# Patient Record
Sex: FEMALE | Race: BLACK OR AFRICAN AMERICAN | NOT HISPANIC OR LATINO | Employment: OTHER | URBAN - METROPOLITAN AREA
[De-identification: names, ages, dates, MRNs, and addresses within clinical notes are randomized per-mention and may not be internally consistent; named-entity substitution may affect disease eponyms.]

---

## 2017-02-18 ENCOUNTER — HOSPITAL ENCOUNTER (EMERGENCY)
Facility: HOSPITAL | Age: 42
Discharge: HOME | End: 2017-02-19
Attending: EMERGENCY MEDICINE
Payer: MEDICAID

## 2017-02-18 DIAGNOSIS — R73.9 HYPERGLYCEMIA: Primary | ICD-10-CM

## 2017-02-18 DIAGNOSIS — R07.89 CHEST PAIN, NON-CARDIAC: ICD-10-CM

## 2017-02-18 LAB
ALBUMIN SERPL BCP-MCNC: 3.5 G/DL
ALP SERPL-CCNC: 126 U/L
ALT SERPL W/O P-5'-P-CCNC: 20 U/L
ANION GAP SERPL CALC-SCNC: 12 MMOL/L
APTT BLDCRRT: 23.4 SEC
AST SERPL-CCNC: 15 U/L
B-HCG UR QL: NEGATIVE
BACTERIA #/AREA URNS HPF: ABNORMAL /HPF
BASOPHILS # BLD AUTO: 0.01 K/UL
BASOPHILS NFR BLD: 0.2 %
BILIRUB SERPL-MCNC: 0.3 MG/DL
BILIRUB UR QL STRIP: NEGATIVE
BUN SERPL-MCNC: 14 MG/DL
CALCIUM SERPL-MCNC: 9.7 MG/DL
CHLORIDE SERPL-SCNC: 96 MMOL/L
CK MB SERPL-MCNC: 2.1 NG/ML
CK MB SERPL-RTO: 2.8 %
CK SERPL-CCNC: 74 U/L
CLARITY UR: CLEAR
CO2 SERPL-SCNC: 25 MMOL/L
COLOR UR: ABNORMAL
CREAT SERPL-MCNC: 1.5 MG/DL
CTP QC/QA: YES
DIFFERENTIAL METHOD: NORMAL
EOSINOPHIL # BLD AUTO: 0.1 K/UL
EOSINOPHIL NFR BLD: 2.3 %
ERYTHROCYTE [DISTWIDTH] IN BLOOD BY AUTOMATED COUNT: 14.2 %
EST. GFR  (AFRICAN AMERICAN): 50 ML/MIN/1.73 M^2
EST. GFR  (NON AFRICAN AMERICAN): 43 ML/MIN/1.73 M^2
GLUCOSE SERPL-MCNC: 637 MG/DL
GLUCOSE UR QL STRIP: ABNORMAL
HCT VFR BLD AUTO: 43.8 %
HGB BLD-MCNC: 14.2 G/DL
HGB UR QL STRIP: ABNORMAL
INR PPP: 0.9
KETONES UR QL STRIP: NEGATIVE
LEUKOCYTE ESTERASE UR QL STRIP: NEGATIVE
LYMPHOCYTES # BLD AUTO: 1.5 K/UL
LYMPHOCYTES NFR BLD: 28.1 %
MAGNESIUM SERPL-MCNC: 2.2 MG/DL
MCH RBC QN AUTO: 27.6 PG
MCHC RBC AUTO-ENTMCNC: 32.4 %
MCV RBC AUTO: 85 FL
MICROSCOPIC COMMENT: ABNORMAL
MONOCYTES # BLD AUTO: 0.3 K/UL
MONOCYTES NFR BLD: 6.1 %
NEUTROPHILS # BLD AUTO: 3.3 K/UL
NEUTROPHILS NFR BLD: 63.3 %
NITRITE UR QL STRIP: NEGATIVE
PH UR STRIP: 7 [PH] (ref 5–8)
PLATELET # BLD AUTO: 287 K/UL
PMV BLD AUTO: 11 FL
POCT GLUCOSE: 361 MG/DL (ref 70–110)
POCT GLUCOSE: 387 MG/DL (ref 70–110)
POCT GLUCOSE: 402 MG/DL (ref 70–110)
POTASSIUM SERPL-SCNC: 4.3 MMOL/L
PROT SERPL-MCNC: 7.9 G/DL
PROT UR QL STRIP: NEGATIVE
PROTHROMBIN TIME: 9.5 SEC
RBC # BLD AUTO: 5.14 M/UL
RBC #/AREA URNS HPF: >100 /HPF (ref 0–4)
SODIUM SERPL-SCNC: 133 MMOL/L
SP GR UR STRIP: 1.02 (ref 1–1.03)
SQUAMOUS #/AREA URNS HPF: 4 /HPF
TROPONIN I SERPL DL<=0.01 NG/ML-MCNC: 0.02 NG/ML
TROPONIN I SERPL DL<=0.01 NG/ML-MCNC: <0.006 NG/ML
TSH SERPL DL<=0.005 MIU/L-ACNC: 0.88 UIU/ML
URN SPEC COLLECT METH UR: ABNORMAL
UROBILINOGEN UR STRIP-ACNC: NEGATIVE EU/DL
WBC # BLD AUTO: 5.26 K/UL
WBC #/AREA URNS HPF: 2 /HPF (ref 0–5)
YEAST URNS QL MICRO: ABNORMAL

## 2017-02-18 PROCEDURE — 82962 GLUCOSE BLOOD TEST: CPT

## 2017-02-18 PROCEDURE — 96374 THER/PROPH/DIAG INJ IV PUSH: CPT

## 2017-02-18 PROCEDURE — 84443 ASSAY THYROID STIM HORMONE: CPT

## 2017-02-18 PROCEDURE — 96376 TX/PRO/DX INJ SAME DRUG ADON: CPT

## 2017-02-18 PROCEDURE — 80053 COMPREHEN METABOLIC PANEL: CPT

## 2017-02-18 PROCEDURE — 81000 URINALYSIS NONAUTO W/SCOPE: CPT

## 2017-02-18 PROCEDURE — 83735 ASSAY OF MAGNESIUM: CPT

## 2017-02-18 PROCEDURE — 63600175 PHARM REV CODE 636 W HCPCS: Performed by: EMERGENCY MEDICINE

## 2017-02-18 PROCEDURE — 99285 EMERGENCY DEPT VISIT HI MDM: CPT | Mod: 25

## 2017-02-18 PROCEDURE — 96372 THER/PROPH/DIAG INJ SC/IM: CPT

## 2017-02-18 PROCEDURE — 84484 ASSAY OF TROPONIN QUANT: CPT | Mod: 91

## 2017-02-18 PROCEDURE — 85025 COMPLETE CBC W/AUTO DIFF WBC: CPT

## 2017-02-18 PROCEDURE — 81025 URINE PREGNANCY TEST: CPT | Performed by: EMERGENCY MEDICINE

## 2017-02-18 PROCEDURE — 85730 THROMBOPLASTIN TIME PARTIAL: CPT

## 2017-02-18 PROCEDURE — 96375 TX/PRO/DX INJ NEW DRUG ADDON: CPT

## 2017-02-18 PROCEDURE — 96361 HYDRATE IV INFUSION ADD-ON: CPT

## 2017-02-18 PROCEDURE — 93005 ELECTROCARDIOGRAM TRACING: CPT

## 2017-02-18 PROCEDURE — 85610 PROTHROMBIN TIME: CPT

## 2017-02-18 PROCEDURE — 25000003 PHARM REV CODE 250: Performed by: EMERGENCY MEDICINE

## 2017-02-18 PROCEDURE — 82553 CREATINE MB FRACTION: CPT

## 2017-02-18 RX ORDER — METFORMIN HYDROCHLORIDE 500 MG/1
1000 TABLET ORAL 2 TIMES DAILY WITH MEALS
Status: DISCONTINUED | OUTPATIENT
Start: 2017-02-19 | End: 2017-02-18

## 2017-02-18 RX ORDER — INSULIN ASPART 100 [IU]/ML
15 INJECTION, SOLUTION INTRAVENOUS; SUBCUTANEOUS
Status: COMPLETED | OUTPATIENT
Start: 2017-02-18 | End: 2017-02-18

## 2017-02-18 RX ORDER — INSULIN ASPART 100 [IU]/ML
25 INJECTION, SOLUTION INTRAVENOUS; SUBCUTANEOUS
Status: DISCONTINUED | OUTPATIENT
Start: 2017-02-18 | End: 2017-02-18

## 2017-02-18 RX ORDER — HYDROMORPHONE HYDROCHLORIDE 2 MG/ML
1 INJECTION, SOLUTION INTRAMUSCULAR; INTRAVENOUS; SUBCUTANEOUS
Status: COMPLETED | OUTPATIENT
Start: 2017-02-18 | End: 2017-02-18

## 2017-02-18 RX ORDER — ONDANSETRON 2 MG/ML
8 INJECTION INTRAMUSCULAR; INTRAVENOUS
Status: COMPLETED | OUTPATIENT
Start: 2017-02-18 | End: 2017-02-18

## 2017-02-18 RX ORDER — INSULIN ASPART 100 [IU]/ML
10 INJECTION, SOLUTION INTRAVENOUS; SUBCUTANEOUS
Status: COMPLETED | OUTPATIENT
Start: 2017-02-18 | End: 2017-02-18

## 2017-02-18 RX ORDER — INSULIN ASPART 100 [IU]/ML
20 INJECTION, SOLUTION INTRAVENOUS; SUBCUTANEOUS
Status: DISCONTINUED | OUTPATIENT
Start: 2017-02-18 | End: 2017-02-18

## 2017-02-18 RX ORDER — ACETAMINOPHEN 500 MG
1000 TABLET ORAL
Status: COMPLETED | OUTPATIENT
Start: 2017-02-18 | End: 2017-02-18

## 2017-02-18 RX ORDER — GLIMEPIRIDE 4 MG/1
4 TABLET ORAL
Status: DISCONTINUED | OUTPATIENT
Start: 2017-02-19 | End: 2017-02-19 | Stop reason: HOSPADM

## 2017-02-18 RX ADMIN — SODIUM CHLORIDE 2000 ML: 0.9 INJECTION, SOLUTION INTRAVENOUS at 11:02

## 2017-02-18 RX ADMIN — INSULIN HUMAN 12 UNITS: 100 INJECTION, SOLUTION PARENTERAL at 11:02

## 2017-02-18 RX ADMIN — ACETAMINOPHEN 1000 MG: 500 TABLET ORAL at 07:02

## 2017-02-18 RX ADMIN — HYDROMORPHONE HYDROCHLORIDE 1 MG: 2 INJECTION INTRAMUSCULAR; INTRAVENOUS; SUBCUTANEOUS at 07:02

## 2017-02-18 RX ADMIN — SODIUM CHLORIDE 1000 ML: 0.9 INJECTION, SOLUTION INTRAVENOUS at 07:02

## 2017-02-18 RX ADMIN — ONDANSETRON 8 MG: 2 INJECTION INTRAMUSCULAR; INTRAVENOUS at 07:02

## 2017-02-18 RX ADMIN — INSULIN HUMAN 12 UNITS: 100 INJECTION, SOLUTION PARENTERAL at 09:02

## 2017-02-18 RX ADMIN — INSULIN ASPART 10 UNITS: 100 INJECTION, SOLUTION INTRAVENOUS; SUBCUTANEOUS at 10:02

## 2017-02-18 RX ADMIN — SODIUM CHLORIDE 2000 ML: 0.9 INJECTION, SOLUTION INTRAVENOUS at 09:02

## 2017-02-18 RX ADMIN — INSULIN ASPART 15 UNITS: 100 INJECTION, SOLUTION INTRAVENOUS; SUBCUTANEOUS at 11:02

## 2017-02-18 NOTE — ED AVS SNAPSHOT
[facility]  [address] [place] [address]               Fabiana Chanel   2017  6:48 PM   ED    Description:  Female : 1975   Department:  [facility]           Your Care was Coordinated By:     Provider Role From To    Cristian Treadwell MD Attending Provider [id number] --      Reason for Visit     Chest Pain           Diagnoses this Visit        Comments    Hyperglycemia    -  Primary     Uncontrolled type 2 diabetes mellitus without complication, without long-term current use of insulin         Chest pain, non-cardiac           ED Disposition     ED Disposition Condition Comment    Discharge             To Do List           Follow-up Information     Follow up with Aguilar Daniel MD In 3 days.    Specialty:  Family Medicine    Contact information:    [address] [facility]  [place] [address] [phone]         These Medications        Disp Refills Start End    glimepiride (AMARYL) 4 MG tablet 30 tablet 3 2017     Take 1 tablet (4 mg total) by mouth before breakfast. - Oral    Pharmacy: [facility] [address] [address] Ph #: [phone]         [facility]     [facility] -  Assistance  Registered nurses in the [facility] provide clinical advisement, health education, appointment booking, and other advisory services.  Call for this free service at [phone].             Medications           Message regarding Medications     Verify the changes and/or additions to your medication regime listed below are the same as discussed with your clinician today.  If any of these changes or additions are incorrect, please notify your healthcare provider.        These medications were administered today        Dose Freq    sodium chloride 0.9% bolus 1,000 mL 1,000 mL ED 1 Time    Sig: Inject 1,000 mLs into the vein ED 1 Time.    Class: Normal    Route: Intravenous     acetaminophen tablet 1,000 mg 1,000 mg ED 1 Time    Sig: Take 2 tablets (1,000 mg total) by mouth ED 1 Time.    Class: Normal    Route: Oral    ondansetron injection 8 mg 8 mg ED 1 Time    Sig: Inject 8 mg into the vein ED 1 Time.    Class: Normal    Route: Intravenous    hydromorphone (PF) injection 1 mg 1 mg ED 1 Time    Sig: Inject 0.5 mLs (1 mg total) into the vein ED 1 Time.    Class: Normal    Route: Intravenous    insulin regular injection 12 Units 12 Units ED 1 Time    Sig: Inject 12 Units into the vein ED 1 Time.    Class: Normal    Route: Intravenous    sodium chloride 0.9% bolus 2,000 mL 2,000 mL ED 1 Time    Sig: Inject 2,000 mLs into the vein ED 1 Time.    Class: Normal    Route: Intravenous    insulin aspart pen 10 Units 10 Units ED 1 Time    Sig: Inject 10 Units into the skin ED 1 Time.    Class: Normal    Route: Subcutaneous    insulin regular injection 12 Units 12 Units ED 1 Time    Sig: Inject 12 Units into the vein ED 1 Time.    Class: Normal    Route: Intravenous    insulin aspart pen 15 Units 15 Units ED 1 Time    Sig: Inject 15 Units into the skin ED 1 Time.    Class: Normal    Route: Subcutaneous    sodium chloride 0.9% bolus 2,000 mL 2,000 mL ED 1 Time    Sig: Inject 2,000 mLs into the vein ED 1 Time.    Class: Normal    Route: Intravenous    glimepiride tablet 4 mg 4 mg With breakfast    Starting on: 2/19/2017    Sig: Take 1 tablet (4 mg total) by mouth daily with breakfast.    Class: Normal    Route: Oral      CHANGE how you are taking these medications     Start Taking Instead of    glimepiride (AMARYL) 4 MG tablet glimepiride (AMARYL) 4 MG tablet    Dosage:  Take 1 tablet (4 mg total) by mouth before breakfast. Dosage:  Take 4 mg by mouth before breakfast.    Starting on: 2/19/2017       STOP taking these medications     naproxen (NAPROSYN) 500 MG tablet Take 1 tablet (500 mg total) by mouth 2 (two) times daily with meals.           Verify that the below list of medications is an accurate  "representation of the medications you are currently taking.  If none reported, the list may be blank. If incorrect, please contact your healthcare provider. Carry this list with you in case of emergency.           Current Medications     albuterol 90 mcg/actuation inhaler Inhale 2 puffs into the lungs every 6 (six) hours as needed for Wheezing.    aspirin 81 MG Chew Take 81 mg by mouth once daily.    carvedilol (COREG) 12.5 MG tablet Take 12.5 mg by mouth 2 (two) times daily with meals.    furosemide (LASIX) 40 MG tablet Take 1 tablet (40 mg total) by mouth once daily.    isosorbide mononitrate (IMDUR) 30 MG 24 hr tablet Take 1 tablet (30 mg total) by mouth once daily.    lansoprazole (PREVACID) 30 MG capsule Take 30 mg by mouth once daily.    lisinopril (PRINIVIL,ZESTRIL) 40 MG tablet Take 1 tablet (40 mg total) by mouth once daily.    metoprolol succinate (TOPROL-XL) 50 MG 24 hr tablet Take 1 tablet (50 mg total) by mouth once daily.    glimepiride (AMARYL) 4 MG tablet Take 1 tablet (4 mg total) by mouth before breakfast.    glimepiride tablet 4 mg Take 1 tablet (4 mg total) by mouth daily with breakfast.    medroxyPROGESTERone (DEPO-PROVERA) 150 mg/mL Syrg Inject 1 mL (150 mg total) into the muscle once.    simvastatin (ZOCOR) 40 MG tablet Take 1 tablet (40 mg total) by mouth every evening.    valacyclovir (VALTREX) 500 MG tablet Take 1 tablet (500 mg total) by mouth 2 (two) times daily.           Clinical Reference Information           Your Vitals Were     BP Pulse Temp Resp Height Weight    138/83 78 98.5 °F (36.9 °C) (Oral) 18 5' 7" (1.702 m) 111.1 kg (245 lb)    Last Period SpO2 BMI          02/11/2017 (Approximate) 100% 38.37 kg/m2        Allergies as of 2/19/2017        Reactions    Pneumococcal 23-abimbola Ps Vaccine       Immunizations Administered on Date of Encounter - 2/19/2017     None      ED Micro, Lab, POCT     Start Ordered       Status Ordering Provider    02/19/17 0023 02/19/17 0023  POCT glucose  " Once      Final result     02/18/17 2321 02/18/17 2321  POCT glucose  Once      Final result     02/18/17 2319 02/18/17 2318  POCT glucose  Once      Acknowledged     02/18/17 2222 02/18/17 2221  Troponin I  STAT      Final result     02/18/17 2211 02/18/17 2211  POCT glucose  Once      Final result     02/18/17 2120 02/18/17 2120  POCT glucose  Once      Final result     02/18/17 1927 02/18/17 1926  Comprehensive metabolic panel  STAT      Final result     02/18/17 1927 02/18/17 1926  Troponin I  STAT      Final result     02/18/17 1927 02/18/17 1926  APTT  STAT      Final result     02/18/17 1927 02/18/17 1926  Protime-INR  STAT      Final result     02/18/17 1927 02/18/17 1926  Magnesium  STAT      Final result     02/18/17 1927 02/18/17 1926  TSH  STAT      Final result     02/18/17 1927 02/18/17 1926  Urinalysis  STAT      Final result     02/18/17 1927 02/18/17 1926  CBC auto differential  STAT      Final result     02/18/17 1927 02/18/17 1926  CK-MB  STAT      Final result     02/18/17 1926 02/18/17 1926  Urinalysis Microscopic  Once      Final result     02/18/17 1853 02/18/17 1852  POCT urine pregnancy  Once      Final result       ED Imaging Orders     Start Ordered       Status Ordering Provider    02/18/17 1927 02/18/17 1926  X-Ray Chest AP Portable  1 time imaging      Final result         Discharge Instructions       Please follow your diabetic diet.  Meat and vegetables only.  Some fruit.  Do not eat candy, pasta bread rice potatoes starches.  Lots of liquids without sugar.  Please take her Amaryl as directed.  Please check her blood sugar twice a day and write the values down.  Please follow-up with your primary care doctor this week.  Take your Amaryl as its prescribed.    [facility] Sign-Up     Activating your [facility] account is as easy as 1-2-3!     1) Visit [email or web address], select Sign Up Now, enter this activation code and your date of birth, then select Next.  [email or web address]  Expires:  4/5/2017  1:05 AM      2) Create a username and password to use when you visit [facility] in the future and select a security question in case you lose your password and select Next.    3) Enter your e-mail address and click Sign Up!    Additional Information  If you have questions, please e-mail [email or web address] or call [phone] to talk to our [facility] staff. Remember, [facility] is NOT to be used for urgent needs. For medical emergencies, dial 911.         Smoking Cessation     If you would like to quit smoking:   You may be eligible for free services if you are a [place] resident and started smoking cigarettes before September 1, 1988.  Call the [employer]oking [employer]essation [employer] ([employer]) toll free at (513) 273-8233 or (609) 951-0625.   Call 3-129-QUIT-NOW if you do not meet the above criteria.             [facility] complies with applicable Federal civil rights laws and does not discriminate on the basis of race, color, national origin, age, disability, or sex.        Language Assistance Services     ATTENTION: Language assistance services are available, free of charge. Please call [phone].      ATENCIÓN: Si habla español, tiene a farley disposición servicios gratuitos de asistencia lingüística. Llame al [phone].     CHÚ Ý: N?u b?n nói Ti?ng Vi?t, có các d?ch v? h? tr? ngôn ng? mi?n phí dành cho b?n. G?i s? [phone].

## 2017-02-19 VITALS
DIASTOLIC BLOOD PRESSURE: 89 MMHG | HEART RATE: 74 BPM | BODY MASS INDEX: 38.45 KG/M2 | OXYGEN SATURATION: 90 % | WEIGHT: 245 LBS | RESPIRATION RATE: 18 BRPM | SYSTOLIC BLOOD PRESSURE: 118 MMHG | TEMPERATURE: 99 F | HEIGHT: 67 IN

## 2017-02-19 LAB
POCT GLUCOSE: 220 MG/DL (ref 70–110)
POCT GLUCOSE: 296 MG/DL (ref 70–110)

## 2017-02-19 PROCEDURE — 25000003 PHARM REV CODE 250: Performed by: EMERGENCY MEDICINE

## 2017-02-19 PROCEDURE — 82962 GLUCOSE BLOOD TEST: CPT

## 2017-02-19 RX ORDER — GLIMEPIRIDE 4 MG/1
4 TABLET ORAL
Qty: 30 TABLET | Refills: 3 | Status: SHIPPED | OUTPATIENT
Start: 2017-02-19 | End: 2018-04-11 | Stop reason: SDUPTHER

## 2017-02-19 RX ADMIN — GLIMEPIRIDE 4 MG: 4 TABLET ORAL at 12:02

## 2017-02-19 NOTE — ED NOTES
Rounding on the patient has been done. she has been updated on the plan of care and her current status. Pain was assessed and is currently a 8/10. Comfort positioning and restroom needs were addressed. Necessary items were placed with in her reach and she was advised when a reassessment would take place. The call bell remains at the bedside for any additional patient needs. The patient is resting comfortably on the stretcher, respirations are even and unlabored, skin warm and dry. Will continue to monitor.

## 2017-02-19 NOTE — DISCHARGE INSTRUCTIONS
Please follow your diabetic diet.  Meat and vegetables only.  Some fruit.  Do not eat candy, pasta bread rice potatoes starches.  Lots of liquids without sugar.  Please take her Amaryl as directed.  Please check her blood sugar twice a day and write the values down.  Please follow-up with your primary care doctor this week.  Take your Amaryl as its prescribed.

## 2017-02-19 NOTE — ED PROVIDER NOTES
"Encounter Date: 2/18/2017    SCRIBE #1 NOTE: I, Elma Cerda, am scribing for, and in the presence of,  Cristian Treadwell MD. I have scribed the following portions of the note - Other sections scribed: HPI and ROS.       History     Chief Complaint   Patient presents with    Chest Pain     midsternal chest pain x 5 hours "my body been aching all night and my headache."      Review of patient's allergies indicates:   Allergen Reactions    Pneumococcal 23-abimbola ps vaccine      HPI Comments: CC: Chest Pain    HPI: This 41 y.o. female with medical history of HTN, CHF, psoriasis, diabetes mellitus, blood clot (associated with vein wall inflammation), sleep apnea and heart attack presents to the ED c/o mid-sternal chest pain that began 3 hours ago. Pt describes the symptoms as "a tight pain" and notes that the symptoms are exacerbated by movement and palpation. Pt also c/o body aches (since yesterday), frontal headache (gradual onset; 1x week), sore throat, "a little" cough and emesis (2x episodes in 24 hours). Pt reports experiencing her last menstrual cycle last week and notes that it was normal and on time. Pt denies abdominal pain, fever, rhinorrhea, ear pain, shortness of breath, diarrhea and dysuria. No other associated symptoms. No attempted treatment reported. No alleviating factors.         The history is provided by the patient. No  was used.     Past Medical History   Diagnosis Date    Blood clot associated with vein wall inflammation     CHF (congestive heart failure)     DM (diabetes mellitus) 9/19/2013    Hypertension     Prediabetes     Psoriasis     Sleep apnea      Past Medical History Pertinent Negatives   Diagnosis Date Noted    Blood transfusion 9/18/2013    Transfusion reaction 9/18/2013     Past Surgical History   Procedure Laterality Date    Dilation and curettage of uterus      Colonoscopy       Family History   Problem Relation Age of Onset    Hypertension Mother  " "   Hypertension Father     Diabetes Father     Diabetes Maternal Grandmother     Diabetes Paternal Grandmother     Breast cancer Neg Hx     Colon cancer Neg Hx     Ovarian cancer Neg Hx      Social History   Substance Use Topics    Smoking status: Passive Smoke Exposure - Never Smoker     Types: Cigarettes    Smokeless tobacco: Never Used      Comment: smokes cigars on occasion    Alcohol use Yes      Comment: occasional     Review of Systems   Constitutional: Negative for fever.   HENT: Positive for sore throat. Negative for ear pain and rhinorrhea.    Eyes: Negative for pain.   Respiratory: Positive for cough ("a little"). Negative for shortness of breath.    Cardiovascular: Positive for chest pain (mid-sternal).   Gastrointestinal: Positive for vomiting (2x episodes in 24 hours). Negative for abdominal pain, diarrhea and nausea.   Genitourinary: Negative for dysuria.   Musculoskeletal: Negative for back pain.        (+) body aches   Skin: Negative for rash.   Neurological: Positive for headaches (frontal). Negative for weakness.       Physical Exam   Initial Vitals   BP Pulse Resp Temp SpO2   02/18/17 1821 02/18/17 1821 02/18/17 1821 02/18/17 1821 02/18/17 1821   123/77 106 18 99.4 °F (37.4 °C) 93 %     Physical Exam  The patient was examined specifically for the following:   General:No significant distress, Good color, Warm and dry. Head and neck:Scalp atraumatic, Neck supple. Neurological:Appropriate conversation, Gross motor deficits. Eyes:Conjugate gaze, Clear corneas. ENT: No epistaxis. Cardiac: Regular rate and rhythm, Grossly normal heart tones. Pulmonary: Wheezing, Rales. Gastrointestinal: Abdominal tenderness, Abdominal distention. Musculoskeletal: Extremity deformity, Apparent pain with range of motion of the joints. Skin: Rash.   The findings on examination were normal except for the following: The patient has exquisite tenderness to the left lower sternal border the chest.  Palpation there " reproduces the pain of chief complaint.  The neck is supple.  Mental status examination, cranial nerves, motor and sensory examination are normal lungs are clear and free wheezing rales of the rhonchi.  Heart tones are normal except for regular tachycardia.  The abdomen is nontender.  Extremities are nontender there is no pale range of motion of any joints.  The pharynx is unremarkable.  The patient's temperatures 99.4.  Vital signs are stable except for the tachycardia with a heart rate of 106.  The patient is not short of breath.  There is no evidence of cardiopulmonary strain.  ED Course   Procedures  Labs Reviewed   COMPREHENSIVE METABOLIC PANEL - Abnormal; Notable for the following:        Result Value    Sodium 133 (*)     Glucose 637 (*)     Creatinine 1.5 (*)     eGFR if  50 (*)     eGFR if non  43 (*)     All other components within normal limits    Narrative:      Glucose result(s) called and verbal readback obtained from Shanna Guerrier., 02/18/2017 20:38   URINALYSIS - Abnormal; Notable for the following:     Glucose, UA 3+ (*)     Occult Blood UA 3+ (*)     All other components within normal limits   URINALYSIS MICROSCOPIC - Abnormal; Notable for the following:     RBC, UA >100 (*)     All other components within normal limits   POCT GLUCOSE - Abnormal; Notable for the following:     POCT Glucose 402 (*)     All other components within normal limits   POCT GLUCOSE - Abnormal; Notable for the following:     POCT Glucose 361 (*)     All other components within normal limits   POCT GLUCOSE - Abnormal; Notable for the following:     POCT Glucose 387 (*)     All other components within normal limits   POCT GLUCOSE - Abnormal; Notable for the following:     POCT Glucose 296 (*)     All other components within normal limits   POCT GLUCOSE - Abnormal; Notable for the following:     POCT Glucose 220 (*)     All other components within normal limits   TROPONIN I   APTT   PROTIME-INR    MAGNESIUM   TSH   CBC W/ AUTO DIFFERENTIAL   CK-MB   TROPONIN I   POCT URINE PREGNANCY   POCT GLUCOSE MONITORING CONTINUOUS     EKG Readings: (Independently Interpreted)   Patient's EKG reveals nonspecific ST and T-wave changes.  There is no definite evidence of acute myocardial infarction or malignant arrhythmia.  This patient may have left ventricular hypertrophy.       X-Rays:   Independently Interpreted Readings:   Other Readings:  Chest x-ray fails to reveal pneumothorax pneumonia pleural effusion    Medical decision making: Given the above, this patient presented emergency room with multiple complaints.  She has generalized body aches and headache in front of the head and chest pain.  The patient states she's had 2 small myocardial infarctions in the past.  2 troponins were negative in the emergency room.  I doubt myocardial infarction.  The patient's pain is worse with movement.  The chest is very tender.  I can find no evidence of rhabdomyolysis significant infection.  The patient does have hematuria.  I will discharge her to follow-up with primary care.  I find no evidence of myocardial infarction.  I doubt pulmonary embolus.  The patient is not short of breath or tachycardic.  She is morbidly obese.  Her diabetes is uncontrolled.  I discussed this case with Dr. Harding, who is in agreement with the treatment plan.  We will discharge her on metformin 1000 twice a day in addition to her current diabetes medicine.         Patient's glucose is 296 at 1 AM.  I learned that this patient has been out of her Amaryl for quite some time. .  She has had trouble getting them refilled.  I will start her back on her Amaryl.  I will advise compliance with her diabetic diet.  I will have her follow-up with primary care doctor.  I believe the patient has chest wall pain.  She is tender where she hurts.  I will have her drink lots of liquids without sugar.       Scribe Attestation:   Scribe #1: I performed the above  scribed service and the documentation accurately describes the services I performed. I attest to the accuracy of the note.    Attending Attestation:           Physician Attestation for Scribe:  Physician Attestation Statement for Scribe #1: I, Cristian Treadwell MD, reviewed documentation, as scribed by Elma Cerda in my presence, and it is both accurate and complete.                 ED Course     Clinical Impression:   The primary encounter diagnosis was Hyperglycemia. Diagnoses of Uncontrolled type 2 diabetes mellitus without complication, without long-term current use of insulin and Chest pain, non-cardiac were also pertinent to this visit.          Cristian Treadwell MD  02/19/17 0869

## 2017-02-19 NOTE — ED NOTES
Rounding on the patient has been done. she has been updated on the plan of care and her current status. Pain was assessed and is currently a 3/10. Comfort positioning and restroom needs were addressed. Necessary items were placed with in her reach and she was advised when a reassessment would take place. The call bell remains at the bedside for any additional patient needs. The patient is resting comfortably on the stretcher, respirations are even and unlabored, skin warm and dry. Will continue to monitor.

## 2017-02-19 NOTE — ED NOTES
Rounding on the patient has been done. she has been updated on the plan of care and her current status. Pain was assessed and is currently a 4/10. Comfort positioning and restroom needs were addressed. Necessary items were placed with in her reach and she was advised when a reassessment would take place. The call bell remains at the bedside for any additional patient needs. The patient is resting comfortably on the stretcher, respirations are even and unlabored, skin warm and dry. Will continue to monitor.

## 2017-02-19 NOTE — ED NOTES
Rounding on the patient has been done. she has been updated on the plan of care and her current status. Pain was assessed and is currently a 0/10. Comfort positioning and restroom needs were addressed. Necessary items were placed with in her reach and she was advised when a reassessment would take place. The call bell remains at the bedside for any additional patient needs. The patient is resting comfortably on the stretcher, respirations are even and unlabored, skin warm and dry. Will continue to monitor.

## 2017-02-23 ENCOUNTER — HOSPITAL ENCOUNTER (EMERGENCY)
Facility: HOSPITAL | Age: 42
Discharge: HOME | End: 2017-02-24
Attending: EMERGENCY MEDICINE
Payer: MEDICAID

## 2017-02-23 DIAGNOSIS — R73.9 HYPERGLYCEMIA: Primary | ICD-10-CM

## 2017-02-23 DIAGNOSIS — R52 BODY ACHES: ICD-10-CM

## 2017-02-23 DIAGNOSIS — E86.0 DEHYDRATION: ICD-10-CM

## 2017-02-23 LAB
ALBUMIN SERPL BCP-MCNC: 3.3 G/DL
ALP SERPL-CCNC: 105 U/L
ALT SERPL W/O P-5'-P-CCNC: 20 U/L
ANION GAP SERPL CALC-SCNC: 10 MMOL/L
AST SERPL-CCNC: 16 U/L
B-HCG UR QL: NEGATIVE
BASOPHILS # BLD AUTO: 0.01 K/UL
BASOPHILS NFR BLD: 0.2 %
BILIRUB SERPL-MCNC: 0.3 MG/DL
BUN SERPL-MCNC: 16 MG/DL
CALCIUM SERPL-MCNC: 9.8 MG/DL
CHLORIDE SERPL-SCNC: 98 MMOL/L
CK SERPL-CCNC: 81 U/L
CO2 SERPL-SCNC: 25 MMOL/L
CREAT SERPL-MCNC: 1.4 MG/DL
CTP QC/QA: YES
DIFFERENTIAL METHOD: NORMAL
EOSINOPHIL # BLD AUTO: 0.1 K/UL
EOSINOPHIL NFR BLD: 2 %
ERYTHROCYTE [DISTWIDTH] IN BLOOD BY AUTOMATED COUNT: 14.1 %
EST. GFR  (AFRICAN AMERICAN): 54 ML/MIN/1.73 M^2
EST. GFR  (NON AFRICAN AMERICAN): 47 ML/MIN/1.73 M^2
GLUCOSE SERPL-MCNC: 447 MG/DL
GLUCOSE SERPL-MCNC: >433 MG/DL (ref 70–110)
HCT VFR BLD AUTO: 42.5 %
HGB BLD-MCNC: 13.9 G/DL
INR PPP: 0.9
LYMPHOCYTES # BLD AUTO: 1.6 K/UL
LYMPHOCYTES NFR BLD: 27.3 %
MCH RBC QN AUTO: 27.9 PG
MCHC RBC AUTO-ENTMCNC: 32.7 %
MCV RBC AUTO: 85 FL
MONOCYTES # BLD AUTO: 0.3 K/UL
MONOCYTES NFR BLD: 4.9 %
NEUTROPHILS # BLD AUTO: 3.8 K/UL
NEUTROPHILS NFR BLD: 65.6 %
PLATELET # BLD AUTO: 253 K/UL
PMV BLD AUTO: 10.8 FL
POCT GLUCOSE: 433 MG/DL (ref 70–110)
POTASSIUM SERPL-SCNC: 4.4 MMOL/L
PROT SERPL-MCNC: 7.6 G/DL
PROTHROMBIN TIME: 9.7 SEC
RBC # BLD AUTO: 4.99 M/UL
SODIUM SERPL-SCNC: 133 MMOL/L
TROPONIN I SERPL DL<=0.01 NG/ML-MCNC: <0.006 NG/ML
WBC # BLD AUTO: 5.86 K/UL

## 2017-02-23 PROCEDURE — 96361 HYDRATE IV INFUSION ADD-ON: CPT

## 2017-02-23 PROCEDURE — 93005 ELECTROCARDIOGRAM TRACING: CPT

## 2017-02-23 PROCEDURE — 80053 COMPREHEN METABOLIC PANEL: CPT

## 2017-02-23 PROCEDURE — 63600175 PHARM REV CODE 636 W HCPCS: Performed by: EMERGENCY MEDICINE

## 2017-02-23 PROCEDURE — 25000003 PHARM REV CODE 250: Performed by: EMERGENCY MEDICINE

## 2017-02-23 PROCEDURE — 96374 THER/PROPH/DIAG INJ IV PUSH: CPT

## 2017-02-23 PROCEDURE — 82550 ASSAY OF CK (CPK): CPT

## 2017-02-23 PROCEDURE — 96375 TX/PRO/DX INJ NEW DRUG ADDON: CPT

## 2017-02-23 PROCEDURE — 81025 URINE PREGNANCY TEST: CPT | Performed by: EMERGENCY MEDICINE

## 2017-02-23 PROCEDURE — 85025 COMPLETE CBC W/AUTO DIFF WBC: CPT

## 2017-02-23 PROCEDURE — 82962 GLUCOSE BLOOD TEST: CPT

## 2017-02-23 PROCEDURE — 84484 ASSAY OF TROPONIN QUANT: CPT

## 2017-02-23 PROCEDURE — 85610 PROTHROMBIN TIME: CPT

## 2017-02-23 PROCEDURE — 99284 EMERGENCY DEPT VISIT MOD MDM: CPT | Mod: 25

## 2017-02-23 RX ORDER — SODIUM CHLORIDE 9 MG/ML
500 INJECTION, SOLUTION INTRAVENOUS
Status: COMPLETED | OUTPATIENT
Start: 2017-02-23 | End: 2017-02-24

## 2017-02-23 RX ORDER — MORPHINE SULFATE 10 MG/ML
6 INJECTION INTRAMUSCULAR; INTRAVENOUS; SUBCUTANEOUS
Status: COMPLETED | OUTPATIENT
Start: 2017-02-23 | End: 2017-02-23

## 2017-02-23 RX ADMIN — MORPHINE SULFATE 6 MG: 10 INJECTION INTRAVENOUS at 09:02

## 2017-02-23 RX ADMIN — INSULIN HUMAN 5 UNITS: 100 INJECTION, SOLUTION PARENTERAL at 11:02

## 2017-02-23 RX ADMIN — SODIUM CHLORIDE, SODIUM LACTATE, POTASSIUM CHLORIDE, AND CALCIUM CHLORIDE 1000 ML: .6; .31; .03; .02 INJECTION, SOLUTION INTRAVENOUS at 09:02

## 2017-02-23 RX ADMIN — SODIUM CHLORIDE 500 ML: 0.9 INJECTION, SOLUTION INTRAVENOUS at 11:02

## 2017-02-23 NOTE — ED AVS SNAPSHOT
[facility]  [address] [place] [address]               Fabiana Chanel   2017  7:31 PM   ED    Description:  Female : 1975   Department:  [facility]           Your Care was Coordinated By:     Provider Role From To    Cristian Turpin III, MD Attending Provider 17 --      Reason for Visit     multiple complaints     Hyperglycemia           Diagnoses this Visit        Comments    Hyperglycemia    -  Primary     Dehydration         Body aches           ED Disposition     ED Disposition Condition Comment    Discharge             To Do List           Follow-up Information     Follow up with Christopher Ferguson MD In 1 week.    Specialty:  Family Medicine    Contact information:    [address] [facility]  [place] [address] [phone]         These Medications        Disp Refills Start End    metformin (GLUCOPHAGE) 500 MG tablet 56 tablet 0 2017 3/24/2017    Take 1 tablet (500 mg total) by mouth 2 (two) times daily with meals. - Oral    Pharmacy: [facility] [address] Ph #: [phone]         [facility] On Call     [facility] -  Assistance  Registered nurses in the [facility] provide clinical advisement, health education, appointment booking, and other advisory services.  Call for this free service at [phone].             Medications           Message regarding Medications     Verify the changes and/or additions to your medication regime listed below are the same as discussed with your clinician today.  If any of these changes or additions are incorrect, please notify your healthcare provider.        START taking these NEW medications        Refills    metformin (GLUCOPHAGE) 500 MG tablet 0    Sig: Take 1 tablet (500 mg total) by mouth 2 (two) times daily with meals.    Class: Print    Route: Oral      These medications were administered today        Dose  Freq    morphine injection 6 mg 6 mg ED 1 Time    Sig: Inject 0.6 mLs (6 mg total) into the vein ED 1 Time.    Class: Normal    Route: Intravenous    lactated ringers bolus 1,000 mL 1,000 mL ED 1 Time    Sig: Inject 1,000 mLs into the vein ED 1 Time.    Class: Normal    Route: Intravenous    0.9%  NaCl infusion 500 mL ED 1 Time    Sig: Inject 500 mLs into the vein ED 1 Time.    Class: Normal    Route: Intravenous    insulin regular injection 5 Units 5 Units ED 1 Time    Sig: Inject 5 Units into the vein ED 1 Time.    Class: Normal    Route: Intravenous           Verify that the below list of medications is an accurate representation of the medications you are currently taking.  If none reported, the list may be blank. If incorrect, please contact your healthcare provider. Carry this list with you in case of emergency.           Current Medications     albuterol 90 mcg/actuation inhaler Inhale 2 puffs into the lungs every 6 (six) hours as needed for Wheezing.    aspirin 81 MG Chew Take 81 mg by mouth once daily.    carvedilol (COREG) 12.5 MG tablet Take 12.5 mg by mouth 2 (two) times daily with meals.    furosemide (LASIX) 40 MG tablet Take 1 tablet (40 mg total) by mouth once daily.    glimepiride (AMARYL) 4 MG tablet Take 1 tablet (4 mg total) by mouth before breakfast.    isosorbide mononitrate (IMDUR) 30 MG 24 hr tablet Take 1 tablet (30 mg total) by mouth once daily.    lansoprazole (PREVACID) 30 MG capsule Take 30 mg by mouth once daily.    lisinopril (PRINIVIL,ZESTRIL) 40 MG tablet Take 1 tablet (40 mg total) by mouth once daily.    medroxyPROGESTERone (DEPO-PROVERA) 150 mg/mL Syrg Inject 1 mL (150 mg total) into the muscle once.    metoprolol succinate (TOPROL-XL) 50 MG 24 hr tablet Take 1 tablet (50 mg total) by mouth once daily.    simvastatin (ZOCOR) 40 MG tablet Take 1 tablet (40 mg total) by mouth every evening.    valacyclovir (VALTREX) 500 MG tablet Take 1 tablet (500 mg total) by mouth 2 (two) times  daily.    metformin (GLUCOPHAGE) 500 MG tablet Take 1 tablet (500 mg total) by mouth 2 (two) times daily with meals.           Clinical Reference Information           Your Vitals Were     BP                   107/60           Allergies as of 2/24/2017        Reactions    Pneumococcal 23-abimbola Ps Vaccine       Immunizations Administered on Date of Encounter - 2/24/2017     None      ED Micro, Lab, POCT     Start Ordered       Status Ordering Provider    02/24/17 0055 02/24/17 0055  POCT glucose  Once      Final result     02/24/17 0028 02/24/17 0027  POCT glucose  Once      Acknowledged     02/23/17 2053 02/23/17 2053  CPK  STAT      Final result     02/23/17 1929 02/23/17 1928  Troponin I  STAT      Final result     02/23/17 1929 02/23/17 1928  Protime-INR  STAT      Final result     02/23/17 1929 02/23/17 1928  POCT glucose  Once      Acknowledged     02/23/17 1929 02/23/17 1928  POCT urine pregnancy  Once      Final result     02/23/17 1928 02/23/17 1928  CBC auto differential  STAT      Final result     02/23/17 1928 02/23/17 1928  Comprehensive metabolic panel  STAT      Final result     02/23/17 1821 02/23/17 1821  POCT glucose  Once      Final result     02/23/17 1821 02/23/17 1820  POCT glucose  Once      Acknowledged     02/23/17 0000 02/23/17 1820  POCT glucose     Comments:  This order was created through External Result Entry    Completed       ED Imaging Orders     Start Ordered       Status Ordering Provider    02/23/17 1929 02/23/17 1928  X-Ray Chest 1 View  1 time imaging      Final result         Discharge Instructions       Return to the emergency department if you develop difficulty breathing, fainting, severe lightheadedness, persistent vomiting, severe abdominal pain, or for any new or worsening medical concerns.  It is very important that you follow-up with a primary doctor as soon as you can to help you control your blood sugars better.  It is also important that you are very strict about not  eating sugary foods, such as candy, soda, juice, etc, until your blood sugar is better controlled. I have included a print-out about diabetic diet that you should read.         Discharge References/Attachments     DIABETES, DIET (ENGLISH)    DIABETES, HEALTHY MEALS FOR (ENGLISH)      [facility] Sign-Up     Activating your [facility] account is as easy as 1-2-3!     1) Visit [email or web address], select Sign Up Now, enter this activation code and your date of birth, then select Next.  [email or web address]  Expires: 4/5/2017  1:05 AM      2) Create a username and password to use when you visit [facility] in the future and select a security question in case you lose your password and select Next.    3) Enter your e-mail address and click Sign Up!    Additional Information  If you have questions, please e-mail [email or web address] or call [phone] to talk to our [facility] staff. Remember, [facility] is NOT to be used for urgent needs. For medical emergencies, dial 911.         Smoking Cessation     If you would like to quit smoking:   You may be eligible for free services if you are a [place] resident and started smoking cigarettes before September 1, 1988.  Call the [employer]oking [employer]essation [employer] (SCT) toll free at (760) 865-7057 or (293) [phone].   Call 4-527-QUIT-NOW if you do not meet the above criteria.             [facility] complies with applicable Federal civil rights laws and does not discriminate on the basis of race, color, national origin, age, disability, or sex.        Language Assistance Services     ATTENTION: Language assistance services are available, free of charge. Please call [phone].      ATENCIÓN: Si habla español, tiene a farley disposición servicios gratuitos de asistencia lingüística. Llame al [phone].     CHÚ Ý: N?u b?n nói Ti?ng Vi?t, có các d?ch v? h? tr? ngôn ng? mi?n phí dành cho b?n. G?i s? [phone].

## 2017-02-24 VITALS
HEART RATE: 82 BPM | SYSTOLIC BLOOD PRESSURE: 118 MMHG | WEIGHT: 250 LBS | RESPIRATION RATE: 18 BRPM | BODY MASS INDEX: 39.24 KG/M2 | OXYGEN SATURATION: 95 % | HEIGHT: 67 IN | DIASTOLIC BLOOD PRESSURE: 85 MMHG | TEMPERATURE: 98 F

## 2017-02-24 LAB — POCT GLUCOSE: 276 MG/DL (ref 70–110)

## 2017-02-24 RX ORDER — METFORMIN HYDROCHLORIDE 500 MG/1
500 TABLET ORAL 2 TIMES DAILY WITH MEALS
Qty: 56 TABLET | Refills: 0 | Status: SHIPPED | OUTPATIENT
Start: 2017-02-24 | End: 2019-02-11 | Stop reason: SDUPTHER

## 2017-02-24 NOTE — ED PROVIDER NOTES
"Encounter Date: 2/23/2017    SCRIBE #1 NOTE: I, Loretta Gonsales, am scribing for, and in the presence of,  Cristian Turpin MD. I have scribed the following portions of the note - Other sections scribed: HPI, ROS.       History     Chief Complaint   Patient presents with    multiple complaints     Pt. c/o body aches, headache, and chest pain that began several days ago. Pt. reports the headache is accompanied by blurry vision. Pt. describes the chest pain as "tightness" and located to the midsternal chest.     Hyperglycemia     Pt. states she ran out of test strips for glucometer. Reading today is >433 mg/dL.      Review of patient's allergies indicates:   Allergen Reactions    Pneumococcal 23-abimbola ps vaccine      HPI Comments: CC: Generalized Body Aches    HPI: 41 year old female with DM, HTN, and CHF presents to the ED c/o generalized body aches and headaches that began yesterday. Symptoms are acute onset and moderate (7/10). Pt states she was evaluated at this ED 5 days ago for hyperglycemia. She was put on treatment with glimepiride and has been taking it for the past 4 days. Pt denies fever, chills, activity changes, appetite changes, cough, N/V/D, and any other associated symptoms. No alleviating factors.    Pt is scheduled to see her new PCP on 3/9/17.    The history is provided by the patient. No  was used.     Past Medical History:   Diagnosis Date    Blood clot associated with vein wall inflammation     CHF (congestive heart failure)     DM (diabetes mellitus) 9/19/2013    Hypertension     Prediabetes     Psoriasis     Sleep apnea      Past Surgical History:   Procedure Laterality Date    COLONOSCOPY      DILATION AND CURETTAGE OF UTERUS       Family History   Problem Relation Age of Onset    Hypertension Mother     Hypertension Father     Diabetes Father     Diabetes Maternal Grandmother     Diabetes Paternal Grandmother     Breast cancer Neg Hx     Colon cancer Neg Hx     " Ovarian cancer Neg Hx      Social History   Substance Use Topics    Smoking status: Passive Smoke Exposure - Never Smoker     Types: Cigarettes    Smokeless tobacco: Never Used      Comment: smokes cigars on occasion    Alcohol use Yes      Comment: occasional     Review of Systems   Constitutional: Negative for activity change, appetite change, chills, diaphoresis and fever.   HENT: Negative for ear pain, rhinorrhea and sore throat.    Eyes: Negative for photophobia and visual disturbance.   Respiratory: Negative for cough and shortness of breath.    Cardiovascular: Negative for chest pain.   Gastrointestinal: Negative for abdominal pain, diarrhea, nausea and vomiting.   Genitourinary: Negative for dysuria.   Musculoskeletal: Positive for myalgias. Negative for back pain.   Skin: Negative for rash.   Neurological: Positive for headaches.       Physical Exam   Initial Vitals   BP Pulse Resp Temp SpO2   02/23/17 1818 02/23/17 1818 02/23/17 1818 02/23/17 1818 02/23/17 1818   114/73 96 18 98.3 °F (36.8 °C) 95 %     Physical Exam    Nursing note and vitals reviewed.  Constitutional: She appears well-developed and well-nourished. She is not diaphoretic. No distress.   HENT:   Head: Normocephalic and atraumatic.   Nose: Nose normal.   Mouth/Throat: Oropharynx is clear and moist. No oropharyngeal exudate.   Eyes: Conjunctivae and EOM are normal. Pupils are equal, round, and reactive to light. No scleral icterus.   Neck: Normal range of motion. Neck supple. No thyromegaly present. No tracheal deviation present.   Cardiovascular: Normal rate, regular rhythm and normal heart sounds. Exam reveals no gallop and no friction rub.    No murmur heard.  Pulmonary/Chest: Breath sounds normal. No respiratory distress. She has no wheezes. She has no rhonchi. She has no rales.   Abdominal: Soft. Bowel sounds are normal. She exhibits no distension and no mass. There is no tenderness. There is no rebound and no guarding.    Musculoskeletal: Normal range of motion. She exhibits no edema or tenderness.   Lymphadenopathy:     She has no cervical adenopathy.   Neurological: She is alert and oriented to person, place, and time. She has normal strength. No cranial nerve deficit or sensory deficit.   Skin: Skin is warm and dry. No rash noted. No erythema. No pallor.   Psychiatric: She has a normal mood and affect. Her behavior is normal. Thought content normal.         ED Course   Procedures  Labs Reviewed   COMPREHENSIVE METABOLIC PANEL - Abnormal; Notable for the following:        Result Value    Sodium 133 (*)     Glucose 447 (*)     Albumin 3.3 (*)     eGFR if  54 (*)     eGFR if non  47 (*)     All other components within normal limits   CBC W/ AUTO DIFFERENTIAL   TROPONIN I   PROTIME-INR   CK   POCT URINE PREGNANCY   POCT GLUCOSE MONITORING CONTINUOUS   POCT GLUCOSE MONITORING CONTINUOUS             Medical Decision Making:   Initial Assessment:   41-year-old female presents complaining of generalized body aches and blurry vision.  While it was reported in triage that the patient was complaining of chest tightness, at this time the patient cannot distinguish her chest pain from any of her other body aches.  Examination reveals evidence of dehydration but no other significant abnormalities.  Differential Diagnosis:   HHS, dehydration, electrolyte abnormality, renal insufficiency, DKA  Independently Interpreted Test(s):   I have ordered and independently interpreted X-rays - see summary below.       <> Summary of X-Ray Reading(s): Chest x-ray: No acute abnormality  I have ordered and independently interpreted EKG Reading(s) - see summary below       <> Summary of EKG Reading(s): Sinus tachycardia at 106 bpm, normal axis, normal intervals, no definite acute ischemic changes  ED Management:  Patient's workup does reveal severe hyperglycemia, but no other significant abnormality.  She is feeling much better  after IV fluids and morphine.  Will add on metformin to her oral hypoglycemic regimen.  I have stressed the importance of compliance with a diabetic diet and also obtaining follow-up with her primary physician.  Return precautions provided.            Scribe Attestation:   Scribe #1: I performed the above scribed service and the documentation accurately describes the services I performed. I attest to the accuracy of the note.    Attending Attestation:           Physician Attestation for Scribe:  Physician Attestation Statement for Scribe #1: I, Cristian Turpin MD, reviewed documentation, as scribed by Loretta Gonsales in my presence, and it is both accurate and complete.                 ED Course     Clinical Impression:   The primary encounter diagnosis was Hyperglycemia. Diagnoses of Dehydration and Body aches were also pertinent to this visit.          Cristian Turpin III, MD  02/28/17 9544

## 2017-02-24 NOTE — DISCHARGE INSTRUCTIONS
Return to the emergency department if you develop difficulty breathing, fainting, severe lightheadedness, persistent vomiting, severe abdominal pain, or for any new or worsening medical concerns.  It is very important that you follow-up with a primary doctor as soon as you can to help you control your blood sugars better.  It is also important that you are very strict about not eating sugary foods, such as candy, soda, juice, etc, until your blood sugar is better controlled. I have included a print-out about diabetic diet that you should read.

## 2017-02-24 NOTE — ED TRIAGE NOTES
Pt presents to the ER for body aches pains. Pt states she was seen here on Saturday and was dx with hyperglycemia and had a CBG of 640's. Pt was discharged that day.

## 2017-03-13 ENCOUNTER — HOSPITAL ENCOUNTER (EMERGENCY)
Facility: HOSPITAL | Age: 42
Discharge: HOME | End: 2017-03-13
Attending: EMERGENCY MEDICINE
Payer: MEDICAID

## 2017-03-13 VITALS
WEIGHT: 240 LBS | DIASTOLIC BLOOD PRESSURE: 68 MMHG | OXYGEN SATURATION: 96 % | HEIGHT: 67 IN | BODY MASS INDEX: 37.67 KG/M2 | TEMPERATURE: 98 F | SYSTOLIC BLOOD PRESSURE: 133 MMHG | RESPIRATION RATE: 18 BRPM | HEART RATE: 76 BPM

## 2017-03-13 DIAGNOSIS — G89.11 ACUTE TRAUMATIC PAIN: ICD-10-CM

## 2017-03-13 DIAGNOSIS — M25.561 ACUTE PAIN OF RIGHT KNEE: Primary | ICD-10-CM

## 2017-03-13 PROCEDURE — 99283 EMERGENCY DEPT VISIT LOW MDM: CPT

## 2017-03-13 RX ORDER — MELOXICAM 7.5 MG/1
7.5 TABLET ORAL 2 TIMES DAILY PRN
Qty: 20 TABLET | Refills: 0 | Status: SHIPPED | OUTPATIENT
Start: 2017-03-13 | End: 2018-01-11

## 2017-03-13 NOTE — ED AVS SNAPSHOT
[facility]  [address] [place] [address]               Fabiana Chanel   3/13/2017  7:14 PM   ED    Description:  Female : 1975   Department:  [facility]           Your Care was Coordinated By:     Provider Role From To    Richard Arias MD Attending Provider 17 --    Michael Mitchell PA-C Physician Assistant 17      Reason for Visit     Back Pain     Knee Pain           Diagnoses this Visit        Comments    Acute pain of right knee    -  Primary     Acute traumatic pain           ED Disposition     ED Disposition Condition Comment    Discharge             To Do List           Follow-up Information     Schedule an appointment as soon as possible for a visit with Primary care physician/[facility].        Schedule an appointment as soon as possible for a visit with Chace Gonsales MD.    Specialty:  Orthopedic Surgery    Why:  Orthopedics    Contact information:    [address]  [address]  [place] [address]  [phone]         These Medications        Disp Refills Start End    meloxicam (MOBIC) 7.5 MG tablet 20 tablet 0 3/13/2017     Take 1 tablet (7.5 mg total) by mouth 2 (two) times daily as needed for Pain. - Oral    Pharmacy: [facility] [address] Ph #: [phone]         [facility] On Call     [facility] On [facility] Nurse Care Line -  Assistance  Registered nurses in the [facility] provide clinical advisement, health education, appointment booking, and other advisory services.  Call for this free service at [phone].             Medications           Message regarding Medications     Verify the changes and/or additions to your medication regime listed below are the same as discussed with your clinician today.  If any of these changes or additions are incorrect, please notify your healthcare provider.        START taking these  NEW medications        Refills    meloxicam (MOBIC) 7.5 MG tablet 0    Sig: Take 1 tablet (7.5 mg total) by mouth 2 (two) times daily as needed for Pain.    Class: Print    Route: Oral           Verify that the below list of medications is an accurate representation of the medications you are currently taking.  If none reported, the list may be blank. If incorrect, please contact your healthcare provider. Carry this list with you in case of emergency.           Current Medications     aspirin 81 MG Chew Take 81 mg by mouth once daily.    carvedilol (COREG) 12.5 MG tablet Take 12.5 mg by mouth 2 (two) times daily with meals.    furosemide (LASIX) 40 MG tablet Take 1 tablet (40 mg total) by mouth once daily.    glimepiride (AMARYL) 4 MG tablet Take 1 tablet (4 mg total) by mouth before breakfast.    lansoprazole (PREVACID) 30 MG capsule Take 30 mg by mouth once daily.    lisinopril (PRINIVIL,ZESTRIL) 40 MG tablet Take 1 tablet (40 mg total) by mouth once daily.    metformin (GLUCOPHAGE) 500 MG tablet Take 1 tablet (500 mg total) by mouth 2 (two) times daily with meals.    albuterol 90 mcg/actuation inhaler Inhale 2 puffs into the lungs every 6 (six) hours as needed for Wheezing.    isosorbide mononitrate (IMDUR) 30 MG 24 hr tablet Take 1 tablet (30 mg total) by mouth once daily.    medroxyPROGESTERone (DEPO-PROVERA) 150 mg/mL Syrg Inject 1 mL (150 mg total) into the muscle once.    meloxicam (MOBIC) 7.5 MG tablet Take 1 tablet (7.5 mg total) by mouth 2 (two) times daily as needed for Pain.    metoprolol succinate (TOPROL-XL) 50 MG 24 hr tablet Take 1 tablet (50 mg total) by mouth once daily.    simvastatin (ZOCOR) 40 MG tablet Take 1 tablet (40 mg total) by mouth every evening.    valacyclovir (VALTREX) 500 MG tablet Take 1 tablet (500 mg total) by mouth 2 (two) times daily.           Clinical Reference Information           Your Vitals Were     BP Pulse Temp Resp Height Weight    133/68 (BP Location: Right arm,  "Patient Position: Sitting) 76 98.3 °F (36.8 °C) (Oral) 18 5' 7" (1.702 m) 108.9 kg (240 lb)    Last Period SpO2 BMI          02/11/2017 (Approximate) 96% 37.59 kg/m2        Allergies as of 3/13/2017        Reactions    Pneumococcal 23-abimbola Ps Vaccine       Immunizations Administered on Date of Encounter - 3/13/2017     None      ED Micro, Lab, POCT     None      ED Imaging Orders     Start Ordered       Status Ordering Provider    03/13/17 1950 03/13/17 1949  X-Ray Knee 3 View Right  1 time imaging      Final result       Discharge References/Attachments     KNEE PAIN AND SWELLING, REDUCING (ENGLISH)    KNEE PAIN OF UNCERTAIN CAUSE (ENGLISH)      [facility] Sign-Up     Activating your [facility] account is as easy as 1-2-3!     1) Visit [email or web address], select Sign Up Now, enter this activation code and your date of birth, then select Next.  [id number]  Expires: 4/5/2017  2:05 AM      2) Create a username and password to use when you visit [facility] in the future and select a security question in case you lose your password and select Next.    3) Enter your e-mail address and click Sign Up!    Additional Information  If you have questions, please e-mail [email or web address] or call [phone] to talk to our [facility] staff. Remember, [facility] is NOT to be used for urgent needs. For medical emergencies, dial 911.          [facility] complies with applicable Federal civil rights laws and does not discriminate on the basis of race, color, national origin, age, disability, or sex.        Language Assistance Services     ATTENTION: Language assistance services are available, free of charge. Please call [phone].      ATENCIÓN: Si habla sandi, tiene a farley disposición servicios gratuitos de asistencia lingüística. Llame al [phone].     CHÚ Ý: N?u b?n nói Ti?ng Vi?t, có các d?ch v? h? tr? ngôn ng? mi?n phí dành cho b?n. G?i s? [phone].        "

## 2017-03-14 NOTE — ED TRIAGE NOTES
Pt reports that she had fallen at [facility] in November and now experiencing pain to middle area of back and Rt knee.  Denies difficulty ambulating.  No noted swelling to Rt knee  States pain is aching pain to both back and knee.  Pain rates 7 at this time.

## 2017-03-30 ENCOUNTER — HOSPITAL ENCOUNTER (OUTPATIENT)
Dept: RADIOLOGY | Facility: HOSPITAL | Age: 42
Discharge: HOME | End: 2017-03-30
Attending: GENERAL PRACTICE
Payer: MEDICARE

## 2017-03-30 DIAGNOSIS — Z12.31 VISIT FOR SCREENING MAMMOGRAM: ICD-10-CM

## 2017-03-30 PROCEDURE — 77063 BREAST TOMOSYNTHESIS BI: CPT | Mod: 26,,, | Performed by: RADIOLOGY

## 2017-03-30 PROCEDURE — 77067 SCR MAMMO BI INCL CAD: CPT | Mod: TC

## 2017-03-30 PROCEDURE — 77067 SCR MAMMO BI INCL CAD: CPT | Mod: 26,,, | Performed by: RADIOLOGY

## 2017-04-17 NOTE — ED PROVIDER NOTES
"Encounter Date: 3/13/2017       History     Chief Complaint   Patient presents with    Back Pain     "I fell back in november and my lower back and right knee have still been bothering me."    Knee Pain     Review of patient's allergies indicates:   Allergen Reactions    Pneumococcal 23-abimbola ps vaccine      HPI Comments:   41-year-old female presents to the emergency department complaining of lower back and right knee pain which has progressively worsened over the past 2-3 days.  Pain is described as a throbbing aching sensation localized to both her lower back and right knee.  Pain does not reportedly radiate.  Discomfort is worsened with palpation, movement of the affected joint and walking.  No known alleviating factors at this time.  Pain is currently a 7/10.      The history is provided by the patient.     Past Medical History:   Diagnosis Date    Blood clot associated with vein wall inflammation     CHF (congestive heart failure)     DM (diabetes mellitus) 9/19/2013    Hypertension     Prediabetes     Psoriasis     Sleep apnea      Past Surgical History:   Procedure Laterality Date    COLONOSCOPY      DILATION AND CURETTAGE OF UTERUS       Family History   Problem Relation Age of Onset    Hypertension Mother     Hypertension Father     Diabetes Father     Diabetes Maternal Grandmother     Diabetes Paternal Grandmother     Breast cancer Neg Hx     Colon cancer Neg Hx     Ovarian cancer Neg Hx      Social History   Substance Use Topics    Smoking status: Passive Smoke Exposure - Never Smoker     Types: Cigarettes    Smokeless tobacco: Never Used      Comment: smokes cigars on occasion    Alcohol use Yes      Comment: occasional     Review of Systems  General:   (-) fever, (-) general weakness  ENT:  (-) neck pain  CV:   (-) chest pain,  (-) exertional dyspnea  Respiratory:   (-) SOB  Gastrointestinal:  (-) nausea, (-) vomiting, (-) abdominal pain  Genitourinary:  (-) hematuria, (-) " dysuria  Skin:  (-) Rash, (-) abrasion(s), (-) lesion(s)  Musculoskeletal:  (+) joint pain (right knee), (-) joint swelling, (+) back pain, (-) myalgia  Neuro:  (-) Headache  All other systems negative.      Physical Exam   Initial Vitals   BP Pulse Resp Temp SpO2   17 1815 17 1815 17 1815 17 1815 17 1815   133/68 76 18 98.3 °F (36.8 °C) 96 %     Physical Exam    Nursing note and vitals reviewed.  Constitutional: Vital signs are normal. She appears well-developed and well-nourished. She is cooperative.   HENT:   Head: Normocephalic and atraumatic.   Eyes: Conjunctivae are normal. No scleral icterus.   Neck: Normal range of motion. Neck supple.   Abdominal: Soft. She exhibits no distension.   Musculoskeletal: Normal range of motion.   Paraspinal lumbar ttp.    Generalized tenderness to the anterior right knee.  No joint effusion or laxity.    Neurological: She is alert and oriented to person, place, and time. She has normal strength.   Skin: Skin is warm, dry and intact.   Psychiatric: She has a normal mood and affect. Thought content normal.         ED Course   Procedures  Labs Reviewed - No data to display          Medical Decision Making:   History:   Old Medical Records: I decided to obtain old medical records.  Old Records Summarized: other records.  Independently Interpreted Test(s):   I have ordered and independently interpreted X-rays - see prior notes.  Clinical Tests:   Radiological Study: Ordered and Reviewed    Additional Medical Decision Makin-year-old female with no significant past history presents emergency department complaining of worsening lower back and right knee pain for the past 2-3 days.  Although no recent injury, she reports falling at [facility] in 2016 which resulted in pain to both her lower back and right knee.  On exam, she appears uncomfortable although in no acute distress.  She is morbidly obese limiting the accuracy of her exam.   X-rays of her right knee are without evidence of acute fracture or malalignment.  There is no significant change from previous x-rays 11/8/16.  Her right knee has been placed in an Ace wrap.  Recommended supportive care including NSAIDs as needed.  She has been advised follow-up with her PCP for further evaluation and management including outpatient MRI and/or orthopedic referral as needed.  Return instructions discussed prior to discharge.                 ED Course     Clinical Impression:   The primary encounter diagnosis was Acute pain of right knee. A diagnosis of Acute traumatic pain was also pertinent to this visit.    Disposition:   Disposition: Discharged       Richard Arias MD  04/17/17 8267

## 2017-06-08 ENCOUNTER — HOSPITAL ENCOUNTER (OUTPATIENT)
Facility: HOSPITAL | Age: 42
Discharge: HOME | End: 2017-06-09
Attending: EMERGENCY MEDICINE | Admitting: EMERGENCY MEDICINE
Payer: MEDICARE

## 2017-06-08 DIAGNOSIS — I50.9 HEART FAILURE: ICD-10-CM

## 2017-06-08 DIAGNOSIS — I10 ESSENTIAL HYPERTENSION: ICD-10-CM

## 2017-06-08 DIAGNOSIS — R07.9 CHEST PAIN: Primary | ICD-10-CM

## 2017-06-08 DIAGNOSIS — E78.2 MIXED HYPERLIPIDEMIA: ICD-10-CM

## 2017-06-08 DIAGNOSIS — E11.9 TYPE 2 DIABETES MELLITUS WITHOUT COMPLICATION, WITHOUT LONG-TERM CURRENT USE OF INSULIN: ICD-10-CM

## 2017-06-08 LAB
ALBUMIN SERPL BCP-MCNC: 3.3 G/DL
ALP SERPL-CCNC: 86 U/L
ALT SERPL W/O P-5'-P-CCNC: 12 U/L
ANION GAP SERPL CALC-SCNC: 7 MMOL/L
AST SERPL-CCNC: 11 U/L
BASOPHILS # BLD AUTO: 0.01 K/UL
BASOPHILS NFR BLD: 0.1 %
BILIRUB SERPL-MCNC: 0.3 MG/DL
BNP SERPL-MCNC: 24 PG/ML
BUN SERPL-MCNC: 11 MG/DL
CALCIUM SERPL-MCNC: 9.7 MG/DL
CHLORIDE SERPL-SCNC: 101 MMOL/L
CO2 SERPL-SCNC: 29 MMOL/L
CREAT SERPL-MCNC: 1.3 MG/DL
DIFFERENTIAL METHOD: ABNORMAL
EOSINOPHIL # BLD AUTO: 0.1 K/UL
EOSINOPHIL NFR BLD: 1.7 %
ERYTHROCYTE [DISTWIDTH] IN BLOOD BY AUTOMATED COUNT: 13.8 %
EST. GFR  (AFRICAN AMERICAN): 59 ML/MIN/1.73 M^2
EST. GFR  (NON AFRICAN AMERICAN): 51 ML/MIN/1.73 M^2
GLUCOSE SERPL-MCNC: 371 MG/DL
HCT VFR BLD AUTO: 43.5 %
HGB BLD-MCNC: 13.7 G/DL
INR PPP: 0.9
LYMPHOCYTES # BLD AUTO: 1.7 K/UL
LYMPHOCYTES NFR BLD: 23.3 %
MCH RBC QN AUTO: 27.3 PG
MCHC RBC AUTO-ENTMCNC: 31.5 %
MCV RBC AUTO: 87 FL
MONOCYTES # BLD AUTO: 0.4 K/UL
MONOCYTES NFR BLD: 6.2 %
NEUTROPHILS # BLD AUTO: 4.9 K/UL
NEUTROPHILS NFR BLD: 68.7 %
PLATELET # BLD AUTO: 300 K/UL
PMV BLD AUTO: 10.3 FL
POTASSIUM SERPL-SCNC: 4.2 MMOL/L
PROT SERPL-MCNC: 7.3 G/DL
PROTHROMBIN TIME: 10 SEC
RBC # BLD AUTO: 5.02 M/UL
SODIUM SERPL-SCNC: 137 MMOL/L
TROPONIN I SERPL DL<=0.01 NG/ML-MCNC: 0.01 NG/ML
WBC # BLD AUTO: 7.08 K/UL

## 2017-06-08 PROCEDURE — 93005 ELECTROCARDIOGRAM TRACING: CPT

## 2017-06-08 PROCEDURE — 83880 ASSAY OF NATRIURETIC PEPTIDE: CPT

## 2017-06-08 PROCEDURE — 80053 COMPREHEN METABOLIC PANEL: CPT

## 2017-06-08 PROCEDURE — 85610 PROTHROMBIN TIME: CPT

## 2017-06-08 PROCEDURE — 84484 ASSAY OF TROPONIN QUANT: CPT

## 2017-06-08 PROCEDURE — 85025 COMPLETE CBC W/AUTO DIFF WBC: CPT

## 2017-06-08 PROCEDURE — 96374 THER/PROPH/DIAG INJ IV PUSH: CPT

## 2017-06-08 PROCEDURE — 81025 URINE PREGNANCY TEST: CPT | Performed by: EMERGENCY MEDICINE

## 2017-06-08 PROCEDURE — 99285 EMERGENCY DEPT VISIT HI MDM: CPT | Mod: 25

## 2017-06-08 RX ORDER — ASPIRIN 325 MG
325 TABLET ORAL
Status: COMPLETED | OUTPATIENT
Start: 2017-06-08 | End: 2017-06-09

## 2017-06-09 VITALS
HEIGHT: 67 IN | TEMPERATURE: 98 F | WEIGHT: 245.31 LBS | DIASTOLIC BLOOD PRESSURE: 60 MMHG | OXYGEN SATURATION: 95 % | RESPIRATION RATE: 20 BRPM | SYSTOLIC BLOOD PRESSURE: 120 MMHG | HEART RATE: 90 BPM | BODY MASS INDEX: 38.5 KG/M2

## 2017-06-09 LAB
ALBUMIN SERPL BCP-MCNC: 3 G/DL
ALP SERPL-CCNC: 78 U/L
ALT SERPL W/O P-5'-P-CCNC: 9 U/L
AMPHET+METHAMPHET UR QL: NEGATIVE
ANION GAP SERPL CALC-SCNC: 7 MMOL/L
AST SERPL-CCNC: 9 U/L
B-HCG UR QL: NEGATIVE
BARBITURATES UR QL SCN>200 NG/ML: NEGATIVE
BASOPHILS # BLD AUTO: 0.01 K/UL
BASOPHILS NFR BLD: 0.2 %
BENZODIAZ UR QL SCN>200 NG/ML: NEGATIVE
BILIRUB SERPL-MCNC: 0.3 MG/DL
BUN SERPL-MCNC: 11 MG/DL
BZE UR QL SCN: NEGATIVE
CALCIUM SERPL-MCNC: 8.9 MG/DL
CANNABINOIDS UR QL SCN: NEGATIVE
CHLORIDE SERPL-SCNC: 103 MMOL/L
CHOLEST/HDLC SERPL: 4.9 {RATIO}
CO2 SERPL-SCNC: 27 MMOL/L
CREAT SERPL-MCNC: 1.1 MG/DL
CREAT UR-MCNC: 26.8 MG/DL
CTP QC/QA: YES
DIFFERENTIAL METHOD: ABNORMAL
EOSINOPHIL # BLD AUTO: 0.1 K/UL
EOSINOPHIL NFR BLD: 1.7 %
ERYTHROCYTE [DISTWIDTH] IN BLOOD BY AUTOMATED COUNT: 13.9 %
EST. GFR  (AFRICAN AMERICAN): >60 ML/MIN/1.73 M^2
EST. GFR  (NON AFRICAN AMERICAN): >60 ML/MIN/1.73 M^2
GLUCOSE SERPL-MCNC: 281 MG/DL
HCT VFR BLD AUTO: 40.8 %
HDL/CHOLESTEROL RATIO: 20.5 %
HDLC SERPL-MCNC: 185 MG/DL
HDLC SERPL-MCNC: 38 MG/DL
HGB BLD-MCNC: 12.9 G/DL
LDLC SERPL CALC-MCNC: 130.4 MG/DL
LYMPHOCYTES # BLD AUTO: 2.1 K/UL
LYMPHOCYTES NFR BLD: 32.2 %
MCH RBC QN AUTO: 27.5 PG
MCHC RBC AUTO-ENTMCNC: 31.6 %
MCV RBC AUTO: 87 FL
METHADONE UR QL SCN>300 NG/ML: NEGATIVE
MONOCYTES # BLD AUTO: 0.4 K/UL
MONOCYTES NFR BLD: 6.8 %
NEUTROPHILS # BLD AUTO: 3.8 K/UL
NEUTROPHILS NFR BLD: 59.1 %
NONHDLC SERPL-MCNC: 147 MG/DL
OPIATES UR QL SCN: NORMAL
PCP UR QL SCN>25 NG/ML: NEGATIVE
PLATELET # BLD AUTO: 289 K/UL
PMV BLD AUTO: 10.6 FL
POCT GLUCOSE: 300 MG/DL (ref 70–110)
POTASSIUM SERPL-SCNC: 3.8 MMOL/L
PROT SERPL-MCNC: 6.6 G/DL
RBC # BLD AUTO: 4.69 M/UL
SODIUM SERPL-SCNC: 137 MMOL/L
TOXICOLOGY INFORMATION: NORMAL
TRIGL SERPL-MCNC: 83 MG/DL
TROPONIN I SERPL DL<=0.01 NG/ML-MCNC: 0.01 NG/ML
TROPONIN I SERPL DL<=0.01 NG/ML-MCNC: 0.02 NG/ML
TROPONIN I SERPL DL<=0.01 NG/ML-MCNC: 0.02 NG/ML
WBC # BLD AUTO: 6.45 K/UL

## 2017-06-09 PROCEDURE — 85025 COMPLETE CBC W/AUTO DIFF WBC: CPT

## 2017-06-09 PROCEDURE — 25000003 PHARM REV CODE 250: Performed by: HOSPITALIST

## 2017-06-09 PROCEDURE — 25000003 PHARM REV CODE 250: Performed by: EMERGENCY MEDICINE

## 2017-06-09 PROCEDURE — 63600175 PHARM REV CODE 636 W HCPCS: Performed by: EMERGENCY MEDICINE

## 2017-06-09 PROCEDURE — 93005 ELECTROCARDIOGRAM TRACING: CPT

## 2017-06-09 PROCEDURE — 96375 TX/PRO/DX INJ NEW DRUG ADDON: CPT | Mod: 59

## 2017-06-09 PROCEDURE — 96374 THER/PROPH/DIAG INJ IV PUSH: CPT

## 2017-06-09 PROCEDURE — 80061 LIPID PANEL: CPT

## 2017-06-09 PROCEDURE — 80053 COMPREHEN METABOLIC PANEL: CPT

## 2017-06-09 PROCEDURE — 25000003 PHARM REV CODE 250: Performed by: NURSE PRACTITIONER

## 2017-06-09 PROCEDURE — 93306 TTE W/DOPPLER COMPLETE: CPT

## 2017-06-09 PROCEDURE — 36415 COLL VENOUS BLD VENIPUNCTURE: CPT

## 2017-06-09 PROCEDURE — G0378 HOSPITAL OBSERVATION PER HR: HCPCS

## 2017-06-09 PROCEDURE — 80307 DRUG TEST PRSMV CHEM ANLYZR: CPT

## 2017-06-09 PROCEDURE — 84484 ASSAY OF TROPONIN QUANT: CPT

## 2017-06-09 PROCEDURE — 96376 TX/PRO/DX INJ SAME DRUG ADON: CPT

## 2017-06-09 PROCEDURE — 63600175 PHARM REV CODE 636 W HCPCS: Performed by: NURSE PRACTITIONER

## 2017-06-09 RX ORDER — MORPHINE SULFATE 10 MG/ML
4 INJECTION INTRAMUSCULAR; INTRAVENOUS; SUBCUTANEOUS EVERY 4 HOURS PRN
Status: DISCONTINUED | OUTPATIENT
Start: 2017-06-09 | End: 2017-06-09

## 2017-06-09 RX ORDER — ISOSORBIDE MONONITRATE 30 MG/1
30 TABLET, EXTENDED RELEASE ORAL DAILY
Status: DISCONTINUED | OUTPATIENT
Start: 2017-06-09 | End: 2017-06-09 | Stop reason: HOSPADM

## 2017-06-09 RX ORDER — ASPIRIN 325 MG
325 TABLET ORAL DAILY
Status: DISCONTINUED | OUTPATIENT
Start: 2017-06-10 | End: 2017-06-09

## 2017-06-09 RX ORDER — FUROSEMIDE 10 MG/ML
40 INJECTION INTRAMUSCULAR; INTRAVENOUS ONCE
Status: COMPLETED | OUTPATIENT
Start: 2017-06-09 | End: 2017-06-09

## 2017-06-09 RX ORDER — SODIUM CHLORIDE 0.9 % (FLUSH) 0.9 %
3 SYRINGE (ML) INJECTION EVERY 8 HOURS PRN
Status: DISCONTINUED | OUTPATIENT
Start: 2017-06-09 | End: 2017-06-09 | Stop reason: HOSPADM

## 2017-06-09 RX ORDER — ONDANSETRON 2 MG/ML
4 INJECTION INTRAMUSCULAR; INTRAVENOUS EVERY 8 HOURS PRN
Status: DISCONTINUED | OUTPATIENT
Start: 2017-06-09 | End: 2017-06-09 | Stop reason: HOSPADM

## 2017-06-09 RX ORDER — LISINOPRIL 20 MG/1
40 TABLET ORAL DAILY
Status: DISCONTINUED | OUTPATIENT
Start: 2017-06-09 | End: 2017-06-09 | Stop reason: HOSPADM

## 2017-06-09 RX ORDER — MORPHINE SULFATE 10 MG/ML
4 INJECTION INTRAMUSCULAR; INTRAVENOUS; SUBCUTANEOUS
Status: COMPLETED | OUTPATIENT
Start: 2017-06-09 | End: 2017-06-09

## 2017-06-09 RX ORDER — SODIUM CHLORIDE 9 MG/ML
INJECTION, SOLUTION INTRAVENOUS CONTINUOUS
Status: DISCONTINUED | OUTPATIENT
Start: 2017-06-09 | End: 2017-06-09

## 2017-06-09 RX ORDER — NITROGLYCERIN 0.4 MG/1
0.4 TABLET SUBLINGUAL EVERY 5 MIN PRN
Status: DISCONTINUED | OUTPATIENT
Start: 2017-06-09 | End: 2017-06-09 | Stop reason: HOSPADM

## 2017-06-09 RX ORDER — MORPHINE SULFATE 10 MG/ML
1 INJECTION INTRAMUSCULAR; INTRAVENOUS; SUBCUTANEOUS EVERY 6 HOURS PRN
Status: DISCONTINUED | OUTPATIENT
Start: 2017-06-09 | End: 2017-06-09 | Stop reason: HOSPADM

## 2017-06-09 RX ORDER — ASPIRIN 81 MG/1
81 TABLET ORAL DAILY
Status: DISCONTINUED | OUTPATIENT
Start: 2017-06-09 | End: 2017-06-09 | Stop reason: HOSPADM

## 2017-06-09 RX ORDER — CARVEDILOL 6.25 MG/1
12.5 TABLET ORAL 2 TIMES DAILY WITH MEALS
Status: DISCONTINUED | OUTPATIENT
Start: 2017-06-09 | End: 2017-06-09 | Stop reason: HOSPADM

## 2017-06-09 RX ADMIN — ASPIRIN 81 MG: 81 TABLET, COATED ORAL at 08:06

## 2017-06-09 RX ADMIN — MORPHINE SULFATE 1 MG: 10 INJECTION INTRAVENOUS at 08:06

## 2017-06-09 RX ADMIN — SODIUM CHLORIDE: 0.9 INJECTION, SOLUTION INTRAVENOUS at 04:06

## 2017-06-09 RX ADMIN — MORPHINE SULFATE 4 MG: 10 INJECTION INTRAVENOUS at 12:06

## 2017-06-09 RX ADMIN — ASPIRIN 325 MG ORAL TABLET 325 MG: 325 PILL ORAL at 12:06

## 2017-06-09 RX ADMIN — NITROGLYCERIN 0.4 MG: 0.4 TABLET SUBLINGUAL at 04:06

## 2017-06-09 RX ADMIN — LISINOPRIL 40 MG: 20 TABLET ORAL at 08:06

## 2017-06-09 RX ADMIN — ISOSORBIDE MONONITRATE 30 MG: 30 TABLET, EXTENDED RELEASE ORAL at 08:06

## 2017-06-09 RX ADMIN — CARVEDILOL 12.5 MG: 6.25 TABLET, FILM COATED ORAL at 08:06

## 2017-06-09 RX ADMIN — FUROSEMIDE 40 MG: 10 INJECTION, SOLUTION INTRAMUSCULAR; INTRAVENOUS at 08:06

## 2017-06-09 NOTE — PLAN OF CARE
06/09/17 1411   Discharge Assessment   Assessment Type Discharge Planning Assessment   Confirmed/corrected address and phone number on facesheet? Yes   Assessment information obtained from? Patient   Expected Length of Stay (days) 1   Communicated expected length of stay with patient/caregiver yes   Prior to hospitilization cognitive status: Alert/Oriented   Prior to hospitalization functional status: Independent   Current cognitive status: Alert/Oriented   Current Functional Status: Independent   Arrived From home or self-care   Lives With spouse   Able to Return to Prior Arrangements yes   Is patient able to care for self after discharge? Yes   Who are your caregiver(s) and their phone number(s)?  available to help at home if needed   Patient's perception of discharge disposition home or selfcare   Readmission Within The Last 30 Days no previous admission in last 30 days   Patient currently being followed by outpatient case management? No   Patient currently receives home health services? No   Does the patient currently use HME? No   Equipment Currently Used at Home none   Do you have any problems affording any of your prescribed medications? No   Is the patient taking medications as prescribed? yes   Does the patient have transportation to healthcare appointments? Yes   Transportation Available car   On Dialysis? No   Does the patient receive services at the [facility]madin Clinic? No   Discharge Plan A Home with family   Patient/Family In Agreement With Plan yes   TN to patient's room to discuss Helping the patient manage care at home.   TN/MAITE roll explained to pt.  Teach back method used.  TN's name and contact info placed on white board.  Pt/family encouraged to call for any problems/concerns with DC.

## 2017-06-09 NOTE — HPI
Fabiana Chanel is a 41 y.o. AAF who is on home oxygen with known Heart failure (EF 30%), DMII, HTN, and sleep apnea who presented for evaluation of a couple of complaints including mid-sternal chest pain that began while washing dishes. She denies radiation, but does endorse L shoulder pain as well. States the CP felt like squeezing 8/10, she reports associated body aches all over. She also reported abdominal pain at the time of presentation but denies abdominal pain at the time of my interview in early morning. Her CP is not reproducible at the bedside.    Initial workup includes unremarkable CBC, mild dehydration on chemistry, elevated BGL on serum = 371, and serial negative troponin 1 levels with normal BNP. CXR negative

## 2017-06-09 NOTE — H&P
"[facility] Medicine  History & Physical    Patient Name: Fabiana Chanel  MRN: [id number]  Admission Date: 6/8/2017  Attending Physician: Missy Gotti MD   Primary Care Provider: Josh Huffman MD         Patient information was obtained from patient and ER records.     Subjective:     Principal Problem:Chest pain    Chief Complaint:   Chief Complaint   Patient presents with    Chest Pain     left sided chest pain, going on since yesterday, "getting tight about 1 hour ago and I have a hx of CHF" No radiation to arm or neck. +cough reported.        HPI: Fabiana Chanel is a 41 y.o. AAF who is on home oxygen with known Heart failure (EF 30%), DMII, HTN, and sleep apnea who presented for evaluation of a couple of complaints including mid-sternal chest pain that began while washing dishes. She denies radiation, but does endorse L shoulder pain as well. States the CP felt like squeezing 8/10, she reports associated body aches all over. She also reported abdominal pain at the time of presentation but denies abdominal pain at the time of my interview in early morning. Her CP is not reproducible at the bedside.    Initial workup includes unremarkable CBC, mild dehydration on chemistry, elevated BGL on serum = 371, and serial negative troponin 1 levels with normal BNP. CXR negative      Past Medical History:   Diagnosis Date    Blood clot associated with vein wall inflammation     CHF (congestive heart failure)     DM (diabetes mellitus) 9/19/2013    Hypertension     Prediabetes     Psoriasis     Sleep apnea        Past Surgical History:   Procedure Laterality Date    COLONOSCOPY      DILATION AND CURETTAGE OF UTERUS         Review of patient's allergies indicates:   Allergen Reactions    Pneumococcal 23-abimbola ps vaccine        No current facility-administered medications on file prior to encounter.      Current Outpatient Prescriptions on File Prior to Encounter "   Medication Sig    aspirin 81 MG Chew Take 81 mg by mouth once daily.    carvedilol (COREG) 12.5 MG tablet Take 12.5 mg by mouth 2 (two) times daily with meals.    furosemide (LASIX) 40 MG tablet Take 1 tablet (40 mg total) by mouth once daily.    glimepiride (AMARYL) 4 MG tablet Take 1 tablet (4 mg total) by mouth before breakfast.    lansoprazole (PREVACID) 30 MG capsule Take 30 mg by mouth once daily.    lisinopril (PRINIVIL,ZESTRIL) 40 MG tablet Take 1 tablet (40 mg total) by mouth once daily.    meloxicam (MOBIC) 7.5 MG tablet Take 1 tablet (7.5 mg total) by mouth 2 (two) times daily as needed for Pain.    metoprolol succinate (TOPROL-XL) 50 MG 24 hr tablet Take 1 tablet (50 mg total) by mouth once daily.    albuterol 90 mcg/actuation inhaler Inhale 2 puffs into the lungs every 6 (six) hours as needed for Wheezing.    isosorbide mononitrate (IMDUR) 30 MG 24 hr tablet Take 1 tablet (30 mg total) by mouth once daily.    medroxyPROGESTERone (DEPO-PROVERA) 150 mg/mL Syrg Inject 1 mL (150 mg total) into the muscle once.    metformin (GLUCOPHAGE) 500 MG tablet Take 1 tablet (500 mg total) by mouth 2 (two) times daily with meals.    simvastatin (ZOCOR) 40 MG tablet Take 1 tablet (40 mg total) by mouth every evening.    valacyclovir (VALTREX) 500 MG tablet Take 1 tablet (500 mg total) by mouth 2 (two) times daily.     Family History     Problem Relation (Age of Onset)    Diabetes Father, Maternal Grandmother, Paternal Grandmother    Hypertension Mother, Father        Social History Main Topics    Smoking status: Passive Smoke Exposure - Never Smoker     Types: Cigarettes    Smokeless tobacco: Never Used      Comment: smokes cigars on occasion    Alcohol use Yes      Comment: occasional    Drug use:      Types: Marijuana      Comment: hx of marijuana use 3 years ago    Sexual activity: Not Currently     Partners: Male     Review of Systems   Constitutional: Negative for appetite change, chills,  diaphoresis and fever.   HENT: Negative for congestion, hearing loss, sore throat, tinnitus and trouble swallowing.    Eyes: Negative for photophobia, discharge, itching and visual disturbance.   Respiratory: Negative for apnea, cough, wheezing and stridor.    Cardiovascular: Negative for chest pain, palpitations and leg swelling.   Gastrointestinal: Negative for abdominal distention, abdominal pain, blood in stool, constipation, diarrhea and nausea.   Endocrine: Negative for polydipsia, polyphagia and polyuria.   Genitourinary: Negative for difficulty urinating, dysuria, flank pain and frequency.   Musculoskeletal: Negative for arthralgias, joint swelling and neck stiffness.   Skin: Negative for color change, rash and wound.   Neurological: Negative for dizziness, tremors, seizures, light-headedness, numbness and headaches.   Hematological: Negative for adenopathy.   Psychiatric/Behavioral: Negative for hallucinations and self-injury.     Objective:     Vital Signs (Most Recent):  Temp: 98.1 °F (36.7 °C) (06/09/17 1148)  Pulse: 106 (06/09/17 1148)  Resp: 20 (06/09/17 1148)  BP: (!) 103/57 (06/09/17 1148)  SpO2: 96 % (06/09/17 1148) Vital Signs (24h Range):  Temp:  [98.1 °F (36.7 °C)-98.5 °F (36.9 °C)] 98.1 °F (36.7 °C)  Pulse:  [] 106  Resp:  [20-22] 20  SpO2:  [93 %-98 %] 96 %  BP: (103-160)/() 103/57     Weight: 111.3 kg (245 lb 4.8 oz)  Body mass index is 38.42 kg/m².    Physical Exam   Constitutional: She appears well-developed and well-nourished. She is cooperative.   Obese and deconditioned appearing   HENT:   Head: Normocephalic and atraumatic.   Eyes: Conjunctivae and lids are normal.   Neck: Full passive range of motion without pain. Neck supple. No JVD present. No edema present. No thyroid mass present.   Cardiovascular: S1 normal, S2 normal and intact distal pulses.    No murmur heard.  Abdominal: Soft. Bowel sounds are normal. She exhibits no distension and no abdominal bruit. There is no  splenomegaly or hepatomegaly. There is no tenderness. There is no CVA tenderness.   Lymphadenopathy:     She has no cervical adenopathy.     She has no axillary adenopathy.   Neurological: She is alert. She has normal reflexes. She displays no tremor. She displays no seizure activity.   Skin: Skin is warm, dry and intact.   Psychiatric: She has a normal mood and affect. Her speech is normal. Thought content normal. Cognition and memory are normal.        Significant Labs:   BMP:   Recent Labs  Lab 06/09/17  0524   *      K 3.8      CO2 27   BUN 11   CREATININE 1.1   CALCIUM 8.9     CBC:   Recent Labs  Lab 06/08/17 2111 06/09/17 0524   WBC 7.08 6.45   HGB 13.7 12.9   HCT 43.5 40.8    289     Cardiac Markers:   Recent Labs  Lab 06/08/17 2111   BNP 24     Lipase: No results for input(s): LIPASE in the last 48 hours.  Lipid Panel:   Recent Labs  Lab 06/09/17 0524   CHOL 185   HDL 38*   LDLCALC 130.4   TRIG 83   CHOLHDL 20.5     Troponin:   Recent Labs  Lab 06/08/17 2111 06/09/17  0030 06/09/17 0524   TROPONINI 0.012 0.016 0.018     TSH:   Recent Labs  Lab 02/18/17  1943   TSH 0.882     Urine Culture: No results for input(s): LABURIN in the last 48 hours.    Significant Imaging:   Imaging Results          X-Ray Chest PA And Lateral (Final result)  Result time 06/08/17 20:19:06    Final result by Rico Saxena MD (06/08/17 20:19:06)                 Impression:     No acute cardiopulmonary process.          Electronically signed by: RICO SAXENA MD  Date:     06/08/17  Time:    20:19              Narrative:    Chest PA and lateral    Indication:Chest pain    Comparison:2/23/2017    Findings:  The cardiomediastinal silhouette is not enlarged.  There is no pleural effusion.  The trachea is midline.  The lungs are symmetrically expanded bilaterally without evidence of acute parenchymal process. No large focal consolidation seen.  There is no pneumothorax.  The osseous structures are  unremarkable.                                Assessment/Plan:     * Chest pain    Atypical chest pain and serial negative troponin 1 levels; Last know EF 30% (5/2015).. Given patient's risk factors will keep NPO until seen by cardiology  Continuous cardiac monitoring  Continue to trend CE Q8 hrs X3 sets  ASA 81 mg PO Daily  Supplemental oxygen at 2L NC PRN SOB  2D Echo  NST                  CHF (congestive heart failure)    Repeat 2D echo as above to check for decompensation although patient does not appear in FVO clinically, negative LE edema, negative JVD, lungs appear clear on exam  -Continue home med of lasix, coreg, imdur, lisinopril, and sdimvastatin          Hyperlipidemia    Lipid panel, continue home statin          Type 2 diabetes mellitus without complication, without long-term current use of insulin    Hold home oral hyperglycemics if indicated - while hospitalized will use combined insulin therapy with basal and prandial insulin coverage, POCT glucose checks, hypoglycemic protocol and correction scale - HgA1c          Tobacco abuse    Smoking cessation - states she quit          HTN (hypertension)    Controlled continue home medications          VTE Risk Mitigation         Ordered     Medium Risk of VTE  Once      06/09/17 0359     Place JONNY hose  Until discontinued      06/09/17 0359     Place sequential compression device  Until discontinued      06/09/17 0359            RODRÍGUEZ Miller, FNP-C  PA# [id number]  NPI# [id number]  Hospitalist - Department of Hospital Medicine  [place] [address] [place] [address]  Office [phone]; Pager [phone]

## 2017-06-09 NOTE — ED PROVIDER NOTES
"Encounter Date: 6/8/2017    SCRIBE #1 NOTE: I, Olvera María , am scribing for, and in the presence of,  Richard Arias MD. I have scribed the following portions of the note - Other sections scribed: HPI, ROS.       History     Chief Complaint   Patient presents with    Chest Pain     left sided chest pain, going on since yesterday, "getting tight about 1 hour ago and I have a hx of CHF" No radiation to arm or neck. +cough reported.     Review of patient's allergies indicates:   Allergen Reactions    Pneumococcal 23-abimbola ps vaccine      CC: Chest Pain     HPI: This 41 y.o. female presents to the ED c/o intermittent abdominal pain and intermittent left-sided chest pain with cough that began 1 day ago. Chest pain feels like a tight sensation. Treatment with Potassium gave intermittent relief. Symptoms are not exacerbated with eating, drinking, or walking. Denies any abdominal pain with palpation. Pt is also c/o generalized body aches. Symptoms are acute in onset and severe 8/10. Denies gallbladder removal. Denies any shortness of breath, fever, dysuria, or any other associated symptoms. Pt has no modifying factors.     Pt has a medical history of Blood clot associated with vein wall inflammation; CHF (congestive heart failure); DM (diabetes mellitus) (9/19/2013); Hypertension; Prediabetes; Psoriasis; and Sleep apnea.      The history is provided by the patient. No  was used.     Past Medical History:   Diagnosis Date    Blood clot associated with vein wall inflammation     CHF (congestive heart failure)     DM (diabetes mellitus) 9/19/2013    Hypertension     Prediabetes     Psoriasis     Sleep apnea      Past Surgical History:   Procedure Laterality Date    COLONOSCOPY      DILATION AND CURETTAGE OF UTERUS       Family History   Problem Relation Age of Onset    Hypertension Mother     Hypertension Father     Diabetes Father     Diabetes Maternal Grandmother     Diabetes Paternal " Grandmother     Breast cancer Neg Hx     Colon cancer Neg Hx     Ovarian cancer Neg Hx      Social History   Substance Use Topics    Smoking status: Passive Smoke Exposure - Never Smoker     Types: Cigarettes    Smokeless tobacco: Never Used      Comment: smokes cigars on occasion    Alcohol use Yes      Comment: occasional     Review of Systems   Constitutional: Negative for fever.   HENT: Negative for sore throat.    Respiratory: Positive for cough. Negative for shortness of breath.    Cardiovascular: Positive for chest pain.   Gastrointestinal: Positive for abdominal pain. Negative for nausea.   Genitourinary: Negative for dysuria.   Musculoskeletal: Negative for back pain.        (+) Generalized Body Aches    Skin: Negative for rash.   Neurological: Negative for weakness.   Hematological: Does not bruise/bleed easily.   All other systems reviewed and are negative.      Physical Exam     Initial Vitals [06/08/17 1954]   BP Pulse Resp Temp SpO2   (!) 160/101 87 (!) 22 98.5 °F (36.9 °C) 97 %     Physical Exam  Nursing note and vitals reviewed.  Constitutional: Morbidly obese middle-age female in no obvious distress.  HENT:    Head: NC/AT    Eyes: Conjunctivae normal.  (-) scleral icterus.              Mouth/Throat: MMM   Neck: Neck supple, normal rom.  Cardiovascular: RRR   Pulmonary/Chest: CTAB   Abdomen:  Soft, ND/NT.  (-) CVA tenderness.  Musculoskeletal:  FROM of all major joints. No apparent edema or tenderness.  Neurological: A&O x4.  No acute focal motor deficits.    Skin: Skin is intact, warm and dry.  Psychiatric: normal mood and affect.      ED Course   Procedures  Labs Reviewed   CBC W/ AUTO DIFFERENTIAL - Abnormal; Notable for the following:        Result Value    MCHC 31.5 (*)     All other components within normal limits   COMPREHENSIVE METABOLIC PANEL - Abnormal; Notable for the following:     Glucose 371 (*)     Albumin 3.3 (*)     Anion Gap 7 (*)     eGFR if  59 (*)     eGFR  if non  51 (*)     All other components within normal limits   B-TYPE NATRIURETIC PEPTIDE   TROPONIN I   PROTIME-INR   TROPONIN I    Narrative:     - REPEAT TROPONIN  - please redraw blood   POCT URINE PREGNANCY     EKG Readings: (Independently Interpreted)   Initial Reading: No STEMI.   Normal sinus rhythm, rate 86, normal axis/intervals, no ST/T-wave abnormalities.   Other EKG Interpretations:   Repeat EKG  06/09/17 00:37  Initial Reading: No STEMI.    - Normal sinus rhythm, rate 81, T-wave inversion in leads III and aVF.       X-Rays:   Independently Interpreted Readings:   Chest X-Ray: Normal heart size.  No infiltrates.  No acute abnormalities.     Medical Decision Making:   History:   I obtained history from: someone other than patient.  Old Medical Records: I decided to obtain old medical records.  Old Records Summarized: records from clinic visits and other records.  Independently Interpreted Test(s):   I have ordered and independently interpreted X-rays - see prior notes.  I have ordered and independently interpreted EKG Reading(s) - see prior notes  Clinical Tests:   Lab Tests: Ordered and Reviewed  Radiological Study: Ordered and Reviewed  Medical Tests: Ordered and Reviewed      Differential diagnosis:   Initial differential diagnosis includes but is not limited to... ACS/MI, aortic dissection, pericarditis, pericardial effusion, PE, pneumothorax, pneumonia, costochondritis, gastritis, GERD, pancreatitis.    Additional Medical Decision Making:   Emergent evaluation of a 41 y.o. female with hypertension, dyslipidemia, and CHF who presents to the Emergency Department complaining of midsternal chest pain for the past hour.  Cardiac, respiratory and abdominal exam are benign.  Emergent EKG without evidence of acute ischemia or dysrhythmia.  Repeat EKG however reveals T-wave inversion in the inferior leads concerning for acute ischemia.  Troponin negative.  Basic labs wnls.    Given her  cardiac risk factors, presentation, and EKG changes she will be admitted to medicine for further evaluation and management including serial cardiac enzymes/EKGs and continuous cardiac monitoring in an effort to r/o ACS.            Scribe Attestation:   Scribe #1: I performed the above scribed service and the documentation accurately describes the services I performed. I attest to the accuracy of the note.    Attending Attestation:           Physician Attestation for Scribe:  Physician Attestation Statement for Scribe #1: I, Richard Arias MD, reviewed documentation, as scribed by Wade Daniel in my presence, and it is both accurate and complete.                 ED Course     Clinical Impression:   The encounter diagnosis was Chest pain.    Disposition:   Disposition: Admitted  Condition: Stable       Richard Arias MD  06/09/17 0250

## 2017-06-09 NOTE — SUBJECTIVE & OBJECTIVE
Past Medical History:   Diagnosis Date    Blood clot associated with vein wall inflammation     CHF (congestive heart failure)     DM (diabetes mellitus) 9/19/2013    Hypertension     Prediabetes     Psoriasis     Sleep apnea        Past Surgical History:   Procedure Laterality Date    COLONOSCOPY      DILATION AND CURETTAGE OF UTERUS         Review of patient's allergies indicates:   Allergen Reactions    Pneumococcal 23-abimbola ps vaccine        No current facility-administered medications on file prior to encounter.      Current Outpatient Prescriptions on File Prior to Encounter   Medication Sig    aspirin 81 MG Chew Take 81 mg by mouth once daily.    carvedilol (COREG) 12.5 MG tablet Take 12.5 mg by mouth 2 (two) times daily with meals.    furosemide (LASIX) 40 MG tablet Take 1 tablet (40 mg total) by mouth once daily.    glimepiride (AMARYL) 4 MG tablet Take 1 tablet (4 mg total) by mouth before breakfast.    lansoprazole (PREVACID) 30 MG capsule Take 30 mg by mouth once daily.    lisinopril (PRINIVIL,ZESTRIL) 40 MG tablet Take 1 tablet (40 mg total) by mouth once daily.    meloxicam (MOBIC) 7.5 MG tablet Take 1 tablet (7.5 mg total) by mouth 2 (two) times daily as needed for Pain.    metoprolol succinate (TOPROL-XL) 50 MG 24 hr tablet Take 1 tablet (50 mg total) by mouth once daily.    albuterol 90 mcg/actuation inhaler Inhale 2 puffs into the lungs every 6 (six) hours as needed for Wheezing.    isosorbide mononitrate (IMDUR) 30 MG 24 hr tablet Take 1 tablet (30 mg total) by mouth once daily.    medroxyPROGESTERone (DEPO-PROVERA) 150 mg/mL Syrg Inject 1 mL (150 mg total) into the muscle once.    metformin (GLUCOPHAGE) 500 MG tablet Take 1 tablet (500 mg total) by mouth 2 (two) times daily with meals.    simvastatin (ZOCOR) 40 MG tablet Take 1 tablet (40 mg total) by mouth every evening.    valacyclovir (VALTREX) 500 MG tablet Take 1 tablet (500 mg total) by mouth 2 (two) times daily.      Family History     Problem Relation (Age of Onset)    Diabetes Father, Maternal Grandmother, Paternal Grandmother    Hypertension Mother, Father        Social History Main Topics    Smoking status: Passive Smoke Exposure - Never Smoker     Types: Cigarettes    Smokeless tobacco: Never Used      Comment: smokes cigars on occasion    Alcohol use Yes      Comment: occasional    Drug use:      Types: Marijuana      Comment: hx of marijuana use 3 years ago    Sexual activity: Not Currently     Partners: Male     Review of Systems   Constitutional: Negative for appetite change, chills, diaphoresis and fever.   HENT: Negative for congestion, hearing loss, sore throat, tinnitus and trouble swallowing.    Eyes: Negative for photophobia, discharge, itching and visual disturbance.   Respiratory: Negative for apnea, cough, wheezing and stridor.    Cardiovascular: Negative for chest pain, palpitations and leg swelling.   Gastrointestinal: Negative for abdominal distention, abdominal pain, blood in stool, constipation, diarrhea and nausea.   Endocrine: Negative for polydipsia, polyphagia and polyuria.   Genitourinary: Negative for difficulty urinating, dysuria, flank pain and frequency.   Musculoskeletal: Negative for arthralgias, joint swelling and neck stiffness.   Skin: Negative for color change, rash and wound.   Neurological: Negative for dizziness, tremors, seizures, light-headedness, numbness and headaches.   Hematological: Negative for adenopathy.   Psychiatric/Behavioral: Negative for hallucinations and self-injury.     Objective:     Vital Signs (Most Recent):  Temp: 98.1 °F (36.7 °C) (06/09/17 1148)  Pulse: 106 (06/09/17 1148)  Resp: 20 (06/09/17 1148)  BP: (!) 103/57 (06/09/17 1148)  SpO2: 96 % (06/09/17 1148) Vital Signs (24h Range):  Temp:  [98.1 °F (36.7 °C)-98.5 °F (36.9 °C)] 98.1 °F (36.7 °C)  Pulse:  [] 106  Resp:  [20-22] 20  SpO2:  [93 %-98 %] 96 %  BP: (103-160)/() 103/57     Weight:  111.3 kg (245 lb 4.8 oz)  Body mass index is 38.42 kg/m².    Physical Exam   Constitutional: She appears well-developed and well-nourished. She is cooperative.   Obese and deconditioned appearing   HENT:   Head: Normocephalic and atraumatic.   Eyes: Conjunctivae and lids are normal.   Neck: Full passive range of motion without pain. Neck supple. No JVD present. No edema present. No thyroid mass present.   Cardiovascular: S1 normal, S2 normal and intact distal pulses.    No murmur heard.  Abdominal: Soft. Bowel sounds are normal. She exhibits no distension and no abdominal bruit. There is no splenomegaly or hepatomegaly. There is no tenderness. There is no CVA tenderness.   Lymphadenopathy:     She has no cervical adenopathy.     She has no axillary adenopathy.   Neurological: She is alert. She has normal reflexes. She displays no tremor. She displays no seizure activity.   Skin: Skin is warm, dry and intact.   Psychiatric: She has a normal mood and affect. Her speech is normal. Thought content normal. Cognition and memory are normal.        Significant Labs:   BMP:   Recent Labs  Lab 06/09/17  0524   *      K 3.8      CO2 27   BUN 11   CREATININE 1.1   CALCIUM 8.9     CBC:   Recent Labs  Lab 06/08/17 2111 06/09/17  0524   WBC 7.08 6.45   HGB 13.7 12.9   HCT 43.5 40.8    289     Cardiac Markers:   Recent Labs  Lab 06/08/17 2111   BNP 24     Lipase: No results for input(s): LIPASE in the last 48 hours.  Lipid Panel:   Recent Labs  Lab 06/09/17  0524   CHOL 185   HDL 38*   LDLCALC 130.4   TRIG 83   CHOLHDL 20.5     Troponin:   Recent Labs  Lab 06/08/17 2111 06/09/17  0030 06/09/17  0524   TROPONINI 0.012 0.016 0.018     TSH:   Recent Labs  Lab 02/18/17  1943   TSH 0.882     Urine Culture: No results for input(s): LABURIN in the last 48 hours.    Significant Imaging:   Imaging Results          X-Ray Chest PA And Lateral (Final result)  Result time 06/08/17 20:19:06    Final result by Francois  DAJA Saxena MD (06/08/17 20:19:06)                 Impression:     No acute cardiopulmonary process.          Electronically signed by: RICO SAXENA MD  Date:     06/08/17  Time:    20:19              Narrative:    Chest PA and lateral    Indication:Chest pain    Comparison:2/23/2017    Findings:  The cardiomediastinal silhouette is not enlarged.  There is no pleural effusion.  The trachea is midline.  The lungs are symmetrically expanded bilaterally without evidence of acute parenchymal process. No large focal consolidation seen.  There is no pneumothorax.  The osseous structures are unremarkable.

## 2017-06-09 NOTE — NURSING
Patient change in heart rhythm; 17 beat run of V Tach. Patient lying in bed resting; stated she had just fell asleep, no distress noted. B/P-118/80   P-85  O2-98%. Will notify BRANDON Worthington/ Abby NP

## 2017-06-09 NOTE — HOSPITAL COURSE
Atypical chest pain with Negative chest pain workup. She was mildly dehydrated which resolved after IV fluids. Her BGL was elevated on serum =371 which improved with treatment. Patient seen by Cardiology noted that 2D echo repeated showed normalized EF. Cleared for discharge for acute issues.

## 2017-06-09 NOTE — NURSING
Discharge instructions given to patient at bedside. Patient verbalized understanding of instructions. Patient states willingness to comply. Saline lock removed. Tele monitoring removed.       Patient waiting on transportation

## 2017-06-09 NOTE — DISCHARGE SUMMARY
[facility] Hospital Medicine  Discharge Summary      Patient Name: Fabiana Chanel  MRN: [id number]  Admission Date: 6/8/2017  Hospital Length of Stay: 0 days  Discharge Date and Time:  06/09/2017 1:45 PM  Attending Physician: Missy Gotti MD   Discharging Provider: TONYA Martínez  Primary Care Provider: Josh Huffman MD      HPI:   Fabiana Chanel is a 41 y.o. AAF who is on home oxygen with known Heart failure (EF 30%), DMII, HTN, and sleep apnea who presented for evaluation of a couple of complaints including mid-sternal chest pain that began while washing dishes. She denies radiation, but does endorse L shoulder pain as well. States the CP felt like squeezing 8/10, she reports associated body aches all over. She also reported abdominal pain at the time of presentation but denies abdominal pain at the time of my interview in early morning. Her CP is not reproducible at the bedside.    Initial workup includes unremarkable CBC, mild dehydration on chemistry, elevated BGL on serum = 371, and serial negative troponin 1 levels with normal BNP. CXR negative    * No surgery found *      Indwelling Lines/Drains at time of discharge:   Lines/Drains/Airways          No matching active lines, drains, or airways        Hospital Course:   Atypical chest pain with Negative chest pain workup. She was mildly dehydrated which resolved after IV fluids. Her BGL was elevated on serum =371 which improved with treatment. Patient seen by Cardiology noted that 2D echo repeated showed normalized EF. Cleared for discharge for acute issues.     Consults:   Consults         Status Ordering Provider     Inpatient consult to Cardiology  Once     Provider:  Frankie Angel MD    Completed MAXWELL CARDENAS          Significant Diagnostic Studies: Labs:   CMP   Recent Labs  Lab 06/08/17  2111 06/09/17  0524    137   K 4.2 3.8    103   CO2 29 27   * 281*   BUN 11 11   CREATININE  1.3 1.1   CALCIUM 9.7 8.9   PROT 7.3 6.6   ALBUMIN 3.3* 3.0*   BILITOT 0.3 0.3   ALKPHOS 86 78   AST 11 9*   ALT 12 9*   ANIONGAP 7* 7*   ESTGFRAFRICA 59* >60   EGFRNONAA 51* >60   , CBC   Recent Labs  Lab 06/08/17  2111 06/09/17  0524   WBC 7.08 6.45   HGB 13.7 12.9   HCT 43.5 40.8    289   , INR   Lab Results   Component Value Date    INR 0.9 06/08/2017    INR 0.9 02/23/2017    INR 0.9 02/18/2017   , Lipid Panel   Lab Results   Component Value Date    CHOL 185 06/09/2017    HDL 38 (L) 06/09/2017    LDLCALC 130.4 06/09/2017    TRIG 83 06/09/2017    CHOLHDL 20.5 06/09/2017   , Troponin   Recent Labs  Lab 06/09/17  1154   TROPONINI 0.011    and A1C: No results for input(s): HGBA1C in the last 4320 hours.    Pending Diagnostic Studies:     None        Final Active Diagnoses:    Diagnosis Date Noted POA    PRINCIPAL PROBLEM:  Chest pain [R07.9] 05/13/2014 Yes    CHF (congestive heart failure) [I50.9] 08/12/2014 Yes    Hyperlipidemia [E78.5] 05/16/2014 Yes    Type 2 diabetes mellitus without complication, without long-term current use of insulin [E11.9] 09/20/2013 Yes    HTN (hypertension) [I10] 09/19/2013 Yes    Tobacco abuse [Z72.0] 09/19/2013 Yes     Chronic      Problems Resolved During this Admission:    Diagnosis Date Noted Date Resolved POA      No new Assessment & Plan notes have been filed under this hospital service since the last note was generated.  Service: Hospital Medicine      Discharged Condition: stable    Disposition: Home or Self Care    Follow Up:  Follow-up Information     Josh Huffman MD In 1 week.    Specialty:  Family Medicine  Contact information:  [address] [place] [address] [id number] [phone]                 Patient Instructions:     Diet general   Order Specific Question Answer Comments   Na restriction, if any: 2gNa      Activity as tolerated       Medications:  Reconciled Home Medications:   Current Discharge Medication List      CONTINUE these medications  which have NOT CHANGED    Details   aspirin 81 MG Chew Take 81 mg by mouth once daily.      carvedilol (COREG) 12.5 MG tablet Take 12.5 mg by mouth 2 (two) times daily with meals.      furosemide (LASIX) 40 MG tablet Take 1 tablet (40 mg total) by mouth once daily.  Qty: 30 tablet, Refills: 6      glimepiride (AMARYL) 4 MG tablet Take 1 tablet (4 mg total) by mouth before breakfast.  Qty: 30 tablet, Refills: 3      lansoprazole (PREVACID) 30 MG capsule Take 30 mg by mouth once daily.      lisinopril (PRINIVIL,ZESTRIL) 40 MG tablet Take 1 tablet (40 mg total) by mouth once daily.  Qty: 30 tablet, Refills: 6      meloxicam (MOBIC) 7.5 MG tablet Take 1 tablet (7.5 mg total) by mouth 2 (two) times daily as needed for Pain.  Qty: 20 tablet, Refills: 0      metoprolol succinate (TOPROL-XL) 50 MG 24 hr tablet Take 1 tablet (50 mg total) by mouth once daily.  Qty: 60 tablet, Refills: 6      albuterol 90 mcg/actuation inhaler Inhale 2 puffs into the lungs every 6 (six) hours as needed for Wheezing.  Qty: 18 g, Refills: 3      isosorbide mononitrate (IMDUR) 30 MG 24 hr tablet Take 1 tablet (30 mg total) by mouth once daily.  Qty: 30 tablet, Refills: 6      medroxyPROGESTERone (DEPO-PROVERA) 150 mg/mL Syrg Inject 1 mL (150 mg total) into the muscle once.  Qty: 1 mL, Refills: 3      metformin (GLUCOPHAGE) 500 MG tablet Take 1 tablet (500 mg total) by mouth 2 (two) times daily with meals.  Qty: 56 tablet, Refills: 0      simvastatin (ZOCOR) 40 MG tablet Take 1 tablet (40 mg total) by mouth every evening.  Qty: 30 tablet, Refills: 6      valacyclovir (VALTREX) 500 MG tablet Take 1 tablet (500 mg total) by mouth 2 (two) times daily.  Qty: 10 tablet, Refills: 1    Associated Diagnoses: HSV (herpes simplex virus) anogenital infection; Vaginitis           Time spent on the discharge of patient: 45 minutes        Abby V. Hensley, APRN, FNP-C  PA# [id number]  NPI# [id number]  Hospitalist - Department of Hospital Medicine  [facility]  [address], [place] [address]  Office [phone]; Pager [phone]

## 2017-06-09 NOTE — CONSULTS
41 yof, diabetic,hx of NICMP with EF 25-35% 2012 until 5/2016. She says she had transradial coronary angio 3 yrs ago at [facility], with no CAD (no records found).  She follows at [facility] and her EF improved to 50% on echo 5/2017. No HF admission over the last yr.  She presents with prolonged atpical sharp CP, that lasted a whole day and resolved with narcotics in the ED. No dyspnea, no PND.  No syncope or dizziness    Exam:   today  Pulse 85  Lungs clear  CV: reg, no MRG  Ext: no edema, DP 1+  JVP Nl    Trop 0.01x3  BNP 24  CXR clear    ECG on admission Nl; ECG today: non-specific mild T inversion  Echo today reviewed by me: EF remains ~50%, no gross wall motion abnormality or valvular abnormality    Impression:  Atypical prolonged pain with negative troponin and no major ECG abnormality, in a patient with NICMP and normalized EF  Echo with improved normalized EF  Suspect GERD as a cause of her pain  May discharge on her current cardiac regimen of ACE/coreg/statin.  STOP NSAIDs and add PPI

## 2017-06-09 NOTE — ED TRIAGE NOTES
Pt reports left sided chest pain that radiates to left arm x2 days. Pt denies SOB, nv, dizziness. Pt doesn't appear to be in distress.

## 2017-06-09 NOTE — PLAN OF CARE
06/09/17 1414   Final Note   Assessment Type Final Discharge Note   Discharge Disposition Home   Discharge planning education complete? Yes   What phone number can be called within the next 1-3 days to see how you are doing after discharge? ([phone])   Hospital Follow Up  Appt(s) scheduled? Yes   Discharge plans and expectations educations in teach back method with documentation complete? Yes   Referral to / orders for Home Health Complete? n/a   30 day supply of medicines given at discharge, if documented non-compliance / non-adherence? n/a   Any social issues identified prior to discharge? No   Did you assess the readiness or willingness of the family or caregiver to support self management of care? Yes   Right Care Referral Info   Post Acute Recommendation No Care   NurseCarolyn notified that all CM needs are met.

## 2017-06-09 NOTE — ASSESSMENT & PLAN NOTE
Atypical chest pain and serial negative troponin 1 levels; Last know EF 30% (5/2015).. Given patient's risk factors will keep NPO until seen by cardiology  Continuous cardiac monitoring  Continue to trend CE Q8 hrs X3 sets  ASA 81 mg PO Daily  Supplemental oxygen at 2L NC PRN SOB  2D Echo  NST  Consult to Cardiology

## 2017-06-09 NOTE — PROGRESS NOTES
Pt follows with [facility] cardiology.  RN informed to change consult to [facility].  Please call us back if we can be of further assistance.

## 2017-06-09 NOTE — ASSESSMENT & PLAN NOTE
Hold home oral hyperglycemics if indicated - while hospitalized will use combined insulin therapy with basal and prandial insulin coverage, POCT glucose checks, hypoglycemic protocol and correction scale - HgA1c

## 2017-06-09 NOTE — ASSESSMENT & PLAN NOTE
Repeat 2D echo as above to check for decompensation although patient does not appear in FVO clinically, negative LE edema, negative JVD, lungs appear clear on exam  -Continue home med of lasix, coreg, imdur, lisinopril, and simvastatin  -Unsure why patient is not on OAC 2/2 to heart failure - deferr to cardiology

## 2017-06-09 NOTE — NURSING
Received patient from ER to room via bed. Patient accompanied by transport. Transferred patient to bed. Evaluated general patient appearance and condition. Nursing assessment initiated. Tele monitoring initiated. Saline lock at left AC is CDI. Plan of care for the evening is reviewed with patient. No apparent distress noted at this time.

## 2017-06-10 LAB
DIASTOLIC DYSFUNCTION: NO
ESTIMATED PA SYSTOLIC PRESSURE: 19.97
RETIRED EF AND QEF - SEE NOTES: 50 (ref 55–65)

## 2017-09-17 ENCOUNTER — HOSPITAL ENCOUNTER (EMERGENCY)
Facility: HOSPITAL | Age: 42
Discharge: HOME | End: 2017-09-17
Attending: EMERGENCY MEDICINE
Payer: MEDICARE

## 2017-09-17 VITALS
SYSTOLIC BLOOD PRESSURE: 127 MMHG | HEIGHT: 67 IN | BODY MASS INDEX: 37.67 KG/M2 | RESPIRATION RATE: 14 BRPM | OXYGEN SATURATION: 98 % | DIASTOLIC BLOOD PRESSURE: 78 MMHG | TEMPERATURE: 98 F | HEART RATE: 84 BPM | WEIGHT: 240 LBS

## 2017-09-17 DIAGNOSIS — R07.89 NON-CARDIAC CHEST PAIN: Primary | ICD-10-CM

## 2017-09-17 DIAGNOSIS — K59.00 CONSTIPATION, UNSPECIFIED CONSTIPATION TYPE: ICD-10-CM

## 2017-09-17 DIAGNOSIS — R73.9 HYPERGLYCEMIA: ICD-10-CM

## 2017-09-17 LAB
ALBUMIN SERPL BCP-MCNC: 3.1 G/DL
ALP SERPL-CCNC: 97 U/L
ALT SERPL W/O P-5'-P-CCNC: 12 U/L
ANION GAP SERPL CALC-SCNC: 11 MMOL/L
AST SERPL-CCNC: 13 U/L
BASOPHILS # BLD AUTO: 0.03 K/UL
BASOPHILS NFR BLD: 0.5 %
BILIRUB SERPL-MCNC: 0.4 MG/DL
BUN SERPL-MCNC: 10 MG/DL
CALCIUM SERPL-MCNC: 9.4 MG/DL
CHLORIDE SERPL-SCNC: 96 MMOL/L
CO2 SERPL-SCNC: 28 MMOL/L
CREAT SERPL-MCNC: 1.2 MG/DL
DIFFERENTIAL METHOD: ABNORMAL
EOSINOPHIL # BLD AUTO: 0.2 K/UL
EOSINOPHIL NFR BLD: 3 %
ERYTHROCYTE [DISTWIDTH] IN BLOOD BY AUTOMATED COUNT: 14.1 %
EST. GFR  (AFRICAN AMERICAN): >60 ML/MIN/1.73 M^2
EST. GFR  (NON AFRICAN AMERICAN): 56 ML/MIN/1.73 M^2
GLUCOSE SERPL-MCNC: 430 MG/DL
HCT VFR BLD AUTO: 41.7 %
HGB BLD-MCNC: 13.3 G/DL
LYMPHOCYTES # BLD AUTO: 2.1 K/UL
LYMPHOCYTES NFR BLD: 34 %
MCH RBC QN AUTO: 26.8 PG
MCHC RBC AUTO-ENTMCNC: 31.9 G/DL
MCV RBC AUTO: 84 FL
MONOCYTES # BLD AUTO: 0.4 K/UL
MONOCYTES NFR BLD: 6.5 %
NEUTROPHILS # BLD AUTO: 3.5 K/UL
NEUTROPHILS NFR BLD: 56 %
PLATELET # BLD AUTO: 321 K/UL
PMV BLD AUTO: 10.4 FL
POCT GLUCOSE: 245 MG/DL (ref 70–110)
POCT GLUCOSE: 342 MG/DL (ref 70–110)
POTASSIUM SERPL-SCNC: 4 MMOL/L
PROT SERPL-MCNC: 7.6 G/DL
RBC # BLD AUTO: 4.96 M/UL
SODIUM SERPL-SCNC: 135 MMOL/L
TROPONIN I SERPL DL<=0.01 NG/ML-MCNC: 0.01 NG/ML
WBC # BLD AUTO: 6.29 K/UL

## 2017-09-17 PROCEDURE — 25000003 PHARM REV CODE 250: Performed by: EMERGENCY MEDICINE

## 2017-09-17 PROCEDURE — 80053 COMPREHEN METABOLIC PANEL: CPT

## 2017-09-17 PROCEDURE — 93005 ELECTROCARDIOGRAM TRACING: CPT

## 2017-09-17 PROCEDURE — 99284 EMERGENCY DEPT VISIT MOD MDM: CPT | Mod: 25

## 2017-09-17 PROCEDURE — 85025 COMPLETE CBC W/AUTO DIFF WBC: CPT

## 2017-09-17 PROCEDURE — 93010 ELECTROCARDIOGRAM REPORT: CPT | Mod: ,,, | Performed by: INTERNAL MEDICINE

## 2017-09-17 PROCEDURE — 84484 ASSAY OF TROPONIN QUANT: CPT

## 2017-09-17 PROCEDURE — 82962 GLUCOSE BLOOD TEST: CPT

## 2017-09-17 RX ORDER — MORPHINE SULFATE 10 MG/ML
5 INJECTION INTRAMUSCULAR; INTRAVENOUS; SUBCUTANEOUS ONCE
Status: DISCONTINUED | OUTPATIENT
Start: 2017-09-17 | End: 2017-09-17 | Stop reason: HOSPADM

## 2017-09-17 RX ORDER — MORPHINE SULFATE 10 MG/ML
INJECTION INTRAMUSCULAR; INTRAVENOUS; SUBCUTANEOUS
Status: DISCONTINUED
Start: 2017-09-17 | End: 2017-09-17 | Stop reason: HOSPADM

## 2017-09-17 RX ORDER — FAMOTIDINE 20 MG/1
20 TABLET, FILM COATED ORAL 2 TIMES DAILY
Qty: 20 TABLET | Refills: 0 | Status: SHIPPED | OUTPATIENT
Start: 2017-09-17 | End: 2017-10-27

## 2017-09-17 RX ORDER — ASPIRIN 325 MG
325 TABLET ORAL
Status: COMPLETED | OUTPATIENT
Start: 2017-09-17 | End: 2017-09-17

## 2017-09-17 RX ORDER — DOCUSATE SODIUM 100 MG/1
100 CAPSULE, LIQUID FILLED ORAL 3 TIMES DAILY PRN
Qty: 60 CAPSULE | Refills: 0 | Status: SHIPPED | OUTPATIENT
Start: 2017-09-17 | End: 2017-10-27

## 2017-09-17 RX ORDER — NITROGLYCERIN 0.4 MG/1
0.4 TABLET SUBLINGUAL EVERY 5 MIN PRN
Status: DISCONTINUED | OUTPATIENT
Start: 2017-09-17 | End: 2017-09-17 | Stop reason: HOSPADM

## 2017-09-17 RX ORDER — ONDANSETRON 4 MG/1
4 TABLET, ORALLY DISINTEGRATING ORAL EVERY 12 HOURS PRN
Qty: 12 TABLET | Refills: 0 | Status: SHIPPED | OUTPATIENT
Start: 2017-09-17 | End: 2017-09-20

## 2017-09-17 RX ADMIN — ASPIRIN 325 MG ORAL TABLET 325 MG: 325 PILL ORAL at 01:09

## 2017-09-17 NOTE — ED TRIAGE NOTES
Pt reports mid sternal and body aches that started this afternoon. Pt also reports generalized abd pain since Thursday. Denies n/v  fever

## 2017-09-17 NOTE — ED PROVIDER NOTES
Encounter Date: 9/17/2017    SCRIBE #1 NOTE: I, Nancy Norris, am scribing for, and in the presence of,  Bruce Pantoja MD. I have scribed the following portions of the note - Other sections scribed: HPI/ROS.       History     Chief Complaint   Patient presents with    Chest Pain     Chest pain and whole body aches.      CC: Chest Pain    HPI: 42 y.o. F with a PMHx of DM, HTN, CHF, and blood clot associated with vein wall inflamation presents to the ED c/o acute onset of midsternal CP. Pt also reports of generelized myalgias, sinus pressure, rhinorrhea, constipation, and generalized abdominal pain (x2 days). Pt's last bowel movement was this AM; however, the episode prior to that was 2 weeks ago. Symptoms are acute and severe. No prior tx reported. Pt notes that her CBG has been running in the 210s. Pt otherwise denies fever, sore throat, cough, dysuria, leg swelling, and SOB.       The history is provided by the patient.     Review of patient's allergies indicates:   Allergen Reactions    Pneumococcal 23-abimbola ps vaccine      Past Medical History:   Diagnosis Date    Blood clot associated with vein wall inflammation     CHF (congestive heart failure)     DM (diabetes mellitus) 9/19/2013    Hypertension     Prediabetes     Psoriasis     Sleep apnea      Past Surgical History:   Procedure Laterality Date    COLONOSCOPY      DILATION AND CURETTAGE OF UTERUS       Family History   Problem Relation Age of Onset    Hypertension Mother     Hypertension Father     Diabetes Father     Diabetes Maternal Grandmother     Diabetes Paternal Grandmother     Breast cancer Neg Hx     Colon cancer Neg Hx     Ovarian cancer Neg Hx      Social History   Substance Use Topics    Smoking status: Passive Smoke Exposure - Never Smoker     Types: Cigarettes    Smokeless tobacco: Never Used      Comment: smokes cigars on occasion    Alcohol use Yes      Comment: occasional     Review of Systems   Constitutional:  Negative for chills and fever.   HENT: Positive for rhinorrhea and sinus pressure. Negative for congestion and sore throat.    Eyes: Negative for pain.   Respiratory: Negative for cough and shortness of breath.    Cardiovascular: Positive for chest pain. Negative for leg swelling.   Gastrointestinal: Positive for abdominal pain and constipation. Negative for blood in stool, diarrhea, nausea and vomiting.   Genitourinary: Negative for difficulty urinating, dysuria and hematuria.   Musculoskeletal: Positive for myalgias. Negative for joint swelling.   Skin: Negative for rash and wound.   Neurological: Negative for headaches.       Physical Exam     Initial Vitals [09/17/17 0112]   BP Pulse Resp Temp SpO2   127/84 96 14 98.4 °F (36.9 °C) 96 %      MAP       98.33         Physical Exam    Vitals reviewed.  Constitutional: She appears well-developed and well-nourished.   HENT:   Head: Normocephalic and atraumatic.   Nose: Nose normal.   Mouth/Throat: No oropharyngeal exudate.   Eyes: EOM are normal. Pupils are equal, round, and reactive to light.   Neck: Normal range of motion. Neck supple. No JVD present.   Cardiovascular: Normal rate, regular rhythm, normal heart sounds and intact distal pulses. Exam reveals no gallop and no friction rub.    No murmur heard.  Pulmonary/Chest: Breath sounds normal. No stridor. No respiratory distress. She has no wheezes. She has no rhonchi. She has no rales.   Abdominal: Soft. Bowel sounds are normal. She exhibits no distension and no mass. There is no tenderness. There is no rebound and no guarding.   Musculoskeletal: Normal range of motion. She exhibits no edema or tenderness.   Neurological: She is alert and oriented to person, place, and time. She has normal strength. No sensory deficit.   Skin: Skin is warm and dry.   Psychiatric: She has a normal mood and affect. Thought content normal.         ED Course   Procedures  Labs Reviewed   COMPREHENSIVE METABOLIC PANEL - Abnormal;  Notable for the following:        Result Value    Sodium 135 (*)     Glucose 430 (*)     Albumin 3.1 (*)     eGFR if non  56 (*)     All other components within normal limits   CBC W/ AUTO DIFFERENTIAL - Abnormal; Notable for the following:     MCH 26.8 (*)     MCHC 31.9 (*)     All other components within normal limits   POCT GLUCOSE - Abnormal; Notable for the following:     POCT Glucose 342 (*)     All other components within normal limits   POCT GLUCOSE - Abnormal; Notable for the following:     POCT Glucose 245 (*)     All other components within normal limits   TROPONIN I     EKG Readings: (Independently Interpreted)   Rhythm: Normal Sinus Rhythm. Ectopy: No Ectopy. Conduction: Normal. ST Segments: Normal ST Segments. T Waves: Normal. Clinical Impression: Normal Sinus Rhythm       X-Rays:   Independently Interpreted Readings:   Chest X-Ray: Normal heart size.  No infiltrates.  No acute abnormalities.     Medical Decision Making:   History:   Old Medical Records: I decided to obtain old medical records.      Medical decision-making:    The patient received a medical screening exam. If performed, the EKG was independently evaluated by me and is pending final cardiology evaluation.  If performed, all radiographic studies were independently evaluated by me and are pending final radiology evaluation. If labs were ordered, they were reviewed. Vital signs are independently assessed by me.  If performed, the pulse oximetry was independently evaluated by me.  I decided to obtain the patient's past medical record.  If available, I reviewed the patient's past medical record, including most recent labs and radiology reports.    This is an emergent evaluation for a patient with chest pain.The patient's pain is atypical for cardiac etiology.  I decided to obtain and review the patient's past medical record.        The vital signs are stable in the room.    EKG is normal.  There is no evidence of STEMI or  ischemia.    CXR is negative for pneumonia, pneumothorax and edema.  Troponin is negative and was drawn at least 8 hours since the onset of pain.  I doubt ACS. There is no evidence of congestive heart failure.  The electrolytes are relatively normal.  The pt is not anemic.  Wells score zero and no sign of DVT- I doubt PE.  Pt was hyperglycemic without being in DKA. Treated with IVF and insulin with improvement   The pt's symptoms were treated with:    Medications   aspirin tablet 325 mg (325 mg Oral Given 9/17/17 0156)       Currently the patient has a a non-diagnostic EKG with negative troponin in the emergency department.  I doubt acute coronary syndrome.  I did inform the patient that even with negative testing, we can never eliminate all risk.  I believe the patient is low risk with negative initial testing; they are appropriate for close outpatient follow-up.  The patient is aware of the small but persistent risk for MI/ACS with subsequent cardiac complications or death.  I have low suspicion for cardiopulmonary, vascular, infectious, respiratory, or other emergent medical condition based on my evaluation in the ED.     The patient's pain is currently improved.      The results and physical exam findings were reviewed with the patient. Pt agrees with assessment, disposition and treatment plan and has no further questions or complaints at this time.      MYRIAM Pantoja M.D. 1:25 AM 9/18/2017            Scribe Attestation:   Scribe #1: I performed the above scribed service and the documentation accurately describes the services I performed. I attest to the accuracy of the note.    Attending Attestation:           Physician Attestation for Scribe:  Physician Attestation Statement for Scribe #1: I, Bruce Pantoja MD, reviewed documentation, as scribed by Nancy Norris in my presence, and it is both accurate and complete.                 ED Course      Clinical Impression:   The primary encounter diagnosis  was Non-cardiac chest pain. Diagnoses of Hyperglycemia and Constipation, unspecified constipation type were also pertinent to this visit.    Disposition:   Disposition: Discharged  Condition: Stable                        Bruce Pantoja MD  09/18/17 0128       Bruce Pantoja MD  09/18/17 0129

## 2017-10-27 ENCOUNTER — HOSPITAL ENCOUNTER (EMERGENCY)
Facility: HOSPITAL | Age: 42
Discharge: HOME | End: 2017-10-27
Attending: EMERGENCY MEDICINE
Payer: MEDICARE

## 2017-10-27 VITALS
TEMPERATURE: 99 F | HEART RATE: 98 BPM | BODY MASS INDEX: 37.67 KG/M2 | WEIGHT: 240 LBS | RESPIRATION RATE: 20 BRPM | SYSTOLIC BLOOD PRESSURE: 160 MMHG | OXYGEN SATURATION: 98 % | HEIGHT: 67 IN | DIASTOLIC BLOOD PRESSURE: 85 MMHG

## 2017-10-27 DIAGNOSIS — N76.4 LEFT GENITAL LABIAL ABSCESS: Primary | ICD-10-CM

## 2017-10-27 PROCEDURE — 25000003 PHARM REV CODE 250: Performed by: EMERGENCY MEDICINE

## 2017-10-27 PROCEDURE — 99283 EMERGENCY DEPT VISIT LOW MDM: CPT | Mod: 25

## 2017-10-27 PROCEDURE — 56405 I&D VULVA/PERINEAL ABSCESS: CPT

## 2017-10-27 RX ORDER — DOXYCYCLINE 100 MG/1
100 CAPSULE ORAL 2 TIMES DAILY
Qty: 14 CAPSULE | Refills: 0 | Status: SHIPPED | OUTPATIENT
Start: 2017-10-27 | End: 2017-11-03

## 2017-10-27 RX ORDER — LIDOCAINE HYDROCHLORIDE AND EPINEPHRINE 10; 10 MG/ML; UG/ML
10 INJECTION, SOLUTION INFILTRATION; PERINEURAL ONCE
Status: COMPLETED | OUTPATIENT
Start: 2017-10-27 | End: 2017-10-27

## 2017-10-27 RX ADMIN — LIDOCAINE HYDROCHLORIDE,EPINEPHRINE BITARTRATE 10 ML: 10; .01 INJECTION, SOLUTION INFILTRATION; PERINEURAL at 02:10

## 2017-10-27 NOTE — ED PROVIDER NOTES
Encounter Date: 10/27/2017    SCRIBE #1 NOTE: I, Isael Jules, am scribing for, and in the presence of,  Arcenio Hu MD. I have scribed the following portions of the note - Other sections scribed: HPI, ROS, PE.       History     Chief Complaint   Patient presents with    Abscess     vaginal abscess x2 days. Denies fever or chills     CC: Abscess    HPI: This 42 y.o. Female with CHF, diabetes mellitus and HTN presents to the ED c/o acute onset vaginal abscess which began x2 days ago. The patient states the previous time she had a vaginal abscess it was lanced and drained. She reports her abscess is painful and swollen. She is allergic to Pneumococcal 23-abimbola Ps Vaccine. The patient is an occasional drinker. She admits diabetes runs in her family. The patient complies with all of her at home medications. She has not taken any medication to relieve her symptoms. The patient denies chest pain, abdominal pain, back pain, neck pain, leg swelling, fever, chills, nausea or emesis. No other symptoms or alleviating factors present.    PCP: Dr. Josh Huffman      The history is provided by the patient. No  was used.     Review of patient's allergies indicates:   Allergen Reactions    Pneumococcal 23-abimbola ps vaccine      Past Medical History:   Diagnosis Date    Blood clot associated with vein wall inflammation     CHF (congestive heart failure)     DM (diabetes mellitus) 9/19/2013    Hypertension     Prediabetes     Psoriasis     Sleep apnea      Past Surgical History:   Procedure Laterality Date    COLONOSCOPY      DILATION AND CURETTAGE OF UTERUS       Family History   Problem Relation Age of Onset    Hypertension Mother     Hypertension Father     Diabetes Father     Diabetes Maternal Grandmother     Diabetes Paternal Grandmother     Breast cancer Neg Hx     Colon cancer Neg Hx     Ovarian cancer Neg Hx      Social History   Substance Use Topics    Smoking status: Passive  Smoke Exposure - Never Smoker     Types: Cigarettes    Smokeless tobacco: Never Used      Comment: smokes cigars on occasion    Alcohol use Yes      Comment: occasional     Review of Systems   Constitutional: Negative for fever.   HENT: Negative for sore throat.    Eyes: Negative for photophobia.   Respiratory: Negative for shortness of breath.    Cardiovascular: Negative for chest pain.   Gastrointestinal: Negative for nausea.   Genitourinary: Positive for genital sores and vaginal pain. Negative for dysuria, flank pain and vaginal bleeding.   Musculoskeletal: Negative for back pain.   Skin: Positive for rash (vaginal abscess).   Neurological: Negative for weakness.   Hematological: Does not bruise/bleed easily.   Psychiatric/Behavioral: Negative for confusion.       Physical Exam     Initial Vitals   BP Pulse Resp Temp SpO2   10/27/17 0141 10/27/17 0140 10/27/17 0140 10/27/17 0140 10/27/17 0140   (!) 178/90 89 19 97.9 °F (36.6 °C) 97 %      MAP       10/27/17 0141       119.33         Physical Exam    Nursing note and vitals reviewed.  Constitutional: She appears well-developed and well-nourished. She is not diaphoretic. No distress.   HENT:   Head: Normocephalic and atraumatic.   Nose: Nose normal.   Eyes: EOM are normal. Pupils are equal, round, and reactive to light. No scleral icterus.   Neck: Normal range of motion. Neck supple.   Cardiovascular: Normal rate, regular rhythm, normal heart sounds and intact distal pulses. Exam reveals no gallop and no friction rub.    No murmur heard.  Pulmonary/Chest: Breath sounds normal. No stridor. No respiratory distress. She has no wheezes. She has no rhonchi. She has no rales.   Abdominal: Soft. Normal appearance and bowel sounds are normal. She exhibits no distension. There is no tenderness. There is no rebound and no guarding.   Genitourinary:         Genitourinary Comments: Small ~1x1 tender nodule with slight fluctuance with surroudning induration on left anterior  labia. Minimal surrounding cellultis. No crepitus.    Musculoskeletal: Normal range of motion. She exhibits no edema or tenderness.   Neurological: She is oriented to person, place, and time. No cranial nerve deficit.   Skin: Skin is warm and dry. No rash noted.   Psychiatric: She has a normal mood and affect. Her behavior is normal.         ED Course   I & D - Incision and Drainage  Date/Time: 11/8/2017 12:58 PM  Performed by: JESENIA LEDESMA  Authorized by: JESENIA LEDESMA   Consent Done: Yes  Consent: Verbal consent obtained.  Consent given by: patient  Patient understanding: patient states understanding of the procedure being performed  Patient consent: the patient's understanding of the procedure matches consent given  Required items: required blood products, implants, devices, and special equipment available  Patient identity confirmed: name, provided demographic data and verbally with patient  Type: abscess  Body area: anogenital  Location details: vulva  Anesthesia: local infiltration    Anesthesia:  Local Anesthetic: lidocaine 1% with epinephrine  Patient sedated: no  Scalpel size: 11  Incision type: single straight  Complexity: simple  Drainage: pus  Drainage amount: moderate  Wound treatment: expression of material and  drain placed  Packing material: wick placed  Complications: No        Labs Reviewed - No data to display          Medical Decision Making:   Initial Assessment:   41 yo with small abscess on labia. Incised with #11 blade after anesthesia applied. Pus drained. No systemic symptoms. No concern for severe or rapidly spreading infection such as nec fasc, isael's. Minimal bleeding after procedure. Rx for Abx provided. Recommended f/u with PCP.            Scribe Attestation:   Scribe #1: I performed the above scribed service and the documentation accurately describes the services I performed. I attest to the accuracy of the note.    Attending Attestation:           Physician  Attestation for Scribe:  Physician Attestation Statement for Scribe #1: I, Arcenio Hu MD, reviewed documentation, as scribed by Isael Jules in my presence, and it is both accurate and complete.                 ED Course      Clinical Impression:   There were no encounter diagnoses.    Disposition:   Disposition: Discharged  Condition: Stable                        Arcenio Hu MD  11/08/17 1301

## 2017-10-27 NOTE — ED TRIAGE NOTES
"Pt reports to ED via personal transportation with c/o "boil on my vagina" ongoing for 2 days; pt reports hx with these types of boils on her vagina and in her butt crack; pt has red, raised, painful bump to left outer vagina lip; pt denies having discharge from the bump; pt AAOx4  "

## 2017-11-21 ENCOUNTER — LAB VISIT (OUTPATIENT)
Dept: LAB | Facility: HOSPITAL | Age: 42
End: 2017-11-21
Attending: GENERAL PRACTICE
Payer: MEDICARE

## 2017-11-21 ENCOUNTER — OFFICE VISIT (OUTPATIENT)
Dept: FAMILY MEDICINE | Facility: CLINIC | Age: 42
End: 2017-11-21
Payer: MEDICARE

## 2017-11-21 VITALS
OXYGEN SATURATION: 96 % | TEMPERATURE: 99 F | WEIGHT: 244.94 LBS | HEIGHT: 67 IN | SYSTOLIC BLOOD PRESSURE: 128 MMHG | HEART RATE: 77 BPM | DIASTOLIC BLOOD PRESSURE: 74 MMHG | RESPIRATION RATE: 14 BRPM | BODY MASS INDEX: 38.44 KG/M2

## 2017-11-21 DIAGNOSIS — E11.9 TYPE 2 DIABETES MELLITUS WITHOUT COMPLICATION, WITHOUT LONG-TERM CURRENT USE OF INSULIN: Primary | ICD-10-CM

## 2017-11-21 DIAGNOSIS — K59.00 CONSTIPATION, UNSPECIFIED CONSTIPATION TYPE: ICD-10-CM

## 2017-11-21 DIAGNOSIS — I50.43 ACUTE ON CHRONIC COMBINED SYSTOLIC AND DIASTOLIC CONGESTIVE HEART FAILURE: ICD-10-CM

## 2017-11-21 DIAGNOSIS — I10 ESSENTIAL HYPERTENSION: ICD-10-CM

## 2017-11-21 DIAGNOSIS — I50.9 CONGESTIVE HEART FAILURE, UNSPECIFIED CONGESTIVE HEART FAILURE CHRONICITY, UNSPECIFIED CONGESTIVE HEART FAILURE TYPE: ICD-10-CM

## 2017-11-21 DIAGNOSIS — E11.9 TYPE 2 DIABETES MELLITUS WITHOUT COMPLICATION, WITHOUT LONG-TERM CURRENT USE OF INSULIN: ICD-10-CM

## 2017-11-21 DIAGNOSIS — E78.2 MIXED HYPERLIPIDEMIA: ICD-10-CM

## 2017-11-21 DIAGNOSIS — Z01.419 WOMEN'S ANNUAL ROUTINE GYNECOLOGICAL EXAMINATION: ICD-10-CM

## 2017-11-21 LAB
ALBUMIN SERPL BCP-MCNC: 3.1 G/DL
ALP SERPL-CCNC: 95 U/L
ALT SERPL W/O P-5'-P-CCNC: 11 U/L
ANION GAP SERPL CALC-SCNC: 8 MMOL/L
AST SERPL-CCNC: 14 U/L
BASOPHILS # BLD AUTO: 0.03 K/UL
BASOPHILS NFR BLD: 0.5 %
BILIRUB SERPL-MCNC: 0.6 MG/DL
BILIRUB SERPL-MCNC: NEGATIVE MG/DL
BLOOD URINE, POC: ABNORMAL
BNP SERPL-MCNC: 375 PG/ML
BUN SERPL-MCNC: 11 MG/DL
CALCIUM SERPL-MCNC: 9.5 MG/DL
CHLORIDE SERPL-SCNC: 100 MMOL/L
CHOLEST SERPL-MCNC: 227 MG/DL
CHOLEST/HDLC SERPL: 3.7 {RATIO}
CO2 SERPL-SCNC: 30 MMOL/L
COLOR, POC UA: YELLOW
CREAT SERPL-MCNC: 1 MG/DL
DIFFERENTIAL METHOD: ABNORMAL
EOSINOPHIL # BLD AUTO: 0.1 K/UL
EOSINOPHIL NFR BLD: 2.3 %
ERYTHROCYTE [DISTWIDTH] IN BLOOD BY AUTOMATED COUNT: 15.3 %
EST. GFR  (AFRICAN AMERICAN): >60 ML/MIN/1.73 M^2
EST. GFR  (NON AFRICAN AMERICAN): >60 ML/MIN/1.73 M^2
ESTIMATED AVG GLUCOSE: 292 MG/DL
GLUCOSE SERPL-MCNC: 293 MG/DL
GLUCOSE UR QL STRIP: 1000
HBA1C MFR BLD HPLC: 11.8 %
HCT VFR BLD AUTO: 43.2 %
HDLC SERPL-MCNC: 62 MG/DL
HDLC SERPL: 27.3 %
HGB BLD-MCNC: 13.2 G/DL
IMM GRANULOCYTES # BLD AUTO: 0.01 K/UL
IMM GRANULOCYTES NFR BLD AUTO: 0.2 %
KETONES UR QL STRIP: ABNORMAL
LDLC SERPL CALC-MCNC: 140.6 MG/DL
LEUKOCYTE ESTERASE URINE, POC: NEGATIVE
LYMPHOCYTES # BLD AUTO: 1.8 K/UL
LYMPHOCYTES NFR BLD: 31.9 %
MCH RBC QN AUTO: 25.9 PG
MCHC RBC AUTO-ENTMCNC: 30.6 G/DL
MCV RBC AUTO: 85 FL
MONOCYTES # BLD AUTO: 0.4 K/UL
MONOCYTES NFR BLD: 7.5 %
NEUTROPHILS # BLD AUTO: 3.3 K/UL
NEUTROPHILS NFR BLD: 57.6 %
NITRITE, POC UA: NEGATIVE
NONHDLC SERPL-MCNC: 165 MG/DL
NRBC BLD-RTO: 0 /100 WBC
PH, POC UA: 5
PLATELET # BLD AUTO: 283 K/UL
PMV BLD AUTO: 11.5 FL
POTASSIUM SERPL-SCNC: 4.1 MMOL/L
PROT SERPL-MCNC: 7.3 G/DL
PROTEIN, POC: ABNORMAL
RBC # BLD AUTO: 5.09 M/UL
SODIUM SERPL-SCNC: 138 MMOL/L
SPECIFIC GRAVITY, POC UA: 1.01
T4 FREE SERPL-MCNC: 0.95 NG/DL
TRIGL SERPL-MCNC: 122 MG/DL
TSH SERPL DL<=0.005 MIU/L-ACNC: 1.19 UIU/ML
UROBILINOGEN, POC UA: NORMAL
WBC # BLD AUTO: 5.73 K/UL

## 2017-11-21 PROCEDURE — 84439 ASSAY OF FREE THYROXINE: CPT

## 2017-11-21 PROCEDURE — 99999 PR PBB SHADOW E&M-EST. PATIENT-LVL IV: CPT | Mod: PBBFAC,,, | Performed by: FAMILY MEDICINE

## 2017-11-21 PROCEDURE — 85025 COMPLETE CBC W/AUTO DIFF WBC: CPT

## 2017-11-21 PROCEDURE — 36415 COLL VENOUS BLD VENIPUNCTURE: CPT | Mod: PO

## 2017-11-21 PROCEDURE — 81001 URINALYSIS AUTO W/SCOPE: CPT | Mod: QW,S$GLB,, | Performed by: FAMILY MEDICINE

## 2017-11-21 PROCEDURE — 99205 OFFICE O/P NEW HI 60 MIN: CPT | Mod: 25,S$GLB,, | Performed by: FAMILY MEDICINE

## 2017-11-21 PROCEDURE — 84443 ASSAY THYROID STIM HORMONE: CPT

## 2017-11-21 PROCEDURE — 83036 HEMOGLOBIN GLYCOSYLATED A1C: CPT

## 2017-11-21 PROCEDURE — 99499 UNLISTED E&M SERVICE: CPT | Mod: S$GLB,,, | Performed by: FAMILY MEDICINE

## 2017-11-21 PROCEDURE — 83880 ASSAY OF NATRIURETIC PEPTIDE: CPT

## 2017-11-21 PROCEDURE — 80053 COMPREHEN METABOLIC PANEL: CPT

## 2017-11-21 PROCEDURE — 80061 LIPID PANEL: CPT

## 2017-11-21 RX ORDER — CARVEDILOL 25 MG/1
25 TABLET ORAL 2 TIMES DAILY WITH MEALS
Status: ON HOLD | COMMUNITY
End: 2017-11-28 | Stop reason: HOSPADM

## 2017-11-21 RX ORDER — GABAPENTIN 400 MG/1
400 CAPSULE ORAL DAILY
COMMUNITY
End: 2018-07-02 | Stop reason: SDUPTHER

## 2017-11-21 RX ORDER — AMLODIPINE BESYLATE 10 MG/1
10 TABLET ORAL DAILY
Status: ON HOLD | COMMUNITY
End: 2017-11-28 | Stop reason: HOSPADM

## 2017-11-21 RX ORDER — GLIMEPIRIDE 4 MG/1
4 TABLET ORAL
Status: ON HOLD | COMMUNITY
Start: 2015-01-20 | End: 2017-11-28 | Stop reason: HOSPADM

## 2017-11-21 RX ORDER — DICYCLOMINE HYDROCHLORIDE 10 MG/1
10 CAPSULE ORAL
COMMUNITY
End: 2018-01-11

## 2017-11-21 RX ORDER — ATORVASTATIN CALCIUM 20 MG/1
20 TABLET, FILM COATED ORAL DAILY
Status: ON HOLD | COMMUNITY
End: 2017-11-28 | Stop reason: HOSPADM

## 2017-11-21 RX ORDER — ESOMEPRAZOLE MAGNESIUM 40 MG/1
40 CAPSULE, DELAYED RELEASE ORAL
COMMUNITY
End: 2019-06-19 | Stop reason: ALTCHOICE

## 2017-11-21 RX ORDER — SYRING-NEEDL,DISP,INSUL,0.3 ML 29 G X1/2"
296 SYRINGE, EMPTY DISPOSABLE MISCELLANEOUS ONCE
Qty: 296 ML | Refills: 2 | Status: SHIPPED | OUTPATIENT
Start: 2017-11-21 | End: 2017-11-21

## 2017-11-21 RX ORDER — FUROSEMIDE 20 MG/1
20 TABLET ORAL DAILY
COMMUNITY
End: 2017-11-21

## 2017-11-21 RX ORDER — POTASSIUM CHLORIDE 750 MG/1
10 TABLET, EXTENDED RELEASE ORAL
Status: ON HOLD | COMMUNITY
End: 2017-11-28 | Stop reason: HOSPADM

## 2017-11-21 NOTE — PROGRESS NOTES
Patient, Fabiana Chanel (MRN #[id number]), presented with a recorded BMI of 38.36 kg/m^2 and a documented comorbidity(s):  - Diabetes Mellitus Type 2  - Hypertension  - Hyperlipidemia  - Atrial Fibrillation  to which the severe obesity is a contributing factor. This is consistent with the definition of severe obesity (BMI 35.0-35.9) with comorbidity (ICD-10 E66.01, Z68.35). The patient's severe obesity was monitored, evaluated, addressed and/or treated. This addendum to the medical record is made on 11/21/2017.

## 2017-11-21 NOTE — PROGRESS NOTES
Chief Complaint   Patient presents with    [facility]     patient insurance changed had to change doc. having a side pain       HPI  Fabiana Chanel is a 42 y.o. female with medical diagnoses as listed in the medical history and problem list that presents to [facility].      Cardiology - CHF, overall compliant with medications, 5/2017 last exacerbation. States mild SOB at this time x 1 day. Currently on lasix 40 qd.    L flank pain - 1 day hx, prog worsening, worse with standing, +tightness. No urinary symptoms, +constipated (states prev seen in ER), last BM 1 week, normally daily.     Resp failure - states 2 years ago, DUB, epilepsy    DM2 - 170 - 230 at home. Only on metformin/glimeperide.    Hx of DVT - 2 years ago, began with anticoagulation, d/c'd 2/2 ICU stay.     PAST MEDICAL HISTORY:  Past Medical History:   Diagnosis Date    Blood clot associated with vein wall inflammation     CHF (congestive heart failure)     DM (diabetes mellitus) 9/19/2013    Hypertension     Prediabetes     Psoriasis     Sleep apnea        PAST SURGICAL HISTORY:  Past Surgical History:   Procedure Laterality Date    COLONOSCOPY      DILATION AND CURETTAGE OF UTERUS         SOCIAL HISTORY:  Social History     Social History    Marital status:      Spouse name: N/A    Number of children: N/A    Years of education: N/A     Occupational History    Not on file.     Social History Main Topics    Smoking status: Passive Smoke Exposure - Never Smoker     Types: Cigarettes    Smokeless tobacco: Never Used      Comment: smokes cigars on occasion    Alcohol use Yes      Comment: occasional    Drug use:      Types: Marijuana      Comment: hx of marijuana use 3 years ago    Sexual activity: Not Currently     Partners: Male     Other Topics Concern    Not on file     Social History Narrative    No narrative on file       FAMILY HISTORY:  Family History   Problem Relation Age of Onset    Hypertension Mother      Hypertension Father     Diabetes Father     Diabetes Maternal Grandmother     Diabetes Paternal Grandmother     Breast cancer Neg Hx     Colon cancer Neg Hx     Ovarian cancer Neg Hx        ALLERGIES AND MEDICATIONS: updated and reviewed.  Review of patient's allergies indicates:   Allergen Reactions    Pneumococcal 23-abimbola ps vaccine      Current Outpatient Prescriptions   Medication Sig Dispense Refill    albuterol 90 mcg/actuation inhaler Inhale 2 puffs into the lungs every 6 (six) hours as needed for Wheezing. 18 g 3    amLODIPine (NORVASC) 10 MG tablet Take 10 mg by mouth once daily.      atorvastatin (LIPITOR) 20 MG tablet Take 20 mg by mouth once daily.      carvedilol (COREG) 25 MG tablet Take 25 mg by mouth 2 (two) times daily with meals.      esomeprazole (NEXIUM) 40 MG capsule Take 40 mg by mouth before breakfast.      gabapentin (NEURONTIN) 400 MG capsule Take 400 mg by mouth Daily.      glimepiride (AMARYL) 4 MG tablet Take 1 tablet (4 mg total) by mouth before breakfast. (Patient taking differently: Take 4 mg by mouth 2 (two) times daily. ) 30 tablet 3    lisinopril (PRINIVIL,ZESTRIL) 40 MG tablet Take 1 tablet (40 mg total) by mouth once daily. 30 tablet 6    metformin (GLUCOPHAGE) 500 MG tablet Take 1 tablet (500 mg total) by mouth 2 (two) times daily with meals. 56 tablet 0    potassium chloride (KLOR-CON) 10 MEQ TbSR Take 10 mEq by mouth.      aspirin 81 MG Chew Take 81 mg by mouth once daily.      carvedilol (COREG) 12.5 MG tablet Take 25 mg by mouth 2 (two) times daily with meals.       dicyclomine (BENTYL) 10 MG capsule Take 10 mg by mouth.      furosemide (LASIX) 40 MG tablet Take 1 tablet (40 mg total) by mouth once daily. 30 tablet 6    glimepiride (AMARYL) 4 MG tablet Take 4 mg by mouth.      magnesium citrate solution Take 296 mLs by mouth once. 296 mL 2    meloxicam (MOBIC) 7.5 MG tablet Take 1 tablet (7.5 mg total) by mouth 2 (two) times daily as needed for  "Pain. 20 tablet 0    metoprolol succinate (TOPROL-XL) 50 MG 24 hr tablet Take 1 tablet (50 mg total) by mouth once daily. 60 tablet 6    rivaroxaban (XARELTO) 10 mg Tab Take 10 mg by mouth.      simvastatin (ZOCOR) 40 MG tablet Take 1 tablet (40 mg total) by mouth every evening. 30 tablet 6     No current facility-administered medications for this visit.        ROS  Review of Systems   Constitutional: Negative for activity change, appetite change and fever.   HENT: Negative for congestion and sore throat.    Eyes: Negative for visual disturbance.   Respiratory: Negative for cough and shortness of breath.    Cardiovascular: Negative for chest pain.   Gastrointestinal: Positive for constipation. Negative for abdominal pain, diarrhea, nausea and vomiting.   Endocrine: Negative.    Genitourinary: Positive for flank pain. Negative for dysuria.   Musculoskeletal: Negative for arthralgias and back pain.   Skin: Negative for rash.   Allergic/Immunologic: Negative.    Neurological: Negative for dizziness, weakness and headaches.   Hematological: Negative.    Psychiatric/Behavioral: Negative for agitation and confusion.       Physical Exam  Vitals:    11/21/17 1333   BP: 128/74   Pulse: 77   Resp: 14   Temp: 98.7 °F (37.1 °C)    Body mass index is 38.36 kg/m².  Weight: 111.1 kg (244 lb 14.9 oz)   Height: 5' 7" (170.2 cm)     Physical Exam   Constitutional: She is oriented to person, place, and time. She appears well-developed and well-nourished.   HENT:   Head: Normocephalic and atraumatic.   Eyes: Conjunctivae and EOM are normal. Pupils are equal, round, and reactive to light.   Neck: Normal range of motion. Neck supple.   Cardiovascular: Normal rate, regular rhythm and normal heart sounds.    Pulmonary/Chest: Effort normal and breath sounds normal.   Abdominal: Soft. Bowel sounds are normal.   Musculoskeletal: Normal range of motion.   L flank pain - reproducible   Neurological: She is alert and oriented to person, " place, and time. She has normal reflexes.   Skin: Skin is warm and dry.   Psychiatric: She has a normal mood and affect. Her behavior is normal. Judgment and thought content normal.       Health Maintenance       Date Due Completion Date    Foot Exam 07/03/1986 ---    Eye Exam 07/03/1986 ---    TETANUS VACCINE 07/03/1994 ---    Pneumococcal PPSV23 (Medium Risk) (1) 07/03/1994 ---    Pap Smear 01/17/2014 1/17/2013    Hemoglobin A1c 11/16/2015 5/16/2015    Influenza Vaccine 08/01/2017 ---    Lipid Panel 06/09/2018 6/9/2017    Mammogram 03/30/2019 3/30/2017          Assessment & Plan    Type 2 diabetes mellitus without complication, without long-term current use of insulin  -     Hemoglobin A1c; Future; Expected date: 12/05/2017  -     T4, free; Future; Expected date: 11/21/2017  -     TSH; Future; Expected date: 11/21/2017  -     Lipid panel; Future; Expected date: 11/21/2017  -     CBC auto differential; Future; Expected date: 11/21/2017  -     Comprehensive metabolic panel; Future; Expected date: 11/21/2017  -     Brain natriuretic peptide; Future; Expected date: 12/05/2017  - Assess labs today  - May require medication adjustment    Essential hypertension  -     Ambulatory Referral to Cardiology    Mixed hyperlipidemia  -     Ambulatory Referral to Cardiology    Women's annual routine gynecological examination  -     Ambulatory referral to Obstetrics / Gynecology    Congestive heart failure, unspecified congestive heart failure chronicity, unspecified congestive heart failure type  -     Ambulatory Referral to Cardiology  - Will refer at this time    Constipation, unspecified constipation type  -     magnesium citrate solution; Take 296 mLs by mouth once.  Dispense: 296 mL; Refill: 2  -     POCT urinalysis, dipstick or tablet reag    Will retrieve records from previous PCP    Return in about 4 weeks (around 12/19/2017).

## 2017-11-26 ENCOUNTER — HOSPITAL ENCOUNTER (INPATIENT)
Facility: HOSPITAL | Age: 42
LOS: 1 days | Discharge: HOME | DRG: 287 | End: 2017-11-28
Attending: EMERGENCY MEDICINE | Admitting: INTERNAL MEDICINE
Payer: MEDICARE

## 2017-11-26 DIAGNOSIS — I50.9 CHF EXACERBATION: ICD-10-CM

## 2017-11-26 DIAGNOSIS — I50.9 CHF (CONGESTIVE HEART FAILURE): ICD-10-CM

## 2017-11-26 DIAGNOSIS — R07.9 CHEST PAIN: ICD-10-CM

## 2017-11-26 DIAGNOSIS — I50.43 ACUTE ON CHRONIC COMBINED SYSTOLIC AND DIASTOLIC CONGESTIVE HEART FAILURE: Primary | ICD-10-CM

## 2017-11-26 DIAGNOSIS — I24.9 ACS (ACUTE CORONARY SYNDROME): ICD-10-CM

## 2017-11-26 DIAGNOSIS — I10 ESSENTIAL (PRIMARY) HYPERTENSION: ICD-10-CM

## 2017-11-26 LAB
ALBUMIN SERPL BCP-MCNC: 3.1 G/DL
ALP SERPL-CCNC: 97 U/L
ALT SERPL W/O P-5'-P-CCNC: 12 U/L
ANION GAP SERPL CALC-SCNC: 10 MMOL/L
AST SERPL-CCNC: 11 U/L
B-HCG UR QL: NEGATIVE
BACTERIA #/AREA URNS HPF: ABNORMAL /HPF
BASOPHILS # BLD AUTO: 0.02 K/UL
BASOPHILS NFR BLD: 0.3 %
BILIRUB SERPL-MCNC: 0.4 MG/DL
BILIRUB UR QL STRIP: NEGATIVE
BNP SERPL-MCNC: 389 PG/ML
BUN SERPL-MCNC: 13 MG/DL
CALCIUM SERPL-MCNC: 9.3 MG/DL
CHLORIDE SERPL-SCNC: 100 MMOL/L
CLARITY UR: CLEAR
CO2 SERPL-SCNC: 29 MMOL/L
COLOR UR: YELLOW
CREAT SERPL-MCNC: 1.3 MG/DL
CTP QC/QA: YES
DIFFERENTIAL METHOD: ABNORMAL
EOSINOPHIL # BLD AUTO: 0.2 K/UL
EOSINOPHIL NFR BLD: 2.9 %
ERYTHROCYTE [DISTWIDTH] IN BLOOD BY AUTOMATED COUNT: 15.5 %
EST. GFR  (AFRICAN AMERICAN): 59 ML/MIN/1.73 M^2
EST. GFR  (NON AFRICAN AMERICAN): 51 ML/MIN/1.73 M^2
GLUCOSE SERPL-MCNC: 251 MG/DL
GLUCOSE UR QL STRIP: ABNORMAL
HCT VFR BLD AUTO: 43.3 %
HGB BLD-MCNC: 13.6 G/DL
HGB UR QL STRIP: NEGATIVE
HYALINE CASTS #/AREA URNS LPF: 0 /LPF
KETONES UR QL STRIP: NEGATIVE
LEUKOCYTE ESTERASE UR QL STRIP: NEGATIVE
LYMPHOCYTES # BLD AUTO: 2.1 K/UL
LYMPHOCYTES NFR BLD: 28.7 %
MCH RBC QN AUTO: 26.4 PG
MCHC RBC AUTO-ENTMCNC: 31.4 G/DL
MCV RBC AUTO: 84 FL
MICROSCOPIC COMMENT: ABNORMAL
MONOCYTES # BLD AUTO: 0.6 K/UL
MONOCYTES NFR BLD: 7.8 %
NEUTROPHILS # BLD AUTO: 4.4 K/UL
NEUTROPHILS NFR BLD: 60.3 %
NITRITE UR QL STRIP: NEGATIVE
NON-SQ EPI CELLS #/AREA URNS HPF: 1 /HPF
PH UR STRIP: 6 [PH] (ref 5–8)
PLATELET # BLD AUTO: 318 K/UL
PMV BLD AUTO: 11.4 FL
POCT GLUCOSE: 228 MG/DL (ref 70–110)
POTASSIUM SERPL-SCNC: 3.9 MMOL/L
PROT SERPL-MCNC: 7.3 G/DL
PROT UR QL STRIP: ABNORMAL
RBC # BLD AUTO: 5.16 M/UL
RBC #/AREA URNS HPF: 1 /HPF (ref 0–4)
SODIUM SERPL-SCNC: 139 MMOL/L
SP GR UR STRIP: 1.02 (ref 1–1.03)
TROPONIN I SERPL DL<=0.01 NG/ML-MCNC: 0.05 NG/ML
URN SPEC COLLECT METH UR: ABNORMAL
UROBILINOGEN UR STRIP-ACNC: ABNORMAL EU/DL
WBC # BLD AUTO: 7.32 K/UL
WBC #/AREA URNS HPF: 2 /HPF (ref 0–5)
YEAST URNS QL MICRO: ABNORMAL

## 2017-11-26 PROCEDURE — 21400001 HC TELEMETRY ROOM

## 2017-11-26 PROCEDURE — 80053 COMPREHEN METABOLIC PANEL: CPT

## 2017-11-26 PROCEDURE — 93010 ELECTROCARDIOGRAM REPORT: CPT | Mod: ,,, | Performed by: INTERNAL MEDICINE

## 2017-11-26 PROCEDURE — 99285 EMERGENCY DEPT VISIT HI MDM: CPT | Mod: 25

## 2017-11-26 PROCEDURE — 93005 ELECTROCARDIOGRAM TRACING: CPT

## 2017-11-26 PROCEDURE — 82962 GLUCOSE BLOOD TEST: CPT

## 2017-11-26 PROCEDURE — 81025 URINE PREGNANCY TEST: CPT | Performed by: EMERGENCY MEDICINE

## 2017-11-26 PROCEDURE — 84484 ASSAY OF TROPONIN QUANT: CPT

## 2017-11-26 PROCEDURE — 83880 ASSAY OF NATRIURETIC PEPTIDE: CPT

## 2017-11-26 PROCEDURE — 86141 C-REACTIVE PROTEIN HS: CPT

## 2017-11-26 PROCEDURE — 96374 THER/PROPH/DIAG INJ IV PUSH: CPT

## 2017-11-26 PROCEDURE — 85651 RBC SED RATE NONAUTOMATED: CPT

## 2017-11-26 PROCEDURE — 85025 COMPLETE CBC W/AUTO DIFF WBC: CPT

## 2017-11-26 PROCEDURE — 81000 URINALYSIS NONAUTO W/SCOPE: CPT

## 2017-11-27 PROBLEM — I24.9 ACS (ACUTE CORONARY SYNDROME): Status: RESOLVED | Noted: 2017-11-27 | Resolved: 2017-11-27

## 2017-11-27 PROBLEM — I24.9 ACS (ACUTE CORONARY SYNDROME): Status: ACTIVE | Noted: 2017-11-27

## 2017-11-27 PROBLEM — I50.43 ACUTE ON CHRONIC COMBINED SYSTOLIC AND DIASTOLIC CONGESTIVE HEART FAILURE: Status: ACTIVE | Noted: 2017-11-27

## 2017-11-27 LAB
ANION GAP SERPL CALC-SCNC: 11 MMOL/L
BASOPHILS # BLD AUTO: 0.03 K/UL
BASOPHILS NFR BLD: 0.6 %
BUN SERPL-MCNC: 12 MG/DL
CALCIUM SERPL-MCNC: 9 MG/DL
CHLORIDE SERPL-SCNC: 96 MMOL/L
CO2 SERPL-SCNC: 31 MMOL/L
CREAT SERPL-MCNC: 1.1 MG/DL
CRP SERPL-MCNC: 2.29 MG/L
CRP SERPL-MCNC: 2.54 MG/L
DIASTOLIC DYSFUNCTION: YES
DIFFERENTIAL METHOD: ABNORMAL
EOSINOPHIL # BLD AUTO: 0.1 K/UL
EOSINOPHIL NFR BLD: 2.8 %
ERYTHROCYTE [DISTWIDTH] IN BLOOD BY AUTOMATED COUNT: 15.5 %
ERYTHROCYTE [SEDIMENTATION RATE] IN BLOOD BY WESTERGREN METHOD: 8 MM/HR
EST. GFR  (AFRICAN AMERICAN): >60 ML/MIN/1.73 M^2
EST. GFR  (NON AFRICAN AMERICAN): >60 ML/MIN/1.73 M^2
ESTIMATED AVG GLUCOSE: 295 MG/DL
ESTIMATED PA SYSTOLIC PRESSURE: 30.25
GLOBAL PERICARDIAL EFFUSION: ABNORMAL
GLUCOSE SERPL-MCNC: 304 MG/DL
HBA1C MFR BLD HPLC: 11.9 %
HCT VFR BLD AUTO: 42.9 %
HGB BLD-MCNC: 13.5 G/DL
LYMPHOCYTES # BLD AUTO: 1.6 K/UL
LYMPHOCYTES NFR BLD: 32.3 %
MCH RBC QN AUTO: 26.5 PG
MCHC RBC AUTO-ENTMCNC: 31.5 G/DL
MCV RBC AUTO: 84 FL
MITRAL VALVE MOBILITY: NORMAL
MITRAL VALVE REGURGITATION: ABNORMAL
MONOCYTES # BLD AUTO: 0.4 K/UL
MONOCYTES NFR BLD: 8.1 %
NEUTROPHILS # BLD AUTO: 2.8 K/UL
NEUTROPHILS NFR BLD: 56.2 %
PLATELET # BLD AUTO: 278 K/UL
PMV BLD AUTO: 10.8 FL
POTASSIUM SERPL-SCNC: 3.5 MMOL/L
RBC # BLD AUTO: 5.09 M/UL
RETIRED EF AND QEF - SEE NOTES: 20 (ref 55–65)
SODIUM SERPL-SCNC: 138 MMOL/L
T3FREE SERPL-MCNC: 2.7 PG/ML
T4 FREE SERPL-MCNC: 0.98 NG/DL
TRICUSPID VALVE REGURGITATION: ABNORMAL
TROPONIN I SERPL DL<=0.01 NG/ML-MCNC: 0.03 NG/ML
TROPONIN I SERPL DL<=0.01 NG/ML-MCNC: 0.03 NG/ML
TSH SERPL DL<=0.005 MIU/L-ACNC: 1.94 UIU/ML
WBC # BLD AUTO: 5.05 K/UL

## 2017-11-27 PROCEDURE — 25000003 PHARM REV CODE 250: Performed by: EMERGENCY MEDICINE

## 2017-11-27 PROCEDURE — 63600175 PHARM REV CODE 636 W HCPCS

## 2017-11-27 PROCEDURE — 84481 FREE ASSAY (FT-3): CPT

## 2017-11-27 PROCEDURE — 84484 ASSAY OF TROPONIN QUANT: CPT | Mod: 91

## 2017-11-27 PROCEDURE — 93306 TTE W/DOPPLER COMPLETE: CPT | Mod: 26,,, | Performed by: INTERNAL MEDICINE

## 2017-11-27 PROCEDURE — 99152 MOD SED SAME PHYS/QHP 5/>YRS: CPT | Mod: ,,, | Performed by: INTERNAL MEDICINE

## 2017-11-27 PROCEDURE — 80048 BASIC METABOLIC PNL TOTAL CA: CPT

## 2017-11-27 PROCEDURE — 93306 TTE W/DOPPLER COMPLETE: CPT

## 2017-11-27 PROCEDURE — 36415 COLL VENOUS BLD VENIPUNCTURE: CPT

## 2017-11-27 PROCEDURE — 63600175 PHARM REV CODE 636 W HCPCS: Performed by: HOSPITALIST

## 2017-11-27 PROCEDURE — A4216 STERILE WATER/SALINE, 10 ML: HCPCS | Performed by: EMERGENCY MEDICINE

## 2017-11-27 PROCEDURE — 25000003 PHARM REV CODE 250

## 2017-11-27 PROCEDURE — C1769 GUIDE WIRE: HCPCS

## 2017-11-27 PROCEDURE — A4216 STERILE WATER/SALINE, 10 ML: HCPCS

## 2017-11-27 PROCEDURE — 99223 1ST HOSP IP/OBS HIGH 75: CPT | Mod: 25,,, | Performed by: INTERNAL MEDICINE

## 2017-11-27 PROCEDURE — 99900035 HC TECH TIME PER 15 MIN (STAT)

## 2017-11-27 PROCEDURE — 99152 MOD SED SAME PHYS/QHP 5/>YRS: CPT

## 2017-11-27 PROCEDURE — 25000003 PHARM REV CODE 250: Performed by: INTERNAL MEDICINE

## 2017-11-27 PROCEDURE — 63600175 PHARM REV CODE 636 W HCPCS: Performed by: INTERNAL MEDICINE

## 2017-11-27 PROCEDURE — 93460 R&L HRT ART/VENTRICLE ANGIO: CPT | Mod: 26,,, | Performed by: INTERNAL MEDICINE

## 2017-11-27 PROCEDURE — 25000003 PHARM REV CODE 250: Performed by: HOSPITALIST

## 2017-11-27 PROCEDURE — 85025 COMPLETE CBC W/AUTO DIFF WBC: CPT

## 2017-11-27 PROCEDURE — B2161ZZ FLUOROSCOPY OF RIGHT AND LEFT HEART USING LOW OSMOLAR CONTRAST: ICD-10-PCS | Performed by: INTERNAL MEDICINE

## 2017-11-27 PROCEDURE — 84443 ASSAY THYROID STIM HORMONE: CPT

## 2017-11-27 PROCEDURE — 86141 C-REACTIVE PROTEIN HS: CPT

## 2017-11-27 PROCEDURE — 83036 HEMOGLOBIN GLYCOSYLATED A1C: CPT

## 2017-11-27 PROCEDURE — 84439 ASSAY OF FREE THYROXINE: CPT

## 2017-11-27 PROCEDURE — 21400001 HC TELEMETRY ROOM

## 2017-11-27 PROCEDURE — 4A023N8 MEASUREMENT OF CARDIAC SAMPLING AND PRESSURE, BILATERAL, PERCUTANEOUS APPROACH: ICD-10-PCS | Performed by: INTERNAL MEDICINE

## 2017-11-27 PROCEDURE — 94761 N-INVAS EAR/PLS OXIMETRY MLT: CPT

## 2017-11-27 RX ORDER — ONDANSETRON 2 MG/ML
4 INJECTION INTRAMUSCULAR; INTRAVENOUS EVERY 8 HOURS PRN
Status: DISCONTINUED | OUTPATIENT
Start: 2017-11-27 | End: 2017-11-27

## 2017-11-27 RX ORDER — GABAPENTIN 400 MG/1
400 CAPSULE ORAL DAILY
Status: DISCONTINUED | OUTPATIENT
Start: 2017-11-27 | End: 2017-11-28 | Stop reason: HOSPADM

## 2017-11-27 RX ORDER — ACETAMINOPHEN 325 MG/1
650 TABLET ORAL EVERY 8 HOURS PRN
Status: DISCONTINUED | OUTPATIENT
Start: 2017-11-27 | End: 2017-11-28 | Stop reason: HOSPADM

## 2017-11-27 RX ORDER — SODIUM CHLORIDE 9 MG/ML
1.5 INJECTION, SOLUTION INTRAVENOUS CONTINUOUS
Status: ACTIVE | OUTPATIENT
Start: 2017-11-27 | End: 2017-11-27

## 2017-11-27 RX ORDER — FAMOTIDINE 20 MG/1
20 TABLET, FILM COATED ORAL 2 TIMES DAILY
Status: DISCONTINUED | OUTPATIENT
Start: 2017-11-27 | End: 2017-11-28 | Stop reason: HOSPADM

## 2017-11-27 RX ORDER — METOPROLOL SUCCINATE 50 MG/1
50 TABLET, EXTENDED RELEASE ORAL DAILY
Status: DISCONTINUED | OUTPATIENT
Start: 2017-11-27 | End: 2017-11-28 | Stop reason: HOSPADM

## 2017-11-27 RX ORDER — GLUCAGON 1 MG
1 KIT INJECTION
Status: DISCONTINUED | OUTPATIENT
Start: 2017-11-27 | End: 2017-11-28 | Stop reason: HOSPADM

## 2017-11-27 RX ORDER — CLOPIDOGREL BISULFATE 75 MG/1
75 TABLET ORAL DAILY
Status: DISCONTINUED | OUTPATIENT
Start: 2017-11-28 | End: 2017-11-27

## 2017-11-27 RX ORDER — OXYCODONE HYDROCHLORIDE 5 MG/1
5 TABLET ORAL
Status: COMPLETED | OUTPATIENT
Start: 2017-11-27 | End: 2017-11-27

## 2017-11-27 RX ORDER — AMOXICILLIN 250 MG
1 CAPSULE ORAL 2 TIMES DAILY
Status: DISCONTINUED | OUTPATIENT
Start: 2017-11-27 | End: 2017-11-28 | Stop reason: HOSPADM

## 2017-11-27 RX ORDER — ATORVASTATIN CALCIUM 40 MG/1
80 TABLET, FILM COATED ORAL NIGHTLY
Status: DISCONTINUED | OUTPATIENT
Start: 2017-11-27 | End: 2017-11-28 | Stop reason: HOSPADM

## 2017-11-27 RX ORDER — LISINOPRIL 20 MG/1
40 TABLET ORAL DAILY
Status: DISCONTINUED | OUTPATIENT
Start: 2017-11-27 | End: 2017-11-27

## 2017-11-27 RX ORDER — ZOLPIDEM TARTRATE 5 MG/1
5 TABLET ORAL NIGHTLY PRN
Status: DISCONTINUED | OUTPATIENT
Start: 2017-11-27 | End: 2017-11-27

## 2017-11-27 RX ORDER — METOPROLOL TARTRATE 1 MG/ML
5 INJECTION, SOLUTION INTRAVENOUS EVERY 5 MIN PRN
Status: DISCONTINUED | OUTPATIENT
Start: 2017-11-27 | End: 2017-11-28 | Stop reason: HOSPADM

## 2017-11-27 RX ORDER — HYDRALAZINE HYDROCHLORIDE 25 MG/1
25 TABLET, FILM COATED ORAL EVERY 8 HOURS
Status: DISCONTINUED | OUTPATIENT
Start: 2017-11-27 | End: 2017-11-28

## 2017-11-27 RX ORDER — SIMVASTATIN 40 MG/1
40 TABLET, FILM COATED ORAL NIGHTLY
Status: DISCONTINUED | OUTPATIENT
Start: 2017-11-27 | End: 2017-11-27

## 2017-11-27 RX ORDER — ONDANSETRON 2 MG/ML
4 INJECTION INTRAMUSCULAR; INTRAVENOUS EVERY 12 HOURS PRN
Status: DISCONTINUED | OUTPATIENT
Start: 2017-11-27 | End: 2017-11-28 | Stop reason: HOSPADM

## 2017-11-27 RX ORDER — SODIUM CHLORIDE 0.9 % (FLUSH) 0.9 %
3 SYRINGE (ML) INJECTION EVERY 8 HOURS
Status: DISCONTINUED | OUTPATIENT
Start: 2017-11-27 | End: 2017-11-28 | Stop reason: HOSPADM

## 2017-11-27 RX ORDER — MORPHINE SULFATE 10 MG/ML
4 INJECTION INTRAMUSCULAR; INTRAVENOUS; SUBCUTANEOUS EVERY 4 HOURS PRN
Status: DISCONTINUED | OUTPATIENT
Start: 2017-11-27 | End: 2017-11-28

## 2017-11-27 RX ORDER — AMLODIPINE BESYLATE 5 MG/1
10 TABLET ORAL DAILY
Status: DISCONTINUED | OUTPATIENT
Start: 2017-11-27 | End: 2017-11-27

## 2017-11-27 RX ORDER — METOPROLOL SUCCINATE 50 MG/1
50 TABLET, EXTENDED RELEASE ORAL DAILY
Status: DISCONTINUED | OUTPATIENT
Start: 2017-11-27 | End: 2017-11-27

## 2017-11-27 RX ORDER — SODIUM CHLORIDE 9 MG/ML
INJECTION, SOLUTION INTRAVENOUS CONTINUOUS
Status: DISCONTINUED | OUTPATIENT
Start: 2017-11-27 | End: 2017-11-27

## 2017-11-27 RX ORDER — ALBUTEROL SULFATE 90 UG/1
2 AEROSOL, METERED RESPIRATORY (INHALATION) EVERY 6 HOURS PRN
Status: DISCONTINUED | OUTPATIENT
Start: 2017-11-27 | End: 2017-11-28 | Stop reason: HOSPADM

## 2017-11-27 RX ORDER — HYDRALAZINE HYDROCHLORIDE 20 MG/ML
10 INJECTION INTRAMUSCULAR; INTRAVENOUS EVERY 6 HOURS PRN
Status: DISCONTINUED | OUTPATIENT
Start: 2017-11-27 | End: 2017-11-28 | Stop reason: HOSPADM

## 2017-11-27 RX ORDER — FUROSEMIDE 10 MG/ML
20 INJECTION INTRAMUSCULAR; INTRAVENOUS 2 TIMES DAILY
Status: DISCONTINUED | OUTPATIENT
Start: 2017-11-27 | End: 2017-11-28

## 2017-11-27 RX ORDER — ONDANSETRON 2 MG/ML
8 INJECTION INTRAMUSCULAR; INTRAVENOUS EVERY 8 HOURS PRN
Status: DISCONTINUED | OUTPATIENT
Start: 2017-11-27 | End: 2017-11-28 | Stop reason: HOSPADM

## 2017-11-27 RX ORDER — INSULIN ASPART 100 [IU]/ML
1-10 INJECTION, SOLUTION INTRAVENOUS; SUBCUTANEOUS EVERY 6 HOURS PRN
Status: DISCONTINUED | OUTPATIENT
Start: 2017-11-27 | End: 2017-11-27

## 2017-11-27 RX ORDER — INSULIN ASPART 100 [IU]/ML
1-10 INJECTION, SOLUTION INTRAVENOUS; SUBCUTANEOUS EVERY 6 HOURS PRN
Status: DISCONTINUED | OUTPATIENT
Start: 2017-11-27 | End: 2017-11-28 | Stop reason: HOSPADM

## 2017-11-27 RX ORDER — ATROPINE SULFATE 0.1 MG/ML
0.5 INJECTION INTRAVENOUS
Status: DISCONTINUED | OUTPATIENT
Start: 2017-11-27 | End: 2017-11-28 | Stop reason: HOSPADM

## 2017-11-27 RX ORDER — ISOSORBIDE MONONITRATE 30 MG/1
30 TABLET, EXTENDED RELEASE ORAL DAILY
Status: DISCONTINUED | OUTPATIENT
Start: 2017-11-28 | End: 2017-11-28 | Stop reason: HOSPADM

## 2017-11-27 RX ORDER — LISINOPRIL 20 MG/1
40 TABLET ORAL DAILY
Status: DISCONTINUED | OUTPATIENT
Start: 2017-11-27 | End: 2017-11-28 | Stop reason: HOSPADM

## 2017-11-27 RX ORDER — MORPHINE SULFATE 5 MG/ML
2 INJECTION, SOLUTION INTRAMUSCULAR; INTRAVENOUS EVERY 10 MIN PRN
Status: DISCONTINUED | OUTPATIENT
Start: 2017-11-27 | End: 2017-11-28

## 2017-11-27 RX ORDER — OXYCODONE HYDROCHLORIDE 5 MG/1
5 TABLET ORAL EVERY 4 HOURS PRN
Status: DISCONTINUED | OUTPATIENT
Start: 2017-11-27 | End: 2017-11-28

## 2017-11-27 RX ORDER — FUROSEMIDE 10 MG/ML
60 INJECTION INTRAMUSCULAR; INTRAVENOUS ONCE
Status: COMPLETED | OUTPATIENT
Start: 2017-11-27 | End: 2017-11-27

## 2017-11-27 RX ORDER — NAPROXEN SODIUM 220 MG/1
81 TABLET, FILM COATED ORAL DAILY
Status: DISCONTINUED | OUTPATIENT
Start: 2017-11-27 | End: 2017-11-28 | Stop reason: HOSPADM

## 2017-11-27 RX ORDER — CLOPIDOGREL 300 MG/1
600 TABLET, FILM COATED ORAL ONCE
Status: COMPLETED | OUTPATIENT
Start: 2017-11-27 | End: 2017-11-27

## 2017-11-27 RX ORDER — HYDROCODONE BITARTRATE AND ACETAMINOPHEN 5; 325 MG/1; MG/1
1 TABLET ORAL EVERY 4 HOURS PRN
Status: DISCONTINUED | OUTPATIENT
Start: 2017-11-27 | End: 2017-11-28 | Stop reason: HOSPADM

## 2017-11-27 RX ADMIN — METOPROLOL SUCCINATE 50 MG: 50 TABLET, EXTENDED RELEASE ORAL at 08:11

## 2017-11-27 RX ADMIN — DOCUSATE SODIUM AND SENNOSIDES 1 TABLET: 8.6; 5 TABLET, FILM COATED ORAL at 08:11

## 2017-11-27 RX ADMIN — SODIUM CHLORIDE 1.5 ML/KG/HR: 0.9 INJECTION, SOLUTION INTRAVENOUS at 03:11

## 2017-11-27 RX ADMIN — HYDRALAZINE HYDROCHLORIDE 25 MG: 25 TABLET ORAL at 10:11

## 2017-11-27 RX ADMIN — AMLODIPINE BESYLATE 10 MG: 5 TABLET ORAL at 08:11

## 2017-11-27 RX ADMIN — Medication 3 ML: at 06:11

## 2017-11-27 RX ADMIN — LISINOPRIL 40 MG: 20 TABLET ORAL at 08:11

## 2017-11-27 RX ADMIN — FAMOTIDINE 20 MG: 20 TABLET, FILM COATED ORAL at 08:11

## 2017-11-27 RX ADMIN — ASPIRIN 81 MG 81 MG: 81 TABLET ORAL at 08:11

## 2017-11-27 RX ADMIN — FUROSEMIDE 20 MG: 10 INJECTION, SOLUTION INTRAMUSCULAR; INTRAVENOUS at 08:11

## 2017-11-27 RX ADMIN — ATORVASTATIN CALCIUM 80 MG: 40 TABLET, FILM COATED ORAL at 08:11

## 2017-11-27 RX ADMIN — HYDROCODONE BITARTRATE AND ACETAMINOPHEN 1 TABLET: 5; 325 TABLET ORAL at 02:11

## 2017-11-27 RX ADMIN — FUROSEMIDE 60 MG: 10 INJECTION, SOLUTION INTRAVENOUS at 12:11

## 2017-11-27 RX ADMIN — GABAPENTIN 400 MG: 400 CAPSULE ORAL at 06:11

## 2017-11-27 RX ADMIN — SODIUM CHLORIDE: 0.9 INJECTION, SOLUTION INTRAVENOUS at 08:11

## 2017-11-27 RX ADMIN — SIMVASTATIN 40 MG: 40 TABLET, FILM COATED ORAL at 04:11

## 2017-11-27 RX ADMIN — CLOPIDOGREL BISULFATE 600 MG: 300 TABLET, FILM COATED ORAL at 08:11

## 2017-11-27 RX ADMIN — Medication 3 ML: at 03:11

## 2017-11-27 RX ADMIN — LIDOCAINE HYDROCHLORIDE: 20 SOLUTION ORAL; TOPICAL at 12:11

## 2017-11-27 RX ADMIN — OXYCODONE HYDROCHLORIDE 5 MG: 5 TABLET ORAL at 12:11

## 2017-11-27 RX ADMIN — HYDROCODONE BITARTRATE AND ACETAMINOPHEN 1 TABLET: 5; 325 TABLET ORAL at 06:11

## 2017-11-27 RX ADMIN — Medication 3 ML: at 10:11

## 2017-11-27 NOTE — PROGRESS NOTES
Patient awake, alert and fully oriented; Up and walked to the Br; Produced 500 cc yellow urine;  Will continue to f/u;

## 2017-11-27 NOTE — HPI
Ms. Chanel is a 43 yo woman with CHF (echo 6/2017 EF 50%, no DD though previously 30% and +DD), h/o DVT on Xarelto, DM2 (A1c 11.8% not on insulin), HTN, psoriasis, obesity, and MILE who presented for shortness of breath. Work up in the ER was consistent with heart failure and she was started on Lasix. Please see H&P for full detail.

## 2017-11-27 NOTE — ED PROVIDER NOTES
"Encounter Date: 11/26/2017    SCRIBE #1 NOTE: I, Isael Jules, am scribing for, and in the presence of,  Arcenio Hu MD. I have scribed the following portions of the note - Other sections scribed: HPI, ROS.       History     Chief Complaint   Patient presents with    Shortness of Breath     " I keep getting SOB and my chest keeps getting tight " since this AM. Reports taking aspirin earlier. Pt crying in triage.     CC: Shortness of Breath    HPI: This 42 y.o. Female with CHF, DM and HTN presents to the ED c/o intermittent SOB, chest tightness, body aches, nausea, cough, hot flashes and rhinorrhea which began this morning. The patient reports her chest tightness worsens when laying down and feels better when sitting up. She reports her SOB comes for x5-10 minutes then goes away. , The patient states she coughs with mucus and tried to take a nap when symptoms onset. She got a morphine shot the last time she had current  symptoms x2 months ago. She denies fever or chills. She complies with heart medications. She took an aspirin for symptoms with slight relief. No other symptoms or alleviating factors present.    PCP: Dr. Josh Huffman      The history is provided by the patient. No  was used.     Review of patient's allergies indicates:   Allergen Reactions    Pneumococcal 23-abimbola ps vaccine      Past Medical History:   Diagnosis Date    Blood clot associated with vein wall inflammation     Cardiomyopathy     Normal cors on cath 11/2017    CHF (congestive heart failure)     DM (diabetes mellitus) 9/19/2013    Hyperlipidemia     Hypertension     Prediabetes     Psoriasis     Sleep apnea      Past Surgical History:   Procedure Laterality Date    CARDIAC CATHETERIZATION      COLONOSCOPY      DILATION AND CURETTAGE OF UTERUS       Family History   Problem Relation Age of Onset    Hypertension Mother     Hypertension Father     Diabetes Father     Diabetes Maternal " Grandmother     Diabetes Paternal Grandmother     Breast cancer Neg Hx     Colon cancer Neg Hx     Ovarian cancer Neg Hx      Social History   Substance Use Topics    Smoking status: Passive Smoke Exposure - Never Smoker     Types: Cigarettes    Smokeless tobacco: Never Used      Comment: smokes cigars on occasion    Alcohol use Yes      Comment: occasional     Review of Systems   Constitutional: Negative for chills and fever.   HENT: Positive for rhinorrhea. Negative for sore throat.    Respiratory: Positive for cough, chest tightness and shortness of breath.    Cardiovascular: Negative for chest pain.   Gastrointestinal: Positive for nausea.   Genitourinary: Negative for dysuria.   Musculoskeletal: Negative for back pain.   Skin: Negative for rash.   Neurological: Negative for weakness.   Hematological: Does not bruise/bleed easily.   Psychiatric/Behavioral: Negative for confusion.       Physical Exam     Initial Vitals [11/26/17 2146]   BP Pulse Resp Temp SpO2   (!) 187/114 108 (!) 22 98.3 °F (36.8 °C) 98 %      MAP       138.33         Physical Exam    Nursing note and vitals reviewed.  Constitutional: She appears well-developed and well-nourished. She is not diaphoretic. No distress.   HENT:   Head: Normocephalic and atraumatic.   Nose: Nose normal.   Eyes: EOM are normal. Pupils are equal, round, and reactive to light. No scleral icterus.   Neck: Normal range of motion. Neck supple.   Cardiovascular: Normal rate, regular rhythm, normal heart sounds and intact distal pulses. Exam reveals no gallop and no friction rub.    No murmur heard.  Pulmonary/Chest: Breath sounds normal. No stridor. No respiratory distress. She has no wheezes. She has no rhonchi. She has no rales.   Abdominal: Soft. Normal appearance and bowel sounds are normal. She exhibits no distension. There is no tenderness. There is no rebound and no guarding.   Musculoskeletal: Normal range of motion. She exhibits no edema or tenderness.    Neurological: She is oriented to person, place, and time. No cranial nerve deficit.   Skin: Skin is warm and dry. No rash noted.   Psychiatric: She has a normal mood and affect. Her behavior is normal.         ED Course   Critical Care  Date/Time: 12/22/2017 10:51 AM  Performed by: JESENIA LEDESMA  Authorized by: JERRY GILLILAND   Direct patient critical care time: 30 minutes  Total critical care time (exclusive of procedural time) : 30 minutes  Critical care time was exclusive of separately billable procedures and treating other patients.  Critical care was necessary to treat or prevent imminent or life-threatening deterioration of the following conditions: cardiac failure.  Critical care was time spent personally by me on the following activities: interpretation of cardiac output measurements, evaluation of patient's response to treatment, examination of patient, obtaining history from patient or surrogate, ordering and performing treatments and interventions, ordering and review of laboratory studies, ordering and review of radiographic studies, pulse oximetry and re-evaluation of patient's condition.        Labs Reviewed   CBC W/ AUTO DIFFERENTIAL - Abnormal; Notable for the following:        Result Value    MCH 26.4 (*)     MCHC 31.4 (*)     RDW 15.5 (*)     All other components within normal limits   COMPREHENSIVE METABOLIC PANEL - Abnormal; Notable for the following:     Glucose 251 (*)     Albumin 3.1 (*)     eGFR if  59 (*)     eGFR if non  51 (*)     All other components within normal limits   TROPONIN I - Abnormal; Notable for the following:     Troponin I 0.047 (*)     All other components within normal limits   B-TYPE NATRIURETIC PEPTIDE - Abnormal; Notable for the following:      (*)     All other components within normal limits   URINALYSIS - Abnormal; Notable for the following:     Protein, UA 2+ (*)     Glucose, UA 3+ (*)     Urobilinogen, UA 4.0-6.0  (*)     All other components within normal limits   URINALYSIS MICROSCOPIC - Abnormal; Notable for the following:     Non-Squam Epith 1 (*)     All other components within normal limits   POCT GLUCOSE - Abnormal; Notable for the following:     POCT Glucose 228 (*)     All other components within normal limits   SEDIMENTATION RATE, MANUAL   HIGH SENSITIVITY CRP   SEDIMENTATION RATE, MANUAL   POCT URINE PREGNANCY     EKG Readings: (Independently Interpreted)   Initial Reading: No STEMI. Rhythm: Normal Sinus Rhythm. Heart Rate: 96.   TWI V5, V6          Medical Decision Making:   Initial Assessment:   43 yo female w/ Cp, SOB, nausea. Ddx includes ACS, CHF exacerbation, PNA, pericarditis, GERD. EKG with TWI in V5/V6, Ii, III, aVF. Initial trop slightly +. Patient took aspirin at home. Patient obese, HTN, Dm. Concerning for CHF exacerbation or possible early NSTEMI. Trop has been elevated previously with CHF. Will admit to medicine for further evaluation, trend trops, and cardiac consult.             Scribe Attestation:   Scribe #1: I performed the above scribed service and the documentation accurately describes the services I performed. I attest to the accuracy of the note.    Attending Attestation:           Physician Attestation for Scribe:  Physician Attestation Statement for Scribe #1: I, Arcenio Hu MD, reviewed documentation, as scribed by Isael Jules in my presence, and it is both accurate and complete.                 ED Course      Clinical Impression:   The primary encounter diagnosis was Acute on chronic combined systolic and diastolic congestive heart failure. Diagnoses of Chest pain, CHF (congestive heart failure), CHF exacerbation, ACS (acute coronary syndrome), and Essential (primary) hypertension were also pertinent to this visit.    Disposition:   Disposition: Admitted  Condition: Stable                        Arcenio Hu MD  12/05/17 1536       Arcenio Hu MD  12/22/17  [id number]

## 2017-11-27 NOTE — BRIEF OP NOTE
[facility]  Brief Operative Note     SUMMARY     Surgery Date: 11/27/2017     Surgeon(s) and Role:     * Oziel Yanez MD - Primary    Assisting Surgeon: None    Pre-op Diagnosis:  ACS (acute coronary syndrome) [I24.9]    Post-op Diagnosis:  NICM    Procedure(s) (LRB):  Left heart cath R rad access, not before 10am (Left)    Anesthesia: RN IV Sedation    Description of the findings of the procedure: R and L HC performed (echo noted severe LV dysfxn).  Consent obtained verbally for RHC in cath lab prior to administering of sedatives.    Findings/Key Components:  RA 5  RV 61/6  PA 60/29/42  PAWP 18  /41  Ao 163/107/130  CO 5.6 L/min    LVEF: 20% by echo with severe LV dilation  Wall Motion: Severe global HK    Dominance: Co-dom  LM: normal  LAD: normal  LCx: normal  RCA: normal    Hemostasis:  RFA/V manual compression    Impression:  Normal cors  Elevated LVEDP with transmission of pressures to pulmonary circuit  Above c/w Severe NICM  RFA/V manual compression    Plan:  Cont med rx  Stop Plavix  Stop amlodipine  Start Hydrala/Imdur/Lasix  Goal net neg 1L/day  Cont BBl/ACEi  Repeat echo in 3 months for reassessment of LV fxn and need for ICD  Follow up with Dr. Yanez 1 week after discharge    Estimated Blood Loss: <50cc         Specimens: None

## 2017-11-27 NOTE — NURSING
Report from cath lab no blockage weak pump pressure dsg R groin no bleed no hematoma. Lying flat /68SR 79 Family at bedside

## 2017-11-27 NOTE — CONSULTS
"  [facility]  Cardiology  Consult Note    Patient Name: Fabiana Chanel  MRN: [id number]  Admission Date: 11/26/2017  Hospital Length of Stay: 0 days  Code Status: Full Code   Attending Provider: Sophia Hsieh MD   Consulting Provider: Oziel Yoon MD  Primary Care Physician: Josh Huffman MD  Principal Problem:Acute on chronic combined systolic and diastolic heart failure, NYHA class 2    Patient information was obtained from patient and ER records.     Inpatient consult to Cardiology  Consult performed by: OZIEL YOON  Consult ordered by: OZIEL SPIVEY  Reason for consult: ACS        Subjective:     Chief Complaint:  CP/SOB     HPI:   42 y.o. Female with CHF, DM and HTN presents to the ED c/o intermittent SOB, chest tightness, body aches, nausea, cough, hot flashes and rhinorrhea which began this morning. The patient reports her chest tightness worsens when laying down and feels better when sitting up. She reports her SOB comes for x5-10 minutes then goes away. The patient states she coughs with mucus and tried to take a nap when symptoms onset. She got a morphine shot the last time she had current  symptoms x2 months ago. She denies fever or chills. She complies with heart medications. She took an aspirin for symptoms with slight relief. No other symptoms or alleviating factors present.    Pt previously followed by [facility].  Apparently has a hx of "NICM" and reportedly a neg cath in 2015 (her at [facility]).  However, no record of that angiogram exists (in [facility] or on the [facility] ) and the pt's description of the procedure is dissimilar to a cardiac angio.  She presents to the ER with chest tightness and dyspnea.  EKG with NSSTTW changes and minimal trop elev.  C/R/O ACS.    Past Medical History:   Diagnosis Date    Blood clot associated with vein wall inflammation     CHF (congestive heart failure)     DM (diabetes mellitus) 9/19/2013    Hypertension     " Prediabetes     Psoriasis     Sleep apnea        Past Surgical History:   Procedure Laterality Date    COLONOSCOPY      DILATION AND CURETTAGE OF UTERUS         Review of patient's allergies indicates:   Allergen Reactions    Pneumococcal 23-abimbola ps vaccine        No current facility-administered medications on file prior to encounter.      Current Outpatient Prescriptions on File Prior to Encounter   Medication Sig    albuterol 90 mcg/actuation inhaler Inhale 2 puffs into the lungs every 6 (six) hours as needed for Wheezing.    amLODIPine (NORVASC) 10 MG tablet Take 10 mg by mouth once daily.    aspirin 81 MG Chew Take 81 mg by mouth once daily.    atorvastatin (LIPITOR) 20 MG tablet Take 20 mg by mouth once daily.    carvedilol (COREG) 12.5 MG tablet Take 25 mg by mouth 2 (two) times daily with meals.     carvedilol (COREG) 25 MG tablet Take 25 mg by mouth 2 (two) times daily with meals.    dicyclomine (BENTYL) 10 MG capsule Take 10 mg by mouth.    esomeprazole (NEXIUM) 40 MG capsule Take 40 mg by mouth before breakfast.    furosemide (LASIX) 40 MG tablet Take 1 tablet (40 mg total) by mouth once daily.    gabapentin (NEURONTIN) 400 MG capsule Take 400 mg by mouth Daily.    glimepiride (AMARYL) 4 MG tablet Take 1 tablet (4 mg total) by mouth before breakfast. (Patient taking differently: Take 4 mg by mouth 2 (two) times daily. )    glimepiride (AMARYL) 4 MG tablet Take 4 mg by mouth.    lisinopril (PRINIVIL,ZESTRIL) 40 MG tablet Take 1 tablet (40 mg total) by mouth once daily.    meloxicam (MOBIC) 7.5 MG tablet Take 1 tablet (7.5 mg total) by mouth 2 (two) times daily as needed for Pain.    metformin (GLUCOPHAGE) 500 MG tablet Take 1 tablet (500 mg total) by mouth 2 (two) times daily with meals.    metoprolol succinate (TOPROL-XL) 50 MG 24 hr tablet Take 1 tablet (50 mg total) by mouth once daily.    potassium chloride (KLOR-CON) 10 MEQ TbSR Take 10 mEq by mouth.    rivaroxaban (XARELTO) 10  mg Tab Take 10 mg by mouth.    simvastatin (ZOCOR) 40 MG tablet Take 1 tablet (40 mg total) by mouth every evening.     Family History     Problem Relation (Age of Onset)    Diabetes Father, Maternal Grandmother, Paternal Grandmother    Hypertension Mother, Father        Social History Main Topics    Smoking status: Passive Smoke Exposure - Never Smoker     Types: Cigarettes    Smokeless tobacco: Never Used      Comment: smokes cigars on occasion    Alcohol use Yes      Comment: occasional    Drug use:      Types: Marijuana      Comment: hx of marijuana use 3 years ago    Sexual activity: Not Currently     Partners: Male     Review of Systems   Constitution: Negative for chills, diaphoresis, fever, weakness and malaise/fatigue.   HENT: Negative for nosebleeds.    Eyes: Negative for blurred vision and double vision.   Cardiovascular: Positive for chest pain. Negative for claudication, cyanosis, dyspnea on exertion, leg swelling, orthopnea, palpitations, paroxysmal nocturnal dyspnea and syncope.   Respiratory: Positive for shortness of breath. Negative for cough and wheezing.    Skin: Negative for dry skin and poor wound healing.   Musculoskeletal: Negative for back pain, joint swelling and myalgias.   Gastrointestinal: Negative for abdominal pain, nausea and vomiting.   Genitourinary: Negative for hematuria.   Neurological: Negative for dizziness, headaches, numbness and seizures.   Psychiatric/Behavioral: Negative for altered mental status and depression.     Objective:     Vital Signs (Most Recent):  Temp: 97.8 °F (36.6 °C) (11/27/17 0750)  Pulse: 84 (11/27/17 0750)  Resp: 19 (11/27/17 0750)  BP: 116/82 (11/27/17 0750)  SpO2: (!) 94 % (11/27/17 0750) Vital Signs (24h Range):  Temp:  [97.6 °F (36.4 °C)-98.3 °F (36.8 °C)] 97.8 °F (36.6 °C)  Pulse:  [] 84  Resp:  [14-22] 19  SpO2:  [94 %-99 %] 94 %  BP: (116-187)/() 116/82     Weight: 109.1 kg (240 lb 8.4 oz)  Body mass index is 37.67 kg/m².    SpO2:  (!) 94 %  O2 Device (Oxygen Therapy): room air    No intake or output data in the 24 hours ending 11/27/17 0815    Lines/Drains/Airways     Peripheral Intravenous Line                 Peripheral IV - Single Lumen 11/26/17 2250 Left Antecubital less than 1 day                Physical Exam   Constitutional: She is oriented to person, place, and time. She appears well-developed and well-nourished. No distress.   HENT:   Head: Normocephalic and atraumatic.   Mouth/Throat: No oropharyngeal exudate.   Eyes: Conjunctivae and EOM are normal. Pupils are equal, round, and reactive to light. No scleral icterus.   Neck: Normal range of motion. Neck supple. No JVD present. No tracheal deviation present. No thyromegaly present.   Cardiovascular: Normal rate, regular rhythm, S1 normal and S2 normal.  Exam reveals no gallop and no friction rub.    No murmur heard.  Pulmonary/Chest: Effort normal and breath sounds normal. No respiratory distress. She has no wheezes. She has no rales. She exhibits no tenderness.   Abdominal: Soft. Bowel sounds are normal. There is no tenderness.   Musculoskeletal: Normal range of motion. She exhibits no edema.   Neurological: She is alert and oriented to person, place, and time. No cranial nerve deficit.   Skin: Skin is warm and dry. She is not diaphoretic.   Psychiatric: She has a normal mood and affect. Her behavior is normal. Judgment normal.       Current Medications:   amLODIPine  10 mg Oral Daily    aspirin  81 mg Oral Daily    famotidine  20 mg Oral BID    gabapentin  400 mg Oral Daily    lisinopril  40 mg Oral Daily    metoprolol succinate  50 mg Oral Daily    senna-docusate 8.6-50 mg  1 tablet Oral BID    simvastatin  40 mg Oral QHS    sodium chloride 0.9%  3 mL Intravenous Q8H        acetaminophen, acetaminophen, albuterol, dextrose 50%, glucagon (human recombinant), hydrALAZINE, influenza, insulin aspart, metoprolol, morphine, ondansetron, oxyCODONE    Laboratory:  CBC:    Recent  Labs  Lab 11/21/17 1445 11/26/17 2205 11/27/17  0509   WHITE BLOOD CELL COUNT 5.73 7.32 5.05   HEMOGLOBIN 13.2 13.6 13.5   HEMATOCRIT 43.2 43.3 42.9   PLATELETS 283 318 278       CHEMISTRIES:    Recent Labs  Lab 12/10/14  1031  07/09/15  1831  02/18/17 1943 11/21/17  1445 11/26/17 2205 11/27/17  0509   GLUCOSE  --   < > 523 HH  < > 637 HH  < > 293 H 251 H 304 H   SODIUM  --   < > 133 L  < > 133 L  < > 138 139 138   POTASSIUM  --   < > 4.5  < > 4.3  < > 4.1 3.9 3.5   BUN BLD  --   < > 20  < > 14  < > 11 13 12   CREATININE  --   < > 1.5 H  < > 1.5 H  < > 1.0 1.3 1.1   EGFR IF   --   < > 50 A  < > 50 A  < > >60.0 59 A >60   EGFR IF NON-  --   < > 44 A  < > 43 A  < > >60.0 51 A >60   CALCIUM  --   < > 9.2  < > 9.7  < > 9.5 9.3 9.0   MAGNESIUM 1.5 L  --  1.8  --  2.2  --   --   --   --    < > = values in this interval not displayed.    CARDIAC BIOMARKERS:    Recent Labs  Lab 02/18/17 1943 02/23/17 2012 09/17/17 0204 11/26/17 2205 11/27/17  0509   CPK 74  --  81  --   --   --   --    CPK MB 2.1  --   --   --   --   --   --    TROPONIN I <0.006  < > <0.006  < > 0.013 0.047 H 0.033 H   < > = values in this interval not displayed.    COAGS:    Recent Labs  Lab 02/18/17 1943 02/23/17 2012 06/08/17  2111   INR 0.9 0.9 0.9       LIPIDS/LFTS:    Recent Labs  Lab 12/10/14  0615  06/09/17  0524 09/17/17 0204 11/21/17 1445 11/26/17 2205   CHOLESTEROL 233 H  --  185  --  227 H  --    TRIGLYCERIDES 94  --  83  --  122  --    HDL 51  --  38 L  --  62  --    LDL CHOLESTEROL 163.2 H  --  130.4  --  140.6  --    NON-HDL CHOLESTEROL 182  --  147  --  165  --    AST  --   < > 9 L 13 14 11   ALT  --   < > 9 L 12 11 12   < > = values in this interval not displayed.    Lab Results   Component Value Date    TSH 1.938 11/27/2017     BNP    Recent Labs  Lab 11/26/17  2205   *     Lab Results   Component Value Date    HGBA1C 11.8 (H) 11/21/2017     UCg neg 11/26/17    Diagnostic Results:  ECG  (personally reviewed tracings):   11/26/17 2159 SR 96, inflat ST abnl ?isch, similar to 9/17/17    Chest X-Ray (personally reviewed image(s)): 11/26/17 mild CHF/CMeg    Echo: 6/9/17 (repeat pending)    1 - Low normal to mildly depressed left ventricular systolic function (EF 50-55%).     2 - Mild left ventricular enlargement.     3 - Wall motion abnormalities (basal anteroseptal segment).     4 - The estimated PA systolic pressure is 20 mmHg.     5 - Normal right ventricular systolic function .     Stress Test: L MPI 9/20/13  Nuclear Quantitative Functional Analysis:   LVEF: 31 %  Impression: NORMAL MYOCARDIAL PERFUSION  1. The perfusion scan is free of evidence for myocardial ischemia or injury.   2. There is a moderate intensity fixed defect in the inferior wall of the left ventricle, secondary to diaphragm attenuation.   3. There is abnormal wall motion at rest showing moderate global hypokinesis of the left ventricle.   4. There is resting LV dysfunction with a reduced ejection fraction of 31 %.   5. The ventricular volumes are normal at rest and stress.   6. The extracardiac distribution of radioactivity is normal.     Assessment and Plan:     * Acute on chronic combined systolic and diastolic heart failure, NYHA class 2    Prior CMP with more recent echo 6/2017 with EF 50%.  Repeat echo.  Cont BBl/ACEi.        ACS (acute coronary syndrome)    Given her presenting sxs, and no evidence of prior angio, will proceed with cath today.  Cont ASA, load plavix.  IVF/NPO.  Check echo.  Change simva to atorva 80mg qhs.  Risks, benefits and alternatives of the catheterization procedure were discussed with the patient which include but are not limited to: bleeding, infection, death, heart attack, arrhythmia, kidney failure, stroke, need for emergency surgery, etc.  Patient understands and and agrees to proceed.  Consent was placed on the chart.          HTN (hypertension)    As above        Type 2 diabetes mellitus without  complication, without long-term current use of insulin    Per IM, uncontrolled with A1c nearly 12%.        Elevated troponin    As above        Hyperlipidemia    Change simva to atorva 80            VTE Risk Mitigation         Ordered     Medium Risk of VTE  Once      11/27/17 0312     Place JONNY hose  Until discontinued      11/27/17 0312     Place sequential compression device  Until discontinued      11/27/17 0312          Thank you for your consult. I will follow-up with patient. Please contact us if you have any additional questions.    Oziel Yanez MD  Cardiology   [facility]

## 2017-11-27 NOTE — SUBJECTIVE & OBJECTIVE
Past Medical History:   Diagnosis Date    Blood clot associated with vein wall inflammation     CHF (congestive heart failure)     DM (diabetes mellitus) 9/19/2013    Hypertension     Prediabetes     Psoriasis     Sleep apnea        Past Surgical History:   Procedure Laterality Date    COLONOSCOPY      DILATION AND CURETTAGE OF UTERUS         Review of patient's allergies indicates:   Allergen Reactions    Pneumococcal 23-abimbola ps vaccine        No current facility-administered medications on file prior to encounter.      Current Outpatient Prescriptions on File Prior to Encounter   Medication Sig    albuterol 90 mcg/actuation inhaler Inhale 2 puffs into the lungs every 6 (six) hours as needed for Wheezing.    amLODIPine (NORVASC) 10 MG tablet Take 10 mg by mouth once daily.    aspirin 81 MG Chew Take 81 mg by mouth once daily.    atorvastatin (LIPITOR) 20 MG tablet Take 20 mg by mouth once daily.    carvedilol (COREG) 12.5 MG tablet Take 25 mg by mouth 2 (two) times daily with meals.     carvedilol (COREG) 25 MG tablet Take 25 mg by mouth 2 (two) times daily with meals.    dicyclomine (BENTYL) 10 MG capsule Take 10 mg by mouth.    esomeprazole (NEXIUM) 40 MG capsule Take 40 mg by mouth before breakfast.    furosemide (LASIX) 40 MG tablet Take 1 tablet (40 mg total) by mouth once daily.    gabapentin (NEURONTIN) 400 MG capsule Take 400 mg by mouth Daily.    glimepiride (AMARYL) 4 MG tablet Take 1 tablet (4 mg total) by mouth before breakfast. (Patient taking differently: Take 4 mg by mouth 2 (two) times daily. )    glimepiride (AMARYL) 4 MG tablet Take 4 mg by mouth.    lisinopril (PRINIVIL,ZESTRIL) 40 MG tablet Take 1 tablet (40 mg total) by mouth once daily.    meloxicam (MOBIC) 7.5 MG tablet Take 1 tablet (7.5 mg total) by mouth 2 (two) times daily as needed for Pain.    metformin (GLUCOPHAGE) 500 MG tablet Take 1 tablet (500 mg total) by mouth 2 (two) times daily with meals.    metoprolol  succinate (TOPROL-XL) 50 MG 24 hr tablet Take 1 tablet (50 mg total) by mouth once daily.    potassium chloride (KLOR-CON) 10 MEQ TbSR Take 10 mEq by mouth.    rivaroxaban (XARELTO) 10 mg Tab Take 10 mg by mouth.    simvastatin (ZOCOR) 40 MG tablet Take 1 tablet (40 mg total) by mouth every evening.     Family History     Problem Relation (Age of Onset)    Diabetes Father, Maternal Grandmother, Paternal Grandmother    Hypertension Mother, Father        Social History Main Topics    Smoking status: Passive Smoke Exposure - Never Smoker     Types: Cigarettes    Smokeless tobacco: Never Used      Comment: smokes cigars on occasion    Alcohol use Yes      Comment: occasional    Drug use:      Types: Marijuana      Comment: hx of marijuana use 3 years ago    Sexual activity: Not Currently     Partners: Male     Review of Systems   Constitution: Negative for chills, diaphoresis, fever, weakness and malaise/fatigue.   HENT: Negative for nosebleeds.    Eyes: Negative for blurred vision and double vision.   Cardiovascular: Positive for chest pain. Negative for claudication, cyanosis, dyspnea on exertion, leg swelling, orthopnea, palpitations, paroxysmal nocturnal dyspnea and syncope.   Respiratory: Positive for shortness of breath. Negative for cough and wheezing.    Skin: Negative for dry skin and poor wound healing.   Musculoskeletal: Negative for back pain, joint swelling and myalgias.   Gastrointestinal: Negative for abdominal pain, nausea and vomiting.   Genitourinary: Negative for hematuria.   Neurological: Negative for dizziness, headaches, numbness and seizures.   Psychiatric/Behavioral: Negative for altered mental status and depression.     Objective:     Vital Signs (Most Recent):  Temp: 97.8 °F (36.6 °C) (11/27/17 0750)  Pulse: 84 (11/27/17 0750)  Resp: 19 (11/27/17 0750)  BP: 116/82 (11/27/17 0750)  SpO2: (!) 94 % (11/27/17 0750) Vital Signs (24h Range):  Temp:  [97.6 °F (36.4 °C)-98.3 °F (36.8 °C)] 97.8  °F (36.6 °C)  Pulse:  [] 84  Resp:  [14-22] 19  SpO2:  [94 %-99 %] 94 %  BP: (116-187)/() 116/82     Weight: 109.1 kg (240 lb 8.4 oz)  Body mass index is 37.67 kg/m².    SpO2: (!) 94 %  O2 Device (Oxygen Therapy): room air    No intake or output data in the 24 hours ending 11/27/17 0815    Lines/Drains/Airways     Peripheral Intravenous Line                 Peripheral IV - Single Lumen 11/26/17 2250 Left Antecubital less than 1 day                Physical Exam   Constitutional: She is oriented to person, place, and time. She appears well-developed and well-nourished. No distress.   HENT:   Head: Normocephalic and atraumatic.   Mouth/Throat: No oropharyngeal exudate.   Eyes: Conjunctivae and EOM are normal. Pupils are equal, round, and reactive to light. No scleral icterus.   Neck: Normal range of motion. Neck supple. No JVD present. No tracheal deviation present. No thyromegaly present.   Cardiovascular: Normal rate, regular rhythm, S1 normal and S2 normal.  Exam reveals no gallop and no friction rub.    No murmur heard.  Pulmonary/Chest: Effort normal and breath sounds normal. No respiratory distress. She has no wheezes. She has no rales. She exhibits no tenderness.   Abdominal: Soft. Bowel sounds are normal. There is no tenderness.   Musculoskeletal: Normal range of motion. She exhibits no edema.   Neurological: She is alert and oriented to person, place, and time. No cranial nerve deficit.   Skin: Skin is warm and dry. She is not diaphoretic.   Psychiatric: She has a normal mood and affect. Her behavior is normal. Judgment normal.       Current Medications:   amLODIPine  10 mg Oral Daily    aspirin  81 mg Oral Daily    famotidine  20 mg Oral BID    gabapentin  400 mg Oral Daily    lisinopril  40 mg Oral Daily    metoprolol succinate  50 mg Oral Daily    senna-docusate 8.6-50 mg  1 tablet Oral BID    simvastatin  40 mg Oral QHS    sodium chloride 0.9%  3 mL Intravenous Q8H         acetaminophen, acetaminophen, albuterol, dextrose 50%, glucagon (human recombinant), hydrALAZINE, influenza, insulin aspart, metoprolol, morphine, ondansetron, oxyCODONE    Laboratory:  CBC:    Recent Labs  Lab 11/21/17  1445 11/26/17 2205 11/27/17  0509   WHITE BLOOD CELL COUNT 5.73 7.32 5.05   HEMOGLOBIN 13.2 13.6 13.5   HEMATOCRIT 43.2 43.3 42.9   PLATELETS 283 318 278       CHEMISTRIES:    Recent Labs  Lab 12/10/14  1031  07/09/15  1831  02/18/17 1943 11/21/17  1445 11/26/17 2205 11/27/17  0509   GLUCOSE  --   < > 523 HH  < > 637 HH  < > 293 H 251 H 304 H   SODIUM  --   < > 133 L  < > 133 L  < > 138 139 138   POTASSIUM  --   < > 4.5  < > 4.3  < > 4.1 3.9 3.5   BUN BLD  --   < > 20  < > 14  < > 11 13 12   CREATININE  --   < > 1.5 H  < > 1.5 H  < > 1.0 1.3 1.1   EGFR IF   --   < > 50 A  < > 50 A  < > >60.0 59 A >60   EGFR IF NON-  --   < > 44 A  < > 43 A  < > >60.0 51 A >60   CALCIUM  --   < > 9.2  < > 9.7  < > 9.5 9.3 9.0   MAGNESIUM 1.5 L  --  1.8  --  2.2  --   --   --   --    < > = values in this interval not displayed.    CARDIAC BIOMARKERS:    Recent Labs  Lab 02/18/17 1943 02/23/17 2012 09/17/17  0204 11/26/17 2205 11/27/17  0509   CPK 74  --  81  --   --   --   --    CPK MB 2.1  --   --   --   --   --   --    TROPONIN I <0.006  < > <0.006  < > 0.013 0.047 H 0.033 H   < > = values in this interval not displayed.    COAGS:    Recent Labs  Lab 02/18/17 1943 02/23/17 2012 06/08/17  2111   INR 0.9 0.9 0.9       LIPIDS/LFTS:    Recent Labs  Lab 12/10/14  0615  06/09/17  0524 09/17/17  0204 11/21/17  1445 11/26/17  2205   CHOLESTEROL 233 H  --  185  --  227 H  --    TRIGLYCERIDES 94  --  83  --  122  --    HDL 51  --  38 L  --  62  --    LDL CHOLESTEROL 163.2 H  --  130.4  --  140.6  --    NON-HDL CHOLESTEROL 182  --  147  --  165  --    AST  --   < > 9 L 13 14 11   ALT  --   < > 9 L 12 11 12   < > = values in this interval not displayed.    Lab Results   Component  Value Date    TSH 1.938 11/27/2017     BNP    Recent Labs  Lab 11/26/17  2205   *     Lab Results   Component Value Date    HGBA1C 11.8 (H) 11/21/2017     UCg neg 11/26/17    Diagnostic Results:  ECG (personally reviewed tracings):   11/26/17 2159 SR 96, inflat ST abnl ?isch, similar to 9/17/17    Chest X-Ray (personally reviewed image(s)): 11/26/17 mild CHF/CMeg    Echo: 6/9/17 (repeat pending)    1 - Low normal to mildly depressed left ventricular systolic function (EF 50-55%).     2 - Mild left ventricular enlargement.     3 - Wall motion abnormalities (basal anteroseptal segment).     4 - The estimated PA systolic pressure is 20 mmHg.     5 - Normal right ventricular systolic function .     Stress Test: L MPI 9/20/13  Nuclear Quantitative Functional Analysis:   LVEF: 31 %  Impression: NORMAL MYOCARDIAL PERFUSION  1. The perfusion scan is free of evidence for myocardial ischemia or injury.   2. There is a moderate intensity fixed defect in the inferior wall of the left ventricle, secondary to diaphragm attenuation.   3. There is abnormal wall motion at rest showing moderate global hypokinesis of the left ventricle.   4. There is resting LV dysfunction with a reduced ejection fraction of 31 %.   5. The ventricular volumes are normal at rest and stress.   6. The extracardiac distribution of radioactivity is normal.

## 2017-11-27 NOTE — PROVIDER TRANSFER
Ms. Chanel is a 43 yo woman with CHF (echo 6/2017 EF 50%, no DD though previously 30% and +DD), h/o DVT on Xarelto, DM2 (A1c 11.8% not on insulin), HTN, psoriasis, obesity, and MILE who presented for shortness of breath. Work up in the ER was consistent with heart failure and she was started on Lasix. Please see H&P for full detail.    Echo 11/27/17 showed    1 - Severely depressed left ventricular systolic function (EF 20-25%). (Previously 50%)     2 - Eccentric hypertrophy.     3 - Severe left ventricular enlargement.     4 - Impaired LV relaxation, elevated LAP (grade 2 diastolic dysfunction).     5 - Mild mitral regurgitation.     6 - Mild tricuspid regurgitation.     7 - The estimated PA systolic pressure is greater than 30 mmHg.     [facility] 11/27/17 with no CAD, elevated LVEDP c/w severe NICM.    Appreciate cards recs. Continue diuresis.

## 2017-11-27 NOTE — NURSING
Pt arrived via stretcher from Er. Alert and oriented X4. Pt moved self from stretcher to bed without difficulty. No c/o voiced at this time.

## 2017-11-27 NOTE — ED TRIAGE NOTES
"Pt here for chest pain that began at noon today. Pt states "I was trying to lay down, my chest was tight, I took an aspirin, but when I lay down I feel like Im drowning". Pt also reports SOB, cough.   "

## 2017-11-27 NOTE — H&P
"[facility] Hospital Medicine  History & Physical    Patient Name: Fabiana Chanel  MRN: [id number]  Admission Date: 11/27/2017  Attending Physician: Oziel Harding MD, MPH      PCP:     Josh Huffman MD    CC:     Chief Complaint   Patient presents with    Shortness of Breath     " I keep getting SOB and my chest keeps getting tight " since this AM. Reports taking aspirin earlier. Pt crying in triage.       HISTORY OF PRESENT ILLNESS:     Fabiana Chanel is a 42 y.o. female that (in part)  has a past medical history of Blood clot associated with vein wall inflammation; CHF (congestive heart failure); DM (diabetes mellitus); Hypertension; Prediabetes; Psoriasis; and Sleep apnea. Presents to [facility] Emergency Department complaining of shortness of breath as described below in detail.     Description of symptoms    Location:  Lungs  Onset:  Subacute onset this morning with progressive worsening  Character:  Moderate intensity.  She reports "I keep getting short of breath and my chest is getting tight.  I feel like I am drowning when I lay down.  "  Frequency:  This is a recurrent problem for her  Duration:  Constant duration  Associated Symptoms:  Dyspnea with exertion, nonproductive cough, bilateral lower extremity edema, unintentional weight gain  Radiation:  No radiation  Exacerbating factors:  Movement/exertion  Relieving factors:  Some relief given with supplemental oxygen and Lasix in the ED       In the emergency department routine laboratory studies, EKG, chest x-ray, and cardiac enzymes were obtained.  There was evidence of acute CHF exacerbation and pulmonary edema.  She was given Lasix and supplemental oxygen.    Hospital medicine has been asked to admit for further evaluation and treatment.       REVIEW OF SYSTEMS:     -- Constitutional: No fever or chills.  -- Eyes: No visual changes, diplopia, pain, tearing, blind spots, or discharge.   -- Ears, " nose, mouth, throat, and face: No congestion, sore throat, epistaxis, d/c, bleeding gums, neck stiffness masses, or dental issues.  -- Respiratory: Shortness of breath.  Nonproductive cough.  No , hemoptysis, stridor, wheezing, or night sweats.  -- Cardiovascular: As above in history of present illness.   -- Gastrointestinal: No vomiting, abdominal pain, hematemesis, melena, dyspepsia, or change in bowel habits.  -- Genitourinary: No hematuria, dysuria, frequency, urgency, nocturia, polyuria, stones, or incontinence.  -- Integument/breast: No rash, pruritis, pigmentation changes, dryness, or changes in hair  -- Hematologic/lymphatic: No easy bruising or lymphadenopathy.   -- Musculoskeletal: No acute arthralgias, acute myalgias, joint swelling, acute limitations of ROM, or acute muscular weakness.  -- Neurological: No seizures, headaches, incoordination, paraesthesias, ataxia, vertigo, or tremors.  -- Behavioral/Psych: No auditory or visual hallucinations, depression, or suicidal/homicidal ideations.  -- Endocrine: Unintentional weight gain.  No heat or cold intolerance, polydipsia, .  -- Allergy/Immunologic: No recurrent infections or adverse reaction to food, insects, or difficulty breathing.    Pain Scale  2 on scale of 1 to 10    PAST MEDICAL / SURGICAL HISTORY:     Past Medical History:   Diagnosis Date    Blood clot associated with vein wall inflammation     CHF (congestive heart failure)     DM (diabetes mellitus) 9/19/2013    Hypertension     Prediabetes     Psoriasis     Sleep apnea      Past Surgical History:   Procedure Laterality Date    COLONOSCOPY      DILATION AND CURETTAGE OF UTERUS           FAMILY HISTORY:     Family History   Problem Relation Age of Onset    Hypertension Mother     Hypertension Father     Diabetes Father     Diabetes Maternal Grandmother     Diabetes Paternal Grandmother     Breast cancer Neg Hx     Colon cancer Neg Hx     Ovarian cancer Neg Hx          SOCIAL  HISTORY:     Social History   Substance Use Topics    Smoking status: Passive Smoke Exposure - Never Smoker     Types: Cigarettes    Smokeless tobacco: Never Used      Comment: smokes cigars on occasion    Alcohol use Yes      Comment: occasional     Social History     Social History    Marital status:      Spouse name: N/A    Number of children: N/A    Years of education: N/A     Social History Main Topics    Smoking status: Passive Smoke Exposure - Never Smoker     Types: Cigarettes    Smokeless tobacco: Never Used      Comment: smokes cigars on occasion    Alcohol use Yes      Comment: occasional    Drug use:      Types: Marijuana      Comment: hx of marijuana use 3 years ago    Sexual activity: Not Currently     Partners: Male     Other Topics Concern    None     Social History Narrative    None         ALLERGIES:       Review of patient's allergies indicates:   Allergen Reactions    Pneumococcal 23-abimbola ps vaccine          HEALTH SCREENING:     Influenza vaccine not up-to-date for this season.  Prevnar 13 pneumonia vaccine = no evidence of previous vaccination found in the medical record      HOME MEDICATIONS:     Prior to Admission medications    Medication Sig Start Date End Date Taking? Authorizing Provider   albuterol 90 mcg/actuation inhaler Inhale 2 puffs into the lungs every 6 (six) hours as needed for Wheezing. 8/13/14 11/21/17  Abby Hensley, TONYA   amLODIPine (NORVASC) 10 MG tablet Take 10 mg by mouth once daily.    Historical Provider, MD   aspirin 81 MG Chew Take 81 mg by mouth once daily.    Historical Provider, MD   atorvastatin (LIPITOR) 20 MG tablet Take 20 mg by mouth once daily.    Historical Provider, MD   carvedilol (COREG) 12.5 MG tablet Take 25 mg by mouth 2 (two) times daily with meals.     Historical Provider, MD   carvedilol (COREG) 25 MG tablet Take 25 mg by mouth 2 (two) times daily with meals.    Historical Provider, MD   dicyclomine (BENTYL) 10 MG capsule Take  10 mg by mouth.    Historical Provider, MD   esomeprazole (NEXIUM) 40 MG capsule Take 40 mg by mouth before breakfast.    Historical Provider, MD   furosemide (LASIX) 40 MG tablet Take 1 tablet (40 mg total) by mouth once daily. 8/13/14   TONYA Miller   gabapentin (NEURONTIN) 400 MG capsule Take 400 mg by mouth Daily.    Historical Provider, MD   glimepiride (AMARYL) 4 MG tablet Take 1 tablet (4 mg total) by mouth before breakfast.  Patient taking differently: Take 4 mg by mouth 2 (two) times daily.  2/19/17   Cristian Treadwell MD   glimepiride (AMARYL) 4 MG tablet Take 4 mg by mouth. 1/20/15   Historical Provider, MD   lisinopril (PRINIVIL,ZESTRIL) 40 MG tablet Take 1 tablet (40 mg total) by mouth once daily. 8/13/14   TONYA Miller   meloxicam (MOBIC) 7.5 MG tablet Take 1 tablet (7.5 mg total) by mouth 2 (two) times daily as needed for Pain. 3/13/17   Richard Arias MD   metformin (GLUCOPHAGE) 500 MG tablet Take 1 tablet (500 mg total) by mouth 2 (two) times daily with meals. 2/24/17 11/21/17  Cristian Turpin III, MD   metoprolol succinate (TOPROL-XL) 50 MG 24 hr tablet Take 1 tablet (50 mg total) by mouth once daily. 8/13/14 10/27/17  TONYA Miller   potassium chloride (KLOR-CON) 10 MEQ TbSR Take 10 mEq by mouth.    Historical Provider, MD   rivaroxaban (XARELTO) 10 mg Tab Take 10 mg by mouth.    Historical Provider, MD   simvastatin (ZOCOR) 40 MG tablet Take 1 tablet (40 mg total) by mouth every evening. 8/13/14 10/27/17  TONYA Miller          HOSPITAL MEDICATIONS:     Scheduled Meds:    amLODIPine  10 mg Oral Daily    aspirin  81 mg Oral Daily    famotidine  20 mg Oral BID    gabapentin  400 mg Oral Daily    lisinopril  40 mg Oral Daily    metoprolol succinate  50 mg Oral Daily    senna-docusate 8.6-50 mg  1 tablet Oral BID    simvastatin  40 mg Oral QHS    sodium chloride 0.9%  3 mL Intravenous Q8H     Continuous Infusions:    PRN Meds: acetaminophen,  "acetaminophen, albuterol, dextrose 50%, glucagon (human recombinant), hydrALAZINE, influenza, insulin aspart, metoprolol, morphine, ondansetron, oxyCODONE      PHYSICAL EXAM:     Wt Readings from Last 1 Encounters:   11/27/17 0235 109.1 kg (240 lb 8.4 oz)   11/26/17 2146 108.9 kg (240 lb)     Body mass index is 37.67 kg/m².  Vitals:    11/26/17 2339 11/27/17 0042 11/27/17 0235 11/27/17 0411   BP: (!) 150/98 (!) 144/94 116/71 121/72   BP Location:   Right arm Left arm   Patient Position:   Lying Lying   Pulse: 82 86 88 85   Resp:  14 14 17   Temp:   98.3 °F (36.8 °C) 97.6 °F (36.4 °C)   TempSrc:   Oral Oral   SpO2: 99% 97% (!) 94% 95%   Weight:   109.1 kg (240 lb 8.4 oz)    Height:   5' 7" (1.702 m)           -- General appearance: Morbidly obese middle-aged female who is lying in bed and appears comfortable.  well developed. appears stated age   -- Head: normocephalic, atraumatic   -- Eyes: conjunctivae clear. Extraocular muscles intact  -- Nose: Nares normal. Septum midline.   -- Mouth/Throat: lips, mucosa, and tongue normal. no throat erythema.   -- Neck: supple, symmetrical, trachea midline, no JVD and thyroid not grossly enlarged, appears symmetric  -- Lungs: Inspiratory crackles at bases bilaterally. normal respiratory effort. No use of accessory muscles.   -- Chest wall: no tenderness. equal bilateral chest rise   -- Heart: regular rate and regular rhythm. S1, S2 normal.  no click, rub or gallop   -- Abdomen: Obese abdomen, soft, non-tender, non-distended, non-tympanic; bowel sounds normal; megaly exam limited by body habitus  -- Extremities: 1+ pitting edema to the level of the knee and bilateral lower extremities.  no cyanosis, clubbing.   -- Pulses: 2+ and symmetric   -- Skin: color normal, texture normal, turgor normal. No rashes or lesions.   -- Neurologic: Normal strength and tone. No focal numbness or weakness. CNII-XII intact. Dee coma scale: eyes open spontaneously-4, oriented & converses-5, obeys " commands-6.      LABORATORY STUDIES:     Recent Results (from the past 36 hour(s))   CBC auto differential    Collection Time: 11/26/17 10:05 PM   Result Value Ref Range    WBC 7.32 3.90 - 12.70 K/uL    RBC 5.16 4.00 - 5.40 M/uL    Hemoglobin 13.6 12.0 - 16.0 g/dL    Hematocrit 43.3 37.0 - 48.5 %    MCV 84 82 - 98 fL    MCH 26.4 (L) 27.0 - 31.0 pg    MCHC 31.4 (L) 32.0 - 36.0 g/dL    RDW 15.5 (H) 11.5 - 14.5 %    Platelets 318 150 - 350 K/uL    MPV 11.4 9.2 - 12.9 fL    Gran # 4.4 1.8 - 7.7 K/uL    Lymph # 2.1 1.0 - 4.8 K/uL    Mono # 0.6 0.3 - 1.0 K/uL    Eos # 0.2 0.0 - 0.5 K/uL    Baso # 0.02 0.00 - 0.20 K/uL    Gran% 60.3 38.0 - 73.0 %    Lymph% 28.7 18.0 - 48.0 %    Mono% 7.8 4.0 - 15.0 %    Eosinophil% 2.9 0.0 - 8.0 %    Basophil% 0.3 0.0 - 1.9 %    Differential Method Automated    Comprehensive metabolic panel    Collection Time: 11/26/17 10:05 PM   Result Value Ref Range    Sodium 139 136 - 145 mmol/L    Potassium 3.9 3.5 - 5.1 mmol/L    Chloride 100 95 - 110 mmol/L    CO2 29 23 - 29 mmol/L    Glucose 251 (H) 70 - 110 mg/dL    BUN, Bld 13 6 - 20 mg/dL    Creatinine 1.3 0.5 - 1.4 mg/dL    Calcium 9.3 8.7 - 10.5 mg/dL    Total Protein 7.3 6.0 - 8.4 g/dL    Albumin 3.1 (L) 3.5 - 5.2 g/dL    Total Bilirubin 0.4 0.1 - 1.0 mg/dL    Alkaline Phosphatase 97 55 - 135 U/L    AST 11 10 - 40 U/L    ALT 12 10 - 44 U/L    Anion Gap 10 8 - 16 mmol/L    eGFR if African American 59 (A) >60 mL/min/1.73 m^2    eGFR if non African American 51 (A) >60 mL/min/1.73 m^2   Troponin I #1    Collection Time: 11/26/17 10:05 PM   Result Value Ref Range    Troponin I 0.047 (H) 0.000 - 0.026 ng/mL   B-Type natriuretic peptide (BNP)    Collection Time: 11/26/17 10:05 PM   Result Value Ref Range     (H) 0 - 99 pg/mL   Sedimentation rate, manual    Collection Time: 11/26/17 10:05 PM   Result Value Ref Range    Sed Rate 8 0 - 20 mm/Hr   POCT glucose    Collection Time: 11/26/17 10:23 PM   Result Value Ref Range    POCT Glucose 228 (H)  70 - 110 mg/dL   POCT urine pregnancy    Collection Time: 11/26/17 11:11 PM   Result Value Ref Range    POC Preg Test, Ur Negative Negative     Acceptable Yes    Urinalysis Clean Catch    Collection Time: 11/26/17 11:11 PM   Result Value Ref Range    Specimen UA Urine, Clean Catch     Color, UA Yellow Yellow, Straw, Elin    Appearance, UA Clear Clear    pH, UA 6.0 5.0 - 8.0    Specific Gravity, UA 1.025 1.005 - 1.030    Protein, UA 2+ (A) Negative    Glucose, UA 3+ (A) Negative    Ketones, UA Negative Negative    Bilirubin (UA) Negative Negative    Occult Blood UA Negative Negative    Nitrite, UA Negative Negative    Urobilinogen, UA 4.0-6.0 (A) <2.0 EU/dL    Leukocytes, UA Negative Negative   Urinalysis Microscopic    Collection Time: 11/26/17 11:11 PM   Result Value Ref Range    RBC, UA 1 0 - 4 /hpf    WBC, UA 2 0 - 5 /hpf    Bacteria, UA Occasional None-Occ /hpf    Yeast, UA None None    Non-Squam Epith 1 (A) <1/hpf /hpf    Hyaline Casts, UA 0 0-1/lpf /lpf    Microscopic Comment SEE COMMENT        Lab Results   Component Value Date    INR 0.9 06/08/2017    INR 0.9 02/23/2017    INR 0.9 02/18/2017     Lab Results   Component Value Date    HGBA1C 11.8 (H) 11/21/2017     Recent Labs      11/26/17   2223   POCTGLUCOSE  228*           IMAGING:     Imaging Results          X-Ray Chest PA And Lateral (Final result)  Result time 11/26/17 23:06:41    Final result by Flavio Desai MD (11/26/17 23:06:41)                 Impression:         Mild cardiomegaly with mild congestive changes.          Electronically signed by: FLAVIO DESAI MD  Date:     11/26/17  Time:    23:06              Narrative:    Chest PA and Lateral    Indication:Chest Pain.    Comparison:September 17, 2017.    Findings:     The cardiomediastinal silhouette is mildly enlarged with central vascular congestion.  There is no pleural effusion.  The trachea is midline.  The lungs are symmetrically expanded bilaterally mild bilateral  edema.  There is no pneumothorax.  The osseous structures appear unchanged.                                CONSULTS:     IP CONSULT TO CARDIOLOGY       ASSESSMENT & PLAN:     Primary Diagnosis:  Acute on chronic combined systolic and diastolic heart failure, NYHA class 2    Active Hospital Problems    Diagnosis  POA    *Acute on chronic combined systolic and diastolic heart failure, NYHA class 2 [I50.43]  Yes     Priority: 1 - High     Chronic    Elevated troponin [R74.8]  Yes     Priority: 2     Hyperlipidemia [E78.5]  Yes    Paroxysmal atrial fibrillation [I48.0]  Yes    Obesity with body mass index (BMI) of 30.0 to 39.9 [E66.9]  Yes    Type 2 diabetes mellitus without complication, without long-term current use of insulin [E11.9]  Yes    HTN (hypertension) [I10]  Yes      Resolved Hospital Problems    Diagnosis Date Resolved POA   No resolved problems to display.         Acute on chronic combined systolic and diastolic heart failure, NYHA class 2  · Evidenced by history, elevated BNP, pulmonary edema, peripheral edema  · Provide diuresis w/ IV medication  · Maintain w/ beta-blocker  · ACE inhibitor or ARB if GFR allows and remains stable  · If 1) Patient cannot tolerate ACEi or 2) NYHA class III or above, add spironolactone (or eplerenone) and hydralazine-isosorbide dinitrate   · Daily Weights  · Strict I/O  · Fluid restriction to 1,500cc daily  · Low-sodium cardiac diet  · Obtain 2D echo if <6 months     EF   Date Value Ref Range Status   06/09/2017 50 55 - 65    05/16/2015 30 (A) 55 - 65      · Chest X-ray  · Check TSH, albumin, UA, and renal function  · EKG and cardiac enzymes PRN  · DVT prophylaxis w/ pharmacological and/or mechanical measures  · Oxygen supplementation support PRN    Instructions given to patient/family:  Monitor daily weight.  Regular activity within patient's limitations.  Low salt, low fat and low choleterol diet and restrict fluid < 2L per day.  Call MD if SOB, chest pain, weight  gain > 2-3 lbs per day and/or 5-6 lbs per week.   No smoking. Annual influenza vaccine required.        Elevated troponin  Possibly related to CHF.  Continue to trend values.  No evidence of ischemia on EKG    HTN (hypertension)  · Goal while inpatient is a systolic blood pressure less than 130mmHg given elevated troponin level  · BP in acceptable range at this time  · Continue current home regimen with hold parameters  · PRN antihypertensives available        Hyperlipidemia  · Lipid panel - pending  · Cardiac diet  · Continue statin        Obesity with body mass index (BMI) of 30.0 to 39.9  · Body mass index is 37.67 kg/m².  · Order a cardiac diet  · Counseling given  · Nutrition consult as an outpatient          Paroxysmal atrial fibrillation  · Currently rate controlled  · Maintain with beta-blocker with hold parameters  · Monitor on telemetry  · Maintain magnesium around 2.0  · Maintain potassium around 4.0      Type 2 diabetes mellitus without complication, without long-term current use of insulin  · BG is above acceptable range at this time; sliding scale insulin given  · Maintain w/ subcutaneous insulin management order set  · Hold oral diabetic meds  · ADA 1800 kcal diet  · BG goal while in patient is <180mg/dL  · HgA1c = Pending              VTE Risk Mitigation         Ordered     Medium Risk of VTE  Once      11/27/17 0312     Place JONNY hose  Until discontinued      11/27/17 0312     Place sequential compression device  Until discontinued      11/27/17 0312            Adult PRN medications available   DVT prophylaxis given       DISPOSITION:     Will admit to the Hospital Medicine service for further evaluation and treatment.    Chart reviewed and updated where applicable.    High Risk Conditions:  Patient has a condition that poses threat to life and bodily function: Acute CHF exacerbation      ===============================================================    Oziel Harding MD, MPH  Department of  Hospital Medicine [facility]  [phone]  (7pm - 6am)          This note is dictated using Dragon Medical 360 voice recognition software.  There are word recognition mistakes that are occasionally missed on review.

## 2017-11-27 NOTE — HPI
"42 y.o. Female with CHF, DM and HTN presents to the ED c/o intermittent SOB, chest tightness, body aches, nausea, cough, hot flashes and rhinorrhea which began this morning. The patient reports her chest tightness worsens when laying down and feels better when sitting up. She reports her SOB comes for x5-10 minutes then goes away. The patient states she coughs with mucus and tried to take a nap when symptoms onset. She got a morphine shot the last time she had current  symptoms x2 months ago. She denies fever or chills. She complies with heart medications. She took an aspirin for symptoms with slight relief. No other symptoms or alleviating factors present.    Pt previously followed by [facility].  Apparently has a hx of "NICM" and reportedly a neg cath in 2015 (her at [facility]).  However, no record of that angiogram exists (in [facility] or on the [facility] ) and the pt's description of the procedure is dissimilar to a cardiac angio.  She presents to the ER with chest tightness and dyspnea.  EKG with NSSTTW changes and minimal trop elev.  C/R/O ACS.  "

## 2017-11-27 NOTE — PROGRESS NOTES
Pre-sedation Assessment:    1. ASA Score: ASA 3 - Patient with moderate systemic disease with functional limitations  2. Mallampati Class: III (soft and hard palate and base of uvula visible)  3. Patient or family history of any reaction to anesthesia or sedation: No  4. Plan for Sedation: Moderate  5. H&P within 30 days of the procedure and updated within 24 hrs of admission or registration: Yes

## 2017-11-28 VITALS
SYSTOLIC BLOOD PRESSURE: 118 MMHG | OXYGEN SATURATION: 94 % | TEMPERATURE: 98 F | DIASTOLIC BLOOD PRESSURE: 64 MMHG | WEIGHT: 240.5 LBS | HEART RATE: 80 BPM | RESPIRATION RATE: 19 BRPM | HEIGHT: 67 IN | BODY MASS INDEX: 37.75 KG/M2

## 2017-11-28 LAB
ANION GAP SERPL CALC-SCNC: 8 MMOL/L
BASOPHILS # BLD AUTO: 0.01 K/UL
BASOPHILS NFR BLD: 0.2 %
BNP SERPL-MCNC: 145 PG/ML
BUN SERPL-MCNC: 11 MG/DL
CALCIUM SERPL-MCNC: 9 MG/DL
CHLORIDE SERPL-SCNC: 98 MMOL/L
CHOLEST SERPL-MCNC: 175 MG/DL
CHOLEST/HDLC SERPL: 3.8 {RATIO}
CO2 SERPL-SCNC: 32 MMOL/L
CREAT SERPL-MCNC: 1.1 MG/DL
DIFFERENTIAL METHOD: ABNORMAL
EOSINOPHIL # BLD AUTO: 0.1 K/UL
EOSINOPHIL NFR BLD: 1.4 %
ERYTHROCYTE [DISTWIDTH] IN BLOOD BY AUTOMATED COUNT: 15.8 %
EST. GFR  (AFRICAN AMERICAN): >60 ML/MIN/1.73 M^2
EST. GFR  (NON AFRICAN AMERICAN): >60 ML/MIN/1.73 M^2
GLUCOSE SERPL-MCNC: 319 MG/DL
HCT VFR BLD AUTO: 41 %
HDLC SERPL-MCNC: 46 MG/DL
HDLC SERPL: 26.3 %
HGB BLD-MCNC: 12.9 G/DL
LDLC SERPL CALC-MCNC: 113.8 MG/DL
LYMPHOCYTES # BLD AUTO: 1.3 K/UL
LYMPHOCYTES NFR BLD: 20 %
MAGNESIUM SERPL-MCNC: 1.7 MG/DL
MCH RBC QN AUTO: 26.4 PG
MCHC RBC AUTO-ENTMCNC: 31.5 G/DL
MCV RBC AUTO: 84 FL
MONOCYTES # BLD AUTO: 0.6 K/UL
MONOCYTES NFR BLD: 8.9 %
NEUTROPHILS # BLD AUTO: 4.6 K/UL
NEUTROPHILS NFR BLD: 69.5 %
NONHDLC SERPL-MCNC: 129 MG/DL
PHOSPHATE SERPL-MCNC: 3.8 MG/DL
PLATELET # BLD AUTO: 310 K/UL
PMV BLD AUTO: 11.1 FL
POCT GLUCOSE: 193 MG/DL (ref 70–110)
POCT GLUCOSE: 212 MG/DL (ref 70–110)
POCT GLUCOSE: 255 MG/DL (ref 70–110)
POCT GLUCOSE: 295 MG/DL (ref 70–110)
POTASSIUM SERPL-SCNC: 3.5 MMOL/L
RBC # BLD AUTO: 4.89 M/UL
SODIUM SERPL-SCNC: 138 MMOL/L
TRIGL SERPL-MCNC: 76 MG/DL
WBC # BLD AUTO: 6.6 K/UL

## 2017-11-28 PROCEDURE — 63600175 PHARM REV CODE 636 W HCPCS: Performed by: HOSPITALIST

## 2017-11-28 PROCEDURE — 84100 ASSAY OF PHOSPHORUS: CPT

## 2017-11-28 PROCEDURE — A4216 STERILE WATER/SALINE, 10 ML: HCPCS | Performed by: EMERGENCY MEDICINE

## 2017-11-28 PROCEDURE — 25000003 PHARM REV CODE 250: Performed by: EMERGENCY MEDICINE

## 2017-11-28 PROCEDURE — 83880 ASSAY OF NATRIURETIC PEPTIDE: CPT

## 2017-11-28 PROCEDURE — 99233 SBSQ HOSP IP/OBS HIGH 50: CPT | Mod: ,,, | Performed by: INTERNAL MEDICINE

## 2017-11-28 PROCEDURE — 25000003 PHARM REV CODE 250: Performed by: INTERNAL MEDICINE

## 2017-11-28 PROCEDURE — 80061 LIPID PANEL: CPT

## 2017-11-28 PROCEDURE — 83735 ASSAY OF MAGNESIUM: CPT

## 2017-11-28 PROCEDURE — 25000003 PHARM REV CODE 250: Performed by: HOSPITALIST

## 2017-11-28 PROCEDURE — 36415 COLL VENOUS BLD VENIPUNCTURE: CPT

## 2017-11-28 PROCEDURE — 63600175 PHARM REV CODE 636 W HCPCS: Performed by: INTERNAL MEDICINE

## 2017-11-28 PROCEDURE — 80048 BASIC METABOLIC PNL TOTAL CA: CPT

## 2017-11-28 PROCEDURE — 85025 COMPLETE CBC W/AUTO DIFF WBC: CPT

## 2017-11-28 RX ORDER — FUROSEMIDE 40 MG/1
40 TABLET ORAL DAILY
Qty: 30 TABLET | Refills: 11 | Status: SHIPPED | OUTPATIENT
Start: 2017-11-29 | End: 2017-12-05 | Stop reason: SDUPTHER

## 2017-11-28 RX ORDER — HYDRALAZINE HYDROCHLORIDE 50 MG/1
50 TABLET, FILM COATED ORAL 2 TIMES DAILY
Qty: 60 TABLET | Refills: 11 | Status: SHIPPED | OUTPATIENT
Start: 2017-11-28 | End: 2018-01-18 | Stop reason: SDUPTHER

## 2017-11-28 RX ORDER — FUROSEMIDE 40 MG/1
40 TABLET ORAL DAILY
Status: DISCONTINUED | OUTPATIENT
Start: 2017-11-28 | End: 2017-11-28 | Stop reason: HOSPADM

## 2017-11-28 RX ORDER — SPIRONOLACTONE 25 MG/1
25 TABLET ORAL DAILY
Qty: 30 TABLET | Refills: 11 | Status: ON HOLD | OUTPATIENT
Start: 2017-11-29 | End: 2018-12-30 | Stop reason: HOSPADM

## 2017-11-28 RX ORDER — SPIRONOLACTONE 25 MG/1
25 TABLET ORAL DAILY
Status: DISCONTINUED | OUTPATIENT
Start: 2017-11-28 | End: 2017-11-28 | Stop reason: HOSPADM

## 2017-11-28 RX ORDER — ISOSORBIDE MONONITRATE 30 MG/1
30 TABLET, EXTENDED RELEASE ORAL DAILY
Qty: 30 TABLET | Refills: 11 | Status: SHIPPED | OUTPATIENT
Start: 2017-11-29 | End: 2018-01-18 | Stop reason: SDUPTHER

## 2017-11-28 RX ORDER — ATORVASTATIN CALCIUM 80 MG/1
80 TABLET, FILM COATED ORAL NIGHTLY
Qty: 90 TABLET | Refills: 3 | Status: SHIPPED | OUTPATIENT
Start: 2017-11-28 | End: 2019-06-18

## 2017-11-28 RX ORDER — METOPROLOL SUCCINATE 50 MG/1
50 TABLET, EXTENDED RELEASE ORAL DAILY
Qty: 30 TABLET | Refills: 11 | Status: SHIPPED | OUTPATIENT
Start: 2017-11-29 | End: 2018-07-23 | Stop reason: SDUPTHER

## 2017-11-28 RX ORDER — LISINOPRIL 40 MG/1
40 TABLET ORAL DAILY
Qty: 90 TABLET | Refills: 3 | Status: ON HOLD | OUTPATIENT
Start: 2017-11-29 | End: 2018-12-30 | Stop reason: HOSPADM

## 2017-11-28 RX ORDER — HYDRALAZINE HYDROCHLORIDE 25 MG/1
50 TABLET, FILM COATED ORAL 2 TIMES DAILY
Status: DISCONTINUED | OUTPATIENT
Start: 2017-11-28 | End: 2017-11-28 | Stop reason: HOSPADM

## 2017-11-28 RX ADMIN — INSULIN ASPART 6 UNITS: 100 INJECTION, SOLUTION INTRAVENOUS; SUBCUTANEOUS at 05:11

## 2017-11-28 RX ADMIN — Medication 3 ML: at 05:11

## 2017-11-28 RX ADMIN — SPIRONOLACTONE 25 MG: 25 TABLET ORAL at 08:11

## 2017-11-28 RX ADMIN — DOCUSATE SODIUM AND SENNOSIDES 1 TABLET: 8.6; 5 TABLET, FILM COATED ORAL at 08:11

## 2017-11-28 RX ADMIN — FUROSEMIDE 40 MG: 40 TABLET ORAL at 08:11

## 2017-11-28 RX ADMIN — INSULIN DETEMIR 3 UNITS: 100 INJECTION, SOLUTION SUBCUTANEOUS at 08:11

## 2017-11-28 RX ADMIN — ASPIRIN 81 MG 81 MG: 81 TABLET ORAL at 08:11

## 2017-11-28 RX ADMIN — FAMOTIDINE 20 MG: 20 TABLET, FILM COATED ORAL at 08:11

## 2017-11-28 RX ADMIN — HYDROCODONE BITARTRATE AND ACETAMINOPHEN 1 TABLET: 5; 325 TABLET ORAL at 05:11

## 2017-11-28 RX ADMIN — LISINOPRIL 40 MG: 20 TABLET ORAL at 08:11

## 2017-11-28 RX ADMIN — ISOSORBIDE MONONITRATE 30 MG: 30 TABLET, EXTENDED RELEASE ORAL at 08:11

## 2017-11-28 RX ADMIN — HYDRALAZINE HYDROCHLORIDE 25 MG: 25 TABLET ORAL at 05:11

## 2017-11-28 RX ADMIN — METOPROLOL SUCCINATE 50 MG: 50 TABLET, EXTENDED RELEASE ORAL at 08:11

## 2017-11-28 NOTE — PLAN OF CARE
11/28/17 1129   Final Note   Assessment Type Final Discharge Note   Discharge Disposition Home   Hospital Follow Up  Appt(s) scheduled? Yes   Discharge plans and expectations educations in teach back method with documentation complete? Yes   Right Care Referral Info   Post Acute Recommendation No Care     Follow-up Information     Oziel Yanez MD On 12/5/2017.    Specialties:  Cardiology, INTERVENTIONAL CARDIOLOGY  Why:  Appointment scheduled for Tuesday December 5th at 9am  Contact information:  [address]  [place] [address]  [phone]             Josh Huffman MD In 3 days.    Specialty:  Family Medicine  Contact information:  [address] [facility] [address] [facility] [address]  [phone]               pts nurse Vidhya notified that all CM needs have been addressed and that she may proceed with nursing d/c instructions and teaching to complete d/c process

## 2017-11-28 NOTE — HOSPITAL COURSE
11/27/17: cath with NICM and elev filling pressures    Interval Hx: feeling better, no cp/sob    Tele: SR, NSVT (pers rev)

## 2017-11-28 NOTE — PROGRESS NOTES
MAITE f/u with patient to review post discharge f/u appt.    Katy Castellanos LMSW, KIKI-[facility], [facility]  11/28/2017

## 2017-11-28 NOTE — PLAN OF CARE
Discharge planning assessment completed.  Patient from home with sopuse and independent.  Patient's spouse helps at home if needed.  Patient's PCP is Dr. Miguel Burden and patient prefers afternoon appts.  Patient's preferred pharmacy is [facility].   Discharge disposition is to home.       11/28/17 1323   Discharge Assessment   Assessment Type Discharge Planning Assessment   Confirmed/corrected address and phone number on facesheet? Yes   Assessment information obtained from? Patient   Expected Length of Stay (days) 2   Communicated expected length of stay with patient/caregiver yes   Prior to hospitilization cognitive status: Alert/Oriented   Prior to hospitalization functional status: Independent   Current cognitive status: Alert/Oriented   Current Functional Status: Independent   Lives With spouse   Able to Return to Prior Arrangements yes   Is patient able to care for self after discharge? Yes   Who are your caregiver(s) and their phone number(s)? Carla Chanel  [phone]   Patient's perception of discharge disposition home or selfcare   Readmission Within The Last 30 Days no previous admission in last 30 days   Patient currently being followed by outpatient case management? No   Patient currently receives any other outside agency services? No   Equipment Currently Used at Home glucometer   Do you have any problems affording any of your prescribed medications? No   Is the patient taking medications as prescribed? yes   Does the patient have transportation home? Yes   Transportation Available car   Does the patient receive services at the [facility]? No   Discharge Plan A Home   Discharge Plan B Home   Patient/Family In Agreement With Plan yes   Katy Castellanos, SRIKANTH, ACMONIKA-Tashi, CCM  11/28/2017

## 2017-11-28 NOTE — HOSPITAL COURSE
Echo 11/27/17 showed    1 - Severely depressed left ventricular systolic function (EF 20-25%). (Previously 50%)     2 - Eccentric hypertrophy.     3 - Severe left ventricular enlargement.     4 - Impaired LV relaxation, elevated LAP (grade 2 diastolic dysfunction).     5 - Mild mitral regurgitation.     6 - Mild tricuspid regurgitation.     7 - The estimated PA systolic pressure is greater than 30 mmHg.      [facility] 11/27/17 with no CAD, elevated LVEDP c/w severe NICM.     Appreciate cards recs. She was started on diuresis and converted to PO Lasix. Cont BBl/ACEi/hydrala/imdur and added aldactone. Follow up with cardiology in 1 week. Will need echo in 3 months to evaluated for need for ICD.

## 2017-11-28 NOTE — PROGRESS NOTES
"Preparation for discharge:Patientawake, alert and fully oriented;patient is aware that she is being discharged today;  She states,"my ride cannot come until 5."  Note left IV removed;  Patient denies pain at this time;  Patient refused influenza/pneumonia vaccine(s); Will continue to follow until departure;   "

## 2017-11-28 NOTE — PROGRESS NOTES
[facility]  Cardiology  Progress Note    Patient Name: Fabiana Chanel  MRN: [id number]  Admission Date: 11/26/2017  Hospital Length of Stay: 1 days  Code Status: Full Code   Attending Physician: Sophia Hsieh MD   Primary Care Physician: Josh Huffman MD  Expected Discharge Date:   Principal Problem:Acute on chronic combined systolic and diastolic heart failure, NYHA class 2    Subjective:     Hospital Course:   11/27/17: cath with NICM and elev filling pressures    Interval Hx: feeling better, no cp/sob    Tele: SR, NSVT (pers rev)      Past Medical History:   Diagnosis Date    Blood clot associated with vein wall inflammation     CHF (congestive heart failure)     DM (diabetes mellitus) 9/19/2013    Hypertension     Prediabetes     Psoriasis     Sleep apnea        Past Surgical History:   Procedure Laterality Date    COLONOSCOPY      DILATION AND CURETTAGE OF UTERUS         Review of patient's allergies indicates:   Allergen Reactions    Pneumococcal 23-abimbola ps vaccine        No current facility-administered medications on file prior to encounter.      Current Outpatient Prescriptions on File Prior to Encounter   Medication Sig    albuterol 90 mcg/actuation inhaler Inhale 2 puffs into the lungs every 6 (six) hours as needed for Wheezing.    amLODIPine (NORVASC) 10 MG tablet Take 10 mg by mouth once daily.    aspirin 81 MG Chew Take 81 mg by mouth once daily.    atorvastatin (LIPITOR) 20 MG tablet Take 20 mg by mouth once daily.    carvedilol (COREG) 12.5 MG tablet Take 25 mg by mouth 2 (two) times daily with meals.     carvedilol (COREG) 25 MG tablet Take 25 mg by mouth 2 (two) times daily with meals.    dicyclomine (BENTYL) 10 MG capsule Take 10 mg by mouth.    esomeprazole (NEXIUM) 40 MG capsule Take 40 mg by mouth before breakfast.    furosemide (LASIX) 40 MG tablet Take 1 tablet (40 mg total) by mouth once daily.    gabapentin (NEURONTIN) 400 MG capsule Take 400  mg by mouth Daily.    glimepiride (AMARYL) 4 MG tablet Take 1 tablet (4 mg total) by mouth before breakfast. (Patient taking differently: Take 4 mg by mouth 2 (two) times daily. )    glimepiride (AMARYL) 4 MG tablet Take 4 mg by mouth.    lisinopril (PRINIVIL,ZESTRIL) 40 MG tablet Take 1 tablet (40 mg total) by mouth once daily.    meloxicam (MOBIC) 7.5 MG tablet Take 1 tablet (7.5 mg total) by mouth 2 (two) times daily as needed for Pain.    metformin (GLUCOPHAGE) 500 MG tablet Take 1 tablet (500 mg total) by mouth 2 (two) times daily with meals.    metoprolol succinate (TOPROL-XL) 50 MG 24 hr tablet Take 1 tablet (50 mg total) by mouth once daily.    potassium chloride (KLOR-CON) 10 MEQ TbSR Take 10 mEq by mouth.    rivaroxaban (XARELTO) 10 mg Tab Take 10 mg by mouth.    simvastatin (ZOCOR) 40 MG tablet Take 1 tablet (40 mg total) by mouth every evening.     Family History     Problem Relation (Age of Onset)    Diabetes Father, Maternal Grandmother, Paternal Grandmother    Hypertension Mother, Father        Social History Main Topics    Smoking status: Passive Smoke Exposure - Never Smoker     Types: Cigarettes    Smokeless tobacco: Never Used      Comment: smokes cigars on occasion    Alcohol use Yes      Comment: occasional    Drug use:      Types: Marijuana      Comment: hx of marijuana use 3 years ago    Sexual activity: Not Currently     Partners: Male     Review of Systems   Gastrointestinal: Negative for melena.   Genitourinary: Negative for hematuria.     Objective:     Vital Signs (Most Recent):  Temp: 98 °F (36.7 °C) (11/28/17 0641)  Pulse: 81 (11/28/17 0641)  Resp: 18 (11/28/17 0641)  BP: 107/72 (11/28/17 0641)  SpO2: 95 % (11/28/17 0641) Vital Signs (24h Range):  Temp:  [97.8 °F (36.6 °C)-98.6 °F (37 °C)] 98 °F (36.7 °C)  Pulse:  [81-98] 81  Resp:  [17-19] 18  SpO2:  [91 %-96 %] 95 %  BP: (107-129)/(70-87) 107/72     Weight: 109.1 kg (240 lb 8.4 oz)  Body mass index is 37.67  kg/m².    SpO2: 95 %  O2 Device (Oxygen Therapy): room air      Intake/Output Summary (Last 24 hours) at 11/28/17 0744  Last data filed at 11/27/17 1000   Gross per 24 hour   Intake                0 ml   Output              500 ml   Net             -500 ml       Lines/Drains/Airways     Peripheral Intravenous Line                 Peripheral IV - Single Lumen 11/26/17 2250 Left Antecubital 1 day                Physical Exam   Constitutional: She is oriented to person, place, and time. She appears well-developed and well-nourished. No distress.   HENT:   Head: Normocephalic and atraumatic.   Mouth/Throat: No oropharyngeal exudate.   Eyes: Conjunctivae and EOM are normal. Pupils are equal, round, and reactive to light. No scleral icterus.   Neck: Normal range of motion. Neck supple. No JVD present. No tracheal deviation present. No thyromegaly present.   Cardiovascular: Normal rate, regular rhythm, S1 normal and S2 normal.  Exam reveals no gallop and no friction rub.    No murmur heard.  R groin access site c/d/i   Pulmonary/Chest: Effort normal and breath sounds normal. No respiratory distress. She has no wheezes. She has no rales. She exhibits no tenderness.   Abdominal: Soft. Bowel sounds are normal. There is no tenderness.   Musculoskeletal: Normal range of motion. She exhibits no edema.   Neurological: She is alert and oriented to person, place, and time. No cranial nerve deficit.   Skin: Skin is warm and dry. She is not diaphoretic.   Psychiatric: She has a normal mood and affect. Her behavior is normal. Judgment normal.       Current Medications:   aspirin  81 mg Oral Daily    atorvastatin  80 mg Oral Nightly    famotidine  20 mg Oral BID    furosemide  20 mg Intravenous BID    gabapentin  400 mg Oral Daily    hydrALAZINE  25 mg Oral Q8H    insulin detemir  3 Units Subcutaneous Once    isosorbide mononitrate  30 mg Oral Daily    lisinopril  40 mg Oral Daily    metoprolol succinate  50 mg Oral Daily     senna-docusate 8.6-50 mg  1 tablet Oral BID    sodium chloride 0.9%  3 mL Intravenous Q8H        acetaminophen, acetaminophen, albuterol, atropine, dextrose 50%, dextrose 50%, glucagon (human recombinant), glucagon (human recombinant), hydrALAZINE, hydrocodone-acetaminophen 5-325mg, influenza, insulin aspart, metoprolol, ondansetron, ondansetron, sodium chloride 0.9%    Laboratory:  CBC:    Recent Labs  Lab 11/26/17 2205 11/27/17  0509 11/28/17  0508   WHITE BLOOD CELL COUNT 7.32 5.05 6.60   HEMOGLOBIN 13.6 13.5 12.9   HEMATOCRIT 43.3 42.9 41.0   PLATELETS [phone]       CHEMISTRIES:    Recent Labs  Lab 12/10/14  1031  07/09/15  1831  02/18/17 1943 11/21/17  1445 11/26/17 2205 11/27/17  0509   GLUCOSE  --   < > 523 HH  < > 637 HH  < > 293 H 251 H 304 H   SODIUM  --   < > 133 L  < > 133 L  < > 138 139 138   POTASSIUM  --   < > 4.5  < > 4.3  < > 4.1 3.9 3.5   BUN BLD  --   < > 20  < > 14  < > 11 13 12   CREATININE  --   < > 1.5 H  < > 1.5 H  < > 1.0 1.3 1.1   EGFR IF   --   < > 50 A  < > 50 A  < > >60.0 59 A >60   EGFR IF NON-  --   < > 44 A  < > 43 A  < > >60.0 51 A >60   CALCIUM  --   < > 9.2  < > 9.7  < > 9.5 9.3 9.0   MAGNESIUM 1.5 L  --  1.8  --  2.2  --   --   --   --    < > = values in this interval not displayed.    CARDIAC BIOMARKERS:    Recent Labs  Lab 02/18/17 1943 02/23/17 2012 11/26/17 2205 11/27/17  0509 11/27/17  0937   CPK 74  --  81  --   --   --   --    CPK MB 2.1  --   --   --   --   --   --    TROPONIN I <0.006  < > <0.006  < > 0.047 H 0.033 H 0.032 H   < > = values in this interval not displayed.    COAGS:    Recent Labs  Lab 02/18/17  1943 02/23/17 2012 06/08/17  2111   INR 0.9 0.9 0.9       LIPIDS/LFTS:    Recent Labs  Lab 12/10/14  0615  06/09/17  0524 09/17/17  0204 11/21/17  1445 11/26/17  2205   CHOLESTEROL 233 H  --  185  --  227 H  --    TRIGLYCERIDES 94  --  83  --  122  --    HDL 51  --  38 L  --  62  --    LDL CHOLESTEROL 163.2 H  --  130.4   --  140.6  --    NON-HDL CHOLESTEROL 182  --  147  --  165  --    AST  --   < > 9 L 13 14 11   ALT  --   < > 9 L 12 11 12   < > = values in this interval not displayed.    Lab Results   Component Value Date    TSH 1.938 11/27/2017     BNP    Recent Labs  Lab 11/26/17 2205   *     Lab Results   Component Value Date    HGBA1C 11.9 (H) 11/27/2017     UCg neg 11/26/17    Diagnostic Results:  ECG (personally reviewed tracings):   11/26/17 2159 SR 96, inflat ST abnl ?isch, similar to 9/17/17    Chest X-Ray (personally reviewed image(s)): 11/26/17 mild CHF/CMeg    Echo: 11/27/17 (images pers rev, EF dropped from 50% 6/2017)    1 - Severely depressed left ventricular systolic function (EF 20-25%).     2 - Eccentric hypertrophy.     3 - Severe left ventricular enlargement.     4 - Impaired LV relaxation, elevated LAP (grade 2 diastolic dysfunction).     5 - Mild mitral regurgitation.     6 - Mild tricuspid regurgitation.     7 - The estimated PA systolic pressure is greater than 30 mmHg.     Cath 11/27/17 (images pers rev)  RA 5  RV 61/6  PA 60/29/42  PAWP 18  /41  Ao 163/107/130  CO 5.6 L/min  LVEF: 20% by echo with severe LV dilation  Wall Motion: Severe global HK  Dominance: Co-dom  LM: normal  LAD: normal  LCx: normal  RCA: normal  Hemostasis:  RFA/V manual compression  Impression:  Normal cors  Elevated LVEDP with transmission of pressures to pulmonary circuit  Above c/w Severe NICM  RFA/V manual compression  Plan:  Cont med rx  Stop Plavix  Stop amlodipine  Start Hydrala/Imdur/Lasix  Goal net neg 1L/day  Cont BBl/ACEi  Repeat echo in 3 months for reassessment of LV fxn and need for ICD  Follow up with Dr. Yanez 1 week after discharge    Stress Test: L MPI 9/20/13  Nuclear Quantitative Functional Analysis:   LVEF: 31 %  Impression: NORMAL MYOCARDIAL PERFUSION  1. The perfusion scan is free of evidence for myocardial ischemia or injury.   2. There is a moderate intensity fixed defect in the inferior wall  of the left ventricle, secondary to diaphragm attenuation.   3. There is abnormal wall motion at rest showing moderate global hypokinesis of the left ventricle.   4. There is resting LV dysfunction with a reduced ejection fraction of 31 %.   5. The ventricular volumes are normal at rest and stress.   6. The extracardiac distribution of radioactivity is normal.     Assessment and Plan:     * Acute on chronic combined systolic and diastolic heart failure, NYHA class 2    Echo noted recurrent severe LV dysfxn  Cath with NICM and elevated filling pressures  Cont med rx  Change lasix to PO  Cont BBl/ACEi/hydrala/imdur  Add aldactone  Dispo planning appropriate  Repeat echo in 3 months (late Feb 2018) for reassessment of LV fxn and need for ICD  Follow up with Dr. Yanez 1 week after discharge          HTN (hypertension)    Cont med rx        Type 2 diabetes mellitus without complication, without long-term current use of insulin    Per IM        Elevated troponin    As above        Hyperlipidemia    Change simva to atorva 80            VTE Risk Mitigation         Ordered     Medium Risk of VTE  Once      11/27/17 0312     Place JONNY hose  Until discontinued      11/27/17 0312     Place sequential compression device  Until discontinued      11/27/17 0312          Oziel Yanez MD  Cardiology  [facility]

## 2017-11-28 NOTE — SUBJECTIVE & OBJECTIVE
Past Medical History:   Diagnosis Date    Blood clot associated with vein wall inflammation     CHF (congestive heart failure)     DM (diabetes mellitus) 9/19/2013    Hypertension     Prediabetes     Psoriasis     Sleep apnea        Past Surgical History:   Procedure Laterality Date    COLONOSCOPY      DILATION AND CURETTAGE OF UTERUS         Review of patient's allergies indicates:   Allergen Reactions    Pneumococcal 23-abimbola ps vaccine        No current facility-administered medications on file prior to encounter.      Current Outpatient Prescriptions on File Prior to Encounter   Medication Sig    albuterol 90 mcg/actuation inhaler Inhale 2 puffs into the lungs every 6 (six) hours as needed for Wheezing.    amLODIPine (NORVASC) 10 MG tablet Take 10 mg by mouth once daily.    aspirin 81 MG Chew Take 81 mg by mouth once daily.    atorvastatin (LIPITOR) 20 MG tablet Take 20 mg by mouth once daily.    carvedilol (COREG) 12.5 MG tablet Take 25 mg by mouth 2 (two) times daily with meals.     carvedilol (COREG) 25 MG tablet Take 25 mg by mouth 2 (two) times daily with meals.    dicyclomine (BENTYL) 10 MG capsule Take 10 mg by mouth.    esomeprazole (NEXIUM) 40 MG capsule Take 40 mg by mouth before breakfast.    furosemide (LASIX) 40 MG tablet Take 1 tablet (40 mg total) by mouth once daily.    gabapentin (NEURONTIN) 400 MG capsule Take 400 mg by mouth Daily.    glimepiride (AMARYL) 4 MG tablet Take 1 tablet (4 mg total) by mouth before breakfast. (Patient taking differently: Take 4 mg by mouth 2 (two) times daily. )    glimepiride (AMARYL) 4 MG tablet Take 4 mg by mouth.    lisinopril (PRINIVIL,ZESTRIL) 40 MG tablet Take 1 tablet (40 mg total) by mouth once daily.    meloxicam (MOBIC) 7.5 MG tablet Take 1 tablet (7.5 mg total) by mouth 2 (two) times daily as needed for Pain.    metformin (GLUCOPHAGE) 500 MG tablet Take 1 tablet (500 mg total) by mouth 2 (two) times daily with meals.    metoprolol  succinate (TOPROL-XL) 50 MG 24 hr tablet Take 1 tablet (50 mg total) by mouth once daily.    potassium chloride (KLOR-CON) 10 MEQ TbSR Take 10 mEq by mouth.    rivaroxaban (XARELTO) 10 mg Tab Take 10 mg by mouth.    simvastatin (ZOCOR) 40 MG tablet Take 1 tablet (40 mg total) by mouth every evening.     Family History     Problem Relation (Age of Onset)    Diabetes Father, Maternal Grandmother, Paternal Grandmother    Hypertension Mother, Father        Social History Main Topics    Smoking status: Passive Smoke Exposure - Never Smoker     Types: Cigarettes    Smokeless tobacco: Never Used      Comment: smokes cigars on occasion    Alcohol use Yes      Comment: occasional    Drug use:      Types: Marijuana      Comment: hx of marijuana use 3 years ago    Sexual activity: Not Currently     Partners: Male     Review of Systems   Gastrointestinal: Negative for melena.   Genitourinary: Negative for hematuria.     Objective:     Vital Signs (Most Recent):  Temp: 98 °F (36.7 °C) (11/28/17 0641)  Pulse: 81 (11/28/17 0641)  Resp: 18 (11/28/17 0641)  BP: 107/72 (11/28/17 0641)  SpO2: 95 % (11/28/17 0641) Vital Signs (24h Range):  Temp:  [97.8 °F (36.6 °C)-98.6 °F (37 °C)] 98 °F (36.7 °C)  Pulse:  [81-98] 81  Resp:  [17-19] 18  SpO2:  [91 %-96 %] 95 %  BP: (107-129)/(70-87) 107/72     Weight: 109.1 kg (240 lb 8.4 oz)  Body mass index is 37.67 kg/m².    SpO2: 95 %  O2 Device (Oxygen Therapy): room air      Intake/Output Summary (Last 24 hours) at 11/28/17 0722  Last data filed at 11/27/17 1000   Gross per 24 hour   Intake                0 ml   Output              500 ml   Net             -500 ml       Lines/Drains/Airways     Peripheral Intravenous Line                 Peripheral IV - Single Lumen 11/26/17 2250 Left Antecubital 1 day                Physical Exam   Constitutional: She is oriented to person, place, and time. She appears well-developed and well-nourished. No distress.   HENT:   Head: Normocephalic and  atraumatic.   Mouth/Throat: No oropharyngeal exudate.   Eyes: Conjunctivae and EOM are normal. Pupils are equal, round, and reactive to light. No scleral icterus.   Neck: Normal range of motion. Neck supple. No JVD present. No tracheal deviation present. No thyromegaly present.   Cardiovascular: Normal rate, regular rhythm, S1 normal and S2 normal.  Exam reveals no gallop and no friction rub.    No murmur heard.  R groin access site c/d/i   Pulmonary/Chest: Effort normal and breath sounds normal. No respiratory distress. She has no wheezes. She has no rales. She exhibits no tenderness.   Abdominal: Soft. Bowel sounds are normal. There is no tenderness.   Musculoskeletal: Normal range of motion. She exhibits no edema.   Neurological: She is alert and oriented to person, place, and time. No cranial nerve deficit.   Skin: Skin is warm and dry. She is not diaphoretic.   Psychiatric: She has a normal mood and affect. Her behavior is normal. Judgment normal.       Current Medications:   aspirin  81 mg Oral Daily    atorvastatin  80 mg Oral Nightly    famotidine  20 mg Oral BID    furosemide  20 mg Intravenous BID    gabapentin  400 mg Oral Daily    hydrALAZINE  25 mg Oral Q8H    insulin detemir  3 Units Subcutaneous Once    isosorbide mononitrate  30 mg Oral Daily    lisinopril  40 mg Oral Daily    metoprolol succinate  50 mg Oral Daily    senna-docusate 8.6-50 mg  1 tablet Oral BID    sodium chloride 0.9%  3 mL Intravenous Q8H        acetaminophen, acetaminophen, albuterol, atropine, dextrose 50%, dextrose 50%, glucagon (human recombinant), glucagon (human recombinant), hydrALAZINE, hydrocodone-acetaminophen 5-325mg, influenza, insulin aspart, metoprolol, ondansetron, ondansetron, sodium chloride 0.9%    Laboratory:  CBC:    Recent Labs  Lab 11/26/17  2205 11/27/17  0509 11/28/17  0508   WHITE BLOOD CELL COUNT 7.32 5.05 6.60   HEMOGLOBIN 13.6 13.5 12.9   HEMATOCRIT 43.3 42.9 41.0   PLATELETS 318 278 310        CHEMISTRIES:    Recent Labs  Lab 12/10/14  1031  07/09/15  1831  02/18/17  1943  11/21/17  1445 11/26/17 2205 11/27/17  0509   GLUCOSE  --   < > 523 HH  < > 637 HH  < > 293 H 251 H 304 H   SODIUM  --   < > 133 L  < > 133 L  < > 138 139 138   POTASSIUM  --   < > 4.5  < > 4.3  < > 4.1 3.9 3.5   BUN BLD  --   < > 20  < > 14  < > 11 13 12   CREATININE  --   < > 1.5 H  < > 1.5 H  < > 1.0 1.3 1.1   EGFR IF   --   < > 50 A  < > 50 A  < > >60.0 59 A >60   EGFR IF NON-  --   < > 44 A  < > 43 A  < > >60.0 51 A >60   CALCIUM  --   < > 9.2  < > 9.7  < > 9.5 9.3 9.0   MAGNESIUM 1.5 L  --  1.8  --  2.2  --   --   --   --    < > = values in this interval not displayed.    CARDIAC BIOMARKERS:    Recent Labs  Lab 02/18/17 1943 02/23/17 2012 11/26/17 2205 11/27/17  0509 11/27/17  0937   CPK 74  --  81  --   --   --   --    CPK MB 2.1  --   --   --   --   --   --    TROPONIN I <0.006  < > <0.006  < > 0.047 H 0.033 H 0.032 H   < > = values in this interval not displayed.    COAGS:    Recent Labs  Lab 02/18/17 1943 02/23/17 2012 06/08/17  2111   INR 0.9 0.9 0.9       LIPIDS/LFTS:    Recent Labs  Lab 12/10/14  0615  06/09/17  0524 09/17/17  0204 11/21/17  1445 11/26/17  2205   CHOLESTEROL 233 H  --  185  --  227 H  --    TRIGLYCERIDES 94  --  83  --  122  --    HDL 51  --  38 L  --  62  --    LDL CHOLESTEROL 163.2 H  --  130.4  --  140.6  --    NON-HDL CHOLESTEROL 182  --  147  --  165  --    AST  --   < > 9 L 13 14 11   ALT  --   < > 9 L 12 11 12   < > = values in this interval not displayed.    Lab Results   Component Value Date    TSH 1.938 11/27/2017     BNP    Recent Labs  Lab 11/26/17 2205   *     Lab Results   Component Value Date    HGBA1C 11.9 (H) 11/27/2017     UCg neg 11/26/17    Diagnostic Results:  ECG (personally reviewed tracings):   11/26/17 2159 SR 96, inflat ST abnl ?isch, similar to 9/17/17    Chest X-Ray (personally reviewed image(s)): 11/26/17 mild  CHF/CMeg    Echo: 11/27/17 (images pers rev, EF dropped from 50% 6/2017)    1 - Severely depressed left ventricular systolic function (EF 20-25%).     2 - Eccentric hypertrophy.     3 - Severe left ventricular enlargement.     4 - Impaired LV relaxation, elevated LAP (grade 2 diastolic dysfunction).     5 - Mild mitral regurgitation.     6 - Mild tricuspid regurgitation.     7 - The estimated PA systolic pressure is greater than 30 mmHg.     Cath 11/27/17 (images pers rev)  RA 5  RV 61/6  PA 60/29/42  PAWP 18  /41  Ao 163/107/130  CO 5.6 L/min  LVEF: 20% by echo with severe LV dilation  Wall Motion: Severe global HK  Dominance: Co-dom  LM: normal  LAD: normal  LCx: normal  RCA: normal  Hemostasis:  RFA/V manual compression  Impression:  Normal cors  Elevated LVEDP with transmission of pressures to pulmonary circuit  Above c/w Severe NICM  RFA/V manual compression  Plan:  Cont med rx  Stop Plavix  Stop amlodipine  Start Hydrala/Imdur/Lasix  Goal net neg 1L/day  Cont BBl/ACEi  Repeat echo in 3 months for reassessment of LV fxn and need for ICD  Follow up with Dr. Yanez 1 week after discharge    Stress Test: L MPI 9/20/13  Nuclear Quantitative Functional Analysis:   LVEF: 31 %  Impression: NORMAL MYOCARDIAL PERFUSION  1. The perfusion scan is free of evidence for myocardial ischemia or injury.   2. There is a moderate intensity fixed defect in the inferior wall of the left ventricle, secondary to diaphragm attenuation.   3. There is abnormal wall motion at rest showing moderate global hypokinesis of the left ventricle.   4. There is resting LV dysfunction with a reduced ejection fraction of 31 %.   5. The ventricular volumes are normal at rest and stress.   6. The extracardiac distribution of radioactivity is normal.

## 2017-11-28 NOTE — PLAN OF CARE
Problem: Fall Risk (Adult)  Goal: Identify Related Risk Factors and Signs and Symptoms  Related risk factors and signs and symptoms are identified upon initiation of Human Response Clinical Practice Guideline (CPG)    11/27/17 2632   Fall Risk   Related Risk Factors (Fall Risk) gait/mobility problems;environment unfamiliar

## 2017-11-28 NOTE — DISCHARGE SUMMARY
[facility] Medicine  Discharge Summary      Patient Name: Fabiana Chanel  MRN: [id number]  Admission Date: 11/26/2017  Hospital Length of Stay: 1 days  Discharge Date and Time: 11/28/2017  Attending Physician: Sophia Hsieh MD   Discharging Provider: Sophia Hsieh MD  Primary Care Provider: Miguel Burden MD      HPI:   Ms. Chanel is a 43 yo woman with CHF (echo 6/2017 EF 50%, no DD though previously 30% and +DD), h/o DVT on Xarelto, DM2 (A1c 11.8% not on insulin), HTN, psoriasis, obesity, and MILE who presented for shortness of breath. Work up in the ER was consistent with heart failure and she was started on Lasix. Please see H&P for full detail.    Procedure(s) (LRB):  Left heart cath R rad access, not before 10am (Left)      Hospital Course:   Echo 11/27/17 showed    1 - Severely depressed left ventricular systolic function (EF 20-25%). (Previously 50%)     2 - Eccentric hypertrophy.     3 - Severe left ventricular enlargement.     4 - Impaired LV relaxation, elevated LAP (grade 2 diastolic dysfunction).     5 - Mild mitral regurgitation.     6 - Mild tricuspid regurgitation.     7 - The estimated PA systolic pressure is greater than 30 mmHg.      C 11/27/17 with no CAD, elevated LVEDP c/w severe NICM.     Appreciate cards recs. She was started on diuresis and converted to PO Lasix. Cont BBl/ACEi/hydrala/imdur and added aldactone. Follow up with cardiology in 1 week. Will need echo in 3 months to evaluated for need for ICD.     Consults:   Consults         Status Ordering Provider     Inpatient consult to Cardiology  Once     Provider:  Valentino Magallanes MD    Completed JESENIA LEDESMA        Final Active Diagnoses:    Diagnosis Date Noted POA    PRINCIPAL PROBLEM:  Acute on chronic combined systolic and diastolic heart failure, NYHA class 2 [I50.43] 12/09/2014 Yes     Chronic    Acute on chronic combined systolic and diastolic congestive heart failure [I50.43]  11/27/2017 Yes    Hyperlipidemia [E78.5] 05/16/2014 Yes    Elevated troponin [R74.8] 05/14/2014 Yes    Paroxysmal atrial fibrillation [I48.0] 05/14/2014 Yes    Obesity with body mass index (BMI) of 30.0 to 39.9 [E66.9] 05/14/2014 Yes    Type 2 diabetes mellitus without complication, without long-term current use of insulin [E11.9] 09/20/2013 Yes    HTN (hypertension) [I10] 09/19/2013 Yes      Problems Resolved During this Admission:    Diagnosis Date Noted Date Resolved POA    ACS (acute coronary syndrome) [I24.9] 11/27/2017 11/27/2017 Yes       Discharged Condition: stable    Disposition: Home or Self Care    Follow Up:  Follow-up Information     Oziel Yanez MD On 12/5/2017.    Specialties:  Cardiology, INTERVENTIONAL CARDIOLOGY  Why:  Appointment scheduled for Tuesday December 5th at 9am  Contact information:  [address] [place] [id number]  [phone]             Miguel Burden MD On 12/5/2017.    Specialty:  Family Medicine  Why:  Tuesday, December 5, 2017 at 1:00 p.m. see Dr. Burden for your hospital followup visit.  Contact information:  [address]  [place] [id number] [phone]                 Patient Instructions:     Diet Diabetic 1800 Calories     Diet Cardiac     Diet Low Sodium, 2gm     Activity as tolerated     Call MD for:  increased confusion or weakness     Call MD for:  persistent dizziness, light-headedness, or visual disturbances     Call MD for:  worsening rash     Call MD for:  severe persistent headache     Call MD for:  difficulty breathing or increased cough     Call MD for:  redness, tenderness, or signs of infection (pain, swelling, redness, odor or green/yellow discharge around incision site)     Call MD for:  severe uncontrolled pain     Call MD for:  persistent nausea and vomiting or diarrhea     Call MD for:  temperature >100.4      Medications:  Reconciled Home Medications:   Current Discharge Medication List      START taking these medications     Details   hydrALAZINE (APRESOLINE) 50 MG tablet Take 1 tablet (50 mg total) by mouth 2 (two) times daily.  Qty: 60 tablet, Refills: 11      isosorbide mononitrate (IMDUR) 30 MG 24 hr tablet Take 1 tablet (30 mg total) by mouth once daily.  Qty: 30 tablet, Refills: 11      spironolactone (ALDACTONE) 25 MG tablet Take 1 tablet (25 mg total) by mouth once daily.  Qty: 30 tablet, Refills: 11         CONTINUE these medications which have CHANGED    Details   atorvastatin (LIPITOR) 80 MG tablet Take 1 tablet (80 mg total) by mouth nightly.  Qty: 90 tablet, Refills: 3      furosemide (LASIX) 40 MG tablet Take 1 tablet (40 mg total) by mouth once daily.  Qty: 30 tablet, Refills: 11      lisinopril (PRINIVIL,ZESTRIL) 40 MG tablet Take 1 tablet (40 mg total) by mouth once daily.  Qty: 90 tablet, Refills: 3      metoprolol succinate (TOPROL-XL) 50 MG 24 hr tablet Take 1 tablet (50 mg total) by mouth once daily.  Qty: 30 tablet, Refills: 11         CONTINUE these medications which have NOT CHANGED    Details   aspirin 81 MG Chew Take 81 mg by mouth once daily.      dicyclomine (BENTYL) 10 MG capsule Take 10 mg by mouth.      esomeprazole (NEXIUM) 40 MG capsule Take 40 mg by mouth before breakfast.      gabapentin (NEURONTIN) 400 MG capsule Take 400 mg by mouth Daily.      glimepiride (AMARYL) 4 MG tablet Take 1 tablet (4 mg total) by mouth before breakfast.  Qty: 30 tablet, Refills: 3      meloxicam (MOBIC) 7.5 MG tablet Take 1 tablet (7.5 mg total) by mouth 2 (two) times daily as needed for Pain.  Qty: 20 tablet, Refills: 0      metformin (GLUCOPHAGE) 500 MG tablet Take 1 tablet (500 mg total) by mouth 2 (two) times daily with meals.  Qty: 56 tablet, Refills: 0      rivaroxaban (XARELTO) 10 mg Tab Take 10 mg by mouth.         STOP taking these medications       albuterol 90 mcg/actuation inhaler Comments:   Reason for Stopping:         amLODIPine (NORVASC) 10 MG tablet Comments:   Reason for Stopping:         carvedilol  (COREG) 12.5 MG tablet Comments:   Reason for Stopping:         carvedilol (COREG) 25 MG tablet Comments:   Reason for Stopping:         potassium chloride (KLOR-CON) 10 MEQ TbSR Comments:   Reason for Stopping:         simvastatin (ZOCOR) 40 MG tablet Comments:   Reason for Stopping:                 Time spent on the discharge of patient: 35 minutes  Patient was seen and examined on the date of discharge and determined to be suitable for discharge.         Sophia Hsieh MD  Department of Hospital Medicine  [facility]

## 2017-11-29 ENCOUNTER — TELEPHONE (OUTPATIENT)
Dept: ADMINISTRATIVE | Facility: HOSPITAL | Age: 42
End: 2017-11-29

## 2017-11-30 ENCOUNTER — PATIENT OUTREACH (OUTPATIENT)
Dept: ADMINISTRATIVE | Facility: CLINIC | Age: 42
End: 2017-11-30

## 2017-12-05 ENCOUNTER — OFFICE VISIT (OUTPATIENT)
Dept: FAMILY MEDICINE | Facility: CLINIC | Age: 42
End: 2017-12-05
Payer: MEDICARE

## 2017-12-05 ENCOUNTER — OFFICE VISIT (OUTPATIENT)
Dept: CARDIOLOGY | Facility: CLINIC | Age: 42
End: 2017-12-05
Payer: MEDICARE

## 2017-12-05 VITALS
RESPIRATION RATE: 15 BRPM | BODY MASS INDEX: 37.3 KG/M2 | HEART RATE: 98 BPM | SYSTOLIC BLOOD PRESSURE: 117 MMHG | HEIGHT: 67 IN | OXYGEN SATURATION: 95 % | WEIGHT: 237.63 LBS | DIASTOLIC BLOOD PRESSURE: 76 MMHG

## 2017-12-05 VITALS
HEART RATE: 94 BPM | TEMPERATURE: 98 F | HEIGHT: 67 IN | BODY MASS INDEX: 37.54 KG/M2 | SYSTOLIC BLOOD PRESSURE: 128 MMHG | WEIGHT: 239.19 LBS | DIASTOLIC BLOOD PRESSURE: 88 MMHG | RESPIRATION RATE: 14 BRPM | OXYGEN SATURATION: 94 %

## 2017-12-05 DIAGNOSIS — Z23 NEEDS FLU SHOT: Primary | ICD-10-CM

## 2017-12-05 DIAGNOSIS — E78.2 MIXED HYPERLIPIDEMIA: ICD-10-CM

## 2017-12-05 DIAGNOSIS — E11.9 TYPE 2 DIABETES MELLITUS WITHOUT COMPLICATION, WITHOUT LONG-TERM CURRENT USE OF INSULIN: ICD-10-CM

## 2017-12-05 DIAGNOSIS — I10 ESSENTIAL HYPERTENSION: ICD-10-CM

## 2017-12-05 DIAGNOSIS — I50.42 CHRONIC COMBINED SYSTOLIC AND DIASTOLIC HEART FAILURE: Primary | ICD-10-CM

## 2017-12-05 DIAGNOSIS — I42.8 NON-ISCHEMIC CARDIOMYOPATHY: ICD-10-CM

## 2017-12-05 DIAGNOSIS — E66.9 OBESITY WITH BODY MASS INDEX (BMI) OF 30.0 TO 39.9: ICD-10-CM

## 2017-12-05 DIAGNOSIS — G47.33 OSA (OBSTRUCTIVE SLEEP APNEA): ICD-10-CM

## 2017-12-05 PROBLEM — I50.43 ACUTE ON CHRONIC COMBINED SYSTOLIC AND DIASTOLIC CONGESTIVE HEART FAILURE: Status: RESOLVED | Noted: 2017-11-27 | Resolved: 2017-12-05

## 2017-12-05 PROCEDURE — 99499 UNLISTED E&M SERVICE: CPT | Mod: S$PBB,,, | Performed by: FAMILY MEDICINE

## 2017-12-05 PROCEDURE — 90686 IIV4 VACC NO PRSV 0.5 ML IM: CPT | Mod: S$GLB,,, | Performed by: FAMILY MEDICINE

## 2017-12-05 PROCEDURE — 99499 UNLISTED E&M SERVICE: CPT | Mod: S$PBB,,, | Performed by: INTERNAL MEDICINE

## 2017-12-05 PROCEDURE — 99213 OFFICE O/P EST LOW 20 MIN: CPT | Mod: S$GLB,,, | Performed by: FAMILY MEDICINE

## 2017-12-05 PROCEDURE — G0008 ADMIN INFLUENZA VIRUS VAC: HCPCS | Mod: S$GLB,,, | Performed by: FAMILY MEDICINE

## 2017-12-05 PROCEDURE — 99214 OFFICE O/P EST MOD 30 MIN: CPT | Mod: S$GLB,,, | Performed by: INTERNAL MEDICINE

## 2017-12-05 PROCEDURE — 99999 PR PBB SHADOW E&M-EST. PATIENT-LVL III: CPT | Mod: PBBFAC,,, | Performed by: INTERNAL MEDICINE

## 2017-12-05 PROCEDURE — 99999 PR PBB SHADOW E&M-EST. PATIENT-LVL IV: CPT | Mod: PBBFAC,,, | Performed by: FAMILY MEDICINE

## 2017-12-05 RX ORDER — INSULIN GLARGINE 100 [IU]/ML
10 INJECTION, SOLUTION SUBCUTANEOUS NIGHTLY
Qty: 15 ML | Refills: 2 | Status: SHIPPED | OUTPATIENT
Start: 2017-12-05 | End: 2018-04-25

## 2017-12-05 RX ORDER — FUROSEMIDE 20 MG/1
20 TABLET ORAL DAILY
Qty: 90 TABLET | Refills: 3 | Status: ON HOLD | OUTPATIENT
Start: 2017-12-05 | End: 2018-12-30 | Stop reason: HOSPADM

## 2017-12-05 NOTE — PROGRESS NOTES
Chief Complaint   Patient presents with    Hospital Follow Up       HPI  Fabiana Chanel is a 42 y.o. female with multiple medical diagnoses as listed in the medical history and problem list that presents for hospital follow up.      Admission - presented for CP and SOB, dx with NICM, CHF, diuresed during admission, also s/p LHC no CAD, followed by Cardiology today. Decreased appetite mildly improving.     DM2 - uncontrolled at this time, diet - high fruit, potato, bread intake. Home BG - , night BG 300s.     Pt is known to me and was last seen by me on 11/21/2017.    PAST MEDICAL HISTORY:  Past Medical History:   Diagnosis Date    Blood clot associated with vein wall inflammation     Cardiomyopathy     Normal cors on cath 11/2017    CHF (congestive heart failure)     DM (diabetes mellitus) 9/19/2013    Hyperlipidemia     Hypertension     Prediabetes     Psoriasis     Sleep apnea        PAST SURGICAL HISTORY:  Past Surgical History:   Procedure Laterality Date    CARDIAC CATHETERIZATION      COLONOSCOPY      DILATION AND CURETTAGE OF UTERUS         SOCIAL HISTORY:  Social History     Social History    Marital status:      Spouse name: N/A    Number of children: N/A    Years of education: N/A     Occupational History    Not on file.     Social History Main Topics    Smoking status: Passive Smoke Exposure - Never Smoker     Types: Cigarettes    Smokeless tobacco: Never Used      Comment: smokes cigars on occasion    Alcohol use Yes      Comment: occasional    Drug use:      Types: Marijuana      Comment: hx of marijuana use 3 years ago    Sexual activity: Not Currently     Partners: Male     Other Topics Concern    Not on file     Social History Narrative    No narrative on file       FAMILY HISTORY:  Family History   Problem Relation Age of Onset    Hypertension Mother     Hypertension Father     Diabetes Father     Diabetes Maternal Grandmother     Diabetes Paternal  "Grandmother     Breast cancer Neg Hx     Colon cancer Neg Hx     Ovarian cancer Neg Hx        ALLERGIES AND MEDICATIONS: updated and reviewed.  Review of patient's allergies indicates:   Allergen Reactions    Pneumococcal 23-abimbola ps vaccine      Current Outpatient Prescriptions   Medication Sig Dispense Refill    aspirin 81 MG Chew Take 81 mg by mouth once daily.      atorvastatin (LIPITOR) 80 MG tablet Take 1 tablet (80 mg total) by mouth nightly. 90 tablet 3    dicyclomine (BENTYL) 10 MG capsule Take 10 mg by mouth.      esomeprazole (NEXIUM) 40 MG capsule Take 40 mg by mouth before breakfast.      furosemide (LASIX) 20 MG tablet Take 1 tablet (20 mg total) by mouth once daily. 90 tablet 3    gabapentin (NEURONTIN) 400 MG capsule Take 400 mg by mouth Daily.      glimepiride (AMARYL) 4 MG tablet Take 1 tablet (4 mg total) by mouth before breakfast. (Patient taking differently: Take 4 mg by mouth 2 (two) times daily. ) 30 tablet 3    hydrALAZINE (APRESOLINE) 50 MG tablet Take 1 tablet (50 mg total) by mouth 2 (two) times daily. 60 tablet 11    isosorbide mononitrate (IMDUR) 30 MG 24 hr tablet Take 1 tablet (30 mg total) by mouth once daily. 30 tablet 11    lisinopril (PRINIVIL,ZESTRIL) 40 MG tablet Take 1 tablet (40 mg total) by mouth once daily. 90 tablet 3    meloxicam (MOBIC) 7.5 MG tablet Take 1 tablet (7.5 mg total) by mouth 2 (two) times daily as needed for Pain. 20 tablet 0    metformin (GLUCOPHAGE) 500 MG tablet Take 1 tablet (500 mg total) by mouth 2 (two) times daily with meals. 56 tablet 0    metoprolol succinate (TOPROL-XL) 50 MG 24 hr tablet Take 1 tablet (50 mg total) by mouth once daily. 30 tablet 11    spironolactone (ALDACTONE) 25 MG tablet Take 1 tablet (25 mg total) by mouth once daily. 30 tablet 11    insulin glargine (LANTUS SOLOSTAR) 100 unit/mL (3 mL) InPn pen Inject 10 Units into the skin every evening. 15 mL 2    pen needle, diabetic 31 gauge x 1/4" Ndle 1 each by " "Misc.(Non-Drug; Combo Route) route 2 (two) times daily. 100 each 0     No current facility-administered medications for this visit.        ROS  Review of Systems   Constitutional: Negative for activity change, appetite change and fever.   HENT: Negative for congestion and sore throat.    Eyes: Negative for visual disturbance.   Respiratory: Positive for chest tightness and shortness of breath. Negative for cough.    Cardiovascular: Negative for chest pain.   Gastrointestinal: Negative for abdominal pain, diarrhea, nausea and vomiting.   Endocrine: Negative.    Genitourinary: Negative for dysuria.   Musculoskeletal: Negative for arthralgias and back pain.   Skin: Negative for rash.   Allergic/Immunologic: Negative.    Neurological: Negative for dizziness, weakness and headaches.   Hematological: Negative.    Psychiatric/Behavioral: Negative for agitation and confusion.       Physical Exam  Vitals:    12/05/17 1321   BP: 128/88   Pulse: 94   Resp: 14   Temp: 98.3 °F (36.8 °C)    Body mass index is 37.46 kg/m².  Weight: 108.5 kg (239 lb 3.2 oz)   Height: 5' 7" (170.2 cm)     Physical Exam   Constitutional: She is oriented to person, place, and time. She appears well-developed and well-nourished.   HENT:   Head: Normocephalic and atraumatic.   Eyes: Conjunctivae and EOM are normal. Pupils are equal, round, and reactive to light.   Neck: Normal range of motion. Neck supple.   Cardiovascular: Normal rate, regular rhythm and normal heart sounds.    Pulmonary/Chest: Effort normal and breath sounds normal.   Abdominal: Soft. Bowel sounds are normal.   Musculoskeletal: Normal range of motion.   Neurological: She is alert and oriented to person, place, and time. She has normal reflexes.   Skin: Skin is warm and dry.   Psychiatric: She has a normal mood and affect. Her behavior is normal. Judgment and thought content normal.       Health Maintenance       Date Due Completion Date    Foot Exam 07/03/1986 ---    Eye Exam " 07/03/1986 ---    TETANUS VACCINE 09/25/2016 9/25/2006    Influenza Vaccine 08/01/2017 ---    Pneumococcal PPSV23 (Medium Risk) (1) 01/25/2018 (Originally 7/3/1994) ---    Pap Smear 01/25/2018 (Originally 1/17/2014) 1/17/2013    Hemoglobin A1c 05/27/2018 11/27/2017    Lipid Panel 11/28/2018 11/28/2017    Mammogram 03/30/2019 3/30/2017          Assessment & Plan    Needs flu shot  -     Influenza - Quadrivalent (3 years & older) (PF)    Essential hypertension, Non-ischemic cardiomyopathy  - Continue current medication regimen as prescribed  - Cont follow up with Cardiology  - Monitor BP closely    Type 2 diabetes mellitus without complication, without long-term current use of insulin  -     insulin glargine (LANTUS SOLOSTAR) 100 unit/mL (3 mL) InPn pen; Inject 10 Units into the skin every evening.  Dispense: 15 mL; Refill: 2  -     Ambulatory consult to Diabetic Education  -     Ambulatory referral to Podiatry  - Monitor BG closely.    MILE (obstructive sleep apnea)  - Continue current medication regimen as prescribed              Return in about 4 weeks (around 1/2/2018), or if symptoms worsen or fail to improve.

## 2017-12-05 NOTE — PROGRESS NOTES
CARDIOVASCULAR PROGRESS NOTE    REASON FOR CONSULT:   Fabiana Chanel is a 42 y.o. female who presents for follow up of NIC.    PCP: Bertram  HISTORY OF PRESENT ILLNESS:   The patient returns for follow-up of her recent hospitalization.  She's had no further shortness of breath, and denies any chest pain.  There's been no palpitations, lightheadedness, dizziness, or syncope.  She denies PND, orthopnea, or lower extremity edema.  There's been no melena, hematuria, or claudicant symptoms.  She does describe intermittent discomfort at the right groin without claudicant symptoms.  On examination today, there was no hematoma.    I discussed the importance of medication compliance, and the patient tells me that she had been taking her blood pressure medications prior to her most recent admission.  We also discussed lifestyle modification, weight loss, diet, and exercise.  I've given her the bariatric surgery information session flyer and encouraged her to follow-up with the information session.    CARDIOVASCULAR HISTORY:   NICM (cath 11/2017) EF 25%    PAST MEDICAL HISTORY:     Past Medical History:   Diagnosis Date    Blood clot associated with vein wall inflammation     Cardiomyopathy     Normal cors on cath 11/2017    CHF (congestive heart failure)     DM (diabetes mellitus) 9/19/2013    Hyperlipidemia     Hypertension     Prediabetes     Psoriasis     Sleep apnea        PAST SURGICAL HISTORY:     Past Surgical History:   Procedure Laterality Date    CARDIAC CATHETERIZATION      COLONOSCOPY      DILATION AND CURETTAGE OF UTERUS         ALLERGIES AND MEDICATION:     Review of patient's allergies indicates:   Allergen Reactions    Pneumococcal 23-abimbola ps vaccine      Previous Medications    ASPIRIN 81 MG CHEW    Take 81 mg by mouth once daily.    ATORVASTATIN (LIPITOR) 80 MG TABLET    Take 1 tablet (80 mg total) by mouth nightly.    DICYCLOMINE (BENTYL) 10 MG CAPSULE    Take 10 mg by mouth.     ESOMEPRAZOLE (NEXIUM) 40 MG CAPSULE    Take 40 mg by mouth before breakfast.    FUROSEMIDE (LASIX) 40 MG TABLET    Take 1 tablet (40 mg total) by mouth once daily.    GABAPENTIN (NEURONTIN) 400 MG CAPSULE    Take 400 mg by mouth Daily.    GLIMEPIRIDE (AMARYL) 4 MG TABLET    Take 1 tablet (4 mg total) by mouth before breakfast.    HYDRALAZINE (APRESOLINE) 50 MG TABLET    Take 1 tablet (50 mg total) by mouth 2 (two) times daily.    ISOSORBIDE MONONITRATE (IMDUR) 30 MG 24 HR TABLET    Take 1 tablet (30 mg total) by mouth once daily.    LISINOPRIL (PRINIVIL,ZESTRIL) 40 MG TABLET    Take 1 tablet (40 mg total) by mouth once daily.    MELOXICAM (MOBIC) 7.5 MG TABLET    Take 1 tablet (7.5 mg total) by mouth 2 (two) times daily as needed for Pain.    METFORMIN (GLUCOPHAGE) 500 MG TABLET    Take 1 tablet (500 mg total) by mouth 2 (two) times daily with meals.    METOPROLOL SUCCINATE (TOPROL-XL) 50 MG 24 HR TABLET    Take 1 tablet (50 mg total) by mouth once daily.    SPIRONOLACTONE (ALDACTONE) 25 MG TABLET    Take 1 tablet (25 mg total) by mouth once daily.       SOCIAL HISTORY:     Social History     Social History    Marital status:      Spouse name: N/A    Number of children: N/A    Years of education: N/A     Occupational History    Not on file.     Social History Main Topics    Smoking status: Passive Smoke Exposure - Never Smoker     Types: Cigarettes    Smokeless tobacco: Never Used      Comment: smokes cigars on occasion    Alcohol use Yes      Comment: occasional    Drug use:      Types: Marijuana      Comment: hx of marijuana use 3 years ago    Sexual activity: Not Currently     Partners: Male     Other Topics Concern    Not on file     Social History Narrative    No narrative on file       FAMILY HISTORY:     Family History   Problem Relation Age of Onset    Hypertension Mother     Hypertension Father     Diabetes Father     Diabetes Maternal Grandmother     Diabetes Paternal Grandmother   "   Breast cancer Neg Hx     Colon cancer Neg Hx     Ovarian cancer Neg Hx        REVIEW OF SYSTEMS:   Review of Systems   Constitutional: Negative for chills, diaphoresis and fever.   HENT: Negative for nosebleeds.    Eyes: Negative for blurred vision, double vision and photophobia.   Respiratory: Negative for hemoptysis, shortness of breath and wheezing.    Cardiovascular: Negative for chest pain, palpitations, orthopnea, claudication, leg swelling and PND.   Gastrointestinal: Negative for abdominal pain, blood in stool, heartburn, melena, nausea and vomiting.   Genitourinary: Negative for flank pain and hematuria.   Musculoskeletal: Negative for falls, myalgias and neck pain.   Skin: Negative for rash.   Neurological: Negative for dizziness, seizures, loss of consciousness, weakness and headaches.   Endo/Heme/Allergies: Negative for polydipsia. Does not bruise/bleed easily.   Psychiatric/Behavioral: Negative for depression and memory loss. The patient is not nervous/anxious.        PHYSICAL EXAM:     Vitals:    12/05/17 0944   BP: 117/76   Pulse: 98   Resp: 15    Body mass index is 37.22 kg/m².  Weight: 107.8 kg (237 lb 10.5 oz)   Height: 5' 7" (170.2 cm)     Physical Exam   Constitutional: She is oriented to person, place, and time. She appears well-developed and well-nourished. She is cooperative.  Non-toxic appearance. No distress.   HENT:   Head: Normocephalic and atraumatic.   Eyes: Conjunctivae and EOM are normal. Pupils are equal, round, and reactive to light. No scleral icterus.   Neck: Trachea normal. Neck supple. Normal carotid pulses and no JVD present. Carotid bruit is not present. No neck rigidity. No edema present. No thyromegaly present.   Cardiovascular: Normal rate, regular rhythm, S1 normal and S2 normal.  PMI is not displaced.  Exam reveals no gallop and no friction rub.    No murmur heard.  Pulses:       Carotid pulses are 2+ on the right side, and 2+ on the left side.  R groin c/d/i, no " hematoma.   Pulmonary/Chest: Effort normal and breath sounds normal. No respiratory distress. She has no wheezes. She has no rales. She exhibits no tenderness.   Abdominal: Soft. Bowel sounds are normal. She exhibits no distension and no mass. There is no hepatosplenomegaly. There is no tenderness.   obese   Musculoskeletal: She exhibits no edema or tenderness.   Feet:   Right Foot:   Skin Integrity: Negative for ulcer.   Left Foot:   Skin Integrity: Negative for ulcer.   Neurological: She is alert and oriented to person, place, and time. No cranial nerve deficit.   Skin: Skin is warm and dry. No rash noted. No erythema.   Psychiatric: She has a normal mood and affect. Her speech is normal and behavior is normal.   Vitals reviewed.      DATA:   EKG: (personally reviewed tracing)  11/26/17 SR 96, inflat ST abnl ?isch, similar to 9/17/17    Laboratory:  CBC:    Recent Labs  Lab 11/26/17 2205 11/27/17  0509 11/28/17  0508   WHITE BLOOD CELL COUNT 7.32 5.05 6.60   HEMOGLOBIN 13.6 13.5 12.9   HEMATOCRIT 43.3 42.9 41.0   PLATELETS 318 278 310       CHEMISTRIES:    Recent Labs  Lab 07/09/15  1831 02/18/17 1943 11/26/17 2205 11/27/17  0509 11/28/17  0508   GLUCOSE 523 HH  < > 637 HH  < > 251 H 304 H 319 H   SODIUM 133 L  < > 133 L  < > 139 138 138   POTASSIUM 4.5  < > 4.3  < > 3.9 3.5 3.5   BUN BLD 20  < > 14  < > 13 12 11   CREATININE 1.5 H  < > 1.5 H  < > 1.3 1.1 1.1   EGFR IF  50 A  < > 50 A  < > 59 A >60 >60   EGFR IF NON- 44 A  < > 43 A  < > 51 A >60 >60   CALCIUM 9.2  < > 9.7  < > 9.3 9.0 9.0   MAGNESIUM 1.8  --  2.2  --   --   --  1.7   < > = values in this interval not displayed.    CARDIAC BIOMARKERS:    Recent Labs  Lab 02/18/17  1943  02/23/17 2012 11/26/17 2205 11/27/17  0509 11/27/17  0937   CPK 74  --  81  --   --   --   --    CPK MB 2.1  --   --   --   --   --   --    TROPONIN I <0.006  < > <0.006  < > 0.047 H 0.033 H 0.032 H   < > = values in this interval not  displayed.    COAGS:    Recent Labs  Lab 02/18/17  1943 02/23/17 2012 06/08/17  2111   INR 0.9 0.9 0.9       LIPIDS/LFTS:    Recent Labs  Lab 06/09/17  0524 09/17/17  0204 11/21/17  1445 11/26/17  2205 11/28/17  0508   CHOLESTEROL 185  --  227 H  --  175   TRIGLYCERIDES 83  --  122  --  76   HDL 38 L  --  62  --  46   LDL CHOLESTEROL 130.4  --  140.6  --  113.8   NON-HDL CHOLESTEROL 147  --  165  --  129   AST 9 L 13 14 11  --    ALT 9 L 12 11 12  --        Cardiovascular Testing:  Echo: 11/27/17 (EF dropped from 50% 6/2017)    1 - Severely depressed left ventricular systolic function (EF 20-25%).     2 - Eccentric hypertrophy.     3 - Severe left ventricular enlargement.     4 - Impaired LV relaxation, elevated LAP (grade 2 diastolic dysfunction).     5 - Mild mitral regurgitation.     6 - Mild tricuspid regurgitation.     7 - The estimated PA systolic pressure is greater than 30 mmHg.      Cath 11/27/17  RA 5  RV 61/6  PA 60/29/42  PAWP 18  /41  Ao 163/107/130  CO 5.6 L/min  LVEF: 20% by echo with severe LV dilation  Wall Motion: Severe global HK  Dominance: Co-dom  LM: normal  LAD: normal  LCx: normal  RCA: normal  Hemostasis:  RFA/V manual compression  Impression:  Normal cors  Elevated LVEDP with transmission of pressures to pulmonary circuit  Above c/w Severe NICM  RFA/V manual compression  Plan:  Cont med rx  Stop Plavix  Stop amlodipine  Start Hydrala/Imdur/Lasix  Goal net neg 1L/day  Cont BBl/ACEi  Repeat echo in 3 months for reassessment of LV fxn and need for ICD  Follow up with Dr. Yanez 1 week after discharge     Stress Test: L MPI 9/20/13  Nuclear Quantitative Functional Analysis:   LVEF: 31 %  Impression: NORMAL MYOCARDIAL PERFUSION  1. The perfusion scan is free of evidence for myocardial ischemia or injury.   2. There is a moderate intensity fixed defect in the inferior wall of the left ventricle, secondary to diaphragm attenuation.   3. There is abnormal wall motion at rest showing  moderate global hypokinesis of the left ventricle.   4. There is resting LV dysfunction with a reduced ejection fraction of 31 %.   5. The ventricular volumes are normal at rest and stress.   6. The extracardiac distribution of radioactivity is normal.     ASSESSMENT:   # NICM, EF 20-25% by echo/cath 11/2017.  Pt appears euvolemic.  # HTN, controlled  # HLP, on atorva 80mg  # DM, uncontrolled, following up with PCP today  # BMI 37    PLAN:   Cont med rx  Dec lasix to 20mg qd  Diet/exercise/weight loss, Na+ avoidance, bariatric surg info session flyer given to pt  RTC 1 month  Repeat echo in 3 months (late Feb 2018) for reassessment of LV fxn and need for ICD  Check lipids/LFT 3 months (late Feb 2018)    Oziel Yanez MD, FACC

## 2017-12-06 DIAGNOSIS — E11.9 TYPE 2 DIABETES MELLITUS WITHOUT COMPLICATION, WITHOUT LONG-TERM CURRENT USE OF INSULIN: ICD-10-CM

## 2017-12-06 RX ORDER — PEN NEEDLE, DIABETIC 29 G X1/2"
1 NEEDLE, DISPOSABLE MISCELLANEOUS 2 TIMES DAILY
Qty: 100 EACH | Refills: 0 | Status: SHIPPED | OUTPATIENT
Start: 2017-12-06 | End: 2018-03-19 | Stop reason: SDUPTHER

## 2017-12-06 NOTE — TELEPHONE ENCOUNTER
----- Message from Melly Aguilar sent at 12/6/2017 10:57 AM CST -----  Contact: Leslie Viera requesting medical forms to be faxed to [phone]. Pt needs all diabetic supplies.

## 2017-12-06 NOTE — TELEPHONE ENCOUNTER
----- Message from Julian Zhu sent at 12/6/2017 12:27 PM CST -----  Contact: [phone]  Called in all scripts but not script for needles. Pt states [facility] trying to reach office for request approval. Please call Pt when script called or sent in

## 2017-12-08 ENCOUNTER — TELEPHONE (OUTPATIENT)
Dept: ADMINISTRATIVE | Facility: HOSPITAL | Age: 42
End: 2017-12-08

## 2017-12-08 NOTE — TELEPHONE ENCOUNTER
Patient has been scheduled with Johanny Henley, Diabetic Education 12/18 @ 1:00pm    Also, scheduled with Dr Cordero, Podiatry, 12/28 @ 4:15pm

## 2017-12-28 ENCOUNTER — HOSPITAL ENCOUNTER (EMERGENCY)
Facility: HOSPITAL | Age: 42
Discharge: HOME | End: 2017-12-28
Attending: EMERGENCY MEDICINE
Payer: MEDICARE

## 2017-12-28 VITALS
BODY MASS INDEX: 36.73 KG/M2 | HEART RATE: 85 BPM | SYSTOLIC BLOOD PRESSURE: 117 MMHG | HEIGHT: 67 IN | DIASTOLIC BLOOD PRESSURE: 76 MMHG | OXYGEN SATURATION: 96 % | TEMPERATURE: 98 F | WEIGHT: 234 LBS | RESPIRATION RATE: 18 BRPM

## 2017-12-28 DIAGNOSIS — J20.9 ACUTE BRONCHITIS, UNSPECIFIED ORGANISM: Primary | ICD-10-CM

## 2017-12-28 DIAGNOSIS — R06.02 SHORTNESS OF BREATH: ICD-10-CM

## 2017-12-28 LAB
B-HCG UR QL: NEGATIVE
CTP QC/QA: YES
FLUAV AG SPEC QL IA: NEGATIVE
FLUBV AG SPEC QL IA: NEGATIVE
POCT GLUCOSE: 182 MG/DL (ref 70–110)
SPECIMEN SOURCE: NORMAL

## 2017-12-28 PROCEDURE — 93005 ELECTROCARDIOGRAM TRACING: CPT

## 2017-12-28 PROCEDURE — 81025 URINE PREGNANCY TEST: CPT | Performed by: PHYSICIAN ASSISTANT

## 2017-12-28 PROCEDURE — 63600175 PHARM REV CODE 636 W HCPCS: Performed by: NURSE PRACTITIONER

## 2017-12-28 PROCEDURE — 96372 THER/PROPH/DIAG INJ SC/IM: CPT

## 2017-12-28 PROCEDURE — 87400 INFLUENZA A/B EACH AG IA: CPT | Mod: 59

## 2017-12-28 PROCEDURE — 99284 EMERGENCY DEPT VISIT MOD MDM: CPT | Mod: 25

## 2017-12-28 PROCEDURE — 82962 GLUCOSE BLOOD TEST: CPT

## 2017-12-28 PROCEDURE — 93010 ELECTROCARDIOGRAM REPORT: CPT | Mod: ,,, | Performed by: INTERNAL MEDICINE

## 2017-12-28 RX ORDER — CODEINE PHOSPHATE AND GUAIFENESIN 10; 100 MG/5ML; MG/5ML
5 SOLUTION ORAL 3 TIMES DAILY PRN
Qty: 118 ML | Refills: 0 | Status: SHIPPED | OUTPATIENT
Start: 2017-12-28 | End: 2018-01-07

## 2017-12-28 RX ORDER — AZITHROMYCIN 250 MG/1
TABLET, FILM COATED ORAL
Qty: 6 TABLET | Refills: 0 | Status: SHIPPED | OUTPATIENT
Start: 2017-12-28 | End: 2018-01-02

## 2017-12-28 RX ORDER — KETOROLAC TROMETHAMINE 30 MG/ML
30 INJECTION, SOLUTION INTRAMUSCULAR; INTRAVENOUS
Status: COMPLETED | OUTPATIENT
Start: 2017-12-28 | End: 2017-12-28

## 2017-12-28 RX ADMIN — KETOROLAC TROMETHAMINE 30 MG: 30 INJECTION, SOLUTION INTRAMUSCULAR at 04:12

## 2017-12-28 NOTE — DISCHARGE INSTRUCTIONS
Please return to the ED for any new or worsening symptoms: chest pain, shortness of breath, loss of consciousness or any other concerns. Please follow up with primary care within in the week. You may also call [phone] for the [facility] same day appointment line.

## 2017-12-28 NOTE — ED TRIAGE NOTES
Pt presents to Ed with c/o productive cough, runny nose that started on 12/25 and progressed generalized body aches that started today. Denies chest pain or SOB. Reports nauseas denies V/D.

## 2017-12-28 NOTE — ED PROVIDER NOTES
"Encounter Date: 12/28/2017    SCRIBE #1 NOTE: I, Hamzah Arora , am scribing for, and in the presence of,  Tess Ruelas NP . I have scribed the following portions of the note - Other sections scribed: HPI/ROS .       History     Chief Complaint   Patient presents with    Generalized Body Aches     body aches, cough - white, cold, congestion x 3 days.  denies n/v/d or fever.     CC: Generalized Body Aches     HPI: This 42 y.o. female with with a pertinent medical hx of HTN, CHF, IDDM, cardiomyopathy, and blood clot associated with vein wall inflammation presents to the ED c/o a 3-day hx of acute-onset rhinorrhea, sore throat, persistent cough, mild abdominal pain, nausea, vomiting "mucus," and generalized body aches that have not resolved since onset. Pt notes that her sore throat has improved, but her generalized body aches (worst to her L shoulder) became especially worse today, prompting her arrival to the ED. Pt has been attempting tx with Nyquil and drinking green tea with no reduction of symptoms. She did not take any Tylenol or Motrin today. Pt notes that she did get the flu shot this season. She otherwise denies fever, chills, chest pain, shortness of breath, headache, and any other associated symptoms.       The history is provided by the patient. No  was used.     Review of patient's allergies indicates:   Allergen Reactions    Pneumococcal 23-abimbola ps vaccine      Past Medical History:   Diagnosis Date    Blood clot associated with vein wall inflammation     Cardiomyopathy     Normal cors on cath 11/2017    CHF (congestive heart failure)     DM (diabetes mellitus) 9/19/2013    Hyperlipidemia     Hypertension     Prediabetes     Psoriasis     Sleep apnea      Past Surgical History:   Procedure Laterality Date    CARDIAC CATHETERIZATION      COLONOSCOPY      DILATION AND CURETTAGE OF UTERUS       Family History   Problem Relation Age of Onset    Hypertension Mother " "    Hypertension Father     Diabetes Father     Diabetes Maternal Grandmother     Diabetes Paternal Grandmother     Breast cancer Neg Hx     Colon cancer Neg Hx     Ovarian cancer Neg Hx      Social History   Substance Use Topics    Smoking status: Passive Smoke Exposure - Never Smoker     Types: Cigarettes    Smokeless tobacco: Never Used      Comment: smokes cigars on occasion    Alcohol use Yes      Comment: occasional     Review of Systems   Constitutional: Negative for chills and fever.   HENT: Positive for rhinorrhea and sore throat. Negative for congestion, ear pain, sinus pain and sinus pressure.    Eyes: Negative for visual disturbance.   Respiratory: Positive for cough. Negative for shortness of breath.    Cardiovascular: Negative for chest pain.   Gastrointestinal: Positive for abdominal pain, nausea and vomiting (vomiting "mucus"). Negative for constipation and diarrhea.   Genitourinary: Negative for dysuria, flank pain, hematuria and urgency.   Musculoskeletal: Positive for myalgias (generalized body aches). Negative for back pain.   Skin: Negative for rash.   Neurological: Negative for dizziness, syncope, weakness, numbness and headaches.       Physical Exam     Initial Vitals [12/28/17 1451]   BP Pulse Resp Temp SpO2   126/89 88 18 97.9 °F (36.6 °C) 96 %      MAP       101.33         Physical Exam    Constitutional: Vital signs are normal. She appears well-developed and well-nourished.  Non-toxic appearance. She appears ill.   HENT:   Head: Normocephalic and atraumatic.   Right Ear: Tympanic membrane normal.   Left Ear: Tympanic membrane normal.   Nose: Mucosal edema and rhinorrhea present.   Mouth/Throat: Uvula is midline and mucous membranes are normal. No trismus in the jaw. Posterior oropharyngeal erythema present. No oropharyngeal exudate or tonsillar abscesses.   Eyes: EOM are normal.   Neck: Full passive range of motion without pain. Neck supple. No neck rigidity.   Cardiovascular: " Normal rate, S1 normal, S2 normal and normal heart sounds. Exam reveals no gallop.    No murmur heard.  Pulmonary/Chest: Effort normal. No tachypnea. She has decreased breath sounds. She has no wheezes. She has no rhonchi. She has no rales.   Abdominal: Soft. Normal appearance. There is no tenderness. There is no CVA tenderness, no tenderness at McBurney's point and negative Dunbar's sign.   Lymphadenopathy:     She has cervical adenopathy.        Right cervical: Superficial cervical adenopathy present.        Left cervical: Superficial cervical adenopathy present.   Neurological: She is alert and oriented to person, place, and time. She has normal strength. Gait normal. GCS eye subscore is 4. GCS verbal subscore is 5. GCS motor subscore is 6.   Skin: Skin is warm and dry. No rash noted.         ED Course   Procedures  Labs Reviewed   POCT GLUCOSE - Abnormal; Notable for the following:        Result Value    POCT Glucose 182 (*)     All other components within normal limits   INFLUENZA A AND B ANTIGEN   POCT URINE PREGNANCY   POCT GLUCOSE MONITORING CONTINUOUS     EKG Readings: (Independently Interpreted)   Initial Reading: No STEMI. Rhythm: Normal Sinus Rhythm. Heart Rate: 85. Ectopy: No Ectopy. Conduction: Normal. ST Segments: Normal ST Segments. T Waves: Normal. Clinical Impression: Normal Sinus Rhythm       X-Rays:   Independently Interpreted Readings:   Chest X-Ray: No consolidation or edema     Medical Decision Making:   ED Management:  This is a 42-year-old nonpregnant female with CHF who presents to the ED with complaints of cough, body aches, fever and congestion.  She is afebrile and nontoxic appearing.  She does appear ill.  On exam, breath sounds are mildly decreased.  Sinus congestion, rhinorrhea and lymphadenopathy noted.  Twelve-lead ECG shows normal sinus rhythm.  Chest x-ray negative.  Flu negative.  Toradol given for pain.  No evidence to suggest PE, ACS or serious illness.  Differential diagnoses  include viral bronchitis versus pneumonia.  However, I will cover for possible early pneumonia given her comorbidities.  Discharged home with prescriptions for azithromycin and Cheratussin.  Instructions given for supportive care and follow-up.  Return precautions given.  Case discussed with Dr. Montez, who agrees with plan.            Scribe Attestation:   Scribe #1: I performed the above scribed service and the documentation accurately describes the services I performed. I attest to the accuracy of the note.    Attending Attestation:           Physician Attestation for Scribe:  Physician Attestation Statement for Scribe #1: I, Tess Ruelas NP , reviewed documentation, as scribed by Hamzah Arora  in my presence, and it is both accurate and complete.                 ED Course      Clinical Impression:   The primary encounter diagnosis was Acute bronchitis, unspecified organism. A diagnosis of Shortness of breath was also pertinent to this visit.    Disposition:   Disposition: Discharged  Condition: Stable                        Tess Ruelas NP  12/28/17 4716

## 2018-01-11 ENCOUNTER — HOSPITAL ENCOUNTER (EMERGENCY)
Facility: HOSPITAL | Age: 43
Discharge: HOME | End: 2018-01-11
Attending: EMERGENCY MEDICINE
Payer: MEDICARE

## 2018-01-11 VITALS
WEIGHT: 237 LBS | HEART RATE: 78 BPM | DIASTOLIC BLOOD PRESSURE: 101 MMHG | RESPIRATION RATE: 18 BRPM | BODY MASS INDEX: 37.2 KG/M2 | TEMPERATURE: 98 F | OXYGEN SATURATION: 97 % | HEIGHT: 67 IN | SYSTOLIC BLOOD PRESSURE: 149 MMHG

## 2018-01-11 DIAGNOSIS — R06.02 SHORTNESS OF BREATH: ICD-10-CM

## 2018-01-11 LAB
ALBUMIN SERPL BCP-MCNC: 3.4 G/DL
ALP SERPL-CCNC: 94 U/L
ALT SERPL W/O P-5'-P-CCNC: 10 U/L
ANION GAP SERPL CALC-SCNC: 8 MMOL/L
AST SERPL-CCNC: 9 U/L
BASOPHILS # BLD AUTO: 0.01 K/UL
BASOPHILS NFR BLD: 0.2 %
BILIRUB SERPL-MCNC: 0.5 MG/DL
BNP SERPL-MCNC: 160 PG/ML
BUN SERPL-MCNC: 12 MG/DL
CALCIUM SERPL-MCNC: 9.2 MG/DL
CHLORIDE SERPL-SCNC: 101 MMOL/L
CO2 SERPL-SCNC: 28 MMOL/L
CREAT SERPL-MCNC: 1 MG/DL
D DIMER PPP IA.FEU-MCNC: 0.31 MG/L FEU
DIFFERENTIAL METHOD: ABNORMAL
EOSINOPHIL # BLD AUTO: 0.1 K/UL
EOSINOPHIL NFR BLD: 1.7 %
ERYTHROCYTE [DISTWIDTH] IN BLOOD BY AUTOMATED COUNT: 16.7 %
EST. GFR  (AFRICAN AMERICAN): >60 ML/MIN/1.73 M^2
EST. GFR  (NON AFRICAN AMERICAN): >60 ML/MIN/1.73 M^2
GLUCOSE SERPL-MCNC: 159 MG/DL
HCT VFR BLD AUTO: 40.5 %
HGB BLD-MCNC: 12.9 G/DL
INR PPP: 1
LYMPHOCYTES # BLD AUTO: 1.7 K/UL
LYMPHOCYTES NFR BLD: 29.5 %
MCH RBC QN AUTO: 26.7 PG
MCHC RBC AUTO-ENTMCNC: 31.9 G/DL
MCV RBC AUTO: 84 FL
MONOCYTES # BLD AUTO: 0.4 K/UL
MONOCYTES NFR BLD: 7.5 %
NEUTROPHILS # BLD AUTO: 3.6 K/UL
NEUTROPHILS NFR BLD: 61.1 %
PLATELET # BLD AUTO: 289 K/UL
PMV BLD AUTO: 11.1 FL
POCT GLUCOSE: 167 MG/DL (ref 70–110)
POTASSIUM SERPL-SCNC: 3.7 MMOL/L
PROT SERPL-MCNC: 7.4 G/DL
PROTHROMBIN TIME: 10 SEC
RBC # BLD AUTO: 4.84 M/UL
SODIUM SERPL-SCNC: 137 MMOL/L
WBC # BLD AUTO: 5.89 K/UL

## 2018-01-11 PROCEDURE — 82962 GLUCOSE BLOOD TEST: CPT

## 2018-01-11 PROCEDURE — 85610 PROTHROMBIN TIME: CPT

## 2018-01-11 PROCEDURE — 93005 ELECTROCARDIOGRAM TRACING: CPT

## 2018-01-11 PROCEDURE — 85379 FIBRIN DEGRADATION QUANT: CPT

## 2018-01-11 PROCEDURE — 96374 THER/PROPH/DIAG INJ IV PUSH: CPT | Mod: 59

## 2018-01-11 PROCEDURE — 94761 N-INVAS EAR/PLS OXIMETRY MLT: CPT

## 2018-01-11 PROCEDURE — 93010 ELECTROCARDIOGRAM REPORT: CPT | Mod: ,,, | Performed by: INTERNAL MEDICINE

## 2018-01-11 PROCEDURE — 99284 EMERGENCY DEPT VISIT MOD MDM: CPT | Mod: 25

## 2018-01-11 PROCEDURE — 25000242 PHARM REV CODE 250 ALT 637 W/ HCPCS: Performed by: EMERGENCY MEDICINE

## 2018-01-11 PROCEDURE — 80053 COMPREHEN METABOLIC PANEL: CPT

## 2018-01-11 PROCEDURE — 85025 COMPLETE CBC W/AUTO DIFF WBC: CPT

## 2018-01-11 PROCEDURE — 25500020 PHARM REV CODE 255: Performed by: EMERGENCY MEDICINE

## 2018-01-11 PROCEDURE — 63600175 PHARM REV CODE 636 W HCPCS: Performed by: EMERGENCY MEDICINE

## 2018-01-11 PROCEDURE — 83880 ASSAY OF NATRIURETIC PEPTIDE: CPT

## 2018-01-11 PROCEDURE — 94640 AIRWAY INHALATION TREATMENT: CPT

## 2018-01-11 RX ORDER — KETOROLAC TROMETHAMINE 30 MG/ML
30 INJECTION, SOLUTION INTRAMUSCULAR; INTRAVENOUS
Status: COMPLETED | OUTPATIENT
Start: 2018-01-11 | End: 2018-01-11

## 2018-01-11 RX ORDER — IPRATROPIUM BROMIDE AND ALBUTEROL SULFATE 2.5; .5 MG/3ML; MG/3ML
3 SOLUTION RESPIRATORY (INHALATION)
Status: COMPLETED | OUTPATIENT
Start: 2018-01-11 | End: 2018-01-11

## 2018-01-11 RX ORDER — ALBUTEROL SULFATE 90 UG/1
1-2 AEROSOL, METERED RESPIRATORY (INHALATION) EVERY 6 HOURS PRN
Qty: 1 INHALER | Refills: 0 | Status: SHIPPED | OUTPATIENT
Start: 2018-01-11 | End: 2019-03-18

## 2018-01-11 RX ORDER — PREDNISONE 20 MG/1
20 TABLET ORAL DAILY
Qty: 10 TABLET | Refills: 0 | Status: SHIPPED | OUTPATIENT
Start: 2018-01-11 | End: 2018-01-16

## 2018-01-11 RX ADMIN — IPRATROPIUM BROMIDE AND ALBUTEROL SULFATE 3 ML: .5; 3 SOLUTION RESPIRATORY (INHALATION) at 01:01

## 2018-01-11 RX ADMIN — KETOROLAC TROMETHAMINE 30 MG: 30 INJECTION, SOLUTION INTRAMUSCULAR at 02:01

## 2018-01-11 RX ADMIN — IOHEXOL 80 ML: 300 INJECTION, SOLUTION INTRAVENOUS at 04:01

## 2018-01-11 NOTE — ED TRIAGE NOTES
Pt. Has a history of CHF and reports having generalized body aches and weakness, SOB since this morning. Pt denies any n/v/d, she states she is SOB at rest. She is AAOx3 tearful and appears to be in no acute distress.

## 2018-01-11 NOTE — ED PROVIDER NOTES
;Encounter Date: 1/11/2018    SCRIBE #1 NOTE: I, Hamzah Arora , am scribing for, and in the presence of,  Ry Leung MD . I have scribed the following portions of the note - Other sections scribed: HPI/ROS .       History     Chief Complaint   Patient presents with    Shortness of Breath     States she has sob and also heart keeps beating fast     Generalized Body Aches     CC: Shortness of Breath, Generalized Body Aches     HPI: This 42 y.o. female with a pertinent medical hx of HTN, CHF (echo 12/27/17 revealed severely depressed left ventricular systolic function - 20-25% compared to 50% on 6/17), MILE, NIDDM, cardiomyopathy, and hx of DVT on Xarelto presents to the ED c/o generalized body aches since last night. Today, she reports experiencing orthopnea and intermittent palpitations. She is experiencing a mild cough, but states that she has not had to use inhaler in a long time. No hx of COPD or asthma. Pt does not smoke cigarettes. She denies any recent sick contacts or long-distance travel. Per medical record review, pt was last seen in this ED on 11/26/17 for SOB, CP, and nausea. She was subsequently admitted to the hospital for further evaluation where she had an echo revealing severely depressed left ventricular systolic function (20-25% previously 50% on 6/17 echo) and LHC which revealed elevated LVEDP consistent with severe NICM but no CAD. Pt states that she was told by her doctors in the past that her CHF is related to her HTN which was initially dx 7 years ago. For the past 5 years, pt reports compliance with all of her medications including her antihypertensive. She reports a f/u with her cardiologist (Dr. Yanez) next month. Pt otherwise denies fever, chills, leg swelling, leg pain, chest pain, abdominal pain, and N/V/D.       The history is provided by the patient and medical records. No  was used.     Review of patient's allergies indicates:   Allergen Reactions     Pneumococcal 23-abimbola ps vaccine      Past Medical History:   Diagnosis Date    Blood clot associated with vein wall inflammation     Cardiomyopathy     Normal cors on cath 11/2017    CHF (congestive heart failure)     DM (diabetes mellitus) 9/19/2013    Hyperlipidemia     Hypertension     Prediabetes     Psoriasis     Sleep apnea      Past Surgical History:   Procedure Laterality Date    CARDIAC CATHETERIZATION      COLONOSCOPY      DILATION AND CURETTAGE OF UTERUS       Family History   Problem Relation Age of Onset    Hypertension Mother     Hypertension Father     Diabetes Father     Diabetes Maternal Grandmother     Diabetes Paternal Grandmother     Breast cancer Neg Hx     Colon cancer Neg Hx     Ovarian cancer Neg Hx      Social History   Substance Use Topics    Smoking status: Passive Smoke Exposure - Never Smoker     Types: Cigarettes    Smokeless tobacco: Never Used      Comment: smokes cigars on occasion    Alcohol use Yes      Comment: occasional     Review of Systems   Constitutional: Negative.  Negative for chills and fever.   HENT: Negative.  Negative for congestion, ear pain, rhinorrhea and sore throat.    Eyes: Negative.  Negative for visual disturbance.   Respiratory: Positive for cough and shortness of breath.    Cardiovascular: Positive for palpitations. Negative for chest pain and leg swelling.   Gastrointestinal: Negative.  Negative for abdominal pain, constipation, diarrhea, nausea and vomiting.   Endocrine: Negative.    Genitourinary: Negative.  Negative for dysuria, frequency, hematuria and urgency.   Musculoskeletal: Positive for myalgias (generalized body aches). Negative for back pain.        (-) BLE pain    Skin: Negative.  Negative for rash.   Allergic/Immunologic: Negative.    Neurological: Negative.  Negative for weakness, numbness and headaches.   Hematological: Negative.    Psychiatric/Behavioral: Negative.    All other systems reviewed and are  negative.      Physical Exam     Initial Vitals [01/11/18 1158]   BP Pulse Resp Temp SpO2   (!) 171/100 79 16 97.8 °F (36.6 °C) 96 %      MAP       123.67         Physical Exam    Nursing note and vitals reviewed.  Constitutional: She appears well-developed and well-nourished.   HENT:   Head: Normocephalic and atraumatic.   Eyes: EOM are normal.   Neck: Normal range of motion. Neck supple.   Cardiovascular: Normal rate, regular rhythm, normal heart sounds and intact distal pulses.   Pulmonary/Chest:   Decreased BS in the apices bilaterally, speaking in full sentences, nonlabored   Abdominal: Soft. Bowel sounds are normal.   Musculoskeletal: Normal range of motion.   Neurological: She is alert and oriented to person, place, and time. She has normal strength and normal reflexes.   Skin: Skin is warm. Capillary refill takes less than 2 seconds.   Psychiatric: She has a normal mood and affect. Her behavior is normal. Judgment and thought content normal.         ED Course   Procedures  Labs Reviewed   CBC W/ AUTO DIFFERENTIAL - Abnormal; Notable for the following:        Result Value    MCH 26.7 (*)     MCHC 31.9 (*)     RDW 16.7 (*)     All other components within normal limits   COMPREHENSIVE METABOLIC PANEL - Abnormal; Notable for the following:     Glucose 159 (*)     Albumin 3.4 (*)     AST 9 (*)     All other components within normal limits   B-TYPE NATRIURETIC PEPTIDE - Abnormal; Notable for the following:      (*)     All other components within normal limits   POCT GLUCOSE - Abnormal; Notable for the following:     POCT Glucose 167 (*)     All other components within normal limits   PROTIME-INR   D DIMER, QUANTITATIVE     EKG Readings: (Independently Interpreted)   Rhythm: Normal Sinus Rhythm. Heart Rate: 78. Ectopy: PVCs. ST Segments: Normal ST Segments. T Waves: Normal. Other Impression: with incomplete LBBB       X-Rays:   Independently Interpreted Readings:   Other Readings:  Impression       1.  No  pulmonary thromboembolism.    2.  Mosaic/ground glass attenuation of the bilateral lower lobes, nonspecific, differential would include mild edema, with possible sequela of small airways/small vessel disease, correlation recommended.    3.  Stable vague low attenuating focus within the right hepatic lobe, essentially unchanged since examination of 2014.    4.  Several additional findings above.      Electronically signed by: RICO TANNER MD  Date: 01/11/18  Time: 17:06         Medical Decision Making:   Initial Assessment:   HPI: This 42 y.o. female with a pertinent medical hx of HTN, CHF (echo 12/27/17 revealed severely depressed left ventricular systolic function - 20-25% compared to 50% on 6/17), MILE, NIDDM, cardiomyopathy, and hx of DVT on Xarelto presents to the ED c/o generalized body aches since last night. Today, she reports experiencing orthopnea and intermittent palpitations. She is experiencing a mild cough, but states that she has not had to use inhaler in a long time. No hx of COPD or asthma. Pt does not smoke cigarettes. She denies any recent sick contacts or long-distance travel. Per medical record review, pt was last seen in this ED on 11/26/17 for SOB, CP, and nausea. She was subsequently admitted to the hospital for further evaluation where she had an echo revealing severely depressed left ventricular systolic function (20-25% previously 50% on 6/17 echo) and LHC which revealed elevated LVEDP consistent with severe NICM but no CAD. Pt states that she was told by her doctors in the past that her CHF is related to her HTN which was initially dx 7 years ago. For the past 5 years, pt reports compliance with all of her medications including her antihypertensive. She reports a f/u with her cardiologist (Dr. Yanez) next month. Pt otherwise denies fever, chills, leg swelling, leg pain, chest pain, abdominal pain, and N/V/D.     Differential Diagnosis:   CHF  PE  ACS  Bronchospasm  PNA    Clinical  Tests:   Lab Tests: Ordered and Reviewed  The following lab test(s) were unremarkable: CBC, BMP, BNP and D-Dimer  Radiological Study: Ordered and Reviewed  Medical Tests: Ordered and Reviewed  ED Management:  Pt placed on cardiac monitor, IV placed, neb given for decreased  BS in the apices, and toradol given for diffuse body myalgias.  1730 pt reassessed, states she feels better, BS no longer diminished after neb and and body pain resolved after toradol.  Since pt is no longer symptomatic after the neb will discharge patient home with albuterol.  Other:   I discussed test(s) with the performing physician.       <> Summary of the Findings: Pt discussed with Dr. Yanez, agreed pt can be discharged home, if pt not in heart failure.   Pt was taken to the cath lab in Nov. 2017, per cath report, coronary arteries were Nl, so CAD is not a concern.                     ED Course      Clinical Impression:   The encounter diagnosis was Shortness of breath.    Disposition:   Disposition: Discharged  Condition: Stable                        Ry Leung MD  01/11/18 8987

## 2018-01-18 ENCOUNTER — OFFICE VISIT (OUTPATIENT)
Dept: CARDIOLOGY | Facility: CLINIC | Age: 43
End: 2018-01-18
Payer: MEDICARE

## 2018-01-18 VITALS
RESPIRATION RATE: 15 BRPM | HEART RATE: 77 BPM | WEIGHT: 233.69 LBS | OXYGEN SATURATION: 96 % | BODY MASS INDEX: 36.6 KG/M2 | SYSTOLIC BLOOD PRESSURE: 170 MMHG | DIASTOLIC BLOOD PRESSURE: 120 MMHG

## 2018-01-18 DIAGNOSIS — E66.9 OBESITY WITH BODY MASS INDEX (BMI) OF 30.0 TO 39.9: ICD-10-CM

## 2018-01-18 DIAGNOSIS — E78.2 MIXED HYPERLIPIDEMIA: ICD-10-CM

## 2018-01-18 DIAGNOSIS — E11.9 TYPE 2 DIABETES MELLITUS WITHOUT COMPLICATION, WITHOUT LONG-TERM CURRENT USE OF INSULIN: ICD-10-CM

## 2018-01-18 DIAGNOSIS — I42.8 NONISCHEMIC CARDIOMYOPATHY: Primary | ICD-10-CM

## 2018-01-18 DIAGNOSIS — I10 ESSENTIAL HYPERTENSION: ICD-10-CM

## 2018-01-18 DIAGNOSIS — G47.33 OSA (OBSTRUCTIVE SLEEP APNEA): ICD-10-CM

## 2018-01-18 PROCEDURE — 99214 OFFICE O/P EST MOD 30 MIN: CPT | Mod: S$GLB,,, | Performed by: INTERNAL MEDICINE

## 2018-01-18 PROCEDURE — 99499 UNLISTED E&M SERVICE: CPT | Mod: S$GLB,,, | Performed by: INTERNAL MEDICINE

## 2018-01-18 PROCEDURE — 99999 PR PBB SHADOW E&M-EST. PATIENT-LVL III: CPT | Mod: PBBFAC,,, | Performed by: INTERNAL MEDICINE

## 2018-01-18 RX ORDER — ISOSORBIDE MONONITRATE 60 MG/1
60 TABLET, EXTENDED RELEASE ORAL DAILY
Qty: 90 TABLET | Refills: 3 | Status: SHIPPED | OUTPATIENT
Start: 2018-01-18 | End: 2018-02-15

## 2018-01-18 RX ORDER — HYDRALAZINE HYDROCHLORIDE 100 MG/1
100 TABLET, FILM COATED ORAL 2 TIMES DAILY
Qty: 180 TABLET | Refills: 3 | Status: SHIPPED | OUTPATIENT
Start: 2018-01-18 | End: 2019-06-18

## 2018-01-18 NOTE — PROGRESS NOTES
Patient, Fabiana Chanel (MRN #[id number]), presented with a recorded BMI of 36.6 kg/m^2 and a documented comorbidity(s):  - Diabetes Mellitus Type 2  - Obstructive Sleep Apnea  - Hypertension  - Hyperlipidemia  to which the severe obesity is a contributing factor. This is consistent with the definition of severe obesity (BMI 35.0-35.9) with comorbidity (ICD-10 E66.01, Z68.35). The patient's severe obesity was monitored, evaluated, addressed and/or treated. This addendum to the medical record is made on 01/18/2018.

## 2018-01-18 NOTE — PROGRESS NOTES
CARDIOVASCULAR PROGRESS NOTE    REASON FOR CONSULT:   Fabiana Chanel is a 42 y.o. female who presents for follow up of NICM.    PCP: Bertram  HISTORY OF PRESENT ILLNESS:   The patient returns for follow-up.  She was recently seen in the emergency room for symptoms of bronchitis and prescribed an asthma inhaler.  Her blood pressure was uncontrolled in the 170/100 range at that visit, and remains uncontrolled today.  She reports compliance with all of her medications, but cannot tell me exactly what medication she is taking.  She says she is taking whatever I prescribed at last office visit.  She otherwise denies angina or dyspnea.  There's been no palpitations, lightheadedness, dizziness, or syncope.  She denies PND, orthopnea, or lower extremity edema.  There's been no melena, hematuria, or claudicant symptoms.    At last visit, I suggested the patient follow up with the bariatric surgery info session, but she has not done so.  I have again encouraged her to do this.  The patient is also been suggested to follow-up with sleep medicine, for a sleep apnea evaluation, and we will attempt to get this scheduled for her.    CARDIOVASCULAR HISTORY:   NICM (cath 11/2017) EF 25%    PAST MEDICAL HISTORY:     Past Medical History:   Diagnosis Date    Blood clot associated with vein wall inflammation     Cardiomyopathy     Normal cors on cath 11/2017    CHF (congestive heart failure)     DM (diabetes mellitus) 9/19/2013    Hyperlipidemia     Hypertension     Prediabetes     Psoriasis     Sleep apnea        PAST SURGICAL HISTORY:     Past Surgical History:   Procedure Laterality Date    CARDIAC CATHETERIZATION      COLONOSCOPY      DILATION AND CURETTAGE OF UTERUS         ALLERGIES AND MEDICATION:     Review of patient's allergies indicates:   Allergen Reactions    Pneumococcal 23-abimbola ps vaccine      Previous Medications    ALBUTEROL 90 MCG/ACTUATION INHALER    Inhale 1-2 puffs into the lungs every 6 (six) hours  "as needed for Wheezing. Rescue    ASPIRIN 81 MG CHEW    Take 81 mg by mouth once daily.    ATORVASTATIN (LIPITOR) 80 MG TABLET    Take 1 tablet (80 mg total) by mouth nightly.    ESOMEPRAZOLE (NEXIUM) 40 MG CAPSULE    Take 40 mg by mouth before breakfast.    FUROSEMIDE (LASIX) 20 MG TABLET    Take 1 tablet (20 mg total) by mouth once daily.    GABAPENTIN (NEURONTIN) 400 MG CAPSULE    Take 400 mg by mouth Daily.    GLIMEPIRIDE (AMARYL) 4 MG TABLET    Take 1 tablet (4 mg total) by mouth before breakfast.    HYDRALAZINE (APRESOLINE) 50 MG TABLET    Take 1 tablet (50 mg total) by mouth 2 (two) times daily.    INSULIN GLARGINE (LANTUS SOLOSTAR) 100 UNIT/ML (3 ML) INPN PEN    Inject 10 Units into the skin every evening.    ISOSORBIDE MONONITRATE (IMDUR) 30 MG 24 HR TABLET    Take 1 tablet (30 mg total) by mouth once daily.    LISINOPRIL (PRINIVIL,ZESTRIL) 40 MG TABLET    Take 1 tablet (40 mg total) by mouth once daily.    METFORMIN (GLUCOPHAGE) 500 MG TABLET    Take 1 tablet (500 mg total) by mouth 2 (two) times daily with meals.    METOPROLOL SUCCINATE (TOPROL-XL) 50 MG 24 HR TABLET    Take 1 tablet (50 mg total) by mouth once daily.    PEN NEEDLE, DIABETIC 31 GAUGE X 1/4" NDLE    1 each by Misc.(Non-Drug; Combo Route) route 2 (two) times daily.    SPIRONOLACTONE (ALDACTONE) 25 MG TABLET    Take 1 tablet (25 mg total) by mouth once daily.       SOCIAL HISTORY:     Social History     Social History    Marital status:      Spouse name: N/A    Number of children: N/A    Years of education: N/A     Occupational History    Not on file.     Social History Main Topics    Smoking status: Passive Smoke Exposure - Never Smoker     Types: Cigarettes    Smokeless tobacco: Never Used      Comment: smokes cigars on occasion    Alcohol use Yes      Comment: occasional    Drug use: Yes     Types: Marijuana      Comment: hx of marijuana use 3 years ago    Sexual activity: Not Currently     Partners: Male     Other Topics " Concern    Not on file     Social History Narrative    No narrative on file       FAMILY HISTORY:     Family History   Problem Relation Age of Onset    Hypertension Mother     Hypertension Father     Diabetes Father     Diabetes Maternal Grandmother     Diabetes Paternal Grandmother     Breast cancer Neg Hx     Colon cancer Neg Hx     Ovarian cancer Neg Hx        REVIEW OF SYSTEMS:   Review of Systems   Constitutional: Negative for chills, diaphoresis and fever.   HENT: Negative for nosebleeds.    Eyes: Negative for blurred vision, double vision and photophobia.   Respiratory: Negative for hemoptysis, shortness of breath and wheezing.    Cardiovascular: Negative for chest pain, palpitations, orthopnea, claudication, leg swelling and PND.   Gastrointestinal: Negative for abdominal pain, blood in stool, heartburn, melena, nausea and vomiting.   Genitourinary: Negative for flank pain and hematuria.   Musculoskeletal: Negative for falls, myalgias and neck pain.   Skin: Negative for rash.   Neurological: Negative for dizziness, seizures, loss of consciousness, weakness and headaches.   Endo/Heme/Allergies: Negative for polydipsia. Does not bruise/bleed easily.   Psychiatric/Behavioral: Negative for depression and memory loss. The patient is not nervous/anxious.        PHYSICAL EXAM:     Vitals:    01/18/18 0923   BP: (!) 170/120   Pulse: 77   Resp: 15    Body mass index is 36.6 kg/m².  Weight: 106 kg (233 lb 11 oz)         Physical Exam   Constitutional: She is oriented to person, place, and time. She appears well-developed and well-nourished. She is cooperative.  Non-toxic appearance. No distress.   HENT:   Head: Normocephalic and atraumatic.   Eyes: Conjunctivae and EOM are normal. Pupils are equal, round, and reactive to light. No scleral icterus.   Neck: Trachea normal. Neck supple. Normal carotid pulses and no JVD present. Carotid bruit is not present. No neck rigidity. No edema present. No thyromegaly  present.   Cardiovascular: Normal rate, regular rhythm, S1 normal and S2 normal.  PMI is not displaced.  Exam reveals no gallop and no friction rub.    No murmur heard.  Pulses:       Carotid pulses are 2+ on the right side, and 2+ on the left side.  Pulmonary/Chest: Effort normal and breath sounds normal. No respiratory distress. She has no wheezes. She has no rales. She exhibits no tenderness.   Abdominal: Soft. Bowel sounds are normal. She exhibits no distension and no mass. There is no hepatosplenomegaly. There is no tenderness.   obese   Musculoskeletal: She exhibits no edema or tenderness.   Feet:   Right Foot:   Skin Integrity: Negative for ulcer.   Left Foot:   Skin Integrity: Negative for ulcer.   Neurological: She is alert and oriented to person, place, and time. No cranial nerve deficit.   Skin: Skin is warm and dry. No rash noted. No erythema.   Psychiatric: She has a normal mood and affect. Her speech is normal and behavior is normal.   Vitals reviewed.      DATA:   EKG: (personally reviewed tracing)  11/26/17 SR 96, inflat ST abnl ?isch, similar to 9/17/17    Laboratory:  CBC:    Recent Labs  Lab 11/27/17  0509 11/28/17  0508 01/11/18  1218   WHITE BLOOD CELL COUNT 5.05 6.60 5.89   HEMOGLOBIN 13.5 12.9 12.9   HEMATOCRIT 42.9 41.0 40.5   PLATELETS 278 310 289       CHEMISTRIES:    Recent Labs  Lab 07/09/15  1831  02/18/17  1943  11/27/17  0509 11/28/17  0508 01/11/18  1218   GLUCOSE 523 HH  < > 637 HH  < > 304 H 319 H 159 H   SODIUM 133 L  < > 133 L  < > 138 138 137   POTASSIUM 4.5  < > 4.3  < > 3.5 3.5 3.7   BUN BLD 20  < > 14  < > 12 11 12   CREATININE 1.5 H  < > 1.5 H  < > 1.1 1.1 1.0   EGFR IF  50 A  < > 50 A  < > >60 >60 >60   EGFR IF NON- 44 A  < > 43 A  < > >60 >60 >60   CALCIUM 9.2  < > 9.7  < > 9.0 9.0 9.2   MAGNESIUM 1.8  --  2.2  --   --  1.7  --    < > = values in this interval not displayed.    CARDIAC BIOMARKERS:    Recent Labs  Lab 02/18/17  4494   02/23/17 2012 11/26/17 2205 11/27/17  0509 11/27/17  0937   CPK 74  --  81  --   --   --   --    CPK MB 2.1  --   --   --   --   --   --    TROPONIN I <0.006  < > <0.006  < > 0.047 H 0.033 H 0.032 H   < > = values in this interval not displayed.    COAGS:    Recent Labs  Lab 02/23/17 2012 06/08/17 2111 01/11/18  1218   INR 0.9 0.9 1.0       LIPIDS/LFTS:    Recent Labs  Lab 06/09/17  0524  11/21/17  1445 11/26/17 2205 11/28/17 0508 01/11/18  1218   CHOLESTEROL 185  --  227 H  --  175  --    TRIGLYCERIDES 83  --  122  --  76  --    HDL 38 L  --  62  --  46  --    LDL CHOLESTEROL 130.4  --  140.6  --  113.8  --    NON-HDL CHOLESTEROL 147  --  165  --  129  --    AST 9 L  < > 14 11  --  9 L   ALT 9 L  < > 11 12  --  10   < > = values in this interval not displayed.    Cardiovascular Testing:  Echo: 11/27/17 (EF dropped from 50% 6/2017)    1 - Severely depressed left ventricular systolic function (EF 20-25%).     2 - Eccentric hypertrophy.     3 - Severe left ventricular enlargement.     4 - Impaired LV relaxation, elevated LAP (grade 2 diastolic dysfunction).     5 - Mild mitral regurgitation.     6 - Mild tricuspid regurgitation.     7 - The estimated PA systolic pressure is greater than 30 mmHg.      Cath 11/27/17  RA 5  RV 61/6  PA 60/29/42  PAWP 18  /41  Ao 163/107/130  CO 5.6 L/min  LVEF: 20% by echo with severe LV dilation  Wall Motion: Severe global HK  Dominance: Co-dom  LM: normal  LAD: normal  LCx: normal  RCA: normal  Hemostasis:  RFA/V manual compression  Impression:  Normal cors  Elevated LVEDP with transmission of pressures to pulmonary circuit  Above c/w Severe NICM  RFA/V manual compression  Plan:  Cont med rx  Stop Plavix  Stop amlodipine  Start Hydrala/Imdur/Lasix  Goal net neg 1L/day  Cont BBl/ACEi  Repeat echo in 3 months for reassessment of LV fxn and need for ICD  Follow up with Dr. Yanez 1 week after discharge     Stress Test: L MPI 9/20/13  Nuclear Quantitative Functional Analysis:    LVEF: 31 %  Impression: NORMAL MYOCARDIAL PERFUSION  1. The perfusion scan is free of evidence for myocardial ischemia or injury.   2. There is a moderate intensity fixed defect in the inferior wall of the left ventricle, secondary to diaphragm attenuation.   3. There is abnormal wall motion at rest showing moderate global hypokinesis of the left ventricle.   4. There is resting LV dysfunction with a reduced ejection fraction of 31 %.   5. The ventricular volumes are normal at rest and stress.   6. The extracardiac distribution of radioactivity is normal.     ASSESSMENT:   # NICM, EF 20-25% by echo/cath 11/2017.  Pt appears euvolemic.  # HTN, uncontrolled, ?med compliance  # HLP, on atorva 80mg  # DM  # BMI 37, stable vs last OV    PLAN:   Cont med rx  Inc hydrala to 100mg bid  Inc imdur to 60mg qd  MILE eval  Diet/exercise/weight loss, Na+ avoidance, bariatric surg info session flyer given to pt (again)  RTC 1 month  Repeat echo in 2 months (late Feb 2018) for reassessment of LV fxn and need for ICD  Check lipids/LFT 2 months (late Feb 2018)    Oziel Yanez MD, FACC

## 2018-02-08 ENCOUNTER — OFFICE VISIT (OUTPATIENT)
Dept: SLEEP MEDICINE | Facility: CLINIC | Age: 43
End: 2018-02-08
Payer: MEDICARE

## 2018-02-08 VITALS
WEIGHT: 240.94 LBS | OXYGEN SATURATION: 94 % | BODY MASS INDEX: 37.82 KG/M2 | HEIGHT: 67 IN | HEART RATE: 74 BPM | SYSTOLIC BLOOD PRESSURE: 110 MMHG | DIASTOLIC BLOOD PRESSURE: 74 MMHG

## 2018-02-08 DIAGNOSIS — G47.00 INSOMNIA, UNSPECIFIED TYPE: ICD-10-CM

## 2018-02-08 DIAGNOSIS — G47.33 OSA (OBSTRUCTIVE SLEEP APNEA): Primary | ICD-10-CM

## 2018-02-08 PROCEDURE — 3008F BODY MASS INDEX DOCD: CPT | Mod: S$GLB,,, | Performed by: INTERNAL MEDICINE

## 2018-02-08 PROCEDURE — 99999 PR PBB SHADOW E&M-EST. PATIENT-LVL III: CPT | Mod: PBBFAC,,, | Performed by: INTERNAL MEDICINE

## 2018-02-08 PROCEDURE — 99204 OFFICE O/P NEW MOD 45 MIN: CPT | Mod: S$GLB,,, | Performed by: INTERNAL MEDICINE

## 2018-02-08 PROCEDURE — 99213 OFFICE O/P EST LOW 20 MIN: CPT | Mod: PO | Performed by: INTERNAL MEDICINE

## 2018-02-08 PROCEDURE — 99499 UNLISTED E&M SERVICE: CPT | Mod: S$GLB,,, | Performed by: INTERNAL MEDICINE

## 2018-02-08 NOTE — LETTER
February 8, 2018      Oziel Yanez MD  [address]  [address]  [place] [address]           [facility]  [address] [place] [address]  Phone: [phone]  Fax: [phone]          Patient: Fabiana Chanel   MR Number: [id number]   YOB: 1975   Date of Visit: 2/8/2018       Dear Dr. Oziel Yanez:    Thank you for referring Fabiana Chanel to me for evaluation. Attached you will find relevant portions of my assessment and plan of care.    If you have questions, please do not hesitate to call me. I look forward to following Fabiana Chanel along with you.    Sincerely,    Hunter Arora MD    Enclosure  CC:  No Recipients    If you would like to receive this communication electronically, please contact [email or web address] or [phone] to request more information on [facility] access.    For providers and/or their staff who would like to refer a patient to [facility], please contact us through our one-stop-shop provider referral line, [facility], at [phone].    If you feel you have received this communication in error or would no longer like to receive these types of communications, please e-mail [email or web address]

## 2018-02-08 NOTE — PATIENT INSTRUCTIONS
Telma or Brian will contact you to schedule your sleep study. Their number is [phone] (ext 2). The [facility] is located on 7th floor of the [facility].    We will call you when the sleep study results are ready - if you have not heard from us by 2 weeks from the date of the study, please call [phone] (ext 1).    You are advised to abstain from driving should you feel sleepy or drowsy.

## 2018-02-08 NOTE — PROGRESS NOTES
Fabiana Chanel  was seen as a new patient at the request of  Oziel Yanez MD for the evaluation of  kaitlynn.    CHIEF COMPLAINT:    Chief Complaint   Patient presents with    Sleep Apnea    Fatigue       HISTORY OF PRESENT ILLNESS: Fabiana Chanel is a 42 y.o. female is here for sleep evaluation.   Patient was provided bipap of 15/7 in 2015 after hospitalization for acute respiratory failure requiring intubation.  Patient was doing well with bipap until machine dropped.  Machine with loud noise when it is on and it does not produced adequate pressure.  Patient have not used bpap x 8 months.  Sleep has gotten worse without cpap.     Without cpap, patient with dry mouth upon awake.  +gasping sensation upon awake 3-4 times per night.  +fatigue upon awake.  No parasomnia.  No cataplexy.  No rls symptoms.      [place] Sleepiness Scale score during initial sleep evaluation was 13.    SLEEP ROUTINE:  Activity the hour prior to sleep: watch tv in bed    Bed partner:    Time to bed:  10 pm   Lights off:  Lamp on   Sleep onset latency:  15 mintues        Disruptions or awakenings:    3 times (no difficulty going back to sleep)    Wakeup time:      8 am   Perceived sleep quality:  tire       Daytime naps:      60 minutes 2 times per day  Weekend sleep routine:      10 pm to 10 am  Caffeine use: none   exercise habit:   none      PAST MEDICAL HISTORY:    Active Ambulatory Problems     Diagnosis Date Noted    HTN (hypertension) 09/19/2013    Tobacco abuse 09/19/2013    Type 2 diabetes mellitus without complication, without long-term current use of insulin 09/20/2013    Iron deficiency anemia 09/20/2013    Long term (current) use of anticoagulants 09/27/2013    Chest pain 05/13/2014    Elevated troponin 05/14/2014    Essential hypertension 05/14/2014    Paroxysmal atrial fibrillation 05/14/2014    Obesity with body mass index (BMI) of 30.0 to 39.9 05/14/2014    Mild protein malnutrition 05/14/2014     Hypoxia 05/16/2014    Respiratory acidosis 05/16/2014    Hyperlipidemia 05/16/2014    Chronic combined systolic and diastolic heart failure 05/16/2014    Hypercapnic respiratory failure, chronic 05/17/2014    Dyspnea 08/12/2014    Pulmonary edema 08/12/2014    Blood glucose elevated 08/12/2014    Shortness of breath 12/09/2014    Acute on chronic combined systolic and diastolic heart failure, NYHA class 2 12/09/2014    Acute respiratory failure with hypercapnia 05/15/2015    Anemia associated with acute blood loss 05/16/2015    MILE (obstructive sleep apnea) 05/16/2015    Obesity hypoventilation syndrome 05/16/2015    Convulsion 05/19/2015    Other convulsions     Treponemal infection 08/18/2015    Vulvar ulceration 08/18/2015    Acute bronchitis     Nonischemic cardiomyopathy 01/18/2018     Resolved Ambulatory Problems     Diagnosis Date Noted    Atrial fibrillation with rapid ventricular response 09/18/2013    CHF (congestive heart failure) 08/12/2014    CHF exacerbation 12/09/2014    Seizure 05/16/2015    Excessive vaginal bleeding 05/16/2015    Altered mental status 05/16/2015    HSV (herpes simplex virus) anogenital infection 07/13/2015    ACS (acute coronary syndrome) 11/27/2017    Acute on chronic combined systolic and diastolic congestive heart failure 11/27/2017     Past Medical History:   Diagnosis Date    Blood clot associated with vein wall inflammation     Cardiomyopathy     CHF (congestive heart failure)     DM (diabetes mellitus) 9/19/2013    Hyperlipidemia     Hypertension     Psoriasis     Sleep apnea                 PAST SURGICAL HISTORY:    Past Surgical History:   Procedure Laterality Date    CARDIAC CATHETERIZATION      COLONOSCOPY      DILATION AND CURETTAGE OF UTERUS           FAMILY HISTORY:                Family History   Problem Relation Age of Onset    Hypertension Mother     Hypertension Father     Diabetes Father     Diabetes Maternal  Grandmother     Diabetes Paternal Grandmother     Breast cancer Neg Hx     Colon cancer Neg Hx     Ovarian cancer Neg Hx        SOCIAL HISTORY:          Tobacco:   History   Smoking Status    Passive Smoke Exposure - Never Smoker    Types: Cigarettes   Smokeless Tobacco    Never Used     Comment: smokes cigars on occasion       alcohol use:    History   Alcohol Use    Yes     Comment: occasional                 Occupation:  disable    ALLERGIES:    Review of patient's allergies indicates:   Allergen Reactions    Pneumococcal 23-abimbola ps vaccine        CURRENT MEDICATIONS:    Current Outpatient Prescriptions   Medication Sig Dispense Refill    albuterol 90 mcg/actuation inhaler Inhale 1-2 puffs into the lungs every 6 (six) hours as needed for Wheezing. Rescue 1 Inhaler 0    aspirin 81 MG Chew Take 81 mg by mouth once daily.      atorvastatin (LIPITOR) 80 MG tablet Take 1 tablet (80 mg total) by mouth nightly. 90 tablet 3    esomeprazole (NEXIUM) 40 MG capsule Take 40 mg by mouth before breakfast.      furosemide (LASIX) 20 MG tablet Take 1 tablet (20 mg total) by mouth once daily. 90 tablet 3    gabapentin (NEURONTIN) 400 MG capsule Take 400 mg by mouth Daily.      glimepiride (AMARYL) 4 MG tablet Take 1 tablet (4 mg total) by mouth before breakfast. (Patient taking differently: Take 4 mg by mouth 2 (two) times daily. ) 30 tablet 3    hydrALAZINE (APRESOLINE) 100 MG tablet Take 1 tablet (100 mg total) by mouth 2 (two) times daily. 180 tablet 3    insulin glargine (LANTUS SOLOSTAR) 100 unit/mL (3 mL) InPn pen Inject 10 Units into the skin every evening. 15 mL 2    isosorbide mononitrate (IMDUR) 60 MG 24 hr tablet Take 1 tablet (60 mg total) by mouth once daily. 90 tablet 3    lisinopril (PRINIVIL,ZESTRIL) 40 MG tablet Take 1 tablet (40 mg total) by mouth once daily. 90 tablet 3    metoprolol succinate (TOPROL-XL) 50 MG 24 hr tablet Take 1 tablet (50 mg total) by mouth once daily. 30 tablet 11     "pen needle, diabetic 31 gauge x 1/4" Ndle 1 each by Misc.(Non-Drug; Combo Route) route 2 (two) times daily. 100 each 0    spironolactone (ALDACTONE) 25 MG tablet Take 1 tablet (25 mg total) by mouth once daily. 30 tablet 11    metformin (GLUCOPHAGE) 500 MG tablet Take 1 tablet (500 mg total) by mouth 2 (two) times daily with meals. 56 tablet 0     No current facility-administered medications for this visit.                   REVIEW OF SYSTEMS:     Sleep related symptoms as per HPI.  CONST:Denies weight gain; loosing weight    HEENT: + sinus congestion  PULM: +dyspnea x 2 blocks  CARD:  +intermittent palpitations - follow by Dr. Yanez  GI:  +intermittent acid reflux  : Denies polyuria  NEURO: Denies headaches  PSYCH: +depression  HEME: Denies anemia   Otherwise, a balance of systems reviewed is negative.          PHYSICAL EXAM:  Vitals:    02/08/18 0848   BP: 110/74   Pulse: 74   SpO2: (!) 94%   Weight: 109.3 kg (240 lb 15.4 oz)   Height: 5' 7" (1.702 m)   PainSc:   2     Body mass index is 37.74 kg/m².     GENERAL: Normal development, well groomed  HEENT:  Conjunctivae are non-erythematous; Pupils equal, round, and reactive to light; Nose is symmetrical; Nasal mucosa is pink and moist; Septum is midline; Inferior turbinates are normal; Nasal airflow is normal; Posterior pharynx is pink; Modified Mallampati: 4; Posterior palate is normal; Tonsils +1; Uvula is normal and pink;Tongue is normal; Dentition is fair; No TMJ tenderness; Jaw opening and protrusion without click and without discomfort.  NECK: Supple. Neck circumference is 16 inches. No thyromegaly. No palpable nodes.     SKIN: On face and neck: No abrasions, no rashes, no lesions.  No subcutaneous nodules are palpable.  RESPIRATORY: Chest is clear to auscultation.  Normal chest expansion and non-labored breathing at rest.  CARDIOVASCULAR: Normal S1, S2.  No murmurs, gallops or rubs. No carotid bruits bilaterally.  EXTREMITIES: No edema. No clubbing. No " cyanosis. Station normal. Gait normal.        NEURO/PSYCH: Oriented to time, place and person. Normal attention span and concentration. Affect is full. Mood is normal.                                              DATA no prior sleep study  12/27/17 echo    1 - Severely depressed left ventricular systolic function (EF 20-25%).     2 - Eccentric hypertrophy.     3 - Severe left ventricular enlargement.     4 - Impaired LV relaxation, elevated LAP (grade 2 diastolic dysfunction).     5 - Mild mitral regurgitation.     6 - Mild tricuspid regurgitation.     7 - The estimated PA systolic pressure is greater than 30 mmHg.     Lab Results   Component Value Date    TSH 1.938 11/27/2017     ASSESSMENT    ICD-10-CM ICD-9-CM    1. MILE (obstructive sleep apnea) G47.33 327.23 Polysomnogram (CPAP will be added if patient meets diagnostic criteria.)   2. Insomnia, unspecified type G47.00 780.52        PLAN:    Sleep Apnea NEC. The patient symptomatically has restless sleep, daytime somnolence with findings of elevated bmi, enlarge neck, high grade mallampatti, chf. This warrants further investigation for possible obstructive sleep apnea.  Patient will be contacted after sleep study is done.  chf warrants in lab evaluation.     chf - reduced ef.  Followed by Dr. Yanez.  Appear euvolumic.    Insomnia - maintenance is the issue.  Slept better while having bipap.      Obesity - aware of need for drastic weight loss.  Loosing weight with chf optimization.      Diagnostic: Polysomnogram. The nature of this procedure and its indication was discussed with the patient.     Education: During our discussion today, we talked about the etiology of obstructive sleep apnea as well as the potential ramifications of untreated sleep apnea, which could include daytime sleepiness, hypertension, heart disease and/or stroke.     Precautions: The patient was advised to abstain from driving should they feel sleepy or drowsy.       Thank you for allowing  me the opportunity to participate in the care of your patient.    Patient will Follow-up after sleep study. with md/np.    Please cc note to  Oziel Yanez MD.     This is 45 minutes visit, over 50% of time spent in direct consultation with patient.

## 2018-02-15 ENCOUNTER — OFFICE VISIT (OUTPATIENT)
Dept: CARDIOLOGY | Facility: CLINIC | Age: 43
End: 2018-02-15
Payer: MEDICARE

## 2018-02-15 VITALS
DIASTOLIC BLOOD PRESSURE: 60 MMHG | BODY MASS INDEX: 37.57 KG/M2 | HEART RATE: 104 BPM | WEIGHT: 239.88 LBS | RESPIRATION RATE: 16 BRPM | SYSTOLIC BLOOD PRESSURE: 110 MMHG | OXYGEN SATURATION: 95 %

## 2018-02-15 DIAGNOSIS — G47.33 OSA (OBSTRUCTIVE SLEEP APNEA): ICD-10-CM

## 2018-02-15 DIAGNOSIS — E11.9 TYPE 2 DIABETES MELLITUS WITHOUT COMPLICATION, WITHOUT LONG-TERM CURRENT USE OF INSULIN: ICD-10-CM

## 2018-02-15 DIAGNOSIS — E78.2 MIXED HYPERLIPIDEMIA: ICD-10-CM

## 2018-02-15 DIAGNOSIS — I10 ESSENTIAL HYPERTENSION: ICD-10-CM

## 2018-02-15 DIAGNOSIS — I42.8 NONISCHEMIC CARDIOMYOPATHY: Primary | ICD-10-CM

## 2018-02-15 DIAGNOSIS — E66.9 OBESITY WITH BODY MASS INDEX (BMI) OF 30.0 TO 39.9: ICD-10-CM

## 2018-02-15 PROCEDURE — 99999 PR PBB SHADOW E&M-EST. PATIENT-LVL III: CPT | Mod: PBBFAC,,, | Performed by: INTERNAL MEDICINE

## 2018-02-15 PROCEDURE — 3008F BODY MASS INDEX DOCD: CPT | Mod: S$GLB,,, | Performed by: INTERNAL MEDICINE

## 2018-02-15 PROCEDURE — 99499 UNLISTED E&M SERVICE: CPT | Mod: S$GLB,,, | Performed by: INTERNAL MEDICINE

## 2018-02-15 PROCEDURE — 99214 OFFICE O/P EST MOD 30 MIN: CPT | Mod: S$GLB,,, | Performed by: INTERNAL MEDICINE

## 2018-02-15 NOTE — PROGRESS NOTES
CARDIOVASCULAR PROGRESS NOTE    REASON FOR CONSULT:   Fabiana Chanel is a 42 y.o. female who presents for follow up of NIC.    PCP: Bertram  HISTORY OF PRESENT ILLNESS:   The patient returns for follow-up.  In the interim since her last office visit, she was seen by Dr. Arora and plans to undergo sleep study for evaluation of sleep apnea.  She reports and otherwise generally a symptom medic status without angina or dyspnea.  She denies palpitations, lightheadedness, dizziness, or syncope.  She's had no PND, orthopnea, or lower extremity edema.  There's been no melena, hematuria, or claudicant symptoms.    CARDIOVASCULAR HISTORY:   NICM (cath 11/2017) EF 25%    PAST MEDICAL HISTORY:     Past Medical History:   Diagnosis Date    Blood clot associated with vein wall inflammation     not dvt    Cardiomyopathy     Normal cors on cath 11/2017    CHF (congestive heart failure)     DM (diabetes mellitus) 9/19/2013    Hyperlipidemia     Hypertension     Psoriasis     Sleep apnea        PAST SURGICAL HISTORY:     Past Surgical History:   Procedure Laterality Date    CARDIAC CATHETERIZATION      COLONOSCOPY      DILATION AND CURETTAGE OF UTERUS         ALLERGIES AND MEDICATION:     Review of patient's allergies indicates:   Allergen Reactions    Pneumococcal 23-abimbola ps vaccine      Previous Medications    ALBUTEROL 90 MCG/ACTUATION INHALER    Inhale 1-2 puffs into the lungs every 6 (six) hours as needed for Wheezing. Rescue    ASPIRIN 81 MG CHEW    Take 81 mg by mouth once daily.    ATORVASTATIN (LIPITOR) 80 MG TABLET    Take 1 tablet (80 mg total) by mouth nightly.    ESOMEPRAZOLE (NEXIUM) 40 MG CAPSULE    Take 40 mg by mouth before breakfast.    FUROSEMIDE (LASIX) 20 MG TABLET    Take 1 tablet (20 mg total) by mouth once daily.    GABAPENTIN (NEURONTIN) 400 MG CAPSULE    Take 400 mg by mouth Daily.    GLIMEPIRIDE (AMARYL) 4 MG TABLET    Take 1 tablet (4 mg total) by mouth before breakfast.    HYDRALAZINE  "(APRESOLINE) 100 MG TABLET    Take 1 tablet (100 mg total) by mouth 2 (two) times daily.    INSULIN GLARGINE (LANTUS SOLOSTAR) 100 UNIT/ML (3 ML) INPN PEN    Inject 10 Units into the skin every evening.    ISOSORBIDE MONONITRATE (IMDUR) 60 MG 24 HR TABLET    Take 1 tablet (60 mg total) by mouth once daily.    LISINOPRIL (PRINIVIL,ZESTRIL) 40 MG TABLET    Take 1 tablet (40 mg total) by mouth once daily.    METFORMIN (GLUCOPHAGE) 500 MG TABLET    Take 1 tablet (500 mg total) by mouth 2 (two) times daily with meals.    METOPROLOL SUCCINATE (TOPROL-XL) 50 MG 24 HR TABLET    Take 1 tablet (50 mg total) by mouth once daily.    PEN NEEDLE, DIABETIC 31 GAUGE X 1/4" NDLE    1 each by Misc.(Non-Drug; Combo Route) route 2 (two) times daily.    SPIRONOLACTONE (ALDACTONE) 25 MG TABLET    Take 1 tablet (25 mg total) by mouth once daily.       SOCIAL HISTORY:     Social History     Social History    Marital status:      Spouse name: N/A    Number of children: N/A    Years of education: N/A     Occupational History    Not on file.     Social History Main Topics    Smoking status: Passive Smoke Exposure - Never Smoker     Types: Cigarettes    Smokeless tobacco: Never Used      Comment: smokes cigars on occasion    Alcohol use Yes      Comment: occasional    Drug use: No      Comment: hx of marijuana use 3 years ago    Sexual activity: Yes     Partners: Male     Other Topics Concern    Not on file     Social History Narrative    No narrative on file       FAMILY HISTORY:     Family History   Problem Relation Age of Onset    Hypertension Mother     Hypertension Father     Diabetes Father     Diabetes Maternal Grandmother     Diabetes Paternal Grandmother     Breast cancer Neg Hx     Colon cancer Neg Hx     Ovarian cancer Neg Hx        REVIEW OF SYSTEMS:   Review of Systems   Constitutional: Negative for chills, diaphoresis and fever.   HENT: Negative for nosebleeds.    Eyes: Negative for blurred vision, " double vision and photophobia.   Respiratory: Negative for hemoptysis, shortness of breath and wheezing.    Cardiovascular: Negative for chest pain, palpitations, orthopnea, claudication, leg swelling and PND.   Gastrointestinal: Negative for abdominal pain, blood in stool, heartburn, melena, nausea and vomiting.   Genitourinary: Negative for flank pain and hematuria.   Musculoskeletal: Negative for falls, myalgias and neck pain.   Skin: Negative for rash.   Neurological: Negative for dizziness, seizures, loss of consciousness, weakness and headaches.   Endo/Heme/Allergies: Negative for polydipsia. Does not bruise/bleed easily.   Psychiatric/Behavioral: Negative for depression and memory loss. The patient is not nervous/anxious.        PHYSICAL EXAM:     Vitals:    02/15/18 0918   BP: 110/60   Pulse: 104   Resp: 16    Body mass index is 37.57 kg/m².  Weight: 108.8 kg (239 lb 13.8 oz)         Physical Exam   Constitutional: She is oriented to person, place, and time. She appears well-developed and well-nourished. She is cooperative.  Non-toxic appearance. No distress.   HENT:   Head: Normocephalic and atraumatic.   Eyes: Conjunctivae and EOM are normal. Pupils are equal, round, and reactive to light. No scleral icterus.   Neck: Trachea normal. Neck supple. Normal carotid pulses and no JVD present. Carotid bruit is not present. No neck rigidity. No edema present. No thyromegaly present.   Cardiovascular: Normal rate, regular rhythm, S1 normal and S2 normal.  PMI is not displaced.  Exam reveals no gallop and no friction rub.    No murmur heard.  Pulses:       Carotid pulses are 2+ on the right side, and 2+ on the left side.  Pulmonary/Chest: Effort normal and breath sounds normal. No respiratory distress. She has no wheezes. She has no rales. She exhibits no tenderness.   Abdominal: Soft. Bowel sounds are normal. She exhibits no distension and no mass. There is no hepatosplenomegaly. There is no tenderness.   obese    Musculoskeletal: She exhibits no edema or tenderness.   Feet:   Right Foot:   Skin Integrity: Negative for ulcer.   Left Foot:   Skin Integrity: Negative for ulcer.   Neurological: She is alert and oriented to person, place, and time. No cranial nerve deficit.   Skin: Skin is warm and dry. No rash noted. No erythema.   Psychiatric: She has a normal mood and affect. Her speech is normal and behavior is normal.   Vitals reviewed.      DATA:   EKG: (personally reviewed tracing)  11/26/17 SR 96, inflat ST abnl ?isch, similar to 9/17/17    Laboratory:  CBC:    Recent Labs  Lab 11/27/17 0509 11/28/17 0508 01/11/18  1218   WHITE BLOOD CELL COUNT 5.05 6.60 5.89   HEMOGLOBIN 13.5 12.9 12.9   HEMATOCRIT 42.9 41.0 40.5   PLATELETS 278 310 289       CHEMISTRIES:    Recent Labs  Lab 07/09/15  1831  02/18/17 1943 11/27/17 0509 11/28/17  0508 01/11/18  1218   GLUCOSE 523 HH  < > 637 HH  < > 304 H 319 H 159 H   SODIUM 133 L  < > 133 L  < > 138 138 137   POTASSIUM 4.5  < > 4.3  < > 3.5 3.5 3.7   BUN BLD 20  < > 14  < > 12 11 12   CREATININE 1.5 H  < > 1.5 H  < > 1.1 1.1 1.0   EGFR IF  50 A  < > 50 A  < > >60 >60 >60   EGFR IF NON- 44 A  < > 43 A  < > >60 >60 >60   CALCIUM 9.2  < > 9.7  < > 9.0 9.0 9.2   MAGNESIUM 1.8  --  2.2  --   --  1.7  --    < > = values in this interval not displayed.    CARDIAC BIOMARKERS:    Recent Labs  Lab 02/18/17  1943  02/23/17  2012  11/26/17  2205 11/27/17  0509 11/27/17  0937   CPK 74  --  81  --   --   --   --    CPK MB 2.1  --   --   --   --   --   --    TROPONIN I <0.006  < > <0.006  < > 0.047 H 0.033 H 0.032 H   < > = values in this interval not displayed.    COAGS:    Recent Labs  Lab 02/23/17 2012 06/08/17  2111 01/11/18  1218   INR 0.9 0.9 1.0       LIPIDS/LFTS:    Recent Labs  Lab 06/09/17  0524  11/21/17  1445 11/26/17  2205 11/28/17  0508 01/11/18  1218   CHOLESTEROL 185  --  227 H  --  175  --    TRIGLYCERIDES 83  --  122  --  76  --    HDL 38 L  --   62  --  46  --    LDL CHOLESTEROL 130.4  --  140.6  --  113.8  --    NON-HDL CHOLESTEROL 147  --  165  --  129  --    AST 9 L  < > 14 11  --  9 L   ALT 9 L  < > 11 12  --  10   < > = values in this interval not displayed.    Cardiovascular Testing:  Echo: 11/27/17 (EF dropped from 50% 6/2017)    1 - Severely depressed left ventricular systolic function (EF 20-25%).     2 - Eccentric hypertrophy.     3 - Severe left ventricular enlargement.     4 - Impaired LV relaxation, elevated LAP (grade 2 diastolic dysfunction).     5 - Mild mitral regurgitation.     6 - Mild tricuspid regurgitation.     7 - The estimated PA systolic pressure is greater than 30 mmHg.      Cath 11/27/17  RA 5  RV 61/6  PA 60/29/42  PAWP 18  /41  Ao 163/107/130  CO 5.6 L/min  LVEF: 20% by echo with severe LV dilation  Wall Motion: Severe global HK  Dominance: Co-dom  LM: normal  LAD: normal  LCx: normal  RCA: normal  Hemostasis:  RFA/V manual compression  Impression:  Normal cors  Elevated LVEDP with transmission of pressures to pulmonary circuit  Above c/w Severe NICM  RFA/V manual compression  Plan:  Cont med rx  Stop Plavix  Stop amlodipine  Start Hydrala/Imdur/Lasix  Goal net neg 1L/day  Cont BBl/ACEi  Repeat echo in 3 months for reassessment of LV fxn and need for ICD  Follow up with Dr. Yanez 1 week after discharge     Stress Test: L MPI 9/20/13  Nuclear Quantitative Functional Analysis:   LVEF: 31 %  Impression: NORMAL MYOCARDIAL PERFUSION  1. The perfusion scan is free of evidence for myocardial ischemia or injury.   2. There is a moderate intensity fixed defect in the inferior wall of the left ventricle, secondary to diaphragm attenuation.   3. There is abnormal wall motion at rest showing moderate global hypokinesis of the left ventricle.   4. There is resting LV dysfunction with a reduced ejection fraction of 31 %.   5. The ventricular volumes are normal at rest and stress.   6. The extracardiac distribution of radioactivity is  normal.     ASSESSMENT:   # NICM, EF 20-25% by echo/cath 11/2017.  Pt appears euvolemic.  # HTN, mildly hypotensive/tachycardic today  # HLP, on atorva 80mg  # DM  # BMI 38, up 1 unit vs last OV  # ?MILE, sleep study pending    PLAN:   Cont med rx  Check echo 2 weeks (late feb 2018) for reassessment of LV fxn and need for ICD  Check lipids 2 weeks (late feb 2018)  Stop imdur given mild hypotension and tachycardia  Sleep study pending  Diet/exercise/weight loss, Na+ avoidance  RTC 1 month    Oziel Yanez MD, FACC

## 2018-02-20 ENCOUNTER — HOSPITAL ENCOUNTER (EMERGENCY)
Facility: HOSPITAL | Age: 43
Discharge: HOME | End: 2018-02-20
Attending: EMERGENCY MEDICINE
Payer: MEDICARE

## 2018-02-20 VITALS
WEIGHT: 239 LBS | SYSTOLIC BLOOD PRESSURE: 110 MMHG | HEART RATE: 88 BPM | DIASTOLIC BLOOD PRESSURE: 64 MMHG | HEIGHT: 67 IN | OXYGEN SATURATION: 94 % | BODY MASS INDEX: 37.51 KG/M2 | RESPIRATION RATE: 18 BRPM | TEMPERATURE: 98 F

## 2018-02-20 DIAGNOSIS — J06.9 UPPER RESPIRATORY TRACT INFECTION, UNSPECIFIED TYPE: Primary | ICD-10-CM

## 2018-02-20 DIAGNOSIS — J00 COMMON COLD: ICD-10-CM

## 2018-02-20 DIAGNOSIS — R52 BODY ACHES: ICD-10-CM

## 2018-02-20 DIAGNOSIS — R07.9 CHEST PAIN: ICD-10-CM

## 2018-02-20 LAB
ALBUMIN SERPL BCP-MCNC: 3.3 G/DL
ALP SERPL-CCNC: 118 U/L
ALT SERPL W/O P-5'-P-CCNC: 10 U/L
ANION GAP SERPL CALC-SCNC: 7 MMOL/L
AST SERPL-CCNC: 9 U/L
B-HCG UR QL: NEGATIVE
BACTERIA #/AREA URNS HPF: NORMAL /HPF
BASOPHILS # BLD AUTO: 0.02 K/UL
BASOPHILS NFR BLD: 0.3 %
BILIRUB SERPL-MCNC: 0.4 MG/DL
BILIRUB UR QL STRIP: NEGATIVE
BNP SERPL-MCNC: 153 PG/ML
BUN SERPL-MCNC: 10 MG/DL
CALCIUM SERPL-MCNC: 9.5 MG/DL
CHLORIDE SERPL-SCNC: 99 MMOL/L
CLARITY UR: CLEAR
CO2 SERPL-SCNC: 36 MMOL/L
COLOR UR: ABNORMAL
CREAT SERPL-MCNC: 1 MG/DL
CTP QC/QA: YES
DIFFERENTIAL METHOD: ABNORMAL
EOSINOPHIL # BLD AUTO: 0.1 K/UL
EOSINOPHIL NFR BLD: 2 %
ERYTHROCYTE [DISTWIDTH] IN BLOOD BY AUTOMATED COUNT: 16.9 %
EST. GFR  (AFRICAN AMERICAN): >60 ML/MIN/1.73 M^2
EST. GFR  (NON AFRICAN AMERICAN): >60 ML/MIN/1.73 M^2
FLUAV AG SPEC QL IA: NEGATIVE
FLUBV AG SPEC QL IA: NEGATIVE
GLUCOSE SERPL-MCNC: 229 MG/DL
GLUCOSE UR QL STRIP: ABNORMAL
HCT VFR BLD AUTO: 41.2 %
HGB BLD-MCNC: 12.7 G/DL
HGB UR QL STRIP: NEGATIVE
KETONES UR QL STRIP: NEGATIVE
LEUKOCYTE ESTERASE UR QL STRIP: NEGATIVE
LYMPHOCYTES # BLD AUTO: 1.7 K/UL
LYMPHOCYTES NFR BLD: 24.2 %
MCH RBC QN AUTO: 26.3 PG
MCHC RBC AUTO-ENTMCNC: 30.8 G/DL
MCV RBC AUTO: 86 FL
MICROSCOPIC COMMENT: NORMAL
MONOCYTES # BLD AUTO: 0.5 K/UL
MONOCYTES NFR BLD: 6.6 %
NEUTROPHILS # BLD AUTO: 4.8 K/UL
NEUTROPHILS NFR BLD: 66.8 %
NITRITE UR QL STRIP: NEGATIVE
PH UR STRIP: 6 [PH] (ref 5–8)
PLATELET # BLD AUTO: 300 K/UL
PMV BLD AUTO: 10.4 FL
POTASSIUM SERPL-SCNC: 3.6 MMOL/L
PROT SERPL-MCNC: 7 G/DL
PROT UR QL STRIP: NEGATIVE
RBC # BLD AUTO: 4.82 M/UL
RBC #/AREA URNS HPF: 0 /HPF (ref 0–4)
SODIUM SERPL-SCNC: 142 MMOL/L
SP GR UR STRIP: 1 (ref 1–1.03)
SPECIMEN SOURCE: NORMAL
SQUAMOUS #/AREA URNS HPF: NORMAL /HPF
TROPONIN I SERPL DL<=0.01 NG/ML-MCNC: 0.02 NG/ML
URN SPEC COLLECT METH UR: ABNORMAL
UROBILINOGEN UR STRIP-ACNC: NEGATIVE EU/DL
WBC # BLD AUTO: 7.12 K/UL
WBC #/AREA URNS HPF: 1 /HPF (ref 0–5)
YEAST URNS QL MICRO: NORMAL

## 2018-02-20 PROCEDURE — 93010 ELECTROCARDIOGRAM REPORT: CPT | Mod: ,,, | Performed by: INTERNAL MEDICINE

## 2018-02-20 PROCEDURE — 96374 THER/PROPH/DIAG INJ IV PUSH: CPT

## 2018-02-20 PROCEDURE — 84484 ASSAY OF TROPONIN QUANT: CPT

## 2018-02-20 PROCEDURE — 85025 COMPLETE CBC W/AUTO DIFF WBC: CPT

## 2018-02-20 PROCEDURE — 93005 ELECTROCARDIOGRAM TRACING: CPT

## 2018-02-20 PROCEDURE — 81000 URINALYSIS NONAUTO W/SCOPE: CPT

## 2018-02-20 PROCEDURE — 87400 INFLUENZA A/B EACH AG IA: CPT | Mod: 59

## 2018-02-20 PROCEDURE — 83880 ASSAY OF NATRIURETIC PEPTIDE: CPT

## 2018-02-20 PROCEDURE — 25000003 PHARM REV CODE 250: Performed by: EMERGENCY MEDICINE

## 2018-02-20 PROCEDURE — 80053 COMPREHEN METABOLIC PANEL: CPT

## 2018-02-20 PROCEDURE — 81025 URINE PREGNANCY TEST: CPT | Performed by: EMERGENCY MEDICINE

## 2018-02-20 PROCEDURE — 63600175 PHARM REV CODE 636 W HCPCS: Performed by: EMERGENCY MEDICINE

## 2018-02-20 PROCEDURE — 99284 EMERGENCY DEPT VISIT MOD MDM: CPT

## 2018-02-20 RX ORDER — ACETAMINOPHEN 325 MG/1
650 TABLET ORAL
Status: COMPLETED | OUTPATIENT
Start: 2018-02-20 | End: 2018-02-20

## 2018-02-20 RX ORDER — KETOROLAC TROMETHAMINE 10 MG/1
10 TABLET, FILM COATED ORAL
Status: DISCONTINUED | OUTPATIENT
Start: 2018-02-20 | End: 2018-02-20

## 2018-02-20 RX ORDER — KETOROLAC TROMETHAMINE 30 MG/ML
10 INJECTION, SOLUTION INTRAMUSCULAR; INTRAVENOUS
Status: COMPLETED | OUTPATIENT
Start: 2018-02-20 | End: 2018-02-20

## 2018-02-20 RX ORDER — KETOROLAC TROMETHAMINE 10 MG/1
10 TABLET, FILM COATED ORAL EVERY 12 HOURS PRN
Qty: 6 TABLET | Refills: 0 | Status: SHIPPED | OUTPATIENT
Start: 2018-02-20 | End: 2018-02-23

## 2018-02-20 RX ADMIN — KETOROLAC TROMETHAMINE 10 MG: 30 INJECTION, SOLUTION INTRAMUSCULAR at 02:02

## 2018-02-20 RX ADMIN — ACETAMINOPHEN 650 MG: 325 TABLET, FILM COATED ORAL at 02:02

## 2018-02-20 NOTE — ED PROVIDER NOTES
Encounter Date: 2/20/2018    SCRIBE #1 NOTE: I, Isela Ramirez, am scribing for, and in the presence of,  Bruce Pantoja MD. I have scribed the following portions of the note - Other sections scribed: HPI and ROS.       History     Chief Complaint   Patient presents with    Chest Pain     pt reports midsternal chest pain and generalized body aches starting earlier today;     Generalized Body Aches     CC: Chest Pain and Generalized Body Aches    HPI: The pt is a 42 y.o. F with a PMHx that includes HTN, CHF, cardiomyopathy, HLD, blood clot associated w/ vein wall inflammation, and diabetes and a past surgical hx of cardiac catheterization who presents to the ED c/o acute onset midsternal chest pain w/ some bilateral leg swelling and generalized body aches x today. She rates pain as severe (8/10). Pt also reports dry cough. No attempted treatments reported. Pt otherwise denies fever, rhinorrhea, congestion, n/v,  dysuria, and other associated symptoms.     Per chart review, L heart cath done by Dr. Yanez on 11/27/2017 revealed clear coronary.       The history is provided by the patient. No  was used.     Review of patient's allergies indicates:   Allergen Reactions    Pneumococcal 23-abimbola ps vaccine      Past Medical History:   Diagnosis Date    Blood clot associated with vein wall inflammation     not dvt    Cardiomyopathy     Normal cors on cath 11/2017    CHF (congestive heart failure)     DM (diabetes mellitus) 9/19/2013    Hyperlipidemia     Hypertension     Psoriasis     Sleep apnea      Past Surgical History:   Procedure Laterality Date    CARDIAC CATHETERIZATION      COLONOSCOPY      DILATION AND CURETTAGE OF UTERUS       Family History   Problem Relation Age of Onset    Hypertension Mother     Hypertension Father     Diabetes Father     Diabetes Maternal Grandmother     Diabetes Paternal Grandmother     Breast cancer Neg Hx     Colon cancer Neg Hx     Ovarian cancer  Neg Hx      Social History   Substance Use Topics    Smoking status: Passive Smoke Exposure - Never Smoker     Types: Cigarettes    Smokeless tobacco: Never Used      Comment: smokes cigars on occasion    Alcohol use Yes      Comment: occasional     Review of Systems   Constitutional: Negative for chills, diaphoresis and fever.   HENT: Negative for congestion, ear pain, rhinorrhea and sore throat.    Eyes: Negative for redness.   Respiratory: Positive for cough (dry). Negative for shortness of breath.    Cardiovascular: Positive for chest pain (8/10, midsternal) and leg swelling (bilateral).   Gastrointestinal: Negative for abdominal pain, diarrhea, nausea and vomiting.   Genitourinary: Negative for decreased urine volume, difficulty urinating, dysuria, flank pain, frequency, hematuria and urgency.   Musculoskeletal: Positive for myalgias (generalized). Negative for back pain.   Skin: Negative for rash.   Neurological: Negative for headaches.       Physical Exam     Initial Vitals [02/20/18 0107]   BP Pulse Resp Temp SpO2   (!) 145/77 100 18 97.9 °F (36.6 °C) 95 %      MAP       99.67         Physical Exam    Vitals reviewed.  Constitutional: She appears well-developed and well-nourished. She is Obese .   HENT:   Head: Normocephalic and atraumatic.   Nose: Nose normal.   Mouth/Throat: No oropharyngeal exudate.   MOUTH, EARS, and NOSE: The tympanic membranes are pearly gray. No evidence of otitis media.  The nasal mucosa is pink with no discharge. The oropharynx is pink with no tonsillar erythema or exudate. There was no evidence of abnormal masses or leukoplakia. No evidence of peritonsillar abscess.  No evidence of odontogenic abscess.     Eyes: EOM are normal. Pupils are equal, round, and reactive to light.   Neck: Normal range of motion. Neck supple. No JVD present.   Cardiovascular: Regular rhythm and normal heart sounds. Exam reveals no gallop and no friction rub.    No murmur heard.  Pulmonary/Chest: Breath  sounds normal. No stridor. No respiratory distress. She has no wheezes. She has no rhonchi. She has no rales. She exhibits no tenderness.   Abdominal: Soft. Bowel sounds are normal. She exhibits no distension and no mass. There is no tenderness. There is no rebound and no guarding.   Musculoskeletal: Normal range of motion. She exhibits no edema or tenderness.   Gross musculoskeletal review of all extremities shows no pain, misalignment, stiffness, joint swelling, decreased range of motion, crepitus, functional or sensory deficit, arthritis, or active sign of bleeding.  There is no evidence of compartment syndrome.  I doubt bony injury such as fracture or dislocation.  I doubt vascular injury.     Neurological: She is alert and oriented to person, place, and time. She has normal strength. No sensory deficit.   Skin: Skin is warm and dry.   Psychiatric: She has a normal mood and affect. Thought content normal.         ED Course   Procedures  Labs Reviewed   CBC W/ AUTO DIFFERENTIAL - Abnormal; Notable for the following:        Result Value    MCH 26.3 (*)     MCHC 30.8 (*)     RDW 16.9 (*)     All other components within normal limits   COMPREHENSIVE METABOLIC PANEL - Abnormal; Notable for the following:     CO2 36 (*)     Glucose 229 (*)     Albumin 3.3 (*)     AST 9 (*)     Anion Gap 7 (*)     All other components within normal limits   URINALYSIS - Abnormal; Notable for the following:     Glucose, UA 3+ (*)     All other components within normal limits   B-TYPE NATRIURETIC PEPTIDE - Abnormal; Notable for the following:      (*)     All other components within normal limits   TROPONIN I   INFLUENZA A AND B ANTIGEN   URINALYSIS MICROSCOPIC   POCT URINE PREGNANCY     EKG Readings: (Independently Interpreted)   Initial Reading: No STEMI. Rhythm: Normal Sinus Rhythm. Ectopy: No Ectopy. Conduction: Normal. ST Segments: Normal ST Segments. T Waves: Normal. Clinical Impression: Normal Sinus Rhythm       X-Rays:    Independently Interpreted Readings:   Chest X-Ray: No infiltrates.  No acute abnormalities. Cardiomegaly present.     Medical Decision Making:   History:   Old Medical Records: I decided to obtain old medical records.  Initial Assessment:   Medical decision-making:    The patient received a medical screening exam. If performed, the EKG was independently evaluated by me and is pending final cardiology evaluation.  If performed, all radiographic studies were independently evaluated by me and are pending final radiology evaluation. If labs were ordered, they were reviewed. Vital signs are independently assessed by me.  If performed, the pulse oximetry was independently evaluated by me.  I decided to obtain the patient's past medical record.  If available, I reviewed the patient's past medical record, including most recent labs and radiology reports.    ED Management:    The patient appears to have a viral upper respiratory infection.  Based upon the history and physical exam the patient does not appear to have a serious bacterial infection such as pneumonia, sepsis, otitis media, bacterial sinusitis, strep pharyngitis, parapharyngeal or peritonsillar abscess, meningitis.  Patient appears very well and I have given specific return precautions to the patient and/or family members.  The patient can take over the counter medications and does not appear to need antibiotics at this time.  Chest x-ray does not show any evidence of infiltrate or pneumonia.  The patient's chest pain is unlikely to be related to cardiac etiology.  Given the patient's clean catheterization several months ago.  Patient is not in heart failure.    The results and physical exam findings were reviewed with the patient. Pt agrees with assessment, disposition and treatment plan and has no further questions or complaints at this time.    Follow up with your primary care physician.    MYRIAM Pantoja M.D. 3:05 AM 2/20/2018                  Nolvia  Attestation:   Scribe #1: I performed the above scribed service and the documentation accurately describes the services I performed. I attest to the accuracy of the note.    Attending Attestation:           Physician Attestation for Scribe:  Physician Attestation Statement for Scribe #1: I, Bruce Pantoja MD, reviewed documentation, as scribed by Isela Ramirez in my presence, and it is both accurate and complete.                    Clinical Impression:   The primary encounter diagnosis was Upper respiratory tract infection, unspecified type. Diagnoses of Chest pain, Common cold, and Body aches were also pertinent to this visit.                           Bruce Pantoja MD  02/20/18 0307       Bruce Pantoja MD  02/20/18 0323

## 2018-02-20 NOTE — DISCHARGE INSTRUCTIONS
"Return to the Emergency Department of any acute worsening of your symptoms or for any other concern.     You should return to the ED for fever/chills, shortness of breath, chest pain, weakness or "passing out".     Pt should take all medications as prescribed.    Pt should follow up with PCP as soon as possible.    The risks associated with not taking your medications as prescribed and not following up with your Primary Care doctor or sub specialist includes worsening of your condition, pain, disability, loss of function or livelihood, and death      MYRIAM Pantoja M.D. 3:03 AM 2/20/2018      Our goal in the emergency department is to always give you outstanding care and exceptional service. You may receive a survey by mail or e-mail in the next week regarding your experience in our ED. We would greatly appreciate your completing and returning the survey. Your feedback provides us with a way to recognize our staff who give very good care and it helps us learn how to improve when your experience was below our aspiration of excellence.     "

## 2018-02-20 NOTE — ED TRIAGE NOTES
Pt c/o midsternal CP (tightness) without radiation and tachycardia on and off all day. Pt also c/o generalized body aches. Pt also c/o non productive cough x2 days. Pt denies fever, N/V/D. Hx CHF

## 2018-02-27 ENCOUNTER — HOSPITAL ENCOUNTER (EMERGENCY)
Facility: HOSPITAL | Age: 43
Discharge: HOME | End: 2018-02-27
Attending: EMERGENCY MEDICINE
Payer: MEDICARE

## 2018-02-27 VITALS
SYSTOLIC BLOOD PRESSURE: 142 MMHG | TEMPERATURE: 98 F | DIASTOLIC BLOOD PRESSURE: 99 MMHG | WEIGHT: 237 LBS | HEIGHT: 66 IN | RESPIRATION RATE: 17 BRPM | BODY MASS INDEX: 38.09 KG/M2 | OXYGEN SATURATION: 99 % | HEART RATE: 99 BPM

## 2018-02-27 DIAGNOSIS — M79.10 MYALGIA: ICD-10-CM

## 2018-02-27 DIAGNOSIS — J98.4 PNEUMONITIS: Primary | ICD-10-CM

## 2018-02-27 DIAGNOSIS — R05.9 COUGH: ICD-10-CM

## 2018-02-27 LAB
ALBUMIN SERPL BCP-MCNC: 3.9 G/DL
ALP SERPL-CCNC: 106 U/L
ALT SERPL W/O P-5'-P-CCNC: 11 U/L
ANION GAP SERPL CALC-SCNC: 11 MMOL/L
AST SERPL-CCNC: 16 U/L
B-HCG UR QL: NEGATIVE
BASOPHILS # BLD AUTO: 0.01 K/UL
BASOPHILS NFR BLD: 0.1 %
BILIRUB SERPL-MCNC: 1.2 MG/DL
BNP SERPL-MCNC: 82 PG/ML
BUN SERPL-MCNC: 10 MG/DL
CALCIUM SERPL-MCNC: 10.6 MG/DL
CHLORIDE SERPL-SCNC: 98 MMOL/L
CK SERPL-CCNC: 65 U/L
CO2 SERPL-SCNC: 30 MMOL/L
CREAT SERPL-MCNC: 1 MG/DL
CTP QC/QA: YES
DIFFERENTIAL METHOD: ABNORMAL
EOSINOPHIL # BLD AUTO: 0.1 K/UL
EOSINOPHIL NFR BLD: 1.2 %
ERYTHROCYTE [DISTWIDTH] IN BLOOD BY AUTOMATED COUNT: 16.5 %
EST. GFR  (AFRICAN AMERICAN): >60 ML/MIN/1.73 M^2
EST. GFR  (NON AFRICAN AMERICAN): >60 ML/MIN/1.73 M^2
GLUCOSE SERPL-MCNC: 134 MG/DL
HCT VFR BLD AUTO: 47.6 %
HGB BLD-MCNC: 15.3 G/DL
LYMPHOCYTES # BLD AUTO: 1.3 K/UL
LYMPHOCYTES NFR BLD: 16.9 %
MAGNESIUM SERPL-MCNC: 1.9 MG/DL
MCH RBC QN AUTO: 26.9 PG
MCHC RBC AUTO-ENTMCNC: 32.1 G/DL
MCV RBC AUTO: 84 FL
MONOCYTES # BLD AUTO: 0.5 K/UL
MONOCYTES NFR BLD: 6.2 %
NEUTROPHILS # BLD AUTO: 5.8 K/UL
NEUTROPHILS NFR BLD: 75.6 %
PLATELET # BLD AUTO: 309 K/UL
PMV BLD AUTO: 10.5 FL
POTASSIUM SERPL-SCNC: 4.8 MMOL/L
PROT SERPL-MCNC: 8.9 G/DL
RBC # BLD AUTO: 5.68 M/UL
SODIUM SERPL-SCNC: 139 MMOL/L
TROPONIN I SERPL DL<=0.01 NG/ML-MCNC: 0.02 NG/ML
WBC # BLD AUTO: 7.73 K/UL

## 2018-02-27 PROCEDURE — 83880 ASSAY OF NATRIURETIC PEPTIDE: CPT

## 2018-02-27 PROCEDURE — 82962 GLUCOSE BLOOD TEST: CPT

## 2018-02-27 PROCEDURE — 81025 URINE PREGNANCY TEST: CPT | Performed by: NURSE PRACTITIONER

## 2018-02-27 PROCEDURE — 93005 ELECTROCARDIOGRAM TRACING: CPT

## 2018-02-27 PROCEDURE — 99284 EMERGENCY DEPT VISIT MOD MDM: CPT | Mod: 25

## 2018-02-27 PROCEDURE — 25000003 PHARM REV CODE 250: Performed by: EMERGENCY MEDICINE

## 2018-02-27 PROCEDURE — 93010 ELECTROCARDIOGRAM REPORT: CPT | Mod: ,,, | Performed by: INTERNAL MEDICINE

## 2018-02-27 PROCEDURE — 83735 ASSAY OF MAGNESIUM: CPT

## 2018-02-27 PROCEDURE — 82550 ASSAY OF CK (CPK): CPT

## 2018-02-27 PROCEDURE — 80053 COMPREHEN METABOLIC PANEL: CPT

## 2018-02-27 PROCEDURE — 84484 ASSAY OF TROPONIN QUANT: CPT

## 2018-02-27 PROCEDURE — 85025 COMPLETE CBC W/AUTO DIFF WBC: CPT

## 2018-02-27 RX ORDER — AZITHROMYCIN 250 MG/1
500 TABLET, FILM COATED ORAL DAILY
Qty: 7 TABLET | Refills: 0 | Status: SHIPPED | OUTPATIENT
Start: 2018-02-27 | End: 2018-03-26 | Stop reason: ALTCHOICE

## 2018-02-27 RX ORDER — OSELTAMIVIR PHOSPHATE 75 MG/1
75 CAPSULE ORAL 2 TIMES DAILY
Qty: 10 CAPSULE | Refills: 0 | Status: SHIPPED | OUTPATIENT
Start: 2018-02-27 | End: 2018-03-04

## 2018-02-27 RX ORDER — PROMETHAZINE HYDROCHLORIDE AND DEXTROMETHORPHAN HYDROBROMIDE 6.25; 15 MG/5ML; MG/5ML
5 SYRUP ORAL EVERY 6 HOURS PRN
Qty: 180 ML | Refills: 0 | Status: SHIPPED | OUTPATIENT
Start: 2018-02-27 | End: 2018-03-09

## 2018-02-27 RX ORDER — HYDROCODONE BITARTRATE AND ACETAMINOPHEN 5; 325 MG/1; MG/1
2 TABLET ORAL
Status: COMPLETED | OUTPATIENT
Start: 2018-02-27 | End: 2018-02-27

## 2018-02-27 RX ORDER — TRAMADOL HYDROCHLORIDE 50 MG/1
50 TABLET ORAL EVERY 6 HOURS PRN
Qty: 10 TABLET | Refills: 0 | Status: SHIPPED | OUTPATIENT
Start: 2018-02-27 | End: 2018-03-09

## 2018-02-27 RX ADMIN — HYDROCODONE BITARTRATE AND ACETAMINOPHEN 2 TABLET: 5; 325 TABLET ORAL at 09:02

## 2018-02-28 LAB — POCT GLUCOSE: 130 MG/DL (ref 70–110)

## 2018-02-28 NOTE — ED PROVIDER NOTES
Encounter Date: 2/27/2018       History     Chief Complaint   Patient presents with    Fatigue     Body aches and weakness that began earlier today.    Coughing with mucous for past week, denies fever.      HPI   This 42-year-old female presents to the emergency room complaining of a 7 day history of rhinorrhea cough sore throat and generalized body aches..  The sore throat is still scratchy.  The patient has a history of congestive heart failure.  She has been to the emergency room twice.  The patient gets shortness of breath, only when she lays down at night.  There is no chest pain pressure or tightness.  She had a negative angiogram in the recent past.  She does have a history of hypertension and diabetes.  She is without other injuries or problems.  She is asking for pain medicine.  Review of patient's allergies indicates:   Allergen Reactions    Pneumococcal 23-abimbola ps vaccine      Past Medical History:   Diagnosis Date    Blood clot associated with vein wall inflammation     not dvt    Cardiomyopathy     Normal cors on cath 11/2017    CHF (congestive heart failure)     DM (diabetes mellitus) 9/19/2013    Hyperlipidemia     Hypertension     Psoriasis     Sleep apnea      Past Surgical History:   Procedure Laterality Date    CARDIAC CATHETERIZATION      COLONOSCOPY      DILATION AND CURETTAGE OF UTERUS       Family History   Problem Relation Age of Onset    Hypertension Mother     Hypertension Father     Diabetes Father     Diabetes Maternal Grandmother     Diabetes Paternal Grandmother     Breast cancer Neg Hx     Colon cancer Neg Hx     Ovarian cancer Neg Hx      Social History   Substance Use Topics    Smoking status: Passive Smoke Exposure - Never Smoker     Types: Cigarettes    Smokeless tobacco: Never Used      Comment: smokes cigars on occasion    Alcohol use Yes      Comment: occasional     Review of Systems  The patient was questioned specifically with regard to the  following.  General: Fever, chills, sweats. Neuro: Headache. Eyes: eye problems. ENT: Ear pain, sore throat. Cardiovascular: Chest pain. Respiratory: Cough, shortness of breath. Gastrointestinal: Abdominal pain, vomiting, diarrhea. Genitourinary: Painful urination.  Musculoskeletal: Arm and leg problems. Skin: Rash.  The review of systems was negative except for the following: Cough generalized body aches sore throat rhinorrhea shortness of breath when she lays down at night.  Physical Exam     Initial Vitals [02/27/18 1938]   BP Pulse Resp Temp SpO2   (!) 162/105 96 16 96.5 °F (35.8 °C) 95 %      MAP       124         Physical Exam  The patient was examined specifically for the following:   General:No significant distress, Good color, Warm and dry. Head and neck:Scalp atraumatic, Neck supple. Neurological:Appropriate conversation, Gross motor deficits. Eyes:Conjugate gaze, Clear corneas. ENT: No epistaxis. Cardiac: Regular rate and rhythm, Grossly normal heart tones. Pulmonary: Wheezing, Rales. Gastrointestinal: Abdominal tenderness, Abdominal distention. Musculoskeletal: Extremity deformity, Apparent pain with range of motion of the joints. Skin: Rash.   The findings on examination were normal except for the following: Patient has facial congestion.  The pharynx is unremarkable.  The lungs are clear and free of wheezing rales of the rhonchi.  The respiratory rate is 16 the patient is afebrile patient's blood pressures 162/105.  Heart tones are normal.  The patient has a regular rate and rhythm.  ED Course   Procedures  Labs Reviewed   COMPREHENSIVE METABOLIC PANEL - Abnormal; Notable for the following:        Result Value    CO2 30 (*)     Glucose 134 (*)     Calcium 10.6 (*)     Total Protein 8.9 (*)     Total Bilirubin 1.2 (*)     All other components within normal limits   CBC W/ AUTO DIFFERENTIAL - Abnormal; Notable for the following:     RBC 5.68 (*)     MCH 26.9 (*)     RDW 16.5 (*)     Gran% 75.6 (*)      Lymph% 16.9 (*)     All other components within normal limits   POCT GLUCOSE - Abnormal; Notable for the following:     POCT Glucose 130 (*)     All other components within normal limits   B-TYPE NATRIURETIC PEPTIDE   TROPONIN I   CK   MAGNESIUM   POCT URINE PREGNANCY     EKG Readings: (Independently Interpreted)   This patient is in a normal sinus rhythm with a heart rate in 94.  The IL QRS and QT intervals are normal.  There is poor R-wave progression across precordium.  There is no evidence of acute myocardial infarction or malignant arrhythmia.       X-Rays:   Independently Interpreted Readings:   Other Readings:  Chest x-ray reveals findings suggestive of interstitial pneumonia or congestive heart failure      Medical decision making: Given the above, this patient presents to the emergency room with cough.  Chest x-ray reveals a viral pneumonitis.  I will treat with Tamiflu.  I will treat with Zithromax and an abundance of caution.  I doubt bacterial disease.  Patient has generalized body aches but a normal CPK at doubt rhabdomyolysis.  There are no electrolyte abnormalities to explain this pain viral syndrome seems most likely.  I will have the patient follow-up with primary care.  I carefully considered congestive heart failure I think it is unlikely the patient has no pedal edema the BNP is normal.                        Clinical Impression:   The primary encounter diagnosis was Pneumonitis. Diagnoses of Cough and Myalgia were also pertinent to this visit.                           Cristian Treadwell MD  03/02/18 2625

## 2018-02-28 NOTE — ED TRIAGE NOTES
Patient states fatigue, cough and sore throat for a week, seen last week for same symptoms, now states getting worse

## 2018-02-28 NOTE — DISCHARGE INSTRUCTIONS
Please use Tylenol 650 mg by mouth every 6 hours as needed for pain.  Tramadol for pain.  Phenergan DM for cough.  Zithromax and Tamiflu as directed.  Please follow-up with your primary care doctor this week.  Rest.

## 2018-03-01 ENCOUNTER — TELEPHONE (OUTPATIENT)
Dept: SLEEP MEDICINE | Facility: CLINIC | Age: 43
End: 2018-03-01

## 2018-03-02 PROCEDURE — 82962 GLUCOSE BLOOD TEST: CPT

## 2018-03-02 PROCEDURE — 96374 THER/PROPH/DIAG INJ IV PUSH: CPT

## 2018-03-02 PROCEDURE — 99284 EMERGENCY DEPT VISIT MOD MDM: CPT | Mod: 25

## 2018-03-03 ENCOUNTER — HOSPITAL ENCOUNTER (EMERGENCY)
Facility: HOSPITAL | Age: 43
Discharge: HOME | End: 2018-03-03
Attending: EMERGENCY MEDICINE
Payer: MEDICARE

## 2018-03-03 VITALS
OXYGEN SATURATION: 97 % | SYSTOLIC BLOOD PRESSURE: 120 MMHG | WEIGHT: 237 LBS | HEIGHT: 67 IN | HEART RATE: 96 BPM | DIASTOLIC BLOOD PRESSURE: 64 MMHG | BODY MASS INDEX: 37.2 KG/M2 | RESPIRATION RATE: 20 BRPM | TEMPERATURE: 99 F

## 2018-03-03 DIAGNOSIS — R00.2 PALPITATIONS: ICD-10-CM

## 2018-03-03 DIAGNOSIS — R06.02 SHORTNESS OF BREATH: Primary | ICD-10-CM

## 2018-03-03 DIAGNOSIS — R05.9 COUGHING: ICD-10-CM

## 2018-03-03 LAB
ALBUMIN SERPL BCP-MCNC: 3.3 G/DL
ALP SERPL-CCNC: 102 U/L
ALT SERPL W/O P-5'-P-CCNC: 10 U/L
ANION GAP SERPL CALC-SCNC: 11 MMOL/L
AST SERPL-CCNC: 13 U/L
B-HCG UR QL: NEGATIVE
B-OH-BUTYR BLD STRIP-SCNC: 0.1 MMOL/L
BASOPHILS # BLD AUTO: 0.03 K/UL
BASOPHILS NFR BLD: 0.5 %
BILIRUB SERPL-MCNC: 0.6 MG/DL
BNP SERPL-MCNC: 536 PG/ML
BUN SERPL-MCNC: 12 MG/DL
CALCIUM SERPL-MCNC: 9.6 MG/DL
CHLORIDE SERPL-SCNC: 103 MMOL/L
CO2 SERPL-SCNC: 30 MMOL/L
CREAT SERPL-MCNC: 1.1 MG/DL
CTP QC/QA: YES
D DIMER PPP IA.FEU-MCNC: 0.6 MG/L FEU
DIFFERENTIAL METHOD: ABNORMAL
EOSINOPHIL # BLD AUTO: 0.2 K/UL
EOSINOPHIL NFR BLD: 3.3 %
ERYTHROCYTE [DISTWIDTH] IN BLOOD BY AUTOMATED COUNT: 17.2 %
EST. GFR  (AFRICAN AMERICAN): >60 ML/MIN/1.73 M^2
EST. GFR  (NON AFRICAN AMERICAN): >60 ML/MIN/1.73 M^2
GLUCOSE SERPL-MCNC: 153 MG/DL
HCT VFR BLD AUTO: 41.7 %
HGB BLD-MCNC: 13.3 G/DL
LYMPHOCYTES # BLD AUTO: 1.4 K/UL
LYMPHOCYTES NFR BLD: 21.6 %
MCH RBC QN AUTO: 26.7 PG
MCHC RBC AUTO-ENTMCNC: 31.9 G/DL
MCV RBC AUTO: 84 FL
MONOCYTES # BLD AUTO: 0.5 K/UL
MONOCYTES NFR BLD: 7.1 %
NEUTROPHILS # BLD AUTO: 4.5 K/UL
NEUTROPHILS NFR BLD: 67 %
OSMOLALITY SERPL: 302 MOSM/KG
PLATELET # BLD AUTO: 367 K/UL
PMV BLD AUTO: 11 FL
POCT GLUCOSE: 122 MG/DL (ref 70–110)
POTASSIUM SERPL-SCNC: 4.1 MMOL/L
PROT SERPL-MCNC: 7.6 G/DL
RBC # BLD AUTO: 4.98 M/UL
SODIUM SERPL-SCNC: 144 MMOL/L
TROPONIN I SERPL DL<=0.01 NG/ML-MCNC: 0.03 NG/ML
TROPONIN I SERPL DL<=0.01 NG/ML-MCNC: 0.03 NG/ML
WBC # BLD AUTO: 6.66 K/UL

## 2018-03-03 PROCEDURE — 83880 ASSAY OF NATRIURETIC PEPTIDE: CPT

## 2018-03-03 PROCEDURE — 25000242 PHARM REV CODE 250 ALT 637 W/ HCPCS: Performed by: EMERGENCY MEDICINE

## 2018-03-03 PROCEDURE — 85379 FIBRIN DEGRADATION QUANT: CPT

## 2018-03-03 PROCEDURE — 93010 ELECTROCARDIOGRAM REPORT: CPT | Mod: ,,, | Performed by: INTERNAL MEDICINE

## 2018-03-03 PROCEDURE — 82010 KETONE BODYS QUAN: CPT

## 2018-03-03 PROCEDURE — 80053 COMPREHEN METABOLIC PANEL: CPT

## 2018-03-03 PROCEDURE — 85025 COMPLETE CBC W/AUTO DIFF WBC: CPT

## 2018-03-03 PROCEDURE — 63600175 PHARM REV CODE 636 W HCPCS: Performed by: EMERGENCY MEDICINE

## 2018-03-03 PROCEDURE — 83930 ASSAY OF BLOOD OSMOLALITY: CPT

## 2018-03-03 PROCEDURE — 81025 URINE PREGNANCY TEST: CPT | Performed by: EMERGENCY MEDICINE

## 2018-03-03 PROCEDURE — 94640 AIRWAY INHALATION TREATMENT: CPT

## 2018-03-03 PROCEDURE — 84484 ASSAY OF TROPONIN QUANT: CPT

## 2018-03-03 PROCEDURE — 93005 ELECTROCARDIOGRAM TRACING: CPT

## 2018-03-03 RX ORDER — IPRATROPIUM BROMIDE AND ALBUTEROL SULFATE 2.5; .5 MG/3ML; MG/3ML
3 SOLUTION RESPIRATORY (INHALATION)
Status: COMPLETED | OUTPATIENT
Start: 2018-03-03 | End: 2018-03-03

## 2018-03-03 RX ORDER — FUROSEMIDE 10 MG/ML
40 INJECTION INTRAMUSCULAR; INTRAVENOUS
Status: COMPLETED | OUTPATIENT
Start: 2018-03-03 | End: 2018-03-03

## 2018-03-03 RX ADMIN — FUROSEMIDE 40 MG: 10 INJECTION, SOLUTION INTRAMUSCULAR; INTRAVENOUS at 03:03

## 2018-03-03 RX ADMIN — IPRATROPIUM BROMIDE AND ALBUTEROL SULFATE 3 ML: .5; 2.5 SOLUTION RESPIRATORY (INHALATION) at 03:03

## 2018-03-03 NOTE — ED TRIAGE NOTES
Pt presents today with shortness of breath and palpitations. She states she was getting ready for bed and taking her evening pills about an hour ago when she felt her heart rate racing and developed shortness of breath. Pt states she was diagnosed with pneumonia earlier this week. At time of intake, pt appears anxious and tachypneic. Pt coached to slow breathing down and pt became calm. She states shortness of breath is still present, although not as severe. She states she does not feel her heart racing at this time.

## 2018-03-03 NOTE — ED PROVIDER NOTES
Encounter Date: 3/2/2018    SCRIBE #1 NOTE: I, Loretta Gonsales, am scribing for, and in the presence of,  Lauren Zarate MD. I have scribed the following portions of the note - Other sections scribed: HPI, ROS, PE.       History     Chief Complaint   Patient presents with    Shortness of Breath     x 1 hour. Patient states she was taking her medication to get in bed and became SOB.     CC: Palpitations    HPI: 42 year old female with CHF, HTN, DM, and HLD presents to the ED c/o palpitations and SOB x 2 hours. Pt states she felt her heart race and felt short of breath after she took a bath and went to lie down. Episode lasted for 1 hour. Pt reports having intermittent chest tightness with the palpitations but denies having any chest pain. Pt was recently diagnosed with pneumonia on 2/17/18 and reports compliance with Z-sadiq. Pt otherwise denies fever, chills, abdominal pain, N/V/D, changes in urinary habits, headaches, diaphoresis, numbness, weakness, leg swelling, abdominal swelling, and any other associated symptoms.     Seen in ED for cough 2/27 (4 days ago) when she was rx'ed azithromycin.      The history is provided by the patient. No  was used.     Review of patient's allergies indicates:   Allergen Reactions    Pneumococcal 23-abimbola ps vaccine      Past Medical History:   Diagnosis Date    Blood clot associated with vein wall inflammation     not dvt    Cardiomyopathy     Normal cors on cath 11/2017    CHF (congestive heart failure)     DM (diabetes mellitus) 9/19/2013    Hyperlipidemia     Hypertension     Psoriasis     Sleep apnea      Past Surgical History:   Procedure Laterality Date    CARDIAC CATHETERIZATION      COLONOSCOPY      DILATION AND CURETTAGE OF UTERUS       Family History   Problem Relation Age of Onset    Hypertension Mother     Hypertension Father     Diabetes Father     Diabetes Maternal Grandmother     Diabetes Paternal Grandmother     Breast cancer Neg  Hx     Colon cancer Neg Hx     Ovarian cancer Neg Hx      Social History   Substance Use Topics    Smoking status: Passive Smoke Exposure - Never Smoker     Types: Cigarettes    Smokeless tobacco: Never Used      Comment: smokes cigars on occasion    Alcohol use Yes      Comment: occasional     Review of Systems   Constitutional: Negative for chills, diaphoresis and fever.   HENT: Negative for ear pain and sore throat.    Eyes: Negative for photophobia and visual disturbance.   Respiratory: Positive for chest tightness and shortness of breath. Negative for cough.    Cardiovascular: Positive for palpitations. Negative for chest pain and leg swelling.   Gastrointestinal: Negative for abdominal distention, abdominal pain, diarrhea, nausea and vomiting.   Genitourinary: Negative for dysuria.   Musculoskeletal: Negative for back pain and neck stiffness.   Skin: Negative for rash.   Neurological: Negative for headaches.       Physical Exam     Initial Vitals [03/02/18 2359]   BP Pulse Resp Temp SpO2   137/85 86 20 98 °F (36.7 °C) 99 %      MAP       102.33         Physical Exam    Nursing note and vitals reviewed.  Constitutional: She appears well-developed and well-nourished. She is not diaphoretic.  Non-toxic appearance. She does not appear ill.   Awake, alert, obese, speaking in complete sentences   HENT:   Head: Normocephalic and atraumatic.   Mouth/Throat: Oropharynx is clear and moist.   Eyes: Conjunctivae and EOM are normal. Pupils are equal, round, and reactive to light.   Neck: Normal range of motion. Neck supple.   Cardiovascular: Normal rate and regular rhythm.   Murmur heard.  Pulmonary/Chest: Effort normal. She has no wheezes. She has no rhonchi. She has no rales.   Coughing intermittently.    Abdominal: Soft. Normal appearance and bowel sounds are normal. She exhibits no distension. There is no tenderness.   Musculoskeletal: Normal range of motion. She exhibits no edema or tenderness.   Neurological:  She is alert and oriented to person, place, and time. She has normal strength.   Skin: Skin is warm and dry. There is pallor.   Psychiatric: She has a normal mood and affect.         ED Course   Procedures  Labs Reviewed   CBC W/ AUTO DIFFERENTIAL - Abnormal; Notable for the following:        Result Value    MCH 26.7 (*)     MCHC 31.9 (*)     RDW 17.2 (*)     Platelets 367 (*)     All other components within normal limits   D DIMER, QUANTITATIVE - Abnormal; Notable for the following:     D-Dimer 0.60 (*)     All other components within normal limits   POCT GLUCOSE - Abnormal; Notable for the following:     POCT Glucose 122 (*)     All other components within normal limits   BETA - HYDROXYBUTYRATE, SERUM   COMPREHENSIVE METABOLIC PANEL   B-TYPE NATRIURETIC PEPTIDE   TROPONIN I   OSMOLALITY   POCT URINE PREGNANCY   POCT GLUCOSE MONITORING CONTINUOUS     EKG Readings: (Independently Interpreted)   00:25: NSR, HR 89. L axis. LVH. PVCs. TWIs in V5, V6. No STEMI.       X-Rays:   Independently Interpreted Readings:   Other Readings:  CXR cardiomegaly, no pulmonary edema    Medical Decision Making:   History:   Old Medical Records: I decided to obtain old medical records.  Old Records Summarized: records from previous admission(s), records from another hospital and records from clinic visits.       <> Summary of Records: Echocardiogram 11/27/17:   CONCLUSIONS     1 - Severely depressed left ventricular systolic function (EF 20-25%).     2 - Eccentric hypertrophy.     3 - Severe left ventricular enlargement.     4 - Impaired LV relaxation, elevated LAP (grade 2 diastolic dysfunction).     5 - Mild mitral regurgitation.     6 - Mild tricuspid regurgitation.     7 - The estimated PA systolic pressure is greater than 30 mmHg.   Initial Assessment:   This is an emergent evaluation of a 42 y.o. Female with CHF, now with SOB and palpitations; palpitations have resolved.  Differential Diagnosis:   Ddx includes CHF exacerbation,  PNA, ACS, symptomatic anemia, PNA, other.  Independently Interpreted Test(s):   I have ordered and independently interpreted X-rays - see prior notes.  I have ordered and independently interpreted EKG Reading(s) - see prior notes  Clinical Tests:   Lab Tests: Ordered and Reviewed  Radiological Study: Ordered and Reviewed  Medical Tests: Ordered and Reviewed  ED Management:  EKG no STEMI.    CXR NAD.    CBC, CMP without acute pathology. Ddimer 0.60; this has been elevated in the past and CTA chests have been negative. Troponins 0.030 and 0.031, c/w prior. ; this is more than in the recent past but this has been elevated as well.    Patient tx'ed with IV lasix and duonebs. As she is maintaining sats here, and has not had any significant dysrhythmia to telemetry (occasional PVCs only but no runs of same), she is stable for d/c to outpatient f/u. Will continue previously rx'ed zpack.            Scribe Attestation:   Scribe #1: I performed the above scribed service and the documentation accurately describes the services I performed. I attest to the accuracy of the note.    Attending Attestation:           Physician Attestation for Scribe:  Physician Attestation Statement for Scribe #1: I, Lauren Zarate MD, reviewed documentation, as scribed by Loretta Gonsales in my presence, and it is both accurate and complete.                    Clinical Impression:   The primary encounter diagnosis was Shortness of breath. Diagnoses of Palpitations and Coughing were also pertinent to this visit.                           Lauren Zarate MD  03/03/18 0998

## 2018-03-12 ENCOUNTER — HOSPITAL ENCOUNTER (OUTPATIENT)
Dept: CARDIOLOGY | Facility: HOSPITAL | Age: 43
Discharge: HOME | End: 2018-03-12
Attending: INTERNAL MEDICINE
Payer: MEDICARE

## 2018-03-12 DIAGNOSIS — I42.8 NONISCHEMIC CARDIOMYOPATHY: ICD-10-CM

## 2018-03-12 LAB
DIASTOLIC DYSFUNCTION: YES
ESTIMATED PA SYSTOLIC PRESSURE: 35.26
GLOBAL PERICARDIAL EFFUSION: ABNORMAL
MITRAL VALVE MOBILITY: NORMAL
MITRAL VALVE REGURGITATION: ABNORMAL
RETIRED EF AND QEF - SEE NOTES: 15 (ref 55–65)
TRICUSPID VALVE REGURGITATION: ABNORMAL

## 2018-03-12 PROCEDURE — 93306 TTE W/DOPPLER COMPLETE: CPT | Mod: 26,,, | Performed by: INTERNAL MEDICINE

## 2018-03-12 PROCEDURE — 93306 TTE W/DOPPLER COMPLETE: CPT

## 2018-03-19 ENCOUNTER — OFFICE VISIT (OUTPATIENT)
Dept: CARDIOLOGY | Facility: CLINIC | Age: 43
End: 2018-03-19
Payer: MEDICARE

## 2018-03-19 ENCOUNTER — PATIENT MESSAGE (OUTPATIENT)
Dept: FAMILY MEDICINE | Facility: CLINIC | Age: 43
End: 2018-03-19

## 2018-03-19 VITALS
RESPIRATION RATE: 15 BRPM | BODY MASS INDEX: 38.44 KG/M2 | WEIGHT: 244.94 LBS | SYSTOLIC BLOOD PRESSURE: 112 MMHG | HEIGHT: 67 IN | OXYGEN SATURATION: 96 % | HEART RATE: 78 BPM | DIASTOLIC BLOOD PRESSURE: 77 MMHG

## 2018-03-19 DIAGNOSIS — I10 ESSENTIAL HYPERTENSION: ICD-10-CM

## 2018-03-19 DIAGNOSIS — E78.2 MIXED HYPERLIPIDEMIA: ICD-10-CM

## 2018-03-19 DIAGNOSIS — I42.8 NONISCHEMIC CARDIOMYOPATHY: Primary | ICD-10-CM

## 2018-03-19 DIAGNOSIS — E11.9 TYPE 2 DIABETES MELLITUS WITHOUT COMPLICATION, WITHOUT LONG-TERM CURRENT USE OF INSULIN: ICD-10-CM

## 2018-03-19 DIAGNOSIS — E66.9 OBESITY WITH BODY MASS INDEX (BMI) OF 30.0 TO 39.9: ICD-10-CM

## 2018-03-19 PROCEDURE — 99214 OFFICE O/P EST MOD 30 MIN: CPT | Mod: S$GLB,,, | Performed by: INTERNAL MEDICINE

## 2018-03-19 PROCEDURE — 3046F HEMOGLOBIN A1C LEVEL >9.0%: CPT | Mod: CPTII,S$GLB,, | Performed by: INTERNAL MEDICINE

## 2018-03-19 PROCEDURE — 3074F SYST BP LT 130 MM HG: CPT | Mod: CPTII,S$GLB,, | Performed by: INTERNAL MEDICINE

## 2018-03-19 PROCEDURE — 3078F DIAST BP <80 MM HG: CPT | Mod: CPTII,S$GLB,, | Performed by: INTERNAL MEDICINE

## 2018-03-19 PROCEDURE — 99999 PR PBB SHADOW E&M-EST. PATIENT-LVL III: CPT | Mod: PBBFAC,,, | Performed by: INTERNAL MEDICINE

## 2018-03-19 PROCEDURE — 99499 UNLISTED E&M SERVICE: CPT | Mod: S$GLB,,, | Performed by: INTERNAL MEDICINE

## 2018-03-19 NOTE — ED NOTES
Confirmed with Michelle that pt's tele box is transmitting properly.  
Patient placed on continuous cardiac monitor, automatic blood pressure cuff and continuous pulse oximeter.  
Patient report given to Cookie Gomes. Patient AAOx4 with NAD noted at this time.  
Yes

## 2018-03-19 NOTE — PROGRESS NOTES
CARDIOVASCULAR PROGRESS NOTE    REASON FOR CONSULT:   Fabiana Chanel is a 42 y.o. female who presents for follow up of NICM.    PCP: Bertram  HISTORY OF PRESENT ILLNESS:   The patient returns for follow-up.  In the interim since her last office visit, she's been seen in the emergency room several times and diagnosed with bronchitis/pneumonia.  She's currently feeling back to a sliding.  She denies chest discomfort, shortness of breath, dictations, lightheadedness, dizziness, or syncope.  There's been no PND, orthopnea, or lower extremity edema.  She denies melena, hematuria, or claudicant symptoms.    I reviewed the results of her echocardiogram noting persistently severely reduced LV systolic function.  Her EKG notes LVH with QRS duration approximately 110 ms.  I plan to refer to Dr. Christine for evaluation of placement of a possible ICD plus minus biventricular leads.    CARDIOVASCULAR HISTORY:   NICM (cath 11/2017) EF 15-20% by echo 3/2018    PAST MEDICAL HISTORY:     Past Medical History:   Diagnosis Date    Blood clot associated with vein wall inflammation     not dvt    Cardiomyopathy     Normal cors on cath 11/2017    CHF (congestive heart failure)     DM (diabetes mellitus) 9/19/2013    Hyperlipidemia     Hypertension     Psoriasis     Sleep apnea        PAST SURGICAL HISTORY:     Past Surgical History:   Procedure Laterality Date    CARDIAC CATHETERIZATION      COLONOSCOPY      DILATION AND CURETTAGE OF UTERUS         ALLERGIES AND MEDICATION:     Review of patient's allergies indicates:   Allergen Reactions    Pneumococcal 23-abimbola ps vaccine      Previous Medications    ALBUTEROL 90 MCG/ACTUATION INHALER    Inhale 1-2 puffs into the lungs every 6 (six) hours as needed for Wheezing. Rescue    ASPIRIN 81 MG CHEW    Take 81 mg by mouth once daily.    ATORVASTATIN (LIPITOR) 80 MG TABLET    Take 1 tablet (80 mg total) by mouth nightly.    AZITHROMYCIN (ZITHROMAX Z-THAO) 250 MG TABLET    Take 2  "tablets (500 mg total) by mouth once daily.    ESOMEPRAZOLE (NEXIUM) 40 MG CAPSULE    Take 40 mg by mouth before breakfast.    FUROSEMIDE (LASIX) 20 MG TABLET    Take 1 tablet (20 mg total) by mouth once daily.    GABAPENTIN (NEURONTIN) 400 MG CAPSULE    Take 400 mg by mouth Daily.    GLIMEPIRIDE (AMARYL) 4 MG TABLET    Take 1 tablet (4 mg total) by mouth before breakfast.    HYDRALAZINE (APRESOLINE) 100 MG TABLET    Take 1 tablet (100 mg total) by mouth 2 (two) times daily.    INSULIN GLARGINE (LANTUS SOLOSTAR) 100 UNIT/ML (3 ML) INPN PEN    Inject 10 Units into the skin every evening.    LISINOPRIL (PRINIVIL,ZESTRIL) 40 MG TABLET    Take 1 tablet (40 mg total) by mouth once daily.    METFORMIN (GLUCOPHAGE) 500 MG TABLET    Take 1 tablet (500 mg total) by mouth 2 (two) times daily with meals.    METOPROLOL SUCCINATE (TOPROL-XL) 50 MG 24 HR TABLET    Take 1 tablet (50 mg total) by mouth once daily.    PEN NEEDLE, DIABETIC 31 GAUGE X 1/4" NDLE    1 each by Misc.(Non-Drug; Combo Route) route 2 (two) times daily.    SPIRONOLACTONE (ALDACTONE) 25 MG TABLET    Take 1 tablet (25 mg total) by mouth once daily.       SOCIAL HISTORY:     Social History     Social History    Marital status:      Spouse name: N/A    Number of children: N/A    Years of education: N/A     Occupational History    Not on file.     Social History Main Topics    Smoking status: Passive Smoke Exposure - Never Smoker     Types: Cigarettes    Smokeless tobacco: Never Used      Comment: smokes cigars on occasion    Alcohol use Yes      Comment: occasional    Drug use: No      Comment: hx of marijuana use 3 years ago    Sexual activity: Yes     Partners: Male     Other Topics Concern    Not on file     Social History Narrative    No narrative on file       FAMILY HISTORY:     Family History   Problem Relation Age of Onset    Hypertension Mother     Hypertension Father     Diabetes Father     Diabetes Maternal Grandmother     " "Diabetes Paternal Grandmother     Breast cancer Neg Hx     Colon cancer Neg Hx     Ovarian cancer Neg Hx        REVIEW OF SYSTEMS:   Review of Systems   Constitutional: Negative for chills, diaphoresis and fever.   HENT: Negative for nosebleeds.    Eyes: Negative for blurred vision, double vision and photophobia.   Respiratory: Negative for hemoptysis, shortness of breath and wheezing.    Cardiovascular: Negative for chest pain, palpitations, orthopnea, claudication, leg swelling and PND.   Gastrointestinal: Negative for abdominal pain, blood in stool, heartburn, melena, nausea and vomiting.   Genitourinary: Negative for flank pain and hematuria.   Musculoskeletal: Negative for falls, myalgias and neck pain.   Skin: Negative for rash.   Neurological: Negative for dizziness, seizures, loss of consciousness, weakness and headaches.   Endo/Heme/Allergies: Negative for polydipsia. Does not bruise/bleed easily.   Psychiatric/Behavioral: Negative for depression and memory loss. The patient is not nervous/anxious.        PHYSICAL EXAM:     Vitals:    03/19/18 1020   BP: 112/77   Pulse: 78   Resp: 15    Body mass index is 38.36 kg/m².  Weight: 111.1 kg (244 lb 14.9 oz)   Height: 5' 7" (170.2 cm)     Physical Exam   Constitutional: She is oriented to person, place, and time. She appears well-developed and well-nourished. She is cooperative.  Non-toxic appearance. No distress.   HENT:   Head: Normocephalic and atraumatic.   Eyes: Conjunctivae and EOM are normal. Pupils are equal, round, and reactive to light. No scleral icterus.   Neck: Trachea normal. Neck supple. Normal carotid pulses and no JVD present. Carotid bruit is not present. No neck rigidity. No edema present. No thyromegaly present.   Cardiovascular: Normal rate, regular rhythm, S1 normal and S2 normal.  PMI is not displaced.  Exam reveals no gallop and no friction rub.    No murmur heard.  Pulses:       Carotid pulses are 2+ on the right side, and 2+ on the " left side.  Pulmonary/Chest: Effort normal and breath sounds normal. No respiratory distress. She has no wheezes. She has no rales. She exhibits no tenderness.   Abdominal: Soft. Bowel sounds are normal. She exhibits no distension and no mass. There is no hepatosplenomegaly. There is no tenderness.   obese   Musculoskeletal: She exhibits no edema or tenderness.   Feet:   Right Foot:   Skin Integrity: Negative for ulcer.   Left Foot:   Skin Integrity: Negative for ulcer.   Neurological: She is alert and oriented to person, place, and time. No cranial nerve deficit.   Skin: Skin is warm and dry. No rash noted. No erythema.   Psychiatric: She has a normal mood and affect. Her speech is normal and behavior is normal.   Vitals reviewed.      DATA:   EKG: (personally reviewed tracing)  3/3/18 SR 89, PVC, borderline LVH, lat ST abnl    Laboratory:  CBC:    Recent Labs  Lab 02/20/18 0155 02/27/18 2120 03/03/18  0030   WHITE BLOOD CELL COUNT 7.12 7.73 6.66   HEMOGLOBIN 12.7 15.3 13.3   HEMATOCRIT 41.2 47.6 41.7   PLATELETS 300 309 367 H       CHEMISTRIES:    Recent Labs  Lab 02/18/17  1943  11/28/17  0508  02/20/18 0155 02/27/18 2120 03/03/18  0030   GLUCOSE 637 HH  < > 319 H  < > 229 H 134 H 153 H   SODIUM 133 L  < > 138  < > 142 139 144   POTASSIUM 4.3  < > 3.5  < > 3.6 4.8 4.1   BUN BLD 14  < > 11  < > 10 10 12   CREATININE 1.5 H  < > 1.1  < > 1.0 1.0 1.1   EGFR IF  50 A  < > >60  < > >60 >60 >60   EGFR IF NON- 43 A  < > >60  < > >60 >60 >60   CALCIUM 9.7  < > 9.0  < > 9.5 10.6 H 9.6   MAGNESIUM 2.2  --  1.7  --   --  1.9  --    < > = values in this interval not displayed.    CARDIAC BIOMARKERS:    Recent Labs  Lab 02/18/17  1943  02/23/17  2012  02/27/18  2120 03/03/18  0030 03/03/18  0225   CPK 74  --  81  --  65  --   --    CPK MB 2.1  --   --   --   --   --   --    TROPONIN I <0.006  < > <0.006  < > 0.017 0.030 H 0.031 H   < > = values in this interval not  displayed.    COAGS:    Recent Labs  Lab 02/23/17 2012 06/08/17  2111 01/11/18  1218   INR 0.9 0.9 1.0       LIPIDS/LFTS:    Recent Labs  Lab 11/21/17  1445  11/28/17  0508  02/27/18  2120 03/03/18  0030 03/12/18  0900   CHOLESTEROL 227 H  --  175  --   --   --  175   TRIGLYCERIDES 122  --  76  --   --   --  76   HDL 62  --  46  --   --   --  47   LDL CHOLESTEROL 140.6  --  113.8  --   --   --  112.8   NON-HDL CHOLESTEROL 165  --  129  --   --   --  128   AST 14  < >  --   < > 16 13 8 L   ALT 11  < >  --   < > 11 10 11   < > = values in this interval not displayed.    Lab Results   Component Value Date    TSH 1.938 11/27/2017         Cardiovascular Testing:  Echo: 3/12/18 (EF similar to 11/2017)    1 - Severely depressed left ventricular systolic function (EF 15-20%).  Severe global hypokinesis.     2 - Severe left ventricular enlargement.     3 - Eccentric hypertrophy.     4 - Impaired LV relaxation, elevated LAP (grade 2 diastolic dysfunction).     5 - Mildly depressed right ventricular systolic function .     6 - Mild mitral regurgitation.     7 - Trivial to mild tricuspid regurgitation.     8 - The estimated PA systolic pressure is 35 mmHg.      Cath 11/27/17  RA 5  RV 61/6  PA 60/29/42  PAWP 18  /41  Ao 163/107/130  CO 5.6 L/min  LVEF: 20% by echo with severe LV dilation  Wall Motion: Severe global HK  Dominance: Co-dom  LM: normal  LAD: normal  LCx: normal  RCA: normal  Hemostasis:  RFA/V manual compression  Impression:  Normal cors  Elevated LVEDP with transmission of pressures to pulmonary circuit  Above c/w Severe NICM  RFA/V manual compression  Plan:  Cont med rx  Stop Plavix  Stop amlodipine  Start Hydrala/Imdur/Lasix  Goal net neg 1L/day  Cont BBl/ACEi  Repeat echo in 3 months for reassessment of LV fxn and need for ICD  Follow up with Dr. Yanez 1 week after discharge     Stress Test: L MPI 9/20/13  Nuclear Quantitative Functional Analysis:   LVEF: 31 %  Impression: NORMAL MYOCARDIAL  PERFUSION  1. The perfusion scan is free of evidence for myocardial ischemia or injury.   2. There is a moderate intensity fixed defect in the inferior wall of the left ventricle, secondary to diaphragm attenuation.   3. There is abnormal wall motion at rest showing moderate global hypokinesis of the left ventricle.   4. There is resting LV dysfunction with a reduced ejection fraction of 31 %.   5. The ventricular volumes are normal at rest and stress.   6. The extracardiac distribution of radioactivity is normal.     ASSESSMENT:   # NICM, EF persistently reduced 15-20% by echo 3/2018.  Pt appears euvolemic.  # HTN, controlled  # HLP, on atorva 80mg  # DM  # BMI 38, stable vs last OV  # ?MILE, sleep study pending    PLAN:   Cont med rx  Refer to Dr. Christine for ICD ?BiV  Sleep study pending 4/18/18  Diet/exercise/weight loss, Na+ avoidance  RTC 3 months    Oziel Yanez MD, FACC

## 2018-03-20 RX ORDER — PEN NEEDLE, DIABETIC 29 G X1/2"
1 NEEDLE, DISPOSABLE MISCELLANEOUS 2 TIMES DAILY
Qty: 100 EACH | Refills: 0 | Status: SHIPPED | OUTPATIENT
Start: 2018-03-20 | End: 2018-07-02 | Stop reason: SDUPTHER

## 2018-03-23 DIAGNOSIS — E11.9 TYPE 2 DIABETES MELLITUS WITHOUT COMPLICATION, WITHOUT LONG-TERM CURRENT USE OF INSULIN: ICD-10-CM

## 2018-03-26 ENCOUNTER — OFFICE VISIT (OUTPATIENT)
Dept: FAMILY MEDICINE | Facility: CLINIC | Age: 43
End: 2018-03-26
Payer: MEDICARE

## 2018-03-26 ENCOUNTER — LAB VISIT (OUTPATIENT)
Dept: LAB | Facility: HOSPITAL | Age: 43
End: 2018-03-26
Attending: FAMILY MEDICINE
Payer: MEDICARE

## 2018-03-26 VITALS
TEMPERATURE: 98 F | BODY MASS INDEX: 37.37 KG/M2 | RESPIRATION RATE: 20 BRPM | DIASTOLIC BLOOD PRESSURE: 74 MMHG | SYSTOLIC BLOOD PRESSURE: 126 MMHG | HEART RATE: 81 BPM | OXYGEN SATURATION: 96 % | HEIGHT: 67 IN | WEIGHT: 238.13 LBS

## 2018-03-26 DIAGNOSIS — K21.9 GASTROESOPHAGEAL REFLUX DISEASE, ESOPHAGITIS PRESENCE NOT SPECIFIED: ICD-10-CM

## 2018-03-26 DIAGNOSIS — I50.42 CHRONIC COMBINED SYSTOLIC AND DIASTOLIC HEART FAILURE: ICD-10-CM

## 2018-03-26 DIAGNOSIS — I10 ESSENTIAL HYPERTENSION: ICD-10-CM

## 2018-03-26 DIAGNOSIS — E78.2 MIXED HYPERLIPIDEMIA: ICD-10-CM

## 2018-03-26 DIAGNOSIS — I42.8 NONISCHEMIC CARDIOMYOPATHY: ICD-10-CM

## 2018-03-26 DIAGNOSIS — M79.672 LEFT FOOT PAIN: ICD-10-CM

## 2018-03-26 DIAGNOSIS — E11.9 TYPE 2 DIABETES MELLITUS WITHOUT COMPLICATION, WITH LONG-TERM CURRENT USE OF INSULIN: Primary | ICD-10-CM

## 2018-03-26 DIAGNOSIS — Z79.4 TYPE 2 DIABETES MELLITUS WITHOUT COMPLICATION, WITH LONG-TERM CURRENT USE OF INSULIN: Primary | ICD-10-CM

## 2018-03-26 LAB
ESTIMATED AVG GLUCOSE: 223 MG/DL
HBA1C MFR BLD HPLC: 9.4 %

## 2018-03-26 PROCEDURE — 99214 OFFICE O/P EST MOD 30 MIN: CPT | Mod: S$GLB,,, | Performed by: FAMILY MEDICINE

## 2018-03-26 PROCEDURE — 3074F SYST BP LT 130 MM HG: CPT | Mod: CPTII,S$GLB,, | Performed by: FAMILY MEDICINE

## 2018-03-26 PROCEDURE — 3046F HEMOGLOBIN A1C LEVEL >9.0%: CPT | Mod: CPTII,S$GLB,, | Performed by: FAMILY MEDICINE

## 2018-03-26 PROCEDURE — 36415 COLL VENOUS BLD VENIPUNCTURE: CPT | Mod: PO

## 2018-03-26 PROCEDURE — 3078F DIAST BP <80 MM HG: CPT | Mod: CPTII,S$GLB,, | Performed by: FAMILY MEDICINE

## 2018-03-26 PROCEDURE — 99999 PR PBB SHADOW E&M-EST. PATIENT-LVL III: CPT | Mod: PBBFAC,,, | Performed by: FAMILY MEDICINE

## 2018-03-26 PROCEDURE — 86677 HELICOBACTER PYLORI ANTIBODY: CPT

## 2018-03-26 PROCEDURE — 83036 HEMOGLOBIN GLYCOSYLATED A1C: CPT

## 2018-03-26 RX ORDER — NEBULIZER AND COMPRESSOR
1 EACH MISCELLANEOUS 2 TIMES DAILY
Qty: 1 EACH | Refills: 0 | Status: SHIPPED | OUTPATIENT
Start: 2018-03-26

## 2018-03-26 RX ORDER — DICLOFENAC SODIUM 10 MG/G
2 GEL TOPICAL 4 TIMES DAILY
Qty: 100 G | Refills: 1 | Status: SHIPPED | OUTPATIENT
Start: 2018-03-26 | End: 2019-03-11

## 2018-03-26 NOTE — PROGRESS NOTES
Chief Complaint   Patient presents with    ER Follow-up    Pneumonia       HPI  Fabiana Chanel is a 42 y.o. female with multiple medical diagnoses as listed in the medical history and problem list that presents for ER follow up.    ER follow up - dx with CAP, treated with abx and symptoms resolved.     NICM, DCHF - pending visit with Dr. Christine, Cardiology for BiVCD    Decreased appetite overall, +burning sensation, before and after meals. Currently on PPI.     DM2 - overall doing well, states currently lantus 10 qhs, metformin and glimeperide.      Pt is known to me and was last seen by me on 12/5/2017.    PAST MEDICAL HISTORY:  Past Medical History:   Diagnosis Date    Blood clot associated with vein wall inflammation     not dvt    Cardiomyopathy     Normal cors on cath 11/2017    CHF (congestive heart failure)     DM (diabetes mellitus) 9/19/2013    Hyperlipidemia     Hypertension     Psoriasis     Sleep apnea        PAST SURGICAL HISTORY:  Past Surgical History:   Procedure Laterality Date    CARDIAC CATHETERIZATION      COLONOSCOPY      DILATION AND CURETTAGE OF UTERUS         SOCIAL HISTORY:  Social History     Social History    Marital status:      Spouse name: N/A    Number of children: N/A    Years of education: N/A     Occupational History    Not on file.     Social History Main Topics    Smoking status: Passive Smoke Exposure - Never Smoker     Types: Cigarettes    Smokeless tobacco: Never Used      Comment: smokes cigars on occasion    Alcohol use Yes      Comment: occasional    Drug use: No      Comment: hx of marijuana use 3 years ago    Sexual activity: Yes     Partners: Male     Other Topics Concern    Not on file     Social History Narrative    No narrative on file       FAMILY HISTORY:  Family History   Problem Relation Age of Onset    Hypertension Mother     Hypertension Father     Diabetes Father     Diabetes Maternal Grandmother     Diabetes Paternal  "Grandmother     Breast cancer Neg Hx     Colon cancer Neg Hx     Ovarian cancer Neg Hx        ALLERGIES AND MEDICATIONS: updated and reviewed.  Review of patient's allergies indicates:   Allergen Reactions    Pneumococcal 23-abimbola ps vaccine      Current Outpatient Prescriptions   Medication Sig Dispense Refill    albuterol 90 mcg/actuation inhaler Inhale 1-2 puffs into the lungs every 6 (six) hours as needed for Wheezing. Rescue 1 Inhaler 0    aspirin 81 MG Chew Take 81 mg by mouth once daily.      atorvastatin (LIPITOR) 80 MG tablet Take 1 tablet (80 mg total) by mouth nightly. 90 tablet 3    esomeprazole (NEXIUM) 40 MG capsule Take 40 mg by mouth before breakfast.      furosemide (LASIX) 20 MG tablet Take 1 tablet (20 mg total) by mouth once daily. 90 tablet 3    gabapentin (NEURONTIN) 400 MG capsule Take 400 mg by mouth Daily.      glimepiride (AMARYL) 4 MG tablet Take 1 tablet (4 mg total) by mouth before breakfast. (Patient taking differently: Take 4 mg by mouth 2 (two) times daily. ) 30 tablet 3    hydrALAZINE (APRESOLINE) 100 MG tablet Take 1 tablet (100 mg total) by mouth 2 (two) times daily. 180 tablet 3    insulin glargine (LANTUS SOLOSTAR) 100 unit/mL (3 mL) InPn pen Inject 10 Units into the skin every evening. 15 mL 2    lisinopril (PRINIVIL,ZESTRIL) 40 MG tablet Take 1 tablet (40 mg total) by mouth once daily. 90 tablet 3    metformin (GLUCOPHAGE) 500 MG tablet Take 1 tablet (500 mg total) by mouth 2 (two) times daily with meals. 56 tablet 0    metoprolol succinate (TOPROL-XL) 50 MG 24 hr tablet Take 1 tablet (50 mg total) by mouth once daily. 30 tablet 11    pen needle, diabetic 31 gauge x 1/4" Ndle 1 each by Misc.(Non-Drug; Combo Route) route 2 (two) times daily. 100 each 0    spironolactone (ALDACTONE) 25 MG tablet Take 1 tablet (25 mg total) by mouth once daily. 30 tablet 11    BLOOD PRESSURE CUFF Misc 1 kit by Misc.(Non-Drug; Combo Route) route 2 (two) times daily. 1 each 0    " "diclofenac sodium (VOLTAREN) 1 % Gel Apply 2 g topically 4 (four) times daily. 100 g 1     No current facility-administered medications for this visit.        ROS  Review of Systems   Constitutional: Positive for appetite change. Negative for activity change and fever.   HENT: Negative for congestion and sore throat.    Eyes: Negative for visual disturbance.   Respiratory: Positive for shortness of breath. Negative for cough.    Cardiovascular: Negative for chest pain.   Gastrointestinal: Positive for abdominal pain. Negative for diarrhea, nausea and vomiting.   Endocrine: Negative.    Genitourinary: Negative for dysuria.   Musculoskeletal: Negative for arthralgias and back pain.   Skin: Negative for rash.   Allergic/Immunologic: Negative.    Neurological: Negative for dizziness, weakness and headaches.   Hematological: Negative.    Psychiatric/Behavioral: Negative for agitation and confusion.       Physical Exam  Vitals:    03/26/18 1333   BP: 126/74   Pulse: 81   Resp: 20   Temp: 97.9 °F (36.6 °C)    Body mass index is 37.29 kg/m².  Weight: 108 kg (238 lb 1.6 oz)   Height: 5' 7" (170.2 cm)     Physical Exam   Constitutional: She is oriented to person, place, and time. She appears well-developed and well-nourished.   HENT:   Head: Normocephalic and atraumatic.   Eyes: Conjunctivae and EOM are normal. Pupils are equal, round, and reactive to light.   Neck: Normal range of motion. Neck supple.   Cardiovascular: Normal rate, regular rhythm and normal heart sounds.    Pulses:       Dorsalis pedis pulses are 2+ on the right side, and 2+ on the left side.        Posterior tibial pulses are 2+ on the right side, and 2+ on the left side.   Pulmonary/Chest: Effort normal and breath sounds normal.   Abdominal: Soft. Bowel sounds are normal.   Musculoskeletal: Normal range of motion.        Right foot: There is normal range of motion and no deformity.        Left foot: There is normal range of motion and no deformity.   Feet: "   Right Foot:   Protective Sensation: 9 sites tested. 9 sites sensed.   Left Foot:   Protective Sensation: 9 sites tested. 9 sites sensed.   Neurological: She is alert and oriented to person, place, and time. She has normal reflexes.   Skin: Skin is warm and dry.   Psychiatric: She has a normal mood and affect. Her behavior is normal. Judgment and thought content normal.       Health Maintenance       Date Due Completion Date    Foot Exam 07/03/1986 ---    Pap Smear 01/17/2014 1/17/2013    TETANUS VACCINE 09/25/2016 9/25/2006    Pneumococcal PPSV23 (Medium Risk) (1) 03/26/2019 (Originally 7/3/1994) ---    Hemoglobin A1c 05/27/2018 11/27/2017    Eye Exam 10/02/2018 10/2/2017 (Done)    Override on 10/2/2017: Done (dr moctezuma)    Lipid Panel 03/12/2019 3/12/2018    Mammogram 03/30/2019 3/30/2017          Assessment & Plan    Type 2 diabetes mellitus without complication, with long-term current use of insulin  -      DIABETES FOOT EXAM    Gastroesophageal reflux disease, esophagitis presence not specified  -     Hemoglobin A1c; Future; Expected date: 03/26/2018  -     H. PYLORI ANTIBODY, IGG; Future; Expected date: 03/26/2018  -     Ambulatory referral to Gastroenterology  - Will refer to evaluate for possible PUD    Chronic combined systolic and diastolic heart failure  -     BLOOD PRESSURE CUFF Misc; 1 kit by Misc.(Non-Drug; Combo Route) route 2 (two) times daily.  Dispense: 1 each; Refill: 0    Essential hypertension  -     BLOOD PRESSURE CUFF Misc; 1 kit by Misc.(Non-Drug; Combo Route) route 2 (two) times daily.  Dispense: 1 each; Refill: 0    Mixed hyperlipidemia  - Continue current medication regimen as prescribed    Nonischemic cardiomyopathy  -     BLOOD PRESSURE CUFF Misc; 1 kit by Misc.(Non-Drug; Combo Route) route 2 (two) times daily.  Dispense: 1 each; Refill: 0  - Continue current medication regimen as prescribed  - Cont follow up with Cardiology as scheduled    Left foot pain  -     diclofenac sodium  (VOLTAREN) 1 % Gel; Apply 2 g topically 4 (four) times daily.  Dispense: 100 g; Refill: 1        Follow-up in about 4 weeks (around 4/23/2018).

## 2018-03-28 LAB — H PYLORI IGG SERPL QL IA: NEGATIVE

## 2018-04-02 ENCOUNTER — TELEPHONE (OUTPATIENT)
Dept: ADMINISTRATIVE | Facility: HOSPITAL | Age: 43
End: 2018-04-02

## 2018-04-09 ENCOUNTER — TELEPHONE (OUTPATIENT)
Dept: ELECTROPHYSIOLOGY | Facility: CLINIC | Age: 43
End: 2018-04-09

## 2018-04-09 NOTE — TELEPHONE ENCOUNTER
Called pt to review new patient questions and confirm apt Wednesday AM with Dr. Christine. Pt denies outside records or implantable cardiac device. Confirmed apt with patient.

## 2018-04-11 ENCOUNTER — TELEPHONE (OUTPATIENT)
Dept: FAMILY MEDICINE | Facility: CLINIC | Age: 43
End: 2018-04-11

## 2018-04-11 ENCOUNTER — PATIENT MESSAGE (OUTPATIENT)
Dept: ELECTROPHYSIOLOGY | Facility: CLINIC | Age: 43
End: 2018-04-11

## 2018-04-11 ENCOUNTER — INITIAL CONSULT (OUTPATIENT)
Dept: ELECTROPHYSIOLOGY | Facility: CLINIC | Age: 43
End: 2018-04-11
Payer: MEDICARE

## 2018-04-11 VITALS
DIASTOLIC BLOOD PRESSURE: 78 MMHG | HEIGHT: 67 IN | BODY MASS INDEX: 37.67 KG/M2 | WEIGHT: 240 LBS | HEART RATE: 86 BPM | SYSTOLIC BLOOD PRESSURE: 119 MMHG

## 2018-04-11 DIAGNOSIS — G47.33 OSA (OBSTRUCTIVE SLEEP APNEA): ICD-10-CM

## 2018-04-11 DIAGNOSIS — I42.8 NONISCHEMIC CARDIOMYOPATHY: Primary | ICD-10-CM

## 2018-04-11 DIAGNOSIS — E11.9 TYPE 2 DIABETES MELLITUS WITHOUT COMPLICATION, WITHOUT LONG-TERM CURRENT USE OF INSULIN: ICD-10-CM

## 2018-04-11 DIAGNOSIS — R79.89 ELEVATED TROPONIN: ICD-10-CM

## 2018-04-11 DIAGNOSIS — I42.8 NONISCHEMIC CARDIOMYOPATHY: ICD-10-CM

## 2018-04-11 DIAGNOSIS — I10 ESSENTIAL HYPERTENSION: Primary | ICD-10-CM

## 2018-04-11 DIAGNOSIS — I48.0 PAF (PAROXYSMAL ATRIAL FIBRILLATION): ICD-10-CM

## 2018-04-11 DIAGNOSIS — E78.2 MIXED HYPERLIPIDEMIA: ICD-10-CM

## 2018-04-11 PROCEDURE — 99999 PR PBB SHADOW E&M-EST. PATIENT-LVL III: CPT | Mod: PBBFAC,,, | Performed by: INTERNAL MEDICINE

## 2018-04-11 PROCEDURE — 3074F SYST BP LT 130 MM HG: CPT | Mod: CPTII,S$GLB,, | Performed by: INTERNAL MEDICINE

## 2018-04-11 PROCEDURE — 99205 OFFICE O/P NEW HI 60 MIN: CPT | Mod: S$GLB,,, | Performed by: INTERNAL MEDICINE

## 2018-04-11 PROCEDURE — 93005 ELECTROCARDIOGRAM TRACING: CPT | Mod: S$GLB,,, | Performed by: INTERNAL MEDICINE

## 2018-04-11 PROCEDURE — 93010 ELECTROCARDIOGRAM REPORT: CPT | Mod: S$GLB,,, | Performed by: INTERNAL MEDICINE

## 2018-04-11 PROCEDURE — 3046F HEMOGLOBIN A1C LEVEL >9.0%: CPT | Mod: CPTII,S$GLB,, | Performed by: INTERNAL MEDICINE

## 2018-04-11 PROCEDURE — 3078F DIAST BP <80 MM HG: CPT | Mod: CPTII,S$GLB,, | Performed by: INTERNAL MEDICINE

## 2018-04-11 RX ORDER — GLIMEPIRIDE 4 MG/1
4 TABLET ORAL
Qty: 90 TABLET | Refills: 1 | Status: SHIPPED | OUTPATIENT
Start: 2018-04-11 | End: 2018-10-26 | Stop reason: SDUPTHER

## 2018-04-11 NOTE — PROGRESS NOTES
IMPLANTABLE DEVICE EDUCATION CHECKLIST            4-17-18 @ 1 PM - DEFIBRILLATOR     REPORT TO CARDIOLOGY WAITING ROOM ON 3RD FLOOR OF HOSPITAL       WASH YOUR CHEST WITH HIBICLENS OR AN ANTIBACTERIAL SOAP (SUCH AS DIAL) ON THE NIGHT BEFORE AND THE MORNING OF YOUR PROCEDURE.      DO NOT EAT OR DRINK ANYTHING AFTER: 12 MN ON THE NIGHT BEFORE YOUR PROCEDURE    MEDICATIONS  NIGHT PRIOR TO PROCEDURE TAKE 1/2 DOSE OF INSULIN IF TAKEN AT BEDTIME  MORNING OF PROCEDURE DO NOT TAKE INSULIN, GLIMEPERIDE, OR METFORMIN.  YOU MAY TAKE ALL OTHER MORNING MEDICATIONS WITH A SIP OF WATER    DIRECTIONS TO CARDIOLOGY WAITING ROOM  If you park in the Parking Garage:  Take elevators to the 2nd floor  Walk up ramp and turn right by [address] Elevators  Take elevator to the 3rd floor  Upon exiting the elevator, turn away from the clinic areas  Walk long [place]houn around to front of hospital to area with windows overlooking [facility]  Check in at Reception Desk  OR  If family is dropping you off:  Have them drop you off at the front of the Hospital  (Near the ER, where all the flags are hung).  Take the E elevators to the 3rd floor.  Check in at the Reception Desk in the waiting room.        YOU WILL BE SPENDING THE NIGHT AFTER YOUR PROCEDURE  YOU WILL NEED SOMEONE TO DRIVE YOU HOME THE DAY AFTER YOUR PROCEDURE    YOUR PAIN DURING YOUR PROCEDURE WILL BE MANAGED BY THE ANESTHESIA TEAM    Any need to reschedule or cancel procedures, or any questions regarding your procedures should be addressed directly with the Arrhythmia Department Nurses at the following phone number: [phone]    THE ABOVE INSTRUCTIONS WERE GIVEN TO THE PATIENT VERBALLY AND THEY VERBALIZED UNDERSTANDING. THEY DO NOT REQUIRE ANY SPECIAL NEEDS AND DO NOT HAVE ANY LEARNING BARRIERS.

## 2018-04-11 NOTE — PROGRESS NOTES
"Subjective:    Patient ID:  Fabiana Chanel is a 42 y.o. female who presents for evaluation of Cardiomyopathy      HPI   "Bette" is referred by Dr Yanez.    42 y.o. F NICM (cath proved; 11/17)  CHF, Class III  DM on meds   hyperlipidemia on meds  HTN on meds  MILE pending repeat sleep study for CPAP titration  PAF hx, on coumadin and xarelto in past, c/b vaginal bleeding    NYHA III sx despite OMT.  Occasional palpitations. No syncope ever. No CP.    3/18 15-20% LVEF. global HK.  QRSd ~110 ms    My interpretation of today's ECG is NSR. QRSd 112 ms.      Review of Systems   Constitution: Negative. Negative for weakness and malaise/fatigue.   HENT: Negative.  Negative for ear pain and tinnitus.    Eyes: Negative for blurred vision.   Cardiovascular: Positive for dyspnea on exertion and palpitations. Negative for chest pain, near-syncope and syncope.   Respiratory: Negative.  Negative for shortness of breath.    Endocrine: Negative.  Negative for polyuria.   Hematologic/Lymphatic: Does not bruise/bleed easily.   Skin: Negative.  Negative for rash.   Musculoskeletal: Negative.  Negative for joint pain and muscle weakness.   Gastrointestinal: Negative.  Negative for abdominal pain and change in bowel habit.   Genitourinary: Negative for frequency.   Neurological: Negative.  Negative for dizziness.   Psychiatric/Behavioral: Negative.  Negative for depression. The patient is not nervous/anxious.    Allergic/Immunologic: Negative for environmental allergies.        Objective:    Physical Exam   Constitutional: She is oriented to person, place, and time. Vital signs are normal. She appears well-developed and well-nourished. She is active and cooperative.   HENT:   Head: Normocephalic and atraumatic.   Eyes: Conjunctivae and EOM are normal.   Neck: Normal range of motion. Carotid bruit is not present. No tracheal deviation and no edema present. No thyroid mass and no thyromegaly present.   Cardiovascular: Normal rate, " regular rhythm, normal heart sounds, intact distal pulses and normal pulses.   No extrasystoles are present. PMI is not displaced.  Exam reveals no gallop and no friction rub.    No murmur heard.  Pulmonary/Chest: Effort normal and breath sounds normal. No respiratory distress. She has no wheezes. She has no rales.   Abdominal: Soft. Normal appearance. She exhibits no distension. There is no hepatosplenomegaly.   Musculoskeletal: Normal range of motion.   Neurological: She is alert and oriented to person, place, and time. Coordination normal.   Skin: Skin is warm and dry. No rash noted.   Psychiatric: She has a normal mood and affect. Her speech is normal and behavior is normal. Thought content normal. Cognition and memory are normal.   Nursing note and vitals reviewed.        Assessment:       1. Essential hypertension    2. Elevated troponin    3. Mixed hyperlipidemia    4. Nonischemic cardiomyopathy    5. Type 2 diabetes mellitus without complication, without long-term current use of insulin    6. MILE (obstructive sleep apnea)         Plan:       Given CM with LVEF <35%, NYHA III sx, makes SCD-HeFT criteria for ICD.  Narrow QRS; CRT is not indicated at present.  Hx of remote PAF, with recent palpitations; will place atrial lead for surveillance for recurrent AF.    I spent about a half hour discussing the risks and benefits of ICD placement and testing. Our discussion of risks included (but was not limited to) the possibility of infection, death, stroke, MI, pneumothorax, embolism, cardiac perforation, cardiac tamponade, renal injury, and bleeding.  I discussed with patient risks, indications, benefits, and alternatives of the planned procedure. All questions were answered. Patient understands and wishes to proceed.

## 2018-04-11 NOTE — TELEPHONE ENCOUNTER
Rep is asking for orders for Diabetic meter and supplies, and test strips    Also states that Lantus is no longer covered under patients formulary, requesting an alternative to be sent in for patient .

## 2018-04-11 NOTE — TELEPHONE ENCOUNTER
Received a corres from [facility] regarding patient compliance with her glimepiride. According to our records,  has never prescribed the glimepiride for the patient. Attempted to reach patient to inquire if there is any issue with getting medication and if she wanted to schedule appt with diabetes education as ordered by , last appt scheduled with them was cancelled. LM for patient to call the office to see who is prescribing medication. Please advise, if script for glimepiride will be changed to 90 day per PHN for compliance. Thank you

## 2018-04-11 NOTE — LETTER
April 11, 2018      Oziel Yanez MD  [address]  [address]  [place] [address]           [facility]  [address]  [facility] [address]  Phone: [phone]  Fax: [phone]          Patient: Fabiana Chanel   MR Number: [id number]   YOB: 1975   Date of Visit: 4/11/2018       Dear Dr. Oziel Yanez:    Thank you for referring Fabiana Chanel to me for evaluation. Attached you will find relevant portions of my assessment and plan of care.    If you have questions, please do not hesitate to call me. I look forward to following Fabiana Chanel along with you.    Sincerely,    Eben Christine MD    Enclosure  CC:  No Recipients    If you would like to receive this communication electronically, please contact [email or web address] or [phone] to request more information on [employer] Link access.    For providers and/or their staff who would like to refer a patient to [facility], please contact us through our one-stop-shop provider referral line, [facility], at [phone].    If you feel you have received this communication in error or would no longer like to receive these types of communications, please e-mail [email or web address]

## 2018-04-11 NOTE — TELEPHONE ENCOUNTER
Returned call to patient, no answer, lm for patient, patient can schedule OV for next week or week after is fine.

## 2018-04-11 NOTE — TELEPHONE ENCOUNTER
----- Message from Shanna lucerobyron sent at 4/11/2018  4:39 PM CDT -----  Contact: Madelaine nurse with [employer]  Rep is asking for orders for Diabetic meter and supplies, and test strips.    Fax #   [phone].    Call back # is [phone].    -Rep also states Lantus fomulara is no longer covered with phn asking to choose a different therapy for insulin.     -Also requesting orders for a Diabetic class

## 2018-04-11 NOTE — TELEPHONE ENCOUNTER
----- Message from Shanna Aalmo sent at 4/11/2018  4:32 PM CDT -----  Contact: self  Pt is asking for an appt on Friday. She stated Dr. Burden asked her to make a 4 week f/u from visit on 03/26. She states she cannot come at 7 or 8 am Tomorrow  (04/12)   b/c that is too early and next week she is already scheduled for multiple appts. Please call back- [phone] to discuss appt.

## 2018-04-13 ENCOUNTER — TELEPHONE (OUTPATIENT)
Dept: ELECTROPHYSIOLOGY | Facility: CLINIC | Age: 43
End: 2018-04-13

## 2018-04-13 DIAGNOSIS — I48.0 PAF (PAROXYSMAL ATRIAL FIBRILLATION): Primary | ICD-10-CM

## 2018-04-13 NOTE — TELEPHONE ENCOUNTER
Contacted Pt to let her know her procedure had not been approved for Monday 4/16. Advised I would call her on Monday once I know its approved or not so we can reschedule. Pt voiced understanding.

## 2018-04-14 ENCOUNTER — HOSPITAL ENCOUNTER (OUTPATIENT)
Dept: SLEEP MEDICINE | Facility: HOSPITAL | Age: 43
Discharge: HOME | End: 2018-04-14
Attending: INTERNAL MEDICINE
Payer: MEDICARE

## 2018-04-14 DIAGNOSIS — G47.33 OSA (OBSTRUCTIVE SLEEP APNEA): ICD-10-CM

## 2018-04-14 PROCEDURE — 95811 PR POLYSOMNOGRAPHY W/CPAP: ICD-10-PCS | Mod: 26,,, | Performed by: INTERNAL MEDICINE

## 2018-04-14 PROCEDURE — 95811 POLYSOM 6/>YRS CPAP 4/> PARM: CPT

## 2018-04-14 PROCEDURE — 95811 POLYSOM 6/>YRS CPAP 4/> PARM: CPT | Mod: 26,,, | Performed by: INTERNAL MEDICINE

## 2018-04-15 NOTE — PATIENT INSTRUCTIONS
Your sleep study will be scored and interpreted by one of our physicians who are board certified in sleep medicine.  Within two weeks the results will be sent to the physician who referred you. Your physician should then contact you to go over the results, along with any recommendations. If you do not hear from your physician within two weeks, please call them. Please make a note of your CPAP pressure. This is very important in case you need to stay in a hospital. You will need to know this pressure to let your Respiratory Therapist know how to set up your CPAP pressure.    If CPAP (the mask you wore during the night) is recommended, it will be ordered by your physician and a home health company will contact you for delivery and set up.  If you do not receive your CPAP in about two weeks, please notify your physician. You will need to use your CPAP machine every night to be compliant with your insurance company. If you have trouble using it, please call your home health company.

## 2018-04-15 NOTE — PROGRESS NOTES
Fabiana Chanel is a 41 y/o F. She is here to R/O MILE. She reported using CPAP at home after being discharge from the hospital. She does not know her CPAP pressure. She reported using full face mask.Patient education was  performed. The patient was instructed on how to call out for help.    EKG: NSR, frequent PVCs, couplets and vetricular tachycardia.    The lowest oxygen saturation observed: 74%    She qualified for split night study. A small Simplus full face mask was use during the study. CPAP pressure explored from 4 to 19. BIPAP mode was  tried to eliminate respiratory events and high mask leak.     The patient's reaction to PAP in the morning was, its good.

## 2018-04-16 ENCOUNTER — PATIENT MESSAGE (OUTPATIENT)
Dept: ELECTROPHYSIOLOGY | Facility: CLINIC | Age: 43
End: 2018-04-16

## 2018-04-16 ENCOUNTER — TELEPHONE (OUTPATIENT)
Dept: ELECTROPHYSIOLOGY | Facility: CLINIC | Age: 43
End: 2018-04-16

## 2018-04-16 NOTE — TELEPHONE ENCOUNTER
Contacted Pt this morning that her procedure was authorized. Pt scheduled for tomm. Reviewed pre op instructions, medications to hold  and arrival time with pt. Pt voiced understanding.

## 2018-04-17 ENCOUNTER — ANESTHESIA (OUTPATIENT)
Dept: MEDSURG UNIT | Facility: HOSPITAL | Age: 43
End: 2018-04-17
Payer: MEDICARE

## 2018-04-17 ENCOUNTER — ANESTHESIA EVENT (OUTPATIENT)
Dept: MEDSURG UNIT | Facility: HOSPITAL | Age: 43
End: 2018-04-17
Payer: MEDICARE

## 2018-04-17 ENCOUNTER — HOSPITAL ENCOUNTER (OUTPATIENT)
Facility: HOSPITAL | Age: 43
Discharge: HOME | End: 2018-04-18
Attending: INTERNAL MEDICINE | Admitting: INTERNAL MEDICINE
Payer: MEDICARE

## 2018-04-17 DIAGNOSIS — I10 ESSENTIAL (PRIMARY) HYPERTENSION: ICD-10-CM

## 2018-04-17 DIAGNOSIS — I42.8 NICM (NONISCHEMIC CARDIOMYOPATHY): Primary | ICD-10-CM

## 2018-04-17 DIAGNOSIS — I42.8 NONISCHEMIC CARDIOMYOPATHY: ICD-10-CM

## 2018-04-17 DIAGNOSIS — I49.9 ARRHYTHMIA: ICD-10-CM

## 2018-04-17 LAB
B-HCG UR QL: NEGATIVE
CTP QC/QA: YES
POCT GLUCOSE: 139 MG/DL (ref 70–110)
POTASSIUM SERPL-SCNC: 3.6 MMOL/L

## 2018-04-17 PROCEDURE — 82962 GLUCOSE BLOOD TEST: CPT | Performed by: INTERNAL MEDICINE

## 2018-04-17 PROCEDURE — 37000009 HC ANESTHESIA EA ADD 15 MINS: Performed by: INTERNAL MEDICINE

## 2018-04-17 PROCEDURE — 93010 ELECTROCARDIOGRAM REPORT: CPT | Mod: ,,, | Performed by: INTERNAL MEDICINE

## 2018-04-17 PROCEDURE — A4216 STERILE WATER/SALINE, 10 ML: HCPCS | Performed by: NURSE ANESTHETIST, CERTIFIED REGISTERED

## 2018-04-17 PROCEDURE — 33249 INSJ/RPLCMT DEFIB W/LEAD(S): CPT | Mod: Q0,,, | Performed by: INTERNAL MEDICINE

## 2018-04-17 PROCEDURE — 25000003 PHARM REV CODE 250: Performed by: NURSE PRACTITIONER

## 2018-04-17 PROCEDURE — C1894 INTRO/SHEATH, NON-LASER: HCPCS

## 2018-04-17 PROCEDURE — 63600175 PHARM REV CODE 636 W HCPCS: Performed by: NURSE ANESTHETIST, CERTIFIED REGISTERED

## 2018-04-17 PROCEDURE — 37000008 HC ANESTHESIA 1ST 15 MINUTES: Performed by: INTERNAL MEDICINE

## 2018-04-17 PROCEDURE — 63600175 PHARM REV CODE 636 W HCPCS: Performed by: NURSE PRACTITIONER

## 2018-04-17 PROCEDURE — 63600175 PHARM REV CODE 636 W HCPCS

## 2018-04-17 PROCEDURE — C1898 LEAD, PMKR, OTHER THAN TRANS: HCPCS

## 2018-04-17 PROCEDURE — 25000003 PHARM REV CODE 250: Performed by: NURSE ANESTHETIST, CERTIFIED REGISTERED

## 2018-04-17 PROCEDURE — 84132 ASSAY OF SERUM POTASSIUM: CPT

## 2018-04-17 PROCEDURE — 81025 URINE PREGNANCY TEST: CPT | Performed by: NURSE PRACTITIONER

## 2018-04-17 PROCEDURE — D9220A PRA ANESTHESIA: Mod: CRNA,,, | Performed by: NURSE ANESTHETIST, CERTIFIED REGISTERED

## 2018-04-17 PROCEDURE — 93005 ELECTROCARDIOGRAM TRACING: CPT

## 2018-04-17 PROCEDURE — 93010 ELECTROCARDIOGRAM REPORT: CPT | Mod: 76,,, | Performed by: INTERNAL MEDICINE

## 2018-04-17 PROCEDURE — 63600175 PHARM REV CODE 636 W HCPCS: Performed by: ANESTHESIOLOGY

## 2018-04-17 PROCEDURE — D9220A PRA ANESTHESIA: Mod: ANES,,, | Performed by: ANESTHESIOLOGY

## 2018-04-17 PROCEDURE — 25000003 PHARM REV CODE 250

## 2018-04-17 RX ORDER — FENTANYL CITRATE 50 UG/ML
INJECTION, SOLUTION INTRAMUSCULAR; INTRAVENOUS
Status: DISCONTINUED | OUTPATIENT
Start: 2018-04-17 | End: 2018-04-17

## 2018-04-17 RX ORDER — SODIUM CHLORIDE 0.9 % (FLUSH) 0.9 %
3 SYRINGE (ML) INJECTION
Status: DISCONTINUED | OUTPATIENT
Start: 2018-04-17 | End: 2018-04-17 | Stop reason: HOSPADM

## 2018-04-17 RX ORDER — MIDAZOLAM HYDROCHLORIDE 1 MG/ML
INJECTION INTRAMUSCULAR; INTRAVENOUS
Status: DISCONTINUED | OUTPATIENT
Start: 2018-04-17 | End: 2018-04-17

## 2018-04-17 RX ORDER — TRAMADOL HYDROCHLORIDE 50 MG/1
50 TABLET ORAL EVERY 6 HOURS PRN
Status: DISCONTINUED | OUTPATIENT
Start: 2018-04-17 | End: 2018-04-18 | Stop reason: HOSPADM

## 2018-04-17 RX ORDER — CEFAZOLIN SODIUM 1 G/3ML
2 INJECTION, POWDER, FOR SOLUTION INTRAMUSCULAR; INTRAVENOUS
Status: COMPLETED | OUTPATIENT
Start: 2018-04-17 | End: 2018-04-17

## 2018-04-17 RX ORDER — ACETAMINOPHEN 325 MG/1
TABLET ORAL
Status: COMPLETED
Start: 2018-04-17 | End: 2018-04-17

## 2018-04-17 RX ORDER — DEXMEDETOMIDINE HYDROCHLORIDE 100 UG/ML
INJECTION, SOLUTION INTRAVENOUS
Status: DISCONTINUED | OUTPATIENT
Start: 2018-04-17 | End: 2018-04-17

## 2018-04-17 RX ORDER — FENTANYL CITRATE 50 UG/ML
25 INJECTION, SOLUTION INTRAMUSCULAR; INTRAVENOUS EVERY 5 MIN PRN
Status: DISCONTINUED | OUTPATIENT
Start: 2018-04-17 | End: 2018-04-17 | Stop reason: HOSPADM

## 2018-04-17 RX ORDER — ACETAMINOPHEN 325 MG/1
650 TABLET ORAL EVERY 4 HOURS PRN
Status: DISCONTINUED | OUTPATIENT
Start: 2018-04-17 | End: 2018-04-18 | Stop reason: HOSPADM

## 2018-04-17 RX ORDER — CEFAZOLIN SODIUM 1 G/3ML
2 INJECTION, POWDER, FOR SOLUTION INTRAMUSCULAR; INTRAVENOUS
Status: COMPLETED | OUTPATIENT
Start: 2018-04-18 | End: 2018-04-18

## 2018-04-17 RX ORDER — SODIUM CHLORIDE 9 MG/ML
INJECTION, SOLUTION INTRAVENOUS CONTINUOUS
Status: DISCONTINUED | OUTPATIENT
Start: 2018-04-17 | End: 2018-04-18 | Stop reason: HOSPADM

## 2018-04-17 RX ORDER — CEPHALEXIN 500 MG/1
500 CAPSULE ORAL EVERY 12 HOURS
Status: DISCONTINUED | OUTPATIENT
Start: 2018-04-18 | End: 2018-04-18 | Stop reason: HOSPADM

## 2018-04-17 RX ADMIN — DEXMEDETOMIDINE HYDROCHLORIDE 1 MCG/KG/HR: 100 INJECTION, SOLUTION, CONCENTRATE INTRAVENOUS at 05:04

## 2018-04-17 RX ADMIN — FENTANYL CITRATE 25 MCG: 50 INJECTION INTRAMUSCULAR; INTRAVENOUS at 07:04

## 2018-04-17 RX ADMIN — ACETAMINOPHEN 650 MG: 325 TABLET ORAL at 07:04

## 2018-04-17 RX ADMIN — MIDAZOLAM HYDROCHLORIDE 2 MG: 1 INJECTION, SOLUTION INTRAMUSCULAR; INTRAVENOUS at 05:04

## 2018-04-17 RX ADMIN — CEFAZOLIN 2 G: 330 INJECTION, POWDER, FOR SOLUTION INTRAMUSCULAR; INTRAVENOUS at 06:04

## 2018-04-17 RX ADMIN — FENTANYL CITRATE 25 MCG: 50 INJECTION, SOLUTION INTRAMUSCULAR; INTRAVENOUS at 06:04

## 2018-04-17 RX ADMIN — SODIUM CHLORIDE: 0.9 INJECTION, SOLUTION INTRAVENOUS at 05:04

## 2018-04-17 RX ADMIN — DEXMEDETOMIDINE HYDROCHLORIDE 55 MCG: 100 INJECTION, SOLUTION, CONCENTRATE INTRAVENOUS at 05:04

## 2018-04-17 NOTE — ANESTHESIA PREPROCEDURE EVALUATION
04/17/2018  Pre-operative evaluation for Procedure(s) (LRB):  INSERTION-ICD-DUAL (Left)    aFbiana Chanel is a 42 y.o. female NICM EF 15%, MILE on CPAP, DMII on insulin, a fib, HTN    Patient Active Problem List   Diagnosis    HTN (hypertension)    Tobacco abuse    Type 2 diabetes mellitus without complication, without long-term current use of insulin    Iron deficiency anemia    Chest pain    Elevated troponin    Essential hypertension    Paroxysmal atrial fibrillation    Obesity with body mass index (BMI) of 30.0 to 39.9    Mild protein malnutrition    Hypoxia    Respiratory acidosis    Hyperlipidemia    Chronic combined systolic and diastolic heart failure    Hypercapnic respiratory failure, chronic    Dyspnea    Pulmonary edema    Blood glucose elevated    Shortness of breath    Acute on chronic combined systolic and diastolic heart failure, NYHA class 2    Acute respiratory failure with hypercapnia    Anemia associated with acute blood loss    MILE (obstructive sleep apnea)    Obesity hypoventilation syndrome    Convulsion    Other convulsions    Treponemal infection    Vulvar ulceration    Acute bronchitis    Nonischemic cardiomyopathy    Upper respiratory tract infection    Pneumonitis    NICM (nonischemic cardiomyopathy)       Review of patient's allergies indicates:   Allergen Reactions    Pneumococcal 23-abimbola ps vaccine        No current facility-administered medications on file prior to encounter.      Current Outpatient Prescriptions on File Prior to Encounter   Medication Sig Dispense Refill    aspirin 81 MG Chew Take 81 mg by mouth once daily.      atorvastatin (LIPITOR) 80 MG tablet Take 1 tablet (80 mg total) by mouth nightly. 90 tablet 3    esomeprazole (NEXIUM) 40 MG capsule Take 40 mg by mouth before breakfast.      furosemide (LASIX) 20 MG tablet  "Take 1 tablet (20 mg total) by mouth once daily. 90 tablet 3    gabapentin (NEURONTIN) 400 MG capsule Take 400 mg by mouth Daily.      hydrALAZINE (APRESOLINE) 100 MG tablet Take 1 tablet (100 mg total) by mouth 2 (two) times daily. 180 tablet 3    insulin glargine (LANTUS SOLOSTAR) 100 unit/mL (3 mL) InPn pen Inject 10 Units into the skin every evening. (Patient taking differently: Inject 10 Units into the skin every evening. Took 1/2 last night) 15 mL 2    lisinopril (PRINIVIL,ZESTRIL) 40 MG tablet Take 1 tablet (40 mg total) by mouth once daily. 90 tablet 3    metoprolol succinate (TOPROL-XL) 50 MG 24 hr tablet Take 1 tablet (50 mg total) by mouth once daily. 30 tablet 11    spironolactone (ALDACTONE) 25 MG tablet Take 1 tablet (25 mg total) by mouth once daily. 30 tablet 11    albuterol 90 mcg/actuation inhaler Inhale 1-2 puffs into the lungs every 6 (six) hours as needed for Wheezing. Rescue 1 Inhaler 0    BLOOD PRESSURE CUFF Misc 1 kit by Misc.(Non-Drug; Combo Route) route 2 (two) times daily. 1 each 0    diclofenac sodium (VOLTAREN) 1 % Gel Apply 2 g topically 4 (four) times daily. 100 g 1    metformin (GLUCOPHAGE) 500 MG tablet Take 1 tablet (500 mg total) by mouth 2 (two) times daily with meals. 56 tablet 0    pen needle, diabetic 31 gauge x 1/4" Ndle 1 each by Misc.(Non-Drug; Combo Route) route 2 (two) times daily. 100 each 0       Past Surgical History:   Procedure Laterality Date    CARDIAC CATHETERIZATION      COLONOSCOPY      DILATION AND CURETTAGE OF UTERUS         Social History     Social History    Marital status:      Spouse name: N/A    Number of children: N/A    Years of education: N/A     Occupational History    Not on file.     Social History Main Topics    Smoking status: Passive Smoke Exposure - Never Smoker     Types: Cigarettes    Smokeless tobacco: Never Used      Comment: smokes cigars on occasion    Alcohol use Yes      Comment: occasional    Drug use: No    "   Comment: hx of marijuana use 3 years ago    Sexual activity: Yes     Partners: Male     Other Topics Concern    Not on file     Social History Narrative    No narrative on file         CBC: No results for input(s): WBC, RBC, HGB, HCT, PLT, MCV, MCH, MCHC in the last 72 hours.    CMP:   Recent Labs      18   1323   K  3.6       INR  No results for input(s): PT, INR, PROTIME, APTT in the last 72 hours.        Diagnostic Studies:      EKD Echo:  Results for orders placed or performed during the hospital encounter of 18   2D Echo w/ Color Flow Doppler   Result Value Ref Range    EF 15 (A) 55 - 65    Mitral Valve Regurgitation MILD     Diastolic Dysfunction Yes (A)     Est. PA Systolic Pressure 35.26     Pericardial Effusion NONE     Mitral Valve Mobility NORMAL     Tricuspid Valve Regurgitation TRIVIAL TO MILD          Anesthesia Evaluation    I have reviewed the Patient Summary Reports.     I have reviewed the Medications.     Review of Systems  Anesthesia Hx:  No problems with previous Anesthesia  History of prior surgery of interest to airway management or planning: Denies Family Hx of Anesthesia complications.   Denies Personal Hx of Anesthesia complications.   Cardiovascular:   Exercise tolerance: poor Hypertension CHF DIAZ NYHA Classification III    Pulmonary:   Shortness of breath Sleep Apnea, CPAP    Renal/:  Renal/ Normal     Hepatic/GI:   GERD, well controlled    Neurological:   Seizures, well controlled    Endocrine:   Diabetes, well controlled, type 2, using insulin        Physical Exam  General:  Obesity    Airway/Jaw/Neck:  Airway Findings: Mouth Opening: Normal Tongue: Normal  General Airway Assessment: Adult  Mallampati: III  TM Distance: Normal, at least 6 cm  Jaw/Neck Findings:  Neck ROM: Normal ROM      Dental:  Dental Findings: In tact, Upper Dentures   Chest/Lungs:  Chest/Lungs Findings: Clear to auscultation, Normal Respiratory Rate         Mental Status:  Mental Status  Findings:  Cooperative, Alert and Oriented         Anesthesia Plan  Type of Anesthesia, risks & benefits discussed:  Anesthesia Type:  general  Patient's Preference:   Intra-op Monitoring Plan: standard ASA monitors  Intra-op Monitoring Plan Comments:   Post Op Pain Control Plan: multimodal analgesia  Post Op Pain Control Plan Comments:   Induction:   IV  Beta Blocker:  Patient is not currently on a Beta-Blocker (No further documentation required).       Informed Consent: Patient understands risks and agrees with Anesthesia plan.  Questions answered. Anesthesia consent signed with patient.  ASA Score: 4     Day of Surgery Review of History & Physical:    H&P update referred to the surgeon.         Ready For Surgery From Anesthesia Perspective.

## 2018-04-17 NOTE — INTERVAL H&P NOTE
The patient has been examined and the H&P has been reviewed:    I concur with the findings and no changes have occurred since H&P was written. pt denies any acute symptoms such as fevers, chills, malaise. Pt FC NYHA class III symptoms and is  on OMT for  NICM > pt is on toprol, lisinopril, and aldactone.      PE:   General: Well developed, well nourished, No distress on room air   HEENT: Head is normocephalic, atraumatic;  Lungs: Clear to auscultation bilaterally.  No wheezing. Normal respiratory effort.  Cardiovascular:  S1 S2 Normal rate, regular rhythm and normal heart sounds.    Extremities: No LE edema. Pulses 2+ and symmetric.   Abdomen: Abdomen is soft, non-tender non-distended with normal bowel sounds.  Skin: Skin color, texture, turgor normal. No rashes.  Musculoskeletal: normal range of motion   Neurologic: Normal strength and tone. No focal numbness or weakness.   Psychiatric: normal mood and affect.  behavior is normal. Alert and oriented X 3.  Labs reviewed      PLAN   ICD implantation   Anesthesia for sedation      Prior to procedure, extensive discussion with patient by STAFF Nanci  regarding risks and benefits of ICD implantation   , and patient  would like to proceed.  The patient/ spouse  voices understanding and all questions have been answered. No further questions/concerns voiced at this time.          DILLON Ta-BC  Cardiology Electrophysiology  NP   [facility]     Attending: MD Eben Christine               Active Hospital Problems    Diagnosis  POA    *NICM (nonischemic cardiomyopathy) [I42.8]  Yes      Resolved Hospital Problems    Diagnosis Date Resolved POA   No resolved problems to display.

## 2018-04-18 VITALS
SYSTOLIC BLOOD PRESSURE: 116 MMHG | OXYGEN SATURATION: 95 % | WEIGHT: 241 LBS | HEIGHT: 67 IN | RESPIRATION RATE: 18 BRPM | TEMPERATURE: 98 F | DIASTOLIC BLOOD PRESSURE: 88 MMHG | HEART RATE: 74 BPM | BODY MASS INDEX: 37.83 KG/M2

## 2018-04-18 LAB — POCT GLUCOSE: 177 MG/DL (ref 70–110)

## 2018-04-18 PROCEDURE — 25000003 PHARM REV CODE 250: Performed by: NURSE PRACTITIONER

## 2018-04-18 PROCEDURE — 63600175 PHARM REV CODE 636 W HCPCS: Performed by: INTERNAL MEDICINE

## 2018-04-18 PROCEDURE — 25000003 PHARM REV CODE 250: Performed by: STUDENT IN AN ORGANIZED HEALTH CARE EDUCATION/TRAINING PROGRAM

## 2018-04-18 RX ORDER — HYDRALAZINE HYDROCHLORIDE 25 MG/1
100 TABLET, FILM COATED ORAL EVERY 12 HOURS
Status: DISCONTINUED | OUTPATIENT
Start: 2018-04-18 | End: 2018-04-18 | Stop reason: HOSPADM

## 2018-04-18 RX ORDER — IBUPROFEN 200 MG
24 TABLET ORAL
Status: DISCONTINUED | OUTPATIENT
Start: 2018-04-18 | End: 2018-04-18 | Stop reason: HOSPADM

## 2018-04-18 RX ORDER — OXYCODONE HYDROCHLORIDE AND ACETAMINOPHEN 5; 325 MG/1; MG/1
1 TABLET ORAL EVERY 6 HOURS PRN
Qty: 10 TABLET | Refills: 0 | Status: SHIPPED | OUTPATIENT
Start: 2018-04-18 | End: 2018-06-21 | Stop reason: ALTCHOICE

## 2018-04-18 RX ORDER — CEPHALEXIN 500 MG/1
500 CAPSULE ORAL EVERY 12 HOURS
Qty: 10 CAPSULE | Refills: 0 | Status: SHIPPED | OUTPATIENT
Start: 2018-04-18 | End: 2018-04-23

## 2018-04-18 RX ORDER — METOPROLOL SUCCINATE 50 MG/1
50 TABLET, EXTENDED RELEASE ORAL DAILY
Status: DISCONTINUED | OUTPATIENT
Start: 2018-04-18 | End: 2018-04-18 | Stop reason: HOSPADM

## 2018-04-18 RX ORDER — INSULIN ASPART 100 [IU]/ML
0-5 INJECTION, SOLUTION INTRAVENOUS; SUBCUTANEOUS
Status: DISCONTINUED | OUTPATIENT
Start: 2018-04-18 | End: 2018-04-18 | Stop reason: HOSPADM

## 2018-04-18 RX ORDER — GLUCAGON 1 MG
1 KIT INJECTION
Status: DISCONTINUED | OUTPATIENT
Start: 2018-04-18 | End: 2018-04-18 | Stop reason: HOSPADM

## 2018-04-18 RX ORDER — ASPIRIN 81 MG/1
81 TABLET ORAL DAILY
Status: DISCONTINUED | OUTPATIENT
Start: 2018-04-18 | End: 2018-04-18 | Stop reason: HOSPADM

## 2018-04-18 RX ORDER — OXYCODONE AND ACETAMINOPHEN 5; 325 MG/1; MG/1
1 TABLET ORAL ONCE
Status: COMPLETED | OUTPATIENT
Start: 2018-04-18 | End: 2018-04-18

## 2018-04-18 RX ORDER — OXYCODONE HYDROCHLORIDE AND ACETAMINOPHEN 5; 325 MG/1; MG/1
1 TABLET ORAL EVERY 6 HOURS PRN
Qty: 10 TABLET | Refills: 0 | Status: SHIPPED | OUTPATIENT
Start: 2018-04-18 | End: 2018-04-18

## 2018-04-18 RX ORDER — LISINOPRIL 20 MG/1
40 TABLET ORAL DAILY
Status: DISCONTINUED | OUTPATIENT
Start: 2018-04-18 | End: 2018-04-18 | Stop reason: HOSPADM

## 2018-04-18 RX ORDER — IBUPROFEN 200 MG
16 TABLET ORAL
Status: DISCONTINUED | OUTPATIENT
Start: 2018-04-18 | End: 2018-04-18 | Stop reason: HOSPADM

## 2018-04-18 RX ADMIN — CEFAZOLIN 2 G: 330 INJECTION, POWDER, FOR SOLUTION INTRAMUSCULAR; INTRAVENOUS at 12:04

## 2018-04-18 RX ADMIN — HYDRALAZINE HYDROCHLORIDE 100 MG: 25 TABLET, FILM COATED ORAL at 09:04

## 2018-04-18 RX ADMIN — TRAMADOL HYDROCHLORIDE 50 MG: 50 TABLET, FILM COATED ORAL at 12:04

## 2018-04-18 RX ADMIN — OXYCODONE HYDROCHLORIDE AND ACETAMINOPHEN 1 TABLET: 5; 325 TABLET ORAL at 09:04

## 2018-04-18 RX ADMIN — ASPIRIN 81 MG: 81 TABLET, COATED ORAL at 09:04

## 2018-04-18 RX ADMIN — CEFAZOLIN 2 G: 330 INJECTION, POWDER, FOR SOLUTION INTRAMUSCULAR; INTRAVENOUS at 09:04

## 2018-04-18 RX ADMIN — METOPROLOL SUCCINATE 50 MG: 50 TABLET, EXTENDED RELEASE ORAL at 09:04

## 2018-04-18 NOTE — PLAN OF CARE
"Problem: Patient Care Overview  Goal: Plan of Care Review  Outcome: Ongoing (interventions implemented as appropriate)  Patient has remained free of falls this shift. She is AAOx4 and VS's have been patient. She is somewhat concerned that the AICD will always "fee tight" and somewhat painful. I told her several times that it was the wrapping on he shoulder that was making it feel that way and that after the incision healed, she won't even know that it's there. Her diet has been advanced to a ADA 2000 calorie diet.      "

## 2018-04-18 NOTE — DISCHARGE SUMMARY
[facility]  Cardiac Electrophysiology  Discharge Summary      Patient Name: Fabiana Chanel  MRN: [id number]  Admission Date: 4/17/2018  Hospital Length of Stay: 0 days  Discharge Date and Time:  04/18/2018 9:00 AM  Attending Physician: Eben Christine MD    Discharging Provider: Naren Conti NP  Primary Care Physician: Miguel Burden MD    HPI: 42 y.o. F NICM (cath proved; 11/17)  CHF, Class III  DM on meds   hyperlipidemia on meds  HTN on meds  MILE pending repeat sleep study for CPAP titration  PAF hx, on coumadin and xarelto in past, c/b vaginal bleeding     NYHA III sx despite OMT. > pt is on toprol and lisinopril.     Given CM with LVEF <35%, NYHA III sx, makes SCD-HeFT criteria for ICD. Pt presented to [facility] for this planned outpatient procedure > denies any acute symptoms on admit.        Procedure(s) (LRB):  INSERTION-ICD-DUAL (Left)       Hospital Course:  Pt underwent  ICD implantation  ( see procedure note for details)  tolerated procedure, no acute complications noted. L chest with with aquacel dressing CDI, no bleeding or hematoma noted. Continue Keflex for 5 days   Pt with aquacel dressing to remain intact until wound clinic visit. Pt to follow up with device clinic in 1 week for wound check in 1 week, and   3 months with MD Christine  in 3 months   Discharge plans/instructions discussed with patient and spouse   who verbalized understanding and agreement of plans of care.  No further questions or concerns  voiced at this time.         Physical Exam:   Gen: no acute distress, pleasant patient answering questions appropriately  CVS: regular rate and rhythm, S1S2 normal, no murmurs/rubs/gallops  CHEST: clear to auscultation bilaterally, no wheezes/rales/ronchi  ABD: Soft, non-tender, nondistended, bowel sounds present  EXT: No Edema  NEURO: awake, alert, and oriented No focal defictis      Consults: ANESTHESIA     Final Active Diagnoses:    Diagnosis Date Noted POA    PRINCIPAL  PROBLEM:  NICM (nonischemic cardiomyopathy) [I42.8] 04/17/2018 Yes      Problems Resolved During this Admission:    Diagnosis Date Noted Date Resolved POA       Discharged Condition: good    Disposition: Home or Self Care    Follow Up:    Patient Instructions:     Diet diabetic   Scheduling Instructions: cardiac     Other restrictions (specify):   Scheduling Instructions: Instructions to patient:  - We have implanted a implantable  defibrillator  on this admission, please take the following precautions in the days/weeks following your procedure  - Please refer to the written post procedure hand out that was given to you.   - For the first 6 weeks, while your implanted leads become permanently fixed, we ask that you avoid raising your left elbow above your shoulder,  and lifting greater than 5-10 lbs.    Wear sling and swath to Left arm continuously x 48 hours; Then wear sling and swath at bedtime x 6 weeks.  - We will schedule a visit to our wound clinic 1 week after your procedure for close follow up, in the interim period please keep your wound as clean & dry as possible, If you notice any discharge,worsening tenderness or discomfort from the area please call our clinic as soon as possible>  Telephone [phone] > or main hospital number  to be directed to Device Clinic [phone].   - You may continue the antibiotics as  prescribed    - Follow with Dr. Christine  in 3  months post procedure     Notify your health care provider if you experience any of the following:  temperature >100.4     Notify your health care provider if you experience any of the following:  persistent nausea and vomiting or diarrhea     Notify your health care provider if you experience any of the following:  severe uncontrolled pain     Notify your health care provider if you experience any of the following:  redness, tenderness, or signs of infection (pain, swelling, redness, odor or green/yellow discharge around incision site)      Notify your health care provider if you experience any of the following:  difficulty breathing or increased cough     Notify your health care provider if you experience any of the following:  severe persistent headache     Notify your health care provider if you experience any of the following:  worsening rash     Notify your health care provider if you experience any of the following:  persistent dizziness, light-headedness, or visual disturbances     Notify your health care provider if you experience any of the following:  increased confusion or weakness     Notify your health care provider if you experience any of the following:     Leave dressing on - Keep it clean, dry, and intact until clinic visit       Medications:  Reconciled Home Medications:      Medication List      START taking these medications    cephALEXin 500 MG capsule  Commonly known as:  KEFLEX  Take 1 capsule (500 mg total) by mouth every 12 (twelve) hours.     PERCOCET 5-325 mg per tablet  Generic drug:  oxyCODONE-acetaminophen  Take 1 tablet by mouth every 6 (six) hours as needed for Pain.        CHANGE how you take these medications    insulin glargine 100 unit/mL (3 mL) Inpn pen  Commonly known as:  LANTUS SOLOSTAR U-100 INSULIN  Inject 10 Units into the skin every evening.  What changed:  additional instructions        CONTINUE taking these medications    albuterol 90 mcg/actuation inhaler  Inhale 1-2 puffs into the lungs every 6 (six) hours as needed for Wheezing. Rescue     aspirin 81 MG Chew  Take 81 mg by mouth once daily.     atorvastatin 80 MG tablet  Commonly known as:  LIPITOR  Take 1 tablet (80 mg total) by mouth nightly.     BLOOD PRESSURE CUFF Misc  Generic drug:  miscellaneous medical supply  1 kit by Misc.(Non-Drug; Combo Route) route 2 (two) times daily.     diclofenac sodium 1 % Gel  Commonly known as:  VOLTAREN  Apply 2 g topically 4 (four) times daily.     esomeprazole 40 MG capsule  Commonly known as:  NEXIUM  Take 40  "mg by mouth before breakfast.     furosemide 20 MG tablet  Commonly known as:  LASIX  Take 1 tablet (20 mg total) by mouth once daily.     gabapentin 400 MG capsule  Commonly known as:  NEURONTIN  Take 400 mg by mouth Daily.     glimepiride 4 MG tablet  Commonly known as:  AMARYL  Take 1 tablet (4 mg total) by mouth daily with breakfast.     hydrALAZINE 100 MG tablet  Commonly known as:  APRESOLINE  Take 1 tablet (100 mg total) by mouth 2 (two) times daily.     lisinopril 40 MG tablet  Commonly known as:  PRINIVIL,ZESTRIL  Take 1 tablet (40 mg total) by mouth once daily.     metFORMIN 500 MG tablet  Commonly known as:  GLUCOPHAGE  Take 1 tablet (500 mg total) by mouth 2 (two) times daily with meals.     metoprolol succinate 50 MG 24 hr tablet  Commonly known as:  TOPROL-XL  Take 1 tablet (50 mg total) by mouth once daily.     pen needle, diabetic 31 gauge x 1/4" Ndle  1 each by Misc.(Non-Drug; Combo Route) route 2 (two) times daily.     spironolactone 25 MG tablet  Commonly known as:  ALDACTONE  Take 1 tablet (25 mg total) by mouth once daily.            Naren Conti NP  Cardiac Electrophysiology  [facility]    STAFF MD Nanci Treadwell   "

## 2018-04-18 NOTE — TRANSFER OF CARE
"Anesthesia Transfer of Care Note    Patient: Fabiana Chanel    Procedure(s) Performed: Procedure(s) (LRB):  INSERTION-ICD-DUAL (Left)    Patient location: PACU    Anesthesia Type: general    Transport from OR: Transported from OR on 6-10 L/min O2 by face mask with adequate spontaneous ventilation    Post pain: adequate analgesia    Post assessment: no apparent anesthetic complications    Post vital signs: stable    Level of consciousness: awake and alert    Nausea/Vomiting: no nausea/vomiting    Complications: none    Transfer of care protocol was followed      Last vitals:   Visit Vitals  BP (!) 99/56 (BP Location: Right arm, Patient Position: Lying)   Pulse 70   Temp 36.2 °C (97.2 °F) (Temporal)   Resp 20   Ht 5' 7" (1.702 m)   Wt 109.3 kg (241 lb)   LMP  (LMP Unknown)   SpO2 99%   Breastfeeding? No   BMI 37.75 kg/m²     "

## 2018-04-18 NOTE — PLAN OF CARE
Problem: Patient Care Overview  Goal: Plan of Care Review  Outcome: Ongoing (interventions implemented as appropriate)  Pt with no s/s hypo or hyperglycemia. Pt with no falls or injuries this hospital stay. Cardiac monitoring NSR. Pt afebrile, post-op dressing intact. No s/s infection.

## 2018-04-18 NOTE — ANESTHESIA POSTPROCEDURE EVALUATION
"Anesthesia Post Evaluation    Patient: Fabiana Chanel    Procedure(s) Performed: Procedure(s) (LRB):  INSERTION-ICD-DUAL (Left)    Final Anesthesia Type: general  Patient location during evaluation: floor  Patient participation: Yes- Able to Participate  Level of consciousness: awake and alert and oriented  Post-procedure vital signs: reviewed and stable  Pain management: adequate  Airway patency: patent  PONV status at discharge: No PONV  Anesthetic complications: no      Cardiovascular status: hemodynamically stable  Respiratory status: unassisted  Hydration status: euvolemic  Follow-up not needed.        Visit Vitals  /88 (BP Location: Right arm, Patient Position: Lying)   Pulse 74   Temp 36.6 °C (97.9 °F) (Oral)   Resp 18   Ht 5' 7" (1.702 m)   Wt 109.3 kg (241 lb)   LMP  (LMP Unknown)   SpO2 95%   Breastfeeding? No   BMI 37.75 kg/m²       Pain/Moris Score: Pain Assessment Performed: Yes (4/18/2018 10:00 AM)  Presence of Pain: complains of pain/discomfort (4/18/2018 10:00 AM)  Pain Rating Prior to Med Admin: 7 (4/18/2018  9:14 AM)  Pain Rating Post Med Admin: 5 (4/18/2018 10:14 AM)  Moris Score: 10 (4/17/2018  8:45 PM)      "

## 2018-04-18 NOTE — NURSING
Order to d/c home today. Saline lock removed. VSS. Discharge instructions given to pt. Pt verbalized understanding. Pt discharged from OBS unit via w/c. Pt accompanied by family. All personal belongings taken home by pt.

## 2018-04-18 NOTE — NURSING TRANSFER
Nursing Transfer Note      4/17/2018     Transfer To: OBS 5    Transfer via stretcher    Transfer with cardiac monitoring and 3L NC    Transported by PCT    Medicines sent: none    Chart send with patient: Yes    Notified: spouse    Patient reassessed at: 4/17/18 see flowsheets

## 2018-04-23 ENCOUNTER — TELEPHONE (OUTPATIENT)
Dept: PULMONOLOGY | Facility: CLINIC | Age: 43
End: 2018-04-23

## 2018-04-23 ENCOUNTER — TELEPHONE (OUTPATIENT)
Dept: SLEEP MEDICINE | Facility: CLINIC | Age: 43
End: 2018-04-23

## 2018-04-23 DIAGNOSIS — G47.33 OSA (OBSTRUCTIVE SLEEP APNEA): Primary | ICD-10-CM

## 2018-04-23 NOTE — TELEPHONE ENCOUNTER
Please let patient know that he/she has sleep apnea and the titration went well.  Patient can have the option of coming into clinic to further discuss result of sleep study  or begin bpap and follow up in clinic.  If patient agreed to cpap, then I would like to set patient up with cpap via dme of choice.  Clinic follow up with md/np in approximately 8 weeks after cpap usage.

## 2018-04-25 ENCOUNTER — CLINICAL SUPPORT (OUTPATIENT)
Dept: ELECTROPHYSIOLOGY | Facility: CLINIC | Age: 43
End: 2018-04-25
Attending: INTERNAL MEDICINE
Payer: MEDICARE

## 2018-04-25 DIAGNOSIS — I42.8 NONISCHEMIC CARDIOMYOPATHY: ICD-10-CM

## 2018-04-25 PROCEDURE — 93283 PRGRMG EVAL IMPLANTABLE DFB: CPT | Mod: S$GLB,,, | Performed by: INTERNAL MEDICINE

## 2018-04-25 RX ORDER — LANCETS
1 EACH MISCELLANEOUS 3 TIMES DAILY
Qty: 200 EACH | Refills: 2 | Status: SHIPPED | OUTPATIENT
Start: 2018-04-25 | End: 2018-07-18 | Stop reason: SDUPTHER

## 2018-04-25 RX ORDER — DEXTROSE 4 G
1 TABLET,CHEWABLE ORAL 3 TIMES DAILY
Qty: 1 EACH | Refills: 0 | Status: SHIPPED | OUTPATIENT
Start: 2018-04-25 | End: 2018-07-18 | Stop reason: SDUPTHER

## 2018-04-26 ENCOUNTER — TELEPHONE (OUTPATIENT)
Dept: FAMILY MEDICINE | Facility: CLINIC | Age: 43
End: 2018-04-26

## 2018-04-26 NOTE — TELEPHONE ENCOUNTER
[facility] sent fax stating Patient's lantus solostar was not cover on patient's insurance and they are requesting another insulin.

## 2018-06-21 ENCOUNTER — OFFICE VISIT (OUTPATIENT)
Dept: CARDIOLOGY | Facility: CLINIC | Age: 43
End: 2018-06-21
Payer: MEDICARE

## 2018-06-21 VITALS
HEIGHT: 67 IN | HEART RATE: 72 BPM | DIASTOLIC BLOOD PRESSURE: 70 MMHG | OXYGEN SATURATION: 95 % | RESPIRATION RATE: 15 BRPM | WEIGHT: 244.06 LBS | BODY MASS INDEX: 38.3 KG/M2 | SYSTOLIC BLOOD PRESSURE: 102 MMHG

## 2018-06-21 DIAGNOSIS — Z95.810 IMPLANTABLE CARDIOVERTER-DEFIBRILLATOR (ICD) IN SITU: ICD-10-CM

## 2018-06-21 DIAGNOSIS — I10 ESSENTIAL HYPERTENSION: ICD-10-CM

## 2018-06-21 DIAGNOSIS — E11.9 TYPE 2 DIABETES MELLITUS WITHOUT COMPLICATION, WITHOUT LONG-TERM CURRENT USE OF INSULIN: ICD-10-CM

## 2018-06-21 DIAGNOSIS — G47.33 OSA (OBSTRUCTIVE SLEEP APNEA): ICD-10-CM

## 2018-06-21 DIAGNOSIS — I42.8 NICM (NONISCHEMIC CARDIOMYOPATHY): Primary | ICD-10-CM

## 2018-06-21 DIAGNOSIS — E78.2 MIXED HYPERLIPIDEMIA: ICD-10-CM

## 2018-06-21 DIAGNOSIS — E66.9 OBESITY WITH BODY MASS INDEX (BMI) OF 30.0 TO 39.9: ICD-10-CM

## 2018-06-21 PROCEDURE — 99214 OFFICE O/P EST MOD 30 MIN: CPT | Mod: S$GLB,,, | Performed by: INTERNAL MEDICINE

## 2018-06-21 PROCEDURE — 3046F HEMOGLOBIN A1C LEVEL >9.0%: CPT | Mod: CPTII,S$GLB,, | Performed by: INTERNAL MEDICINE

## 2018-06-21 PROCEDURE — 3008F BODY MASS INDEX DOCD: CPT | Mod: CPTII,S$GLB,, | Performed by: INTERNAL MEDICINE

## 2018-06-21 PROCEDURE — 99999 PR PBB SHADOW E&M-EST. PATIENT-LVL III: CPT | Mod: PBBFAC,,, | Performed by: INTERNAL MEDICINE

## 2018-06-21 PROCEDURE — 99499 UNLISTED E&M SERVICE: CPT | Mod: S$GLB,,, | Performed by: INTERNAL MEDICINE

## 2018-06-21 PROCEDURE — 3078F DIAST BP <80 MM HG: CPT | Mod: CPTII,S$GLB,, | Performed by: INTERNAL MEDICINE

## 2018-06-21 PROCEDURE — 3074F SYST BP LT 130 MM HG: CPT | Mod: CPTII,S$GLB,, | Performed by: INTERNAL MEDICINE

## 2018-06-21 NOTE — PROGRESS NOTES
CARDIOVASCULAR PROGRESS NOTE    REASON FOR CONSULT:   Fabiana Chanel is a 42 y.o. female who presents for follow up of NICM.    PCP: Bertram  EP: Nanci  HISTORY OF PRESENT ILLNESS:   The patient returns for follow-up.  In the interim since her last office visit, she has underwent [employer] ICD implant.  The patient tells me she is feeling well and has had no intercurrent angina or dyspnea.  She denies any palpitations, lightheadedness, dizziness, syncope, or defibrillator discharges.  There has been no PND, orthopnea, or lower extremity edema. She denies melena, hematuria, or claudication symptoms.  Her sleep study was positive, and she is now on CPAP.    CARDIOVASCULAR HISTORY:   NICM (cath 11/2017) EF 15-20% by echo 3/2018  [employer] ICD (4/17/18 Dr. Christine)  MILE on CPAP    PAST MEDICAL HISTORY:     Past Medical History:   Diagnosis Date    Atrial fibrillation     Blood clot associated with vein wall inflammation     not dvt    Cardiomyopathy     Normal cors on cath 11/2017    CHF (congestive heart failure)     DM (diabetes mellitus) 9/19/2013    Hyperlipidemia     Hypertension     Psoriasis     Sleep apnea        PAST SURGICAL HISTORY:     Past Surgical History:   Procedure Laterality Date    CARDIAC CATHETERIZATION      COLONOSCOPY      DILATION AND CURETTAGE OF UTERUS         ALLERGIES AND MEDICATION:     Review of patient's allergies indicates:   Allergen Reactions    Pneumococcal 23-abimbola ps vaccine      Previous Medications    ALBUTEROL 90 MCG/ACTUATION INHALER    Inhale 1-2 puffs into the lungs every 6 (six) hours as needed for Wheezing. Rescue    ASPIRIN 81 MG CHEW    Take 81 mg by mouth once daily.    ATORVASTATIN (LIPITOR) 80 MG TABLET    Take 1 tablet (80 mg total) by mouth nightly.    BLOOD GLUCOSE CONTROL, HIGH SOLN    1 each by Misc.(Non-Drug; Combo Route) route once.    BLOOD GLUCOSE CONTROL, LOW SOLN    1 each by Misc.(Non-Drug; Combo Route) route once.    BLOOD PRESSURE CUFF MISC    1  "kit by Misc.(Non-Drug; Combo Route) route 2 (two) times daily.    BLOOD SUGAR DIAGNOSTIC (TRUE METRIX GLUCOSE TEST STRIP) STRP    1 strip by Misc.(Non-Drug; Combo Route) route 3 (three) times daily.    BLOOD-GLUCOSE METER (TRUE METRIX AIR GLUCOSE METER) MISC    1 each by Misc.(Non-Drug; Combo Route) route 3 (three) times daily.    DICLOFENAC SODIUM (VOLTAREN) 1 % GEL    Apply 2 g topically 4 (four) times daily.    ESOMEPRAZOLE (NEXIUM) 40 MG CAPSULE    Take 40 mg by mouth before breakfast.    FUROSEMIDE (LASIX) 20 MG TABLET    Take 1 tablet (20 mg total) by mouth once daily.    GABAPENTIN (NEURONTIN) 400 MG CAPSULE    Take 400 mg by mouth Daily.    GLIMEPIRIDE (AMARYL) 4 MG TABLET    Take 1 tablet (4 mg total) by mouth daily with breakfast.    HYDRALAZINE (APRESOLINE) 100 MG TABLET    Take 1 tablet (100 mg total) by mouth 2 (two) times daily.    INSULIN DETEMIR U-100 (LEVEMIR FLEXTOUCH) 100 UNIT/ML (3 ML) SUBQ INPN PEN    Inject 10 Units into the skin every evening.    LANCETS MISC    1 each by Misc.(Non-Drug; Combo Route) route 3 (three) times daily.    LISINOPRIL (PRINIVIL,ZESTRIL) 40 MG TABLET    Take 1 tablet (40 mg total) by mouth once daily.    METFORMIN (GLUCOPHAGE) 500 MG TABLET    Take 1 tablet (500 mg total) by mouth 2 (two) times daily with meals.    METOPROLOL SUCCINATE (TOPROL-XL) 50 MG 24 HR TABLET    Take 1 tablet (50 mg total) by mouth once daily.    PEN NEEDLE, DIABETIC 31 GAUGE X 1/4" NDLE    1 each by Misc.(Non-Drug; Combo Route) route 2 (two) times daily.    SPIRONOLACTONE (ALDACTONE) 25 MG TABLET    Take 1 tablet (25 mg total) by mouth once daily.       SOCIAL HISTORY:     Social History     Social History    Marital status:      Spouse name: N/A    Number of children: N/A    Years of education: N/A     Occupational History    Not on file.     Social History Main Topics    Smoking status: Passive Smoke Exposure - Never Smoker     Types: Cigarettes    Smokeless tobacco: Never Used    " "  Comment: smokes cigars on occasion    Alcohol use Yes      Comment: occasional    Drug use: No      Comment: hx of marijuana use 3 years ago    Sexual activity: Yes     Partners: Male     Other Topics Concern    Not on file     Social History Narrative    No narrative on file       FAMILY HISTORY:     Family History   Problem Relation Age of Onset    Hypertension Mother     Hypertension Father     Diabetes Father     Diabetes Maternal Grandmother     Diabetes Paternal Grandmother     Breast cancer Neg Hx     Colon cancer Neg Hx     Ovarian cancer Neg Hx        REVIEW OF SYSTEMS:   Review of Systems   Constitutional: Negative for chills, diaphoresis and fever.   HENT: Negative for nosebleeds.    Eyes: Negative for blurred vision, double vision and photophobia.   Respiratory: Negative for hemoptysis, shortness of breath and wheezing.    Cardiovascular: Negative for chest pain, palpitations, orthopnea, claudication, leg swelling and PND.   Gastrointestinal: Negative for abdominal pain, blood in stool, heartburn, melena, nausea and vomiting.   Genitourinary: Negative for flank pain and hematuria.   Musculoskeletal: Negative for falls, myalgias and neck pain.   Skin: Negative for rash.   Neurological: Negative for dizziness, seizures, loss of consciousness, weakness and headaches.   Endo/Heme/Allergies: Negative for polydipsia. Does not bruise/bleed easily.   Psychiatric/Behavioral: Negative for depression and memory loss. The patient is not nervous/anxious.        PHYSICAL EXAM:     Vitals:    06/21/18 1027   BP: 102/70   Pulse: 72   Resp: 15    Body mass index is 38.22 kg/m².  Weight: 110.7 kg (244 lb 0.8 oz)   Height: 5' 7" (170.2 cm)     Physical Exam   Constitutional: She is oriented to person, place, and time. She appears well-developed and well-nourished. She is cooperative.  Non-toxic appearance. No distress.   HENT:   Head: Normocephalic and atraumatic.   Eyes: Conjunctivae and EOM are normal. " Pupils are equal, round, and reactive to light. No scleral icterus.   Neck: Trachea normal and normal range of motion. Neck supple. Normal carotid pulses and no JVD present. Carotid bruit is not present. No neck rigidity. No tracheal deviation and no edema present. No thyromegaly present.   Cardiovascular: Normal rate, regular rhythm, S1 normal and S2 normal.  PMI is not displaced.  Exam reveals no gallop and no friction rub.    No murmur heard.  Pulses:       Carotid pulses are 2+ on the right side, and 2+ on the left side.  L side ICD incision C/D/I.   Pulmonary/Chest: Effort normal and breath sounds normal. No respiratory distress. She has no wheezes. She has no rales. She exhibits no tenderness.   Abdominal: Soft. She exhibits no distension. There is no hepatosplenomegaly.   obese   Musculoskeletal: She exhibits no edema or tenderness.   Feet:   Right Foot:   Skin Integrity: Negative for ulcer.   Left Foot:   Skin Integrity: Negative for ulcer.   Neurological: She is alert and oriented to person, place, and time. No cranial nerve deficit.   Skin: Skin is warm and dry. No rash noted. No erythema.   Psychiatric: She has a normal mood and affect. Her speech is normal and behavior is normal.   Vitals reviewed.      DATA:   EKG: (personally reviewed tracing)  3/3/18 SR 89, PVC, borderline LVH, lat ST abnl    Laboratory:  CBC:    Recent Labs  Lab 02/27/18 2120 03/03/18  0030 04/11/18  1133   WHITE BLOOD CELL COUNT 7.73 6.66 5.07   HEMOGLOBIN 15.3 13.3 13.0   HEMATOCRIT 47.6 41.7 43.6   PLATELETS 309 367 H 337       CHEMISTRIES:    Recent Labs  Lab 02/18/17  1943  11/28/17  0508  02/27/18 2120 03/03/18  0030 04/11/18  1133 04/17/18  1323   GLUCOSE 637 HH  < > 319 H  < > 134 H 153 H 149 H  --    SODIUM 133 L  < > 138  < > 139 144 142  --    POTASSIUM 4.3  < > 3.5  < > 4.8 4.1 3.4 L 3.6   BUN BLD 14  < > 11  < > 10 12 10  --    CREATININE 1.5 H  < > 1.1  < > 1.0 1.1 1.0  --    EGFR IF  50 A  < > >60  <  > >60 >60 >60.0  --    EGFR IF NON- 43 A  < > >60  < > >60 >60 >60.0  --    CALCIUM 9.7  < > 9.0  < > 10.6 H 9.6 9.3  --    MAGNESIUM 2.2  --  1.7  --  1.9  --   --   --    < > = values in this interval not displayed.    CARDIAC BIOMARKERS:    Recent Labs  Lab 02/18/17  1943  02/23/17 2012 02/27/18 2120 03/03/18  0030 03/03/18  0225   CPK 74  --  81  --  65  --   --    CPK MB 2.1  --   --   --   --   --   --    TROPONIN I <0.006  < > <0.006  < > 0.017 0.030 H 0.031 H   < > = values in this interval not displayed.    COAGS:    Recent Labs  Lab 06/08/17  2111 01/11/18  1218 04/11/18  1133   INR 0.9 1.0 0.9       LIPIDS/LFTS:    Recent Labs  Lab 11/21/17  1445  11/28/17  0508  02/27/18 2120 03/03/18 0030 03/12/18  0900   CHOLESTEROL 227 H  --  175  --   --   --  175   TRIGLYCERIDES 122  --  76  --   --   --  76   HDL 62  --  46  --   --   --  47   LDL CHOLESTEROL 140.6  --  113.8  --   --   --  112.8   NON-HDL CHOLESTEROL 165  --  129  --   --   --  128   AST 14  < >  --   < > 16 13 8 L   ALT 11  < >  --   < > 11 10 11   < > = values in this interval not displayed.    Lab Results   Component Value Date    TSH 1.938 11/27/2017         Cardiovascular Testing:  Echo: 3/12/18 (EF similar to 11/2017)    1 - Severely depressed left ventricular systolic function (EF 15-20%).  Severe global hypokinesis.     2 - Severe left ventricular enlargement.     3 - Eccentric hypertrophy.     4 - Impaired LV relaxation, elevated LAP (grade 2 diastolic dysfunction).     5 - Mildly depressed right ventricular systolic function .     6 - Mild mitral regurgitation.     7 - Trivial to mild tricuspid regurgitation.     8 - The estimated PA systolic pressure is 35 mmHg.      Cath 11/27/17  RA 5  RV 61/6  PA 60/29/42  PAWP 18  /41  Ao 163/107/130  CO 5.6 L/min  LVEF: 20% by echo with severe LV dilation  Wall Motion: Severe global HK  Dominance: Co-dom  LM: normal  LAD: normal  LCx: normal  RCA:  normal  Hemostasis:  RFA/V manual compression  Impression:  Normal cors  Elevated LVEDP with transmission of pressures to pulmonary circuit  Above c/w Severe NICM  RFA/V manual compression  Plan:  Cont med rx  Stop Plavix  Stop amlodipine  Start Hydrala/Imdur/Lasix  Goal net neg 1L/day  Cont BBl/ACEi  Repeat echo in 3 months for reassessment of LV fxn and need for ICD  Follow up with Dr. Yanez 1 week after discharge     Stress Test: L MPI 9/20/13  Nuclear Quantitative Functional Analysis:   LVEF: 31 %  Impression: NORMAL MYOCARDIAL PERFUSION  1. The perfusion scan is free of evidence for myocardial ischemia or injury.   2. There is a moderate intensity fixed defect in the inferior wall of the left ventricle, secondary to diaphragm attenuation.   3. There is abnormal wall motion at rest showing moderate global hypokinesis of the left ventricle.   4. There is resting LV dysfunction with a reduced ejection fraction of 31 %.   5. The ventricular volumes are normal at rest and stress.   6. The extracardiac distribution of radioactivity is normal.     ASSESSMENT:   # NICM, EF persistently reduced 15-20% by echo 3/2018.  Pt appears euvolemic.  # S/P [employer] ICD 4/2018 (Dr. Christine)  # HTN, controlled  # HLP, on atorva 80mg  # DM  # BMI 38, stable vs last OV  # MILE, on CPAP    PLAN:   Cont med rx  F/U with arrhythmia svc at [facility] planned 7/23/18  Diet/exercise/weight loss, Na+ avoidance  RTC 6 months or sooner prn    Oziel Yanez MD, [facility]

## 2018-07-02 ENCOUNTER — OFFICE VISIT (OUTPATIENT)
Dept: FAMILY MEDICINE | Facility: CLINIC | Age: 43
End: 2018-07-02
Payer: MEDICARE

## 2018-07-02 ENCOUNTER — LAB VISIT (OUTPATIENT)
Dept: LAB | Facility: HOSPITAL | Age: 43
End: 2018-07-02
Attending: FAMILY MEDICINE
Payer: MEDICARE

## 2018-07-02 VITALS
DIASTOLIC BLOOD PRESSURE: 82 MMHG | WEIGHT: 243.63 LBS | BODY MASS INDEX: 38.24 KG/M2 | SYSTOLIC BLOOD PRESSURE: 120 MMHG | RESPIRATION RATE: 20 BRPM | TEMPERATURE: 98 F | HEART RATE: 79 BPM | OXYGEN SATURATION: 97 % | HEIGHT: 67 IN

## 2018-07-02 DIAGNOSIS — D50.9 IRON DEFICIENCY ANEMIA, UNSPECIFIED IRON DEFICIENCY ANEMIA TYPE: ICD-10-CM

## 2018-07-02 DIAGNOSIS — Z95.810 IMPLANTABLE CARDIOVERTER-DEFIBRILLATOR (ICD) IN SITU: ICD-10-CM

## 2018-07-02 DIAGNOSIS — E11.9 TYPE 2 DIABETES MELLITUS WITHOUT COMPLICATION, WITHOUT LONG-TERM CURRENT USE OF INSULIN: ICD-10-CM

## 2018-07-02 DIAGNOSIS — R10.9 CHRONIC ABDOMINAL PAIN: ICD-10-CM

## 2018-07-02 DIAGNOSIS — R56.9 CONVULSIONS, UNSPECIFIED CONVULSION TYPE: ICD-10-CM

## 2018-07-02 DIAGNOSIS — E66.2 OBESITY HYPOVENTILATION SYNDROME: ICD-10-CM

## 2018-07-02 DIAGNOSIS — I50.42 CHRONIC COMBINED SYSTOLIC AND DIASTOLIC HEART FAILURE: ICD-10-CM

## 2018-07-02 DIAGNOSIS — E78.2 MIXED HYPERLIPIDEMIA: ICD-10-CM

## 2018-07-02 DIAGNOSIS — E11.69 TYPE 2 DIABETES MELLITUS WITH OTHER SPECIFIED COMPLICATION, WITH LONG-TERM CURRENT USE OF INSULIN: ICD-10-CM

## 2018-07-02 DIAGNOSIS — E44.1 MILD PROTEIN MALNUTRITION: ICD-10-CM

## 2018-07-02 DIAGNOSIS — J30.9 ALLERGIC RHINITIS, UNSPECIFIED SEASONALITY, UNSPECIFIED TRIGGER: ICD-10-CM

## 2018-07-02 DIAGNOSIS — I10 ESSENTIAL HYPERTENSION: ICD-10-CM

## 2018-07-02 DIAGNOSIS — Z79.4 TYPE 2 DIABETES MELLITUS WITH OTHER SPECIFIED COMPLICATION, WITH LONG-TERM CURRENT USE OF INSULIN: ICD-10-CM

## 2018-07-02 DIAGNOSIS — I48.0 PAROXYSMAL ATRIAL FIBRILLATION: Primary | ICD-10-CM

## 2018-07-02 DIAGNOSIS — G89.29 CHRONIC ABDOMINAL PAIN: ICD-10-CM

## 2018-07-02 DIAGNOSIS — I42.8 NONISCHEMIC CARDIOMYOPATHY: ICD-10-CM

## 2018-07-02 LAB
ESTIMATED AVG GLUCOSE: 209 MG/DL
HBA1C MFR BLD HPLC: 8.9 %

## 2018-07-02 PROCEDURE — 36415 COLL VENOUS BLD VENIPUNCTURE: CPT | Mod: PO

## 2018-07-02 PROCEDURE — 99499 UNLISTED E&M SERVICE: CPT | Mod: S$GLB,,, | Performed by: FAMILY MEDICINE

## 2018-07-02 PROCEDURE — 3008F BODY MASS INDEX DOCD: CPT | Mod: CPTII,S$GLB,, | Performed by: FAMILY MEDICINE

## 2018-07-02 PROCEDURE — 3045F PR MOST RECENT HEMOGLOBIN A1C LEVEL 7.0-9.0%: CPT | Mod: CPTII,S$GLB,, | Performed by: FAMILY MEDICINE

## 2018-07-02 PROCEDURE — 99214 OFFICE O/P EST MOD 30 MIN: CPT | Mod: S$GLB,,, | Performed by: FAMILY MEDICINE

## 2018-07-02 PROCEDURE — 3079F DIAST BP 80-89 MM HG: CPT | Mod: CPTII,S$GLB,, | Performed by: FAMILY MEDICINE

## 2018-07-02 PROCEDURE — 3074F SYST BP LT 130 MM HG: CPT | Mod: CPTII,S$GLB,, | Performed by: FAMILY MEDICINE

## 2018-07-02 PROCEDURE — 83036 HEMOGLOBIN GLYCOSYLATED A1C: CPT

## 2018-07-02 PROCEDURE — 99999 PR PBB SHADOW E&M-EST. PATIENT-LVL III: CPT | Mod: PBBFAC,,, | Performed by: FAMILY MEDICINE

## 2018-07-02 RX ORDER — LORATADINE 10 MG/1
10 TABLET ORAL DAILY
Qty: 30 TABLET | Refills: 1 | Status: SHIPPED | OUTPATIENT
Start: 2018-07-02 | End: 2018-12-25

## 2018-07-02 RX ORDER — FLUTICASONE PROPIONATE 50 MCG
1 SPRAY, SUSPENSION (ML) NASAL DAILY
Qty: 16 G | Refills: 1 | Status: SHIPPED | OUTPATIENT
Start: 2018-07-02 | End: 2021-02-19

## 2018-07-02 RX ORDER — GABAPENTIN 400 MG/1
400 CAPSULE ORAL DAILY
Qty: 60 CAPSULE | Refills: 2 | Status: SHIPPED | OUTPATIENT
Start: 2018-07-02 | End: 2019-02-11 | Stop reason: SDUPTHER

## 2018-07-02 RX ORDER — PEN NEEDLE, DIABETIC 29 G X1/2"
1 NEEDLE, DISPOSABLE MISCELLANEOUS 2 TIMES DAILY
Qty: 100 EACH | Refills: 0 | Status: SHIPPED | OUTPATIENT
Start: 2018-07-02 | End: 2018-07-18 | Stop reason: SDUPTHER

## 2018-07-02 NOTE — PROGRESS NOTES
Chief Complaint   Patient presents with    Diabetes    Hypertension    Sinus Problem       HPI  Fabiana Chanel is a 43 y.o. female with multiple medical diagnoses as listed in the medical history and problem list that presents for follow up.      DM2 - states is out of strips, unable to check BG. Previous HgA1c minimally improved, levemir 10 units qhs, amaryl, metformin.    CAD/CHF/Afib/NICM - followed by Cardiology, s/p AICD 2.5 months ago.     MILE - states back on CPAP x 3 months, +PND, +sneeze, +rhinitis    Pt is known to me and was last seen by me on 3/26/2018.    PAST MEDICAL HISTORY:  Past Medical History:   Diagnosis Date    Atrial fibrillation     Blood clot associated with vein wall inflammation     not dvt    Cardiomyopathy     Normal cors on cath 11/2017    CHF (congestive heart failure)     DM (diabetes mellitus) 9/19/2013    Hyperlipidemia     Hypertension     Psoriasis     Sleep apnea        PAST SURGICAL HISTORY:  Past Surgical History:   Procedure Laterality Date    CARDIAC CATHETERIZATION      COLONOSCOPY      DILATION AND CURETTAGE OF UTERUS         SOCIAL HISTORY:  Social History     Social History    Marital status:      Spouse name: N/A    Number of children: N/A    Years of education: N/A     Occupational History    Not on file.     Social History Main Topics    Smoking status: Passive Smoke Exposure - Never Smoker     Types: Cigarettes    Smokeless tobacco: Never Used      Comment: smokes cigars on occasion    Alcohol use Yes      Comment: occasional    Drug use: No      Comment: hx of marijuana use 3 years ago    Sexual activity: Yes     Partners: Male     Other Topics Concern    Not on file     Social History Narrative    No narrative on file       FAMILY HISTORY:  Family History   Problem Relation Age of Onset    Hypertension Mother     Hypertension Father     Diabetes Father     Diabetes Maternal Grandmother     Diabetes Paternal Grandmother      Breast cancer Neg Hx     Colon cancer Neg Hx     Ovarian cancer Neg Hx        ALLERGIES AND MEDICATIONS: updated and reviewed.  Review of patient's allergies indicates:   Allergen Reactions    Pneumococcal 23-abimbola ps vaccine      Current Outpatient Prescriptions   Medication Sig Dispense Refill    albuterol 90 mcg/actuation inhaler Inhale 1-2 puffs into the lungs every 6 (six) hours as needed for Wheezing. Rescue 1 Inhaler 0    aspirin 81 MG Chew Take 81 mg by mouth once daily.      atorvastatin (LIPITOR) 80 MG tablet Take 1 tablet (80 mg total) by mouth nightly. 90 tablet 3    blood glucose control, high Soln 1 each by Misc.(Non-Drug; Combo Route) route once. 1 each 3    blood glucose control, low Soln 1 each by Misc.(Non-Drug; Combo Route) route once. 1 each 3    BLOOD PRESSURE CUFF Misc 1 kit by Misc.(Non-Drug; Combo Route) route 2 (two) times daily. 1 each 0    blood sugar diagnostic (TRUE METRIX GLUCOSE TEST STRIP) Strp 1 strip by Misc.(Non-Drug; Combo Route) route 3 (three) times daily. 200 each 2    blood-glucose meter (TRUE METRIX AIR GLUCOSE METER) Misc 1 each by Misc.(Non-Drug; Combo Route) route 3 (three) times daily. 1 each 0    diclofenac sodium (VOLTAREN) 1 % Gel Apply 2 g topically 4 (four) times daily. 100 g 1    esomeprazole (NEXIUM) 40 MG capsule Take 40 mg by mouth before breakfast.      fluticasone (FLONASE) 50 mcg/actuation nasal spray 1 spray (50 mcg total) by Each Nare route once daily. 16 g 1    furosemide (LASIX) 20 MG tablet Take 1 tablet (20 mg total) by mouth once daily. 90 tablet 3    gabapentin (NEURONTIN) 400 MG capsule Take 1 capsule (400 mg total) by mouth Daily. 60 capsule 2    glimepiride (AMARYL) 4 MG tablet Take 1 tablet (4 mg total) by mouth daily with breakfast. 90 tablet 1    hydrALAZINE (APRESOLINE) 100 MG tablet Take 1 tablet (100 mg total) by mouth 2 (two) times daily. 180 tablet 3    insulin detemir U-100 (LEVEMIR FLEXTOUCH) 100 unit/mL (3 mL) SubQ InPn  "pen Inject 10 Units into the skin every evening. 15 mL 0    lancets Misc 1 each by Misc.(Non-Drug; Combo Route) route 3 (three) times daily. 200 each 2    lisinopril (PRINIVIL,ZESTRIL) 40 MG tablet Take 1 tablet (40 mg total) by mouth once daily. 90 tablet 3    loratadine (CLARITIN) 10 mg tablet Take 1 tablet (10 mg total) by mouth once daily. 30 tablet 1    metformin (GLUCOPHAGE) 500 MG tablet Take 1 tablet (500 mg total) by mouth 2 (two) times daily with meals. 56 tablet 0    metoprolol succinate (TOPROL-XL) 50 MG 24 hr tablet Take 1 tablet (50 mg total) by mouth once daily. 30 tablet 11    pen needle, diabetic 31 gauge x 1/4" Ndle 1 each by Misc.(Non-Drug; Combo Route) route 2 (two) times daily. 100 each 0    spironolactone (ALDACTONE) 25 MG tablet Take 1 tablet (25 mg total) by mouth once daily. 30 tablet 11     No current facility-administered medications for this visit.        ROS  Review of Systems   Constitutional: Negative for activity change, appetite change and fever.   HENT: Negative for congestion and sore throat.    Eyes: Negative for visual disturbance.   Respiratory: Negative for cough and shortness of breath.    Cardiovascular: Negative for chest pain.   Gastrointestinal: Negative for abdominal pain, diarrhea, nausea and vomiting.   Endocrine: Negative.    Genitourinary: Negative for dysuria.   Musculoskeletal: Negative for arthralgias and back pain.   Skin: Negative for rash.   Allergic/Immunologic: Negative.    Neurological: Negative for dizziness, weakness and headaches.   Hematological: Negative.    Psychiatric/Behavioral: Negative for agitation and confusion.       Physical Exam  Vitals:    07/02/18 1328   BP: 120/82   Pulse: 79   Resp: 20   Temp: 98.2 °F (36.8 °C)    Body mass index is 38.15 kg/m².  Weight: 110.5 kg (243 lb 9.7 oz)   Height: 5' 7" (170.2 cm)     Physical Exam   Constitutional: She is oriented to person, place, and time. She appears well-developed and well-nourished. " "  HENT:   Head: Normocephalic and atraumatic.   Eyes: Conjunctivae and EOM are normal. Pupils are equal, round, and reactive to light.   Neck: Normal range of motion. Neck supple.   Cardiovascular: Normal rate, regular rhythm and normal heart sounds.    Pulmonary/Chest: Effort normal and breath sounds normal.   Abdominal: Soft. Bowel sounds are normal.   Musculoskeletal: Normal range of motion.   Neurological: She is alert and oriented to person, place, and time. She has normal reflexes.   Skin: Skin is warm and dry.   1 cm wound, L arm   Psychiatric: She has a normal mood and affect. Her behavior is normal. Judgment and thought content normal.       Health Maintenance       Date Due Completion Date    Pneumococcal PPSV23 (Medium Risk) (1) 03/26/2019 (Originally 7/3/1994) ---    TETANUS VACCINE 07/02/2019 (Originally 9/25/2016) 9/25/2006    Influenza Vaccine 08/01/2018 12/5/2017    Hemoglobin A1c 09/26/2018 3/26/2018    Eye Exam 10/02/2018 10/2/2017 (Done)    Override on 10/2/2017: Done (dr moctezuma)    Lipid Panel 03/12/2019 3/12/2018    Foot Exam 03/26/2019 3/26/2018    Pap Smear 03/27/2019 3/27/2018    Mammogram 03/30/2019 3/30/2017          Assessment & Plan    Paroxysmal atrial fibrillation    Type 2 diabetes mellitus without complication, without long-term current use of insulin  -     gabapentin (NEURONTIN) 400 MG capsule; Take 1 capsule (400 mg total) by mouth Daily.  Dispense: 60 capsule; Refill: 2  -     blood sugar diagnostic (TRUE METRIX GLUCOSE TEST STRIP) Strp; 1 strip by Misc.(Non-Drug; Combo Route) route 3 (three) times daily.  Dispense: 200 each; Refill: 2  -     Hemoglobin A1c; Future; Expected date: 07/02/2018  -     pen needle, diabetic 31 gauge x 1/4" Ndle; 1 each by Misc.(Non-Drug; Combo Route) route 2 (two) times daily.  Dispense: 100 each; Refill: 0  -     Ambulatory referral to Gastroenterology  -     Hemoglobin A1c; Future; Expected date: 07/02/2018  - Refilled all supplies, will assess " labs today    Nonischemic cardiomyopathy, Mixed hyperlipidemia, Implantable cardioverter-defibrillator (ICD) in situ, Essential hypertension, Chronic combined systolic and diastolic heart failure  - Continue current medication regimen as prescribed  - Cont follow up as scheduled, BP well controlled tdoay    Iron deficiency anemia, unspecified iron deficiency anemia type  - Continue current medication regimen as prescribed    Allergic rhinitis, unspecified seasonality, unspecified trigger  -     loratadine (CLARITIN) 10 mg tablet; Take 1 tablet (10 mg total) by mouth once daily.  Dispense: 30 tablet; Refill: 1  - Continue current medication regimen as prescribed    Chronic abdominal pain  -     Ambulatory referral to Gastroenterology    Follow-up in about 4 weeks (around 7/30/2018).

## 2018-07-05 ENCOUNTER — TELEPHONE (OUTPATIENT)
Dept: ADMINISTRATIVE | Facility: HOSPITAL | Age: 43
End: 2018-07-05

## 2018-07-06 ENCOUNTER — TELEPHONE (OUTPATIENT)
Dept: ADMINISTRATIVE | Facility: HOSPITAL | Age: 43
End: 2018-07-06

## 2018-07-09 ENCOUNTER — TELEPHONE (OUTPATIENT)
Dept: ADMINISTRATIVE | Facility: HOSPITAL | Age: 43
End: 2018-07-09

## 2018-07-17 ENCOUNTER — TELEPHONE (OUTPATIENT)
Dept: FAMILY MEDICINE | Facility: CLINIC | Age: 43
End: 2018-07-17

## 2018-07-17 DIAGNOSIS — E11.9 TYPE 2 DIABETES MELLITUS WITHOUT COMPLICATION, WITHOUT LONG-TERM CURRENT USE OF INSULIN: ICD-10-CM

## 2018-07-17 NOTE — TELEPHONE ENCOUNTER
----- Message from Carmela Smith sent at 7/17/2018  4:15 PM CDT -----  Contact: [facility]/Jalen  Please fax orders for pt's diabetic supplies to [phone]. Jalen [phone].    Thanks-

## 2018-07-18 RX ORDER — PEN NEEDLE, DIABETIC 29 G X1/2"
1 NEEDLE, DISPOSABLE MISCELLANEOUS 2 TIMES DAILY
Qty: 100 EACH | Refills: 0 | Status: SHIPPED | OUTPATIENT
Start: 2018-07-18 | End: 2018-10-26 | Stop reason: SDUPTHER

## 2018-07-18 RX ORDER — LANCETS
1 EACH MISCELLANEOUS 3 TIMES DAILY
Qty: 200 EACH | Refills: 2 | Status: SHIPPED | OUTPATIENT
Start: 2018-07-18 | End: 2019-08-29

## 2018-07-18 RX ORDER — DEXTROSE 4 G
1 TABLET,CHEWABLE ORAL 3 TIMES DAILY
Qty: 1 EACH | Refills: 0 | Status: SHIPPED | OUTPATIENT
Start: 2018-07-18 | End: 2022-12-15

## 2018-07-23 ENCOUNTER — OFFICE VISIT (OUTPATIENT)
Dept: ELECTROPHYSIOLOGY | Facility: CLINIC | Age: 43
End: 2018-07-23
Payer: MEDICARE

## 2018-07-23 ENCOUNTER — CLINICAL SUPPORT (OUTPATIENT)
Dept: ELECTROPHYSIOLOGY | Facility: CLINIC | Age: 43
End: 2018-07-23
Payer: MEDICARE

## 2018-07-23 ENCOUNTER — HOSPITAL ENCOUNTER (OUTPATIENT)
Dept: CARDIOLOGY | Facility: CLINIC | Age: 43
Discharge: HOME | End: 2018-07-23
Payer: MEDICARE

## 2018-07-23 VITALS
DIASTOLIC BLOOD PRESSURE: 91 MMHG | BODY MASS INDEX: 37.67 KG/M2 | WEIGHT: 240 LBS | HEART RATE: 93 BPM | SYSTOLIC BLOOD PRESSURE: 137 MMHG | HEIGHT: 67 IN

## 2018-07-23 DIAGNOSIS — I48.0 PAF (PAROXYSMAL ATRIAL FIBRILLATION): ICD-10-CM

## 2018-07-23 DIAGNOSIS — G47.33 OSA (OBSTRUCTIVE SLEEP APNEA): ICD-10-CM

## 2018-07-23 DIAGNOSIS — I10 ESSENTIAL HYPERTENSION: ICD-10-CM

## 2018-07-23 DIAGNOSIS — I48.0 PAROXYSMAL ATRIAL FIBRILLATION: Primary | ICD-10-CM

## 2018-07-23 DIAGNOSIS — E66.9 OBESITY WITH BODY MASS INDEX (BMI) OF 30.0 TO 39.9: ICD-10-CM

## 2018-07-23 DIAGNOSIS — Z95.810 IMPLANTABLE CARDIOVERTER-DEFIBRILLATOR (ICD) IN SITU: ICD-10-CM

## 2018-07-23 DIAGNOSIS — I42.8 NONISCHEMIC CARDIOMYOPATHY: ICD-10-CM

## 2018-07-23 PROCEDURE — 99214 OFFICE O/P EST MOD 30 MIN: CPT | Mod: S$GLB,,, | Performed by: NURSE PRACTITIONER

## 2018-07-23 PROCEDURE — 3075F SYST BP GE 130 - 139MM HG: CPT | Mod: CPTII,S$GLB,, | Performed by: NURSE PRACTITIONER

## 2018-07-23 PROCEDURE — 99999 PR PBB SHADOW E&M-EST. PATIENT-LVL III: CPT | Mod: PBBFAC,,, | Performed by: NURSE PRACTITIONER

## 2018-07-23 PROCEDURE — 3080F DIAST BP >= 90 MM HG: CPT | Mod: CPTII,S$GLB,, | Performed by: NURSE PRACTITIONER

## 2018-07-23 PROCEDURE — 93283 PRGRMG EVAL IMPLANTABLE DFB: CPT | Mod: S$GLB,,, | Performed by: INTERNAL MEDICINE

## 2018-07-23 PROCEDURE — 93000 ELECTROCARDIOGRAM COMPLETE: CPT | Mod: S$GLB,,, | Performed by: INTERNAL MEDICINE

## 2018-07-23 PROCEDURE — 3008F BODY MASS INDEX DOCD: CPT | Mod: CPTII,S$GLB,, | Performed by: NURSE PRACTITIONER

## 2018-07-23 RX ORDER — METOPROLOL SUCCINATE 25 MG/1
75 TABLET, EXTENDED RELEASE ORAL DAILY
Qty: 90 TABLET | Refills: 11 | Status: SHIPPED | OUTPATIENT
Start: 2018-07-23 | End: 2019-02-04 | Stop reason: SDUPTHER

## 2018-07-23 NOTE — PROGRESS NOTES
"Ms. Chanel is a patient of Dr. Christine and was last seen in clinic 4/11/2018.      Subjective:   Patient ID:  Fabiana Chanel is a 43 y.o. female who presents for follow-up of Pacemaker Check  .     HPI:    Ms. Chanel is a 43 y.o. female with NICM (EF 15-20%), ICD (4/2018), DM, HTN, MILE, pAF (off OAC due to vaginal bleeding) here for follow up after ICD implantation.    Background:    "Karmen Peraza" is referred by Dr Yanez.  NICM (cath proved; 11/17)  CHF, Class III  DM on meds   hyperlipidemia on meds  HTN on meds  MILE pending repeat sleep study for CPAP titration  PAF hx, on coumadin and xarelto in past, c/b vaginal bleeding  NYHA III sx despite OMT.  Occasional palpitations. No syncope ever. No CP.  3/18 15-20% LVEF. global HK.    Given CM with LVEF <35%, NYHA III sx, makes SCD-HeFT criteria for ICD.  Narrow QRS (QRSd ~110 ms); CRT is not indicated at present.  Hx of remote PAF, with recent palpitations; will place atrial lead for surveillance for recurrent AF.    Update (07/23/2018):    She successfully underwent dual chamber ICD implantation on 4/17/2018.    Today she says that she feels some fatigue. She has mild DIAZ that is chronic. She says she does sometimes feel her heart race. She denies light-headedness or syncope. She has some brief sharp pain in her left nipple area that comes and goes.    Device Interrogation (7/23/2018) reveals an intrinsic sinus rhythm with stable lead and device function. Infrequent NSVT noted, max 6-8 seconds. She paces 0% in the RA and 0% in the RV. Estimated battery longevity 5-8 years.     I have personally reviewed the patient's EKG today, which shows sinus tachycardia at 100bpm. WY interval is 108. QTc is 503.    Recent Cardiac Tests:    2D Echo (3/12/2018):  CONCLUSIONS     1 - Severely depressed left ventricular systolic function (EF 15-20%).  Severe global hypokinesis.     2 - Severe left ventricular enlargement.     3 - Eccentric hypertrophy.     4 - Impaired LV " relaxation, elevated LAP (grade 2 diastolic dysfunction).     5 - Mildly depressed right ventricular systolic function .     6 - Mild mitral regurgitation.     7 - Trivial to mild tricuspid regurgitation.     8 - The estimated PA systolic pressure is 35 mmHg.       Current Outpatient Prescriptions   Medication Sig    albuterol 90 mcg/actuation inhaler Inhale 1-2 puffs into the lungs every 6 (six) hours as needed for Wheezing. Rescue    aspirin 81 MG Chew Take 81 mg by mouth once daily.    atorvastatin (LIPITOR) 80 MG tablet Take 1 tablet (80 mg total) by mouth nightly.    blood glucose control, high Soln 1 each by Misc.(Non-Drug; Combo Route) route once. for 1 dose    blood glucose control, low Soln 1 each by Misc.(Non-Drug; Combo Route) route once. for 1 dose    BLOOD PRESSURE CUFF Misc 1 kit by Misc.(Non-Drug; Combo Route) route 2 (two) times daily.    blood sugar diagnostic (TRUE METRIX GLUCOSE TEST STRIP) Strp 1 strip by Misc.(Non-Drug; Combo Route) route 3 (three) times daily.    blood-glucose meter (TRUE METRIX AIR GLUCOSE METER) Misc 1 each by Misc.(Non-Drug; Combo Route) route 3 (three) times daily.    diclofenac sodium (VOLTAREN) 1 % Gel Apply 2 g topically 4 (four) times daily.    esomeprazole (NEXIUM) 40 MG capsule Take 40 mg by mouth before breakfast.    fluticasone (FLONASE) 50 mcg/actuation nasal spray 1 spray (50 mcg total) by Each Nare route once daily.    furosemide (LASIX) 20 MG tablet Take 1 tablet (20 mg total) by mouth once daily.    gabapentin (NEURONTIN) 400 MG capsule Take 1 capsule (400 mg total) by mouth Daily.    glimepiride (AMARYL) 4 MG tablet Take 1 tablet (4 mg total) by mouth daily with breakfast.    hydrALAZINE (APRESOLINE) 100 MG tablet Take 1 tablet (100 mg total) by mouth 2 (two) times daily.    insulin detemir U-100 (LEVEMIR FLEXTOUCH) 100 unit/mL (3 mL) SubQ InPn pen Inject 10 Units into the skin every evening.    lancets Misc 1 each by Misc.(Non-Drug; Combo  "Route) route 3 (three) times daily.    lisinopril (PRINIVIL,ZESTRIL) 40 MG tablet Take 1 tablet (40 mg total) by mouth once daily.    loratadine (CLARITIN) 10 mg tablet Take 1 tablet (10 mg total) by mouth once daily.    metformin (GLUCOPHAGE) 500 MG tablet Take 1 tablet (500 mg total) by mouth 2 (two) times daily with meals.    metoprolol succinate (TOPROL-XL) 50 MG 24 hr tablet Take 1 tablet (50 mg total) by mouth once daily.    pen needle, diabetic 31 gauge x 1/4" Ndle 1 each by Misc.(Non-Drug; Combo Route) route 2 (two) times daily.    spironolactone (ALDACTONE) 25 MG tablet Take 1 tablet (25 mg total) by mouth once daily.     No current facility-administered medications for this visit.        Review of Systems   Constitution: Negative for malaise/fatigue.   Cardiovascular: Negative for chest pain, dyspnea on exertion, irregular heartbeat, leg swelling and palpitations.   Respiratory: Negative for shortness of breath.    Hematologic/Lymphatic: Negative for bleeding problem.   Skin: Negative for rash.   Musculoskeletal: Negative for myalgias.   Gastrointestinal: Negative for hematemesis, hematochezia and nausea.   Genitourinary: Negative for hematuria.   Neurological: Negative for light-headedness.   Psychiatric/Behavioral: Negative for altered mental status.   Allergic/Immunologic: Negative for persistent infections.     Objective:          BP (!) 137/91   Pulse 93   Ht 5' 7" (1.702 m)   Wt 108.9 kg (240 lb)   LMP 06/30/2018 (Exact Date)   BMI 37.59 kg/m²     Physical Exam   Constitutional: She is oriented to person, place, and time. She appears well-developed and well-nourished.   HENT:   Head: Normocephalic.   Nose: Nose normal.   Eyes: Pupils are equal, round, and reactive to light.   Cardiovascular: Normal rate, regular rhythm, S1 normal and S2 normal.    No murmur heard.  Pulses:       Radial pulses are 2+ on the right side, and 2+ on the left side.   Pulmonary/Chest: Breath sounds normal. No " respiratory distress.   Device to LUCW   Abdominal: Normal appearance.   Musculoskeletal: Normal range of motion. She exhibits no edema.   Neurological: She is alert and oriented to person, place, and time.   Skin: Skin is warm and dry. No erythema.   Psychiatric: She has a normal mood and affect. Her speech is normal and behavior is normal.   Nursing note and vitals reviewed.    Lab Results   Component Value Date     04/11/2018    K 3.6 04/17/2018    MG 1.9 02/27/2018    BUN 10 04/11/2018    CREATININE 1.0 04/11/2018    ALT 11 03/12/2018    AST 8 (L) 03/12/2018    HGB 13.0 04/11/2018    HCT 43.6 04/11/2018    TSH 1.938 11/27/2017    LDLCALC 112.8 03/12/2018         Recent Labs  Lab 02/23/17 2012 06/08/17  2111 01/11/18  1218 04/11/18  1133   INR 0.9 0.9 1.0 0.9       Assessment:     1. Paroxysmal atrial fibrillation    2. Nonischemic cardiomyopathy    3. Essential hypertension    4. Obesity with body mass index (BMI) of 30.0 to 39.9    5. MILE (obstructive sleep apnea)    6. Implantable cardioverter-defibrillator (ICD) in situ      Plan:     In summary, Ms. Chanel is a 43 y.o. female with NICM (EF 15-20%), ICD (4/2018), DM, HTN, MILE, pAF (off OAC due to vaginal bleeding) here for follow up after ICD implantation. Ms. Chanel is doing well from a device perspective with stable lead and device function. Infrequent NSVT noted. HR today is 100bpm and she reports heart racing despite taking her metoprolol last night. Will increase metoprolol. She should continue routine CHF management with Dr. Yanez. Advised to notify PCP if her breast/nipple pain continues.    Increase metoprolol to 75mg daily.  Continue current device settings.   Follow up in device clinic as scheduled.   Follow up in EP clinic with echo in 1 year, sooner as needed.     *A copy of this note has been sent to Dr. Christine*    Follow-up in about 1 year (around  7/23/2019).    ------------------------------------------------------------------    RODRÍGUEZ Roach, NP-C  [facility]

## 2018-08-11 NOTE — TELEPHONE ENCOUNTER
----- Message from Gila Wasserman sent at 12/6/2017  9:29 AM CST -----  Contact: [facility] [phone] fax [phone]  Pharmacy is needing a script for pen needles Please call  at your earliest convenience.    Thanks !   7lb+

## 2018-08-18 ENCOUNTER — HOSPITAL ENCOUNTER (EMERGENCY)
Facility: HOSPITAL | Age: 43
Discharge: HOME | End: 2018-08-18
Attending: EMERGENCY MEDICINE
Payer: MEDICARE

## 2018-08-18 VITALS
BODY MASS INDEX: 38.57 KG/M2 | RESPIRATION RATE: 18 BRPM | HEART RATE: 88 BPM | OXYGEN SATURATION: 98 % | DIASTOLIC BLOOD PRESSURE: 113 MMHG | TEMPERATURE: 98 F | WEIGHT: 240 LBS | SYSTOLIC BLOOD PRESSURE: 152 MMHG | HEIGHT: 66 IN

## 2018-08-18 DIAGNOSIS — J02.9 SORE THROAT: ICD-10-CM

## 2018-08-18 DIAGNOSIS — T18.9XXA SWALLOWED FOREIGN BODY, INITIAL ENCOUNTER: ICD-10-CM

## 2018-08-18 LAB
ALBUMIN SERPL BCP-MCNC: 3.8 G/DL
ALP SERPL-CCNC: 82 U/L
ALT SERPL W/O P-5'-P-CCNC: 7 U/L
ANION GAP SERPL CALC-SCNC: 10 MMOL/L
AST SERPL-CCNC: 9 U/L
B-HCG UR QL: NEGATIVE
BASOPHILS # BLD AUTO: 0.01 K/UL
BASOPHILS NFR BLD: 0.2 %
BILIRUB SERPL-MCNC: 0.7 MG/DL
BNP SERPL-MCNC: 246 PG/ML
BUN SERPL-MCNC: 10 MG/DL
CALCIUM SERPL-MCNC: 9.6 MG/DL
CHLORIDE SERPL-SCNC: 99 MMOL/L
CO2 SERPL-SCNC: 31 MMOL/L
CREAT SERPL-MCNC: 0.9 MG/DL
CTP QC/QA: YES
DIFFERENTIAL METHOD: ABNORMAL
EOSINOPHIL # BLD AUTO: 0.2 K/UL
EOSINOPHIL NFR BLD: 3.5 %
ERYTHROCYTE [DISTWIDTH] IN BLOOD BY AUTOMATED COUNT: 16.8 %
EST. GFR  (AFRICAN AMERICAN): >60 ML/MIN/1.73 M^2
EST. GFR  (NON AFRICAN AMERICAN): >60 ML/MIN/1.73 M^2
GLUCOSE SERPL-MCNC: 130 MG/DL
HCT VFR BLD AUTO: 40 %
HGB BLD-MCNC: 12.9 G/DL
LYMPHOCYTES # BLD AUTO: 1.3 K/UL
LYMPHOCYTES NFR BLD: 25.8 %
MCH RBC QN AUTO: 26.2 PG
MCHC RBC AUTO-ENTMCNC: 32.3 G/DL
MCV RBC AUTO: 81 FL
MONOCYTES # BLD AUTO: 0.5 K/UL
MONOCYTES NFR BLD: 9.4 %
NEUTROPHILS # BLD AUTO: 3.1 K/UL
NEUTROPHILS NFR BLD: 60.9 %
PLATELET # BLD AUTO: 290 K/UL
PMV BLD AUTO: 10.8 FL
POTASSIUM SERPL-SCNC: 4 MMOL/L
PROT SERPL-MCNC: 7.8 G/DL
RBC # BLD AUTO: 4.93 M/UL
SODIUM SERPL-SCNC: 140 MMOL/L
TROPONIN I SERPL DL<=0.01 NG/ML-MCNC: 0.02 NG/ML
WBC # BLD AUTO: 5.11 K/UL

## 2018-08-18 PROCEDURE — 81025 URINE PREGNANCY TEST: CPT | Performed by: PHYSICIAN ASSISTANT

## 2018-08-18 PROCEDURE — 25000003 PHARM REV CODE 250: Performed by: EMERGENCY MEDICINE

## 2018-08-18 PROCEDURE — 83880 ASSAY OF NATRIURETIC PEPTIDE: CPT

## 2018-08-18 PROCEDURE — 84484 ASSAY OF TROPONIN QUANT: CPT

## 2018-08-18 PROCEDURE — 80053 COMPREHEN METABOLIC PANEL: CPT

## 2018-08-18 PROCEDURE — 85025 COMPLETE CBC W/AUTO DIFF WBC: CPT

## 2018-08-18 PROCEDURE — 99284 EMERGENCY DEPT VISIT MOD MDM: CPT | Mod: 25

## 2018-08-18 RX ORDER — SYRING-NEEDL,DISP,INSUL,0.3 ML 29 G X1/2"
296 SYRINGE, EMPTY DISPOSABLE MISCELLANEOUS
Status: COMPLETED | OUTPATIENT
Start: 2018-08-18 | End: 2018-08-18

## 2018-08-18 RX ADMIN — MAGESIUM CITRATE 296 ML: 1.75 LIQUID ORAL at 02:08

## 2018-08-18 NOTE — ED TRIAGE NOTES
Patient thinks she has swallowed her dentures. Now feels like she is choking. Incident happened after 0500 this AM.

## 2018-08-18 NOTE — ED PROVIDER NOTES
"Encounter Date: 8/18/2018    SCRIBE #1 NOTE: I, Antonia Young, am scribing for, and in the presence of,  Cristian Amezcua MD. I have scribed the entire note.       History     Chief Complaint   Patient presents with    Swallowed Foreign Body     " I think I swallowed my dentures, I sleep with a mask and this morning they was gone"     CC: Swallowed Foreign Body    HPI: This 43 y.o. Female with PMHx of atrial fibrillation, Blood clot associated with vein wall inflammation, Cardiomyopathy, CHF, DM , Hyperlipidemia, Hypertension, Psoriasis, and Sleep apnea presents to the ED for an evaluation after she swallowed a foreign body. Pt states she wears 4 front teeth bridge denatures and she sleeps with BiPap. Pt woke up chocking, and then she noticed her denatures were gone. Pt feels her denatures "stuck"  in the mid top part of her chest. Pt states she began spitting up mucus and blood. Pt denies fever, abdominal pain, and any other associated symptoms.      The history is provided by the patient. No  was used.     Review of patient's allergies indicates:   Allergen Reactions    Pneumococcal 23-abimbola ps vaccine      Past Medical History:   Diagnosis Date    Atrial fibrillation     Blood clot associated with vein wall inflammation     not dvt    Cardiomyopathy     Normal cors on cath 11/2017    CHF (congestive heart failure)     DM (diabetes mellitus) 9/19/2013    Hyperlipidemia     Hypertension     Psoriasis     Sleep apnea      Past Surgical History:   Procedure Laterality Date    CARDIAC CATHETERIZATION      COLONOSCOPY      DILATION AND CURETTAGE OF UTERUS       Family History   Problem Relation Age of Onset    Hypertension Mother     Hypertension Father     Diabetes Father     Diabetes Maternal Grandmother     Diabetes Paternal Grandmother     Breast cancer Neg Hx     Colon cancer Neg Hx     Ovarian cancer Neg Hx      Social History     Tobacco Use    Smoking status: Passive Smoke " "Exposure - Never Smoker    Smokeless tobacco: Never Used    Tobacco comment: smokes cigars on occasion   Substance Use Topics    Alcohol use: Yes     Comment: occasional    Drug use: No     Comment: hx of marijuana use 3 years ago     Review of Systems   Constitutional: Negative for chills, diaphoresis and fever.   HENT: Negative for ear pain and sore throat.    Eyes: Negative for pain.   Respiratory: Negative for cough and shortness of breath.    Cardiovascular: Negative for chest pain.   Gastrointestinal: Negative for abdominal pain, diarrhea, nausea and vomiting.        (+) Denatures "stuck"  in the mid top part of chest. (+) Spitting up mucus and blood.   Genitourinary: Negative for dysuria.   Musculoskeletal: Negative for back pain.   Skin: Negative for rash.   Neurological: Negative for headaches.       Physical Exam     Initial Vitals [08/18/18 1009]   BP Pulse Resp Temp SpO2   (!) 134/103 96 20 97.8 °F (36.6 °C) 97 %      MAP       --         Physical Exam    Nursing note and vitals reviewed.  Constitutional: She is not diaphoretic. No distress.   HENT:   Head: Normocephalic and atraumatic.   Nose: Nose normal.   Mouth/Throat: Oropharynx is clear and moist.   Unable to see denatures in oropharyngeal exam.   Eyes: EOM are normal. Pupils are equal, round, and reactive to light.   Neck: Normal range of motion. No JVD present.   Cardiovascular: Normal rate, regular rhythm, normal heart sounds and intact distal pulses.   see documented heart rate and blood pressure,     Radial pulses 2+ equal DP pulses 2+ and equal   Pulmonary/Chest: Breath sounds normal. No respiratory distress. She has no wheezes. She has no rhonchi. She has no rales.   No stridor   Abdominal: Soft. She exhibits no distension. There is no tenderness. There is no rebound and no guarding.   Musculoskeletal: She exhibits no edema.   Neurological: She is alert and oriented to person, place, and time. She has normal strength and normal reflexes. " She displays normal reflexes. No cranial nerve deficit or sensory deficit.   No obvious focal deficit   Skin: Skin is warm.   Psychiatric: She has a normal mood and affect.         ED Course   Procedures  Labs Reviewed   POCT URINE PREGNANCY          Imaging Results    None          Medical Decision Making:   X-ray of the neck, chest, CT of the neck and chest all show no evidence of foreign body.  CT shows no evidence of aspiration pulmonary edema, patient appears to be better now.  Perhaps she swallowed her bridge in its in her small intestines at this time.  She needs to follow up with her primary care physician.  Her vital signs are stable for discharge except for mild hypertension.  I do not think she has any signs of heart failure.            Scribe Attestation:   Scribe #1: I performed the above scribed service and the documentation accurately describes the services I performed. I attest to the accuracy of the note.    Attending Attestation:           Physician Attestation for Scribe:  Physician Attestation Statement for Scribe #1: I, Cristian Amezcua MD, reviewed documentation, as scribed by Antonia Young in my presence, and it is both accurate and complete.                    Clinical Impression:   Diagnoses of Sore throat, Swallowed foreign body, initial encounter, and Swallowed foreign body, initial encounter were pertinent to this visit.                             Cristian Amezcua MD  08/18/18 [id number]

## 2018-08-18 NOTE — DISCHARGE INSTRUCTIONS
You may have had a small amount of aspiration anterior lungs which is where you breathe and saliva and stomach fluid into the lungs while on BiPAP.  This should improve in the next few days.  You can continue BiPAP.  We do not see any evidence of your bridge/dentures on CT or x-ray of her neck and chest.  You may have swallowed it and may be in your stool and, out in the next few days.  Return to the ER if he have any severe abdominal pain bloody stools vomiting or any other complaints.

## 2018-10-26 DIAGNOSIS — E11.9 TYPE 2 DIABETES MELLITUS WITHOUT COMPLICATION, WITHOUT LONG-TERM CURRENT USE OF INSULIN: ICD-10-CM

## 2018-10-26 DIAGNOSIS — Z95.810 CARDIAC DEFIBRILLATOR IN SITU: Primary | ICD-10-CM

## 2018-10-26 RX ORDER — PEN NEEDLE, DIABETIC 29 G X1/2"
1 NEEDLE, DISPOSABLE MISCELLANEOUS 2 TIMES DAILY
Qty: 100 EACH | Refills: 0 | Status: SHIPPED | OUTPATIENT
Start: 2018-10-26 | End: 2019-05-13 | Stop reason: SDUPTHER

## 2018-10-26 RX ORDER — GLIMEPIRIDE 4 MG/1
4 TABLET ORAL
Qty: 90 TABLET | Refills: 1 | Status: SHIPPED | OUTPATIENT
Start: 2018-10-26 | End: 2019-07-24 | Stop reason: SDUPTHER

## 2018-10-30 ENCOUNTER — CLINICAL SUPPORT (OUTPATIENT)
Dept: ELECTROPHYSIOLOGY | Facility: CLINIC | Age: 43
End: 2018-10-30
Attending: INTERNAL MEDICINE
Payer: MEDICAID

## 2018-10-30 DIAGNOSIS — I42.8 NONISCHEMIC CARDIOMYOPATHY: ICD-10-CM

## 2018-12-25 PROBLEM — S29.012A MUSCLE STRAIN OF LEFT UPPER BACK: Status: ACTIVE | Noted: 2018-12-25

## 2018-12-27 ENCOUNTER — HOSPITAL ENCOUNTER (INPATIENT)
Facility: HOSPITAL | Age: 43
LOS: 3 days | Discharge: HOME | DRG: 292 | End: 2018-12-30
Attending: EMERGENCY MEDICINE | Admitting: EMERGENCY MEDICINE
Payer: COMMERCIAL

## 2018-12-27 DIAGNOSIS — R06.02 SOB (SHORTNESS OF BREATH): ICD-10-CM

## 2018-12-27 DIAGNOSIS — I50.9 CONGESTIVE HEART FAILURE (CHF): ICD-10-CM

## 2018-12-27 DIAGNOSIS — I50.9 CONGESTIVE HEART FAILURE, UNSPECIFIED HF CHRONICITY, UNSPECIFIED HEART FAILURE TYPE: Primary | ICD-10-CM

## 2018-12-27 DIAGNOSIS — I50.43 ACUTE ON CHRONIC COMBINED SYSTOLIC AND DIASTOLIC HEART FAILURE, NYHA CLASS 2: Chronic | ICD-10-CM

## 2018-12-27 LAB
ALBUMIN SERPL BCP-MCNC: 3.3 G/DL
ALP SERPL-CCNC: 79 U/L
ALT SERPL W/O P-5'-P-CCNC: 12 U/L
ANION GAP SERPL CALC-SCNC: 10 MMOL/L
ANION GAP SERPL CALC-SCNC: 13 MMOL/L (ref 8–16)
AST SERPL-CCNC: 11 U/L
BASOPHILS # BLD AUTO: 0.02 K/UL
BASOPHILS NFR BLD: 0.4 %
BILIRUB SERPL-MCNC: 0.7 MG/DL
BNP SERPL-MCNC: 802 PG/ML
BUN SERPL-MCNC: 14 MG/DL
BUN SERPL-MCNC: 15 MG/DL (ref 6–30)
CALCIUM SERPL-MCNC: 9.2 MG/DL
CHLORIDE SERPL-SCNC: 100 MMOL/L (ref 95–110)
CHLORIDE SERPL-SCNC: 102 MMOL/L
CO2 SERPL-SCNC: 25 MMOL/L
CREAT SERPL-MCNC: 0.8 MG/DL (ref 0.5–1.4)
CREAT SERPL-MCNC: 1 MG/DL
DIFFERENTIAL METHOD: ABNORMAL
EOSINOPHIL # BLD AUTO: 0.2 K/UL
EOSINOPHIL NFR BLD: 3.2 %
ERYTHROCYTE [DISTWIDTH] IN BLOOD BY AUTOMATED COUNT: 15.2 %
EST. GFR  (AFRICAN AMERICAN): >60 ML/MIN/1.73 M^2
EST. GFR  (NON AFRICAN AMERICAN): >60 ML/MIN/1.73 M^2
GLUCOSE SERPL-MCNC: 220 MG/DL (ref 70–110)
GLUCOSE SERPL-MCNC: 226 MG/DL
GLUCOSE SERPL-MCNC: 232 MG/DL (ref 70–110)
HCT VFR BLD AUTO: 42 %
HCT VFR BLD CALC: 43 %PCV (ref 36–54)
HGB BLD-MCNC: 13.4 G/DL
INR PPP: 1
LYMPHOCYTES # BLD AUTO: 1.3 K/UL
LYMPHOCYTES NFR BLD: 28.5 %
MAGNESIUM SERPL-MCNC: 1.9 MG/DL
MCH RBC QN AUTO: 27.3 PG
MCHC RBC AUTO-ENTMCNC: 31.9 G/DL
MCV RBC AUTO: 86 FL
MONOCYTES # BLD AUTO: 0.4 K/UL
MONOCYTES NFR BLD: 7.4 %
NEUTROPHILS # BLD AUTO: 2.9 K/UL
NEUTROPHILS NFR BLD: 60.5 %
PLATELET # BLD AUTO: 248 K/UL
PMV BLD AUTO: 11 FL
POC IONIZED CALCIUM: 1.23 MMOL/L (ref 1.06–1.42)
POC TCO2 (MEASURED): 31 MMOL/L (ref 23–29)
POTASSIUM BLD-SCNC: 4.4 MMOL/L (ref 3.5–5.1)
POTASSIUM SERPL-SCNC: 4.4 MMOL/L
PROT SERPL-MCNC: 7.4 G/DL
PROTHROMBIN TIME: 10.5 SEC
RBC # BLD AUTO: 4.91 M/UL
SAMPLE: ABNORMAL
SODIUM BLD-SCNC: 139 MMOL/L (ref 136–145)
SODIUM SERPL-SCNC: 137 MMOL/L
TROPONIN I SERPL DL<=0.01 NG/ML-MCNC: 0.01 NG/ML
WBC # BLD AUTO: 4.71 K/UL

## 2018-12-27 PROCEDURE — 96374 THER/PROPH/DIAG INJ IV PUSH: CPT

## 2018-12-27 PROCEDURE — 85025 COMPLETE CBC W/AUTO DIFF WBC: CPT

## 2018-12-27 PROCEDURE — 83735 ASSAY OF MAGNESIUM: CPT

## 2018-12-27 PROCEDURE — 99284 EMERGENCY DEPT VISIT MOD MDM: CPT | Mod: 25

## 2018-12-27 PROCEDURE — 82803 BLOOD GASES ANY COMBINATION: CPT

## 2018-12-27 PROCEDURE — 82565 ASSAY OF CREATININE: CPT

## 2018-12-27 PROCEDURE — 63600175 PHARM REV CODE 636 W HCPCS: Performed by: EMERGENCY MEDICINE

## 2018-12-27 PROCEDURE — 93010 ELECTROCARDIOGRAM REPORT: CPT | Mod: ,,, | Performed by: INTERNAL MEDICINE

## 2018-12-27 PROCEDURE — 85014 HEMATOCRIT: CPT

## 2018-12-27 PROCEDURE — 85610 PROTHROMBIN TIME: CPT

## 2018-12-27 PROCEDURE — 84484 ASSAY OF TROPONIN QUANT: CPT

## 2018-12-27 PROCEDURE — 96375 TX/PRO/DX INJ NEW DRUG ADDON: CPT

## 2018-12-27 PROCEDURE — 82330 ASSAY OF CALCIUM: CPT

## 2018-12-27 PROCEDURE — 83880 ASSAY OF NATRIURETIC PEPTIDE: CPT

## 2018-12-27 PROCEDURE — 12000002 HC ACUTE/MED SURGE SEMI-PRIVATE ROOM

## 2018-12-27 PROCEDURE — 93010 EKG 12-LEAD: ICD-10-PCS | Mod: ,,, | Performed by: INTERNAL MEDICINE

## 2018-12-27 PROCEDURE — 93005 ELECTROCARDIOGRAM TRACING: CPT

## 2018-12-27 PROCEDURE — 80053 COMPREHEN METABOLIC PANEL: CPT

## 2018-12-27 PROCEDURE — 84132 ASSAY OF SERUM POTASSIUM: CPT

## 2018-12-27 PROCEDURE — 84295 ASSAY OF SERUM SODIUM: CPT

## 2018-12-27 PROCEDURE — 99900035 HC TECH TIME PER 15 MIN (STAT)

## 2018-12-27 RX ORDER — FUROSEMIDE 10 MG/ML
40 INJECTION INTRAMUSCULAR; INTRAVENOUS
Status: COMPLETED | OUTPATIENT
Start: 2018-12-27 | End: 2018-12-27

## 2018-12-27 RX ORDER — MORPHINE SULFATE 10 MG/ML
4 INJECTION INTRAMUSCULAR; INTRAVENOUS; SUBCUTANEOUS
Status: COMPLETED | OUTPATIENT
Start: 2018-12-27 | End: 2018-12-27

## 2018-12-27 RX ORDER — HYDRALAZINE HYDROCHLORIDE 20 MG/ML
10 INJECTION INTRAMUSCULAR; INTRAVENOUS
Status: COMPLETED | OUTPATIENT
Start: 2018-12-27 | End: 2018-12-27

## 2018-12-27 RX ADMIN — HYDRALAZINE HYDROCHLORIDE 10 MG: 20 INJECTION INTRAMUSCULAR; INTRAVENOUS at 11:12

## 2018-12-27 RX ADMIN — MORPHINE SULFATE 4 MG: 10 INJECTION INTRAVENOUS at 11:12

## 2018-12-27 RX ADMIN — FUROSEMIDE 40 MG: 10 INJECTION, SOLUTION INTRAVENOUS at 10:12

## 2018-12-28 PROBLEM — I42.8 NONISCHEMIC CARDIOMYOPATHY: Status: RESOLVED | Noted: 2018-01-18 | Resolved: 2018-12-28

## 2018-12-28 PROBLEM — S29.012A MUSCLE STRAIN OF LEFT UPPER BACK: Status: RESOLVED | Noted: 2018-12-25 | Resolved: 2018-12-28

## 2018-12-28 LAB
AORTIC ROOT ANNULUS: 2.78 CM
AORTIC VALVE CUSP SEPERATION: 2 CM
ASCENDING AORTA: 2.42 CM
AV INDEX (PROSTH): 0.8
AV MEAN GRADIENT: 3.97 MMHG
AV PEAK GRADIENT: 7.29 MMHG
AV VALVE AREA: 4.06 CM2
BSA FOR ECHO PROCEDURE: 2.27 M2
CV ECHO LV RWT: 0.28 CM
DOP CALC AO PEAK VEL: 1.35 M/S
DOP CALC AO VTI: 22.89 CM
DOP CALC LVOT AREA: 5.1 CM2
DOP CALC LVOT DIAMETER: 2.55 CM
DOP CALC LVOT STROKE VOLUME: 93 CM3
DOP CALCLVOT PEAK VEL VTI: 18.22 CM
E WAVE DECELERATION TIME: 144.09 MSEC
E/A RATIO: 1.9
E/E' RATIO: 18
ECHO LV POSTERIOR WALL: 0.93 CM (ref 0.6–1.1)
ESTIMATED AVG GLUCOSE: 249 MG/DL
FRACTIONAL SHORTENING: 7 % (ref 28–44)
HBA1C MFR BLD HPLC: 10.3 %
INTERVENTRICULAR SEPTUM: 1.05 CM (ref 0.6–1.1)
IVRT: 0.12 MSEC
LA MAJOR: 5.24 CM
LA MINOR: 5.6 CM
LA WIDTH: 4.05 CM
LEFT ATRIUM SIZE: 4.08 CM
LEFT ATRIUM VOLUME INDEX: 34.5 ML/M2
LEFT ATRIUM VOLUME: 76.04 CM3
LEFT INTERNAL DIMENSION IN SYSTOLE: 6.24 CM (ref 2.1–4)
LEFT VENTRICLE DIASTOLIC VOLUME INDEX: 105.08 ML/M2
LEFT VENTRICLE DIASTOLIC VOLUME: 231.51 ML
LEFT VENTRICLE MASS INDEX: 133.7 G/M2
LEFT VENTRICLE SYSTOLIC VOLUME INDEX: 89.2 ML/M2
LEFT VENTRICLE SYSTOLIC VOLUME: 196.56 ML
LEFT VENTRICULAR INTERNAL DIMENSION IN DIASTOLE: 6.7 CM (ref 3.5–6)
LEFT VENTRICULAR MASS: 294.47 G
LV LATERAL E/E' RATIO: 16.5
LV SEPTAL E/E' RATIO: 19.8
MV PEAK A VEL: 0.52 M/S
MV PEAK E VEL: 0.99 M/S
PISA TR MAX VEL: 2.52 M/S
POCT GLUCOSE: 172 MG/DL (ref 70–110)
POCT GLUCOSE: 177 MG/DL (ref 70–110)
POCT GLUCOSE: 187 MG/DL (ref 70–110)
POCT GLUCOSE: 194 MG/DL (ref 70–110)
POCT GLUCOSE: 220 MG/DL (ref 70–110)
POCT GLUCOSE: 224 MG/DL (ref 70–110)
PV PEAK VELOCITY: 0.97 CM/S
RA MAJOR: 4.82 CM
RA WIDTH: 3.81 CM
RV TISSUE DOPPLER FREE WALL SYSTOLIC VELOCITY 1 (APICAL 4 CHAMBER VIEW): 11.78 M/S
SINUS: 2.6 CM
STJ: 2.75 CM
TDI LATERAL: 0.06
TDI SEPTAL: 0.05
TDI: 0.06
TR MAX PG: 25.4 MMHG
TRICUSPID ANNULAR PLANE SYSTOLIC EXCURSION: 1.26 CM

## 2018-12-28 PROCEDURE — 99223 PR INITIAL HOSPITAL CARE,LEVL III: ICD-10-PCS | Mod: ,,, | Performed by: INTERNAL MEDICINE

## 2018-12-28 PROCEDURE — 27000190 HC CPAP FULL FACE MASK W/VALVE

## 2018-12-28 PROCEDURE — 27000221 HC OXYGEN, UP TO 24 HOURS

## 2018-12-28 PROCEDURE — 36415 COLL VENOUS BLD VENIPUNCTURE: CPT

## 2018-12-28 PROCEDURE — 83036 HEMOGLOBIN GLYCOSYLATED A1C: CPT

## 2018-12-28 PROCEDURE — 25000003 PHARM REV CODE 250: Performed by: HOSPITALIST

## 2018-12-28 PROCEDURE — 21400001 HC TELEMETRY ROOM

## 2018-12-28 PROCEDURE — 99223 1ST HOSP IP/OBS HIGH 75: CPT | Mod: ,,, | Performed by: INTERNAL MEDICINE

## 2018-12-28 PROCEDURE — 25000242 PHARM REV CODE 250 ALT 637 W/ HCPCS: Performed by: HOSPITALIST

## 2018-12-28 PROCEDURE — 94640 AIRWAY INHALATION TREATMENT: CPT

## 2018-12-28 PROCEDURE — 63600175 PHARM REV CODE 636 W HCPCS: Performed by: INTERNAL MEDICINE

## 2018-12-28 PROCEDURE — 94660 CPAP INITIATION&MGMT: CPT

## 2018-12-28 PROCEDURE — 63600175 PHARM REV CODE 636 W HCPCS: Performed by: HOSPITALIST

## 2018-12-28 PROCEDURE — 94761 N-INVAS EAR/PLS OXIMETRY MLT: CPT

## 2018-12-28 PROCEDURE — 99900035 HC TECH TIME PER 15 MIN (STAT)

## 2018-12-28 PROCEDURE — S5571 INSULIN DISPOS PEN 3 ML: HCPCS | Performed by: INTERNAL MEDICINE

## 2018-12-28 RX ORDER — FUROSEMIDE 10 MG/ML
40 INJECTION INTRAMUSCULAR; INTRAVENOUS 2 TIMES DAILY
Status: DISCONTINUED | OUTPATIENT
Start: 2018-12-28 | End: 2018-12-30

## 2018-12-28 RX ORDER — CYCLOBENZAPRINE HCL 10 MG
10 TABLET ORAL 3 TIMES DAILY PRN
Status: DISCONTINUED | OUTPATIENT
Start: 2018-12-28 | End: 2018-12-30 | Stop reason: HOSPADM

## 2018-12-28 RX ORDER — GABAPENTIN 400 MG/1
400 CAPSULE ORAL DAILY
Status: DISCONTINUED | OUTPATIENT
Start: 2018-12-28 | End: 2018-12-30 | Stop reason: HOSPADM

## 2018-12-28 RX ORDER — IBUPROFEN 200 MG
24 TABLET ORAL
Status: DISCONTINUED | OUTPATIENT
Start: 2018-12-28 | End: 2018-12-30 | Stop reason: HOSPADM

## 2018-12-28 RX ORDER — NAPROXEN SODIUM 220 MG/1
81 TABLET, FILM COATED ORAL DAILY
Status: DISCONTINUED | OUTPATIENT
Start: 2018-12-28 | End: 2018-12-30 | Stop reason: HOSPADM

## 2018-12-28 RX ORDER — HYDRALAZINE HYDROCHLORIDE 25 MG/1
100 TABLET, FILM COATED ORAL 2 TIMES DAILY
Status: DISCONTINUED | OUTPATIENT
Start: 2018-12-28 | End: 2018-12-30 | Stop reason: HOSPADM

## 2018-12-28 RX ORDER — ATORVASTATIN CALCIUM 40 MG/1
80 TABLET, FILM COATED ORAL NIGHTLY
Status: DISCONTINUED | OUTPATIENT
Start: 2018-12-28 | End: 2018-12-30 | Stop reason: HOSPADM

## 2018-12-28 RX ORDER — IPRATROPIUM BROMIDE AND ALBUTEROL SULFATE 2.5; .5 MG/3ML; MG/3ML
3 SOLUTION RESPIRATORY (INHALATION) EVERY 4 HOURS PRN
Status: DISCONTINUED | OUTPATIENT
Start: 2018-12-28 | End: 2018-12-30 | Stop reason: HOSPADM

## 2018-12-28 RX ORDER — INSULIN ASPART 100 [IU]/ML
0-5 INJECTION, SOLUTION INTRAVENOUS; SUBCUTANEOUS
Status: DISCONTINUED | OUTPATIENT
Start: 2018-12-28 | End: 2018-12-30 | Stop reason: HOSPADM

## 2018-12-28 RX ORDER — OXYCODONE AND ACETAMINOPHEN 5; 325 MG/1; MG/1
1 TABLET ORAL EVERY 4 HOURS PRN
Status: DISCONTINUED | OUTPATIENT
Start: 2018-12-28 | End: 2018-12-30 | Stop reason: HOSPADM

## 2018-12-28 RX ORDER — ONDANSETRON 2 MG/ML
8 INJECTION INTRAMUSCULAR; INTRAVENOUS EVERY 6 HOURS PRN
Status: DISCONTINUED | OUTPATIENT
Start: 2018-12-28 | End: 2018-12-30 | Stop reason: HOSPADM

## 2018-12-28 RX ORDER — ACETAMINOPHEN 325 MG/1
650 TABLET ORAL EVERY 4 HOURS PRN
Status: DISCONTINUED | OUTPATIENT
Start: 2018-12-28 | End: 2018-12-30 | Stop reason: HOSPADM

## 2018-12-28 RX ORDER — GLUCAGON 1 MG
1 KIT INJECTION
Status: DISCONTINUED | OUTPATIENT
Start: 2018-12-28 | End: 2018-12-30 | Stop reason: HOSPADM

## 2018-12-28 RX ORDER — SPIRONOLACTONE 25 MG/1
25 TABLET ORAL DAILY
Status: DISCONTINUED | OUTPATIENT
Start: 2018-12-28 | End: 2018-12-30

## 2018-12-28 RX ORDER — IBUPROFEN 200 MG
16 TABLET ORAL
Status: DISCONTINUED | OUTPATIENT
Start: 2018-12-28 | End: 2018-12-30 | Stop reason: HOSPADM

## 2018-12-28 RX ORDER — FLUTICASONE PROPIONATE 50 MCG
1 SPRAY, SUSPENSION (ML) NASAL DAILY
Status: DISCONTINUED | OUTPATIENT
Start: 2018-12-28 | End: 2018-12-30 | Stop reason: HOSPADM

## 2018-12-28 RX ORDER — PANTOPRAZOLE SODIUM 40 MG/1
40 TABLET, DELAYED RELEASE ORAL DAILY
Status: DISCONTINUED | OUTPATIENT
Start: 2018-12-28 | End: 2018-12-30 | Stop reason: HOSPADM

## 2018-12-28 RX ORDER — ENOXAPARIN SODIUM 100 MG/ML
40 INJECTION SUBCUTANEOUS EVERY 24 HOURS
Status: DISCONTINUED | OUTPATIENT
Start: 2018-12-28 | End: 2018-12-30 | Stop reason: HOSPADM

## 2018-12-28 RX ORDER — LISINOPRIL 20 MG/1
40 TABLET ORAL DAILY
Status: DISCONTINUED | OUTPATIENT
Start: 2018-12-28 | End: 2018-12-30 | Stop reason: HOSPADM

## 2018-12-28 RX ADMIN — INSULIN DETEMIR 10 UNITS: 100 INJECTION, SOLUTION SUBCUTANEOUS at 08:12

## 2018-12-28 RX ADMIN — FUROSEMIDE 40 MG: 10 INJECTION, SOLUTION INTRAVENOUS at 05:12

## 2018-12-28 RX ADMIN — LISINOPRIL 40 MG: 20 TABLET ORAL at 08:12

## 2018-12-28 RX ADMIN — METOPROLOL SUCCINATE 75 MG: 50 TABLET, EXTENDED RELEASE ORAL at 08:12

## 2018-12-28 RX ADMIN — ASPIRIN 81 MG 81 MG: 81 TABLET ORAL at 08:12

## 2018-12-28 RX ADMIN — FUROSEMIDE 40 MG: 10 INJECTION, SOLUTION INTRAVENOUS at 08:12

## 2018-12-28 RX ADMIN — ATORVASTATIN CALCIUM 80 MG: 40 TABLET, FILM COATED ORAL at 08:12

## 2018-12-28 RX ADMIN — GABAPENTIN 400 MG: 400 CAPSULE ORAL at 05:12

## 2018-12-28 RX ADMIN — IPRATROPIUM BROMIDE AND ALBUTEROL SULFATE 3 ML: .5; 2.5 SOLUTION RESPIRATORY (INHALATION) at 08:12

## 2018-12-28 RX ADMIN — INSULIN DETEMIR 10 UNITS: 100 INJECTION, SOLUTION SUBCUTANEOUS at 02:12

## 2018-12-28 RX ADMIN — ENOXAPARIN SODIUM 40 MG: 100 INJECTION SUBCUTANEOUS at 05:12

## 2018-12-28 RX ADMIN — HYDRALAZINE HYDROCHLORIDE 100 MG: 25 TABLET ORAL at 08:12

## 2018-12-28 RX ADMIN — PANTOPRAZOLE SODIUM 40 MG: 40 TABLET, DELAYED RELEASE ORAL at 08:12

## 2018-12-28 RX ADMIN — SPIRONOLACTONE 25 MG: 25 TABLET ORAL at 08:12

## 2018-12-28 NOTE — NURSING
Notified Dr. Yanez of 5 beats of vtach. Ordered to only notify MD of vtach if greater than 20 beats.

## 2018-12-28 NOTE — PLAN OF CARE
Problem: Hypertension Acute  Goal: Blood Pressure Within Desired Range    Intervention: Provide Multisystem Surveillance and Support   12/28/18 0502   Manage Acute Allergic Reaction   Stabilization Measures legs elevated;respiratory support increased         Problem: Breathing Pattern Ineffective  Goal: Effective Breathing Pattern  Outcome: Ongoing (interventions implemented as appropriate)  Intervention: Promote Improved Breathing Pattern   12/28/18 0502   Support Asthma Symptom Control   Airway/Ventilation Management airway patency maintained;calming measures promoted   Promote Airway Secretion Clearance   Breathing Techniques/Airway Clearance deep/controlled cough encouraged   Prevent Additional Skin Injury   Head of Bed (HOB) HOB at 60 degrees   Promote Anxiety Reduction   Supportive Measures active listening utilized;self-care encouraged;verbalization of feelings encouraged

## 2018-12-28 NOTE — PLAN OF CARE
Problem: Fall Injury Risk  Goal: Absence of Fall and Fall-Related Injury  Outcome: Ongoing (interventions implemented as appropriate)  Intervention: Identify and Manage Contributors to Fall Injury Risk   12/28/18 1647   Manage Acute Allergic Reaction   Medication Review/Management medications reviewed     Intervention: Promote Injury-Free Environment   12/28/18 1647   Optimize Stevens and Functional Mobility   Environmental Safety Modification lighting adjusted;clutter free environment maintained   Optimize Balance and Safe Activity   Safety Promotion/Fall Prevention medications reviewed         Problem: Hypertension Acute  Goal: Blood Pressure Within Desired Range    Intervention: Normalize Blood Pressure   12/28/18 1647   Manage Acute Allergic Reaction   Medication Review/Management medications reviewed   Prevent or Manage Pain   Sensory Stimulation Regulation lighting decreased;music/television provided for stimulation;quiet environment promoted;care clustered         Problem: Breathing Pattern Ineffective  Goal: Effective Breathing Pattern    Intervention: Promote Improved Breathing Pattern   12/28/18 1647   Support Asthma Symptom Control   Airway/Ventilation Management airway patency maintained   Prevent Additional Skin Injury   Head of Bed (HOB) HOB elevated   Promote Anxiety Reduction   Supportive Measures active listening utilized;relaxation techniques promoted

## 2018-12-28 NOTE — SUBJECTIVE & OBJECTIVE
Past Medical History:   Diagnosis Date    Atrial fibrillation     Blood clot associated with vein wall inflammation     not dvt    Cardiomyopathy     Normal cors on cath 11/2017    CHF (congestive heart failure)     DM (diabetes mellitus) 9/19/2013    Hyperlipidemia     Hypertension     Psoriasis     Sleep apnea        Past Surgical History:   Procedure Laterality Date    CARDIAC CATHETERIZATION      COLONOSCOPY      DILATION AND CURETTAGE OF UTERUS      INSERTION-ICD-DUAL Left 4/17/2018    Performed by Eben Christine MD at [facility] CATH LAB       Review of patient's allergies indicates:   Allergen Reactions    Pneumococcal 23-abimbola ps vaccine        No current facility-administered medications on file prior to encounter.      Current Outpatient Medications on File Prior to Encounter   Medication Sig    aspirin 81 MG Chew Take 81 mg by mouth once daily.    atorvastatin (LIPITOR) 80 MG tablet Take 1 tablet (80 mg total) by mouth nightly.    blood glucose control, high Soln 1 each by Misc.(Non-Drug; Combo Route) route once. for 1 dose    blood glucose control, low Soln 1 each by Misc.(Non-Drug; Combo Route) route once. for 1 dose    BLOOD PRESSURE CUFF Misc 1 kit by Misc.(Non-Drug; Combo Route) route 2 (two) times daily.    blood sugar diagnostic (TRUE METRIX GLUCOSE TEST STRIP) Strp 1 strip by Misc.(Non-Drug; Combo Route) route 3 (three) times daily.    blood-glucose meter (TRUE METRIX AIR GLUCOSE METER) Misc 1 each by Misc.(Non-Drug; Combo Route) route 3 (three) times daily.    cyclobenzaprine (FLEXERIL) 10 MG tablet Take 1 tablet (10 mg total) by mouth 3 (three) times daily as needed for Muscle spasms.    diclofenac sodium (VOLTAREN) 1 % Gel Apply 2 g topically 4 (four) times daily.    esomeprazole (NEXIUM) 40 MG capsule Take 40 mg by mouth before breakfast.    fluticasone (FLONASE) 50 mcg/actuation nasal spray 1 spray (50 mcg total) by Each Nare route once daily.    furosemide (LASIX) 20 MG  "tablet Take 1 tablet (20 mg total) by mouth once daily.    gabapentin (NEURONTIN) 400 MG capsule Take 1 capsule (400 mg total) by mouth Daily.    glimepiride (AMARYL) 4 MG tablet Take 1 tablet (4 mg total) by mouth daily with breakfast.    hydrALAZINE (APRESOLINE) 100 MG tablet Take 1 tablet (100 mg total) by mouth 2 (two) times daily.    ibuprofen (ADVIL,MOTRIN) 600 MG tablet Take 1 tablet (600 mg total) by mouth 3 (three) times daily.    insulin detemir U-100 (LEVEMIR FLEXTOUCH) 100 unit/mL (3 mL) SubQ InPn pen Inject 10 Units into the skin every evening.    lancets Misc 1 each by Misc.(Non-Drug; Combo Route) route 3 (three) times daily.    lisinopril (PRINIVIL,ZESTRIL) 40 MG tablet Take 1 tablet (40 mg total) by mouth once daily.    metformin (GLUCOPHAGE) 500 MG tablet Take 1 tablet (500 mg total) by mouth 2 (two) times daily with meals.    metoprolol succinate (TOPROL-XL) 25 MG 24 hr tablet Take 3 tablets (75 mg total) by mouth once daily.    pen needle, diabetic 31 gauge x 1/4" Ndle 1 each by Misc.(Non-Drug; Combo Route) route 2 (two) times daily.    spironolactone (ALDACTONE) 25 MG tablet Take 1 tablet (25 mg total) by mouth once daily.    albuterol 90 mcg/actuation inhaler Inhale 1-2 puffs into the lungs every 6 (six) hours as needed for Wheezing. Rescue     Family History     Problem Relation (Age of Onset)    Diabetes Father, Maternal Grandmother, Paternal Grandmother    Hypertension Mother, Father        Tobacco Use    Smoking status: Never Smoker    Smokeless tobacco: Never Used    Tobacco comment: smokes cigars on occasion   Substance and Sexual Activity    Alcohol use: Yes     Comment: occasional    Drug use: No     Comment: hx of marijuana use 3 years ago    Sexual activity: Yes     Partners: Male     Review of Systems   Constitution: Negative for chills, diaphoresis, fever, weakness and malaise/fatigue.   HENT: Negative for nosebleeds.    Eyes: Negative for blurred vision and double " vision.   Cardiovascular: Negative for chest pain, claudication, cyanosis, dyspnea on exertion, leg swelling, orthopnea, palpitations, paroxysmal nocturnal dyspnea and syncope.   Respiratory: Positive for shortness of breath. Negative for cough and wheezing.    Skin: Negative for dry skin and poor wound healing.   Musculoskeletal: Negative for back pain, joint swelling and myalgias.   Gastrointestinal: Positive for abdominal pain and anorexia. Negative for nausea and vomiting.   Genitourinary: Negative for hematuria.   Neurological: Negative for dizziness, headaches, numbness and seizures.   Psychiatric/Behavioral: Negative for altered mental status and depression.     Objective:     Vital Signs (Most Recent):  Temp: 98 °F (36.7 °C) (12/28/18 0806)  Pulse: 104 (12/28/18 0806)  Resp: 17 (12/28/18 0806)  BP: (!) 135/96 (12/28/18 0806)  SpO2: (!) 90 % (12/28/18 0806) Vital Signs (24h Range):  Temp:  [98 °F (36.7 °C)-99 °F (37.2 °C)] 98 °F (36.7 °C)  Pulse:  [] 104  Resp:  [17-33] 17  SpO2:  [90 %-99 %] 90 %  BP: (127-178)/() 135/96     Weight: 111 kg (244 lb 11.4 oz)  Body mass index is 38.33 kg/m².    SpO2: (!) 90 %  O2 Device (Oxygen Therapy): nasal cannula      Intake/Output Summary (Last 24 hours) at 12/28/2018 1128  Last data filed at 12/28/2018 0930  Gross per 24 hour   Intake 200 ml   Output 3650 ml   Net -3450 ml       Lines/Drains/Airways     Peripheral Intravenous Line                 Peripheral IV - Single Lumen 12/27/18 2132 Right Forearm less than 1 day                Physical Exam   Constitutional: She is oriented to person, place, and time. She appears well-developed and well-nourished. No distress.   HENT:   Head: Normocephalic and atraumatic.   Mouth/Throat: No oropharyngeal exudate.   Eyes: Conjunctivae and EOM are normal. Pupils are equal, round, and reactive to light. No scleral icterus.   Neck: Normal range of motion. Neck supple. No JVD present. No tracheal deviation present. No  thyromegaly present.   Cardiovascular: Normal rate, regular rhythm, S1 normal and S2 normal. Exam reveals distant heart sounds. Exam reveals no gallop and no friction rub.   No murmur heard.  Pulmonary/Chest: Effort normal. No respiratory distress. She has no wheezes. She has no rales. She exhibits no tenderness.   Dec air entry bilat   Abdominal: Soft. She exhibits no distension.   obese   Musculoskeletal: Normal range of motion. She exhibits no edema.   Neurological: She is alert and oriented to person, place, and time. No cranial nerve deficit.   Skin: Skin is warm and dry. She is not diaphoretic.   Psychiatric: She has a normal mood and affect. Her behavior is normal. Judgment normal.       Current Medications:   aspirin  81 mg Oral Daily    atorvastatin  80 mg Oral Nightly    enoxaparin  40 mg Subcutaneous Daily    fluticasone  1 spray Each Nare Daily    furosemide  40 mg Intravenous BID    gabapentin  400 mg Oral Daily    hydrALAZINE  100 mg Oral BID    insulin detemir U-100  10 Units Subcutaneous QHS    lisinopril  40 mg Oral Daily    metoprolol succinate  75 mg Oral Daily    pantoprazole  40 mg Oral Daily    spironolactone  25 mg Oral Daily       acetaminophen, albuterol-ipratropium, cyclobenzaprine, dextrose 50%, dextrose 50%, glucagon (human recombinant), glucose, glucose, insulin aspart U-100, ondansetron, oxyCODONE-acetaminophen    Laboratory:  CBC:  Recent Labs   Lab 03/03/18  0030 04/11/18  1133 08/18/18  1313 12/25/18  2150 12/27/18  2139 12/27/18  2148   WHITE BLOOD CELL COUNT 6.66 5.07 5.11 5.57 4.71  --    HEMOGLOBIN 13.3 13.0 12.9 13.2 13.4  --    POC HEMATOCRIT  --   --   --   --   --  43   HEMATOCRIT 41.7 43.6 40.0 41.6 42.0  --    PLATELETS 367 H 337 290 264 248  --        CHEMISTRIES:  Recent Labs   Lab 02/18/17  1943  11/28/17  0508  02/27/18 2120 03/03/18  0030 04/11/18  1133 04/17/18  1323 08/18/18  1313 12/25/18  2150 12/27/18  2139   GLUCOSE 637 HH   < > 319 H   < > 134 H 153  H 149 H  --  130 H 224 H 226 H   SODIUM 133 L   < > 138   < > 139 144 142  --  140 140 137   POTASSIUM 4.3   < > 3.5   < > 4.8 4.1 3.4 L 3.6 4.0 4.2 4.4   BUN BLD 14   < > 11   < > 10 12 10  --  10 13 14   CREATININE 1.5 H   < > 1.1   < > 1.0 1.1 1.0  --  0.9 1.2 1.0   EGFR IF  50 A   < > >60   < > >60 >60 >60.0  --  >60 >60 >60   EGFR IF NON- 43 A   < > >60   < > >60 >60 >60.0  --  >60 56 A >60   CALCIUM 9.7   < > 9.0   < > 10.6 H 9.6 9.3  --  9.6 9.3 9.2   MAGNESIUM 2.2  --  1.7  --  1.9  --   --   --   --   --  1.9    < > = values in this interval not displayed.       CARDIAC BIOMARKERS:  Recent Labs   Lab 02/18/17  1943  02/23/17  2012  02/27/18  2120 03/03/18  0030 03/03/18  0225 08/18/18  1313 12/27/18 2139   CPK 74  --  81  --  65  --   --   --   --    CPK MB 2.1  --   --   --   --   --   --   --   --    TROPONIN I <0.006   < > <0.006   < > 0.017 0.030 H 0.031 H 0.018 0.014    < > = values in this interval not displayed.       COAGS:  Recent Labs   Lab 06/08/17  2111 01/11/18  1218 04/11/18  1133 12/25/18 2150 12/27/18  2139   INR 0.9 1.0 0.9 1.0 1.0       LIPIDS/LFTS:  Recent Labs   Lab 06/09/17  0524  11/21/17  1445  11/28/17  0508  03/03/18  0030 03/12/18  0900 08/18/18  1313 12/25/18  2150 12/27/18  2139   CHOLESTEROL 185  --  227 H  --  175  --   --  175  --   --   --    TRIGLYCERIDES 83  --  122  --  76  --   --  76  --   --   --    HDL 38 L  --  62  --  46  --   --  47  --   --   --    LDL CHOLESTEROL 130.4  --  140.6  --  113.8  --   --  112.8  --   --   --    NON-HDL CHOLESTEROL 147  --  165  --  129  --   --  128  --   --   --    AST 9 L   < > 14   < >  --    < > 13 8 L 9 L 15 11   ALT 9 L   < > 11   < >  --    < > 10 11 7 L 16 12    < > = values in this interval not displayed.       BNP:  Recent Labs   Lab 02/20/18  0155 02/27/18  2120 03/03/18  0030 08/18/18  1313 12/27/18  2139    H 82 536 H 246 H 802 H       TSH:  Recent Labs   Lab 02/18/17  1943  11/21/17  1445 11/27/17  0509   TSH 0.882 1.189 1.938       Free T4:  Recent Labs   Lab 11/21/17  1445 11/27/17  0509   FREE T4 0.95 0.98       Diagnostic Results:  ECG (personally reviewed tracings):  12/27/18 , PRWP, NSSTTW changes, similar to 7/23/18    Chest X-Ray (personally reviewed image(s)): 12/27/18 CHF, L ICD    Echo: 3/12/18 (EF similar to 11/2017) Repeat study ordered by primary team, pending.    1 - Severely depressed left ventricular systolic function (EF 15-20%).  Severe global hypokinesis.     2 - Severe left ventricular enlargement.     3 - Eccentric hypertrophy.     4 - Impaired LV relaxation, elevated LAP (grade 2 diastolic dysfunction).     5 - Mildly depressed right ventricular systolic function .     6 - Mild mitral regurgitation.     7 - Trivial to mild tricuspid regurgitation.     8 - The estimated PA systolic pressure is 35 mmHg.      Cath 11/27/17  RA 5  RV 61/6  PA 60/29/42  PAWP 18  /41  Ao 163/107/130  CO 5.6 L/min  LVEF: 20% by echo with severe LV dilation  Wall Motion: Severe global HK  Dominance: Co-dom  LM: normal  LAD: normal  LCx: normal  RCA: normal  Hemostasis:  RFA/V manual compression  Impression:  Normal cors  Elevated LVEDP with transmission of pressures to pulmonary circuit  Above c/w Severe NICM  RFA/V manual compression  Plan:  Cont med rx  Stop Plavix  Stop amlodipine  Start Hydrala/Imdur/Lasix  Goal net neg 1L/day  Cont BBl/ACEi  Repeat echo in 3 months for reassessment of LV fxn and need for ICD  Follow up with Dr. Yanez 1 week after discharge     Stress Test: L MPI 9/20/13  Nuclear Quantitative Functional Analysis:   LVEF: 31 %  Impression: NORMAL MYOCARDIAL PERFUSION  1. The perfusion scan is free of evidence for myocardial ischemia or injury.   2. There is a moderate intensity fixed defect in the inferior wall of the left ventricle, secondary to diaphragm attenuation.   3. There is abnormal wall motion at rest showing moderate global hypokinesis of the  left ventricle.   4. There is resting LV dysfunction with a reduced ejection fraction of 31 %.   5. The ventricular volumes are normal at rest and stress.   6. The extracardiac distribution of radioactivity is normal.

## 2018-12-28 NOTE — PLAN OF CARE
"TN met with pt and pt's family at bedside. TN explained her role in Care Management. Pt voiced understanding. TN inquired about HELP AT HOME. Pt stated that she will have spouse at home to help for support. TN voiced understanding. TN inquired about responsibilities when it comes to  MANAGING HER HEALTH at home and what it entails. Pt inquired about details. TN informed pt of RESPONSIBILITIES of:    1. Follow up appointments  2. Getting Prescriptions filled  3. Taking medications as prescribed.     Pt voiced understanding.  TN explained "My Health Packet" blue folder and the pink and green tabs that are on the folder as well.  Pt voiced understanding.     Pt's pharmacy:   [id number]  [place] - [address]  [address]  [place] [id number]  Phone: [phone] Fax: [phone]      Pt's preference for appointments: Pt requested to schedule all appts.       12/28/18 1439   Discharge Assessment   Assessment Type Discharge Planning Assessment   Confirmed/corrected address and phone number on facesheet? Yes   Assessment information obtained from? Patient   Communicated expected length of stay with patient/caregiver no   Prior to hospitilization cognitive status: Alert/Oriented   Prior to hospitalization functional status: Independent   Current cognitive status: Alert/Oriented   Current Functional Status: Independent   Lives With spouse   Able to Return to Prior Arrangements yes   Is patient able to care for self after discharge? Yes   Who are your caregiver(s) and their phone number(s)? (Spouse, Carla Chanel [phone])   Patient's perception of discharge disposition home or selfcare   Readmission Within the Last 30 Days no previous admission in last 30 days   Patient currently being followed by outpatient case management? No   Patient currently receives any other outside agency services? No   Equipment Currently Used at Home glucometer;CPAP  (inhalers)   Do you have any problems " affording any of your prescribed medications? No   Is the patient taking medications as prescribed? yes   Does the patient have transportation home? Yes   Transportation Anticipated family or friend will provide   Does the patient receive services at the [facility]madin Clinic? No   Discharge Plan A Home with family   Patient/Family in Agreement with Plan yes

## 2018-12-28 NOTE — ASSESSMENT & PLAN NOTE
Patient presents with dyspnea on exertion.  Elevated BNP with evidence of fluid overload on CXR.  EF of 15-20% with diastolic dysfunction in 3/2018.  Non ischemic cardiomyopathy.  Start IV diuresis.  Continue ACEI, B blocker and Aldactone.  Cardiology consult.  Repeat Echo.

## 2018-12-28 NOTE — HPI
44 y/o female with non ischemic cardiomyopathy EF of 20% presents to the ER complaining of acute onset, SOB with myalgias and weakness.  Symptoms started yesterday morning.  Patient reports similar symptoms whenever she gets fluid in her lungs.  Dyspnea is mostly on exertion.  Denies any lower extremity edema.  Also denies any chest pain.  Patient states compliance with medications.  Denies non compliance with diet over [place] holidays.  She reports she has a defibrillator in place since April and denies any shocking sensation.  She reports she uses a CPAP machine to sleep at night.  No alleviating or aggravating factors.  She presented to ER where she was noted to have elevated BNP and fluid on CXR.  No other complaints.

## 2018-12-28 NOTE — CONSULTS
[facility]  Cardiology  Consult Note    Patient Name: Fabiana Chanel  MRN: [id number]  Admission Date: 12/27/2018  Hospital Length of Stay: 1 days  Code Status: Full Code   Attending Provider: Marty Lira MD   Consulting Provider: Oziel Yanez MD  Primary Care Physician: Miguel Burden MD  Principal Problem:Acute on chronic combined systolic (congestive) and diastolic (congestive) heart failure    Patient information was obtained from patient and ER records.     Inpatient consult to Cardiology  Consult performed by: Oziel Yanez MD  Consult ordered by: Marty Lira MD  Reason for consult: ADHF/NICM        Subjective:     Chief Complaint:  SOB     HPI:   44 y/o female with non ischemic cardiomyopathy EF of 20% presents to the ER complaining of acute onset, SOB with myalgias and weakness.  Symptoms started yesterday morning.  Patient reports similar symptoms whenever she gets fluid in her lungs.  Dyspnea is mostly on exertion.  Denies any lower extremity edema.  Also denies any chest pain.  Patient states compliance with medications.  Denies non compliance with diet over Christmas holidays.  She reports she has a defibrillator in place since April and denies any shocking sensation.  She reports she uses a CPAP machine to sleep at night.  No alleviating or aggravating factors.  She presented to ER where she was noted to have elevated BNP and fluid on CXR.  No other complaints.    Pt presents with sxs of CHF.  Denies angina.  No med/diet noncompliance.  No ICD discharges or LOC.  Feeling better since admit.    Follows with me in clinic, last seen 6/21/18:  CARDIOVASCULAR HISTORY:   NICM (cath 11/2017) EF 15-20% by echo 3/2018  [employer] [facility] ICD (4/17/18 Dr. Christine)  MILE on CPAP  Cardiovascular Testing:  Echo: 3/12/18 (EF similar to 11/2017)    1 - Severely depressed left ventricular systolic function (EF 15-20%).  Severe global hypokinesis.     2 - Severe left ventricular  enlargement.     3 - Eccentric hypertrophy.     4 - Impaired LV relaxation, elevated LAP (grade 2 diastolic dysfunction).     5 - Mildly depressed right ventricular systolic function .     6 - Mild mitral regurgitation.     7 - Trivial to mild tricuspid regurgitation.     8 - The estimated PA systolic pressure is 35 mmHg.      Cath 11/27/17  RA 5  RV 61/6  PA 60/29/42  PAWP 18  /41  Ao 163/107/130  CO 5.6 L/min  LVEF: 20% by echo with severe LV dilation  Wall Motion: Severe global HK  Dominance: Co-dom  LM: normal  LAD: normal  LCx: normal  RCA: normal  Hemostasis:  RFA/V manual compression  Impression:  Normal cors  Elevated LVEDP with transmission of pressures to pulmonary circuit  Above c/w Severe NICM  RFA/V manual compression  Plan:  Cont med rx  Stop Plavix  Stop amlodipine  Start Hydrala/Imdur/Lasix  Goal net neg 1L/day  Cont BBl/ACEi  Repeat echo in 3 months for reassessment of LV fxn and need for ICD  Follow up with Dr. Yanez 1 week after discharge     Stress Test: L MPI 9/20/13  Nuclear Quantitative Functional Analysis:   LVEF: 31 %  Impression: NORMAL MYOCARDIAL PERFUSION  1. The perfusion scan is free of evidence for myocardial ischemia or injury.   2. There is a moderate intensity fixed defect in the inferior wall of the left ventricle, secondary to diaphragm attenuation.   3. There is abnormal wall motion at rest showing moderate global hypokinesis of the left ventricle.   4. There is resting LV dysfunction with a reduced ejection fraction of 31 %.   5. The ventricular volumes are normal at rest and stress.   6. The extracardiac distribution of radioactivity is normal.      ASSESSMENT:   # NICM, EF persistently reduced 15-20% by echo 3/2018.  Pt appears euvolemic.  # S/P [facility] [facility] ICD 4/2018 (Dr. Christine)  # HTN, controlled  # HLP, on atorva 80mg  # DM  # BMI 38, stable vs last OV  # MILE, on CPAP     PLAN:   Cont med rx  F/U with arrhythmia svc at [facility]- planned 7/23/18  Diet/exercise/weight loss,  Na+ avoidance  RTC 6 months or sooner prn      Past Medical History:   Diagnosis Date    Atrial fibrillation     Blood clot associated with vein wall inflammation     not dvt    Cardiomyopathy     Normal cors on cath 11/2017    CHF (congestive heart failure)     DM (diabetes mellitus) 9/19/2013    Hyperlipidemia     Hypertension     Psoriasis     Sleep apnea        Past Surgical History:   Procedure Laterality Date    CARDIAC CATHETERIZATION      COLONOSCOPY      DILATION AND CURETTAGE OF UTERUS      INSERTION-ICD-DUAL Left 4/17/2018    Performed by Eben Christine MD at [facility] CATH LAB       Review of patient's allergies indicates:   Allergen Reactions    Pneumococcal 23-abimbola ps vaccine        No current facility-administered medications on file prior to encounter.      Current Outpatient Medications on File Prior to Encounter   Medication Sig    aspirin 81 MG Chew Take 81 mg by mouth once daily.    atorvastatin (LIPITOR) 80 MG tablet Take 1 tablet (80 mg total) by mouth nightly.    blood glucose control, high Soln 1 each by Misc.(Non-Drug; Combo Route) route once. for 1 dose    blood glucose control, low Soln 1 each by Misc.(Non-Drug; Combo Route) route once. for 1 dose    BLOOD PRESSURE CUFF Misc 1 kit by Misc.(Non-Drug; Combo Route) route 2 (two) times daily.    blood sugar diagnostic (TRUE METRIX GLUCOSE TEST STRIP) Strp 1 strip by Misc.(Non-Drug; Combo Route) route 3 (three) times daily.    blood-glucose meter (TRUE METRIX AIR GLUCOSE METER) Misc 1 each by Misc.(Non-Drug; Combo Route) route 3 (three) times daily.    cyclobenzaprine (FLEXERIL) 10 MG tablet Take 1 tablet (10 mg total) by mouth 3 (three) times daily as needed for Muscle spasms.    diclofenac sodium (VOLTAREN) 1 % Gel Apply 2 g topically 4 (four) times daily.    esomeprazole (NEXIUM) 40 MG capsule Take 40 mg by mouth before breakfast.    fluticasone (FLONASE) 50 mcg/actuation nasal spray 1 spray (50 mcg total) by Each Nare  "route once daily.    furosemide (LASIX) 20 MG tablet Take 1 tablet (20 mg total) by mouth once daily.    gabapentin (NEURONTIN) 400 MG capsule Take 1 capsule (400 mg total) by mouth Daily.    glimepiride (AMARYL) 4 MG tablet Take 1 tablet (4 mg total) by mouth daily with breakfast.    hydrALAZINE (APRESOLINE) 100 MG tablet Take 1 tablet (100 mg total) by mouth 2 (two) times daily.    ibuprofen (ADVIL,MOTRIN) 600 MG tablet Take 1 tablet (600 mg total) by mouth 3 (three) times daily.    insulin detemir U-100 (LEVEMIR FLEXTOUCH) 100 unit/mL (3 mL) SubQ InPn pen Inject 10 Units into the skin every evening.    lancets Misc 1 each by Misc.(Non-Drug; Combo Route) route 3 (three) times daily.    lisinopril (PRINIVIL,ZESTRIL) 40 MG tablet Take 1 tablet (40 mg total) by mouth once daily.    metformin (GLUCOPHAGE) 500 MG tablet Take 1 tablet (500 mg total) by mouth 2 (two) times daily with meals.    metoprolol succinate (TOPROL-XL) 25 MG 24 hr tablet Take 3 tablets (75 mg total) by mouth once daily.    pen needle, diabetic 31 gauge x 1/4" Ndle 1 each by Misc.(Non-Drug; Combo Route) route 2 (two) times daily.    spironolactone (ALDACTONE) 25 MG tablet Take 1 tablet (25 mg total) by mouth once daily.    albuterol 90 mcg/actuation inhaler Inhale 1-2 puffs into the lungs every 6 (six) hours as needed for Wheezing. Rescue     Family History     Problem Relation (Age of Onset)    Diabetes Father, Maternal Grandmother, Paternal Grandmother    Hypertension Mother, Father        Tobacco Use    Smoking status: Never Smoker    Smokeless tobacco: Never Used    Tobacco comment: smokes cigars on occasion   Substance and Sexual Activity    Alcohol use: Yes     Comment: occasional    Drug use: No     Comment: hx of marijuana use 3 years ago    Sexual activity: Yes     Partners: Male     Review of Systems   Constitution: Negative for chills, diaphoresis, fever, weakness and malaise/fatigue.   HENT: Negative for nosebleeds.  "   Eyes: Negative for blurred vision and double vision.   Cardiovascular: Negative for chest pain, claudication, cyanosis, dyspnea on exertion, leg swelling, orthopnea, palpitations, paroxysmal nocturnal dyspnea and syncope.   Respiratory: Positive for shortness of breath. Negative for cough and wheezing.    Skin: Negative for dry skin and poor wound healing.   Musculoskeletal: Negative for back pain, joint swelling and myalgias.   Gastrointestinal: Positive for abdominal pain and anorexia. Negative for nausea and vomiting.   Genitourinary: Negative for hematuria.   Neurological: Negative for dizziness, headaches, numbness and seizures.   Psychiatric/Behavioral: Negative for altered mental status and depression.     Objective:     Vital Signs (Most Recent):  Temp: 98 °F (36.7 °C) (12/28/18 0806)  Pulse: 104 (12/28/18 0806)  Resp: 17 (12/28/18 0806)  BP: (!) 135/96 (12/28/18 0806)  SpO2: (!) 90 % (12/28/18 0806) Vital Signs (24h Range):  Temp:  [98 °F (36.7 °C)-99 °F (37.2 °C)] 98 °F (36.7 °C)  Pulse:  [] 104  Resp:  [17-33] 17  SpO2:  [90 %-99 %] 90 %  BP: (127-178)/() 135/96     Weight: 111 kg (244 lb 11.4 oz)  Body mass index is 38.33 kg/m².    SpO2: (!) 90 %  O2 Device (Oxygen Therapy): nasal cannula      Intake/Output Summary (Last 24 hours) at 12/28/2018 1128  Last data filed at 12/28/2018 0930  Gross per 24 hour   Intake 200 ml   Output 3650 ml   Net -3450 ml       Lines/Drains/Airways     Peripheral Intravenous Line                 Peripheral IV - Single Lumen 12/27/18 2132 Right Forearm less than 1 day                Physical Exam   Constitutional: She is oriented to person, place, and time. She appears well-developed and well-nourished. No distress.   HENT:   Head: Normocephalic and atraumatic.   Mouth/Throat: No oropharyngeal exudate.   Eyes: Conjunctivae and EOM are normal. Pupils are equal, round, and reactive to light. No scleral icterus.   Neck: Normal range of motion. Neck supple. No JVD  present. No tracheal deviation present. No thyromegaly present.   Cardiovascular: Normal rate, regular rhythm, S1 normal and S2 normal. Exam reveals distant heart sounds. Exam reveals no gallop and no friction rub.   No murmur heard.  Pulmonary/Chest: Effort normal. No respiratory distress. She has no wheezes. She has no rales. She exhibits no tenderness.   Dec air entry bilat   Abdominal: Soft. She exhibits no distension.   obese   Musculoskeletal: Normal range of motion. She exhibits no edema.   Neurological: She is alert and oriented to person, place, and time. No cranial nerve deficit.   Skin: Skin is warm and dry. She is not diaphoretic.   Psychiatric: She has a normal mood and affect. Her behavior is normal. Judgment normal.       Current Medications:   aspirin  81 mg Oral Daily    atorvastatin  80 mg Oral Nightly    enoxaparin  40 mg Subcutaneous Daily    fluticasone  1 spray Each Nare Daily    furosemide  40 mg Intravenous BID    gabapentin  400 mg Oral Daily    hydrALAZINE  100 mg Oral BID    insulin detemir U-100  10 Units Subcutaneous QHS    lisinopril  40 mg Oral Daily    metoprolol succinate  75 mg Oral Daily    pantoprazole  40 mg Oral Daily    spironolactone  25 mg Oral Daily       acetaminophen, albuterol-ipratropium, cyclobenzaprine, dextrose 50%, dextrose 50%, glucagon (human recombinant), glucose, glucose, insulin aspart U-100, ondansetron, oxyCODONE-acetaminophen    Laboratory:  CBC:  Recent Labs   Lab 03/03/18  0030 04/11/18  1133 08/18/18  1313 12/25/18  2150 12/27/18 2139 12/27/18  2148   WHITE BLOOD CELL COUNT 6.66 5.07 5.11 5.57 4.71  --    HEMOGLOBIN 13.3 13.0 12.9 13.2 13.4  --    POC HEMATOCRIT  --   --   --   --   --  43   HEMATOCRIT 41.7 43.6 40.0 41.6 42.0  --    PLATELETS 367 H 337 290 264 248  --        CHEMISTRIES:  Recent Labs   Lab 02/18/17  1943  11/28/17  0508  02/27/18 2120 03/03/18  0030 04/11/18  1133 04/17/18  1323 08/18/18  1313 12/25/18  2150 12/27/18 2139    GLUCOSE 637 HH   < > 319 H   < > 134 H 153 H 149 H  --  130 H 224 H 226 H   SODIUM 133 L   < > 138   < > 139 144 142  --  140 140 137   POTASSIUM 4.3   < > 3.5   < > 4.8 4.1 3.4 L 3.6 4.0 4.2 4.4   BUN BLD 14   < > 11   < > 10 12 10  --  10 13 14   CREATININE 1.5 H   < > 1.1   < > 1.0 1.1 1.0  --  0.9 1.2 1.0   EGFR IF  50 A   < > >60   < > >60 >60 >60.0  --  >60 >60 >60   EGFR IF NON- 43 A   < > >60   < > >60 >60 >60.0  --  >60 56 A >60   CALCIUM 9.7   < > 9.0   < > 10.6 H 9.6 9.3  --  9.6 9.3 9.2   MAGNESIUM 2.2  --  1.7  --  1.9  --   --   --   --   --  1.9    < > = values in this interval not displayed.       CARDIAC BIOMARKERS:  Recent Labs   Lab 02/18/17  1943  02/23/17 2012 02/27/18 2120 03/03/18  0030 03/03/18  0225 08/18/18 1313 12/27/18 2139   CPK 74  --  81  --  65  --   --   --   --    CPK MB 2.1  --   --   --   --   --   --   --   --    TROPONIN I <0.006   < > <0.006   < > 0.017 0.030 H 0.031 H 0.018 0.014    < > = values in this interval not displayed.       COAGS:  Recent Labs   Lab 06/08/17  2111 01/11/18  1218 04/11/18  1133 12/25/18 2150 12/27/18  2139   INR 0.9 1.0 0.9 1.0 1.0       LIPIDS/LFTS:  Recent Labs   Lab 06/09/17  0524  11/21/17  1445  11/28/17  0508  03/03/18  0030 03/12/18  0900 08/18/18  1313 12/25/18 2150 12/27/18  2139   CHOLESTEROL 185  --  227 H  --  175  --   --  175  --   --   --    TRIGLYCERIDES 83  --  122  --  76  --   --  76  --   --   --    HDL 38 L  --  62  --  46  --   --  47  --   --   --    LDL CHOLESTEROL 130.4  --  140.6  --  113.8  --   --  112.8  --   --   --    NON-HDL CHOLESTEROL 147  --  165  --  129  --   --  128  --   --   --    AST 9 L   < > 14   < >  --    < > 13 8 L 9 L 15 11   ALT 9 L   < > 11   < >  --    < > 10 11 7 L 16 12    < > = values in this interval not displayed.       BNP:  Recent Labs   Lab 02/20/18  0155 02/27/18 2120 03/03/18  0030 08/18/18  1313 12/27/18 2139    H 82 536 H 246 H 802 H        TSH:  Recent Labs   Lab 02/18/17  1943 11/21/17  1445 11/27/17  0509   TSH 0.882 1.189 1.938       Free T4:  Recent Labs   Lab 11/21/17  1445 11/27/17  0509   FREE T4 0.95 0.98       Diagnostic Results:  ECG (personally reviewed tracings):  12/27/18 , PRWP, NSSTTW changes, similar to 7/23/18    Chest X-Ray (personally reviewed image(s)): 12/27/18 CHF, L ICD    Echo: 3/12/18 (EF similar to 11/2017) Repeat study ordered by primary team, pending.    1 - Severely depressed left ventricular systolic function (EF 15-20%).  Severe global hypokinesis.     2 - Severe left ventricular enlargement.     3 - Eccentric hypertrophy.     4 - Impaired LV relaxation, elevated LAP (grade 2 diastolic dysfunction).     5 - Mildly depressed right ventricular systolic function .     6 - Mild mitral regurgitation.     7 - Trivial to mild tricuspid regurgitation.     8 - The estimated PA systolic pressure is 35 mmHg.      Cath 11/27/17  RA 5  RV 61/6  PA 60/29/42  PAWP 18  /41  Ao 163/107/130  CO 5.6 L/min  LVEF: 20% by echo with severe LV dilation  Wall Motion: Severe global HK  Dominance: Co-dom  LM: normal  LAD: normal  LCx: normal  RCA: normal  Hemostasis:  RFA/V manual compression  Impression:  Normal cors  Elevated LVEDP with transmission of pressures to pulmonary circuit  Above c/w Severe NICM  RFA/V manual compression  Plan:  Cont med rx  Stop Plavix  Stop amlodipine  Start Hydrala/Imdur/Lasix  Goal net neg 1L/day  Cont BBl/ACEi  Repeat echo in 3 months for reassessment of LV fxn and need for ICD  Follow up with Dr. Yanez 1 week after discharge     Stress Test: L MPI 9/20/13  Nuclear Quantitative Functional Analysis:   LVEF: 31 %  Impression: NORMAL MYOCARDIAL PERFUSION  1. The perfusion scan is free of evidence for myocardial ischemia or injury.   2. There is a moderate intensity fixed defect in the inferior wall of the left ventricle, secondary to diaphragm attenuation.   3. There is abnormal wall motion at  rest showing moderate global hypokinesis of the left ventricle.   4. There is resting LV dysfunction with a reduced ejection fraction of 31 %.   5. The ventricular volumes are normal at rest and stress.   6. The extracardiac distribution of radioactivity is normal.         Assessment and Plan:     * Acute on chronic combined systolic (congestive) and diastolic (congestive) heart failure    Known NICM  Pt denies med noncompliance or dietary indiscretion  Agree with IV lasix  Cont BBl/ACEi/aldact  Review echo (ordered by primary team)  I will ask social work to inquire as to insurance coverage for entresto     NICM (nonischemic cardiomyopathy)    As above     Essential hypertension    Cont med rx     Paroxysmal atrial fibrillation    Per EP notes  Currently in SR  Not on OAC d/t hx of vaginal bleeding  Had atrial lead placed for monitoring of possible recurrent PAF, will need to have [employer] [facility] interrogate device (as outpatient).     Type 2 diabetes mellitus without complication, without long-term current use of insulin    Per IM     Mixed hyperlipidemia    Cont statin     Implantable cardioverter-defibrillator (ICD) in situ    [facility] [facility]  No ICD shocks per pt report         VTE Risk Mitigation (From admission, onward)        Ordered     enoxaparin injection 40 mg  Daily      12/28/18 0835     IP VTE HIGH RISK PATIENT  Once      12/28/18 0041     Place sequential compression device  Until discontinued      12/28/18 0041          Thank you for your consult. I will follow-up with patient. Please contact us if you have any additional questions.    Oziel Yanez MD  Cardiology   [facility]

## 2018-12-28 NOTE — ED TRIAGE NOTES
Patient presents to the ER with  via personal vehicle. Patient presents with SOB, body aches that started today. Reports feeling weak and fatigue. Hx of CHF. Denies chest pain, n/v/d. 9/10 pain

## 2018-12-28 NOTE — NURSING
Pt arrived back to room. Pt is AAO. Dr. Yanez at pt's bedside. Safety maintained. Will continue to monitor.

## 2018-12-28 NOTE — H&P
[facility] Medicine  History & Physical    Patient Name: Fabiana Chanel  MRN: [id number]  Admission Date: 12/27/2018  Attending Physician: Marty Lira MD   Primary Care Provider: Miguel Burden MD         Patient information was obtained from patient and ER records.     Subjective:     Principal Problem:Acute on chronic combined systolic (congestive) and diastolic (congestive) heart failure    Chief Complaint:   Chief Complaint   Patient presents with    Shortness of Breath     Pt reports to ED wiht complaints of SOB and generalized body aches. Pt has hx of COPD, and CHF.        HPI: 44 y/o female with non ischemic cardiomyopathy EF of 20% presents to the ER complaining of acute onset, SOB with myalgias and weakness.  Symptoms started yesterday morning.  Patient reports similar symptoms whenever she gets fluid in her lungs.  Dyspnea is mostly on exertion.  Denies any lower extremity edema.  Also denies any chest pain.  Patient states compliance with medications.  Denies non compliance with diet over Christmas holidays.  She reports she has a defibrillator in place since April and denies any shocking sensation.  She reports she uses a CPAP machine to sleep at night.  No alleviating or aggravating factors.  She presented to ER where she was noted to have elevated BNP and fluid on CXR.  No other complaints.        Past Medical History:   Diagnosis Date    Atrial fibrillation     Blood clot associated with vein wall inflammation     not dvt    Cardiomyopathy     Normal cors on cath 11/2017    CHF (congestive heart failure)     DM (diabetes mellitus) 9/19/2013    Hyperlipidemia     Hypertension     Psoriasis     Sleep apnea        Past Surgical History:   Procedure Laterality Date    CARDIAC CATHETERIZATION      COLONOSCOPY      DILATION AND CURETTAGE OF UTERUS      INSERTION-ICD-DUAL Left 4/17/2018    Performed by Eben Christine MD at [facility] CATH LAB       Review of  patient's allergies indicates:   Allergen Reactions    Pneumococcal 23-abimbola ps vaccine        No current facility-administered medications on file prior to encounter.      Current Outpatient Medications on File Prior to Encounter   Medication Sig    aspirin 81 MG Chew Take 81 mg by mouth once daily.    atorvastatin (LIPITOR) 80 MG tablet Take 1 tablet (80 mg total) by mouth nightly.    blood glucose control, high Soln 1 each by Misc.(Non-Drug; Combo Route) route once. for 1 dose    blood glucose control, low Soln 1 each by Misc.(Non-Drug; Combo Route) route once. for 1 dose    BLOOD PRESSURE CUFF Misc 1 kit by Misc.(Non-Drug; Combo Route) route 2 (two) times daily.    blood sugar diagnostic (TRUE METRIX GLUCOSE TEST STRIP) Strp 1 strip by Misc.(Non-Drug; Combo Route) route 3 (three) times daily.    blood-glucose meter (TRUE METRIX AIR GLUCOSE METER) Misc 1 each by Misc.(Non-Drug; Combo Route) route 3 (three) times daily.    cyclobenzaprine (FLEXERIL) 10 MG tablet Take 1 tablet (10 mg total) by mouth 3 (three) times daily as needed for Muscle spasms.    diclofenac sodium (VOLTAREN) 1 % Gel Apply 2 g topically 4 (four) times daily.    esomeprazole (NEXIUM) 40 MG capsule Take 40 mg by mouth before breakfast.    fluticasone (FLONASE) 50 mcg/actuation nasal spray 1 spray (50 mcg total) by Each Nare route once daily.    furosemide (LASIX) 20 MG tablet Take 1 tablet (20 mg total) by mouth once daily.    gabapentin (NEURONTIN) 400 MG capsule Take 1 capsule (400 mg total) by mouth Daily.    glimepiride (AMARYL) 4 MG tablet Take 1 tablet (4 mg total) by mouth daily with breakfast.    hydrALAZINE (APRESOLINE) 100 MG tablet Take 1 tablet (100 mg total) by mouth 2 (two) times daily.    ibuprofen (ADVIL,MOTRIN) 600 MG tablet Take 1 tablet (600 mg total) by mouth 3 (three) times daily.    insulin detemir U-100 (LEVEMIR FLEXTOUCH) 100 unit/mL (3 mL) SubQ InPn pen Inject 10 Units into the skin every evening.     "lancets Misc 1 each by Misc.(Non-Drug; Combo Route) route 3 (three) times daily.    lisinopril (PRINIVIL,ZESTRIL) 40 MG tablet Take 1 tablet (40 mg total) by mouth once daily.    metformin (GLUCOPHAGE) 500 MG tablet Take 1 tablet (500 mg total) by mouth 2 (two) times daily with meals.    metoprolol succinate (TOPROL-XL) 25 MG 24 hr tablet Take 3 tablets (75 mg total) by mouth once daily.    pen needle, diabetic 31 gauge x 1/4" Ndle 1 each by Misc.(Non-Drug; Combo Route) route 2 (two) times daily.    spironolactone (ALDACTONE) 25 MG tablet Take 1 tablet (25 mg total) by mouth once daily.    albuterol 90 mcg/actuation inhaler Inhale 1-2 puffs into the lungs every 6 (six) hours as needed for Wheezing. Rescue     Family History     Problem Relation (Age of Onset)    Diabetes Father, Maternal Grandmother, Paternal Grandmother    Hypertension Mother, Father        Tobacco Use    Smoking status: Never Smoker    Smokeless tobacco: Never Used    Tobacco comment: smokes cigars on occasion   Substance and Sexual Activity    Alcohol use: Yes     Comment: occasional    Drug use: No     Comment: hx of marijuana use 3 years ago    Sexual activity: Yes     Partners: Male     Review of Systems   Constitutional: Negative for chills and fever.   HENT: Negative for ear discharge and ear pain.    Eyes: Negative for pain and itching.   Respiratory: Positive for shortness of breath. Negative for cough.    Cardiovascular: Negative for chest pain and palpitations.   Gastrointestinal: Negative for abdominal distention and abdominal pain.   Endocrine: Negative for polyphagia and polyuria.   Genitourinary: Negative for difficulty urinating and dysuria.   Musculoskeletal: Negative for neck pain and neck stiffness.   Skin: Negative for rash and wound.   Neurological: Negative for seizures and syncope.   Psychiatric/Behavioral: Negative for agitation and hallucinations.     Objective:     Vital Signs (Most Recent):  Temp: 98 °F (36.7 " °C) (12/28/18 0806)  Pulse: 104 (12/28/18 0806)  Resp: 17 (12/28/18 0806)  BP: (!) 135/96 (12/28/18 0806)  SpO2: (!) 90 % (12/28/18 0806) Vital Signs (24h Range):  Temp:  [98 °F (36.7 °C)-99 °F (37.2 °C)] 98 °F (36.7 °C)  Pulse:  [] 104  Resp:  [17-33] 17  SpO2:  [90 %-99 %] 90 %  BP: (127-178)/() 135/96     Weight: 111 kg (244 lb 11.4 oz)  Body mass index is 38.33 kg/m².    Physical Exam   Constitutional: She is oriented to person, place, and time. She appears well-developed. No distress.   HENT:   Head: Normocephalic and atraumatic.   Eyes: Conjunctivae are normal. Right eye exhibits no discharge. Left eye exhibits no discharge.   Neck: Neck supple. No tracheal deviation present.   Cardiovascular: Normal rate, regular rhythm and normal heart sounds.   Pulmonary/Chest: Effort normal. No respiratory distress. She has no wheezes.   Decreased BS bases   Abdominal: Soft. Bowel sounds are normal.   Musculoskeletal: Normal range of motion. She exhibits no deformity.   Neurological: She is alert and oriented to person, place, and time.   Skin: Skin is warm and dry.           Significant Labs:   BMP:   Recent Labs   Lab 12/27/18 2139   *      K 4.4      CO2 25   BUN 14   CREATININE 1.0   CALCIUM 9.2   MG 1.9     CBC:   Recent Labs   Lab 12/27/18 2139 12/27/18 2148   WBC 4.71  --    HGB 13.4  --    HCT 42.0 43     --        Significant Imaging: I have reviewed all pertinent imaging results/findings within the past 24 hours.    Assessment/Plan:     * Acute on chronic combined systolic (congestive) and diastolic (congestive) heart failure    Patient presents with dyspnea on exertion.  Elevated BNP with evidence of fluid overload on CXR.  EF of 15-20% with diastolic dysfunction in 3/2018.  Non ischemic cardiomyopathy.  Start IV diuresis.  Continue ACEI, B blocker and Aldactone.  Cardiology consult.  Repeat Echo.       NICM (nonischemic cardiomyopathy)           MILE (obstructive sleep  apnea)    Nightly CPAP       Mixed hyperlipidemia    Continue Statin       Obesity with body mass index (BMI) of 30.0 to 39.9           Essential hypertension    Continue home meds and monitor.     Type 2 diabetes mellitus without complication, without long-term current use of insulin    Continue nightly Levemir.  Diabetic diet and insulin sliding scale.         VTE Risk Mitigation (From admission, onward)        Ordered     enoxaparin injection 40 mg  Daily      12/28/18 0835     IP VTE HIGH RISK PATIENT  Once      12/28/18 0041     Place sequential compression device  Until discontinued      12/28/18 0041             Marty Lira MD  Department of Hospital Medicine   [facility]

## 2018-12-28 NOTE — HPI
44 y/o female with non ischemic cardiomyopathy EF of 20% presents to the ER complaining of acute onset, SOB with myalgias and weakness.  Symptoms started yesterday morning.  Patient reports similar symptoms whenever she gets fluid in her lungs.  Dyspnea is mostly on exertion.  Denies any lower extremity edema.  Also denies any chest pain.  Patient states compliance with medications.  Denies non compliance with diet over [place] holidays.  She reports she has a defibrillator in place since April and denies any shocking sensation.  She reports she uses a CPAP machine to sleep at night.  No alleviating or aggravating factors.  She presented to ER where she was noted to have elevated BNP and fluid on CXR.  No other complaints.    Pt presents with sxs of CHF.  Denies angina.  No med/diet noncompliance.  No ICD discharges or LOC.  Feeling better since admit.    Follows with me in clinic, last seen 6/21/18:  CARDIOVASCULAR HISTORY:   NICM (cath 11/2017) EF 15-20% by echo 3/2018  [employer] ICD (4/17/18 Dr. Christine)  MILE on CPAP  Cardiovascular Testing:  Echo: 3/12/18 (EF similar to 11/2017)    1 - Severely depressed left ventricular systolic function (EF 15-20%).  Severe global hypokinesis.     2 - Severe left ventricular enlargement.     3 - Eccentric hypertrophy.     4 - Impaired LV relaxation, elevated LAP (grade 2 diastolic dysfunction).     5 - Mildly depressed right ventricular systolic function .     6 - Mild mitral regurgitation.     7 - Trivial to mild tricuspid regurgitation.     8 - The estimated PA systolic pressure is 35 mmHg.      Cath 11/27/17  RA 5  RV 61/6  PA 60/29/42  PAWP 18  /41  Ao 163/107/130  CO 5.6 L/min  LVEF: 20% by echo with severe LV dilation  Wall Motion: Severe global HK  Dominance: Co-dom  LM: normal  LAD: normal  LCx: normal  RCA: normal  Hemostasis:  RFA/V manual compression  Impression:  Normal cors  Elevated LVEDP with transmission of pressures to pulmonary circuit  Above c/w Severe  NICM  RFA/V manual compression  Plan:  Cont med rx  Stop Plavix  Stop amlodipine  Start Hydrala/Imdur/Lasix  Goal net neg 1L/day  Cont BBl/ACEi  Repeat echo in 3 months for reassessment of LV fxn and need for ICD  Follow up with Dr. Yanez 1 week after discharge     Stress Test: L MPI 9/20/13  Nuclear Quantitative Functional Analysis:   LVEF: 31 %  Impression: NORMAL MYOCARDIAL PERFUSION  1. The perfusion scan is free of evidence for myocardial ischemia or injury.   2. There is a moderate intensity fixed defect in the inferior wall of the left ventricle, secondary to diaphragm attenuation.   3. There is abnormal wall motion at rest showing moderate global hypokinesis of the left ventricle.   4. There is resting LV dysfunction with a reduced ejection fraction of 31 %.   5. The ventricular volumes are normal at rest and stress.   6. The extracardiac distribution of radioactivity is normal.      ASSESSMENT:   # NICM, EF persistently reduced 15-20% by echo 3/2018.  Pt appears euvolemic.  # S/P [employer] [facility] ICD 4/2018 (Dr. Christine)  # HTN, controlled  # HLP, on atorva 80mg  # DM  # BMI 38, stable vs last OV  # MILE, on CPAP     PLAN:   Cont med rx  F/U with arrhythmia svc at [facility] planned 7/23/18  Diet/exercise/weight loss, Na+ avoidance  RTC 6 months or sooner prn

## 2018-12-28 NOTE — SUBJECTIVE & OBJECTIVE
Past Medical History:   Diagnosis Date    Atrial fibrillation     Blood clot associated with vein wall inflammation     not dvt    Cardiomyopathy     Normal cors on cath 11/2017    CHF (congestive heart failure)     DM (diabetes mellitus) 9/19/2013    Hyperlipidemia     Hypertension     Psoriasis     Sleep apnea        Past Surgical History:   Procedure Laterality Date    CARDIAC CATHETERIZATION      COLONOSCOPY      DILATION AND CURETTAGE OF UTERUS      INSERTION-ICD-DUAL Left 4/17/2018    Performed by Eben Christine MD at [facility] CATH LAB       Review of patient's allergies indicates:   Allergen Reactions    Pneumococcal 23-abimbola ps vaccine        No current facility-administered medications on file prior to encounter.      Current Outpatient Medications on File Prior to Encounter   Medication Sig    aspirin 81 MG Chew Take 81 mg by mouth once daily.    atorvastatin (LIPITOR) 80 MG tablet Take 1 tablet (80 mg total) by mouth nightly.    blood glucose control, high Soln 1 each by Misc.(Non-Drug; Combo Route) route once. for 1 dose    blood glucose control, low Soln 1 each by Misc.(Non-Drug; Combo Route) route once. for 1 dose    BLOOD PRESSURE CUFF Misc 1 kit by Misc.(Non-Drug; Combo Route) route 2 (two) times daily.    blood sugar diagnostic (TRUE METRIX GLUCOSE TEST STRIP) Strp 1 strip by Misc.(Non-Drug; Combo Route) route 3 (three) times daily.    blood-glucose meter (TRUE METRIX AIR GLUCOSE METER) Misc 1 each by Misc.(Non-Drug; Combo Route) route 3 (three) times daily.    cyclobenzaprine (FLEXERIL) 10 MG tablet Take 1 tablet (10 mg total) by mouth 3 (three) times daily as needed for Muscle spasms.    diclofenac sodium (VOLTAREN) 1 % Gel Apply 2 g topically 4 (four) times daily.    esomeprazole (NEXIUM) 40 MG capsule Take 40 mg by mouth before breakfast.    fluticasone (FLONASE) 50 mcg/actuation nasal spray 1 spray (50 mcg total) by Each Nare route once daily.    furosemide (LASIX) 20 MG  "tablet Take 1 tablet (20 mg total) by mouth once daily.    gabapentin (NEURONTIN) 400 MG capsule Take 1 capsule (400 mg total) by mouth Daily.    glimepiride (AMARYL) 4 MG tablet Take 1 tablet (4 mg total) by mouth daily with breakfast.    hydrALAZINE (APRESOLINE) 100 MG tablet Take 1 tablet (100 mg total) by mouth 2 (two) times daily.    ibuprofen (ADVIL,MOTRIN) 600 MG tablet Take 1 tablet (600 mg total) by mouth 3 (three) times daily.    insulin detemir U-100 (LEVEMIR FLEXTOUCH) 100 unit/mL (3 mL) SubQ InPn pen Inject 10 Units into the skin every evening.    lancets Misc 1 each by Misc.(Non-Drug; Combo Route) route 3 (three) times daily.    lisinopril (PRINIVIL,ZESTRIL) 40 MG tablet Take 1 tablet (40 mg total) by mouth once daily.    metformin (GLUCOPHAGE) 500 MG tablet Take 1 tablet (500 mg total) by mouth 2 (two) times daily with meals.    metoprolol succinate (TOPROL-XL) 25 MG 24 hr tablet Take 3 tablets (75 mg total) by mouth once daily.    pen needle, diabetic 31 gauge x 1/4" Ndle 1 each by Misc.(Non-Drug; Combo Route) route 2 (two) times daily.    spironolactone (ALDACTONE) 25 MG tablet Take 1 tablet (25 mg total) by mouth once daily.    albuterol 90 mcg/actuation inhaler Inhale 1-2 puffs into the lungs every 6 (six) hours as needed for Wheezing. Rescue     Family History     Problem Relation (Age of Onset)    Diabetes Father, Maternal Grandmother, Paternal Grandmother    Hypertension Mother, Father        Tobacco Use    Smoking status: Never Smoker    Smokeless tobacco: Never Used    Tobacco comment: smokes cigars on occasion   Substance and Sexual Activity    Alcohol use: Yes     Comment: occasional    Drug use: No     Comment: hx of marijuana use 3 years ago    Sexual activity: Yes     Partners: Male     Review of Systems   Constitutional: Negative for chills and fever.   HENT: Negative for ear discharge and ear pain.    Eyes: Negative for pain and itching.   Respiratory: Positive for " shortness of breath. Negative for cough.    Cardiovascular: Negative for chest pain and palpitations.   Gastrointestinal: Negative for abdominal distention and abdominal pain.   Endocrine: Negative for polyphagia and polyuria.   Genitourinary: Negative for difficulty urinating and dysuria.   Musculoskeletal: Negative for neck pain and neck stiffness.   Skin: Negative for rash and wound.   Neurological: Negative for seizures and syncope.   Psychiatric/Behavioral: Negative for agitation and hallucinations.     Objective:     Vital Signs (Most Recent):  Temp: 98 °F (36.7 °C) (12/28/18 0806)  Pulse: 104 (12/28/18 0806)  Resp: 17 (12/28/18 0806)  BP: (!) 135/96 (12/28/18 0806)  SpO2: (!) 90 % (12/28/18 0806) Vital Signs (24h Range):  Temp:  [98 °F (36.7 °C)-99 °F (37.2 °C)] 98 °F (36.7 °C)  Pulse:  [] 104  Resp:  [17-33] 17  SpO2:  [90 %-99 %] 90 %  BP: (127-178)/() 135/96     Weight: 111 kg (244 lb 11.4 oz)  Body mass index is 38.33 kg/m².    Physical Exam   Constitutional: She is oriented to person, place, and time. She appears well-developed. No distress.   HENT:   Head: Normocephalic and atraumatic.   Eyes: Conjunctivae are normal. Right eye exhibits no discharge. Left eye exhibits no discharge.   Neck: Neck supple. No tracheal deviation present.   Cardiovascular: Normal rate, regular rhythm and normal heart sounds.   Pulmonary/Chest: Effort normal. No respiratory distress. She has no wheezes.   Decreased BS bases   Abdominal: Soft. Bowel sounds are normal.   Musculoskeletal: Normal range of motion. She exhibits no deformity.   Neurological: She is alert and oriented to person, place, and time.   Skin: Skin is warm and dry.           Significant Labs:   BMP:   Recent Labs   Lab 12/27/18 2139   *      K 4.4      CO2 25   BUN 14   CREATININE 1.0   CALCIUM 9.2   MG 1.9     CBC:   Recent Labs   Lab 12/27/18 2139 12/27/18 2148   WBC 4.71  --    HGB 13.4  --    HCT 42.0 43     --         Significant Imaging: I have reviewed all pertinent imaging results/findings within the past 24 hours.

## 2018-12-28 NOTE — PROGRESS NOTES
Results for FARRUKH FERREIRA (MRN [id number]) as of 12/27/2018 22:15   Ref. Range 12/27/2018 21:48   POC Glucose Latest Ref Range: 70 - 110 mg/dL 232 (H)   POC Creatinine Latest Ref Range: 0.5 - 1.4 mg/dL 0.8   POC BUN Latest Ref Range: 6 - 30 mg/dL 15   POC Chloride Latest Ref Range: 95 - 110 mmol/L 100   POC Sodium Latest Ref Range: 136 - 145 mmol/L 139   POC Potassium Latest Ref Range: 3.5 - 5.1 mmol/L 4.4   POC Ionized Calcium Latest Ref Range: 1.06 - 1.42 mmol/L 1.23   POC Hematocrit Latest Ref Range: 36 - 54 %PCV 43   POC TCO2 (MEASURED) Latest Ref Range: 23 - 29 mmol/L 31 (H)   Sample Unknown VENOUS   POC Anion Gap Latest Ref Range: 8 - 16 mmol/L 13     CHEM 8 results reported to DEB Ruelas MD

## 2018-12-28 NOTE — ASSESSMENT & PLAN NOTE
Per EP notes  Currently in SR  Not on OAC d/t hx of vaginal bleeding  Had atrial lead placed for monitoring of possible recurrent PAF, will need to have [employer] [facility] interrogate device (as outpatient).

## 2018-12-28 NOTE — NURSING
Bedside report received from BONY Leon. Pt is AAO. Pt denies needs. Safety maintained. Will continue to monitor.

## 2018-12-28 NOTE — NURSING
informed of patient having a 5 run beat of v-tach. BP 19/94, 100. No new orders given. Pt resting quietly.

## 2018-12-28 NOTE — ASSESSMENT & PLAN NOTE
Known NICM  Pt denies med noncompliance or dietary indiscretion  Agree with IV lasix  Cont BBl/ACEi/aldact  Review echo (ordered by primary team)  I will ask social work to inquire as to insurance coverage for entresto

## 2018-12-29 LAB
ANION GAP SERPL CALC-SCNC: 12 MMOL/L
BUN SERPL-MCNC: 15 MG/DL
CALCIUM SERPL-MCNC: 9.3 MG/DL
CHLORIDE SERPL-SCNC: 94 MMOL/L
CO2 SERPL-SCNC: 32 MMOL/L
CREAT SERPL-MCNC: 1 MG/DL
EST. GFR  (AFRICAN AMERICAN): >60 ML/MIN/1.73 M^2
EST. GFR  (NON AFRICAN AMERICAN): >60 ML/MIN/1.73 M^2
GLUCOSE SERPL-MCNC: 179 MG/DL
POCT GLUCOSE: 156 MG/DL (ref 70–110)
POCT GLUCOSE: 164 MG/DL (ref 70–110)
POCT GLUCOSE: 205 MG/DL (ref 70–110)
POCT GLUCOSE: 247 MG/DL (ref 70–110)
POTASSIUM SERPL-SCNC: 3.8 MMOL/L
SODIUM SERPL-SCNC: 138 MMOL/L

## 2018-12-29 PROCEDURE — 99233 SBSQ HOSP IP/OBS HIGH 50: CPT | Mod: ,,, | Performed by: INTERNAL MEDICINE

## 2018-12-29 PROCEDURE — 99900035 HC TECH TIME PER 15 MIN (STAT)

## 2018-12-29 PROCEDURE — 25000242 PHARM REV CODE 250 ALT 637 W/ HCPCS: Performed by: HOSPITALIST

## 2018-12-29 PROCEDURE — 80048 BASIC METABOLIC PNL TOTAL CA: CPT

## 2018-12-29 PROCEDURE — 94660 CPAP INITIATION&MGMT: CPT

## 2018-12-29 PROCEDURE — 25000003 PHARM REV CODE 250: Performed by: HOSPITALIST

## 2018-12-29 PROCEDURE — 99233 PR SUBSEQUENT HOSPITAL CARE,LEVL III: ICD-10-PCS | Mod: ,,, | Performed by: INTERNAL MEDICINE

## 2018-12-29 PROCEDURE — 36415 COLL VENOUS BLD VENIPUNCTURE: CPT

## 2018-12-29 PROCEDURE — 25000003 PHARM REV CODE 250: Performed by: INTERNAL MEDICINE

## 2018-12-29 PROCEDURE — 63600175 PHARM REV CODE 636 W HCPCS: Performed by: HOSPITALIST

## 2018-12-29 PROCEDURE — 63600175 PHARM REV CODE 636 W HCPCS: Performed by: INTERNAL MEDICINE

## 2018-12-29 PROCEDURE — 21400001 HC TELEMETRY ROOM

## 2018-12-29 RX ORDER — METOPROLOL SUCCINATE 50 MG/1
100 TABLET, EXTENDED RELEASE ORAL DAILY
Status: DISCONTINUED | OUTPATIENT
Start: 2018-12-29 | End: 2018-12-30 | Stop reason: HOSPADM

## 2018-12-29 RX ADMIN — FLUTICASONE PROPIONATE 50 MCG: 50 SPRAY, METERED NASAL at 09:12

## 2018-12-29 RX ADMIN — METOPROLOL SUCCINATE 100 MG: 50 TABLET, EXTENDED RELEASE ORAL at 09:12

## 2018-12-29 RX ADMIN — INSULIN ASPART 2 UNITS: 100 INJECTION, SOLUTION INTRAVENOUS; SUBCUTANEOUS at 05:12

## 2018-12-29 RX ADMIN — FUROSEMIDE 40 MG: 10 INJECTION, SOLUTION INTRAVENOUS at 09:12

## 2018-12-29 RX ADMIN — ATORVASTATIN CALCIUM 80 MG: 40 TABLET, FILM COATED ORAL at 09:12

## 2018-12-29 RX ADMIN — ENOXAPARIN SODIUM 40 MG: 100 INJECTION SUBCUTANEOUS at 05:12

## 2018-12-29 RX ADMIN — LISINOPRIL 40 MG: 20 TABLET ORAL at 09:12

## 2018-12-29 RX ADMIN — PANTOPRAZOLE SODIUM 40 MG: 40 TABLET, DELAYED RELEASE ORAL at 09:12

## 2018-12-29 RX ADMIN — GABAPENTIN 400 MG: 400 CAPSULE ORAL at 05:12

## 2018-12-29 RX ADMIN — FUROSEMIDE 40 MG: 10 INJECTION, SOLUTION INTRAVENOUS at 05:12

## 2018-12-29 RX ADMIN — HYDRALAZINE HYDROCHLORIDE 100 MG: 25 TABLET ORAL at 09:12

## 2018-12-29 RX ADMIN — SPIRONOLACTONE 25 MG: 25 TABLET ORAL at 09:12

## 2018-12-29 RX ADMIN — INSULIN DETEMIR 10 UNITS: 100 INJECTION, SOLUTION SUBCUTANEOUS at 09:12

## 2018-12-29 RX ADMIN — OXYCODONE AND ACETAMINOPHEN 1 TABLET: 5; 325 TABLET ORAL at 12:12

## 2018-12-29 RX ADMIN — ASPIRIN 81 MG 81 MG: 81 TABLET ORAL at 09:12

## 2018-12-29 NOTE — SUBJECTIVE & OBJECTIVE
Interval History: No new events overnight.    Review of Systems   HENT: Negative for ear discharge and ear pain.    Eyes: Negative for itching.   Endocrine: Negative for polyphagia and polyuria.   Neurological: Negative for seizures and syncope.     Objective:     Vital Signs (Most Recent):  Temp: 97.5 °F (36.4 °C) (12/29/18 0748)  Pulse: 79 (12/29/18 0748)  Resp: 18 (12/29/18 0748)  BP: 118/69 (12/29/18 0748)  SpO2: 97 % (12/29/18 0748) Vital Signs (24h Range):  Temp:  [97.5 °F (36.4 °C)-97.8 °F (36.6 °C)] 97.5 °F (36.4 °C)  Pulse:  [] 79  Resp:  [17-20] 18  SpO2:  [94 %-100 %] 97 %  BP: (100-126)/(64-84) 118/69     Weight: 111 kg (244 lb 11.4 oz)  Body mass index is 38.33 kg/m².    Intake/Output Summary (Last 24 hours) at 12/29/2018 1101  Last data filed at 12/29/2018 0600  Gross per 24 hour   Intake 876 ml   Output 2250 ml   Net -1374 ml      Physical Exam   Constitutional: She is oriented to person, place, and time. She appears well-developed. No distress.   HENT:   Head: Normocephalic and atraumatic.   Eyes: Conjunctivae are normal. Right eye exhibits no discharge. Left eye exhibits no discharge.   Neck: Neck supple. No tracheal deviation present.   Cardiovascular: Normal rate, regular rhythm and normal heart sounds.   Pulmonary/Chest: Effort normal. No respiratory distress. She has no wheezes.   Decreased BS bases   Abdominal: Soft. Bowel sounds are normal.   Musculoskeletal: Normal range of motion. She exhibits no deformity.   Neurological: She is alert and oriented to person, place, and time.   Skin: Skin is warm and dry.       Significant Labs:   BMP:   Recent Labs   Lab 12/27/18 2139 12/29/18  0510   * 179*    138   K 4.4 3.8    94*   CO2 25 32*   BUN 14 15   CREATININE 1.0 1.0   CALCIUM 9.2 9.3   MG 1.9  --      CBC:   Recent Labs   Lab 12/27/18 2139 12/27/18 2148   WBC 4.71  --    HGB 13.4  --    HCT 42.0 43     --

## 2018-12-29 NOTE — ASSESSMENT & PLAN NOTE
Patient presents with dyspnea on exertion.  Elevated BNP with evidence of fluid overload on CXR.  EF of 15-20% with diastolic dysfunction in 3/2018.  Non ischemic cardiomyopathy.  Started IV diuresis.  Continue ACEI, B blocker and Aldactone.  Appreciate Cards input.  Repeat Echo shows EF of 20%

## 2018-12-29 NOTE — HOSPITAL COURSE
12/27/18: adm for ADHF  12/28/18: neg 1.7L  12/29/18: neg 2.2L    Interval Hx: feeling better.  No cp, sob resolved.  Ambulating without difficulty.  Case d/w Dr. Lira.    Tele: SR, Sustained VT (pers rev)

## 2018-12-29 NOTE — PROGRESS NOTES
[facility] [place]  Cardiology  Progress Note    Patient Name: Fabiana Chanel  MRN: [id number]  Admission Date: 12/27/2018  Hospital Length of Stay: 2 days  Code Status: Full Code   Attending Physician: Marty Lira MD   Primary Care Physician: Miguel Burden MD  Expected Discharge Date:   Principal Problem:Acute on chronic combined systolic (congestive) and diastolic (congestive) heart failure    Subjective:     Hospital Course:   12/27/18: adm for ADHF  12/28/18: neg 1.7L    Interval Hx: feeling better.  No cp, sob much improved.    Tele: SR, NSVT (pers rev)      Past Medical History:   Diagnosis Date    Atrial fibrillation     Blood clot associated with vein wall inflammation     not dvt    Cardiomyopathy     Normal cors on cath 11/2017    CHF (congestive heart failure)     DM (diabetes mellitus) 9/19/2013    Hyperlipidemia     Hypertension     Psoriasis     Sleep apnea        Past Surgical History:   Procedure Laterality Date    CARDIAC CATHETERIZATION      COLONOSCOPY      DILATION AND CURETTAGE OF UTERUS      INSERTION-ICD-DUAL Left 4/17/2018    Performed by Eben Christine MD at [facility] CATH LAB       Review of patient's allergies indicates:   Allergen Reactions    Pneumococcal 23-abimbola ps vaccine        No current facility-administered medications on file prior to encounter.      Current Outpatient Medications on File Prior to Encounter   Medication Sig    aspirin 81 MG Chew Take 81 mg by mouth once daily.    atorvastatin (LIPITOR) 80 MG tablet Take 1 tablet (80 mg total) by mouth nightly.    blood glucose control, high Soln 1 each by Misc.(Non-Drug; Combo Route) route once. for 1 dose    blood glucose control, low Soln 1 each by Misc.(Non-Drug; Combo Route) route once. for 1 dose    BLOOD PRESSURE CUFF Misc 1 kit by Misc.(Non-Drug; Combo Route) route 2 (two) times daily.    blood sugar diagnostic (TRUE METRIX GLUCOSE TEST STRIP) Strp 1 strip by Misc.(Non-Drug; Combo Route)  "route 3 (three) times daily.    blood-glucose meter (TRUE METRIX AIR GLUCOSE METER) Misc 1 each by Misc.(Non-Drug; Combo Route) route 3 (three) times daily.    cyclobenzaprine (FLEXERIL) 10 MG tablet Take 1 tablet (10 mg total) by mouth 3 (three) times daily as needed for Muscle spasms.    diclofenac sodium (VOLTAREN) 1 % Gel Apply 2 g topically 4 (four) times daily.    esomeprazole (NEXIUM) 40 MG capsule Take 40 mg by mouth before breakfast.    fluticasone (FLONASE) 50 mcg/actuation nasal spray 1 spray (50 mcg total) by Each Nare route once daily.    furosemide (LASIX) 20 MG tablet Take 1 tablet (20 mg total) by mouth once daily.    gabapentin (NEURONTIN) 400 MG capsule Take 1 capsule (400 mg total) by mouth Daily.    glimepiride (AMARYL) 4 MG tablet Take 1 tablet (4 mg total) by mouth daily with breakfast.    hydrALAZINE (APRESOLINE) 100 MG tablet Take 1 tablet (100 mg total) by mouth 2 (two) times daily.    ibuprofen (ADVIL,MOTRIN) 600 MG tablet Take 1 tablet (600 mg total) by mouth 3 (three) times daily.    insulin detemir U-100 (LEVEMIR FLEXTOUCH) 100 unit/mL (3 mL) SubQ InPn pen Inject 10 Units into the skin every evening.    lancets Misc 1 each by Misc.(Non-Drug; Combo Route) route 3 (three) times daily.    lisinopril (PRINIVIL,ZESTRIL) 40 MG tablet Take 1 tablet (40 mg total) by mouth once daily.    metformin (GLUCOPHAGE) 500 MG tablet Take 1 tablet (500 mg total) by mouth 2 (two) times daily with meals.    metoprolol succinate (TOPROL-XL) 25 MG 24 hr tablet Take 3 tablets (75 mg total) by mouth once daily.    pen needle, diabetic 31 gauge x 1/4" Ndle 1 each by Misc.(Non-Drug; Combo Route) route 2 (two) times daily.    spironolactone (ALDACTONE) 25 MG tablet Take 1 tablet (25 mg total) by mouth once daily.    albuterol 90 mcg/actuation inhaler Inhale 1-2 puffs into the lungs every 6 (six) hours as needed for Wheezing. Rescue     Family History     Problem Relation (Age of Onset)    Diabetes " Father, Maternal Grandmother, Paternal Grandmother    Hypertension Mother, Father        Tobacco Use    Smoking status: Never Smoker    Smokeless tobacco: Never Used    Tobacco comment: smokes cigars on occasion   Substance and Sexual Activity    Alcohol use: Yes     Comment: occasional    Drug use: No     Comment: hx of marijuana use 3 years ago    Sexual activity: Yes     Partners: Male     Review of Systems   Gastrointestinal: Negative for melena.   Genitourinary: Negative for hematuria.     Objective:     Vital Signs (Most Recent):  Temp: 97.5 °F (36.4 °C) (12/29/18 0748)  Pulse: 79 (12/29/18 0748)  Resp: 18 (12/29/18 0748)  BP: 118/69 (12/29/18 0748)  SpO2: 97 % (12/29/18 0748) Vital Signs (24h Range):  Temp:  [97.5 °F (36.4 °C)-97.8 °F (36.6 °C)] 97.5 °F (36.4 °C)  Pulse:  [] 79  Resp:  [17-20] 18  SpO2:  [94 %-100 %] 97 %  BP: (100-126)/(64-84) 118/69     Weight: 111 kg (244 lb 11.4 oz)  Body mass index is 38.33 kg/m².    SpO2: 97 %  O2 Device (Oxygen Therapy): nasal cannula      Intake/Output Summary (Last 24 hours) at 12/29/2018 0907  Last data filed at 12/29/2018 0600  Gross per 24 hour   Intake 1236 ml   Output 2700 ml   Net -1464 ml       Lines/Drains/Airways     Peripheral Intravenous Line                 Peripheral IV - Single Lumen 12/27/18 2132 Right Forearm 1 day                Physical Exam   Constitutional: She is oriented to person, place, and time. She appears well-developed and well-nourished. No distress.   HENT:   Head: Normocephalic and atraumatic.   Mouth/Throat: No oropharyngeal exudate.   Eyes: Conjunctivae and EOM are normal. Pupils are equal, round, and reactive to light. No scleral icterus.   Neck: Normal range of motion. Neck supple. No JVD present. No tracheal deviation present. No thyromegaly present.   Cardiovascular: Normal rate, regular rhythm, S1 normal and S2 normal. Exam reveals distant heart sounds. Exam reveals no gallop and no friction rub.   No murmur  heard.  Pulmonary/Chest: Effort normal. No respiratory distress. She has no wheezes. She has no rales. She exhibits no tenderness.   Dec air entry bilat   Abdominal: Soft. She exhibits no distension.   obese   Musculoskeletal: Normal range of motion. She exhibits no edema.   Neurological: She is alert and oriented to person, place, and time. No cranial nerve deficit.   Skin: Skin is warm and dry. She is not diaphoretic.   Psychiatric: She has a normal mood and affect. Her behavior is normal. Judgment normal.       Current Medications:   aspirin  81 mg Oral Daily    atorvastatin  80 mg Oral Nightly    enoxaparin  40 mg Subcutaneous Daily    fluticasone  1 spray Each Nare Daily    furosemide  40 mg Intravenous BID    gabapentin  400 mg Oral Daily    hydrALAZINE  100 mg Oral BID    insulin detemir U-100  10 Units Subcutaneous QHS    lisinopril  40 mg Oral Daily    metoprolol succinate  75 mg Oral Daily    pantoprazole  40 mg Oral Daily    spironolactone  25 mg Oral Daily       acetaminophen, albuterol-ipratropium, cyclobenzaprine, dextrose 50%, dextrose 50%, glucagon (human recombinant), glucose, glucose, insulin aspart U-100, ondansetron, oxyCODONE-acetaminophen    Laboratory:  CBC:  Recent Labs   Lab 03/03/18  0030 04/11/18  1133 08/18/18  1313 12/25/18 2150 12/27/18 2139 12/27/18  2148   WHITE BLOOD CELL COUNT 6.66 5.07 5.11 5.57 4.71  --    HEMOGLOBIN 13.3 13.0 12.9 13.2 13.4  --    POC HEMATOCRIT  --   --   --   --   --  43   HEMATOCRIT 41.7 43.6 40.0 41.6 42.0  --    PLATELETS 367 H 337 290 264 248  --        CHEMISTRIES:  Recent Labs   Lab 02/18/17  1943  11/28/17  0508  02/27/18  2120  04/11/18  1133 04/17/18  1323 08/18/18  1313 12/25/18  2150 12/27/18 2139 12/29/18  0510   GLUCOSE 637 HH   < > 319 H   < > 134 H   < > 149 H  --  130 H 224 H 226 H 179 H   SODIUM 133 L   < > 138   < > 139   < > 142  --  140 140 137 138   POTASSIUM 4.3   < > 3.5   < > 4.8   < > 3.4 L 3.6 4.0 4.2 4.4 3.8   BUN BLD  14   < > 11   < > 10   < > 10  --  10 13 14 15   CREATININE 1.5 H   < > 1.1   < > 1.0   < > 1.0  --  0.9 1.2 1.0 1.0   EGFR IF  50 A   < > >60   < > >60   < > >60.0  --  >60 >60 >60 >60   EGFR IF NON- 43 A   < > >60   < > >60   < > >60.0  --  >60 56 A >60 >60   CALCIUM 9.7   < > 9.0   < > 10.6 H   < > 9.3  --  9.6 9.3 9.2 9.3   MAGNESIUM 2.2  --  1.7  --  1.9  --   --   --   --   --  1.9  --     < > = values in this interval not displayed.       CARDIAC BIOMARKERS:  Recent Labs   Lab 02/18/17  1943  02/23/17 2012 02/27/18 2120 03/03/18  0030 03/03/18  0225 08/18/18  1313 12/27/18  2139   CPK 74  --  81  --  65  --   --   --   --    CPK MB 2.1  --   --   --   --   --   --   --   --    TROPONIN I <0.006   < > <0.006   < > 0.017 0.030 H 0.031 H 0.018 0.014    < > = values in this interval not displayed.       COAGS:  Recent Labs   Lab 06/08/17  2111 01/11/18  1218 04/11/18  1133 12/25/18 2150 12/27/18  2139   INR 0.9 1.0 0.9 1.0 1.0       LIPIDS/LFTS:  Recent Labs   Lab 06/09/17  0524  11/21/17  1445  11/28/17  0508  03/03/18  0030 03/12/18  0900 08/18/18  1313 12/25/18  2150 12/27/18  2139   CHOLESTEROL 185  --  227 H  --  175  --   --  175  --   --   --    TRIGLYCERIDES 83  --  122  --  76  --   --  76  --   --   --    HDL 38 L  --  62  --  46  --   --  47  --   --   --    LDL CHOLESTEROL 130.4  --  140.6  --  113.8  --   --  112.8  --   --   --    NON-HDL CHOLESTEROL 147  --  165  --  129  --   --  128  --   --   --    AST 9 L   < > 14   < >  --    < > 13 8 L 9 L 15 11   ALT 9 L   < > 11   < >  --    < > 10 11 7 L 16 12    < > = values in this interval not displayed.       BNP:  Recent Labs   Lab 02/20/18  0155 02/27/18  2120 03/03/18  0030 08/18/18  1313 12/27/18  2139    H 82 536 H 246 H 802 H       TSH:  Recent Labs   Lab 02/18/17  1943 11/21/17  1445 11/27/17  0509   TSH 0.882 1.189 1.938       Free T4:  Recent Labs   Lab 11/21/17  1445 11/27/17  0509   FREE T4 0.95  0.98       Diagnostic Results:  ECG (personally reviewed tracings):  12/27/18 , PRWP, NSSTTW changes, similar to 7/23/18    Chest X-Ray (personally reviewed image(s)): 12/27/18 CHF, L ICD    Echo: 12/28/18 (images pers rev, EF unchanged vs 3/2018)  · Severely decreased left ventricular systolic function. The estimated ejection fraction is 20%  · Severe global hypokinetic wall motion.  · Severe left ventricular enlargement.  · Eccentric left ventricular hypertrophy.  · Grade II (moderate) left ventricular diastolic dysfunction consistent with pseudonormalization.  · Moderate right ventricular enlargement.  · Moderately reduced right ventricular systolic function.     Cath 11/27/17  RA 5  RV 61/6  PA 60/29/42  PAWP 18  /41  Ao 163/107/130  CO 5.6 L/min  LVEF: 20% by echo with severe LV dilation  Wall Motion: Severe global HK  Dominance: Co-dom  LM: normal  LAD: normal  LCx: normal  RCA: normal  Hemostasis:  RFA/V manual compression  Impression:  Normal cors  Elevated LVEDP with transmission of pressures to pulmonary circuit  Above c/w Severe NICM  RFA/V manual compression  Plan:  Cont med rx  Stop Plavix  Stop amlodipine  Start Hydrala/Imdur/Lasix  Goal net neg 1L/day  Cont BBl/ACEi  Repeat echo in 3 months for reassessment of LV fxn and need for ICD  Follow up with Dr. Yanez 1 week after discharge     Stress Test: L MPI 9/20/13  Nuclear Quantitative Functional Analysis:   LVEF: 31 %  Impression: NORMAL MYOCARDIAL PERFUSION  1. The perfusion scan is free of evidence for myocardial ischemia or injury.   2. There is a moderate intensity fixed defect in the inferior wall of the left ventricle, secondary to diaphragm attenuation.   3. There is abnormal wall motion at rest showing moderate global hypokinesis of the left ventricle.   4. There is resting LV dysfunction with a reduced ejection fraction of 31 %.   5. The ventricular volumes are normal at rest and stress.   6. The extracardiac distribution of  radioactivity is normal.         Assessment and Plan:     * Acute on chronic combined systolic (congestive) and diastolic (congestive) heart failure    Known NICM  Pt denies med noncompliance or dietary indiscretion  Cont IV lasix x24 hrx, switch to PO tomorrow  Cont BBl/ACEi/aldact, titrate toprol XL to 100mg qd  I asked social work to inquire as to insurance coverage for entresto     NICM (nonischemic cardiomyopathy)    As above     Essential hypertension    Cont med rx  Titrate Toprol to 100mg qd     Paroxysmal atrial fibrillation    Per EP notes  Currently in SR  Not on OAC d/t hx of vaginal bleeding  Had atrial lead placed for monitoring of possible recurrent PAF, will need to have [facility] [facility] interrogate device (as outpatient).     Type 2 diabetes mellitus without complication, without long-term current use of insulin    Per IM     Mixed hyperlipidemia    Cont statin     Implantable cardioverter-defibrillator (ICD) in situ    NSVT noted, titrate BBL  [facility]  No ICD shocks per pt report     MILE (obstructive sleep apnea)    On CPAP         VTE Risk Mitigation (From admission, onward)        Ordered     enoxaparin injection 40 mg  Daily      12/28/18 0835     IP VTE HIGH RISK PATIENT  Once      12/28/18 0041     Place sequential compression device  Until discontinued      12/28/18 0041        Case d/w Dr. Pankaj Yanez MD  Cardiology  [facility] [place]

## 2018-12-29 NOTE — ASSESSMENT & PLAN NOTE
Known NICM  Pt denies med noncompliance or dietary indiscretion  Cont IV lasix x24 hrx, switch to PO tomorrow  Cont BBl/ACEi/aldact, titrate toprol XL to 100mg qd  I asked social work to inquire as to insurance coverage for entresto

## 2018-12-29 NOTE — SUBJECTIVE & OBJECTIVE
Past Medical History:   Diagnosis Date    Atrial fibrillation     Blood clot associated with vein wall inflammation     not dvt    Cardiomyopathy     Normal cors on cath 11/2017    CHF (congestive heart failure)     DM (diabetes mellitus) 9/19/2013    Hyperlipidemia     Hypertension     Psoriasis     Sleep apnea        Past Surgical History:   Procedure Laterality Date    CARDIAC CATHETERIZATION      COLONOSCOPY      DILATION AND CURETTAGE OF UTERUS      INSERTION-ICD-DUAL Left 4/17/2018    Performed by Eben Christine MD at [facility] CATH LAB       Review of patient's allergies indicates:   Allergen Reactions    Pneumococcal 23-abimbola ps vaccine        No current facility-administered medications on file prior to encounter.      Current Outpatient Medications on File Prior to Encounter   Medication Sig    aspirin 81 MG Chew Take 81 mg by mouth once daily.    atorvastatin (LIPITOR) 80 MG tablet Take 1 tablet (80 mg total) by mouth nightly.    blood glucose control, high Soln 1 each by Misc.(Non-Drug; Combo Route) route once. for 1 dose    blood glucose control, low Soln 1 each by Misc.(Non-Drug; Combo Route) route once. for 1 dose    BLOOD PRESSURE CUFF Misc 1 kit by Misc.(Non-Drug; Combo Route) route 2 (two) times daily.    blood sugar diagnostic (TRUE METRIX GLUCOSE TEST STRIP) Strp 1 strip by Misc.(Non-Drug; Combo Route) route 3 (three) times daily.    blood-glucose meter (TRUE METRIX AIR GLUCOSE METER) Misc 1 each by Misc.(Non-Drug; Combo Route) route 3 (three) times daily.    cyclobenzaprine (FLEXERIL) 10 MG tablet Take 1 tablet (10 mg total) by mouth 3 (three) times daily as needed for Muscle spasms.    diclofenac sodium (VOLTAREN) 1 % Gel Apply 2 g topically 4 (four) times daily.    esomeprazole (NEXIUM) 40 MG capsule Take 40 mg by mouth before breakfast.    fluticasone (FLONASE) 50 mcg/actuation nasal spray 1 spray (50 mcg total) by Each Nare route once daily.    furosemide (LASIX) 20 MG  "tablet Take 1 tablet (20 mg total) by mouth once daily.    gabapentin (NEURONTIN) 400 MG capsule Take 1 capsule (400 mg total) by mouth Daily.    glimepiride (AMARYL) 4 MG tablet Take 1 tablet (4 mg total) by mouth daily with breakfast.    hydrALAZINE (APRESOLINE) 100 MG tablet Take 1 tablet (100 mg total) by mouth 2 (two) times daily.    ibuprofen (ADVIL,MOTRIN) 600 MG tablet Take 1 tablet (600 mg total) by mouth 3 (three) times daily.    insulin detemir U-100 (LEVEMIR FLEXTOUCH) 100 unit/mL (3 mL) SubQ InPn pen Inject 10 Units into the skin every evening.    lancets Misc 1 each by Misc.(Non-Drug; Combo Route) route 3 (three) times daily.    lisinopril (PRINIVIL,ZESTRIL) 40 MG tablet Take 1 tablet (40 mg total) by mouth once daily.    metformin (GLUCOPHAGE) 500 MG tablet Take 1 tablet (500 mg total) by mouth 2 (two) times daily with meals.    metoprolol succinate (TOPROL-XL) 25 MG 24 hr tablet Take 3 tablets (75 mg total) by mouth once daily.    pen needle, diabetic 31 gauge x 1/4" Ndle 1 each by Misc.(Non-Drug; Combo Route) route 2 (two) times daily.    spironolactone (ALDACTONE) 25 MG tablet Take 1 tablet (25 mg total) by mouth once daily.    albuterol 90 mcg/actuation inhaler Inhale 1-2 puffs into the lungs every 6 (six) hours as needed for Wheezing. Rescue     Family History     Problem Relation (Age of Onset)    Diabetes Father, Maternal Grandmother, Paternal Grandmother    Hypertension Mother, Father        Tobacco Use    Smoking status: Never Smoker    Smokeless tobacco: Never Used    Tobacco comment: smokes cigars on occasion   Substance and Sexual Activity    Alcohol use: Yes     Comment: occasional    Drug use: No     Comment: hx of marijuana use 3 years ago    Sexual activity: Yes     Partners: Male     Review of Systems   Gastrointestinal: Negative for melena.   Genitourinary: Negative for hematuria.     Objective:     Vital Signs (Most Recent):  Temp: 97.5 °F (36.4 °C) (12/29/18 " 0748)  Pulse: 79 (12/29/18 0748)  Resp: 18 (12/29/18 0748)  BP: 118/69 (12/29/18 0748)  SpO2: 97 % (12/29/18 0748) Vital Signs (24h Range):  Temp:  [97.5 °F (36.4 °C)-97.8 °F (36.6 °C)] 97.5 °F (36.4 °C)  Pulse:  [] 79  Resp:  [17-20] 18  SpO2:  [94 %-100 %] 97 %  BP: (100-126)/(64-84) 118/69     Weight: 111 kg (244 lb 11.4 oz)  Body mass index is 38.33 kg/m².    SpO2: 97 %  O2 Device (Oxygen Therapy): nasal cannula      Intake/Output Summary (Last 24 hours) at 12/29/2018 0907  Last data filed at 12/29/2018 0600  Gross per 24 hour   Intake 1236 ml   Output 2700 ml   Net -1464 ml       Lines/Drains/Airways     Peripheral Intravenous Line                 Peripheral IV - Single Lumen 12/27/18 2132 Right Forearm 1 day                Physical Exam   Constitutional: She is oriented to person, place, and time. She appears well-developed and well-nourished. No distress.   HENT:   Head: Normocephalic and atraumatic.   Mouth/Throat: No oropharyngeal exudate.   Eyes: Conjunctivae and EOM are normal. Pupils are equal, round, and reactive to light. No scleral icterus.   Neck: Normal range of motion. Neck supple. No JVD present. No tracheal deviation present. No thyromegaly present.   Cardiovascular: Normal rate, regular rhythm, S1 normal and S2 normal. Exam reveals distant heart sounds. Exam reveals no gallop and no friction rub.   No murmur heard.  Pulmonary/Chest: Effort normal. No respiratory distress. She has no wheezes. She has no rales. She exhibits no tenderness.   Dec air entry bilat   Abdominal: Soft. She exhibits no distension.   obese   Musculoskeletal: Normal range of motion. She exhibits no edema.   Neurological: She is alert and oriented to person, place, and time. No cranial nerve deficit.   Skin: Skin is warm and dry. She is not diaphoretic.   Psychiatric: She has a normal mood and affect. Her behavior is normal. Judgment normal.       Current Medications:   aspirin  81 mg Oral Daily    atorvastatin  80  mg Oral Nightly    enoxaparin  40 mg Subcutaneous Daily    fluticasone  1 spray Each Nare Daily    furosemide  40 mg Intravenous BID    gabapentin  400 mg Oral Daily    hydrALAZINE  100 mg Oral BID    insulin detemir U-100  10 Units Subcutaneous QHS    lisinopril  40 mg Oral Daily    metoprolol succinate  75 mg Oral Daily    pantoprazole  40 mg Oral Daily    spironolactone  25 mg Oral Daily       acetaminophen, albuterol-ipratropium, cyclobenzaprine, dextrose 50%, dextrose 50%, glucagon (human recombinant), glucose, glucose, insulin aspart U-100, ondansetron, oxyCODONE-acetaminophen    Laboratory:  CBC:  Recent Labs   Lab 03/03/18  0030 04/11/18  1133 08/18/18  1313 12/25/18 2150 12/27/18 2139 12/27/18  2148   WHITE BLOOD CELL COUNT 6.66 5.07 5.11 5.57 4.71  --    HEMOGLOBIN 13.3 13.0 12.9 13.2 13.4  --    POC HEMATOCRIT  --   --   --   --   --  43   HEMATOCRIT 41.7 43.6 40.0 41.6 42.0  --    PLATELETS 367 H 337 290 264 248  --        CHEMISTRIES:  Recent Labs   Lab 02/18/17  1943  11/28/17  0508  02/27/18 2120  04/11/18  1133 04/17/18  1323 08/18/18  1313 12/25/18 2150 12/27/18 2139 12/29/18  0510   GLUCOSE 637 HH   < > 319 H   < > 134 H   < > 149 H  --  130 H 224 H 226 H 179 H   SODIUM 133 L   < > 138   < > 139   < > 142  --  140 140 137 138   POTASSIUM 4.3   < > 3.5   < > 4.8   < > 3.4 L 3.6 4.0 4.2 4.4 3.8   BUN BLD 14   < > 11   < > 10   < > 10  --  10 13 14 15   CREATININE 1.5 H   < > 1.1   < > 1.0   < > 1.0  --  0.9 1.2 1.0 1.0   EGFR IF  50 A   < > >60   < > >60   < > >60.0  --  >60 >60 >60 >60   EGFR IF NON- 43 A   < > >60   < > >60   < > >60.0  --  >60 56 A >60 >60   CALCIUM 9.7   < > 9.0   < > 10.6 H   < > 9.3  --  9.6 9.3 9.2 9.3   MAGNESIUM 2.2  --  1.7  --  1.9  --   --   --   --   --  1.9  --     < > = values in this interval not displayed.       CARDIAC BIOMARKERS:  Recent Labs   Lab 02/18/17  1943  02/23/17 2012 02/27/18 2120 03/03/18  0030  03/03/18  0225 08/18/18 1313 12/27/18 2139   CPK 74  --  81  --  65  --   --   --   --    CPK MB 2.1  --   --   --   --   --   --   --   --    TROPONIN I <0.006   < > <0.006   < > 0.017 0.030 H 0.031 H 0.018 0.014    < > = values in this interval not displayed.       COAGS:  Recent Labs   Lab 06/08/17 2111 01/11/18  1218 04/11/18  1133 12/25/18  2150 12/27/18  2139   INR 0.9 1.0 0.9 1.0 1.0       LIPIDS/LFTS:  Recent Labs   Lab 06/09/17  0524  11/21/17  1445 11/28/17  0508  03/03/18  0030 03/12/18  0900 08/18/18 1313 12/25/18 2150 12/27/18 2139   CHOLESTEROL 185  --  227 H  --  175  --   --  175  --   --   --    TRIGLYCERIDES 83  --  122  --  76  --   --  76  --   --   --    HDL 38 L  --  62  --  46  --   --  47  --   --   --    LDL CHOLESTEROL 130.4  --  140.6  --  113.8  --   --  112.8  --   --   --    NON-HDL CHOLESTEROL 147  --  165  --  129  --   --  128  --   --   --    AST 9 L   < > 14   < >  --    < > 13 8 L 9 L 15 11   ALT 9 L   < > 11   < >  --    < > 10 11 7 L 16 12    < > = values in this interval not displayed.       BNP:  Recent Labs   Lab 02/20/18  0155 02/27/18 2120 03/03/18 0030 08/18/18 1313 12/27/18 2139    H 82 536 H 246 H 802 H       TSH:  Recent Labs   Lab 02/18/17 1943 11/21/17  1445 11/27/17  0509   TSH 0.882 1.189 1.938       Free T4:  Recent Labs   Lab 11/21/17 1445 11/27/17  0509   FREE T4 0.95 0.98       Diagnostic Results:  ECG (personally reviewed tracings):  12/27/18 , PRWP, NSSTTW changes, similar to 7/23/18    Chest X-Ray (personally reviewed image(s)): 12/27/18 CHF, L ICD    Echo: 12/28/18 (images pers rev, EF unchanged vs 3/2018)  · Severely decreased left ventricular systolic function. The estimated ejection fraction is 20%  · Severe global hypokinetic wall motion.  · Severe left ventricular enlargement.  · Eccentric left ventricular hypertrophy.  · Grade II (moderate) left ventricular diastolic dysfunction consistent with pseudonormalization.  · Moderate  right ventricular enlargement.  · Moderately reduced right ventricular systolic function.     Cath 11/27/17  RA 5  RV 61/6  PA 60/29/42  PAWP 18  /41  Ao 163/107/130  CO 5.6 L/min  LVEF: 20% by echo with severe LV dilation  Wall Motion: Severe global HK  Dominance: Co-dom  LM: normal  LAD: normal  LCx: normal  RCA: normal  Hemostasis:  RFA/V manual compression  Impression:  Normal cors  Elevated LVEDP with transmission of pressures to pulmonary circuit  Above c/w Severe NICM  RFA/V manual compression  Plan:  Cont med rx  Stop Plavix  Stop amlodipine  Start Hydrala/Imdur/Lasix  Goal net neg 1L/day  Cont BBl/ACEi  Repeat echo in 3 months for reassessment of LV fxn and need for ICD  Follow up with Dr. Yanez 1 week after discharge     Stress Test: L MPI 9/20/13  Nuclear Quantitative Functional Analysis:   LVEF: 31 %  Impression: NORMAL MYOCARDIAL PERFUSION  1. The perfusion scan is free of evidence for myocardial ischemia or injury.   2. There is a moderate intensity fixed defect in the inferior wall of the left ventricle, secondary to diaphragm attenuation.   3. There is abnormal wall motion at rest showing moderate global hypokinesis of the left ventricle.   4. There is resting LV dysfunction with a reduced ejection fraction of 31 %.   5. The ventricular volumes are normal at rest and stress.   6. The extracardiac distribution of radioactivity is normal.

## 2018-12-29 NOTE — PROGRESS NOTES
[facility] Hospital Medicine  Progress Note    Patient Name: Fabiana Chanel  MRN: [id number]  Patient Class: IP- Inpatient   Admission Date: 12/27/2018  Length of Stay: 2 days  Attending Physician: Marty Lira MD  Primary Care Provider: Miguel Burden MD        Subjective:     Principal Problem:Acute on chronic combined systolic (congestive) and diastolic (congestive) heart failure    HPI:  44 y/o female with non ischemic cardiomyopathy EF of 20% presents to the ER complaining of acute onset, SOB with myalgias and weakness.  Symptoms started yesterday morning.  Patient reports similar symptoms whenever she gets fluid in her lungs.  Dyspnea is mostly on exertion.  Denies any lower extremity edema.  Also denies any chest pain.  Patient states compliance with medications.  Denies non compliance with diet over Christmas holidays.  She reports she has a defibrillator in place since April and denies any shocking sensation.  She reports she uses a CPAP machine to sleep at night.  No alleviating or aggravating factors.  She presented to ER where she was noted to have elevated BNP and fluid on CXR.  No other complaints.        Hospital Course:  44 y/o female admitted with acute on chronic combined CHF.  Started on IV diuresis.  Cardiology consulted.    Interval History: Doing better.    Review of Systems   HENT: Negative for ear discharge and ear pain.    Eyes: Negative for itching.   Endocrine: Negative for polyphagia and polyuria.   Neurological: Negative for seizures and syncope.     Objective:     Vital Signs (Most Recent):  Temp: 97.5 °F (36.4 °C) (12/29/18 0748)  Pulse: 79 (12/29/18 0748)  Resp: 18 (12/29/18 0748)  BP: 118/69 (12/29/18 0748)  SpO2: 97 % (12/29/18 0748) Vital Signs (24h Range):  Temp:  [97.5 °F (36.4 °C)-97.8 °F (36.6 °C)] 97.5 °F (36.4 °C)  Pulse:  [] 79  Resp:  [17-20] 18  SpO2:  [94 %-100 %] 97 %  BP: (100-126)/(64-84) 118/69     Weight: 111 kg (244 lb 11.4 oz)  Body  mass index is 38.33 kg/m².    Intake/Output Summary (Last 24 hours) at 12/29/2018 1101  Last data filed at 12/29/2018 0600  Gross per 24 hour   Intake 876 ml   Output 2250 ml   Net -1374 ml      Physical Exam   Constitutional: She is oriented to person, place, and time. She appears well-developed. No distress.   HENT:   Head: Normocephalic and atraumatic.   Eyes: Conjunctivae are normal. Right eye exhibits no discharge. Left eye exhibits no discharge.   Neck: Neck supple. No tracheal deviation present.   Cardiovascular: Normal rate, regular rhythm and normal heart sounds.   Pulmonary/Chest: Effort normal. No respiratory distress. She has no wheezes.   Decreased BS bases   Abdominal: Soft. Bowel sounds are normal.   Musculoskeletal: Normal range of motion. She exhibits no deformity.   Neurological: She is alert and oriented to person, place, and time.   Skin: Skin is warm and dry.       Significant Labs:   BMP:   Recent Labs   Lab 12/27/18 2139 12/29/18  0510   * 179*    138   K 4.4 3.8    94*   CO2 25 32*   BUN 14 15   CREATININE 1.0 1.0   CALCIUM 9.2 9.3   MG 1.9  --      CBC:   Recent Labs   Lab 12/27/18 2139 12/27/18 2148   WBC 4.71  --    HGB 13.4  --    HCT 42.0 43     --          Assessment/Plan:      * Acute on chronic combined systolic (congestive) and diastolic (congestive) heart failure    Patient presents with dyspnea on exertion.  Elevated BNP with evidence of fluid overload on CXR.  EF of 15-20% with diastolic dysfunction in 3/2018.  Non ischemic cardiomyopathy.  Started IV diuresis.  Continue ACEI, B blocker and Aldactone.  Appreciate Cards input.  Repeat Echo shows EF of 20%       NICM (nonischemic cardiomyopathy)           MILE (obstructive sleep apnea)    Nightly CPAP       Mixed hyperlipidemia    Continue Statin       Obesity with body mass index (BMI) of 30.0 to 39.9           Essential hypertension    Continue home meds and monitor.     Type 2 diabetes mellitus without  complication, without long-term current use of insulin    Continue nightly Levemir.  Diabetic diet and insulin sliding scale.         VTE Risk Mitigation (From admission, onward)        Ordered     enoxaparin injection 40 mg  Daily      12/28/18 0835     IP VTE HIGH RISK PATIENT  Once      12/28/18 0041     Place sequential compression device  Until discontinued      12/28/18 0041              Marty Lira MD  Department of Hospital Medicine   [facility]

## 2018-12-30 VITALS
BODY MASS INDEX: 34.64 KG/M2 | WEIGHT: 220.69 LBS | SYSTOLIC BLOOD PRESSURE: 91 MMHG | RESPIRATION RATE: 17 BRPM | TEMPERATURE: 98 F | DIASTOLIC BLOOD PRESSURE: 50 MMHG | HEART RATE: 80 BPM | OXYGEN SATURATION: 97 % | HEIGHT: 67 IN

## 2018-12-30 LAB
ANION GAP SERPL CALC-SCNC: 12 MMOL/L
BUN SERPL-MCNC: 17 MG/DL
CALCIUM SERPL-MCNC: 9.1 MG/DL
CHLORIDE SERPL-SCNC: 95 MMOL/L
CO2 SERPL-SCNC: 31 MMOL/L
CREAT SERPL-MCNC: 1.1 MG/DL
EST. GFR  (AFRICAN AMERICAN): >60 ML/MIN/1.73 M^2
EST. GFR  (NON AFRICAN AMERICAN): >60 ML/MIN/1.73 M^2
GLUCOSE SERPL-MCNC: 154 MG/DL
POCT GLUCOSE: 225 MG/DL (ref 70–110)
POCT GLUCOSE: 246 MG/DL (ref 70–110)
POTASSIUM SERPL-SCNC: 3.5 MMOL/L
SODIUM SERPL-SCNC: 138 MMOL/L

## 2018-12-30 PROCEDURE — 63600175 PHARM REV CODE 636 W HCPCS: Performed by: HOSPITALIST

## 2018-12-30 PROCEDURE — 63600175 PHARM REV CODE 636 W HCPCS: Performed by: INTERNAL MEDICINE

## 2018-12-30 PROCEDURE — 99233 SBSQ HOSP IP/OBS HIGH 50: CPT | Mod: ,,, | Performed by: INTERNAL MEDICINE

## 2018-12-30 PROCEDURE — 80048 BASIC METABOLIC PNL TOTAL CA: CPT

## 2018-12-30 PROCEDURE — 36415 COLL VENOUS BLD VENIPUNCTURE: CPT

## 2018-12-30 PROCEDURE — 25000003 PHARM REV CODE 250: Performed by: HOSPITALIST

## 2018-12-30 PROCEDURE — 99233 PR SUBSEQUENT HOSPITAL CARE,LEVL III: ICD-10-PCS | Mod: ,,, | Performed by: INTERNAL MEDICINE

## 2018-12-30 PROCEDURE — 25000003 PHARM REV CODE 250: Performed by: INTERNAL MEDICINE

## 2018-12-30 RX ORDER — FUROSEMIDE 40 MG/1
40 TABLET ORAL 2 TIMES DAILY
Status: DISCONTINUED | OUTPATIENT
Start: 2018-12-30 | End: 2018-12-30 | Stop reason: HOSPADM

## 2018-12-30 RX ORDER — SPIRONOLACTONE 25 MG/1
25 TABLET ORAL ONCE
Status: COMPLETED | OUTPATIENT
Start: 2018-12-30 | End: 2018-12-30

## 2018-12-30 RX ORDER — FUROSEMIDE 40 MG/1
40 TABLET ORAL 2 TIMES DAILY
Qty: 60 TABLET | Refills: 11 | Status: SHIPPED | OUTPATIENT
Start: 2018-12-30 | End: 2020-01-02 | Stop reason: SDUPTHER

## 2018-12-30 RX ORDER — SPIRONOLACTONE 50 MG/1
50 TABLET, FILM COATED ORAL DAILY
Qty: 30 TABLET | Refills: 11 | Status: SHIPPED | OUTPATIENT
Start: 2018-12-31 | End: 2019-02-04 | Stop reason: SDUPTHER

## 2018-12-30 RX ORDER — SPIRONOLACTONE 25 MG/1
50 TABLET ORAL DAILY
Status: DISCONTINUED | OUTPATIENT
Start: 2018-12-31 | End: 2018-12-30 | Stop reason: HOSPADM

## 2018-12-30 RX ADMIN — METOPROLOL SUCCINATE 100 MG: 50 TABLET, EXTENDED RELEASE ORAL at 09:12

## 2018-12-30 RX ADMIN — FUROSEMIDE 40 MG: 10 INJECTION, SOLUTION INTRAVENOUS at 09:12

## 2018-12-30 RX ADMIN — LISINOPRIL 40 MG: 20 TABLET ORAL at 09:12

## 2018-12-30 RX ADMIN — HYDRALAZINE HYDROCHLORIDE 100 MG: 25 TABLET ORAL at 09:12

## 2018-12-30 RX ADMIN — FLUTICASONE PROPIONATE 50 MCG: 50 SPRAY, METERED NASAL at 09:12

## 2018-12-30 RX ADMIN — SPIRONOLACTONE 25 MG: 25 TABLET ORAL at 11:12

## 2018-12-30 RX ADMIN — SPIRONOLACTONE 25 MG: 25 TABLET ORAL at 09:12

## 2018-12-30 RX ADMIN — INSULIN ASPART 2 UNITS: 100 INJECTION, SOLUTION INTRAVENOUS; SUBCUTANEOUS at 12:12

## 2018-12-30 RX ADMIN — PANTOPRAZOLE SODIUM 40 MG: 40 TABLET, DELAYED RELEASE ORAL at 09:12

## 2018-12-30 RX ADMIN — ASPIRIN 81 MG 81 MG: 81 TABLET ORAL at 09:12

## 2018-12-30 RX ADMIN — INSULIN ASPART 2 UNITS: 100 INJECTION, SOLUTION INTRAVENOUS; SUBCUTANEOUS at 09:12

## 2018-12-30 NOTE — PLAN OF CARE
12/30/18 1400   Final Note   Assessment Type Final Discharge Note   Anticipated Discharge Disposition Home   What phone number can be called within the next 1-3 days to see how you are doing after discharge? ([phone] )   Hospital Follow Up  Appt(s) scheduled? Yes   Discharge plans and expectations educations in teach back method with documentation complete? Yes   Right Care Referral Info   Post Acute Recommendation No Care        12/30/18 1400   Final Note   Assessment Type Final Discharge Note   Anticipated Discharge Disposition Home   What phone number can be called within the next 1-3 days to see how you are doing after discharge? ([phone] )   Hospital Follow Up  Appt(s) scheduled? Yes   Discharge plans and expectations educations in teach back method with documentation complete? Yes   Right Care Referral Info   Post Acute Recommendation No Care

## 2018-12-30 NOTE — PLAN OF CARE
Problem: Diabetes Comorbidity  Goal: Blood Glucose Level Within Desired Range  Outcome: Ongoing (interventions implemented as appropriate)  Intervention: Maintain Glycemic Control   12/30/18 0045   Monitor and Manage Ketoacidosis   Glycemic Management blood glucose monitoring

## 2018-12-30 NOTE — PROGRESS NOTES
[facility]  Cardiology  Progress Note    Patient Name: Fabiana Chanel  MRN: [id number]  Admission Date: 12/27/2018  Hospital Length of Stay: 3 days  Code Status: Full Code   Attending Physician: Marty Lira MD   Primary Care Physician: Miguel Burden MD  Expected Discharge Date:   Principal Problem:Acute on chronic combined systolic (congestive) and diastolic (congestive) heart failure    Subjective:     Hospital Course:   12/27/18: adm for ADHF  12/28/18: neg 1.7L  12/29/18: neg 2.2L    Interval Hx: feeling better.  No cp, sob resolved.  Ambulating without difficulty.  Case d/w Dr. Lira.    Tele: SR, Sustained VT (pers rev)      Past Medical History:   Diagnosis Date    Atrial fibrillation     Blood clot associated with vein wall inflammation     not dvt    Cardiomyopathy     Normal cors on cath 11/2017    CHF (congestive heart failure)     DM (diabetes mellitus) 9/19/2013    Hyperlipidemia     Hypertension     Psoriasis     Sleep apnea        Past Surgical History:   Procedure Laterality Date    CARDIAC CATHETERIZATION      COLONOSCOPY      DILATION AND CURETTAGE OF UTERUS      INSERTION-ICD-DUAL Left 4/17/2018    Performed by Eben Christine MD at [facility] CATH LAB       Review of patient's allergies indicates:   Allergen Reactions    Pneumococcal 23-abimbola ps vaccine        No current facility-administered medications on file prior to encounter.      Current Outpatient Medications on File Prior to Encounter   Medication Sig    aspirin 81 MG Chew Take 81 mg by mouth once daily.    atorvastatin (LIPITOR) 80 MG tablet Take 1 tablet (80 mg total) by mouth nightly.    blood glucose control, high Soln 1 each by Misc.(Non-Drug; Combo Route) route once. for 1 dose    blood glucose control, low Soln 1 each by Misc.(Non-Drug; Combo Route) route once. for 1 dose    BLOOD PRESSURE CUFF Misc 1 kit by Misc.(Non-Drug; Combo Route) route 2 (two) times daily.    blood sugar diagnostic  "(TRUE METRIX GLUCOSE TEST STRIP) Strp 1 strip by Misc.(Non-Drug; Combo Route) route 3 (three) times daily.    blood-glucose meter (TRUE METRIX AIR GLUCOSE METER) Misc 1 each by Misc.(Non-Drug; Combo Route) route 3 (three) times daily.    cyclobenzaprine (FLEXERIL) 10 MG tablet Take 1 tablet (10 mg total) by mouth 3 (three) times daily as needed for Muscle spasms.    diclofenac sodium (VOLTAREN) 1 % Gel Apply 2 g topically 4 (four) times daily.    esomeprazole (NEXIUM) 40 MG capsule Take 40 mg by mouth before breakfast.    fluticasone (FLONASE) 50 mcg/actuation nasal spray 1 spray (50 mcg total) by Each Nare route once daily.    furosemide (LASIX) 20 MG tablet Take 1 tablet (20 mg total) by mouth once daily.    gabapentin (NEURONTIN) 400 MG capsule Take 1 capsule (400 mg total) by mouth Daily.    glimepiride (AMARYL) 4 MG tablet Take 1 tablet (4 mg total) by mouth daily with breakfast.    hydrALAZINE (APRESOLINE) 100 MG tablet Take 1 tablet (100 mg total) by mouth 2 (two) times daily.    ibuprofen (ADVIL,MOTRIN) 600 MG tablet Take 1 tablet (600 mg total) by mouth 3 (three) times daily.    insulin detemir U-100 (LEVEMIR FLEXTOUCH) 100 unit/mL (3 mL) SubQ InPn pen Inject 10 Units into the skin every evening.    lancets Misc 1 each by Misc.(Non-Drug; Combo Route) route 3 (three) times daily.    lisinopril (PRINIVIL,ZESTRIL) 40 MG tablet Take 1 tablet (40 mg total) by mouth once daily.    metformin (GLUCOPHAGE) 500 MG tablet Take 1 tablet (500 mg total) by mouth 2 (two) times daily with meals.    metoprolol succinate (TOPROL-XL) 25 MG 24 hr tablet Take 3 tablets (75 mg total) by mouth once daily.    pen needle, diabetic 31 gauge x 1/4" Ndle 1 each by Misc.(Non-Drug; Combo Route) route 2 (two) times daily.    spironolactone (ALDACTONE) 25 MG tablet Take 1 tablet (25 mg total) by mouth once daily.    albuterol 90 mcg/actuation inhaler Inhale 1-2 puffs into the lungs every 6 (six) hours as needed for " Wheezing. Rescue     Family History     Problem Relation (Age of Onset)    Diabetes Father, Maternal Grandmother, Paternal Grandmother    Hypertension Mother, Father        Tobacco Use    Smoking status: Never Smoker    Smokeless tobacco: Never Used    Tobacco comment: smokes cigars on occasion   Substance and Sexual Activity    Alcohol use: Yes     Comment: occasional    Drug use: No     Comment: hx of marijuana use 3 years ago    Sexual activity: Yes     Partners: Male     Review of Systems   Gastrointestinal: Negative for melena.   Genitourinary: Negative for hematuria.     Objective:     Vital Signs (Most Recent):  Temp: 97 °F (36.1 °C) (12/30/18 0828)  Pulse: 87 (12/30/18 0828)  Resp: 18 (12/30/18 0828)  BP: (!) 114/59 (12/30/18 0828)  SpO2: 95 % (12/30/18 0828) Vital Signs (24h Range):  Temp:  [96.5 °F (35.8 °C)-98.7 °F (37.1 °C)] 97 °F (36.1 °C)  Pulse:  [75-91] 87  Resp:  [17-19] 18  SpO2:  [94 %-98 %] 95 %  BP: ()/(54-72) 114/59     Weight: 100.1 kg (220 lb 10.9 oz)  Body mass index is 34.56 kg/m².    SpO2: 95 %  O2 Device (Oxygen Therapy): nasal cannula      Intake/Output Summary (Last 24 hours) at 12/30/2018 0934  Last data filed at 12/30/2018 0500  Gross per 24 hour   Intake --   Output 2200 ml   Net -2200 ml       Lines/Drains/Airways     Peripheral Intravenous Line                 Peripheral IV - Single Lumen 12/27/18 2132 Right Forearm 2 days                Physical Exam   Constitutional: She is oriented to person, place, and time. She appears well-developed and well-nourished. No distress.   HENT:   Head: Normocephalic and atraumatic.   Mouth/Throat: No oropharyngeal exudate.   Eyes: Conjunctivae and EOM are normal. Pupils are equal, round, and reactive to light. No scleral icterus.   Neck: Normal range of motion. Neck supple. No JVD present. No tracheal deviation present. No thyromegaly present.   Cardiovascular: Normal rate, regular rhythm, S1 normal and S2 normal. Exam reveals distant  heart sounds. Exam reveals no gallop and no friction rub.   No murmur heard.  Pulmonary/Chest: Effort normal. No respiratory distress. She has no wheezes. She has no rales. She exhibits no tenderness.   Abdominal: Soft. She exhibits no distension.   obese   Musculoskeletal: Normal range of motion. She exhibits no edema.   Neurological: She is alert and oriented to person, place, and time. No cranial nerve deficit.   Skin: Skin is warm and dry. She is not diaphoretic.   Psychiatric: She has a normal mood and affect. Her behavior is normal. Judgment normal.       Current Medications:   aspirin  81 mg Oral Daily    atorvastatin  80 mg Oral Nightly    enoxaparin  40 mg Subcutaneous Daily    fluticasone  1 spray Each Nare Daily    furosemide  40 mg Intravenous BID    gabapentin  400 mg Oral Daily    hydrALAZINE  100 mg Oral BID    insulin detemir U-100  10 Units Subcutaneous QHS    lisinopril  40 mg Oral Daily    metoprolol succinate  100 mg Oral Daily    pantoprazole  40 mg Oral Daily    spironolactone  25 mg Oral Daily       acetaminophen, albuterol-ipratropium, cyclobenzaprine, dextrose 50%, dextrose 50%, glucagon (human recombinant), glucose, glucose, insulin aspart U-100, ondansetron, oxyCODONE-acetaminophen    Laboratory:  CBC:  Recent Labs   Lab 03/03/18  0030 04/11/18  1133 08/18/18  1313 12/25/18  2150 12/27/18 2139 12/27/18  2148   WHITE BLOOD CELL COUNT 6.66 5.07 5.11 5.57 4.71  --    HEMOGLOBIN 13.3 13.0 12.9 13.2 13.4  --    POC HEMATOCRIT  --   --   --   --   --  43   HEMATOCRIT 41.7 43.6 40.0 41.6 42.0  --    PLATELETS 367 H 337 290 264 248  --        CHEMISTRIES:  Recent Labs   Lab 02/18/17  1943  11/28/17  0508  02/27/18  2120  08/18/18  1313 12/25/18  2150 12/27/18  2139 12/29/18  0510 12/30/18  0602   GLUCOSE 637 HH   < > 319 H   < > 134 H   < > 130 H 224 H 226 H 179 H 154 H   SODIUM 133 L   < > 138   < > 139   < > 140 140 137 138 138   POTASSIUM 4.3   < > 3.5   < > 4.8   < > 4.0 4.2 4.4  3.8 3.5   BUN BLD 14   < > 11   < > 10   < > 10 13 14 15 17   CREATININE 1.5 H   < > 1.1   < > 1.0   < > 0.9 1.2 1.0 1.0 1.1   EGFR IF  50 A   < > >60   < > >60   < > >60 >60 >60 >60 >60   EGFR IF NON- 43 A   < > >60   < > >60   < > >60 56 A >60 >60 >60   CALCIUM 9.7   < > 9.0   < > 10.6 H   < > 9.6 9.3 9.2 9.3 9.1   MAGNESIUM 2.2  --  1.7  --  1.9  --   --   --  1.9  --   --     < > = values in this interval not displayed.       CARDIAC BIOMARKERS:  Recent Labs   Lab 02/18/17  1943  02/23/17 2012 02/27/18 2120 03/03/18  0030 03/03/18  0225 08/18/18  1313 12/27/18 2139   CPK 74  --  81  --  65  --   --   --   --    CPK MB 2.1  --   --   --   --   --   --   --   --    TROPONIN I <0.006   < > <0.006   < > 0.017 0.030 H 0.031 H 0.018 0.014    < > = values in this interval not displayed.       COAGS:  Recent Labs   Lab 06/08/17  2111 01/11/18  1218 04/11/18  1133 12/25/18 2150 12/27/18  2139   INR 0.9 1.0 0.9 1.0 1.0       LIPIDS/LFTS:  Recent Labs   Lab 06/09/17  0524  11/21/17  1445  11/28/17  0508  03/03/18  0030 03/12/18  0900 08/18/18  1313 12/25/18  2150 12/27/18  2139   CHOLESTEROL 185  --  227 H  --  175  --   --  175  --   --   --    TRIGLYCERIDES 83  --  122  --  76  --   --  76  --   --   --    HDL 38 L  --  62  --  46  --   --  47  --   --   --    LDL CHOLESTEROL 130.4  --  140.6  --  113.8  --   --  112.8  --   --   --    NON-HDL CHOLESTEROL 147  --  165  --  129  --   --  128  --   --   --    AST 9 L   < > 14   < >  --    < > 13 8 L 9 L 15 11   ALT 9 L   < > 11   < >  --    < > 10 11 7 L 16 12    < > = values in this interval not displayed.       BNP:  Recent Labs   Lab 02/20/18  0155 02/27/18  2120 03/03/18  0030 08/18/18  1313 12/27/18  2139    H 82 536 H 246 H 802 H       TSH:  Recent Labs   Lab 02/18/17  1943 11/21/17  1445 11/27/17  0509   TSH 0.882 1.189 1.938       Free T4:  Recent Labs   Lab 11/21/17  1445 11/27/17  0509   FREE T4 0.95 0.98        Diagnostic Results:  ECG (personally reviewed tracings):  12/27/18 , PRWP, NSSTTW changes, similar to 7/23/18    Chest X-Ray (personally reviewed image(s)): 12/27/18 CHF, L ICD    Echo: 12/28/18 (images pers rev, EF unchanged vs 3/2018)  · Severely decreased left ventricular systolic function. The estimated ejection fraction is 20%  · Severe global hypokinetic wall motion.  · Severe left ventricular enlargement.  · Eccentric left ventricular hypertrophy.  · Grade II (moderate) left ventricular diastolic dysfunction consistent with pseudonormalization.  · Moderate right ventricular enlargement.  · Moderately reduced right ventricular systolic function.     Cath 11/27/17  RA 5  RV 61/6  PA 60/29/42  PAWP 18  /41  Ao 163/107/130  CO 5.6 L/min  LVEF: 20% by echo with severe LV dilation  Wall Motion: Severe global HK  Dominance: Co-dom  LM: normal  LAD: normal  LCx: normal  RCA: normal  Hemostasis:  RFA/V manual compression  Impression:  Normal cors  Elevated LVEDP with transmission of pressures to pulmonary circuit  Above c/w Severe NICM  RFA/V manual compression  Plan:  Cont med rx  Stop Plavix  Stop amlodipine  Start Hydrala/Imdur/Lasix  Goal net neg 1L/day  Cont BBl/ACEi  Repeat echo in 3 months for reassessment of LV fxn and need for ICD  Follow up with Dr. Yanez 1 week after discharge     Stress Test: L MPI 9/20/13  Nuclear Quantitative Functional Analysis:   LVEF: 31 %  Impression: NORMAL MYOCARDIAL PERFUSION  1. The perfusion scan is free of evidence for myocardial ischemia or injury.   2. There is a moderate intensity fixed defect in the inferior wall of the left ventricle, secondary to diaphragm attenuation.   3. There is abnormal wall motion at rest showing moderate global hypokinesis of the left ventricle.   4. There is resting LV dysfunction with a reduced ejection fraction of 31 %.   5. The ventricular volumes are normal at rest and stress.   6. The extracardiac distribution of  radioactivity is normal.         Assessment and Plan:     * Acute on chronic combined systolic (congestive) and diastolic (congestive) heart failure    Known NICM  Pt denies med noncompliance or dietary indiscretion  Change lasix to 40mg po bid for home use  Cont BBl/ACEi/aldact, titrate aldact to 50mg qd  I asked social work to inquire as to insurance coverage for entresto     NICM (nonischemic cardiomyopathy)    As above     Essential hypertension    Cont med rx  Controlled     Paroxysmal atrial fibrillation    Per EP notes  Currently in SR  Not on OAC d/t hx of vaginal bleeding  Had atrial lead placed for monitoring of possible recurrent PAF, will need to have [employer] interrogate device (as outpatient).     Type 2 diabetes mellitus without complication, without long-term current use of insulin    Per IM     Mixed hyperlipidemia    Cont statin     Implantable cardioverter-defibrillator (ICD) in situ    Sustained VT/NSVT noted  [employer] device in place  No ICD shocks per pt report     MILE (obstructive sleep apnea)    On CPAP         VTE Risk Mitigation (From admission, onward)        Ordered     enoxaparin injection 40 mg  Daily      12/28/18 0835     IP VTE HIGH RISK PATIENT  Once      12/28/18 0041     Place sequential compression device  Until discontinued      12/28/18 0041        Dispo planning appropriate.  Cardiology will sign off, pls call with questions.  Pt to follow up with me in 1 week.    Oziel Yanez MD  Cardiology  [facility]

## 2018-12-30 NOTE — NURSING
Pt discharged home.  Pt teaching and dc instructions given.  Pt verbalized understanding.  IV removed tip intact.  Pt tolerated well.  Tele removed.  Pt awaiting her  and transport.

## 2018-12-30 NOTE — PLAN OF CARE
12/30/18 1403   Post-Acute Status   Post-Acute Authorization (Home with follow-up appointments)

## 2018-12-30 NOTE — SUBJECTIVE & OBJECTIVE
Past Medical History:   Diagnosis Date    Atrial fibrillation     Blood clot associated with vein wall inflammation     not dvt    Cardiomyopathy     Normal cors on cath 11/2017    CHF (congestive heart failure)     DM (diabetes mellitus) 9/19/2013    Hyperlipidemia     Hypertension     Psoriasis     Sleep apnea        Past Surgical History:   Procedure Laterality Date    CARDIAC CATHETERIZATION      COLONOSCOPY      DILATION AND CURETTAGE OF UTERUS      INSERTION-ICD-DUAL Left 4/17/2018    Performed by Eben Christine MD at [facility] CATH LAB       Review of patient's allergies indicates:   Allergen Reactions    Pneumococcal 23-abimbola ps vaccine        No current facility-administered medications on file prior to encounter.      Current Outpatient Medications on File Prior to Encounter   Medication Sig    aspirin 81 MG Chew Take 81 mg by mouth once daily.    atorvastatin (LIPITOR) 80 MG tablet Take 1 tablet (80 mg total) by mouth nightly.    blood glucose control, high Soln 1 each by Misc.(Non-Drug; Combo Route) route once. for 1 dose    blood glucose control, low Soln 1 each by Misc.(Non-Drug; Combo Route) route once. for 1 dose    BLOOD PRESSURE CUFF Misc 1 kit by Misc.(Non-Drug; Combo Route) route 2 (two) times daily.    blood sugar diagnostic (TRUE METRIX GLUCOSE TEST STRIP) Strp 1 strip by Misc.(Non-Drug; Combo Route) route 3 (three) times daily.    blood-glucose meter (TRUE METRIX AIR GLUCOSE METER) Misc 1 each by Misc.(Non-Drug; Combo Route) route 3 (three) times daily.    cyclobenzaprine (FLEXERIL) 10 MG tablet Take 1 tablet (10 mg total) by mouth 3 (three) times daily as needed for Muscle spasms.    diclofenac sodium (VOLTAREN) 1 % Gel Apply 2 g topically 4 (four) times daily.    esomeprazole (NEXIUM) 40 MG capsule Take 40 mg by mouth before breakfast.    fluticasone (FLONASE) 50 mcg/actuation nasal spray 1 spray (50 mcg total) by Each Nare route once daily.    furosemide (LASIX) 20 MG  "tablet Take 1 tablet (20 mg total) by mouth once daily.    gabapentin (NEURONTIN) 400 MG capsule Take 1 capsule (400 mg total) by mouth Daily.    glimepiride (AMARYL) 4 MG tablet Take 1 tablet (4 mg total) by mouth daily with breakfast.    hydrALAZINE (APRESOLINE) 100 MG tablet Take 1 tablet (100 mg total) by mouth 2 (two) times daily.    ibuprofen (ADVIL,MOTRIN) 600 MG tablet Take 1 tablet (600 mg total) by mouth 3 (three) times daily.    insulin detemir U-100 (LEVEMIR FLEXTOUCH) 100 unit/mL (3 mL) SubQ InPn pen Inject 10 Units into the skin every evening.    lancets Misc 1 each by Misc.(Non-Drug; Combo Route) route 3 (three) times daily.    lisinopril (PRINIVIL,ZESTRIL) 40 MG tablet Take 1 tablet (40 mg total) by mouth once daily.    metformin (GLUCOPHAGE) 500 MG tablet Take 1 tablet (500 mg total) by mouth 2 (two) times daily with meals.    metoprolol succinate (TOPROL-XL) 25 MG 24 hr tablet Take 3 tablets (75 mg total) by mouth once daily.    pen needle, diabetic 31 gauge x 1/4" Ndle 1 each by Misc.(Non-Drug; Combo Route) route 2 (two) times daily.    spironolactone (ALDACTONE) 25 MG tablet Take 1 tablet (25 mg total) by mouth once daily.    albuterol 90 mcg/actuation inhaler Inhale 1-2 puffs into the lungs every 6 (six) hours as needed for Wheezing. Rescue     Family History     Problem Relation (Age of Onset)    Diabetes Father, Maternal Grandmother, Paternal Grandmother    Hypertension Mother, Father        Tobacco Use    Smoking status: Never Smoker    Smokeless tobacco: Never Used    Tobacco comment: smokes cigars on occasion   Substance and Sexual Activity    Alcohol use: Yes     Comment: occasional    Drug use: No     Comment: hx of marijuana use 3 years ago    Sexual activity: Yes     Partners: Male     Review of Systems   Gastrointestinal: Negative for melena.   Genitourinary: Negative for hematuria.     Objective:     Vital Signs (Most Recent):  Temp: 97 °F (36.1 °C) (12/30/18 " 0828)  Pulse: 87 (12/30/18 0828)  Resp: 18 (12/30/18 0828)  BP: (!) 114/59 (12/30/18 0828)  SpO2: 95 % (12/30/18 0828) Vital Signs (24h Range):  Temp:  [96.5 °F (35.8 °C)-98.7 °F (37.1 °C)] 97 °F (36.1 °C)  Pulse:  [75-91] 87  Resp:  [17-19] 18  SpO2:  [94 %-98 %] 95 %  BP: ()/(54-72) 114/59     Weight: 100.1 kg (220 lb 10.9 oz)  Body mass index is 34.56 kg/m².    SpO2: 95 %  O2 Device (Oxygen Therapy): nasal cannula      Intake/Output Summary (Last 24 hours) at 12/30/2018 0934  Last data filed at 12/30/2018 0500  Gross per 24 hour   Intake --   Output 2200 ml   Net -2200 ml       Lines/Drains/Airways     Peripheral Intravenous Line                 Peripheral IV - Single Lumen 12/27/18 2132 Right Forearm 2 days                Physical Exam   Constitutional: She is oriented to person, place, and time. She appears well-developed and well-nourished. No distress.   HENT:   Head: Normocephalic and atraumatic.   Mouth/Throat: No oropharyngeal exudate.   Eyes: Conjunctivae and EOM are normal. Pupils are equal, round, and reactive to light. No scleral icterus.   Neck: Normal range of motion. Neck supple. No JVD present. No tracheal deviation present. No thyromegaly present.   Cardiovascular: Normal rate, regular rhythm, S1 normal and S2 normal. Exam reveals distant heart sounds. Exam reveals no gallop and no friction rub.   No murmur heard.  Pulmonary/Chest: Effort normal. No respiratory distress. She has no wheezes. She has no rales. She exhibits no tenderness.   Abdominal: Soft. She exhibits no distension.   obese   Musculoskeletal: Normal range of motion. She exhibits no edema.   Neurological: She is alert and oriented to person, place, and time. No cranial nerve deficit.   Skin: Skin is warm and dry. She is not diaphoretic.   Psychiatric: She has a normal mood and affect. Her behavior is normal. Judgment normal.       Current Medications:   aspirin  81 mg Oral Daily    atorvastatin  80 mg Oral Nightly     enoxaparin  40 mg Subcutaneous Daily    fluticasone  1 spray Each Nare Daily    furosemide  40 mg Intravenous BID    gabapentin  400 mg Oral Daily    hydrALAZINE  100 mg Oral BID    insulin detemir U-100  10 Units Subcutaneous QHS    lisinopril  40 mg Oral Daily    metoprolol succinate  100 mg Oral Daily    pantoprazole  40 mg Oral Daily    spironolactone  25 mg Oral Daily       acetaminophen, albuterol-ipratropium, cyclobenzaprine, dextrose 50%, dextrose 50%, glucagon (human recombinant), glucose, glucose, insulin aspart U-100, ondansetron, oxyCODONE-acetaminophen    Laboratory:  CBC:  Recent Labs   Lab 03/03/18  0030 04/11/18  1133 08/18/18  1313 12/25/18 2150 12/27/18 2139 12/27/18  2148   WHITE BLOOD CELL COUNT 6.66 5.07 5.11 5.57 4.71  --    HEMOGLOBIN 13.3 13.0 12.9 13.2 13.4  --    POC HEMATOCRIT  --   --   --   --   --  43   HEMATOCRIT 41.7 43.6 40.0 41.6 42.0  --    PLATELETS 367 H 337 290 264 248  --        CHEMISTRIES:  Recent Labs   Lab 02/18/17  1943  11/28/17  0508  02/27/18  2120  08/18/18  1313 12/25/18  2150 12/27/18 2139 12/29/18  0510 12/30/18  0602   GLUCOSE 637 HH   < > 319 H   < > 134 H   < > 130 H 224 H 226 H 179 H 154 H   SODIUM 133 L   < > 138   < > 139   < > 140 140 137 138 138   POTASSIUM 4.3   < > 3.5   < > 4.8   < > 4.0 4.2 4.4 3.8 3.5   BUN BLD 14   < > 11   < > 10   < > 10 13 14 15 17   CREATININE 1.5 H   < > 1.1   < > 1.0   < > 0.9 1.2 1.0 1.0 1.1   EGFR IF  50 A   < > >60   < > >60   < > >60 >60 >60 >60 >60   EGFR IF NON- 43 A   < > >60   < > >60   < > >60 56 A >60 >60 >60   CALCIUM 9.7   < > 9.0   < > 10.6 H   < > 9.6 9.3 9.2 9.3 9.1   MAGNESIUM 2.2  --  1.7  --  1.9  --   --   --  1.9  --   --     < > = values in this interval not displayed.       CARDIAC BIOMARKERS:  Recent Labs   Lab 02/18/17  1943  02/23/17 2012 02/27/18 2120 03/03/18  0030 03/03/18  0225 08/18/18  1313 12/27/18  2139   CPK 74  --  81  --  65  --   --   --   --     CPK MB 2.1  --   --   --   --   --   --   --   --    TROPONIN I <0.006   < > <0.006   < > 0.017 0.030 H 0.031 H 0.018 0.014    < > = values in this interval not displayed.       COAGS:  Recent Labs   Lab 06/08/17  2111 01/11/18  1218 04/11/18  1133 12/25/18  2150 12/27/18  2139   INR 0.9 1.0 0.9 1.0 1.0       LIPIDS/LFTS:  Recent Labs   Lab 06/09/17  0524  11/21/17  1445  11/28/17  0508  03/03/18  0030 03/12/18  0900 08/18/18  1313 12/25/18 2150 12/27/18  2139   CHOLESTEROL 185  --  227 H  --  175  --   --  175  --   --   --    TRIGLYCERIDES 83  --  122  --  76  --   --  76  --   --   --    HDL 38 L  --  62  --  46  --   --  47  --   --   --    LDL CHOLESTEROL 130.4  --  140.6  --  113.8  --   --  112.8  --   --   --    NON-HDL CHOLESTEROL 147  --  165  --  129  --   --  128  --   --   --    AST 9 L   < > 14   < >  --    < > 13 8 L 9 L 15 11   ALT 9 L   < > 11   < >  --    < > 10 11 7 L 16 12    < > = values in this interval not displayed.       BNP:  Recent Labs   Lab 02/20/18  0155 02/27/18 2120 03/03/18  0030 08/18/18  1313 12/27/18 2139    H 82 536 H 246 H 802 H       TSH:  Recent Labs   Lab 02/18/17  1943 11/21/17  1445 11/27/17  0509   TSH 0.882 1.189 1.938       Free T4:  Recent Labs   Lab 11/21/17  1445 11/27/17  0509   FREE T4 0.95 0.98       Diagnostic Results:  ECG (personally reviewed tracings):  12/27/18 , PRWP, NSSTTW changes, similar to 7/23/18    Chest X-Ray (personally reviewed image(s)): 12/27/18 CHF, L ICD    Echo: 12/28/18 (images pers rev, EF unchanged vs 3/2018)  · Severely decreased left ventricular systolic function. The estimated ejection fraction is 20%  · Severe global hypokinetic wall motion.  · Severe left ventricular enlargement.  · Eccentric left ventricular hypertrophy.  · Grade II (moderate) left ventricular diastolic dysfunction consistent with pseudonormalization.  · Moderate right ventricular enlargement.  · Moderately reduced right ventricular systolic  function.     Cath 11/27/17  RA 5  RV 61/6  PA 60/29/42  PAWP 18  /41  Ao 163/107/130  CO 5.6 L/min  LVEF: 20% by echo with severe LV dilation  Wall Motion: Severe global HK  Dominance: Co-dom  LM: normal  LAD: normal  LCx: normal  RCA: normal  Hemostasis:  RFA/V manual compression  Impression:  Normal cors  Elevated LVEDP with transmission of pressures to pulmonary circuit  Above c/w Severe NICM  RFA/V manual compression  Plan:  Cont med rx  Stop Plavix  Stop amlodipine  Start Hydrala/Imdur/Lasix  Goal net neg 1L/day  Cont BBl/ACEi  Repeat echo in 3 months for reassessment of LV fxn and need for ICD  Follow up with Dr. Yanez 1 week after discharge     Stress Test: L MPI 9/20/13  Nuclear Quantitative Functional Analysis:   LVEF: 31 %  Impression: NORMAL MYOCARDIAL PERFUSION  1. The perfusion scan is free of evidence for myocardial ischemia or injury.   2. There is a moderate intensity fixed defect in the inferior wall of the left ventricle, secondary to diaphragm attenuation.   3. There is abnormal wall motion at rest showing moderate global hypokinesis of the left ventricle.   4. There is resting LV dysfunction with a reduced ejection fraction of 31 %.   5. The ventricular volumes are normal at rest and stress.   6. The extracardiac distribution of radioactivity is normal.

## 2018-12-30 NOTE — CONSULTS
Follow-up with Dr. Yanez scheduled for patient; written discharge instructions provided; entresto covered by patient's insurance for $3.75 and called into pharmacy.

## 2018-12-30 NOTE — PROGRESS NOTES
WRITTEN DISCHARGE INSTRUCTIONS    Follow-Up Appointments Scheduled for You: It is very important that you attend your follow-ups, especially your cardiology follow-up to ensure that you will be able to get your refills in place for your medications.     Follow-up Information     Oziel Yanez MD. Go on 1/11/2019.    Specialties:  Cardiology, INTERVENTIONAL CARDIOLOGY  Why:  8:40am Cardiology [facility] Follow-Up with Dr. Yanez. It is very important that you attend this follow-up   Contact information:  [address] [facility] [address]  [address]  [place] [address]  [phone]             Miguel Burden MD. Go on 12/31/2018.    Specialty:  Family Medicine  Why:  1:00pm [facility] follow-up scheduled  Contact information:  [address] [place] [address]  [phone]               HELP AT HOME:      [phone] After discharge for assistance with [facility] On-Call Nurse Care Line open 24/7 for assistance.    Things you are responsible for to Manage Your Care at Home:  1. Getting your prescriptions filled or picking up those prescriptions that have been called in for you.  2. Taking your medication as directed; DO NOT MISS ANY DOSES!  3. Going to your follow-up doctor appointment(s).  This is important because it allows your doctor to monitor your progress and determine if any changes need to be made to your treatment plan.    Thanks for choosing [facility] for your care. Please answer any calls you may receive from [facility]. We want to continue to support you as you manage your healthcare needs.  We is happy to have the opportunity to serve you.    Sincerely,  Your [facility] Treatment Team,  LAMAR Foster, ACSW: [phone]

## 2018-12-30 NOTE — ASSESSMENT & PLAN NOTE
Known NICM  Pt denies med noncompliance or dietary indiscretion  Change lasix to 40mg po bid for home use  Cont BBl/ACEi/aldact, titrate aldact to 50mg qd  I asked social work to inquire as to insurance coverage for entresto

## 2018-12-30 NOTE — NURSING
Pt transported off unit via wc to 's vehicle.   Keystone Flap Text: The defect edges were debeveled with a #15 scalpel blade.  Given the location of the defect, shape of the defect a keystone flap was deemed most appropriate.  Using a sterile surgical marker, an appropriate keystone flap was drawn incorporating the defect, outlining the appropriate donor tissue and placing the expected incisions within the relaxed skin tension lines where possible. The area thus outlined was incised deep to adipose tissue with a #15 scalpel blade.  The skin margins were undermined to an appropriate distance in all directions around the primary defect and laterally outward around the flap utilizing iris scissors.

## 2018-12-30 NOTE — PROGRESS NOTES
WRITTEN DISCHARGE INSTRUCTIONS    Follow-Up Appointments:  We were unable to schedule a cardiology follow-up with Dr. Yanez for within 1 week; please call his office on Monday morning to schedule with his nurses. A follow-up was scheduled with Dr. Burden; his next available appointment is provided below.     Follow-up Information     Oziel Yanez MD. Schedule an appointment as soon as possible for a visit in 1 week.    Specialties:  Cardiology, INTERVENTIONAL CARDIOLOGY  Why:  Call Monday morning to schedule appointment with Dr. Yanez as soon as possible  Contact information:  [address] [facility] [address]  [address]  [place] [address]  [phone]             Miguel Burden MD On 12/31/2018.    Specialty:  Family Medicine  Why:  1:00pm [facility] follow-up scheduled  Contact information:  [address]  [place] [address]  [phone]                HELP AT HOME:      [phone] After discharge for assistance with [facility] On-Call Nurse Care Line open 24/7 for assistance.    Things you are responsible for to Manage Your Care at Home:  1. Getting your prescriptions filled or picking up those prescriptions that have been called in for you.  2. Taking your medication as directed; DO NOT MISS ANY DOSES!  3. Going to your follow-up doctor appointment(s).  This is important because it allows your doctor to monitor your progress and determine if any changes need to be made to your treatment plan.    Thanks for choosing [facility] for your care. Please answer any calls you may receive from [facility]. We want to continue to support you as you manage your healthcare needs.  We are happy to have the opportunity to serve you.    Sincerely,  Your [facility] Treatment Team,  LAMAR Foster, ACSW: [phone]

## 2018-12-30 NOTE — DISCHARGE SUMMARY
[facility] Hospital Medicine  Discharge Summary      Patient Name: Fabiana Chanel  MRN: [id number]  Admission Date: 12/27/2018  Hospital Length of Stay: 3 days  Discharge Date and Time:  12/30/2018 10:06 AM  Attending Physician: Marty Lira MD   Discharging Provider: Marty Lira MD  Primary Care Provider: Miguel Burden MD      HPI:   42 y/o female with non ischemic cardiomyopathy EF of 20% presents to the ER complaining of acute onset, SOB with myalgias and weakness.  Symptoms started yesterday morning.  Patient reports similar symptoms whenever she gets fluid in her lungs.  Dyspnea is mostly on exertion.  Denies any lower extremity edema.  Also denies any chest pain.  Patient states compliance with medications.  Denies non compliance with diet over Christmas holidays.  She reports she has a defibrillator in place since April and denies any shocking sensation.  She reports she uses a CPAP machine to sleep at night.  No alleviating or aggravating factors.  She presented to ER where she was noted to have elevated BNP and fluid on CXR.  No other complaints.        * No surgery found *      Hospital Course:   42 y/o female admitted with acute on chronic combined CHF.  Started on IV diuresis with improvement of volume status and symptoms.  Cardiology consulted and agreed with continued diuresis.  Echo with EF of 20% and diastolic dysfunction as on previous echo.  Patient remained afebrile and hemodynamically stable.  She did have episode of asymptomatic NSVT during hospital stay.  Patient has AICD in place with no evidence of shocks.  Patient is currently asymptomatic.  Cardiology has cleared her for discharge.  Her home Lasix dose will be increased and her Aldactone dose adjusted.  Patient will be discharged home to follow up with PCP.  Per Cardiology, patient's Lisinopril will be stopped and she will be started on Entresto.     Consults:   Consults (From admission, onward)         Status Ordering Provider     Inpatient consult to Cardiology  Once     Provider:  Oziel Yanez MD    Completed KASSIE DAS     Inpatient consult to Social Work  Once     Provider:  (Not yet assigned)    OZIEL Coates     Inpatient consult to Social Work  Once     Provider:  (Not yet assigned)    OZIEL Coates          No new Assessment & Plan notes have been filed under this hospital service since the last note was generated.  Service: Hospital Medicine    Final Active Diagnoses:    Diagnosis Date Noted POA    Implantable cardioverter-defibrillator (ICD) in situ [Z95.810] 06/21/2018 Yes    NICM (nonischemic cardiomyopathy) [I42.8] 04/17/2018 Yes    MILE (obstructive sleep apnea) [G47.33] 05/16/2015 Yes    Mixed hyperlipidemia [E78.2] 05/16/2014 Yes    Essential hypertension [I10] 05/14/2014 Yes    Obesity with body mass index (BMI) of 30.0 to 39.9 [E66.9] 05/14/2014 Yes    Paroxysmal atrial fibrillation [I48.0] 05/14/2014 Yes    Type 2 diabetes mellitus without complication, without long-term current use of insulin [E11.9] 09/20/2013 Yes      Problems Resolved During this Admission:    Diagnosis Date Noted Date Resolved POA    PRINCIPAL PROBLEM:  Acute on chronic combined systolic (congestive) and diastolic (congestive) heart failure [I50.43] 12/09/2014 12/30/2018 Yes    SOB (shortness of breath) [R06.02] 12/09/2014 12/28/2018 Unknown       Discharged Condition: stable    Disposition: Home or Self Care    Follow Up:  Follow-up Information     Oziel Yanez MD. Go on 1/11/2019.    Specialties:  Cardiology, INTERVENTIONAL CARDIOLOGY  Why:  8:40am Cardiology Hospital Follow-Up with Dr. Yanez. It is very important that you attend this follow-up   Contact information:  [address] [facility]  [address]  [facility] [address]  [phone]             Miguel Burden MD. Go on 12/31/2018.    Specialty:  Family Medicine  Why:  1:00pm [facility] Hospital follow-up scheduled  Contact  information:  [address] [place] [address]  [phone]                 Patient Instructions:      Diet diabetic     Diet Cardiac     Notify your health care provider if you experience any of the following:  temperature >100.4     Notify your health care provider if you experience any of the following:  persistent nausea and vomiting or diarrhea     Notify your health care provider if you experience any of the following:  severe uncontrolled pain     Notify your health care provider if you experience any of the following:  difficulty breathing or increased cough     Notify your health care provider if you experience any of the following:  persistent dizziness, light-headedness, or visual disturbances     Notify your health care provider if you experience any of the following:  increased confusion or weakness     Activity as tolerated         Pending Diagnostic Studies:     None         Medications:  Reconciled Home Medications:      Medication List      START taking these medications    sacubitril-valsartan 49-51 mg per tablet  Commonly known as:  ENTRESTO  Take 1 tablet by mouth 2 (two) times daily.        CHANGE how you take these medications    furosemide 40 MG tablet  Commonly known as:  LASIX  Take 1 tablet (40 mg total) by mouth 2 (two) times daily.  What changed:    · medication strength  · how much to take  · when to take this     spironolactone 50 MG tablet  Commonly known as:  ALDACTONE  Take 1 tablet (50 mg total) by mouth once daily.  Start taking on:  12/31/2018  What changed:    · medication strength  · how much to take        CONTINUE taking these medications    albuterol 90 mcg/actuation inhaler  Commonly known as:  PROVENTIL/VENTOLIN HFA  Inhale 1-2 puffs into the lungs every 6 (six) hours as needed for Wheezing. Rescue     aspirin 81 MG Chew  Take 81 mg by mouth once daily.     atorvastatin 80 MG tablet  Commonly known as:  LIPITOR  Take 1 tablet (80 mg total) by mouth nightly.     blood  glucose control, high Soln  1 each by Misc.(Non-Drug; Combo Route) route once. for 1 dose     blood glucose control, low Soln  1 each by Misc.(Non-Drug; Combo Route) route once. for 1 dose     BLOOD PRESSURE CUFF Misc  Generic drug:  miscellaneous medical supply  1 kit by Misc.(Non-Drug; Combo Route) route 2 (two) times daily.     blood sugar diagnostic Strp  Commonly known as:  TRUE METRIX GLUCOSE TEST STRIP  1 strip by Misc.(Non-Drug; Combo Route) route 3 (three) times daily.     blood-glucose meter Misc  Commonly known as:  TRUE METRIX AIR GLUCOSE METER  1 each by Misc.(Non-Drug; Combo Route) route 3 (three) times daily.     cyclobenzaprine 10 MG tablet  Commonly known as:  FLEXERIL  Take 1 tablet (10 mg total) by mouth 3 (three) times daily as needed for Muscle spasms.     diclofenac sodium 1 % Gel  Commonly known as:  VOLTAREN  Apply 2 g topically 4 (four) times daily.     esomeprazole 40 MG capsule  Commonly known as:  NEXIUM  Take 40 mg by mouth before breakfast.     fluticasone 50 mcg/actuation nasal spray  Commonly known as:  FLONASE  1 spray (50 mcg total) by Each Nare route once daily.     gabapentin 400 MG capsule  Commonly known as:  NEURONTIN  Take 1 capsule (400 mg total) by mouth Daily.     glimepiride 4 MG tablet  Commonly known as:  AMARYL  Take 1 tablet (4 mg total) by mouth daily with breakfast.     hydrALAZINE 100 MG tablet  Commonly known as:  APRESOLINE  Take 1 tablet (100 mg total) by mouth 2 (two) times daily.     ibuprofen 600 MG tablet  Commonly known as:  ADVIL,MOTRIN  Take 1 tablet (600 mg total) by mouth 3 (three) times daily.     insulin detemir U-100 100 unit/mL (3 mL) Inpn pen  Commonly known as:  LEVEMIR FLEXTOUCH  Inject 10 Units into the skin every evening.     lancets Misc  1 each by Misc.(Non-Drug; Combo Route) route 3 (three) times daily.     metFORMIN 500 MG tablet  Commonly known as:  GLUCOPHAGE  Take 1 tablet (500 mg total) by mouth 2 (two) times daily with meals.    "  metoprolol succinate 25 MG 24 hr tablet  Commonly known as:  TOPROL-XL  Take 3 tablets (75 mg total) by mouth once daily.     pen needle, diabetic 31 gauge x 1/4" Ndle  1 each by Misc.(Non-Drug; Combo Route) route 2 (two) times daily.        STOP taking these medications    lisinopril 40 MG tablet  Commonly known as:  PRINIVIL,ZESTRIL            Indwelling Lines/Drains at time of discharge:   Lines/Drains/Airways          None          Time spent on the discharge of patient: >30 minutes  Patient was seen and examined on the date of discharge and determined to be suitable for discharge.         Marty Lira MD  Department of Hospital Medicine  [facility]  "

## 2019-01-02 ENCOUNTER — PATIENT OUTREACH (OUTPATIENT)
Dept: ADMINISTRATIVE | Facility: CLINIC | Age: 44
End: 2019-01-02

## 2019-01-15 DIAGNOSIS — Z95.810 CARDIAC DEFIBRILLATOR IN PLACE: Primary | ICD-10-CM

## 2019-01-23 DIAGNOSIS — E11.9 DIABETES MELLITUS WITHOUT COMPLICATION: Primary | ICD-10-CM

## 2019-02-04 ENCOUNTER — OFFICE VISIT (OUTPATIENT)
Dept: CARDIOLOGY | Facility: CLINIC | Age: 44
End: 2019-02-04
Payer: MEDICARE

## 2019-02-04 VITALS
BODY MASS INDEX: 38.06 KG/M2 | DIASTOLIC BLOOD PRESSURE: 84 MMHG | RESPIRATION RATE: 15 BRPM | SYSTOLIC BLOOD PRESSURE: 120 MMHG | HEIGHT: 67 IN | OXYGEN SATURATION: 97 % | WEIGHT: 242.5 LBS | HEART RATE: 85 BPM

## 2019-02-04 DIAGNOSIS — E66.9 OBESITY WITH BODY MASS INDEX (BMI) OF 30.0 TO 39.9: ICD-10-CM

## 2019-02-04 DIAGNOSIS — G47.33 OSA (OBSTRUCTIVE SLEEP APNEA): ICD-10-CM

## 2019-02-04 DIAGNOSIS — I10 ESSENTIAL HYPERTENSION: ICD-10-CM

## 2019-02-04 DIAGNOSIS — Z95.810 CARDIAC DEFIBRILLATOR IN PLACE: ICD-10-CM

## 2019-02-04 DIAGNOSIS — I48.0 PAROXYSMAL ATRIAL FIBRILLATION: ICD-10-CM

## 2019-02-04 DIAGNOSIS — I42.8 NONISCHEMIC CARDIOMYOPATHY: Primary | ICD-10-CM

## 2019-02-04 PROCEDURE — 99214 PR OFFICE/OUTPT VISIT, EST, LEVL IV, 30-39 MIN: ICD-10-PCS | Mod: S$GLB,,, | Performed by: INTERNAL MEDICINE

## 2019-02-04 PROCEDURE — 3008F BODY MASS INDEX DOCD: CPT | Mod: CPTII,S$GLB,, | Performed by: INTERNAL MEDICINE

## 2019-02-04 PROCEDURE — 99499 RISK ADDL DX/OHS AUDIT: ICD-10-PCS | Mod: S$GLB,,, | Performed by: INTERNAL MEDICINE

## 2019-02-04 PROCEDURE — 99499 UNLISTED E&M SERVICE: CPT | Mod: S$GLB,,, | Performed by: INTERNAL MEDICINE

## 2019-02-04 PROCEDURE — 3008F PR BODY MASS INDEX (BMI) DOCUMENTED: ICD-10-PCS | Mod: CPTII,S$GLB,, | Performed by: INTERNAL MEDICINE

## 2019-02-04 PROCEDURE — 99999 PR PBB SHADOW E&M-EST. PATIENT-LVL III: CPT | Mod: PBBFAC,,, | Performed by: INTERNAL MEDICINE

## 2019-02-04 PROCEDURE — 99214 OFFICE O/P EST MOD 30 MIN: CPT | Mod: S$GLB,,, | Performed by: INTERNAL MEDICINE

## 2019-02-04 PROCEDURE — 3074F SYST BP LT 130 MM HG: CPT | Mod: CPTII,S$GLB,, | Performed by: INTERNAL MEDICINE

## 2019-02-04 PROCEDURE — 3079F DIAST BP 80-89 MM HG: CPT | Mod: CPTII,S$GLB,, | Performed by: INTERNAL MEDICINE

## 2019-02-04 PROCEDURE — 3079F PR MOST RECENT DIASTOLIC BLOOD PRESSURE 80-89 MM HG: ICD-10-PCS | Mod: CPTII,S$GLB,, | Performed by: INTERNAL MEDICINE

## 2019-02-04 PROCEDURE — 99999 PR PBB SHADOW E&M-EST. PATIENT-LVL III: ICD-10-PCS | Mod: PBBFAC,,, | Performed by: INTERNAL MEDICINE

## 2019-02-04 PROCEDURE — 3074F PR MOST RECENT SYSTOLIC BLOOD PRESSURE < 130 MM HG: ICD-10-PCS | Mod: CPTII,S$GLB,, | Performed by: INTERNAL MEDICINE

## 2019-02-04 RX ORDER — METOPROLOL SUCCINATE 100 MG/1
100 TABLET, EXTENDED RELEASE ORAL DAILY
Qty: 90 TABLET | Refills: 3 | Status: SHIPPED | OUTPATIENT
Start: 2019-02-04 | End: 2020-05-12

## 2019-02-04 RX ORDER — SPIRONOLACTONE 50 MG/1
50 TABLET, FILM COATED ORAL DAILY
Qty: 90 TABLET | Refills: 3 | Status: SHIPPED | OUTPATIENT
Start: 2019-02-04 | End: 2020-05-12

## 2019-02-04 NOTE — PROGRESS NOTES
CARDIOVASCULAR PROGRESS NOTE    REASON FOR CONSULT:   Fabiana Chanel is a 43 y.o. female who presents for follow up of NICM.    PCP: Bertram  EP: Nanci  HISTORY OF PRESENT ILLNESS:   The patient returns for follow-up.  She was hospitalized for heart failure in late December.  She returns today feeling well and tells me her shortness of breath has resolved on increased dose of Lasix.  She is also now on Entresto.  She tells me she has run out of her spironolactone, and is about to run out of her metoprolol, I have instructed her that it is very important that she gets refills of these medications on time.  She did describe a brief episode of chest discomfort last night, but otherwise had no further anginal symptoms.  She denies any palpitations, lightheadedness, dizziness, syncope, or defibrillator discharges.  There has been no PND, orthopnea, or lower extremity edema.  She denies melena, hematuria, or claudicant symptoms.  She has not had any genitourinary bleeding, and reports a prior history of vaginal bleeding on Xarelto, without any other cause for this bleeding identified other than the anticoagulant.    CARDIOVASCULAR HISTORY:   NICM (cath 11/2017) EF 20% by echo 12/2018  [employer] ICD (4/17/18 Dr. Christine)  PAF (see 6/17/19 addendum below)  MILE on CPAP    PAST MEDICAL HISTORY:     Past Medical History:   Diagnosis Date    Atrial fibrillation     Blood clot associated with vein wall inflammation     not dvt    Cardiomyopathy     Normal cors on cath 11/2017    CHF (congestive heart failure)     DM (diabetes mellitus) 9/19/2013    Hyperlipidemia     Hypertension     Psoriasis     Sleep apnea        PAST SURGICAL HISTORY:     Past Surgical History:   Procedure Laterality Date    CARDIAC CATHETERIZATION      COLONOSCOPY      DILATION AND CURETTAGE OF UTERUS      INSERTION-ICD-DUAL Left 4/17/2018    Performed by Eben Christine MD at [facility] CATH LAB       ALLERGIES AND MEDICATION:     Review of  patient's allergies indicates:   Allergen Reactions    Pneumococcal 23-abimbola ps vaccine      Previous Medications    ALBUTEROL 90 MCG/ACTUATION INHALER    Inhale 1-2 puffs into the lungs every 6 (six) hours as needed for Wheezing. Rescue    ASPIRIN 81 MG CHEW    Take 81 mg by mouth once daily.    ATORVASTATIN (LIPITOR) 80 MG TABLET    Take 1 tablet (80 mg total) by mouth nightly.    BLOOD GLUCOSE CONTROL, HIGH SOLN    1 each by Misc.(Non-Drug; Combo Route) route once. for 1 dose    BLOOD GLUCOSE CONTROL, LOW SOLN    1 each by Misc.(Non-Drug; Combo Route) route once. for 1 dose    BLOOD PRESSURE CUFF MISC    1 kit by Misc.(Non-Drug; Combo Route) route 2 (two) times daily.    BLOOD SUGAR DIAGNOSTIC (TRUE METRIX GLUCOSE TEST STRIP) STRP    1 strip by Misc.(Non-Drug; Combo Route) route 3 (three) times daily.    BLOOD-GLUCOSE METER (TRUE METRIX AIR GLUCOSE METER) MISC    1 each by Misc.(Non-Drug; Combo Route) route 3 (three) times daily.    DICLOFENAC SODIUM (VOLTAREN) 1 % GEL    Apply 2 g topically 4 (four) times daily.    ESOMEPRAZOLE (NEXIUM) 40 MG CAPSULE    Take 40 mg by mouth before breakfast.    FLUTICASONE (FLONASE) 50 MCG/ACTUATION NASAL SPRAY    1 spray (50 mcg total) by Each Nare route once daily.    FUROSEMIDE (LASIX) 40 MG TABLET    Take 1 tablet (40 mg total) by mouth 2 (two) times daily.    GABAPENTIN (NEURONTIN) 400 MG CAPSULE    Take 1 capsule (400 mg total) by mouth Daily.    GLIMEPIRIDE (AMARYL) 4 MG TABLET    Take 1 tablet (4 mg total) by mouth daily with breakfast.    HYDRALAZINE (APRESOLINE) 100 MG TABLET    Take 1 tablet (100 mg total) by mouth 2 (two) times daily.    IBUPROFEN (ADVIL,MOTRIN) 600 MG TABLET    Take 1 tablet (600 mg total) by mouth 3 (three) times daily.    INSULIN DETEMIR U-100 (LEVEMIR FLEXTOUCH) 100 UNIT/ML (3 ML) SUBQ INPN PEN    Inject 10 Units into the skin every evening.    LANCETS MISC    1 each by Misc.(Non-Drug; Combo Route) route 3 (three) times daily.    METFORMIN  "(GLUCOPHAGE) 500 MG TABLET    Take 1 tablet (500 mg total) by mouth 2 (two) times daily with meals.    METOPROLOL SUCCINATE (TOPROL-XL) 25 MG 24 HR TABLET    Take 3 tablets (75 mg total) by mouth once daily.    PEN NEEDLE, DIABETIC 31 GAUGE X 1/4" NDLE    1 each by Misc.(Non-Drug; Combo Route) route 2 (two) times daily.    SACUBITRIL-VALSARTAN (ENTRESTO) 49-51 MG PER TABLET    Take 1 tablet by mouth 2 (two) times daily.    SPIRONOLACTONE (ALDACTONE) 50 MG TABLET    Take 1 tablet (50 mg total) by mouth once daily.       SOCIAL HISTORY:     Social History     Socioeconomic History    Marital status:      Spouse name: Not on file    Number of children: Not on file    Years of education: Not on file    Highest education level: Not on file   Social Needs    Financial resource strain: Not on file    Food insecurity - worry: Not on file    Food insecurity - inability: Not on file    Transportation needs - medical: Not on file    Transportation needs - non-medical: Not on file   Occupational History    Not on file   Tobacco Use    Smoking status: Never Smoker    Smokeless tobacco: Never Used    Tobacco comment: smokes cigars on occasion   Substance and Sexual Activity    Alcohol use: Yes     Comment: occasional    Drug use: No     Comment: hx of marijuana use 3 years ago    Sexual activity: Yes     Partners: Male   Other Topics Concern    Not on file   Social History Narrative    Not on file       FAMILY HISTORY:     Family History   Problem Relation Age of Onset    Hypertension Mother     Hypertension Father     Diabetes Father     Diabetes Maternal Grandmother     Diabetes Paternal Grandmother     Breast cancer Neg Hx     Colon cancer Neg Hx     Ovarian cancer Neg Hx        REVIEW OF SYSTEMS:   Review of Systems   Constitutional: Negative for chills, diaphoresis and fever.   HENT: Negative for nosebleeds.    Eyes: Negative for blurred vision, double vision and photophobia.   Respiratory: " "Negative for hemoptysis, shortness of breath and wheezing.    Cardiovascular: Negative for chest pain, palpitations, orthopnea, claudication, leg swelling and PND.   Gastrointestinal: Negative for abdominal pain, blood in stool, heartburn, melena, nausea and vomiting.   Genitourinary: Negative for flank pain and hematuria.   Musculoskeletal: Negative for falls, myalgias and neck pain.   Skin: Negative for rash.   Neurological: Negative for dizziness, seizures, loss of consciousness, weakness and headaches.   Endo/Heme/Allergies: Negative for polydipsia. Does not bruise/bleed easily.   Psychiatric/Behavioral: Negative for depression and memory loss. The patient is not nervous/anxious.        PHYSICAL EXAM:     Vitals:    02/04/19 0907   BP: 120/84   Pulse: 85   Resp: 15    Body mass index is 37.98 kg/m².  Weight: 110 kg (242 lb 8.1 oz)   Height: 5' 7" (170.2 cm)     Physical Exam   Constitutional: She is oriented to person, place, and time. She appears well-developed and well-nourished. She is cooperative.  Non-toxic appearance. No distress.   HENT:   Head: Normocephalic and atraumatic.   Eyes: Conjunctivae and EOM are normal. Pupils are equal, round, and reactive to light. No scleral icterus.   Neck: Trachea normal and normal range of motion. Neck supple. Normal carotid pulses and no JVD present. Carotid bruit is not present. No neck rigidity. No tracheal deviation and no edema present. No thyromegaly present.   Cardiovascular: Normal rate, regular rhythm, S1 normal and S2 normal. PMI is not displaced. Exam reveals no gallop and no friction rub.   No murmur heard.  Pulses:       Carotid pulses are 2+ on the right side, and 2+ on the left side.  L side ICD in place.   Pulmonary/Chest: Effort normal and breath sounds normal. No stridor. No respiratory distress. She has no wheezes. She has no rales. She exhibits no tenderness.   Abdominal: Soft. She exhibits no distension. There is no hepatosplenomegaly.   obese "   Musculoskeletal: Normal range of motion. She exhibits no edema or tenderness.   Feet:   Right Foot:   Skin Integrity: Negative for ulcer.   Left Foot:   Skin Integrity: Negative for ulcer.   Neurological: She is alert and oriented to person, place, and time. No cranial nerve deficit.   Skin: Skin is warm and dry. No rash noted. No erythema.   Psychiatric: She has a normal mood and affect. Her speech is normal and behavior is normal.   Vitals reviewed.      DATA:   EKG: (personally reviewed tracing)  12/27/18 , PRWP, NSSTTW changes, similar to 7/23/18    Laboratory:  CBC:  Recent Labs   Lab 08/18/18  1313 12/25/18  2150 12/27/18 2139 12/27/18  2148   WHITE BLOOD CELL COUNT 5.11 5.57 4.71  --    HEMOGLOBIN 12.9 13.2 13.4  --    POC HEMATOCRIT  --   --   --  43   HEMATOCRIT 40.0 41.6 42.0  --    PLATELETS 290 264 248  --        CHEMISTRIES:  Recent Labs   Lab 11/28/17  0508  02/27/18 2120 12/27/18 2139 12/29/18  0510 12/30/18  0602   GLUCOSE 319 H   < > 134 H   < > 226 H 179 H 154 H   SODIUM 138   < > 139   < > 137 138 138   POTASSIUM 3.5   < > 4.8   < > 4.4 3.8 3.5   BUN BLD 11   < > 10   < > 14 15 17   CREATININE 1.1   < > 1.0   < > 1.0 1.0 1.1   EGFR IF  >60   < > >60   < > >60 >60 >60   EGFR IF NON- >60   < > >60   < > >60 >60 >60   CALCIUM 9.0   < > 10.6 H   < > 9.2 9.3 9.1   MAGNESIUM 1.7  --  1.9  --  1.9  --   --     < > = values in this interval not displayed.       CARDIAC BIOMARKERS:  Recent Labs   Lab 02/18/17  1943  02/23/17 2012 02/27/18 2120 03/03/18  0225 08/18/18  1313 12/27/18 2139   CPK 74  --  81  --  65  --   --   --   --    CPK MB 2.1  --   --   --   --   --   --   --   --    TROPONIN I <0.006   < > <0.006   < > 0.017   < > 0.031 H 0.018 0.014    < > = values in this interval not displayed.       COAGS:  Recent Labs   Lab 04/11/18  1133 12/25/18  2150 12/27/18  2139   INR 0.9 1.0 1.0       LIPIDS/LFTS:  Recent Labs   Lab 11/21/17  1445   11/28/17  0508  03/12/18  0900 08/18/18  1313 12/25/18  2150 12/27/18  2139   CHOLESTEROL 227 H  --  175  --  175  --   --   --    TRIGLYCERIDES 122  --  76  --  76  --   --   --    HDL 62  --  46  --  47  --   --   --    LDL CHOLESTEROL 140.6  --  113.8  --  112.8  --   --   --    NON-HDL CHOLESTEROL 165  --  129  --  128  --   --   --    AST 14   < >  --    < > 8 L 9 L 15 11   ALT 11   < >  --    < > 11 7 L 16 12    < > = values in this interval not displayed.       Lab Results   Component Value Date    TSH 1.938 11/27/2017       Cardiovascular Testing:  Echo: 12/28/18 (EF unchanged vs 3/2018)  · Severely decreased left ventricular systolic function. The estimated ejection fraction is 20%  · Severe global hypokinetic wall motion.  · Severe left ventricular enlargement.  · Eccentric left ventricular hypertrophy.  · Grade II (moderate) left ventricular diastolic dysfunction consistent with pseudonormalization.  · Moderate right ventricular enlargement.  · Moderately reduced right ventricular systolic function.     Cath 11/27/17  RA 5  RV 61/6  PA 60/29/42  PAWP 18  /41  Ao 163/107/130  CO 5.6 L/min  LVEF: 20% by echo with severe LV dilation  Wall Motion: Severe global HK  Dominance: Co-dom  LM: normal  LAD: normal  LCx: normal  RCA: normal  Hemostasis:  RFA/V manual compression  Impression:  Normal cors  Elevated LVEDP with transmission of pressures to pulmonary circuit  Above c/w Severe NICM  RFA/V manual compression  Plan:  Cont med rx  Stop Plavix  Stop amlodipine  Start Hydrala/Imdur/Lasix  Goal net neg 1L/day  Cont BBl/ACEi  Repeat echo in 3 months for reassessment of LV fxn and need for ICD  Follow up with Dr. Yanez 1 week after discharge     Stress Test: L MPI 9/20/13  Nuclear Quantitative Functional Analysis:   LVEF: 31 %  Impression: NORMAL MYOCARDIAL PERFUSION  1. The perfusion scan is free of evidence for myocardial ischemia or injury.   2. There is a moderate intensity fixed defect in the  inferior wall of the left ventricle, secondary to diaphragm attenuation.   3. There is abnormal wall motion at rest showing moderate global hypokinesis of the left ventricle.   4. There is resting LV dysfunction with a reduced ejection fraction of 31 %.   5. The ventricular volumes are normal at rest and stress.   6. The extracardiac distribution of radioactivity is normal.     ASSESSMENT:   # NICM, EF persistently reduced 20% by echo 12/2018.  Pt appears euvolemic.  # hx NSVT  # PAF, not on OAC d/t hx of  bleeding on Xarelto  # S/P [employer] [facility] ICD 4/2018 (Dr. Christine), atrial leadplaced for AF monitoring  # HTN, controlled  # HLP, on atorva 80mg  # DM  # BMI 38, stable vs last OV  # MILE, on CPAP    PLAN:   Cont med rx  Inc toprol XL 100mg qd  Importance of med adherence and asking for refills on time stressed to pt (monique with entresto rx)  Diet/exercise/weight loss, Na+ avoidance  Check BMP 2 weeks  RTC 1 month with [employer] [facility] ICD check (?PAF, NSVT)  Consider dose titration of Entresto and initiation of eliquis at next OV    Oziel Yanez MD, FACC    Addendum 6/17/19:  Eben Christine MD at 6/17/2019  8:40 AM     Status: Signed      >4 hours AF found on ICD.  d/w pt via phone.  Start eliquis for CVA prophylaxis. Monitor for bleeding. If bleeding, stop eliquis and seek medical care -- e.g., if recurrent vaginal bleeding, seek OB/GYN.  Discussed the indications and possible side effects of the medications prescribed to the patient by me.  f/u as scheduled.

## 2019-02-04 NOTE — PROGRESS NOTES
Patient, Fabiana Chanel (MRN #[id number]), presented with a recorded BMI of 37.98 kg/m^2 and a documented comorbidity(s):  - Obstructive Sleep Apnea  - Hypertension  - Atrial Fibrillation  to which the severe obesity is a contributing factor. This is consistent with the definition of severe obesity (BMI 35.0-39.9) with comorbidity (ICD-10 E66.01, Z68.35). The patient's severe obesity was monitored, evaluated, addressed and/or treated. This addendum to the medical record is made on 02/04/2019.

## 2019-02-11 ENCOUNTER — OFFICE VISIT (OUTPATIENT)
Dept: FAMILY MEDICINE | Facility: CLINIC | Age: 44
End: 2019-02-11
Payer: MEDICARE

## 2019-02-11 VITALS
TEMPERATURE: 98 F | OXYGEN SATURATION: 96 % | BODY MASS INDEX: 38.65 KG/M2 | RESPIRATION RATE: 17 BRPM | DIASTOLIC BLOOD PRESSURE: 80 MMHG | HEART RATE: 90 BPM | SYSTOLIC BLOOD PRESSURE: 124 MMHG | WEIGHT: 246.25 LBS | HEIGHT: 67 IN

## 2019-02-11 DIAGNOSIS — E11.9 TYPE 2 DIABETES MELLITUS WITHOUT COMPLICATION, WITHOUT LONG-TERM CURRENT USE OF INSULIN: ICD-10-CM

## 2019-02-11 DIAGNOSIS — Z95.810 IMPLANTABLE CARDIOVERTER-DEFIBRILLATOR (ICD) IN SITU: ICD-10-CM

## 2019-02-11 DIAGNOSIS — G47.33 OSA (OBSTRUCTIVE SLEEP APNEA): ICD-10-CM

## 2019-02-11 DIAGNOSIS — K21.9 GASTROESOPHAGEAL REFLUX DISEASE, ESOPHAGITIS PRESENCE NOT SPECIFIED: ICD-10-CM

## 2019-02-11 DIAGNOSIS — I10 ESSENTIAL HYPERTENSION: ICD-10-CM

## 2019-02-11 DIAGNOSIS — I50.42 CHRONIC COMBINED SYSTOLIC AND DIASTOLIC HEART FAILURE: Primary | ICD-10-CM

## 2019-02-11 LAB
BILIRUB SERPL-MCNC: NEGATIVE MG/DL
BLOOD URINE, POC: NEGATIVE
COLOR, POC UA: YELLOW
GLUCOSE UR QL STRIP: 1000
KETONES UR QL STRIP: NEGATIVE
LEUKOCYTE ESTERASE URINE, POC: NEGATIVE
NITRITE, POC UA: NEGATIVE
PH, POC UA: 7
PROTEIN, POC: ABNORMAL
SPECIFIC GRAVITY, POC UA: 1
UROBILINOGEN, POC UA: 1

## 2019-02-11 PROCEDURE — 99214 PR OFFICE/OUTPT VISIT, EST, LEVL IV, 30-39 MIN: ICD-10-PCS | Mod: 25,S$GLB,, | Performed by: FAMILY MEDICINE

## 2019-02-11 PROCEDURE — 3046F HEMOGLOBIN A1C LEVEL >9.0%: CPT | Mod: CPTII,S$GLB,, | Performed by: FAMILY MEDICINE

## 2019-02-11 PROCEDURE — 3046F PR MOST RECENT HEMOGLOBIN A1C LEVEL > 9.0%: ICD-10-PCS | Mod: CPTII,S$GLB,, | Performed by: FAMILY MEDICINE

## 2019-02-11 PROCEDURE — 3074F SYST BP LT 130 MM HG: CPT | Mod: CPTII,S$GLB,, | Performed by: FAMILY MEDICINE

## 2019-02-11 PROCEDURE — 3079F DIAST BP 80-89 MM HG: CPT | Mod: CPTII,S$GLB,, | Performed by: FAMILY MEDICINE

## 2019-02-11 PROCEDURE — 99999 PR PBB SHADOW E&M-EST. PATIENT-LVL IV: CPT | Mod: PBBFAC,,, | Performed by: FAMILY MEDICINE

## 2019-02-11 PROCEDURE — 99999 PR PBB SHADOW E&M-EST. PATIENT-LVL IV: ICD-10-PCS | Mod: PBBFAC,,, | Performed by: FAMILY MEDICINE

## 2019-02-11 PROCEDURE — 3074F PR MOST RECENT SYSTOLIC BLOOD PRESSURE < 130 MM HG: ICD-10-PCS | Mod: CPTII,S$GLB,, | Performed by: FAMILY MEDICINE

## 2019-02-11 PROCEDURE — 3008F BODY MASS INDEX DOCD: CPT | Mod: CPTII,S$GLB,, | Performed by: FAMILY MEDICINE

## 2019-02-11 PROCEDURE — 3008F PR BODY MASS INDEX (BMI) DOCUMENTED: ICD-10-PCS | Mod: CPTII,S$GLB,, | Performed by: FAMILY MEDICINE

## 2019-02-11 PROCEDURE — 99214 OFFICE O/P EST MOD 30 MIN: CPT | Mod: 25,S$GLB,, | Performed by: FAMILY MEDICINE

## 2019-02-11 PROCEDURE — 81001 POCT URINALYSIS, DIPSTICK OR TABLET REAGENT, AUTOMATED, WITH MICROSCOP: ICD-10-PCS | Mod: S$GLB,,, | Performed by: FAMILY MEDICINE

## 2019-02-11 PROCEDURE — 81001 URINALYSIS AUTO W/SCOPE: CPT | Mod: S$GLB,,, | Performed by: FAMILY MEDICINE

## 2019-02-11 PROCEDURE — 3079F PR MOST RECENT DIASTOLIC BLOOD PRESSURE 80-89 MM HG: ICD-10-PCS | Mod: CPTII,S$GLB,, | Performed by: FAMILY MEDICINE

## 2019-02-11 RX ORDER — GABAPENTIN 400 MG/1
400 CAPSULE ORAL DAILY
Qty: 60 CAPSULE | Refills: 2 | Status: SHIPPED | OUTPATIENT
Start: 2019-02-11 | End: 2019-08-06 | Stop reason: SDUPTHER

## 2019-02-11 RX ORDER — METFORMIN HYDROCHLORIDE 500 MG/1
500 TABLET ORAL 2 TIMES DAILY WITH MEALS
Qty: 60 TABLET | Refills: 3 | Status: SHIPPED | OUTPATIENT
Start: 2019-02-11 | End: 2019-04-26 | Stop reason: SDUPTHER

## 2019-02-11 NOTE — PROGRESS NOTES
Chief Complaint   Patient presents with    Hospital Follow Up       HPI  Fabiana Chanel is a 43 y.o. female with multiple medical diagnoses as listed in the medical history and problem list that presents for hospital follow up.    Hospital follow up - CHF exac, improved with treatment    Since d/c overall feeling well, denies SOB    MILE - states sleeps well, although +sinus congestion, needs replacement of parts.     BLE pain - states previously improved, with gabapentin,     Pt is known to me and was last seen by me on 7/2/2018.    PAST MEDICAL HISTORY:  Past Medical History:   Diagnosis Date    Atrial fibrillation     Blood clot associated with vein wall inflammation     not dvt    Cardiomyopathy     Normal cors on cath 11/2017    CHF (congestive heart failure)     DM (diabetes mellitus) 9/19/2013    Hyperlipidemia     Hypertension     Psoriasis     Sleep apnea        PAST SURGICAL HISTORY:  Past Surgical History:   Procedure Laterality Date    CARDIAC CATHETERIZATION      COLONOSCOPY      DILATION AND CURETTAGE OF UTERUS      INSERTION-ICD-DUAL Left 4/17/2018    Performed by Eben Christine MD at [facility] CATH LAB       SOCIAL HISTORY:  Social History     Socioeconomic History    Marital status:      Spouse name: Not on file    Number of children: Not on file    Years of education: Not on file    Highest education level: Not on file   Social Needs    Financial resource strain: Not on file    Food insecurity - worry: Not on file    Food insecurity - inability: Not on file    Transportation needs - medical: Not on file    Transportation needs - non-medical: Not on file   Occupational History    Not on file   Tobacco Use    Smoking status: Never Smoker    Smokeless tobacco: Never Used    Tobacco comment: smokes cigars on occasion   Substance and Sexual Activity    Alcohol use: Yes     Comment: occasional    Drug use: No     Comment: hx of marijuana use 3 years ago    Sexual  activity: Yes     Partners: Male   Other Topics Concern    Not on file   Social History Narrative    Not on file       FAMILY HISTORY:  Family History   Problem Relation Age of Onset    Hypertension Mother     Hypertension Father     Diabetes Father     Diabetes Maternal Grandmother     Diabetes Paternal Grandmother     Breast cancer Neg Hx     Colon cancer Neg Hx     Ovarian cancer Neg Hx        ALLERGIES AND MEDICATIONS: updated and reviewed.  Review of patient's allergies indicates:   Allergen Reactions    Pneumococcal 23-abimbola ps vaccine      Current Outpatient Medications   Medication Sig Dispense Refill    aspirin 81 MG Chew Take 81 mg by mouth once daily.      blood sugar diagnostic (TRUE METRIX GLUCOSE TEST STRIP) Strp 1 strip by Misc.(Non-Drug; Combo Route) route 3 (three) times daily. 200 each 2    blood-glucose meter (TRUE METRIX AIR GLUCOSE METER) Misc 1 each by Misc.(Non-Drug; Combo Route) route 3 (three) times daily. 1 each 0    esomeprazole (NEXIUM) 40 MG capsule Take 40 mg by mouth before breakfast.      fluticasone (FLONASE) 50 mcg/actuation nasal spray 1 spray (50 mcg total) by Each Nare route once daily. 16 g 1    furosemide (LASIX) 40 MG tablet Take 1 tablet (40 mg total) by mouth 2 (two) times daily. 60 tablet 11    gabapentin (NEURONTIN) 400 MG capsule Take 1 capsule (400 mg total) by mouth Daily. 60 capsule 2    glimepiride (AMARYL) 4 MG tablet Take 1 tablet (4 mg total) by mouth daily with breakfast. 90 tablet 1    ibuprofen (ADVIL,MOTRIN) 600 MG tablet Take 1 tablet (600 mg total) by mouth 3 (three) times daily. 20 tablet 0    insulin detemir U-100 (LEVEMIR FLEXTOUCH) 100 unit/mL (3 mL) SubQ InPn pen Inject 10 Units into the skin every evening. 15 mL 0    lancets Misc 1 each by Misc.(Non-Drug; Combo Route) route 3 (three) times daily. 200 each 2    metoprolol succinate (TOPROL-XL) 100 MG 24 hr tablet Take 1 tablet (100 mg total) by mouth once daily. 90 tablet 3    pen  "needle, diabetic 31 gauge x 1/4" Ndle 1 each by Misc.(Non-Drug; Combo Route) route 2 (two) times daily. 100 each 0    sacubitril-valsartan (ENTRESTO) 49-51 mg per tablet Take 1 tablet by mouth 2 (two) times daily. 30 tablet 11    spironolactone (ALDACTONE) 50 MG tablet Take 1 tablet (50 mg total) by mouth once daily. 90 tablet 3    albuterol 90 mcg/actuation inhaler Inhale 1-2 puffs into the lungs every 6 (six) hours as needed for Wheezing. Rescue 1 Inhaler 0    atorvastatin (LIPITOR) 80 MG tablet Take 1 tablet (80 mg total) by mouth nightly. 90 tablet 3    blood glucose control, high Soln 1 each by Misc.(Non-Drug; Combo Route) route once. for 1 dose 1 each 3    blood glucose control, low Soln 1 each by Misc.(Non-Drug; Combo Route) route once. for 1 dose 1 each 3    BLOOD PRESSURE CUFF Misc 1 kit by Misc.(Non-Drug; Combo Route) route 2 (two) times daily. 1 each 0    diclofenac sodium (VOLTAREN) 1 % Gel Apply 2 g topically 4 (four) times daily. 100 g 1    hydrALAZINE (APRESOLINE) 100 MG tablet Take 1 tablet (100 mg total) by mouth 2 (two) times daily. 180 tablet 3    metFORMIN (GLUCOPHAGE) 500 MG tablet Take 1 tablet (500 mg total) by mouth 2 (two) times daily with meals. 60 tablet 3     No current facility-administered medications for this visit.        ROS  Review of Systems   Constitutional: Negative for activity change, appetite change and fever.   HENT: Negative for congestion and sore throat.    Eyes: Negative for visual disturbance.   Respiratory: Negative for cough and shortness of breath.    Cardiovascular: Negative for chest pain.   Gastrointestinal: Negative for abdominal pain, diarrhea, nausea and vomiting.   Endocrine: Negative.    Genitourinary: Negative for dysuria.   Musculoskeletal: Negative for arthralgias and back pain.   Skin: Negative for rash.   Allergic/Immunologic: Negative.    Neurological: Negative for dizziness, weakness and headaches.   Hematological: Negative.  " "  Psychiatric/Behavioral: Negative for agitation and confusion.       Physical Exam  Vitals:    02/11/19 1139   BP: 124/80   Pulse: 90   Resp: 17   Temp: 98.2 °F (36.8 °C)    Body mass index is 38.57 kg/m².  Weight: 111.7 kg (246 lb 4.1 oz)   Height: 5' 7" (170.2 cm)     Physical Exam   Constitutional: She is oriented to person, place, and time. She appears well-developed and well-nourished.   HENT:   Head: Normocephalic and atraumatic.   Eyes: Conjunctivae and EOM are normal. Pupils are equal, round, and reactive to light.   Neck: Normal range of motion. Neck supple.   Cardiovascular: Normal rate, regular rhythm and normal heart sounds.   Pulmonary/Chest: Effort normal and breath sounds normal.   Abdominal: Soft. Bowel sounds are normal.   Musculoskeletal: Normal range of motion.   Neurological: She is alert and oriented to person, place, and time. She has normal reflexes.   Skin: Skin is warm and dry.   Psychiatric: She has a normal mood and affect. Her behavior is normal. Judgment and thought content normal.       Health Maintenance       Date Due Completion Date    Foot Exam 03/26/2019 3/26/2018    Mammogram 03/30/2019 3/30/2017    Pneumococcal Vaccine (Medium Risk) (1 of 1 - PPSV23) 03/26/2019 (Originally 7/3/1995) ---    TETANUS VACCINE 07/02/2019 (Originally 9/25/2016) 9/25/2006    Lipid Panel 03/12/2019 3/12/2018    Hemoglobin A1c 03/28/2019 12/28/2018    Eye Exam 07/09/2019 7/9/2018    Override on 10/2/2017: Done (dr moctezuma)    Pap Smear 03/27/2021 3/27/2018          Assessment & Plan    Chronic combined systolic and diastolic heart failure, Essential hypertension, AICD  - Continue current medication regimen as prescribed  - Cont follow up with Cardiology    Type 2 diabetes mellitus without complication, without long-term current use of insulin  -     gabapentin (NEURONTIN) 400 MG capsule; Take 1 capsule (400 mg total) by mouth Daily.  Dispense: 60 capsule; Refill: 2  -     Ambulatory referral to " Endocrinology  -     POCT URINE DIPSTICK WITH MICROSCOPE, AUTOMATED  -     metFORMIN (GLUCOPHAGE) 500 MG tablet; Take 1 tablet (500 mg total) by mouth 2 (two) times daily with meals.  Dispense: 60 tablet; Refill: 3  - Continue current medication regimen as prescribed    MILE (obstructive sleep apnea)  -     Ambulatory referral to Pulmonology    Gastroesophageal reflux disease, esophagitis presence not specified  -     Ambulatory referral to Gastroenterology        Follow-up in about 4 weeks (around 3/11/2019), or if symptoms worsen or fail to improve.

## 2019-02-13 ENCOUNTER — TELEPHONE (OUTPATIENT)
Dept: CARDIOLOGY | Facility: HOSPITAL | Age: 44
End: 2019-02-13

## 2019-02-18 ENCOUNTER — LAB VISIT (OUTPATIENT)
Dept: LAB | Facility: HOSPITAL | Age: 44
End: 2019-02-18
Attending: INTERNAL MEDICINE
Payer: MEDICARE

## 2019-02-18 ENCOUNTER — TELEPHONE (OUTPATIENT)
Dept: ADMINISTRATIVE | Facility: HOSPITAL | Age: 44
End: 2019-02-18

## 2019-02-18 DIAGNOSIS — I10 ESSENTIAL HYPERTENSION: ICD-10-CM

## 2019-02-18 DIAGNOSIS — I42.8 NONISCHEMIC CARDIOMYOPATHY: ICD-10-CM

## 2019-02-18 LAB
ANION GAP SERPL CALC-SCNC: 9 MMOL/L
BUN SERPL-MCNC: 10 MG/DL
CALCIUM SERPL-MCNC: 8.7 MG/DL
CHLORIDE SERPL-SCNC: 97 MMOL/L
CO2 SERPL-SCNC: 31 MMOL/L
CREAT SERPL-MCNC: 0.9 MG/DL
EST. GFR  (AFRICAN AMERICAN): >60 ML/MIN/1.73 M^2
EST. GFR  (NON AFRICAN AMERICAN): >60 ML/MIN/1.73 M^2
GLUCOSE SERPL-MCNC: 202 MG/DL
POTASSIUM SERPL-SCNC: 3.6 MMOL/L
SODIUM SERPL-SCNC: 137 MMOL/L

## 2019-02-18 PROCEDURE — 36415 COLL VENOUS BLD VENIPUNCTURE: CPT

## 2019-02-18 PROCEDURE — 80048 BASIC METABOLIC PNL TOTAL CA: CPT

## 2019-03-11 ENCOUNTER — OFFICE VISIT (OUTPATIENT)
Dept: PULMONOLOGY | Facility: CLINIC | Age: 44
End: 2019-03-11
Payer: MEDICARE

## 2019-03-11 VITALS
HEIGHT: 67 IN | TEMPERATURE: 98 F | BODY MASS INDEX: 38.67 KG/M2 | OXYGEN SATURATION: 92 % | DIASTOLIC BLOOD PRESSURE: 86 MMHG | SYSTOLIC BLOOD PRESSURE: 114 MMHG | WEIGHT: 246.38 LBS | HEART RATE: 73 BPM

## 2019-03-11 DIAGNOSIS — I10 ESSENTIAL HYPERTENSION: ICD-10-CM

## 2019-03-11 DIAGNOSIS — G47.33 OSA TREATED WITH BIPAP: Primary | ICD-10-CM

## 2019-03-11 DIAGNOSIS — I50.42 CHRONIC COMBINED SYSTOLIC AND DIASTOLIC CONGESTIVE HEART FAILURE: ICD-10-CM

## 2019-03-11 PROCEDURE — 99999 PR PBB SHADOW E&M-EST. PATIENT-LVL V: ICD-10-PCS | Mod: PBBFAC,,, | Performed by: NURSE PRACTITIONER

## 2019-03-11 PROCEDURE — 3074F PR MOST RECENT SYSTOLIC BLOOD PRESSURE < 130 MM HG: ICD-10-PCS | Mod: CPTII,S$GLB,, | Performed by: NURSE PRACTITIONER

## 2019-03-11 PROCEDURE — 99999 PR PBB SHADOW E&M-EST. PATIENT-LVL V: CPT | Mod: PBBFAC,,, | Performed by: NURSE PRACTITIONER

## 2019-03-11 PROCEDURE — 3079F DIAST BP 80-89 MM HG: CPT | Mod: CPTII,S$GLB,, | Performed by: NURSE PRACTITIONER

## 2019-03-11 PROCEDURE — 99499 RISK ADDL DX/OHS AUDIT: ICD-10-PCS | Mod: S$GLB,,, | Performed by: NURSE PRACTITIONER

## 2019-03-11 PROCEDURE — 99499 UNLISTED E&M SERVICE: CPT | Mod: S$GLB,,, | Performed by: NURSE PRACTITIONER

## 2019-03-11 PROCEDURE — 3008F BODY MASS INDEX DOCD: CPT | Mod: CPTII,S$GLB,, | Performed by: NURSE PRACTITIONER

## 2019-03-11 PROCEDURE — 3074F SYST BP LT 130 MM HG: CPT | Mod: CPTII,S$GLB,, | Performed by: NURSE PRACTITIONER

## 2019-03-11 PROCEDURE — 3008F PR BODY MASS INDEX (BMI) DOCUMENTED: ICD-10-PCS | Mod: CPTII,S$GLB,, | Performed by: NURSE PRACTITIONER

## 2019-03-11 PROCEDURE — 99214 PR OFFICE/OUTPT VISIT, EST, LEVL IV, 30-39 MIN: ICD-10-PCS | Mod: S$GLB,,, | Performed by: NURSE PRACTITIONER

## 2019-03-11 PROCEDURE — 99214 OFFICE O/P EST MOD 30 MIN: CPT | Mod: S$GLB,,, | Performed by: NURSE PRACTITIONER

## 2019-03-11 PROCEDURE — 3079F PR MOST RECENT DIASTOLIC BLOOD PRESSURE 80-89 MM HG: ICD-10-PCS | Mod: CPTII,S$GLB,, | Performed by: NURSE PRACTITIONER

## 2019-03-11 NOTE — LETTER
March 15, 2019      Miguel Burden MD  [address]  [place] [address]           [facility]  [address] [facility] [address]  [place] [address]  Phone: [phone]  Fax: [phone]          Patient: Fabiana Chanel   MR Number: [id number]   YOB: 1975   Date of Visit: 3/11/2019       Dear Dr. Miguel Burden:    Thank you for referring Fabiana Chanel to me for evaluation. Attached you will find relevant portions of my assessment and plan of care.    If you have questions, please do not hesitate to call me. I look forward to following Fabiana Chanel along with you.    Sincerely,    Vivian Hall  CC:  No Recipients    If you would like to receive this communication electronically, please contact [email or web address] or [phone] to request more information on [employer] access.    For providers and/or their staff who would like to refer a patient to [facility], please contact us through our one-stop-shop provider referral line, [facility], at [phone].    If you feel you have received this communication in error or would no longer like to receive these types of communications, please e-mail [email or web address]

## 2019-03-11 NOTE — PROGRESS NOTES
Fabiana Chanel is a 43 y.o. year-old female returns for management of obstructive sleep apnea and CPAP equipment check. The patient has symptoms of dry mouth, gasping upon awakening, difficulty maintaining sleep, daytime fatigue and EDS.  Patient has been on BiPAP since 2015 after hospitalization for acute respiratory failure requiring intubation.  Medical co-morbidities include: DM, PAF, chronic hypercapnic respiratory failure, HLD, Obesity hypoventilation syndrome, MILE NICM, CHF EF 20% s/p ICD, HTN, Smokes cigars    She is using her BiPAP most nights.  Reports improved sleep with use.    Split night Sleep Study:    The overall AHI was 133.1 with an oxygen nataly of 74.0%.   Effective control of sleep disordered breathing was seen during supine REM stage sleep at a pressure of 19/11 cm of water.      Compliance Summary  10/26/2018 - 1/23/2019 (90 days)   Days with Device Usage 88 days   Days without Device Usage 2 days   Percent Days with Device Usage 97.8%   . Average Usage (Days Used) 11 hrs. 9 mins. 2 secs.  Percent of Days with Usage >= 4 Hours 96.7%    Summary Average Time in Large Leak Per Day 8 mins. 22 secs.  Average AHI 2.2  EPAP 11.0 cmH2O  IPAP 19.0 cmH2O      On today's [place] Sleepiness Scale the patient scores a 10. (prior 13)       Past Medical History:    Past Medical History:   Diagnosis Date    Atrial fibrillation     Blood clot associated with vein wall inflammation     not dvt    Cardiomyopathy     Normal cors on cath 11/2017    CHF (congestive heart failure)     DM (diabetes mellitus) 9/19/2013    Hyperlipidemia     Hypertension     Psoriasis     Sleep apnea       Past Surgical History:   Past Surgical History:   Procedure Laterality Date    CARDIAC CATHETERIZATION      COLONOSCOPY      DILATION AND CURETTAGE OF UTERUS      INSERTION-ICD-DUAL Left 4/17/2018    Performed by Eben Christine MD at [facility] CATH LAB      Family History:   Family History   Problem Relation Age of  "Onset    Hypertension Mother     Hypertension Father     Diabetes Father     Diabetes Maternal Grandmother     Diabetes Paternal Grandmother     Breast cancer Neg Hx     Colon cancer Neg Hx     Ovarian cancer Neg Hx       Social History:   Social History     Tobacco Use   Smoking Status Never Smoker   Smokeless Tobacco Never Used   Tobacco Comment    smokes cigars on occasion      Social History     Substance and Sexual Activity   Alcohol Use Yes    Comment: occasional      Social History     Substance and Sexual Activity   Drug Use No    Comment: hx of marijuana use 3 years ago        Review of Symptoms  Review of Systems   Constitutional: Negative for activity change and appetite change.   HENT: Positive for postnasal drip and congestion.    Respiratory: Negative for apnea, snoring, shortness of breath and Paroxysmal Nocturnal Dyspnea.    Cardiovascular: Negative for chest pain.       Physical Exam  /86   Pulse 73   Temp 97.7 °F (36.5 °C) (Oral)   Ht 5' 7" (1.702 m)   Wt 111.8 kg (246 lb 6.4 oz)   SpO2 (!) 92%   BMI 38.59 kg/m²     Physical Exam   Constitutional: She appears well-developed. No distress. She is obese.   HENT:   Head: Normocephalic.   Mouth/Throat: Mallampati Score: IV.   Cardiovascular: Normal rate and regular rhythm.   Murmur heard.  Pulmonary/Chest: Normal expansion, effort normal and breath sounds normal.   Musculoskeletal: She exhibits no edema.   Skin: She is not diaphoretic.   Psychiatric: She has a normal mood and affect. Her behavior is normal.     Assessment/Plan:     Fabiana was seen today for sleep apnea.    Diagnoses and all orders for this visit:    MILE treated with BiPAP  Split night Sleep Study 4/14/18:    The overall AHI was 133.1 with an oxygen nataly of 74.0%.   Effective control of sleep disordered breathing was seen during supine REM stage sleep at a pressure of 19/11 cm of water.      The patient is using and benefitting from BiPAP. Residual AHI optimal " at 2.2 on BIPAP  Continue treatment  -     CPAP/BIPAP SUPPLIES    Chronic combined systolic and diastolic congestive heart failure  Comments:  f/u per cardiology    Essential hypertension  Comments:  f/u per pcp    Follow-up in about 1 year (around 3/11/2020).

## 2019-03-17 ENCOUNTER — HOSPITAL ENCOUNTER (EMERGENCY)
Facility: HOSPITAL | Age: 44
Discharge: HOME | End: 2019-03-17
Attending: EMERGENCY MEDICINE
Payer: MEDICARE

## 2019-03-17 ENCOUNTER — HOSPITAL ENCOUNTER (EMERGENCY)
Facility: HOSPITAL | Age: 44
Discharge: HOME | End: 2019-03-18
Attending: EMERGENCY MEDICINE
Payer: MEDICARE

## 2019-03-17 VITALS
HEART RATE: 81 BPM | WEIGHT: 240 LBS | TEMPERATURE: 99 F | OXYGEN SATURATION: 100 % | SYSTOLIC BLOOD PRESSURE: 135 MMHG | RESPIRATION RATE: 20 BRPM | BODY MASS INDEX: 37.67 KG/M2 | HEIGHT: 67 IN | DIASTOLIC BLOOD PRESSURE: 88 MMHG

## 2019-03-17 DIAGNOSIS — J06.9 VIRAL URI WITH COUGH: ICD-10-CM

## 2019-03-17 DIAGNOSIS — B34.9 ACUTE VIRAL SYNDROME: Primary | ICD-10-CM

## 2019-03-17 DIAGNOSIS — R05.9 COUGH: ICD-10-CM

## 2019-03-17 DIAGNOSIS — R52 GENERALIZED BODY ACHES: Primary | ICD-10-CM

## 2019-03-17 DIAGNOSIS — R53.81 MALAISE: ICD-10-CM

## 2019-03-17 DIAGNOSIS — R06.02 SOB (SHORTNESS OF BREATH): ICD-10-CM

## 2019-03-17 LAB
ALBUMIN SERPL BCP-MCNC: 3.6 G/DL
ALP SERPL-CCNC: 88 U/L
ALT SERPL W/O P-5'-P-CCNC: 15 U/L
ANION GAP SERPL CALC-SCNC: 11 MMOL/L
AST SERPL-CCNC: 20 U/L
B-HCG UR QL: NEGATIVE
BACTERIA #/AREA URNS HPF: NORMAL /HPF
BASOPHILS # BLD AUTO: 0.01 K/UL
BASOPHILS NFR BLD: 0.2 %
BILIRUB SERPL-MCNC: 0.9 MG/DL
BILIRUB UR QL STRIP: NEGATIVE
BUN SERPL-MCNC: 11 MG/DL
CALCIUM SERPL-MCNC: 9.6 MG/DL
CHLORIDE SERPL-SCNC: 100 MMOL/L
CLARITY UR: CLEAR
CO2 SERPL-SCNC: 27 MMOL/L
COLOR UR: YELLOW
CREAT SERPL-MCNC: 1 MG/DL
CTP QC/QA: YES
DIFFERENTIAL METHOD: ABNORMAL
EOSINOPHIL # BLD AUTO: 0.2 K/UL
EOSINOPHIL NFR BLD: 4.4 %
ERYTHROCYTE [DISTWIDTH] IN BLOOD BY AUTOMATED COUNT: 17.6 %
EST. GFR  (AFRICAN AMERICAN): >60 ML/MIN/1.73 M^2
EST. GFR  (NON AFRICAN AMERICAN): >60 ML/MIN/1.73 M^2
FLUAV AG NPH QL: NEGATIVE
FLUBV AG NPH QL: NEGATIVE
GLUCOSE SERPL-MCNC: 234 MG/DL
GLUCOSE UR QL STRIP: ABNORMAL
HCT VFR BLD AUTO: 41.6 %
HGB BLD-MCNC: 13.2 G/DL
HGB UR QL STRIP: NEGATIVE
HYALINE CASTS #/AREA URNS LPF: 0 /LPF
KETONES UR QL STRIP: NEGATIVE
LEUKOCYTE ESTERASE UR QL STRIP: NEGATIVE
LYMPHOCYTES # BLD AUTO: 1.1 K/UL
LYMPHOCYTES NFR BLD: 23 %
MCH RBC QN AUTO: 28 PG
MCHC RBC AUTO-ENTMCNC: 31.7 G/DL
MCV RBC AUTO: 88 FL
MICROSCOPIC COMMENT: NORMAL
MONOCYTES # BLD AUTO: 0.5 K/UL
MONOCYTES NFR BLD: 10.5 %
NEUTROPHILS # BLD AUTO: 2.8 K/UL
NEUTROPHILS NFR BLD: 61.9 %
NITRITE UR QL STRIP: NEGATIVE
PH UR STRIP: 6 [PH] (ref 5–8)
PLATELET # BLD AUTO: 220 K/UL
PMV BLD AUTO: 10.6 FL
POTASSIUM SERPL-SCNC: 4.3 MMOL/L
PROT SERPL-MCNC: 7.7 G/DL
PROT UR QL STRIP: ABNORMAL
RBC # BLD AUTO: 4.71 M/UL
RBC #/AREA URNS HPF: 1 /HPF (ref 0–4)
SODIUM SERPL-SCNC: 138 MMOL/L
SP GR UR STRIP: 1.01 (ref 1–1.03)
SQUAMOUS #/AREA URNS HPF: 15 /HPF
TROPONIN I SERPL DL<=0.01 NG/ML-MCNC: 0.02 NG/ML
URN SPEC COLLECT METH UR: ABNORMAL
UROBILINOGEN UR STRIP-ACNC: ABNORMAL EU/DL
WBC # BLD AUTO: 4.57 K/UL
WBC #/AREA URNS HPF: 3 /HPF (ref 0–5)
YEAST URNS QL MICRO: NORMAL

## 2019-03-17 PROCEDURE — 99284 EMERGENCY DEPT VISIT MOD MDM: CPT | Mod: 25

## 2019-03-17 PROCEDURE — 85025 COMPLETE CBC W/AUTO DIFF WBC: CPT

## 2019-03-17 PROCEDURE — 80053 COMPREHEN METABOLIC PANEL: CPT

## 2019-03-17 PROCEDURE — 84484 ASSAY OF TROPONIN QUANT: CPT

## 2019-03-17 PROCEDURE — 81000 URINALYSIS NONAUTO W/SCOPE: CPT

## 2019-03-17 PROCEDURE — 87804 INFLUENZA ASSAY W/OPTIC: CPT

## 2019-03-17 PROCEDURE — 99285 EMERGENCY DEPT VISIT HI MDM: CPT | Mod: 27

## 2019-03-17 PROCEDURE — 81025 URINE PREGNANCY TEST: CPT

## 2019-03-17 PROCEDURE — 25000003 PHARM REV CODE 250: Performed by: EMERGENCY MEDICINE

## 2019-03-17 RX ORDER — ACETAMINOPHEN 500 MG
1000 TABLET ORAL
Status: COMPLETED | OUTPATIENT
Start: 2019-03-17 | End: 2019-03-17

## 2019-03-17 RX ADMIN — ACETAMINOPHEN 1000 MG: 500 TABLET, FILM COATED ORAL at 01:03

## 2019-03-17 NOTE — ED PROVIDER NOTES
Encounter Date: 3/17/2019    This is a SORT/MSE of a 43 y.o. female presenting to the ED with c/o body aches, shortness of breath, and chest/nasal congestion. Hx of CHF, Afib, DM. Breath sounds with no wheezing. No lower extremity swelling. Care will be transferred to an alternate provider when patient is roomed for a full evaluation and final disposition. RODRÍGUEZ Ruff, FNP-C 3/17/2019 12:37 PM    SCRIBE #1 NOTE: I, Shubham Clifton, am scribing for, and in the presence of,  Timothy Rg MD . I have scribed the following portions of the note - Other sections scribed: HPI, ROS, PE .       History     Chief Complaint   Patient presents with    Generalized Body Aches     all over body aches, nasal and chest congestion x 2 days.   denies n/v/d or fever.     CC: Generalized Body Aches    This is a 43 y.o Female with pmhx of Afib, cardiomyopathy, CHF, DM, hyperlipidemia, HTN, psoriasis, and sleep apnea presenting to the ED with c/o congestion, SOB and coughing x2 days as well as generalized body aches x1 day. Pt has not attempted txt. She denies smoking, drinking and drug use. Also denies nausea, vomiting, abdominal pain, diarrhea, and neck pain. No other symptoms to report.         The history is provided by the patient. No  was used.     Review of patient's allergies indicates:   Allergen Reactions    Pneumococcal 23-abimbola ps vaccine      Past Medical History:   Diagnosis Date    Atrial fibrillation     Blood clot associated with vein wall inflammation     not dvt    Cardiomyopathy     Normal cors on cath 11/2017    CHF (congestive heart failure)     DM (diabetes mellitus) 9/19/2013    Hyperlipidemia     Hypertension     Psoriasis     Sleep apnea      Past Surgical History:   Procedure Laterality Date    CARDIAC CATHETERIZATION      COLONOSCOPY      DILATION AND CURETTAGE OF UTERUS      INSERTION-ICD-DUAL Left 4/17/2018    Performed by Eben Christine MD at [facility] CATH LAB     [facility]  History   Problem Relation Age of Onset    Hypertension Mother     Hypertension Father     Diabetes Father     Diabetes Maternal Grandmother     Diabetes Paternal Grandmother     Breast cancer Neg Hx     Colon cancer Neg Hx     Ovarian cancer Neg Hx      Social History     Tobacco Use    Smoking status: Never Smoker    Smokeless tobacco: Never Used    Tobacco comment: smokes cigars on occasion   Substance Use Topics    Alcohol use: Yes     Comment: occasional    Drug use: No     Comment: hx of marijuana use 3 years ago     Review of Systems   Constitutional: Negative for chills and fever.   HENT: Positive for congestion.    Eyes: Negative for redness.   Respiratory: Positive for cough and shortness of breath.    Gastrointestinal: Negative for abdominal pain, diarrhea, nausea and vomiting.   Genitourinary: Negative for dysuria.   Musculoskeletal: Negative for neck pain.        (+) generalize body aches   Skin: Negative for color change.   Neurological: Negative for headaches.       Physical Exam     Initial Vitals [03/17/19 1237]   BP Pulse Resp Temp SpO2   (!) 137/95 92 18 98.7 °F (37.1 °C) 95 %      MAP       --         Physical Exam    Nursing note and vitals reviewed.  Constitutional: She is not diaphoretic. No distress.   Well hydrated, no evidence of infection    HENT:   Head: Normocephalic and atraumatic.   Mouth/Throat: Oropharynx is clear and moist.   Eyes: EOM are normal. Pupils are equal, round, and reactive to light. No scleral icterus.   Neck: Normal range of motion. Neck supple. No JVD present.   Cardiovascular: Normal rate and regular rhythm.   Pulmonary/Chest: Breath sounds normal. No stridor. No respiratory distress.   Abdominal: Soft. Bowel sounds are normal. She exhibits no distension. There is no tenderness.   Musculoskeletal: Normal range of motion. She exhibits no edema or tenderness.   Neurological: She is alert and oriented to person, place, and time. She has normal strength. No  cranial nerve deficit or sensory deficit.   Skin: Skin is warm and dry.   Psychiatric: She has a normal mood and affect.         ED Course   Procedures  Labs Reviewed   CBC W/ AUTO DIFFERENTIAL - Abnormal; Notable for the following components:       Result Value    MCHC 31.7 (*)     RDW 17.6 (*)     All other components within normal limits   COMPREHENSIVE METABOLIC PANEL - Abnormal; Notable for the following components:    Glucose 234 (*)     All other components within normal limits   URINALYSIS - Abnormal; Notable for the following components:    Protein, UA 1+ (*)     Glucose, UA 3+ (*)     Urobilinogen, UA 4.0-6.0 (*)     All other components within normal limits   PREGNANCY TEST, URINE RAPID   TROPONIN I   URINALYSIS MICROSCOPIC   INFLUENZA A AND B ANTIGEN   POCT INFLUENZA A/B     EKG Readings: (Independently Interpreted)   No STEMI, Vent Rate 82 BPM, normal sinus rhythm, nonspecific T wave abnormality, prolonged QT, abnormal ECG       Imaging Results          X-Ray Chest AP Portable (Final result)  Result time 03/17/19 13:58:37    Final result by Willa Quarles MD (03/17/19 13:58:37)                 Impression:      No acute abnormality.      Electronically signed by: Willa Quarles MD  Date:    03/17/2019  Time:    13:58             Narrative:    EXAMINATION:  XR CHEST AP PORTABLE    CLINICAL HISTORY:  Chest Pain;.    TECHNIQUE:  Single frontal portable view of the chest was performed.    COMPARISON:  12/27/2018    FINDINGS:  Support devices: Left-sided cardiac pacing device shows no abnormalities.    Evaluation slightly limited by body habitus and underpenetration.The lungs are clear, with normal appearance of pulmonary vasculature and no pleural effusion or pneumothorax.    The cardiac silhouette is normal in size. The hilar and mediastinal contours are unremarkable.    Bones are intact.                                            Scribe Attestation:   Scribe #1: I performed the above scribed service  and the documentation accurately describes the services I performed. I attest to the accuracy of the note.    Attending Attestation:           Physician Attestation for Scribe:  Physician Attestation Statement for Scribe #1: I, Timothy Rg MD, reviewed documentation, as scribed by Shubham Clifton  in my presence, and it is both accurate and complete.          medical decision making:  Patient with normal labs unremarkable chest x-ray negative for flu history consistent with general myalgias and viral respiratory infection no evidence of pneumonia or acute coronary event normal electrolytes.  Patient discharged home with instructions regarding viral syndrome treatment.  Of note she was not happy with her care she felt like we had a not dresser pain issues although she was given Tylenol as recommended she take Motrin at home do not feel that narcotics or stronger medication or indicated this time.  Patient left the emergency department without receiving her discharge instructions and took her own IV out prior to leaving.           Clinical Impression:       ICD-10-CM ICD-9-CM   1. Acute viral syndrome B34.9 079.99   2. SOB (shortness of breath) R06.02 786.05   3. Malaise R53.81 780.79                     I, Dr. Timothy Rg, personally performed the services described in this documentation.   All medical record entries made by the scribe were at my direction and in my presence.   I have reviewed the chart and agree that the record is accurate and complete.   Timothy Rg MD.  5:02 PM 03/17/2019            Timothy Rg MD  03/17/19 1751

## 2019-03-18 VITALS
RESPIRATION RATE: 20 BRPM | HEART RATE: 122 BPM | OXYGEN SATURATION: 96 % | WEIGHT: 240 LBS | TEMPERATURE: 99 F | SYSTOLIC BLOOD PRESSURE: 148 MMHG | DIASTOLIC BLOOD PRESSURE: 83 MMHG | BODY MASS INDEX: 37.67 KG/M2 | HEIGHT: 67 IN

## 2019-03-18 PROCEDURE — 96372 THER/PROPH/DIAG INJ SC/IM: CPT

## 2019-03-18 PROCEDURE — 63600175 PHARM REV CODE 636 W HCPCS: Performed by: PHYSICIAN ASSISTANT

## 2019-03-18 PROCEDURE — 94640 AIRWAY INHALATION TREATMENT: CPT

## 2019-03-18 PROCEDURE — 94761 N-INVAS EAR/PLS OXIMETRY MLT: CPT

## 2019-03-18 PROCEDURE — 25000003 PHARM REV CODE 250: Performed by: PHYSICIAN ASSISTANT

## 2019-03-18 PROCEDURE — 25000242 PHARM REV CODE 250 ALT 637 W/ HCPCS: Performed by: PHYSICIAN ASSISTANT

## 2019-03-18 RX ORDER — OSELTAMIVIR PHOSPHATE 75 MG/1
75 CAPSULE ORAL
Status: COMPLETED | OUTPATIENT
Start: 2019-03-18 | End: 2019-03-18

## 2019-03-18 RX ORDER — IPRATROPIUM BROMIDE AND ALBUTEROL SULFATE 2.5; .5 MG/3ML; MG/3ML
3 SOLUTION RESPIRATORY (INHALATION)
Status: COMPLETED | OUTPATIENT
Start: 2019-03-18 | End: 2019-03-18

## 2019-03-18 RX ORDER — OSELTAMIVIR PHOSPHATE 75 MG/1
75 CAPSULE ORAL 2 TIMES DAILY
Qty: 10 CAPSULE | Refills: 0 | Status: SHIPPED | OUTPATIENT
Start: 2019-03-18 | End: 2019-03-23

## 2019-03-18 RX ORDER — IBUPROFEN 600 MG/1
600 TABLET ORAL EVERY 6 HOURS PRN
Qty: 20 TABLET | Refills: 0 | Status: SHIPPED | OUTPATIENT
Start: 2019-03-18 | End: 2019-03-23

## 2019-03-18 RX ORDER — ACETAMINOPHEN 500 MG
500 TABLET ORAL EVERY 4 HOURS PRN
Qty: 20 TABLET | Refills: 0 | Status: SHIPPED | OUTPATIENT
Start: 2019-03-18 | End: 2019-03-23

## 2019-03-18 RX ORDER — ACETAMINOPHEN 500 MG
500 TABLET ORAL
Status: COMPLETED | OUTPATIENT
Start: 2019-03-18 | End: 2019-03-18

## 2019-03-18 RX ORDER — ALBUTEROL SULFATE 90 UG/1
1-2 AEROSOL, METERED RESPIRATORY (INHALATION) EVERY 6 HOURS PRN
Qty: 1 INHALER | Refills: 0 | Status: SHIPPED | OUTPATIENT
Start: 2019-03-18 | End: 2019-11-23

## 2019-03-18 RX ORDER — BENZONATATE 100 MG/1
100 CAPSULE ORAL
Status: COMPLETED | OUTPATIENT
Start: 2019-03-18 | End: 2019-03-18

## 2019-03-18 RX ORDER — BENZONATATE 100 MG/1
100 CAPSULE ORAL 3 TIMES DAILY PRN
Qty: 20 CAPSULE | Refills: 0 | Status: SHIPPED | OUTPATIENT
Start: 2019-03-18 | End: 2019-03-25

## 2019-03-18 RX ORDER — CETIRIZINE HYDROCHLORIDE 10 MG/1
10 TABLET ORAL DAILY
Qty: 14 TABLET | Refills: 0 | Status: SHIPPED | OUTPATIENT
Start: 2019-03-18 | End: 2021-02-19

## 2019-03-18 RX ORDER — KETOROLAC TROMETHAMINE 30 MG/ML
15 INJECTION, SOLUTION INTRAMUSCULAR; INTRAVENOUS
Status: COMPLETED | OUTPATIENT
Start: 2019-03-18 | End: 2019-03-18

## 2019-03-18 RX ADMIN — IPRATROPIUM BROMIDE AND ALBUTEROL SULFATE 3 ML: .5; 3 SOLUTION RESPIRATORY (INHALATION) at 12:03

## 2019-03-18 RX ADMIN — KETOROLAC TROMETHAMINE 15 MG: 30 INJECTION, SOLUTION INTRAMUSCULAR; INTRAVENOUS at 12:03

## 2019-03-18 RX ADMIN — OSELTAMIVIR PHOSPHATE 75 MG: 75 CAPSULE ORAL at 12:03

## 2019-03-18 RX ADMIN — BENZONATATE 100 MG: 100 CAPSULE ORAL at 12:03

## 2019-03-18 RX ADMIN — ACETAMINOPHEN 500 MG: 500 TABLET, FILM COATED ORAL at 12:03

## 2019-03-18 NOTE — DISCHARGE INSTRUCTIONS
Take following meds as prescribed:   Tamiflu for possible flu  Ibuprofen and Tylenol for body aches  Zyrtec and nasal spray for congestion and itchy, watery eyes  Tessalon for cough  Albuterol for Shortness of breath     Follow up with primary care in 2 days. Return to ER for worsening symptoms or as needed.

## 2019-03-18 NOTE — ED TRIAGE NOTES
Patient came to the ED with complaint of SOB, bad cold, and generalized body aches since yesterday.

## 2019-03-18 NOTE — ED NOTES
Patient had elevated pulse rate of 122.  Provider, Neema Astorga NP notified.  Repeat pulse rate showed decrease to 108 upon asking patient to take a few deep breaths through nose and slowly let out through mouth.  Patient tolerated activity well and netted the decrease.  Patient denied pain prior to and after activity.  Provider notified and this Nurse was instructed to follow through with discharge.

## 2019-03-18 NOTE — ED PROVIDER NOTES
"Encounter Date: 3/17/2019    This is a SORT/MSE of a 43 y.o. female presenting to the ED with c/o body aches, shortness of breath. Evaluated and discharged earlier today, returns because symptoms are worse. Care will be transferred to an alternate provider when patient is roomed for a full evaluation and final disposition. RODRÍGUEZ Ruff, TONYA-C 3/17/2019 9:34 PM       History     Chief Complaint   Patient presents with    Generalized Body Aches     Pt states, "My body is aching and I'm short of breath and have a bad cold." Pt states symptoms started on Wed and bodyaches are getting worse. Pt last took tylenol for pain while here earlier for same complaint.     URI     CC: Generalized Body Aches: URI     HPI:   44 y/o Female with past medical history of hypertension, diabetes, CHF, AFib presenting to the ED with c/o 2 day history of nasal congestion, productive cough with yellow sputum, intermittent SOB and 1 day history of generalized body aches.  Patient was evaluated at this facility earlier today with full workup that was negative. Returning due to worsening myalgias. According to note from earlier pt removed her IV and left without discharge paperwork because she was unhappy with receiving Tylenol for pain. Pt is now asking me "are you going to give me something by the IV for pain?" No attempted tx. Denies chest pain, lower extremity swelling, difficulty laying flat, nausea, vomiting, abdominal pain, diarrhea, and neck pain.              Review of patient's allergies indicates:   Allergen Reactions    Pneumococcal 23-abimbola ps vaccine      Past Medical History:   Diagnosis Date    Atrial fibrillation     Blood clot associated with vein wall inflammation     not dvt    Cardiomyopathy     Normal cors on cath 11/2017    CHF (congestive heart failure)     DM (diabetes mellitus) 9/19/2013    Hyperlipidemia     Hypertension     Psoriasis     Sleep apnea      Past Surgical History:   Procedure Laterality " Date    CARDIAC CATHETERIZATION      COLONOSCOPY      DILATION AND CURETTAGE OF UTERUS      INSERTION-ICD-DUAL Left 4/17/2018    Performed by Eben Christine MD at [facility]     Family History   Problem Relation Age of Onset    Hypertension Mother     Hypertension Father     Diabetes Father     Diabetes Maternal Grandmother     Diabetes Paternal Grandmother     Breast cancer Neg Hx     Colon cancer Neg Hx     Ovarian cancer Neg Hx      Social History     Tobacco Use    Smoking status: Never Smoker    Smokeless tobacco: Never Used    Tobacco comment: smokes cigars on occasion   Substance Use Topics    Alcohol use: Yes     Comment: occasional    Drug use: No     Comment: hx of marijuana use 3 years ago     Review of Systems   Constitutional: Positive for chills. Negative for fever.   HENT: Positive for congestion and rhinorrhea. Negative for ear pain and sore throat.    Eyes: Positive for discharge and itching. Negative for redness.   Respiratory: Positive for cough and shortness of breath.    Cardiovascular: Negative for chest pain and leg swelling.   Gastrointestinal: Negative for abdominal pain, diarrhea and vomiting.   Genitourinary: Negative for decreased urine volume and dysuria.   Musculoskeletal: Positive for myalgias. Negative for back pain and neck pain.   Skin: Negative for rash.   Neurological: Negative for dizziness, light-headedness and headaches.   Psychiatric/Behavioral: Negative for confusion.       Physical Exam     Initial Vitals [03/17/19 2132]   BP Pulse Resp Temp SpO2   (!) 154/95 93 18 98.8 °F (37.1 °C) 95 %      MAP       --         Physical Exam    Nursing note and vitals reviewed.  Constitutional: She appears well-developed and well-nourished.   HENT:   Head: Normocephalic.   Right Ear: Hearing, tympanic membrane, external ear and ear canal normal.   Left Ear: Hearing, tympanic membrane, external ear and ear canal normal.   Nose: Mucosal edema and rhinorrhea present.  Right sinus exhibits maxillary sinus tenderness. Left sinus exhibits maxillary sinus tenderness.   Mouth/Throat: Uvula is midline, oropharynx is clear and moist and mucous membranes are normal. No oropharyngeal exudate, posterior oropharyngeal edema or posterior oropharyngeal erythema.   Postnasal drip    Eyes: Conjunctivae, EOM and lids are normal. Pupils are equal, round, and reactive to light.   Allergic shiners to bilateral eyes. Dried white crusted discharge to R eye. No conjunctival injection.    Cardiovascular: Normal rate and regular rhythm. Exam reveals no gallop and no friction rub.    No murmur heard.  Pulmonary/Chest: Effort normal and breath sounds normal. She has no decreased breath sounds. She has no wheezes. She has no rhonchi. She has no rales.   Abdominal: Soft. Bowel sounds are normal. She exhibits no distension. There is no tenderness. There is no rebound, no guarding, no tenderness at McBurney's point and negative Dunbar's sign.   Musculoskeletal: Normal range of motion.   Lymphadenopathy:     She has no cervical adenopathy.   Neurological: She is alert. She has normal strength. No cranial nerve deficit or sensory deficit.   Skin: Skin is warm and dry.   Psychiatric: She has a normal mood and affect.         ED Course   Procedures  Labs Reviewed - No data to display       Imaging Results    None          Medical Decision Making:   Initial Assessment:   43-year-old female with history of hypertension, diabetes, CHF presenting for evaluation of flu-like symptoms x2 days.  Patient evaluated this facility earlier today with negative workup.  She returns because her myalgias are persisting and worsening.  Patient is afebrile, nontoxic appearing in no distress.  Not hypoxic or tachycardic.  Exam above.  Heart RRR. Doubt AFib. Denies CP doubt ACS. Had negative workup earlier today.Patient has no lower extremity swelling. She states this is not shortness of breath that is similar to her CHF  exacerbations.  Considered but doubt that at this time.  Patient was given IM toradol and DuoNeb x3 with improvement of her myalgias and shortness of breath. Tachycardic at discharge, likely due to breathing tx. Will discharge with albuterol inhaler.  Will treat with Tamiflu for possible influenza.  Ibuprofen and Tylenol for myalgias.  Tessalon for cough.  Zyrtec nasal spray for nasal congestion. follow up with primary care in 2 days.  Return to the ER for worsening symptoms or as needed. Discussed with Dr. Lincoln who agrees with assessment and plan.                       Clinical Impression:       ICD-10-CM ICD-9-CM   1. Generalized body aches R52 780.96   2. SOB (shortness of breath) R06.02 786.05   3. Cough R05 786.2   4. Viral URI with cough J06.9 465.9    B97.89                                  Neema Garcia PA-C  03/18/19 0109

## 2019-03-26 NOTE — ASSESSMENT & PLAN NOTE
As above  
Change simva to atorva 80  
Change simva to atorva 80  
Cont med rx  
Echo noted recurrent severe LV dysfxn  Cath with NICM and elevated filling pressures  Cont med rx  Change lasix to PO  Cont BBl/ACEi/hydrala/imdur  Add aldactone  Dispo planning appropriate  Repeat echo in 3 months (late Feb 2018) for reassessment of LV fxn and need for ICD  Follow up with Dr. Yanez 1 week after discharge    
Given her presenting sxs, and no evidence of prior angio, will proceed with cath today.  Cont ASA, load plavix.  IVF/NPO.  Check echo.  Change simva to atorva 80mg qhs.  Risks, benefits and alternatives of the catheterization procedure were discussed with the patient which include but are not limited to: bleeding, infection, death, heart attack, arrhythmia, kidney failure, stroke, need for emergency surgery, etc.  Patient understands and and agrees to proceed.  Consent was placed on the chart.    
Per IM  
Per IM  
Possibly related to CHF.  Continue to trend values.  No evidence of ischemia on EKG  
Prior CMP with more recent echo 6/2017 with EF 50%.  Repeat echo.  Cont BBl/ACEi.  
· BG is above acceptable range at this time; sliding scale insulin given  · Maintain w/ subcutaneous insulin management order set  · Hold oral diabetic meds  · ADA 1800 kcal diet  · BG goal while in patient is <180mg/dL  · HgA1c = Pending        
· Body mass index is 37.67 kg/m².  · Order a cardiac diet  · Counseling given  · Nutrition consult as an outpatient        
· Currently rate controlled  · Maintain with beta-blocker with hold parameters  · Monitor on telemetry  · Maintain magnesium around 2.0  · Maintain potassium around 4.0    
· Evidenced by history, elevated BNP, pulmonary edema, peripheral edema  · Provide diuresis w/ IV medication  · Maintain w/ beta-blocker  · ACE inhibitor or ARB if GFR allows and remains stable  · If 1) Patient cannot tolerate ACEi or 2) NYHA class III or above, add spironolactone (or eplerenone) and hydralazine-isosorbide dinitrate   · Daily Weights  · Strict I/O  · Fluid restriction to 1,500cc daily  · Low-sodium cardiac diet  · Obtain 2D echo if <6 months     EF   Date Value Ref Range Status   06/09/2017 50 55 - 65    05/16/2015 30 (A) 55 - 65      · Chest X-ray  · Check TSH, albumin, UA, and renal function  · EKG and cardiac enzymes PRN  · DVT prophylaxis w/ pharmacological and/or mechanical measures  · Oxygen supplementation support PRN    Instructions given to patient/family:  Monitor daily weight.  Regular activity within patient's limitations.  Low salt, low fat and low choleterol diet and restrict fluid < 2L per day.  Call MD if SOB, chest pain, weight gain > 2-3 lbs per day and/or 5-6 lbs per week.   No smoking. Annual influenza vaccine required.      
· Goal while inpatient is a systolic blood pressure less than 130mmHg given elevated troponin level  · BP in acceptable range at this time  · Continue current home regimen with hold parameters  · PRN antihypertensives available      
· Lipid panel - pending  · Cardiac diet  · Continue statin      
1092

## 2019-04-26 ENCOUNTER — OFFICE VISIT (OUTPATIENT)
Dept: GASTROENTEROLOGY | Facility: CLINIC | Age: 44
End: 2019-04-26
Payer: MEDICARE

## 2019-04-26 ENCOUNTER — HOSPITAL ENCOUNTER (EMERGENCY)
Facility: HOSPITAL | Age: 44
Discharge: HOME | End: 2019-04-26
Attending: EMERGENCY MEDICINE
Payer: MEDICARE

## 2019-04-26 VITALS
WEIGHT: 238 LBS | DIASTOLIC BLOOD PRESSURE: 56 MMHG | HEIGHT: 67 IN | BODY MASS INDEX: 37.35 KG/M2 | SYSTOLIC BLOOD PRESSURE: 90 MMHG | HEART RATE: 76 BPM

## 2019-04-26 VITALS
DIASTOLIC BLOOD PRESSURE: 76 MMHG | TEMPERATURE: 98 F | BODY MASS INDEX: 37.35 KG/M2 | HEART RATE: 68 BPM | RESPIRATION RATE: 18 BRPM | OXYGEN SATURATION: 95 % | HEIGHT: 67 IN | WEIGHT: 238 LBS | SYSTOLIC BLOOD PRESSURE: 112 MMHG

## 2019-04-26 DIAGNOSIS — R10.13 EPIGASTRIC PAIN: Primary | ICD-10-CM

## 2019-04-26 DIAGNOSIS — R53.83 FATIGUE: ICD-10-CM

## 2019-04-26 DIAGNOSIS — R42 EPISODIC LIGHTHEADEDNESS: Primary | ICD-10-CM

## 2019-04-26 DIAGNOSIS — R73.9 HYPERGLYCEMIA: ICD-10-CM

## 2019-04-26 DIAGNOSIS — R11.0 NAUSEA: ICD-10-CM

## 2019-04-26 LAB
ALBUMIN SERPL BCP-MCNC: 3.7 G/DL (ref 3.5–5.2)
ALP SERPL-CCNC: 90 U/L (ref 55–135)
ALT SERPL W/O P-5'-P-CCNC: 13 U/L (ref 10–44)
ANION GAP SERPL CALC-SCNC: 10 MMOL/L (ref 8–16)
AST SERPL-CCNC: 14 U/L (ref 10–40)
BACTERIA #/AREA URNS HPF: ABNORMAL /HPF
BASOPHILS # BLD AUTO: 0.03 K/UL (ref 0–0.2)
BASOPHILS NFR BLD: 0.6 % (ref 0–1.9)
BILIRUB SERPL-MCNC: 0.4 MG/DL (ref 0.1–1)
BILIRUB UR QL STRIP: NEGATIVE
BUN SERPL-MCNC: 15 MG/DL (ref 6–20)
CALCIUM SERPL-MCNC: 9.6 MG/DL (ref 8.7–10.5)
CHLORIDE SERPL-SCNC: 94 MMOL/L (ref 95–110)
CLARITY UR: ABNORMAL
CO2 SERPL-SCNC: 33 MMOL/L (ref 23–29)
COLOR UR: YELLOW
CREAT SERPL-MCNC: 1.2 MG/DL (ref 0.5–1.4)
DIFFERENTIAL METHOD: ABNORMAL
EOSINOPHIL # BLD AUTO: 0.1 K/UL (ref 0–0.5)
EOSINOPHIL NFR BLD: 2.3 % (ref 0–8)
ERYTHROCYTE [DISTWIDTH] IN BLOOD BY AUTOMATED COUNT: 15.9 % (ref 11.5–14.5)
EST. GFR  (AFRICAN AMERICAN): >60 ML/MIN/1.73 M^2
EST. GFR  (NON AFRICAN AMERICAN): 56 ML/MIN/1.73 M^2
GLUCOSE SERPL-MCNC: 306 MG/DL (ref 70–110)
GLUCOSE UR QL STRIP: ABNORMAL
HCT VFR BLD AUTO: 49.3 % (ref 37–48.5)
HGB BLD-MCNC: 15.8 G/DL (ref 12–16)
HGB UR QL STRIP: NEGATIVE
HYALINE CASTS #/AREA URNS LPF: 0 /LPF
KETONES UR QL STRIP: NEGATIVE
LEUKOCYTE ESTERASE UR QL STRIP: NEGATIVE
LIPASE SERPL-CCNC: 5 U/L (ref 4–60)
LYMPHOCYTES # BLD AUTO: 1.9 K/UL (ref 1–4.8)
LYMPHOCYTES NFR BLD: 38.3 % (ref 18–48)
MCH RBC QN AUTO: 29.2 PG (ref 27–31)
MCHC RBC AUTO-ENTMCNC: 32 G/DL (ref 32–36)
MCV RBC AUTO: 91 FL (ref 82–98)
MICROSCOPIC COMMENT: ABNORMAL
MONOCYTES # BLD AUTO: 0.3 K/UL (ref 0.3–1)
MONOCYTES NFR BLD: 6.1 % (ref 4–15)
NEUTROPHILS # BLD AUTO: 2.6 K/UL (ref 1.8–7.7)
NEUTROPHILS NFR BLD: 52.7 % (ref 38–73)
NITRITE UR QL STRIP: NEGATIVE
PH UR STRIP: 5 [PH] (ref 5–8)
PLATELET # BLD AUTO: 299 K/UL (ref 150–350)
PMV BLD AUTO: 10.9 FL (ref 9.2–12.9)
POTASSIUM SERPL-SCNC: 4 MMOL/L (ref 3.5–5.1)
PROT SERPL-MCNC: 7.8 G/DL (ref 6–8.4)
PROT UR QL STRIP: ABNORMAL
RBC # BLD AUTO: 5.41 M/UL (ref 4–5.4)
RBC #/AREA URNS HPF: 1 /HPF (ref 0–4)
SODIUM SERPL-SCNC: 137 MMOL/L (ref 136–145)
SP GR UR STRIP: 1.01 (ref 1–1.03)
TSH SERPL DL<=0.005 MIU/L-ACNC: 1.25 UIU/ML (ref 0.4–4)
URN SPEC COLLECT METH UR: ABNORMAL
UROBILINOGEN UR STRIP-ACNC: ABNORMAL EU/DL
WBC # BLD AUTO: 4.88 K/UL (ref 3.9–12.7)
WBC #/AREA URNS HPF: 2 /HPF (ref 0–5)
YEAST URNS QL MICRO: ABNORMAL

## 2019-04-26 PROCEDURE — 3074F PR MOST RECENT SYSTOLIC BLOOD PRESSURE < 130 MM HG: ICD-10-PCS | Mod: CPTII,S$GLB,, | Performed by: INTERNAL MEDICINE

## 2019-04-26 PROCEDURE — 3008F PR BODY MASS INDEX (BMI) DOCUMENTED: ICD-10-PCS | Mod: CPTII,S$GLB,, | Performed by: INTERNAL MEDICINE

## 2019-04-26 PROCEDURE — 99284 EMERGENCY DEPT VISIT MOD MDM: CPT | Mod: 25

## 2019-04-26 PROCEDURE — 84443 ASSAY THYROID STIM HORMONE: CPT

## 2019-04-26 PROCEDURE — 93010 EKG 12-LEAD: ICD-10-PCS | Mod: ,,, | Performed by: INTERNAL MEDICINE

## 2019-04-26 PROCEDURE — 3008F BODY MASS INDEX DOCD: CPT | Mod: CPTII,S$GLB,, | Performed by: INTERNAL MEDICINE

## 2019-04-26 PROCEDURE — 93005 ELECTROCARDIOGRAM TRACING: CPT

## 2019-04-26 PROCEDURE — 93010 ELECTROCARDIOGRAM REPORT: CPT | Mod: ,,, | Performed by: INTERNAL MEDICINE

## 2019-04-26 PROCEDURE — 80053 COMPREHEN METABOLIC PANEL: CPT

## 2019-04-26 PROCEDURE — 85025 COMPLETE CBC W/AUTO DIFF WBC: CPT

## 2019-04-26 PROCEDURE — 81000 URINALYSIS NONAUTO W/SCOPE: CPT

## 2019-04-26 PROCEDURE — 3074F SYST BP LT 130 MM HG: CPT | Mod: CPTII,S$GLB,, | Performed by: INTERNAL MEDICINE

## 2019-04-26 PROCEDURE — 3078F DIAST BP <80 MM HG: CPT | Mod: CPTII,S$GLB,, | Performed by: INTERNAL MEDICINE

## 2019-04-26 PROCEDURE — 63600175 PHARM REV CODE 636 W HCPCS: Performed by: EMERGENCY MEDICINE

## 2019-04-26 PROCEDURE — 3078F PR MOST RECENT DIASTOLIC BLOOD PRESSURE < 80 MM HG: ICD-10-PCS | Mod: CPTII,S$GLB,, | Performed by: INTERNAL MEDICINE

## 2019-04-26 PROCEDURE — 99204 OFFICE O/P NEW MOD 45 MIN: CPT | Mod: S$GLB,,, | Performed by: INTERNAL MEDICINE

## 2019-04-26 PROCEDURE — 99204 PR OFFICE/OUTPT VISIT, NEW, LEVL IV, 45-59 MIN: ICD-10-PCS | Mod: S$GLB,,, | Performed by: INTERNAL MEDICINE

## 2019-04-26 PROCEDURE — 96374 THER/PROPH/DIAG INJ IV PUSH: CPT

## 2019-04-26 PROCEDURE — 83690 ASSAY OF LIPASE: CPT

## 2019-04-26 RX ORDER — METFORMIN HYDROCHLORIDE 500 MG/1
1000 TABLET ORAL 2 TIMES DAILY WITH MEALS
Qty: 56 TABLET | Refills: 0 | Status: SHIPPED | OUTPATIENT
Start: 2019-04-26 | End: 2019-06-13 | Stop reason: SINTOL

## 2019-04-26 RX ORDER — ONDANSETRON 2 MG/ML
4 INJECTION INTRAMUSCULAR; INTRAVENOUS
Status: COMPLETED | OUTPATIENT
Start: 2019-04-26 | End: 2019-04-26

## 2019-04-26 RX ORDER — SODIUM CHLORIDE 9 MG/ML
1000 INJECTION, SOLUTION INTRAVENOUS ONCE
Status: DISCONTINUED | OUTPATIENT
Start: 2019-04-26 | End: 2019-04-26

## 2019-04-26 RX ADMIN — ONDANSETRON 4 MG: 2 INJECTION INTRAMUSCULAR; INTRAVENOUS at 01:04

## 2019-04-26 NOTE — LETTER
April 26, 2019      Miguel Burden MD  [address]  [place] [address]           [facility]  [address] [facility] [address]  [place] [address]  Phone: [phone]  Fax: [phone]          Patient: Fabiana Chanel   MR Number: [id number]   YOB: 1975   Date of Visit: 4/26/2019       Dear Dr. Miguel Burden:    Thank you for referring Fabiana Chanel to me for evaluation. Attached you will find relevant portions of my assessment and plan of care.    If you have questions, please do not hesitate to call me. I look forward to following Fabiana Chanel along with you.    Sincerely,    Vielka Burrell MD    Enclosure  CC:  No Recipients    If you would like to receive this communication electronically, please contact [email or web address] or [phone] to request more information on [facility] access.    For providers and/or their staff who would like to refer a patient to [facility], please contact us through our one-stop-shop provider referral line, [facility], at [phone].    If you feel you have received this communication in error or would no longer like to receive these types of communications, please e-mail [email or web address]

## 2019-04-26 NOTE — ED TRIAGE NOTES
"Pt states she was at the doctor's earlier today and that she came to the ED because "her pressure was low." Pt states she has a hx of high blood pressure and takes pressure medicines. States she did not take any today since she takes them at night.  "

## 2019-04-26 NOTE — PROGRESS NOTES
"Subjective:       Patient ID: Fabiana Chanel is a 43 y.o. female.    Chief Complaint: Gastroesophageal Reflux    Nausea   This is a chronic problem. The current episode started more than 1 year ago. The problem occurs intermittently. The problem has been unchanged. Associated symptoms include abdominal pain and nausea. Pertinent negatives include no arthralgias, chest pain, fever, joint swelling, neck pain, rash, sore throat or weakness. The symptoms are aggravated by eating. Treatments tried: bentyl. The treatment provided moderate relief.   Abdominal Pain   This is a chronic problem. The problem occurs intermittently. The problem has been unchanged. The pain is located in the epigastric region. The pain is at a severity of 8/10. The quality of the pain is burning. Pain radiation: "side to side" Associated symptoms include nausea. Pertinent negatives include no arthralgias or fever. Treatments tried: Bentyl.     Review of Systems   Constitutional: Positive for appetite change. Negative for fever.   HENT: Negative for sore throat and trouble swallowing.    Eyes: Negative for photophobia and visual disturbance.   Respiratory: Negative for wheezing.    Cardiovascular: Negative for chest pain and palpitations.   Gastrointestinal: Positive for abdominal pain and nausea.        See HPI for details   Musculoskeletal: Negative for arthralgias, joint swelling and neck pain.   Skin: Negative for rash and wound.   Neurological: Negative for dizziness, tremors and weakness.   Psychiatric/Behavioral: Negative for behavioral problems and suicidal ideas.       Objective:       Vitals:    04/26/19 0900   BP: (!) 90/56   Pulse: 76       Physical Exam   Constitutional: She is oriented to person, place, and time. She appears well-nourished.   HENT:   Mouth/Throat: Oropharynx is clear and moist.   Eyes: Pupils are equal, round, and reactive to light.   Neck: Neck supple.   Cardiovascular: Normal heart sounds.   Pulmonary/Chest: " "Effort normal and breath sounds normal.   Abdominal: Soft. She exhibits no mass. There is no tenderness. There is no guarding.   Musculoskeletal: Normal range of motion.   Lymphadenopathy:     She has no cervical adenopathy.   Neurological: She is alert and oriented to person, place, and time.   Skin: Skin is warm. Rash (multiple hyperpigmented lesions on upper extremities.) noted.   Psychiatric: She has a normal mood and affect.       Assessment:       1. Epigastric pain    2. Nausea        Plan:       EGD  PPI  Continue Bentyl    While at the office she suddenly complained of feeling "very bad" and light headed.   Blood pressure repeated 94/60, 76  No changes in mental status.    Taken to ED in wheelchair.       "

## 2019-04-26 NOTE — ED PROVIDER NOTES
"Encounter Date: 4/26/2019    SCRIBE #1 NOTE: I, Jack Callahan, am scribing for, and in the presence of,  Samson Jackson MD. I have scribed the following portions of the note - Other sections scribed: HPI/ROS.       History     Chief Complaint   Patient presents with    Fatigue     " I feel a little tired, they brought me from the doctor for low blood pressure".      CC: Decreased Blood Pressure      HPI: This 43 y.o. female with a medical hx of HTN, CHF (EF: 20%; pt states secondary to HTN; defibrillator also in place), psoriasis, DM, blood clot associated with vein wall inflammation, sleep apnea, cardiomyopathy, HLD, and A-fib presents to the ED for an emergent evaluation of decreased BP and fatigue/lightheadedness. Pt reports she was evaluated at gastroenterology earlier today secondary to nausea and abdominal pain where it was noted that her BP was low. Patient was at the GI clinic for an EGD evaluation of suspected GERD/gastritis when she began to feel lightheaded and overall fatigued. Her BP was taken and found to be low. She was then taken to the ED via wheelchair for evaluation. Patient denies any chest pain, shortness of breath, palpitations, or LOC associated with the event. Reports an increase in Lasix medication recently secondary to excessive fluid around the lungs and heart. Lightheadedness improved with sitting/lying down. Denies fever, chills, n/v/d, dizziness, abdominal pain, chest pain, SOB, cough, and any other associated symptoms. States she has an appointment with endocrinology soon. She does report feeling dehydrated and decreased PO intake and increased urinary output. Increased urinary output since increase in Lasix. Decreased PO intake since yesterday.      The history is provided by the patient. No  was used.     Review of patient's allergies indicates:   Allergen Reactions    Pneumococcal 23-abimbola ps vaccine      Past Medical History:   Diagnosis Date    Atrial " fibrillation     Blood clot associated with vein wall inflammation     not dvt    Cardiomyopathy     Normal cors on cath 11/2017    CHF (congestive heart failure)     DM (diabetes mellitus) 9/19/2013    Hyperlipidemia     Hypertension     Psoriasis     Sleep apnea      Past Surgical History:   Procedure Laterality Date    CARDIAC CATHETERIZATION      COLONOSCOPY      DILATION AND CURETTAGE OF UTERUS      INSERTION-ICD-DUAL Left 4/17/2018    Performed by Eben Christine MD at [facility]     Family History   Problem Relation Age of Onset    Hypertension Mother     Hypertension Father     Diabetes Father     Diabetes Maternal Grandmother     Diabetes Paternal Grandmother     Breast cancer Neg Hx     Colon cancer Neg Hx     Ovarian cancer Neg Hx      Social History     Tobacco Use    Smoking status: Never Smoker    Smokeless tobacco: Never Used    Tobacco comment: smokes cigars on occasion   Substance Use Topics    Alcohol use: Yes     Comment: occasional    Drug use: No     Comment: hx of marijuana use 3 years ago     Review of Systems   Constitutional: Positive for fatigue. Negative for activity change, chills, diaphoresis, fever and unexpected weight change.        (+) decreased BP   HENT: Negative for congestion, ear pain, facial swelling, rhinorrhea, sore throat, trouble swallowing and voice change.    Eyes: Negative for pain and visual disturbance.   Respiratory: Negative for apnea, cough, choking, chest tightness, shortness of breath, wheezing and stridor.    Cardiovascular: Negative for chest pain, palpitations and leg swelling.   Gastrointestinal: Positive for abdominal pain (Chronic, unchanged. ) and nausea. Negative for abdominal distention, anal bleeding, blood in stool, constipation, diarrhea, rectal pain and vomiting.   Endocrine: Positive for polyuria. Negative for polydipsia and polyphagia.   Genitourinary: Positive for decreased urine volume and frequency. Negative for  difficulty urinating, dysuria, hematuria, pelvic pain and urgency.   Musculoskeletal: Negative for arthralgias, back pain, gait problem, joint swelling, myalgias, neck pain and neck stiffness.   Skin: Negative for rash.   Neurological: Positive for light-headedness. Negative for dizziness, tremors, seizures, syncope, facial asymmetry, speech difficulty, weakness, numbness and headaches.   Psychiatric/Behavioral: Negative for agitation, confusion, decreased concentration, self-injury, sleep disturbance and suicidal ideas. The patient is not nervous/anxious.    All other systems reviewed and are negative.      Physical Exam     Initial Vitals [04/26/19 1005]   BP Pulse Resp Temp SpO2   111/79 75 18 97.8 °F (36.6 °C) 99 %      MAP       --         Physical Exam    Nursing note and vitals reviewed.  Constitutional: She appears well-developed and well-nourished. She is not diaphoretic. No distress.   HENT:   Head: Normocephalic and atraumatic.   Right Ear: External ear normal.   Left Ear: External ear normal.   Nose: Nose normal.   Mouth/Throat: Oropharynx is clear and moist. No oropharyngeal exudate.   Eyes: Conjunctivae and EOM are normal. Pupils are equal, round, and reactive to light. Right eye exhibits no discharge. Left eye exhibits no discharge. No scleral icterus.   Neck: Normal range of motion. No thyromegaly present. No tracheal deviation present. No JVD present.   Cardiovascular: Normal rate, regular rhythm and intact distal pulses.   Pulmonary/Chest: Breath sounds normal. No stridor. No respiratory distress. She has no wheezes. She has no rhonchi. She has no rales. She exhibits no tenderness.   Abdominal: Soft. She exhibits no distension and no mass. There is no tenderness. There is no rebound and no guarding.   Musculoskeletal: Normal range of motion. She exhibits no edema or tenderness.   Neurological: She is alert and oriented to person, place, and time. She has normal strength. She displays normal  reflexes. No cranial nerve deficit or sensory deficit. GCS score is 15. GCS eye subscore is 4. GCS verbal subscore is 5. GCS motor subscore is 6.   Finger to nose, shin to heel, tandem gait are all intact.    Skin: Skin is warm. Capillary refill takes less than 2 seconds. No rash noted. No erythema.   Psychiatric: She has a normal mood and affect. Her behavior is normal. Judgment and thought content normal.         ED Course   Procedures  Labs Reviewed   CBC W/ AUTO DIFFERENTIAL - Abnormal; Notable for the following components:       Result Value    RBC 5.41 (*)     Hematocrit 49.3 (*)     RDW 15.9 (*)     All other components within normal limits   COMPREHENSIVE METABOLIC PANEL - Abnormal; Notable for the following components:    Chloride 94 (*)     CO2 33 (*)     Glucose 306 (*)     eGFR if non  56 (*)     All other components within normal limits   URINALYSIS, REFLEX TO URINE CULTURE - Abnormal; Notable for the following components:    Appearance, UA Hazy (*)     Protein, UA 2+ (*)     Glucose, UA 3+ (*)     Urobilinogen, UA 4.0-6.0 (*)     All other components within normal limits   URINALYSIS MICROSCOPIC - Abnormal; Notable for the following components:    Bacteria Few (*)     All other components within normal limits   LIPASE   TSH     EKG Readings: (Independently Interpreted)   Initial Reading: No STEMI. Rhythm: Normal Sinus Rhythm. Heart Rate: 83.   TWI Lead aVL  No acute ST segment changes      ECG Results          EKG 12-lead (Final result)  Result time 04/27/19 10:36:56    Final result by Interface, Lab In [facility] (04/27/19 10:36:56)                 Narrative:    Test Reason : R53.83,    Vent. Rate : 083 BPM     Atrial Rate : 083 BPM     P-R Int : 130 ms          QRS Dur : 114 ms      QT Int : 446 ms       P-R-T Axes : 029 -07 077 degrees     QTc Int : 524 ms    Normal sinus rhythm  Possible Left atrial enlargement  Incomplete left bundle branch block  LVH  Prolonged QT  Abnormal ECG  When  compared with ECG of 27-DEC-2018 21:39,  Significant changes have occurred  Confirmed by Oziel Yanez MD ([id number]) on 4/27/2019 10:36:46 AM    Referred By: SHIREEN   SELF           Confirmed By:Oziel Yanez MD                            Imaging Results    None       Medications   ondansetron injection 4 mg (4 mg Intravenous Given 4/26/19 1344)     Vitals:    04/26/19 1346 04/26/19 1401 04/26/19 1416 04/26/19 1433   BP:  110/73     Pulse: 71 70 60 66   Resp:       Temp:       TempSrc:       SpO2: 99% (!) 94% 95% 97%   Weight:       Height:        04/26/19 1442   BP: 112/76   Pulse: 68   Resp: 18   Temp: 98 °F (36.7 °C)   TempSrc: Oral   SpO2: 95%   Weight:    Height:             Medical Decision Making:   ED Management:  Patient felt mildly nausea still upon arrival to ED. This resolved with Zofran. Patient able to tolerate PO intake. IV versus PO fluid hydration discussed with patient. PO preferred in this case due to patient being currently asymptomatic when sitting and able to tolerate PO and having a diagnosis of CHF with an EF of 20%.     EKG shows no acute ischemic changes when compared to EKG on 3/18/19. QT prolongation noted on today's and last month's EKG.     Patient found to have hyperglycemia on lab evaluation. She reports compliance with her diabetic medications. Hyperglycemia and increase in Lasix dose possibly causing greater PO output resulting in dehydration and hypotensive episodes. Increasing patient;s Metformin until she can follow up with her PCP was discussed with patient who is agreeable with plan. Patient instructed to monitor her blood glucose closely due to increase in Metformin. Patient expressed understanding.     After PO intake in ED and observation patient reports significant improvement. Patient is able to stand without recurrence of postural hypotension or lightheadedness. She is no longer nauseous. Patient given strict return precautions and instructions to increase PO intake  and monitor her blood glucose.     Patient expressed understanding and was discharged with strict return precautions and instructions to urgently follow up with her PCP due to medication adjustments and today's presenting symptoms.             Scribe Attestation:   Scribe #1: I performed the above scribed service and the documentation accurately describes the services I performed. I attest to the accuracy of the note.    Attending Attestation:           Physician Attestation for Scribe:  Physician Attestation Statement for Scribe #1: I, Samson Jackson MD, reviewed documentation, as scribed by Jack Callahan in my presence, and it is both accurate and complete.                    Clinical Impression:       ICD-10-CM ICD-9-CM   1. Episodic lightheadedness R42 780.4   2. Fatigue R53.83 780.79   3. Hyperglycemia R73.9 790.29         Disposition:   Disposition: Discharged                        Samson Jackson MD  05/03/19 [id number]

## 2019-04-29 ENCOUNTER — TELEPHONE (OUTPATIENT)
Dept: SURGERY | Facility: CLINIC | Age: 44
End: 2019-04-29

## 2019-04-29 DIAGNOSIS — R10.13 EPIGASTRIC PAIN: Primary | ICD-10-CM

## 2019-05-07 ENCOUNTER — TELEPHONE (OUTPATIENT)
Dept: PULMONOLOGY | Facility: CLINIC | Age: 44
End: 2019-05-07

## 2019-05-07 NOTE — TELEPHONE ENCOUNTER
----- Message from Kiya Amador NP sent at 5/7/2019  1:02 PM CDT -----  Contact: [facility]  [phone]  Please fax necessary documents. Thanks. Kiya  ----- Message -----  From: Yanique Jackson MA  Sent: 5/6/2019   2:38 PM  To: Kiya Amador NP    Please Advise  ----- Message -----  From: Anyi Allred  Sent: 5/6/2019   2:26 PM  To: Marjorie Huertas Staff    Type:  Call Back    Who called:[facility] any one  Can assist with call     What is the request in detail:  Requesting orders, office notes and medical necessity forms for Ppap  and supplies FAX # [phone].    Best call back number: [phone]    Additional Information: Pls fax to [phone]    Thanks.......Ella

## 2019-05-09 ENCOUNTER — HOSPITAL ENCOUNTER (OUTPATIENT)
Facility: HOSPITAL | Age: 44
Discharge: HOME | End: 2019-05-09
Attending: INTERNAL MEDICINE | Admitting: INTERNAL MEDICINE
Payer: MEDICARE

## 2019-05-09 ENCOUNTER — ANESTHESIA (OUTPATIENT)
Dept: ENDOSCOPY | Facility: HOSPITAL | Age: 44
End: 2019-05-09
Payer: MEDICARE

## 2019-05-09 ENCOUNTER — ANESTHESIA EVENT (OUTPATIENT)
Dept: ENDOSCOPY | Facility: HOSPITAL | Age: 44
End: 2019-05-09
Payer: MEDICARE

## 2019-05-09 VITALS
OXYGEN SATURATION: 98 % | TEMPERATURE: 98 F | RESPIRATION RATE: 18 BRPM | WEIGHT: 238 LBS | BODY MASS INDEX: 37.35 KG/M2 | SYSTOLIC BLOOD PRESSURE: 120 MMHG | DIASTOLIC BLOOD PRESSURE: 64 MMHG | HEIGHT: 67 IN | HEART RATE: 78 BPM

## 2019-05-09 DIAGNOSIS — K21.9 GERD (GASTROESOPHAGEAL REFLUX DISEASE): ICD-10-CM

## 2019-05-09 LAB
B-HCG UR QL: NEGATIVE
POCT GLUCOSE: 245 MG/DL (ref 70–110)

## 2019-05-09 PROCEDURE — 88305 TISSUE EXAM BY PATHOLOGIST: CPT | Performed by: PATHOLOGY

## 2019-05-09 PROCEDURE — 27201012 HC FORCEPS, HOT/COLD, DISP: Performed by: INTERNAL MEDICINE

## 2019-05-09 PROCEDURE — 43239 EGD BIOPSY SINGLE/MULTIPLE: CPT | Mod: ,,, | Performed by: INTERNAL MEDICINE

## 2019-05-09 PROCEDURE — D9220A PRA ANESTHESIA: Mod: CRNA,,, | Performed by: NURSE ANESTHETIST, CERTIFIED REGISTERED

## 2019-05-09 PROCEDURE — 88305 TISSUE EXAM BY PATHOLOGIST: CPT | Mod: 26,,, | Performed by: PATHOLOGY

## 2019-05-09 PROCEDURE — D9220A PRA ANESTHESIA: ICD-10-PCS | Mod: ANES,,, | Performed by: ANESTHESIOLOGY

## 2019-05-09 PROCEDURE — 37000009 HC ANESTHESIA EA ADD 15 MINS: Performed by: INTERNAL MEDICINE

## 2019-05-09 PROCEDURE — 25000003 PHARM REV CODE 250: Performed by: ANESTHESIOLOGY

## 2019-05-09 PROCEDURE — 43239 EGD BIOPSY SINGLE/MULTIPLE: CPT | Performed by: INTERNAL MEDICINE

## 2019-05-09 PROCEDURE — 81025 URINE PREGNANCY TEST: CPT

## 2019-05-09 PROCEDURE — D9220A PRA ANESTHESIA: Mod: ANES,,, | Performed by: ANESTHESIOLOGY

## 2019-05-09 PROCEDURE — 25000003 PHARM REV CODE 250: Performed by: NURSE ANESTHETIST, CERTIFIED REGISTERED

## 2019-05-09 PROCEDURE — 88305 TISSUE SPECIMEN TO PATHOLOGY - SURGERY: ICD-10-PCS | Mod: 26,,, | Performed by: PATHOLOGY

## 2019-05-09 PROCEDURE — 37000008 HC ANESTHESIA 1ST 15 MINUTES: Performed by: INTERNAL MEDICINE

## 2019-05-09 PROCEDURE — 63600175 PHARM REV CODE 636 W HCPCS: Performed by: NURSE ANESTHETIST, CERTIFIED REGISTERED

## 2019-05-09 PROCEDURE — 43239 PR EGD, FLEX, W/BIOPSY, SGL/MULTI: ICD-10-PCS | Mod: ,,, | Performed by: INTERNAL MEDICINE

## 2019-05-09 PROCEDURE — D9220A PRA ANESTHESIA: ICD-10-PCS | Mod: CRNA,,, | Performed by: NURSE ANESTHETIST, CERTIFIED REGISTERED

## 2019-05-09 RX ORDER — GLYCOPYRROLATE 0.2 MG/ML
INJECTION INTRAMUSCULAR; INTRAVENOUS
Status: DISCONTINUED | OUTPATIENT
Start: 2019-05-09 | End: 2019-05-09

## 2019-05-09 RX ORDER — MIDAZOLAM HYDROCHLORIDE 1 MG/ML
INJECTION, SOLUTION INTRAMUSCULAR; INTRAVENOUS
Status: DISCONTINUED | OUTPATIENT
Start: 2019-05-09 | End: 2019-05-09

## 2019-05-09 RX ORDER — PROPOFOL 10 MG/ML
VIAL (ML) INTRAVENOUS
Status: COMPLETED
Start: 2019-05-09 | End: 2019-05-09

## 2019-05-09 RX ORDER — ONDANSETRON 2 MG/ML
INJECTION INTRAMUSCULAR; INTRAVENOUS
Status: DISCONTINUED | OUTPATIENT
Start: 2019-05-09 | End: 2019-05-09

## 2019-05-09 RX ORDER — LIDOCAINE HCL/PF 100 MG/5ML
SYRINGE (ML) INTRAVENOUS
Status: DISCONTINUED | OUTPATIENT
Start: 2019-05-09 | End: 2019-05-09

## 2019-05-09 RX ORDER — SODIUM CHLORIDE, SODIUM LACTATE, POTASSIUM CHLORIDE, CALCIUM CHLORIDE 600; 310; 30; 20 MG/100ML; MG/100ML; MG/100ML; MG/100ML
INJECTION, SOLUTION INTRAVENOUS CONTINUOUS PRN
Status: DISCONTINUED | OUTPATIENT
Start: 2019-05-09 | End: 2019-05-09

## 2019-05-09 RX ORDER — SODIUM CHLORIDE 9 MG/ML
INJECTION, SOLUTION INTRAVENOUS CONTINUOUS
Status: DISCONTINUED | OUTPATIENT
Start: 2019-05-09 | End: 2019-05-09 | Stop reason: HOSPADM

## 2019-05-09 RX ORDER — LIDOCAINE HYDROCHLORIDE 20 MG/ML
INJECTION, SOLUTION EPIDURAL; INFILTRATION; INTRACAUDAL; PERINEURAL
Status: DISCONTINUED
Start: 2019-05-09 | End: 2019-05-09 | Stop reason: HOSPADM

## 2019-05-09 RX ORDER — GLYCOPYRROLATE 0.2 MG/ML
INJECTION INTRAMUSCULAR; INTRAVENOUS
Status: COMPLETED
Start: 2019-05-09 | End: 2019-05-09

## 2019-05-09 RX ORDER — MIDAZOLAM HYDROCHLORIDE 1 MG/ML
INJECTION INTRAMUSCULAR; INTRAVENOUS
Status: COMPLETED
Start: 2019-05-09 | End: 2019-05-09

## 2019-05-09 RX ORDER — LIDOCAINE HYDROCHLORIDE 10 MG/ML
1 INJECTION, SOLUTION EPIDURAL; INFILTRATION; INTRACAUDAL; PERINEURAL ONCE
Status: DISCONTINUED | OUTPATIENT
Start: 2019-05-09 | End: 2019-05-09 | Stop reason: HOSPADM

## 2019-05-09 RX ORDER — ONDANSETRON 2 MG/ML
INJECTION INTRAMUSCULAR; INTRAVENOUS
Status: DISCONTINUED
Start: 2019-05-09 | End: 2019-05-09 | Stop reason: HOSPADM

## 2019-05-09 RX ORDER — PROPOFOL 10 MG/ML
VIAL (ML) INTRAVENOUS
Status: DISCONTINUED | OUTPATIENT
Start: 2019-05-09 | End: 2019-05-09

## 2019-05-09 RX ADMIN — PROPOFOL 100 MG: 10 INJECTION, EMULSION INTRAVENOUS at 10:05

## 2019-05-09 RX ADMIN — SODIUM CHLORIDE: 0.9 INJECTION, SOLUTION INTRAVENOUS at 09:05

## 2019-05-09 RX ADMIN — PROPOFOL 20 MG: 10 INJECTION, EMULSION INTRAVENOUS at 10:05

## 2019-05-09 RX ADMIN — SODIUM CHLORIDE, SODIUM LACTATE, POTASSIUM CHLORIDE, AND CALCIUM CHLORIDE: .6; .31; .03; .02 INJECTION, SOLUTION INTRAVENOUS at 10:05

## 2019-05-09 RX ADMIN — MIDAZOLAM HYDROCHLORIDE 2 MG: 1 INJECTION, SOLUTION INTRAMUSCULAR; INTRAVENOUS at 10:05

## 2019-05-09 RX ADMIN — ONDANSETRON 4 MG: 2 INJECTION, SOLUTION INTRAMUSCULAR; INTRAVENOUS at 10:05

## 2019-05-09 RX ADMIN — LIDOCAINE HYDROCHLORIDE 100 MG: 20 INJECTION, SOLUTION INTRAVENOUS at 10:05

## 2019-05-09 RX ADMIN — GLYCOPYRROLATE 0.1 MG: 0.2 INJECTION, SOLUTION INTRAMUSCULAR; INTRAVENOUS at 10:05

## 2019-05-09 NOTE — TRANSFER OF CARE
"Anesthesia Transfer of Care Note    Patient: Fabiana Chanel    Procedure(s) Performed: Procedure(s) (LRB):  EGD (ESOPHAGOGASTRODUODENOSCOPY) (N/A)    Patient location: GI    Anesthesia Type: general (tiva)    Transport from OR: Transported from OR on room air with adequate spontaneous ventilation    Post assessment: no apparent anesthetic complications    Post vital signs: stable    Level of consciousness: awake    Nausea/Vomiting: no nausea/vomiting    Complications: none    Transfer of care protocol was followed      Last vitals:   Visit Vitals  BP (!) 96/54 (BP Location: Left arm, Patient Position: Lying)   Pulse 74   Temp 36.5 °C (97.7 °F) (Oral)   Resp 16   Ht 5' 7" (1.702 m)   Wt 108 kg (238 lb)   SpO2 96%   Breastfeeding? No   BMI 37.28 kg/m²     "

## 2019-05-09 NOTE — ANESTHESIA PREPROCEDURE EVALUATION
05/09/2019  Fabiana Chanel is a 43 y.o., female. NPO for EGD to evaluate abdominal pain. PMH sifnificant for DM (gluc 200s this am), EF 40%. CPAP 4 at home    Anesthesia Evaluation    I have reviewed the Patient Summary Reports.        Review of Systems  Cardiovascular:   Hypertension CHF  Congestive Heart Failure (CHF)  Hypertension    Pulmonary:   Shortness of breath Sleep Apnea    Hepatic/GI:   GERD    Neurological:   Seizures    Endocrine:   Diabetes  Diabetes    Results for FABIANA CHANEL (MRN [id number]) as of 5/9/2019 10:40   Ref. Range 4/26/2019 11:15   WBC Latest Ref Range: 3.90 - 12.70 K/uL 4.88   RBC Latest Ref Range: 4.00 - 5.40 M/uL 5.41 (H)   Hemoglobin Latest Ref Range: 12.0 - 16.0 g/dL 15.8   Hematocrit Latest Ref Range: 37.0 - 48.5 % 49.3 (H)   MCV Latest Ref Range: 82 - 98 fL 91   MCH Latest Ref Range: 27.0 - 31.0 pg 29.2   MCHC Latest Ref Range: 32.0 - 36.0 g/dL 32.0   RDW Latest Ref Range: 11.5 - 14.5 % 15.9 (H)   Results for FABIANA CHANEL (MRN [id number]) as of 5/9/2019 10:40   Ref. Range 4/26/2019 11:04 4/26/2019 11:15 5/6/2019 10:24 5/9/2019 09:38 5/9/2019 09:42   Sodium Latest Ref Range: 136 - 145 mmol/L  137      Potassium Latest Ref Range: 3.5 - 5.1 mmol/L  4.0      Chloride Latest Ref Range: 95 - 110 mmol/L  94 (L)      CO2 Latest Ref Range: 23 - 29 mmol/L  33 (H)      Anion Gap Latest Ref Range: 8 - 16 mmol/L  10      BUN, Bld Latest Ref Range: 6 - 20 mg/dL  15      Creatinine Latest Ref Range: 0.5 - 1.4 mg/dL  1.2      Results for FABIANA CHANEL (MRN [id number]) as of 5/9/2019 10:40   Ref. Range 5/9/2019 09:38   Preg Test, Ur Unknown Negative   Performed Procedure     TRANSTHORACIC ECHO (TTE) COMPLETE   Conclusion     · Severely decreased left ventricular systolic function. The estimated ejection fraction is 20%  · Severe global hypokinetic wall motion.  · Severe  left ventricular enlargement.  · Eccentric left ventricular hypertrophy.  · Grade II (moderate) left ventricular diastolic dysfunction consistent with pseudonormalization.  · Moderate right ventricular enlargement.  · Moderately reduced right ventricular systolic function.         Physical Exam  General:  Obesity    Airway/Jaw/Neck:  Airway Findings: Mouth Opening: Normal General Airway Assessment: Adult  Mallampati: II       Chest/Lungs:  Chest/Lungs Findings: Clear to auscultation, Normal Respiratory Rate     Heart/Vascular:  Heart Findings: Rate: Normal  Rhythm: Regular Rhythm        Mental Status:  Mental Status Findings:  Alert and Oriented         Anesthesia Plan  Type of Anesthesia, risks & benefits discussed:  Anesthesia Type:  general  Patient's Preference:   Intra-op Monitoring Plan:   Intra-op Monitoring Plan Comments:   Post Op Pain Control Plan:   Post Op Pain Control Plan Comments:   Induction:    Beta Blocker:         Informed Consent: Patient understands risks and agrees with Anesthesia plan.  Questions answered. Anesthesia consent signed with patient.  ASA Score: 3     Day of Surgery Review of History & Physical:            Ready For Surgery From Anesthesia Perspective.

## 2019-05-09 NOTE — ANESTHESIA POSTPROCEDURE EVALUATION
Anesthesia Post Evaluation    Patient: Fabiana Chanel    Procedure(s) Performed: Procedure(s) (LRB):  EGD (ESOPHAGOGASTRODUODENOSCOPY) (N/A)    Final Anesthesia Type: general  Patient location during evaluation: PACU  Patient participation: Yes- Able to Participate  Level of consciousness: sedated  Post-procedure vital signs: reviewed and stable  Pain management: adequate  Airway patency: patent  PONV status at discharge: No PONV  Anesthetic complications: no      Cardiovascular status: blood pressure returned to baseline  Respiratory status: unassisted  Hydration status: euvolemic  Follow-up not needed.          Vitals Value Taken Time   /77 5/9/2019  9:47 AM   Temp 36.7 °C (98.1 °F) 5/9/2019  9:47 AM   Pulse 74 5/9/2019  9:47 AM   Resp 18 5/9/2019  9:47 AM   SpO2 95 % 5/9/2019  9:47 AM         No case tracking events are documented in the log.      Pain/Moris Score: No data recorded    Pt participated with evaluation

## 2019-05-09 NOTE — INTERVAL H&P NOTE
The patient has been examined and the H&P has been reviewed:    I concur with the findings and no changes have occurred since H&P was written.    Anesthesia/Surgery risks, benefits and alternative options discussed and understood by patient/family.          Active Hospital Problems    Diagnosis  POA    GERD (gastroesophageal reflux disease) [K21.9]  Yes      Resolved Hospital Problems   No resolved problems to display.

## 2019-05-09 NOTE — OR NURSING
"Patient states "I'm still sleepy" Lying in bed with eyes closed. Family at bedside. Recovery completed. Will continue to monitor until patient ready to leave.  "

## 2019-05-09 NOTE — [employer] PATIENT INSTRUCTIONS
Discharge Summary/Instructions after an Endoscopic Procedure  Patient Name: Fabiana Chanel  Patient MRN: [id number]  Patient YOB: 1975  Thursday, May 09, 2019  Vielka Burrell MD  RESTRICTIONS:  During your procedure today, you received medications for sedation.  These   medications may affect your judgment, balance and coordination.  Therefore,   for 24 hours, you have the following restrictions:   - DO NOT drive a car, operate machinery, make legal/financial decisions,   sign important papers or drink alcohol.    ACTIVITY:  Today: no heavy lifting, straining or running due to procedural   sedation/anesthesia.  The following day: return to full activity including work.  DIET:  Eat and drink normally unless instructed otherwise.     TREATMENT FOR COMMON SIDE EFFECTS:  - Mild abdominal pain, nausea, belching, bloating or excessive gas:  rest,   eat lightly and use a heating pad.  - Sore Throat: treat with throat lozenges and/or gargle with warm salt   water.  - Because air was used during the procedure, expelling large amounts of air   from your rectum or belching is normal.  - If a bowel prep was taken, you may not have a bowel movement for 1-3 days.    This is normal.  SYMPTOMS TO WATCH FOR AND REPORT TO YOUR PHYSICIAN:  1. Abdominal pain or bloating, other than gas cramps.  2. Chest pain.  3. Back pain.  4. Signs of infection such as: chills or fever occurring within 24 hours   after the procedure.  5. Rectal bleeding, which would show as bright red, maroon, or black stools.   (A tablespoon of blood from the rectum is not serious, especially if   hemorrhoids are present.)  6. Vomiting.  7. Weakness or dizziness.  GO DIRECTLY TO THE NEAREST EMERGENCY ROOM IF YOU HAVE ANY OF THE FOLLOWING:      Difficulty breathing              Chills and/or fever over 101 F   Persistent vomiting and/or vomiting blood   Severe abdominal pain   Severe chest pain   Black, tarry stools   Bleeding- more than one  tablespoon   Any other symptom or condition that you feel may need urgent attention  Your doctor recommends these additional instructions:  If any biopsies were taken, your doctors clinic will contact you in 1 to 2   weeks with any results.  - Await pathology results.   - Recommend acid suppression medication.   - Discontinue aspirin and NSAIDs.   - Discharge patient to home.   - Return to GI clinic PRN.  For questions, problems or results please call your physician - Vielka Burrell MD at Work:  [phone].  [facility] can be reached at [phone]     IF A COMPLICATION OR EMERGENCY SITUATION ARISES AND YOU ARE UNABLE TO REACH   YOUR PHYSICIAN - GO DIRECTLY TO THE EMERGENCY ROOM.  Vielka Burrell MD  5/9/2019 10:39:17 AM  This report has been verified and signed electronically.  [employer]

## 2019-05-13 DIAGNOSIS — E11.9 TYPE 2 DIABETES MELLITUS WITHOUT COMPLICATION, WITHOUT LONG-TERM CURRENT USE OF INSULIN: ICD-10-CM

## 2019-05-13 RX ORDER — PEN NEEDLE, DIABETIC 29 G X1/2"
1 NEEDLE, DISPOSABLE MISCELLANEOUS 2 TIMES DAILY
Qty: 100 EACH | Refills: 0 | Status: SHIPPED | OUTPATIENT
Start: 2019-05-13 | End: 2019-07-22 | Stop reason: SDUPTHER

## 2019-05-23 ENCOUNTER — TELEPHONE (OUTPATIENT)
Dept: SURGERY | Facility: CLINIC | Age: 44
End: 2019-05-23

## 2019-05-23 ENCOUNTER — PATIENT MESSAGE (OUTPATIENT)
Dept: GASTROENTEROLOGY | Facility: CLINIC | Age: 44
End: 2019-05-23

## 2019-05-23 NOTE — TELEPHONE ENCOUNTER
----- Message from Yanique Aburto sent at 5/23/2019  1:17 PM CDT -----  Contact: Self  Type: Patient Call Back    Who called: Fabiana    What is the request in detail: patient called to discuss care plan after  Biopsy results    Can the clinic reply by [facility]?    Would the patient rather a call back or a response via [facility]? Call    Best call back number:[phone]    Additional Information:

## 2019-05-24 ENCOUNTER — OFFICE VISIT (OUTPATIENT)
Dept: GASTROENTEROLOGY | Facility: CLINIC | Age: 44
End: 2019-05-24
Payer: MEDICARE

## 2019-05-24 VITALS
WEIGHT: 238 LBS | HEIGHT: 67 IN | DIASTOLIC BLOOD PRESSURE: 81 MMHG | SYSTOLIC BLOOD PRESSURE: 137 MMHG | HEART RATE: 79 BPM | BODY MASS INDEX: 37.35 KG/M2

## 2019-05-24 DIAGNOSIS — R10.13 EPIGASTRIC PAIN: Primary | ICD-10-CM

## 2019-05-24 PROCEDURE — 3008F PR BODY MASS INDEX (BMI) DOCUMENTED: ICD-10-PCS | Mod: CPTII,S$GLB,, | Performed by: INTERNAL MEDICINE

## 2019-05-24 PROCEDURE — 3075F SYST BP GE 130 - 139MM HG: CPT | Mod: CPTII,S$GLB,, | Performed by: INTERNAL MEDICINE

## 2019-05-24 PROCEDURE — 99214 PR OFFICE/OUTPT VISIT, EST, LEVL IV, 30-39 MIN: ICD-10-PCS | Mod: S$GLB,,, | Performed by: INTERNAL MEDICINE

## 2019-05-24 PROCEDURE — 3075F PR MOST RECENT SYSTOLIC BLOOD PRESS GE 130-139MM HG: ICD-10-PCS | Mod: CPTII,S$GLB,, | Performed by: INTERNAL MEDICINE

## 2019-05-24 PROCEDURE — 3008F BODY MASS INDEX DOCD: CPT | Mod: CPTII,S$GLB,, | Performed by: INTERNAL MEDICINE

## 2019-05-24 PROCEDURE — 3079F PR MOST RECENT DIASTOLIC BLOOD PRESSURE 80-89 MM HG: ICD-10-PCS | Mod: CPTII,S$GLB,, | Performed by: INTERNAL MEDICINE

## 2019-05-24 PROCEDURE — 99214 OFFICE O/P EST MOD 30 MIN: CPT | Mod: S$GLB,,, | Performed by: INTERNAL MEDICINE

## 2019-05-24 PROCEDURE — 3079F DIAST BP 80-89 MM HG: CPT | Mod: CPTII,S$GLB,, | Performed by: INTERNAL MEDICINE

## 2019-05-24 RX ORDER — SUCRALFATE 1 G/10ML
1 SUSPENSION ORAL 4 TIMES DAILY
Qty: 1200 ML | Refills: 3 | Status: SHIPPED | OUTPATIENT
Start: 2019-05-24 | End: 2019-06-19 | Stop reason: ALTCHOICE

## 2019-05-25 NOTE — PROGRESS NOTES
Subjective:       Patient ID: Fabiana Chanel is a 43 y.o. female.    Chief Complaint: Abdominal Pain    HPI Continue to have epigastric pain. There is no nausea or vomiting.  Gastric nodule is benign. She denies weight loss.    Review of Systems   Constitutional: Negative for appetite change.   HENT: Negative for trouble swallowing.    Eyes: Negative for photophobia.   Respiratory: Negative for cough and shortness of breath.    Cardiovascular: Negative for palpitations.   Gastrointestinal:        See HPI for details   Genitourinary: Negative for frequency and hematuria.   Skin: Negative for rash.   Neurological: Negative for weakness and headaches.   Psychiatric/Behavioral: Negative for behavioral problems and suicidal ideas.       Objective:      Physical Exam   Eyes: Pupils are equal, round, and reactive to light.   Neck: No thyromegaly present.   Cardiovascular: Normal rate, regular rhythm and normal heart sounds.   Pulmonary/Chest: Effort normal and breath sounds normal.   Abdominal: Soft. She exhibits no mass. There is no tenderness. There is no rebound and no guarding.   Musculoskeletal: She exhibits no tenderness.   Psychiatric: Her behavior is normal. Thought content normal.       Assessment:       1. Epigastric pain        Plan:       Fabiana was seen today for abdominal pain.    Diagnoses and all orders for this visit:    Epigastric pain    Other orders  -     sucralfate (CARAFATE) 100 mg/mL suspension; Take 10 mLs (1 g total) by mouth 4 (four) times daily.    If pain persists, will proceed with CT scan of the abdomen.

## 2019-05-28 ENCOUNTER — CLINICAL SUPPORT (OUTPATIENT)
Dept: CARDIOLOGY | Facility: HOSPITAL | Age: 44
End: 2019-05-28
Attending: INTERNAL MEDICINE
Payer: MEDICARE

## 2019-05-28 ENCOUNTER — LAB VISIT (OUTPATIENT)
Dept: LAB | Facility: HOSPITAL | Age: 44
End: 2019-05-28
Attending: DERMATOLOGY
Payer: MEDICARE

## 2019-05-28 DIAGNOSIS — L40.0 PSORIASIS VULGARIS: Primary | ICD-10-CM

## 2019-05-28 DIAGNOSIS — Z95.810 CARDIAC DEFIBRILLATOR IN PLACE: ICD-10-CM

## 2019-05-28 LAB
ALBUMIN SERPL BCP-MCNC: 3.4 G/DL (ref 3.5–5.2)
ALBUMIN SERPL BCP-MCNC: 3.4 G/DL (ref 3.5–5.2)
ALP SERPL-CCNC: 76 U/L (ref 55–135)
ALT SERPL W/O P-5'-P-CCNC: 13 U/L (ref 10–44)
ANION GAP SERPL CALC-SCNC: 9 MMOL/L (ref 8–16)
ANION GAP SERPL CALC-SCNC: 9 MMOL/L (ref 8–16)
AST SERPL-CCNC: 10 U/L (ref 10–40)
BASOPHILS # BLD AUTO: 0.01 K/UL (ref 0–0.2)
BASOPHILS NFR BLD: 0.2 % (ref 0–1.9)
BILIRUB SERPL-MCNC: 0.4 MG/DL (ref 0.1–1)
BUN SERPL-MCNC: 15 MG/DL (ref 6–20)
BUN SERPL-MCNC: 15 MG/DL (ref 6–20)
CALCIUM SERPL-MCNC: 8.9 MG/DL (ref 8.7–10.5)
CALCIUM SERPL-MCNC: 8.9 MG/DL (ref 8.7–10.5)
CHLORIDE SERPL-SCNC: 99 MMOL/L (ref 95–110)
CHLORIDE SERPL-SCNC: 99 MMOL/L (ref 95–110)
CO2 SERPL-SCNC: 30 MMOL/L (ref 23–29)
CO2 SERPL-SCNC: 30 MMOL/L (ref 23–29)
CREAT SERPL-MCNC: 1 MG/DL (ref 0.5–1.4)
CREAT SERPL-MCNC: 1 MG/DL (ref 0.5–1.4)
DIFFERENTIAL METHOD: NORMAL
EOSINOPHIL # BLD AUTO: 0.1 K/UL (ref 0–0.5)
EOSINOPHIL NFR BLD: 2 % (ref 0–8)
ERYTHROCYTE [DISTWIDTH] IN BLOOD BY AUTOMATED COUNT: 13.8 % (ref 11.5–14.5)
EST. GFR  (AFRICAN AMERICAN): >60 ML/MIN/1.73 M^2
EST. GFR  (AFRICAN AMERICAN): >60 ML/MIN/1.73 M^2
EST. GFR  (NON AFRICAN AMERICAN): >60 ML/MIN/1.73 M^2
EST. GFR  (NON AFRICAN AMERICAN): >60 ML/MIN/1.73 M^2
GLUCOSE SERPL-MCNC: 226 MG/DL (ref 70–110)
GLUCOSE SERPL-MCNC: 226 MG/DL (ref 70–110)
HCT VFR BLD AUTO: 44.3 % (ref 37–48.5)
HGB BLD-MCNC: 14.4 G/DL (ref 12–16)
LYMPHOCYTES # BLD AUTO: 2.1 K/UL (ref 1–4.8)
LYMPHOCYTES NFR BLD: 34.4 % (ref 18–48)
MCH RBC QN AUTO: 29.6 PG (ref 27–31)
MCHC RBC AUTO-ENTMCNC: 32.5 G/DL (ref 32–36)
MCV RBC AUTO: 91 FL (ref 82–98)
MONOCYTES # BLD AUTO: 0.3 K/UL (ref 0.3–1)
MONOCYTES NFR BLD: 4.5 % (ref 4–15)
NEUTROPHILS # BLD AUTO: 3.5 K/UL (ref 1.8–7.7)
NEUTROPHILS NFR BLD: 58.9 % (ref 38–73)
PHOSPHATE SERPL-MCNC: 3.3 MG/DL (ref 2.7–4.5)
PLATELET # BLD AUTO: 282 K/UL (ref 150–350)
PMV BLD AUTO: 10.6 FL (ref 9.2–12.9)
POTASSIUM SERPL-SCNC: 3.8 MMOL/L (ref 3.5–5.1)
POTASSIUM SERPL-SCNC: 3.8 MMOL/L (ref 3.5–5.1)
PROT SERPL-MCNC: 7.3 G/DL (ref 6–8.4)
RBC # BLD AUTO: 4.86 M/UL (ref 4–5.4)
SODIUM SERPL-SCNC: 138 MMOL/L (ref 136–145)
SODIUM SERPL-SCNC: 138 MMOL/L (ref 136–145)
WBC # BLD AUTO: 5.99 K/UL (ref 3.9–12.7)

## 2019-05-28 PROCEDURE — 85025 COMPLETE CBC W/AUTO DIFF WBC: CPT

## 2019-05-28 PROCEDURE — 86706 HEP B SURFACE ANTIBODY: CPT

## 2019-05-28 PROCEDURE — 80074 ACUTE HEPATITIS PANEL: CPT

## 2019-05-28 PROCEDURE — 84100 ASSAY OF PHOSPHORUS: CPT

## 2019-05-28 PROCEDURE — 80053 COMPREHEN METABOLIC PANEL: CPT

## 2019-05-28 PROCEDURE — 86480 TB TEST CELL IMMUN MEASURE: CPT

## 2019-05-28 PROCEDURE — 93296 REM INTERROG EVL PM/IDS: CPT

## 2019-05-29 LAB
HAV IGM SERPL QL IA: NEGATIVE
HBV CORE IGM SERPL QL IA: NEGATIVE
HBV SURFACE AB SER-ACNC: NEGATIVE M[IU]/ML
HBV SURFACE AG SERPL QL IA: NEGATIVE
HCV AB SERPL QL IA: NEGATIVE
HCV AB SERPL QL IA: NEGATIVE

## 2019-05-30 ENCOUNTER — PATIENT MESSAGE (OUTPATIENT)
Dept: GASTROENTEROLOGY | Facility: CLINIC | Age: 44
End: 2019-05-30

## 2019-06-03 ENCOUNTER — TELEPHONE (OUTPATIENT)
Dept: SURGERY | Facility: CLINIC | Age: 44
End: 2019-06-03

## 2019-06-03 NOTE — TELEPHONE ENCOUNTER
Pt notified PHN is denying Carafate.  Will discuss with Dr. Burrell when she gets back on Monday and call pt.

## 2019-06-05 LAB
M TB IFN-G CD4+ BCKGRND COR BLD-ACNC: 0 IU/ML
MITOGEN IGNF BCKGRD COR BLD-ACNC: >10 IU/ML
MITOGEN IGNF BCKGRD COR BLD-ACNC: NEGATIVE [IU]/ML
NIL: 0.03 IU/ML
TB2 - NIL: -0.01 IU/ML

## 2019-06-10 ENCOUNTER — TELEPHONE (OUTPATIENT)
Dept: SURGERY | Facility: CLINIC | Age: 44
End: 2019-06-10

## 2019-06-10 NOTE — TELEPHONE ENCOUNTER
Spoke with pt to let her know Dr. Burrell wants her to take Nexium q am before breakfast and Zantac 150mg po at bedtime. Pt states she is taking these medications and they do not help.  I told pt we do not have Zantac on her med list but she says she takes it.  Pt would like to speak with Dr. Burrell.  Informed pt I will send her a message.

## 2019-06-13 ENCOUNTER — OFFICE VISIT (OUTPATIENT)
Dept: ENDOCRINOLOGY | Facility: CLINIC | Age: 44
End: 2019-06-13
Payer: MEDICARE

## 2019-06-13 VITALS
DIASTOLIC BLOOD PRESSURE: 99 MMHG | HEIGHT: 67 IN | RESPIRATION RATE: 14 BRPM | WEIGHT: 248.88 LBS | HEART RATE: 69 BPM | SYSTOLIC BLOOD PRESSURE: 133 MMHG | BODY MASS INDEX: 39.06 KG/M2

## 2019-06-13 DIAGNOSIS — E78.2 MIXED HYPERLIPIDEMIA: ICD-10-CM

## 2019-06-13 DIAGNOSIS — I50.42 CHRONIC COMBINED SYSTOLIC AND DIASTOLIC HEART FAILURE: ICD-10-CM

## 2019-06-13 DIAGNOSIS — I10 HYPERTENSION, UNSPECIFIED TYPE: ICD-10-CM

## 2019-06-13 DIAGNOSIS — E66.01 SEVERE OBESITY (BMI 35.0-39.9) WITH COMORBIDITY: ICD-10-CM

## 2019-06-13 PROCEDURE — 3080F DIAST BP >= 90 MM HG: CPT | Mod: CPTII,S$GLB,, | Performed by: NURSE PRACTITIONER

## 2019-06-13 PROCEDURE — 99204 PR OFFICE/OUTPT VISIT, NEW, LEVL IV, 45-59 MIN: ICD-10-PCS | Mod: S$GLB,,, | Performed by: NURSE PRACTITIONER

## 2019-06-13 PROCEDURE — 99204 OFFICE O/P NEW MOD 45 MIN: CPT | Mod: S$GLB,,, | Performed by: NURSE PRACTITIONER

## 2019-06-13 PROCEDURE — 3075F SYST BP GE 130 - 139MM HG: CPT | Mod: CPTII,S$GLB,, | Performed by: NURSE PRACTITIONER

## 2019-06-13 PROCEDURE — 99999 PR PBB SHADOW E&M-EST. PATIENT-LVL V: ICD-10-PCS | Mod: PBBFAC,,, | Performed by: NURSE PRACTITIONER

## 2019-06-13 PROCEDURE — 3008F BODY MASS INDEX DOCD: CPT | Mod: CPTII,S$GLB,, | Performed by: NURSE PRACTITIONER

## 2019-06-13 PROCEDURE — 3075F PR MOST RECENT SYSTOLIC BLOOD PRESS GE 130-139MM HG: ICD-10-PCS | Mod: CPTII,S$GLB,, | Performed by: NURSE PRACTITIONER

## 2019-06-13 PROCEDURE — 3080F PR MOST RECENT DIASTOLIC BLOOD PRESSURE >= 90 MM HG: ICD-10-PCS | Mod: CPTII,S$GLB,, | Performed by: NURSE PRACTITIONER

## 2019-06-13 PROCEDURE — 99499 UNLISTED E&M SERVICE: CPT | Mod: S$GLB,,, | Performed by: NURSE PRACTITIONER

## 2019-06-13 PROCEDURE — 3008F PR BODY MASS INDEX (BMI) DOCUMENTED: ICD-10-PCS | Mod: CPTII,S$GLB,, | Performed by: NURSE PRACTITIONER

## 2019-06-13 PROCEDURE — 99999 PR PBB SHADOW E&M-EST. PATIENT-LVL V: CPT | Mod: PBBFAC,,, | Performed by: NURSE PRACTITIONER

## 2019-06-13 PROCEDURE — 3046F HEMOGLOBIN A1C LEVEL >9.0%: CPT | Mod: CPTII,S$GLB,, | Performed by: NURSE PRACTITIONER

## 2019-06-13 PROCEDURE — 3046F PR MOST RECENT HEMOGLOBIN A1C LEVEL > 9.0%: ICD-10-PCS | Mod: CPTII,S$GLB,, | Performed by: NURSE PRACTITIONER

## 2019-06-13 PROCEDURE — 99499 RISK ADDL DX/OHS AUDIT: ICD-10-PCS | Mod: S$GLB,,, | Performed by: NURSE PRACTITIONER

## 2019-06-13 RX ORDER — METFORMIN HYDROCHLORIDE 500 MG/1
1000 TABLET, EXTENDED RELEASE ORAL 2 TIMES DAILY WITH MEALS
Qty: 360 TABLET | Refills: 3 | Status: SHIPPED | OUTPATIENT
Start: 2019-06-13 | End: 2019-08-21 | Stop reason: SINTOL

## 2019-06-13 NOTE — PATIENT INSTRUCTIONS
A1C goal: <7%  Fasting/premeal blood glucose goal:   2 hour post-meal blood glucose goal:     Start exercise regimen - aim for 1/2 hour 5 days/week.   Try to incorporate regular meal schedule. See Diabetes Educator/Registered Dietician for Medical Nutrition Therapy. Avoid beverages with sugar.     Stop metformin instant release 500 mg tablets because of diarrhea.   Switch to metformin  mg tablets. Start with 1 tablet twice daily with food. After a week, if having no diarrhea, then increase to 2 tablets with breakfast and 2 tablets with dinner.     Increase Levemir to 20 units nightly.     Continue glimepiride 4 mg tablet for now - but take with first meal of the day.     Test glucose 2x/day - fasting before eating in the morning and again at night.   Keep a blood sugar log.     Return to 2 months with a1c prior.     a1c today.     Send blood sugar log in 2 weeks and await further instructions.

## 2019-06-13 NOTE — PROGRESS NOTES
"CC: This 43 y.o. Black or  female  is here for evaluation of  T2DM along with comorbidities indicated in the Visit Diagnosis section of this encounter.    HPI: Fabiana Chanel was diagnosed with T2DM in 2013. Metformin and a sulfonylurea started at the time of diagnosis.     New to Endocrine.   She has only been taking metformin 500 mg bid instead of 1000 mg bid. C/o diarrhea on metformin.   She just realized that she should be taking furosemide bid not once daily; has not taken it today yet. Also she did not know she was rx'd hydralazine, which was rx'd to her last year by Dr. Yanez, her cardiologist.     PMHX: CHF,  MILE on cpap, atrial fibrillation, MI     LAST DIABETES EDUCATION: never     RECENT ILLNESSES/HOSPITALIZATIONS - -  Admitted 12/28/18 x 2 nights for acute on chronic HF      HOSPITALIZED FOR DIABETES  -  Yes - for hyperglycemia     PRESCRIBED DIABETES MEDICATIONS: metformin 1000 mg bid, glimepiride 4 mg once daily, Levemir Flextouch 16 units every evening      Misses medication doses - No    DM COMPLICATIONS: peripheral neuropathy and cardiovascular disease    SIGNIFICANT DIABETES MED HISTORY: diarrhea on metformin 1000 mg instant release     SELF MONITORING BLOOD GLUCOSE: Checks blood glucose at home 1x/day, about an hour after dinner; mostly low to mid 200s; lowest 186.     HYPOGLYCEMIC EPISODES: denies      CURRENT DIET: drinks mostly water and coconut water; apple juice about 3x/week. Meal schedule erratic due to GI symptoms - nausea, reflux. Eats at least 1 meal a day in the evening. At most eats 2 meals/day.     CURRENT EXERCISE: none but just joined gym       BP (!) 133/99 (BP Location: Right arm, Patient Position: Sitting, BP Method: Medium (Automatic))   Pulse 69   Resp 14   Ht 5' 7" (1.702 m)   Wt 112.9 kg (248 lb 14.4 oz)   BMI 38.98 kg/m²       ROS:   CONSTITUTIONAL: Appetite good, + fatigue  SKIN: No rash or pruritis   EYES: No visual disturbances  RESPIRATORY: No " shortness of breath or cough  CARDIAC: No chest pain or palpitations  GI: + nausea; + diarrhea  : + urinary frequency on lasix; no dysuria   MS: + arthralgias sometimes    NEURO: + paresthesias to feet   OTHER: no night sweats       PHYSICAL EXAM:  GENERAL: Well developed, well nourished. No acute distress.   PSYCH: AAOx3, appropriate mood and affect, conversant, well-groomed. Judgement and insight good.   NEURO: Cranial nerves grossly intact. Speech clear, no tremor.   NECK: Trachea midline, no thyromegaly or lymphadenopathy.   CHEST: Respirations even and unlabored. CTA bilaterally.  CARDIOVASCULAR: Regular rate and rhythm. No bruits. No murmur. No edema.   ABDOMEN: Soft, non-tender, non-distended. Bowel sounds present.   MS: Gait steady. No clubbing.   SKIN: Normal skin turgor. Skin warm and dry. No areas of breakdown. + acanthosis nigricans.  FEET: no open wounds         Hemoglobin A1C   Date Value Ref Range Status   12/28/2018 10.3 (H) 4.0 - 5.6 % Final     Comment:     ADA Screening Guidelines:  5.7-6.4%  Consistent with prediabetes  >or=6.5%  Consistent with diabetes  High levels of fetal hemoglobin interfere with the HbA1C  assay. Heterozygous hemoglobin variants (HbS, HgC, etc)do  not significantly interfere with this assay.   However, presence of multiple variants may affect accuracy.     07/02/2018 8.9 (H) 4.0 - 5.6 % Final     Comment:     ADA Screening Guidelines:  5.7-6.4%  Consistent with prediabetes  >or=6.5%  Consistent with diabetes  High levels of fetal hemoglobin interfere with the HbA1C  assay. Heterozygous hemoglobin variants (HbS, HgC, etc)do  not significantly interfere with this assay.   However, presence of multiple variants may affect accuracy.     03/26/2018 9.4 (H) 4.0 - 5.6 % Final     Comment:     According to ADA guidelines, hemoglobin A1c <7.0% represents  optimal control in non-pregnant diabetic patients. Different  metrics may apply to specific patient populations.   Standards of  Medical Care in Diabetes-2016.  For the purpose of screening for the presence of diabetes:  <5.7%     Consistent with the absence of diabetes  5.7-6.4%  Consistent with increasing risk for diabetes   (prediabetes)  >or=6.5%  Consistent with diabetes  Currently, no consensus exists for use of hemoglobin A1c  for diagnosis of diabetes for children.  This Hemoglobin A1c assay has significant interference with fetal   hemoglobin   (HbF). The results are invalid for patients with abnormal amounts of   HbF,   including those with known Hereditary Persistence   of Fetal Hemoglobin. Heterozygous hemoglobin variants (HbAS, HbAC,   HbAD, HbAE, HbA2) do not significantly interfere with this assay;   however, presence of multiple variants in a sample may impact the %   interference.             Chemistry        Component Value Date/Time     05/28/2019 1304     05/28/2019 1304    K 3.8 05/28/2019 1304    K 3.8 05/28/2019 1304    CL 99 05/28/2019 1304    CL 99 05/28/2019 1304    CO2 30 (H) 05/28/2019 1304    CO2 30 (H) 05/28/2019 1304    BUN 15 05/28/2019 1304    BUN 15 05/28/2019 1304    CREATININE 1.0 05/28/2019 1304    CREATININE 1.0 05/28/2019 1304     (H) 05/28/2019 1304     (H) 05/28/2019 1304        Component Value Date/Time    CALCIUM 8.9 05/28/2019 1304    CALCIUM 8.9 05/28/2019 1304    ALKPHOS 76 05/28/2019 1304    AST 10 05/28/2019 1304    ALT 13 05/28/2019 1304    BILITOT 0.4 05/28/2019 1304    ESTGFRAFRICA >60 05/28/2019 1304    ESTGFRAFRICA >60 05/28/2019 1304    EGFRNONAA >60 05/28/2019 1304    EGFRNONAA >60 05/28/2019 1304          Lab Results   Component Value Date    LDLCALC 112.8 03/12/2018       No results found for: MICALBCREAT        ASSESSMENT and PLAN:    A1C GOAL: < 7 %     1. Type 2 diabetes, uncontrolled, with neuropathy  Start exercise regimen - aim for 1/2 hour 5 days/week.   Try to incorporate regular meal schedule. See Diabetes Educator/Registered Dietician for Medical  Nutrition Therapy. Avoid beverages with sugar.     Stop metformin instant release 500 mg tablets because of diarrhea.   Switch to metformin  mg tablets. Start with 1 tablet twice daily with food. After a week, if having no diarrhea, then increase to 2 tablets with breakfast and 2 tablets with dinner.     Increase Levemir to 20 units nightly.     Continue glimepiride 4 mg tablet for now - but take with first meal of the day.     Test glucose 2x/day - fasting before eating in the morning and again at night.   Keep a blood sugar log.     Return to 2 months with a1c prior.     a1c today.     Send blood sugar log in 2 weeks and await further instructions.       Ambulatory Referral to Diabetes Education    Hemoglobin A1c    Hemoglobin A1c    Ambulatory referral to Podiatry    Microalbumin/creatinine urine ratio   2. Mixed hyperlipidemia  Lipid panel   3. Severe obesity (BMI 35.0-39.9) with comorbidity  Increases insulin resistance.   See dietician   4. Hypertension, unspecified type  She is confused re: hydralazine rx; Advised her to f/u with Dr. Yanez.    5. Chronic combined systolic and diastolic heart failure  Followed by Cardiology.        Orders Placed This Encounter   Procedures    Hemoglobin A1c     Standing Status:   Future     Standing Expiration Date:   8/11/2020    Hemoglobin A1c     Standing Status:   Future     Standing Expiration Date:   8/11/2020    Lipid panel     Standing Status:   Future     Standing Expiration Date:   6/12/2020    Microalbumin/creatinine urine ratio     Standing Status:   Future     Standing Expiration Date:   8/11/2020     Order Specific Question:   Specimen Source     Answer:   Urine    Ambulatory Referral to Diabetes Education     Referral Priority:   Routine     Referral Type:   Consultation     Referral Reason:   Specialty Services Required     Requested Specialty:   Endocrinology     Number of Visits Requested:   1     Expiration Date:   6/13/2020    Ambulatory  referral to Podiatry     Referral Priority:   Routine     Referral Type:   Consultation     Referral Reason:   Specialty Services Required     Requested Specialty:   Podiatry     Number of Visits Requested:   1        Follow up in about 2 months (around 8/13/2019).     Thank you very much for allowing me to participate in Fabiana Chanel's care.

## 2019-06-13 NOTE — LETTER
June 13, 2019      Miguel Burden MD  [address]  [place] [address]           [place]  [address], [address]  [place] [address]  Phone: [phone]  Fax: [phone]          Patient: Fabiana Chanel   MR Number: [id number]   YOB: 1975   Date of Visit: 6/13/2019       Dear Dr. Miguel Burden:    Thank you for referring Fabiana Chanel to me for evaluation. Attached you will find relevant portions of my assessment and plan of care.    If you have questions, please do not hesitate to call me. I look forward to following Fabiana Chanel along with you.    Sincerely,    Xiomara Daniel NP    Enclosure  CC:  No Recipients    If you would like to receive this communication electronically, please contact [email or web address] or [phone] to request more information on [employer] Link access.    For providers and/or their staff who would like to refer a patient to [facility], please contact us through our one-stop-shop provider referral line, [facility], at [phone].    If you feel you have received this communication in error or would no longer like to receive these types of communications, please e-mail [email or web address]

## 2019-06-17 ENCOUNTER — TELEPHONE (OUTPATIENT)
Dept: ELECTROPHYSIOLOGY | Facility: CLINIC | Age: 44
End: 2019-06-17

## 2019-06-17 NOTE — TELEPHONE ENCOUNTER
>4 hours AF found on ICD.  d/w pt via phone.  Start eliquis for CVA prophylaxis. Monitor for bleeding. If bleeding, stop eliquis and seek medical care -- e.g., if recurrent vaginal bleeding, seek OB/GYN.  Discussed the indications and possible side effects of the medications prescribed to the patient by me.  f/u as scheduled.

## 2019-06-18 ENCOUNTER — OFFICE VISIT (OUTPATIENT)
Dept: FAMILY MEDICINE | Facility: CLINIC | Age: 44
End: 2019-06-18
Payer: MEDICARE

## 2019-06-18 VITALS
DIASTOLIC BLOOD PRESSURE: 80 MMHG | TEMPERATURE: 98 F | HEIGHT: 67 IN | SYSTOLIC BLOOD PRESSURE: 110 MMHG | OXYGEN SATURATION: 95 % | HEART RATE: 74 BPM | WEIGHT: 243.19 LBS | BODY MASS INDEX: 38.17 KG/M2 | RESPIRATION RATE: 17 BRPM

## 2019-06-18 DIAGNOSIS — L40.9 PSORIASIS: ICD-10-CM

## 2019-06-18 DIAGNOSIS — I50.42 CHRONIC COMBINED SYSTOLIC AND DIASTOLIC HEART FAILURE: Primary | ICD-10-CM

## 2019-06-18 DIAGNOSIS — I10 ESSENTIAL HYPERTENSION: ICD-10-CM

## 2019-06-18 DIAGNOSIS — Z23 NEED FOR TETANUS BOOSTER: ICD-10-CM

## 2019-06-18 DIAGNOSIS — E11.9 TYPE 2 DIABETES MELLITUS WITHOUT COMPLICATION, WITHOUT LONG-TERM CURRENT USE OF INSULIN: ICD-10-CM

## 2019-06-18 PROBLEM — R42 EPISODIC LIGHTHEADEDNESS: Status: RESOLVED | Noted: 2019-04-26 | Resolved: 2019-06-18

## 2019-06-18 PROCEDURE — 90471 IMMUNIZATION ADMIN: CPT | Mod: S$GLB,,, | Performed by: FAMILY MEDICINE

## 2019-06-18 PROCEDURE — 3008F BODY MASS INDEX DOCD: CPT | Mod: CPTII,S$GLB,, | Performed by: FAMILY MEDICINE

## 2019-06-18 PROCEDURE — 3046F HEMOGLOBIN A1C LEVEL >9.0%: CPT | Mod: CPTII,S$GLB,, | Performed by: FAMILY MEDICINE

## 2019-06-18 PROCEDURE — 90714 TD VACC NO PRESV 7 YRS+ IM: CPT | Mod: S$GLB,,, | Performed by: FAMILY MEDICINE

## 2019-06-18 PROCEDURE — 3046F PR MOST RECENT HEMOGLOBIN A1C LEVEL > 9.0%: ICD-10-PCS | Mod: CPTII,S$GLB,, | Performed by: FAMILY MEDICINE

## 2019-06-18 PROCEDURE — 99999 PR PBB SHADOW E&M-EST. PATIENT-LVL III: ICD-10-PCS | Mod: PBBFAC,,, | Performed by: FAMILY MEDICINE

## 2019-06-18 PROCEDURE — 3074F PR MOST RECENT SYSTOLIC BLOOD PRESSURE < 130 MM HG: ICD-10-PCS | Mod: CPTII,S$GLB,, | Performed by: FAMILY MEDICINE

## 2019-06-18 PROCEDURE — 3008F PR BODY MASS INDEX (BMI) DOCUMENTED: ICD-10-PCS | Mod: CPTII,S$GLB,, | Performed by: FAMILY MEDICINE

## 2019-06-18 PROCEDURE — 99499 UNLISTED E&M SERVICE: CPT | Mod: S$GLB,,, | Performed by: FAMILY MEDICINE

## 2019-06-18 PROCEDURE — 3079F DIAST BP 80-89 MM HG: CPT | Mod: CPTII,S$GLB,, | Performed by: FAMILY MEDICINE

## 2019-06-18 PROCEDURE — 3074F SYST BP LT 130 MM HG: CPT | Mod: CPTII,S$GLB,, | Performed by: FAMILY MEDICINE

## 2019-06-18 PROCEDURE — 99214 PR OFFICE/OUTPT VISIT, EST, LEVL IV, 30-39 MIN: ICD-10-PCS | Mod: 25,S$GLB,, | Performed by: FAMILY MEDICINE

## 2019-06-18 PROCEDURE — 99499 RISK ADDL DX/OHS AUDIT: ICD-10-PCS | Mod: S$GLB,,, | Performed by: FAMILY MEDICINE

## 2019-06-18 PROCEDURE — 90471 TD VACCINE GREATER THAN OR EQUAL TO 7YO PRESERVATIVE FREE IM: ICD-10-PCS | Mod: S$GLB,,, | Performed by: FAMILY MEDICINE

## 2019-06-18 PROCEDURE — 99999 PR PBB SHADOW E&M-EST. PATIENT-LVL III: CPT | Mod: PBBFAC,,, | Performed by: FAMILY MEDICINE

## 2019-06-18 PROCEDURE — 3079F PR MOST RECENT DIASTOLIC BLOOD PRESSURE 80-89 MM HG: ICD-10-PCS | Mod: CPTII,S$GLB,, | Performed by: FAMILY MEDICINE

## 2019-06-18 PROCEDURE — 99214 OFFICE O/P EST MOD 30 MIN: CPT | Mod: 25,S$GLB,, | Performed by: FAMILY MEDICINE

## 2019-06-18 PROCEDURE — 90714 TD VACCINE GREATER THAN OR EQUAL TO 7YO PRESERVATIVE FREE IM: ICD-10-PCS | Mod: S$GLB,,, | Performed by: FAMILY MEDICINE

## 2019-06-18 RX ORDER — KETOCONAZOLE 20 MG/G
CREAM TOPICAL
Refills: 4 | COMMUNITY
Start: 2019-05-24 | End: 2019-11-23

## 2019-06-18 RX ORDER — TRIAMCINOLONE ACETONIDE 1 MG/G
CREAM TOPICAL 2 TIMES DAILY
Refills: 4 | COMMUNITY
Start: 2019-05-24

## 2019-06-18 NOTE — PROGRESS NOTES
Chief Complaint   Patient presents with    Follow-up    Hypotension       Fabiana Chanel is a 43 y.o. female who presents per the Chief Complaint.  Pt is known to this practice but has not been seen by me previously.  All known chronic medical issues have been documented.           Diabetes   She presents for her follow-up diabetic visit. She has type 2 diabetes mellitus. Her disease course has been worsening. There are no hypoglycemic associated symptoms. Pertinent negatives for hypoglycemia include no confusion, dizziness, headaches, nervousness/anxiousness, seizures or sweats. Pertinent negatives for diabetes include no blurred vision, no chest pain, no fatigue, no foot paresthesias, no foot ulcerations, no polydipsia, no polyphagia, no polyuria, no visual change, no weakness and no weight loss. There are no hypoglycemic complications. Symptoms are stable. There are no diabetic complications. Pertinent negatives for diabetic complications include no CVA, PVD or retinopathy. Risk factors for coronary artery disease include hypertension, diabetes mellitus, sedentary lifestyle and obesity. Current diabetic treatment includes insulin injections and oral agent (dual therapy). She is compliant with treatment most of the time. Her weight is stable. She is following a generally healthy diet. When asked about meal planning, she reported none. She has not had a previous visit with a dietitian. She participates in exercise intermittently. Her home blood glucose trend is increasing steadily. An ACE inhibitor/angiotensin II receptor blocker is being taken. She does not see a podiatrist.Eye exam is current.   Hypertension   This is a chronic problem. The current episode started more than 1 year ago. The problem is unchanged. The problem is controlled. Pertinent negatives include no anxiety, blurred vision, chest pain, headaches, malaise/fatigue, neck pain, orthopnea, palpitations, peripheral edema, PND, shortness of  breath or sweats. There are no associated agents to hypertension. Risk factors for coronary artery disease include diabetes mellitus and obesity. Past treatments include ACE inhibitors, angiotensin blockers and diuretics. The current treatment provides moderate improvement. Hypertensive end-organ damage includes heart failure. There is no history of angina, kidney disease, CAD/MI, CVA, left ventricular hypertrophy, PVD or retinopathy. There is no history of chronic renal disease, coarctation of the aorta, hyperaldosteronism, hypercortisolism, hyperparathyroidism, a hypertension causing med, pheochromocytoma, renovascular disease, sleep apnea or a thyroid problem.        ROS  Review of Systems   Constitutional: Negative.  Negative for activity change, appetite change, chills, diaphoresis, fatigue, fever, malaise/fatigue, unexpected weight change and weight loss.   HENT: Negative.  Negative for congestion, ear pain, hearing loss, nosebleeds, postnasal drip, rhinorrhea, sinus pressure, sneezing, sore throat and trouble swallowing.    Eyes: Negative for blurred vision, pain and visual disturbance.   Respiratory: Negative for cough, choking and shortness of breath.    Cardiovascular: Negative for chest pain, palpitations, orthopnea, leg swelling and PND.   Gastrointestinal: Negative for abdominal pain, constipation, diarrhea, nausea and vomiting.   Endocrine: Negative for polydipsia, polyphagia and polyuria.   Genitourinary: Negative for difficulty urinating, dysuria, frequency and urgency.   Musculoskeletal: Negative.  Negative for arthralgias, back pain, gait problem, joint swelling, myalgias and neck pain.   Skin: Negative.    Allergic/Immunologic: Negative for environmental allergies and food allergies.   Neurological: Negative.  Negative for dizziness, seizures, syncope, weakness, light-headedness and headaches.   Psychiatric/Behavioral: Negative.  Negative for confusion, decreased concentration, dysphoric mood and  "sleep disturbance. The patient is not nervous/anxious.        Physical Exam  Vitals:    06/18/19 1427   BP: 110/80   Pulse: 74   Resp: 17   Temp: 98.1 °F (36.7 °C)    Body mass index is 38.09 kg/m².  Weight: 110.3 kg (243 lb 2.7 oz)   Height: 5' 7" (170.2 cm)     Physical Exam    Assessment & Plan    Discussion of plan of care including treatment options regarding health and wellness were reviewed and discussed with patient.  Any changes to medication or treatment plan, as well as any screening blood test, imaging, or referrals to specialist, are documented.  Follow up as indicated.     1. Chronic combined systolic and diastolic heart failure  Stable; no therapeutic changes at this time.  Managed by  Cardiology.    2. Essential hypertension  Patient was counseled and encouraged to maintain a low sodium diet, as well as increasing physical activity.  Recommend random BP checks at home on a regular basis.  Repeat BP at end of visit was not necessary. Will continue medication at this time, and follow up in 3-6 months, or sooner if blood pressure begins to increase.       3. Type 2 diabetes mellitus without complication, without long-term current use of insulin  Patient is encouraged to follow a diet low in carbohydrates and simple sugars.  Discussed simple vs. complex carbohydrates as well as eating times of certain meals. Advised to focus on good food choices and increased physical activity and encouraged to adhere to medication regimen and/or lifestyle adjustments, and to check glucose level as recommended.  Contact office if glucose levels are not improving over time.  Screening blood test is due in 3 months.       4. Psoriasis  Stable; no therapeutic changes at this time.     5. Need for tetanus booster  Recommended vaccines were given to boost immunity and prevent spread of communicable diseases.   - (In Office Administered) Td Vaccine - Preservative Free       Follow up if symptoms worsen or fail to improve.  "       ACTIVE MEDICAL ISSUES:  Documented in Problem List    PAST MEDICAL HISTORY  Documented    PAST SURGICAL HISTORY:  Documented    SOCIAL HISTORY:  Documented    FAMILY HISTORY:  Documented    ALLERGIES AND MEDICATIONS: updated and reviewed.  Documented    Health Maintenance       Date Due Completion Date    Pneumococcal Vaccine (Medium Risk) (1 of 1 - PPSV23) 07/03/1995 ---    Lipid Panel 03/12/2019 3/12/2018    Foot Exam 03/26/2019 3/26/2018    Eye Exam 07/09/2019 7/9/2018    Override on 10/2/2017: Done (dr moctezuma)    TETANUS VACCINE 07/02/2019 (Originally 9/25/2016) 9/25/2006    Influenza Vaccine 08/01/2019 1/8/2019    Hemoglobin A1c 09/13/2019 6/13/2019    Mammogram 05/28/2021 5/28/2019    Pap Smear 05/06/2022 5/6/2019

## 2019-06-19 ENCOUNTER — TELEPHONE (OUTPATIENT)
Dept: SURGERY | Facility: CLINIC | Age: 44
End: 2019-06-19

## 2019-06-19 ENCOUNTER — CLINICAL SUPPORT (OUTPATIENT)
Dept: DIABETES | Facility: CLINIC | Age: 44
End: 2019-06-19
Payer: MEDICARE

## 2019-06-19 VITALS — WEIGHT: 249.81 LBS | BODY MASS INDEX: 39.12 KG/M2

## 2019-06-19 DIAGNOSIS — R10.84 GENERALIZED ABDOMINAL PAIN: ICD-10-CM

## 2019-06-19 DIAGNOSIS — E11.9 TYPE 2 DIABETES MELLITUS WITHOUT COMPLICATION, WITHOUT LONG-TERM CURRENT USE OF INSULIN: Primary | ICD-10-CM

## 2019-06-19 PROCEDURE — G0108 DIAB MANAGE TRN  PER INDIV: HCPCS | Mod: S$GLB,,, | Performed by: DIETITIAN, REGISTERED

## 2019-06-19 PROCEDURE — G0108 PR DIAB MANAGE TRN  PER INDIV: ICD-10-PCS | Mod: S$GLB,,, | Performed by: DIETITIAN, REGISTERED

## 2019-06-19 RX ORDER — SUCRALFATE 1 G/1
1 TABLET ORAL 4 TIMES DAILY
Qty: 120 TABLET | Refills: 3 | Status: SHIPPED | OUTPATIENT
Start: 2019-06-19 | End: 2019-07-19

## 2019-06-19 NOTE — PROGRESS NOTES
Diabetes Education  Author: Johanny Henley RD  Date: 6/19/2019    Diabetes Care Management Summary  Pt visit today focused on introducing pt to diabetes self management w emphasis on CHO awareness/counting, meal planning/label reading, SMBG, exercise, and meds. Also discussed 2 gm Na and low-saturated fat Heart Healthy Diet  Diabetes Education Record Assessment/Progress: Initial  Current Diabetes Risk Level: Moderate     Last A1c:   Lab Results   Component Value Date    HGBA1C 11.0 (H) 06/13/2019     Last visit with Diabetes Educator: : 06/19/2019  Diabetes Type  Diabetes Type : Type II  Diabetes History  Diabetes Diagnosis: 5-10 years  Current Treatment: Oral Medication, Diet, Insulin  Reviewed Problem List with Patient: Yes    Health Maintenance was reviewed today with patient. Discussed with patient importance of routine eye exams, foot exams/foot care, blood work (i.e.: A1c, microalbumin, and lipid), dental visits, yearly flu vaccine, and pneumonia vaccine as indicated by PCP. Patient verbalized understanding.     Health Maintenance Topics with due status: Not Due       Topic Last Completion Date    Influenza Vaccine 01/08/2019    Pap Smear 05/06/2019    Mammogram 05/28/2019    Hemoglobin A1c 06/13/2019    TETANUS VACCINE 06/18/2019     Health Maintenance Due   Topic Date Due    Pneumococcal Vaccine (Medium Risk) (1 of 1 - PPSV23) 07/03/1995    Lipid Panel  03/12/2019    Foot Exam  03/26/2019    Eye Exam  07/09/2019     Nutrition  Meal Planning: skipping meals, water  What type of beverages do you drink?: sport drinks, water, juice, other (see comments)(coconut water)  Meal Plan 24 Hour Recall - Breakfast: often skips due to GI discomfort but has tried grits  Meal Plan 24 Hour Recall - Lunch: noon: likes seafood salads made w bell and pickle reslish such as Imitation crab salad (13 g carb, 1700 mg Sodium per 3 ounce serving) w 16 buttery crackers  or Turkdy sandwich w american cheese, bell and mustard  Meal  Plan 24 Hour Recall - Dinner: Cheeseadeeler w American cheese, bell and mustard, water  Meal Plan 24 Hour Recall - Snack: 2 cups watermelon    Monitoring   Self Monitoring : checks BG 2x/day since lov  Blood Glucose Logs: No(stated she needed log forms but fasting this am was 197 and last night was 167)  In the last month, how often have you had a low blood sugar reaction?: never  Can you tell when your blood sugar is too high?: no    Exercise   Exercise Type: none(none at present but joined a gym and plans to start soon)  Intensity: (Pt stated Cardiologist rec keeping HR to less than 115)    Current Diabetes Treatment   Current Treatment: Oral Medication, Diet, Insulin    Social History  Preferred Learning Method: Face to Face, Group Education  Primary Support: Self  Educational Level: Middle School  Occupation: disabled  Smoking Status: Never a Smoker  Alcohol Use: Rarely  Barriers to Change: None  Learning Challenges : None   Readiness to Learn : Acceptance  Cultural Influences: No    Diabetes Education Assessment/Progress  Diabetes Disease Process (diabetes disease process and treatment options): Instructed, Discussion, Written Materials Provided, Individual Session, Comprehends Key Points  -Pt aware of continued increase in A1c up to 11. Discussed goal of 7% and strategies for lowering this value.    Nutrition (Incorporating nutritional management into one's lifestyle): Individual Session, Written Materials Provided, Instructed, Discussion, Needs Review  -Pt states she often skips meals due to GI discomfort/nausea. Diet hx indicates pt consumes 1-2 meals/day with mid-meal snacks included. Diet recall indicates carb inconsistency in meals mainly due to carb unawareness as well as pt following unsupported diet/beverage recommendations from friends/family and Internet  -Diet recall notes high sodium foods consumed daily such as pickles, processed cheeses, coconut water (1 c= 250 mg Na, 9 g carb, 60 cals),  "processed/packaged imitation crab, salted crackers, condiments like mustard and ketchup, etc. Diet lacking in whole grains, fresh fruit, non-starchy vegetables and low fat dairy.  -Using the Diabetes and Heart Healthy Guide, discussed carb vs non-carb foods. Discussed appropriate amount of carbs to have at meals/snacks. Discussed appropriate serving sizes of individual carb items.   -Reviewed label reading, portion control (hand) and using the plate method of meal planning.   -Instructed pt to aim for 3 evenly-spaced meals with 30-45 gm carb/meal and 0-15 gm at snacks.  -Reviewed strategies for reducing sodium and saturated fats in meals/food choices.  - Pt states she understood info provided    Physical Activity (incorporating physical activity into one's lifestyle): Individual Session, Written Materials Provided, Instructed, Discussion, Comprehends Key Points  -Pt stated she just joined gym but has not yet started exercising. Also stated, her Cardiologist rec'd keeping HR to less than 115 during exercise  -Discussed benefits of physical activity on BG control and encouraged pt to exercise for a total of 150 minutes per week while  keeping 3 exercise components in mind: Frequency- 3-5x/wk, Duration- 30 min, Intensity- can say name but "not sing a song"    Medications (states correct name, dose, onset, peak, duration, side effects & timing of meds): Instructed, Comprehends Key Points, Discussion, Written Materials Provided, Individual Session  -Pt correctly identified meds and dosage for metformin, Levemir and Amaryl. Pt states she is diligent w taking meds on time    Monitoring (monitoring blood glucose/other parameters & using results): Individual Session, Written Materials Provided, Instructed, Discussion, Comprehends Key Points  -pt states she just increased Bg checks to 2 x/day since lov; fasting 197, night 167  -Reviewed A1c goal of 7% or less and BGgoals of 80-130pre meal/fasting, <160-180 2hrpp;   discussed " BG readings and how to use data in DM self management; rec'd continue SMBG 2x daily, fasting and night ; keep log/ copy log sheet provided.    Acute Complications (preventing, detecting, and treating acute complications): Individual Session, Written Materials Provided, Instructed, Discussion, Comprehends Key Points  Chronic Complications (preventing, detecting, and treating chronic complications): Discussion, Individual Session, Written Materials Provided, Instructed, Comprehends Key Points  Clinical (diabetes, other pertinent medical history, and relevant comorbidities reviewed during visit): Discussion, Individual Session, Written Materials Provided, Instructed  Cognitive (knowledge of self-management skills, functional health literacy): Discussion, Individual Session, Written Materials Provided  Psychosocial (emotional response to diabetes): Not Covered/Deferred  Diabetes Distress and Support Systems: Discussion  Behavioral (readiness for change, lifestyle practices, self-care behaviors): Discussion    Goals  Patient has selected/evaluated goals during today's session: Yes, selected  Healthy Eating: Set(distribute carbs into 3 evenly spaced meals/day 30-45g carb/meal while limiting sodium and saturated fats)  Start Date: 06/19/19  Target Date: 07/19/19  Monitoring: Set(SMBG 2x/day and keep log)  Start Date: 06/19/19  Target Date: 07/19/19    Diabetes Care Plan/Intervention  Education Plan/Intervention: Individual Follow-Up DSMT(contact information provided)    Diabetes Meal Plan  Restrictions: Low Sodium, Low Fat, Restricted Carbohydrate  Carbohydrate Per Meal: 30-45g  Carbohydrate Per Snack : 15-20g    Today's Self-Management Care Plan was developed with the patient's input and is based on barriers identified during today's assessment.    The long and short-term goals in the care plan were written with the patient/caregiver's input. The patient has agreed to work toward these goals to improve her overall  diabetes control.      The patient received a copy of today's self-management plan and verbalized understanding of the care plan, goals, and all of today's instructions.      The patient was encouraged to communicate with her physician and care team regarding her condition(s) and treatment.  I provided the patient with my contact information today and encouraged her to contact me via phone or patient portal as needed.     Education Units of Time   Time Spent: 60 min

## 2019-06-20 DIAGNOSIS — R10.84 GENERALIZED ABDOMINAL PAIN: Primary | ICD-10-CM

## 2019-06-20 PROCEDURE — 81025 POCT URINE PREGNANCY: ICD-10-PCS | Mod: S$GLB,,, | Performed by: INTERNAL MEDICINE

## 2019-06-20 PROCEDURE — 81025 URINE PREGNANCY TEST: CPT | Mod: S$GLB,,, | Performed by: INTERNAL MEDICINE

## 2019-06-25 ENCOUNTER — HOSPITAL ENCOUNTER (OUTPATIENT)
Dept: RADIOLOGY | Facility: HOSPITAL | Age: 44
Discharge: HOME | End: 2019-06-25
Attending: INTERNAL MEDICINE
Payer: MEDICARE

## 2019-06-25 DIAGNOSIS — R10.84 GENERALIZED ABDOMINAL PAIN: ICD-10-CM

## 2019-06-25 PROCEDURE — 25500020 PHARM REV CODE 255: Performed by: INTERNAL MEDICINE

## 2019-06-25 PROCEDURE — 74177 CT ABD & PELVIS W/CONTRAST: CPT | Mod: 26,,, | Performed by: RADIOLOGY

## 2019-06-25 PROCEDURE — 74177 CT ABD & PELVIS W/CONTRAST: CPT | Mod: TC

## 2019-06-25 PROCEDURE — 74177 CT ABDOMEN PELVIS WITH CONTRAST: ICD-10-PCS | Mod: 26,,, | Performed by: RADIOLOGY

## 2019-06-25 RX ADMIN — IOHEXOL 15 ML: 300 INJECTION, SOLUTION INTRAVENOUS at 10:06

## 2019-06-25 RX ADMIN — IOHEXOL 100 ML: 350 INJECTION, SOLUTION INTRAVENOUS at 10:06

## 2019-06-26 ENCOUNTER — PATIENT MESSAGE (OUTPATIENT)
Dept: ENDOCRINOLOGY | Facility: CLINIC | Age: 44
End: 2019-06-26

## 2019-06-29 ENCOUNTER — HOSPITAL ENCOUNTER (EMERGENCY)
Facility: HOSPITAL | Age: 44
Discharge: HOME | End: 2019-06-29
Attending: EMERGENCY MEDICINE
Payer: MEDICARE

## 2019-06-29 VITALS
BODY MASS INDEX: 37.67 KG/M2 | WEIGHT: 240 LBS | TEMPERATURE: 98 F | SYSTOLIC BLOOD PRESSURE: 110 MMHG | HEIGHT: 67 IN | HEART RATE: 98 BPM | OXYGEN SATURATION: 99 % | RESPIRATION RATE: 18 BRPM | DIASTOLIC BLOOD PRESSURE: 65 MMHG

## 2019-06-29 DIAGNOSIS — M79.672 LEFT FOOT PAIN: Primary | ICD-10-CM

## 2019-06-29 PROCEDURE — 99284 EMERGENCY DEPT VISIT MOD MDM: CPT

## 2019-06-29 PROCEDURE — 25000003 PHARM REV CODE 250: Performed by: NURSE PRACTITIONER

## 2019-06-29 RX ORDER — OXYCODONE AND ACETAMINOPHEN 5; 325 MG/1; MG/1
1 TABLET ORAL
Status: COMPLETED | OUTPATIENT
Start: 2019-06-29 | End: 2019-06-29

## 2019-06-29 RX ORDER — HYDROCODONE BITARTRATE AND ACETAMINOPHEN 5; 325 MG/1; MG/1
1 TABLET ORAL EVERY 6 HOURS PRN
Qty: 8 TABLET | Refills: 0 | Status: SHIPPED | OUTPATIENT
Start: 2019-06-29 | End: 2019-11-23

## 2019-06-29 RX ADMIN — OXYCODONE HYDROCHLORIDE AND ACETAMINOPHEN 1 TABLET: 5; 325 TABLET ORAL at 04:06

## 2019-06-29 NOTE — DISCHARGE INSTRUCTIONS
Take pain medication as needed only as prescribed.  Do not drive or drink alcohol when taking pain medication because it will make you drowsy.  Schedule a follow-up appointment with your regular doctor for further testing and treatment.  Return to the emergency department immediately for any new or worsening symptoms.    Thank you for coming to our Emergency Department today. It is important to remember that some problems are difficult to diagnose and may not be found during your first visit. Be sure to follow up with your primary care doctor.  If you do not have one, you may contact the one listed on your discharge paperwork or you may also call the [facility] Appointment Desk at [phone] to schedule an appointment with one.     Return to the ER with any questions/concerns, new/concerning symptoms, worsening or failure to improve. Do not drive or make any important decisions for 24 hours if you have received any pain medications, sedatives or mood altering drugs during your ER visit.

## 2019-06-29 NOTE — ED PROVIDER NOTES
Encounter Date: 6/29/2019       History     Chief Complaint   Patient presents with    Ankle Pain     Left ankle pain with swelling x 1 day Pt denies injury      43-year-old female with a history of type 2 diabetes, pulmonary edema, hypertension, cardiomyopathy, and others presenting for evaluation of pain to the medial aspect of the left foot that she 1st noticed when she awoke in the morning.  She denies pain to the left ankle or toes.  She also denies pain to the dorsum or plantar aspects of the foot.  She denies trauma to the foot.  She has not taken any medications for symptoms prior to arrival.        Review of patient's allergies indicates:   Allergen Reactions    Pneumococcal 23-abimbola ps vaccine      Past Medical History:   Diagnosis Date    Atrial fibrillation     Blood clot associated with vein wall inflammation     not dvt    Cardiomyopathy     Normal cors on cath 11/2017    CHF (congestive heart failure)     DM (diabetes mellitus) 9/19/2013    Hyperlipidemia     Hypertension     Psoriasis     Sleep apnea      Past Surgical History:   Procedure Laterality Date    CARDIAC CATHETERIZATION      COLONOSCOPY      DILATION AND CURETTAGE OF UTERUS      EGD (ESOPHAGOGASTRODUODENOSCOPY) N/A 5/9/2019    Performed [facility]iel[facility]rell [facility] at [facility] ENDO    INSERTION-ICD-DUAL Left 4/17/2018    Performed by Eben Christine [facility] at [facility] CATH LAB     Family History   Problem Relation Age of Onset    Hypertension Mother     Hypertension Father     Diabetes Father     Diabetes Maternal Grandmother     Diabetes Paternal Grandmother     Breast cancer Neg Hx     Colon cancer Neg Hx     Ovarian cancer Neg Hx      Social History     Tobacco Use    Smoking status: Never Smoker    Smokeless tobacco: Never Used    Tobacco comment: smokes cigars on occasion   Substance Use Topics    Alcohol use: Yes     Comment: occasional    Drug use: No     Comment: hx of marijuana use 3 years ago     Review of  Systems   Constitutional: Negative for chills, fatigue and fever.   HENT: Negative for congestion, ear pain, nosebleeds, postnasal drip, rhinorrhea, sinus pressure and sore throat.    Eyes: Negative for photophobia and pain.   Respiratory: Negative for apnea, cough, choking, chest tightness, shortness of breath, wheezing and stridor.    Cardiovascular: Negative for chest pain, palpitations and leg swelling.   Gastrointestinal: Negative for abdominal pain, constipation, diarrhea, nausea and vomiting.   Genitourinary: Negative for dysuria, flank pain, hematuria, pelvic pain and vaginal discharge.   Musculoskeletal: Negative for arthralgias, back pain, gait problem and neck pain.        Left foot pain   Skin: Negative for color change, pallor, rash and wound.   Neurological: Negative for dizziness, seizures, syncope, facial asymmetry, light-headedness and headaches.   Hematological: Negative for adenopathy.   Psychiatric/Behavioral: Negative for confusion. The patient is not nervous/anxious.        Physical Exam     Initial Vitals [06/29/19 0329]   BP Pulse Resp Temp SpO2   105/67 108 18 97.9 °F (36.6 °C) 98 %      MAP       --         Physical Exam    Nursing note and vitals reviewed.  Constitutional: She appears well-developed and well-nourished. She is not diaphoretic. No distress.   HENT:   Head: Normocephalic and atraumatic.   Right Ear: External ear normal.   Left Ear: External ear normal.   Nose: Nose normal.   Eyes: Conjunctivae and EOM are normal. Right eye exhibits no discharge. Left eye exhibits no discharge.   Neck: Normal range of motion. Neck supple. No tracheal deviation present.   Cardiovascular: Normal rate.   Pulmonary/Chest: No stridor. No respiratory distress.   Abdominal: Soft. She exhibits no distension. There is no tenderness.   Musculoskeletal: Normal range of motion.        Left foot: There is tenderness. There is normal range of motion, no bony tenderness, no swelling, normal capillary refill,  no crepitus, no deformity and no laceration.   Focal tenderness to palpation to the medial aspect of the left midfoot.  Do plantar or dorsal tenderness.  No pain with range of motion of the ankle.  DP pulse is normal. Capillary refill to toes is brisk.  There is no deformity, erythema, warmth, induration, fluctuance, or other acute abnormality.   Neurological: She is alert and oriented to person, place, and time. She has normal strength. No cranial nerve deficit or sensory deficit.   Skin: Skin is warm and dry.   Psychiatric: She has a normal mood and affect. Her behavior is normal. Judgment and thought content normal.         ED Course   Procedures  Labs Reviewed - No data to display       Imaging Results          X-Ray Foot Complete Left (Final result)  Result time 06/29/19 05:22:13    Final result by Mingo Duran MD (06/29/19 05:22:13)                 Impression:      No radiographic evidence of acute osseous injury of the left foot.      Electronically signed by: Mingo Duran MD  Date:    06/29/2019  Time:    05:22             Narrative:    EXAMINATION:  XR FOOT COMPLETE 3 VIEW LEFT    CLINICAL HISTORY:  .  Pain in left foot    TECHNIQUE:  AP, lateral and oblique views of the left foot were performed.    COMPARISON:  None    FINDINGS:  There is no evidence of acute fracture or dislocation.  The Lisfranc interval appears within normal limits.  There is a moderate-sized os naviculare incidentally noted.  No radiopaque foreign body within the visualized soft tissues.                               X-Ray Ankle Complete Left (Final result)  Result time 06/29/19 03:59:39    Final result by Mingo Duran MD (06/29/19 03:59:39)                 Impression:      No radiographic evidence of acute fracture or dislocation.      Electronically signed by: Mingo Duran MD  Date:    06/29/2019  Time:    03:59             Narrative:    EXAMINATION:  XR ANKLE COMPLETE 3 VIEW LEFT    CLINICAL HISTORY:  Pain in left  ankle and joints of left foot    TECHNIQUE:  AP, lateral and oblique views of the left ankle were performed.    COMPARISON:  None    FINDINGS:  There is no evidence of acute fracture or dislocation.  The ankle mortise appears maintained and symmetric.  There is mild noninflammatory calcaneal enthesopathic change at the plantar fascia attachment.  No focal soft tissue swelling or radiopaque foreign body identified.                                 Medical Decision Making:   History:   Old Medical Records: I decided to obtain old medical records.  Differential Diagnosis:   Fracture, dislocation, septic joint, gout, contusion, cellulitis, others  Clinical Tests:   Radiological Study: Ordered and Reviewed  ED Management:  HPI and physical exam as above.    There is focal tenderness to the medial aspect of the left midfoot.  There is no associated erythema, warmth, induration, fluctuance, or other signs of an infectious process.  The remaining aspects of the foot are nontender. No pain with range of motion or palpation of the ankle.  X-ray of the left foot and left ankle is negative.  Uncertain etiology of the patient's symptoms, however I doubt emergent pathology.  Will treat with a short course of Norco given patient's comorbidities. Advised patient to follow up with her PCP for re-evaluation and further management.  ED return precautions given. All questions regarding diagnosis and plan were answered to the patient's fullest possible satisfaction. Patient expressed understanding of diagnosis, discharge instructions, and return precautions.                        Clinical Impression:       ICD-10-CM ICD-9-CM   1. Left foot pain M79.672 729.5         Disposition:   Disposition: Discharged  Condition: Stable                        Collin Mores NP  06/29/19 0550

## 2019-07-05 ENCOUNTER — TELEPHONE (OUTPATIENT)
Dept: SURGERY | Facility: CLINIC | Age: 44
End: 2019-07-05

## 2019-07-08 ENCOUNTER — TELEPHONE (OUTPATIENT)
Dept: SURGERY | Facility: CLINIC | Age: 44
End: 2019-07-08

## 2019-07-08 DIAGNOSIS — R10.84 GENERALIZED ABDOMINAL PAIN: Primary | ICD-10-CM

## 2019-07-09 ENCOUNTER — OFFICE VISIT (OUTPATIENT)
Dept: FAMILY MEDICINE | Facility: CLINIC | Age: 44
End: 2019-07-09
Payer: MEDICARE

## 2019-07-09 VITALS
DIASTOLIC BLOOD PRESSURE: 70 MMHG | RESPIRATION RATE: 16 BRPM | TEMPERATURE: 98 F | OXYGEN SATURATION: 97 % | SYSTOLIC BLOOD PRESSURE: 120 MMHG | HEIGHT: 67 IN | WEIGHT: 253.5 LBS | BODY MASS INDEX: 39.79 KG/M2 | HEART RATE: 76 BPM

## 2019-07-09 DIAGNOSIS — M77.52 TENDONITIS OF ANKLE, LEFT: Primary | ICD-10-CM

## 2019-07-09 DIAGNOSIS — E11.9 TYPE 2 DIABETES MELLITUS WITHOUT COMPLICATION, WITHOUT LONG-TERM CURRENT USE OF INSULIN: ICD-10-CM

## 2019-07-09 DIAGNOSIS — R56.9 SEIZURE: ICD-10-CM

## 2019-07-09 DIAGNOSIS — I10 ESSENTIAL HYPERTENSION: ICD-10-CM

## 2019-07-09 PROCEDURE — 99214 PR OFFICE/OUTPT VISIT, EST, LEVL IV, 30-39 MIN: ICD-10-PCS | Mod: S$GLB,,, | Performed by: FAMILY MEDICINE

## 2019-07-09 PROCEDURE — 3074F SYST BP LT 130 MM HG: CPT | Mod: CPTII,S$GLB,, | Performed by: FAMILY MEDICINE

## 2019-07-09 PROCEDURE — 3078F PR MOST RECENT DIASTOLIC BLOOD PRESSURE < 80 MM HG: ICD-10-PCS | Mod: CPTII,S$GLB,, | Performed by: FAMILY MEDICINE

## 2019-07-09 PROCEDURE — 99999 PR PBB SHADOW E&M-EST. PATIENT-LVL IV: ICD-10-PCS | Mod: PBBFAC,,, | Performed by: FAMILY MEDICINE

## 2019-07-09 PROCEDURE — 3008F PR BODY MASS INDEX (BMI) DOCUMENTED: ICD-10-PCS | Mod: CPTII,S$GLB,, | Performed by: FAMILY MEDICINE

## 2019-07-09 PROCEDURE — 3074F PR MOST RECENT SYSTOLIC BLOOD PRESSURE < 130 MM HG: ICD-10-PCS | Mod: CPTII,S$GLB,, | Performed by: FAMILY MEDICINE

## 2019-07-09 PROCEDURE — 3078F DIAST BP <80 MM HG: CPT | Mod: CPTII,S$GLB,, | Performed by: FAMILY MEDICINE

## 2019-07-09 PROCEDURE — 99214 OFFICE O/P EST MOD 30 MIN: CPT | Mod: S$GLB,,, | Performed by: FAMILY MEDICINE

## 2019-07-09 PROCEDURE — 3008F BODY MASS INDEX DOCD: CPT | Mod: CPTII,S$GLB,, | Performed by: FAMILY MEDICINE

## 2019-07-09 PROCEDURE — 99499 RISK ADDL DX/OHS AUDIT: ICD-10-PCS | Mod: S$GLB,,, | Performed by: FAMILY MEDICINE

## 2019-07-09 PROCEDURE — 3046F PR MOST RECENT HEMOGLOBIN A1C LEVEL > 9.0%: ICD-10-PCS | Mod: CPTII,S$GLB,, | Performed by: FAMILY MEDICINE

## 2019-07-09 PROCEDURE — 99499 UNLISTED E&M SERVICE: CPT | Mod: S$GLB,,, | Performed by: FAMILY MEDICINE

## 2019-07-09 PROCEDURE — 3046F HEMOGLOBIN A1C LEVEL >9.0%: CPT | Mod: CPTII,S$GLB,, | Performed by: FAMILY MEDICINE

## 2019-07-09 PROCEDURE — 99999 PR PBB SHADOW E&M-EST. PATIENT-LVL IV: CPT | Mod: PBBFAC,,, | Performed by: FAMILY MEDICINE

## 2019-07-09 RX ORDER — DICLOFENAC SODIUM 10 MG/G
GEL TOPICAL
Qty: 100 G | Refills: 3 | Status: SHIPPED | OUTPATIENT
Start: 2019-07-09 | End: 2019-08-06

## 2019-07-09 NOTE — PROGRESS NOTES
Pneumonia Vaccine: Pt is allergic  Foot Exam: Ok to get done today  Eye Exam: Completed with jose elias

## 2019-07-11 ENCOUNTER — HOSPITAL ENCOUNTER (OUTPATIENT)
Dept: RADIOLOGY | Facility: HOSPITAL | Age: 44
Discharge: HOME | End: 2019-07-11
Attending: INTERNAL MEDICINE
Payer: MEDICARE

## 2019-07-11 DIAGNOSIS — R10.84 GENERALIZED ABDOMINAL PAIN: ICD-10-CM

## 2019-07-11 PROCEDURE — 76705 ECHO EXAM OF ABDOMEN: CPT | Mod: TC

## 2019-07-11 PROCEDURE — 76705 ECHO EXAM OF ABDOMEN: CPT | Mod: 26,,, | Performed by: RADIOLOGY

## 2019-07-11 PROCEDURE — 76705 US ABDOMEN LIMITED_LIVER: ICD-10-PCS | Mod: 26,,, | Performed by: RADIOLOGY

## 2019-07-12 ENCOUNTER — TELEPHONE (OUTPATIENT)
Dept: ADMINISTRATIVE | Facility: HOSPITAL | Age: 44
End: 2019-07-12

## 2019-07-15 ENCOUNTER — TELEPHONE (OUTPATIENT)
Dept: ADMINISTRATIVE | Facility: HOSPITAL | Age: 44
End: 2019-07-15

## 2019-07-17 ENCOUNTER — PATIENT MESSAGE (OUTPATIENT)
Dept: ENDOCRINOLOGY | Facility: CLINIC | Age: 44
End: 2019-07-17

## 2019-07-18 ENCOUNTER — TELEPHONE (OUTPATIENT)
Dept: FAMILY MEDICINE | Facility: CLINIC | Age: 44
End: 2019-07-18

## 2019-07-18 DIAGNOSIS — E11.9 TYPE 2 DIABETES MELLITUS WITHOUT COMPLICATION, WITHOUT LONG-TERM CURRENT USE OF INSULIN: ICD-10-CM

## 2019-07-18 NOTE — TELEPHONE ENCOUNTER
[facility] need's clarification on LEVEMIR FLEXTOUCH INJ, patient states  Increased to 20 units please send new rx

## 2019-07-22 DIAGNOSIS — E11.9 TYPE 2 DIABETES MELLITUS WITHOUT COMPLICATION, WITHOUT LONG-TERM CURRENT USE OF INSULIN: ICD-10-CM

## 2019-07-24 DIAGNOSIS — E11.9 TYPE 2 DIABETES MELLITUS WITHOUT COMPLICATION, WITHOUT LONG-TERM CURRENT USE OF INSULIN: ICD-10-CM

## 2019-07-24 RX ORDER — PEN NEEDLE, DIABETIC 31 GX5/16"
NEEDLE, DISPOSABLE MISCELLANEOUS
Qty: 100 EACH | Refills: 3 | Status: SHIPPED | OUTPATIENT
Start: 2019-07-24 | End: 2019-11-21 | Stop reason: SDUPTHER

## 2019-07-24 RX ORDER — GLIMEPIRIDE 4 MG/1
4 TABLET ORAL
Qty: 90 TABLET | Refills: 0 | Status: SHIPPED | OUTPATIENT
Start: 2019-07-24 | End: 2019-11-06 | Stop reason: SDUPTHER

## 2019-07-29 ENCOUNTER — TELEPHONE (OUTPATIENT)
Dept: FAMILY MEDICINE | Facility: CLINIC | Age: 44
End: 2019-07-29

## 2019-07-30 NOTE — TELEPHONE ENCOUNTER
Pt advised and verbalized understanding. Pt states that the Tylenol is not working and would like something else if possible. Pt scheduled with podiatry 8/13. Pt scheduled with PCP 7/31 due to pain.

## 2019-07-30 NOTE — TELEPHONE ENCOUNTER
There is no obvious substitute.  Pain options are limited by her heart condition.  Please have her take Tylenol arthritis over-the-counter as directed on the bottle be. I would like to send her to Podiatry to have them take a 2nd look.  It does not seem like she has follow-up with  podiatry.  Please call patient her call referral dept to schedule an appt.

## 2019-08-01 NOTE — TELEPHONE ENCOUNTER
Patient states that she is not taking the Tylenol. She states that she given 2 Norcos at [facility]  And that she is taking that. Please advise.

## 2019-08-01 NOTE — TELEPHONE ENCOUNTER
Please inquire wether or not she is taking tylenol arthritis 650 x 2 every 8 hours. This is the maximum dose but works well for pain. Her options are very limited because of her health conditions and blood thinner.

## 2019-08-02 NOTE — TELEPHONE ENCOUNTER
Another topical option does now available over-the-counter is Aspercreme with lidocaine.  Lidocaine as a numbing medicine we used prior to small procedures and surgeries and can be very effective at helping localized pain. Please have her try this and keep her follow ups as scheduled.  If these things are not controlling her pain please have her follow up urgently with with 1 of my partners.  As I am out of town for the next week

## 2019-08-05 ENCOUNTER — HOSPITAL ENCOUNTER (EMERGENCY)
Facility: HOSPITAL | Age: 44
Discharge: HOME | End: 2019-08-05
Attending: EMERGENCY MEDICINE
Payer: MEDICARE

## 2019-08-05 VITALS
HEIGHT: 67 IN | OXYGEN SATURATION: 97 % | TEMPERATURE: 98 F | WEIGHT: 241 LBS | HEART RATE: 60 BPM | BODY MASS INDEX: 37.83 KG/M2 | SYSTOLIC BLOOD PRESSURE: 131 MMHG | DIASTOLIC BLOOD PRESSURE: 64 MMHG | RESPIRATION RATE: 16 BRPM

## 2019-08-05 DIAGNOSIS — M62.830 BACK SPASM: Primary | ICD-10-CM

## 2019-08-05 LAB
B-HCG UR QL: NEGATIVE
B-HCG UR QL: NEGATIVE
CTP QC/QA: YES
CTP QC/QA: YES
POCT GLUCOSE: 321 MG/DL (ref 70–110)

## 2019-08-05 PROCEDURE — 82962 GLUCOSE BLOOD TEST: CPT

## 2019-08-05 PROCEDURE — 63600175 PHARM REV CODE 636 W HCPCS: Performed by: EMERGENCY MEDICINE

## 2019-08-05 PROCEDURE — 96372 THER/PROPH/DIAG INJ SC/IM: CPT

## 2019-08-05 PROCEDURE — 99284 EMERGENCY DEPT VISIT MOD MDM: CPT | Mod: 25

## 2019-08-05 PROCEDURE — 81025 URINE PREGNANCY TEST: CPT | Performed by: EMERGENCY MEDICINE

## 2019-08-05 RX ORDER — BACLOFEN 10 MG/1
10 TABLET ORAL 3 TIMES DAILY
Qty: 90 TABLET | Refills: 0 | Status: SHIPPED | OUTPATIENT
Start: 2019-08-05 | End: 2019-11-23

## 2019-08-05 RX ORDER — KETOROLAC TROMETHAMINE 30 MG/ML
30 INJECTION, SOLUTION INTRAMUSCULAR; INTRAVENOUS
Status: COMPLETED | OUTPATIENT
Start: 2019-08-05 | End: 2019-08-05

## 2019-08-05 RX ADMIN — KETOROLAC TROMETHAMINE 30 MG: 30 INJECTION, SOLUTION INTRAMUSCULAR; INTRAVENOUS at 08:08

## 2019-08-06 ENCOUNTER — OFFICE VISIT (OUTPATIENT)
Dept: FAMILY MEDICINE | Facility: CLINIC | Age: 44
End: 2019-08-06
Payer: MEDICARE

## 2019-08-06 VITALS
HEART RATE: 66 BPM | OXYGEN SATURATION: 95 % | WEIGHT: 253.75 LBS | TEMPERATURE: 98 F | BODY MASS INDEX: 39.83 KG/M2 | HEIGHT: 67 IN | SYSTOLIC BLOOD PRESSURE: 102 MMHG | DIASTOLIC BLOOD PRESSURE: 74 MMHG | RESPIRATION RATE: 18 BRPM

## 2019-08-06 DIAGNOSIS — M54.50 ACUTE LOW BACK PAIN WITHOUT SCIATICA, UNSPECIFIED BACK PAIN LATERALITY: Primary | ICD-10-CM

## 2019-08-06 DIAGNOSIS — M79.672 FOOT PAIN, LEFT: ICD-10-CM

## 2019-08-06 DIAGNOSIS — E11.9 TYPE 2 DIABETES MELLITUS WITHOUT COMPLICATION, WITHOUT LONG-TERM CURRENT USE OF INSULIN: ICD-10-CM

## 2019-08-06 PROCEDURE — 3074F PR MOST RECENT SYSTOLIC BLOOD PRESSURE < 130 MM HG: ICD-10-PCS | Mod: CPTII,S$GLB,, | Performed by: FAMILY MEDICINE

## 2019-08-06 PROCEDURE — 99214 OFFICE O/P EST MOD 30 MIN: CPT | Mod: 25,S$GLB,, | Performed by: FAMILY MEDICINE

## 2019-08-06 PROCEDURE — 3078F DIAST BP <80 MM HG: CPT | Mod: CPTII,S$GLB,, | Performed by: FAMILY MEDICINE

## 2019-08-06 PROCEDURE — 96372 PR INJECTION,THERAP/PROPH/DIAG2ST, IM OR SUBCUT: ICD-10-PCS | Mod: S$GLB,,, | Performed by: FAMILY MEDICINE

## 2019-08-06 PROCEDURE — 3008F PR BODY MASS INDEX (BMI) DOCUMENTED: ICD-10-PCS | Mod: CPTII,S$GLB,, | Performed by: FAMILY MEDICINE

## 2019-08-06 PROCEDURE — 99999 PR PBB SHADOW E&M-EST. PATIENT-LVL III: CPT | Mod: PBBFAC,,, | Performed by: FAMILY MEDICINE

## 2019-08-06 PROCEDURE — 3046F HEMOGLOBIN A1C LEVEL >9.0%: CPT | Mod: CPTII,S$GLB,, | Performed by: FAMILY MEDICINE

## 2019-08-06 PROCEDURE — 3046F PR MOST RECENT HEMOGLOBIN A1C LEVEL > 9.0%: ICD-10-PCS | Mod: CPTII,S$GLB,, | Performed by: FAMILY MEDICINE

## 2019-08-06 PROCEDURE — 3078F PR MOST RECENT DIASTOLIC BLOOD PRESSURE < 80 MM HG: ICD-10-PCS | Mod: CPTII,S$GLB,, | Performed by: FAMILY MEDICINE

## 2019-08-06 PROCEDURE — 99214 PR OFFICE/OUTPT VISIT, EST, LEVL IV, 30-39 MIN: ICD-10-PCS | Mod: 25,S$GLB,, | Performed by: FAMILY MEDICINE

## 2019-08-06 PROCEDURE — 3008F BODY MASS INDEX DOCD: CPT | Mod: CPTII,S$GLB,, | Performed by: FAMILY MEDICINE

## 2019-08-06 PROCEDURE — 3074F SYST BP LT 130 MM HG: CPT | Mod: CPTII,S$GLB,, | Performed by: FAMILY MEDICINE

## 2019-08-06 PROCEDURE — 96372 THER/PROPH/DIAG INJ SC/IM: CPT | Mod: S$GLB,,, | Performed by: FAMILY MEDICINE

## 2019-08-06 PROCEDURE — 99999 PR PBB SHADOW E&M-EST. PATIENT-LVL III: ICD-10-PCS | Mod: PBBFAC,,, | Performed by: FAMILY MEDICINE

## 2019-08-06 RX ORDER — KETOROLAC TROMETHAMINE 30 MG/ML
60 INJECTION, SOLUTION INTRAMUSCULAR; INTRAVENOUS
Status: COMPLETED | OUTPATIENT
Start: 2019-08-06 | End: 2019-08-06

## 2019-08-06 RX ORDER — IBUPROFEN 600 MG/1
600 TABLET ORAL EVERY 8 HOURS PRN
Qty: 90 TABLET | Refills: 0 | Status: SHIPPED | OUTPATIENT
Start: 2019-08-06 | End: 2019-11-23 | Stop reason: ALTCHOICE

## 2019-08-06 RX ORDER — GABAPENTIN 400 MG/1
400 CAPSULE ORAL 3 TIMES DAILY PRN
Qty: 90 CAPSULE | Refills: 1 | Status: SHIPPED | OUTPATIENT
Start: 2019-08-06 | End: 2019-10-16 | Stop reason: SDUPTHER

## 2019-08-06 RX ORDER — ADALIMUMAB 4 MG/ML
KIT INJECTION
Refills: 0 | COMMUNITY
Start: 2019-07-05 | End: 2019-11-23

## 2019-08-06 RX ORDER — DICLOFENAC SODIUM 10 MG/G
2 GEL TOPICAL 2 TIMES DAILY
Qty: 100 G | Refills: 1 | Status: SHIPPED | OUTPATIENT
Start: 2019-08-06 | End: 2022-05-10

## 2019-08-06 RX ADMIN — KETOROLAC TROMETHAMINE 60 MG: 30 INJECTION, SOLUTION INTRAMUSCULAR; INTRAVENOUS at 02:08

## 2019-08-06 NOTE — ED PROVIDER NOTES
Encounter Date: 8/5/2019       History     Chief Complaint   Patient presents with    Generalized Body Aches     pt reports generalized body aches ongoing for 2 days; pt denies fever, n/v/d, or any other symptoms at this time; pt denies taking any medications for pain      44 y.o. female Past Medical History:  No date: Atrial fibrillation  No date: Blood clot associated with vein wall inflammation      Comment:  not dvt  No date: Cardiomyopathy      Comment:  Normal cors on cath 11/2017  No date: CHF (congestive heart failure)  9/19/2013: DM (diabetes mellitus)  No date: Hyperlipidemia  No date: Hypertension  No date: Psoriasis  No date: Sleep apnea     Presents for evaluation of R flank pain, pt notes that she awoke with flank pain this am. Has used heating pads which have seemed to help her symptoms. Denies f/c, n/v, diarrhea/dysuria, cough, cp/sob, headache, runny nose/sore throat/body aches. Notes R flank pain improved with rest/worse with movement.        Review of patient's allergies indicates:   Allergen Reactions    Pneumococcal 23-abimbola ps vaccine      Past Medical History:   Diagnosis Date    Atrial fibrillation     Blood clot associated with vein wall inflammation     not dvt    Cardiomyopathy     Normal cors on cath 11/2017    CHF (congestive heart failure)     DM (diabetes mellitus) 9/19/2013    Hyperlipidemia     Hypertension     Psoriasis     Sleep apnea      Past Surgical History:   Procedure Laterality Date    CARDIAC CATHETERIZATION      COLONOSCOPY      DILATION AND CURETTAGE OF UTERUS      EGD (ESOPHAGOGASTRODUODENOSCOPY) N/A 5/9/2019    [facility] ENDO    INSERTION-ICD-DUAL Left 4/17/2018    Performed by Eben Christine MD at [facility] CATH LAB     Family History   Problem Relation Age of Onset    Hypertension Mother     Hypertension Father     Diabetes Father     Diabetes Maternal Grandmother     Diabetes Paternal Grandmother     Breast cancer  Neg Hx     Colon cancer Neg Hx     Ovarian cancer Neg Hx      Social History     Tobacco Use    Smoking status: Never Smoker    Smokeless tobacco: Never Used    Tobacco comment: smokes cigars on occasion   Substance Use Topics    Alcohol use: Yes     Comment: occasional    Drug use: No     Comment: hx of marijuana use 3 years ago     Review of Systems   Constitutional: Negative for fever.   HENT: Negative for sore throat.    Respiratory: Negative for shortness of breath.    Cardiovascular: Negative for chest pain.   Gastrointestinal: Negative for nausea.   Genitourinary: Negative for dysuria.   Musculoskeletal: Negative for back pain.   Skin: Negative for rash.   Neurological: Negative for weakness.   Hematological: Does not bruise/bleed easily.   All other systems reviewed and are negative.      Physical Exam     Initial Vitals [08/05/19 1942]   BP Pulse Resp Temp SpO2   128/78 60 16 98 °F (36.7 °C) 99 %      MAP       --         Physical Exam    Nursing note and vitals reviewed.  Constitutional: She appears well-developed and well-nourished.   HENT:   Head: Normocephalic and atraumatic.   Eyes: Conjunctivae and EOM are normal. Pupils are equal, round, and reactive to light.   Neck: Normal range of motion.   Cardiovascular: Normal rate and regular rhythm.   Pulmonary/Chest: Breath sounds normal. No respiratory distress.   Abdominal: She exhibits no distension.   Musculoskeletal: Normal range of motion.   Neurological: She is alert. No cranial nerve deficit. GCS score is 15. GCS eye subscore is 4. GCS verbal subscore is 5. GCS motor subscore is 6.   Skin: Skin is warm and dry.   Psychiatric: She has a normal mood and affect. Thought content normal.     no midline ttp on any spinal body on neck/back  +paraspinal muscle spasm on R with complete reproducibility on palpation.    ED Course   Procedures  Labs Reviewed   POCT URINE PREGNANCY   POCT GLUCOSE MONITORING CONTINUOUS          Imaging Results    None                                Clinical Impression:       ICD-10-CM ICD-9-CM   1. Back spasm M62.830 724.8                                Jerzy Velez MD  08/05/19 2006

## 2019-08-06 NOTE — PROGRESS NOTES
Patient given toradol 60 mg injection right ventrogluteal, tolerated well, no complaints, no reaction noted

## 2019-08-06 NOTE — ED TRIAGE NOTES
Pt presents to the ED with c/o lower back pain that has been going on for 2 days. Denies pain anywhere else currently. In NAD at this time. VSS.

## 2019-08-06 NOTE — DISCHARGE INSTRUCTIONS
Thank you for coming to our Emergency Department today. It is important to remember that some problems are difficult to diagnose and may not be found during your first visit. Be sure to follow up with your primary care doctor and review any labs/imaging that was performed with them. If you do not have a primary care doctor, you may contact the one listed on your discharge paperwork or you may also call the [facility] Appointment Desk at [phone] to schedule an appointment with one.     All medications may potentially have side effects and it is impossible to predict which medications may give you side effects. If you feel that you are having a negative effect of any medication you should immediately stop taking them and seek medical attention.    Return to the ER with any questions/concerns, new/concerning symptoms, worsening or failure to improve. Do not drive or make any important decisions for 24 hours if you have received any pain medications, sedatives or mood altering drugs during your ER visit.

## 2019-08-07 ENCOUNTER — TELEPHONE (OUTPATIENT)
Dept: FAMILY MEDICINE | Facility: CLINIC | Age: 44
End: 2019-08-07

## 2019-08-07 NOTE — PROGRESS NOTES
Subjective:       Patient ID: Fabiana Chanel is a 44 y.o. female.    Chief Complaint: Back Pain and Foot Pain (left)    HPI   43 yo female presents for L foot pain and back pain. Went to ED yesterday for muscle spasms. States the pain is severe. Denies any urinary or bowel issues. Denies any numbness. States the L foot pain is chronic. Was referred to podiatry by pcp.     Review of Systems   Constitutional: Negative.    HENT: Negative.    Respiratory: Negative.    Cardiovascular: Negative.    Gastrointestinal: Negative.    Endocrine: Negative.    Genitourinary: Negative.    Musculoskeletal: Positive for arthralgias and myalgias.   Neurological: Negative.    Psychiatric/Behavioral: Negative.           Past Medical History:   Diagnosis Date    Atrial fibrillation     Blood clot associated with vein wall inflammation     not dvt    Cardiomyopathy     Normal cors on cath 11/2017    CHF (congestive heart failure)     DM (diabetes mellitus) 9/19/2013    Hyperlipidemia     Hypertension     Psoriasis     Sleep apnea      Past Surgical History:   Procedure Laterality Date    CARDIAC CATHETERIZATION      COLONOSCOPY      DILATION AND CURETTAGE OF UTERUS      EGD (ESOPHAGOGASTRODUODENOSCOPY) N/A 5/9/2019    Performed by [facility]rell [facility] at [facility] ENDO    INSERTION-ICD-DUAL Left 4/17/2018    Performed by Eben Christine MD at [facility] CATH LAB     Family History   Problem Relation Age of Onset    Hypertension Mother     Hypertension Father     Diabetes Father     Diabetes Maternal Grandmother     Diabetes Paternal Grandmother     Breast cancer Neg Hx     Colon cancer Neg Hx     Ovarian cancer Neg Hx      Social History     Socioeconomic History    Marital status:      Spouse name: Not on file    Number of children: Not on file    Years of education: Not on file    Highest education level: Not on file   Occupational History    Not on file   Social Needs    Financial resource strain:  "Not on file    Food insecurity:     Worry: Not on file     Inability: Not on file    Transportation needs:     Medical: Not on file     Non-medical: Not on file   Tobacco Use    Smoking status: Never Smoker    Smokeless tobacco: Never Used    Tobacco comment: smokes cigars on occasion   Substance and Sexual Activity    Alcohol use: Yes     Comment: occasional    Drug use: No     Comment: hx of marijuana use 3 years ago    Sexual activity: Yes     Partners: Male   Lifestyle    Physical activity:     Days per week: Not on file     Minutes per session: Not on file    Stress: Rather much   Relationships    Social connections:     Talks on phone: Not on file     Gets together: Not on file     Attends Congregational service: Not on file     Active member of club or organization: Not on file     Attends meetings of clubs or organizations: Not on file     Relationship status: Not on file   Other Topics Concern    Not on file   Social History Narrative    Not on file       Current Outpatient Medications:     apixaban (ELIQUIS) 5 mg Tab, Take 1 tablet (5 mg total) by mouth 2 (two) times daily., Disp: 180 tablet, Rfl: 3    aspirin 81 MG Chew, Take 81 mg by mouth once daily., Disp: , Rfl:     baclofen (LIORESAL) 10 MG tablet, Take 1 tablet (10 mg total) by mouth 3 (three) times daily., Disp: 90 tablet, Rfl: 0    BD ULTRA-FINE LORI PEN NEEDLE 32 gauge x 5/32" Ndle, USE 1 UNIT TWICE DAILY, Disp: 100 each, Rfl: 3    BLOOD PRESSURE CUFF Misc, 1 kit by Misc.(Non-Drug; Combo Route) route 2 (two) times daily., Disp: 1 each, Rfl: 0    blood sugar diagnostic (TRUE METRIX GLUCOSE TEST STRIP) Strp, 1 strip by Misc.(Non-Drug; Combo Route) route 3 (three) times daily., Disp: 200 each, Rfl: 2    fluticasone (FLONASE) 50 mcg/actuation nasal spray, 1 spray (50 mcg total) by Each Nare route once daily., Disp: 16 g, Rfl: 1    furosemide (LASIX) 40 MG tablet, Take 1 tablet (40 mg total) by mouth 2 (two) times daily., Disp: 60 " tablet, Rfl: 11    gabapentin (NEURONTIN) 400 MG capsule, Take 1 capsule (400 mg total) by mouth 3 (three) times daily as needed (pain)., Disp: 90 capsule, Rfl: 1    glimepiride (AMARYL) 4 MG tablet, Take 1 tablet (4 mg total) by mouth daily with breakfast., Disp: 90 tablet, Rfl: 0    HUMIRA,CF, PEN [id number] HS 80 mg/0.8 mL-40 mg/0.4 mL PnKt, INJECT ONE 80MG INJECTION SUBCUTANEOUSLY ON DAY 1 THEN INJECT ONE 40MG INJECTION ON DAY 8 THEN INJECT ONE 40MG INJECTION ON DAY 22, Disp: , Rfl: 0    HYDROcodone-acetaminophen (NORCO) 5-325 mg per tablet, Take 1 tablet by mouth every 6 (six) hours as needed for Pain., Disp: 8 tablet, Rfl: 0    insulin detemir U-100 (LEVEMIR FLEXTOUCH) 100 unit/mL (3 mL) SubQ InPn pen, Inject 20 Units into the skin every evening., Disp: 15 mL, Rfl: 0    ketoconazole (NIZORAL) 2 % cream, APPLY TO BREAST TWICE A DAY, Disp: , Rfl: 4    lancets Misc, 1 each by Misc.(Non-Drug; Combo Route) route 3 (three) times daily., Disp: 200 each, Rfl: 2    metFORMIN (GLUCOPHAGE-XR) 500 MG 24 hr tablet, Take 2 tablets (1,000 mg total) by mouth 2 (two) times daily with meals., Disp: 360 tablet, Rfl: 3    metoprolol succinate (TOPROL-XL) 100 MG 24 hr tablet, Take 1 tablet (100 mg total) by mouth once daily., Disp: 90 tablet, Rfl: 3    sacubitril-valsartan (ENTRESTO) 49-51 mg per tablet, Take 1 tablet by mouth 2 (two) times daily., Disp: 30 tablet, Rfl: 11    spironolactone (ALDACTONE) 50 MG tablet, Take 1 tablet (50 mg total) by mouth once daily., Disp: 90 tablet, Rfl: 3    triamcinolone acetonide 0.1% (KENALOG) 0.1 % cream, 2 (two) times daily. Apply to affected area, Disp: , Rfl: 4    albuterol (PROVENTIL/VENTOLIN HFA) 90 mcg/actuation inhaler, Inhale 1-2 puffs into the lungs every 6 (six) hours as needed for Shortness of Breath. Rescue, Disp: 1 Inhaler, Rfl: 0    blood glucose control, high Soln, 1 each by Misc.(Non-Drug; Combo Route) route once. for 1 dose, Disp: 1 each, Rfl: 3    blood  "glucose control, low Soln, 1 each by Misc.(Non-Drug; Combo Route) route once. for 1 dose, Disp: 1 each, Rfl: 3    blood-glucose meter (TRUE METRIX AIR GLUCOSE METER) Misc, 1 each by Misc.(Non-Drug; Combo Route) route 3 (three) times daily., Disp: 1 each, Rfl: 0    cetirizine (ZYRTEC) 10 MG tablet, Take 1 tablet (10 mg total) by mouth once daily. for 14 days (Patient taking differently: Take 10 mg by mouth daily as needed. ), Disp: 14 tablet, Rfl: 0    diclofenac sodium (VOLTAREN) 1 % Gel, Apply 2 g topically 2 (two) times daily., Disp: 100 g, Rfl: 1    ibuprofen (ADVIL,MOTRIN) 600 MG tablet, Take 1 tablet (600 mg total) by mouth every 8 (eight) hours as needed for Pain., Disp: 90 tablet, Rfl: 0  No current facility-administered medications for this visit.    Objective:      Vitals:    08/06/19 1330   BP: 102/74   BP Location: Left arm   Patient Position: Sitting   BP Method: Large (Manual)   Pulse: 66   Resp: 18   Temp: 97.9 °F (36.6 °C)   TempSrc: Oral   SpO2: 95%   Weight: 115.1 kg (253 lb 12 oz)   Height: 5' 7" (1.702 m)       Physical Exam   Constitutional: She is oriented to person, place, and time. No distress.   HENT:   Head: Normocephalic and atraumatic.   Eyes: Conjunctivae are normal.   Neck: Neck supple.   Cardiovascular: Normal rate, regular rhythm and normal heart sounds. Exam reveals no gallop and no friction rub.   No murmur heard.  Pulmonary/Chest: Effort normal and breath sounds normal. She has no wheezes. She has no rales.   Musculoskeletal:        Lumbar back: She exhibits no tenderness.        Left foot: There is tenderness (in medial aspect). There is no swelling.   Neurological: She is alert and oriented to person, place, and time.   Skin: Skin is warm and dry.   Psychiatric: She has a normal mood and affect. Her behavior is normal. Judgment and thought content normal.        Assessment:       1. Acute low back pain without sciatica, unspecified back pain laterality    2. Foot pain, left  "   3. Type 2 diabetes mellitus without complication, without long-term current use of insulin        Plan:       Acute low back pain without sciatica, unspecified back pain laterality  -     ketorolac injection 60 mg  -     ibuprofen (ADVIL,MOTRIN) 600 MG tablet; Take 1 tablet (600 mg total) by mouth every 8 (eight) hours as needed for Pain.  Dispense: 90 tablet; Refill: 0  -     diclofenac sodium (VOLTAREN) 1 % Gel; Apply 2 g topically 2 (two) times daily.  Dispense: 100 g; Refill: 1    Foot pain, left  -     ketorolac injection 60 mg  -     ibuprofen (ADVIL,MOTRIN) 600 MG tablet; Take 1 tablet (600 mg total) by mouth every 8 (eight) hours as needed for Pain.  Dispense: 90 tablet; Refill: 0  -     diclofenac sodium (VOLTAREN) 1 % Gel; Apply 2 g topically 2 (two) times daily.  Dispense: 100 g; Refill: 1    Type 2 diabetes mellitus without complication, without long-term current use of insulin  -     gabapentin (NEURONTIN) 400 MG capsule; Take 1 capsule (400 mg total) by mouth 3 (three) times daily as needed (pain).  Dispense: 90 capsule; Refill: 1      Follow up in about 3 months (around 11/6/2019).            Gil Leal MD

## 2019-08-07 NOTE — TELEPHONE ENCOUNTER
Patient was prescribed  Diclofenac Sodium 1% gel  , Pharmacy is stating that med need's PA, sent to [facility] for PA .

## 2019-08-09 ENCOUNTER — TELEPHONE (OUTPATIENT)
Dept: FAMILY MEDICINE | Facility: CLINIC | Age: 44
End: 2019-08-09

## 2019-08-09 ENCOUNTER — PATIENT OUTREACH (OUTPATIENT)
Dept: ADMINISTRATIVE | Facility: OTHER | Age: 44
End: 2019-08-09

## 2019-08-09 DIAGNOSIS — E11.9 DIABETES MELLITUS WITHOUT COMPLICATION: Primary | ICD-10-CM

## 2019-08-09 NOTE — TELEPHONE ENCOUNTER
Pharmacist needing to know if pt has diagnosis of osteoarthritis regarding PA for diclofenac; please advise

## 2019-08-09 NOTE — TELEPHONE ENCOUNTER
----- Message from Sondra Mota sent at 8/9/2019  9:00 AM CDT -----  Contact: daniel with [employer] [phone]  Type: Patient Call Back    Who called:daniel with [employer] [phone]    What is the request in detail:wants to discuss PA. Please call daniel    Can the clinic reply by [facility]?    Would the patient rather a call back or a response via [facility]? Call back    Best call back number:daniel with [employer] [phone]    Additional Information:

## 2019-08-09 NOTE — TELEPHONE ENCOUNTER
Informed Ermelinda per Dr Leal diagnosis is osteoarthritis left foot; she verbalized understanding; states medication is approved and she will contact member to let her know

## 2019-08-13 ENCOUNTER — OFFICE VISIT (OUTPATIENT)
Dept: PODIATRY | Facility: CLINIC | Age: 44
End: 2019-08-13
Payer: MEDICARE

## 2019-08-13 ENCOUNTER — LAB VISIT (OUTPATIENT)
Dept: LAB | Facility: HOSPITAL | Age: 44
End: 2019-08-13
Attending: NURSE PRACTITIONER
Payer: MEDICARE

## 2019-08-13 VITALS — HEIGHT: 67 IN | WEIGHT: 253 LBS | BODY MASS INDEX: 39.71 KG/M2

## 2019-08-13 DIAGNOSIS — M79.672 FOOT PAIN, LEFT: ICD-10-CM

## 2019-08-13 DIAGNOSIS — E11.49 TYPE II DIABETES MELLITUS WITH NEUROLOGICAL MANIFESTATIONS: Primary | ICD-10-CM

## 2019-08-13 DIAGNOSIS — E78.2 MIXED HYPERLIPIDEMIA: ICD-10-CM

## 2019-08-13 DIAGNOSIS — M20.42 HAMMER TOES OF BOTH FEET: ICD-10-CM

## 2019-08-13 DIAGNOSIS — M20.41 HAMMER TOES OF BOTH FEET: ICD-10-CM

## 2019-08-13 DIAGNOSIS — M76.822 LEFT TIBIALIS POSTERIOR TENDONITIS: ICD-10-CM

## 2019-08-13 DIAGNOSIS — M20.5X2 HALLUX LIMITUS, ACQUIRED, LEFT: ICD-10-CM

## 2019-08-13 DIAGNOSIS — M21.41 PES PLANUS OF BOTH FEET: ICD-10-CM

## 2019-08-13 DIAGNOSIS — M20.5X1 HALLUX LIMITUS, ACQUIRED, RIGHT: ICD-10-CM

## 2019-08-13 DIAGNOSIS — M21.42 PES PLANUS OF BOTH FEET: ICD-10-CM

## 2019-08-13 LAB
CHOLEST SERPL-MCNC: 346 MG/DL (ref 120–199)
CHOLEST/HDLC SERPL: 6.4 {RATIO} (ref 2–5)
ESTIMATED AVG GLUCOSE: 275 MG/DL (ref 68–131)
HBA1C MFR BLD HPLC: 11.2 % (ref 4–5.6)
HDLC SERPL-MCNC: 54 MG/DL (ref 40–75)
HDLC SERPL: 15.6 % (ref 20–50)
LDLC SERPL CALC-MCNC: 241.6 MG/DL (ref 63–159)
NONHDLC SERPL-MCNC: 292 MG/DL
TRIGL SERPL-MCNC: 252 MG/DL (ref 30–150)

## 2019-08-13 PROCEDURE — 99999 PR PBB SHADOW E&M-EST. PATIENT-LVL III: ICD-10-PCS | Mod: PBBFAC,,, | Performed by: PODIATRIST

## 2019-08-13 PROCEDURE — 3008F BODY MASS INDEX DOCD: CPT | Mod: CPTII,S$GLB,, | Performed by: PODIATRIST

## 2019-08-13 PROCEDURE — 99203 PR OFFICE/OUTPT VISIT, NEW, LEVL III, 30-44 MIN: ICD-10-PCS | Mod: S$GLB,,, | Performed by: PODIATRIST

## 2019-08-13 PROCEDURE — 3046F HEMOGLOBIN A1C LEVEL >9.0%: CPT | Mod: CPTII,S$GLB,, | Performed by: PODIATRIST

## 2019-08-13 PROCEDURE — 3008F PR BODY MASS INDEX (BMI) DOCUMENTED: ICD-10-PCS | Mod: CPTII,S$GLB,, | Performed by: PODIATRIST

## 2019-08-13 PROCEDURE — 83036 HEMOGLOBIN GLYCOSYLATED A1C: CPT

## 2019-08-13 PROCEDURE — 80061 LIPID PANEL: CPT

## 2019-08-13 PROCEDURE — 99499 RISK ADDL DX/OHS AUDIT: ICD-10-PCS | Mod: S$GLB,,, | Performed by: PODIATRIST

## 2019-08-13 PROCEDURE — 36415 COLL VENOUS BLD VENIPUNCTURE: CPT | Mod: PN

## 2019-08-13 PROCEDURE — 99203 OFFICE O/P NEW LOW 30 MIN: CPT | Mod: S$GLB,,, | Performed by: PODIATRIST

## 2019-08-13 PROCEDURE — 3046F PR MOST RECENT HEMOGLOBIN A1C LEVEL > 9.0%: ICD-10-PCS | Mod: CPTII,S$GLB,, | Performed by: PODIATRIST

## 2019-08-13 PROCEDURE — 99499 UNLISTED E&M SERVICE: CPT | Mod: S$GLB,,, | Performed by: PODIATRIST

## 2019-08-13 PROCEDURE — 99999 PR PBB SHADOW E&M-EST. PATIENT-LVL III: CPT | Mod: PBBFAC,,, | Performed by: PODIATRIST

## 2019-08-13 NOTE — PROGRESS NOTES
Subjective:      Patient ID: Fabiana Chanel is a 44 y.o. female.    Chief Complaint: Foot Pain (tendonitis, left foot (PCP Dr Leal 8/6/19)); Foot Problem; Diabetes Mellitus; and Diabetic Foot Exam      Fabiana Chanel is a 44 y.o. female who presents to the clinic complaining of bilateral plantar medial midfoot and arch pain pain, especially at night. The pain is described as Burning and Throbbing.  Fabiana Chanel rates the pain as 9/10.  The onset of the pain was gradual and has worsened over the past several month(s).  she relates pain began without known inciting event. Prior treatments include none.    History of trauma: denies    Shoe gear: Flip-flops  Hours on Feet: 8  Exercise: not active      Hemoglobin A1C   Date Value Ref Range Status   08/13/2019 11.2 (H) 4.0 - 5.6 % Final     Comment:     ADA Screening Guidelines:  5.7-6.4%  Consistent with prediabetes  >or=6.5%  Consistent with diabetes  High levels of fetal hemoglobin interfere with the HbA1C  assay. Heterozygous hemoglobin variants (HbS, HgC, etc)do  not significantly interfere with this assay.   However, presence of multiple variants may affect accuracy.     06/13/2019 11.0 (H) 4.0 - 5.6 % Final     Comment:     ADA Screening Guidelines:  5.7-6.4%  Consistent with prediabetes  >or=6.5%  Consistent with diabetes  High levels of fetal hemoglobin interfere with the HbA1C  assay. Heterozygous hemoglobin variants (HbS, HgC, etc)do  not significantly interfere with this assay.   However, presence of multiple variants may affect accuracy.     12/28/2018 10.3 (H) 4.0 - 5.6 % Final     Comment:     ADA Screening Guidelines:  5.7-6.4%  Consistent with prediabetes  >or=6.5%  Consistent with diabetes  High levels of fetal hemoglobin interfere with the HbA1C  assay. Heterozygous hemoglobin variants (HbS, HgC, etc)do  not significantly interfere with this assay.   However, presence of multiple variants may affect accuracy.         Patient Active  "Problem List   Diagnosis    Type 2 diabetes mellitus without complication, without long-term current use of insulin    Iron deficiency anemia    Essential hypertension    Paroxysmal atrial fibrillation    Obesity with body mass index (BMI) of 30.0 to 39.9    Mixed hyperlipidemia    Chronic combined systolic and diastolic heart failure    Dyspnea    Pulmonary edema    Seizure    MILE treated with BiPAP    Obesity hypoventilation syndrome    Vulvar ulceration    NICM (nonischemic cardiomyopathy)    Implantable cardioverter-defibrillator (ICD) in situ    Fatigue    GERD (gastroesophageal reflux disease)    Psoriasis       Current Outpatient Medications on File Prior to Visit   Medication Sig Dispense Refill    apixaban (ELIQUIS) 5 mg Tab Take 1 tablet (5 mg total) by mouth 2 (two) times daily. 180 tablet 3    aspirin 81 MG Chew Take 81 mg by mouth once daily.      baclofen (LIORESAL) 10 MG tablet Take 1 tablet (10 mg total) by mouth 3 (three) times daily. 90 tablet 0    BD ULTRA-FINE LORI PEN NEEDLE 32 gauge x 5/32" Ndle USE 1 UNIT TWICE DAILY 100 each 3    BLOOD PRESSURE CUFF Misc 1 kit by Misc.(Non-Drug; Combo Route) route 2 (two) times daily. 1 each 0    blood sugar diagnostic (TRUE METRIX GLUCOSE TEST STRIP) Strp 1 strip by Misc.(Non-Drug; Combo Route) route 3 (three) times daily. 200 each 2    diclofenac sodium (VOLTAREN) 1 % Gel Apply 2 g topically 2 (two) times daily. 100 g 1    fluticasone (FLONASE) 50 mcg/actuation nasal spray 1 spray (50 mcg total) by Each Nare route once daily. 16 g 1    furosemide (LASIX) 40 MG tablet Take 1 tablet (40 mg total) by mouth 2 (two) times daily. 60 tablet 11    gabapentin (NEURONTIN) 400 MG capsule Take 1 capsule (400 mg total) by mouth 3 (three) times daily as needed (pain). 90 capsule 1    glimepiride (AMARYL) 4 MG tablet Take 1 tablet (4 mg total) by mouth daily with breakfast. 90 tablet 0    HUMIRA,CF, PEN AOXC-MS-VOYK HS 80 mg/0.8 mL-40 mg/0.4 " mL PnKt INJECT ONE 80MG INJECTION SUBCUTANEOUSLY ON DAY 1 THEN INJECT ONE 40MG INJECTION ON DAY 8 THEN INJECT ONE 40MG INJECTION ON DAY 22  0    HYDROcodone-acetaminophen (NORCO) 5-325 mg per tablet Take 1 tablet by mouth every 6 (six) hours as needed for Pain. 8 tablet 0    ibuprofen (ADVIL,MOTRIN) 600 MG tablet Take 1 tablet (600 mg total) by mouth every 8 (eight) hours as needed for Pain. 90 tablet 0    insulin detemir U-100 (LEVEMIR FLEXTOUCH) 100 unit/mL (3 mL) SubQ InPn pen Inject 20 Units into the skin every evening. 15 mL 0    ketoconazole (NIZORAL) 2 % cream APPLY TO BREAST TWICE A DAY  4    lancets Misc 1 each by Misc.(Non-Drug; Combo Route) route 3 (three) times daily. 200 each 2    metFORMIN (GLUCOPHAGE-XR) 500 MG 24 hr tablet Take 2 tablets (1,000 mg total) by mouth 2 (two) times daily with meals. 360 tablet 3    metoprolol succinate (TOPROL-XL) 100 MG 24 hr tablet Take 1 tablet (100 mg total) by mouth once daily. 90 tablet 3    sacubitril-valsartan (ENTRESTO) 49-51 mg per tablet Take 1 tablet by mouth 2 (two) times daily. 30 tablet 11    spironolactone (ALDACTONE) 50 MG tablet Take 1 tablet (50 mg total) by mouth once daily. 90 tablet 3    triamcinolone acetonide 0.1% (KENALOG) 0.1 % cream 2 (two) times daily. Apply to affected area  4    albuterol (PROVENTIL/VENTOLIN HFA) 90 mcg/actuation inhaler Inhale 1-2 puffs into the lungs every 6 (six) hours as needed for Shortness of Breath. Rescue 1 Inhaler 0    blood glucose control, high Soln 1 each by Misc.(Non-Drug; Combo Route) route once. for 1 dose 1 each 3    blood glucose control, low Soln 1 each by Misc.(Non-Drug; Combo Route) route once. for 1 dose 1 each 3    blood-glucose meter (TRUE METRIX AIR GLUCOSE METER) Misc 1 each by Misc.(Non-Drug; Combo Route) route 3 (three) times daily. 1 each 0    cetirizine (ZYRTEC) 10 MG tablet Take 1 tablet (10 mg total) by mouth once daily. for 14 days (Patient taking differently: Take 10 mg by mouth  daily as needed. ) 14 tablet 0     No current facility-administered medications on file prior to visit.        Review of patient's allergies indicates:   Allergen Reactions    Pneumococcal 23-abimbola ps vaccine        Past Surgical History:   Procedure Laterality Date    CARDIAC CATHETERIZATION      COLONOSCOPY      DILATION AND CURETTAGE OF UTERUS      EGD (ESOPHAGOGASTRODUODENOSCOPY) N/A 5/9/2019    [facility]    INSERTION-ICD-DUAL Left 4/17/2018    Performed by Eben [facility] at [facility] CATH LAB       Family History   Problem Relation Age of Onset    Hypertension Mother     Hypertension Father     Diabetes Father     Diabetes Maternal Grandmother     Diabetes Paternal Grandmother     Breast cancer Neg Hx     Colon cancer Neg Hx     Ovarian cancer Neg Hx        Social History     Socioeconomic History    Marital status:      Spouse name: Not on file    Number of children: Not on file    Years of education: Not on file    Highest education level: Not on file   Occupational History    Not on file   Social Needs    Financial resource strain: Not on file    Food insecurity:     Worry: Not on file     Inability: Not on file    Transportation needs:     Medical: Not on file     Non-medical: Not on file   Tobacco Use    Smoking status: Never Smoker    Smokeless tobacco: Never Used    Tobacco comment: smokes cigars on occasion   Substance and Sexual Activity    Alcohol use: Yes     Comment: occasional    Drug use: No     Comment: hx of marijuana use 3 years ago    Sexual activity: Yes     Partners: Male   Lifestyle    Physical activity:     Days per week: Not on file     Minutes per session: Not on file    Stress: Rather much   Relationships    Social connections:     Talks on phone: Not on file     Gets together: Not on file     Attends Jehovah's witness service: Not on file     Active member of club or organization: Not on file     Attends meetings of  "clubs or organizations: Not on file     Relationship status: Not on file   Other Topics Concern    Not on file   Social History Narrative    Not on file       Review of Systems   Constitution: Negative for chills and fever.   Cardiovascular: Positive for leg swelling. Negative for claudication.   Respiratory: Negative for cough and shortness of breath.    Skin: Positive for dry skin and nail changes. Negative for itching and rash.   Musculoskeletal: Positive for joint pain and myalgias. Negative for falls, joint swelling and muscle weakness.   Gastrointestinal: Negative for diarrhea, nausea and vomiting.   Neurological: Positive for numbness and paresthesias. Negative for tremors and weakness.   Psychiatric/Behavioral: Negative for altered mental status and hallucinations.           Objective:      Vitals:    08/13/19 1126   Weight: 114.8 kg (253 lb)   Height: 5' 7" (1.702 m)   PainSc:   7   PainLoc: Foot       Physical Exam   Constitutional: She appears well-developed and well-nourished.  Non-toxic appearance. She does not have a sickly appearance. No distress.   alert and oriented x 3.    Cardiovascular:   Pulses:       Dorsalis pedis pulses are 2+ on the right side, and 2+ on the left side.        Posterior tibial pulses are 2+ on the right side, and 2+ on the left side.    Capillary refill time is within normal limits. Digital hair present.    Pulmonary/Chest: No respiratory distress.   Musculoskeletal: She exhibits no deformity.        Right ankle: No tenderness. No lateral malleolus, no medial malleolus, no AITFL, no CF ligament and no posterior TFL tenderness found. Achilles tendon exhibits no pain, no defect and normal Jones's test results.        Left ankle: No tenderness. No lateral malleolus, no medial malleolus, no AITFL, no CF ligament and no posterior TFL tenderness found. Achilles tendon exhibits no pain, no defect and normal Jones's test results.        Right foot: There is no tenderness and " no bony tenderness.        Left foot: There is tenderness (Pain on palpation to posterior tibial tendon course and insertion. ). There is no bony tenderness.   Muscle strength is 5/5 in all groups bilaterally.    There is equinus deformity bilateral with decreased dorsiflexion at the ankle joint bilateral. No tenderness with compression of heel. Negative tinels sign. Gait analysis reveals excessive pronation through midstance and propulsion with early heel off. Shoes reveals lateral heel counter wear bilateral     Decreased first MPJ range of motion both weightbearing and nonweightbearing, no crepitus observed the first MP joint, + dorsal flag sign. Mild  bunion deformity is observed .    Patient has hammertoes of digits 2-5 bilateral partially reducible              Feet:   Right Foot:   Protective Sensation: 5 sites tested. 5 sites sensed.   Left Foot:   Protective Sensation: 5 sites tested. 5 sites sensed.   Lymphadenopathy:   No lymphatic streaking     Neurological: She displays no atrophy. No sensory deficit.   Light touch present    Amanda Park-Jayashree 5.07 monofilament is intact bilateral feet. Sharp/dull sensation is also intact Bilateral feet.    Paresthesias, and hyperesthesia bilateral feet with no clearly identified trigger or source.     Skin: Skin is warm, dry and intact. No rash noted. She is not diaphoretic. No cyanosis. No pallor. Nails show no clubbing.   Skin is of normal turgor.   Normal temperature gradient.  Examination of the skin reveals no evidence of significant rashes, open lesions, suspicious appearing nevi or other concerning lesions.      Psychiatric: Her mood appears not anxious. Her affect is not inappropriate. Her speech is not slurred. She is not combative. She is communicative. She is attentive.   Nursing note and vitals reviewed.            Assessment:       Encounter Diagnoses   Name Primary?    Type II diabetes mellitus with neurological manifestations Yes    Foot pain, left      Left tibialis posterior tendonitis     Pes planus of both feet     Hallux limitus, acquired, right     Hallux limitus, acquired, left     Hammer toes of both feet          Plan:       Fabiana was seen today for foot pain, foot problem, diabetes mellitus and diabetic foot exam.    Diagnoses and all orders for this visit:    Type II diabetes mellitus with neurological manifestations  -     DIABETIC SHOES FOR HOME USE    Foot pain, left  -     DIABETIC SHOES FOR HOME USE    Left tibialis posterior tendonitis  -     DIABETIC SHOES FOR HOME USE    Pes planus of both feet  -     DIABETIC SHOES FOR HOME USE    Hallux limitus, acquired, right  -     DIABETIC SHOES FOR HOME USE    Hallux limitus, acquired, left  -     DIABETIC SHOES FOR HOME USE    Hammer toes of both feet  -     DIABETIC SHOES FOR HOME USE      I counseled the patient on her conditions, their implications and medical management.      Greater than 50% of this visit spent on counseling and coordination of care.    Education about the diabetic foot, neuropathy, and prevention of limb loss.    Shoe inspection. Diabetic Foot Education. Patient reminded of the importance of good nutrition/healthy diet/weight management and blood sugar control to help prevent podiatric complications of diabetes. Patient instructed on proper foot hygeine. Wear comfortable, proper fitting shoes. Wash feet daily. Dry well. After drying, apply moisturizer to feet (no lotion to webspaces). Inspect feet daily for skin breaks, blisters, swelling, or redness. Wear cotton socks (preferably white)  Change socks every day. Do NOT walk barefoot. Do NOT use heating pads or hot water soaks. We discussed wearing proper shoe gear, daily foot inspections, never walking without protective shoe gear.     Discussed edema control and the importance of daily moisturizer to the feet such as Gold bonds diabetic foot cream    Recommend applying vicks vaporub to thick abnormal toenails daily x 6  months to treat fungal nail infection.    Foot pain secondary to tendonitis of tibialis posterior tendon of left foot. Discussed tendon pathology particularly PT tendon and importance of immobilization for healing as well as the lengthy course of treatment to decrease potential deformity and arthralgia of the ankle    Rx diabetic shoes for protection and support    Patient instructed on adequate icing techniques. Patient should ice the affected area at least once per day x 10 minutes for 10 days . I advised the  patient that extra icing would also be beneficial to ensure adequate anti inflammatory effect     Rx topical pain cream from [employer] to be delivered to patient home. Previously prescribed voltaren gel was not covered.     RTC in  6-9 weeks if no improvement, at this time she will receive cortisone injections and referral to PT. Patient is amenable to plan.

## 2019-08-13 NOTE — PATIENT INSTRUCTIONS
.Recommend lotions: eucerin, eucerin for diabetics, aquaphor, A&D ointment, gold bond for diabetics, sween, Jose Alberto's Bees all purpose baby ointment,  urea 40 with aloe (found on amazon.com)    Shoe recommendations: (try 6pm.com, zappos.com , nordstromrack.[email or web address], or shoes.[email or web address] for discounted prices) you can visit DSW shoes in [place]  or [employer] in the [facility] (there are also several shoe brand outlets in the [facility])    Asics (GT 2000 or gel foundations), new balance stability type shoes, saucony (stabil c3),  Leslie (GTS or Beast or transcend), propet (tennis shoe)    Sofft Joss (women) Lillian&Manjinder (men), clarks, crocs, aerosoles, naturalizers, SAS, ecco, born, saeed dueñas, rockports (dress shoes)    Vionic, burkenstocks, fitflops, propet (sandals)  Nike comfort thong sandals, crocs, propet (house shoes)    Nail Home remedy:  Vicks Vapor rub to nails for easier manageability      Diabetes: Inspecting Your Feet  Diabetes increases your chances of developing foot problems. So inspect your feet every day. This helps you find small skin irritations before they become serious infections. If you have trouble seeing the bottoms of your feet, use a mirror or ask a family member or friend to help.     Pressure spots on the bottom of the foot are common areas where problems develop.   How to check your feet  Below are tips to help you look for foot problems. Try to check your feet at the same time each day, such as when you get out of bed in the morning:  · Check the top of each foot. The tops of toes, back of the heel, and outer edge of the foot can get a lot of rubbing from poor-fitting shoes.  · Check the bottom of each foot. Daily wear and tear often leads to problems at pressure spots.  · Check the toes and nails. Fungal infections often occur between toes. Toenail problems can also be a sign of fungal infections or lead to breaks in the skin.  · Check your shoes, too. Loose objects inside a shoe can injure the  foot. Use your hand to feel inside your shoes for things like lakeisha, loose stitching, or rough areas that could irritate your skin.  Warning signs  Look for any color changes in the foot. Redness with streaks can signal a severe infection, which needs immediate medical attention. Tell your doctor right away if you have any of these problems:  · Swelling, sometimes with color changes, may be a sign of poor blood flow or infection. Symptoms include tenderness and an increase in the size of your foot.  · Warm or hot areas on your feet may be signs of infection. A foot that is cold may not be getting enough blood.  · Sensations such as burning, tingling, or pins and needles can be signs of a problem. Also check for areas that may be numb.  · Hot spots are caused by friction or pressure. Look for hot spots in areas that get a lot of rubbing. Hot spots can turn into blisters, calluses, or sores.  · Cracks and sores are caused by dry or irritated skin. They are a sign that the skin is breaking down, which can lead to infection.  · Toenail problems to watch for include nails growing into the skin (ingrown toenail) and causing redness or pain. Thick, yellow, or discolored nails can signal a fungal infection.  · Drainage and odor can develop from untreated sores and ulcers. Call your doctor right away if you notice white or yellow drainage, bleeding, or unpleasant odor.   © 2294-5747 [employer]. [address] [place] [address]. All rights reserved. This information is not intended as a substitute for professional medical care. Always follow your healthcare professional's instructions.        Step-by-Step:  Inspecting Your Feet (Diabetes)    Date Last Reviewed: 10/1/2016  © 5904-6419 [employer]. [address] [place] [address]. All rights reserved. This information is not intended as a substitute for professional medical care. Always follow your healthcare professional's  instructions.            What Is Tendonitis of the Foot?  When you use a set of muscles too much, youre likely to strain the tendons (soft tissues) that connect those muscles to your bones. At first, pain or swelling may come and go quickly. But if you do too much too soon, your muscles may overtire again. The strain may cause a tendons outer covering to swell or small fibers in a tendon to pull apart. If you keep pushing your muscles, damage to the tendons adds up, and tendonitis develops. Over time, pain and swelling may limit your activities. But with your doctors help, tendonitis can be controlled. Both your symptoms and your risk of future problems including tendon rupture can be reduced.       The back of your foot  The Achilles tendon connects the calf muscle to the heel bone. If tendonitis occurs here, you may feel pain when your foot touches down or when your heel lifts off the ground.   The front of your foot  The anterior tibial tendon helps control the front of your foot when it meets the ground. If this tendon is strained, you may feel pain when you go down stairs or walk or run on hills.     The inside of your foot  The posterior tibial tendon runs along the inside of the ankle and foot. If this tendon is strained, your foot may hurt when it moves forward to push off the ground. Or you may feel pain when your heel shifts from side to side.   The outside of your foot  The peroneal tendon wraps across the bottom of your foot, from the outside to the inside. Tendonitis here may cause pain when you stand or push off the ground and when walking on uneven surfaces.   Date Last Reviewed: 9/21/2015 © 2000-2017 [employer]. [address] [place] [address], [place] [address]. All rights reserved. This information is not intended as a substitute for professional medical care. Always follow your healthcare professional's instructions.

## 2019-08-13 NOTE — LETTER
August 14, 2019      Eliseo Murphy MD  [address] [facility]  [place] [address]           [facility]  [address] [place]  [place] [address]  Phone: [phone]          Patient: Fabiana Chanel   MR Number: [id number]   YOB: 1975   Date of Visit: 8/13/2019       Dear Dr. Eliseo Murphy:    Thank you for referring Fabiana Chanel to me for evaluation. Attached you will find relevant portions of my assessment and plan of care.    If you have questions, please do not hesitate to call me. I look forward to following Fabiana Chanel along with you.    Sincerely,    Coco Cordero DPM    Enclosure  CC:  No Recipients    If you would like to receive this communication electronically, please contact [email or web address] or [phone] to request more information on [employer] access.    For providers and/or their staff who would like to refer a patient to [facility], please contact us through our one-stop-shop provider referral line, [facility] , at [phone].    If you feel you have received this communication in error or would no longer like to receive these types of communications, please e-mail [email or web address]

## 2019-08-18 ENCOUNTER — PATIENT OUTREACH (OUTPATIENT)
Dept: ADMINISTRATIVE | Facility: OTHER | Age: 44
End: 2019-08-18

## 2019-08-21 ENCOUNTER — TELEPHONE (OUTPATIENT)
Dept: ENDOCRINOLOGY | Facility: CLINIC | Age: 44
End: 2019-08-21

## 2019-08-21 ENCOUNTER — OFFICE VISIT (OUTPATIENT)
Dept: ENDOCRINOLOGY | Facility: CLINIC | Age: 44
End: 2019-08-21
Payer: MEDICARE

## 2019-08-21 VITALS
DIASTOLIC BLOOD PRESSURE: 77 MMHG | BODY MASS INDEX: 38.78 KG/M2 | WEIGHT: 247.63 LBS | SYSTOLIC BLOOD PRESSURE: 101 MMHG | HEART RATE: 77 BPM

## 2019-08-21 DIAGNOSIS — E78.2 MIXED HYPERLIPIDEMIA: ICD-10-CM

## 2019-08-21 DIAGNOSIS — E66.01 SEVERE OBESITY (BMI 35.0-39.9) WITH COMORBIDITY: ICD-10-CM

## 2019-08-21 DIAGNOSIS — I50.42 CHRONIC COMBINED SYSTOLIC AND DIASTOLIC HEART FAILURE: ICD-10-CM

## 2019-08-21 PROCEDURE — 99999 PR PBB SHADOW E&M-EST. PATIENT-LVL V: CPT | Mod: PBBFAC,,, | Performed by: NURSE PRACTITIONER

## 2019-08-21 PROCEDURE — 3008F PR BODY MASS INDEX (BMI) DOCUMENTED: ICD-10-PCS | Mod: CPTII,S$GLB,, | Performed by: NURSE PRACTITIONER

## 2019-08-21 PROCEDURE — 3074F SYST BP LT 130 MM HG: CPT | Mod: CPTII,S$GLB,, | Performed by: NURSE PRACTITIONER

## 2019-08-21 PROCEDURE — 3078F PR MOST RECENT DIASTOLIC BLOOD PRESSURE < 80 MM HG: ICD-10-PCS | Mod: CPTII,S$GLB,, | Performed by: NURSE PRACTITIONER

## 2019-08-21 PROCEDURE — 3046F PR MOST RECENT HEMOGLOBIN A1C LEVEL > 9.0%: ICD-10-PCS | Mod: CPTII,S$GLB,, | Performed by: NURSE PRACTITIONER

## 2019-08-21 PROCEDURE — 3046F HEMOGLOBIN A1C LEVEL >9.0%: CPT | Mod: CPTII,S$GLB,, | Performed by: NURSE PRACTITIONER

## 2019-08-21 PROCEDURE — 3074F PR MOST RECENT SYSTOLIC BLOOD PRESSURE < 130 MM HG: ICD-10-PCS | Mod: CPTII,S$GLB,, | Performed by: NURSE PRACTITIONER

## 2019-08-21 PROCEDURE — 99214 PR OFFICE/OUTPT VISIT, EST, LEVL IV, 30-39 MIN: ICD-10-PCS | Mod: S$GLB,,, | Performed by: NURSE PRACTITIONER

## 2019-08-21 PROCEDURE — 99214 OFFICE O/P EST MOD 30 MIN: CPT | Mod: S$GLB,,, | Performed by: NURSE PRACTITIONER

## 2019-08-21 PROCEDURE — 99999 PR PBB SHADOW E&M-EST. PATIENT-LVL V: ICD-10-PCS | Mod: PBBFAC,,, | Performed by: NURSE PRACTITIONER

## 2019-08-21 PROCEDURE — 3008F BODY MASS INDEX DOCD: CPT | Mod: CPTII,S$GLB,, | Performed by: NURSE PRACTITIONER

## 2019-08-21 PROCEDURE — 3078F DIAST BP <80 MM HG: CPT | Mod: CPTII,S$GLB,, | Performed by: NURSE PRACTITIONER

## 2019-08-21 RX ORDER — ROSUVASTATIN CALCIUM 40 MG/1
40 TABLET, COATED ORAL NIGHTLY
Qty: 30 TABLET | Refills: 3 | Status: SHIPPED | OUTPATIENT
Start: 2019-08-21 | End: 2019-10-04 | Stop reason: SDUPTHER

## 2019-08-21 NOTE — PROGRESS NOTES
CC: This 44 y.o. Black or  female  is here for evaluation of  T2DM along with comorbidities indicated in the Visit Diagnosis section of this encounter.    HPI: Fabiana Chanel was diagnosed with T2DM in 2013. Metformin and a sulfonylurea started at the time of diagnosis.     Initial visit 6/2019  New to Endocrine.   She has only been taking metformin 500 mg bid instead of 1000 mg bid. C/o diarrhea on metformin.   She just realized that she should be taking furosemide bid not once daily; has not taken it today yet. Also she did not know she was rx'd hydralazine, which was rx'd to her last year by Dr. Yanez, her cardiologist.   Plan Start exercise regimen - aim for 1/2 hour 5 days/week.   Try to incorporate regular meal schedule. See Diabetes Educator/Registered Dietician for Medical Nutrition Therapy. Avoid beverages with sugar.   Stop metformin instant release 500 mg tablets because of diarrhea.   Switch to metformin  mg tablets. Start with 1 tablet twice daily with food. After a week, if having no diarrhea, then increase to 2 tablets with breakfast and 2 tablets with dinner.   Increase Levemir to 20 units nightly.   Continue glimepiride 4 mg tablet for now - but take with first meal of the day.   Test glucose 2x/day - fasting before eating in the morning and again at night.   Keep a blood sugar log.   Return to 2 months with a1c prior. a1c today. Send blood sugar log in 2 weeks and await further instructions.     Interval history  Pt unable to tolerate metformin ER d/t diarrhea even on 500 mg twice daily. She was advised to start Trulicity. Today, she questions if she can take it with her other meds. She has not taken metformin for at least a month.     She did meet with dietician. She found the visit helpful. Backed away from sodas and watching her carbs more.  She does feel a bit better in general. She no longer c/o nausea.     PMHX: CHF,  MILE on cpap, atrial fibrillation, MI     LAST  DIABETES EDUCATION: 6/19/19    RECENT ILLNESSES/HOSPITALIZATIONS - -  Admitted 12/28/18 x 2 nights for acute on chronic HF      HOSPITALIZED FOR DIABETES  -  Yes - for hyperglycemia     PRESCRIBED DIABETES MEDICATIONS:  glimepiride 4 mg once daily, Levemir Flextouch 20 units every evening      Misses medication doses - No    DM COMPLICATIONS: peripheral neuropathy and cardiovascular disease    SIGNIFICANT DIABETES MED HISTORY: diarrhea on metformin 1000 mg instant release     SELF MONITORING BLOOD GLUCOSE: Checks blood glucose at home 2x/day. Forgot her logs. Recalls:   FBG 180s  After dinner 260s     No more BGs in 300-400s    HYPOGLYCEMIC EPISODES: denies      CURRENT DIET: drinks water.  Eats 1-2 meals/day because appetite fluctuates.  tries to snack when she doesn't get a meal in.     CURRENT EXERCISE: none but just joined gym       /77 (BP Location: Right arm, Patient Position: Sitting, BP Method: X-Large (Automatic))   Pulse 77   Wt 112.3 kg (247 lb 9.6 oz)   BMI 38.78 kg/m²       ROS:   CONSTITUTIONAL: Appetite good, + fatigue  GI: No nausea, vomiting, or diarrhea  : + urinary frequency on diuretics       PHYSICAL EXAM:  GENERAL: Well developed, well nourished. No acute distress.   PSYCH: AAOx3, appropriate mood and affect, conversant, well-groomed. Judgement and insight good.   NEURO: Cranial nerves grossly intact. Speech clear, no tremor.   CHEST: Respirations even and unlabored.         Hemoglobin A1C   Date Value Ref Range Status   08/13/2019 11.2 (H) 4.0 - 5.6 % Final     Comment:     ADA Screening Guidelines:  5.7-6.4%  Consistent with prediabetes  >or=6.5%  Consistent with diabetes  High levels of fetal hemoglobin interfere with the HbA1C  assay. Heterozygous hemoglobin variants (HbS, HgC, etc)do  not significantly interfere with this assay.   However, presence of multiple variants may affect accuracy.     06/13/2019 11.0 (H) 4.0 - 5.6 % Final     Comment:     ADA Screening  Guidelines:  5.7-6.4%  Consistent with prediabetes  >or=6.5%  Consistent with diabetes  High levels of fetal hemoglobin interfere with the HbA1C  assay. Heterozygous hemoglobin variants (HbS, HgC, etc)do  not significantly interfere with this assay.   However, presence of multiple variants may affect accuracy.     12/28/2018 10.3 (H) 4.0 - 5.6 % Final     Comment:     ADA Screening Guidelines:  5.7-6.4%  Consistent with prediabetes  >or=6.5%  Consistent with diabetes  High levels of fetal hemoglobin interfere with the HbA1C  assay. Heterozygous hemoglobin variants (HbS, HgC, etc)do  not significantly interfere with this assay.   However, presence of multiple variants may affect accuracy.             Chemistry        Component Value Date/Time     05/28/2019 1304     05/28/2019 1304    K 3.8 05/28/2019 1304    K 3.8 05/28/2019 1304    CL 99 05/28/2019 1304    CL 99 05/28/2019 1304    CO2 30 (H) 05/28/2019 1304    CO2 30 (H) 05/28/2019 1304    BUN 15 05/28/2019 1304    BUN 15 05/28/2019 1304    CREATININE 1.0 05/28/2019 1304    CREATININE 1.0 05/28/2019 1304     (H) 05/28/2019 1304     (H) 05/28/2019 1304        Component Value Date/Time    CALCIUM 8.9 05/28/2019 1304    CALCIUM 8.9 05/28/2019 1304    ALKPHOS 76 05/28/2019 1304    AST 10 05/28/2019 1304    ALT 13 05/28/2019 1304    BILITOT 0.4 05/28/2019 1304    ESTGFRAFRICA >60 05/28/2019 1304    ESTGFRAFRICA >60 05/28/2019 1304    EGFRNONAA >60 05/28/2019 1304    EGFRNONAA >60 05/28/2019 1304          Lab Results   Component Value Date    LDLCALC 241.6 (H) 08/13/2019       Lab Results   Component Value Date    MICALBCREAT 44.7 (H) 08/13/2019           ASSESSMENT and PLAN:    A1C GOAL: < 7 %     1. Type 2 diabetes, uncontrolled, with neuropathy  Increase Levemir to 30 units every evening.   Continue glimepiride.   Start Jardiance 10 mg once daily in the AM for diabetes.   Continue testing blood sugars 2x/day.     Return to clinic in 3 months  with labs prior.     Send a blood sugar log in 2 weeks after starting new med and increasing insulin dose.       empagliflozin (JARDIANCE) 10 mg Tab    Hemoglobin A1c   2. Severe obesity (BMI 35.0-39.9) with comorbidity  empagliflozin (JARDIANCE) 10 mg Tab    May lose some weight on jardiance    3. Mixed hyperlipidemia  rosuvastatin (CRESTOR) 40 MG Tab    Hepatic function panel    Lipid panel   4. Chronic combined systolic and diastolic heart failure  Reviewed that Jardiance has been shown to reduce HF hospitalizations.          Orders Placed This Encounter   Procedures    Hemoglobin A1c     Standing Status:   Future     Standing Expiration Date:   10/19/2020    Hepatic function panel     Standing Status:   Future     Standing Expiration Date:   10/19/2020    Lipid panel     Standing Status:   Future     Standing Expiration Date:   8/20/2020        Follow up in about 3 months (around 11/21/2019).

## 2019-08-21 NOTE — TELEPHONE ENCOUNTER
Spoke with pt given appt today for 11:30 reminded pt to make every effort to be on time. Xiomara krishnamurthy.

## 2019-08-21 NOTE — PATIENT INSTRUCTIONS
Increase Levemir to 30 units every evening.   Continue glimepiride.   Start Jardiance 10 mg once daily in the AM for diabetes.   Continue testing blood sugars 2x/day.     Return to clinic in 3 months with labs prior.     Send a blood sugar log in 2 weeks after starting new med and increasing insulin dose.       Jardiance -   Drink at least 2 liters of water to avoid dehydration. Side effects include yeast infection, UTI, and lightheadedness. May lower blood pressure a few points. May lose some weight. Stop 2 days  prior to surgery and resume 2 days later. Go to ER if nausea and vomiting. Cannot go on no carbohydrate diet. We expect there to be positive glucose in your urine samples now.

## 2019-08-21 NOTE — TELEPHONE ENCOUNTER
----- Message from Veto Stock sent at 8/21/2019 10:02 AM CDT -----  Contact: Fabiana [phone]  Type:  Sooner Appointment Request    Patient is requesting a sooner appointment.  Patient declined first available appointment listed as well as another facility and provider .  Patient will not accept being placed on the waitlist and is requesting a message be sent to doctor.    Name of Caller: Fabiana    When is the first available appointment? October 21    Symptoms: 2 month follow up(#patient had an appointment today that she missed, she would like to be seen sooner that the next availability in October#)    Would the patient rather a call back or a response via [facility]? Call back    Best Call Back Number: [phone]

## 2019-08-22 ENCOUNTER — TELEPHONE (OUTPATIENT)
Dept: GASTROENTEROLOGY | Facility: CLINIC | Age: 44
End: 2019-08-22

## 2019-08-22 DIAGNOSIS — E11.9 TYPE 2 DIABETES MELLITUS WITHOUT COMPLICATION, WITHOUT LONG-TERM CURRENT USE OF INSULIN: ICD-10-CM

## 2019-08-26 ENCOUNTER — PATIENT MESSAGE (OUTPATIENT)
Dept: CARDIOLOGY | Facility: CLINIC | Age: 44
End: 2019-08-26

## 2019-08-28 ENCOUNTER — PATIENT MESSAGE (OUTPATIENT)
Dept: ENDOCRINOLOGY | Facility: CLINIC | Age: 44
End: 2019-08-28

## 2019-08-29 RX ORDER — LANCETS
EACH MISCELLANEOUS
Qty: 300 EACH | Refills: 3 | Status: SHIPPED | OUTPATIENT
Start: 2019-08-29 | End: 2019-11-23

## 2019-09-10 ENCOUNTER — CLINICAL SUPPORT (OUTPATIENT)
Dept: CARDIOLOGY | Facility: HOSPITAL | Age: 44
End: 2019-09-10
Attending: INTERNAL MEDICINE
Payer: MEDICARE

## 2019-09-10 DIAGNOSIS — Z95.810 CARDIAC DEFIBRILLATOR IN PLACE: ICD-10-CM

## 2019-09-10 PROCEDURE — 93296 REM INTERROG EVL PM/IDS: CPT

## 2019-09-13 ENCOUNTER — TELEPHONE (OUTPATIENT)
Dept: ELECTROPHYSIOLOGY | Facility: CLINIC | Age: 44
End: 2019-09-13

## 2019-09-13 DIAGNOSIS — I50.42 CHRONIC COMBINED SYSTOLIC AND DIASTOLIC HEART FAILURE: Primary | ICD-10-CM

## 2019-09-13 DIAGNOSIS — I49.8 OTHER SPECIFIED CARDIAC ARRHYTHMIAS: Primary | ICD-10-CM

## 2019-09-13 NOTE — TELEPHONE ENCOUNTER
Spoke w/ pt & scheduled echo, ekg, device & JF appt. Will mail to pt.  ----- Message from Nancy Morales RN sent at 9/13/2019 12:16 PM CDT -----  done  ----- Message -----  From: Brannon Anguiano MA  Sent: 9/13/2019  10:07 AM  To: Nancy Morales RN    Echo order please  ----- Message -----  From: Mahad Hutson  Sent: 9/12/2019   8:46 AM  To: Brannon Anguiano MA    Good morning,    The above patient was due to see Dr. Christine in July with testing and ICD ck.  If you would please schedule her needed appointments.    Thanks,  Mahad

## 2019-09-19 ENCOUNTER — PATIENT MESSAGE (OUTPATIENT)
Dept: ELECTROPHYSIOLOGY | Facility: CLINIC | Age: 44
End: 2019-09-19

## 2019-09-19 ENCOUNTER — PATIENT MESSAGE (OUTPATIENT)
Dept: CARDIOLOGY | Facility: CLINIC | Age: 44
End: 2019-09-19

## 2019-09-20 ENCOUNTER — PATIENT MESSAGE (OUTPATIENT)
Dept: CARDIOLOGY | Facility: CLINIC | Age: 44
End: 2019-09-20

## 2019-10-04 ENCOUNTER — TELEPHONE (OUTPATIENT)
Dept: FAMILY MEDICINE | Facility: CLINIC | Age: 44
End: 2019-10-04

## 2019-10-04 ENCOUNTER — OFFICE VISIT (OUTPATIENT)
Dept: FAMILY MEDICINE | Facility: CLINIC | Age: 44
End: 2019-10-04
Payer: MEDICARE

## 2019-10-04 DIAGNOSIS — I50.42 CHRONIC COMBINED SYSTOLIC AND DIASTOLIC HEART FAILURE: ICD-10-CM

## 2019-10-04 DIAGNOSIS — Z95.810 IMPLANTABLE CARDIOVERTER-DEFIBRILLATOR (ICD) IN SITU: ICD-10-CM

## 2019-10-04 DIAGNOSIS — I48.0 PAROXYSMAL ATRIAL FIBRILLATION: ICD-10-CM

## 2019-10-04 DIAGNOSIS — L40.9 PSORIASIS: ICD-10-CM

## 2019-10-04 DIAGNOSIS — E78.2 MIXED HYPERLIPIDEMIA: ICD-10-CM

## 2019-10-04 DIAGNOSIS — E11.65 UNCONTROLLED TYPE 2 DIABETES MELLITUS WITH HYPERGLYCEMIA: ICD-10-CM

## 2019-10-04 DIAGNOSIS — E11.65 UNCONTROLLED TYPE 2 DIABETES MELLITUS WITH HYPERGLYCEMIA: Primary | ICD-10-CM

## 2019-10-04 PROCEDURE — 3074F SYST BP LT 130 MM HG: CPT | Mod: CPTII,S$GLB,, | Performed by: FAMILY MEDICINE

## 2019-10-04 PROCEDURE — 3078F DIAST BP <80 MM HG: CPT | Mod: CPTII,S$GLB,, | Performed by: FAMILY MEDICINE

## 2019-10-04 PROCEDURE — 99499 RISK ADDL DX/OHS AUDIT: ICD-10-PCS | Mod: S$GLB,,, | Performed by: FAMILY MEDICINE

## 2019-10-04 PROCEDURE — 3046F HEMOGLOBIN A1C LEVEL >9.0%: CPT | Mod: CPTII,S$GLB,, | Performed by: FAMILY MEDICINE

## 2019-10-04 PROCEDURE — 99215 OFFICE O/P EST HI 40 MIN: CPT | Mod: S$GLB,,, | Performed by: FAMILY MEDICINE

## 2019-10-04 PROCEDURE — 3074F PR MOST RECENT SYSTOLIC BLOOD PRESSURE < 130 MM HG: ICD-10-PCS | Mod: CPTII,S$GLB,, | Performed by: FAMILY MEDICINE

## 2019-10-04 PROCEDURE — 3008F BODY MASS INDEX DOCD: CPT | Mod: CPTII,S$GLB,, | Performed by: FAMILY MEDICINE

## 2019-10-04 PROCEDURE — 99999 PR PBB SHADOW E&M-EST. PATIENT-LVL V: ICD-10-PCS | Mod: PBBFAC,,, | Performed by: FAMILY MEDICINE

## 2019-10-04 PROCEDURE — 3078F PR MOST RECENT DIASTOLIC BLOOD PRESSURE < 80 MM HG: ICD-10-PCS | Mod: CPTII,S$GLB,, | Performed by: FAMILY MEDICINE

## 2019-10-04 PROCEDURE — 99215 PR OFFICE/OUTPT VISIT, EST, LEVL V, 40-54 MIN: ICD-10-PCS | Mod: S$GLB,,, | Performed by: FAMILY MEDICINE

## 2019-10-04 PROCEDURE — 3008F PR BODY MASS INDEX (BMI) DOCUMENTED: ICD-10-PCS | Mod: CPTII,S$GLB,, | Performed by: FAMILY MEDICINE

## 2019-10-04 PROCEDURE — 99999 PR PBB SHADOW E&M-EST. PATIENT-LVL V: CPT | Mod: PBBFAC,,, | Performed by: FAMILY MEDICINE

## 2019-10-04 PROCEDURE — 3046F PR MOST RECENT HEMOGLOBIN A1C LEVEL > 9.0%: ICD-10-PCS | Mod: CPTII,S$GLB,, | Performed by: FAMILY MEDICINE

## 2019-10-04 PROCEDURE — 99499 UNLISTED E&M SERVICE: CPT | Mod: S$GLB,,, | Performed by: FAMILY MEDICINE

## 2019-10-04 RX ORDER — ADALIMUMAB 40MG/0.4ML
KIT SUBCUTANEOUS
Refills: 3 | COMMUNITY
Start: 2019-09-16 | End: 2021-04-15

## 2019-10-04 RX ORDER — ROSUVASTATIN CALCIUM 40 MG/1
40 TABLET, COATED ORAL NIGHTLY
Qty: 90 TABLET | Refills: 3 | Status: SHIPPED | OUTPATIENT
Start: 2019-10-04 | End: 2020-01-07 | Stop reason: SDUPTHER

## 2019-10-04 NOTE — TELEPHONE ENCOUNTER
Directions for Levemir states to inject 28 Units into the skin 2 times daily and Inject 30 Units and will titrate. Which one is it supposed to be. Please resend.

## 2019-10-04 NOTE — TELEPHONE ENCOUNTER
----- Message from Kelsy George sent at 10/4/2019  4:51 PM CDT -----  Contact: [facility] [phone]  Type:  Pharmacy Calling to Clarify an RX    Name of Caller: [facility]    Pharmacy Name:   [facility] - [address]  [address]  [place] [address]  Phone: [phone] Fax: [phone]    Prescription Name: insulin detemir U-100 (LEVEMIR FLEXTOUCH) 100 unit/mL (3 mL) SubQ InPn pen    What do they need to clarify? The directions. The doctor sent over 2 different directions not sure which one to follow    Can you be contacted via [facility]?Call back    Best Call Back Number: [phone]

## 2019-10-05 VITALS
SYSTOLIC BLOOD PRESSURE: 98 MMHG | WEIGHT: 245.38 LBS | RESPIRATION RATE: 16 BRPM | OXYGEN SATURATION: 95 % | DIASTOLIC BLOOD PRESSURE: 60 MMHG | BODY MASS INDEX: 38.51 KG/M2 | TEMPERATURE: 99 F | HEIGHT: 67 IN | HEART RATE: 77 BPM

## 2019-10-05 NOTE — PROGRESS NOTES
Subjective:       Patient ID: Fabiana Chanel is a 44 y.o. female.    Chief Complaint: Follow-up    HPI   43 yo female presents for back pain f/u. Pt states the pain has improved greatly. States she has minor neck pain but will take care of it herself.     Has hx of dm. a1c is uncontrolled. Pt states she is complaint w/ her meds. Follows w/ summer. Is attempting to diet and exercise.     Review of Systems   Constitutional: Negative.    HENT: Negative.    Respiratory: Negative.    Cardiovascular: Negative.    Gastrointestinal: Negative.    Endocrine: Negative.    Genitourinary: Negative.    Musculoskeletal: Negative.    Neurological: Negative.    Psychiatric/Behavioral: Negative.           Past Medical History:   Diagnosis Date    Atrial fibrillation     Blood clot associated with vein wall inflammation     not dvt    Cardiomyopathy     Normal cors on cath 11/2017    CHF (congestive heart failure)     DM (diabetes mellitus) 9/19/2013    Hyperlipidemia     Hypertension     Psoriasis     Sleep apnea      Past Surgical History:   Procedure Laterality Date    CARDIAC CATHETERIZATION      COLONOSCOPY      DILATION AND CURETTAGE OF UTERUS      ESOPHAGOGASTRODUODENOSCOPY N/A 5/9/2019    Procedure: EGD (ESOPHAGOGASTRODUODENOSCOPY);  Surgeon: Vielka Burrell MD;  [facility]: [facility];  Service: Endoscopy;  Laterality: N/A;     Family History   Problem Relation Age of Onset    Hypertension Mother     Hypertension Father     Diabetes Father     Diabetes Maternal Grandmother     Diabetes Paternal Grandmother     Breast cancer Neg Hx     Colon cancer Neg Hx     Ovarian cancer Neg Hx      Social History     Socioeconomic History    Marital status:      Spouse name: Not on file    Number of children: Not on file    Years of education: Not on file    Highest education level: Not on file   Occupational History    Not on file   Social Needs    Financial resource strain: Not on file    Food  "insecurity:     Worry: Not on file     Inability: Not on file    Transportation needs:     Medical: Not on file     Non-medical: Not on file   Tobacco Use    Smoking status: Never Smoker    Smokeless tobacco: Never Used    Tobacco comment: smokes cigars on occasion   Substance and Sexual Activity    Alcohol use: Yes     Comment: occasional    Drug use: No     Comment: hx of marijuana use 3 years ago    Sexual activity: Yes     Partners: Male   Lifestyle    Physical activity:     Days per week: Not on file     Minutes per session: Not on file    Stress: Rather much   Relationships    Social connections:     Talks on phone: Not on file     Gets together: Not on file     Attends Judaism service: Not on file     Active member of club or organization: Not on file     Attends meetings of clubs or organizations: Not on file     Relationship status: Not on file   Other Topics Concern    Not on file   Social History Narrative    Not on file       Current Outpatient Medications:     apixaban (ELIQUIS) 5 mg Tab, Take 1 tablet (5 mg total) by mouth 2 (two) times daily., Disp: 180 tablet, Rfl: 3    aspirin 81 MG Chew, Take 81 mg by mouth once daily., Disp: , Rfl:     baclofen (LIORESAL) 10 MG tablet, Take 1 tablet (10 mg total) by mouth 3 (three) times daily., Disp: 90 tablet, Rfl: 0    BD ULTRA-FINE LORI PEN NEEDLE 32 gauge x 5/32" Ndle, USE 1 UNIT TWICE DAILY, Disp: 100 each, Rfl: 3    BLOOD PRESSURE CUFF Misc, 1 kit by Misc.(Non-Drug; Combo Route) route 2 (two) times daily., Disp: 1 each, Rfl: 0    blood sugar diagnostic Strp, Check blood glucose 3x/day., Disp: 300 strip, Rfl: 3    diclofenac sodium (VOLTAREN) 1 % Gel, Apply 2 g topically 2 (two) times daily., Disp: 100 g, Rfl: 1    empagliflozin (JARDIANCE) 10 mg Tab, Take 10 mg by mouth once daily., Disp: 30 tablet, Rfl: 3    fluticasone (FLONASE) 50 mcg/actuation nasal spray, 1 spray (50 mcg total) by Each Nare route once daily., Disp: 16 g, Rfl: " 1    furosemide (LASIX) 40 MG tablet, Take 1 tablet (40 mg total) by mouth 2 (two) times daily., Disp: 60 tablet, Rfl: 11    gabapentin (NEURONTIN) 400 MG capsule, Take 1 capsule (400 mg total) by mouth 3 (three) times daily as needed (pain)., Disp: 90 capsule, Rfl: 1    glimepiride (AMARYL) 4 MG tablet, Take 1 tablet (4 mg total) by mouth daily with breakfast., Disp: 90 tablet, Rfl: 0    HUMIRA,CF, PEN 40 mg/0.4 mL PnKt, INJECT ONE 40MG INJECTION SUBCUTANEOUSLY EVERY OTHER WEEK, Disp: , Rfl: 3    HUMIRA,CF, PEN XBGE-CC-CAKJ HS 80 mg/0.8 mL-40 mg/0.4 mL PnKt, INJECT ONE 80MG INJECTION SUBCUTANEOUSLY ON DAY 1 THEN INJECT ONE 40MG INJECTION ON DAY 8 THEN INJECT ONE 40MG INJECTION ON DAY 22, Disp: , Rfl: 0    HYDROcodone-acetaminophen (NORCO) 5-325 mg per tablet, Take 1 tablet by mouth every 6 (six) hours as needed for Pain., Disp: 8 tablet, Rfl: 0    ibuprofen (ADVIL,MOTRIN) 600 MG tablet, Take 1 tablet (600 mg total) by mouth every 8 (eight) hours as needed for Pain., Disp: 90 tablet, Rfl: 0    insulin detemir U-100 (LEVEMIR FLEXTOUCH) 100 unit/mL (3 mL) SubQ InPn pen, Inject 28 Units into the skin 2 (two) times daily. Inject 30 units and will titrate. Max dose 50 units/day, Disp: 50.4 mL, Rfl: 3    ketoconazole (NIZORAL) 2 % cream, APPLY TO BREAST TWICE A DAY, Disp: , Rfl: 4    lancets Misc, Check blood glucose 3x/day., Disp: 300 each, Rfl: 3    metoprolol succinate (TOPROL-XL) 100 MG 24 hr tablet, Take 1 tablet (100 mg total) by mouth once daily., Disp: 90 tablet, Rfl: 3    rosuvastatin (CRESTOR) 40 MG Tab, Take 1 tablet (40 mg total) by mouth every evening., Disp: 90 tablet, Rfl: 3    sacubitril-valsartan (ENTRESTO) 49-51 mg per tablet, Take 1 tablet by mouth 2 (two) times daily., Disp: 180 tablet, Rfl: 1    spironolactone (ALDACTONE) 50 MG tablet, Take 1 tablet (50 mg total) by mouth once daily., Disp: 90 tablet, Rfl: 3    triamcinolone acetonide 0.1% (KENALOG) 0.1 % cream, 2 (two) times daily.  "Apply to affected area, Disp: , Rfl: 4    albuterol (PROVENTIL/VENTOLIN HFA) 90 mcg/actuation inhaler, Inhale 1-2 puffs into the lungs every 6 (six) hours as needed for Shortness of Breath. Rescue, Disp: 1 Inhaler, Rfl: 0    blood glucose control, high Soln, 1 each by Misc.(Non-Drug; Combo Route) route once. for 1 dose, Disp: 1 each, Rfl: 3    blood glucose control, low Soln, 1 each by Misc.(Non-Drug; Combo Route) route once. for 1 dose, Disp: 1 each, Rfl: 3    blood-glucose meter (TRUE METRIX AIR GLUCOSE METER) Misc, 1 each by Misc.(Non-Drug; Combo Route) route 3 (three) times daily., Disp: 1 each, Rfl: 0    cetirizine (ZYRTEC) 10 MG tablet, Take 1 tablet (10 mg total) by mouth once daily. for 14 days (Patient taking differently: Take 10 mg by mouth daily as needed. ), Disp: 14 tablet, Rfl: 0   Objective:      Vitals:    10/04/19 1450   BP: 98/60   BP Location: Left arm   Patient Position: Sitting   BP Method: Medium (Manual)   Pulse: 77   Resp: 16   Temp: 98.8 °F (37.1 °C)   TempSrc: Oral   SpO2: 95%   Weight: 111.3 kg (245 lb 6 oz)   Height: 5' 7" (1.702 m)       Physical Exam   Constitutional: She is oriented to person, place, and time. No distress.   HENT:   Head: Normocephalic and atraumatic.   Eyes: Conjunctivae are normal.   Neck: Neck supple.   Cardiovascular: Normal rate, regular rhythm and normal heart sounds. Exam reveals no gallop and no friction rub.   No murmur heard.  Pulmonary/Chest: Effort normal and breath sounds normal. She has no wheezes. She has no rales.   Neurological: She is alert and oriented to person, place, and time.   Skin: Skin is warm and dry.   Psychiatric: She has a normal mood and affect. Her behavior is normal. Judgment and thought content normal.          Assessment:       1. Uncontrolled type 2 diabetes mellitus with hyperglycemia    2. Mixed hyperlipidemia    3. Chronic combined systolic and diastolic heart failure    4. Paroxysmal atrial fibrillation    5. Psoriasis    6. " Implantable cardioverter-defibrillator (ICD) in situ        Plan:       Uncontrolled type 2 diabetes mellitus with hyperglycemia  - levemir's half life is around 18hrs. Will increase insulin overall to 56u but switch to bid dosing. Advised pt to continue other dm meds. Has a f/u with summer in 1 month  -     insulin detemir U-100 (LEVEMIR FLEXTOUCH) 100 unit/mL (3 mL) SubQ InPn pen; Inject 28 Units into the skin 2 (two) times daily. Inject 30 units and will titrate. Max dose 50 units/day  Dispense: 50.4 mL; Refill: 3    Mixed hyperlipidemia  -     rosuvastatin (CRESTOR) 40 MG Tab; Take 1 tablet (40 mg total) by mouth every evening.  Dispense: 90 tablet; Refill: 3    Chronic combined systolic and diastolic heart failure  S/p ICD. Continue meds. Has a f/u with cards in 1 month    Paroxysmal atrial fibrillation  Continue meds    Psoriasis  Advised pt about importance of immunizations due to her I/c but pt declined.  Continue meds    Implantable cardioverter-defibrillator (ICD) in situ  See hf    40 minute visit with more than 50% of time spent on counseling.     Follow up in about 3 months (around 1/4/2020).            Gil Leal MD

## 2019-10-08 ENCOUNTER — PATIENT OUTREACH (OUTPATIENT)
Dept: ADMINISTRATIVE | Facility: OTHER | Age: 44
End: 2019-10-08

## 2019-10-10 ENCOUNTER — HOSPITAL ENCOUNTER (OUTPATIENT)
Dept: CARDIOLOGY | Facility: CLINIC | Age: 44
Discharge: HOME | End: 2019-10-10
Payer: MEDICARE

## 2019-10-10 ENCOUNTER — HOSPITAL ENCOUNTER (OUTPATIENT)
Dept: CARDIOLOGY | Facility: CLINIC | Age: 44
Discharge: HOME | End: 2019-10-10
Attending: INTERNAL MEDICINE
Payer: MEDICARE

## 2019-10-10 ENCOUNTER — CLINICAL SUPPORT (OUTPATIENT)
Dept: CARDIOLOGY | Facility: HOSPITAL | Age: 44
End: 2019-10-10
Attending: INTERNAL MEDICINE
Payer: MEDICARE

## 2019-10-10 ENCOUNTER — OFFICE VISIT (OUTPATIENT)
Dept: ELECTROPHYSIOLOGY | Facility: CLINIC | Age: 44
End: 2019-10-10
Payer: MEDICARE

## 2019-10-10 VITALS
DIASTOLIC BLOOD PRESSURE: 74 MMHG | SYSTOLIC BLOOD PRESSURE: 132 MMHG | HEIGHT: 67 IN | BODY MASS INDEX: 40.45 KG/M2 | HEART RATE: 66 BPM | WEIGHT: 257.69 LBS

## 2019-10-10 VITALS
SYSTOLIC BLOOD PRESSURE: 142 MMHG | BODY MASS INDEX: 38.45 KG/M2 | WEIGHT: 245 LBS | DIASTOLIC BLOOD PRESSURE: 95 MMHG | HEIGHT: 67 IN | HEART RATE: 63 BPM

## 2019-10-10 DIAGNOSIS — Z79.01 ANTICOAGULANT LONG-TERM USE: ICD-10-CM

## 2019-10-10 DIAGNOSIS — I48.0 PAROXYSMAL ATRIAL FIBRILLATION: ICD-10-CM

## 2019-10-10 DIAGNOSIS — I42.8 NICM (NONISCHEMIC CARDIOMYOPATHY): ICD-10-CM

## 2019-10-10 DIAGNOSIS — I49.8 OTHER SPECIFIED CARDIAC ARRHYTHMIAS: ICD-10-CM

## 2019-10-10 DIAGNOSIS — G47.33 OSA TREATED WITH BIPAP: ICD-10-CM

## 2019-10-10 DIAGNOSIS — Z95.810 IMPLANTABLE CARDIOVERTER-DEFIBRILLATOR (ICD) IN SITU: Primary | ICD-10-CM

## 2019-10-10 DIAGNOSIS — I10 ESSENTIAL HYPERTENSION: ICD-10-CM

## 2019-10-10 DIAGNOSIS — I50.42 CHRONIC COMBINED SYSTOLIC AND DIASTOLIC HEART FAILURE: ICD-10-CM

## 2019-10-10 DIAGNOSIS — Z95.810 CARDIAC DEFIBRILLATOR IN PLACE: ICD-10-CM

## 2019-10-10 LAB
ASCENDING AORTA: 2.74 CM
AV INDEX (PROSTH): 0.44
AV MEAN GRADIENT: 8 MMHG
AV PEAK GRADIENT: 13 MMHG
AV VALVE AREA: 2.09 CM2
AV VELOCITY RATIO: 0.42
BSA FOR ECHO PROCEDURE: 2.29 M2
CV ECHO LV RWT: 0.29 CM
DOP CALC AO PEAK VEL: 1.79 M/S
DOP CALC AO VTI: 33.7 CM
DOP CALC LVOT AREA: 4.7 CM2
DOP CALC LVOT DIAMETER: 2.45 CM
DOP CALC LVOT PEAK VEL: 0.76 M/S
DOP CALC LVOT STROKE VOLUME: 70.3 CM3
DOP CALCLVOT PEAK VEL VTI: 14.92 CM
E WAVE DECELERATION TIME: 198.24 MSEC
E/A RATIO: 0.67
E/E' RATIO: 8.27 M/S
ECHO LV POSTERIOR WALL: 0.94 CM (ref 0.6–1.1)
FRACTIONAL SHORTENING: 20 % (ref 28–44)
INTERVENTRICULAR SEPTUM: 0.89 CM (ref 0.6–1.1)
LA MAJOR: 3.98 CM
LA MINOR: 4.21 CM
LA WIDTH: 3.65 CM
LEFT ATRIUM SIZE: 4.41 CM
LEFT ATRIUM VOLUME INDEX: 25.4 ML/M2
LEFT ATRIUM VOLUME: 55.98 CM3
LEFT INTERNAL DIMENSION IN SYSTOLE: 5.27 CM (ref 2.1–4)
LEFT VENTRICLE DIASTOLIC VOLUME INDEX: 100.72 ML/M2
LEFT VENTRICLE DIASTOLIC VOLUME: 222.01 ML
LEFT VENTRICLE MASS INDEX: 117 G/M2
LEFT VENTRICLE SYSTOLIC VOLUME INDEX: 60.6 ML/M2
LEFT VENTRICLE SYSTOLIC VOLUME: 133.65 ML
LEFT VENTRICULAR INTERNAL DIMENSION IN DIASTOLE: 6.58 CM (ref 3.5–6)
LEFT VENTRICULAR MASS: 258.46 G
LV LATERAL E/E' RATIO: 6.2 M/S
LV SEPTAL E/E' RATIO: 12.4 M/S
MV PEAK A VEL: 0.93 M/S
MV PEAK E VEL: 0.62 M/S
PISA TR MAX VEL: 2.71 M/S
PULM VEIN S/D RATIO: 1.14
PV PEAK D VEL: 0.42 M/S
PV PEAK S VEL: 0.48 M/S
RA MAJOR: 3.62 CM
RA PRESSURE: 3 MMHG
RA WIDTH: 2.99 CM
RIGHT VENTRICULAR END-DIASTOLIC DIMENSION: 3.42 CM
RV TISSUE DOPPLER FREE WALL SYSTOLIC VELOCITY 1 (APICAL 4 CHAMBER VIEW): 12.46 CM/S
SINUS: 2.26 CM
STJ: 2.26 CM
TDI LATERAL: 0.1 M/S
TDI SEPTAL: 0.05 M/S
TDI: 0.08 M/S
TR MAX PG: 29 MMHG
TRICUSPID ANNULAR PLANE SYSTOLIC EXCURSION: 1.87 CM
TV REST PULMONARY ARTERY PRESSURE: 32 MMHG

## 2019-10-10 PROCEDURE — 93306 ECHO (CUPID ONLY): ICD-10-PCS | Mod: 26,,, | Performed by: INTERNAL MEDICINE

## 2019-10-10 PROCEDURE — 93306 TTE W/DOPPLER COMPLETE: CPT

## 2019-10-10 PROCEDURE — 99999 PR PBB SHADOW E&M-EST. PATIENT-LVL III: CPT | Mod: PBBFAC,,, | Performed by: NURSE PRACTITIONER

## 2019-10-10 PROCEDURE — 3075F PR MOST RECENT SYSTOLIC BLOOD PRESS GE 130-139MM HG: ICD-10-PCS | Mod: CPTII,S$GLB,, | Performed by: NURSE PRACTITIONER

## 2019-10-10 PROCEDURE — 99999 PR PBB SHADOW E&M-EST. PATIENT-LVL III: ICD-10-PCS | Mod: PBBFAC,,, | Performed by: NURSE PRACTITIONER

## 2019-10-10 PROCEDURE — 93306 TTE W/DOPPLER COMPLETE: CPT | Mod: 26,,, | Performed by: INTERNAL MEDICINE

## 2019-10-10 PROCEDURE — 93010 ELECTROCARDIOGRAM REPORT: CPT | Mod: S$GLB,,, | Performed by: INTERNAL MEDICINE

## 2019-10-10 PROCEDURE — 3008F BODY MASS INDEX DOCD: CPT | Mod: CPTII,S$GLB,, | Performed by: NURSE PRACTITIONER

## 2019-10-10 PROCEDURE — 3078F DIAST BP <80 MM HG: CPT | Mod: CPTII,S$GLB,, | Performed by: NURSE PRACTITIONER

## 2019-10-10 PROCEDURE — 3008F PR BODY MASS INDEX (BMI) DOCUMENTED: ICD-10-PCS | Mod: CPTII,S$GLB,, | Performed by: NURSE PRACTITIONER

## 2019-10-10 PROCEDURE — 99214 PR OFFICE/OUTPT VISIT, EST, LEVL IV, 30-39 MIN: ICD-10-PCS | Mod: S$GLB,,, | Performed by: NURSE PRACTITIONER

## 2019-10-10 PROCEDURE — 93005 RHYTHM STRIP: ICD-10-PCS | Mod: S$GLB,,, | Performed by: INTERNAL MEDICINE

## 2019-10-10 PROCEDURE — 93005 ELECTROCARDIOGRAM TRACING: CPT | Mod: S$GLB,,, | Performed by: INTERNAL MEDICINE

## 2019-10-10 PROCEDURE — 3075F SYST BP GE 130 - 139MM HG: CPT | Mod: CPTII,S$GLB,, | Performed by: NURSE PRACTITIONER

## 2019-10-10 PROCEDURE — 93283 PRGRMG EVAL IMPLANTABLE DFB: CPT

## 2019-10-10 PROCEDURE — 99214 OFFICE O/P EST MOD 30 MIN: CPT | Mod: S$GLB,,, | Performed by: NURSE PRACTITIONER

## 2019-10-10 PROCEDURE — 93010 RHYTHM STRIP: ICD-10-PCS | Mod: S$GLB,,, | Performed by: INTERNAL MEDICINE

## 2019-10-10 PROCEDURE — 3078F PR MOST RECENT DIASTOLIC BLOOD PRESSURE < 80 MM HG: ICD-10-PCS | Mod: CPTII,S$GLB,, | Performed by: NURSE PRACTITIONER

## 2019-10-10 NOTE — PROGRESS NOTES
"Ms. Chanel is a patient of Dr. Christine and was last seen in clinic 7/23/2018.      Subjective:   Patient ID:  Fabiana Chanel is a 44 y.o. female who presents for follow-up of Atrial Fibrillation and Pacemaker Check  .     HPI:    Ms. Chanel is a 44 y.o. female with NICM, ICD (4/2018), DM, HTN, MILE, pAF (off OAC due to vaginal bleeding) here for follow up.    Background:    "Karmen Peraza" is referred by Dr Yanez.  NICM (cath proved; 11/17)  CHF, Class III  DM on meds   hyperlipidemia on meds  HTN on meds  MILE pending repeat sleep study for CPAP titration  PAF hx, on coumadin and xarelto in past, c/b vaginal bleeding  NYHA III sx despite OMT.  Occasional palpitations. No syncope ever. No CP.  3/18 15-20% LVEF. global HK.    Given CM with LVEF <35%, NYHA III sx, makes SCD-HeFT criteria for ICD.  Narrow QRS (QRSd ~110 ms); CRT is not indicated at present.  Hx of remote PAF, with recent palpitations; will place atrial lead for surveillance for recurrent AF.  Underwent dual chamber ICD implantation on 4/17/2018. EF 15-20% 2018.    Update (10/10/2019):    6/17/2019: >4 hours AF found on ICD. Eliquis started for CVA prophylaxis.    Today she says she feels well. Overall baseline. No recent CHF exacerbations. No new cardiac complaints. Ms. Chanel denies chest pain with exertion or at rest, palpitations, SOB, DIAZ, dizziness, or syncope. ED visits over the year primarily for body aches.    She is currently taking eliquis 5mg BID for stroke prophylaxis and denies significant bleeding episodes. She is currently being treated with metoprolol succinate 100mg daily for HR control.  Kidney function is stable, with a creatinine of 1 on 5/28/2019.    Device Interrogation (10/10/2019) reveals an intrinsic sinus rhythm with stable lead and device function. No arrhythmias or treated episodes were noted.  She paces 1.2% in the RA and <1% in the RV. Estimated battery longevity 6.9-7.7 years.     I have personally reviewed the patient's " "EKG today, which shows sinus rhythm with IVCD at 66bpm. TX interval is 146. QRS is 134. QTc is 459.    Recent Cardiac Tests:    2D Echo (10/10/2019):  · Severely decreased left ventricular systolic function. The estimated ejection fraction is 20%. Global hypokinetic wall motion.  · Grade I (mild) left ventricular diastolic dysfunction consistent with impaired relaxation.  · Eccentric left ventricular hypertrophy. Moderate left ventricular enlargement.  · Normal right ventricular systolic function.  · The estimated PA systolic pressure is 32 mm Hg  · Normal central venous pressure (3 mm Hg).    Current Outpatient Medications   Medication Sig    albuterol (PROVENTIL/VENTOLIN HFA) 90 mcg/actuation inhaler Inhale 1-2 puffs into the lungs every 6 (six) hours as needed for Shortness of Breath. Rescue    apixaban (ELIQUIS) 5 mg Tab Take 1 tablet (5 mg total) by mouth 2 (two) times daily.    aspirin 81 MG Chew Take 81 mg by mouth once daily.    BD ULTRA-FINE LORI PEN NEEDLE 32 gauge x 5/32" Ndle USE 1 UNIT TWICE DAILY    BLOOD PRESSURE CUFF Misc 1 kit by Misc.(Non-Drug; Combo Route) route 2 (two) times daily.    blood sugar diagnostic Strp Check blood glucose 3x/day.    diclofenac sodium (VOLTAREN) 1 % Gel Apply 2 g topically 2 (two) times daily.    empagliflozin (JARDIANCE) 10 mg Tab Take 10 mg by mouth once daily.    fluticasone (FLONASE) 50 mcg/actuation nasal spray 1 spray (50 mcg total) by Each Nare route once daily.    furosemide (LASIX) 40 MG tablet Take 1 tablet (40 mg total) by mouth 2 (two) times daily.    gabapentin (NEURONTIN) 400 MG capsule Take 1 capsule (400 mg total) by mouth 3 (three) times daily as needed (pain).    glimepiride (AMARYL) 4 MG tablet Take 1 tablet (4 mg total) by mouth daily with breakfast.    [place] CALVERT PEN 40 mg/0.4 mL PnKt INJECT ONE 40MG INJECTION SUBCUTANEOUSLY EVERY OTHER WEEK    HUMCHASECF PEN [id number] HS 80 mg/0.8 mL-40 mg/0.4 mL PnKt INJECT ONE 80MG INJECTION " SUBCUTANEOUSLY ON DAY 1 THEN INJECT ONE 40MG INJECTION ON DAY 8 THEN INJECT ONE 40MG INJECTION ON DAY 22    HYDROcodone-acetaminophen (NORCO) 5-325 mg per tablet Take 1 tablet by mouth every 6 (six) hours as needed for Pain.    insulin detemir U-100 (LEVEMIR FLEXTOUCH) 100 unit/mL (3 mL) SubQ InPn pen Inject 28 Units into the skin 2 (two) times daily.    ketoconazole (NIZORAL) 2 % cream APPLY TO BREAST TWICE A DAY    lancets Misc Check blood glucose 3x/day.    metoprolol succinate (TOPROL-XL) 100 MG 24 hr tablet Take 1 tablet (100 mg total) by mouth once daily.    rosuvastatin (CRESTOR) 40 MG Tab Take 1 tablet (40 mg total) by mouth every evening.    sacubitril-valsartan (ENTRESTO) 49-51 mg per tablet Take 1 tablet by mouth 2 (two) times daily.    spironolactone (ALDACTONE) 50 MG tablet Take 1 tablet (50 mg total) by mouth once daily.    triamcinolone acetonide 0.1% (KENALOG) 0.1 % cream 2 (two) times daily. Apply to affected area    baclofen (LIORESAL) 10 MG tablet Take 1 tablet (10 mg total) by mouth 3 (three) times daily. (Patient not taking: Reported on 10/10/2019)    blood glucose control, high Soln 1 each by Misc.(Non-Drug; Combo Route) route once. for 1 dose    blood glucose control, low Soln 1 each by Misc.(Non-Drug; Combo Route) route once. for 1 dose    blood-glucose meter (TRUE METRIX AIR GLUCOSE METER) Misc 1 each by Misc.(Non-Drug; Combo Route) route 3 (three) times daily.    cetirizine (ZYRTEC) 10 MG tablet Take 1 tablet (10 mg total) by mouth once daily. for 14 days (Patient taking differently: Take 10 mg by mouth daily as needed. )    ibuprofen (ADVIL,MOTRIN) 600 MG tablet Take 1 tablet (600 mg total) by mouth every 8 (eight) hours as needed for Pain. (Patient not taking: Reported on 10/10/2019)     No current facility-administered medications for this visit.        Review of Systems   Constitution: Negative for malaise/fatigue.   Cardiovascular: Negative for chest pain, dyspnea on  "exertion, irregular heartbeat, leg swelling and palpitations.   Respiratory: Negative for shortness of breath.    Hematologic/Lymphatic: Negative for bleeding problem.   Skin: Negative for rash.   Musculoskeletal: Negative for myalgias.   Gastrointestinal: Negative for hematemesis, hematochezia and nausea.   Genitourinary: Negative for hematuria.   Neurological: Negative for light-headedness.   Psychiatric/Behavioral: Negative for altered mental status.   Allergic/Immunologic: Negative for persistent infections.     Objective:        /74   Pulse 66   Ht 5' 7" (1.702 m)   Wt 116.9 kg (257 lb 11.5 oz)   BMI 40.36 kg/m²     Physical Exam   Constitutional: She is oriented to person, place, and time. She appears well-developed and well-nourished.   HENT:   Head: Normocephalic.   Nose: Nose normal.   Eyes: Pupils are equal, round, and reactive to light.   Cardiovascular: Normal rate, regular rhythm, S1 normal and S2 normal.   No murmur heard.  Pulses:       Radial pulses are 2+ on the right side, and 2+ on the left side.   Pulmonary/Chest: Breath sounds normal. No respiratory distress.   Device to LUCW. Pocket in good repair.   Abdominal: Normal appearance.   Musculoskeletal: Normal range of motion. She exhibits no edema.   Neurological: She is alert and oriented to person, place, and time.   Skin: Skin is warm and dry. No erythema.   Psychiatric: She has a normal mood and affect. Her speech is normal and behavior is normal.   Nursing note and vitals reviewed.    Lab Results   Component Value Date     05/28/2019     05/28/2019    K 3.8 05/28/2019    K 3.8 05/28/2019    MG 1.9 12/27/2018    BUN 15 05/28/2019    BUN 15 05/28/2019    CREATININE 1.0 05/28/2019    CREATININE 1.0 05/28/2019    ALT 13 05/28/2019    AST 10 05/28/2019    HGB 13.3 10/10/2019    HCT 43.6 10/10/2019    HCT 43 12/27/2018    TSH 1.245 04/26/2019    LDLCALC 241.6 (H) 08/13/2019       Recent Labs   Lab 01/11/18  1218 04/11/18  1133 " 12/25/18 2150 12/27/18 2139   INR 1.0 0.9 1.0 1.0       Assessment:     1. Implantable cardioverter-defibrillator (ICD) in situ    2. Paroxysmal atrial fibrillation    3. NICM (nonischemic cardiomyopathy)    4. Essential hypertension    5. MILE treated with BiPAP    6. Anticoagulant long-term use      Plan:     In summary, Ms. Chanel is a 44 y.o. female with NICM, ICD (4/2018), DM, HTN, MILE, pAF (off OAC due to vaginal bleeding) here for follow up.  Ms. Chanel is doing well from a device perspective with stable lead and device function. Norecent  arrhythmia noted. pAF on device and now on eliquis for CVA prophylaxis. Will update CBC given history of vaginal bleeding. No RV pacing. No CHF symptoms. Echo shows EF stable at 20%. On GDMT.     CBC (UPDATE: CBC WNL)  Continue current medication regimen and device settings.   Follow up in device clinic as scheduled.   Follow up in EP clinic in 1 year, sooner as needed.     *A copy of this note has been sent to Dr. Christine*    Follow up in about 1 year (around 10/10/2020).    ------------------------------------------------------------------    RODRÍGUEZ Roach, NP-C  Cardiac Electrophysiology

## 2019-10-15 ENCOUNTER — PATIENT OUTREACH (OUTPATIENT)
Dept: ADMINISTRATIVE | Facility: OTHER | Age: 44
End: 2019-10-15

## 2019-10-16 DIAGNOSIS — E11.9 TYPE 2 DIABETES MELLITUS WITHOUT COMPLICATION, WITHOUT LONG-TERM CURRENT USE OF INSULIN: ICD-10-CM

## 2019-10-18 ENCOUNTER — OFFICE VISIT (OUTPATIENT)
Dept: CARDIOLOGY | Facility: CLINIC | Age: 44
End: 2019-10-18
Payer: MEDICARE

## 2019-10-18 VITALS
HEIGHT: 67 IN | OXYGEN SATURATION: 95 % | SYSTOLIC BLOOD PRESSURE: 132 MMHG | BODY MASS INDEX: 39.38 KG/M2 | RESPIRATION RATE: 15 BRPM | WEIGHT: 250.88 LBS | HEART RATE: 76 BPM | DIASTOLIC BLOOD PRESSURE: 66 MMHG

## 2019-10-18 DIAGNOSIS — R06.02 SOB (SHORTNESS OF BREATH): ICD-10-CM

## 2019-10-18 DIAGNOSIS — I50.42 CHRONIC COMBINED SYSTOLIC AND DIASTOLIC HEART FAILURE: ICD-10-CM

## 2019-10-18 DIAGNOSIS — E78.2 MIXED HYPERLIPIDEMIA: ICD-10-CM

## 2019-10-18 DIAGNOSIS — E66.9 OBESITY WITH BODY MASS INDEX (BMI) OF 30.0 TO 39.9: ICD-10-CM

## 2019-10-18 DIAGNOSIS — Z95.810 IMPLANTABLE CARDIOVERTER-DEFIBRILLATOR (ICD) IN SITU: ICD-10-CM

## 2019-10-18 DIAGNOSIS — G47.33 OSA TREATED WITH BIPAP: ICD-10-CM

## 2019-10-18 DIAGNOSIS — I48.0 PAROXYSMAL ATRIAL FIBRILLATION: ICD-10-CM

## 2019-10-18 DIAGNOSIS — E11.9 TYPE 2 DIABETES MELLITUS WITHOUT COMPLICATION, WITHOUT LONG-TERM CURRENT USE OF INSULIN: ICD-10-CM

## 2019-10-18 DIAGNOSIS — I10 ESSENTIAL HYPERTENSION: ICD-10-CM

## 2019-10-18 DIAGNOSIS — I42.8 NICM (NONISCHEMIC CARDIOMYOPATHY): Primary | ICD-10-CM

## 2019-10-18 PROCEDURE — 3078F DIAST BP <80 MM HG: CPT | Mod: CPTII,S$GLB,, | Performed by: INTERNAL MEDICINE

## 2019-10-18 PROCEDURE — 93000 EKG 12-LEAD: ICD-10-PCS | Mod: S$GLB,,, | Performed by: INTERNAL MEDICINE

## 2019-10-18 PROCEDURE — 99999 PR PBB SHADOW E&M-EST. PATIENT-LVL III: CPT | Mod: PBBFAC,,, | Performed by: INTERNAL MEDICINE

## 2019-10-18 PROCEDURE — 3075F PR MOST RECENT SYSTOLIC BLOOD PRESS GE 130-139MM HG: ICD-10-PCS | Mod: CPTII,S$GLB,, | Performed by: INTERNAL MEDICINE

## 2019-10-18 PROCEDURE — 3046F HEMOGLOBIN A1C LEVEL >9.0%: CPT | Mod: CPTII,S$GLB,, | Performed by: INTERNAL MEDICINE

## 2019-10-18 PROCEDURE — 99999 PR PBB SHADOW E&M-EST. PATIENT-LVL III: ICD-10-PCS | Mod: PBBFAC,,, | Performed by: INTERNAL MEDICINE

## 2019-10-18 PROCEDURE — 3075F SYST BP GE 130 - 139MM HG: CPT | Mod: CPTII,S$GLB,, | Performed by: INTERNAL MEDICINE

## 2019-10-18 PROCEDURE — 3046F PR MOST RECENT HEMOGLOBIN A1C LEVEL > 9.0%: ICD-10-PCS | Mod: CPTII,S$GLB,, | Performed by: INTERNAL MEDICINE

## 2019-10-18 PROCEDURE — 3008F BODY MASS INDEX DOCD: CPT | Mod: CPTII,S$GLB,, | Performed by: INTERNAL MEDICINE

## 2019-10-18 PROCEDURE — 3078F PR MOST RECENT DIASTOLIC BLOOD PRESSURE < 80 MM HG: ICD-10-PCS | Mod: CPTII,S$GLB,, | Performed by: INTERNAL MEDICINE

## 2019-10-18 PROCEDURE — 99214 PR OFFICE/OUTPT VISIT, EST, LEVL IV, 30-39 MIN: ICD-10-PCS | Mod: S$GLB,,, | Performed by: INTERNAL MEDICINE

## 2019-10-18 PROCEDURE — 3008F PR BODY MASS INDEX (BMI) DOCUMENTED: ICD-10-PCS | Mod: CPTII,S$GLB,, | Performed by: INTERNAL MEDICINE

## 2019-10-18 PROCEDURE — 99214 OFFICE O/P EST MOD 30 MIN: CPT | Mod: S$GLB,,, | Performed by: INTERNAL MEDICINE

## 2019-10-18 PROCEDURE — 93000 ELECTROCARDIOGRAM COMPLETE: CPT | Mod: S$GLB,,, | Performed by: INTERNAL MEDICINE

## 2019-10-18 NOTE — PROGRESS NOTES
CARDIOVASCULAR PROGRESS NOTE    REASON FOR CONSULT:   Fabiana Chanel is a 44 y.o. female who presents for follow up of NICM.    PCP: Elvis  EP: Nanci  HISTORY OF PRESENT ILLNESS:   The patient returns for follow-up.  She was recently seen in the EP Clinic.  She denies intercurrent angina or dyspnea and reports generally asymptomatic status.  There has been no palpitations, lightheadedness, dizziness, syncope, or defibrillator discharges.  She denies PND, orthopnea, or lower extremity edema.  There has been no melena, hematuria, or claudicant symptoms.    CARDIOVASCULAR HISTORY:   NICM (cath 11/2017) EF 20% by echo 12/2018  [employer] ICD (4/17/18 Dr. Christine)  PAF on eliquis 5mg bid  MILE on CPAP    PAST MEDICAL HISTORY:     Past Medical History:   Diagnosis Date    Atrial fibrillation     Blood clot associated with vein wall inflammation     not dvt    Cardiomyopathy     Normal cors on cath 11/2017    CHF (congestive heart failure)     DM (diabetes mellitus) 9/19/2013    Hyperlipidemia     Hypertension     Psoriasis     Sleep apnea        PAST SURGICAL HISTORY:     Past Surgical History:   Procedure Laterality Date    CARDIAC CATHETERIZATION      COLONOSCOPY      DILATION AND CURETTAGE OF UTERUS      ESOPHAGOGASTRODUODENOSCOPY N/A 5/9/2019    Procedure: EGD (ESOPHAGOGASTRODUODENOSCOPY);  Surgeon: Vielka Burrell MD;  Location: [facility];  Service: Endoscopy;  Laterality: N/A;       ALLERGIES AND MEDICATION:     Review of patient's allergies indicates:   Allergen Reactions    Pneumococcal 23-abimbola ps vaccine      Previous Medications    ALBUTEROL (PROVENTIL/VENTOLIN HFA) 90 MCG/ACTUATION INHALER    Inhale 1-2 puffs into the lungs every 6 (six) hours as needed for Shortness of Breath. Rescue    APIXABAN (ELIQUIS) 5 MG TAB    Take 1 tablet (5 mg total) by mouth 2 (two) times daily.    ASPIRIN 81 MG CHEW    Take 81 mg by mouth once daily.    BACLOFEN (LIORESAL) 10 MG TABLET    Take 1 tablet (10 mg  "total) by mouth 3 (three) times daily.    BD ULTRA-FINE LORI PEN NEEDLE 32 GAUGE X 5/32" NDLE    USE 1 UNIT TWICE DAILY    BLOOD GLUCOSE CONTROL, HIGH SOLN    1 each by Misc.(Non-Drug; Combo Route) route once. for 1 dose    BLOOD GLUCOSE CONTROL, LOW SOLN    1 each by Misc.(Non-Drug; Combo Route) route once. for 1 dose    BLOOD PRESSURE CUFF MISC    1 kit by Misc.(Non-Drug; Combo Route) route 2 (two) times daily.    BLOOD SUGAR DIAGNOSTIC STRP    Check blood glucose 3x/day.    BLOOD-GLUCOSE METER (TRUE METRIX AIR GLUCOSE METER) MISC    1 each by Misc.(Non-Drug; Combo Route) route 3 (three) times daily.    CETIRIZINE (ZYRTEC) 10 MG TABLET    Take 1 tablet (10 mg total) by mouth once daily. for 14 days    DICLOFENAC SODIUM (VOLTAREN) 1 % GEL    Apply 2 g topically 2 (two) times daily.    EMPAGLIFLOZIN (JARDIANCE) 10 MG TAB    Take 10 mg by mouth once daily.    FLUTICASONE (FLONASE) 50 MCG/ACTUATION NASAL SPRAY    1 spray (50 mcg total) by Each Nare route once daily.    FUROSEMIDE (LASIX) 40 MG TABLET    Take 1 tablet (40 mg total) by mouth 2 (two) times daily.    GABAPENTIN (NEURONTIN) 400 MG CAPSULE    Take 1 capsule (400 mg total) by mouth 3 (three) times daily as needed (pain).    GLIMEPIRIDE (AMARYL) 4 MG TABLET    Take 1 tablet (4 mg total) by mouth daily with breakfast.    [place] CALVERT PEN 40 MG/0.4 ML PNKT    INJECT ONE 40MG INJECTION SUBCUTANEOUSLY EVERY OTHER WEEK    HUMALBANIA STONE PEN XOVH-VE-SJHA HS 80 MG/0.8 ML-40 MG/0.4 ML PNKT    INJECT ONE 80MG INJECTION SUBCUTANEOUSLY ON DAY 1 THEN INJECT ONE 40MG INJECTION ON DAY 8 THEN INJECT ONE 40MG INJECTION ON DAY 22    HYDROCODONE-ACETAMINOPHEN (NORCO) 5-325 MG PER TABLET    Take 1 tablet by mouth every 6 (six) hours as needed for Pain.    IBUPROFEN (ADVIL,MOTRIN) 600 MG TABLET    Take 1 tablet (600 mg total) by mouth every 8 (eight) hours as needed for Pain.    INSULIN DETEMIR U-100 (LEVEMIR FLEXTOUCH) 100 UNIT/ML (3 ML) SUBQ INPN PEN    Inject 28 Units into the skin " 2 (two) times daily.    KETOCONAZOLE (NIZORAL) 2 % CREAM    APPLY TO BREAST TWICE A DAY    LANCETS MISC    Check blood glucose 3x/day.    METOPROLOL SUCCINATE (TOPROL-XL) 100 MG 24 HR TABLET    Take 1 tablet (100 mg total) by mouth once daily.    ROSUVASTATIN (CRESTOR) 40 MG TAB    Take 1 tablet (40 mg total) by mouth every evening.    SACUBITRIL-VALSARTAN (ENTRESTO) 49-51 MG PER TABLET    Take 1 tablet by mouth 2 (two) times daily.    SPIRONOLACTONE (ALDACTONE) 50 MG TABLET    Take 1 tablet (50 mg total) by mouth once daily.    TRIAMCINOLONE ACETONIDE 0.1% (KENALOG) 0.1 % CREAM    2 (two) times daily. Apply to affected area       SOCIAL HISTORY:     Social History     Socioeconomic History    Marital status:      Spouse name: Not on file    Number of children: Not on file    Years of education: Not on file    Highest education level: Not on file   Occupational History    Not on file   Social Needs    Financial resource strain: Not on file    Food insecurity:     Worry: Not on file     Inability: Not on file    Transportation needs:     Medical: Not on file     Non-medical: Not on file   Tobacco Use    Smoking status: Never Smoker    Smokeless tobacco: Never Used    Tobacco comment: smokes cigars on occasion   Substance and Sexual Activity    Alcohol use: Yes     Comment: occasional    Drug use: No     Comment: hx of marijuana use 3 years ago    Sexual activity: Yes     Partners: Male   Lifestyle    Physical activity:     Days per week: Not on file     Minutes per session: Not on file    Stress: Rather much   Relationships    Social connections:     Talks on phone: Not on file     Gets together: Not on file     Attends Sikh service: Not on file     Active member of club or organization: Not on file     Attends meetings of clubs or organizations: Not on file     Relationship status: Not on file   Other Topics Concern    Not on file   Social History Narrative    Not on file       FAMILY  "HISTORY:     Family History   Problem Relation Age of Onset    Hypertension Mother     Hypertension Father     Diabetes Father     Diabetes Maternal Grandmother     Diabetes Paternal Grandmother     Breast cancer Neg Hx     Colon cancer Neg Hx     Ovarian cancer Neg Hx        REVIEW OF SYSTEMS:   Review of Systems   Constitutional: Negative for chills, diaphoresis and fever.   HENT: Negative for nosebleeds.    Eyes: Negative for blurred vision, double vision and photophobia.   Respiratory: Negative for hemoptysis, shortness of breath and wheezing.    Cardiovascular: Negative for chest pain, palpitations, orthopnea, claudication, leg swelling and PND.   Gastrointestinal: Negative for abdominal pain, blood in stool, heartburn, melena, nausea and vomiting.   Genitourinary: Negative for flank pain and hematuria.   Musculoskeletal: Negative for falls, myalgias and neck pain.   Skin: Negative for rash.   Neurological: Negative for dizziness, seizures, loss of consciousness, weakness and headaches.   Endo/Heme/Allergies: Negative for polydipsia. Does not bruise/bleed easily.   Psychiatric/Behavioral: Negative for depression and memory loss. The patient is not nervous/anxious.        PHYSICAL EXAM:     Vitals:    10/18/19 1324   BP: 132/66   Pulse: 76   Resp: 15    Body mass index is 39.29 kg/m².  Weight: 113.8 kg (250 lb 14.1 oz)   Height: 5' 7" (170.2 cm)     Physical Exam   Constitutional: She is oriented to person, place, and time. She appears well-developed and well-nourished. She is cooperative.  Non-toxic appearance. No distress.   HENT:   Head: Normocephalic and atraumatic.   Eyes: Pupils are equal, round, and reactive to light. Conjunctivae and EOM are normal. No scleral icterus.   Neck: Trachea normal and normal range of motion. Neck supple. Normal carotid pulses and no JVD present. Carotid bruit is not present. No neck rigidity. No tracheal deviation and no edema present. No thyromegaly present. "   Cardiovascular: Normal rate, regular rhythm, S1 normal and S2 normal. PMI is not displaced. Exam reveals no gallop and no friction rub.   No murmur heard.  Pulses:       Carotid pulses are 2+ on the right side, and 2+ on the left side.  L side ICD in place.   Pulmonary/Chest: Effort normal and breath sounds normal. No stridor. No respiratory distress. She has no wheezes. She has no rales. She exhibits no tenderness.   Abdominal: Soft. She exhibits no distension. There is no hepatosplenomegaly.   obese   Musculoskeletal: Normal range of motion. She exhibits no edema or tenderness.   Feet:   Right Foot:   Skin Integrity: Negative for ulcer.   Left Foot:   Skin Integrity: Negative for ulcer.   Neurological: She is alert and oriented to person, place, and time. No cranial nerve deficit.   Skin: Skin is warm and dry. No rash noted. No erythema.   Psychiatric: She has a normal mood and affect. Her speech is normal and behavior is normal.   Vitals reviewed.      DATA:   EKG: (personally reviewed tracing)  10/18/19 SR 76, PVC, LVH    Laboratory:  CBC:  Recent Labs   Lab 04/26/19  1115 05/28/19  1304 10/10/19  1153   WBC 4.88 5.99 8.40   Hemoglobin 15.8 14.4 13.3   Hematocrit 49.3 H 44.3 43.6   Platelets 299 282 203       CHEMISTRIES:  Recent Labs   Lab 11/28/17  0508  02/27/18  2120  12/27/18  2139  03/17/19  1343 04/26/19  1115 05/28/19  1304   Glucose 319 H   < > 134 H   < > 226 H   < > 234 H 306 H 226 H  226 H   Sodium 138   < > 139   < > 137   < > 138 137 138  138   Potassium 3.5   < > 4.8   < > 4.4   < > 4.3 4.0 3.8  3.8   BUN, Bld 11   < > 10   < > 14   < > 11 15 15  15   Creatinine 1.1   < > 1.0   < > 1.0   < > 1.0 1.2 1.0  1.0   eGFR if  >60   < > >60   < > >60   < > >60 >60 >60  >60   eGFR if non African American >60   < > >60   < > >60   < > >60 56 A >60  >60   Calcium 9.0   < > 10.6 H   < > 9.2   < > 9.6 9.6 8.9  8.9   Magnesium 1.7  --  1.9  --  1.9  --   --   --   --     < > = values  in this interval not displayed.       CARDIAC BIOMARKERS:  Recent Labs   Lab 02/18/17  1943  02/23/17 2012 02/27/18  2120  08/18/18  1313 12/27/18 2139 03/17/19  1343   CPK 74  --  81  --  65  --   --   --   --    CPK MB 2.1  --   --   --   --   --   --   --   --    Troponin I <0.006   < > <0.006   < > 0.017   < > 0.018 0.014 0.020    < > = values in this interval not displayed.       COAGS:  Recent Labs   Lab 04/11/18  1133 12/25/18 2150 12/27/18 2139   INR 0.9 1.0 1.0       LIPIDS/LFTS:  Recent Labs   Lab 11/28/17  0508  03/12/18  0900  03/17/19  1343 04/26/19  1115 05/28/19  1304 08/13/19  0826   Cholesterol 175  --  175  --   --   --   --  346 H   Triglycerides 76  --  76  --   --   --   --  252 H   HDL 46  --  47  --   --   --   --  54   LDL Cholesterol 113.8  --  112.8  --   --   --   --  241.6 H   Non-HDL Cholesterol 129  --  128  --   --   --   --  292   AST  --    < > 8 L   < > 20 14 10  --    ALT  --    < > 11   < > 15 13 13  --     < > = values in this interval not displayed.       Lab Results   Component Value Date    TSH 1.245 04/26/2019       Cardiovascular Testing:  Echo: 10/10/19 (EF unchanged vs 12/2018)  · Severely decreased left ventricular systolic function. The estimated ejection fraction is 20%. Global hypokinetic wall motion.  · Grade I (mild) left ventricular diastolic dysfunction consistent with impaired relaxation.  · Eccentric left ventricular hypertrophy. Moderate left ventricular enlargement.  · Normal right ventricular systolic function.  · The estimated PA systolic pressure is 32 mm Hg  · Normal central venous pressure (3 mm Hg).    Cath 11/27/17  RA 5  RV 61/6  PA 60/29/42  PAWP 18  /41  Ao 163/107/130  CO 5.6 L/min  LVEF: 20% by echo with severe LV dilation  Wall Motion: Severe global HK  Dominance: Co-dom  LM: normal  LAD: normal  LCx: normal  RCA: normal  Hemostasis:  RFA/V manual compression  Impression:  Normal cors  Elevated LVEDP with transmission of pressures to  pulmonary circuit  Above c/w Severe NICM  RFA/V manual compression  Plan:  Cont med rx  Stop Plavix  Stop amlodipine  Start Hydrala/Imdur/Lasix  Goal net neg 1L/day  Cont BBl/ACEi  Repeat echo in 3 months for reassessment of LV fxn and need for ICD  Follow up with Dr. Yanez 1 week after discharge     Stress Test: L MPI 9/20/13  Nuclear Quantitative Functional Analysis:   LVEF: 31 %  Impression: NORMAL MYOCARDIAL PERFUSION  1. The perfusion scan is free of evidence for myocardial ischemia or injury.   2. There is a moderate intensity fixed defect in the inferior wall of the left ventricle, secondary to diaphragm attenuation.   3. There is abnormal wall motion at rest showing moderate global hypokinesis of the left ventricle.   4. There is resting LV dysfunction with a reduced ejection fraction of 31 %.   5. The ventricular volumes are normal at rest and stress.   6. The extracardiac distribution of radioactivity is normal.     ASSESSMENT:   # NICM, EF persistently reduced 20% by echo 10/2019.  Pt appears euvolemic.  # hx NSVT  # PAF, in SR on eliquis 5mg bid  # S/P [employer] [facility] ICD 4/2018 (Dr. Christine), atrial lead placed for AF monitoring  # HTN, controlled  # HLP, on atorva 80mg  # DM  # BMI 39, up 1 unit vs last OV  # MILE, on CPAP    PLAN:   Cont med rx  Cont eliquis 5mg bid  Inc entresto to top dose  Diet/exercise/weight loss  Check BMP 2 weeks  RTC 1 month   [facility] as planned.    Oziel Yanez MD, [facility]    Addendum 11/6/19    BMP  Lab Results   Component Value Date     11/06/2019    K 2.9 (L) 11/06/2019    CL 96 11/06/2019    CO2 37 (H) 11/06/2019    BUN 16 11/06/2019    CREATININE 1.6 (H) 11/06/2019    CALCIUM 9.6 11/06/2019    ANIONGAP 9 11/06/2019    ESTGFRAFRICA 45 (A) 11/06/2019    EGFRNONAA 39 (A) 11/06/2019         Will decrease entresto back to 49/51mg bid dose and repeat BMP in 2 weeks.  Follow up as scheduled 12/5/19.    Addendum 11/18/19:  Creat 1.0->1.6->2.0    BMP  Lab Results    Component Value Date     11/18/2019    K 2.9 (L) 11/18/2019    CL 92 (L) 11/18/2019    CO2 36 (H) 11/18/2019    BUN 25 (H) 11/18/2019    CREATININE 2.0 (H) 11/18/2019    CALCIUM 10.0 11/18/2019    ANIONGAP 9 11/18/2019    ESTGFRAFRICA 34 (A) 11/18/2019    EGFRNONAA 30 (A) 11/18/2019     Will stop entresto and repeat BMP in 1 week  Follow up as planned 12/5/19

## 2019-10-20 RX ORDER — GABAPENTIN 400 MG/1
400 CAPSULE ORAL 3 TIMES DAILY PRN
Qty: 90 CAPSULE | Refills: 1 | Status: SHIPPED | OUTPATIENT
Start: 2019-10-20 | End: 2020-03-28 | Stop reason: SDUPTHER

## 2019-11-01 ENCOUNTER — TELEPHONE (OUTPATIENT)
Dept: GASTROENTEROLOGY | Facility: CLINIC | Age: 44
End: 2019-11-01

## 2019-11-01 ENCOUNTER — TELEMEDICINE (OUTPATIENT)
Dept: GASTROENTEROLOGY | Facility: CLINIC | Age: 44
End: 2019-11-01

## 2019-11-04 ENCOUNTER — TELEPHONE (OUTPATIENT)
Dept: SURGERY | Facility: CLINIC | Age: 44
End: 2019-11-04

## 2019-11-04 NOTE — TELEPHONE ENCOUNTER
Called pt to schedule f/u ct scan per Dr. Burrell's orders.  Pt does not want to have CT performed at this time.  I will notify Dr. Burrell.

## 2019-11-06 ENCOUNTER — TELEPHONE (OUTPATIENT)
Dept: CARDIOLOGY | Facility: CLINIC | Age: 44
End: 2019-11-06

## 2019-11-06 ENCOUNTER — LAB VISIT (OUTPATIENT)
Dept: LAB | Facility: HOSPITAL | Age: 44
End: 2019-11-06
Attending: INTERNAL MEDICINE
Payer: MEDICARE

## 2019-11-06 DIAGNOSIS — E11.9 TYPE 2 DIABETES MELLITUS WITHOUT COMPLICATION, WITHOUT LONG-TERM CURRENT USE OF INSULIN: ICD-10-CM

## 2019-11-06 DIAGNOSIS — I42.8 NICM (NONISCHEMIC CARDIOMYOPATHY): ICD-10-CM

## 2019-11-06 LAB
ANION GAP SERPL CALC-SCNC: 9 MMOL/L (ref 8–16)
BUN SERPL-MCNC: 16 MG/DL (ref 6–20)
CALCIUM SERPL-MCNC: 9.6 MG/DL (ref 8.7–10.5)
CHLORIDE SERPL-SCNC: 96 MMOL/L (ref 95–110)
CO2 SERPL-SCNC: 37 MMOL/L (ref 23–29)
CREAT SERPL-MCNC: 1.6 MG/DL (ref 0.5–1.4)
EST. GFR  (AFRICAN AMERICAN): 45 ML/MIN/1.73 M^2
EST. GFR  (NON AFRICAN AMERICAN): 39 ML/MIN/1.73 M^2
GLUCOSE SERPL-MCNC: 206 MG/DL (ref 70–110)
POTASSIUM SERPL-SCNC: 2.9 MMOL/L (ref 3.5–5.1)
SODIUM SERPL-SCNC: 142 MMOL/L (ref 136–145)

## 2019-11-06 PROCEDURE — 36415 COLL VENOUS BLD VENIPUNCTURE: CPT

## 2019-11-06 PROCEDURE — 80048 BASIC METABOLIC PNL TOTAL CA: CPT

## 2019-11-07 ENCOUNTER — CLINICAL SUPPORT (OUTPATIENT)
Dept: CARDIOLOGY | Facility: HOSPITAL | Age: 44
End: 2019-11-07
Attending: INTERNAL MEDICINE
Payer: MEDICARE

## 2019-11-07 DIAGNOSIS — Z95.810 CARDIAC DEFIBRILLATOR IN PLACE: ICD-10-CM

## 2019-11-07 PROCEDURE — 93296 REM INTERROG EVL PM/IDS: CPT | Performed by: INTERNAL MEDICINE

## 2019-11-07 PROCEDURE — 93295 CARDIAC DEVICE CHECK - REMOTE: ICD-10-PCS | Mod: ,,, | Performed by: INTERNAL MEDICINE

## 2019-11-07 PROCEDURE — 93295 DEV INTERROG REMOTE 1/2/MLT: CPT | Mod: ,,, | Performed by: INTERNAL MEDICINE

## 2019-11-11 RX ORDER — GLIMEPIRIDE 4 MG/1
TABLET ORAL
Qty: 90 TABLET | Refills: 0 | Status: SHIPPED | OUTPATIENT
Start: 2019-11-11 | End: 2019-11-21 | Stop reason: ALTCHOICE

## 2019-11-17 ENCOUNTER — PATIENT OUTREACH (OUTPATIENT)
Dept: ADMINISTRATIVE | Facility: OTHER | Age: 44
End: 2019-11-17

## 2019-11-18 ENCOUNTER — LAB VISIT (OUTPATIENT)
Dept: LAB | Facility: HOSPITAL | Age: 44
End: 2019-11-18
Attending: NURSE PRACTITIONER
Payer: MEDICARE

## 2019-11-18 DIAGNOSIS — E78.2 MIXED HYPERLIPIDEMIA: ICD-10-CM

## 2019-11-18 LAB
ALBUMIN SERPL BCP-MCNC: 4 G/DL (ref 3.5–5.2)
ALP SERPL-CCNC: 119 U/L (ref 55–135)
ALT SERPL W/O P-5'-P-CCNC: 20 U/L (ref 10–44)
AST SERPL-CCNC: 21 U/L (ref 10–40)
BILIRUB DIRECT SERPL-MCNC: 0.1 MG/DL (ref 0.1–0.3)
BILIRUB SERPL-MCNC: 0.4 MG/DL (ref 0.1–1)
CHOLEST SERPL-MCNC: 199 MG/DL (ref 120–199)
CHOLEST/HDLC SERPL: 4.5 {RATIO} (ref 2–5)
HDLC SERPL-MCNC: 44 MG/DL (ref 40–75)
HDLC SERPL: 22.1 % (ref 20–50)
LDLC SERPL CALC-MCNC: 97.6 MG/DL (ref 63–159)
NONHDLC SERPL-MCNC: 155 MG/DL
PROT SERPL-MCNC: 8 G/DL (ref 6–8.4)
TRIGL SERPL-MCNC: 287 MG/DL (ref 30–150)

## 2019-11-18 PROCEDURE — 80076 HEPATIC FUNCTION PANEL: CPT

## 2019-11-18 PROCEDURE — 80061 LIPID PANEL: CPT

## 2019-11-18 PROCEDURE — 83036 HEMOGLOBIN GLYCOSYLATED A1C: CPT

## 2019-11-19 ENCOUNTER — TELEPHONE (OUTPATIENT)
Dept: CARDIOLOGY | Facility: CLINIC | Age: 44
End: 2019-11-19

## 2019-11-19 LAB
ESTIMATED AVG GLUCOSE: 341 MG/DL (ref 68–131)
HBA1C MFR BLD HPLC: 13.5 % (ref 4–5.6)

## 2019-11-21 ENCOUNTER — OFFICE VISIT (OUTPATIENT)
Dept: ENDOCRINOLOGY | Facility: CLINIC | Age: 44
End: 2019-11-21
Payer: MEDICARE

## 2019-11-21 VITALS
WEIGHT: 246.31 LBS | HEART RATE: 64 BPM | BODY MASS INDEX: 38.58 KG/M2 | SYSTOLIC BLOOD PRESSURE: 117 MMHG | DIASTOLIC BLOOD PRESSURE: 80 MMHG

## 2019-11-21 DIAGNOSIS — Z71.9 VISIT FOR COUNSELING: ICD-10-CM

## 2019-11-21 DIAGNOSIS — N18.30 CKD (CHRONIC KIDNEY DISEASE) STAGE 3, GFR 30-59 ML/MIN: ICD-10-CM

## 2019-11-21 DIAGNOSIS — I50.42 CHRONIC COMBINED SYSTOLIC AND DIASTOLIC HEART FAILURE: ICD-10-CM

## 2019-11-21 DIAGNOSIS — E66.01 SEVERE OBESITY (BMI 35.0-39.9) WITH COMORBIDITY: ICD-10-CM

## 2019-11-21 LAB
BACTERIA #/AREA URNS HPF: NORMAL /HPF
BILIRUB UR QL STRIP: NEGATIVE
CLARITY UR: CLEAR
COLOR UR: ABNORMAL
GLUCOSE UR QL STRIP: ABNORMAL
HGB UR QL STRIP: ABNORMAL
HYALINE CASTS #/AREA URNS LPF: 0 /LPF
KETONES UR QL STRIP: NEGATIVE
LEUKOCYTE ESTERASE UR QL STRIP: NEGATIVE
MICROSCOPIC COMMENT: NORMAL
NITRITE UR QL STRIP: NEGATIVE
NON-SQ EPI CELLS #/AREA URNS HPF: 0 /HPF
PH UR STRIP: 7 [PH] (ref 5–8)
PROT UR QL STRIP: ABNORMAL
RBC #/AREA URNS HPF: 2 /HPF (ref 0–4)
SP GR UR STRIP: 1.02 (ref 1–1.03)
SQUAMOUS #/AREA URNS HPF: 4 /HPF
URN SPEC COLLECT METH UR: ABNORMAL
UROBILINOGEN UR STRIP-ACNC: NEGATIVE EU/DL
WBC #/AREA URNS HPF: 3 /HPF (ref 0–5)
YEAST URNS QL MICRO: NORMAL

## 2019-11-21 PROCEDURE — 99499 UNLISTED E&M SERVICE: CPT | Mod: S$GLB,,, | Performed by: NURSE PRACTITIONER

## 2019-11-21 PROCEDURE — 99214 PR OFFICE/OUTPT VISIT, EST, LEVL IV, 30-39 MIN: ICD-10-PCS | Mod: S$GLB,,, | Performed by: NURSE PRACTITIONER

## 2019-11-21 PROCEDURE — 3008F BODY MASS INDEX DOCD: CPT | Mod: CPTII,S$GLB,, | Performed by: NURSE PRACTITIONER

## 2019-11-21 PROCEDURE — 3079F DIAST BP 80-89 MM HG: CPT | Mod: CPTII,S$GLB,, | Performed by: NURSE PRACTITIONER

## 2019-11-21 PROCEDURE — 3074F SYST BP LT 130 MM HG: CPT | Mod: CPTII,S$GLB,, | Performed by: NURSE PRACTITIONER

## 2019-11-21 PROCEDURE — 99214 OFFICE O/P EST MOD 30 MIN: CPT | Mod: S$GLB,,, | Performed by: NURSE PRACTITIONER

## 2019-11-21 PROCEDURE — 99499 RISK ADDL DX/OHS AUDIT: ICD-10-PCS | Mod: S$GLB,,, | Performed by: NURSE PRACTITIONER

## 2019-11-21 PROCEDURE — 3079F PR MOST RECENT DIASTOLIC BLOOD PRESSURE 80-89 MM HG: ICD-10-PCS | Mod: CPTII,S$GLB,, | Performed by: NURSE PRACTITIONER

## 2019-11-21 PROCEDURE — 3074F PR MOST RECENT SYSTOLIC BLOOD PRESSURE < 130 MM HG: ICD-10-PCS | Mod: CPTII,S$GLB,, | Performed by: NURSE PRACTITIONER

## 2019-11-21 PROCEDURE — 3046F PR MOST RECENT HEMOGLOBIN A1C LEVEL > 9.0%: ICD-10-PCS | Mod: CPTII,S$GLB,, | Performed by: NURSE PRACTITIONER

## 2019-11-21 PROCEDURE — 3046F HEMOGLOBIN A1C LEVEL >9.0%: CPT | Mod: CPTII,S$GLB,, | Performed by: NURSE PRACTITIONER

## 2019-11-21 PROCEDURE — 99999 PR PBB SHADOW E&M-EST. PATIENT-LVL III: CPT | Mod: PBBFAC,,, | Performed by: NURSE PRACTITIONER

## 2019-11-21 PROCEDURE — 3008F PR BODY MASS INDEX (BMI) DOCUMENTED: ICD-10-PCS | Mod: CPTII,S$GLB,, | Performed by: NURSE PRACTITIONER

## 2019-11-21 PROCEDURE — 81000 URINALYSIS NONAUTO W/SCOPE: CPT

## 2019-11-21 PROCEDURE — 99999 PR PBB SHADOW E&M-EST. PATIENT-LVL III: ICD-10-PCS | Mod: PBBFAC,,, | Performed by: NURSE PRACTITIONER

## 2019-11-21 RX ORDER — INSULIN DEGLUDEC 200 U/ML
INJECTION, SOLUTION SUBCUTANEOUS
Qty: 6 SYRINGE | Refills: 1 | Status: SHIPPED | OUTPATIENT
Start: 2019-11-21 | End: 2020-01-07 | Stop reason: SDUPTHER

## 2019-11-21 RX ORDER — PEN NEEDLE, DIABETIC 30 GX3/16"
NEEDLE, DISPOSABLE MISCELLANEOUS
Qty: 400 EACH | Refills: 11 | Status: SHIPPED | OUTPATIENT
Start: 2019-11-21 | End: 2021-03-04

## 2019-11-21 RX ORDER — INSULIN ASPART 100 [IU]/ML
INJECTION, SOLUTION INTRAVENOUS; SUBCUTANEOUS
Qty: 1 BOX | Refills: 1 | Status: SHIPPED | OUTPATIENT
Start: 2019-11-21 | End: 2020-01-07 | Stop reason: SDUPTHER

## 2019-11-21 NOTE — PATIENT INSTRUCTIONS
"Switch from levemir to Tresiba and take 60 units ONCE daily.   Start Novolog 16 units before each meal.   Check your blood glucose before meals and before bedtime, at least 3x/day.   Stop glimepiride.   Ok to take both Levemir/Tresiba and Novolog at the same time.  Start Trulicity 0.75 mg once weekly.   Stop Jardiance for now due to low kidney function.   Return to clinic in 6-8 weeks. Call with any issues. Send a log in 2 weeks.     trulicity - This medication works by lowering glucoses whenever you eat carbohydrates. Therefore, it has a very low chance of lowering your glucose too much (i.e. Low risk of hypoglycemia). It also reduces appetite and decreases the rate of digestion, which may lead to some weight loss. The side effects include nausea and upset stomach. Risks include pancreatitis. Go to ER if having severe abdominal pain, nausea or vomiting.          INSULIN TIPS FOR PATIENTS ON BOTH MEAL AND BASAL INSULIN:    Take meal insulin within 15 minutes before or after each meal. But ideally before you eat. Carry the Novolog pen with you so that you can take it when you go out to eat.     Be as consistent with possible with all of your insulin doses - frequently skipping doses results in poor diabetes control.    If you forget to take your meal insulin, wait until the next meal and take your meal dose.     Skip meal insulin if meal is skipped.     Try to limit snacking between meals to foods that contain 15 grams of carbohydrates or less.    Avoid "insulin stacking" - DO NOT take your meal insulin any more often than every 4 hours.      Maintain blood glucose logs to look for patterns and make insulin adjustments.     If your blood glucose is less than 70 before your meal, eat before taking your insulin - you may also cut back your usual dose by 5 units.    If your blood glucose is less than 100 before bedtime, make sure to eat a snack of 15 - 30 grams of carbohydrates before going to sleep.      Use Rule of 15 " for treatment of hypoglycemia.     Call between visits if BG has been running high or low on several occasions with no known reason.

## 2019-11-21 NOTE — PROGRESS NOTES
CC: This 44 y.o. Black or  female  is here for evaluation of  T2DM along with comorbidities indicated in the Visit Diagnosis section of this encounter.    HPI: Fabiana Chanel was diagnosed with T2DM in 2013. Metformin and a sulfonylurea started at the time of diagnosis.       Prior visit 8/21/19  Pt unable to tolerate metformin ER d/t diarrhea even on 500 mg twice daily. She was advised to start Trulicity. Today, she questions if she can take it with her other meds. She has not taken metformin for at least a month.   She did meet with dietician. She found the visit helpful. Backed away from sodas and watching her carbs more.  She does feel a bit better in general. She no longer c/o nausea.   Plan Increase Levemir to 30 units every evening.   Continue glimepiride.   Start Jardiance 10 mg once daily in the AM for diabetes.   Continue testing blood sugars 2x/day. Return to clinic in 3 months with labs prior. Send a blood sugar log in 2 weeks after starting new med and increasing insulin dose.       Interval history   a1c is higher from 11.2 to 13.5%.   She was advised to increase Levemir from 20 to 30 units once daily. However, she misunderstood and took an additional 30 units for a total of 50 units nightly.  She was advised by her PCP in October to increase her insulin and take it BID. Currently taking 26 units BID. She is discouraged and does not know why her BGs are high despite trying to eat better on significantly more insulin.     No steroids recently nor acute illnesses. States she barely eats and has low appetite.       PMHX: CHF,  MILE on cpap, atrial fibrillation, MI, ICD placement, NICM (cath 11/2017) EF 20% by echo 12/2018    LAST DIABETES EDUCATION: 6/19/19    RECENT ILLNESSES/HOSPITALIZATIONS - -  Admitted 12/28/18 x 2 nights for acute on chronic HF      HOSPITALIZED FOR DIABETES  -  Yes - for hyperglycemia     PRESCRIBED DIABETES MEDICATIONS:  glimepiride 4 mg once daily, Levemir  Flextouch 26 units BID, jardiance 10 mg once daily        Misses medication doses - No    DM COMPLICATIONS: peripheral neuropathy and cardiovascular disease    SIGNIFICANT DIABETES MED HISTORY: diarrhea on metformin 1000 mg instant release     SELF MONITORING BLOOD GLUCOSE: Checks blood glucose at home 2x/day. BGs are mostly 200-300s.   Lowest BG was 230.     HYPOGLYCEMIC EPISODES: denies      CURRENT DIET: drinks water.  Eats 1-2 meals/day because appetite fluctuates.  tries to snack when she doesn't get a meal in.     Diet recall: dinner was a hot dog and salad. Lunch was skipped yesterday.     CURRENT EXERCISE: none but just joined gym       /80 (BP Location: Right arm, Patient Position: Sitting, BP Method: Large (Automatic))   Pulse 64   Wt 111.7 kg (246 lb 4.8 oz)   BMI 38.58 kg/m²       ROS:   CONSTITUTIONAL: Appetite good, + fatigue  GI: Denies personal/family h/o thyroid CA   : + urinary frequency on diuretics - more frequent now; denies dysuria   OTHER: + dry mouth       PHYSICAL EXAM:  GENERAL: Well developed, well nourished. No acute distress.   PSYCH: AAOx3, appropriate mood and affect, conversant, well-groomed. Judgement and insight good.   NEURO: Cranial nerves grossly intact. Speech clear, no tremor.   CHEST: Respirations even and unlabored.         Hemoglobin A1C   Date Value Ref Range Status   11/18/2019 13.5 (H) 4.0 - 5.6 % Final     Comment:     ADA Screening Guidelines:  5.7-6.4%  Consistent with prediabetes  >or=6.5%  Consistent with diabetes  High levels of fetal hemoglobin interfere with the HbA1C  assay. Heterozygous hemoglobin variants (HbS, HgC, etc)do  not significantly interfere with this assay.   However, presence of multiple variants may affect accuracy.     08/13/2019 11.2 (H) 4.0 - 5.6 % Final     Comment:     ADA Screening Guidelines:  5.7-6.4%  Consistent with prediabetes  >or=6.5%  Consistent with diabetes  High levels of fetal hemoglobin interfere with the HbA1C  assay.  Heterozygous hemoglobin variants (HbS, HgC, etc)do  not significantly interfere with this assay.   However, presence of multiple variants may affect accuracy.     06/13/2019 11.0 (H) 4.0 - 5.6 % Final     Comment:     ADA Screening Guidelines:  5.7-6.4%  Consistent with prediabetes  >or=6.5%  Consistent with diabetes  High levels of fetal hemoglobin interfere with the HbA1C  assay. Heterozygous hemoglobin variants (HbS, HgC, etc)do  not significantly interfere with this assay.   However, presence of multiple variants may affect accuracy.             Chemistry        Component Value Date/Time     11/18/2019 0813    K 2.9 (L) 11/18/2019 0813    CL 92 (L) 11/18/2019 0813    CO2 36 (H) 11/18/2019 0813    BUN 25 (H) 11/18/2019 0813    CREATININE 2.0 (H) 11/18/2019 0813     (H) 11/18/2019 0813        Component Value Date/Time    CALCIUM 10.0 11/18/2019 0813    ALKPHOS 119 11/18/2019 0813    AST 21 11/18/2019 0813    ALT 20 11/18/2019 0813    BILITOT 0.4 11/18/2019 0813    ESTGFRAFRICA 34 (A) 11/18/2019 0813    EGFRNONAA 30 (A) 11/18/2019 0813          Lab Results   Component Value Date    LDLCALC 97.6 11/18/2019        Ref. Range 11/18/2019 08:13   Cholesterol Latest Ref Range: 120 - 199 mg/dL 199   HDL Latest Ref Range: 40 - 75 mg/dL 44   Hdl/Cholesterol Ratio Latest Ref Range: 20.0 - 50.0 % 22.1   LDL Cholesterol External Latest Ref Range: 63.0 - 159.0 mg/dL 97.6   Non-HDL Cholesterol Latest Units: mg/dL 155   Total Cholesterol/HDL Ratio Latest Ref Range: 2.0 - 5.0  4.5   Triglycerides Latest Ref Range: 30 - 150 mg/dL 287 (H)     Lab Results   Component Value Date    MICALBCREAT 44.7 (H) 08/13/2019           ASSESSMENT and PLAN:    A1C GOAL: < 7 %     1. Type 2 diabetes, uncontrolled, with neuropathy  Unsure why DM control has worsened. Will check urine today to r/o UTI.     Advised -     Switch from levemir to Tresiba and take 60 units ONCE daily.   Start Novolog 16 units before each meal. Reviewed MOA,  pharmacokinetics, and timing of insulin.   Check your blood glucose before meals and before bedtime, at least 3x/day.   Stop glimepiride.   Ok to take both Levemir/Tresiba and Novolog at the same time.  Start Trulicity 0.75 mg once weekly. Reviewed s/e.   Stop Jardiance for now due to low kidney function.   Return to clinic in 6-8 weeks. Call with any issues. Send a log in 2 weeks.     Urinalysis    Urinalysis   2. Severe obesity (BMI 35.0-39.9) with comorbidity  Increases insulin resistance.      3. Chronic combined systolic and diastolic heart failure  Improve glycemic control.   Followed by Dr. Yanez.    4. CKD (chronic kidney disease) stage 3, GFR 30-59 ml/min  Improve glycemic control.        Spent 40 minutes with patient with >50% time spent in counseling, as noted in # 1-4.           Orders Placed This Encounter   Procedures    Urinalysis     Standing Status:   Future     Number of Occurrences:   1     Standing Expiration Date:   11/20/2020        Follow up in about 7 weeks (around 1/9/2020).

## 2019-11-22 PROCEDURE — 87502 INFLUENZA DNA AMP PROBE: CPT

## 2019-11-22 PROCEDURE — 96374 THER/PROPH/DIAG INJ IV PUSH: CPT

## 2019-11-22 PROCEDURE — 99285 EMERGENCY DEPT VISIT HI MDM: CPT | Mod: 25

## 2019-11-22 PROCEDURE — 82962 GLUCOSE BLOOD TEST: CPT

## 2019-11-23 ENCOUNTER — HOSPITAL ENCOUNTER (EMERGENCY)
Facility: HOSPITAL | Age: 44
Discharge: HOME | End: 2019-11-23
Attending: EMERGENCY MEDICINE
Payer: MEDICARE

## 2019-11-23 VITALS
TEMPERATURE: 98 F | OXYGEN SATURATION: 98 % | HEART RATE: 78 BPM | WEIGHT: 244 LBS | DIASTOLIC BLOOD PRESSURE: 66 MMHG | BODY MASS INDEX: 38.3 KG/M2 | SYSTOLIC BLOOD PRESSURE: 108 MMHG | HEIGHT: 67 IN | RESPIRATION RATE: 14 BRPM

## 2019-11-23 DIAGNOSIS — R07.9 CHEST PAIN: ICD-10-CM

## 2019-11-23 DIAGNOSIS — M79.10 MYALGIA: ICD-10-CM

## 2019-11-23 DIAGNOSIS — R53.1 GENERALIZED WEAKNESS: Primary | ICD-10-CM

## 2019-11-23 LAB
ALBUMIN SERPL BCP-MCNC: 3.7 G/DL (ref 3.5–5.2)
ALP SERPL-CCNC: 87 U/L (ref 55–135)
ALT SERPL W/O P-5'-P-CCNC: 18 U/L (ref 10–44)
ANION GAP SERPL CALC-SCNC: 12 MMOL/L (ref 8–16)
AST SERPL-CCNC: 19 U/L (ref 10–40)
B-HCG UR QL: NEGATIVE
BACTERIA #/AREA URNS HPF: NORMAL /HPF
BASOPHILS # BLD AUTO: 0.03 K/UL (ref 0–0.2)
BASOPHILS NFR BLD: 0.4 % (ref 0–1.9)
BILIRUB SERPL-MCNC: 0.6 MG/DL (ref 0.1–1)
BILIRUB UR QL STRIP: NEGATIVE
BNP SERPL-MCNC: 29 PG/ML (ref 0–99)
BUN SERPL-MCNC: 16 MG/DL (ref 6–20)
CALCIUM SERPL-MCNC: 10.2 MG/DL (ref 8.7–10.5)
CHLORIDE SERPL-SCNC: 100 MMOL/L (ref 95–110)
CK SERPL-CCNC: 151 U/L (ref 20–180)
CLARITY UR: CLEAR
CO2 SERPL-SCNC: 28 MMOL/L (ref 23–29)
COLOR UR: ABNORMAL
CREAT SERPL-MCNC: 1.6 MG/DL (ref 0.5–1.4)
CTP QC/QA: YES
CTP QC/QA: YES
DIFFERENTIAL METHOD: NORMAL
EOSINOPHIL # BLD AUTO: 0.1 K/UL (ref 0–0.5)
EOSINOPHIL NFR BLD: 1.7 % (ref 0–8)
ERYTHROCYTE [DISTWIDTH] IN BLOOD BY AUTOMATED COUNT: 13.7 % (ref 11.5–14.5)
EST. GFR  (AFRICAN AMERICAN): 45 ML/MIN/1.73 M^2
EST. GFR  (NON AFRICAN AMERICAN): 39 ML/MIN/1.73 M^2
GLUCOSE SERPL-MCNC: 195 MG/DL (ref 70–110)
GLUCOSE SERPL-MCNC: 198 MG/DL (ref 70–110)
GLUCOSE UR QL STRIP: ABNORMAL
HCT VFR BLD AUTO: 46.2 % (ref 37–48.5)
HGB BLD-MCNC: 15 G/DL (ref 12–16)
HGB UR QL STRIP: ABNORMAL
HYALINE CASTS #/AREA URNS LPF: NORMAL /LPF
IMM GRANULOCYTES # BLD AUTO: 0.02 K/UL (ref 0–0.04)
IMM GRANULOCYTES NFR BLD AUTO: 0.3 % (ref 0–0.5)
KETONES UR QL STRIP: NEGATIVE
LEUKOCYTE ESTERASE UR QL STRIP: NEGATIVE
LYMPHOCYTES # BLD AUTO: 2.5 K/UL (ref 1–4.8)
LYMPHOCYTES NFR BLD: 32.6 % (ref 18–48)
MAGNESIUM SERPL-MCNC: 2.5 MG/DL (ref 1.6–2.6)
MCH RBC QN AUTO: 29.4 PG (ref 27–31)
MCHC RBC AUTO-ENTMCNC: 32.5 G/DL (ref 32–36)
MCV RBC AUTO: 91 FL (ref 82–98)
MICROSCOPIC COMMENT: NORMAL
MONOCYTES # BLD AUTO: 0.4 K/UL (ref 0.3–1)
MONOCYTES NFR BLD: 5.3 % (ref 4–15)
NEUTROPHILS # BLD AUTO: 4.6 K/UL (ref 1.8–7.7)
NEUTROPHILS NFR BLD: 59.7 % (ref 38–73)
NITRITE UR QL STRIP: NEGATIVE
NRBC BLD-RTO: 0 /100 WBC
PH UR STRIP: 6 [PH] (ref 5–8)
PHOSPHATE SERPL-MCNC: 3.5 MG/DL (ref 2.7–4.5)
PLATELET # BLD AUTO: 224 K/UL (ref 150–350)
PMV BLD AUTO: 11.8 FL (ref 9.2–12.9)
POC MOLECULAR INFLUENZA A AGN: NEGATIVE
POC MOLECULAR INFLUENZA B AGN: NEGATIVE
POCT GLUCOSE: 195 MG/DL (ref 70–110)
POTASSIUM SERPL-SCNC: 3.4 MMOL/L (ref 3.5–5.1)
PROT SERPL-MCNC: 7.7 G/DL (ref 6–8.4)
PROT UR QL STRIP: ABNORMAL
RBC # BLD AUTO: 5.1 M/UL (ref 4–5.4)
RBC #/AREA URNS HPF: 2 /HPF (ref 0–4)
SODIUM SERPL-SCNC: 140 MMOL/L (ref 136–145)
SP GR UR STRIP: 1.02 (ref 1–1.03)
SQUAMOUS #/AREA URNS HPF: NORMAL /HPF
TROPONIN I SERPL DL<=0.01 NG/ML-MCNC: 0.05 NG/ML (ref 0–0.03)
TROPONIN I SERPL DL<=0.01 NG/ML-MCNC: 0.07 NG/ML (ref 0–0.03)
URN SPEC COLLECT METH UR: ABNORMAL
UROBILINOGEN UR STRIP-ACNC: NEGATIVE EU/DL
WBC # BLD AUTO: 7.74 K/UL (ref 3.9–12.7)
WBC #/AREA URNS HPF: 3 /HPF (ref 0–5)
YEAST URNS QL MICRO: NORMAL

## 2019-11-23 PROCEDURE — 80053 COMPREHEN METABOLIC PANEL: CPT

## 2019-11-23 PROCEDURE — 83735 ASSAY OF MAGNESIUM: CPT

## 2019-11-23 PROCEDURE — 25000003 PHARM REV CODE 250: Performed by: EMERGENCY MEDICINE

## 2019-11-23 PROCEDURE — 84484 ASSAY OF TROPONIN QUANT: CPT

## 2019-11-23 PROCEDURE — 93005 ELECTROCARDIOGRAM TRACING: CPT

## 2019-11-23 PROCEDURE — 93010 ELECTROCARDIOGRAM REPORT: CPT | Mod: ,,, | Performed by: INTERNAL MEDICINE

## 2019-11-23 PROCEDURE — 81025 URINE PREGNANCY TEST: CPT | Performed by: EMERGENCY MEDICINE

## 2019-11-23 PROCEDURE — 84100 ASSAY OF PHOSPHORUS: CPT

## 2019-11-23 PROCEDURE — 82550 ASSAY OF CK (CPK): CPT

## 2019-11-23 PROCEDURE — 93010 EKG 12-LEAD: ICD-10-PCS | Mod: ,,, | Performed by: INTERNAL MEDICINE

## 2019-11-23 PROCEDURE — 85025 COMPLETE CBC W/AUTO DIFF WBC: CPT

## 2019-11-23 PROCEDURE — 63600175 PHARM REV CODE 636 W HCPCS: Performed by: EMERGENCY MEDICINE

## 2019-11-23 PROCEDURE — 81000 URINALYSIS NONAUTO W/SCOPE: CPT

## 2019-11-23 PROCEDURE — 83880 ASSAY OF NATRIURETIC PEPTIDE: CPT

## 2019-11-23 RX ORDER — ASPIRIN 325 MG
325 TABLET ORAL
Status: COMPLETED | OUTPATIENT
Start: 2019-11-23 | End: 2019-11-23

## 2019-11-23 RX ORDER — KETOROLAC TROMETHAMINE 30 MG/ML
10 INJECTION, SOLUTION INTRAMUSCULAR; INTRAVENOUS
Status: COMPLETED | OUTPATIENT
Start: 2019-11-23 | End: 2019-11-23

## 2019-11-23 RX ORDER — HYDROCODONE BITARTRATE AND ACETAMINOPHEN 5; 325 MG/1; MG/1
1 TABLET ORAL
Status: COMPLETED | OUTPATIENT
Start: 2019-11-23 | End: 2019-11-23

## 2019-11-23 RX ORDER — ACETAMINOPHEN 500 MG
1000 TABLET ORAL
Status: DISCONTINUED | OUTPATIENT
Start: 2019-11-23 | End: 2019-11-23

## 2019-11-23 RX ADMIN — HYDROCODONE BITARTRATE AND ACETAMINOPHEN 1 TABLET: 5; 325 TABLET ORAL at 02:11

## 2019-11-23 RX ADMIN — ASPIRIN 325 MG ORAL TABLET 325 MG: 325 PILL ORAL at 01:11

## 2019-11-23 RX ADMIN — KETOROLAC TROMETHAMINE 10 MG: 30 INJECTION, SOLUTION INTRAMUSCULAR at 03:11

## 2019-11-23 NOTE — ED PROVIDER NOTES
Encounter Date: 11/22/2019    SCRIBE #1 NOTE: I, Melissa Zurita, am scribing for, and in the presence of,  Tripp Vilchis MD. I have scribed the following portions of the note - Other sections scribed: HPI, ROS, PE.       History     Chief Complaint   Patient presents with    Weakness     Patient reports generalized bodyaches and weakness that started at 1800. pain 8/10.  Patient reports nausea, denies V/D/CP.  Patient denies being near anyone that is sick. denies take medication for relief.      CC: Weakness    HPI: This is a 44 y.o. female with a PMHx of hypertension, hyperlipidemia, diabetes mellitus, and atrial fibrillation who presents to the Emergency Department with a cc of weakness since 7 hours prior to arrival. Patient reports associated shortness of breath, body aches, and a fever. She denies nausea, vomiting, diarrhea, or dysuria. No worsening or alleviating factors are noted and no tx have been tried. Patient reports no prior history of similar symptoms. She is not a smoker, drinks alcohol occasionally, and does not use drugs. Patient is allergic to Metformin.        The history is provided by the patient. No  was used.     Review of patient's allergies indicates:   Allergen Reactions    Metformin      Diarrhea on metformin XR     Pneumococcal 23-abimbola ps vaccine      Past Medical History:   Diagnosis Date    Atrial fibrillation     Blood clot associated with vein wall inflammation     not dvt    Cardiomyopathy     Normal cors on cath 11/2017    CHF (congestive heart failure)     DM (diabetes mellitus) 9/19/2013    Hyperlipidemia     Hypertension     Psoriasis     Sleep apnea      Past Surgical History:   Procedure Laterality Date    CARDIAC CATHETERIZATION      COLONOSCOPY      DILATION AND CURETTAGE OF UTERUS      ESOPHAGOGASTRODUODENOSCOPY N/A 5/9/2019    Procedure: EGD (ESOPHAGOGASTRODUODENOSCOPY);  Surgeon: Vielka Burrell MD;  Location: [facility];   Service: Endoscopy;  Laterality: N/A;     Family History   Problem Relation Age of Onset    Hypertension Mother     Hypertension Father     Diabetes Father     Diabetes Maternal Grandmother     Diabetes Paternal Grandmother     Breast cancer Neg Hx     Colon cancer Neg Hx     Ovarian cancer Neg Hx      Social History     Tobacco Use    Smoking status: Never Smoker    Smokeless tobacco: Never Used    Tobacco comment: smokes cigars on occasion   Substance Use Topics    Alcohol use: Yes     Comment: occasional    Drug use: No     Comment: hx of marijuana use 3 years ago     Review of Systems   Constitutional: Positive for fever.        (+) Body aches   HENT: Negative for sore throat.    Respiratory: Positive for shortness of breath.    Cardiovascular: Negative for chest pain.   Gastrointestinal: Negative for diarrhea, nausea and vomiting.   Genitourinary: Negative for dysuria.   Musculoskeletal: Negative for back pain.   Skin: Negative for rash.   Neurological: Positive for weakness.   Hematological: Does not bruise/bleed easily.       Physical Exam     Initial Vitals [11/22/19 2336]   BP Pulse Resp Temp SpO2   133/84 96 19 98.4 °F (36.9 °C) 99 %      MAP       --         Physical Exam    Nursing note and vitals reviewed.  Constitutional: She appears well-developed and well-nourished.   HENT:   Head: Normocephalic and atraumatic.   Eyes: EOM are normal. Pupils are equal, round, and reactive to light.   Neck: Normal range of motion. Neck supple.   Cardiovascular: Normal rate and regular rhythm.   Pulmonary/Chest: Breath sounds normal. No respiratory distress.   Abdominal: Soft. Bowel sounds are normal.   Musculoskeletal: Normal range of motion. She exhibits no edema.   Neurological: She is alert and oriented to person, place, and time.   Skin: Skin is warm and dry. Capillary refill takes less than 2 seconds.   Psychiatric: She has a normal mood and affect. Her behavior is normal.         ED Course    Procedures  Labs Reviewed   COMPREHENSIVE METABOLIC PANEL - Abnormal; Notable for the following components:       Result Value    Potassium 3.4 (*)     Glucose 198 (*)     Creatinine 1.6 (*)     eGFR if  45 (*)     eGFR if non  39 (*)     All other components within normal limits    Narrative:     Recoll. [id number] by [email or web address] at 11/23/2019 01:55, reason:  11/23/2019    01:55 Specimen hemolyzed   TROPONIN I - Abnormal; Notable for the following components:    Troponin I 0.066 (*)     All other components within normal limits   URINALYSIS, REFLEX TO URINE CULTURE - Abnormal; Notable for the following components:    Protein, UA 1+ (*)     Glucose, UA 3+ (*)     Occult Blood UA 1+ (*)     All other components within normal limits    Narrative:     Preferred Collection Type->Urine, Clean Catch   TROPONIN I - Abnormal; Notable for the following components:    Troponin I 0.049 (*)     All other components within normal limits   POCT GLUCOSE - Abnormal; Notable for the following components:    POCT Glucose 195 (*)     All other components within normal limits   CBC W/ AUTO DIFFERENTIAL   B-TYPE NATRIURETIC PEPTIDE   CK   MAGNESIUM   PHOSPHORUS   URINALYSIS MICROSCOPIC    Narrative:     Preferred Collection Type->Urine, Clean Catch   MAGNESIUM    Narrative:     Recoll. [id number] by SMB2 at 11/23/2019 01:55, reason:  11/23/2019    01:55 Specimen hemolyzed   PHOSPHORUS    Narrative:     Recoll. [id number] by B2 at 11/23/2019 01:55, reason:  11/23/2019    01:55 Specimen hemolyzed   CK    Narrative:     Recoll. [id number] by SMB2 at 11/23/2019 01:55, reason:  11/23/2019    01:55 Specimen hemolyzed   POCT URINE PREGNANCY   POCT INFLUENZA A/B MOLECULAR        ECG Results          EKG 12-lead (Final result)  Result time 11/23/19 12:19:23    Final result by Interface, Lab In [facility] (11/23/19 12:19:23)                 Narrative:    Test Reason : R52,    Vent. Rate : 103 BPM     Atrial Rate :  103 BPM     P-R Int : 142 ms          QRS Dur : 116 ms      QT Int : 304 ms       P-R-T Axes : 063 003 113 degrees     QTc Int : 398 ms    Sinus tachycardia  LVH with QRS widening  Nonspecific T wave abnormality  Abnormal ECG  When compared with ECG of 18-OCT-2019 12:42,  Significant changes have occurred  Confirmed by Oziel Yanez MD ([id number]) on 11/23/2019 12:19:13 PM    Referred By: [email or web address]   SELF           Confirmed By:Oziel Yanez MD                            Imaging Results          X-Ray Chest AP Portable (Final result)  Result time 11/23/19 01:19:11    Final result by Rober Gonsales MD (11/23/19 01:19:11)                 Impression:      No acute cardiopulmonary process identified.      Electronically signed by: Rober Gonsales MD  Date:    11/23/2019  Time:    01:19             Narrative:    EXAMINATION:  XR CHEST AP PORTABLE    CLINICAL HISTORY:  Chest Pain;    TECHNIQUE:  Single frontal view of the chest was performed.    COMPARISON:  03/17/2019.    FINDINGS:  Cardiac silhouette is mildly enlarged but stable in size.  Left-sided pacer device is seen.  Lungs are slightly hypoinflated.  No evidence of focal consolidative process, pneumothorax, or significant effusion.  No acute osseous abnormality identified.                                Pt arrived alert, afebrile, non-toxic in appearance, in no acute respiratory distress with VSS.  EKG revealed no acute findings concerning for ischemia, arrhythmia, heart block or any pathology to warrant cardiology consultation.  Serial troponin enzymes were flat with remainder of labs unremarkable.  PE unremarkable.  Pt endorsing Sx's likely secondary to URI and / or viral syndrome.  Pt had no findings that warranted admission or further evaluation at this time.  Pt discharged and counseled on the need to return to the nearest emergency room if they experience any other concerning symptoms.  Pt counseled to F/U outpatient with a PCP over the next two to three  days.    Tripp Vilchis MD                      Scribe Attestation:   Scribe #1: I performed the above scribed service and the documentation accurately describes the services I performed. I attest to the accuracy of the note.                          Clinical Impression:     1. Generalized weakness    2. Chest pain    3. Myalgia                I, Tripp Vilchis, personally performed the services described in this documentation. All medical record entries made by the scribe were at my direction and in my presence.  I have reviewed the chart and agree that the record reflects my personal performance and is accurate and complete.                   Tripp Vilchis MD  11/23/19 [id number]

## 2019-12-01 ENCOUNTER — PATIENT OUTREACH (OUTPATIENT)
Dept: ADMINISTRATIVE | Facility: OTHER | Age: 44
End: 2019-12-01

## 2019-12-31 ENCOUNTER — PATIENT OUTREACH (OUTPATIENT)
Dept: ADMINISTRATIVE | Facility: OTHER | Age: 44
End: 2019-12-31

## 2020-01-02 ENCOUNTER — OFFICE VISIT (OUTPATIENT)
Dept: CARDIOLOGY | Facility: CLINIC | Age: 45
End: 2020-01-02
Payer: MEDICARE

## 2020-01-02 VITALS
HEIGHT: 67 IN | BODY MASS INDEX: 39.83 KG/M2 | SYSTOLIC BLOOD PRESSURE: 130 MMHG | OXYGEN SATURATION: 98 % | WEIGHT: 253.75 LBS | RESPIRATION RATE: 15 BRPM | HEART RATE: 68 BPM | DIASTOLIC BLOOD PRESSURE: 62 MMHG

## 2020-01-02 DIAGNOSIS — I50.42 CHRONIC COMBINED SYSTOLIC AND DIASTOLIC HEART FAILURE: ICD-10-CM

## 2020-01-02 DIAGNOSIS — I48.0 PAROXYSMAL ATRIAL FIBRILLATION: ICD-10-CM

## 2020-01-02 DIAGNOSIS — E78.2 MIXED HYPERLIPIDEMIA: ICD-10-CM

## 2020-01-02 DIAGNOSIS — Z95.810 IMPLANTABLE CARDIOVERTER-DEFIBRILLATOR (ICD) IN SITU: ICD-10-CM

## 2020-01-02 DIAGNOSIS — I42.8 NICM (NONISCHEMIC CARDIOMYOPATHY): Primary | ICD-10-CM

## 2020-01-02 DIAGNOSIS — E66.9 OBESITY WITH BODY MASS INDEX (BMI) OF 30.0 TO 39.9: ICD-10-CM

## 2020-01-02 DIAGNOSIS — E11.9 TYPE 2 DIABETES MELLITUS WITHOUT COMPLICATION, WITHOUT LONG-TERM CURRENT USE OF INSULIN: ICD-10-CM

## 2020-01-02 DIAGNOSIS — I10 ESSENTIAL HYPERTENSION: ICD-10-CM

## 2020-01-02 PROCEDURE — 3075F PR MOST RECENT SYSTOLIC BLOOD PRESS GE 130-139MM HG: ICD-10-PCS | Mod: CPTII,S$GLB,, | Performed by: INTERNAL MEDICINE

## 2020-01-02 PROCEDURE — 3008F BODY MASS INDEX DOCD: CPT | Mod: CPTII,S$GLB,, | Performed by: INTERNAL MEDICINE

## 2020-01-02 PROCEDURE — 3046F HEMOGLOBIN A1C LEVEL >9.0%: CPT | Mod: CPTII,S$GLB,, | Performed by: INTERNAL MEDICINE

## 2020-01-02 PROCEDURE — 3078F PR MOST RECENT DIASTOLIC BLOOD PRESSURE < 80 MM HG: ICD-10-PCS | Mod: CPTII,S$GLB,, | Performed by: INTERNAL MEDICINE

## 2020-01-02 PROCEDURE — 99499 RISK ADDL DX/OHS AUDIT: ICD-10-PCS | Mod: S$GLB,,, | Performed by: INTERNAL MEDICINE

## 2020-01-02 PROCEDURE — 3008F PR BODY MASS INDEX (BMI) DOCUMENTED: ICD-10-PCS | Mod: CPTII,S$GLB,, | Performed by: INTERNAL MEDICINE

## 2020-01-02 PROCEDURE — 3078F DIAST BP <80 MM HG: CPT | Mod: CPTII,S$GLB,, | Performed by: INTERNAL MEDICINE

## 2020-01-02 PROCEDURE — 99999 PR PBB SHADOW E&M-EST. PATIENT-LVL III: ICD-10-PCS | Mod: PBBFAC,,, | Performed by: INTERNAL MEDICINE

## 2020-01-02 PROCEDURE — 99214 OFFICE O/P EST MOD 30 MIN: CPT | Mod: S$GLB,,, | Performed by: INTERNAL MEDICINE

## 2020-01-02 PROCEDURE — 3075F SYST BP GE 130 - 139MM HG: CPT | Mod: CPTII,S$GLB,, | Performed by: INTERNAL MEDICINE

## 2020-01-02 PROCEDURE — 3046F PR MOST RECENT HEMOGLOBIN A1C LEVEL > 9.0%: ICD-10-PCS | Mod: CPTII,S$GLB,, | Performed by: INTERNAL MEDICINE

## 2020-01-02 PROCEDURE — 99999 PR PBB SHADOW E&M-EST. PATIENT-LVL III: CPT | Mod: PBBFAC,,, | Performed by: INTERNAL MEDICINE

## 2020-01-02 PROCEDURE — 99499 UNLISTED E&M SERVICE: CPT | Mod: S$GLB,,, | Performed by: INTERNAL MEDICINE

## 2020-01-02 PROCEDURE — 99214 PR OFFICE/OUTPT VISIT, EST, LEVL IV, 30-39 MIN: ICD-10-PCS | Mod: S$GLB,,, | Performed by: INTERNAL MEDICINE

## 2020-01-02 RX ORDER — FUROSEMIDE 40 MG/1
40 TABLET ORAL DAILY
Qty: 90 TABLET | Refills: 3 | Status: SHIPPED | OUTPATIENT
Start: 2020-01-02 | End: 2021-03-24

## 2020-01-02 NOTE — PROGRESS NOTES
CARDIOVASCULAR PROGRESS NOTE    REASON FOR CONSULT:   Fabiana Chanel is a 44 y.o. female who presents for follow up of NICM.    PCP: Elvis  EP: Nanci  HISTORY OF PRESENT ILLNESS:   The patient returns for follow-up.  In the interim since her last office visit, her creatinine was noted to rise up to 2.0.  I instructed her to stop her Entresto, but it appears that she has only cut it down to half the top dose.  The patient also thinks that her rising creatinine may have been due to the initiation of Humira.  She denies intercurrent angina or dyspnea.  There has been no palpitations, lightheadedness, dizziness, syncope, or defibrillator discharges.  There has been no PND, orthopnea, lower extremity edema.  She denies melena, hematuria, or claudicant symptoms.    CARDIOVASCULAR HISTORY:   NICM (cath 11/2017) EF 20% by echo 12/2018  [employer] ICD (4/17/18 Dr. Christine)  PAF on eliquis 5mg bid  MILE on CPAP    PAST MEDICAL HISTORY:     Past Medical History:   Diagnosis Date    Atrial fibrillation     Blood clot associated with vein wall inflammation     not dvt    Cardiomyopathy     Normal cors on cath 11/2017    CHF (congestive heart failure)     DM (diabetes mellitus) 9/19/2013    Hyperlipidemia     Hypertension     Psoriasis     Sleep apnea        PAST SURGICAL HISTORY:     Past Surgical History:   Procedure Laterality Date    CARDIAC CATHETERIZATION      COLONOSCOPY      DILATION AND CURETTAGE OF UTERUS      ESOPHAGOGASTRODUODENOSCOPY N/A 5/9/2019    Procedure: EGD (ESOPHAGOGASTRODUODENOSCOPY);  Surgeon: Vielka Burrell MD;  Location: [facility];  Service: Endoscopy;  Laterality: N/A;       ALLERGIES AND MEDICATION:     Review of patient's allergies indicates:   Allergen Reactions    Pneumococcal 23-abimbola ps vaccine      Previous Medications    APIXABAN (ELIQUIS) 5 MG TAB    Take 1 tablet (5 mg total) by mouth 2 (two) times daily.    ASPIRIN 81 MG CHEW    Take 81 mg by mouth once daily.    BLOOD  "GLUCOSE CONTROL, HIGH SOLN    1 each by Misc.(Non-Drug; Combo Route) route once. for 1 dose    BLOOD GLUCOSE CONTROL, LOW SOLN    1 each by Misc.(Non-Drug; Combo Route) route once. for 1 dose    BLOOD PRESSURE CUFF MISC    1 kit by Misc.(Non-Drug; Combo Route) route 2 (two) times daily.    BLOOD SUGAR DIAGNOSTIC STRP    Check blood glucose 3x/day.    BLOOD-GLUCOSE METER (TRUE METRIX AIR GLUCOSE METER) MISC    1 each by Misc.(Non-Drug; Combo Route) route 3 (three) times daily.    CETIRIZINE (ZYRTEC) 10 MG TABLET    Take 1 tablet (10 mg total) by mouth once daily. for 14 days    DICLOFENAC SODIUM (VOLTAREN) 1 % GEL    Apply 2 g topically 2 (two) times daily.    DULAGLUTIDE (TRULICITY) 0.75 MG/0.5 ML PNIJ    Inject 0.5 mLs (0.75 mg total) into the skin every 7 days.    FLUTICASONE (FLONASE) 50 MCG/ACTUATION NASAL SPRAY    1 spray (50 mcg total) by Each Nare route once daily.    FUROSEMIDE (LASIX) 40 MG TABLET    Take 1 tablet (40 mg total) by mouth 2 (two) times daily.    GABAPENTIN (NEURONTIN) 400 MG CAPSULE    TAKE 1 CAPSULE (400 MG TOTAL) BY MOUTH 3 (THREE) TIMES DAILY AS NEEDED (PAIN).    HUMIRA,CF, PEN 40 MG/0.4 ML PNKT    INJECT ONE 40MG INJECTION SUBCUTANEOUSLY EVERY OTHER WEEK    INSULIN ASPART U-100 (NOVOLOG FLEXPEN U-100 INSULIN) 100 UNIT/ML (3 ML) INPN PEN    Inject 16 units three times daily before meals    INSULIN DEGLUDEC (TRESIBA FLEXTOUCH U-200) 200 UNIT/ML (3 ML) INPN    Inject 60 units once daily.    METOPROLOL SUCCINATE (TOPROL-XL) 100 MG 24 HR TABLET    Take 1 tablet (100 mg total) by mouth once daily.    PEN NEEDLE, DIABETIC (BD ULTRA-FINE LORI PEN NEEDLE) 32 GAUGE X 5/32" NDLE    Use 4x/day    ROSUVASTATIN (CRESTOR) 40 MG TAB    Take 1 tablet (40 mg total) by mouth every evening.    SPIRONOLACTONE (ALDACTONE) 50 MG TABLET    Take 1 tablet (50 mg total) by mouth once daily.    TRIAMCINOLONE ACETONIDE 0.1% (KENALOG) 0.1 % CREAM    2 (two) times daily. Apply to affected area       SOCIAL HISTORY: "     Social History     Socioeconomic History    Marital status:      Spouse name: Not on file    Number of children: Not on file    Years of education: Not on file    Highest education level: Not on file   Occupational History    Not on file   Social Needs    Financial resource strain: Not on file    Food insecurity:     Worry: Not on file     Inability: Not on file    Transportation needs:     Medical: Not on file     Non-medical: Not on file   Tobacco Use    Smoking status: Never Smoker    Smokeless tobacco: Never Used    Tobacco comment: smokes cigars on occasion   Substance and Sexual Activity    Alcohol use: Yes     Comment: occasional    Drug use: No     Comment: hx of marijuana use 3 years ago    Sexual activity: Yes     Partners: Male   Lifestyle    Physical activity:     Days per week: Not on file     Minutes per session: Not on file    Stress: Rather much   Relationships    Social connections:     Talks on phone: Not on file     Gets together: Not on file     Attends Jain service: Not on file     Active member of club or organization: Not on file     Attends meetings of clubs or organizations: Not on file     Relationship status: Not on file   Other Topics Concern    Not on file   Social History Narrative    Not on file       FAMILY HISTORY:     Family History   Problem Relation Age of Onset    Hypertension Mother     Hypertension Father     Diabetes Father     Diabetes Maternal Grandmother     Diabetes Paternal Grandmother     Breast cancer Neg Hx     Colon cancer Neg Hx     Ovarian cancer Neg Hx        REVIEW OF SYSTEMS:   Review of Systems   Constitutional: Negative for chills, diaphoresis and fever.   HENT: Negative for nosebleeds.    Eyes: Negative for blurred vision, double vision and photophobia.   Respiratory: Negative for hemoptysis, shortness of breath and wheezing.    Cardiovascular: Negative for chest pain, palpitations, orthopnea, claudication, leg  "swelling and PND.   Gastrointestinal: Negative for abdominal pain, blood in stool, heartburn, melena, nausea and vomiting.   Genitourinary: Negative for flank pain and hematuria.   Musculoskeletal: Negative for falls, myalgias and neck pain.   Skin: Negative for rash.   Neurological: Negative for dizziness, seizures, loss of consciousness, weakness and headaches.   Endo/Heme/Allergies: Negative for polydipsia. Does not bruise/bleed easily.   Psychiatric/Behavioral: Negative for depression and memory loss. The patient is not nervous/anxious.        PHYSICAL EXAM:     Vitals:    01/02/20 1355   BP: 130/62   Pulse: 68   Resp: 15    Body mass index is 39.74 kg/m².  Weight: 115.1 kg (253 lb 12 oz)   Height: 5' 7" (170.2 cm)     Physical Exam   Constitutional: She is oriented to person, place, and time. She appears well-developed and well-nourished. She is cooperative.  Non-toxic appearance. No distress.   HENT:   Head: Normocephalic and atraumatic.   Eyes: Pupils are equal, round, and reactive to light. Conjunctivae and EOM are normal. No scleral icterus.   Neck: Trachea normal and normal range of motion. Neck supple. Normal carotid pulses and no JVD present. Carotid bruit is not present. No neck rigidity. No tracheal deviation and no edema present. No thyromegaly present.   Cardiovascular: Normal rate, regular rhythm, S1 normal and S2 normal. PMI is not displaced. Exam reveals no gallop and no friction rub.   No murmur heard.  Pulses:       Carotid pulses are 2+ on the right side, and 2+ on the left side.  L side ICD in place.   Pulmonary/Chest: Effort normal and breath sounds normal. No stridor. No respiratory distress. She has no wheezes. She has no rales. She exhibits no tenderness.   Abdominal: Soft. She exhibits no distension. There is no hepatosplenomegaly.   obese   Musculoskeletal: Normal range of motion. She exhibits no edema or tenderness.   Feet:   Right Foot:   Skin Integrity: Negative for ulcer.   Left " Foot:   Skin Integrity: Negative for ulcer.   Neurological: She is alert and oriented to person, place, and time. No cranial nerve deficit.   Skin: Skin is warm and dry. No rash noted. No erythema.   Psychiatric: She has a normal mood and affect. Her speech is normal and behavior is normal.   Vitals reviewed.      DATA:   EKG: (personally reviewed tracing)  10/18/19 SR 76, PVC, LVH    Laboratory:  CBC:  Recent Labs   Lab 05/28/19  1304 10/10/19  1153 11/23/19  0109   WBC 5.99 8.40 7.74   Hemoglobin 14.4 13.3 15.0   Hematocrit 44.3 43.6 46.2   Platelets 282 203 224       CHEMISTRIES:  Recent Labs   Lab 02/27/18 2120 12/27/18 2139  11/06/19  1332 11/18/19  0813 11/23/19  0209   Glucose 134 H   < > 226 H   < > 206 H 368 H 198 H   Sodium 139   < > 137   < > 142 137 140   Potassium 4.8   < > 4.4   < > 2.9 L 2.9 L 3.4 L   BUN, Bld 10   < > 14   < > 16 25 H 16   Creatinine 1.0   < > 1.0   < > 1.6 H 2.0 H 1.6 H   eGFR if  >60   < > >60   < > 45 A 34 A 45 A   eGFR if non African American >60   < > >60   < > 39 A 30 A 39 A   Calcium 10.6 H   < > 9.2   < > 9.6 10.0 10.2   Magnesium 1.9  --  1.9  --   --   --  2.5    < > = values in this interval not displayed.       CARDIAC BIOMARKERS:  Recent Labs   Lab 02/18/17 1943 02/23/17 2012 02/27/18 2120 03/17/19  1343 11/23/19  0109 11/23/19  0209 11/23/19  0255   CPK 74  --  81  --  65  --   --   --  151  --    CPK MB 2.1  --   --   --   --   --   --   --   --   --    Troponin I <0.006   < > <0.006   < > 0.017   < > 0.020 0.066 H  --  0.049 H    < > = values in this interval not displayed.       COAGS:  Recent Labs   Lab 04/11/18  1133 12/25/18  2150 12/27/18  2139   INR 0.9 1.0 1.0       LIPIDS/LFTS:  Recent Labs   Lab 03/12/18  0900  05/28/19  1304 08/13/19  0826 11/18/19  0813 11/23/19  0209   Cholesterol 175  --   --  346 H 199  --    Triglycerides 76  --   --  252 H 287 H  --    HDL 47  --   --  54 44  --    LDL Cholesterol 112.8  --   --  241.6 H 97.6   --    Non-HDL Cholesterol 128  --   --  292 155  --    AST 8 L   < > 10  --  21 19   ALT 11   < > 13  --  20 18    < > = values in this interval not displayed.       Lab Results   Component Value Date    TSH 1.245 04/26/2019       Cardiovascular Testing:  Echo: 10/10/19 (EF unchanged vs 12/2018)  · Severely decreased left ventricular systolic function. The estimated ejection fraction is 20%. Global hypokinetic wall motion.  · Grade I (mild) left ventricular diastolic dysfunction consistent with impaired relaxation.  · Eccentric left ventricular hypertrophy. Moderate left ventricular enlargement.  · Normal right ventricular systolic function.  · The estimated PA systolic pressure is 32 mm Hg  · Normal central venous pressure (3 mm Hg).    Cath 11/27/17  RA 5  RV 61/6  PA 60/29/42  PAWP 18  /41  Ao 163/107/130  CO 5.6 L/min  LVEF: 20% by echo with severe LV dilation  Wall Motion: Severe global HK  Dominance: Co-dom  LM: normal  LAD: normal  LCx: normal  RCA: normal  Hemostasis:  RFA/V manual compression  Impression:  Normal cors  Elevated LVEDP with transmission of pressures to pulmonary circuit  Above c/w Severe NICM  RFA/V manual compression  Plan:  Cont med rx  Stop Plavix  Stop amlodipine  Start Hydrala/Imdur/Lasix  Goal net neg 1L/day  Cont BBl/ACEi  Repeat echo in 3 months for reassessment of LV fxn and need for ICD  Follow up with Dr. Yanez 1 week after discharge     Stress Test: L MPI 9/20/13  Nuclear Quantitative Functional Analysis:   LVEF: 31 %  Impression: NORMAL MYOCARDIAL PERFUSION  1. The perfusion scan is free of evidence for myocardial ischemia or injury.   2. There is a moderate intensity fixed defect in the inferior wall of the left ventricle, secondary to diaphragm attenuation.   3. There is abnormal wall motion at rest showing moderate global hypokinesis of the left ventricle.   4. There is resting LV dysfunction with a reduced ejection fraction of 31 %.   5. The ventricular volumes are  normal at rest and stress.   6. The extracardiac distribution of radioactivity is normal.     ASSESSMENT:   # NICM, EF persistently reduced 20% by echo 10/2019.  Pt appears euvolemic.  # hx NSVT  # PAF, in SR on eliquis 5mg bid  # S/P [employer] ICD 4/2018 (Dr. Christine), atrial lead placed for AF monitoring  # HTN, controlled  # HLP, on atorva 80mg  # DM  # BMI 40, up 1 unit vs last OV  # MILE, on CPAP  # Creat up to 2.0 on top entresto dose    PLAN:   Cont med rx  Cont eliquis 5mg bid  Stop ASA  Stop entresto  Dec lasix to 40mg qd  Check BMP 2 weeks  Diet/exercise/weight loss  RTC 1 month   ICD follow up at [facility] as planned.    Oziel Yanez MD, FACC

## 2020-01-02 NOTE — PROGRESS NOTES
Patient, Fabiana Chanel (MRN #[id number]), presented with a recorded BMI of 39.74 kg/m^2 and a documented comorbidity(s):  - Diabetes Mellitus Type 2  - Hypertension  - Hyperlipidemia  - Atrial Fibrillation  - Atrial Fibrillation  to which the severe obesity is a contributing factor. This is consistent with the definition of severe obesity (BMI 35.0-39.9) with comorbidity (ICD-10 E66.01, Z68.35). The patient's severe obesity was monitored, evaluated, addressed and/or treated. This addendum to the medical record is made on 01/02/2020.

## 2020-01-06 ENCOUNTER — PATIENT OUTREACH (OUTPATIENT)
Dept: ADMINISTRATIVE | Facility: OTHER | Age: 45
End: 2020-01-06

## 2020-01-07 ENCOUNTER — OFFICE VISIT (OUTPATIENT)
Dept: ENDOCRINOLOGY | Facility: CLINIC | Age: 45
End: 2020-01-07
Payer: MEDICARE

## 2020-01-07 VITALS
HEART RATE: 73 BPM | BODY MASS INDEX: 41.27 KG/M2 | DIASTOLIC BLOOD PRESSURE: 87 MMHG | SYSTOLIC BLOOD PRESSURE: 140 MMHG | WEIGHT: 263.5 LBS

## 2020-01-07 DIAGNOSIS — E78.5 HYPERLIPIDEMIA, UNSPECIFIED HYPERLIPIDEMIA TYPE: ICD-10-CM

## 2020-01-07 DIAGNOSIS — Z71.9 VISIT FOR COUNSELING: ICD-10-CM

## 2020-01-07 DIAGNOSIS — I50.42 CHRONIC COMBINED SYSTOLIC AND DIASTOLIC HEART FAILURE: ICD-10-CM

## 2020-01-07 DIAGNOSIS — E66.01 MORBID OBESITY, UNSPECIFIED OBESITY TYPE: ICD-10-CM

## 2020-01-07 PROCEDURE — 99215 OFFICE O/P EST HI 40 MIN: CPT | Mod: S$GLB,,, | Performed by: NURSE PRACTITIONER

## 2020-01-07 PROCEDURE — 3079F PR MOST RECENT DIASTOLIC BLOOD PRESSURE 80-89 MM HG: ICD-10-PCS | Mod: CPTII,S$GLB,, | Performed by: NURSE PRACTITIONER

## 2020-01-07 PROCEDURE — 99999 PR PBB SHADOW E&M-EST. PATIENT-LVL IV: ICD-10-PCS | Mod: PBBFAC,,, | Performed by: NURSE PRACTITIONER

## 2020-01-07 PROCEDURE — 3046F HEMOGLOBIN A1C LEVEL >9.0%: CPT | Mod: CPTII,S$GLB,, | Performed by: NURSE PRACTITIONER

## 2020-01-07 PROCEDURE — 99999 PR PBB SHADOW E&M-EST. PATIENT-LVL IV: CPT | Mod: PBBFAC,,, | Performed by: NURSE PRACTITIONER

## 2020-01-07 PROCEDURE — 3046F PR MOST RECENT HEMOGLOBIN A1C LEVEL > 9.0%: ICD-10-PCS | Mod: CPTII,S$GLB,, | Performed by: NURSE PRACTITIONER

## 2020-01-07 PROCEDURE — 99215 PR OFFICE/OUTPT VISIT, EST, LEVL V, 40-54 MIN: ICD-10-PCS | Mod: S$GLB,,, | Performed by: NURSE PRACTITIONER

## 2020-01-07 PROCEDURE — 3079F DIAST BP 80-89 MM HG: CPT | Mod: CPTII,S$GLB,, | Performed by: NURSE PRACTITIONER

## 2020-01-07 PROCEDURE — 3077F SYST BP >= 140 MM HG: CPT | Mod: CPTII,S$GLB,, | Performed by: NURSE PRACTITIONER

## 2020-01-07 PROCEDURE — 3008F BODY MASS INDEX DOCD: CPT | Mod: CPTII,S$GLB,, | Performed by: NURSE PRACTITIONER

## 2020-01-07 PROCEDURE — 3077F PR MOST RECENT SYSTOLIC BLOOD PRESSURE >= 140 MM HG: ICD-10-PCS | Mod: CPTII,S$GLB,, | Performed by: NURSE PRACTITIONER

## 2020-01-07 PROCEDURE — 3008F PR BODY MASS INDEX (BMI) DOCUMENTED: ICD-10-PCS | Mod: CPTII,S$GLB,, | Performed by: NURSE PRACTITIONER

## 2020-01-07 RX ORDER — INSULIN DEGLUDEC 200 U/ML
INJECTION, SOLUTION SUBCUTANEOUS
Qty: 3 SYRINGE | Refills: 5 | Status: SHIPPED | OUTPATIENT
Start: 2020-01-07 | End: 2020-03-09 | Stop reason: SDUPTHER

## 2020-01-07 RX ORDER — ROSUVASTATIN CALCIUM 40 MG/1
40 TABLET, COATED ORAL NIGHTLY
Qty: 90 TABLET | Refills: 2 | Status: SHIPPED | OUTPATIENT
Start: 2020-01-07 | End: 2020-11-18

## 2020-01-07 RX ORDER — INSULIN ASPART 100 [IU]/ML
INJECTION, SOLUTION INTRAVENOUS; SUBCUTANEOUS
Qty: 1 BOX | Refills: 5 | Status: SHIPPED | OUTPATIENT
Start: 2020-01-07 | End: 2020-01-14 | Stop reason: ALTCHOICE

## 2020-01-07 NOTE — PATIENT INSTRUCTIONS
Ideally walk 1/2 hour 5 days a week.   Will increase Trulicity dose and reduce Novolog dose to help with weight contro.   Increase Trulicity to 1.5 mg weekly.   Decrease Novolog from 16 to 12 units before meals.   Continue Tresiba 60 units nightly.   Test glucose before meals and bedtime, at least 3x/day.   Will start Invokana 100 mg once daily for cardiovascular benefits, as well as to reduce progression of chronic kidney disease and hospitalizations for heart failure.   Avoid juice including cranberry.   Return to clinic in 2 months with labs prior.

## 2020-01-07 NOTE — PROGRESS NOTES
CC: This 44 y.o. Black or  female  is here for evaluation of  T2DM along with comorbidities indicated in the Visit Diagnosis section of this encounter.    HPI: Fabiana Chanel was diagnosed with T2DM in 2013. Metformin and a sulfonylurea started at the time of diagnosis.           Prior visit 11/21/19  a1c is higher from 11.2 to 13.5%.   She was advised to increase Levemir from 20 to 30 units once daily. However, she misunderstood and took an additional 30 units for a total of 50 units nightly.  She was advised by her PCP in October to increase her insulin and take it BID. Currently taking 26 units BID. She is discouraged and does not know why her BGs are high despite trying to eat better on significantly more insulin.   No steroids recently nor acute illnesses. States she barely eats and has low appetite.   Plan Unsure why DM control has worsened. Will check urine today to r/o UTI.   Advised -   Switch from levemir to Tresiba and take 60 units ONCE daily.   Start Novolog 16 units before each meal. Reviewed MOA, pharmacokinetics, and timing of insulin.   Check your blood glucose before meals and before bedtime, at least 3x/day.   Stop glimepiride.   Ok to take both Levemir/Tresiba and Novolog at the same time.  Start Trulicity 0.75 mg once weekly. Reviewed s/e.   Stop Jardiance for now due to low kidney function.   Return to clinic in 6-8 weeks. Call with any issues. Send a log in 2 weeks.     Interval history  She has started Trulicity. Denies n/v/d. Had some constipation yesterday. Overall tolerating it fine.   BGs have much improved. She started Novolog ac as advised. Energy has improved as well. But c/o weight gain despite lower appetite secondary to Trulicity.     She has gained 17 lb over the last several weeks from last visit but gained 10 lb just in the last few days. Her lasix dose was reduced from 40 mg BID to now just 20 mg once daily per Dr. Yanez presumably d/t rise in creatinine.  C/o periorbital edema.         PMHX: CHF,  MILE on cpap, atrial fibrillation, MI, ICD placement, NICM (cath 11/2017) EF 20% by echo 12/2018    LAST DIABETES EDUCATION: 6/19/19    RECENT ILLNESSES/HOSPITALIZATIONS - -  Admitted 12/28/18 x 2 nights for acute on chronic HF      HOSPITALIZED FOR DIABETES  -  Yes - for hyperglycemia     PRESCRIBED DIABETES MEDICATIONS:  Trulicity 0.75 mg weekly, Tresiba 60 units nightly,  Novolog 16 units ac      Misses medication doses - No  She injects Novolog about 2x/day as she only eats 2 meals/day     DM COMPLICATIONS: peripheral neuropathy and cardiovascular disease    SIGNIFICANT DIABETES MED HISTORY:   diarrhea on metformin 1000 mg instant release   Pt unable to tolerate metformin ER d/t diarrhea even on 500 mg twice daily.     SELF MONITORING BLOOD GLUCOSE: Checks blood glucose at home 3x/day. Forgot log.   FBGs 130s  A couple hours later she tests again before her first meal and the BGs are about the same 108-130s.   presupper - the same     No more BGs in the 200s; highest was 157.       HYPOGLYCEMIC EPISODES: denies      CURRENT DIET: drinks water and cranberry juice.  Eats 2 meals/day.  tries to snack when she doesn't get a meal in.   Wakes up 12 pm. Lunch 1- 2 pm. Supper 8-9 pm. Bedtime is at 5 am.   No more sodas.     CURRENT EXERCISE:  Walking at the gym maybe once a week.       BP (!) 140/87 (BP Location: Right arm, Patient Position: Sitting, BP Method: X-Large (Automatic)) Comment: Pt has not taken blood pressure medication today.  Pulse 73   Wt 119.5 kg (263 lb 8 oz)   BMI 41.27 kg/m²       ROS:   CONSTITUTIONAL: Appetite good, + fatigue  RESPIRATORY: denies shortness of breath   : + urinary frequency on diuretics - more frequent now; denies dysuria   OTHER: + dry mouth       PHYSICAL EXAM:  GENERAL: Well developed, well nourished. No acute distress.   PSYCH: AAOx3, appropriate mood and affect, conversant, well-groomed. Judgement and insight good.   NEURO: Cranial  nerves grossly intact. Speech clear, no tremor.   EYES: + BILATERAL periorbital edema.   CHEST: Respirations even and unlabored. CTA, diminished bilaterally         Hemoglobin A1C   Date Value Ref Range Status   11/18/2019 13.5 (H) 4.0 - 5.6 % Final     Comment:     ADA Screening Guidelines:  5.7-6.4%  Consistent with prediabetes  >or=6.5%  Consistent with diabetes  High levels of fetal hemoglobin interfere with the HbA1C  assay. Heterozygous hemoglobin variants (HbS, HgC, etc)do  not significantly interfere with this assay.   However, presence of multiple variants may affect accuracy.     08/13/2019 11.2 (H) 4.0 - 5.6 % Final     Comment:     ADA Screening Guidelines:  5.7-6.4%  Consistent with prediabetes  >or=6.5%  Consistent with diabetes  High levels of fetal hemoglobin interfere with the HbA1C  assay. Heterozygous hemoglobin variants (HbS, HgC, etc)do  not significantly interfere with this assay.   However, presence of multiple variants may affect accuracy.     06/13/2019 11.0 (H) 4.0 - 5.6 % Final     Comment:     ADA Screening Guidelines:  5.7-6.4%  Consistent with prediabetes  >or=6.5%  Consistent with diabetes  High levels of fetal hemoglobin interfere with the HbA1C  assay. Heterozygous hemoglobin variants (HbS, HgC, etc)do  not significantly interfere with this assay.   However, presence of multiple variants may affect accuracy.             Chemistry        Component Value Date/Time     11/23/2019 0209    K 3.4 (L) 11/23/2019 0209     11/23/2019 0209    CO2 28 11/23/2019 0209    BUN 16 11/23/2019 0209    CREATININE 1.6 (H) 11/23/2019 0209     (H) 11/23/2019 0209        Component Value Date/Time    CALCIUM 10.2 11/23/2019 0209    ALKPHOS 87 11/23/2019 0209    AST 19 11/23/2019 0209    ALT 18 11/23/2019 0209    BILITOT 0.6 11/23/2019 0209    ESTGFRAFRICA 45 (A) 11/23/2019 0209    EGFRNONAA 39 (A) 11/23/2019 0209          Lab Results   Component Value Date    LDLCALC 97.6 11/18/2019         Ref. Range 11/18/2019 08:13   Cholesterol Latest Ref Range: 120 - 199 mg/dL 199   HDL Latest Ref Range: 40 - 75 mg/dL 44   Hdl/Cholesterol Ratio Latest Ref Range: 20.0 - 50.0 % 22.1   LDL Cholesterol External Latest Ref Range: 63.0 - 159.0 mg/dL 97.6   Non-HDL Cholesterol Latest Units: mg/dL 155   Total Cholesterol/HDL Ratio Latest Ref Range: 2.0 - 5.0  4.5   Triglycerides Latest Ref Range: 30 - 150 mg/dL 287 (H)     Lab Results   Component Value Date    MICALBCREAT 44.7 (H) 08/13/2019           ASSESSMENT and PLAN:    A1C GOAL: < 7 %     1. Type 2 diabetes, uncontrolled, with neuropathy  Ideally walk 1/2 hour 5 days a week.   Will increase Trulicity dose and reduce Novolog dose to help with weight contro.   Increase Trulicity to 1.5 mg weekly.   Decrease Novolog from 16 to 12 units before meals.   Continue Tresiba 60 units nightly.   Test glucose before meals and bedtime, at least 3x/day.   Will start Invokana 100 mg once daily for cardiovascular benefits, as well as to reduce progression of chronic kidney disease and hospitalizations for heart failure.   Avoid juice including cranberry.   Return to clinic in 2 months with labs prior.       Hemoglobin A1c    Basic metabolic panel   2. Hyperlipidemia, unspecified hyperlipidemia type  Lipid panel   3. Chronic combined systolic and diastolic heart failure  Followed by Dr. Yanez. Start Invokana as above.    4. Morbid obesity, unspecified obesity type  Increases insulin resistance.   Increase Trulicity as above and reduce Novolog.   F/u with Dr. Yanez re: rapid weight gain and periorbital edema.          Spent 40 minutes with patient with >50% time spent in counseling, as noted in # 1-4.           Orders Placed This Encounter   Procedures    Hemoglobin A1c     Standing Status:   Future     Standing Expiration Date:   3/7/2021    Lipid panel     Standing Status:   Future     Standing Expiration Date:   1/6/2021    Basic metabolic panel     Standing Status:    Future     Standing Expiration Date:   3/7/2021        Follow up in about 2 months (around 3/7/2020).

## 2020-01-14 RX ORDER — INSULIN LISPRO 100 [IU]/ML
12 INJECTION, SOLUTION INTRAVENOUS; SUBCUTANEOUS
Qty: 1 BOX | Refills: 1 | Status: SHIPPED | OUTPATIENT
Start: 2020-01-14 | End: 2020-03-09 | Stop reason: SDUPTHER

## 2020-01-14 NOTE — TELEPHONE ENCOUNTER
[employer] states that Novolog is not on the plan's drug list. Suggested Alternative drugs Insulin Lispro and Humalog.

## 2020-01-14 NOTE — TELEPHONE ENCOUNTER
Please advise pt to finish off Novolog supply and then switch to Humalog per insurance preference. rx sent.    No

## 2020-01-20 ENCOUNTER — LAB VISIT (OUTPATIENT)
Dept: LAB | Facility: HOSPITAL | Age: 45
End: 2020-01-20
Attending: INTERNAL MEDICINE
Payer: MEDICARE

## 2020-01-20 DIAGNOSIS — I10 ESSENTIAL HYPERTENSION: ICD-10-CM

## 2020-01-20 DIAGNOSIS — I50.42 CHRONIC COMBINED SYSTOLIC AND DIASTOLIC HEART FAILURE: ICD-10-CM

## 2020-01-20 DIAGNOSIS — I42.8 NICM (NONISCHEMIC CARDIOMYOPATHY): ICD-10-CM

## 2020-01-20 LAB
ANION GAP SERPL CALC-SCNC: 7 MMOL/L (ref 8–16)
BUN SERPL-MCNC: 11 MG/DL (ref 6–20)
CALCIUM SERPL-MCNC: 9.1 MG/DL (ref 8.7–10.5)
CHLORIDE SERPL-SCNC: 104 MMOL/L (ref 95–110)
CO2 SERPL-SCNC: 31 MMOL/L (ref 23–29)
CREAT SERPL-MCNC: 1.1 MG/DL (ref 0.5–1.4)
EST. GFR  (AFRICAN AMERICAN): >60 ML/MIN/1.73 M^2
EST. GFR  (NON AFRICAN AMERICAN): >60 ML/MIN/1.73 M^2
GLUCOSE SERPL-MCNC: 95 MG/DL (ref 70–110)
POTASSIUM SERPL-SCNC: 3.9 MMOL/L (ref 3.5–5.1)
SODIUM SERPL-SCNC: 142 MMOL/L (ref 136–145)

## 2020-01-20 PROCEDURE — 36415 COLL VENOUS BLD VENIPUNCTURE: CPT

## 2020-01-20 PROCEDURE — 80048 BASIC METABOLIC PNL TOTAL CA: CPT

## 2020-01-29 ENCOUNTER — LAB VISIT (OUTPATIENT)
Dept: LAB | Facility: HOSPITAL | Age: 45
End: 2020-01-29
Attending: DERMATOLOGY
Payer: MEDICARE

## 2020-01-29 DIAGNOSIS — L40.0 PSORIASIS VULGARIS: ICD-10-CM

## 2020-01-29 LAB
ALBUMIN SERPL BCP-MCNC: 4 G/DL (ref 3.5–5.2)
ALP SERPL-CCNC: 95 U/L (ref 55–135)
ALP SERPL-CCNC: 95 U/L (ref 55–135)
ALT SERPL W/O P-5'-P-CCNC: 13 U/L (ref 10–44)
ALT SERPL W/O P-5'-P-CCNC: 13 U/L (ref 10–44)
ANION GAP SERPL CALC-SCNC: 9 MMOL/L (ref 8–16)
ANION GAP SERPL CALC-SCNC: 9 MMOL/L (ref 8–16)
AST SERPL-CCNC: 13 U/L (ref 10–40)
AST SERPL-CCNC: 13 U/L (ref 10–40)
BASOPHILS # BLD AUTO: 0.04 K/UL (ref 0–0.2)
BASOPHILS NFR BLD: 0.7 % (ref 0–1.9)
BILIRUB DIRECT SERPL-MCNC: 0.2 MG/DL (ref 0.1–0.3)
BILIRUB SERPL-MCNC: 0.3 MG/DL (ref 0.1–1)
BILIRUB SERPL-MCNC: 0.3 MG/DL (ref 0.1–1)
BUN SERPL-MCNC: 17 MG/DL (ref 6–20)
BUN SERPL-MCNC: 17 MG/DL (ref 6–20)
CALCIUM SERPL-MCNC: 9.6 MG/DL (ref 8.7–10.5)
CALCIUM SERPL-MCNC: 9.6 MG/DL (ref 8.7–10.5)
CHLORIDE SERPL-SCNC: 103 MMOL/L (ref 95–110)
CHLORIDE SERPL-SCNC: 103 MMOL/L (ref 95–110)
CO2 SERPL-SCNC: 32 MMOL/L (ref 23–29)
CO2 SERPL-SCNC: 32 MMOL/L (ref 23–29)
CREAT SERPL-MCNC: 1.3 MG/DL (ref 0.5–1.4)
CREAT SERPL-MCNC: 1.3 MG/DL (ref 0.5–1.4)
DIFFERENTIAL METHOD: ABNORMAL
EOSINOPHIL # BLD AUTO: 0.2 K/UL (ref 0–0.5)
EOSINOPHIL NFR BLD: 3.8 % (ref 0–8)
ERYTHROCYTE [DISTWIDTH] IN BLOOD BY AUTOMATED COUNT: 13.2 % (ref 11.5–14.5)
EST. GFR  (AFRICAN AMERICAN): 58 ML/MIN/1.73 M^2
EST. GFR  (AFRICAN AMERICAN): 58 ML/MIN/1.73 M^2
EST. GFR  (NON AFRICAN AMERICAN): 50 ML/MIN/1.73 M^2
EST. GFR  (NON AFRICAN AMERICAN): 50 ML/MIN/1.73 M^2
GLUCOSE SERPL-MCNC: 119 MG/DL (ref 70–110)
GLUCOSE SERPL-MCNC: 119 MG/DL (ref 70–110)
HCT VFR BLD AUTO: 45.8 % (ref 37–48.5)
HGB BLD-MCNC: 14.4 G/DL (ref 12–16)
IMM GRANULOCYTES # BLD AUTO: 0.01 K/UL (ref 0–0.04)
IMM GRANULOCYTES NFR BLD AUTO: 0.2 % (ref 0–0.5)
LYMPHOCYTES # BLD AUTO: 2.3 K/UL (ref 1–4.8)
LYMPHOCYTES NFR BLD: 38.3 % (ref 18–48)
MCH RBC QN AUTO: 28.7 PG (ref 27–31)
MCHC RBC AUTO-ENTMCNC: 31.4 G/DL (ref 32–36)
MCV RBC AUTO: 91 FL (ref 82–98)
MONOCYTES # BLD AUTO: 0.4 K/UL (ref 0.3–1)
MONOCYTES NFR BLD: 7.2 % (ref 4–15)
NEUTROPHILS # BLD AUTO: 3.1 K/UL (ref 1.8–7.7)
NEUTROPHILS NFR BLD: 49.8 % (ref 38–73)
NRBC BLD-RTO: 0 /100 WBC
PHOSPHATE SERPL-MCNC: 3.7 MG/DL (ref 2.7–4.5)
PLATELET # BLD AUTO: 317 K/UL (ref 150–350)
PMV BLD AUTO: 10.1 FL (ref 9.2–12.9)
POTASSIUM SERPL-SCNC: 3.7 MMOL/L (ref 3.5–5.1)
POTASSIUM SERPL-SCNC: 3.7 MMOL/L (ref 3.5–5.1)
PROT SERPL-MCNC: 8.3 G/DL (ref 6–8.4)
PROT SERPL-MCNC: 8.3 G/DL (ref 6–8.4)
RBC # BLD AUTO: 5.02 M/UL (ref 4–5.4)
SODIUM SERPL-SCNC: 144 MMOL/L (ref 136–145)
SODIUM SERPL-SCNC: 144 MMOL/L (ref 136–145)
WBC # BLD AUTO: 6.11 K/UL (ref 3.9–12.7)

## 2020-01-29 PROCEDURE — 86480 TB TEST CELL IMMUN MEASURE: CPT

## 2020-01-29 PROCEDURE — 84100 ASSAY OF PHOSPHORUS: CPT

## 2020-01-29 PROCEDURE — 80053 COMPREHEN METABOLIC PANEL: CPT

## 2020-01-29 PROCEDURE — 85025 COMPLETE CBC W/AUTO DIFF WBC: CPT

## 2020-01-29 PROCEDURE — 86038 ANTINUCLEAR ANTIBODIES: CPT

## 2020-01-31 LAB
GAMMA INTERFERON BACKGROUND BLD IA-ACNC: 0.01 IU/ML
M TB IFN-G CD4+ BCKGRND COR BLD-ACNC: 0 IU/ML
MITOGEN IGNF BCKGRD COR BLD-ACNC: 7.74 IU/ML
TB GOLD PLUS: NEGATIVE
TB2 - NIL: 0 IU/ML

## 2020-02-01 LAB — ANA SER QL IF: NORMAL

## 2020-02-05 ENCOUNTER — CLINICAL SUPPORT (OUTPATIENT)
Dept: CARDIOLOGY | Facility: HOSPITAL | Age: 45
End: 2020-02-05
Attending: INTERNAL MEDICINE
Payer: MEDICARE

## 2020-02-05 DIAGNOSIS — Z95.810 CARDIAC DEFIBRILLATOR IN PLACE: ICD-10-CM

## 2020-02-05 PROCEDURE — 93296 REM INTERROG EVL PM/IDS: CPT | Performed by: INTERNAL MEDICINE

## 2020-02-05 PROCEDURE — 93295 CARDIAC DEVICE CHECK - REMOTE: ICD-10-PCS | Mod: ,,, | Performed by: INTERNAL MEDICINE

## 2020-02-05 PROCEDURE — 93295 DEV INTERROG REMOTE 1/2/MLT: CPT | Mod: ,,, | Performed by: INTERNAL MEDICINE

## 2020-02-10 ENCOUNTER — PATIENT OUTREACH (OUTPATIENT)
Dept: ADMINISTRATIVE | Facility: OTHER | Age: 45
End: 2020-02-10

## 2020-02-10 NOTE — PROGRESS NOTES
Chart reviewed.   Requested updates from Care Everywhere.  Immunizations reconciled.   HM updated.    Eye cam orders already placed  Labs 02/24/2020

## 2020-02-17 ENCOUNTER — OFFICE VISIT (OUTPATIENT)
Dept: GASTROENTEROLOGY | Facility: CLINIC | Age: 45
End: 2020-02-17
Payer: MEDICARE

## 2020-02-17 ENCOUNTER — PATIENT OUTREACH (OUTPATIENT)
Dept: ADMINISTRATIVE | Facility: OTHER | Age: 45
End: 2020-02-17

## 2020-02-17 VITALS
SYSTOLIC BLOOD PRESSURE: 140 MMHG | HEART RATE: 80 BPM | WEIGHT: 263 LBS | DIASTOLIC BLOOD PRESSURE: 89 MMHG | HEIGHT: 67 IN | BODY MASS INDEX: 41.28 KG/M2

## 2020-02-17 DIAGNOSIS — R10.13 EPIGASTRIC PAIN: Primary | ICD-10-CM

## 2020-02-17 DIAGNOSIS — R93.89 ABNORMAL FINDINGS ON DIAGNOSTIC IMAGING OF OTHER SPECIFIED BODY STRUCTURES: ICD-10-CM

## 2020-02-17 PROCEDURE — 99214 OFFICE O/P EST MOD 30 MIN: CPT | Mod: S$GLB,,, | Performed by: INTERNAL MEDICINE

## 2020-02-17 PROCEDURE — 3077F PR MOST RECENT SYSTOLIC BLOOD PRESSURE >= 140 MM HG: ICD-10-PCS | Mod: CPTII,S$GLB,, | Performed by: INTERNAL MEDICINE

## 2020-02-17 PROCEDURE — 3008F BODY MASS INDEX DOCD: CPT | Mod: CPTII,S$GLB,, | Performed by: INTERNAL MEDICINE

## 2020-02-17 PROCEDURE — 3079F PR MOST RECENT DIASTOLIC BLOOD PRESSURE 80-89 MM HG: ICD-10-PCS | Mod: CPTII,S$GLB,, | Performed by: INTERNAL MEDICINE

## 2020-02-17 PROCEDURE — 3008F PR BODY MASS INDEX (BMI) DOCUMENTED: ICD-10-PCS | Mod: CPTII,S$GLB,, | Performed by: INTERNAL MEDICINE

## 2020-02-17 PROCEDURE — 3079F DIAST BP 80-89 MM HG: CPT | Mod: CPTII,S$GLB,, | Performed by: INTERNAL MEDICINE

## 2020-02-17 PROCEDURE — 3077F SYST BP >= 140 MM HG: CPT | Mod: CPTII,S$GLB,, | Performed by: INTERNAL MEDICINE

## 2020-02-17 PROCEDURE — 99214 PR OFFICE/OUTPT VISIT, EST, LEVL IV, 30-39 MIN: ICD-10-PCS | Mod: S$GLB,,, | Performed by: INTERNAL MEDICINE

## 2020-02-17 RX ORDER — ONDANSETRON 4 MG/1
4 TABLET, FILM COATED ORAL EVERY 8 HOURS PRN
Qty: 40 TABLET | Refills: 3 | Status: SHIPPED | OUTPATIENT
Start: 2020-02-17 | End: 2020-03-18

## 2020-02-18 DIAGNOSIS — R93.89 ABNORMAL FINDINGS ON DIAGNOSTIC IMAGING OF OTHER SPECIFIED BODY STRUCTURES: Primary | ICD-10-CM

## 2020-02-19 ENCOUNTER — HOSPITAL ENCOUNTER (OUTPATIENT)
Dept: RADIOLOGY | Facility: HOSPITAL | Age: 45
Discharge: HOME | End: 2020-02-19
Attending: INTERNAL MEDICINE
Payer: MEDICARE

## 2020-02-19 DIAGNOSIS — R93.89 ABNORMAL FINDINGS ON DIAGNOSTIC IMAGING OF OTHER SPECIFIED BODY STRUCTURES: ICD-10-CM

## 2020-02-19 PROCEDURE — 25500020 PHARM REV CODE 255: Performed by: INTERNAL MEDICINE

## 2020-02-19 PROCEDURE — 74177 CT ABDOMEN PELVIS WITH CONTRAST: ICD-10-PCS | Mod: 26,,, | Performed by: RADIOLOGY

## 2020-02-19 PROCEDURE — 74177 CT ABD & PELVIS W/CONTRAST: CPT | Mod: 26,,, | Performed by: RADIOLOGY

## 2020-02-19 PROCEDURE — 74177 CT ABD & PELVIS W/CONTRAST: CPT | Mod: TC

## 2020-02-19 RX ADMIN — IOHEXOL 100 ML: 350 INJECTION, SOLUTION INTRAVENOUS at 11:02

## 2020-02-19 NOTE — PROGRESS NOTES
Subjective:       Patient ID: Fabiana Chanel is a 44 y.o. female.    Chief Complaint: Abdominal Pain (Pt states of having abdominal pain for about 1 year as well as nausea.); Colonoscopy; and Nausea    Abdominal Pain   This is a chronic problem. The current episode started yesterday. The onset quality is undetermined. The problem has been unchanged. The pain is located in the generalized abdominal region. The pain is at a severity of 3/10. The quality of the pain is dull and aching. The abdominal pain does not radiate. Associated symptoms include anorexia and nausea. Pertinent negatives include no arthralgias, fever or vomiting. Nothing aggravates the pain. The pain is relieved by nothing. Prior diagnostic workup includes CT scan and upper endoscopy.   Nausea   Associated symptoms include abdominal pain, anorexia and nausea. Pertinent negatives include no arthralgias, chest pain, fever, joint swelling, neck pain, rash, sore throat, vomiting or weakness.     Review of Systems   Constitutional: Negative for appetite change and fever.   HENT: Negative for sore throat and trouble swallowing.    Eyes: Negative for photophobia and visual disturbance.   Respiratory: Negative for wheezing.    Cardiovascular: Negative for chest pain and palpitations.   Gastrointestinal: Positive for abdominal pain, anorexia and nausea. Negative for vomiting.        See HPI for details   Musculoskeletal: Negative for arthralgias, joint swelling and neck pain.   Skin: Negative for rash and wound.   Neurological: Negative for dizziness, tremors and weakness.   Psychiatric/Behavioral: Negative for behavioral problems and suicidal ideas.       Objective:       Vitals:    02/17/20 1040   BP: (!) 140/89   Pulse: 80       Physical Exam   Constitutional: She is oriented to person, place, and time. She appears well-nourished.   HENT:   Mouth/Throat: Oropharynx is clear and moist.   Eyes: Pupils are equal, round, and reactive to light.   Neck:  Neck supple.   Cardiovascular: Normal heart sounds.   Pulmonary/Chest: Effort normal and breath sounds normal.   Abdominal: Soft. She exhibits no mass. There is no tenderness. There is no guarding.   Musculoskeletal: Normal range of motion.   Lymphadenopathy:     She has no cervical adenopathy.   Neurological: She is alert and oriented to person, place, and time.   Skin: Skin is warm. No rash noted.   Psychiatric: She has a normal mood and affect.       Assessment:       1. Abnormal findings on diagnostic imaging of other specified body structures        Plan:       Fabiana was seen today for abdominal pain, colonoscopy and nausea.    Diagnoses and all orders for this visit:    Epigastric pain    Abnormal findings on diagnostic imaging of other specified body structures  -     CT Abdomen Pelvis W Wo Contrast; Future    Other orders  -     ondansetron (ZOFRAN) 4 MG tablet; Take 1 tablet (4 mg total) by mouth every 8 (eight) hours as needed for Nausea.

## 2020-02-21 ENCOUNTER — PATIENT MESSAGE (OUTPATIENT)
Dept: GASTROENTEROLOGY | Facility: CLINIC | Age: 45
End: 2020-02-21

## 2020-03-06 ENCOUNTER — PATIENT OUTREACH (OUTPATIENT)
Dept: ADMINISTRATIVE | Facility: OTHER | Age: 45
End: 2020-03-06

## 2020-03-09 ENCOUNTER — OFFICE VISIT (OUTPATIENT)
Dept: ENDOCRINOLOGY | Facility: CLINIC | Age: 45
End: 2020-03-09
Payer: MEDICARE

## 2020-03-09 VITALS
BODY MASS INDEX: 39.69 KG/M2 | SYSTOLIC BLOOD PRESSURE: 108 MMHG | HEART RATE: 80 BPM | DIASTOLIC BLOOD PRESSURE: 68 MMHG | RESPIRATION RATE: 24 BRPM | WEIGHT: 252.88 LBS | HEIGHT: 67 IN

## 2020-03-09 DIAGNOSIS — I50.42 CHRONIC COMBINED SYSTOLIC AND DIASTOLIC HEART FAILURE: ICD-10-CM

## 2020-03-09 DIAGNOSIS — E78.5 HYPERLIPIDEMIA, UNSPECIFIED HYPERLIPIDEMIA TYPE: ICD-10-CM

## 2020-03-09 DIAGNOSIS — E66.01 SEVERE OBESITY: ICD-10-CM

## 2020-03-09 PROCEDURE — 3008F PR BODY MASS INDEX (BMI) DOCUMENTED: ICD-10-PCS | Mod: CPTII,S$GLB,, | Performed by: NURSE PRACTITIONER

## 2020-03-09 PROCEDURE — 99214 PR OFFICE/OUTPT VISIT, EST, LEVL IV, 30-39 MIN: ICD-10-PCS | Mod: S$GLB,,, | Performed by: NURSE PRACTITIONER

## 2020-03-09 PROCEDURE — 3078F PR MOST RECENT DIASTOLIC BLOOD PRESSURE < 80 MM HG: ICD-10-PCS | Mod: CPTII,S$GLB,, | Performed by: NURSE PRACTITIONER

## 2020-03-09 PROCEDURE — 3074F PR MOST RECENT SYSTOLIC BLOOD PRESSURE < 130 MM HG: ICD-10-PCS | Mod: CPTII,S$GLB,, | Performed by: NURSE PRACTITIONER

## 2020-03-09 PROCEDURE — 3051F HG A1C>EQUAL 7.0%<8.0%: CPT | Mod: CPTII,S$GLB,, | Performed by: NURSE PRACTITIONER

## 2020-03-09 PROCEDURE — 99999 PR PBB SHADOW E&M-EST. PATIENT-LVL V: ICD-10-PCS | Mod: PBBFAC,,, | Performed by: NURSE PRACTITIONER

## 2020-03-09 PROCEDURE — 3051F PR MOST RECENT HEMOGLOBIN A1C LEVEL 7.0 - < 8.0%: ICD-10-PCS | Mod: CPTII,S$GLB,, | Performed by: NURSE PRACTITIONER

## 2020-03-09 PROCEDURE — 99214 OFFICE O/P EST MOD 30 MIN: CPT | Mod: S$GLB,,, | Performed by: NURSE PRACTITIONER

## 2020-03-09 PROCEDURE — 3074F SYST BP LT 130 MM HG: CPT | Mod: CPTII,S$GLB,, | Performed by: NURSE PRACTITIONER

## 2020-03-09 PROCEDURE — 99999 PR PBB SHADOW E&M-EST. PATIENT-LVL V: CPT | Mod: PBBFAC,,, | Performed by: NURSE PRACTITIONER

## 2020-03-09 PROCEDURE — 3078F DIAST BP <80 MM HG: CPT | Mod: CPTII,S$GLB,, | Performed by: NURSE PRACTITIONER

## 2020-03-09 PROCEDURE — 3008F BODY MASS INDEX DOCD: CPT | Mod: CPTII,S$GLB,, | Performed by: NURSE PRACTITIONER

## 2020-03-09 RX ORDER — INSULIN DEGLUDEC 200 U/ML
INJECTION, SOLUTION SUBCUTANEOUS
Qty: 3 SYRINGE | Refills: 5 | Status: SHIPPED | OUTPATIENT
Start: 2020-03-09 | End: 2020-10-02 | Stop reason: SDUPTHER

## 2020-03-09 RX ORDER — INSULIN LISPRO 100 [IU]/ML
12 INJECTION, SOLUTION INTRAVENOUS; SUBCUTANEOUS
Qty: 1 BOX | Refills: 5 | Status: SHIPPED | OUTPATIENT
Start: 2020-03-09 | End: 2020-10-02 | Stop reason: SDUPTHER

## 2020-03-09 NOTE — PROGRESS NOTES
CC: This 44 y.o. Black or  female  is here for evaluation of  T2DM along with comorbidities indicated in the Visit Diagnosis section of this encounter.    HPI: Fabiana Chanel was diagnosed with T2DM in 2013. Metformin and a sulfonylurea started at the time of diagnosis.           Prior visit 11/21/19  a1c is higher from 11.2 to 13.5%.   She was advised to increase Levemir from 20 to 30 units once daily. However, she misunderstood and took an additional 30 units for a total of 50 units nightly.  She was advised by her PCP in October to increase her insulin and take it BID. Currently taking 26 units BID. She is discouraged and does not know why her BGs are high despite trying to eat better on significantly more insulin.   No steroids recently nor acute illnesses. States she barely eats and has low appetite.   Plan Unsure why DM control has worsened. Will check urine today to r/o UTI.   Advised -   Switch from levemir to Tresiba and take 60 units ONCE daily.   Start Novolog 16 units before each meal. Reviewed MOA, pharmacokinetics, and timing of insulin.   Check your blood glucose before meals and before bedtime, at least 3x/day.   Stop glimepiride.   Ok to take both Levemir/Tresiba and Novolog at the same time.  Start Trulicity 0.75 mg once weekly. Reviewed s/e.   Stop Jardiance for now due to low kidney function.   Return to clinic in 6-8 weeks. Call with any issues. Send a log in 2 weeks.       prior visit 1/7/20  She has started Trulicity. Denies n/v/d. Had some constipation yesterday. Overall tolerating it fine.   BGs have much improved. She started Novolog ac as advised. Energy has improved as well. But c/o weight gain despite lower appetite secondary to Trulicity.   She has gained 17 lb over the last several weeks from last visit but gained 10 lb just in the last few days. Her lasix dose was reduced from 40 mg BID to now just 20 mg once daily per Dr. Yanez presumably d/t rise in  creatinine. C/o periorbital edema.   Plan Ideally walk 1/2 hour 5 days a week.   Will increase Trulicity dose and reduce Novolog dose to help with weight contro.   Increase Trulicity to 1.5 mg weekly.   Decrease Novolog from 16 to 12 units before meals.   Continue Tresiba 60 units nightly.   Test glucose before meals and bedtime, at least 3x/day.   Will start Invokana 100 mg once daily for cardiovascular benefits, as well as to reduce progression of chronic kidney disease and hospitalizations for heart failure.   Avoid juice including cranberry.   Return to clinic in 2 months with labs prior.       Interval history  a1c down from 13.5 to 7.3%.   She did increase Trulicity to 1.5 mg weekly. Appetite is a little bit lower.   She has started Invokana. Denies  irritation/dysuria. Will be changing from Novolog to Humalog d/t formulary change.   She is undergoing GI workup for nausea and constipation. She does not believe this is d/t Trulicity, as she's been having these symptoms for several months and they are no worse than before.         PMHX: CHF,  MILE on cpap, atrial fibrillation, MI, ICD placement, NICM (cath 11/2017) EF 20% by echo 12/2018    LAST DIABETES EDUCATION: 6/19/19    RECENT ILLNESSES/HOSPITALIZATIONS - -  Admitted 12/28/18 x 2 nights for acute on chronic HF      HOSPITALIZED FOR DIABETES  -  Yes - for hyperglycemia     PRESCRIBED DIABETES MEDICATIONS:  Invokana 100 mg once daily, Trulicity 1.5 mg weekly, Tresiba 60 units nightly,  Novolog Flexpen 12 units ac      Misses medication doses - No  She injects Novolog about 2x/day as she only eats 2 meals/day     DM COMPLICATIONS: peripheral neuropathy and cardiovascular disease    SIGNIFICANT DIABETES MED HISTORY:   diarrhea on metformin 1000 mg instant release   Pt unable to tolerate metformin ER d/t diarrhea even on 500 mg twice daily.     SELF MONITORING BLOOD GLUCOSE: Checks blood glucose at home 3x/day. Forgot log.   FBGs  90s   Highest BG is 160s  "after meals.   Prelunch/predinner 130s.       HYPOGLYCEMIC EPISODES: denies      CURRENT DIET: drinks water.  Eats 2 meals/day.  tries to snack when she doesn't get a meal in.   Wakes up 12 pm. Lunch 1- 2 pm. Supper 8-9 pm. Bedtime is at 5 am.   No more sodas.       CURRENT EXERCISE:  None recent       /68   Pulse 80   Resp (!) 24   Ht 5' 7" (1.702 m)   Wt 114.7 kg (252 lb 14.4 oz)   BMI 39.61 kg/m²       ROS:   CONSTITUTIONAL: Appetite good,  Fatigue - improved   : + urinary frequency; denies dysuria   OTHER: + dry mouth       PHYSICAL EXAM:  GENERAL: Well developed, well nourished. No acute distress.   PSYCH: AAOx3, appropriate mood and affect, conversant, well-groomed. Judgement and insight good.   NEURO: Cranial nerves grossly intact. Speech clear, no tremor.   CHEST: Respirations even and unlabored. CTA, diminished bilaterally         Hemoglobin A1C   Date Value Ref Range Status   02/19/2020 7.3 (H) 4.0 - 5.6 % Final     Comment:     ADA Screening Guidelines:  5.7-6.4%  Consistent with prediabetes  >or=6.5%  Consistent with diabetes  High levels of fetal hemoglobin interfere with the HbA1C  assay. Heterozygous hemoglobin variants (HbS, HgC, etc)do  not significantly interfere with this assay.   However, presence of multiple variants may affect accuracy.     11/18/2019 13.5 (H) 4.0 - 5.6 % Final     Comment:     ADA Screening Guidelines:  5.7-6.4%  Consistent with prediabetes  >or=6.5%  Consistent with diabetes  High levels of fetal hemoglobin interfere with the HbA1C  assay. Heterozygous hemoglobin variants (HbS, HgC, etc)do  not significantly interfere with this assay.   However, presence of multiple variants may affect accuracy.     08/13/2019 11.2 (H) 4.0 - 5.6 % Final     Comment:     ADA Screening Guidelines:  5.7-6.4%  Consistent with prediabetes  >or=6.5%  Consistent with diabetes  High levels of fetal hemoglobin interfere with the HbA1C  assay. Heterozygous hemoglobin variants (HbS, HgC, " etc)do  not significantly interfere with this assay.   However, presence of multiple variants may affect accuracy.             Chemistry        Component Value Date/Time     02/19/2020 1022    K 3.5 02/19/2020 1022     02/19/2020 1022    CO2 31 (H) 02/19/2020 1022    BUN 16 02/19/2020 1022    CREATININE 1.2 02/19/2020 1022     (H) 02/19/2020 1022        Component Value Date/Time    CALCIUM 9.5 02/19/2020 1022    ALKPHOS 95 01/29/2020 1226    ALKPHOS 95 01/29/2020 1226    AST 13 01/29/2020 1226    AST 13 01/29/2020 1226    ALT 13 01/29/2020 1226    ALT 13 01/29/2020 1226    BILITOT 0.3 01/29/2020 1226    BILITOT 0.3 01/29/2020 1226    ESTGFRAFRICA >60 02/19/2020 1022    EGFRNONAA 55 (A) 02/19/2020 1022          Lab Results   Component Value Date    LDLCALC 82.0 02/19/2020        Ref. Range 11/18/2019 08:13 2/19/2020 10:22   Cholesterol Latest Ref Range: 120 - 199 mg/dL 199 143   HDL Latest Ref Range: 40 - 75 mg/dL 44 42   Hdl/Cholesterol Ratio Latest Ref Range: 20.0 - 50.0 % 22.1 29.4   LDL Cholesterol External Latest Ref Range: 63.0 - 159.0 mg/dL 97.6 82.0   Non-HDL Cholesterol Latest Units: mg/dL 155 101   Total Cholesterol/HDL Ratio Latest Ref Range: 2.0 - 5.0  4.5 3.4   Triglycerides Latest Ref Range: 30 - 150 mg/dL 287 (H) 95         Lab Results   Component Value Date    MICALBCREAT 44.7 (H) 08/13/2019           ASSESSMENT and PLAN:    A1C GOAL: < 7 %       1. Type 2 diabetes, uncontrolled, with neuropathy  Continue current regimen.   Test glucoses 4x/day.   Start exercise regimen.   Return to clinic in 3 months with labs prior.         Hemoglobin A1c    Basic metabolic panel   2. Hyperlipidemia, unspecified hyperlipidemia type  Continue rosuvastatin    3. Severe obesity  Increases insulin resistance.      4. Chronic combined systolic and diastolic heart failure  Continue Invokana, FDA approved to help prevent hospitalizations for HF.               Orders Placed This Encounter   Procedures     Hemoglobin A1c     Standing Status:   Future     Standing Expiration Date:   5/8/2021    Basic metabolic panel     Standing Status:   Future     Standing Expiration Date:   5/8/2021        Follow up in about 3 months (around 6/9/2020).

## 2020-03-09 NOTE — LETTER
March 9, 2020      [facility]  [address], [address]  [place] [address]  Phone: [phone]  Fax: [phone]       Patient: Fabiana Chanel   YOB: 1975  Date of Visit: 03/09/2020    To Whom It May Concern:    Natasha Chanel  was at [facility] on 03/09/2020. She may return to work on 03/09/2020 with no restrictions. If you have any questions or concerns, or if I can be of further assistance, please do not hesitate to contact me.    Sincerely,    Xiomara DanielNP

## 2020-03-09 NOTE — PATIENT INSTRUCTIONS
Continue current regimen.   Test glucoses 4x/day.   Start exercise regimen.   Return to clinic in 3 months with labs prior.

## 2020-03-20 DIAGNOSIS — R10.2 VAGINAL PAIN: Primary | ICD-10-CM

## 2020-03-28 DIAGNOSIS — E11.9 TYPE 2 DIABETES MELLITUS WITHOUT COMPLICATION, WITHOUT LONG-TERM CURRENT USE OF INSULIN: ICD-10-CM

## 2020-03-30 RX ORDER — GABAPENTIN 400 MG/1
400 CAPSULE ORAL 3 TIMES DAILY PRN
Qty: 90 CAPSULE | Refills: 1 | Status: SHIPPED | OUTPATIENT
Start: 2020-03-30 | End: 2020-06-24 | Stop reason: SDUPTHER

## 2020-04-12 ENCOUNTER — HOSPITAL ENCOUNTER (EMERGENCY)
Facility: HOSPITAL | Age: 45
Discharge: HOME | End: 2020-04-12
Attending: EMERGENCY MEDICINE
Payer: MEDICARE

## 2020-04-12 VITALS
DIASTOLIC BLOOD PRESSURE: 65 MMHG | HEART RATE: 85 BPM | TEMPERATURE: 98 F | SYSTOLIC BLOOD PRESSURE: 122 MMHG | WEIGHT: 240 LBS | OXYGEN SATURATION: 96 % | RESPIRATION RATE: 16 BRPM | BODY MASS INDEX: 37.67 KG/M2 | HEIGHT: 67 IN

## 2020-04-12 DIAGNOSIS — B34.9 VIRAL ILLNESS: ICD-10-CM

## 2020-04-12 DIAGNOSIS — M79.10 MYALGIA: Primary | ICD-10-CM

## 2020-04-12 DIAGNOSIS — R07.9 CHEST PAIN: ICD-10-CM

## 2020-04-12 LAB
ALBUMIN SERPL BCP-MCNC: 3.7 G/DL (ref 3.5–5.2)
ALP SERPL-CCNC: 87 U/L (ref 55–135)
ALT SERPL W/O P-5'-P-CCNC: 18 U/L (ref 10–44)
ANION GAP SERPL CALC-SCNC: 13 MMOL/L (ref 8–16)
AST SERPL-CCNC: 19 U/L (ref 10–40)
B-HCG UR QL: NEGATIVE
BACTERIA #/AREA URNS HPF: NORMAL /HPF
BASOPHILS # BLD AUTO: 0.03 K/UL (ref 0–0.2)
BASOPHILS NFR BLD: 0.5 % (ref 0–1.9)
BILIRUB SERPL-MCNC: 0.6 MG/DL (ref 0.1–1)
BILIRUB UR QL STRIP: NEGATIVE
BNP SERPL-MCNC: 36 PG/ML (ref 0–99)
BUN SERPL-MCNC: 15 MG/DL (ref 6–20)
CALCIUM SERPL-MCNC: 9.3 MG/DL (ref 8.7–10.5)
CHLORIDE SERPL-SCNC: 98 MMOL/L (ref 95–110)
CLARITY UR: CLEAR
CO2 SERPL-SCNC: 28 MMOL/L (ref 23–29)
COLOR UR: YELLOW
CREAT SERPL-MCNC: 1.1 MG/DL (ref 0.5–1.4)
CTP QC/QA: YES
DIFFERENTIAL METHOD: ABNORMAL
EOSINOPHIL # BLD AUTO: 0.2 K/UL (ref 0–0.5)
EOSINOPHIL NFR BLD: 3.1 % (ref 0–8)
ERYTHROCYTE [DISTWIDTH] IN BLOOD BY AUTOMATED COUNT: 13.7 % (ref 11.5–14.5)
EST. GFR  (AFRICAN AMERICAN): >60 ML/MIN/1.73 M^2
EST. GFR  (NON AFRICAN AMERICAN): >60 ML/MIN/1.73 M^2
GLUCOSE SERPL-MCNC: 139 MG/DL (ref 70–110)
GLUCOSE UR QL STRIP: NEGATIVE
HCT VFR BLD AUTO: 42.5 % (ref 37–48.5)
HGB BLD-MCNC: 13.5 G/DL (ref 12–16)
HGB UR QL STRIP: NEGATIVE
HYALINE CASTS #/AREA URNS LPF: 0 /LPF
IMM GRANULOCYTES # BLD AUTO: 0.01 K/UL (ref 0–0.04)
IMM GRANULOCYTES NFR BLD AUTO: 0.2 % (ref 0–0.5)
KETONES UR QL STRIP: NEGATIVE
LEUKOCYTE ESTERASE UR QL STRIP: NEGATIVE
LYMPHOCYTES # BLD AUTO: 2 K/UL (ref 1–4.8)
LYMPHOCYTES NFR BLD: 32.7 % (ref 18–48)
MCH RBC QN AUTO: 27.5 PG (ref 27–31)
MCHC RBC AUTO-ENTMCNC: 31.8 G/DL (ref 32–36)
MCV RBC AUTO: 87 FL (ref 82–98)
MICROSCOPIC COMMENT: NORMAL
MONOCYTES # BLD AUTO: 0.4 K/UL (ref 0.3–1)
MONOCYTES NFR BLD: 6.4 % (ref 4–15)
NEUTROPHILS # BLD AUTO: 3.5 K/UL (ref 1.8–7.7)
NEUTROPHILS NFR BLD: 57.1 % (ref 38–73)
NITRITE UR QL STRIP: NEGATIVE
NRBC BLD-RTO: 0 /100 WBC
PH UR STRIP: 6 [PH] (ref 5–8)
PLATELET # BLD AUTO: 219 K/UL (ref 150–350)
PMV BLD AUTO: 10.4 FL (ref 9.2–12.9)
POTASSIUM SERPL-SCNC: 3.6 MMOL/L (ref 3.5–5.1)
PROT SERPL-MCNC: 7.9 G/DL (ref 6–8.4)
PROT UR QL STRIP: ABNORMAL
RBC # BLD AUTO: 4.91 M/UL (ref 4–5.4)
RBC #/AREA URNS HPF: 0 /HPF (ref 0–4)
SARS-COV-2 RDRP RESP QL NAA+PROBE: NEGATIVE
SODIUM SERPL-SCNC: 139 MMOL/L (ref 136–145)
SP GR UR STRIP: 1.01 (ref 1–1.03)
SQUAMOUS #/AREA URNS HPF: 12 /HPF
TROPONIN I SERPL DL<=0.01 NG/ML-MCNC: 0.02 NG/ML (ref 0–0.03)
URN SPEC COLLECT METH UR: ABNORMAL
UROBILINOGEN UR STRIP-ACNC: ABNORMAL EU/DL
WBC # BLD AUTO: 6.11 K/UL (ref 3.9–12.7)
WBC #/AREA URNS HPF: 5 /HPF (ref 0–5)

## 2020-04-12 PROCEDURE — 80053 COMPREHEN METABOLIC PANEL: CPT

## 2020-04-12 PROCEDURE — 84484 ASSAY OF TROPONIN QUANT: CPT

## 2020-04-12 PROCEDURE — 83880 ASSAY OF NATRIURETIC PEPTIDE: CPT

## 2020-04-12 PROCEDURE — 81000 URINALYSIS NONAUTO W/SCOPE: CPT

## 2020-04-12 PROCEDURE — 96374 THER/PROPH/DIAG INJ IV PUSH: CPT

## 2020-04-12 PROCEDURE — 25000003 PHARM REV CODE 250: Performed by: EMERGENCY MEDICINE

## 2020-04-12 PROCEDURE — 81025 URINE PREGNANCY TEST: CPT | Performed by: NURSE PRACTITIONER

## 2020-04-12 PROCEDURE — U0002 COVID-19 LAB TEST NON-CDC: HCPCS

## 2020-04-12 PROCEDURE — 99285 EMERGENCY DEPT VISIT HI MDM: CPT | Mod: 25

## 2020-04-12 PROCEDURE — 63600175 PHARM REV CODE 636 W HCPCS: Performed by: EMERGENCY MEDICINE

## 2020-04-12 PROCEDURE — 85025 COMPLETE CBC W/AUTO DIFF WBC: CPT

## 2020-04-12 PROCEDURE — 93005 ELECTROCARDIOGRAM TRACING: CPT

## 2020-04-12 RX ORDER — ACETAMINOPHEN 500 MG
1000 TABLET ORAL
Status: COMPLETED | OUTPATIENT
Start: 2020-04-12 | End: 2020-04-12

## 2020-04-12 RX ORDER — KETOROLAC TROMETHAMINE 30 MG/ML
15 INJECTION, SOLUTION INTRAMUSCULAR; INTRAVENOUS
Status: COMPLETED | OUTPATIENT
Start: 2020-04-12 | End: 2020-04-12

## 2020-04-12 RX ORDER — ONDANSETRON 4 MG/1
4 TABLET, ORALLY DISINTEGRATING ORAL
Status: COMPLETED | OUTPATIENT
Start: 2020-04-12 | End: 2020-04-12

## 2020-04-12 RX ORDER — ACETAMINOPHEN 500 MG
1000 TABLET ORAL EVERY 8 HOURS PRN
Qty: 30 TABLET | Refills: 0 | Status: SHIPPED | OUTPATIENT
Start: 2020-04-12 | End: 2020-04-17

## 2020-04-12 RX ORDER — ONDANSETRON 4 MG/1
4 TABLET, FILM COATED ORAL EVERY 8 HOURS PRN
Qty: 8 TABLET | Refills: 0 | Status: SHIPPED | OUTPATIENT
Start: 2020-04-12 | End: 2022-05-10

## 2020-04-12 RX ADMIN — ACETAMINOPHEN 1000 MG: 500 TABLET ORAL at 03:04

## 2020-04-12 RX ADMIN — ONDANSETRON 4 MG: 4 TABLET, ORALLY DISINTEGRATING ORAL at 03:04

## 2020-04-12 RX ADMIN — KETOROLAC TROMETHAMINE 15 MG: 30 INJECTION, SOLUTION INTRAMUSCULAR; INTRAVENOUS at 04:04

## 2020-04-12 NOTE — ED PROVIDER NOTES
Encounter Date: 4/12/2020    SCRIBE #1 NOTE: I, Colin Krishna, am scribing for, and in the presence of,  Juvenal Negron MD. I have scribed the following portions of the note - Other sections scribed: HPI, ROS.       History     Chief Complaint   Patient presents with    Generalized Body Aches     Pt c/o generalized body aches, nausea x2 days. Denies cough, SOB, fever, abd pain, vomiting. Pain is 8/10. Denies taking meds PTA     Fabiana Chanel is an 44 y.o female presenting to the ED complaining of a sudden onset of generalized body aches persisting for three days. Patient reports symptoms of nausea, headache, rhinorrhea, abdominal pain, and sharp intermittent chest pain with episodes persisting for three minutes. Patient denies shortness of breath, cough, sore throat, fever, chills, emesis, diarrhea, or leg swelling. Patient denies prior attempt at treatment. Patient has a past medical history of diabetes mellitus, congestive heart failure, hypertension, hyperlipidemia, and atrial fibrillation. Patient has a past surgical history of cardiac catheterization and colonoscopy. Patient is allergic to Metformin. Patient smokes cigars and drinks on occasion. Patient has history of marijuana use three years ago.    The history is provided by the patient.     Review of patient's allergies indicates:   Allergen Reactions    Metformin      Diarrhea on metformin XR     Pneumococcal 23-abimbola ps vaccine      Past Medical History:   Diagnosis Date    Atrial fibrillation     Blood clot associated with vein wall inflammation     not dvt    Cardiomyopathy     Normal cors on cath 11/2017    CHF (congestive heart failure)     DM (diabetes mellitus) 9/19/2013    Hyperlipidemia     Hypertension     Psoriasis     Sleep apnea      Past Surgical History:   Procedure Laterality Date    CARDIAC CATHETERIZATION      COLONOSCOPY      DILATION AND CURETTAGE OF UTERUS      ESOPHAGOGASTRODUODENOSCOPY N/A 5/9/2019    Procedure:  EGD (ESOPHAGOGASTRODUODENOSCOPY);  Surgeon: Vielka Burrell MD;  Location: [facility];  Service: Endoscopy;  Laterality: N/A;     Family History   Problem Relation Age of Onset    Hypertension Mother     Hypertension Father     Diabetes Father     Diabetes Maternal Grandmother     Diabetes Paternal Grandmother     Breast cancer Neg Hx     Colon cancer Neg Hx     Ovarian cancer Neg Hx      Social History     Tobacco Use    Smoking status: Never Smoker    Smokeless tobacco: Never Used    Tobacco comment: smokes cigars on occasion   Substance Use Topics    Alcohol use: Yes     Comment: occasional    Drug use: No     Comment: hx of marijuana use 3 years ago     Review of Systems   Constitutional: Negative for chills and fever.   HENT: Positive for rhinorrhea. Negative for sore throat.    Eyes: Negative for visual disturbance.   Respiratory: Negative for cough and shortness of breath.    Cardiovascular: Positive for chest pain. Negative for leg swelling.   Gastrointestinal: Positive for abdominal pain and nausea. Negative for diarrhea and vomiting.   Genitourinary: Negative for dysuria.   Musculoskeletal: Positive for myalgias.   Skin: Negative for color change.   Neurological: Positive for headaches.   Hematological: Does not bruise/bleed easily.   Psychiatric/Behavioral: Negative for confusion.       Physical Exam     Initial Vitals [04/12/20 1339]   BP Pulse Resp Temp SpO2   118/89 93 18 98.1 °F (36.7 °C) 97 %      MAP       --         Physical Exam    Nursing note and vitals reviewed.  Constitutional: She appears well-developed and well-nourished. She is not diaphoretic. No distress.   HENT:   Head: Normocephalic and atraumatic.   Right Ear: External ear normal.   Left Ear: External ear normal.   Nose: Nose normal.   Mouth/Throat: Oropharynx is clear and moist.   Eyes: Conjunctivae and EOM are normal. Pupils are equal, round, and reactive to light. Right eye exhibits no discharge. Left eye exhibits  no discharge. No scleral icterus.   Neck: Normal range of motion. Neck supple.   Cardiovascular: Normal rate and regular rhythm.   Pulmonary/Chest: No respiratory distress.   No respiratory distress or increased work of breathing.   Abdominal: Soft. She exhibits no distension and no mass. There is no tenderness. There is no rebound and no guarding.   Patient's abdomen is soft and nontender in all 4 quadrants.  No hernias or masses.  No distention.   Musculoskeletal: Normal range of motion. She exhibits no edema or tenderness.   Neurological: She is alert and oriented to person, place, and time. She has normal strength. No cranial nerve deficit or sensory deficit.   Skin: Skin is warm and dry. No rash noted. No erythema. No pallor.   Psychiatric: She has a normal mood and affect. Her behavior is normal. Judgment and thought content normal.         ED Course   Procedures  Labs Reviewed   CBC W/ AUTO DIFFERENTIAL - Abnormal; Notable for the following components:       Result Value    Mean Corpuscular Hemoglobin Conc 31.8 (*)     All other components within normal limits   COMPREHENSIVE METABOLIC PANEL - Abnormal; Notable for the following components:    Glucose 139 (*)     All other components within normal limits   URINALYSIS, REFLEX TO URINE CULTURE - Abnormal; Notable for the following components:    Protein, UA 1+ (*)     Urobilinogen, UA 2.0-3.0 (*)     All other components within normal limits    Narrative:     Preferred Collection Type->Urine, Clean Catch   TROPONIN I   SARS-COV-2 RNA AMPLIFICATION, QUAL    Narrative:     What symptom criteria does the patient meet?->Other (specify)  Please specify:->Body ache, chest pain   B-TYPE NATRIURETIC PEPTIDE   URINALYSIS MICROSCOPIC    Narrative:     Preferred Collection Type->Urine, Clean Catch   POCT URINE PREGNANCY     EKG Readings: (Independently Interpreted)   Initial Reading: No STEMI. Ectopy: No Ectopy.   EKG reviewed and interpreted by me shows sinus rhythm at a  rate of 95.  There is QRS widening.  The QTC is prolonged.  There is a normal axis.  There is poor R-wave progression.  There are no ST segment or T-wave ischemic findings.  EKG similar morphology to EKG from November 2019.       Imaging Results          X-Ray Chest AP Portable (Final result)  Result time 04/12/20 16:09:10    Final result by Brandyn Suarez MD (04/12/20 16:09:10)                 Impression:      No acute findings.      Electronically signed by: Brandyn Suarez MD  Date:    04/12/2020  Time:    16:09             Narrative:    EXAMINATION:  XR CHEST AP PORTABLE    CLINICAL HISTORY:  Chest Pain;    TECHNIQUE:  Single frontal view of the chest was performed.    COMPARISON:  11/23/2019    FINDINGS:  Left cardiac pacer lead tips unchanged.  Lungs are grossly clear.  No pneumothorax or pleural effusions.  Heart size stable.                              X-Rays:   Independently Interpreted Readings:   Other Readings:  Chest x-ray reveals no infiltrate or consolidation.  No pulmonary edema.  No pleural effusion.    Medical Decision Making:   ED Management:  This is the emergent evaluation of a 44-year-old female presents emergency department for evaluation of generalized body aches for the past 2 days with associated nausea, headache, rhinorrhea, and intermittent sharp chest pain.  Differential diagnosis at the time of initial evaluation included, but was not limited to:  Viral illness including COVID-19 or influenza, bacterial pneumonia, metabolic disturbance, acute renal failure, dehydration, urinary tract infection.   Patient complains of generalized abdominal discomfort.  There is no tenderness to palpation on abdominal examination.    It is unclear what is causing this patient's myalgias.  She has a normal-appearing chest x-ray.  There is no leukocytosis or leukopenia or lymphopenia noted on CBC.  There is no acute renal failure or metabolic disturbance.  The patient's troponin is not elevated.  EKG reveals  no significant changes from prior or evidence of acute ischemia.  Urinalysis reveals no urinary tract infection.  The patient's COVID swab was negative.  I did explain to her that sometimes false negatives occur but given her lack of fever and well appearance and normal-appearing laboratory evaluation, I have less suspicion for this virus.  The patient did decline influenza swab testing.  Patient received Zofran acetaminophen in the emergency department.  At her request, additional dose of medication for myalgias was given.  Patient received 1 dose of Toradol.  I will not have her take NSAIDs at home given her history of being on apixaban.  I did advised to follow up with her PCP this week and return if she develops new or worsening symptoms such as intractable vomiting or shortness of breath.            Scribe Attestation:   Scribe #1: I performed the above scribed service and the documentation accurately describes the services I performed. I attest to the accuracy of the note.                          Clinical Impression:     1. Myalgia    2. Chest pain    3. Viral illness                ED Disposition Condition    Discharge Stable        ED Prescriptions     Medication Sig Dispense Start Date End Date Auth. Provider    acetaminophen (TYLENOL) 500 MG tablet Take 2 tablets (1,000 mg total) by mouth every 8 (eight) hours as needed for Pain or Temperature greater than (100.4 F). 30 tablet 4/12/2020 4/17/2020 Juvenal Negron Jr., MD    ondansetron (ZOFRAN) 4 MG tablet Take 1 tablet (4 mg total) by mouth every 8 (eight) hours as needed for Nausea. 8 tablet 4/12/2020  Juvenal Negron Jr., MD        Follow-up Information     Follow up With Specialties Details Why Contact Info    Gil Leal MD Family Medicine Schedule an appointment as soon as possible for a visit in 3 days  [address] [place] [address]  [phone]                            I, Juvenal Negron MD, personally performed the  services described in this documentation. All medical record entries made by the scribe were at my direction and in my presence. I have reviewed the chart and agree that the record reflects my personal performance and is accurate and complete.             Juvenal Negron Jr., MD  04/12/20 3300

## 2020-04-12 NOTE — DISCHARGE INSTRUCTIONS
It is unclear what is causing your body aches.  Please take medications for nausea and body aches as discussed.  Please return if you develop new or worsening symptoms, especially any intractable vomiting or any shortness of breath.  Drink plenty of fluids.  Follow up with your PCP this week.

## 2020-04-12 NOTE — ED TRIAGE NOTES
Patient reports generalized weakness started Friday, Nausea Headache no fever. Patient has history of pacemaker with Difib.

## 2020-05-05 ENCOUNTER — CLINICAL SUPPORT (OUTPATIENT)
Dept: CARDIOLOGY | Facility: HOSPITAL | Age: 45
End: 2020-05-05
Attending: INTERNAL MEDICINE
Payer: MEDICARE

## 2020-05-05 DIAGNOSIS — Z95.810 CARDIAC DEFIBRILLATOR IN PLACE: ICD-10-CM

## 2020-05-05 PROCEDURE — 93296 REM INTERROG EVL PM/IDS: CPT | Performed by: INTERNAL MEDICINE

## 2020-05-05 PROCEDURE — 93295 CARDIAC DEVICE CHECK - REMOTE: ICD-10-PCS | Mod: ,,, | Performed by: INTERNAL MEDICINE

## 2020-05-05 PROCEDURE — 93295 DEV INTERROG REMOTE 1/2/MLT: CPT | Mod: ,,, | Performed by: INTERNAL MEDICINE

## 2020-05-11 DIAGNOSIS — I42.8 NONISCHEMIC CARDIOMYOPATHY: ICD-10-CM

## 2020-05-12 RX ORDER — METOPROLOL SUCCINATE 100 MG/1
TABLET, EXTENDED RELEASE ORAL
Qty: 90 TABLET | Refills: 3 | Status: SHIPPED | OUTPATIENT
Start: 2020-05-12 | End: 2021-02-19

## 2020-05-12 RX ORDER — SPIRONOLACTONE 50 MG/1
TABLET, FILM COATED ORAL
Qty: 90 TABLET | Refills: 3 | Status: ON HOLD | OUTPATIENT
Start: 2020-05-12 | End: 2021-05-02 | Stop reason: SDUPTHER

## 2020-05-16 ENCOUNTER — HOSPITAL ENCOUNTER (OUTPATIENT)
Facility: HOSPITAL | Age: 45
Discharge: HOME | End: 2020-05-17
Attending: EMERGENCY MEDICINE | Admitting: FAMILY MEDICINE
Payer: MEDICARE

## 2020-05-16 DIAGNOSIS — R79.89 ELEVATED TROPONIN: Primary | ICD-10-CM

## 2020-05-16 DIAGNOSIS — I42.8 NONISCHEMIC CARDIOMYOPATHY: ICD-10-CM

## 2020-05-16 DIAGNOSIS — R07.9 CHEST PAIN: ICD-10-CM

## 2020-05-16 DIAGNOSIS — Z45.02 DEFIBRILLATOR DISCHARGE: ICD-10-CM

## 2020-05-16 LAB
ALBUMIN SERPL BCP-MCNC: 3.3 G/DL (ref 3.5–5.2)
ALP SERPL-CCNC: 74 U/L (ref 55–135)
ALT SERPL W/O P-5'-P-CCNC: 14 U/L (ref 10–44)
ANION GAP SERPL CALC-SCNC: 9 MMOL/L (ref 8–16)
AST SERPL-CCNC: 15 U/L (ref 10–40)
BASOPHILS # BLD AUTO: 0.02 K/UL (ref 0–0.2)
BASOPHILS NFR BLD: 0.3 % (ref 0–1.9)
BILIRUB SERPL-MCNC: 0.4 MG/DL (ref 0.1–1)
BNP SERPL-MCNC: 74 PG/ML (ref 0–99)
BUN SERPL-MCNC: 11 MG/DL (ref 6–20)
CALCIUM SERPL-MCNC: 8.9 MG/DL (ref 8.7–10.5)
CHLORIDE SERPL-SCNC: 105 MMOL/L (ref 95–110)
CO2 SERPL-SCNC: 26 MMOL/L (ref 23–29)
CREAT SERPL-MCNC: 1.1 MG/DL (ref 0.5–1.4)
DIFFERENTIAL METHOD: ABNORMAL
EOSINOPHIL # BLD AUTO: 0.1 K/UL (ref 0–0.5)
EOSINOPHIL NFR BLD: 2.2 % (ref 0–8)
ERYTHROCYTE [DISTWIDTH] IN BLOOD BY AUTOMATED COUNT: 14.8 % (ref 11.5–14.5)
EST. GFR  (AFRICAN AMERICAN): >60 ML/MIN/1.73 M^2
EST. GFR  (NON AFRICAN AMERICAN): >60 ML/MIN/1.73 M^2
GLUCOSE SERPL-MCNC: 119 MG/DL (ref 70–110)
HCT VFR BLD AUTO: 42.7 % (ref 37–48.5)
HGB BLD-MCNC: 13.4 G/DL (ref 12–16)
IMM GRANULOCYTES # BLD AUTO: 0.03 K/UL (ref 0–0.04)
IMM GRANULOCYTES NFR BLD AUTO: 0.5 % (ref 0–0.5)
LYMPHOCYTES # BLD AUTO: 2 K/UL (ref 1–4.8)
LYMPHOCYTES NFR BLD: 30.7 % (ref 18–48)
MAGNESIUM SERPL-MCNC: 2 MG/DL (ref 1.6–2.6)
MCH RBC QN AUTO: 27.1 PG (ref 27–31)
MCHC RBC AUTO-ENTMCNC: 31.4 G/DL (ref 32–36)
MCV RBC AUTO: 86 FL (ref 82–98)
MONOCYTES # BLD AUTO: 0.5 K/UL (ref 0.3–1)
MONOCYTES NFR BLD: 8.2 % (ref 4–15)
NEUTROPHILS # BLD AUTO: 3.8 K/UL (ref 1.8–7.7)
NEUTROPHILS NFR BLD: 58.1 % (ref 38–73)
NRBC BLD-RTO: 0 /100 WBC
PLATELET # BLD AUTO: 239 K/UL (ref 150–350)
PMV BLD AUTO: 10.5 FL (ref 9.2–12.9)
POTASSIUM SERPL-SCNC: 3.5 MMOL/L (ref 3.5–5.1)
PROT SERPL-MCNC: 7.1 G/DL (ref 6–8.4)
RBC # BLD AUTO: 4.94 M/UL (ref 4–5.4)
SODIUM SERPL-SCNC: 140 MMOL/L (ref 136–145)
TROPONIN I SERPL DL<=0.01 NG/ML-MCNC: 0.02 NG/ML (ref 0–0.03)
TROPONIN I SERPL DL<=0.01 NG/ML-MCNC: 0.11 NG/ML (ref 0–0.03)
TSH SERPL DL<=0.005 MIU/L-ACNC: 1.09 UIU/ML (ref 0.4–4)
WBC # BLD AUTO: 6.45 K/UL (ref 3.9–12.7)

## 2020-05-16 PROCEDURE — 84484 ASSAY OF TROPONIN QUANT: CPT | Mod: 91

## 2020-05-16 PROCEDURE — 83735 ASSAY OF MAGNESIUM: CPT

## 2020-05-16 PROCEDURE — 83880 ASSAY OF NATRIURETIC PEPTIDE: CPT

## 2020-05-16 PROCEDURE — 99285 EMERGENCY DEPT VISIT HI MDM: CPT | Mod: 25

## 2020-05-16 PROCEDURE — 25000003 PHARM REV CODE 250: Performed by: EMERGENCY MEDICINE

## 2020-05-16 PROCEDURE — 84443 ASSAY THYROID STIM HORMONE: CPT

## 2020-05-16 PROCEDURE — 93010 ELECTROCARDIOGRAM REPORT: CPT | Mod: ,,, | Performed by: INTERNAL MEDICINE

## 2020-05-16 PROCEDURE — 93005 ELECTROCARDIOGRAM TRACING: CPT

## 2020-05-16 PROCEDURE — 36000 PLACE NEEDLE IN VEIN: CPT

## 2020-05-16 PROCEDURE — 93010 EKG 12-LEAD: ICD-10-PCS | Mod: ,,, | Performed by: INTERNAL MEDICINE

## 2020-05-16 PROCEDURE — 85025 COMPLETE CBC W/AUTO DIFF WBC: CPT

## 2020-05-16 PROCEDURE — 80053 COMPREHEN METABOLIC PANEL: CPT

## 2020-05-16 RX ORDER — ASPIRIN 325 MG
325 TABLET ORAL
Status: COMPLETED | OUTPATIENT
Start: 2020-05-16 | End: 2020-05-16

## 2020-05-16 RX ADMIN — ASPIRIN 325 MG ORAL TABLET 325 MG: 325 PILL ORAL at 07:05

## 2020-05-17 VITALS
DIASTOLIC BLOOD PRESSURE: 94 MMHG | BODY MASS INDEX: 41.12 KG/M2 | HEIGHT: 67 IN | HEART RATE: 88 BPM | SYSTOLIC BLOOD PRESSURE: 131 MMHG | RESPIRATION RATE: 16 BRPM | OXYGEN SATURATION: 96 % | WEIGHT: 262 LBS | TEMPERATURE: 98 F

## 2020-05-17 PROBLEM — R79.89 ELEVATED TROPONIN: Status: ACTIVE | Noted: 2020-05-17

## 2020-05-17 LAB
ANION GAP SERPL CALC-SCNC: 10 MMOL/L (ref 8–16)
BASOPHILS # BLD AUTO: 0.03 K/UL (ref 0–0.2)
BASOPHILS NFR BLD: 0.4 % (ref 0–1.9)
BUN SERPL-MCNC: 12 MG/DL (ref 6–20)
CALCIUM SERPL-MCNC: 9 MG/DL (ref 8.7–10.5)
CHLORIDE SERPL-SCNC: 105 MMOL/L (ref 95–110)
CO2 SERPL-SCNC: 26 MMOL/L (ref 23–29)
CREAT SERPL-MCNC: 1 MG/DL (ref 0.5–1.4)
DIFFERENTIAL METHOD: ABNORMAL
EOSINOPHIL # BLD AUTO: 0.1 K/UL (ref 0–0.5)
EOSINOPHIL NFR BLD: 1.9 % (ref 0–8)
ERYTHROCYTE [DISTWIDTH] IN BLOOD BY AUTOMATED COUNT: 15 % (ref 11.5–14.5)
EST. GFR  (AFRICAN AMERICAN): >60 ML/MIN/1.73 M^2
EST. GFR  (NON AFRICAN AMERICAN): >60 ML/MIN/1.73 M^2
GLUCOSE SERPL-MCNC: 125 MG/DL (ref 70–110)
HCT VFR BLD AUTO: 44.4 % (ref 37–48.5)
HGB BLD-MCNC: 13.4 G/DL (ref 12–16)
IMM GRANULOCYTES # BLD AUTO: 0.03 K/UL (ref 0–0.04)
IMM GRANULOCYTES NFR BLD AUTO: 0.4 % (ref 0–0.5)
LYMPHOCYTES # BLD AUTO: 2.4 K/UL (ref 1–4.8)
LYMPHOCYTES NFR BLD: 35.3 % (ref 18–48)
MCH RBC QN AUTO: 26.8 PG (ref 27–31)
MCHC RBC AUTO-ENTMCNC: 30.2 G/DL (ref 32–36)
MCV RBC AUTO: 89 FL (ref 82–98)
MONOCYTES # BLD AUTO: 0.5 K/UL (ref 0.3–1)
MONOCYTES NFR BLD: 7.1 % (ref 4–15)
NEUTROPHILS # BLD AUTO: 3.7 K/UL (ref 1.8–7.7)
NEUTROPHILS NFR BLD: 54.9 % (ref 38–73)
NRBC BLD-RTO: 0 /100 WBC
PLATELET # BLD AUTO: 242 K/UL (ref 150–350)
PMV BLD AUTO: 10.3 FL (ref 9.2–12.9)
POCT GLUCOSE: 121 MG/DL (ref 70–110)
POCT GLUCOSE: 137 MG/DL (ref 70–110)
POTASSIUM SERPL-SCNC: 3.9 MMOL/L (ref 3.5–5.1)
RBC # BLD AUTO: 5 M/UL (ref 4–5.4)
SARS-COV-2 RDRP RESP QL NAA+PROBE: NEGATIVE
SODIUM SERPL-SCNC: 141 MMOL/L (ref 136–145)
TROPONIN I SERPL DL<=0.01 NG/ML-MCNC: 0.09 NG/ML (ref 0–0.03)
TROPONIN I SERPL DL<=0.01 NG/ML-MCNC: 0.12 NG/ML (ref 0–0.03)
WBC # BLD AUTO: 6.72 K/UL (ref 3.9–12.7)

## 2020-05-17 PROCEDURE — G0378 HOSPITAL OBSERVATION PER HR: HCPCS

## 2020-05-17 PROCEDURE — 25000003 PHARM REV CODE 250: Performed by: EMERGENCY MEDICINE

## 2020-05-17 PROCEDURE — 25000003 PHARM REV CODE 250: Performed by: NURSE PRACTITIONER

## 2020-05-17 PROCEDURE — 84484 ASSAY OF TROPONIN QUANT: CPT

## 2020-05-17 PROCEDURE — 85025 COMPLETE CBC W/AUTO DIFF WBC: CPT

## 2020-05-17 PROCEDURE — U0002 COVID-19 LAB TEST NON-CDC: HCPCS

## 2020-05-17 PROCEDURE — 36415 COLL VENOUS BLD VENIPUNCTURE: CPT

## 2020-05-17 PROCEDURE — 94761 N-INVAS EAR/PLS OXIMETRY MLT: CPT

## 2020-05-17 PROCEDURE — 80048 BASIC METABOLIC PNL TOTAL CA: CPT

## 2020-05-17 RX ORDER — METOPROLOL SUCCINATE 50 MG/1
50 TABLET, EXTENDED RELEASE ORAL NIGHTLY
Status: DISCONTINUED | OUTPATIENT
Start: 2020-05-17 | End: 2020-05-17

## 2020-05-17 RX ORDER — PANTOPRAZOLE SODIUM 40 MG/1
40 TABLET, DELAYED RELEASE ORAL DAILY
Status: DISCONTINUED | OUTPATIENT
Start: 2020-05-17 | End: 2020-05-17 | Stop reason: HOSPADM

## 2020-05-17 RX ORDER — DICLOFENAC SODIUM 10 MG/G
2 GEL TOPICAL 2 TIMES DAILY PRN
Status: DISCONTINUED | OUTPATIENT
Start: 2020-05-17 | End: 2020-05-17 | Stop reason: HOSPADM

## 2020-05-17 RX ORDER — METOPROLOL SUCCINATE 50 MG/1
100 TABLET, EXTENDED RELEASE ORAL DAILY
Status: DISCONTINUED | OUTPATIENT
Start: 2020-05-17 | End: 2020-05-17 | Stop reason: HOSPADM

## 2020-05-17 RX ORDER — GLUCAGON 1 MG
1 KIT INJECTION
Status: DISCONTINUED | OUTPATIENT
Start: 2020-05-17 | End: 2020-05-17 | Stop reason: HOSPADM

## 2020-05-17 RX ORDER — METOPROLOL SUCCINATE 50 MG/1
50 TABLET, EXTENDED RELEASE ORAL DAILY
Qty: 30 TABLET | Refills: 3 | Status: ON HOLD | OUTPATIENT
Start: 2020-05-17 | End: 2021-05-02 | Stop reason: SDUPTHER

## 2020-05-17 RX ORDER — FUROSEMIDE 40 MG/1
40 TABLET ORAL DAILY
Status: DISCONTINUED | OUTPATIENT
Start: 2020-05-17 | End: 2020-05-17 | Stop reason: HOSPADM

## 2020-05-17 RX ORDER — ACETAMINOPHEN 325 MG/1
650 TABLET ORAL EVERY 8 HOURS PRN
Status: DISCONTINUED | OUTPATIENT
Start: 2020-05-17 | End: 2020-05-17 | Stop reason: HOSPADM

## 2020-05-17 RX ORDER — PANTOPRAZOLE SODIUM 40 MG/1
40 TABLET, DELAYED RELEASE ORAL DAILY
Qty: 30 TABLET | Refills: 11 | Status: SHIPPED | OUTPATIENT
Start: 2020-05-18 | End: 2021-04-15

## 2020-05-17 RX ORDER — METOPROLOL SUCCINATE 25 MG/1
25 TABLET, EXTENDED RELEASE ORAL DAILY
Qty: 30 TABLET | Refills: 11 | Status: SHIPPED | OUTPATIENT
Start: 2020-05-17 | End: 2020-05-17 | Stop reason: HOSPADM

## 2020-05-17 RX ORDER — ACETAMINOPHEN 500 MG
1000 TABLET ORAL
Status: COMPLETED | OUTPATIENT
Start: 2020-05-17 | End: 2020-05-17

## 2020-05-17 RX ORDER — SPIRONOLACTONE 25 MG/1
50 TABLET ORAL DAILY
Status: DISCONTINUED | OUTPATIENT
Start: 2020-05-17 | End: 2020-05-17 | Stop reason: HOSPADM

## 2020-05-17 RX ORDER — ROSUVASTATIN CALCIUM 10 MG/1
40 TABLET, COATED ORAL NIGHTLY
Status: DISCONTINUED | OUTPATIENT
Start: 2020-05-17 | End: 2020-05-17 | Stop reason: HOSPADM

## 2020-05-17 RX ORDER — NAPROXEN SODIUM 220 MG/1
81 TABLET, FILM COATED ORAL DAILY
Status: DISCONTINUED | OUTPATIENT
Start: 2020-05-17 | End: 2020-05-17 | Stop reason: HOSPADM

## 2020-05-17 RX ORDER — INSULIN ASPART 100 [IU]/ML
1-10 INJECTION, SOLUTION INTRAVENOUS; SUBCUTANEOUS EVERY 6 HOURS PRN
Status: DISCONTINUED | OUTPATIENT
Start: 2020-05-17 | End: 2020-05-17 | Stop reason: HOSPADM

## 2020-05-17 RX ORDER — SODIUM CHLORIDE 0.9 % (FLUSH) 0.9 %
3 SYRINGE (ML) INJECTION
Status: DISCONTINUED | OUTPATIENT
Start: 2020-05-17 | End: 2020-05-17 | Stop reason: HOSPADM

## 2020-05-17 RX ADMIN — METOPROLOL SUCCINATE 50 MG: 50 TABLET, EXTENDED RELEASE ORAL at 03:05

## 2020-05-17 RX ADMIN — ACETAMINOPHEN 1000 MG: 500 TABLET ORAL at 01:05

## 2020-05-17 RX ADMIN — DICLOFENAC 2 G: 10 GEL TOPICAL at 05:05

## 2020-05-17 RX ADMIN — SPIRONOLACTONE 50 MG: 25 TABLET ORAL at 08:05

## 2020-05-17 RX ADMIN — METOPROLOL SUCCINATE 100 MG: 50 TABLET, EXTENDED RELEASE ORAL at 08:05

## 2020-05-17 RX ADMIN — FUROSEMIDE 40 MG: 40 TABLET ORAL at 08:05

## 2020-05-17 RX ADMIN — ASPIRIN 81 MG 81 MG: 81 TABLET ORAL at 08:05

## 2020-05-17 RX ADMIN — APIXABAN 5 MG: 5 TABLET, FILM COATED ORAL at 08:05

## 2020-05-17 RX ADMIN — ROSUVASTATIN CALCIUM 40 MG: 10 TABLET, COATED ORAL at 03:05

## 2020-05-17 RX ADMIN — PANTOPRAZOLE SODIUM 40 MG: 40 TABLET, DELAYED RELEASE ORAL at 08:05

## 2020-05-17 NOTE — ED NOTES
Pt A & O x 3, texting on her cell phone and in no obvious distress. Respirations are even and unlabored. Skin is warm, dry and pink. VSS. Will continue to monitor closely.

## 2020-05-17 NOTE — ED NOTES
Pt sitting up in bed talking to her  on her cell phone. Pt denies pain and distress. VS. Will continue to monitor closely.

## 2020-05-17 NOTE — ASSESSMENT & PLAN NOTE
Lab Results   Component Value Date    HGBA1C 7.3 (H) 02/19/2020     Home meds: insulin and Trulicity    - Hold home meds  - POCT BG 4 times daily  - NPO for now  - SSI prn

## 2020-05-17 NOTE — ED NOTES
Pt is A & O x 3, denies SOB and chest pain. Pt tolerated blood draw w/out incident. VSS. Will continue to monitor closely.

## 2020-05-17 NOTE — ED NOTES
Pt resting w/ eyes closed and in no obvious distress. Pt denies SOB and chest pain. VS. Will continue to monitor closely.

## 2020-05-17 NOTE — NURSING
Received patient from ER to room via Wheelchair. Patient accompanied by transport, awake and alert, ambulatory. Transferred patient to bed. Evaluated general patient appearance and condition. Admit assessment initiated. Tele monitoring initiated. Saline lock at L forarm flushed, Intact. No apparent distress noted at this time.Bed alarm engaged call light within reach, bed in lowest position.Complains of left lateral neck pain 8/10. Will continue to monitor.    Will inform about CPAP nighly, another pain meds and diabetic order set. GERTRUDE Daniel notified.    4:00am Complains of muscle tightness/cramps on her upper posterior thigh, this is new to her as claimed. She is oncerned for DVT. ASHLEY Daniel notified.

## 2020-05-17 NOTE — ED PROVIDER NOTES
"Encounter Date: 5/16/2020    SCRIBE #1 NOTE: I, Juwan Barth, am scribing for, and in the presence of,  Mayela Johnson MD. I have scribed the following portions of the note - Other sections scribed: HPI, ROS , PE.       History     Chief Complaint   Patient presents with    Chest Pain     Patient arrived to ED with c/o left side chest pain, Left neck pain, left shoulder pain, and reports she felt her defibrillator shock her x 1 x 30 min pta.  Denies chest pain, sob, or nausea at this time.     Time seen by provider: 7:11 PM on 05/16/2020  This is a 44 y.o. female who presents to the ED with c/o sudden onset left side chest sensation 30 mins pta. The pt says she felt a heavy thud in her chest "like someone punched me". She states that she believes her defibrillator shocked her. Pt also states that she had constant left side neck and shoulder pain since yesterday. Symptoms are rated 7/10 in severity. No mitigating or exacerbating factors reported. Patient denies any recent in jury or strain, SOB, N/V/D, diaphoresis, LOC, and all other sxs at this time. The pt does not smoke. Denies any drug or alcohol use.  PMHx: .   NICM-CHF-Ef 20%  Atrial fibrillation   Blood clot associated with vein wall inflammation   Normal cors on cath 11/2017   DM (diabetes mellitus)   Hyperlipidemia   Hypertension   Psoriasis   Sleep apnea     The history is provided by the patient and medical records.     Review of patient's allergies indicates:   Allergen Reactions    Metformin      Diarrhea on metformin XR     Pneumococcal 23-abimbola ps vaccine      Past Medical History:   Diagnosis Date    Atrial fibrillation     Blood clot associated with vein wall inflammation     not dvt    Cardiomyopathy     Normal cors on cath 11/2017    CHF (congestive heart failure)     DM (diabetes mellitus) 9/19/2013    Hyperlipidemia     Hypertension     Psoriasis     Sleep apnea      Past Surgical History:   Procedure Laterality Date    CARDIAC " CATHETERIZATION      COLONOSCOPY      DILATION AND CURETTAGE OF UTERUS      ESOPHAGOGASTRODUODENOSCOPY N/A 5/9/2019    Procedure: EGD (ESOPHAGOGASTRODUODENOSCOPY);  Surgeon: Vielka Burrell MD;  Location: [facility];  Service: Endoscopy;  Laterality: N/A;     Family History   Problem Relation Age of Onset    Hypertension Mother     Hypertension Father     Diabetes Father     Diabetes Maternal Grandmother     Diabetes Paternal Grandmother     Breast cancer Neg Hx     Colon cancer Neg Hx     Ovarian cancer Neg Hx      Social History     Tobacco Use    Smoking status: Never Smoker    Smokeless tobacco: Never Used    Tobacco comment: smokes cigars on occasion   Substance Use Topics    Alcohol use: Yes     Comment: occasional    Drug use: No     Comment: hx of marijuana use 3 years ago     Review of Systems   Constitutional: Negative for chills, diaphoresis and fever.   HENT: Negative for congestion.    Eyes: Negative for photophobia, pain and visual disturbance.   Respiratory: Negative for cough and shortness of breath.    Cardiovascular: Positive for chest pain. Negative for leg swelling.   Gastrointestinal: Negative for abdominal pain, blood in stool, constipation, diarrhea, nausea and vomiting.   Genitourinary: Negative for dysuria, flank pain, frequency, hematuria and urgency.   Musculoskeletal: Positive for arthralgias and neck pain. Negative for back pain and neck stiffness.   Skin: Negative for rash and wound.   Neurological: Negative for dizziness, syncope, weakness, light-headedness, numbness and headaches.   Psychiatric/Behavioral: Negative for confusion and suicidal ideas.   All other systems reviewed and are negative.      Physical Exam     Initial Vitals [05/16/20 1852]   BP Pulse Resp Temp SpO2   (!) 197/128 (!) 142 (!) 22 98.4 °F (36.9 °C) (!) 94 %      MAP       --         Physical Exam    Nursing note and vitals reviewed.  Constitutional: She appears well-developed and  well-nourished. She is not diaphoretic. No distress.   HENT:   Head: Normocephalic and atraumatic.   Mouth/Throat: Oropharynx is clear and moist. No oropharyngeal exudate.   Eyes: Conjunctivae and EOM are normal. Pupils are equal, round, and reactive to light. Right eye exhibits no discharge. Left eye exhibits no discharge.   Neck: Normal range of motion. Neck supple. No JVD present.   Sternocleidomastoid tenderness.   Cardiovascular: Normal rate, regular rhythm, normal heart sounds and intact distal pulses. Exam reveals no gallop and no friction rub.    No murmur heard.  Pulmonary/Chest: Breath sounds normal. No respiratory distress. She has no wheezes. She has no rhonchi. She has no rales.   defibrillator to left chest wall.   Abdominal: Soft. Bowel sounds are normal. She exhibits no distension. There is no tenderness. There is no rebound and no guarding.   Musculoskeletal: Normal range of motion. She exhibits tenderness (left trapezius). She exhibits no edema.   Lymphadenopathy:     She has no cervical adenopathy.   Neurological: She is alert and oriented to person, place, and time. No cranial nerve deficit.   Skin: Skin is warm and dry. Capillary refill takes less than 2 seconds.   Psychiatric: She has a normal mood and affect. Thought content normal.         ED Course   Procedures  Labs Reviewed   CBC W/ AUTO DIFFERENTIAL - Abnormal; Notable for the following components:       Result Value    Mean Corpuscular Hemoglobin Conc 31.4 (*)     RDW 14.8 (*)     All other components within normal limits   COMPREHENSIVE METABOLIC PANEL - Abnormal; Notable for the following components:    Glucose 119 (*)     Albumin 3.3 (*)     All other components within normal limits   TROPONIN I - Abnormal; Notable for the following components:    Troponin I 0.106 (*)     All other components within normal limits   TROPONIN I   B-TYPE NATRIURETIC PEPTIDE   MAGNESIUM   MAGNESIUM   TSH   TSH   SARS-COV-2 RNA AMPLIFICATION, QUAL    TROPONIN I   TROPONIN I     EKG Readings: (Independently Interpreted)   Initial Reading: No STEMI. Rhythm: Sinus Tachycardia. Heart Rate: 106 BPM. Conduction: LBBB.   Sinus tachycardia at 106, no ST elevation or depression.  QTC is 507.  T-wave flattening in aVL.       Imaging Results    None          Medical Decision Making:   History:   Old Medical Records: I decided to obtain old medical records.  Initial Assessment:   Past medical records queried and reviewed.  his is a 44 y.o. female who presents to the ED with c/o sudden onset left side chest pain 30 mins pta. The patient was seen and examined. The history and physical exam was obtained. The nursing notes and vital signs were reviewed.    MDM  This evaluation of a 44-year-old female with nonischemic cardiomyopathy with an EF of 20% status post defibrillator who presents after a shock occurred 30 min prior to arrival.  Patient states she felt normal prior to the shock.  She does not describe chest pain necessary early just the sensation of shock.  She has had left-sided neck pain and shoulder pain for the last 2 days constant.  Worse with movement of the neck.  She has some sternocleidomastoid and trapezius tenderness to palpation.  Her device was interrogated which looks like an atrial tachycardia at 218 which resulted in a attempt to pace and then shock with return to normal sinus rhythm.  Patient denies any chest pain shortness of breath or palpitations prior to the shock.  Differential diagnosis includes was not limited to arrhythmia, metabolic derangement, ACS, thyroid dysfunction, anemia.  Labs with COVID negative, TSH within normal limits.  No leukocytosis or anemia.  No significant metabolic derangement.  BMP within normal limits.  Mag within normal limits.  Chest x-ray no acute abnormality.  Initial troponin negative at 0.016.  I discussed the case with Dr. Magallanes with Cardiology including after patient's troponin came back elevated at 0.106.  And he  recommended increasing her metoprolol to  in the morning and 50 at night.  However when I went to go to discuss the patient discharge she then reported that she had chest pain approximately 45 min prior to our conversation which she did not alert us of.  She said lasted 10-15 minutes and was heaviness in the center of her chest with radiation.  She had associated shortness of breath but no nausea, vomiting, diaphoresis.  Given that she now had chest pain that was 3 hr after her shock with an elevated troponin I do not feel comfortable sending the patient home.  I will place her in observation for trending of her troponins and monitoring.  Patient said she did not feel comfortable going home and is requesting observation.  Discussed with Hospital Medicine who agreed to observe the patient and trend troponins.          Scribe Attestation:   Scribe #1: I performed the above scribed service and the documentation accurately describes the services I performed. I attest to the accuracy of the note.                          Clinical Impression:       ICD-10-CM ICD-9-CM   1. Elevated troponin R79.89 790.6   2. Chest pain R07.9 786.50   3. Defibrillator discharge Z45.02 V71.89             ED Disposition Condition    Observation            I, Mayela Johnson, personally performed the services described in this documentation. All medical record entries made by the scribe were at my direction and in my presence.  I have reviewed the chart and agree that the record reflects my personal performance and is accurate and complete                    Mayela Johnson MD  05/17/20 0336

## 2020-05-17 NOTE — ED NOTES
Pt A & O x 3, denies pain/ distress. Respirations are even and unlabored. VSS. Will continue to monitor closely.

## 2020-05-17 NOTE — ASSESSMENT & PLAN NOTE
Pt c/o her ICD firing x 1 yesterday. [employer] ICD placed on 4/17/2018. Interrogated in ED which reveals HR up to 218 Atrial flutter/atrial fib then back down to sinus rhythm right away.     - Continue monitor  - Telemetry

## 2020-05-17 NOTE — ASSESSMENT & PLAN NOTE
Troponin trending up from 0.01 to 0.1. BNP normal.  C/o intermittent chest discomfort since her ICD shocked her x 1 yesterday. Also c/o left neck and left upper back aching pain x2 days. Hx NICM, [employer] ICD4/17/2018, AF on Eliquis, CHF. Last Echo 10/10/2019 with EF 20%, global hypokinetic wall motion, and grade I DD.  EKG today no acute ischemia with sinus tachycardia at 106 LVH with no significant change from previous 4/12/2020. Denies chest pain on exam. Lung clear and euvolemic on exam and chest xray    - Trend Troponin  - Telemetry  - 2D Echo pending  - On ASA and Eliquis/BB/Spironolactone/laxis/statin  - Consider Cardiology consult if not improve - She follows Dr. Yanez outpatient

## 2020-05-17 NOTE — HOSPITAL COURSE
"Mrs. Chanel is a 43 yo female with history of NICM with AICD and PAF on eliquis who was placed in observation for AICD discharge x 2. AICD interrogation "showed HR up to 218 Atrial flutter/atrial fib then back down to sinus rhythm right away." Elevated troponin trend flat pattern then down on discharge. Discussed with Cardiology, increase metoprolol  mg to 150 mg daily. 2 D echo pending. Patient refused US legs ordered however patient refused US BLE. Patient anxious to go home. She will follow up with primary Cardiologist Dr. Yanez in 1-2 weeks.  "
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
## 2020-05-17 NOTE — ASSESSMENT & PLAN NOTE
EKG ST today.  ICD Interrogated in ED which reveals HR up to 218 Atrial flutter/atrial fib then back down to sinus rhythm right away.     - Continue Eliquis/BB  - Telemetry

## 2020-05-17 NOTE — NURSING
Discharge instructions given to patient prior to discharge. Patient refused to go over discharge information with , requesting to leave now. IV and tele monitor discontinued. Tolerated well.   All personal belongings with patient at the time of discharge. Transported to personal vehicle by FRENCH Templeton.

## 2020-05-17 NOTE — PLAN OF CARE
Problem: Fall Injury Risk  Goal: Absence of Fall and Fall-Related Injury  Outcome: Ongoing, Progressing     Problem: Adult Inpatient Plan of Care  Goal: Plan of Care Review  Outcome: Ongoing, Progressing  Goal: Patient-Specific Goal (Individualization)  Outcome: Ongoing, Progressing  Goal: Absence of Hospital-Acquired Illness or Injury  Outcome: Ongoing, Progressing  Goal: Optimal Comfort and Wellbeing  Outcome: Ongoing, Progressing  Goal: Readiness for Transition of Care  Outcome: Ongoing, Progressing  Goal: Rounds/Family Conference  Outcome: Ongoing, Progressing     Problem: Pain Acute  Goal: Optimal Pain Control  Outcome: Ongoing, Progressing  Intervention: Develop Pain Management Plan  Flowsheets (Taken 5/17/2020 0621)  Pain Management Interventions: breathing exercises; relaxation techniques promoted; quiet environment facilitated; around-the-clock dosing utilized; biofeedback utilized; pain management plan reviewed with patient/caregiver; pillow support provided  Intervention: Prevent or Manage Pain  Flowsheets (Taken 5/17/2020 0621)  Sleep/Rest Enhancement: relaxation techniques promoted; regular sleep/rest pattern promoted  Intervention: Optimize Psychosocial Wellbeing  Flowsheets (Taken 5/17/2020 0621)  Supportive Measures: relaxation techniques promoted; verbalization of feelings encouraged

## 2020-05-17 NOTE — PLAN OF CARE
Attempted d/c assessment but patient refusing to wait. Nurse Wendy on telemetry says pt wants to leave now and will make own appts.     05/17/20 1046   Discharge Assessment   Assessment Type Discharge Planning Assessment   Patient/Family in Agreement with Plan other (see comments)   .Shanna Avina RN, BSN, [facility]  5/17/2020    Follow-up Information     Gil Leal MD. Schedule an appointment as soon as possible for a visit in 1 week.    Specialty:  Family Medicine  Contact information:  [address] [place] [id number] [phone]

## 2020-05-17 NOTE — PLAN OF CARE
TN spoke with nurse Wendy ngo telemetry says pt wants to leave now, is not willing to wait for cm.     05/17/20 1049   Final Note   Assessment Type Final Discharge Note   Anticipated Discharge Disposition Home   What phone number can be called within the next 1-3 days to see how you are doing after discharge?   (see chart)   Hospital Follow Up  Appt(s) scheduled? No   Discharge plans and expectations educations in teach back method with documentation complete? No   Right Care Referral Info   Referral Type no care   Post-Acute Status   Discharge Delays None known at this time   .Shanna Avina, RN, BSN, [facility]  5/17/2020

## 2020-05-17 NOTE — PROGRESS NOTES
"Patient refused to have ultrasound of lower extremity.  She stated " I'm not worried about my legs. I'm ready to go." ASHLEY Brown notified.   "

## 2020-05-17 NOTE — HPI
"This is 45 y/o female with medical hx of T2DM, HTN, HLD, CHF, AF on Eliquis, NICM,  [employer] ICD (placed in 4/17/2018), obesity, and MILE on CPAP who presents to the hospital with a chief complaint of sudden onset left side chest pain 30 mins pta. The patient reports that she believes her defibrillator shocked her x1 while she was having sex yesterday early evening (5/16/2020) (Of note, per ED record, the patient states her defibrillator fired while she was lying down watching TV). She reports she felt like someone hitting her in the chest "real hard w/ their fist" and the pain went away right away. She also reports that she has been having some left side neck pain and left upper back "aching" pain for the last 2 days which relieved by using "muscle cream". Pt denies feeling bad before the event. No alleviating or exacerbating factors reported. Patient denies any recent in jury or strain, SOB, N/V/D, diaphoresis, LOC, and all other sxs at this time. The pt does not smoke. Denies any drug or alcohol use.    In ED, COVID negative. [employer] was contacted and interrogation performed which reveals HR up to 218 Atrial flutter/atrial fib then back down to sinus rhythm right away.  Chest xray with no acute intrathoracic abnormality detected. EKG no acute ischemia with sinus tachycardia at 106 LVH with no significant change from previous 4/12/2020. Labs with Troponin trending up from 0.01 to 0.1 otherwise unremarkable.  BNP/TSH normal. Upon my exam, the patient denies chest pain but still complaints of left neck and left upper back aching.     Patient is placed in to the Observation unit for further evaluation of chest discomfort and elevated troponin.    "

## 2020-05-17 NOTE — SUBJECTIVE & OBJECTIVE
Past Medical History:   Diagnosis Date    Atrial fibrillation     Blood clot associated with vein wall inflammation     not dvt    Cardiomyopathy     Normal cors on cath 11/2017    CHF (congestive heart failure)     DM (diabetes mellitus) 9/19/2013    Hyperlipidemia     Hypertension     Psoriasis     Sleep apnea        Past Surgical History:   Procedure Laterality Date    CARDIAC CATHETERIZATION      COLONOSCOPY      DILATION AND CURETTAGE OF UTERUS      ESOPHAGOGASTRODUODENOSCOPY N/A 5/9/2019    Procedure: EGD (ESOPHAGOGASTRODUODENOSCOPY);  Surgeon: Vielka Burrell MD;  Location: [facility];  Service: Endoscopy;  Laterality: N/A;       Review of patient's allergies indicates:   Allergen Reactions    Metformin      Diarrhea on metformin XR     Pneumococcal 23-abimbola ps vaccine        No current facility-administered medications on file prior to encounter.      Current Outpatient Medications on File Prior to Encounter   Medication Sig    apixaban (ELIQUIS) 5 mg Tab Take 1 tablet (5 mg total) by mouth 2 (two) times daily.    diclofenac sodium (VOLTAREN) 1 % Gel Apply 2 g topically 2 (two) times daily.    dulaglutide (TRULICITY) 1.5 mg/0.5 mL PnIj Inject 1.5 mg into the skin every 7 days.    furosemide (LASIX) 40 MG tablet Take 1 tablet (40 mg total) by mouth once daily.    gabapentin (NEURONTIN) 400 MG capsule Take 1 capsule (400 mg total) by mouth 3 (three) times daily as needed (pain).    metoprolol succinate (TOPROL-XL) 100 MG 24 hr tablet Take 1 tablet by mouth once daily    rosuvastatin (CRESTOR) 40 MG Tab Take 1 tablet (40 mg total) by mouth every evening.    spironolactone (ALDACTONE) 50 MG tablet Take 1 tablet by mouth once daily    triamcinolone acetonide 0.1% (KENALOG) 0.1 % cream 2 (two) times daily. Apply to affected area    blood glucose control, high Soln 1 each by Misc.(Non-Drug; Combo Route) route once. for 1 dose    blood glucose control, low Soln 1 each by Misc.(Non-Drug;  "Combo Route) route once. for 1 dose    BLOOD PRESSURE CUFF Misc 1 kit by Misc.(Non-Drug; Combo Route) route 2 (two) times daily.    blood sugar diagnostic Strp Check blood glucose 3x/day.    blood-glucose meter (TRUE METRIX AIR GLUCOSE METER) Misc 1 each by Misc.(Non-Drug; Combo Route) route 3 (three) times daily.    canagliflozin (INVOKANA) 100 mg Tab tablet Take 1 tablet (100 mg total) by mouth once daily.    cetirizine (ZYRTEC) 10 MG tablet Take 1 tablet (10 mg total) by mouth once daily. for 14 days (Patient not taking: Reported on 11/21/2019)    fluticasone (FLONASE) 50 mcg/actuation nasal spray 1 spray (50 mcg total) by Each Nare route once daily.    HUMIRA,CF, PEN 40 mg/0.4 mL PnKt INJECT ONE 40MG INJECTION SUBCUTANEOUSLY EVERY OTHER WEEK    insulin degludec (TRESIBA FLEXTOUCH U-200) 200 unit/mL (3 mL) InPn Inject 60 units once daily.    insulin lispro (HUMALOG KWIKPEN INSULIN) 100 unit/mL pen Inject 12 Units into the skin 3 (three) times daily before meals.    ondansetron (ZOFRAN) 4 MG tablet Take 1 tablet (4 mg total) by mouth every 8 (eight) hours as needed for Nausea.    pen needle, diabetic (BD ULTRA-FINE LORI PEN NEEDLE) 32 gauge x 5/32" Ndle Use 4x/day     Family History     Problem Relation (Age of Onset)    Diabetes Father, Maternal Grandmother, Paternal Grandmother    Hypertension Mother, Father        Tobacco Use    Smoking status: Never Smoker    Smokeless tobacco: Never Used    Tobacco comment: smokes cigars on occasion   Substance and Sexual Activity    Alcohol use: Yes     Comment: occasional    Drug use: No     Comment: hx of marijuana use 3 years ago    Sexual activity: Yes     Partners: Male     Review of Systems   Constitutional: Negative for activity change, appetite change, chills, diaphoresis, fatigue and fever.   HENT: Negative for sore throat.    Eyes: Negative for visual disturbance.   Respiratory: Negative for apnea, cough, chest tightness, shortness of breath and " "wheezing.    Cardiovascular: Positive for chest pain (sudden quick heavy pain "like someone punched me" . Then intermittent left side chest discomfort since. Denies chest pain during exam). Negative for leg swelling.   Gastrointestinal: Negative for abdominal distention, abdominal pain, blood in stool, diarrhea, nausea and vomiting.   Genitourinary: Negative for dysuria and hematuria.   Musculoskeletal: Positive for neck pain (left side aching constant x2 days).        Left upper back aching pain x 2 days   Neurological: Negative for dizziness, seizures, syncope, weakness, light-headedness and numbness.   Psychiatric/Behavioral: Negative for confusion.     Objective:     Vital Signs (Most Recent):  Temp: 97.2 °F (36.2 °C) (05/17/20 0349)  Pulse: 78 (05/17/20 0349)  Resp: 20 (05/17/20 0349)  BP: (!) 145/95 (05/17/20 0349)  SpO2: 98 % (05/17/20 0349) Vital Signs (24h Range):  Temp:  [97.2 °F (36.2 °C)-98.4 °F (36.9 °C)] 97.2 °F (36.2 °C)  Pulse:  [] 78  Resp:  [20-32] 20  SpO2:  [91 %-100 %] 98 %  BP: (134-197)/() 145/95     Weight: 108.9 kg (240 lb)  Body mass index is 37.59 kg/m².    Physical Exam   Constitutional: She is oriented to person, place, and time. No distress.   obese   HENT:   Head: Normocephalic and atraumatic.   Eyes: Pupils are equal, round, and reactive to light.   Neck: Normal range of motion. No JVD present.   Cardiovascular: Normal rate and regular rhythm.   ICD left upper chest   Pulmonary/Chest: Effort normal and breath sounds normal. No respiratory distress. She has no wheezes. She has no rales. She exhibits no tenderness.   Abdominal: Soft. Bowel sounds are normal. There is no tenderness. There is no rebound and no guarding.   Musculoskeletal: Normal range of motion. She exhibits no edema or tenderness.   Neurological: She is alert and oriented to person, place, and time.   Skin: Skin is warm and dry. Capillary refill takes less than 2 seconds. She is not diaphoretic. "   Psychiatric: She has a normal mood and affect.         CRANIAL NERVES     CN III, IV, VI   Pupils are equal, round, and reactive to light.       Significant Labs:   CBC:   Recent Labs   Lab 05/16/20 1926   WBC 6.45   HGB 13.4   HCT 42.7        CMP:   Recent Labs   Lab 05/16/20 1926      K 3.5      CO2 26   *   BUN 11   CREATININE 1.1   CALCIUM 8.9   PROT 7.1   ALBUMIN 3.3*   BILITOT 0.4   ALKPHOS 74   AST 15   ALT 14   ANIONGAP 9   EGFRNONAA >60     Cardiac Markers:   Recent Labs   Lab 05/16/20 1926   BNP 74     POCT Glucose:   Recent Labs   Lab 05/17/20  0350   POCTGLUCOSE 121*     Troponin:   Recent Labs   Lab 05/16/20 1926 05/16/20  2211   TROPONINI 0.016 0.106*     TSH:   Recent Labs   Lab 05/16/20  2335   TSH 1.092     Urine Studies: No results for input(s): COLORU, APPEARANCEUA, PHUR, SPECGRAV, PROTEINUA, GLUCUA, KETONESU, BILIRUBINUA, OCCULTUA, NITRITE, UROBILINOGEN, LEUKOCYTESUR, RBCUA, WBCUA, BACTERIA, SQUAMEPITHEL, HYALINECASTS in the last 48 hours.    Invalid input(s): TALYA    Significant Imaging: I have reviewed and interpreted all pertinent imaging results/findings within the past 24 hours.

## 2020-05-17 NOTE — DISCHARGE SUMMARY
"[facility]  Discharge Summary      Patient Name: Fabiana Chanel  MRN: [id number]  Admission Date: 5/16/2020  Hospital Length of Stay: 0 days  Discharge Date and Time:  05/17/2020 2:56 PM  Attending Physician: No att. providers found   Discharging Provider: Stephanie Daniel NP  Primary Care Provider: Gil Leal MD      HPI:   This is 45 y/o female with medical hx of T2DM, HTN, HLD, CHF, AF on Eliquis, NICM,  [employer] ICD (placed in 4/17/2018), obesity, and MILE on CPAP who presents to the hospital with a chief complaint of sudden onset left side chest pain 30 mins pta. The patient reports that she believes her defibrillator shocked her x1 while she was having sex yesterday early evening (5/16/2020) (Of note, per ED record, the patient states her defibrillator fired while she was lying down watching TV). She reports she felt like someone hitting her in the chest "real hard w/ their fist" and the pain went away right away. She also reports that she has been having some left side neck pain and left upper back "aching" pain for the last 2 days which relieved by using "muscle cream". Pt denies feeling bad before the event. No alleviating or exacerbating factors reported. Patient denies any recent in jury or strain, SOB, N/V/D, diaphoresis, LOC, and all other sxs at this time. The pt does not smoke. Denies any drug or alcohol use.    In ED, COVID negative. [facility] [facility] was contacted and interrogation performed which reveals HR up to 218 Atrial flutter/atrial fib then back down to sinus rhythm right away.  Chest xray with no acute intrathoracic abnormality detected. EKG no acute ischemia with sinus tachycardia at 106 LVH with no significant change from previous 4/12/2020. Labs with Troponin trending up from 0.01 to 0.1 otherwise unremarkable.  BNP/TSH normal. Upon my exam, the patient denies chest pain but still complaints of left neck and left upper back aching.     Patient is " "placed in to the Observation unit for further evaluation of chest discomfort and elevated troponin.      * No surgery found *      Hospital Course:   Mrs. Chanel is a 43 yo female with history of NICM with AICD and PAF on eliquis who was placed in observation for AICD discharge x 2. AICD interrogation "showed HR up to 218 Atrial flutter/atrial fib then back down to sinus rhythm right away." Elevated troponin trend flat pattern then down on discharge. Discussed with Cardiology, increase metoprolol  mg to 150 mg daily. 2 D echo pending. Patient refused US legs. Patient anxious to go home. She will follow up with primary Cardiologist Dr. Yanez in 1-2 weeks.     Consults:     No new Assessment & Plan notes have been filed under this hospital service since the last note was generated.  Service: Hospital Medicine    Final Active Diagnoses:    Diagnosis Date Noted POA    PRINCIPAL PROBLEM:  Elevated troponin [R79.89] 05/17/2020 Yes    GERD (gastroesophageal reflux disease) [K21.9] 05/09/2019 Yes    Implantable cardioverter-defibrillator (ICD) in situ [Z95.810] 06/21/2018 Yes    MILE treated with BiPAP [G47.33] 05/16/2015 Yes    Mixed hyperlipidemia [E78.2] 05/16/2014 Yes    Chronic combined systolic and diastolic heart failure [I50.42] 05/16/2014 Yes    Essential hypertension [I10] 05/14/2014 Yes    Paroxysmal atrial fibrillation [I48.0] 05/14/2014 Yes    Obesity with body mass index (BMI) of 30.0 to 39.9 [E66.9] 05/14/2014 Yes    DM (diabetes mellitus) [E11.9] 09/19/2013 Unknown      Problems Resolved During this Admission:       Discharged Condition: good    Disposition: Home or Self Care    Follow Up:  Follow-up Information     Gil Leal MD. Schedule an appointment as soon as possible for a visit in 1 week.    Specialty:  Family Medicine  Contact information:  [address] [place] [address] [phone]                 Patient Instructions:      Activity as tolerated "       Significant Diagnostic Studies: Labs: All labs within the past 24 hours have been reviewed    Pending Diagnostic Studies:     Procedure Component Value Units Date/Time    Echo Color Flow Doppler? Yes [[id number]]  (Abnormal) Resulted:  05/17/20 1301    Order Status:  Sent Lab Status:  In process Updated:  05/17/20 1301     BSA 2.37 m2      TDI SEPTAL 0.03 m/s      LV LATERAL E/E' RATIO 16.67 m/s      LV SEPTAL E/E' RATIO 16.67 m/s      LA WIDTH 4.39 cm      AORTIC VALVE CUSP SEPERATION 2.10 cm      TDI LATERAL 0.03 m/s      PV PEAK VELOCITY 1.21 cm/s      LVIDD 6.35 cm      IVS 1.62 cm      PW 1.47 cm      Ao root annulus 3.36 cm      LVIDS 5.92 cm      FS 7 %      LA volume 62.52 cm3      Sinus 3.04 cm      STJ 1.94 cm      Ascending aorta 2.39 cm      LV mass 487.47 g      LA size 3.13 cm      RVDD 3.46 cm      TAPSE 2.31 cm      RV S' 12.65 cm/s      Left Ventricle Relative Wall Thickness 0.46 cm      AV mean gradient 5 mmHg      AV valve area 2.16 cm2      AV Velocity Ratio 0.58     AV index (prosthetic) 0.53     E/A ratio 0.47     Mean e' 0.03 m/s      E wave decelartion time 150.36 msec      IVRT 159.17 msec      Pulm vein S/D ratio 1.31     LVOT diameter 2.28 cm      LVOT area 4.1 cm2      LVOT peak jose 0.84 m/s      LVOT peak VTI 14.99 cm      Ao peak jose 1.46 m/s      Ao VTI 28.30 cm      LVOT stroke volume 61.17 cm3      AV peak gradient 9 mmHg      E/E' ratio 16.67 m/s      MV Peak E Jose 0.50 m/s      TR Max Jose 2.88 m/s      MV Peak A Jose 1.06 m/s      PV Peak S Jose 0.38 m/s      PV Peak D Jose 0.29 m/s      LV Systolic Volume 174.68 mL      LV Systolic Volume Index 77.0 mL/m2      LV Diastolic Volume 204.93 mL      LV Diastolic Volume Index 90.36 mL/m2      LA Volume Index 27.6 mL/m2      LV Mass Index 215 g/m2      RA Major Axis 4.58 cm      Left Atrium Minor Axis 5.46 cm      Left Atrium Major Axis 5.25 cm      Triscuspid Valve Regurgitation Peak Gradient 33 mmHg      RA Width 3.90 cm           Medications:  Reconciled Home Medications:      Medication List      START taking these medications    pantoprazole 40 MG tablet  Commonly known as:  PROTONIX  Take 1 tablet (40 mg total) by mouth once daily.  Start taking on:  May 18, 2020        CHANGE how you take these medications    * metoprolol succinate 100 MG 24 hr tablet  Commonly known as:  TOPROL-XL  Take 1 tablet by mouth once daily  What changed:  Another medication with the same name was added. Make sure you understand how and when to take each.     * metoprolol succinate 50 MG 24 hr tablet  Commonly known as:  TOPROL-XL  Take 1 tablet (50 mg total) by mouth once daily.  What changed:  You were already taking a medication with the same name, and this prescription was added. Make sure you understand how and when to take each.         * This list has 2 medication(s) that are the same as other medications prescribed for you. Read the directions carefully, and ask your doctor or other care provider to review them with you.            CONTINUE taking these medications    apixaban 5 mg Tab  Commonly known as:  ELIQUIS  Take 1 tablet (5 mg total) by mouth 2 (two) times daily.     blood glucose control, high Soln  1 each by Misc.(Non-Drug; Combo Route) route once. for 1 dose     blood glucose control, low Soln  1 each by Misc.(Non-Drug; Combo Route) route once. for 1 dose     BLOOD PRESSURE CUFF Misc  Generic drug:  miscellaneous medical supply  1 kit by Misc.(Non-Drug; Combo Route) route 2 (two) times daily.     blood sugar diagnostic Strp  Check blood glucose 3x/day.     blood-glucose meter Misc  Commonly known as:  TRUE METRIX AIR GLUCOSE METER  1 each by Misc.(Non-Drug; Combo Route) route 3 (three) times daily.     cetirizine 10 MG tablet  Commonly known as:  ZYRTEC  Take 1 tablet (10 mg total) by mouth once daily. for 14 days     diclofenac sodium 1 % Gel  Commonly known as:  VOLTAREN  Apply 2 g topically 2 (two) times daily.     dulaglutide 1.5 mg/0.5  "mL Pnij  Commonly known as:  TRULICITY  Inject 1.5 mg into the skin every 7 days.     fluticasone propionate 50 mcg/actuation nasal spray  Commonly known as:  FLONASE  1 spray (50 mcg total) by Each Nare route once daily.     furosemide 40 MG tablet  Commonly known as:  LASIX  Take 1 tablet (40 mg total) by mouth once daily.     gabapentin 400 MG capsule  Commonly known as:  NEURONTIN  Take 1 capsule (400 mg total) by mouth 3 (three) times daily as needed (pain).     HUMIRA(CF) PEN 40 mg/0.4 mL Pnkt  Generic drug:  adalimumab  INJECT ONE 40MG INJECTION SUBCUTANEOUSLY EVERY OTHER WEEK     insulin degludec 200 unit/mL (3 mL) Inpn  Commonly known as:  TRESIBA FLEXTOUCH U-200  Inject 60 units once daily.     insulin lispro 100 unit/mL pen  Commonly known as:  HumaLOG KwikPen Insulin  Inject 12 Units into the skin 3 (three) times daily before meals.     ondansetron 4 MG tablet  Commonly known as:  ZOFRAN  Take 1 tablet (4 mg total) by mouth every 8 (eight) hours as needed for Nausea.     pen needle, diabetic 32 gauge x 5/32" Ndle  Commonly known as:  BD ULTRA-FINE LORI PEN NEEDLE  Use 4x/day     rosuvastatin 40 MG Tab  Commonly known as:  CRESTOR  Take 1 tablet (40 mg total) by mouth every evening.     spironolactone 50 MG tablet  Commonly known as:  ALDACTONE  Take 1 tablet by mouth once daily     triamcinolone acetonide 0.1% 0.1 % cream  Commonly known as:  KENALOG  2 (two) times daily. Apply to affected area        STOP taking these medications    canagliflozin 100 mg Tab tablet  Commonly known as:  INVOKANA            Indwelling Lines/Drains at time of discharge:   Lines/Drains/Airways     None                 Time spent on the discharge of patient: 35 minutes  Patient was seen and examined on the date of discharge and determined to be suitable for discharge.         Stephanie Daniel NP  Department of Hospital Medicine  [facility]  "

## 2020-05-18 LAB
AORTIC ROOT ANNULUS: 3.36 CM
AORTIC VALVE CUSP SEPERATION: 2.1 CM
ASCENDING AORTA: 2.39 CM
AV INDEX (PROSTH): 0.53
AV MEAN GRADIENT: 5 MMHG
AV PEAK GRADIENT: 9 MMHG
AV VALVE AREA: 2.16 CM2
AV VELOCITY RATIO: 0.58
BSA FOR ECHO PROCEDURE: 2.37 M2
CV ECHO LV RWT: 0.46 CM
DOP CALC AO PEAK VEL: 1.46 M/S
DOP CALC AO VTI: 28.3 CM
DOP CALC LVOT AREA: 4.1 CM2
DOP CALC LVOT DIAMETER: 2.28 CM
DOP CALC LVOT PEAK VEL: 0.84 M/S
DOP CALC LVOT STROKE VOLUME: 61.17 CM3
DOP CALCLVOT PEAK VEL VTI: 14.99 CM
E WAVE DECELERATION TIME: 150.36 MSEC
E/A RATIO: 0.47
E/E' RATIO: 16.67 M/S
ECHO LV POSTERIOR WALL: 1.47 CM (ref 0.6–1.1)
FRACTIONAL SHORTENING: 7 % (ref 28–44)
INTERVENTRICULAR SEPTUM: 1.62 CM (ref 0.6–1.1)
IVRT: 159.17 MSEC
LA MAJOR: 5.25 CM
LA MINOR: 5.46 CM
LA WIDTH: 4.39 CM
LEFT ATRIUM SIZE: 3.13 CM
LEFT ATRIUM VOLUME INDEX: 27.6 ML/M2
LEFT ATRIUM VOLUME: 62.52 CM3
LEFT INTERNAL DIMENSION IN SYSTOLE: 5.92 CM (ref 2.1–4)
LEFT VENTRICLE DIASTOLIC VOLUME INDEX: 90.36 ML/M2
LEFT VENTRICLE DIASTOLIC VOLUME: 204.93 ML
LEFT VENTRICLE MASS INDEX: 215 G/M2
LEFT VENTRICLE SYSTOLIC VOLUME INDEX: 77 ML/M2
LEFT VENTRICLE SYSTOLIC VOLUME: 174.68 ML
LEFT VENTRICULAR INTERNAL DIMENSION IN DIASTOLE: 6.35 CM (ref 3.5–6)
LEFT VENTRICULAR MASS: 487.47 G
LV LATERAL E/E' RATIO: 16.67 M/S
LV SEPTAL E/E' RATIO: 16.67 M/S
MV PEAK A VEL: 1.06 M/S
MV PEAK E VEL: 0.5 M/S
PISA TR MAX VEL: 2.88 M/S
PULM VEIN S/D RATIO: 1.31
PV PEAK D VEL: 0.29 M/S
PV PEAK S VEL: 0.38 M/S
PV PEAK VELOCITY: 1.21 CM/S
RA MAJOR: 4.58 CM
RA PRESSURE: 3 MMHG
RA WIDTH: 3.9 CM
RIGHT VENTRICULAR END-DIASTOLIC DIMENSION: 3.46 CM
RV TISSUE DOPPLER FREE WALL SYSTOLIC VELOCITY 1 (APICAL 4 CHAMBER VIEW): 12.65 CM/S
SINUS: 3.04 CM
STJ: 1.94 CM
TDI LATERAL: 0.03 M/S
TDI SEPTAL: 0.03 M/S
TDI: 0.03 M/S
TR MAX PG: 33 MMHG
TRICUSPID ANNULAR PLANE SYSTOLIC EXCURSION: 2.31 CM
TV REST PULMONARY ARTERY PRESSURE: 36 MMHG

## 2020-05-19 ENCOUNTER — HOSPITAL ENCOUNTER (OUTPATIENT)
Dept: RADIOLOGY | Facility: HOSPITAL | Age: 45
Discharge: HOME | End: 2020-05-19
Attending: OBSTETRICS & GYNECOLOGY
Payer: MEDICARE

## 2020-05-19 DIAGNOSIS — R10.2 VAGINAL PAIN: ICD-10-CM

## 2020-05-19 PROCEDURE — 76830 TRANSVAGINAL US NON-OB: CPT | Mod: 26,,, | Performed by: RADIOLOGY

## 2020-05-19 PROCEDURE — 76830 US TRANSVAGINAL NON OB: ICD-10-PCS | Mod: 26,,, | Performed by: RADIOLOGY

## 2020-05-19 PROCEDURE — 76830 TRANSVAGINAL US NON-OB: CPT | Mod: TC

## 2020-06-03 ENCOUNTER — LAB VISIT (OUTPATIENT)
Dept: LAB | Facility: HOSPITAL | Age: 45
End: 2020-06-03
Payer: MEDICARE

## 2020-06-03 LAB
ANION GAP SERPL CALC-SCNC: 8 MMOL/L (ref 8–16)
BUN SERPL-MCNC: 18 MG/DL (ref 6–20)
CALCIUM SERPL-MCNC: 9.6 MG/DL (ref 8.7–10.5)
CHLORIDE SERPL-SCNC: 96 MMOL/L (ref 95–110)
CO2 SERPL-SCNC: 36 MMOL/L (ref 23–29)
CREAT SERPL-MCNC: 1.7 MG/DL (ref 0.5–1.4)
EST. GFR  (AFRICAN AMERICAN): 42 ML/MIN/1.73 M^2
EST. GFR  (NON AFRICAN AMERICAN): 36 ML/MIN/1.73 M^2
ESTIMATED AVG GLUCOSE: 192 MG/DL (ref 68–131)
GLUCOSE SERPL-MCNC: 177 MG/DL (ref 70–110)
HBA1C MFR BLD HPLC: 8.3 % (ref 4–5.6)
POTASSIUM SERPL-SCNC: 3.6 MMOL/L (ref 3.5–5.1)
SODIUM SERPL-SCNC: 140 MMOL/L (ref 136–145)

## 2020-06-03 PROCEDURE — 83036 HEMOGLOBIN GLYCOSYLATED A1C: CPT

## 2020-06-03 PROCEDURE — 80048 BASIC METABOLIC PNL TOTAL CA: CPT

## 2020-06-03 PROCEDURE — 36415 COLL VENOUS BLD VENIPUNCTURE: CPT | Mod: PN

## 2020-06-09 ENCOUNTER — PATIENT OUTREACH (OUTPATIENT)
Dept: ADMINISTRATIVE | Facility: OTHER | Age: 45
End: 2020-06-09

## 2020-06-16 LAB — HM PAP SMEAR: NORMAL

## 2020-06-23 ENCOUNTER — PATIENT OUTREACH (OUTPATIENT)
Dept: ADMINISTRATIVE | Facility: OTHER | Age: 45
End: 2020-06-23

## 2020-06-25 ENCOUNTER — OFFICE VISIT (OUTPATIENT)
Dept: ENDOCRINOLOGY | Facility: CLINIC | Age: 45
End: 2020-06-25
Payer: MEDICARE

## 2020-06-25 VITALS
WEIGHT: 252.88 LBS | DIASTOLIC BLOOD PRESSURE: 87 MMHG | HEART RATE: 72 BPM | SYSTOLIC BLOOD PRESSURE: 132 MMHG | BODY MASS INDEX: 39.61 KG/M2

## 2020-06-25 DIAGNOSIS — N28.9 RENAL INSUFFICIENCY: ICD-10-CM

## 2020-06-25 DIAGNOSIS — E66.01 SEVERE OBESITY (BMI 35.0-39.9) WITH COMORBIDITY: ICD-10-CM

## 2020-06-25 DIAGNOSIS — F41.9 ANXIETY: ICD-10-CM

## 2020-06-25 PROCEDURE — 3052F HG A1C>EQUAL 8.0%<EQUAL 9.0%: CPT | Mod: CPTII,S$GLB,, | Performed by: NURSE PRACTITIONER

## 2020-06-25 PROCEDURE — 3052F PR MOST RECENT HEMOGLOBIN A1C LEVEL 8.0 - < 9.0%: ICD-10-PCS | Mod: CPTII,S$GLB,, | Performed by: NURSE PRACTITIONER

## 2020-06-25 PROCEDURE — 3008F BODY MASS INDEX DOCD: CPT | Mod: CPTII,S$GLB,, | Performed by: NURSE PRACTITIONER

## 2020-06-25 PROCEDURE — 99499 RISK ADDL DX/OHS AUDIT: ICD-10-PCS | Mod: S$GLB,,, | Performed by: NURSE PRACTITIONER

## 2020-06-25 PROCEDURE — 99499 UNLISTED E&M SERVICE: CPT | Mod: S$GLB,,, | Performed by: NURSE PRACTITIONER

## 2020-06-25 PROCEDURE — 3079F PR MOST RECENT DIASTOLIC BLOOD PRESSURE 80-89 MM HG: ICD-10-PCS | Mod: CPTII,S$GLB,, | Performed by: NURSE PRACTITIONER

## 2020-06-25 PROCEDURE — 3075F SYST BP GE 130 - 139MM HG: CPT | Mod: CPTII,S$GLB,, | Performed by: NURSE PRACTITIONER

## 2020-06-25 PROCEDURE — 3008F PR BODY MASS INDEX (BMI) DOCUMENTED: ICD-10-PCS | Mod: CPTII,S$GLB,, | Performed by: NURSE PRACTITIONER

## 2020-06-25 PROCEDURE — 99999 PR PBB SHADOW E&M-EST. PATIENT-LVL IV: ICD-10-PCS | Mod: PBBFAC,,, | Performed by: NURSE PRACTITIONER

## 2020-06-25 PROCEDURE — 99214 PR OFFICE/OUTPT VISIT, EST, LEVL IV, 30-39 MIN: ICD-10-PCS | Mod: S$GLB,,, | Performed by: NURSE PRACTITIONER

## 2020-06-25 PROCEDURE — 99214 OFFICE O/P EST MOD 30 MIN: CPT | Mod: S$GLB,,, | Performed by: NURSE PRACTITIONER

## 2020-06-25 PROCEDURE — 99999 PR PBB SHADOW E&M-EST. PATIENT-LVL IV: CPT | Mod: PBBFAC,,, | Performed by: NURSE PRACTITIONER

## 2020-06-25 PROCEDURE — 3079F DIAST BP 80-89 MM HG: CPT | Mod: CPTII,S$GLB,, | Performed by: NURSE PRACTITIONER

## 2020-06-25 PROCEDURE — 3075F PR MOST RECENT SYSTOLIC BLOOD PRESS GE 130-139MM HG: ICD-10-PCS | Mod: CPTII,S$GLB,, | Performed by: NURSE PRACTITIONER

## 2020-06-25 RX ORDER — EMPAGLIFLOZIN 10 MG/1
10 TABLET, FILM COATED ORAL DAILY
Qty: 30 TABLET | Refills: 3 | Status: SHIPPED | OUTPATIENT
Start: 2020-06-25 | End: 2020-10-02

## 2020-06-25 NOTE — PROGRESS NOTES
CC: This 44 y.o. Black or  female  is here for evaluation of  T2DM along with comorbidities indicated in the Visit Diagnosis section of this encounter.    HPI: Fabiana Chanel was diagnosed with T2DM in 2013. Metformin and a sulfonylurea started at the time of diagnosis.       Prior visit 3/9/20  a1c down from 13.5 to 7.3%.   She did increase Trulicity to 1.5 mg weekly. Appetite is a little bit lower.   She has started Invokana. Denies  irritation/dysuria. Will be changing from Novolog to Humalog d/t formulary change.   She is undergoing GI workup for nausea and constipation. She does not believe this is d/t Trulicity, as she's been having these symptoms for several months and they are no worse than before.   Plan Continue current regimen.   Test glucoses 4x/day.   Start exercise regimen.   Return to clinic in 3 months with labs prior.       Interval history  a1c is up from 7.3 to 8.3%.     She does not know why her A1c is higher. She found her BGs rising higher after she got her A1c back. Her appetite is low. May only eat once a day. She stopped Invokana on her own in March because it caused her hair to thin. She is open to trying Jardiance again.   Her weight is the same as from last visit.   She has been very stressed and anxious with pandemic. Has not seen her family or anyone else.     Kidney function is much lower from eGFR > 60 to 42 and creatinine 1 to 1.7. She was taking ibuprofen at least 1-2x/day in May.       PMHX: CHF,  MILE on cpap, atrial fibrillation, MI, ICD placement, NICM (cath 11/2017) EF 20% by echo 12/2018    LAST DIABETES EDUCATION: 6/19/19    RECENT ILLNESSES/HOSPITALIZATIONS - -  Admitted 12/28/18 x 2 nights for acute on chronic HF      HOSPITALIZED FOR DIABETES  -  Yes - for hyperglycemia     PRESCRIBED DIABETES MEDICATIONS:  Invokana 100 mg once daily, Trulicity 1.5 mg weekly, Tresiba 60 units nightly,  Humalog Kwikpen 12 units ac      Misses medication doses - No  She  injects Humalog about 1x/day as she only eats 1 meal/day     DM COMPLICATIONS: peripheral neuropathy and cardiovascular disease    SIGNIFICANT DIABETES MED HISTORY:   diarrhea on metformin 1000 mg instant release   Pt unable to tolerate metformin ER d/t diarrhea even on 500 mg twice daily.   Trulicity and Novolog started 11/2019  Invokana started 1/2020     SELF MONITORING BLOOD GLUCOSE: Checks blood glucose at home 3x/day.   FBGs  150s  At night 150s to 200 depending on diet.       HYPOGLYCEMIC EPISODES: denies      CURRENT DIET: drinks water.  Eats 1-2 meals/day.  tries to snack when she doesn't get a meal in.    Bedtime is at 5 am.   No more sodas.   Breakfast could be cereal and an egg or peanut butter and jelly sandwich.       CURRENT EXERCISE:  None recent       /87 (BP Location: Left arm, Patient Position: Sitting, BP Method: Large (Automatic))   Pulse 72   Wt 114.7 kg (252 lb 14.4 oz)   BMI 39.61 kg/m²       ROS:   CONSTITUTIONAL: Appetite good, + Fatigue   GI: gets gassy sometimes and not sure if it's related to Trulicity or not       PHYSICAL EXAM:  GENERAL: Well developed, well nourished. No acute distress.   PSYCH: AAOx3,  conversant, well-groomed. Judgement and insight good. Anxious, worried   NEURO: Cranial nerves grossly intact. Speech clear, no tremor.   CHEST: Respirations even and unlabored. CTA, diminished bilaterally         Hemoglobin A1C   Date Value Ref Range Status   06/03/2020 8.3 (H) 4.0 - 5.6 % Final     Comment:     ADA Screening Guidelines:  5.7-6.4%  Consistent with prediabetes  >or=6.5%  Consistent with diabetes  High levels of fetal hemoglobin interfere with the HbA1C  assay. Heterozygous hemoglobin variants (HbS, HgC, etc)do  not significantly interfere with this assay.   However, presence of multiple variants may affect accuracy.     02/19/2020 7.3 (H) 4.0 - 5.6 % Final     Comment:     ADA Screening Guidelines:  5.7-6.4%  Consistent with prediabetes  >or=6.5%  Consistent  with diabetes  High levels of fetal hemoglobin interfere with the HbA1C  assay. Heterozygous hemoglobin variants (HbS, HgC, etc)do  not significantly interfere with this assay.   However, presence of multiple variants may affect accuracy.     11/18/2019 13.5 (H) 4.0 - 5.6 % Final     Comment:     ADA Screening Guidelines:  5.7-6.4%  Consistent with prediabetes  >or=6.5%  Consistent with diabetes  High levels of fetal hemoglobin interfere with the HbA1C  assay. Heterozygous hemoglobin variants (HbS, HgC, etc)do  not significantly interfere with this assay.   However, presence of multiple variants may affect accuracy.             Chemistry        Component Value Date/Time     06/03/2020 0905    K 3.6 06/03/2020 0905    CL 96 06/03/2020 0905    CO2 36 (H) 06/03/2020 0905    BUN 18 06/03/2020 0905    CREATININE 1.7 (H) 06/03/2020 0905     (H) 06/03/2020 0905        Component Value Date/Time    CALCIUM 9.6 06/03/2020 0905    ALKPHOS 74 05/16/2020 1926    AST 15 05/16/2020 1926    ALT 14 05/16/2020 1926    BILITOT 0.4 05/16/2020 1926    ESTGFRAFRICA 42 (A) 06/03/2020 0905    EGFRNONAA 36 (A) 06/03/2020 0905          Lab Results   Component Value Date    LDLCALC 82.0 02/19/2020        Ref. Range 11/18/2019 08:13 2/19/2020 10:22   Cholesterol Latest Ref Range: 120 - 199 mg/dL 199 143   HDL Latest Ref Range: 40 - 75 mg/dL 44 42   Hdl/Cholesterol Ratio Latest Ref Range: 20.0 - 50.0 % 22.1 29.4   LDL Cholesterol External Latest Ref Range: 63.0 - 159.0 mg/dL 97.6 82.0   Non-HDL Cholesterol Latest Units: mg/dL 155 101   Total Cholesterol/HDL Ratio Latest Ref Range: 2.0 - 5.0  4.5 3.4   Triglycerides Latest Ref Range: 30 - 150 mg/dL 287 (H) 95         Lab Results   Component Value Date    MICALBCREAT 44.7 (H) 08/13/2019           ASSESSMENT and PLAN:    A1C GOAL: < 7 %     1. Type 2 diabetes, uncontrolled, with neuropathy  Start jardiance 10 mg once daily in the morning. Reviewed FDA indications of Jardiance versus  with Invokana.   Test glucose 3x/day.   Return to clinic in 3 months for virtual visit with labs prior.       empagliflozin (JARDIANCE) 10 mg tablet    Hemoglobin A1C   2. Renal insufficiency  Basic metabolic panel   3. Severe obesity (BMI 35.0-39.9) with comorbidity  Increases insulin resistance.      4.  Anxiety  Advised pt she can social distance outdoors and visit with a family member from afar.            Orders Placed This Encounter   Procedures    Basic metabolic panel     Standing Status:   Future     Standing Expiration Date:   8/24/2021    Hemoglobin A1C     Standing Status:   Future     Standing Expiration Date:   8/24/2021        Follow up in about 3 months (around 9/25/2020).

## 2020-06-25 NOTE — PATIENT INSTRUCTIONS
Start jardiance 10 mg once daily in the morning.   Test glucose 3x/day.   Return to clinic in 3 months for virtual visit with labs prior.

## 2020-08-03 ENCOUNTER — CLINICAL SUPPORT (OUTPATIENT)
Dept: CARDIOLOGY | Facility: HOSPITAL | Age: 45
End: 2020-08-03
Attending: INTERNAL MEDICINE
Payer: MEDICARE

## 2020-08-03 DIAGNOSIS — I47.20 VENTRICULAR TACHYCARDIA: ICD-10-CM

## 2020-08-03 DIAGNOSIS — Z95.810 CARDIAC DEFIBRILLATOR IN PLACE: ICD-10-CM

## 2020-08-03 PROCEDURE — 93295 DEV INTERROG REMOTE 1/2/MLT: CPT | Mod: ,,, | Performed by: INTERNAL MEDICINE

## 2020-08-03 PROCEDURE — 93295 CARDIAC DEVICE CHECK - REMOTE: ICD-10-PCS | Mod: ,,, | Performed by: INTERNAL MEDICINE

## 2020-08-03 PROCEDURE — 93296 REM INTERROG EVL PM/IDS: CPT | Performed by: INTERNAL MEDICINE

## 2020-08-24 ENCOUNTER — APPOINTMENT (OUTPATIENT)
Dept: GENERAL RADIOLOGY | Age: 45
End: 2020-08-24
Attending: PHYSICIAN ASSISTANT
Payer: MEDICARE

## 2020-08-24 ENCOUNTER — HOSPITAL ENCOUNTER (EMERGENCY)
Age: 45
Discharge: HOME | End: 2020-08-24
Attending: EMERGENCY MEDICINE
Payer: MEDICARE

## 2020-08-24 VITALS
HEIGHT: 67 IN | OXYGEN SATURATION: 94 % | DIASTOLIC BLOOD PRESSURE: 89 MMHG | HEART RATE: 77 BPM | SYSTOLIC BLOOD PRESSURE: 137 MMHG | WEIGHT: 225 LBS | BODY MASS INDEX: 35.31 KG/M2 | TEMPERATURE: 97.9 F | RESPIRATION RATE: 20 BRPM

## 2020-08-24 DIAGNOSIS — M79.651 ACUTE PAIN OF RIGHT THIGH: Primary | ICD-10-CM

## 2020-08-24 PROCEDURE — 74011250636 HC RX REV CODE- 250/636: Performed by: PHYSICIAN ASSISTANT

## 2020-08-24 PROCEDURE — 99282 EMERGENCY DEPT VISIT SF MDM: CPT

## 2020-08-24 PROCEDURE — 96372 THER/PROPH/DIAG INJ SC/IM: CPT

## 2020-08-24 PROCEDURE — 73552 X-RAY EXAM OF FEMUR 2/>: CPT

## 2020-08-24 RX ORDER — KETOROLAC TROMETHAMINE 15 MG/ML
15 INJECTION, SOLUTION INTRAMUSCULAR; INTRAVENOUS ONCE
Status: COMPLETED | OUTPATIENT
Start: 2020-08-24 | End: 2020-08-24

## 2020-08-24 RX ORDER — KETOROLAC TROMETHAMINE 10 MG/1
10 TABLET, FILM COATED ORAL
Qty: 20 TAB | Refills: 0 | Status: SHIPPED | OUTPATIENT
Start: 2020-08-24 | End: 2020-08-29

## 2020-08-24 RX ADMIN — KETOROLAC TROMETHAMINE 15 MG: 15 INJECTION, SOLUTION INTRAMUSCULAR; INTRAVENOUS at 16:12

## 2020-08-24 NOTE — DISCHARGE INSTRUCTIONS
Patient Education        Leg Pain: Care Instructions  Your Care Instructions  Many things can cause leg pain. Too much exercise or overuse can cause a muscle cramp (or charley horse). You can get leg cramps from not eating a balanced diet that has enough potassium, calcium, and other minerals. If you do not drink enough fluids or are taking certain medicines, you may develop leg cramps. Other causes of leg pain include injuries, blood flow problems, nerve damage, and twisted and enlarged veins (varicose veins). You can usually ease pain with self-care. Your doctor may recommend that you rest your leg and keep it elevated. Follow-up care is a key part of your treatment and safety. Be sure to make and go to all appointments, and call your doctor if you are having problems. It's also a good idea to know your test results and keep a list of the medicines you take. How can you care for yourself at home? · Take pain medicines exactly as directed. ? If the doctor gave you a prescription medicine for pain, take it as prescribed. ? If you are not taking a prescription pain medicine, ask your doctor if you can take an over-the-counter medicine. · Take any other medicines exactly as prescribed. Call your doctor if you think you are having a problem with your medicine. · Rest your leg while you have pain, and avoid standing for long periods of time. · Prop up your leg at or above the level of your heart when possible. · Make sure you are eating a balanced diet that is rich in calcium, potassium, and magnesium, especially if you are pregnant. · If directed by your doctor, put ice or a cold pack on the area for 10 to 20 minutes at a time. Put a thin cloth between the ice and your skin. · Your leg may be in a splint, a brace, or an elastic bandage, and you may have crutches to help you walk. Follow your doctor's directions about how long to wear supports and how to use the crutches. When should you call for help? [id number] anytime you think you may need emergency care. For example, call if:  · You have sudden chest pain and shortness of breath, or you cough up blood. · Your leg is cool or pale or changes color. Call your doctor now or seek immediate medical care if:  · You have increasing or severe pain. · Your leg suddenly feels weak and you cannot move it. · You have signs of a blood clot, such as:  ? Pain in your calf, back of the knee, thigh, or groin. ? Redness and swelling in your leg or groin. · You have signs of infection, such as:  ? Increased pain, swelling, warmth, or redness. ? Red streaks leading from the sore area. ? Pus draining from a place on your leg. ? A fever. · You cannot bear weight on your leg. Watch closely for changes in your health, and be sure to contact your doctor if:  · You do not get better as expected. Where can you learn more? Go to http://www.gray.com/  Enter J298 in the search box to learn more about \"Leg Pain: Care Instructions. \"  Current as of: June 26, 2019               Content Version: 12.5  © 6631-2758 [employer]. Care instructions adapted under license by [employer] (which disclaims liability or warranty for this information). If you have questions about a medical condition or this instruction, always ask your healthcare professional. [place] [address] any warranty or liability for your use of this information.

## 2020-08-24 NOTE — ED PROVIDER NOTES
EMERGENCY DEPARTMENT HISTORY AND PHYSICAL EXAM    Date: 8/24/2020  Patient Name: Alexandro Bloch    History of Presenting Illness     Chief Complaint   Patient presents with    Leg Pain         History Provided By: patient      Additional History (Context): Alexandro Bloch is a 40yo F here with c/o right lateral leg pain. States pain is located along lateral aspect of leg and has been there for 2 days. No injury or trauma. Pain worse with palpation to area. No injury/trauma. No pain in hip, knee, calf or LE. No swelling in leg. No cp, sob or recent travel, immobilization, h/o dvt or bleeding disorders. PCP: [facility], Not On File    Current Outpatient Medications   Medication Sig Dispense Refill    ketorolac (TORADOL) 10 mg tablet Take 1 Tab by mouth every six (6) hours as needed for Pain for up to 5 days. 20 Tab 0       Past History     Past Medical History:  Past Medical History:   Diagnosis Date    CAD (coronary artery disease)     [facility] obstructive [facility] disease ([facility]     Diabetes ([facility]     Hypertension     Sleep disorder        Past Surgical History:  History reviewed. No pertinent surgical history. Family History:  History reviewed. No pertinent family history. Social History:  Social History     Tobacco Use    Smoking status: Never Smoker    Smokeless tobacco: Never Used   Substance Use Topics    Alcohol use: Not Currently    Drug use: Never       Allergies: Allergies   Allergen Reactions    Pneumococcal Vaccine Anaphylaxis         Review of Systems       Review of Systems   Constitutional: Negative for chills and fever. HENT: Negative for nasal congestion, sore throat, rhinorrhea  Eyes: Negative. Respiratory: pos cough and negative for shortness of breath. Cardiovascular: Negative for chest pain and palpitations. Gastrointestinal: Negative for abdominal pain, constipation, diarrhea, nausea and vomiting. Genitourinary: Negative.   Negative for difficulty urinating and flank pain. Musculoskeletal: Negative for back pain. Negative for gait problem and neck pain. Pos for leg pain  Skin: Negative. Allergic/Immunologic: Negative. Neurological: Negative for dizziness, weakness, numbness and headaches. Psychiatric/Behavioral: Negative. All other systems reviewed and are negative. All Other Systems Negative  Physical Exam     Vitals:    08/24/20 1538   BP: 137/89   Pulse: 77   Resp: 20   Temp: 97.9 °F (36.6 °C)   SpO2: 94%   Weight: 102.1 kg (225 lb)   Height: 5' 7\" (1.702 m)     Physical Exam  Vitals signs and nursing note reviewed. Constitutional:       General: She is not in acute distress. Appearance: She is well-developed. She is not diaphoretic. Comments: NAD, well hydrated, non toxic     HENT:      Head: Normocephalic and atraumatic. Nose: Nose normal.      Mouth/Throat:      Pharynx: No oropharyngeal exudate. Eyes:      Conjunctiva/sclera: Conjunctivae normal.      Pupils: Pupils are equal, round, and reactive to light. Neck:      Musculoskeletal: Normal range of motion and neck supple. Cardiovascular:      Rate and Rhythm: Normal rate and regular rhythm. Heart sounds: Normal heart sounds. No murmur. Pulmonary:      Effort: Pulmonary effort is normal. No respiratory distress. Breath sounds: Normal breath sounds. No wheezing or rales. Abdominal:      General: There is no distension. Palpations: Abdomen is soft. Tenderness: There is no abdominal tenderness. There is no guarding. Musculoskeletal: Normal range of motion. Right hip: Normal.      Right knee: Normal.      Right ankle: Normal.      Right upper leg: She exhibits tenderness. She exhibits no bony tenderness, no swelling, no edema, no deformity and no laceration. Legs:    Lymphadenopathy:      Cervical: No cervical adenopathy. Skin:     General: Skin is warm. Findings: No rash.    Neurological:      Mental Status: She is alert and oriented to person, place, and time. Cranial Nerves: No cranial nerve deficit. Coordination: Coordination normal.   Psychiatric:         Behavior: Behavior normal.           Diagnostic Study Results     Labs -   No results found for this or any previous visit (from the past 12 hour(s)). Radiologic Studies -   XR FEMUR RT 2 VS   Final Result   IMPRESSION:      No acute radiographic abnormality involving the right femur. CT Results  (Last 48 hours)    None        CXR Results  (Last 48 hours)    None            Medical Decision Making   I am the first provider for this patient. I reviewed the vital signs, available nursing notes, past medical history, past surgical history, family history and social history. Vital Signs-Reviewed the patient's vital signs. Records Reviewed: Nursing notes, old medical records and any previous labs, imaging, visits, consultations pertinent to patient care    Procedures:  Procedures    ED Course: Progress Notes, Reevaluation, and Consults:      Provider Notes (Medical Decision Making):     Xray negative for acute process. Appropriate for out pt management. Pain along IT band,. Will given supportive treatment, NSAIDS, RICE and ortho f/u. Discussed proper way to take medications. Discussed treatment plan, return precautions, symptomatic relief, and expected time to improvement. All questions answered. Patient is stable for discharge and outpatient management. MED RECONCILIATION:  No current facility-administered medications for this encounter. Current Outpatient Medications   Medication Sig    ketorolac (TORADOL) 10 mg tablet Take 1 Tab by mouth every six (6) hours as needed for Pain for up to 5 days. Disposition:  home    DISCHARGE NOTE:     Pt has been reexamined. Patient has no new complaints, changes, or physical findings. Care plan outlined and precautions discussed. Discussed proper way to take medications.  Discussed treatment plan, return precautions, symptomatic relief, and expected time to improvement. All questions answered. Patient is stable for discharge and outpatient management. Patient is ready to go home. Follow-up Information     Follow up With Specialties Details Why Contact [facility] EMERGENCY DEPT Emergency Medicine   [address]  [phone]          Discharge Medication List as of 8/24/2020  4:32 PM      START taking these medications    Details   ketorolac (TORADOL) 10 mg tablet Take 1 Tab by mouth every six (6) hours as needed for Pain for up to 5 days. , Print, Disp-20 Tab,R-0                   Diagnosis     Clinical Impression:   1. Acute pain of right thigh          Dictation disclaimer:  Please note that this dictation was completed with [employer]uit, the computer voice recognition software. Quite often unanticipated grammatical, syntax, homophones, and other interpretive errors are inadvertently transcribed by the computer software. Please disregard these errors. Please excuse any errors that have escaped final proofreading.

## 2020-09-28 ENCOUNTER — LAB VISIT (OUTPATIENT)
Dept: LAB | Facility: HOSPITAL | Age: 45
End: 2020-09-28
Attending: NURSE PRACTITIONER
Payer: MEDICARE

## 2020-09-28 PROCEDURE — 83036 HEMOGLOBIN GLYCOSYLATED A1C: CPT

## 2020-09-28 PROCEDURE — 36415 COLL VENOUS BLD VENIPUNCTURE: CPT | Mod: PN

## 2020-09-29 LAB
ESTIMATED AVG GLUCOSE: 292 MG/DL (ref 68–131)
HBA1C MFR BLD HPLC: 11.8 % (ref 4–5.6)

## 2020-10-01 ENCOUNTER — PATIENT OUTREACH (OUTPATIENT)
Dept: ADMINISTRATIVE | Facility: OTHER | Age: 45
End: 2020-10-01

## 2020-10-01 NOTE — PROGRESS NOTES
Health Maintenance Due   Topic Date Due    Pneumococcal Vaccine (Medium Risk) (1 of 1 - PPSV23) 07/03/1995    Influenza Vaccine (1) 08/01/2020    Foot Exam  08/13/2020     Updates were requested from care everywhere.  Chart was reviewed for overdue Proactive [facility] Encounters (MATTHIEU) topics (CRS, Breast Cancer Screening, Eye exam)  Health Maintenance has been updated.  LINKS immunization registry triggered.  Immunizations were reconciled.

## 2020-10-02 ENCOUNTER — OFFICE VISIT (OUTPATIENT)
Dept: ENDOCRINOLOGY | Facility: CLINIC | Age: 45
End: 2020-10-02
Payer: MEDICARE

## 2020-10-02 VITALS
TEMPERATURE: 97 F | DIASTOLIC BLOOD PRESSURE: 99 MMHG | BODY MASS INDEX: 39.86 KG/M2 | WEIGHT: 254.5 LBS | HEART RATE: 67 BPM | SYSTOLIC BLOOD PRESSURE: 142 MMHG

## 2020-10-02 DIAGNOSIS — E78.5 HYPERLIPIDEMIA, UNSPECIFIED HYPERLIPIDEMIA TYPE: ICD-10-CM

## 2020-10-02 DIAGNOSIS — E66.01 SEVERE OBESITY (BMI 35.0-39.9) WITH COMORBIDITY: ICD-10-CM

## 2020-10-02 DIAGNOSIS — I10 HYPERTENSION, UNSPECIFIED TYPE: ICD-10-CM

## 2020-10-02 LAB — HM FOOT EXAM: NORMAL

## 2020-10-02 PROCEDURE — 99214 PR OFFICE/OUTPT VISIT, EST, LEVL IV, 30-39 MIN: ICD-10-PCS | Mod: S$GLB,,, | Performed by: NURSE PRACTITIONER

## 2020-10-02 PROCEDURE — 99214 OFFICE O/P EST MOD 30 MIN: CPT | Mod: S$GLB,,, | Performed by: NURSE PRACTITIONER

## 2020-10-02 PROCEDURE — 3080F DIAST BP >= 90 MM HG: CPT | Mod: CPTII,S$GLB,, | Performed by: NURSE PRACTITIONER

## 2020-10-02 PROCEDURE — 3046F HEMOGLOBIN A1C LEVEL >9.0%: CPT | Mod: CPTII,S$GLB,, | Performed by: NURSE PRACTITIONER

## 2020-10-02 PROCEDURE — 3080F PR MOST RECENT DIASTOLIC BLOOD PRESSURE >= 90 MM HG: ICD-10-PCS | Mod: CPTII,S$GLB,, | Performed by: NURSE PRACTITIONER

## 2020-10-02 PROCEDURE — 3046F PR MOST RECENT HEMOGLOBIN A1C LEVEL > 9.0%: ICD-10-PCS | Mod: CPTII,S$GLB,, | Performed by: NURSE PRACTITIONER

## 2020-10-02 PROCEDURE — 3077F PR MOST RECENT SYSTOLIC BLOOD PRESSURE >= 140 MM HG: ICD-10-PCS | Mod: CPTII,S$GLB,, | Performed by: NURSE PRACTITIONER

## 2020-10-02 PROCEDURE — 99999 PR PBB SHADOW E&M-EST. PATIENT-LVL V: ICD-10-PCS | Mod: PBBFAC,,, | Performed by: NURSE PRACTITIONER

## 2020-10-02 PROCEDURE — 3008F BODY MASS INDEX DOCD: CPT | Mod: CPTII,S$GLB,, | Performed by: NURSE PRACTITIONER

## 2020-10-02 PROCEDURE — 3008F PR BODY MASS INDEX (BMI) DOCUMENTED: ICD-10-PCS | Mod: CPTII,S$GLB,, | Performed by: NURSE PRACTITIONER

## 2020-10-02 PROCEDURE — 99999 PR PBB SHADOW E&M-EST. PATIENT-LVL V: CPT | Mod: PBBFAC,,, | Performed by: NURSE PRACTITIONER

## 2020-10-02 PROCEDURE — 3077F SYST BP >= 140 MM HG: CPT | Mod: CPTII,S$GLB,, | Performed by: NURSE PRACTITIONER

## 2020-10-02 RX ORDER — INSULIN DEGLUDEC 200 U/ML
INJECTION, SOLUTION SUBCUTANEOUS
Qty: 3 SYRINGE | Refills: 5 | Status: SHIPPED | OUTPATIENT
Start: 2020-10-02 | End: 2020-11-05 | Stop reason: SDUPTHER

## 2020-10-02 RX ORDER — INSULIN LISPRO 100 [IU]/ML
20 INJECTION, SOLUTION INTRAVENOUS; SUBCUTANEOUS
Qty: 1 BOX | Refills: 5 | Status: SHIPPED | OUTPATIENT
Start: 2020-10-02 | End: 2020-11-05

## 2020-10-02 RX ORDER — DULAGLUTIDE 0.75 MG/.5ML
0.75 INJECTION, SOLUTION SUBCUTANEOUS
Qty: 4 PEN | Refills: 2 | Status: SHIPPED | OUTPATIENT
Start: 2020-10-02 | End: 2021-02-19

## 2020-10-02 NOTE — PROGRESS NOTES
CC: This 45 y.o. Black or  female  is here for evaluation of  T2DM along with comorbidities indicated in the Visit Diagnosis section of this encounter.    HPI: Fabiana Chanel was diagnosed with T2DM in 2013. Metformin and a sulfonylurea started at the time of diagnosis.         Prior visit 6/2020  a1c is up from 7.3 to 8.3%.   She does not know why her A1c is higher. She found her BGs rising higher after she got her A1c back. Her appetite is low. May only eat once a day. She stopped Invokana on her own in March because it caused her hair to thin. She is open to trying Jardiance again.   Her weight is the same as from last visit.   She has been very stressed and anxious with pandemic. Has not seen her family or anyone else.   Kidney function is much lower from eGFR > 60 to 42 and creatinine 1 to 1.7. She was taking ibuprofen at least 1-2x/day in May.   Plan Start jardiance 10 mg once daily in the morning. Reviewed FDA indications of Jardiance versus with Invokana.   Test glucose 3x/day.   Return to clinic in 3 months for virtual visit with labs prior.         Interval history  a1c is up from 8.3 to 11.8%.   Pt had ran out of Trulicity for about a month. She resumed it at Trulicity 1.5 mg and had a lot of nausea and bloating/gas. She stopped it after that. Prior GI issues have resolved since stopping Trulicity. Appetite has increased.       PMHX: CHF,  MILE on cpap, atrial fibrillation, MI, ICD placement, NICM (cath 11/2017) EF 20% by echo 12/2018    LAST DIABETES EDUCATION: 6/19/19    RECENT ILLNESSES/HOSPITALIZATIONS - -  Admitted 12/28/18 x 2 nights for acute on chronic HF      HOSPITALIZED FOR DIABETES  -  Yes - for hyperglycemia     DIABETES MEDICATIONS:  Jardiance 10 mg once daily, Tresiba 60 units nightly,  Humalog Kwikpen 12 units ac      Misses medication doses - No  She injects Humalog about 1x/day as she only eats 1 meal/day     DM COMPLICATIONS: peripheral neuropathy and cardiovascular  disease    SIGNIFICANT DIABETES MED HISTORY:   diarrhea on metformin 1000 mg instant release; cannot afford topical.    Pt unable to tolerate metformin ER d/t diarrhea even on 500 mg twice daily.   Trulicity and Novolog started 11/2019  Invokana started 1/2020     SELF MONITORING BLOOD GLUCOSE: Checks blood glucose at home 2x/day.   BGs are in the 200s.   FBG was 221.     HYPOGLYCEMIC EPISODES: denies      CURRENT DIET: drinks water.  Eats 2 meals/day.  tries to snack when she doesn't get a meal in. Admits that her diet is not healthy as before because she has felt discouraged.   Supper was nuggets.       CURRENT EXERCISE:  None recent       BP (!) 142/99 (BP Location: Right arm, Patient Position: Sitting, BP Method: Large (Automatic))   Pulse 67   Temp 97.4 °F (36.3 °C) (Oral)   Wt 115.4 kg (254 lb 8 oz)   BMI 39.86 kg/m²       ROS:   CONSTITUTIONAL: Appetite good, + Fatigue   GI: negative    PHYSICAL EXAM:  GENERAL: Well developed, well nourished. No acute distress.   PSYCH: AAOx3,  conversant, well-groomed. Judgement and insight good. Anxious, worried   NEURO: Cranial nerves grossly intact. Speech clear, no tremor.   CHEST: Respirations even and unlabored. CTA, diminished bilaterally     Foot exam:     Protective Sensation (w/ 10 gram monofilament):  Right: Intact  Left: Intact    Visual Inspection:  Skin Breakdown -  Neither    Pedal Pulses:   Right: Present  Left: Present    Posterior tibialis:   Right:Present  Left: Present      Hemoglobin A1C   Date Value Ref Range Status   09/28/2020 11.8 (H) 4.0 - 5.6 % Final     Comment:     ADA Screening Guidelines:  5.7-6.4%  Consistent with prediabetes  >or=6.5%  Consistent with diabetes  High levels of fetal hemoglobin interfere with the HbA1C  assay. Heterozygous hemoglobin variants (HbS, HgC, etc)do  not significantly interfere with this assay.   However, presence of multiple variants may affect accuracy.     06/03/2020 8.3 (H) 4.0 - 5.6 % Final     Comment:      ADA Screening Guidelines:  5.7-6.4%  Consistent with prediabetes  >or=6.5%  Consistent with diabetes  High levels of fetal hemoglobin interfere with the HbA1C  assay. Heterozygous hemoglobin variants (HbS, HgC, etc)do  not significantly interfere with this assay.   However, presence of multiple variants may affect accuracy.     02/19/2020 7.3 (H) 4.0 - 5.6 % Final     Comment:     ADA Screening Guidelines:  5.7-6.4%  Consistent with prediabetes  >or=6.5%  Consistent with diabetes  High levels of fetal hemoglobin interfere with the HbA1C  assay. Heterozygous hemoglobin variants (HbS, HgC, etc)do  not significantly interfere with this assay.   However, presence of multiple variants may affect accuracy.             Chemistry        Component Value Date/Time     06/25/2020 1103    K 3.8 06/25/2020 1103    CL 95 06/25/2020 1103    CO2 35 (H) 06/25/2020 1103    BUN 19 06/25/2020 1103    CREATININE 1.5 (H) 06/25/2020 1103     (H) 06/25/2020 1103        Component Value Date/Time    CALCIUM 9.3 06/25/2020 1103    ALKPHOS 74 05/16/2020 1926    AST 15 05/16/2020 1926    ALT 14 05/16/2020 1926    BILITOT 0.4 05/16/2020 1926    ESTGFRAFRICA 49 (A) 06/25/2020 1103    EGFRNONAA 42 (A) 06/25/2020 1103          Lab Results   Component Value Date    LDLCALC 82.0 02/19/2020        Ref. Range 11/18/2019 08:13 2/19/2020 10:22   Cholesterol Latest Ref Range: 120 - 199 mg/dL 199 143   HDL Latest Ref Range: 40 - 75 mg/dL 44 42   Hdl/Cholesterol Ratio Latest Ref Range: 20.0 - 50.0 % 22.1 29.4   LDL Cholesterol External Latest Ref Range: 63.0 - 159.0 mg/dL 97.6 82.0   Non-HDL Cholesterol Latest Units: mg/dL 155 101   Total Cholesterol/HDL Ratio Latest Ref Range: 2.0 - 5.0  4.5 3.4   Triglycerides Latest Ref Range: 30 - 150 mg/dL 287 (H) 95         Lab Results   Component Value Date    MICALBCREAT 44.7 (H) 08/13/2019           ASSESSMENT and PLAN:    A1C GOAL: < 7 %       1. Type 2 diabetes, uncontrolled, with neuropathy   Continue Tresiba at 60 units once daily.   Resume Trulicity but at only 0.75 mg once weekly. Hopefully you will not have any upset stomach on the low dose.     Increase Humalog from 12 to 16 units before each meal.     Stop Jardiance for now since you are having frequent yeast infections. We can resume this once blood sugars drop less than 200.     If blood sugars are higher than 180 after a few days, you can increase the Humalog from 16 to 20 units before each meal.     Test glucose before each meal and bedtime. Keep a blood sugar log and bring to each visit.     Follow up in a month.      2. Severe obesity (BMI 35.0-39.9) with comorbidity  Increases insulin resistance.      3. Hypertension, unspecified type  Suboptimal. Will reassess BP at next visit.    4. Hyperlipidemia, unspecified hyperlipidemia type  Continue Crestor.            No orders of the defined types were placed in this encounter.       Follow up in about 4 weeks (around 10/30/2020).

## 2020-10-02 NOTE — PATIENT INSTRUCTIONS
Continue Tresiba at 60 units once daily.   Resume Trulicity but at only 0.75 mg once weekly. Hopefully you will not have any upset stomach on the low dose.     Increase Humalog from 12 to 16 units before each meal.     Stop Jardiance for now since you are having frequent yeast infections. We can resume this once blood sugars drop less than 200.     If blood sugars are higher than 180 after a few days, you can increase the Humalog from 16 to 20 units before each meal.     Test glucose before each meal and bedtime. Keep a blood sugar log and bring to each visit.     Follow up in a month.

## 2020-10-05 ENCOUNTER — TELEPHONE (OUTPATIENT)
Dept: ENDOCRINOLOGY | Facility: CLINIC | Age: 45
End: 2020-10-05

## 2020-10-05 ENCOUNTER — PATIENT MESSAGE (OUTPATIENT)
Dept: ADMINISTRATIVE | Facility: HOSPITAL | Age: 45
End: 2020-10-05

## 2020-10-05 ENCOUNTER — OFFICE VISIT (OUTPATIENT)
Dept: PULMONOLOGY | Facility: CLINIC | Age: 45
End: 2020-10-05
Payer: MEDICARE

## 2020-10-05 VITALS
TEMPERATURE: 97 F | SYSTOLIC BLOOD PRESSURE: 112 MMHG | BODY MASS INDEX: 40.17 KG/M2 | WEIGHT: 255.94 LBS | HEIGHT: 67 IN | OXYGEN SATURATION: 99 % | HEART RATE: 60 BPM | DIASTOLIC BLOOD PRESSURE: 74 MMHG

## 2020-10-05 DIAGNOSIS — G47.33 OSA TREATED WITH BIPAP: ICD-10-CM

## 2020-10-05 DIAGNOSIS — I42.8 NICM (NONISCHEMIC CARDIOMYOPATHY): ICD-10-CM

## 2020-10-05 PROCEDURE — 99999 PR PBB SHADOW E&M-EST. PATIENT-LVL IV: ICD-10-PCS | Mod: PBBFAC,,, | Performed by: INTERNAL MEDICINE

## 2020-10-05 PROCEDURE — 3008F PR BODY MASS INDEX (BMI) DOCUMENTED: ICD-10-PCS | Mod: CPTII,S$GLB,, | Performed by: INTERNAL MEDICINE

## 2020-10-05 PROCEDURE — 99214 PR OFFICE/OUTPT VISIT, EST, LEVL IV, 30-39 MIN: ICD-10-PCS | Mod: S$GLB,,, | Performed by: INTERNAL MEDICINE

## 2020-10-05 PROCEDURE — 3074F PR MOST RECENT SYSTOLIC BLOOD PRESSURE < 130 MM HG: ICD-10-PCS | Mod: CPTII,S$GLB,, | Performed by: INTERNAL MEDICINE

## 2020-10-05 PROCEDURE — 3078F DIAST BP <80 MM HG: CPT | Mod: CPTII,S$GLB,, | Performed by: INTERNAL MEDICINE

## 2020-10-05 PROCEDURE — 99999 PR PBB SHADOW E&M-EST. PATIENT-LVL IV: CPT | Mod: PBBFAC,,, | Performed by: INTERNAL MEDICINE

## 2020-10-05 PROCEDURE — 99499 UNLISTED E&M SERVICE: CPT | Mod: S$GLB,,, | Performed by: INTERNAL MEDICINE

## 2020-10-05 PROCEDURE — 99214 OFFICE O/P EST MOD 30 MIN: CPT | Mod: S$GLB,,, | Performed by: INTERNAL MEDICINE

## 2020-10-05 PROCEDURE — 3074F SYST BP LT 130 MM HG: CPT | Mod: CPTII,S$GLB,, | Performed by: INTERNAL MEDICINE

## 2020-10-05 PROCEDURE — 3008F BODY MASS INDEX DOCD: CPT | Mod: CPTII,S$GLB,, | Performed by: INTERNAL MEDICINE

## 2020-10-05 PROCEDURE — 3078F PR MOST RECENT DIASTOLIC BLOOD PRESSURE < 80 MM HG: ICD-10-PCS | Mod: CPTII,S$GLB,, | Performed by: INTERNAL MEDICINE

## 2020-10-05 PROCEDURE — 99499 RISK ADDL DX/OHS AUDIT: ICD-10-PCS | Mod: S$GLB,,, | Performed by: INTERNAL MEDICINE

## 2020-10-05 NOTE — TELEPHONE ENCOUNTER
----- Message from Xiomara Daniel NP sent at 10/2/2020  2:18 PM CDT -----  Please see if pt wants to convert her next visit to virtual

## 2020-10-05 NOTE — TELEPHONE ENCOUNTER
Tried contacting the pt to see if she was okay with doing a virtual visit for her follow up. The pt did not answer. LVM explaining and to return call.

## 2020-10-05 NOTE — PROGRESS NOTES
Fabiana Chanel  was seen as a follow up.    CHIEF COMPLAINT:    Chief Complaint   Patient presents with    Apnea       HISTORY OF PRESENT ILLNESS: Fabiana Chanel is a 45 y.o. female is here for sleep evaluation.   Our first encounter was 2/8/18.  At at time, patient was provided bipap of 15/7 in 2015 after hospitalization for acute respiratory failure requiring intubation.  Patient was doing well with bipap until machine dropped.  Machine with loud noise when it is on and it does not produced adequate pressure.  Patient have not used bpap x 8 months.  Sleep has gotten worse without cpap.     Without cpap, patient with dry mouth upon awake.  +gasping sensation upon awake 3-4 times per night.  +fatigue upon awake.  No parasomnia.  No cataplexy.  No rls symptoms.      Wynne Sleepiness Scale score during initial sleep evaluation was 13.  Today's ESS is 11.      Patient underwent split study 4/23/18 with ahi of 133 with optimal bipap of 19/11.  Currently patient is with full face mask.  Patient is wearing bipap nightly.  +dry throat and mouth.  On some days, patient recall waking up with parched tongue.  No nasal congestion.  Overall sleep is better with bipap    SLEEP ROUTINE:  Activity the hour prior to sleep: watch tv in bed    Bed partner:    Time to bed:  10 pm   Lights off:  Lamp on   Sleep onset latency:  15 mintues        Disruptions or awakenings:    3 times (no difficulty going back to sleep)    Wakeup time:      8 am   Perceived sleep quality:  rested   Daytime naps:      60 minutes 2 times per day  Weekend sleep routine:      10 pm to 10 am  Caffeine use: none   exercise habit:   none      PAST MEDICAL HISTORY:    Active Ambulatory Problems     Diagnosis Date Noted    Type 2 diabetes mellitus without complication, without long-term current use of insulin 09/20/2013    Iron deficiency anemia 09/20/2013    Essential hypertension 05/14/2014    Paroxysmal atrial fibrillation 05/14/2014     Obesity with body mass index (BMI) of 30.0 to 39.9 05/14/2014    Mixed hyperlipidemia 05/16/2014    Chronic combined systolic and diastolic heart failure 05/16/2014    Dyspnea 08/12/2014    Pulmonary edema 08/12/2014    Seizure 05/16/2015    MILE treated with BiPAP 05/16/2015    Obesity hypoventilation syndrome 05/16/2015    Vulvar ulceration 08/18/2015    NICM (nonischemic cardiomyopathy) 04/17/2018    Implantable cardioverter-defibrillator (ICD) in situ 06/21/2018    Fatigue 04/26/2019    GERD (gastroesophageal reflux disease) 05/09/2019    Psoriasis 06/18/2019    Elevated troponin 05/17/2020    DM (diabetes mellitus) 09/19/2013     Resolved Ambulatory Problems     Diagnosis Date Noted    Atrial fibrillation with rapid ventricular response 09/18/2013    HTN (hypertension) 09/19/2013    Tobacco abuse 09/19/2013    Long term (current) use of anticoagulants 09/27/2013    Chest pain 05/13/2014    Elevated troponin 05/14/2014    Mild protein malnutrition 05/14/2014    Hypoxia 05/16/2014    Respiratory acidosis 05/16/2014    Hypercapnic respiratory failure, chronic 05/17/2014    CHF (congestive heart failure) 08/12/2014    Hyperglycemia 08/12/2014    Acute on chronic combined systolic (congestive) and diastolic (congestive) heart failure 12/09/2014    SOB (shortness of breath) 12/09/2014    Acute on chronic combined systolic and diastolic heart failure, NYHA class 2 12/09/2014    Acute respiratory failure with hypercapnia 05/15/2015    Anemia associated with acute blood loss 05/16/2015    Excessive vaginal bleeding 05/16/2015    Altered mental status 05/16/2015    Convulsion 05/19/2015    HSV (herpes simplex virus) anogenital infection 07/13/2015    Other convulsions     Treponemal infection 08/18/2015    ACS (acute coronary syndrome) 11/27/2017    Acute on chronic combined systolic and diastolic congestive heart failure 11/27/2017    Acute bronchitis     Nonischemic  cardiomyopathy 01/18/2018    Upper respiratory tract infection     Pneumonitis     Muscle strain of left upper back 12/25/2018    Episodic lightheadedness 04/26/2019     Past Medical History:   Diagnosis Date    Atrial fibrillation     Blood clot associated with vein wall inflammation     Cardiomyopathy     Hyperlipidemia     Hypertension     Sleep apnea                 PAST SURGICAL HISTORY:    Past Surgical History:   Procedure Laterality Date    CARDIAC CATHETERIZATION      COLONOSCOPY      DILATION AND CURETTAGE OF UTERUS      ESOPHAGOGASTRODUODENOSCOPY N/A 5/9/2019    Procedure: EGD (ESOPHAGOGASTRODUODENOSCOPY);  Surgeon: Vielka Burrell MD;  Location: [facility];  Service: Endoscopy;  Laterality: N/A;         FAMILY HISTORY:                Family History   Problem Relation Age of Onset    Hypertension Mother     Hypertension Father     Diabetes Father     Diabetes Maternal Grandmother     Diabetes Paternal Grandmother     Breast cancer Neg Hx     Colon cancer Neg Hx     Ovarian cancer Neg Hx        SOCIAL HISTORY:          Tobacco:   Social History     Tobacco Use   Smoking Status Never Smoker   Smokeless Tobacco Never Used   Tobacco Comment    smokes cigars on occasion       alcohol use:    Social History     Substance and Sexual Activity   Alcohol Use Yes    Comment: occasional                 Occupation:  disable    ALLERGIES:    Review of patient's allergies indicates:   Allergen Reactions    Pneumococcal 23-abimbola ps vaccine        CURRENT MEDICATIONS:    Current Outpatient Medications   Medication Sig Dispense Refill    apixaban (ELIQUIS) 5 mg Tab Take 1 tablet (5 mg total) by mouth 2 (two) times daily. 180 tablet 3    BLOOD PRESSURE CUFF Misc 1 kit by Misc.(Non-Drug; Combo Route) route 2 (two) times daily. 1 each 0    blood sugar diagnostic Strp Check blood glucose 3x/day. 300 strip 3    diclofenac sodium (VOLTAREN) 1 % Gel Apply 2 g topically 2 (two) times daily. 100 g 1  "   dulaglutide (TRULICITY) 0.75 mg/0.5 mL pen injector Inject 0.75 mg into the skin every 7 days. 4 pen 2    fluticasone (FLONASE) 50 mcg/actuation nasal spray 1 spray (50 mcg total) by Each Nare route once daily. 16 g 1    furosemide (LASIX) 40 MG tablet Take 1 tablet (40 mg total) by mouth once daily. 90 tablet 3    gabapentin (NEURONTIN) 400 MG capsule Take 1 capsule (400 mg total) by mouth 3 (three) times daily as needed (pain). 90 capsule 1    HUMIRA,CF, PEN 40 mg/0.4 mL PnKt INJECT ONE 40MG INJECTION SUBCUTANEOUSLY EVERY OTHER WEEK  3    insulin degludec (TRESIBA FLEXTOUCH U-200) 200 unit/mL (3 mL) InPn Inject 60 units once daily. 3 Syringe 5    insulin lispro (HUMALOG KWIKPEN INSULIN) 100 unit/mL pen Inject 20 Units into the skin 3 (three) times daily before meals. 1 Box 5    metoprolol succinate (TOPROL-XL) 100 MG 24 hr tablet Take 1 tablet by mouth once daily 90 tablet 3    metoprolol succinate (TOPROL-XL) 50 MG 24 hr tablet Take 1 tablet (50 mg total) by mouth once daily. 30 tablet 3    ondansetron (ZOFRAN) 4 MG tablet Take 1 tablet (4 mg total) by mouth every 8 (eight) hours as needed for Nausea. 8 tablet 0    pantoprazole (PROTONIX) 40 MG tablet Take 1 tablet (40 mg total) by mouth once daily. 30 tablet 11    pen needle, diabetic (BD ULTRA-FINE LORI PEN NEEDLE) 32 gauge x 5/32" Ndle Use 4x/day 400 each 11    rosuvastatin (CRESTOR) 40 MG Tab Take 1 tablet (40 mg total) by mouth every evening. 90 tablet 2    spironolactone (ALDACTONE) 50 MG tablet Take 1 tablet by mouth once daily 90 tablet 3    triamcinolone acetonide 0.1% (KENALOG) 0.1 % cream 2 (two) times daily. Apply to affected area  4    blood glucose control, high Soln 1 each by Misc.(Non-Drug; Combo Route) route once. for 1 dose 1 each 3    blood glucose control, low Soln 1 each by Misc.(Non-Drug; Combo Route) route once. for 1 dose 1 each 3    blood-glucose meter (TRUE METRIX AIR GLUCOSE METER) Misc 1 each by Misc.(Non-Drug; " "Combo Route) route 3 (three) times daily. 1 each 0    cetirizine (ZYRTEC) 10 MG tablet Take 1 tablet (10 mg total) by mouth once daily. for 14 days (Patient not taking: Reported on 11/21/2019) 14 tablet 0     No current facility-administered medications for this visit.                   REVIEW OF SYSTEMS:     Sleep related symptoms as per HPI.  CONST:Denies weight gain; loosing weight    HEENT: + sinus congestion  PULM: +dyspnea x 2 blocks or less  CARD:  +intermittent palpitations - follow by Dr. Yanez  GI:  +intermittent acid reflux  : Denies polyuria  NEURO: Denies headaches  PSYCH: +depression  HEME: Denies anemia   Otherwise, a balance of systems reviewed is negative.          PHYSICAL EXAM:  Vitals:    10/05/20 1000   BP: 112/74   Pulse: 60   Temp: 97.3 °F (36.3 °C)   TempSrc: Temporal   SpO2: 99%   Weight: 116.1 kg (255 lb 15.3 oz)   Height: 5' 7" (1.702 m)   PainSc: 0-No pain     Body mass index is 40.09 kg/m².     GENERAL: Normal development, well groomed  HEENT:  Conjunctivae are non-erythematous; Pupils equal, round, and reactive to light; Nose is symmetrical; Nasal mucosa is pink and moist; Septum is midline; Inferior turbinates are normal; Nasal airflow is normal; Posterior pharynx is pink; Modified Mallampati: 4; Posterior palate is normal; Tonsils +1; Uvula is normal and pink;Tongue is normal; Dentition is fair; No TMJ tenderness; Jaw opening and protrusion without click and without discomfort.  NECK: Supple. Neck circumference is 16 inches. No thyromegaly. No palpable nodes.     SKIN: On face and neck: No abrasions, no rashes, no lesions.  No subcutaneous nodules are palpable.  RESPIRATORY: Chest is clear to auscultation.  Normal chest expansion and non-labored breathing at rest.  CARDIOVASCULAR: Normal S1, S2.  No murmurs, gallops or rubs. No carotid bruits bilaterally.  EXTREMITIES: No edema. No clubbing. No cyanosis. Station normal. Gait normal.        NEURO/PSYCH: Oriented to time, place and " person. Normal attention span and concentration. Affect is full. Mood is normal.                                              DATA   Split study 4/14/18 ahi of 133 with optimal bipap of 19/11    5/17/20 echo  · Severely decreased left ventricular systolic function. The estimated ejection fraction is 15%.  · Moderate left ventricular enlargement.  · Eccentric left ventricular hypertrophy.  · Grade I (mild) left ventricular diastolic dysfunction consistent with impaired relaxation.  · Normal right ventricular systolic function.  · Mild mitral regurgitation.  · Normal central venous pressure (3 mmHg).  · The estimated PA systolic pressure is 36 mmHg.    Lab Results   Component Value Date    TSH 1.092 05/16/2020     ASSESSMENT  Problem List Items Addressed This Visit     NICM (nonischemic cardiomyopathy)    Overview     reduced ef.  Followed by Dr. Yanez.  Appear euvolumic.  Metoprolol, lasix and spironolactone.           MILE treated with BiPAP    Overview     Split night Sleep Study 4/14/18:    The overall AHI was 133.1 with an oxygen nataly of 74.0%.   Effective control of sleep disordered breathing was seen during supine REM stage sleep at a pressure of 19/11 cm of water.      The patient is benefitting from BiPAP.  100%>4 hours.  Residual 1.2 events per hour.           Current Assessment & Plan     Will switch from simplus full face to nasal mask with chin strap in hope of mitigating dry mouth.           Relevant Orders    CPAP/BIPAP SUPPLIES           Insomnia - maintenance is the issue.  Slept better while having bipap.      Obesity - aware of need for drastic weight loss.  Loosing weight with chf optimization.      Education: During our discussion today, we talked about the etiology of obstructive sleep apnea as well as the potential ramifications of untreated sleep apnea, which could include daytime sleepiness, hypertension, heart disease and/or stroke.     Precautions: The patient was advised to abstain from  driving should they feel sleepy or drowsy.       Patient will No follow-ups on file. with md/np.    Please cc note to  No ref. provider found.     This is 25 minutes visit, over 50% of time spent in direct consultation with patient.

## 2020-10-05 NOTE — ASSESSMENT & PLAN NOTE
Will switch from simplus full face to nasal mask with chin strap in hope of mitigating dry mouth.

## 2020-10-06 ENCOUNTER — PATIENT MESSAGE (OUTPATIENT)
Dept: ENDOCRINOLOGY | Facility: CLINIC | Age: 45
End: 2020-10-06

## 2020-11-01 ENCOUNTER — CLINICAL SUPPORT (OUTPATIENT)
Dept: CARDIOLOGY | Facility: HOSPITAL | Age: 45
End: 2020-11-01
Payer: MEDICARE

## 2020-11-01 DIAGNOSIS — I42.5 OTHER RESTRICTIVE CARDIOMYOPATHY: ICD-10-CM

## 2020-11-01 DIAGNOSIS — I50.42 CHRONIC COMBINED SYSTOLIC (CONGESTIVE) AND DIASTOLIC (CONGESTIVE) HEART FAILURE: ICD-10-CM

## 2020-11-01 DIAGNOSIS — Z95.810 PRESENCE OF AUTOMATIC (IMPLANTABLE) CARDIAC DEFIBRILLATOR: ICD-10-CM

## 2020-11-01 PROCEDURE — 93296 REM INTERROG EVL PM/IDS: CPT | Performed by: INTERNAL MEDICINE

## 2020-11-01 PROCEDURE — 93295 DEV INTERROG REMOTE 1/2/MLT: CPT | Mod: ,,, | Performed by: INTERNAL MEDICINE

## 2020-11-01 PROCEDURE — 93295 CARDIAC DEVICE CHECK - REMOTE: ICD-10-PCS | Mod: ,,, | Performed by: INTERNAL MEDICINE

## 2020-11-04 ENCOUNTER — HOSPITAL ENCOUNTER (EMERGENCY)
Facility: HOSPITAL | Age: 45
Discharge: HOME | End: 2020-11-04
Attending: EMERGENCY MEDICINE
Payer: MEDICARE

## 2020-11-04 ENCOUNTER — PATIENT OUTREACH (OUTPATIENT)
Dept: ADMINISTRATIVE | Facility: OTHER | Age: 45
End: 2020-11-04

## 2020-11-04 VITALS
OXYGEN SATURATION: 98 % | HEIGHT: 67 IN | HEART RATE: 76 BPM | RESPIRATION RATE: 18 BRPM | TEMPERATURE: 98 F | WEIGHT: 245 LBS | SYSTOLIC BLOOD PRESSURE: 116 MMHG | BODY MASS INDEX: 38.45 KG/M2 | DIASTOLIC BLOOD PRESSURE: 78 MMHG

## 2020-11-04 DIAGNOSIS — N93.9 VAGINAL BLEEDING: Primary | ICD-10-CM

## 2020-11-04 LAB
ALBUMIN SERPL BCP-MCNC: 3.8 G/DL (ref 3.5–5.2)
ALP SERPL-CCNC: 77 U/L (ref 55–135)
ALT SERPL W/O P-5'-P-CCNC: 16 U/L (ref 10–44)
ANION GAP SERPL CALC-SCNC: 7 MMOL/L (ref 8–16)
APTT BLDCRRT: 29.5 SEC (ref 21–32)
AST SERPL-CCNC: 17 U/L (ref 10–40)
B-HCG UR QL: NEGATIVE
B-HCG UR QL: NEGATIVE
BACTERIA GENITAL QL WET PREP: ABNORMAL
BASOPHILS # BLD AUTO: 0.03 K/UL (ref 0–0.2)
BASOPHILS NFR BLD: 0.5 % (ref 0–1.9)
BILIRUB SERPL-MCNC: 0.4 MG/DL (ref 0.1–1)
BILIRUB UR QL STRIP: NEGATIVE
BUN SERPL-MCNC: 18 MG/DL (ref 6–20)
CALCIUM SERPL-MCNC: 8.9 MG/DL (ref 8.7–10.5)
CHLORIDE SERPL-SCNC: 99 MMOL/L (ref 95–110)
CLARITY UR: CLEAR
CLUE CELLS VAG QL WET PREP: ABNORMAL
CO2 SERPL-SCNC: 32 MMOL/L (ref 23–29)
COLOR UR: ABNORMAL
CREAT SERPL-MCNC: 1.3 MG/DL (ref 0.5–1.4)
CTP QC/QA: YES
CTP QC/QA: YES
DIFFERENTIAL METHOD: ABNORMAL
EOSINOPHIL # BLD AUTO: 0.2 K/UL (ref 0–0.5)
EOSINOPHIL NFR BLD: 2.8 % (ref 0–8)
ERYTHROCYTE [DISTWIDTH] IN BLOOD BY AUTOMATED COUNT: 15 % (ref 11.5–14.5)
EST. GFR  (AFRICAN AMERICAN): 58 ML/MIN/1.73 M^2
EST. GFR  (NON AFRICAN AMERICAN): 50 ML/MIN/1.73 M^2
FILAMENT FUNGI VAG WET PREP-#/AREA: ABNORMAL
GLUCOSE SERPL-MCNC: 168 MG/DL (ref 70–110)
GLUCOSE UR QL STRIP: NEGATIVE
HCT VFR BLD AUTO: 40.5 % (ref 37–48.5)
HGB BLD-MCNC: 12.8 G/DL (ref 12–16)
HGB UR QL STRIP: ABNORMAL
IMM GRANULOCYTES # BLD AUTO: 0.02 K/UL (ref 0–0.04)
IMM GRANULOCYTES NFR BLD AUTO: 0.3 % (ref 0–0.5)
INR PPP: 0.9 (ref 0.8–1.2)
KETONES UR QL STRIP: NEGATIVE
LEUKOCYTE ESTERASE UR QL STRIP: NEGATIVE
LYMPHOCYTES # BLD AUTO: 1.8 K/UL (ref 1–4.8)
LYMPHOCYTES NFR BLD: 29.1 % (ref 18–48)
MCH RBC QN AUTO: 28.6 PG (ref 27–31)
MCHC RBC AUTO-ENTMCNC: 31.6 G/DL (ref 32–36)
MCV RBC AUTO: 90 FL (ref 82–98)
MICROSCOPIC COMMENT: ABNORMAL
MONOCYTES # BLD AUTO: 0.4 K/UL (ref 0.3–1)
MONOCYTES NFR BLD: 6.6 % (ref 4–15)
NEUTROPHILS # BLD AUTO: 3.8 K/UL (ref 1.8–7.7)
NEUTROPHILS NFR BLD: 60.7 % (ref 38–73)
NITRITE UR QL STRIP: NEGATIVE
NRBC BLD-RTO: 0 /100 WBC
PH UR STRIP: 6 [PH] (ref 5–8)
PLATELET # BLD AUTO: 211 K/UL (ref 150–350)
PMV BLD AUTO: 10.4 FL (ref 9.2–12.9)
POTASSIUM SERPL-SCNC: 3.8 MMOL/L (ref 3.5–5.1)
PROT SERPL-MCNC: 7.7 G/DL (ref 6–8.4)
PROT UR QL STRIP: NEGATIVE
PROTHROMBIN TIME: 10 SEC (ref 9–12.5)
RBC # BLD AUTO: 4.48 M/UL (ref 4–5.4)
RBC #/AREA URNS HPF: >100 /HPF (ref 0–4)
SODIUM SERPL-SCNC: 138 MMOL/L (ref 136–145)
SP GR UR STRIP: 1.01 (ref 1–1.03)
SPECIMEN SOURCE: ABNORMAL
T VAGINALIS GENITAL QL WET PREP: ABNORMAL
URN SPEC COLLECT METH UR: ABNORMAL
UROBILINOGEN UR STRIP-ACNC: NEGATIVE EU/DL
WBC # BLD AUTO: 6.32 K/UL (ref 3.9–12.7)
WBC #/AREA VAG WET PREP: ABNORMAL
YEAST GENITAL QL WET PREP: ABNORMAL

## 2020-11-04 PROCEDURE — 85610 PROTHROMBIN TIME: CPT

## 2020-11-04 PROCEDURE — 96374 THER/PROPH/DIAG INJ IV PUSH: CPT

## 2020-11-04 PROCEDURE — 99284 EMERGENCY DEPT VISIT MOD MDM: CPT | Mod: 25,27

## 2020-11-04 PROCEDURE — 81025 URINE PREGNANCY TEST: CPT | Performed by: EMERGENCY MEDICINE

## 2020-11-04 PROCEDURE — 81000 URINALYSIS NONAUTO W/SCOPE: CPT

## 2020-11-04 PROCEDURE — 81025 URINE PREGNANCY TEST: CPT | Performed by: PHYSICIAN ASSISTANT

## 2020-11-04 PROCEDURE — 80053 COMPREHEN METABOLIC PANEL: CPT

## 2020-11-04 PROCEDURE — 85025 COMPLETE CBC W/AUTO DIFF WBC: CPT

## 2020-11-04 PROCEDURE — 99284 EMERGENCY DEPT VISIT MOD MDM: CPT

## 2020-11-04 PROCEDURE — 87210 SMEAR WET MOUNT SALINE/INK: CPT

## 2020-11-04 PROCEDURE — 85730 THROMBOPLASTIN TIME PARTIAL: CPT

## 2020-11-04 NOTE — ED PROVIDER NOTES
Encounter Date: 11/4/2020       History     Chief Complaint   Patient presents with    Vaginal Bleeding     heavy bleeding started Sunday with clots      Ms. Chanel is a 46 y/o AA female with history of AFib, CHF, diabetes on Eliquis presenting to the ED with vaginal bleeding x 3 days.  Patient reports saturating for pads daily.  She has been on Eliquis for 4 years. She endorses one short, 3-5 minute episode of RUQ cramping on the car ride here, but denies pain, n/v/d, dysuria, headache, weakness, lightheadness, SOB, CP, fever.  Patient reports irregular menstrual cycles, with last cycle being in September.  Patient's OBGYN is Dr. Miguel Lincoln.    The history is provided by the patient. No  was used.     Review of patient's allergies indicates:   Allergen Reactions    Metformin      Diarrhea on metformin XR     Pneumococcal 23-abimbola ps vaccine      Past Medical History:   Diagnosis Date    Atrial fibrillation     Blood clot associated with vein wall inflammation     not dvt    Cardiomyopathy     Normal cors on cath 11/2017    CHF (congestive heart failure)     DM (diabetes mellitus) 9/19/2013    Hyperlipidemia     Hypertension     Psoriasis     Sleep apnea      Past Surgical History:   Procedure Laterality Date    CARDIAC CATHETERIZATION      COLONOSCOPY      DILATION AND CURETTAGE OF UTERUS      ESOPHAGOGASTRODUODENOSCOPY N/A 5/9/2019    Procedure: EGD (ESOPHAGOGASTRODUODENOSCOPY);  Surgeon: Vielka Burrell MD;  Location: [facility];  Service: Endoscopy;  Laterality: N/A;     Family History   Problem Relation Age of Onset    Hypertension Mother     Hypertension Father     Diabetes Father     Diabetes Maternal Grandmother     Diabetes Paternal Grandmother     Breast cancer Neg Hx     Colon cancer Neg Hx     Ovarian cancer Neg Hx      Social History     Tobacco Use    Smoking status: Never Smoker    Smokeless tobacco: Never Used    Tobacco comment: smokes cigars on  occasion   Substance Use Topics    Alcohol use: Yes     Comment: occasional    Drug use: No     Comment: hx of marijuana use 3 years ago     Review of Systems   Constitutional: Negative for chills and fatigue.   Gastrointestinal: Negative for abdominal distention, abdominal pain, anal bleeding, blood in stool and constipation.   Genitourinary: Positive for vaginal bleeding.   All other systems reviewed and are negative.      Physical Exam     Initial Vitals [11/04/20 0843]   BP Pulse Resp Temp SpO2   120/75 78 18 97.9 °F (36.6 °C) 96 %      MAP       --         Physical Exam    Constitutional: She appears well-developed and well-nourished. She is not diaphoretic. No distress.   HENT:   Head: Normocephalic and atraumatic.   Right Ear: External ear normal.   Left Ear: External ear normal.   Nose: Nose normal.   Mouth/Throat: Oropharynx is clear and moist.   Eyes: EOM are normal. Pupils are equal, round, and reactive to light.   Neck: Normal range of motion. Neck supple.   Cardiovascular: Normal rate, regular rhythm, normal heart sounds and intact distal pulses.   Pulmonary/Chest: Breath sounds normal. No respiratory distress.   Abdominal: Soft. Bowel sounds are normal. She exhibits no distension and no mass. There is no abdominal tenderness. There is no rebound and no guarding.   Genitourinary:    Uterus normal.   There is no rash, tenderness, lesion or injury on the right labia. There is no rash, tenderness, lesion or injury on the left labia. Cervix exhibits no motion tenderness, no discharge and no friability. Right adnexum displays no mass, no tenderness and no fullness. Left adnexum displays no mass, no tenderness and no fullness.    Vaginal bleeding present.   There is bleeding in the vagina.    Genitourinary Comments: Cervical os closed.  Small amount (5 ml) of bleeding in the vault.  No large clots, tissue.  No pooling of blood.     Musculoskeletal: Normal range of motion. No tenderness or edema.    Neurological: She is alert and oriented to person, place, and time. She has normal strength.   Skin: Skin is warm and dry. Capillary refill takes less than 2 seconds.   Psychiatric: She has a normal mood and affect. Her behavior is normal.         ED Course   Procedures  Labs Reviewed   URINALYSIS, REFLEX TO URINE CULTURE - Abnormal; Notable for the following components:       Result Value    Color, UA Red (*)     Occult Blood UA 3+ (*)     All other components within normal limits    Narrative:     Specimen Source->Urine   CBC W/ AUTO DIFFERENTIAL - Abnormal; Notable for the following components:    MCHC 31.6 (*)     RDW 15.0 (*)     All other components within normal limits   URINALYSIS MICROSCOPIC - Abnormal; Notable for the following components:    RBC, UA >100 (*)     All other components within normal limits    Narrative:     Specimen Source->Urine   VAGINAL SCREEN - Abnormal; Notable for the following components:    Bacteria - Vaginal Screen Occasional (*)     All other components within normal limits   COMPREHENSIVE METABOLIC PANEL - Abnormal; Notable for the following components:    CO2 32 (*)     Glucose 168 (*)     Anion Gap 7 (*)     eGFR if  58 (*)     eGFR if non  50 (*)     All other components within normal limits   APTT   COMPREHENSIVE METABOLIC PANEL   PROTIME-INR   APTT   PROTIME-INR   POCT URINE PREGNANCY          Imaging Results    None          Medical Decision Making:   ED Management:  45-year-old female on Eliquis presenting to the ED with vaginal bleeding.  She denies chest pain, shortness of breath, dizziness, fatigue.  UPT negative.  Vital signs stable and reassuring.  She is not tachycardic.  CBC reassuring.  She is not anemic. PT-INR, APTT within normal limits.  Urinalysis without infection.   exam with small amount of bleeding.  Abdomen is soft and nontender.  I doubt emergent etiology of dysfunctional uterine bleeding.  Patient has appointment with  her OBGYN on November 12th.  She will keep this for follow-up.  Strict return precautions were given and the patient verbalized understanding.                             Clinical Impression:     ICD-10-CM ICD-9-CM   1. Vaginal bleeding  N93.9 623.8                      Disposition:   Disposition: Discharged  Condition: Stable     ED Disposition Condition    Discharge Stable        ED Prescriptions     None        Follow-up Information     Follow up With Specialties Details Why Contact Info    [facility] Obstetrics [facility]cology Schedule an appointment as soon as possible for a visit in 1 day For follow-up [address] [place] [id number]  [phone]      [facility] Emergency Medicine Go to  If symptoms worsen [address] [place] [address] [phone]                                       Shanna Cabrera NP  11/04/20 1220

## 2020-11-04 NOTE — PROGRESS NOTES
Health Maintenance Due   Topic Date Due    Pneumococcal Vaccine (Medium Risk) (1 of 1 - PPSV23) 07/03/1995     Updates were requested from care everywhere.  Chart was reviewed for overdue Proactive [facility] Encounters (MATTHIEU) topics (CRS, Breast Cancer Screening, Eye exam)  Health Maintenance has been updated.  LINKS immunization registry triggered.  Immunizations were reconciled.    Need VIRI last eye appt.

## 2020-11-04 NOTE — DISCHARGE INSTRUCTIONS
Please return to the Emergency Department for any new or worsening symptoms including: fever, chest pain, shortness of breath, loss of consciousness, dizziness, weakness, or any other concerns.     Please follow up with your OBGYN within in the week. If you do not have one, you may contact the one listed on your discharge paperwork or you may also call the [facility] Appointment Desk at [phone] to schedule an appointment with one.

## 2020-11-04 NOTE — ED TRIAGE NOTES
"Pt presents to the ED via personal transportation reporting vaginal bleeding with "large like blood clots" since Sunday. Pt reporting abdominal cramping that began PTA today. Pt denies any hx of anemia, dizziness, weakness, or lightheadedness. Pt reports going through about 4 pads an hour. Pt reports she doesn't get regular menstrual cycles anymore. Pt in NAD.  "

## 2020-11-05 ENCOUNTER — OFFICE VISIT (OUTPATIENT)
Dept: ENDOCRINOLOGY | Facility: CLINIC | Age: 45
End: 2020-11-05
Payer: MEDICARE

## 2020-11-05 ENCOUNTER — HOSPITAL ENCOUNTER (EMERGENCY)
Facility: HOSPITAL | Age: 45
Discharge: HOME | End: 2020-11-05
Attending: EMERGENCY MEDICINE
Payer: MEDICARE

## 2020-11-05 VITALS
HEART RATE: 76 BPM | OXYGEN SATURATION: 98 % | RESPIRATION RATE: 18 BRPM | SYSTOLIC BLOOD PRESSURE: 120 MMHG | WEIGHT: 245 LBS | BODY MASS INDEX: 38.45 KG/M2 | HEIGHT: 67 IN | DIASTOLIC BLOOD PRESSURE: 82 MMHG | TEMPERATURE: 98 F

## 2020-11-05 VITALS
DIASTOLIC BLOOD PRESSURE: 86 MMHG | WEIGHT: 254 LBS | BODY MASS INDEX: 39.78 KG/M2 | SYSTOLIC BLOOD PRESSURE: 125 MMHG | TEMPERATURE: 98 F | HEART RATE: 68 BPM

## 2020-11-05 DIAGNOSIS — E78.5 HYPERLIPIDEMIA, UNSPECIFIED HYPERLIPIDEMIA TYPE: ICD-10-CM

## 2020-11-05 DIAGNOSIS — Z79.01 ANTICOAGULANT LONG-TERM USE: ICD-10-CM

## 2020-11-05 DIAGNOSIS — R73.09 ELEVATED GLUCOSE LEVEL: ICD-10-CM

## 2020-11-05 DIAGNOSIS — I10 HYPERTENSION, UNSPECIFIED TYPE: ICD-10-CM

## 2020-11-05 DIAGNOSIS — N93.8 DUB (DYSFUNCTIONAL UTERINE BLEEDING): Primary | ICD-10-CM

## 2020-11-05 LAB
ALBUMIN SERPL BCP-MCNC: 3.6 G/DL (ref 3.5–5.2)
ALP SERPL-CCNC: 78 U/L (ref 55–135)
ALT SERPL W/O P-5'-P-CCNC: 12 U/L (ref 10–44)
ANION GAP SERPL CALC-SCNC: 11 MMOL/L (ref 8–16)
AST SERPL-CCNC: 14 U/L (ref 10–40)
BASOPHILS # BLD AUTO: 0.02 K/UL (ref 0–0.2)
BASOPHILS NFR BLD: 0.3 % (ref 0–1.9)
BILIRUB SERPL-MCNC: 0.3 MG/DL (ref 0.1–1)
BUN SERPL-MCNC: 17 MG/DL (ref 6–20)
CALCIUM SERPL-MCNC: 9.6 MG/DL (ref 8.7–10.5)
CHLORIDE SERPL-SCNC: 100 MMOL/L (ref 95–110)
CO2 SERPL-SCNC: 30 MMOL/L (ref 23–29)
CREAT SERPL-MCNC: 1.3 MG/DL (ref 0.5–1.4)
DIFFERENTIAL METHOD: ABNORMAL
EOSINOPHIL # BLD AUTO: 0.1 K/UL (ref 0–0.5)
EOSINOPHIL NFR BLD: 2.1 % (ref 0–8)
ERYTHROCYTE [DISTWIDTH] IN BLOOD BY AUTOMATED COUNT: 14.9 % (ref 11.5–14.5)
EST. GFR  (AFRICAN AMERICAN): 58 ML/MIN/1.73 M^2
EST. GFR  (NON AFRICAN AMERICAN): 50 ML/MIN/1.73 M^2
GLUCOSE SERPL-MCNC: 242 MG/DL (ref 70–110)
HCT VFR BLD AUTO: 40.1 % (ref 37–48.5)
HGB BLD-MCNC: 12.4 G/DL (ref 12–16)
IMM GRANULOCYTES # BLD AUTO: 0.02 K/UL (ref 0–0.04)
IMM GRANULOCYTES NFR BLD AUTO: 0.3 % (ref 0–0.5)
LYMPHOCYTES # BLD AUTO: 2 K/UL (ref 1–4.8)
LYMPHOCYTES NFR BLD: 31.1 % (ref 18–48)
MCH RBC QN AUTO: 28.2 PG (ref 27–31)
MCHC RBC AUTO-ENTMCNC: 30.9 G/DL (ref 32–36)
MCV RBC AUTO: 91 FL (ref 82–98)
MONOCYTES # BLD AUTO: 0.4 K/UL (ref 0.3–1)
MONOCYTES NFR BLD: 6.4 % (ref 4–15)
NEUTROPHILS # BLD AUTO: 3.8 K/UL (ref 1.8–7.7)
NEUTROPHILS NFR BLD: 59.8 % (ref 38–73)
NRBC BLD-RTO: 0 /100 WBC
PLATELET # BLD AUTO: 220 K/UL (ref 150–350)
PMV BLD AUTO: 10.4 FL (ref 9.2–12.9)
POTASSIUM SERPL-SCNC: 3.7 MMOL/L (ref 3.5–5.1)
PROT SERPL-MCNC: 7.3 G/DL (ref 6–8.4)
RBC # BLD AUTO: 4.39 M/UL (ref 4–5.4)
SODIUM SERPL-SCNC: 141 MMOL/L (ref 136–145)
WBC # BLD AUTO: 6.27 K/UL (ref 3.9–12.7)

## 2020-11-05 PROCEDURE — 99214 OFFICE O/P EST MOD 30 MIN: CPT | Mod: S$GLB,,, | Performed by: NURSE PRACTITIONER

## 2020-11-05 PROCEDURE — 63600175 PHARM REV CODE 636 W HCPCS: Performed by: PHYSICIAN ASSISTANT

## 2020-11-05 PROCEDURE — 99999 PR PBB SHADOW E&M-EST. PATIENT-LVL IV: CPT | Mod: PBBFAC,,, | Performed by: NURSE PRACTITIONER

## 2020-11-05 PROCEDURE — 3008F BODY MASS INDEX DOCD: CPT | Mod: CPTII,S$GLB,, | Performed by: NURSE PRACTITIONER

## 2020-11-05 PROCEDURE — 99214 PR OFFICE/OUTPT VISIT, EST, LEVL IV, 30-39 MIN: ICD-10-PCS | Mod: S$GLB,,, | Performed by: NURSE PRACTITIONER

## 2020-11-05 PROCEDURE — 99999 PR PBB SHADOW E&M-EST. PATIENT-LVL IV: ICD-10-PCS | Mod: PBBFAC,,, | Performed by: NURSE PRACTITIONER

## 2020-11-05 PROCEDURE — 3046F HEMOGLOBIN A1C LEVEL >9.0%: CPT | Mod: CPTII,S$GLB,, | Performed by: NURSE PRACTITIONER

## 2020-11-05 PROCEDURE — 3046F PR MOST RECENT HEMOGLOBIN A1C LEVEL > 9.0%: ICD-10-PCS | Mod: CPTII,S$GLB,, | Performed by: NURSE PRACTITIONER

## 2020-11-05 PROCEDURE — 3079F PR MOST RECENT DIASTOLIC BLOOD PRESSURE 80-89 MM HG: ICD-10-PCS | Mod: CPTII,S$GLB,, | Performed by: NURSE PRACTITIONER

## 2020-11-05 PROCEDURE — 3074F PR MOST RECENT SYSTOLIC BLOOD PRESSURE < 130 MM HG: ICD-10-PCS | Mod: CPTII,S$GLB,, | Performed by: NURSE PRACTITIONER

## 2020-11-05 PROCEDURE — 3074F SYST BP LT 130 MM HG: CPT | Mod: CPTII,S$GLB,, | Performed by: NURSE PRACTITIONER

## 2020-11-05 PROCEDURE — 3079F DIAST BP 80-89 MM HG: CPT | Mod: CPTII,S$GLB,, | Performed by: NURSE PRACTITIONER

## 2020-11-05 PROCEDURE — 80053 COMPREHEN METABOLIC PANEL: CPT

## 2020-11-05 PROCEDURE — 85025 COMPLETE CBC W/AUTO DIFF WBC: CPT

## 2020-11-05 PROCEDURE — 3008F PR BODY MASS INDEX (BMI) DOCUMENTED: ICD-10-PCS | Mod: CPTII,S$GLB,, | Performed by: NURSE PRACTITIONER

## 2020-11-05 RX ORDER — IBUPROFEN 600 MG/1
600 TABLET ORAL EVERY 6 HOURS PRN
Qty: 15 TABLET | Refills: 0 | Status: SHIPPED | OUTPATIENT
Start: 2020-11-05 | End: 2021-02-19

## 2020-11-05 RX ORDER — INSULIN LISPRO 100 [IU]/ML
24 INJECTION, SOLUTION INTRAVENOUS; SUBCUTANEOUS
Qty: 2 BOX | Refills: 5 | Status: ON HOLD | OUTPATIENT
Start: 2020-11-05 | End: 2021-05-02 | Stop reason: SDUPTHER

## 2020-11-05 RX ORDER — KETOROLAC TROMETHAMINE 30 MG/ML
30 INJECTION, SOLUTION INTRAMUSCULAR; INTRAVENOUS
Status: COMPLETED | OUTPATIENT
Start: 2020-11-05 | End: 2020-11-05

## 2020-11-05 RX ORDER — INSULIN DEGLUDEC 200 U/ML
INJECTION, SOLUTION SUBCUTANEOUS
Qty: 6 SYRINGE | Refills: 5 | Status: ON HOLD | OUTPATIENT
Start: 2020-11-05 | End: 2021-05-02 | Stop reason: SDUPTHER

## 2020-11-05 RX ADMIN — KETOROLAC TROMETHAMINE 30 MG: 30 INJECTION, SOLUTION INTRAMUSCULAR at 02:11

## 2020-11-05 NOTE — PROGRESS NOTES
CC: This 45 y.o. Black or  female  is here for evaluation of  T2DM along with comorbidities indicated in the Visit Diagnosis section of this encounter.    HPI: Fabiana Chanel was diagnosed with T2DM in 2013. Metformin and a sulfonylurea started at the time of diagnosis.         Prior visit 6/2020  a1c is up from 7.3 to 8.3%.   She does not know why her A1c is higher. She found her BGs rising higher after she got her A1c back. Her appetite is low. May only eat once a day. She stopped Invokana on her own in March because it caused her to thin. She is open to trying Jardiance again.   Her weight is the same as from last visit.   She has been very stressed and anxious with pandemic. Has not seen her family or anyone else.   Kidney function is much lower from eGFR > 60 to 42 and creatinine 1 to 1.7. She was taking ibuprofen at least 1-2x/day in May.   Plan Start jardiance 10 mg once daily in the morning. Reviewed FDA indications of Jardiance versus with Invokana.   Test glucose 3x/day.   Return to clinic in 3 months for virtual visit with labs prior.         Prior visit 10/2/20  a1c is up from 8.3 to 11.8%.   Pt had ran out of Trulicity for about a month. She resumed it at Trulicity 1.5 mg and had a lot of nausea and bloating/gas. She stopped it after that. Prior GI issues have resolved since stopping Trulicity. Appetite has increased.   Plan Continue Tresiba at 60 units once daily.   Resume Trulicity but at only 0.75 mg once weekly. Hopefully you will not have any upset stomach on the low dose.   Increase Humalog from 12 to 16 units before each meal.   Stop Jardiance for now since you are having frequent yeast infections. We can resume this once blood sugars drop less than 200.   If blood sugars are higher than 180 after a few days, you can increase the Humalog from 16 to 20 units before each meal.   Test glucose before each meal and bedtime. Keep a blood sugar log and bring to each visit.    Follow up in a month.       Interval history  She is in poor spirits today. Tearful because she is now having uterine bleeding and cramps. Needs to see gynecologist tomorrow. Went to ER last night.   BGs have improved.   She is back down on Trulicity 0.75 mg. Still has nausea but much improved and is tolerable. She is eating twice daily now. She has increased Humalog from 12 to 20 units ac.       PMHX: CHF,  MILE on cpap, atrial fibrillation, MI, ICD placement, NICM (cath 11/2017) EF 20% by echo 12/2018    LAST DIABETES EDUCATION: 6/19/19    RECENT ILLNESSES/HOSPITALIZATIONS - -  Admitted 12/28/18 x 2 nights for acute on chronic HF      HOSPITALIZED FOR DIABETES  -  Yes - for hyperglycemia     DIABETES MEDICATIONS:  Tresiba 60 units nightly,  Humalog Kwikpen 20 units ac, Trulicity 0.75 mg once weekly.      Misses medication doses - No  She injects Humalog about 1x/day as she only eats 1 meal/day     DM COMPLICATIONS: peripheral neuropathy and cardiovascular disease    SIGNIFICANT DIABETES MED HISTORY:   diarrhea on metformin 1000 mg instant release; cannot afford topical.    Pt unable to tolerate metformin ER d/t diarrhea even on 500 mg twice daily.   Trulicity and Novolog started 11/2019  Jardiance - recurrent  infection     SELF MONITORING BLOOD GLUCOSE: Checks blood glucose at home 3x/day.   BGs are 160-180s. No BGs < 150.     HYPOGLYCEMIC EPISODES: denies      CURRENT DIET: drinks water.  Eats 2 meals/day. Food choices are not always healthy. Eats takeout when she feels too sick to cook.   Lunch yesterday was chicken and rice.     CURRENT EXERCISE:  None recent       /86 (BP Location: Left arm, Patient Position: Sitting, BP Method: Medium (Automatic))   Pulse 68   Temp 97.9 °F (36.6 °C) (Oral)   Wt 115.2 kg (254 lb)   BMI 39.78 kg/m²       ROS:   CONSTITUTIONAL: Appetite low, + Fatigue   : no yeast infection or dysuria       PHYSICAL EXAM:  GENERAL: Well developed, well nourished. No acute  distress.   PSYCH: AAOx3,  conversant, well-groomed. Judgement and insight good. Anxious, worried   NEURO: Cranial nerves grossly intact. Speech clear, no tremor.   CHEST: Respirations even and unlabored. CTA, diminished bilaterally         Hemoglobin A1C   Date Value Ref Range Status   09/28/2020 11.8 (H) 4.0 - 5.6 % Final     Comment:     ADA Screening Guidelines:  5.7-6.4%  Consistent with prediabetes  >or=6.5%  Consistent with diabetes  High levels of fetal hemoglobin interfere with the HbA1C  assay. Heterozygous hemoglobin variants (HbS, HgC, etc)do  not significantly interfere with this assay.   However, presence of multiple variants may affect accuracy.     06/03/2020 8.3 (H) 4.0 - 5.6 % Final     Comment:     ADA Screening Guidelines:  5.7-6.4%  Consistent with prediabetes  >or=6.5%  Consistent with diabetes  High levels of fetal hemoglobin interfere with the HbA1C  assay. Heterozygous hemoglobin variants (HbS, HgC, etc)do  not significantly interfere with this assay.   However, presence of multiple variants may affect accuracy.     02/19/2020 7.3 (H) 4.0 - 5.6 % Final     Comment:     ADA Screening Guidelines:  5.7-6.4%  Consistent with prediabetes  >or=6.5%  Consistent with diabetes  High levels of fetal hemoglobin interfere with the HbA1C  assay. Heterozygous hemoglobin variants (HbS, HgC, etc)do  not significantly interfere with this assay.   However, presence of multiple variants may affect accuracy.             Chemistry        Component Value Date/Time     11/05/2020 0020    K 3.7 11/05/2020 0020     11/05/2020 0020    CO2 30 (H) 11/05/2020 0020    BUN 17 11/05/2020 0020    CREATININE 1.3 11/05/2020 0020     (H) 11/05/2020 0020        Component Value Date/Time    CALCIUM 9.6 11/05/2020 0020    ALKPHOS 78 11/05/2020 0020    AST 14 11/05/2020 0020    ALT 12 11/05/2020 0020    BILITOT 0.3 11/05/2020 0020    ESTGFRAFRICA 58 (A) 11/05/2020 0020    EGFRNONAA 50 (A) 11/05/2020 0020           Lab Results   Component Value Date    LDLCALC 82.0 02/19/2020        Ref. Range 11/18/2019 08:13 2/19/2020 10:22   Cholesterol Latest Ref Range: 120 - 199 mg/dL 199 143   HDL Latest Ref Range: 40 - 75 mg/dL 44 42   Hdl/Cholesterol Ratio Latest Ref Range: 20.0 - 50.0 % 22.1 29.4   LDL Cholesterol External Latest Ref Range: 63.0 - 159.0 mg/dL 97.6 82.0   Non-HDL Cholesterol Latest Units: mg/dL 155 101   Total Cholesterol/HDL Ratio Latest Ref Range: 2.0 - 5.0  4.5 3.4   Triglycerides Latest Ref Range: 30 - 150 mg/dL 287 (H) 95         Lab Results   Component Value Date    MICALBCREAT 44.7 (H) 08/13/2019           ASSESSMENT and PLAN:    A1C GOAL: < 7 %     1. Type 2 diabetes, uncontrolled, with neuropathy  Increase Tresiba from 60 to 70 units once daily.   Increase Humalog from 20 to 24 units before each meal.   Continue Trulicity 0.75 mg once weekly if tolerable.   Test glucoses before each meal and bedtime, at least 3x/day. Please bring written blood sugar log to every visit.   Follow up in 2 months with labs prior.       Microalbumin/Creatinine Ratio, Urine    Hemoglobin A1C    Lipid Panel   2. Hyperlipidemia, unspecified hyperlipidemia type  Lipid Panel   3. Hypertension, unspecified type  Microalbumin/Creatinine Ratio, Urine         Orders Placed This Encounter   Procedures    Microalbumin/Creatinine Ratio, Urine     Standing Status:   Future     Standing Expiration Date:   1/4/2022     Order Specific Question:   Specimen Source     Answer:   Urine    Hemoglobin A1C     Standing Status:   Future     Standing Expiration Date:   1/4/2022    Lipid Panel     Standing Status:   Future     Standing Expiration Date:   11/5/2021        Follow up in about 2 months (around 1/5/2021).

## 2020-11-05 NOTE — PATIENT INSTRUCTIONS
Increase Tresiba from 60 to 70 units once daily.   Increase Humalog from 20 to 24 units before each meal.   Continue Trulicity 0.75 mg once weekly if tolerable.   Test glucoses before each meal and bedtime, at least 3x/day. Please bring written blood sugar log to every visit.   Follow up in 2 months with labs prior.

## 2020-11-05 NOTE — ED TRIAGE NOTES
Patient presents to the ED with c/o abdominal pain and vaginal bleeding since Sunday. Pt reports she has been passing large blood clots. Pt reports she went to the ED yesterday for the same problem, and they told her to return if the bleeding got worse. NAD noted.

## 2020-11-05 NOTE — FIRST PROVIDER EVALUATION
Emergency Department TeleTriage Encounter Note      CHIEF COMPLAINT    Chief Complaint   Patient presents with    Vaginal Bleeding     Seen today for the same symptoms earlier today. Back to ED tonight because her bleeding is worse. Reports passing big clots and abdominal pains. Denies n/v/d.    Abdominal Pain       VITAL SIGNS   Initial Vitals [11/04/20 2214]   BP Pulse Resp Temp SpO2   116/74 79 20 98.6 °F (37 °C) 97 %      MAP       --            ALLERGIES    Review of patient's allergies indicates:   Allergen Reactions    Metformin      Diarrhea on metformin XR     Pneumococcal 23-abimbola ps vaccine        PROVIDER TRIAGE NOTE  This is a teletriage evaluation of a 45 y.o. female presenting to the ED with c/o vaginal bleeding and lower abdominal pain.  Seen in the ED earlier for same complaint, now bleeding is worse.  H&H within normal limits earlier today.. Initial orders will be placed and care will be transferred to an alternate provider when patient is roomed for a full evaluation. Any additional orders and the final disposition will be determined by that provider.         ORDERS  Labs Reviewed   CBC W/ AUTO DIFFERENTIAL   COMPREHENSIVE METABOLIC PANEL   POCT URINE PREGNANCY       ED Orders (720h ago, onward)    Start Ordered     Status Ordering Provider    11/04/20 2303 11/04/20 2303  Insert peripheral IV  Once      Ordered YIFAN MILNER    11/04/20 2303 11/04/20 2303  CBC W/ AUTO DIFFERENTIAL  STAT  Collect    Ordered YIFAN MILNER    11/04/20 2303 11/04/20 2303  Comp. Metabolic Panel  STAT  Collect    Ordered YIFAN MILNER    11/04/20 2303 11/04/20 2303  POCT urine pregnancy  Once      Ordered YIFAN MILNER    11/04/20 2303 11/04/20 2303  Setup Pelvic Tray  Once      Ordered YIFAN MILNER            Virtual Visit Note: The provider triage portion of this emergency department evaluation and documentation was performed via [employer], a HIPAA-compliant telemedicine application, in concert with a  tele-presenter in the room. A face to face patient evaluation with one of my colleagues will occur once the patient is placed in an emergency department room.      DISCLAIMER: This note was prepared with [employer] voice recognition transcription software. Garbled syntax, mangled pronouns, and other bizarre constructions may be attributed to that software system.

## 2020-11-05 NOTE — ED PROVIDER NOTES
Encounter Date: 11/4/2020       History     Chief Complaint   Patient presents with    Vaginal Bleeding     Seen today for the same symptoms earlier today. Back to ED tonight because her bleeding is worse. Reports passing big clots and abdominal pains. Denies n/v/d.    Abdominal Pain     45-year-old female with history of dysfunctional uterine bleeding (required a blood transfusion 5 years ago) and atrial fibrillation (on Eliquis) presents to the emergency department for 4 day history of worsening vaginal bleeding.  States symptoms worsened significantly 2 days ago.  States at the peak of severity, including today, she has fully saturated through approximately 6 pads/tampons.  Notes mild lower abdominal cramping.  Denies fever, nausea, vomiting, chest pain, shortness of breath, dizziness, weakness, rash, and urinary symptoms.  She has an appointment with her OBGYN in roughly 2 weeks.  No history of bleeding disorder.  Not currently on hormone therapy.  Notes he had adverse reaction hormone therapy in the past due to medication interactions.    Of note, patient was here yesterday for identical symptoms.  States there persistent but worsening compared to yesterday's visit.    The history is provided by the patient.     Review of patient's allergies indicates:   Allergen Reactions    Metformin      Diarrhea on metformin XR     Pneumococcal 23-abimbola ps vaccine      Past Medical History:   Diagnosis Date    Atrial fibrillation     Blood clot associated with vein wall inflammation     not dvt    Cardiomyopathy     Normal cors on cath 11/2017    CHF (congestive heart failure)     DM (diabetes mellitus) 9/19/2013    Hyperlipidemia     Hypertension     Psoriasis     Sleep apnea      Past Surgical History:   Procedure Laterality Date    CARDIAC CATHETERIZATION      COLONOSCOPY      DILATION AND CURETTAGE OF UTERUS      ESOPHAGOGASTRODUODENOSCOPY N/A 5/9/2019    Procedure: EGD (ESOPHAGOGASTRODUODENOSCOPY);   Surgeon: Vielka Burrell MD;  Location: [facility];  Service: Endoscopy;  Laterality: N/A;     Family History   Problem Relation Age of Onset    Hypertension Mother     Hypertension Father     Diabetes Father     Diabetes Maternal Grandmother     Diabetes Paternal Grandmother     Breast cancer Neg Hx     Colon cancer Neg Hx     Ovarian cancer Neg Hx      Social History     Tobacco Use    Smoking status: Never Smoker    Smokeless tobacco: Never Used    Tobacco comment: smokes cigars on occasion   Substance Use Topics    Alcohol use: Yes     Comment: occasional    Drug use: No     Comment: hx of marijuana use 3 years ago     Review of Systems   Constitutional: Negative for fever.   HENT: Negative for congestion, sore throat and trouble swallowing.    Respiratory: Negative for cough and shortness of breath.    Cardiovascular: Negative for chest pain.   Gastrointestinal: Positive for abdominal pain. Negative for constipation, diarrhea, nausea and vomiting.   Genitourinary: Positive for menstrual problem and vaginal bleeding. Negative for dysuria, flank pain, frequency, urgency and vaginal discharge.   Musculoskeletal: Negative for back pain.   Skin: Negative for rash.   Neurological: Negative for dizziness, weakness and headaches.   All other systems reviewed and are negative.      Physical Exam     Initial Vitals [11/04/20 2214]   BP Pulse Resp Temp SpO2   116/74 79 20 98.6 °F (37 °C) 97 %      MAP       --         Physical Exam    Nursing note and vitals reviewed.  Constitutional: She appears well-developed and well-nourished. She is not diaphoretic. She is Obese . No distress.   HENT:   Head: Normocephalic and atraumatic.   Nose: Nose normal.   Eyes: Conjunctivae and EOM are normal. Right eye exhibits no discharge. Left eye exhibits no discharge.   Neck: Normal range of motion. No tracheal deviation present. No JVD present.   Cardiovascular: Normal rate, regular rhythm and normal heart sounds. Exam  reveals no friction rub.    No murmur heard.  Pulmonary/Chest: Breath sounds normal. No stridor. No respiratory distress. She has no wheezes. She has no rhonchi. She has no rales. She exhibits no tenderness.   Abdominal: Soft. She exhibits no distension. There is no abdominal tenderness. There is no rigidity, no rebound, no guarding, no CVA tenderness, no tenderness at McBurney's point and negative Dunbar's sign.   Genitourinary:    Pelvic exam was performed with patient supine.   There is no rash, tenderness or lesion on the right labia. There is no rash, tenderness or lesion on the left labia. Cervix exhibits no motion tenderness, no discharge and no friability. Right adnexum displays no mass and no tenderness. Left adnexum displays no mass and no tenderness.    Vaginal bleeding (scant amount of clotted blood in vaginal vault. No active bleeding) present.      No vaginal discharge, erythema or tenderness.   There is bleeding (scant amount of clotted blood in vaginal vault. No active bleeding) in the vagina. No erythema or tenderness in the vagina.    No foreign body in the vagina.      No signs of injury in the vagina.      Genitourinary Comments: No lacerations     Musculoskeletal: Normal range of motion.   Neurological: She is alert and oriented to person, place, and time.   Skin: Skin is warm and dry. No rash and no abscess noted. No erythema. No pallor.         ED Course   Procedures  Labs Reviewed   CBC W/ AUTO DIFFERENTIAL - Abnormal; Notable for the following components:       Result Value    MCHC 30.9 (*)     RDW 14.9 (*)     All other components within normal limits   COMPREHENSIVE METABOLIC PANEL - Abnormal; Notable for the following components:    CO2 30 (*)     Glucose 242 (*)     eGFR if  58 (*)     eGFR if non  50 (*)     All other components within normal limits   POCT URINE PREGNANCY   POCT GLUCOSE MONITORING CONTINUOUS          Imaging Results    None           Medical Decision Making:   History:   Old Medical Records: I decided to obtain old medical records.  Clinical Tests:   Lab Tests: Ordered and Reviewed      This is an emergent evaluation of a 45 y.o. female presenting to the ED for vaginal bleeding. UPT negative. Patient is non-toxic appearing and in no acute distress. Presentation consistent with DUB. Denies Hx of vaginal/pelvic/abdominal trauma and retained foreign body. Denies Hx of bleeding dyscrasia, including for Von Willebrand and ITP. Afebrile and does not endorse symptoms suggestive of acute bacterial etiology at this time. UA unremarkable. I carefully consider but have lower suspicion for hypothyroidism and cirrhosis requiring emergent workup at this time; LFTs normal. No anemia and H/H stable with no signs of significant bleeding on exam today warranting emergent blood transfusion or surgical intervention at this time. Hyperglycemic without DKA; I recommend insulin in ED until improved but patient declines at states she would prefer to take insulin at home. States she does not need refills of insulin/DM supplies.     Sent home with reassurance. I recommend iron supplementation. Advising follow up with OBGYN. Strict return precautions discussed with patient. Patient agreeable to plan.     I discussed with the patient the diagnosis, treatment plan, indications for return to the emergency department, and for expected follow-up. The patient verbalized an understanding. The patient is asked if there are any questions or concerns. We discuss the case, until all issues are addressed to the patients satisfaction. Patient understands and is agreeable to the plan.                            Clinical Impression:     ICD-10-CM ICD-9-CM   1. DUB (dysfunctional uterine bleeding)  N93.8 626.8   2. Anticoagulant long-term use  Z79.01 V58.61   3. Elevated glucose level  R73.09 790.29                          ED Disposition Condition    Discharge Stable        ED  Prescriptions     Medication Sig Dispense Start Date End Date Auth. Provider    ibuprofen (ADVIL,MOTRIN) 600 MG tablet Take 1 tablet (600 mg total) by mouth every 6 (six) hours as needed for Pain. 15 tablet 11/5/2020  Isael Astorga PA-C        Follow-up Information     Follow up With Specialties Details Why Contact Info    maintain your scheduled appointment with your OBGYN for further evaluation. Call for sooner appointment in several hours if you like.        [facility] Go to  If symptoms worsen [address]  [facility] [id number] [phone]                                       Isael Astorga PA-C  11/05/20 0258

## 2020-12-28 ENCOUNTER — TELEPHONE (OUTPATIENT)
Dept: FAMILY MEDICINE | Facility: CLINIC | Age: 45
End: 2020-12-28

## 2020-12-28 NOTE — TELEPHONE ENCOUNTER
"----- Message from Amarilis Pham sent at 12/28/2020 12:23 PM CST -----  Regarding: Maribell "[facility]  [phone]  .Type: Patient Call Back    Who called: maribell "[employer]"    What is the request in detail: Requesting clarification on Dx for arthritis     Can the clinic reply by [facility]? Call back     Would the patient rather a call back or a response via [facility]?  Call back     Best call back number: [phone]        "

## 2020-12-29 ENCOUNTER — LAB VISIT (OUTPATIENT)
Dept: LAB | Facility: HOSPITAL | Age: 45
End: 2020-12-29
Payer: MEDICARE

## 2020-12-29 DIAGNOSIS — E78.5 HYPERLIPIDEMIA, UNSPECIFIED HYPERLIPIDEMIA TYPE: ICD-10-CM

## 2020-12-29 LAB
CHOLEST SERPL-MCNC: 135 MG/DL (ref 120–199)
CHOLEST/HDLC SERPL: 3.2 {RATIO} (ref 2–5)
ESTIMATED AVG GLUCOSE: 243 MG/DL (ref 68–131)
HBA1C MFR BLD HPLC: 10.1 % (ref 4–5.6)
HDLC SERPL-MCNC: 42 MG/DL (ref 40–75)
HDLC SERPL: 31.1 % (ref 20–50)
LDLC SERPL CALC-MCNC: 61.4 MG/DL (ref 63–159)
NONHDLC SERPL-MCNC: 93 MG/DL
TRIGL SERPL-MCNC: 158 MG/DL (ref 30–150)

## 2020-12-29 PROCEDURE — 83036 HEMOGLOBIN GLYCOSYLATED A1C: CPT

## 2020-12-29 PROCEDURE — 80061 LIPID PANEL: CPT

## 2020-12-29 PROCEDURE — 36415 COLL VENOUS BLD VENIPUNCTURE: CPT | Mod: PN

## 2021-01-04 ENCOUNTER — PATIENT OUTREACH (OUTPATIENT)
Dept: ADMINISTRATIVE | Facility: OTHER | Age: 46
End: 2021-01-04

## 2021-01-04 ENCOUNTER — PATIENT MESSAGE (OUTPATIENT)
Dept: ADMINISTRATIVE | Facility: HOSPITAL | Age: 46
End: 2021-01-04

## 2021-01-04 DIAGNOSIS — Z12.31 BREAST CANCER SCREENING BY MAMMOGRAM: Primary | ICD-10-CM

## 2021-01-30 ENCOUNTER — CLINICAL SUPPORT (OUTPATIENT)
Dept: CARDIOLOGY | Facility: HOSPITAL | Age: 46
End: 2021-01-30
Payer: MEDICARE

## 2021-01-30 DIAGNOSIS — Z95.810 PRESENCE OF AUTOMATIC (IMPLANTABLE) CARDIAC DEFIBRILLATOR: ICD-10-CM

## 2021-01-30 PROCEDURE — 93295 CARDIAC DEVICE CHECK - REMOTE: ICD-10-PCS | Mod: ,,, | Performed by: INTERNAL MEDICINE

## 2021-01-30 PROCEDURE — 93295 DEV INTERROG REMOTE 1/2/MLT: CPT | Mod: ,,, | Performed by: INTERNAL MEDICINE

## 2021-01-30 PROCEDURE — 93296 REM INTERROG EVL PM/IDS: CPT | Performed by: INTERNAL MEDICINE

## 2021-02-15 ENCOUNTER — TELEPHONE (OUTPATIENT)
Dept: CARDIOLOGY | Facility: HOSPITAL | Age: 46
End: 2021-02-15

## 2021-02-15 DIAGNOSIS — I48.0 PAROXYSMAL ATRIAL FIBRILLATION: Primary | ICD-10-CM

## 2021-02-18 ENCOUNTER — PATIENT OUTREACH (OUTPATIENT)
Dept: ADMINISTRATIVE | Facility: OTHER | Age: 46
End: 2021-02-18

## 2021-02-19 ENCOUNTER — CLINICAL SUPPORT (OUTPATIENT)
Dept: CARDIOLOGY | Facility: HOSPITAL | Age: 46
End: 2021-02-19
Attending: INTERNAL MEDICINE
Payer: MEDICARE

## 2021-02-19 ENCOUNTER — HOSPITAL ENCOUNTER (OUTPATIENT)
Dept: CARDIOLOGY | Facility: CLINIC | Age: 46
Discharge: HOME | End: 2021-02-19
Payer: MEDICARE

## 2021-02-19 ENCOUNTER — OFFICE VISIT (OUTPATIENT)
Dept: ELECTROPHYSIOLOGY | Facility: CLINIC | Age: 46
End: 2021-02-19
Payer: MEDICARE

## 2021-02-19 VITALS
DIASTOLIC BLOOD PRESSURE: 76 MMHG | HEIGHT: 67 IN | HEART RATE: 76 BPM | BODY MASS INDEX: 40.48 KG/M2 | WEIGHT: 257.94 LBS | SYSTOLIC BLOOD PRESSURE: 118 MMHG

## 2021-02-19 DIAGNOSIS — I50.42 CHRONIC COMBINED SYSTOLIC AND DIASTOLIC HEART FAILURE: ICD-10-CM

## 2021-02-19 DIAGNOSIS — I48.0 PAROXYSMAL ATRIAL FIBRILLATION: ICD-10-CM

## 2021-02-19 DIAGNOSIS — E78.2 MIXED HYPERLIPIDEMIA: ICD-10-CM

## 2021-02-19 DIAGNOSIS — I49.8 OTHER SPECIFIED CARDIAC ARRHYTHMIAS: ICD-10-CM

## 2021-02-19 DIAGNOSIS — Z95.810 IMPLANTABLE CARDIOVERTER-DEFIBRILLATOR (ICD) IN SITU: ICD-10-CM

## 2021-02-19 DIAGNOSIS — I42.8 NICM (NONISCHEMIC CARDIOMYOPATHY): ICD-10-CM

## 2021-02-19 DIAGNOSIS — G47.33 OSA TREATED WITH BIPAP: ICD-10-CM

## 2021-02-19 DIAGNOSIS — I10 ESSENTIAL HYPERTENSION: Primary | ICD-10-CM

## 2021-02-19 PROCEDURE — 3074F PR MOST RECENT SYSTOLIC BLOOD PRESSURE < 130 MM HG: ICD-10-PCS | Mod: CPTII,S$GLB,, | Performed by: INTERNAL MEDICINE

## 2021-02-19 PROCEDURE — 99999 PR PBB SHADOW E&M-EST. PATIENT-LVL IV: ICD-10-PCS | Mod: PBBFAC,,, | Performed by: INTERNAL MEDICINE

## 2021-02-19 PROCEDURE — 3008F PR BODY MASS INDEX (BMI) DOCUMENTED: ICD-10-PCS | Mod: CPTII,S$GLB,, | Performed by: INTERNAL MEDICINE

## 2021-02-19 PROCEDURE — 93010 ELECTROCARDIOGRAM REPORT: CPT | Mod: S$GLB,,, | Performed by: INTERNAL MEDICINE

## 2021-02-19 PROCEDURE — 3074F SYST BP LT 130 MM HG: CPT | Mod: CPTII,S$GLB,, | Performed by: INTERNAL MEDICINE

## 2021-02-19 PROCEDURE — 93010 RHYTHM STRIP: ICD-10-PCS | Mod: S$GLB,,, | Performed by: INTERNAL MEDICINE

## 2021-02-19 PROCEDURE — 93005 RHYTHM STRIP: ICD-10-PCS | Mod: S$GLB,,, | Performed by: INTERNAL MEDICINE

## 2021-02-19 PROCEDURE — 93283 PRGRMG EVAL IMPLANTABLE DFB: CPT | Mod: 26,,, | Performed by: INTERNAL MEDICINE

## 2021-02-19 PROCEDURE — 3078F PR MOST RECENT DIASTOLIC BLOOD PRESSURE < 80 MM HG: ICD-10-PCS | Mod: CPTII,S$GLB,, | Performed by: INTERNAL MEDICINE

## 2021-02-19 PROCEDURE — 99999 PR PBB SHADOW E&M-EST. PATIENT-LVL IV: CPT | Mod: PBBFAC,,, | Performed by: INTERNAL MEDICINE

## 2021-02-19 PROCEDURE — 99214 OFFICE O/P EST MOD 30 MIN: CPT | Mod: S$GLB,,, | Performed by: INTERNAL MEDICINE

## 2021-02-19 PROCEDURE — 99499 UNLISTED E&M SERVICE: CPT | Mod: S$GLB,,, | Performed by: INTERNAL MEDICINE

## 2021-02-19 PROCEDURE — 93283 CARDIAC DEVICE CHECK - IN CLINIC & HOSPITAL: ICD-10-PCS | Mod: 26,,, | Performed by: INTERNAL MEDICINE

## 2021-02-19 PROCEDURE — 1126F AMNT PAIN NOTED NONE PRSNT: CPT | Mod: S$GLB,,, | Performed by: INTERNAL MEDICINE

## 2021-02-19 PROCEDURE — 93283 PRGRMG EVAL IMPLANTABLE DFB: CPT

## 2021-02-19 PROCEDURE — 3078F DIAST BP <80 MM HG: CPT | Mod: CPTII,S$GLB,, | Performed by: INTERNAL MEDICINE

## 2021-02-19 PROCEDURE — 3008F BODY MASS INDEX DOCD: CPT | Mod: CPTII,S$GLB,, | Performed by: INTERNAL MEDICINE

## 2021-02-19 PROCEDURE — 93005 ELECTROCARDIOGRAM TRACING: CPT | Mod: S$GLB,,, | Performed by: INTERNAL MEDICINE

## 2021-02-19 PROCEDURE — 99499 RISK ADDL DX/OHS AUDIT: ICD-10-PCS | Mod: S$GLB,,, | Performed by: INTERNAL MEDICINE

## 2021-02-19 PROCEDURE — 99214 PR OFFICE/OUTPT VISIT, EST, LEVL IV, 30-39 MIN: ICD-10-PCS | Mod: S$GLB,,, | Performed by: INTERNAL MEDICINE

## 2021-02-19 PROCEDURE — 1126F PR PAIN SEVERITY QUANTIFIED, NO PAIN PRESENT: ICD-10-PCS | Mod: S$GLB,,, | Performed by: INTERNAL MEDICINE

## 2021-02-19 RX ORDER — MEDROXYPROGESTERONE ACETATE 10 MG/1
10 TABLET ORAL DAILY
COMMUNITY
Start: 2020-11-12 | End: 2022-05-17

## 2021-02-25 ENCOUNTER — HOSPITAL ENCOUNTER (EMERGENCY)
Facility: HOSPITAL | Age: 46
Discharge: HOME | End: 2021-02-26
Attending: EMERGENCY MEDICINE
Payer: MEDICARE

## 2021-02-25 DIAGNOSIS — R73.9 HYPERGLYCEMIA: ICD-10-CM

## 2021-02-25 DIAGNOSIS — Z91.89 AT HIGH RISK FOR KIDNEY INJURY: ICD-10-CM

## 2021-02-25 DIAGNOSIS — R07.9 CHEST PAIN: Primary | ICD-10-CM

## 2021-02-25 LAB — POCT GLUCOSE: 448 MG/DL (ref 70–110)

## 2021-02-25 PROCEDURE — 82962 GLUCOSE BLOOD TEST: CPT

## 2021-02-25 PROCEDURE — 93005 ELECTROCARDIOGRAM TRACING: CPT

## 2021-02-25 PROCEDURE — 93010 ELECTROCARDIOGRAM REPORT: CPT | Mod: ,,, | Performed by: INTERNAL MEDICINE

## 2021-02-25 PROCEDURE — 96372 THER/PROPH/DIAG INJ SC/IM: CPT

## 2021-02-25 PROCEDURE — 93010 EKG 12-LEAD: ICD-10-PCS | Mod: ,,, | Performed by: INTERNAL MEDICINE

## 2021-02-25 PROCEDURE — 99285 EMERGENCY DEPT VISIT HI MDM: CPT | Mod: 25

## 2021-02-25 PROCEDURE — U0002 COVID-19 LAB TEST NON-CDC: HCPCS | Performed by: PHYSICIAN ASSISTANT

## 2021-02-26 VITALS
SYSTOLIC BLOOD PRESSURE: 110 MMHG | HEART RATE: 83 BPM | HEIGHT: 67 IN | DIASTOLIC BLOOD PRESSURE: 62 MMHG | TEMPERATURE: 99 F | OXYGEN SATURATION: 95 % | BODY MASS INDEX: 39.24 KG/M2 | RESPIRATION RATE: 23 BRPM | WEIGHT: 250 LBS

## 2021-02-26 LAB
ALBUMIN SERPL BCP-MCNC: 3.6 G/DL (ref 3.5–5.2)
ALLENS TEST: ABNORMAL
ALP SERPL-CCNC: 112 U/L (ref 55–135)
ALT SERPL W/O P-5'-P-CCNC: 19 U/L (ref 10–44)
ANION GAP SERPL CALC-SCNC: 12 MMOL/L (ref 8–16)
APTT BLDCRRT: 25.4 SEC (ref 21–32)
AST SERPL-CCNC: 18 U/L (ref 10–40)
B-OH-BUTYR BLD STRIP-SCNC: 0.1 MMOL/L (ref 0–0.5)
BACTERIA #/AREA URNS HPF: NORMAL /HPF
BASOPHILS # BLD AUTO: 0.04 K/UL (ref 0–0.2)
BASOPHILS NFR BLD: 0.5 % (ref 0–1.9)
BILIRUB SERPL-MCNC: 0.4 MG/DL (ref 0.1–1)
BILIRUB UR QL STRIP: NEGATIVE
BNP SERPL-MCNC: 17 PG/ML (ref 0–99)
BUN SERPL-MCNC: 20 MG/DL (ref 6–20)
CALCIUM SERPL-MCNC: 9.3 MG/DL (ref 8.7–10.5)
CHLORIDE SERPL-SCNC: 96 MMOL/L (ref 95–110)
CLARITY UR: CLEAR
CO2 SERPL-SCNC: 30 MMOL/L (ref 23–29)
COLOR UR: COLORLESS
CREAT SERPL-MCNC: 1.8 MG/DL (ref 0.5–1.4)
CTP QC/QA: YES
D DIMER PPP IA.FEU-MCNC: 0.63 MG/L FEU
DELSYS: ABNORMAL
DIFFERENTIAL METHOD: ABNORMAL
EOSINOPHIL # BLD AUTO: 0.2 K/UL (ref 0–0.5)
EOSINOPHIL NFR BLD: 2.2 % (ref 0–8)
ERYTHROCYTE [DISTWIDTH] IN BLOOD BY AUTOMATED COUNT: 15.6 % (ref 11.5–14.5)
EST. GFR  (AFRICAN AMERICAN): 39 ML/MIN/1.73 M^2
EST. GFR  (NON AFRICAN AMERICAN): 34 ML/MIN/1.73 M^2
FIO2: 21
GLUCOSE SERPL-MCNC: 421 MG/DL (ref 70–110)
GLUCOSE UR QL STRIP: ABNORMAL
HCO3 UR-SCNC: 36 MMOL/L (ref 24–28)
HCT VFR BLD AUTO: 45.5 % (ref 37–48.5)
HGB BLD-MCNC: 14.2 G/DL (ref 12–16)
HGB UR QL STRIP: NEGATIVE
IMM GRANULOCYTES # BLD AUTO: 0.03 K/UL (ref 0–0.04)
IMM GRANULOCYTES NFR BLD AUTO: 0.4 % (ref 0–0.5)
INR PPP: 1 (ref 0.8–1.2)
KETONES UR QL STRIP: NEGATIVE
LEUKOCYTE ESTERASE UR QL STRIP: NEGATIVE
LYMPHOCYTES # BLD AUTO: 2.2 K/UL (ref 1–4.8)
LYMPHOCYTES NFR BLD: 26.3 % (ref 18–48)
MCH RBC QN AUTO: 26.1 PG (ref 27–31)
MCHC RBC AUTO-ENTMCNC: 31.2 G/DL (ref 32–36)
MCV RBC AUTO: 84 FL (ref 82–98)
MICROSCOPIC COMMENT: NORMAL
MODE: ABNORMAL
MONOCYTES # BLD AUTO: 0.5 K/UL (ref 0.3–1)
MONOCYTES NFR BLD: 5.8 % (ref 4–15)
NEUTROPHILS # BLD AUTO: 5.4 K/UL (ref 1.8–7.7)
NEUTROPHILS NFR BLD: 64.8 % (ref 38–73)
NITRITE UR QL STRIP: NEGATIVE
NRBC BLD-RTO: 0 /100 WBC
PCO2 BLDA: 58.7 MMHG (ref 35–45)
PH SMN: 7.4 [PH] (ref 7.35–7.45)
PH UR STRIP: 7 [PH] (ref 5–8)
PLATELET # BLD AUTO: 265 K/UL (ref 150–350)
PMV BLD AUTO: 9.9 FL (ref 9.2–12.9)
PO2 BLDA: 38 MMHG (ref 40–60)
POC BE: 9 MMOL/L
POC SATURATED O2: 70 % (ref 95–100)
POC TCO2: 38 MMOL/L (ref 24–29)
POCT GLUCOSE: 311 MG/DL (ref 70–110)
POCT GLUCOSE: 389 MG/DL (ref 70–110)
POTASSIUM SERPL-SCNC: 4.2 MMOL/L (ref 3.5–5.1)
PROT SERPL-MCNC: 8 G/DL (ref 6–8.4)
PROT UR QL STRIP: NEGATIVE
PROTHROMBIN TIME: 10.3 SEC (ref 9–12.5)
RBC # BLD AUTO: 5.44 M/UL (ref 4–5.4)
SAMPLE: ABNORMAL
SARS-COV-2 RDRP RESP QL NAA+PROBE: NEGATIVE
SITE: ABNORMAL
SODIUM SERPL-SCNC: 138 MMOL/L (ref 136–145)
SP GR UR STRIP: 1.01 (ref 1–1.03)
TROPONIN I SERPL DL<=0.01 NG/ML-MCNC: 0.02 NG/ML (ref 0–0.03)
TROPONIN I SERPL DL<=0.01 NG/ML-MCNC: 0.02 NG/ML (ref 0–0.03)
URN SPEC COLLECT METH UR: ABNORMAL
UROBILINOGEN UR STRIP-ACNC: NEGATIVE EU/DL
WBC # BLD AUTO: 8.34 K/UL (ref 3.9–12.7)
YEAST URNS QL MICRO: NORMAL

## 2021-02-26 PROCEDURE — 25500020 PHARM REV CODE 255: Performed by: EMERGENCY MEDICINE

## 2021-02-26 PROCEDURE — 82803 BLOOD GASES ANY COMBINATION: CPT

## 2021-02-26 PROCEDURE — 82010 KETONE BODYS QUAN: CPT

## 2021-02-26 PROCEDURE — 85025 COMPLETE CBC W/AUTO DIFF WBC: CPT

## 2021-02-26 PROCEDURE — 80053 COMPREHEN METABOLIC PANEL: CPT

## 2021-02-26 PROCEDURE — 83880 ASSAY OF NATRIURETIC PEPTIDE: CPT

## 2021-02-26 PROCEDURE — 63600175 PHARM REV CODE 636 W HCPCS: Performed by: PHYSICIAN ASSISTANT

## 2021-02-26 PROCEDURE — 81000 URINALYSIS NONAUTO W/SCOPE: CPT

## 2021-02-26 PROCEDURE — 85730 THROMBOPLASTIN TIME PARTIAL: CPT

## 2021-02-26 PROCEDURE — 85379 FIBRIN DEGRADATION QUANT: CPT

## 2021-02-26 PROCEDURE — 82962 GLUCOSE BLOOD TEST: CPT | Mod: 91

## 2021-02-26 PROCEDURE — 85610 PROTHROMBIN TIME: CPT

## 2021-02-26 PROCEDURE — 99900035 HC TECH TIME PER 15 MIN (STAT)

## 2021-02-26 PROCEDURE — 84484 ASSAY OF TROPONIN QUANT: CPT | Mod: 91

## 2021-02-26 PROCEDURE — 25000003 PHARM REV CODE 250: Performed by: PHYSICIAN ASSISTANT

## 2021-02-26 RX ADMIN — INSULIN HUMAN 8 UNITS: 100 INJECTION, SOLUTION PARENTERAL at 01:02

## 2021-02-26 RX ADMIN — IOHEXOL 75 ML: 350 INJECTION, SOLUTION INTRAVENOUS at 02:02

## 2021-02-26 RX ADMIN — SODIUM CHLORIDE 500 ML: 0.9 INJECTION, SOLUTION INTRAVENOUS at 02:02

## 2021-03-04 RX ORDER — PEN NEEDLE, DIABETIC 32GX 5/32"
NEEDLE, DISPOSABLE MISCELLANEOUS
Qty: 400 EACH | Refills: 3 | Status: SHIPPED | OUTPATIENT
Start: 2021-03-04 | End: 2022-03-10 | Stop reason: SDUPTHER

## 2021-03-29 ENCOUNTER — PATIENT OUTREACH (OUTPATIENT)
Dept: ADMINISTRATIVE | Facility: HOSPITAL | Age: 46
End: 2021-03-29

## 2021-04-01 ENCOUNTER — PATIENT OUTREACH (OUTPATIENT)
Dept: ADMINISTRATIVE | Facility: HOSPITAL | Age: 46
End: 2021-04-01

## 2021-04-05 ENCOUNTER — PATIENT MESSAGE (OUTPATIENT)
Dept: ADMINISTRATIVE | Facility: HOSPITAL | Age: 46
End: 2021-04-05

## 2021-04-06 ENCOUNTER — HOSPITAL ENCOUNTER (EMERGENCY)
Facility: HOSPITAL | Age: 46
Discharge: LEFT BEFORE DISCHARGE | End: 2021-04-07
Payer: MEDICARE

## 2021-04-06 VITALS
HEART RATE: 90 BPM | RESPIRATION RATE: 18 BRPM | SYSTOLIC BLOOD PRESSURE: 143 MMHG | TEMPERATURE: 99 F | WEIGHT: 250 LBS | OXYGEN SATURATION: 98 % | HEIGHT: 67 IN | DIASTOLIC BLOOD PRESSURE: 80 MMHG | BODY MASS INDEX: 39.24 KG/M2

## 2021-04-06 LAB
B-HCG UR QL: NEGATIVE
CTP QC/QA: YES

## 2021-04-06 PROCEDURE — 99282 EMERGENCY DEPT VISIT SF MDM: CPT

## 2021-04-06 PROCEDURE — 81025 URINE PREGNANCY TEST: CPT | Performed by: PHYSICIAN ASSISTANT

## 2021-04-06 RX ORDER — LIDOCAINE HYDROCHLORIDE 20 MG/ML
5 INJECTION, SOLUTION INFILTRATION; PERINEURAL
Status: DISCONTINUED | OUTPATIENT
Start: 2021-04-06 | End: 2021-04-07 | Stop reason: HOSPADM

## 2021-04-07 ENCOUNTER — HOSPITAL ENCOUNTER (EMERGENCY)
Facility: HOSPITAL | Age: 46
Discharge: HOME | End: 2021-04-07
Attending: EMERGENCY MEDICINE
Payer: MEDICARE

## 2021-04-07 VITALS
OXYGEN SATURATION: 98 % | SYSTOLIC BLOOD PRESSURE: 135 MMHG | HEART RATE: 87 BPM | TEMPERATURE: 99 F | DIASTOLIC BLOOD PRESSURE: 90 MMHG | WEIGHT: 250 LBS | HEIGHT: 67 IN | BODY MASS INDEX: 39.24 KG/M2 | RESPIRATION RATE: 18 BRPM

## 2021-04-07 DIAGNOSIS — N76.4 LABIAL ABSCESS: Primary | ICD-10-CM

## 2021-04-07 LAB
B-HCG UR QL: NEGATIVE
CTP QC/QA: YES
POCT GLUCOSE: 330 MG/DL (ref 70–110)

## 2021-04-07 PROCEDURE — 81025 URINE PREGNANCY TEST: CPT | Performed by: NURSE PRACTITIONER

## 2021-04-07 PROCEDURE — 25000003 PHARM REV CODE 250: Performed by: NURSE PRACTITIONER

## 2021-04-07 PROCEDURE — 82962 GLUCOSE BLOOD TEST: CPT

## 2021-04-07 PROCEDURE — 99284 EMERGENCY DEPT VISIT MOD MDM: CPT | Mod: 25

## 2021-04-07 PROCEDURE — 56420 I&D BARTHOLINS GLAND ABSCESS: CPT

## 2021-04-07 RX ORDER — LIDOCAINE HYDROCHLORIDE 10 MG/ML
10 INJECTION INFILTRATION; PERINEURAL
Status: COMPLETED | OUTPATIENT
Start: 2021-04-07 | End: 2021-04-07

## 2021-04-07 RX ORDER — OXYCODONE AND ACETAMINOPHEN 5; 325 MG/1; MG/1
1 TABLET ORAL
Status: DISCONTINUED | OUTPATIENT
Start: 2021-04-07 | End: 2021-04-07 | Stop reason: HOSPADM

## 2021-04-07 RX ORDER — OXYCODONE AND ACETAMINOPHEN 5; 325 MG/1; MG/1
1 TABLET ORAL EVERY 6 HOURS PRN
Qty: 6 TABLET | Refills: 0 | Status: SHIPPED | OUTPATIENT
Start: 2021-04-07 | End: 2021-04-07 | Stop reason: SDUPTHER

## 2021-04-07 RX ORDER — OXYCODONE AND ACETAMINOPHEN 5; 325 MG/1; MG/1
1 TABLET ORAL EVERY 6 HOURS PRN
Qty: 6 TABLET | Refills: 0 | Status: SHIPPED | OUTPATIENT
Start: 2021-04-07 | End: 2021-07-12

## 2021-04-07 RX ORDER — KETOROLAC TROMETHAMINE 30 MG/ML
15 INJECTION, SOLUTION INTRAMUSCULAR; INTRAVENOUS
Status: DISCONTINUED | OUTPATIENT
Start: 2021-04-07 | End: 2021-04-07

## 2021-04-07 RX ADMIN — LIDOCAINE HYDROCHLORIDE 10 ML: 10 INJECTION, SOLUTION INFILTRATION; PERINEURAL at 07:04

## 2021-04-08 ENCOUNTER — PATIENT OUTREACH (OUTPATIENT)
Dept: ADMINISTRATIVE | Facility: OTHER | Age: 46
End: 2021-04-08

## 2021-04-08 DIAGNOSIS — E11.9 TYPE 2 DIABETES MELLITUS WITHOUT COMPLICATION, WITHOUT LONG-TERM CURRENT USE OF INSULIN: Primary | ICD-10-CM

## 2021-04-15 ENCOUNTER — HOSPITAL ENCOUNTER (OUTPATIENT)
Dept: RADIOLOGY | Facility: HOSPITAL | Age: 46
Discharge: HOME | End: 2021-04-15
Attending: FAMILY MEDICINE
Payer: MEDICARE

## 2021-04-15 ENCOUNTER — OFFICE VISIT (OUTPATIENT)
Dept: FAMILY MEDICINE | Facility: CLINIC | Age: 46
End: 2021-04-15
Payer: MEDICARE

## 2021-04-15 ENCOUNTER — OFFICE VISIT (OUTPATIENT)
Dept: CARDIOLOGY | Facility: CLINIC | Age: 46
End: 2021-04-15
Payer: MEDICARE

## 2021-04-15 ENCOUNTER — HOSPITAL ENCOUNTER (EMERGENCY)
Facility: HOSPITAL | Age: 46
Discharge: HOME | End: 2021-04-16
Attending: EMERGENCY MEDICINE
Payer: MEDICARE

## 2021-04-15 VITALS
SYSTOLIC BLOOD PRESSURE: 122 MMHG | OXYGEN SATURATION: 99 % | DIASTOLIC BLOOD PRESSURE: 82 MMHG | HEART RATE: 80 BPM | BODY MASS INDEX: 39.88 KG/M2 | WEIGHT: 254.63 LBS | TEMPERATURE: 98 F

## 2021-04-15 VITALS
SYSTOLIC BLOOD PRESSURE: 126 MMHG | BODY MASS INDEX: 39.87 KG/M2 | HEART RATE: 91 BPM | HEIGHT: 67 IN | OXYGEN SATURATION: 98 % | WEIGHT: 254 LBS | RESPIRATION RATE: 15 BRPM | DIASTOLIC BLOOD PRESSURE: 72 MMHG

## 2021-04-15 VITALS — HEIGHT: 67 IN | WEIGHT: 250 LBS | BODY MASS INDEX: 39.24 KG/M2

## 2021-04-15 DIAGNOSIS — I42.8 NICM (NONISCHEMIC CARDIOMYOPATHY): ICD-10-CM

## 2021-04-15 DIAGNOSIS — E78.2 MIXED HYPERLIPIDEMIA: ICD-10-CM

## 2021-04-15 DIAGNOSIS — E11.9 TYPE 2 DIABETES MELLITUS WITHOUT COMPLICATION, WITHOUT LONG-TERM CURRENT USE OF INSULIN: ICD-10-CM

## 2021-04-15 DIAGNOSIS — E66.9 OBESITY WITH BODY MASS INDEX (BMI) OF 30.0 TO 39.9: ICD-10-CM

## 2021-04-15 DIAGNOSIS — I48.0 PAROXYSMAL ATRIAL FIBRILLATION: ICD-10-CM

## 2021-04-15 DIAGNOSIS — N18.31 STAGE 3A CHRONIC KIDNEY DISEASE: ICD-10-CM

## 2021-04-15 DIAGNOSIS — G47.33 OSA TREATED WITH BIPAP: ICD-10-CM

## 2021-04-15 DIAGNOSIS — R25.2 MUSCLE CRAMP: Primary | ICD-10-CM

## 2021-04-15 DIAGNOSIS — Z95.810 IMPLANTABLE CARDIOVERTER-DEFIBRILLATOR (ICD) IN SITU: ICD-10-CM

## 2021-04-15 DIAGNOSIS — R73.9 HYPERGLYCEMIA: Primary | ICD-10-CM

## 2021-04-15 DIAGNOSIS — Z12.31 BREAST CANCER SCREENING BY MAMMOGRAM: ICD-10-CM

## 2021-04-15 DIAGNOSIS — I10 ESSENTIAL HYPERTENSION: ICD-10-CM

## 2021-04-15 DIAGNOSIS — I50.42 CHRONIC COMBINED SYSTOLIC AND DIASTOLIC HEART FAILURE: Primary | ICD-10-CM

## 2021-04-15 LAB
ALLENS TEST: ABNORMAL
B-HCG UR QL: NEGATIVE
B-OH-BUTYR BLD STRIP-SCNC: 0.1 MMOL/L (ref 0–0.5)
BASOPHILS # BLD AUTO: 0.02 K/UL (ref 0–0.2)
BASOPHILS NFR BLD: 0.2 % (ref 0–1.9)
CTP QC/QA: YES
DELSYS: ABNORMAL
DIFFERENTIAL METHOD: ABNORMAL
EOSINOPHIL # BLD AUTO: 0.1 K/UL (ref 0–0.5)
EOSINOPHIL NFR BLD: 1.5 % (ref 0–8)
ERYTHROCYTE [DISTWIDTH] IN BLOOD BY AUTOMATED COUNT: 16.8 % (ref 11.5–14.5)
FIO2: 21
HCO3 UR-SCNC: 34.8 MMOL/L (ref 24–28)
HCT VFR BLD AUTO: 44.2 % (ref 37–48.5)
HGB BLD-MCNC: 14.7 G/DL (ref 12–16)
IMM GRANULOCYTES # BLD AUTO: 0.03 K/UL (ref 0–0.04)
IMM GRANULOCYTES NFR BLD AUTO: 0.3 % (ref 0–0.5)
LYMPHOCYTES # BLD AUTO: 2.5 K/UL (ref 1–4.8)
LYMPHOCYTES NFR BLD: 28.2 % (ref 18–48)
MCH RBC QN AUTO: 26.5 PG (ref 27–31)
MCHC RBC AUTO-ENTMCNC: 33.3 G/DL (ref 32–36)
MCV RBC AUTO: 80 FL (ref 82–98)
MODE: ABNORMAL
MONOCYTES # BLD AUTO: 0.6 K/UL (ref 0.3–1)
MONOCYTES NFR BLD: 6.6 % (ref 4–15)
NEUTROPHILS # BLD AUTO: 5.6 K/UL (ref 1.8–7.7)
NEUTROPHILS NFR BLD: 63.2 % (ref 38–73)
NRBC BLD-RTO: 0 /100 WBC
PCO2 BLDA: 59.6 MMHG (ref 35–45)
PH SMN: 7.38 [PH] (ref 7.35–7.45)
PLATELET # BLD AUTO: 243 K/UL (ref 150–450)
PMV BLD AUTO: 11 FL (ref 9.2–12.9)
PO2 BLDA: 42 MMHG (ref 40–60)
POC BE: 7 MMOL/L
POC SATURATED O2: 74 % (ref 95–100)
POC TCO2: 37 MMOL/L (ref 24–29)
POCT GLUCOSE: 431 MG/DL (ref 70–110)
RBC # BLD AUTO: 5.54 M/UL (ref 4–5.4)
SAMPLE: ABNORMAL
SITE: ABNORMAL
SP02: 97
WBC # BLD AUTO: 8.84 K/UL (ref 3.9–12.7)

## 2021-04-15 PROCEDURE — 99499 RISK ADDL DX/OHS AUDIT: ICD-10-PCS | Mod: S$GLB,,, | Performed by: FAMILY MEDICINE

## 2021-04-15 PROCEDURE — 3008F BODY MASS INDEX DOCD: CPT | Mod: CPTII,S$GLB,, | Performed by: FAMILY MEDICINE

## 2021-04-15 PROCEDURE — 99499 UNLISTED E&M SERVICE: CPT | Mod: S$GLB,,, | Performed by: INTERNAL MEDICINE

## 2021-04-15 PROCEDURE — 96361 HYDRATE IV INFUSION ADD-ON: CPT

## 2021-04-15 PROCEDURE — 99214 OFFICE O/P EST MOD 30 MIN: CPT | Mod: S$GLB,,, | Performed by: FAMILY MEDICINE

## 2021-04-15 PROCEDURE — 3074F SYST BP LT 130 MM HG: CPT | Mod: CPTII,S$GLB,, | Performed by: FAMILY MEDICINE

## 2021-04-15 PROCEDURE — 77067 MAMMO DIGITAL SCREENING BILAT WITH TOMO: ICD-10-PCS | Mod: 26,,, | Performed by: RADIOLOGY

## 2021-04-15 PROCEDURE — 3008F PR BODY MASS INDEX (BMI) DOCUMENTED: ICD-10-PCS | Mod: CPTII,S$GLB,, | Performed by: INTERNAL MEDICINE

## 2021-04-15 PROCEDURE — 99900035 HC TECH TIME PER 15 MIN (STAT)

## 2021-04-15 PROCEDURE — 25000003 PHARM REV CODE 250: Performed by: EMERGENCY MEDICINE

## 2021-04-15 PROCEDURE — 85025 COMPLETE CBC W/AUTO DIFF WBC: CPT | Performed by: EMERGENCY MEDICINE

## 2021-04-15 PROCEDURE — 80047 BASIC METABLC PNL IONIZED CA: CPT

## 2021-04-15 PROCEDURE — 1126F AMNT PAIN NOTED NONE PRSNT: CPT | Mod: S$GLB,,, | Performed by: FAMILY MEDICINE

## 2021-04-15 PROCEDURE — 3074F PR MOST RECENT SYSTOLIC BLOOD PRESSURE < 130 MM HG: ICD-10-PCS | Mod: CPTII,S$GLB,, | Performed by: FAMILY MEDICINE

## 2021-04-15 PROCEDURE — 93000 ELECTROCARDIOGRAM COMPLETE: CPT | Mod: S$GLB,,, | Performed by: INTERNAL MEDICINE

## 2021-04-15 PROCEDURE — 77063 BREAST TOMOSYNTHESIS BI: CPT | Mod: 26,,, | Performed by: RADIOLOGY

## 2021-04-15 PROCEDURE — 77067 SCR MAMMO BI INCL CAD: CPT | Mod: TC

## 2021-04-15 PROCEDURE — 99999 PR PBB SHADOW E&M-EST. PATIENT-LVL V: ICD-10-PCS | Mod: PBBFAC,,, | Performed by: INTERNAL MEDICINE

## 2021-04-15 PROCEDURE — 99999 PR PBB SHADOW E&M-EST. PATIENT-LVL IV: CPT | Mod: PBBFAC,,, | Performed by: FAMILY MEDICINE

## 2021-04-15 PROCEDURE — 96374 THER/PROPH/DIAG INJ IV PUSH: CPT

## 2021-04-15 PROCEDURE — 99214 PR OFFICE/OUTPT VISIT, EST, LEVL IV, 30-39 MIN: ICD-10-PCS | Mod: S$GLB,,, | Performed by: INTERNAL MEDICINE

## 2021-04-15 PROCEDURE — 77067 SCR MAMMO BI INCL CAD: CPT | Mod: 26,,, | Performed by: RADIOLOGY

## 2021-04-15 PROCEDURE — 93000 EKG 12-LEAD: ICD-10-PCS | Mod: S$GLB,,, | Performed by: INTERNAL MEDICINE

## 2021-04-15 PROCEDURE — 99499 RISK ADDL DX/OHS AUDIT: ICD-10-PCS | Mod: S$GLB,,, | Performed by: INTERNAL MEDICINE

## 2021-04-15 PROCEDURE — 80053 COMPREHEN METABOLIC PANEL: CPT | Performed by: EMERGENCY MEDICINE

## 2021-04-15 PROCEDURE — 82962 GLUCOSE BLOOD TEST: CPT

## 2021-04-15 PROCEDURE — 99999 PR PBB SHADOW E&M-EST. PATIENT-LVL V: CPT | Mod: PBBFAC,,, | Performed by: INTERNAL MEDICINE

## 2021-04-15 PROCEDURE — 99214 PR OFFICE/OUTPT VISIT, EST, LEVL IV, 30-39 MIN: ICD-10-PCS | Mod: S$GLB,,, | Performed by: FAMILY MEDICINE

## 2021-04-15 PROCEDURE — 1126F PR PAIN SEVERITY QUANTIFIED, NO PAIN PRESENT: ICD-10-PCS | Mod: S$GLB,,, | Performed by: FAMILY MEDICINE

## 2021-04-15 PROCEDURE — 96372 THER/PROPH/DIAG INJ SC/IM: CPT

## 2021-04-15 PROCEDURE — 99285 EMERGENCY DEPT VISIT HI MDM: CPT | Mod: 25

## 2021-04-15 PROCEDURE — 3079F DIAST BP 80-89 MM HG: CPT | Mod: CPTII,S$GLB,, | Performed by: FAMILY MEDICINE

## 2021-04-15 PROCEDURE — 81025 URINE PREGNANCY TEST: CPT | Performed by: EMERGENCY MEDICINE

## 2021-04-15 PROCEDURE — 99214 OFFICE O/P EST MOD 30 MIN: CPT | Mod: S$GLB,,, | Performed by: INTERNAL MEDICINE

## 2021-04-15 PROCEDURE — 99999 PR PBB SHADOW E&M-EST. PATIENT-LVL IV: ICD-10-PCS | Mod: PBBFAC,,, | Performed by: FAMILY MEDICINE

## 2021-04-15 PROCEDURE — 99499 UNLISTED E&M SERVICE: CPT | Mod: S$GLB,,, | Performed by: FAMILY MEDICINE

## 2021-04-15 PROCEDURE — 1126F AMNT PAIN NOTED NONE PRSNT: CPT | Mod: S$GLB,,, | Performed by: INTERNAL MEDICINE

## 2021-04-15 PROCEDURE — 3079F PR MOST RECENT DIASTOLIC BLOOD PRESSURE 80-89 MM HG: ICD-10-PCS | Mod: CPTII,S$GLB,, | Performed by: FAMILY MEDICINE

## 2021-04-15 PROCEDURE — 3008F PR BODY MASS INDEX (BMI) DOCUMENTED: ICD-10-PCS | Mod: CPTII,S$GLB,, | Performed by: FAMILY MEDICINE

## 2021-04-15 PROCEDURE — 82803 BLOOD GASES ANY COMBINATION: CPT

## 2021-04-15 PROCEDURE — 1126F PR PAIN SEVERITY QUANTIFIED, NO PAIN PRESENT: ICD-10-PCS | Mod: S$GLB,,, | Performed by: INTERNAL MEDICINE

## 2021-04-15 PROCEDURE — 82010 KETONE BODYS QUAN: CPT | Performed by: EMERGENCY MEDICINE

## 2021-04-15 PROCEDURE — 3008F BODY MASS INDEX DOCD: CPT | Mod: CPTII,S$GLB,, | Performed by: INTERNAL MEDICINE

## 2021-04-15 PROCEDURE — 77063 MAMMO DIGITAL SCREENING BILAT WITH TOMO: ICD-10-PCS | Mod: 26,,, | Performed by: RADIOLOGY

## 2021-04-15 RX ORDER — CYCLOBENZAPRINE HCL 5 MG
5 TABLET ORAL NIGHTLY PRN
Qty: 30 TABLET | Refills: 0 | Status: SHIPPED | OUTPATIENT
Start: 2021-04-15 | End: 2021-05-15

## 2021-04-15 RX ORDER — GABAPENTIN 400 MG/1
CAPSULE ORAL
Qty: 90 CAPSULE | Refills: 0 | Status: SHIPPED | OUTPATIENT
Start: 2021-04-15 | End: 2021-04-15 | Stop reason: SDUPTHER

## 2021-04-15 RX ORDER — GABAPENTIN 400 MG/1
CAPSULE ORAL
Qty: 90 CAPSULE | Refills: 0 | Status: SHIPPED | OUTPATIENT
Start: 2021-04-15 | End: 2021-08-31 | Stop reason: SDUPTHER

## 2021-04-15 RX ADMIN — INSULIN DETEMIR 50 UNITS: 100 INJECTION, SOLUTION SUBCUTANEOUS at 11:04

## 2021-04-15 RX ADMIN — SODIUM CHLORIDE 1000 ML: 0.9 INJECTION, SOLUTION INTRAVENOUS at 11:04

## 2021-04-16 VITALS
WEIGHT: 255.75 LBS | OXYGEN SATURATION: 100 % | SYSTOLIC BLOOD PRESSURE: 132 MMHG | BODY MASS INDEX: 40.05 KG/M2 | HEART RATE: 78 BPM | RESPIRATION RATE: 17 BRPM | TEMPERATURE: 98 F | DIASTOLIC BLOOD PRESSURE: 67 MMHG

## 2021-04-16 LAB
ALBUMIN SERPL BCP-MCNC: 3.9 G/DL (ref 3.5–5.2)
ALP SERPL-CCNC: 124 U/L (ref 55–135)
ALT SERPL W/O P-5'-P-CCNC: 21 U/L (ref 10–44)
ANION GAP SERPL CALC-SCNC: 13 MMOL/L (ref 8–16)
AST SERPL-CCNC: 20 U/L (ref 10–40)
BILIRUB SERPL-MCNC: 0.5 MG/DL (ref 0.1–1)
BUN SERPL-MCNC: 31 MG/DL (ref 6–20)
CALCIUM SERPL-MCNC: 9.5 MG/DL (ref 8.7–10.5)
CHLORIDE SERPL-SCNC: 91 MMOL/L (ref 95–110)
CO2 SERPL-SCNC: 27 MMOL/L (ref 23–29)
CREAT SERPL-MCNC: 2.4 MG/DL (ref 0.5–1.4)
EST. GFR  (AFRICAN AMERICAN): 27 ML/MIN/1.73 M^2
EST. GFR  (NON AFRICAN AMERICAN): 24 ML/MIN/1.73 M^2
GLUCOSE SERPL-MCNC: 532 MG/DL (ref 70–110)
POCT GLUCOSE: 323 MG/DL (ref 70–110)
POCT GLUCOSE: 352 MG/DL (ref 70–110)
POCT GLUCOSE: 367 MG/DL (ref 70–110)
POCT GLUCOSE: 433 MG/DL (ref 70–110)
POTASSIUM SERPL-SCNC: 4.2 MMOL/L (ref 3.5–5.1)
PROT SERPL-MCNC: 8.3 G/DL (ref 6–8.4)
SODIUM SERPL-SCNC: 131 MMOL/L (ref 136–145)

## 2021-04-16 PROCEDURE — 96376 TX/PRO/DX INJ SAME DRUG ADON: CPT

## 2021-04-16 PROCEDURE — 82962 GLUCOSE BLOOD TEST: CPT

## 2021-04-16 PROCEDURE — 96374 THER/PROPH/DIAG INJ IV PUSH: CPT

## 2021-04-16 PROCEDURE — 25000003 PHARM REV CODE 250: Performed by: EMERGENCY MEDICINE

## 2021-04-16 PROCEDURE — 63600175 PHARM REV CODE 636 W HCPCS: Performed by: EMERGENCY MEDICINE

## 2021-04-16 PROCEDURE — C9399 UNCLASSIFIED DRUGS OR BIOLOG: HCPCS | Performed by: EMERGENCY MEDICINE

## 2021-04-16 RX ADMIN — INSULIN HUMAN 10 UNITS: 100 INJECTION, SOLUTION PARENTERAL at 03:04

## 2021-04-16 RX ADMIN — INSULIN HUMAN 5 UNITS: 100 INJECTION, SOLUTION PARENTERAL at 01:04

## 2021-04-16 RX ADMIN — INSULIN HUMAN 5 UNITS: 100 INJECTION, SOLUTION PARENTERAL at 12:04

## 2021-04-16 RX ADMIN — INSULIN HUMAN 7 UNITS: 100 INJECTION, SOLUTION PARENTERAL at 04:04

## 2021-04-26 ENCOUNTER — PATIENT OUTREACH (OUTPATIENT)
Dept: ADMINISTRATIVE | Facility: HOSPITAL | Age: 46
End: 2021-04-26

## 2021-04-26 DIAGNOSIS — I10 ESSENTIAL HYPERTENSION: Primary | ICD-10-CM

## 2021-04-28 ENCOUNTER — HOSPITAL ENCOUNTER (INPATIENT)
Facility: HOSPITAL | Age: 46
LOS: 4 days | Discharge: HOME | DRG: 177 | End: 2021-05-02
Attending: EMERGENCY MEDICINE | Admitting: HOSPITALIST
Payer: MEDICARE

## 2021-04-28 DIAGNOSIS — N18.31 STAGE 3A CHRONIC KIDNEY DISEASE: ICD-10-CM

## 2021-04-28 DIAGNOSIS — U07.1 PNEUMONIA DUE TO COVID-19 VIRUS: ICD-10-CM

## 2021-04-28 DIAGNOSIS — R09.02 HYPOXIA: Primary | ICD-10-CM

## 2021-04-28 DIAGNOSIS — U07.1 ACUTE RESPIRATORY FAILURE DUE TO COVID-19: ICD-10-CM

## 2021-04-28 DIAGNOSIS — U07.1 COVID-19: ICD-10-CM

## 2021-04-28 DIAGNOSIS — E11.9 TYPE 2 DIABETES MELLITUS WITHOUT COMPLICATION, WITHOUT LONG-TERM CURRENT USE OF INSULIN: ICD-10-CM

## 2021-04-28 DIAGNOSIS — Z95.810 IMPLANTABLE CARDIOVERTER-DEFIBRILLATOR (ICD) IN SITU: ICD-10-CM

## 2021-04-28 DIAGNOSIS — J96.00 ACUTE RESPIRATORY FAILURE DUE TO COVID-19: ICD-10-CM

## 2021-04-28 DIAGNOSIS — I10 ESSENTIAL HYPERTENSION: ICD-10-CM

## 2021-04-28 DIAGNOSIS — J12.82 PNEUMONIA DUE TO COVID-19 VIRUS: ICD-10-CM

## 2021-04-28 LAB
ALBUMIN SERPL BCP-MCNC: 3.6 G/DL (ref 3.5–5.2)
ALP SERPL-CCNC: 102 U/L (ref 55–135)
ALT SERPL W/O P-5'-P-CCNC: 29 U/L (ref 10–44)
ANION GAP SERPL CALC-SCNC: 10 MMOL/L (ref 8–16)
AST SERPL-CCNC: 28 U/L (ref 10–40)
BACTERIA #/AREA URNS HPF: ABNORMAL /HPF
BASOPHILS # BLD AUTO: 0.03 K/UL (ref 0–0.2)
BASOPHILS NFR BLD: 0.5 % (ref 0–1.9)
BILIRUB SERPL-MCNC: 0.6 MG/DL (ref 0.1–1)
BILIRUB UR QL STRIP: NEGATIVE
BNP SERPL-MCNC: 17 PG/ML (ref 0–99)
BUN SERPL-MCNC: 21 MG/DL (ref 6–20)
CALCIUM SERPL-MCNC: 9.1 MG/DL (ref 8.7–10.5)
CHLORIDE SERPL-SCNC: 97 MMOL/L (ref 95–110)
CK SERPL-CCNC: 186 U/L (ref 20–180)
CLARITY UR: CLEAR
CO2 SERPL-SCNC: 28 MMOL/L (ref 23–29)
COLOR UR: YELLOW
CREAT SERPL-MCNC: 2 MG/DL (ref 0.5–1.4)
CRP SERPL-MCNC: 9.2 MG/L (ref 0–8.2)
CTP QC/QA: YES
D DIMER PPP IA.FEU-MCNC: 0.3 MG/L FEU
DIFFERENTIAL METHOD: ABNORMAL
EOSINOPHIL # BLD AUTO: 0.1 K/UL (ref 0–0.5)
EOSINOPHIL NFR BLD: 1.3 % (ref 0–8)
ERYTHROCYTE [DISTWIDTH] IN BLOOD BY AUTOMATED COUNT: 18 % (ref 11.5–14.5)
EST. GFR  (AFRICAN AMERICAN): 34 ML/MIN/1.73 M^2
EST. GFR  (NON AFRICAN AMERICAN): 30 ML/MIN/1.73 M^2
FERRITIN SERPL-MCNC: 64 NG/ML (ref 20–300)
GLUCOSE SERPL-MCNC: 255 MG/DL (ref 70–110)
GLUCOSE UR QL STRIP: ABNORMAL
HCT VFR BLD AUTO: 46.7 % (ref 37–48.5)
HGB BLD-MCNC: 15.2 G/DL (ref 12–16)
HGB UR QL STRIP: ABNORMAL
HYALINE CASTS #/AREA URNS LPF: 62 /LPF
IMM GRANULOCYTES # BLD AUTO: 0.03 K/UL (ref 0–0.04)
IMM GRANULOCYTES NFR BLD AUTO: 0.5 % (ref 0–0.5)
KETONES UR QL STRIP: NEGATIVE
LACTATE SERPL-SCNC: 0.8 MMOL/L (ref 0.5–2.2)
LDH SERPL L TO P-CCNC: 276 U/L (ref 110–260)
LEUKOCYTE ESTERASE UR QL STRIP: NEGATIVE
LYMPHOCYTES # BLD AUTO: 1.4 K/UL (ref 1–4.8)
LYMPHOCYTES NFR BLD: 24.9 % (ref 18–48)
MCH RBC QN AUTO: 27.3 PG (ref 27–31)
MCHC RBC AUTO-ENTMCNC: 32.5 G/DL (ref 32–36)
MCV RBC AUTO: 84 FL (ref 82–98)
MICROSCOPIC COMMENT: ABNORMAL
MONOCYTES # BLD AUTO: 0.6 K/UL (ref 0.3–1)
MONOCYTES NFR BLD: 11 % (ref 4–15)
NEUTROPHILS # BLD AUTO: 3.4 K/UL (ref 1.8–7.7)
NEUTROPHILS NFR BLD: 61.8 % (ref 38–73)
NITRITE UR QL STRIP: NEGATIVE
NRBC BLD-RTO: 0 /100 WBC
PH UR STRIP: 6 [PH] (ref 5–8)
PLATELET # BLD AUTO: 196 K/UL (ref 150–450)
PMV BLD AUTO: 10.2 FL (ref 9.2–12.9)
POTASSIUM SERPL-SCNC: 3.8 MMOL/L (ref 3.5–5.1)
PROCALCITONIN SERPL IA-MCNC: 0.11 NG/ML
PROT SERPL-MCNC: 8.2 G/DL (ref 6–8.4)
PROT UR QL STRIP: ABNORMAL
RBC # BLD AUTO: 5.57 M/UL (ref 4–5.4)
RBC #/AREA URNS HPF: 4 /HPF (ref 0–4)
SARS-COV-2 RDRP RESP QL NAA+PROBE: POSITIVE
SODIUM SERPL-SCNC: 135 MMOL/L (ref 136–145)
SP GR UR STRIP: 1.01 (ref 1–1.03)
SQUAMOUS #/AREA URNS HPF: 1 /HPF
TROPONIN I SERPL DL<=0.01 NG/ML-MCNC: 0.04 NG/ML (ref 0–0.03)
URN SPEC COLLECT METH UR: ABNORMAL
UROBILINOGEN UR STRIP-ACNC: NEGATIVE EU/DL
WBC # BLD AUTO: 5.47 K/UL (ref 3.9–12.7)
WBC #/AREA URNS HPF: 0 /HPF (ref 0–5)

## 2021-04-28 PROCEDURE — 93005 ELECTROCARDIOGRAM TRACING: CPT

## 2021-04-28 PROCEDURE — 84484 ASSAY OF TROPONIN QUANT: CPT | Performed by: EMERGENCY MEDICINE

## 2021-04-28 PROCEDURE — 96374 THER/PROPH/DIAG INJ IV PUSH: CPT

## 2021-04-28 PROCEDURE — C9399 UNCLASSIFIED DRUGS OR BIOLOG: HCPCS | Performed by: PHYSICIAN ASSISTANT

## 2021-04-28 PROCEDURE — 99285 EMERGENCY DEPT VISIT HI MDM: CPT | Mod: 25

## 2021-04-28 PROCEDURE — 84145 PROCALCITONIN (PCT): CPT | Performed by: EMERGENCY MEDICINE

## 2021-04-28 PROCEDURE — 83880 ASSAY OF NATRIURETIC PEPTIDE: CPT | Performed by: PHYSICIAN ASSISTANT

## 2021-04-28 PROCEDURE — 36415 COLL VENOUS BLD VENIPUNCTURE: CPT | Performed by: PHYSICIAN ASSISTANT

## 2021-04-28 PROCEDURE — G0378 HOSPITAL OBSERVATION PER HR: HCPCS

## 2021-04-28 PROCEDURE — 85025 COMPLETE CBC W/AUTO DIFF WBC: CPT | Performed by: EMERGENCY MEDICINE

## 2021-04-28 PROCEDURE — 87040 BLOOD CULTURE FOR BACTERIA: CPT | Performed by: PHYSICIAN ASSISTANT

## 2021-04-28 PROCEDURE — 82728 ASSAY OF FERRITIN: CPT | Performed by: EMERGENCY MEDICINE

## 2021-04-28 PROCEDURE — 96372 THER/PROPH/DIAG INJ SC/IM: CPT

## 2021-04-28 PROCEDURE — 83615 LACTATE (LD) (LDH) ENZYME: CPT | Performed by: EMERGENCY MEDICINE

## 2021-04-28 PROCEDURE — 85379 FIBRIN DEGRADATION QUANT: CPT | Performed by: EMERGENCY MEDICINE

## 2021-04-28 PROCEDURE — 83036 HEMOGLOBIN GLYCOSYLATED A1C: CPT | Performed by: PHYSICIAN ASSISTANT

## 2021-04-28 PROCEDURE — 82306 VITAMIN D 25 HYDROXY: CPT | Performed by: PHYSICIAN ASSISTANT

## 2021-04-28 PROCEDURE — 63600175 PHARM REV CODE 636 W HCPCS: Performed by: EMERGENCY MEDICINE

## 2021-04-28 PROCEDURE — 93010 EKG 12-LEAD: ICD-10-PCS | Mod: ,,, | Performed by: INTERNAL MEDICINE

## 2021-04-28 PROCEDURE — 81000 URINALYSIS NONAUTO W/SCOPE: CPT | Performed by: EMERGENCY MEDICINE

## 2021-04-28 PROCEDURE — 82550 ASSAY OF CK (CPK): CPT | Performed by: PHYSICIAN ASSISTANT

## 2021-04-28 PROCEDURE — U0002 COVID-19 LAB TEST NON-CDC: HCPCS | Performed by: EMERGENCY MEDICINE

## 2021-04-28 PROCEDURE — 25000003 PHARM REV CODE 250: Performed by: EMERGENCY MEDICINE

## 2021-04-28 PROCEDURE — 85652 RBC SED RATE AUTOMATED: CPT | Performed by: PHYSICIAN ASSISTANT

## 2021-04-28 PROCEDURE — 25000003 PHARM REV CODE 250: Performed by: PHYSICIAN ASSISTANT

## 2021-04-28 PROCEDURE — 83605 ASSAY OF LACTIC ACID: CPT | Performed by: PHYSICIAN ASSISTANT

## 2021-04-28 PROCEDURE — 80053 COMPREHEN METABOLIC PANEL: CPT | Performed by: EMERGENCY MEDICINE

## 2021-04-28 PROCEDURE — 11000001 HC ACUTE MED/SURG PRIVATE ROOM

## 2021-04-28 PROCEDURE — 93010 ELECTROCARDIOGRAM REPORT: CPT | Mod: ,,, | Performed by: INTERNAL MEDICINE

## 2021-04-28 PROCEDURE — 63600175 PHARM REV CODE 636 W HCPCS: Performed by: PHYSICIAN ASSISTANT

## 2021-04-28 PROCEDURE — 96361 HYDRATE IV INFUSION ADD-ON: CPT

## 2021-04-28 PROCEDURE — 86140 C-REACTIVE PROTEIN: CPT | Performed by: EMERGENCY MEDICINE

## 2021-04-28 PROCEDURE — 83520 IMMUNOASSAY QUANT NOS NONAB: CPT | Performed by: EMERGENCY MEDICINE

## 2021-04-28 RX ORDER — ACETAMINOPHEN 500 MG
500 TABLET ORAL EVERY 6 HOURS PRN
Status: DISCONTINUED | OUTPATIENT
Start: 2021-04-28 | End: 2021-05-02 | Stop reason: HOSPADM

## 2021-04-28 RX ORDER — ALBUTEROL SULFATE 90 UG/1
2 AEROSOL, METERED RESPIRATORY (INHALATION) EVERY 6 HOURS
Status: DISCONTINUED | OUTPATIENT
Start: 2021-04-29 | End: 2021-05-02 | Stop reason: HOSPADM

## 2021-04-28 RX ORDER — METOPROLOL SUCCINATE 50 MG/1
50 TABLET, EXTENDED RELEASE ORAL DAILY
Status: DISCONTINUED | OUTPATIENT
Start: 2021-04-29 | End: 2021-05-02 | Stop reason: HOSPADM

## 2021-04-28 RX ORDER — AMOXICILLIN 250 MG
1 CAPSULE ORAL DAILY PRN
Status: DISCONTINUED | OUTPATIENT
Start: 2021-04-28 | End: 2021-05-02 | Stop reason: HOSPADM

## 2021-04-28 RX ORDER — GLUCAGON 1 MG
1 KIT INJECTION
Status: DISCONTINUED | OUTPATIENT
Start: 2021-04-28 | End: 2021-04-28

## 2021-04-28 RX ORDER — IBUPROFEN 200 MG
16 TABLET ORAL
Status: DISCONTINUED | OUTPATIENT
Start: 2021-04-28 | End: 2021-04-28

## 2021-04-28 RX ORDER — DEXAMETHASONE SODIUM PHOSPHATE 4 MG/ML
6 INJECTION, SOLUTION INTRA-ARTICULAR; INTRALESIONAL; INTRAMUSCULAR; INTRAVENOUS; SOFT TISSUE
Status: COMPLETED | OUTPATIENT
Start: 2021-04-28 | End: 2021-04-28

## 2021-04-28 RX ORDER — GLUCAGON 1 MG
1 KIT INJECTION
Status: DISCONTINUED | OUTPATIENT
Start: 2021-04-28 | End: 2021-05-02 | Stop reason: HOSPADM

## 2021-04-28 RX ORDER — INSULIN ASPART 100 [IU]/ML
0-5 INJECTION, SOLUTION INTRAVENOUS; SUBCUTANEOUS
Status: DISCONTINUED | OUTPATIENT
Start: 2021-04-28 | End: 2021-05-02 | Stop reason: HOSPADM

## 2021-04-28 RX ORDER — BENZONATATE 100 MG/1
100 CAPSULE ORAL 3 TIMES DAILY PRN
Status: DISCONTINUED | OUTPATIENT
Start: 2021-04-28 | End: 2021-05-02 | Stop reason: HOSPADM

## 2021-04-28 RX ORDER — ASCORBIC ACID 500 MG
500 TABLET ORAL 2 TIMES DAILY
Status: DISCONTINUED | OUTPATIENT
Start: 2021-04-28 | End: 2021-05-02 | Stop reason: HOSPADM

## 2021-04-28 RX ORDER — IBUPROFEN 200 MG
16 TABLET ORAL
Status: DISCONTINUED | OUTPATIENT
Start: 2021-04-28 | End: 2021-05-02 | Stop reason: HOSPADM

## 2021-04-28 RX ORDER — SODIUM CHLORIDE 0.9 % (FLUSH) 0.9 %
10 SYRINGE (ML) INJECTION
Status: DISCONTINUED | OUTPATIENT
Start: 2021-04-28 | End: 2021-05-02 | Stop reason: HOSPADM

## 2021-04-28 RX ORDER — IBUPROFEN 200 MG
24 TABLET ORAL
Status: DISCONTINUED | OUTPATIENT
Start: 2021-04-28 | End: 2021-04-28

## 2021-04-28 RX ORDER — SODIUM CHLORIDE 9 MG/ML
INJECTION, SOLUTION INTRAVENOUS
Status: COMPLETED | OUTPATIENT
Start: 2021-04-28 | End: 2021-04-28

## 2021-04-28 RX ORDER — TALC
6 POWDER (GRAM) TOPICAL NIGHTLY PRN
Status: DISCONTINUED | OUTPATIENT
Start: 2021-04-28 | End: 2021-05-02 | Stop reason: HOSPADM

## 2021-04-28 RX ORDER — INSULIN ASPART 100 [IU]/ML
7 INJECTION, SOLUTION INTRAVENOUS; SUBCUTANEOUS
Status: DISCONTINUED | OUTPATIENT
Start: 2021-04-29 | End: 2021-04-29

## 2021-04-28 RX ORDER — ROSUVASTATIN CALCIUM 10 MG/1
40 TABLET, COATED ORAL NIGHTLY
Status: DISCONTINUED | OUTPATIENT
Start: 2021-04-28 | End: 2021-05-02 | Stop reason: HOSPADM

## 2021-04-28 RX ORDER — IBUPROFEN 200 MG
24 TABLET ORAL
Status: DISCONTINUED | OUTPATIENT
Start: 2021-04-28 | End: 2021-05-02 | Stop reason: HOSPADM

## 2021-04-28 RX ORDER — ACETAMINOPHEN 500 MG
1000 TABLET ORAL
Status: COMPLETED | OUTPATIENT
Start: 2021-04-28 | End: 2021-04-28

## 2021-04-28 RX ADMIN — SODIUM CHLORIDE: 0.9 INJECTION, SOLUTION INTRAVENOUS at 07:04

## 2021-04-28 RX ADMIN — INSULIN ASPART 1 UNITS: 100 INJECTION, SOLUTION INTRAVENOUS; SUBCUTANEOUS at 10:04

## 2021-04-28 RX ADMIN — DEXAMETHASONE SODIUM PHOSPHATE 6 MG: 4 INJECTION INTRA-ARTICULAR; INTRALESIONAL; INTRAMUSCULAR; INTRAVENOUS; SOFT TISSUE at 07:04

## 2021-04-28 RX ADMIN — OXYCODONE HYDROCHLORIDE AND ACETAMINOPHEN 500 MG: 500 TABLET ORAL at 10:04

## 2021-04-28 RX ADMIN — APIXABAN 5 MG: 5 TABLET, FILM COATED ORAL at 10:04

## 2021-04-28 RX ADMIN — ROSUVASTATIN CALCIUM 40 MG: 10 TABLET, FILM COATED ORAL at 10:04

## 2021-04-28 RX ADMIN — INSULIN DETEMIR 20 UNITS: 100 INJECTION, SOLUTION SUBCUTANEOUS at 10:04

## 2021-04-28 RX ADMIN — ACETAMINOPHEN 1000 MG: 500 TABLET, FILM COATED ORAL at 07:04

## 2021-04-29 PROBLEM — U07.1 ACUTE RESPIRATORY FAILURE DUE TO COVID-19: Status: ACTIVE | Noted: 2021-04-29

## 2021-04-29 PROBLEM — J96.00 ACUTE RESPIRATORY FAILURE DUE TO COVID-19: Status: ACTIVE | Noted: 2021-04-29

## 2021-04-29 PROBLEM — R09.02 HYPOXIA: Status: ACTIVE | Noted: 2021-04-29

## 2021-04-29 LAB
25(OH)D3+25(OH)D2 SERPL-MCNC: 9 NG/ML (ref 30–96)
ALBUMIN SERPL BCP-MCNC: 3.2 G/DL (ref 3.5–5.2)
ALP SERPL-CCNC: 95 U/L (ref 55–135)
ALT SERPL W/O P-5'-P-CCNC: 28 U/L (ref 10–44)
ANION GAP SERPL CALC-SCNC: 9 MMOL/L (ref 8–16)
AST SERPL-CCNC: 26 U/L (ref 10–40)
BILIRUB SERPL-MCNC: 0.5 MG/DL (ref 0.1–1)
BUN SERPL-MCNC: 23 MG/DL (ref 6–20)
CALCIUM SERPL-MCNC: 8.6 MG/DL (ref 8.7–10.5)
CHLORIDE SERPL-SCNC: 97 MMOL/L (ref 95–110)
CO2 SERPL-SCNC: 27 MMOL/L (ref 23–29)
CREAT SERPL-MCNC: 2 MG/DL (ref 0.5–1.4)
ERYTHROCYTE [SEDIMENTATION RATE] IN BLOOD BY WESTERGREN METHOD: 29 MM/HR (ref 0–20)
EST. GFR  (AFRICAN AMERICAN): 34 ML/MIN/1.73 M^2
EST. GFR  (NON AFRICAN AMERICAN): 30 ML/MIN/1.73 M^2
ESTIMATED AVG GLUCOSE: ABNORMAL MG/DL (ref 68–131)
GLUCOSE SERPL-MCNC: 419 MG/DL (ref 70–110)
GLUCOSE SERPL-MCNC: 482 MG/DL (ref 70–110)
HBA1C MFR BLD: >14 % (ref 4–5.6)
MAGNESIUM SERPL-MCNC: 2.2 MG/DL (ref 1.6–2.6)
PHOSPHATE SERPL-MCNC: 4.3 MG/DL (ref 2.7–4.5)
POCT GLUCOSE: 295 MG/DL (ref 70–110)
POCT GLUCOSE: 364 MG/DL (ref 70–110)
POCT GLUCOSE: 386 MG/DL (ref 70–110)
POCT GLUCOSE: 404 MG/DL (ref 70–110)
POCT GLUCOSE: 407 MG/DL (ref 70–110)
POCT GLUCOSE: 422 MG/DL (ref 70–110)
POTASSIUM SERPL-SCNC: 4.3 MMOL/L (ref 3.5–5.1)
PROT SERPL-MCNC: 7.4 G/DL (ref 6–8.4)
SODIUM SERPL-SCNC: 133 MMOL/L (ref 136–145)
TROPONIN I SERPL DL<=0.01 NG/ML-MCNC: 0.02 NG/ML (ref 0–0.03)

## 2021-04-29 PROCEDURE — 27000221 HC OXYGEN, UP TO 24 HOURS

## 2021-04-29 PROCEDURE — 94761 N-INVAS EAR/PLS OXIMETRY MLT: CPT

## 2021-04-29 PROCEDURE — 25000242 PHARM REV CODE 250 ALT 637 W/ HCPCS: Performed by: PHYSICIAN ASSISTANT

## 2021-04-29 PROCEDURE — 36415 COLL VENOUS BLD VENIPUNCTURE: CPT | Performed by: HOSPITALIST

## 2021-04-29 PROCEDURE — 63600175 PHARM REV CODE 636 W HCPCS: Performed by: PHYSICIAN ASSISTANT

## 2021-04-29 PROCEDURE — 84484 ASSAY OF TROPONIN QUANT: CPT | Performed by: PHYSICIAN ASSISTANT

## 2021-04-29 PROCEDURE — 96372 THER/PROPH/DIAG INJ SC/IM: CPT

## 2021-04-29 PROCEDURE — 36415 COLL VENOUS BLD VENIPUNCTURE: CPT | Performed by: PHYSICIAN ASSISTANT

## 2021-04-29 PROCEDURE — 83735 ASSAY OF MAGNESIUM: CPT | Performed by: PHYSICIAN ASSISTANT

## 2021-04-29 PROCEDURE — 63600175 PHARM REV CODE 636 W HCPCS: Performed by: NURSE PRACTITIONER

## 2021-04-29 PROCEDURE — 94640 AIRWAY INHALATION TREATMENT: CPT

## 2021-04-29 PROCEDURE — 11000001 HC ACUTE MED/SURG PRIVATE ROOM

## 2021-04-29 PROCEDURE — 82947 ASSAY GLUCOSE BLOOD QUANT: CPT | Performed by: HOSPITALIST

## 2021-04-29 PROCEDURE — 80053 COMPREHEN METABOLIC PANEL: CPT | Performed by: PHYSICIAN ASSISTANT

## 2021-04-29 PROCEDURE — 84100 ASSAY OF PHOSPHORUS: CPT | Performed by: PHYSICIAN ASSISTANT

## 2021-04-29 PROCEDURE — 25000003 PHARM REV CODE 250: Performed by: PHYSICIAN ASSISTANT

## 2021-04-29 RX ORDER — INSULIN ASPART 100 [IU]/ML
15 INJECTION, SOLUTION INTRAVENOUS; SUBCUTANEOUS
Status: DISCONTINUED | OUTPATIENT
Start: 2021-04-29 | End: 2021-04-30

## 2021-04-29 RX ORDER — INSULIN ASPART 100 [IU]/ML
6 INJECTION, SOLUTION INTRAVENOUS; SUBCUTANEOUS ONCE
Status: COMPLETED | OUTPATIENT
Start: 2021-04-29 | End: 2021-04-29

## 2021-04-29 RX ORDER — INSULIN ASPART 100 [IU]/ML
10 INJECTION, SOLUTION INTRAVENOUS; SUBCUTANEOUS
Status: DISCONTINUED | OUTPATIENT
Start: 2021-04-29 | End: 2021-04-29

## 2021-04-29 RX ORDER — INSULIN ASPART 100 [IU]/ML
5 INJECTION, SOLUTION INTRAVENOUS; SUBCUTANEOUS ONCE
Status: COMPLETED | OUTPATIENT
Start: 2021-04-29 | End: 2021-04-29

## 2021-04-29 RX ADMIN — OXYCODONE HYDROCHLORIDE AND ACETAMINOPHEN 500 MG: 500 TABLET ORAL at 08:04

## 2021-04-29 RX ADMIN — ACETAMINOPHEN 500 MG: 500 TABLET, FILM COATED ORAL at 09:04

## 2021-04-29 RX ADMIN — ALBUTEROL SULFATE 2 PUFF: 90 AEROSOL, METERED RESPIRATORY (INHALATION) at 07:04

## 2021-04-29 RX ADMIN — APIXABAN 5 MG: 5 TABLET, FILM COATED ORAL at 09:04

## 2021-04-29 RX ADMIN — ALBUTEROL SULFATE 2 PUFF: 90 AEROSOL, METERED RESPIRATORY (INHALATION) at 12:04

## 2021-04-29 RX ADMIN — INSULIN DETEMIR 20 UNITS: 100 INJECTION, SOLUTION SUBCUTANEOUS at 09:04

## 2021-04-29 RX ADMIN — INSULIN ASPART 5 UNITS: 100 INJECTION, SOLUTION INTRAVENOUS; SUBCUTANEOUS at 03:04

## 2021-04-29 RX ADMIN — METOPROLOL SUCCINATE 50 MG: 50 TABLET, EXTENDED RELEASE ORAL at 08:04

## 2021-04-29 RX ADMIN — INSULIN ASPART 5 UNITS: 100 INJECTION, SOLUTION INTRAVENOUS; SUBCUTANEOUS at 12:04

## 2021-04-29 RX ADMIN — INSULIN ASPART 7 UNITS: 100 INJECTION, SOLUTION INTRAVENOUS; SUBCUTANEOUS at 08:04

## 2021-04-29 RX ADMIN — DEXAMETHASONE 6 MG: 4 TABLET ORAL at 08:04

## 2021-04-29 RX ADMIN — INSULIN ASPART 15 UNITS: 100 INJECTION, SOLUTION INTRAVENOUS; SUBCUTANEOUS at 06:04

## 2021-04-29 RX ADMIN — THERA TABS 1 TABLET: TAB at 08:04

## 2021-04-29 RX ADMIN — INSULIN ASPART 3 UNITS: 100 INJECTION, SOLUTION INTRAVENOUS; SUBCUTANEOUS at 09:04

## 2021-04-29 RX ADMIN — ALBUTEROL SULFATE 2 PUFF: 90 AEROSOL, METERED RESPIRATORY (INHALATION) at 08:04

## 2021-04-29 RX ADMIN — INSULIN ASPART 5 UNITS: 100 INJECTION, SOLUTION INTRAVENOUS; SUBCUTANEOUS at 09:04

## 2021-04-29 RX ADMIN — INSULIN ASPART 5 UNITS: 100 INJECTION, SOLUTION INTRAVENOUS; SUBCUTANEOUS at 04:04

## 2021-04-29 RX ADMIN — OXYCODONE HYDROCHLORIDE AND ACETAMINOPHEN 500 MG: 500 TABLET ORAL at 09:04

## 2021-04-29 RX ADMIN — REMDESIVIR 200 MG: 100 INJECTION, POWDER, LYOPHILIZED, FOR SOLUTION INTRAVENOUS at 04:04

## 2021-04-29 RX ADMIN — INSULIN ASPART 10 UNITS: 100 INJECTION, SOLUTION INTRAVENOUS; SUBCUTANEOUS at 04:04

## 2021-04-29 RX ADMIN — ROSUVASTATIN CALCIUM 40 MG: 10 TABLET, FILM COATED ORAL at 09:04

## 2021-04-29 RX ADMIN — APIXABAN 5 MG: 5 TABLET, FILM COATED ORAL at 08:04

## 2021-04-29 RX ADMIN — INSULIN ASPART 7 UNITS: 100 INJECTION, SOLUTION INTRAVENOUS; SUBCUTANEOUS at 12:04

## 2021-04-29 RX ADMIN — INSULIN ASPART 5 UNITS: 100 INJECTION, SOLUTION INTRAVENOUS; SUBCUTANEOUS at 05:04

## 2021-04-29 RX ADMIN — INSULIN ASPART 6 UNITS: 100 INJECTION, SOLUTION INTRAVENOUS; SUBCUTANEOUS at 06:04

## 2021-04-30 ENCOUNTER — CLINICAL SUPPORT (OUTPATIENT)
Dept: CARDIOLOGY | Facility: HOSPITAL | Age: 46
End: 2021-04-30
Payer: MEDICARE

## 2021-04-30 DIAGNOSIS — Z95.810 PRESENCE OF AUTOMATIC (IMPLANTABLE) CARDIAC DEFIBRILLATOR: ICD-10-CM

## 2021-04-30 LAB
ALBUMIN SERPL BCP-MCNC: 3.1 G/DL (ref 3.5–5.2)
ALP SERPL-CCNC: 97 U/L (ref 55–135)
ALT SERPL W/O P-5'-P-CCNC: 25 U/L (ref 10–44)
ANION GAP SERPL CALC-SCNC: 6 MMOL/L (ref 8–16)
AST SERPL-CCNC: 21 U/L (ref 10–40)
BASOPHILS # BLD AUTO: 0.01 K/UL (ref 0–0.2)
BASOPHILS NFR BLD: 0.1 % (ref 0–1.9)
BILIRUB SERPL-MCNC: 0.3 MG/DL (ref 0.1–1)
BUN SERPL-MCNC: 30 MG/DL (ref 6–20)
CALCIUM SERPL-MCNC: 8.6 MG/DL (ref 8.7–10.5)
CHLORIDE SERPL-SCNC: 100 MMOL/L (ref 95–110)
CK SERPL-CCNC: 132 U/L (ref 20–180)
CO2 SERPL-SCNC: 27 MMOL/L (ref 23–29)
CREAT SERPL-MCNC: 1.8 MG/DL (ref 0.5–1.4)
CRP SERPL-MCNC: 7.3 MG/L (ref 0–8.2)
DIFFERENTIAL METHOD: ABNORMAL
EOSINOPHIL # BLD AUTO: 0 K/UL (ref 0–0.5)
EOSINOPHIL NFR BLD: 0.1 % (ref 0–8)
ERYTHROCYTE [DISTWIDTH] IN BLOOD BY AUTOMATED COUNT: 18.6 % (ref 11.5–14.5)
EST. GFR  (AFRICAN AMERICAN): 39 ML/MIN/1.73 M^2
EST. GFR  (NON AFRICAN AMERICAN): 34 ML/MIN/1.73 M^2
GLUCOSE SERPL-MCNC: 344 MG/DL (ref 70–110)
HCT VFR BLD AUTO: 41.4 % (ref 37–48.5)
HGB BLD-MCNC: 13.2 G/DL (ref 12–16)
IMM GRANULOCYTES # BLD AUTO: 0.05 K/UL (ref 0–0.04)
IMM GRANULOCYTES NFR BLD AUTO: 0.7 % (ref 0–0.5)
LYMPHOCYTES # BLD AUTO: 1.4 K/UL (ref 1–4.8)
LYMPHOCYTES NFR BLD: 18.6 % (ref 18–48)
MAGNESIUM SERPL-MCNC: 2.4 MG/DL (ref 1.6–2.6)
MCH RBC QN AUTO: 27.7 PG (ref 27–31)
MCHC RBC AUTO-ENTMCNC: 31.9 G/DL (ref 32–36)
MCV RBC AUTO: 87 FL (ref 82–98)
MONOCYTES # BLD AUTO: 0.5 K/UL (ref 0.3–1)
MONOCYTES NFR BLD: 7.4 % (ref 4–15)
NEUTROPHILS # BLD AUTO: 5.3 K/UL (ref 1.8–7.7)
NEUTROPHILS NFR BLD: 73.1 % (ref 38–73)
NRBC BLD-RTO: 0 /100 WBC
PHOSPHATE SERPL-MCNC: 3.2 MG/DL (ref 2.7–4.5)
PLATELET # BLD AUTO: 175 K/UL (ref 150–450)
PMV BLD AUTO: 10.3 FL (ref 9.2–12.9)
POCT GLUCOSE: 236 MG/DL (ref 70–110)
POCT GLUCOSE: 295 MG/DL (ref 70–110)
POCT GLUCOSE: 319 MG/DL (ref 70–110)
POTASSIUM SERPL-SCNC: 4.4 MMOL/L (ref 3.5–5.1)
PROT SERPL-MCNC: 7.1 G/DL (ref 6–8.4)
RBC # BLD AUTO: 4.77 M/UL (ref 4–5.4)
SODIUM SERPL-SCNC: 133 MMOL/L (ref 136–145)
WBC # BLD AUTO: 7.31 K/UL (ref 3.9–12.7)

## 2021-04-30 PROCEDURE — 63600175 PHARM REV CODE 636 W HCPCS: Performed by: PHYSICIAN ASSISTANT

## 2021-04-30 PROCEDURE — 94640 AIRWAY INHALATION TREATMENT: CPT

## 2021-04-30 PROCEDURE — 86140 C-REACTIVE PROTEIN: CPT | Performed by: PHYSICIAN ASSISTANT

## 2021-04-30 PROCEDURE — 36415 COLL VENOUS BLD VENIPUNCTURE: CPT | Performed by: PHYSICIAN ASSISTANT

## 2021-04-30 PROCEDURE — 99900035 HC TECH TIME PER 15 MIN (STAT)

## 2021-04-30 PROCEDURE — 85025 COMPLETE CBC W/AUTO DIFF WBC: CPT | Performed by: NURSE PRACTITIONER

## 2021-04-30 PROCEDURE — 83735 ASSAY OF MAGNESIUM: CPT | Performed by: PHYSICIAN ASSISTANT

## 2021-04-30 PROCEDURE — 11000001 HC ACUTE MED/SURG PRIVATE ROOM

## 2021-04-30 PROCEDURE — 94761 N-INVAS EAR/PLS OXIMETRY MLT: CPT

## 2021-04-30 PROCEDURE — 27000221 HC OXYGEN, UP TO 24 HOURS

## 2021-04-30 PROCEDURE — 25000242 PHARM REV CODE 250 ALT 637 W/ HCPCS: Performed by: PHYSICIAN ASSISTANT

## 2021-04-30 PROCEDURE — 80053 COMPREHEN METABOLIC PANEL: CPT | Performed by: PHYSICIAN ASSISTANT

## 2021-04-30 PROCEDURE — 84100 ASSAY OF PHOSPHORUS: CPT | Performed by: PHYSICIAN ASSISTANT

## 2021-04-30 PROCEDURE — 93295 CARDIAC DEVICE CHECK - REMOTE: ICD-10-PCS | Mod: ,,, | Performed by: INTERNAL MEDICINE

## 2021-04-30 PROCEDURE — 93295 DEV INTERROG REMOTE 1/2/MLT: CPT | Mod: ,,, | Performed by: INTERNAL MEDICINE

## 2021-04-30 PROCEDURE — 82550 ASSAY OF CK (CPK): CPT | Performed by: NURSE PRACTITIONER

## 2021-04-30 PROCEDURE — 93296 REM INTERROG EVL PM/IDS: CPT | Performed by: INTERNAL MEDICINE

## 2021-04-30 PROCEDURE — 27000190 HC CPAP FULL FACE MASK W/VALVE

## 2021-04-30 PROCEDURE — 25000003 PHARM REV CODE 250: Performed by: PHYSICIAN ASSISTANT

## 2021-04-30 RX ORDER — INSULIN ASPART 100 [IU]/ML
20 INJECTION, SOLUTION INTRAVENOUS; SUBCUTANEOUS
Status: DISCONTINUED | OUTPATIENT
Start: 2021-04-30 | End: 2021-04-30

## 2021-04-30 RX ORDER — INSULIN ASPART 100 [IU]/ML
25 INJECTION, SOLUTION INTRAVENOUS; SUBCUTANEOUS
Status: DISCONTINUED | OUTPATIENT
Start: 2021-05-01 | End: 2021-05-01

## 2021-04-30 RX ADMIN — THERA TABS 1 TABLET: TAB at 08:04

## 2021-04-30 RX ADMIN — INSULIN ASPART 20 UNITS: 100 INJECTION, SOLUTION INTRAVENOUS; SUBCUTANEOUS at 03:04

## 2021-04-30 RX ADMIN — ACETAMINOPHEN 500 MG: 500 TABLET, FILM COATED ORAL at 11:04

## 2021-04-30 RX ADMIN — OXYCODONE HYDROCHLORIDE AND ACETAMINOPHEN 500 MG: 500 TABLET ORAL at 08:04

## 2021-04-30 RX ADMIN — ALBUTEROL SULFATE 2 PUFF: 90 AEROSOL, METERED RESPIRATORY (INHALATION) at 08:04

## 2021-04-30 RX ADMIN — INSULIN ASPART 20 UNITS: 100 INJECTION, SOLUTION INTRAVENOUS; SUBCUTANEOUS at 11:04

## 2021-04-30 RX ADMIN — ALBUTEROL SULFATE 2 PUFF: 90 AEROSOL, METERED RESPIRATORY (INHALATION) at 12:04

## 2021-04-30 RX ADMIN — DEXAMETHASONE 6 MG: 4 TABLET ORAL at 08:04

## 2021-04-30 RX ADMIN — INSULIN ASPART 20 UNITS: 100 INJECTION, SOLUTION INTRAVENOUS; SUBCUTANEOUS at 08:04

## 2021-04-30 RX ADMIN — APIXABAN 5 MG: 5 TABLET, FILM COATED ORAL at 08:04

## 2021-04-30 RX ADMIN — INSULIN ASPART 3 UNITS: 100 INJECTION, SOLUTION INTRAVENOUS; SUBCUTANEOUS at 03:04

## 2021-04-30 RX ADMIN — METOPROLOL SUCCINATE 50 MG: 50 TABLET, EXTENDED RELEASE ORAL at 08:04

## 2021-04-30 RX ADMIN — INSULIN ASPART 2 UNITS: 100 INJECTION, SOLUTION INTRAVENOUS; SUBCUTANEOUS at 08:04

## 2021-04-30 RX ADMIN — ALBUTEROL SULFATE 2 PUFF: 90 AEROSOL, METERED RESPIRATORY (INHALATION) at 02:04

## 2021-04-30 RX ADMIN — ACETAMINOPHEN 500 MG: 500 TABLET, FILM COATED ORAL at 09:04

## 2021-04-30 RX ADMIN — ROSUVASTATIN CALCIUM 40 MG: 10 TABLET, FILM COATED ORAL at 08:04

## 2021-04-30 RX ADMIN — ALBUTEROL SULFATE 2 PUFF: 90 AEROSOL, METERED RESPIRATORY (INHALATION) at 07:04

## 2021-04-30 RX ADMIN — INSULIN ASPART 4 UNITS: 100 INJECTION, SOLUTION INTRAVENOUS; SUBCUTANEOUS at 11:04

## 2021-05-01 LAB
BASOPHILS # BLD AUTO: 0.01 K/UL (ref 0–0.2)
BASOPHILS NFR BLD: 0.2 % (ref 0–1.9)
CK SERPL-CCNC: 95 U/L (ref 20–180)
DIFFERENTIAL METHOD: ABNORMAL
EOSINOPHIL # BLD AUTO: 0 K/UL (ref 0–0.5)
EOSINOPHIL NFR BLD: 0.3 % (ref 0–8)
ERYTHROCYTE [DISTWIDTH] IN BLOOD BY AUTOMATED COUNT: 17.8 % (ref 11.5–14.5)
HCT VFR BLD AUTO: 46.2 % (ref 37–48.5)
HGB BLD-MCNC: 14.4 G/DL (ref 12–16)
IMM GRANULOCYTES # BLD AUTO: 0.04 K/UL (ref 0–0.04)
IMM GRANULOCYTES NFR BLD AUTO: 0.7 % (ref 0–0.5)
LYMPHOCYTES # BLD AUTO: 1.7 K/UL (ref 1–4.8)
LYMPHOCYTES NFR BLD: 27 % (ref 18–48)
MCH RBC QN AUTO: 26.8 PG (ref 27–31)
MCHC RBC AUTO-ENTMCNC: 31.2 G/DL (ref 32–36)
MCV RBC AUTO: 86 FL (ref 82–98)
MONOCYTES # BLD AUTO: 0.5 K/UL (ref 0.3–1)
MONOCYTES NFR BLD: 7.8 % (ref 4–15)
NEUTROPHILS # BLD AUTO: 3.9 K/UL (ref 1.8–7.7)
NEUTROPHILS NFR BLD: 64 % (ref 38–73)
NRBC BLD-RTO: 0 /100 WBC
PLATELET # BLD AUTO: 190 K/UL (ref 150–450)
PMV BLD AUTO: 10.6 FL (ref 9.2–12.9)
POCT GLUCOSE: 288 MG/DL (ref 70–110)
POCT GLUCOSE: 310 MG/DL (ref 70–110)
POCT GLUCOSE: 365 MG/DL (ref 70–110)
POCT GLUCOSE: 399 MG/DL (ref 70–110)
POCT GLUCOSE: 410 MG/DL (ref 70–110)
RBC # BLD AUTO: 5.38 M/UL (ref 4–5.4)
WBC # BLD AUTO: 6.15 K/UL (ref 3.9–12.7)

## 2021-05-01 PROCEDURE — 25000003 PHARM REV CODE 250: Performed by: NURSE PRACTITIONER

## 2021-05-01 PROCEDURE — 94640 AIRWAY INHALATION TREATMENT: CPT

## 2021-05-01 PROCEDURE — 11000001 HC ACUTE MED/SURG PRIVATE ROOM

## 2021-05-01 PROCEDURE — 85025 COMPLETE CBC W/AUTO DIFF WBC: CPT | Performed by: NURSE PRACTITIONER

## 2021-05-01 PROCEDURE — 25000003 PHARM REV CODE 250: Performed by: PHYSICIAN ASSISTANT

## 2021-05-01 PROCEDURE — 82550 ASSAY OF CK (CPK): CPT | Performed by: NURSE PRACTITIONER

## 2021-05-01 PROCEDURE — 27000221 HC OXYGEN, UP TO 24 HOURS

## 2021-05-01 PROCEDURE — 25000242 PHARM REV CODE 250 ALT 637 W/ HCPCS: Performed by: PHYSICIAN ASSISTANT

## 2021-05-01 PROCEDURE — 94761 N-INVAS EAR/PLS OXIMETRY MLT: CPT

## 2021-05-01 PROCEDURE — 99900035 HC TECH TIME PER 15 MIN (STAT)

## 2021-05-01 PROCEDURE — 63600175 PHARM REV CODE 636 W HCPCS: Performed by: PHYSICIAN ASSISTANT

## 2021-05-01 RX ORDER — INSULIN ASPART 100 [IU]/ML
32 INJECTION, SOLUTION INTRAVENOUS; SUBCUTANEOUS
Status: DISCONTINUED | OUTPATIENT
Start: 2021-05-02 | End: 2021-05-02

## 2021-05-01 RX ORDER — INSULIN ASPART 100 [IU]/ML
10 INJECTION, SOLUTION INTRAVENOUS; SUBCUTANEOUS ONCE
Status: COMPLETED | OUTPATIENT
Start: 2021-05-01 | End: 2021-05-01

## 2021-05-01 RX ORDER — INSULIN ASPART 100 [IU]/ML
28 INJECTION, SOLUTION INTRAVENOUS; SUBCUTANEOUS
Status: DISCONTINUED | OUTPATIENT
Start: 2021-05-01 | End: 2021-05-01

## 2021-05-01 RX ORDER — TRAMADOL HYDROCHLORIDE 50 MG/1
50 TABLET ORAL EVERY 6 HOURS PRN
Status: DISCONTINUED | OUTPATIENT
Start: 2021-05-01 | End: 2021-05-02 | Stop reason: HOSPADM

## 2021-05-01 RX ADMIN — INSULIN ASPART 4 UNITS: 100 INJECTION, SOLUTION INTRAVENOUS; SUBCUTANEOUS at 11:05

## 2021-05-01 RX ADMIN — ROSUVASTATIN CALCIUM 40 MG: 10 TABLET, FILM COATED ORAL at 08:05

## 2021-05-01 RX ADMIN — REMDESIVIR 100 MG: 100 INJECTION, POWDER, LYOPHILIZED, FOR SOLUTION INTRAVENOUS at 03:05

## 2021-05-01 RX ADMIN — APIXABAN 5 MG: 5 TABLET, FILM COATED ORAL at 08:05

## 2021-05-01 RX ADMIN — TRAMADOL HYDROCHLORIDE 50 MG: 50 TABLET, FILM COATED ORAL at 11:05

## 2021-05-01 RX ADMIN — INSULIN ASPART 3 UNITS: 100 INJECTION, SOLUTION INTRAVENOUS; SUBCUTANEOUS at 08:05

## 2021-05-01 RX ADMIN — INSULIN ASPART 28 UNITS: 100 INJECTION, SOLUTION INTRAVENOUS; SUBCUTANEOUS at 11:05

## 2021-05-01 RX ADMIN — INSULIN DETEMIR 50 UNITS: 100 INJECTION, SOLUTION SUBCUTANEOUS at 08:05

## 2021-05-01 RX ADMIN — ALBUTEROL SULFATE 2 PUFF: 90 AEROSOL, METERED RESPIRATORY (INHALATION) at 07:05

## 2021-05-01 RX ADMIN — ALBUTEROL SULFATE 2 PUFF: 90 AEROSOL, METERED RESPIRATORY (INHALATION) at 12:05

## 2021-05-01 RX ADMIN — OXYCODONE HYDROCHLORIDE AND ACETAMINOPHEN 500 MG: 500 TABLET ORAL at 09:05

## 2021-05-01 RX ADMIN — THERA TABS 1 TABLET: TAB at 09:05

## 2021-05-01 RX ADMIN — INSULIN ASPART 28 UNITS: 100 INJECTION, SOLUTION INTRAVENOUS; SUBCUTANEOUS at 04:05

## 2021-05-01 RX ADMIN — ALBUTEROL SULFATE 2 PUFF: 90 AEROSOL, METERED RESPIRATORY (INHALATION) at 01:05

## 2021-05-01 RX ADMIN — DEXAMETHASONE 6 MG: 4 TABLET ORAL at 09:05

## 2021-05-01 RX ADMIN — INSULIN ASPART 5 UNITS: 100 INJECTION, SOLUTION INTRAVENOUS; SUBCUTANEOUS at 04:05

## 2021-05-01 RX ADMIN — APIXABAN 5 MG: 5 TABLET, FILM COATED ORAL at 09:05

## 2021-05-01 RX ADMIN — OXYCODONE HYDROCHLORIDE AND ACETAMINOPHEN 500 MG: 500 TABLET ORAL at 08:05

## 2021-05-01 RX ADMIN — INSULIN DETEMIR 50 UNITS: 100 INJECTION, SOLUTION SUBCUTANEOUS at 09:05

## 2021-05-01 RX ADMIN — INSULIN ASPART 10 UNITS: 100 INJECTION, SOLUTION INTRAVENOUS; SUBCUTANEOUS at 09:05

## 2021-05-02 VITALS
TEMPERATURE: 98 F | BODY MASS INDEX: 39.06 KG/M2 | RESPIRATION RATE: 18 BRPM | DIASTOLIC BLOOD PRESSURE: 87 MMHG | WEIGHT: 248.88 LBS | OXYGEN SATURATION: 95 % | SYSTOLIC BLOOD PRESSURE: 141 MMHG | HEIGHT: 67 IN | HEART RATE: 53 BPM

## 2021-05-02 LAB
ALBUMIN SERPL BCP-MCNC: 2.9 G/DL (ref 3.5–5.2)
ALP SERPL-CCNC: 91 U/L (ref 55–135)
ALT SERPL W/O P-5'-P-CCNC: 22 U/L (ref 10–44)
ANION GAP SERPL CALC-SCNC: 9 MMOL/L (ref 8–16)
AST SERPL-CCNC: 20 U/L (ref 10–40)
BASOPHILS # BLD AUTO: 0.01 K/UL (ref 0–0.2)
BASOPHILS NFR BLD: 0.2 % (ref 0–1.9)
BILIRUB SERPL-MCNC: 0.3 MG/DL (ref 0.1–1)
BUN SERPL-MCNC: 29 MG/DL (ref 6–20)
CALCIUM SERPL-MCNC: 9.2 MG/DL (ref 8.7–10.5)
CHLORIDE SERPL-SCNC: 102 MMOL/L (ref 95–110)
CK SERPL-CCNC: 72 U/L (ref 20–180)
CO2 SERPL-SCNC: 26 MMOL/L (ref 23–29)
CREAT SERPL-MCNC: 1.5 MG/DL (ref 0.5–1.4)
CRP SERPL-MCNC: 5.2 MG/L (ref 0–8.2)
DIFFERENTIAL METHOD: ABNORMAL
EOSINOPHIL # BLD AUTO: 0 K/UL (ref 0–0.5)
EOSINOPHIL NFR BLD: 0.2 % (ref 0–8)
ERYTHROCYTE [DISTWIDTH] IN BLOOD BY AUTOMATED COUNT: 17.7 % (ref 11.5–14.5)
EST. GFR  (AFRICAN AMERICAN): 48 ML/MIN/1.73 M^2
EST. GFR  (NON AFRICAN AMERICAN): 42 ML/MIN/1.73 M^2
GLUCOSE SERPL-MCNC: 290 MG/DL (ref 70–110)
HCT VFR BLD AUTO: 44.3 % (ref 37–48.5)
HGB BLD-MCNC: 13.8 G/DL (ref 12–16)
IMM GRANULOCYTES # BLD AUTO: 0.04 K/UL (ref 0–0.04)
IMM GRANULOCYTES NFR BLD AUTO: 0.6 % (ref 0–0.5)
LYMPHOCYTES # BLD AUTO: 1.9 K/UL (ref 1–4.8)
LYMPHOCYTES NFR BLD: 29.6 % (ref 18–48)
MAGNESIUM SERPL-MCNC: 2.4 MG/DL (ref 1.6–2.6)
MCH RBC QN AUTO: 27 PG (ref 27–31)
MCHC RBC AUTO-ENTMCNC: 31.2 G/DL (ref 32–36)
MCV RBC AUTO: 87 FL (ref 82–98)
MONOCYTES # BLD AUTO: 0.6 K/UL (ref 0.3–1)
MONOCYTES NFR BLD: 8.8 % (ref 4–15)
NEUTROPHILS # BLD AUTO: 4 K/UL (ref 1.8–7.7)
NEUTROPHILS NFR BLD: 60.6 % (ref 38–73)
NRBC BLD-RTO: 0 /100 WBC
PHOSPHATE SERPL-MCNC: 3 MG/DL (ref 2.7–4.5)
PLATELET # BLD AUTO: 211 K/UL (ref 150–450)
PMV BLD AUTO: 10.2 FL (ref 9.2–12.9)
POCT GLUCOSE: 228 MG/DL (ref 70–110)
POCT GLUCOSE: 301 MG/DL (ref 70–110)
POTASSIUM SERPL-SCNC: 4.4 MMOL/L (ref 3.5–5.1)
PROT SERPL-MCNC: 6.9 G/DL (ref 6–8.4)
RBC # BLD AUTO: 5.12 M/UL (ref 4–5.4)
SODIUM SERPL-SCNC: 137 MMOL/L (ref 136–145)
WBC # BLD AUTO: 6.51 K/UL (ref 3.9–12.7)

## 2021-05-02 PROCEDURE — 99900035 HC TECH TIME PER 15 MIN (STAT)

## 2021-05-02 PROCEDURE — 25000003 PHARM REV CODE 250: Performed by: NURSE PRACTITIONER

## 2021-05-02 PROCEDURE — 94640 AIRWAY INHALATION TREATMENT: CPT

## 2021-05-02 PROCEDURE — 86140 C-REACTIVE PROTEIN: CPT | Performed by: PHYSICIAN ASSISTANT

## 2021-05-02 PROCEDURE — 36415 COLL VENOUS BLD VENIPUNCTURE: CPT | Performed by: PHYSICIAN ASSISTANT

## 2021-05-02 PROCEDURE — 83735 ASSAY OF MAGNESIUM: CPT | Performed by: PHYSICIAN ASSISTANT

## 2021-05-02 PROCEDURE — 94761 N-INVAS EAR/PLS OXIMETRY MLT: CPT

## 2021-05-02 PROCEDURE — 25000003 PHARM REV CODE 250: Performed by: PHYSICIAN ASSISTANT

## 2021-05-02 PROCEDURE — 84100 ASSAY OF PHOSPHORUS: CPT | Performed by: PHYSICIAN ASSISTANT

## 2021-05-02 PROCEDURE — 63600175 PHARM REV CODE 636 W HCPCS: Performed by: PHYSICIAN ASSISTANT

## 2021-05-02 PROCEDURE — C9399 UNCLASSIFIED DRUGS OR BIOLOG: HCPCS | Performed by: NURSE PRACTITIONER

## 2021-05-02 PROCEDURE — 25000242 PHARM REV CODE 250 ALT 637 W/ HCPCS: Performed by: PHYSICIAN ASSISTANT

## 2021-05-02 PROCEDURE — 82550 ASSAY OF CK (CPK): CPT | Performed by: NURSE PRACTITIONER

## 2021-05-02 PROCEDURE — 80053 COMPREHEN METABOLIC PANEL: CPT | Performed by: PHYSICIAN ASSISTANT

## 2021-05-02 PROCEDURE — 85025 COMPLETE CBC W/AUTO DIFF WBC: CPT | Performed by: NURSE PRACTITIONER

## 2021-05-02 RX ORDER — FUROSEMIDE 40 MG/1
40 TABLET ORAL DAILY
Qty: 90 TABLET | Refills: 0 | Status: SHIPPED | OUTPATIENT
Start: 2021-05-02 | End: 2021-10-14 | Stop reason: SDUPTHER

## 2021-05-02 RX ORDER — INSULIN ASPART 100 [IU]/ML
38 INJECTION, SOLUTION INTRAVENOUS; SUBCUTANEOUS
Status: DISCONTINUED | OUTPATIENT
Start: 2021-05-02 | End: 2021-05-02 | Stop reason: HOSPADM

## 2021-05-02 RX ORDER — METOPROLOL SUCCINATE 50 MG/1
50 TABLET, EXTENDED RELEASE ORAL DAILY
Qty: 30 TABLET | Refills: 3 | Status: SHIPPED | OUTPATIENT
Start: 2021-05-02 | End: 2021-10-14 | Stop reason: SDUPTHER

## 2021-05-02 RX ORDER — INSULIN ASPART 100 [IU]/ML
35 INJECTION, SOLUTION INTRAVENOUS; SUBCUTANEOUS
Status: DISCONTINUED | OUTPATIENT
Start: 2021-05-02 | End: 2021-05-02

## 2021-05-02 RX ORDER — BUTALBITAL, ACETAMINOPHEN AND CAFFEINE 50; 325; 40 MG/1; MG/1; MG/1
1 TABLET ORAL ONCE
Status: COMPLETED | OUTPATIENT
Start: 2021-05-02 | End: 2021-05-02

## 2021-05-02 RX ORDER — SPIRONOLACTONE 50 MG/1
50 TABLET, FILM COATED ORAL DAILY
Qty: 90 TABLET | Refills: 3 | Status: SHIPPED | OUTPATIENT
Start: 2021-05-02 | End: 2022-06-10 | Stop reason: SDUPTHER

## 2021-05-02 RX ORDER — INSULIN DEGLUDEC 200 U/ML
45 INJECTION, SOLUTION SUBCUTANEOUS 2 TIMES DAILY
Qty: 6 SYRINGE | Refills: 5
Start: 2021-05-02 | End: 2021-05-18 | Stop reason: SDUPTHER

## 2021-05-02 RX ORDER — INSULIN LISPRO 100 [IU]/ML
28 INJECTION, SOLUTION INTRAVENOUS; SUBCUTANEOUS
Qty: 2 BOX | Refills: 5 | Status: SHIPPED | OUTPATIENT
Start: 2021-05-02 | End: 2021-05-18 | Stop reason: SDUPTHER

## 2021-05-02 RX ADMIN — ALBUTEROL SULFATE 2 PUFF: 90 AEROSOL, METERED RESPIRATORY (INHALATION) at 07:05

## 2021-05-02 RX ADMIN — INSULIN ASPART 4 UNITS: 100 INJECTION, SOLUTION INTRAVENOUS; SUBCUTANEOUS at 12:05

## 2021-05-02 RX ADMIN — INSULIN DETEMIR 50 UNITS: 100 INJECTION, SOLUTION SUBCUTANEOUS at 08:05

## 2021-05-02 RX ADMIN — TRAMADOL HYDROCHLORIDE 50 MG: 50 TABLET, FILM COATED ORAL at 08:05

## 2021-05-02 RX ADMIN — ALBUTEROL SULFATE 2 PUFF: 90 AEROSOL, METERED RESPIRATORY (INHALATION) at 12:05

## 2021-05-02 RX ADMIN — APIXABAN 5 MG: 5 TABLET, FILM COATED ORAL at 08:05

## 2021-05-02 RX ADMIN — INSULIN ASPART 38 UNITS: 100 INJECTION, SOLUTION INTRAVENOUS; SUBCUTANEOUS at 12:05

## 2021-05-02 RX ADMIN — BUTALBITAL, ACETAMINOPHEN, AND CAFFEINE 1 TABLET: 50; 325; 40 TABLET ORAL at 12:05

## 2021-05-02 RX ADMIN — METOPROLOL SUCCINATE 50 MG: 50 TABLET, EXTENDED RELEASE ORAL at 08:05

## 2021-05-02 RX ADMIN — DEXAMETHASONE 6 MG: 4 TABLET ORAL at 08:05

## 2021-05-02 RX ADMIN — OXYCODONE HYDROCHLORIDE AND ACETAMINOPHEN 500 MG: 500 TABLET ORAL at 08:05

## 2021-05-02 RX ADMIN — THERA TABS 1 TABLET: TAB at 08:05

## 2021-05-02 RX ADMIN — ALBUTEROL SULFATE 2 PUFF: 90 AEROSOL, METERED RESPIRATORY (INHALATION) at 02:05

## 2021-05-03 LAB — IL6 SERPL-MCNC: 12.3 PG/ML

## 2021-05-04 ENCOUNTER — PATIENT MESSAGE (OUTPATIENT)
Dept: ADMINISTRATIVE | Facility: CLINIC | Age: 46
End: 2021-05-04

## 2021-05-04 ENCOUNTER — TELEPHONE (OUTPATIENT)
Dept: FAMILY MEDICINE | Facility: CLINIC | Age: 46
End: 2021-05-04

## 2021-05-04 ENCOUNTER — PATIENT OUTREACH (OUTPATIENT)
Dept: ADMINISTRATIVE | Facility: CLINIC | Age: 46
End: 2021-05-04

## 2021-05-04 LAB
BACTERIA BLD CULT: NORMAL
BACTERIA BLD CULT: NORMAL

## 2021-05-05 ENCOUNTER — PATIENT MESSAGE (OUTPATIENT)
Dept: ADMINISTRATIVE | Facility: CLINIC | Age: 46
End: 2021-05-05

## 2021-05-05 ENCOUNTER — PATIENT OUTREACH (OUTPATIENT)
Dept: ADMINISTRATIVE | Facility: HOSPITAL | Age: 46
End: 2021-05-05

## 2021-05-06 ENCOUNTER — PATIENT MESSAGE (OUTPATIENT)
Dept: ADMINISTRATIVE | Facility: CLINIC | Age: 46
End: 2021-05-06

## 2021-05-18 ENCOUNTER — OFFICE VISIT (OUTPATIENT)
Dept: FAMILY MEDICINE | Facility: CLINIC | Age: 46
End: 2021-05-18
Payer: MEDICARE

## 2021-05-18 VITALS
SYSTOLIC BLOOD PRESSURE: 124 MMHG | RESPIRATION RATE: 16 BRPM | TEMPERATURE: 98 F | OXYGEN SATURATION: 95 % | BODY MASS INDEX: 39.97 KG/M2 | HEART RATE: 97 BPM | DIASTOLIC BLOOD PRESSURE: 88 MMHG | HEIGHT: 67 IN | WEIGHT: 254.63 LBS

## 2021-05-18 DIAGNOSIS — U07.1 COVID-19: Primary | ICD-10-CM

## 2021-05-18 DIAGNOSIS — I10 ESSENTIAL HYPERTENSION: ICD-10-CM

## 2021-05-18 DIAGNOSIS — Z95.810 IMPLANTABLE CARDIOVERTER-DEFIBRILLATOR (ICD) IN SITU: ICD-10-CM

## 2021-05-18 DIAGNOSIS — I48.0 PAROXYSMAL ATRIAL FIBRILLATION: ICD-10-CM

## 2021-05-18 DIAGNOSIS — I42.8 NICM (NONISCHEMIC CARDIOMYOPATHY): ICD-10-CM

## 2021-05-18 DIAGNOSIS — I50.42 CHRONIC COMBINED SYSTOLIC AND DIASTOLIC HEART FAILURE: ICD-10-CM

## 2021-05-18 PROCEDURE — 3008F BODY MASS INDEX DOCD: CPT | Mod: CPTII,S$GLB,, | Performed by: FAMILY MEDICINE

## 2021-05-18 PROCEDURE — 3008F PR BODY MASS INDEX (BMI) DOCUMENTED: ICD-10-PCS | Mod: CPTII,S$GLB,, | Performed by: FAMILY MEDICINE

## 2021-05-18 PROCEDURE — 99214 OFFICE O/P EST MOD 30 MIN: CPT | Mod: S$GLB,,, | Performed by: FAMILY MEDICINE

## 2021-05-18 PROCEDURE — 99999 PR PBB SHADOW E&M-EST. PATIENT-LVL IV: CPT | Mod: PBBFAC,,, | Performed by: FAMILY MEDICINE

## 2021-05-18 PROCEDURE — 1126F AMNT PAIN NOTED NONE PRSNT: CPT | Mod: S$GLB,,, | Performed by: FAMILY MEDICINE

## 2021-05-18 PROCEDURE — 99999 PR PBB SHADOW E&M-EST. PATIENT-LVL IV: ICD-10-PCS | Mod: PBBFAC,,, | Performed by: FAMILY MEDICINE

## 2021-05-18 PROCEDURE — 1126F PR PAIN SEVERITY QUANTIFIED, NO PAIN PRESENT: ICD-10-PCS | Mod: S$GLB,,, | Performed by: FAMILY MEDICINE

## 2021-05-18 PROCEDURE — 99214 PR OFFICE/OUTPT VISIT, EST, LEVL IV, 30-39 MIN: ICD-10-PCS | Mod: S$GLB,,, | Performed by: FAMILY MEDICINE

## 2021-05-18 RX ORDER — INSULIN LISPRO 100 [IU]/ML
28 INJECTION, SOLUTION INTRAVENOUS; SUBCUTANEOUS
Qty: 2 BOX | Refills: 5 | Status: SHIPPED | OUTPATIENT
Start: 2021-05-18 | End: 2022-03-10

## 2021-05-18 RX ORDER — INSULIN DEGLUDEC 200 U/ML
5 INJECTION, SOLUTION SUBCUTANEOUS 2 TIMES DAILY
Qty: 6 SYRINGE | Refills: 5 | Status: SHIPPED | OUTPATIENT
Start: 2021-05-18 | End: 2021-05-20 | Stop reason: SDUPTHER

## 2021-05-18 RX ORDER — ALBUTEROL SULFATE 90 UG/1
2 AEROSOL, METERED RESPIRATORY (INHALATION) EVERY 6 HOURS PRN
Qty: 18 G | Refills: 2 | Status: SHIPPED | OUTPATIENT
Start: 2021-05-18 | End: 2023-08-03 | Stop reason: SDUPTHER

## 2021-05-20 RX ORDER — INSULIN DEGLUDEC 200 U/ML
55 INJECTION, SOLUTION SUBCUTANEOUS 2 TIMES DAILY
Qty: 6 SYRINGE | Refills: 5 | Status: SHIPPED | OUTPATIENT
Start: 2021-05-20 | End: 2021-12-03

## 2021-05-26 ENCOUNTER — HOSPITAL ENCOUNTER (EMERGENCY)
Facility: HOSPITAL | Age: 46
Discharge: HOME | End: 2021-05-26
Attending: EMERGENCY MEDICINE
Payer: MEDICARE

## 2021-05-26 VITALS
TEMPERATURE: 99 F | BODY MASS INDEX: 46.26 KG/M2 | HEIGHT: 61 IN | DIASTOLIC BLOOD PRESSURE: 89 MMHG | HEART RATE: 73 BPM | OXYGEN SATURATION: 95 % | SYSTOLIC BLOOD PRESSURE: 123 MMHG | WEIGHT: 245 LBS | RESPIRATION RATE: 16 BRPM

## 2021-05-26 DIAGNOSIS — R07.9 CHEST PAIN: ICD-10-CM

## 2021-05-26 DIAGNOSIS — M25.511 ACUTE PAIN OF BOTH SHOULDERS: Primary | ICD-10-CM

## 2021-05-26 DIAGNOSIS — M25.512 ACUTE PAIN OF BOTH SHOULDERS: Primary | ICD-10-CM

## 2021-05-26 LAB
ALBUMIN SERPL BCP-MCNC: 3.2 G/DL (ref 3.5–5.2)
ALP SERPL-CCNC: 95 U/L (ref 55–135)
ALT SERPL W/O P-5'-P-CCNC: 13 U/L (ref 10–44)
ANION GAP SERPL CALC-SCNC: 11 MMOL/L (ref 8–16)
AST SERPL-CCNC: 15 U/L (ref 10–40)
B-HCG UR QL: NEGATIVE
BASOPHILS # BLD AUTO: 0.02 K/UL (ref 0–0.2)
BASOPHILS NFR BLD: 0.2 % (ref 0–1.9)
BILIRUB SERPL-MCNC: 0.4 MG/DL (ref 0.1–1)
BNP SERPL-MCNC: 59 PG/ML (ref 0–99)
BUN SERPL-MCNC: 13 MG/DL (ref 6–20)
CALCIUM SERPL-MCNC: 9.8 MG/DL (ref 8.7–10.5)
CHLORIDE SERPL-SCNC: 99 MMOL/L (ref 95–110)
CO2 SERPL-SCNC: 26 MMOL/L (ref 23–29)
CREAT SERPL-MCNC: 1.4 MG/DL (ref 0.5–1.4)
CTP QC/QA: YES
DIFFERENTIAL METHOD: ABNORMAL
EOSINOPHIL # BLD AUTO: 0.2 K/UL (ref 0–0.5)
EOSINOPHIL NFR BLD: 2 % (ref 0–8)
ERYTHROCYTE [DISTWIDTH] IN BLOOD BY AUTOMATED COUNT: 16.4 % (ref 11.5–14.5)
EST. GFR  (AFRICAN AMERICAN): 53 ML/MIN/1.73 M^2
EST. GFR  (NON AFRICAN AMERICAN): 46 ML/MIN/1.73 M^2
GLUCOSE SERPL-MCNC: 277 MG/DL (ref 70–110)
HCT VFR BLD AUTO: 44.9 % (ref 37–48.5)
HGB BLD-MCNC: 14.2 G/DL (ref 12–16)
IMM GRANULOCYTES # BLD AUTO: 0.07 K/UL (ref 0–0.04)
IMM GRANULOCYTES NFR BLD AUTO: 0.8 % (ref 0–0.5)
LYMPHOCYTES # BLD AUTO: 2.6 K/UL (ref 1–4.8)
LYMPHOCYTES NFR BLD: 30.9 % (ref 18–48)
MCH RBC QN AUTO: 27.5 PG (ref 27–31)
MCHC RBC AUTO-ENTMCNC: 31.6 G/DL (ref 32–36)
MCV RBC AUTO: 87 FL (ref 82–98)
MONOCYTES # BLD AUTO: 0.6 K/UL (ref 0.3–1)
MONOCYTES NFR BLD: 7.2 % (ref 4–15)
NEUTROPHILS # BLD AUTO: 4.9 K/UL (ref 1.8–7.7)
NEUTROPHILS NFR BLD: 58.9 % (ref 38–73)
NRBC BLD-RTO: 0 /100 WBC
PLATELET # BLD AUTO: 226 K/UL (ref 150–450)
PMV BLD AUTO: 10.7 FL (ref 9.2–12.9)
POTASSIUM SERPL-SCNC: 4 MMOL/L (ref 3.5–5.1)
PROT SERPL-MCNC: 7.5 G/DL (ref 6–8.4)
RBC # BLD AUTO: 5.16 M/UL (ref 4–5.4)
SODIUM SERPL-SCNC: 136 MMOL/L (ref 136–145)
TROPONIN I SERPL DL<=0.01 NG/ML-MCNC: 0.02 NG/ML (ref 0–0.03)
WBC # BLD AUTO: 8.32 K/UL (ref 3.9–12.7)

## 2021-05-26 PROCEDURE — 81025 URINE PREGNANCY TEST: CPT | Performed by: NURSE PRACTITIONER

## 2021-05-26 PROCEDURE — 80053 COMPREHEN METABOLIC PANEL: CPT | Performed by: PHYSICIAN ASSISTANT

## 2021-05-26 PROCEDURE — 93005 ELECTROCARDIOGRAM TRACING: CPT

## 2021-05-26 PROCEDURE — 84484 ASSAY OF TROPONIN QUANT: CPT | Performed by: PHYSICIAN ASSISTANT

## 2021-05-26 PROCEDURE — 93010 ELECTROCARDIOGRAM REPORT: CPT | Mod: ,,, | Performed by: INTERNAL MEDICINE

## 2021-05-26 PROCEDURE — 96374 THER/PROPH/DIAG INJ IV PUSH: CPT

## 2021-05-26 PROCEDURE — 85025 COMPLETE CBC W/AUTO DIFF WBC: CPT | Performed by: PHYSICIAN ASSISTANT

## 2021-05-26 PROCEDURE — 83880 ASSAY OF NATRIURETIC PEPTIDE: CPT | Performed by: PHYSICIAN ASSISTANT

## 2021-05-26 PROCEDURE — 99284 EMERGENCY DEPT VISIT MOD MDM: CPT | Mod: 25

## 2021-05-26 PROCEDURE — 93010 EKG 12-LEAD: ICD-10-PCS | Mod: ,,, | Performed by: INTERNAL MEDICINE

## 2021-05-26 PROCEDURE — 63600175 PHARM REV CODE 636 W HCPCS: Performed by: PHYSICIAN ASSISTANT

## 2021-05-26 PROCEDURE — 25000003 PHARM REV CODE 250: Performed by: PHYSICIAN ASSISTANT

## 2021-05-26 RX ORDER — MORPHINE SULFATE 4 MG/ML
6 INJECTION, SOLUTION INTRAMUSCULAR; INTRAVENOUS
Status: COMPLETED | OUTPATIENT
Start: 2021-05-26 | End: 2021-05-26

## 2021-05-26 RX ORDER — OXYCODONE AND ACETAMINOPHEN 5; 325 MG/1; MG/1
1 TABLET ORAL
Status: COMPLETED | OUTPATIENT
Start: 2021-05-26 | End: 2021-05-26

## 2021-05-26 RX ORDER — METHOCARBAMOL 500 MG/1
500 TABLET, FILM COATED ORAL 3 TIMES DAILY
Qty: 15 TABLET | Refills: 0 | Status: SHIPPED | OUTPATIENT
Start: 2021-05-26 | End: 2021-05-31

## 2021-05-26 RX ORDER — MELOXICAM 15 MG/1
15 TABLET ORAL DAILY
Qty: 30 TABLET | Refills: 0 | OUTPATIENT
Start: 2021-05-26 | End: 2021-07-20

## 2021-05-26 RX ORDER — ASPIRIN 325 MG
325 TABLET ORAL
Status: COMPLETED | OUTPATIENT
Start: 2021-05-26 | End: 2021-05-26

## 2021-05-26 RX ADMIN — MORPHINE SULFATE 6 MG: 4 INJECTION INTRAVENOUS at 09:05

## 2021-05-26 RX ADMIN — OXYCODONE HYDROCHLORIDE AND ACETAMINOPHEN 1 TABLET: 5; 325 TABLET ORAL at 11:05

## 2021-05-26 RX ADMIN — ASPIRIN 325 MG ORAL TABLET 325 MG: 325 PILL ORAL at 08:05

## 2021-07-09 ENCOUNTER — PATIENT OUTREACH (OUTPATIENT)
Dept: ADMINISTRATIVE | Facility: OTHER | Age: 46
End: 2021-07-09

## 2021-07-09 ENCOUNTER — HOSPITAL ENCOUNTER (EMERGENCY)
Facility: HOSPITAL | Age: 46
Discharge: HOME | End: 2021-07-09
Attending: EMERGENCY MEDICINE
Payer: MEDICARE

## 2021-07-09 VITALS
DIASTOLIC BLOOD PRESSURE: 76 MMHG | OXYGEN SATURATION: 95 % | BODY MASS INDEX: 39.24 KG/M2 | HEART RATE: 84 BPM | TEMPERATURE: 98 F | WEIGHT: 250 LBS | SYSTOLIC BLOOD PRESSURE: 139 MMHG | HEIGHT: 67 IN | RESPIRATION RATE: 16 BRPM

## 2021-07-09 DIAGNOSIS — M54.9 BACK PAIN, UNSPECIFIED BACK LOCATION, UNSPECIFIED BACK PAIN LATERALITY, UNSPECIFIED CHRONICITY: Primary | ICD-10-CM

## 2021-07-09 PROCEDURE — 96372 THER/PROPH/DIAG INJ SC/IM: CPT

## 2021-07-09 PROCEDURE — 99284 EMERGENCY DEPT VISIT MOD MDM: CPT | Mod: 25

## 2021-07-09 PROCEDURE — 63600175 PHARM REV CODE 636 W HCPCS: Performed by: EMERGENCY MEDICINE

## 2021-07-09 RX ORDER — CYCLOBENZAPRINE HCL 10 MG
10 TABLET ORAL 3 TIMES DAILY PRN
Qty: 20 TABLET | Refills: 0 | Status: SHIPPED | OUTPATIENT
Start: 2021-07-09 | End: 2021-07-14

## 2021-07-09 RX ORDER — CETIRIZINE HYDROCHLORIDE 10 MG/1
10 TABLET ORAL DAILY
Qty: 30 TABLET | Refills: 0 | Status: ON HOLD | OUTPATIENT
Start: 2021-07-09 | End: 2022-05-17

## 2021-07-09 RX ORDER — ORPHENADRINE CITRATE 30 MG/ML
30 INJECTION INTRAMUSCULAR; INTRAVENOUS
Status: DISCONTINUED | OUTPATIENT
Start: 2021-07-09 | End: 2021-07-09

## 2021-07-09 RX ORDER — MELOXICAM 15 MG/1
7.5 TABLET ORAL DAILY
Qty: 20 TABLET | Refills: 0 | OUTPATIENT
Start: 2021-07-09 | End: 2021-07-20

## 2021-07-09 RX ORDER — KETOROLAC TROMETHAMINE 30 MG/ML
30 INJECTION, SOLUTION INTRAMUSCULAR; INTRAVENOUS
Status: COMPLETED | OUTPATIENT
Start: 2021-07-09 | End: 2021-07-09

## 2021-07-09 RX ORDER — KETOROLAC TROMETHAMINE 30 MG/ML
30 INJECTION, SOLUTION INTRAMUSCULAR; INTRAVENOUS
Status: DISCONTINUED | OUTPATIENT
Start: 2021-07-09 | End: 2021-07-09

## 2021-07-09 RX ORDER — FLUTICASONE PROPIONATE 50 MCG
1 SPRAY, SUSPENSION (ML) NASAL 2 TIMES DAILY PRN
Qty: 15 G | Refills: 0 | Status: SHIPPED | OUTPATIENT
Start: 2021-07-09 | End: 2023-08-23

## 2021-07-09 RX ORDER — LIDOCAINE 50 MG/G
1 PATCH TOPICAL DAILY
Qty: 15 PATCH | Refills: 0 | OUTPATIENT
Start: 2021-07-09 | End: 2021-10-17

## 2021-07-09 RX ORDER — ORPHENADRINE CITRATE 30 MG/ML
30 INJECTION INTRAMUSCULAR; INTRAVENOUS
Status: COMPLETED | OUTPATIENT
Start: 2021-07-09 | End: 2021-07-09

## 2021-07-09 RX ADMIN — ORPHENADRINE CITRATE 30 MG: 30 INJECTION INTRAMUSCULAR; INTRAVENOUS at 07:07

## 2021-07-09 RX ADMIN — KETOROLAC TROMETHAMINE 30 MG: 30 INJECTION, SOLUTION INTRAMUSCULAR; INTRAVENOUS at 07:07

## 2021-07-11 ENCOUNTER — HOSPITAL ENCOUNTER (EMERGENCY)
Facility: HOSPITAL | Age: 46
Discharge: HOME | End: 2021-07-12
Attending: EMERGENCY MEDICINE
Payer: MEDICARE

## 2021-07-11 DIAGNOSIS — M54.50 ACUTE MIDLINE LOW BACK PAIN WITHOUT SCIATICA: ICD-10-CM
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
## 2021-07-11 LAB
BACTERIA #/AREA URNS HPF: NORMAL /HPF
BILIRUB UR QL STRIP: NEGATIVE
CLARITY UR: CLEAR
COLOR UR: YELLOW
GLUCOSE UR QL STRIP: ABNORMAL
HGB UR QL STRIP: ABNORMAL
HYALINE CASTS #/AREA URNS LPF: 0 /LPF
KETONES UR QL STRIP: NEGATIVE
LEUKOCYTE ESTERASE UR QL STRIP: NEGATIVE
MICROSCOPIC COMMENT: NORMAL
NITRITE UR QL STRIP: NEGATIVE
PH UR STRIP: 6 [PH] (ref 5–8)
PROT UR QL STRIP: ABNORMAL
RBC #/AREA URNS HPF: 2 /HPF (ref 0–4)
SP GR UR STRIP: 1.02 (ref 1–1.03)
SQUAMOUS #/AREA URNS HPF: 1 /HPF
URN SPEC COLLECT METH UR: ABNORMAL
UROBILINOGEN UR STRIP-ACNC: NEGATIVE EU/DL
WBC #/AREA URNS HPF: 1 /HPF (ref 0–5)
WBC CLUMPS URNS QL MICRO: NORMAL
YEAST URNS QL MICRO: NORMAL

## 2021-07-11 PROCEDURE — 99284 EMERGENCY DEPT VISIT MOD MDM: CPT | Mod: 25

## 2021-07-11 PROCEDURE — 25000003 PHARM REV CODE 250: Performed by: PHYSICIAN ASSISTANT

## 2021-07-11 PROCEDURE — 82962 GLUCOSE BLOOD TEST: CPT

## 2021-07-11 PROCEDURE — 81000 URINALYSIS NONAUTO W/SCOPE: CPT | Performed by: PHYSICIAN ASSISTANT

## 2021-07-11 RX ORDER — DIAZEPAM 2 MG/1
2 TABLET ORAL
Status: COMPLETED | OUTPATIENT
Start: 2021-07-11 | End: 2021-07-11

## 2021-07-11 RX ADMIN — DIAZEPAM 2 MG: 2 TABLET ORAL at 11:07

## 2021-07-12 VITALS
OXYGEN SATURATION: 100 % | SYSTOLIC BLOOD PRESSURE: 136 MMHG | HEART RATE: 73 BPM | DIASTOLIC BLOOD PRESSURE: 96 MMHG | BODY MASS INDEX: 39.24 KG/M2 | WEIGHT: 250 LBS | RESPIRATION RATE: 17 BRPM | TEMPERATURE: 98 F | HEIGHT: 67 IN

## 2021-07-12 LAB — POCT GLUCOSE: 290 MG/DL (ref 70–110)

## 2021-07-12 PROCEDURE — 25000003 PHARM REV CODE 250: Performed by: PHYSICIAN ASSISTANT

## 2021-07-12 RX ORDER — OXYCODONE AND ACETAMINOPHEN 5; 325 MG/1; MG/1
1 TABLET ORAL
Status: COMPLETED | OUTPATIENT
Start: 2021-07-12 | End: 2021-07-12

## 2021-07-12 RX ORDER — OXYCODONE AND ACETAMINOPHEN 5; 325 MG/1; MG/1
1 TABLET ORAL EVERY 6 HOURS PRN
Qty: 6 TABLET | Refills: 0 | OUTPATIENT
Start: 2021-07-12 | End: 2021-10-17

## 2021-07-12 RX ADMIN — OXYCODONE HYDROCHLORIDE AND ACETAMINOPHEN 1 TABLET: 5; 325 TABLET ORAL at 12:07

## 2021-07-15 ENCOUNTER — HOSPITAL ENCOUNTER (EMERGENCY)
Facility: HOSPITAL | Age: 46
Discharge: HOME | End: 2021-07-15
Attending: EMERGENCY MEDICINE
Payer: MEDICARE

## 2021-07-15 VITALS
OXYGEN SATURATION: 99 % | TEMPERATURE: 98 F | HEART RATE: 80 BPM | WEIGHT: 250 LBS | HEIGHT: 67 IN | SYSTOLIC BLOOD PRESSURE: 122 MMHG | DIASTOLIC BLOOD PRESSURE: 80 MMHG | BODY MASS INDEX: 39.24 KG/M2 | RESPIRATION RATE: 18 BRPM

## 2021-07-15 DIAGNOSIS — M54.31 SCIATICA OF RIGHT SIDE: Primary | ICD-10-CM

## 2021-07-15 LAB
POCT GLUCOSE: 56 MG/DL (ref 70–110)
POCT GLUCOSE: 89 MG/DL (ref 70–110)

## 2021-07-15 PROCEDURE — 99284 EMERGENCY DEPT VISIT MOD MDM: CPT | Mod: 25

## 2021-07-15 PROCEDURE — 63600175 PHARM REV CODE 636 W HCPCS: Performed by: NURSE PRACTITIONER

## 2021-07-15 PROCEDURE — 96372 THER/PROPH/DIAG INJ SC/IM: CPT

## 2021-07-15 RX ORDER — HYDROCODONE BITARTRATE AND ACETAMINOPHEN 5; 325 MG/1; MG/1
1 TABLET ORAL EVERY 6 HOURS PRN
Qty: 12 TABLET | Refills: 0 | Status: SHIPPED | OUTPATIENT
Start: 2021-07-15 | End: 2021-07-24 | Stop reason: SDUPTHER

## 2021-07-15 RX ORDER — DEXAMETHASONE SODIUM PHOSPHATE 4 MG/ML
8 INJECTION, SOLUTION INTRA-ARTICULAR; INTRALESIONAL; INTRAMUSCULAR; INTRAVENOUS; SOFT TISSUE
Status: COMPLETED | OUTPATIENT
Start: 2021-07-15 | End: 2021-07-15

## 2021-07-15 RX ORDER — MORPHINE SULFATE 4 MG/ML
6 INJECTION, SOLUTION INTRAMUSCULAR; INTRAVENOUS
Status: COMPLETED | OUTPATIENT
Start: 2021-07-15 | End: 2021-07-15

## 2021-07-15 RX ADMIN — MORPHINE SULFATE 6 MG: 4 INJECTION INTRAVENOUS at 07:07

## 2021-07-15 RX ADMIN — DEXAMETHASONE SODIUM PHOSPHATE 8 MG: 4 INJECTION INTRA-ARTICULAR; INTRALESIONAL; INTRAMUSCULAR; INTRAVENOUS; SOFT TISSUE at 07:07

## 2021-07-19 LAB
BASOPHILS # BLD AUTO: 0.02 K/UL (ref 0–0.2)
BASOPHILS NFR BLD: 0.3 % (ref 0–1.9)
DIFFERENTIAL METHOD: ABNORMAL
EOSINOPHIL # BLD AUTO: 0.4 K/UL (ref 0–0.5)
EOSINOPHIL NFR BLD: 6.5 % (ref 0–8)
ERYTHROCYTE [DISTWIDTH] IN BLOOD BY AUTOMATED COUNT: 14.6 % (ref 11.5–14.5)
HCT VFR BLD AUTO: 45.4 % (ref 37–48.5)
HGB BLD-MCNC: 14.7 G/DL (ref 12–16)
IMM GRANULOCYTES # BLD AUTO: 0.03 K/UL (ref 0–0.04)
IMM GRANULOCYTES NFR BLD AUTO: 0.5 % (ref 0–0.5)
LYMPHOCYTES # BLD AUTO: 1.8 K/UL (ref 1–4.8)
LYMPHOCYTES NFR BLD: 28.1 % (ref 18–48)
MCH RBC QN AUTO: 28.1 PG (ref 27–31)
MCHC RBC AUTO-ENTMCNC: 32.4 G/DL (ref 32–36)
MCV RBC AUTO: 87 FL (ref 82–98)
MONOCYTES # BLD AUTO: 0.4 K/UL (ref 0.3–1)
MONOCYTES NFR BLD: 6.1 % (ref 4–15)
NEUTROPHILS # BLD AUTO: 3.7 K/UL (ref 1.8–7.7)
NEUTROPHILS NFR BLD: 58.5 % (ref 38–73)
NRBC BLD-RTO: 0 /100 WBC
PLATELET # BLD AUTO: 255 K/UL (ref 150–450)
PMV BLD AUTO: 10.2 FL (ref 9.2–12.9)
RBC # BLD AUTO: 5.23 M/UL (ref 4–5.4)
WBC # BLD AUTO: 6.27 K/UL (ref 3.9–12.7)

## 2021-07-19 PROCEDURE — U0002 COVID-19 LAB TEST NON-CDC: HCPCS | Performed by: EMERGENCY MEDICINE

## 2021-07-19 PROCEDURE — 93005 ELECTROCARDIOGRAM TRACING: CPT

## 2021-07-19 PROCEDURE — 80053 COMPREHEN METABOLIC PANEL: CPT | Performed by: EMERGENCY MEDICINE

## 2021-07-19 PROCEDURE — 84484 ASSAY OF TROPONIN QUANT: CPT | Performed by: EMERGENCY MEDICINE

## 2021-07-19 PROCEDURE — 85025 COMPLETE CBC W/AUTO DIFF WBC: CPT | Performed by: EMERGENCY MEDICINE

## 2021-07-19 PROCEDURE — 83880 ASSAY OF NATRIURETIC PEPTIDE: CPT | Performed by: EMERGENCY MEDICINE

## 2021-07-19 PROCEDURE — 99285 EMERGENCY DEPT VISIT HI MDM: CPT | Mod: 25

## 2021-07-19 PROCEDURE — 93010 EKG 12-LEAD: ICD-10-PCS | Mod: ,,, | Performed by: INTERNAL MEDICINE

## 2021-07-19 PROCEDURE — 93010 ELECTROCARDIOGRAM REPORT: CPT | Mod: ,,, | Performed by: INTERNAL MEDICINE

## 2021-07-20 ENCOUNTER — HOSPITAL ENCOUNTER (EMERGENCY)
Facility: HOSPITAL | Age: 46
Discharge: HOME | End: 2021-07-20
Attending: EMERGENCY MEDICINE
Payer: MEDICARE

## 2021-07-20 VITALS
WEIGHT: 250 LBS | OXYGEN SATURATION: 97 % | DIASTOLIC BLOOD PRESSURE: 74 MMHG | SYSTOLIC BLOOD PRESSURE: 114 MMHG | TEMPERATURE: 98 F | RESPIRATION RATE: 17 BRPM | BODY MASS INDEX: 39.24 KG/M2 | HEART RATE: 80 BPM | HEIGHT: 67 IN

## 2021-07-20 DIAGNOSIS — R07.9 CHEST PAIN: ICD-10-CM

## 2021-07-20 DIAGNOSIS — G89.29 CHRONIC RIGHT-SIDED LOW BACK PAIN WITH RIGHT-SIDED SCIATICA: Primary | ICD-10-CM

## 2021-07-20 DIAGNOSIS — M54.41 CHRONIC RIGHT-SIDED LOW BACK PAIN WITH RIGHT-SIDED SCIATICA: Primary | ICD-10-CM

## 2021-07-20 LAB
ALBUMIN SERPL BCP-MCNC: 3.5 G/DL (ref 3.5–5.2)
ALP SERPL-CCNC: 86 U/L (ref 55–135)
ALT SERPL W/O P-5'-P-CCNC: 9 U/L (ref 10–44)
ANION GAP SERPL CALC-SCNC: 6 MMOL/L (ref 8–16)
AST SERPL-CCNC: 14 U/L (ref 10–40)
B-HCG UR QL: NEGATIVE
BILIRUB SERPL-MCNC: 0.9 MG/DL (ref 0.1–1)
BNP SERPL-MCNC: 77 PG/ML (ref 0–99)
BUN SERPL-MCNC: 21 MG/DL (ref 6–20)
CALCIUM SERPL-MCNC: 9.4 MG/DL (ref 8.7–10.5)
CHLORIDE SERPL-SCNC: 105 MMOL/L (ref 95–110)
CO2 SERPL-SCNC: 30 MMOL/L (ref 23–29)
CREAT SERPL-MCNC: 1.8 MG/DL (ref 0.5–1.4)
CTP QC/QA: YES
CTP QC/QA: YES
EST. GFR  (AFRICAN AMERICAN): 39 ML/MIN/1.73 M^2
EST. GFR  (NON AFRICAN AMERICAN): 34 ML/MIN/1.73 M^2
GLUCOSE SERPL-MCNC: 120 MG/DL (ref 70–110)
POTASSIUM SERPL-SCNC: 3.9 MMOL/L (ref 3.5–5.1)
PROT SERPL-MCNC: 7.5 G/DL (ref 6–8.4)
SARS-COV-2 RDRP RESP QL NAA+PROBE: NEGATIVE
SODIUM SERPL-SCNC: 141 MMOL/L (ref 136–145)
TROPONIN I SERPL DL<=0.01 NG/ML-MCNC: <0.006 NG/ML (ref 0–0.03)
TROPONIN I SERPL DL<=0.01 NG/ML-MCNC: <0.006 NG/ML (ref 0–0.03)

## 2021-07-20 PROCEDURE — 84484 ASSAY OF TROPONIN QUANT: CPT | Performed by: EMERGENCY MEDICINE

## 2021-07-20 PROCEDURE — 25000003 PHARM REV CODE 250: Performed by: EMERGENCY MEDICINE

## 2021-07-20 PROCEDURE — 81025 URINE PREGNANCY TEST: CPT | Performed by: EMERGENCY MEDICINE

## 2021-07-20 RX ORDER — NAPROXEN 500 MG/1
500 TABLET ORAL 2 TIMES DAILY WITH MEALS
Qty: 14 TABLET | Refills: 0 | Status: SHIPPED | OUTPATIENT
Start: 2021-07-20 | End: 2021-07-27

## 2021-07-20 RX ORDER — OXYCODONE AND ACETAMINOPHEN 5; 325 MG/1; MG/1
1 TABLET ORAL
Status: COMPLETED | OUTPATIENT
Start: 2021-07-20 | End: 2021-07-20

## 2021-07-20 RX ADMIN — OXYCODONE HYDROCHLORIDE AND ACETAMINOPHEN 1 TABLET: 5; 325 TABLET ORAL at 02:07

## 2021-07-24 ENCOUNTER — HOSPITAL ENCOUNTER (EMERGENCY)
Facility: HOSPITAL | Age: 46
Discharge: HOME | End: 2021-07-24
Attending: STUDENT IN AN ORGANIZED HEALTH CARE EDUCATION/TRAINING PROGRAM
Payer: MEDICARE

## 2021-07-24 VITALS
HEART RATE: 84 BPM | BODY MASS INDEX: 39.24 KG/M2 | DIASTOLIC BLOOD PRESSURE: 59 MMHG | OXYGEN SATURATION: 99 % | SYSTOLIC BLOOD PRESSURE: 115 MMHG | HEIGHT: 67 IN | WEIGHT: 250 LBS | RESPIRATION RATE: 18 BRPM | TEMPERATURE: 98 F

## 2021-07-24 DIAGNOSIS — R07.9 CHEST PAIN: ICD-10-CM

## 2021-07-24 DIAGNOSIS — R06.02 SOB (SHORTNESS OF BREATH): ICD-10-CM

## 2021-07-24 DIAGNOSIS — M79.604 PAIN OF RIGHT LOWER EXTREMITY: Primary | ICD-10-CM

## 2021-07-24 LAB
ALBUMIN SERPL BCP-MCNC: 3.5 G/DL (ref 3.5–5.2)
ALP SERPL-CCNC: 80 U/L (ref 55–135)
ALT SERPL W/O P-5'-P-CCNC: 8 U/L (ref 10–44)
ANION GAP SERPL CALC-SCNC: 9 MMOL/L (ref 8–16)
AST SERPL-CCNC: 13 U/L (ref 10–40)
BASOPHILS # BLD AUTO: 0.06 K/UL (ref 0–0.2)
BASOPHILS NFR BLD: 0.9 % (ref 0–1.9)
BILIRUB SERPL-MCNC: 0.6 MG/DL (ref 0.1–1)
BNP SERPL-MCNC: 13 PG/ML (ref 0–99)
BUN SERPL-MCNC: 19 MG/DL (ref 6–20)
CALCIUM SERPL-MCNC: 9.6 MG/DL (ref 8.7–10.5)
CHLORIDE SERPL-SCNC: 105 MMOL/L (ref 95–110)
CO2 SERPL-SCNC: 23 MMOL/L (ref 23–29)
CREAT SERPL-MCNC: 2 MG/DL (ref 0.5–1.4)
CTP QC/QA: YES
DIFFERENTIAL METHOD: ABNORMAL
EOSINOPHIL # BLD AUTO: 0.3 K/UL (ref 0–0.5)
EOSINOPHIL NFR BLD: 4.6 % (ref 0–8)
ERYTHROCYTE [DISTWIDTH] IN BLOOD BY AUTOMATED COUNT: 14.8 % (ref 11.5–14.5)
EST. GFR  (AFRICAN AMERICAN): 34 ML/MIN/1.73 M^2
EST. GFR  (NON AFRICAN AMERICAN): 30 ML/MIN/1.73 M^2
GLUCOSE SERPL-MCNC: 140 MG/DL (ref 70–110)
HCT VFR BLD AUTO: 45.4 % (ref 37–48.5)
HGB BLD-MCNC: 14.3 G/DL (ref 12–16)
IMM GRANULOCYTES # BLD AUTO: 0.01 K/UL (ref 0–0.04)
IMM GRANULOCYTES NFR BLD AUTO: 0.1 % (ref 0–0.5)
LYMPHOCYTES # BLD AUTO: 2.1 K/UL (ref 1–4.8)
LYMPHOCYTES NFR BLD: 30.3 % (ref 18–48)
MCH RBC QN AUTO: 28 PG (ref 27–31)
MCHC RBC AUTO-ENTMCNC: 31.5 G/DL (ref 32–36)
MCV RBC AUTO: 89 FL (ref 82–98)
MONOCYTES # BLD AUTO: 0.6 K/UL (ref 0.3–1)
MONOCYTES NFR BLD: 8.8 % (ref 4–15)
NEUTROPHILS # BLD AUTO: 3.9 K/UL (ref 1.8–7.7)
NEUTROPHILS NFR BLD: 55.3 % (ref 38–73)
NRBC BLD-RTO: 0 /100 WBC
PLATELET # BLD AUTO: 228 K/UL (ref 150–450)
PMV BLD AUTO: 10 FL (ref 9.2–12.9)
POTASSIUM SERPL-SCNC: 4 MMOL/L (ref 3.5–5.1)
PROT SERPL-MCNC: 7.6 G/DL (ref 6–8.4)
RBC # BLD AUTO: 5.1 M/UL (ref 4–5.4)
SARS-COV-2 RDRP RESP QL NAA+PROBE: NEGATIVE
SODIUM SERPL-SCNC: 137 MMOL/L (ref 136–145)
TROPONIN I SERPL DL<=0.01 NG/ML-MCNC: <0.006 NG/ML (ref 0–0.03)
WBC # BLD AUTO: 7.02 K/UL (ref 3.9–12.7)

## 2021-07-24 PROCEDURE — 85025 COMPLETE CBC W/AUTO DIFF WBC: CPT | Performed by: EMERGENCY MEDICINE

## 2021-07-24 PROCEDURE — 84484 ASSAY OF TROPONIN QUANT: CPT | Performed by: EMERGENCY MEDICINE

## 2021-07-24 PROCEDURE — U0002 COVID-19 LAB TEST NON-CDC: HCPCS | Performed by: EMERGENCY MEDICINE

## 2021-07-24 PROCEDURE — 83880 ASSAY OF NATRIURETIC PEPTIDE: CPT | Performed by: EMERGENCY MEDICINE

## 2021-07-24 PROCEDURE — 93005 ELECTROCARDIOGRAM TRACING: CPT

## 2021-07-24 PROCEDURE — 80053 COMPREHEN METABOLIC PANEL: CPT | Performed by: EMERGENCY MEDICINE

## 2021-07-24 PROCEDURE — 25000003 PHARM REV CODE 250: Performed by: STUDENT IN AN ORGANIZED HEALTH CARE EDUCATION/TRAINING PROGRAM

## 2021-07-24 PROCEDURE — 99285 EMERGENCY DEPT VISIT HI MDM: CPT | Mod: 25

## 2021-07-24 PROCEDURE — 93010 EKG 12-LEAD: ICD-10-PCS | Mod: ,,, | Performed by: INTERNAL MEDICINE

## 2021-07-24 PROCEDURE — 93010 ELECTROCARDIOGRAM REPORT: CPT | Mod: ,,, | Performed by: INTERNAL MEDICINE

## 2021-07-24 RX ORDER — HYDROCODONE BITARTRATE AND ACETAMINOPHEN 5; 325 MG/1; MG/1
1 TABLET ORAL
Status: COMPLETED | OUTPATIENT
Start: 2021-07-24 | End: 2021-07-24

## 2021-07-24 RX ORDER — DICYCLOMINE HYDROCHLORIDE 10 MG/1
10 CAPSULE ORAL
Status: COMPLETED | OUTPATIENT
Start: 2021-07-24 | End: 2021-07-24

## 2021-07-24 RX ORDER — HYDROCODONE BITARTRATE AND ACETAMINOPHEN 5; 325 MG/1; MG/1
1 TABLET ORAL EVERY 6 HOURS PRN
Qty: 12 TABLET | Refills: 0 | Status: SHIPPED | OUTPATIENT
Start: 2021-07-24 | End: 2021-10-17 | Stop reason: HOSPADM

## 2021-07-24 RX ADMIN — HYDROCODONE BITARTRATE AND ACETAMINOPHEN 1 TABLET: 5; 325 TABLET ORAL at 09:07

## 2021-07-24 RX ADMIN — DICYCLOMINE HYDROCHLORIDE 10 MG: 10 CAPSULE ORAL at 10:07

## 2021-07-29 ENCOUNTER — CLINICAL SUPPORT (OUTPATIENT)
Dept: CARDIOLOGY | Facility: HOSPITAL | Age: 46
End: 2021-07-29
Payer: MEDICARE

## 2021-07-29 DIAGNOSIS — I48.91 UNSPECIFIED ATRIAL FIBRILLATION: ICD-10-CM

## 2021-07-29 DIAGNOSIS — I47.20 VENTRICULAR TACHYCARDIA: ICD-10-CM

## 2021-07-29 DIAGNOSIS — I42.9 CARDIOMYOPATHY, UNSPECIFIED: ICD-10-CM

## 2021-07-29 DIAGNOSIS — Z95.810 PRESENCE OF AUTOMATIC (IMPLANTABLE) CARDIAC DEFIBRILLATOR: ICD-10-CM

## 2021-07-29 DIAGNOSIS — I50.9 HEART FAILURE, UNSPECIFIED: ICD-10-CM

## 2021-07-29 PROCEDURE — 93295 DEV INTERROG REMOTE 1/2/MLT: CPT | Mod: ,,, | Performed by: INTERNAL MEDICINE

## 2021-07-29 PROCEDURE — 93296 REM INTERROG EVL PM/IDS: CPT | Performed by: INTERNAL MEDICINE

## 2021-07-29 PROCEDURE — 93295 CARDIAC DEVICE CHECK - REMOTE: ICD-10-PCS | Mod: ,,, | Performed by: INTERNAL MEDICINE

## 2021-08-04 ENCOUNTER — PATIENT MESSAGE (OUTPATIENT)
Dept: ADMINISTRATIVE | Facility: HOSPITAL | Age: 46
End: 2021-08-04

## 2021-09-02 NOTE — TELEPHONE ENCOUNTER
Patient requesting that an order for a colonoscopy be put in her chart stating she had an order years ago, but never had it done, and is now ready. Please confirm when the order is in.    Caller:  Naz Hobbs  Phone: [phone]  Confirmed: Yes   Spoke with pt and informed her that her insurance will no longer cover Novolog and advised her to finish off Novolog supply and then switch to Humalog per Xiomara. Humalog rx has been sent.

## 2021-10-04 ENCOUNTER — PATIENT MESSAGE (OUTPATIENT)
Dept: ADMINISTRATIVE | Facility: HOSPITAL | Age: 46
End: 2021-10-04

## 2021-10-14 ENCOUNTER — OFFICE VISIT (OUTPATIENT)
Dept: FAMILY MEDICINE | Facility: CLINIC | Age: 46
End: 2021-10-14
Payer: MEDICARE

## 2021-10-14 ENCOUNTER — LAB VISIT (OUTPATIENT)
Dept: LAB | Facility: HOSPITAL | Age: 46
End: 2021-10-14
Attending: FAMILY MEDICINE
Payer: MEDICARE

## 2021-10-14 VITALS
OXYGEN SATURATION: 96 % | BODY MASS INDEX: 41.52 KG/M2 | RESPIRATION RATE: 20 BRPM | HEART RATE: 73 BPM | WEIGHT: 264.56 LBS | TEMPERATURE: 98 F | HEIGHT: 67 IN | SYSTOLIC BLOOD PRESSURE: 119 MMHG | DIASTOLIC BLOOD PRESSURE: 82 MMHG

## 2021-10-14 DIAGNOSIS — I48.0 PAROXYSMAL ATRIAL FIBRILLATION: ICD-10-CM

## 2021-10-14 DIAGNOSIS — I50.42 CHRONIC COMBINED SYSTOLIC AND DIASTOLIC HEART FAILURE: ICD-10-CM

## 2021-10-14 DIAGNOSIS — E11.65 UNCONTROLLED TYPE 2 DIABETES MELLITUS WITH HYPERGLYCEMIA: ICD-10-CM

## 2021-10-14 DIAGNOSIS — G47.33 OSA TREATED WITH BIPAP: ICD-10-CM

## 2021-10-14 DIAGNOSIS — Z95.810 IMPLANTABLE CARDIOVERTER-DEFIBRILLATOR (ICD) IN SITU: ICD-10-CM

## 2021-10-14 DIAGNOSIS — Z00.00 ANNUAL PHYSICAL EXAM: Primary | ICD-10-CM

## 2021-10-14 LAB
ALBUMIN SERPL BCP-MCNC: 3.5 G/DL (ref 3.5–5.2)
ALP SERPL-CCNC: 77 U/L (ref 55–135)
ALT SERPL W/O P-5'-P-CCNC: 20 U/L (ref 10–44)
ANION GAP SERPL CALC-SCNC: 10 MMOL/L (ref 8–16)
AST SERPL-CCNC: 19 U/L (ref 10–40)
BASOPHILS # BLD AUTO: 0.04 K/UL (ref 0–0.2)
BASOPHILS NFR BLD: 0.6 % (ref 0–1.9)
BILIRUB SERPL-MCNC: 0.4 MG/DL (ref 0.1–1)
BUN SERPL-MCNC: 18 MG/DL (ref 6–20)
CALCIUM SERPL-MCNC: 9.4 MG/DL (ref 8.7–10.5)
CHLORIDE SERPL-SCNC: 101 MMOL/L (ref 95–110)
CHOLEST SERPL-MCNC: 111 MG/DL (ref 120–199)
CHOLEST/HDLC SERPL: 2.9 {RATIO} (ref 2–5)
CO2 SERPL-SCNC: 25 MMOL/L (ref 23–29)
CREAT SERPL-MCNC: 1.5 MG/DL (ref 0.5–1.4)
DIFFERENTIAL METHOD: ABNORMAL
EOSINOPHIL # BLD AUTO: 0.2 K/UL (ref 0–0.5)
EOSINOPHIL NFR BLD: 3.6 % (ref 0–8)
ERYTHROCYTE [DISTWIDTH] IN BLOOD BY AUTOMATED COUNT: 15.5 % (ref 11.5–14.5)
EST. GFR  (AFRICAN AMERICAN): 48.2 ML/MIN/1.73 M^2
EST. GFR  (NON AFRICAN AMERICAN): 41.8 ML/MIN/1.73 M^2
ESTIMATED AVG GLUCOSE: 292 MG/DL (ref 68–131)
GLUCOSE SERPL-MCNC: 194 MG/DL (ref 70–110)
HBA1C MFR BLD: 11.8 % (ref 4–5.6)
HCT VFR BLD AUTO: 41.9 % (ref 37–48.5)
HDLC SERPL-MCNC: 38 MG/DL (ref 40–75)
HDLC SERPL: 34.2 % (ref 20–50)
HGB BLD-MCNC: 12.7 G/DL (ref 12–16)
IMM GRANULOCYTES # BLD AUTO: 0.03 K/UL (ref 0–0.04)
IMM GRANULOCYTES NFR BLD AUTO: 0.4 % (ref 0–0.5)
LDLC SERPL CALC-MCNC: 58.4 MG/DL (ref 63–159)
LYMPHOCYTES # BLD AUTO: 2.2 K/UL (ref 1–4.8)
LYMPHOCYTES NFR BLD: 32.6 % (ref 18–48)
MCH RBC QN AUTO: 28.1 PG (ref 27–31)
MCHC RBC AUTO-ENTMCNC: 30.3 G/DL (ref 32–36)
MCV RBC AUTO: 93 FL (ref 82–98)
MONOCYTES # BLD AUTO: 0.5 K/UL (ref 0.3–1)
MONOCYTES NFR BLD: 7 % (ref 4–15)
NEUTROPHILS # BLD AUTO: 3.7 K/UL (ref 1.8–7.7)
NEUTROPHILS NFR BLD: 55.8 % (ref 38–73)
NONHDLC SERPL-MCNC: 73 MG/DL
NRBC BLD-RTO: 0 /100 WBC
PLATELET # BLD AUTO: 255 K/UL (ref 150–450)
PMV BLD AUTO: 11 FL (ref 9.2–12.9)
POTASSIUM SERPL-SCNC: 4.3 MMOL/L (ref 3.5–5.1)
PROT SERPL-MCNC: 7 G/DL (ref 6–8.4)
RBC # BLD AUTO: 4.52 M/UL (ref 4–5.4)
SODIUM SERPL-SCNC: 136 MMOL/L (ref 136–145)
TRIGL SERPL-MCNC: 73 MG/DL (ref 30–150)
WBC # BLD AUTO: 6.71 K/UL (ref 3.9–12.7)

## 2021-10-14 PROCEDURE — 99214 OFFICE O/P EST MOD 30 MIN: CPT | Mod: S$GLB,,, | Performed by: FAMILY MEDICINE

## 2021-10-14 PROCEDURE — 99999 PR PBB SHADOW E&M-EST. PATIENT-LVL V: CPT | Mod: PBBFAC,,, | Performed by: FAMILY MEDICINE

## 2021-10-14 PROCEDURE — 3079F DIAST BP 80-89 MM HG: CPT | Mod: CPTII,S$GLB,, | Performed by: FAMILY MEDICINE

## 2021-10-14 PROCEDURE — 1159F MED LIST DOCD IN RCRD: CPT | Mod: CPTII,S$GLB,, | Performed by: FAMILY MEDICINE

## 2021-10-14 PROCEDURE — 3046F HEMOGLOBIN A1C LEVEL >9.0%: CPT | Mod: CPTII,S$GLB,, | Performed by: FAMILY MEDICINE

## 2021-10-14 PROCEDURE — 3008F BODY MASS INDEX DOCD: CPT | Mod: CPTII,S$GLB,, | Performed by: FAMILY MEDICINE

## 2021-10-14 PROCEDURE — 3079F PR MOST RECENT DIASTOLIC BLOOD PRESSURE 80-89 MM HG: ICD-10-PCS | Mod: CPTII,S$GLB,, | Performed by: FAMILY MEDICINE

## 2021-10-14 PROCEDURE — 80061 LIPID PANEL: CPT | Performed by: FAMILY MEDICINE

## 2021-10-14 PROCEDURE — 3008F PR BODY MASS INDEX (BMI) DOCUMENTED: ICD-10-PCS | Mod: CPTII,S$GLB,, | Performed by: FAMILY MEDICINE

## 2021-10-14 PROCEDURE — 85025 COMPLETE CBC W/AUTO DIFF WBC: CPT | Performed by: FAMILY MEDICINE

## 2021-10-14 PROCEDURE — 3074F SYST BP LT 130 MM HG: CPT | Mod: CPTII,S$GLB,, | Performed by: FAMILY MEDICINE

## 2021-10-14 PROCEDURE — 3046F PR MOST RECENT HEMOGLOBIN A1C LEVEL > 9.0%: ICD-10-PCS | Mod: CPTII,S$GLB,, | Performed by: FAMILY MEDICINE

## 2021-10-14 PROCEDURE — 3074F PR MOST RECENT SYSTOLIC BLOOD PRESSURE < 130 MM HG: ICD-10-PCS | Mod: CPTII,S$GLB,, | Performed by: FAMILY MEDICINE

## 2021-10-14 PROCEDURE — 99214 PR OFFICE/OUTPT VISIT, EST, LEVL IV, 30-39 MIN: ICD-10-PCS | Mod: S$GLB,,, | Performed by: FAMILY MEDICINE

## 2021-10-14 PROCEDURE — 80053 COMPREHEN METABOLIC PANEL: CPT | Performed by: FAMILY MEDICINE

## 2021-10-14 PROCEDURE — 83036 HEMOGLOBIN GLYCOSYLATED A1C: CPT | Performed by: FAMILY MEDICINE

## 2021-10-14 PROCEDURE — 1159F PR MEDICATION LIST DOCUMENTED IN MEDICAL RECORD: ICD-10-PCS | Mod: CPTII,S$GLB,, | Performed by: FAMILY MEDICINE

## 2021-10-14 PROCEDURE — 36415 COLL VENOUS BLD VENIPUNCTURE: CPT | Mod: PO | Performed by: FAMILY MEDICINE

## 2021-10-14 PROCEDURE — 99999 PR PBB SHADOW E&M-EST. PATIENT-LVL V: ICD-10-PCS | Mod: PBBFAC,,, | Performed by: FAMILY MEDICINE

## 2021-10-14 RX ORDER — ROSUVASTATIN CALCIUM 40 MG/1
40 TABLET, COATED ORAL NIGHTLY
Qty: 90 TABLET | Refills: 1 | Status: SHIPPED | OUTPATIENT
Start: 2021-10-14 | End: 2022-06-10 | Stop reason: SDUPTHER

## 2021-10-14 RX ORDER — MUPIROCIN 20 MG/G
OINTMENT TOPICAL NIGHTLY
COMMUNITY
Start: 2021-04-22

## 2021-10-14 RX ORDER — FUROSEMIDE 40 MG/1
40 TABLET ORAL DAILY
Qty: 90 TABLET | Refills: 1 | Status: SHIPPED | OUTPATIENT
Start: 2021-10-14 | End: 2022-04-02

## 2021-10-14 RX ORDER — METOPROLOL SUCCINATE 50 MG/1
50 TABLET, EXTENDED RELEASE ORAL DAILY
Qty: 90 TABLET | Refills: 1 | Status: SHIPPED | OUTPATIENT
Start: 2021-10-14 | End: 2022-05-10 | Stop reason: SDUPTHER

## 2021-10-14 RX ORDER — GABAPENTIN 400 MG/1
400 CAPSULE ORAL 2 TIMES DAILY PRN
Qty: 180 CAPSULE | Refills: 1 | Status: ON HOLD | OUTPATIENT
Start: 2021-10-14 | End: 2022-05-01

## 2021-10-17 ENCOUNTER — HOSPITAL ENCOUNTER (EMERGENCY)
Facility: HOSPITAL | Age: 46
Discharge: HOME | End: 2021-10-17
Attending: EMERGENCY MEDICINE
Payer: MEDICARE

## 2021-10-17 VITALS
SYSTOLIC BLOOD PRESSURE: 142 MMHG | BODY MASS INDEX: 40.81 KG/M2 | OXYGEN SATURATION: 95 % | DIASTOLIC BLOOD PRESSURE: 98 MMHG | WEIGHT: 260 LBS | HEART RATE: 83 BPM | HEIGHT: 67 IN | TEMPERATURE: 98 F | RESPIRATION RATE: 16 BRPM

## 2021-10-17 DIAGNOSIS — M54.31 SCIATICA OF RIGHT SIDE: ICD-10-CM

## 2021-10-17 DIAGNOSIS — M54.50 LUMBAR PAIN: Primary | ICD-10-CM

## 2021-10-17 PROCEDURE — 63600175 PHARM REV CODE 636 W HCPCS: Performed by: PHYSICIAN ASSISTANT

## 2021-10-17 PROCEDURE — 99284 EMERGENCY DEPT VISIT MOD MDM: CPT | Mod: 25

## 2021-10-17 PROCEDURE — 96372 THER/PROPH/DIAG INJ SC/IM: CPT

## 2021-10-17 RX ORDER — MORPHINE SULFATE 4 MG/ML
6 INJECTION, SOLUTION INTRAMUSCULAR; INTRAVENOUS
Status: COMPLETED | OUTPATIENT
Start: 2021-10-17 | End: 2021-10-17

## 2021-10-17 RX ORDER — OXYCODONE AND ACETAMINOPHEN 5; 325 MG/1; MG/1
1 TABLET ORAL EVERY 4 HOURS PRN
Qty: 15 TABLET | Refills: 0 | Status: SHIPPED | OUTPATIENT
Start: 2021-10-17 | End: 2021-10-20

## 2021-10-17 RX ORDER — METHOCARBAMOL 500 MG/1
1000 TABLET, FILM COATED ORAL 3 TIMES DAILY PRN
Qty: 30 TABLET | Refills: 0 | Status: SHIPPED | OUTPATIENT
Start: 2021-10-17 | End: 2023-11-20 | Stop reason: SDUPTHER

## 2021-10-17 RX ORDER — LIDOCAINE 50 MG/G
1 PATCH TOPICAL DAILY
Qty: 15 PATCH | Refills: 0 | Status: SHIPPED | OUTPATIENT
Start: 2021-10-17 | End: 2021-11-01

## 2021-10-17 RX ADMIN — MORPHINE SULFATE 6 MG: 4 INJECTION INTRAVENOUS at 08:10

## 2021-10-18 ENCOUNTER — PATIENT MESSAGE (OUTPATIENT)
Dept: ADMINISTRATIVE | Facility: HOSPITAL | Age: 46
End: 2021-10-18
Payer: MEDICARE

## 2021-10-19 ENCOUNTER — TELEPHONE (OUTPATIENT)
Dept: PAIN MEDICINE | Facility: CLINIC | Age: 46
End: 2021-10-19

## 2021-10-22 ENCOUNTER — PATIENT OUTREACH (OUTPATIENT)
Dept: ADMINISTRATIVE | Facility: OTHER | Age: 46
End: 2021-10-22

## 2021-10-27 ENCOUNTER — TELEPHONE (OUTPATIENT)
Dept: GASTROENTEROLOGY | Facility: CLINIC | Age: 46
End: 2021-10-27
Payer: MEDICARE

## 2021-10-27 ENCOUNTER — CLINICAL SUPPORT (OUTPATIENT)
Dept: CARDIOLOGY | Facility: HOSPITAL | Age: 46
End: 2021-10-27
Payer: MEDICARE

## 2021-10-27 DIAGNOSIS — I48.91 UNSPECIFIED ATRIAL FIBRILLATION: ICD-10-CM

## 2021-10-27 DIAGNOSIS — I47.20 VENTRICULAR TACHYCARDIA: ICD-10-CM

## 2021-10-27 DIAGNOSIS — Z12.11 COLON CANCER SCREENING: Primary | ICD-10-CM

## 2021-10-27 DIAGNOSIS — I42.9 CARDIOMYOPATHY, UNSPECIFIED: ICD-10-CM

## 2021-10-27 DIAGNOSIS — Z95.810 PRESENCE OF AUTOMATIC (IMPLANTABLE) CARDIAC DEFIBRILLATOR: ICD-10-CM

## 2021-10-27 DIAGNOSIS — I50.9 HEART FAILURE, UNSPECIFIED: ICD-10-CM

## 2021-10-27 PROCEDURE — 93296 REM INTERROG EVL PM/IDS: CPT | Performed by: INTERNAL MEDICINE

## 2021-10-27 PROCEDURE — 93295 CARDIAC DEVICE CHECK - REMOTE: ICD-10-PCS | Mod: ,,, | Performed by: INTERNAL MEDICINE

## 2021-10-27 PROCEDURE — 93295 DEV INTERROG REMOTE 1/2/MLT: CPT | Mod: ,,, | Performed by: INTERNAL MEDICINE

## 2021-11-03 ENCOUNTER — TELEPHONE (OUTPATIENT)
Dept: GASTROENTEROLOGY | Facility: CLINIC | Age: 46
End: 2021-11-03
Payer: MEDICARE

## 2021-11-03 ENCOUNTER — OFFICE VISIT (OUTPATIENT)
Dept: ENDOCRINOLOGY | Facility: CLINIC | Age: 46
End: 2021-11-03
Payer: MEDICARE

## 2021-11-03 VITALS
DIASTOLIC BLOOD PRESSURE: 80 MMHG | HEART RATE: 78 BPM | TEMPERATURE: 98 F | SYSTOLIC BLOOD PRESSURE: 132 MMHG | WEIGHT: 260.56 LBS | BODY MASS INDEX: 40.81 KG/M2

## 2021-11-03 DIAGNOSIS — I10 HYPERTENSION, UNSPECIFIED TYPE: ICD-10-CM

## 2021-11-03 DIAGNOSIS — E66.01 MORBID OBESITY, UNSPECIFIED OBESITY TYPE: ICD-10-CM

## 2021-11-03 DIAGNOSIS — E11.65 UNCONTROLLED TYPE 2 DIABETES MELLITUS WITH HYPERGLYCEMIA: Primary | ICD-10-CM

## 2021-11-03 PROCEDURE — 1160F PR REVIEW ALL MEDS BY PRESCRIBER/CLIN PHARMACIST DOCUMENTED: ICD-10-PCS | Mod: CPTII,S$GLB,, | Performed by: NURSE PRACTITIONER

## 2021-11-03 PROCEDURE — 3079F DIAST BP 80-89 MM HG: CPT | Mod: CPTII,S$GLB,, | Performed by: NURSE PRACTITIONER

## 2021-11-03 PROCEDURE — 99214 OFFICE O/P EST MOD 30 MIN: CPT | Mod: S$GLB,,, | Performed by: NURSE PRACTITIONER

## 2021-11-03 PROCEDURE — 3079F PR MOST RECENT DIASTOLIC BLOOD PRESSURE 80-89 MM HG: ICD-10-PCS | Mod: CPTII,S$GLB,, | Performed by: NURSE PRACTITIONER

## 2021-11-03 PROCEDURE — 3046F PR MOST RECENT HEMOGLOBIN A1C LEVEL > 9.0%: ICD-10-PCS | Mod: CPTII,S$GLB,, | Performed by: NURSE PRACTITIONER

## 2021-11-03 PROCEDURE — 3008F BODY MASS INDEX DOCD: CPT | Mod: CPTII,S$GLB,, | Performed by: NURSE PRACTITIONER

## 2021-11-03 PROCEDURE — 99999 PR PBB SHADOW E&M-EST. PATIENT-LVL V: CPT | Mod: PBBFAC,,, | Performed by: NURSE PRACTITIONER

## 2021-11-03 PROCEDURE — 1159F MED LIST DOCD IN RCRD: CPT | Mod: CPTII,S$GLB,, | Performed by: NURSE PRACTITIONER

## 2021-11-03 PROCEDURE — 99999 PR PBB SHADOW E&M-EST. PATIENT-LVL V: ICD-10-PCS | Mod: PBBFAC,,, | Performed by: NURSE PRACTITIONER

## 2021-11-03 PROCEDURE — 3008F PR BODY MASS INDEX (BMI) DOCUMENTED: ICD-10-PCS | Mod: CPTII,S$GLB,, | Performed by: NURSE PRACTITIONER

## 2021-11-03 PROCEDURE — 1160F RVW MEDS BY RX/DR IN RCRD: CPT | Mod: CPTII,S$GLB,, | Performed by: NURSE PRACTITIONER

## 2021-11-03 PROCEDURE — 3075F PR MOST RECENT SYSTOLIC BLOOD PRESS GE 130-139MM HG: ICD-10-PCS | Mod: CPTII,S$GLB,, | Performed by: NURSE PRACTITIONER

## 2021-11-03 PROCEDURE — 1159F PR MEDICATION LIST DOCUMENTED IN MEDICAL RECORD: ICD-10-PCS | Mod: CPTII,S$GLB,, | Performed by: NURSE PRACTITIONER

## 2021-11-03 PROCEDURE — 99214 PR OFFICE/OUTPT VISIT, EST, LEVL IV, 30-39 MIN: ICD-10-PCS | Mod: S$GLB,,, | Performed by: NURSE PRACTITIONER

## 2021-11-03 PROCEDURE — 3075F SYST BP GE 130 - 139MM HG: CPT | Mod: CPTII,S$GLB,, | Performed by: NURSE PRACTITIONER

## 2021-11-03 PROCEDURE — 3046F HEMOGLOBIN A1C LEVEL >9.0%: CPT | Mod: CPTII,S$GLB,, | Performed by: NURSE PRACTITIONER

## 2021-11-03 RX ORDER — DULAGLUTIDE 0.75 MG/.5ML
0.75 INJECTION, SOLUTION SUBCUTANEOUS
Qty: 4 PEN | Refills: 3 | Status: SHIPPED | OUTPATIENT
Start: 2021-11-03 | End: 2022-02-22

## 2021-11-03 RX ORDER — BLOOD-GLUCOSE SENSOR
EACH MISCELLANEOUS
Qty: 12 EACH | Refills: 3 | Status: SHIPPED | OUTPATIENT
Start: 2021-11-03 | End: 2022-01-03

## 2021-11-03 RX ORDER — BLOOD-GLUCOSE,RECEIVER,CONT
EACH MISCELLANEOUS
Qty: 1 EACH | Refills: 0 | Status: SHIPPED | OUTPATIENT
Start: 2021-11-03 | End: 2022-03-18

## 2021-11-03 RX ORDER — BLOOD-GLUCOSE TRANSMITTER
EACH MISCELLANEOUS
Qty: 1 EACH | Refills: 3 | Status: SHIPPED | OUTPATIENT
Start: 2021-11-03 | End: 2022-01-03

## 2021-11-10 ENCOUNTER — OFFICE VISIT (OUTPATIENT)
Dept: PODIATRY | Facility: CLINIC | Age: 46
End: 2021-11-10
Payer: MEDICARE

## 2021-11-10 VITALS — WEIGHT: 260 LBS | BODY MASS INDEX: 40.81 KG/M2 | HEIGHT: 67 IN

## 2021-11-10 DIAGNOSIS — M21.42 PES PLANUS OF BOTH FEET: ICD-10-CM

## 2021-11-10 DIAGNOSIS — M20.5X1 HALLUX LIMITUS, ACQUIRED, RIGHT: ICD-10-CM

## 2021-11-10 DIAGNOSIS — E11.9 COMPREHENSIVE DIABETIC FOOT EXAMINATION, TYPE 2 DM, ENCOUNTER FOR: Primary | ICD-10-CM

## 2021-11-10 DIAGNOSIS — E11.49 TYPE II DIABETES MELLITUS WITH NEUROLOGICAL MANIFESTATIONS: ICD-10-CM

## 2021-11-10 DIAGNOSIS — M20.42 HAMMER TOES OF BOTH FEET: ICD-10-CM

## 2021-11-10 DIAGNOSIS — M20.5X2 HALLUX LIMITUS, ACQUIRED, LEFT: ICD-10-CM

## 2021-11-10 DIAGNOSIS — M21.41 PES PLANUS OF BOTH FEET: ICD-10-CM

## 2021-11-10 DIAGNOSIS — M20.41 HAMMER TOES OF BOTH FEET: ICD-10-CM

## 2021-11-10 DIAGNOSIS — M72.2 PLANTAR FASCIITIS: ICD-10-CM

## 2021-11-10 DIAGNOSIS — E11.65 UNCONTROLLED TYPE 2 DIABETES MELLITUS WITH HYPERGLYCEMIA: ICD-10-CM

## 2021-11-10 PROCEDURE — 1160F RVW MEDS BY RX/DR IN RCRD: CPT | Mod: CPTII,S$GLB,, | Performed by: PODIATRIST

## 2021-11-10 PROCEDURE — 3008F BODY MASS INDEX DOCD: CPT | Mod: CPTII,S$GLB,, | Performed by: PODIATRIST

## 2021-11-10 PROCEDURE — 1159F PR MEDICATION LIST DOCUMENTED IN MEDICAL RECORD: ICD-10-PCS | Mod: CPTII,S$GLB,, | Performed by: PODIATRIST

## 2021-11-10 PROCEDURE — 1160F PR REVIEW ALL MEDS BY PRESCRIBER/CLIN PHARMACIST DOCUMENTED: ICD-10-PCS | Mod: CPTII,S$GLB,, | Performed by: PODIATRIST

## 2021-11-10 PROCEDURE — 3046F PR MOST RECENT HEMOGLOBIN A1C LEVEL > 9.0%: ICD-10-PCS | Mod: CPTII,S$GLB,, | Performed by: PODIATRIST

## 2021-11-10 PROCEDURE — 99214 OFFICE O/P EST MOD 30 MIN: CPT | Mod: S$GLB,,, | Performed by: PODIATRIST

## 2021-11-10 PROCEDURE — 99214 PR OFFICE/OUTPT VISIT, EST, LEVL IV, 30-39 MIN: ICD-10-PCS | Mod: S$GLB,,, | Performed by: PODIATRIST

## 2021-11-10 PROCEDURE — 99999 PR PBB SHADOW E&M-EST. PATIENT-LVL III: CPT | Mod: PBBFAC,,, | Performed by: PODIATRIST

## 2021-11-10 PROCEDURE — 3008F PR BODY MASS INDEX (BMI) DOCUMENTED: ICD-10-PCS | Mod: CPTII,S$GLB,, | Performed by: PODIATRIST

## 2021-11-10 PROCEDURE — 3046F HEMOGLOBIN A1C LEVEL >9.0%: CPT | Mod: CPTII,S$GLB,, | Performed by: PODIATRIST

## 2021-11-10 PROCEDURE — 99999 PR PBB SHADOW E&M-EST. PATIENT-LVL III: ICD-10-PCS | Mod: PBBFAC,,, | Performed by: PODIATRIST

## 2021-11-10 PROCEDURE — 1159F MED LIST DOCD IN RCRD: CPT | Mod: CPTII,S$GLB,, | Performed by: PODIATRIST

## 2021-11-11 ENCOUNTER — TELEPHONE (OUTPATIENT)
Dept: GASTROENTEROLOGY | Facility: CLINIC | Age: 46
End: 2021-11-11
Payer: MEDICARE

## 2021-11-11 ENCOUNTER — CLINICAL SUPPORT (OUTPATIENT)
Dept: DIABETES | Facility: CLINIC | Age: 46
End: 2021-11-11
Payer: MEDICARE

## 2021-11-11 ENCOUNTER — TELEPHONE (OUTPATIENT)
Dept: FAMILY MEDICINE | Facility: CLINIC | Age: 46
End: 2021-11-11
Payer: MEDICARE

## 2021-11-11 DIAGNOSIS — E11.9 TYPE 2 DIABETES MELLITUS WITHOUT COMPLICATION, WITHOUT LONG-TERM CURRENT USE OF INSULIN: Primary | ICD-10-CM

## 2021-11-11 DIAGNOSIS — Z01.818 PRE-OP TESTING: ICD-10-CM

## 2021-11-11 PROCEDURE — G0108 PR DIAB MANAGE TRN  PER INDIV: ICD-10-PCS | Mod: S$GLB,,, | Performed by: DIETITIAN, REGISTERED

## 2021-11-11 PROCEDURE — G0108 DIAB MANAGE TRN  PER INDIV: HCPCS | Mod: S$GLB,,, | Performed by: DIETITIAN, REGISTERED

## 2021-11-16 DIAGNOSIS — Z12.11 COLON CANCER SCREENING: Primary | ICD-10-CM

## 2021-11-29 ENCOUNTER — HOSPITAL ENCOUNTER (EMERGENCY)
Facility: HOSPITAL | Age: 46
Discharge: HOME | End: 2021-11-29
Attending: EMERGENCY MEDICINE
Payer: MEDICARE

## 2021-11-29 VITALS
TEMPERATURE: 98 F | OXYGEN SATURATION: 96 % | BODY MASS INDEX: 40.02 KG/M2 | WEIGHT: 255 LBS | DIASTOLIC BLOOD PRESSURE: 70 MMHG | RESPIRATION RATE: 18 BRPM | HEIGHT: 67 IN | SYSTOLIC BLOOD PRESSURE: 97 MMHG | HEART RATE: 95 BPM

## 2021-11-29 DIAGNOSIS — M62.830 BACK SPASM: ICD-10-CM

## 2021-11-29 DIAGNOSIS — M54.31 RIGHT SIDED SCIATICA: ICD-10-CM

## 2021-11-29 DIAGNOSIS — M54.30 SCIATICA, UNSPECIFIED LATERALITY: Primary | ICD-10-CM

## 2021-11-29 LAB
B-HCG UR QL: NEGATIVE
CTP QC/QA: YES

## 2021-11-29 PROCEDURE — 96372 THER/PROPH/DIAG INJ SC/IM: CPT

## 2021-11-29 PROCEDURE — 63600175 PHARM REV CODE 636 W HCPCS: Performed by: EMERGENCY MEDICINE

## 2021-11-29 PROCEDURE — 99284 EMERGENCY DEPT VISIT MOD MDM: CPT | Mod: 25

## 2021-11-29 PROCEDURE — 81025 URINE PREGNANCY TEST: CPT | Performed by: PHYSICIAN ASSISTANT

## 2021-11-29 RX ORDER — BACLOFEN 10 MG/1
10 TABLET ORAL 3 TIMES DAILY
Qty: 90 TABLET | Refills: 0 | Status: ON HOLD | OUTPATIENT
Start: 2021-11-29 | End: 2022-05-01 | Stop reason: SDUPTHER

## 2021-11-29 RX ORDER — MELOXICAM 15 MG/1
15 TABLET ORAL DAILY
Qty: 30 TABLET | Refills: 0 | Status: SHIPPED | OUTPATIENT
Start: 2021-11-29 | End: 2021-12-15 | Stop reason: ALTCHOICE

## 2021-11-29 RX ORDER — KETOROLAC TROMETHAMINE 30 MG/ML
30 INJECTION, SOLUTION INTRAMUSCULAR; INTRAVENOUS
Status: COMPLETED | OUTPATIENT
Start: 2021-11-29 | End: 2021-11-29

## 2021-11-29 RX ORDER — ORPHENADRINE CITRATE 30 MG/ML
60 INJECTION INTRAMUSCULAR; INTRAVENOUS
Status: COMPLETED | OUTPATIENT
Start: 2021-11-29 | End: 2021-11-29

## 2021-11-29 RX ORDER — FAMOTIDINE 20 MG/1
20 TABLET, FILM COATED ORAL 2 TIMES DAILY
Qty: 60 TABLET | Refills: 0 | Status: SHIPPED | OUTPATIENT
Start: 2021-11-29 | End: 2022-05-10

## 2021-11-29 RX ADMIN — ORPHENADRINE CITRATE 60 MG: 30 INJECTION INTRAMUSCULAR; INTRAVENOUS at 08:11

## 2021-11-29 RX ADMIN — KETOROLAC TROMETHAMINE 30 MG: 30 INJECTION, SOLUTION INTRAMUSCULAR; INTRAVENOUS at 08:11

## 2021-12-01 ENCOUNTER — HOSPITAL ENCOUNTER (EMERGENCY)
Facility: HOSPITAL | Age: 46
Discharge: HOME | End: 2021-12-01
Attending: EMERGENCY MEDICINE
Payer: MEDICARE

## 2021-12-01 VITALS
SYSTOLIC BLOOD PRESSURE: 113 MMHG | OXYGEN SATURATION: 97 % | HEART RATE: 83 BPM | RESPIRATION RATE: 18 BRPM | HEIGHT: 67 IN | DIASTOLIC BLOOD PRESSURE: 64 MMHG | TEMPERATURE: 98 F | WEIGHT: 244 LBS | BODY MASS INDEX: 38.3 KG/M2

## 2021-12-01 DIAGNOSIS — M54.41 CHRONIC RIGHT-SIDED LOW BACK PAIN WITH RIGHT-SIDED SCIATICA: Primary | ICD-10-CM

## 2021-12-01 DIAGNOSIS — E11.65 UNCONTROLLED TYPE 2 DIABETES MELLITUS WITH HYPERGLYCEMIA: ICD-10-CM

## 2021-12-01 DIAGNOSIS — G89.29 CHRONIC RIGHT-SIDED LOW BACK PAIN WITH RIGHT-SIDED SCIATICA: Primary | ICD-10-CM

## 2021-12-01 PROCEDURE — 99284 EMERGENCY DEPT VISIT MOD MDM: CPT | Mod: 25

## 2021-12-01 PROCEDURE — 63600175 PHARM REV CODE 636 W HCPCS: Performed by: EMERGENCY MEDICINE

## 2021-12-01 PROCEDURE — 96372 THER/PROPH/DIAG INJ SC/IM: CPT

## 2021-12-01 PROCEDURE — 25000003 PHARM REV CODE 250: Performed by: EMERGENCY MEDICINE

## 2021-12-01 RX ORDER — OXYCODONE AND ACETAMINOPHEN 7.5; 325 MG/1; MG/1
1 TABLET ORAL EVERY 4 HOURS PRN
Qty: 18 TABLET | Refills: 0 | Status: SHIPPED | OUTPATIENT
Start: 2021-12-01 | End: 2021-12-04

## 2021-12-01 RX ORDER — HYDROMORPHONE HYDROCHLORIDE 2 MG/ML
1 INJECTION, SOLUTION INTRAMUSCULAR; INTRAVENOUS; SUBCUTANEOUS
Status: COMPLETED | OUTPATIENT
Start: 2021-12-01 | End: 2021-12-01

## 2021-12-01 RX ORDER — LIDOCAINE 50 MG/G
1 PATCH TOPICAL
Status: DISCONTINUED | OUTPATIENT
Start: 2021-12-01 | End: 2021-12-01 | Stop reason: HOSPADM

## 2021-12-01 RX ADMIN — LIDOCAINE 1 PATCH: 50 PATCH TOPICAL at 06:12

## 2021-12-01 RX ADMIN — HYDROMORPHONE HYDROCHLORIDE 1 MG: 2 INJECTION INTRAMUSCULAR; INTRAVENOUS; SUBCUTANEOUS at 06:12

## 2021-12-03 ENCOUNTER — PATIENT OUTREACH (OUTPATIENT)
Dept: ADMINISTRATIVE | Facility: OTHER | Age: 46
End: 2021-12-03
Payer: MEDICARE

## 2021-12-06 ENCOUNTER — HOSPITAL ENCOUNTER (EMERGENCY)
Facility: HOSPITAL | Age: 46
Discharge: HOME | End: 2021-12-06
Attending: EMERGENCY MEDICINE
Payer: MEDICARE

## 2021-12-06 VITALS
TEMPERATURE: 99 F | RESPIRATION RATE: 20 BRPM | BODY MASS INDEX: 39.24 KG/M2 | HEIGHT: 67 IN | WEIGHT: 250 LBS | DIASTOLIC BLOOD PRESSURE: 70 MMHG | OXYGEN SATURATION: 97 % | HEART RATE: 74 BPM | SYSTOLIC BLOOD PRESSURE: 122 MMHG

## 2021-12-06 DIAGNOSIS — M54.31 SCIATICA OF RIGHT SIDE: Primary | ICD-10-CM

## 2021-12-06 PROCEDURE — 96372 THER/PROPH/DIAG INJ SC/IM: CPT

## 2021-12-06 PROCEDURE — 96374 THER/PROPH/DIAG INJ IV PUSH: CPT

## 2021-12-06 PROCEDURE — 63600175 PHARM REV CODE 636 W HCPCS: Performed by: EMERGENCY MEDICINE

## 2021-12-06 PROCEDURE — 99284 EMERGENCY DEPT VISIT MOD MDM: CPT | Mod: 25

## 2021-12-06 PROCEDURE — 25000003 PHARM REV CODE 250: Performed by: EMERGENCY MEDICINE

## 2021-12-06 RX ORDER — MORPHINE SULFATE 4 MG/ML
6 INJECTION, SOLUTION INTRAMUSCULAR; INTRAVENOUS
Status: COMPLETED | OUTPATIENT
Start: 2021-12-06 | End: 2021-12-06

## 2021-12-06 RX ORDER — METHOCARBAMOL 500 MG/1
1000 TABLET, FILM COATED ORAL
Status: COMPLETED | OUTPATIENT
Start: 2021-12-06 | End: 2021-12-06

## 2021-12-06 RX ORDER — KETOROLAC TROMETHAMINE 30 MG/ML
10 INJECTION, SOLUTION INTRAMUSCULAR; INTRAVENOUS
Status: COMPLETED | OUTPATIENT
Start: 2021-12-06 | End: 2021-12-06

## 2021-12-06 RX ORDER — HYDROCODONE BITARTRATE AND ACETAMINOPHEN 5; 325 MG/1; MG/1
1 TABLET ORAL EVERY 6 HOURS PRN
Qty: 15 TABLET | Refills: 0 | Status: SHIPPED | OUTPATIENT
Start: 2021-12-06 | End: 2022-05-10

## 2021-12-06 RX ADMIN — METHOCARBAMOL 1000 MG: 500 TABLET ORAL at 11:12

## 2021-12-06 RX ADMIN — MORPHINE SULFATE 6 MG: 4 INJECTION, SOLUTION INTRAMUSCULAR; INTRAVENOUS at 11:12

## 2021-12-06 RX ADMIN — KETOROLAC TROMETHAMINE 10 MG: 30 INJECTION, SOLUTION INTRAMUSCULAR; INTRAVENOUS at 11:12

## 2021-12-09 ENCOUNTER — TELEPHONE (OUTPATIENT)
Dept: DIABETES | Facility: CLINIC | Age: 46
End: 2021-12-09
Payer: MEDICARE

## 2021-12-09 ENCOUNTER — TELEPHONE (OUTPATIENT)
Dept: ENDOCRINOLOGY | Facility: CLINIC | Age: 46
End: 2021-12-09
Payer: MEDICARE

## 2021-12-09 RX ORDER — BLOOD-GLUCOSE TRANSMITTER
EACH MISCELLANEOUS
Qty: 1 EACH | Refills: 3 | Status: CANCELLED | OUTPATIENT
Start: 2021-12-09

## 2021-12-09 RX ORDER — BLOOD-GLUCOSE SENSOR
EACH MISCELLANEOUS
Qty: 12 EACH | Refills: 3 | Status: CANCELLED | OUTPATIENT
Start: 2021-12-09

## 2021-12-14 ENCOUNTER — PATIENT MESSAGE (OUTPATIENT)
Dept: ORTHOPEDICS | Facility: CLINIC | Age: 46
End: 2021-12-14
Payer: MEDICARE

## 2021-12-15 ENCOUNTER — OFFICE VISIT (OUTPATIENT)
Dept: FAMILY MEDICINE | Facility: CLINIC | Age: 46
End: 2021-12-15
Payer: MEDICARE

## 2021-12-15 VITALS
DIASTOLIC BLOOD PRESSURE: 84 MMHG | HEIGHT: 67 IN | SYSTOLIC BLOOD PRESSURE: 125 MMHG | WEIGHT: 264.75 LBS | HEART RATE: 79 BPM | TEMPERATURE: 98 F | BODY MASS INDEX: 41.55 KG/M2 | OXYGEN SATURATION: 95 %

## 2021-12-15 DIAGNOSIS — M54.41 ACUTE RIGHT-SIDED LOW BACK PAIN WITH RIGHT-SIDED SCIATICA: Primary | ICD-10-CM

## 2021-12-15 DIAGNOSIS — G47.33 OSA TREATED WITH BIPAP: ICD-10-CM

## 2021-12-15 DIAGNOSIS — I10 ESSENTIAL HYPERTENSION: ICD-10-CM

## 2021-12-15 DIAGNOSIS — Z95.810 IMPLANTABLE CARDIOVERTER-DEFIBRILLATOR (ICD) IN SITU: ICD-10-CM

## 2021-12-15 DIAGNOSIS — I48.0 PAROXYSMAL ATRIAL FIBRILLATION: ICD-10-CM

## 2021-12-15 DIAGNOSIS — E11.65 UNCONTROLLED TYPE 2 DIABETES MELLITUS WITH HYPERGLYCEMIA: ICD-10-CM

## 2021-12-15 DIAGNOSIS — I50.42 CHRONIC COMBINED SYSTOLIC AND DIASTOLIC HEART FAILURE: ICD-10-CM

## 2021-12-15 PROCEDURE — 99999 PR PBB SHADOW E&M-EST. PATIENT-LVL V: ICD-10-PCS | Mod: PBBFAC,,, | Performed by: NURSE PRACTITIONER

## 2021-12-15 PROCEDURE — 99214 OFFICE O/P EST MOD 30 MIN: CPT | Mod: S$GLB,,, | Performed by: NURSE PRACTITIONER

## 2021-12-15 PROCEDURE — 99999 PR PBB SHADOW E&M-EST. PATIENT-LVL V: CPT | Mod: PBBFAC,,, | Performed by: NURSE PRACTITIONER

## 2021-12-15 PROCEDURE — 99214 PR OFFICE/OUTPT VISIT, EST, LEVL IV, 30-39 MIN: ICD-10-PCS | Mod: S$GLB,,, | Performed by: NURSE PRACTITIONER

## 2021-12-17 ENCOUNTER — HOSPITAL ENCOUNTER (OUTPATIENT)
Dept: RADIOLOGY | Facility: HOSPITAL | Age: 46
Discharge: HOME | End: 2021-12-17
Attending: ORTHOPAEDIC SURGERY
Payer: MEDICARE

## 2021-12-17 ENCOUNTER — OFFICE VISIT (OUTPATIENT)
Dept: ORTHOPEDICS | Facility: CLINIC | Age: 46
End: 2021-12-17
Payer: MEDICARE

## 2021-12-17 VITALS — HEIGHT: 67 IN | BODY MASS INDEX: 39.24 KG/M2 | WEIGHT: 250 LBS

## 2021-12-17 DIAGNOSIS — M51.36 DDD (DEGENERATIVE DISC DISEASE), LUMBAR: ICD-10-CM

## 2021-12-17 DIAGNOSIS — M54.16 LUMBAR RADICULOPATHY: Primary | ICD-10-CM

## 2021-12-17 DIAGNOSIS — M54.31 SCIATICA OF RIGHT SIDE: ICD-10-CM

## 2021-12-17 PROCEDURE — 99999 PR PBB SHADOW E&M-EST. PATIENT-LVL IV: ICD-10-PCS | Mod: PBBFAC,,, | Performed by: ORTHOPAEDIC SURGERY

## 2021-12-17 PROCEDURE — 99999 PR PBB SHADOW E&M-EST. PATIENT-LVL IV: CPT | Mod: PBBFAC,,, | Performed by: ORTHOPAEDIC SURGERY

## 2021-12-17 PROCEDURE — 72110 X-RAY EXAM L-2 SPINE 4/>VWS: CPT | Mod: TC

## 2021-12-17 PROCEDURE — 72110 X-RAY EXAM L-2 SPINE 4/>VWS: CPT | Mod: 26,,, | Performed by: RADIOLOGY

## 2021-12-17 PROCEDURE — 99204 PR OFFICE/OUTPT VISIT, NEW, LEVL IV, 45-59 MIN: ICD-10-PCS | Mod: S$GLB,,, | Performed by: ORTHOPAEDIC SURGERY

## 2021-12-17 PROCEDURE — 99204 OFFICE O/P NEW MOD 45 MIN: CPT | Mod: S$GLB,,, | Performed by: ORTHOPAEDIC SURGERY

## 2021-12-17 PROCEDURE — 72110 XR LUMBAR SPINE AP AND LAT WITH FLEX/EXT: ICD-10-PCS | Mod: 26,,, | Performed by: RADIOLOGY

## 2021-12-17 RX ORDER — GABAPENTIN 400 MG/1
400 CAPSULE ORAL 3 TIMES DAILY
Qty: 90 CAPSULE | Refills: 0 | Status: SHIPPED | OUTPATIENT
Start: 2021-12-17 | End: 2022-01-16

## 2021-12-22 ENCOUNTER — OFFICE VISIT (OUTPATIENT)
Dept: SPINE | Facility: CLINIC | Age: 46
End: 2021-12-22
Payer: MEDICARE

## 2021-12-22 ENCOUNTER — PATIENT MESSAGE (OUTPATIENT)
Dept: PAIN MEDICINE | Facility: OTHER | Age: 46
End: 2021-12-22
Payer: MEDICARE

## 2021-12-22 ENCOUNTER — TELEPHONE (OUTPATIENT)
Dept: PAIN MEDICINE | Facility: OTHER | Age: 46
End: 2021-12-22
Payer: MEDICARE

## 2021-12-22 VITALS
WEIGHT: 250 LBS | HEIGHT: 67 IN | HEART RATE: 85 BPM | SYSTOLIC BLOOD PRESSURE: 134 MMHG | BODY MASS INDEX: 39.24 KG/M2 | DIASTOLIC BLOOD PRESSURE: 100 MMHG

## 2021-12-22 DIAGNOSIS — M51.36 DDD (DEGENERATIVE DISC DISEASE), LUMBAR: ICD-10-CM

## 2021-12-22 DIAGNOSIS — M54.16 LUMBAR RADICULOPATHY: Primary | ICD-10-CM

## 2021-12-22 DIAGNOSIS — G89.4 CHRONIC PAIN DISORDER: ICD-10-CM

## 2021-12-22 PROCEDURE — 3075F PR MOST RECENT SYSTOLIC BLOOD PRESS GE 130-139MM HG: ICD-10-PCS | Mod: CPTII,S$GLB,, | Performed by: ANESTHESIOLOGY

## 2021-12-22 PROCEDURE — 99999 PR PBB SHADOW E&M-EST. PATIENT-LVL V: CPT | Mod: PBBFAC,,, | Performed by: ANESTHESIOLOGY

## 2021-12-22 PROCEDURE — 3008F BODY MASS INDEX DOCD: CPT | Mod: CPTII,S$GLB,, | Performed by: ANESTHESIOLOGY

## 2021-12-22 PROCEDURE — 3075F SYST BP GE 130 - 139MM HG: CPT | Mod: CPTII,S$GLB,, | Performed by: ANESTHESIOLOGY

## 2021-12-22 PROCEDURE — 3080F PR MOST RECENT DIASTOLIC BLOOD PRESSURE >= 90 MM HG: ICD-10-PCS | Mod: CPTII,S$GLB,, | Performed by: ANESTHESIOLOGY

## 2021-12-22 PROCEDURE — 3046F HEMOGLOBIN A1C LEVEL >9.0%: CPT | Mod: CPTII,S$GLB,, | Performed by: ANESTHESIOLOGY

## 2021-12-22 PROCEDURE — 1160F PR REVIEW ALL MEDS BY PRESCRIBER/CLIN PHARMACIST DOCUMENTED: ICD-10-PCS | Mod: CPTII,S$GLB,, | Performed by: ANESTHESIOLOGY

## 2021-12-22 PROCEDURE — 1160F RVW MEDS BY RX/DR IN RCRD: CPT | Mod: CPTII,S$GLB,, | Performed by: ANESTHESIOLOGY

## 2021-12-22 PROCEDURE — 3080F DIAST BP >= 90 MM HG: CPT | Mod: CPTII,S$GLB,, | Performed by: ANESTHESIOLOGY

## 2021-12-22 PROCEDURE — 1159F PR MEDICATION LIST DOCUMENTED IN MEDICAL RECORD: ICD-10-PCS | Mod: CPTII,S$GLB,, | Performed by: ANESTHESIOLOGY

## 2021-12-22 PROCEDURE — 3046F PR MOST RECENT HEMOGLOBIN A1C LEVEL > 9.0%: ICD-10-PCS | Mod: CPTII,S$GLB,, | Performed by: ANESTHESIOLOGY

## 2021-12-22 PROCEDURE — 99204 PR OFFICE/OUTPT VISIT, NEW, LEVL IV, 45-59 MIN: ICD-10-PCS | Mod: S$GLB,,, | Performed by: ANESTHESIOLOGY

## 2021-12-22 PROCEDURE — 3008F PR BODY MASS INDEX (BMI) DOCUMENTED: ICD-10-PCS | Mod: CPTII,S$GLB,, | Performed by: ANESTHESIOLOGY

## 2021-12-22 PROCEDURE — 1159F MED LIST DOCD IN RCRD: CPT | Mod: CPTII,S$GLB,, | Performed by: ANESTHESIOLOGY

## 2021-12-22 PROCEDURE — 99204 OFFICE O/P NEW MOD 45 MIN: CPT | Mod: S$GLB,,, | Performed by: ANESTHESIOLOGY

## 2021-12-22 PROCEDURE — 99999 PR PBB SHADOW E&M-EST. PATIENT-LVL V: ICD-10-PCS | Mod: PBBFAC,,, | Performed by: ANESTHESIOLOGY
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
## 2021-12-23 ENCOUNTER — TELEPHONE (OUTPATIENT)
Dept: PAIN MEDICINE | Facility: OTHER | Age: 46
End: 2021-12-23
Payer: MEDICARE

## 2021-12-28 ENCOUNTER — TELEPHONE (OUTPATIENT)
Dept: PAIN MEDICINE | Facility: OTHER | Age: 46
End: 2021-12-28
Payer: MEDICARE

## 2021-12-28 DIAGNOSIS — Z01.818 PRE-OP TESTING: ICD-10-CM

## 2021-12-29 DIAGNOSIS — Z01.818 PRE-OP TESTING: ICD-10-CM

## 2022-01-03 ENCOUNTER — TELEPHONE (OUTPATIENT)
Dept: ENDOCRINOLOGY | Facility: CLINIC | Age: 47
End: 2022-01-03
Payer: MEDICARE

## 2022-01-03 DIAGNOSIS — E11.9 TYPE 2 DIABETES MELLITUS WITHOUT COMPLICATION, WITHOUT LONG-TERM CURRENT USE OF INSULIN: ICD-10-CM

## 2022-01-03 RX ORDER — BLOOD-GLUCOSE TRANSMITTER
EACH MISCELLANEOUS
Qty: 1 EACH | Refills: 3 | OUTPATIENT
Start: 2022-01-03 | End: 2022-01-04

## 2022-01-03 RX ORDER — BLOOD-GLUCOSE SENSOR
EACH MISCELLANEOUS
Qty: 12 EACH | Refills: 3 | OUTPATIENT
Start: 2022-01-03 | End: 2022-01-04

## 2022-01-03 NOTE — TELEPHONE ENCOUNTER
----- Message from Dina Pham sent at 1/3/2022  8:43 AM CST -----  Regarding: PHN called    .Type: Patient Call Back    Who called:Jessica with [employer]     What is the request in detail: needs dexcon order and script and lancets     Can the clinic reply by [facility]? no    Would the patient rather a call back or a response via [facility]? Call     Best call back number: [phone]

## 2022-01-04 RX ORDER — BLOOD-GLUCOSE SENSOR
EACH MISCELLANEOUS
Qty: 12 EACH | Refills: 3 | Status: SHIPPED | OUTPATIENT
Start: 2022-01-04 | End: 2022-03-18

## 2022-01-04 RX ORDER — BLOOD-GLUCOSE TRANSMITTER
EACH MISCELLANEOUS
Qty: 1 EACH | Refills: 3 | Status: SHIPPED | OUTPATIENT
Start: 2022-01-04 | End: 2022-03-18

## 2022-01-06 ENCOUNTER — HOSPITAL ENCOUNTER (OUTPATIENT)
Facility: OTHER | Age: 47
Discharge: HOME | End: 2022-01-06
Attending: ANESTHESIOLOGY | Admitting: ANESTHESIOLOGY
Payer: MEDICARE

## 2022-01-06 VITALS
BODY MASS INDEX: 39.24 KG/M2 | DIASTOLIC BLOOD PRESSURE: 58 MMHG | HEART RATE: 75 BPM | HEIGHT: 67 IN | TEMPERATURE: 98 F | OXYGEN SATURATION: 94 % | SYSTOLIC BLOOD PRESSURE: 108 MMHG | WEIGHT: 250 LBS | RESPIRATION RATE: 16 BRPM

## 2022-01-06 DIAGNOSIS — G89.29 CHRONIC PAIN: ICD-10-CM

## 2022-01-06 DIAGNOSIS — M54.16 LUMBAR RADICULOPATHY: Primary | ICD-10-CM

## 2022-01-06 LAB — POCT GLUCOSE: 104 MG/DL (ref 70–110)

## 2022-01-06 PROCEDURE — 64484 PRA INJECT ANES/STEROID FORAMEN LUMBAR/SACRAL W IMG GUIDE ,EA ADD LEVEL: ICD-10-PCS | Mod: RT,,, | Performed by: ANESTHESIOLOGY

## 2022-01-06 PROCEDURE — 64483 NJX AA&/STRD TFRM EPI L/S 1: CPT | Mod: RT | Performed by: ANESTHESIOLOGY

## 2022-01-06 PROCEDURE — 64484 NJX AA&/STRD TFRM EPI L/S EA: CPT | Mod: RT,,, | Performed by: ANESTHESIOLOGY

## 2022-01-06 PROCEDURE — 63600175 PHARM REV CODE 636 W HCPCS: Performed by: ANESTHESIOLOGY

## 2022-01-06 PROCEDURE — 25500020 PHARM REV CODE 255: Performed by: ANESTHESIOLOGY

## 2022-01-06 PROCEDURE — 64483 NJX AA&/STRD TFRM EPI L/S 1: CPT | Mod: RT,,, | Performed by: ANESTHESIOLOGY

## 2022-01-06 PROCEDURE — 64483 PR EPIDURAL INJ, ANES/STEROID, TRANSFORAMINAL, LUMB/SACR, SNGL LEVL: ICD-10-PCS | Mod: RT,,, | Performed by: ANESTHESIOLOGY

## 2022-01-06 PROCEDURE — 64484 NJX AA&/STRD TFRM EPI L/S EA: CPT | Mod: RT | Performed by: ANESTHESIOLOGY

## 2022-01-06 PROCEDURE — 25000003 PHARM REV CODE 250: Performed by: ANESTHESIOLOGY

## 2022-01-06 RX ORDER — LIDOCAINE HYDROCHLORIDE 10 MG/ML
INJECTION, SOLUTION EPIDURAL; INFILTRATION; INTRACAUDAL; PERINEURAL
Status: DISCONTINUED | OUTPATIENT
Start: 2022-01-06 | End: 2022-01-06 | Stop reason: HOSPADM

## 2022-01-06 RX ORDER — FENTANYL CITRATE 50 UG/ML
INJECTION, SOLUTION INTRAMUSCULAR; INTRAVENOUS
Status: DISCONTINUED | OUTPATIENT
Start: 2022-01-06 | End: 2022-01-06 | Stop reason: HOSPADM

## 2022-01-06 RX ORDER — MIDAZOLAM HYDROCHLORIDE 1 MG/ML
INJECTION INTRAMUSCULAR; INTRAVENOUS
Status: DISCONTINUED | OUTPATIENT
Start: 2022-01-06 | End: 2022-01-06 | Stop reason: HOSPADM

## 2022-01-06 RX ORDER — SODIUM CHLORIDE 9 MG/ML
INJECTION, SOLUTION INTRAVENOUS CONTINUOUS
Status: DISCONTINUED | OUTPATIENT
Start: 2022-01-06 | End: 2022-01-06 | Stop reason: HOSPADM

## 2022-01-06 RX ORDER — LIDOCAINE HYDROCHLORIDE 20 MG/ML
INJECTION, SOLUTION INFILTRATION; PERINEURAL
Status: DISCONTINUED | OUTPATIENT
Start: 2022-01-06 | End: 2022-01-06 | Stop reason: HOSPADM

## 2022-01-06 RX ORDER — DEXAMETHASONE SODIUM PHOSPHATE 10 MG/ML
INJECTION INTRAMUSCULAR; INTRAVENOUS
Status: DISCONTINUED | OUTPATIENT
Start: 2022-01-06 | End: 2022-01-06 | Stop reason: HOSPADM

## 2022-01-06 NOTE — DISCHARGE INSTRUCTIONS
Thank you for allowing us to care for you today. You may receive a survey about the care we provided. Your feedback is valuable and helps us provide excellent care throughout the community.     Home Care Instructions for Pain Management:    1. DIET:   You may resume your normal diet today.   2. BATHING:   You may shower with luke warm water. No tub baths or anything that will soak injection sites under water for the next 24 hours.  3. DRESSING:   You may remove your bandage today.   4. ACTIVITY LEVEL:   You may resume your normal activities 24 hrs after your procedure. Nothing strenuous today.  5. MEDICATIONS:   You may resume your normal medications today. To restart blood thinners, ask your doctor.  6. DRIVING    If you have received any sedatives by mouth today, you may not drive for 12 hours.    If you have received any sedation through your IV, you may not drive for 24 hrs.   7. SPECIAL INSTRUCTIONS:   No heat to the injection site for 24 hrs including, hot bath or shower, heating pad, moist heat, or hot tubs.    Use ice pack to injection site for any pain or discomfort.  Apply ice packs for 20 minute intervals as needed.    IF you have diabetes, be sure to monitor your blood sugar more closely. IF your injection contained steroids your blood sugar levels may become higher than normal.    If you are still having pain upon discharge:  Your pain may improve over the next 48 hours. The anesthetic (numbing medication) works immediately to 48 hours. IF your injection contained a steroid (anti-inflammatory medication), it takes approximately 3 days to start feeling relief and 7-10 days to see your greatest results from the medication. It is possible you may need subsequent injections. This would be discussed at your follow up appointment with pain management or your referring doctor.    Please call the PAIN MANAGEMENT office at [phone] or ON CALL pager at [phone] if you experienced any:   -Weakness or  loss of sensation  -Fever > 101.5  -Pain uncontrolled with oral medications   -Persistent nausea, vomiting, or diarrhea  -Redness or drainage from the injection sites, or any other worrisome concerns.   If physician on call was not reached or could not communicate with our office for any reason please go to the nearest emergency department.  Patient Education       Moderate Sedation in Adults Discharge Instructions   About this topic   Moderate sedation is also known as conscious sedation. It changes your state of being awake or consciousness. With this sedation, you may feel slight pain or pressure during a procedure. The drugs help you to relax and may even allow you to sleep. It will be easy to wake you and you may talk and answer questions while under sedation. Most likely, you will not remember what happens while under this sedation.  What care is needed at home?   · Ask your doctor what you need to do when you go home. Make sure you understand everything the doctor says. This way you will know what you need to do.  · You will not be allowed to drive right away after the procedure. Ask a family member or a friend to drive you home.  · Do not operate heavy or dangerous machinery for at least 24 hours.  · Do not make major decisions or sign important papers for at least 24 hours. You may not be thinking clearly.  · Avoid beer, wine, or mixed drinks (alcohol) for at least 24 hours.  · You are at a higher risk of falling for at least 24 hours after moderate sedation.  ? Take extra care when you get up.  ? Do not change positions quickly.  ? Do not rush when you need to go to the bathroom or to answer the phone.  ? Ask for help if you feel unsteady when you try to walk.  ? Wear shoes with non-slip soles and low heels.  What follow-up care is needed?   Your doctor may ask you to make visits to the office to check on your progress. Be sure to keep these visits. Your doctor may also refer you to other doctors or tell  you that you need more tests or care.  What drugs may be needed?   The doctor may order drugs to:  · Help with pain  · Treat an upset stomach or throwing up  Will physical activity be limited?   Rest for the day of the procedure. Avoid strenuous activities like heavy lifting and hard exercise. Talk to your doctor about whether you need to limit lifting or exercise after your procedure.  What changes to diet are needed?   Start with a light diet when you are fully awake. This includes things that are easy to swallow like soups, pudding, jello, toast, and eggs. Slowly progress to your normal diet.  What problems could happen?   · Low blood pressure  · Breathing problems  · Upset stomach or throwing up  · Dizziness  When do I need to call the doctor?   · Feel dizzy, weak, or tired  · Faint  · Very bad headache  · Upset stomach or throwing up  · To follow up for more tests or care  Teach Back: Helping You Understand   The Teach Back Method helps you understand the information we are giving you. After you talk with the staff, tell them in your own words what you learned. This helps to make sure the staff has described each thing clearly. It also helps to explain things that may have been confusing. Before going home, make sure you can do these:  · I can tell you about my procedure.  · I can tell you if I need more tests or care.  · I can tell you what is good for me to eat and drink the next day.  · I can tell you what I would do if I feel dizzy, weak, or tired.  Last Reviewed Date   2020-03-02  Consumer Information Use and Disclaimer   This information is not specific medical advice and does not replace information you receive from your health care provider. This is only a brief summary of general information. It does NOT include all information about conditions, illnesses, injuries, tests, procedures, treatments, therapies, discharge instructions or life-style choices that may apply to you. You must talk with your  health care provider for complete information about your health and treatment options. This information should not be used to decide whether or not to accept your health care providers advice, instructions or recommendations. Only your health care provider has the knowledge and training to provide advice that is right for you.  Copyright   Copyright © 2021 [employer] and its affiliates and/or licensors. All rights reserved.

## 2022-01-06 NOTE — DISCHARGE SUMMARY
"Discharge Note  Short Stay      SUMMARY     Admit Date: 1/6/2022    Attending Physician: Jed Yeager      Discharge Physician: Jed Yeager      Discharge Date: 1/6/2022 3:10 PM    Procedure(s) (LRB):  Transforaminal ANKITA L4/L5 L5/S1 (N/A)    Final Diagnosis: Lumbar radiculopathy [M54.16]    Disposition: Home or self care    Patient Instructions:   Current Discharge Medication List      CONTINUE these medications which have NOT CHANGED    Details   albuterol (VENTOLIN HFA) 90 mcg/actuation inhaler Inhale 2 puffs into the lungs every 6 (six) hours as needed for Wheezing or Shortness of Breath. Rescue  Qty: 18 g, Refills: 2    Associated Diagnoses: COVID-19      apixaban (ELIQUIS) 5 mg Tab Take 1 tablet (5 mg total) by mouth 2 (two) times daily.  Qty: 180 tablet, Refills: 3    Associated Diagnoses: Paroxysmal atrial fibrillation      baclofen (LIORESAL) 10 MG tablet Take 1 tablet (10 mg total) by mouth 3 (three) times daily.  Qty: 90 tablet, Refills: 0      BD LORI 2ND GEN PEN NEEDLE 32 gauge x 5/32" Ndle USE 1 PEN NEEDLE 4 TIMES DAILY  Qty: 400 each, Refills: 3      blood glucose control, high Soln 1 each by Misc.(Non-Drug; Combo Route) route once. for 1 dose  Qty: 1 each, Refills: 3    Associated Diagnoses: Type 2 diabetes mellitus without complication, without long-term current use of insulin      blood glucose control, low Soln 1 each by Misc.(Non-Drug; Combo Route) route once. for 1 dose  Qty: 1 each, Refills: 3    Associated Diagnoses: Type 2 diabetes mellitus without complication, without long-term current use of insulin      BLOOD PRESSURE CUFF Misc 1 kit by Misc.(Non-Drug; Combo Route) route 2 (two) times daily.  Qty: 1 each, Refills: 0    Associated Diagnoses: Chronic combined systolic and diastolic heart failure; Essential hypertension; Nonischemic cardiomyopathy      blood sugar diagnostic Strp Check blood glucose 3x/day.  Qty: 300 strip, Refills: 3      blood-glucose meter (TRUE METRIX AIR GLUCOSE " METER) Misc 1 each by Misc.(Non-Drug; Combo Route) route 3 (three) times daily.  Qty: 1 each, Refills: 0    Associated Diagnoses: Type 2 diabetes mellitus without complication, without long-term current use of insulin      blood-glucose meter,continuous (DEXCOM G6 ) Misc Use with dexcom G6 system  Qty: 1 each, Refills: 0      blood-glucose sensor (DEXCOM G6 SENSOR) Lupe Change sensor every 10 days  Qty: 12 each, Refills: 3      blood-glucose transmitter (DEXCOM G6 TRANSMITTER) Lupe Change every 3 months  Qty: 1 each, Refills: 3      cetirizine (ZYRTEC) 10 MG tablet Take 1 tablet (10 mg total) by mouth once daily.  Qty: 30 tablet, Refills: 0      diclofenac sodium (VOLTAREN) 1 % Gel Apply 2 g topically 2 (two) times daily.  Qty: 100 g, Refills: 1    Associated Diagnoses: Foot pain, left; Acute low back pain without sciatica, unspecified back pain laterality      dulaglutide (TRULICITY) 0.75 mg/0.5 mL pen injector Inject 0.75 mg into the skin every 7 days.  Qty: 4 pen, Refills: 3      famotidine (PEPCID) 20 MG tablet Take 1 tablet (20 mg total) by mouth 2 (two) times daily.  Qty: 60 tablet, Refills: 0      fluticasone propionate (FLONASE) 50 mcg/actuation nasal spray 1 spray (50 mcg total) by Each Nostril route 2 (two) times daily as needed for Rhinitis.  Qty: 15 g, Refills: 0      furosemide (LASIX) 40 MG tablet Take 1 tablet (40 mg total) by mouth once daily. Please hold while not drinking.  Qty: 90 tablet, Refills: 1    Associated Diagnoses: Chronic combined systolic and diastolic heart failure      !! gabapentin (NEURONTIN) 400 MG capsule Take 1 capsule (400 mg total) by mouth 2 (two) times daily as needed.  Qty: 180 capsule, Refills: 1    Associated Diagnoses: Uncontrolled type 2 diabetes mellitus with hyperglycemia      !! gabapentin (NEURONTIN) 400 MG capsule Take 1 capsule (400 mg total) by mouth 3 (three) times daily.  Qty: 90 capsule, Refills: 0    Associated Diagnoses: Sciatica of right side; Lumbar  radiculopathy      HYDROcodone-acetaminophen (NORCO) 5-325 mg per tablet Take 1 tablet by mouth every 6 (six) hours as needed for Pain.  Qty: 15 tablet, Refills: 0      insulin degludec (TRESIBA FLEXTOUCH U-200) 200 unit/mL (3 mL) insulin pen Inject 88 Units into the skin once daily.  Qty: 6 pen, Refills: 5    Associated Diagnoses: Type 2 diabetes, uncontrolled, with neuropathy      insulin lispro (HUMALOG KWIKPEN INSULIN) 100 unit/mL pen Inject 28 Units into the skin 3 (three) times daily before meals.  Qty: 2 Box, Refills: 5    Associated Diagnoses: Type 2 diabetes, uncontrolled, with neuropathy      medroxyPROGESTERone (PROVERA) 10 MG tablet Take 10 mg by mouth once daily.      metoprolol succinate (TOPROL-XL) 50 MG 24 hr tablet Take 1 tablet (50 mg total) by mouth once daily. Hold SBP <120  Qty: 90 tablet, Refills: 1    Comments: .  Associated Diagnoses: Chronic combined systolic and diastolic heart failure      mupirocin (BACTROBAN) 2 % ointment Apply topically nightly.      ondansetron (ZOFRAN) 4 MG tablet Take 1 tablet (4 mg total) by mouth every 8 (eight) hours as needed for Nausea.  Qty: 8 tablet, Refills: 0      rosuvastatin (CRESTOR) 40 MG Tab Take 1 tablet (40 mg total) by mouth every evening.  Qty: 90 tablet, Refills: 1    Associated Diagnoses: Chronic combined systolic and diastolic heart failure      spironolactone (ALDACTONE) 50 MG tablet Take 1 tablet (50 mg total) by mouth once daily. Hold until eating and drinking improves. Need to weight self daily  Qty: 90 tablet, Refills: 3    Comments: .      triamcinolone acetonide 0.1% (KENALOG) 0.1 % cream 2 (two) times daily. Apply to affected area  Refills: 4       !! - Potential duplicate medications found. Please discuss with provider.              Discharge Diagnosis: Lumbar radiculopathy [M54.16]  Condition on Discharge: Stable with no complications to procedure   Diet on Discharge: Same as before.  Activity: as per instruction sheet.  Discharge to:  Home with a responsible adult.  Follow up: 2-4 weeks

## 2022-01-06 NOTE — INTERVAL H&P NOTE
The patient has been examined and the H&P has been reviewed:    I concur with the findings and changes have been noted since the H&P was written: Stopped eliquis 1/2. 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 Will change to caudal ANKITA where there is no rash present by sacral hiatus    Procedure risks, benefits and alternative options discussed and understood by patient/family.          There are no hospital problems to display for this patient.

## 2022-01-06 NOTE — OP NOTE
Lumbar Transforaminal Epidural Steroid Injection under Fluoroscopic Guidance    The procedure, risks, benefits, and options were discussed with the patient. There are no contraindications to the procedure. The patent expressed understanding and agreed to the procedure. Informed written consent was obtained prior to the start of the procedure and can be found in the patient's chart.    PATIENT NAME: Fabiana Chanel   MRN: [id number]     DATE OF PROCEDURE: 01/06/2022    PROCEDURE:  Right  L4/5 and L5/S1 Lumbar Transforaminal Epidural Steroid Injection under Fluoroscopic Guidance    PRE-OP DIAGNOSIS: Lumbar radiculopathy [M54.16]    POST-OP DIAGNOSIS: Same    PHYSICIAN: Jed Yeager MD    ASSISTANTS: Dr. Chester     MEDICATIONS INJECTED: Preservative-free Decadron 10mg with 5cc of Lidocaine 1% MPF     LOCAL ANESTHETIC INJECTED: Xylocaine 2%     SEDATION:   Versed 2mg and Fentanyl 50mcg                                                                                                                                                                                     Conscious sedation ordered by M.D. Patient re-evaluation prior to administration of conscious sedation. No changes noted in patient's status from initial evaluation. The patient's vital signs were monitored by RN and patient remained hemodynamically stable throughout the procedure.    Event Time In   In Facility 1417   In Pre-Procedure 1433   Physician Available    Anesthesia Available    Pre-Procedure Complete 1443   Out of Pre-Procedure    Anesthesia Start    Anesthesia Start Data Collection    In Room 1459   Prep Start    Procedure Prep Complete    Procedure Start 1504   Procedure Closing    Emergence    Procedure Finish    Out of Room    Cleanup Start    Cleanup Complete    In Recovery    Anesthesia Finish    Recovery Care Complete    Out of Recovery    In Phase II    Out of Phase II    Phase II Care Complete    Procedural Care Complete    Sedation  Start 1505   Sedation End        ESTIMATED BLOOD LOSS: None    COMPLICATIONS: None    TECHNIQUE: Time-out was performed to identify the patient and procedure to be performed. With the patient laying in a prone position, the surgical area was prepped and draped in the usual sterile fashion using ChloraPrep and a fenestrated drape.The levels were determined under fluoroscopy guidance. Skin anesthesia was achieved by injecting Lidocaine 2% over the injection sites. The transforaminal spaces were then approached with a 22 gauge, 5 inch spinal quinke needle that was introduced under fluoroscopic guidance in the AP and Lateral views. Once the needle tip was in the area of the transforaminal space, and there was no blood, CSF or paraesthesias, contrast dye Omnipaque (300mg/ml) was injected to confirm placement and there was no vascular runoff. Fluoroscopic imaging in the AP and lateral views revealed a clear outline of the spinal nerve with proximal spread of agent through the neural foramen into the epidural space. 3 mL of the medication mixture listed above was injected slowly at each site. Displacement of the radio opaque contrast after injection of the medication confirmed that the medication went into the area of the transforaminal spaces. The needles were removed and bleeding was nil. A sterile dressing was applied. No specimens collected. The patient tolerated the procedure well.       The patient was monitored after the procedure in the recovery area. They were given post-procedure and discharge instructions to follow at home. The patient was discharged in a stable condition.    Jed Yeager MD

## 2022-01-25 ENCOUNTER — CLINICAL SUPPORT (OUTPATIENT)
Dept: CARDIOLOGY | Facility: HOSPITAL | Age: 47
End: 2022-01-25
Payer: MEDICARE

## 2022-01-25 DIAGNOSIS — I42.9 CARDIOMYOPATHY, UNSPECIFIED: ICD-10-CM

## 2022-01-25 DIAGNOSIS — Z95.810 PRESENCE OF AUTOMATIC (IMPLANTABLE) CARDIAC DEFIBRILLATOR: ICD-10-CM

## 2022-01-25 PROCEDURE — 93295 DEV INTERROG REMOTE 1/2/MLT: CPT | Mod: ,,, | Performed by: INTERNAL MEDICINE

## 2022-01-25 PROCEDURE — 93295 CARDIAC DEVICE CHECK - REMOTE: ICD-10-PCS | Mod: ,,, | Performed by: INTERNAL MEDICINE

## 2022-01-25 PROCEDURE — 93296 REM INTERROG EVL PM/IDS: CPT | Performed by: INTERNAL MEDICINE

## 2022-01-26 ENCOUNTER — CLINICAL SUPPORT (OUTPATIENT)
Dept: REHABILITATION | Facility: HOSPITAL | Age: 47
End: 2022-01-26
Attending: ANESTHESIOLOGY
Payer: MEDICARE

## 2022-01-26 DIAGNOSIS — R29.898 DECREASED STRENGTH OF LOWER EXTREMITY: ICD-10-CM

## 2022-01-26 DIAGNOSIS — M54.16 LUMBAR RADICULOPATHY: ICD-10-CM

## 2022-01-26 PROCEDURE — 97162 PT EVAL MOD COMPLEX 30 MIN: CPT | Mod: PN
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
## 2022-01-27 PROBLEM — R29.898 DECREASED STRENGTH OF LOWER EXTREMITY: Status: ACTIVE | Noted: 2022-01-27
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
## 2022-02-01 ENCOUNTER — TELEPHONE (OUTPATIENT)
Dept: ELECTROPHYSIOLOGY | Facility: CLINIC | Age: 47
End: 2022-02-01
Payer: MEDICARE

## 2022-02-01 NOTE — TELEPHONE ENCOUNTER
Benny Townsend DDS is requesting a medication hold for the patients eliquis for a tooth extraction, faxed letter to [phone]

## 2022-02-01 NOTE — TELEPHONE ENCOUNTER
----- Message from Shaun Wasserman MA sent at 2/1/2022 11:11 AM CST -----  Regarding: FW: medication hold    ----- Message -----  From: Brenda Alfaro  Sent: 2/1/2022  11:07 AM CST  To: [facility] Staff  Subject: medication hold                                  The doctor's office is calling to get a medication hold. Please call them back @ [phone]. Thanks, Brenda

## 2022-02-07 NOTE — PROGRESS NOTES
"    Physical Therapy Daily Treatment Note     Name: Fabiana Chanel  Clinic Number: [id number]    Therapy Diagnosis:   Encounter Diagnosis   Name Primary?    Decreased strength of lower extremity Yes     Physician: Jed Yeager MD    Visit Date: 2/8/2022    Physician Orders: PT Eval and Treat   Medical Diagnosis from Referral: Lumbar radiculopathy [M54.16]  Evaluation Date: 1/26/2022  Authorization Period Expiration: 2/9/2022  Plan of Care Expiration: 3/25/2022  Progress Note Due: 2/26/2022  Visit # / Visits authorized: 1/   FOTO: 1/ 3      Precautions: Standard     Time In: 1430PM  Time Out: 1500PM  Total Appointment Time (timed & untimed codes): 30 minutes    Subjective     Pt reports: her mid back is bothering her today.     She was compliant with home exercise program.  Response to previous treatment: eval   Functional change: on going    Pain: 7/10  Location: back    Objective     Fabiana received therapeutic exercises to develop strength, endurance, ROM, posture and core stabilization for 30 minutes including:    Recumbent Bike    5 min   Shuttle Squats 1.5 black bands  2x10  Seated Lumbar Flexion w/ PB 10x  Seated Thoracic Ext w/ half foam 15x  SIdelying Openbooks  15x/ea  PPT     10x3"  LTR w/ ball    1 min  Bridges    2x10       NV: core stabilization, sciatic nerve flossing, piriformis stretch, hip strengtheningPiriformis stretch  Hamstring stretch  Standing hip extension  Standing hip abduction    Fabiana received the following manual therapy techniques: Joint mobilizations and Soft tissue Mobilization were applied to the: 00 for 00 minutes, including:      Fabiana received hot pack for 10 minutes to mid back in Z lying position..      Home Exercises Provided and Patient Education Provided     Education provided:   Cont to perform HEP as provided.     Written Home Exercises Provided: Patient instructed to cont prior HEP.  Exercises were reviewed and Fabiana was able to demonstrate " them prior to the end of the session.  Fabiana demonstrated good  understanding of the education provided.     See EMR under Patient Instructions for exercises provided prior visit.    Assessment     Fabiana tolerated the above tx session fair to well with c/o pain in back upon arrival. Pt reports being complaint with HEP, but no improvements in symptoms at this time. Pt performed well with all of the prescribed exercises; however pt had difficulty with changing positions. Hypomobility in thoracic spine noted with sidelying openbooks. Will continue working towards established PT goals in future sessions.     Fabiana Is progressing well towards her goals.   Pt prognosis is Good.     Pt will continue to benefit from skilled outpatient physical therapy to address the deficits listed in the problem list box on initial evaluation, provide pt/family education and to maximize pt's level of independence in the home and community environment.     Pt's spiritual, cultural and educational needs considered and pt agreeable to plan of care and goals.    Anticipated barriers to physical therapy: chronicity of condition    Goals:Short Term Goals:  4 weeks  1.Report decreased in pain at worse less than  <   / =  6/10  to increase tolerance for functional mobility.  2. Increased B hip MMT >/ = 4/5 to increase tolerance for ADL and work activities.  3.  Pt to tolerate HEP to improve ROM and independence with ADL's     Long Term Goals: 8 weeks  1.Report decreased in pain at worse less than  <   / =  2/10  to increase tolerance for functional mobility  2. Pt will report 0/10 p! When walking for >/=10 minutes  3.Increased B hip MMT >/ = 4/5 to increase tolerance for ADL and work activities.  4. Pt will report at </=40% limitation on FOTO  to demonstrate increase in LE function with every day tasks.   5. Pt to be Independent with HEP to improve ROM and independence with ADL's    Plan     Plan of care Certification: 1/26/2022  to 3/25/2022.     Outpatient Physical Therapy 2 times weekly for 8 weeks to include the following interventions: Cervical/Lumbar Traction, Electrical Stimulation  , Gait Training, Manual Therapy, Moist Heat/ Ice, Neuromuscular Re-ed, Orthotic Management and Training, Patient Education, Self Care, Therapeutic Activities and Therapeutic Exercise.     Cont skilled PT session towards PT and patient's goals.    Shae rIizarry, PTA   02/08/2022

## 2022-02-08 ENCOUNTER — CLINICAL SUPPORT (OUTPATIENT)
Dept: REHABILITATION | Facility: HOSPITAL | Age: 47
End: 2022-02-08
Attending: ANESTHESIOLOGY
Payer: MEDICARE

## 2022-02-08 DIAGNOSIS — R29.898 DECREASED STRENGTH OF LOWER EXTREMITY: Primary | ICD-10-CM

## 2022-02-08 PROCEDURE — 97110 THERAPEUTIC EXERCISES: CPT | Mod: PN,CQ

## 2022-02-09 ENCOUNTER — TELEPHONE (OUTPATIENT)
Dept: ELECTROPHYSIOLOGY | Facility: CLINIC | Age: 47
End: 2022-02-09
Payer: MEDICARE

## 2022-02-09 NOTE — TELEPHONE ENCOUNTER
----- Message from Eva Valdez MA sent at 2/9/2022  1:13 PM CST -----  Dolly please call  Sofie from [facility] she need to talk to you about the patient tooth extraction and her medication Eliquis.. please call [phone] Thank you.

## 2022-02-10 ENCOUNTER — CLINICAL SUPPORT (OUTPATIENT)
Dept: REHABILITATION | Facility: HOSPITAL | Age: 47
End: 2022-02-10
Attending: ANESTHESIOLOGY
Payer: MEDICARE

## 2022-02-10 DIAGNOSIS — R29.898 DECREASED STRENGTH OF LOWER EXTREMITY: Primary | ICD-10-CM

## 2022-02-10 PROCEDURE — 97110 THERAPEUTIC EXERCISES: CPT | Mod: PN,CQ

## 2022-02-10 NOTE — PROGRESS NOTES
"    Physical Therapy Daily Treatment Note     Name: Faibana Chanel  Clinic Number: [id number]    Therapy Diagnosis:   Encounter Diagnosis   Name Primary?    Decreased strength of lower extremity Yes     Physician: Jed Yeager MD    Visit Date: 2/10/2022    Physician Orders: PT Eval and Treat   Medical Diagnosis from Referral: Lumbar radiculopathy [M54.16]  Evaluation Date: 1/26/2022  Authorization Period Expiration: 2/9/2022  Plan of Care Expiration: 3/25/2022  Progress Note Due: 2/26/2022  Visit # / Visits authorized: 2/ 25 (+eval)  FOTO: 1/ 3      Precautions: Standard     Time In: 2:17 pm   Time Out: 3:08 pm   Total Appointment Time (timed & untimed codes):  41 minutes (51min)    Subjective     Pt reports: She has some tightness in her lower legs and back. It's not too bad.     She was compliant with home exercise program.  Response to previous treatment: Good.  Functional change: on going    Pain: 6/10  Location: back    Objective     Fabiana received therapeutic exercises to develop strength, endurance, ROM, posture and core stabilization for 51 minutes including:    Recumbent Bike    6 min     Seated Lumbar Flexion w/ PB (3-ways) 10x  SIdelying Openbooks  10 ea  PPT     10 x3"    LTR w/ ball    2 min  Bridges    2x10  Shuttle Squats 1.5 black bands  2x10 (np)  +Supine clams RTB   2 x 10 c/ 3" hold  +HSS     2 x 30"   +sciatic nerve flossing   X 10   +Tra with baal squeeze   10 x 3-5" hold  +STS EOM     2 x 10  +Piriformis         2 x 30"    NV: core stabilization, sciatic nerve flossing, piriformis stretch, hip strengtheningPiriformis stretch  Hamstring stretch  Standing hip extension  Standing hip abduction    Fabiana received the following manual therapy techniques: Joint mobilizations and Soft tissue Mobilization were applied to the: 00 for 00 minutes, including:      Fabiana received hot pack for 10 minutes to mid back in Z lying position..    Not performed 2/10/2022 :  Seated Thoracic " Ext w/ half foam 15x        Home Exercises Provided and Patient Education Provided     Education provided:   Cont to perform HEP as provided.     Written Home Exercises Provided: Patient instructed to cont prior HEP.  Exercises were reviewed and Fabiana was able to demonstrate them prior to the end of the session.  Fabiana demonstrated good  understanding of the education provided.     See EMR under Patient Instructions for exercises provided prior visit.    Assessment   Fabiana tolerated today's session well. Pt requires heavy verbal and tactile cues for pelvic mobility. Added additional hip and core strengthening with pt appropriately fatigued with both. Continue progressing pt to tolerance to address deficits.      Fabiana Is progressing well towards her goals.   Pt prognosis is Good.     Pt will continue to benefit from skilled outpatient physical therapy to address the deficits listed in the problem list box on initial evaluation, provide pt/family education and to maximize pt's level of independence in the home and community environment.     Pt's spiritual, cultural and educational needs considered and pt agreeable to plan of care and goals.    Anticipated barriers to physical therapy: chronicity of condition    Goals:Short Term Goals:  4 weeks  1.Report decreased in pain at worse less than  <   / =  6/10  to increase tolerance for functional mobility.  2. Increased B hip MMT >/ = 4/5 to increase tolerance for ADL and work activities.  3.  Pt to tolerate HEP to improve ROM and independence with ADL's     Long Term Goals: 8 weeks  1.Report decreased in pain at worse less than  <   / =  2/10  to increase tolerance for functional mobility  2. Pt will report 0/10 p! When walking for >/=10 minutes  3.Increased B hip MMT >/ = 4/5 to increase tolerance for ADL and work activities.  4. Pt will report at </=40% limitation on FOTO  to demonstrate increase in LE function with every day tasks.   5. Pt to  be Independent with HEP to improve ROM and independence with ADL's    Plan     Plan of care Certification: 1/26/2022 to 3/25/2022.     Outpatient Physical Therapy 2 times weekly for 8 weeks to include the following interventions: Cervical/Lumbar Traction, Electrical Stimulation  , Gait Training, Manual Therapy, Moist Heat/ Ice, Neuromuscular Re-ed, Orthotic Management and Training, Patient Education, Self Care, Therapeutic Activities and Therapeutic Exercise.     Cont skilled PT session towards PT and patient's goals.    Lamin Guaman, PTA   02/10/2022

## 2022-02-16 ENCOUNTER — CLINICAL SUPPORT (OUTPATIENT)
Dept: REHABILITATION | Facility: HOSPITAL | Age: 47
End: 2022-02-16
Attending: ANESTHESIOLOGY
Payer: MEDICARE

## 2022-02-16 DIAGNOSIS — R29.898 DECREASED STRENGTH OF LOWER EXTREMITY: Primary | ICD-10-CM

## 2022-02-16 DIAGNOSIS — M54.16 LUMBAR RADICULOPATHY: ICD-10-CM

## 2022-02-16 PROCEDURE — 97110 THERAPEUTIC EXERCISES: CPT
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
## 2022-02-17 ENCOUNTER — TELEPHONE (OUTPATIENT)
Dept: FAMILY MEDICINE | Facility: CLINIC | Age: 47
End: 2022-02-17
Payer: MEDICARE

## 2022-02-17 NOTE — TELEPHONE ENCOUNTER
----- Message from Amie Fowler LPN sent at 2/17/2022 10:12 AM CST -----  Regarding: Anticoagulant  Your patient has a case request for a/an (   Colonoscopy            ).     Your permission is needed to hold (     Eliquis       ) for (  2 ) days prior to the procedure.     Thank you,    [facility] Endoscopy Department/ Dr Burrell    [phone]      Ten Chairez M.D.     ASSISTANT:  Dr. Remingotn Sierra.     

## 2022-02-18 DIAGNOSIS — E11.9 TYPE 2 DIABETES MELLITUS WITHOUT COMPLICATION, WITHOUT LONG-TERM CURRENT USE OF INSULIN: Primary | ICD-10-CM

## 2022-02-21 ENCOUNTER — CLINICAL SUPPORT (OUTPATIENT)
Dept: REHABILITATION | Facility: HOSPITAL | Age: 47
End: 2022-02-21
Attending: ANESTHESIOLOGY
Payer: MEDICARE

## 2022-02-21 DIAGNOSIS — R29.898 DECREASED STRENGTH OF LOWER EXTREMITY: Primary | ICD-10-CM

## 2022-02-21 PROCEDURE — 97110 THERAPEUTIC EXERCISES: CPT | Mod: PN,CQ

## 2022-02-21 NOTE — PROGRESS NOTES
"    Physical Therapy Daily Treatment Note     Name: Fabiana Chanel  Clinic Number: [id number]    Therapy Diagnosis:   Encounter Diagnosis   Name Primary?    Decreased strength of lower extremity Yes     Physician: Jed Yeager MD    Visit Date: 2/21/2022    Physician Orders: PT Eval and Treat   Medical Diagnosis from Referral: Lumbar radiculopathy [M54.16]  Evaluation Date: 1/26/2022  Authorization Period Expiration: 2/9/2022  Plan of Care Expiration: 3/25/2022  Progress Note Due: 2/26/2022  Visit # / Visits authorized: 2/ 25 (+eval)  FOTO: 1/ 3      Precautions: Standard     Time In: 1:35   Time Out: 2:20  Total Appointment Time (timed & untimed codes):  45min    Subjective     Pt reports: Pt states she is doing ok today.     She was compliant with home exercise program.  Response to previous treatment: no adverse response  Functional change: on going    Pain: 6/10  Location: back    Objective     Fabiana received therapeutic exercises to develop strength, endurance, ROM, posture and core stabilization for 40 minutes including: (bolded=performed)    Recumbent Bike     6 min  Shuttle Squats +2 black bands   2x10   +standing hip abduction    2x10  +standing hip extension    2x10  Seated Lumbar Flexion w/ PB (3-ways)  3min 10"holds    SIdelying Openbooks   10 ea  PPT      10 x3"    LTR w/ ball     2 min  Bridges     2x10  Supine clams RTB    2 x 10 c/ 3" hold  HSS      2 x 30"  sciatic nerve flossing    X 10   Ab Iso with PB    15x 5" hold  Piriformis stretch         2 x 30"      Fabiana received the following manual therapy techniques: Joint mobilizations and Soft tissue Mobilization were applied for 05 minutes, including:  B long axis distraction      Fabiana received hot pack for 10 minutes to mid back in Z lying position (not billed)              Home Exercises Provided and Patient Education Provided     Education provided:       Written Home Exercises Provided: Patient instructed to cont prior " HEP.  Exercises were reviewed and Fabiana was able to demonstrate them prior to the end of the session.  Fabiana demonstrated good  understanding of the education provided.     See EMR under Patient Instructions for exercises provided prior visit.    Assessment     Pt had good tolerance to exercise program today. She did require some cues for proper form with standing hip strengthening exercises secondary to forward and lateral trunk lean however demonstrated improvement post cues. Pt reported increased discomfort in L LE with hamstring stretching this date but had better tolerance when performing on R LE. Pt responded well to long axis distractions today with slight relief in pain symptoms reported. Pt able to complete all exercises without complaint of increased pain symptoms. Will benefit from continued mobility and strengthening exercises with progressions as tolerated.       Fabiana Is progressing well towards her goals.   Pt prognosis is Good.     Pt will continue to benefit from skilled outpatient physical therapy to address the deficits listed in the problem list box on initial evaluation, provide pt/family education and to maximize pt's level of independence in the home and community environment.     Pt's spiritual, cultural and educational needs considered and pt agreeable to plan of care and goals.    Anticipated barriers to physical therapy: chronicity of condition    Goals:Short Term Goals:  4 weeks  1.Report decreased in pain at worse less than  <   / =  6/10  to increase tolerance for functional mobility.  2. Increased B hip MMT >/ = 4/5 to increase tolerance for ADL and work activities.  3.  Pt to tolerate HEP to improve ROM and independence with ADL's     Long Term Goals: 8 weeks  1.Report decreased in pain at worse less than  <   / =  2/10  to increase tolerance for functional mobility  2. Pt will report 0/10 p! When walking for >/=10 minutes  3.Increased B hip MMT >/ = 4/5 to increase  tolerance for ADL and work activities.  4. Pt will report at </=40% limitation on FOTO  to demonstrate increase in LE function with every day tasks.   5. Pt to be Independent with HEP to improve ROM and independence with ADL's    Plan     Plan of care Certification: 1/26/2022 to 3/25/2022.     Outpatient Physical Therapy 2 times weekly for 8 weeks to include the following interventions: Cervical/Lumbar Traction, Electrical Stimulation  , Gait Training, Manual Therapy, Moist Heat/ Ice, Neuromuscular Re-ed, Orthotic Management and Training, Patient Education, Self Care, Therapeutic Activities and Therapeutic Exercise.     Cont current POC and progress exercises as tolerated.     Celio Peres, PTA   02/21/2022

## 2022-02-22 RX ORDER — DULAGLUTIDE 0.75 MG/.5ML
INJECTION, SOLUTION SUBCUTANEOUS
Qty: 4 PEN | Refills: 0 | Status: SHIPPED | OUTPATIENT
Start: 2022-02-22 | End: 2022-03-10

## 2022-03-02 ENCOUNTER — CLINICAL SUPPORT (OUTPATIENT)
Dept: REHABILITATION | Facility: HOSPITAL | Age: 47
End: 2022-03-02
Attending: ANESTHESIOLOGY
Payer: MEDICARE

## 2022-03-02 DIAGNOSIS — R29.898 DECREASED STRENGTH OF LOWER EXTREMITY: Primary | ICD-10-CM

## 2022-03-02 PROCEDURE — 97110 THERAPEUTIC EXERCISES: CPT | Mod: PN

## 2022-03-02 NOTE — PROGRESS NOTES
"    Physical Therapy Daily Treatment Note     Name: Fabiana Chanel  Clinic Number: [id number]    Therapy Diagnosis:   Encounter Diagnosis   Name Primary?    Decreased strength of lower extremity Yes     Physician: Jed Yeager MD    Visit Date: 3/2/2022    Physician Orders: PT Eval and Treat   Medical Diagnosis from Referral: Lumbar radiculopathy [M54.16]  Evaluation Date: 1/26/2022  Authorization Period Expiration: 2/9/2022  Plan of Care Expiration: 3/25/2022  Progress Note Due: 4/1/2022  FOTO: 1/ 3      Precautions: Standard     Time In: 11:30 am   Time Out: 12:15 pm  Total Appointment Time (timed & untimed codes):  45 min    Subjective     Pt reports: Pt states she is doing ok today. I've been doing my HEP and enjoy it. I was evaluated by aquatic therapy and will do 1:1 visits. I feel 70% better    She was compliant with home exercise program.  Response to previous treatment: no adverse response  Functional change: on going    Pain: 7/10  Location: back    Objective   Observation: genu recurvatum of the LLE    Lumbar Range of Motion:     Degrees Pain   Flexion WNL    Pain in low back          Extension WNL    Pain in the low back      Left Side Bending WNL none         Right Side Bending WNL none      Left rotation    WNL Pain in L lower back         Right Rotation    WNL none               Lower Extremity Strength  Right LE   Left LE     Knee extension: 4/5 Knee extension: 4/5   Knee flexion: 4/5 Knee flexion: 4/5   Hip flexion: 4/5 Hip flexion: 4/5   Hip extension:  4-/5 Hip extension: 4-/5   Hip abduction: 4-/5 Hip abduction: 4-/5   Hip adduction: 4-/5 Hip adduction 4-/5   Ankle dorsiflexion: 5/5 Ankle dorsiflexion: 5/5   Ankle plantarflexion: 5/5 Ankle plantarflexion: 5/5   Core MMT: 2+/5     Neuro Dynamic Testing:               Sciatic nerve:                                       SLR:    R = "tightness"                                      L = "tightness"  Sensation: numbness on the plantar surface " "of the feet       TREATMENT     Fabiana received therapeutic exercises to develop strength, endurance, ROM, posture and core stabilization for 40 minutes including: (bolded=performed)    HSS      2 x 45"  Shuttle Squats +2 black +1 red bands  3x10   standing hip abduction    2x10  standing hip extension with 2 #  2x10  + standing marches with 2 #  2x10  Seated Lumbar Flexion w/ PB (3-ways)  x10 ea      Not performed:  Recumbent Bike     6 min  SIdelying Openbooks   10 ea  PPT      10 x3"    LTR w/ ball     2 min  Bridges     2x10  Supine clams RTB    2 x 10 c/ 3" hold  HSS      2 x 30"  sciatic nerve flossing    X 10   Ab Iso with PB    15x 5" hold  Piriformis stretch         2 x 30"      Fabiana received the following manual therapy techniques: Joint mobilizations and Soft tissue Mobilization were applied for 00 minutes, including:  B long axis distraction    Fabiana received hot pack for 10 minutes to mid back in Z lying position (not billed)      Home Exercises Provided and Patient Education Provided     Education provided:       Written Home Exercises Provided: Patient instructed to cont prior HEP.  Exercises were reviewed and Jamalana laurarupeshpricilla was able to demonstrate them prior to the end of the session.  Fabiana demonstrated good  understanding of the education provided.     See EMR under Patient Instructions for exercises provided prior visit.    Assessment   Pt was re-assessed today and demonstrated increased strength and functional mobility. Pt has met 1/3 STGs and 0/5 LTGs. Pt has demonstrated improvements in function, but continues to present with limited LE and core strength. Pt would benefit from continued LE and core strengthening and encouragement for adherence to HEP. Pt requires some cues for proper form with standing hip strengthening exercises secondary to forward and lateral trunk lean however demonstrated improvement post cues. Pt able to complete all exercises without complaint of " increased pain symptoms. Will benefit from continued mobility and strengthening exercises with progressions as tolerated.       Fabiana Is progressing well towards her goals.   Pt prognosis is Good.     Pt will continue to benefit from skilled outpatient physical therapy to address the deficits listed in the problem list box on initial evaluation, provide pt/family education and to maximize pt's level of independence in the home and community environment.     Pt's spiritual, cultural and educational needs considered and pt agreeable to plan of care and goals.    Anticipated barriers to physical therapy: chronicity of condition    Goals:Short Term Goals:  4 weeks  1.Report decreased in pain at worse less than  <   / =  6/10  to increase tolerance for functional mobility. (ongoing, 3/2/2022)  2. Increased B hip MMT >/ = 4/5 to increase tolerance for ADL and work activities.  (ongoing, 3/2/2022)  3.  Pt to tolerate HEP to improve ROM and independence with ADL's (met, 3/2/2022)     Long Term Goals: 8 weeks  1.Report decreased in pain at worse less than  <   / =  2/10  to increase tolerance for functional mobility (ongoing, 3/2/2022)  2. Pt will report 0/10 p! When walking for >/=10 minutes (ongoing, 3/2/2022)  3.Increased B hip MMT >/ = 4/5 to increase tolerance for ADL and work activities.(ongoing, 3/2/2022)  4. Pt will report at </=40% limitation on FOTO  to demonstrate increase in LE function with every day tasks. (ongoing, 3/2/2022)  5. Pt to be Independent with HEP to improve ROM and independence with ADL's(ongoing, 3/2/2022)    Plan     Plan of care Certification: 1/26/2022 to 3/25/2022.     Outpatient Physical Therapy 2 times weekly for 8 weeks to include the following interventions: Cervical/Lumbar Traction, Electrical Stimulation  , Gait Training, Manual Therapy, Moist Heat/ Ice, Neuromuscular Re-ed, Orthotic Management and Training, Patient Education, Self Care, Therapeutic Activities and Therapeutic  Exercise.     Cont current POC and progress exercises as tolerated.     Jing Huerta, PT   03/02/2022

## 2022-03-06 ENCOUNTER — LAB VISIT (OUTPATIENT)
Dept: PRIMARY CARE CLINIC | Facility: CLINIC | Age: 47
End: 2022-03-06
Payer: MEDICARE

## 2022-03-06 DIAGNOSIS — Z01.818 PRE-OP TESTING: ICD-10-CM

## 2022-03-06 PROCEDURE — U0003 INFECTIOUS AGENT DETECTION BY NUCLEIC ACID (DNA OR RNA); SEVERE ACUTE RESPIRATORY SYNDROME CORONAVIRUS 2 (SARS-COV-2) (CORONAVIRUS DISEASE [COVID-19]), AMPLIFIED PROBE TECHNIQUE, MAKING USE OF HIGH THROUGHPUT TECHNOLOGIES AS DESCRIBED BY CMS-2020-01-R: HCPCS | Performed by: INTERNAL MEDICINE

## 2022-03-06 PROCEDURE — U0005 INFEC AGEN DETEC AMPLI PROBE: HCPCS | Performed by: INTERNAL MEDICINE

## 2022-03-07 ENCOUNTER — TELEPHONE (OUTPATIENT)
Dept: GASTROENTEROLOGY | Facility: CLINIC | Age: 47
End: 2022-03-07
Payer: MEDICARE

## 2022-03-07 LAB
SARS-COV-2 RNA RESP QL NAA+PROBE: NOT DETECTED
SARS-COV-2- CYCLE NUMBER: NORMAL

## 2022-03-07 NOTE — PROGRESS NOTES
"    Physical Therapy Daily Treatment Note     Name: Fabiana Chanel  Clinic Number: [id number]    Therapy Diagnosis:   Encounter Diagnosis   Name Primary?    Decreased strength of lower extremity Yes     Physician: Jed Yeager MD    Visit Date: 3/8/2022    Physician Orders: PT Eval and Treat   Medical Diagnosis from Referral: Lumbar radiculopathy [M54.16]  Evaluation Date: 1/26/2022  Authorization Period Expiration: 2/9/2022  Plan of Care Expiration: 3/25/2022  Progress Note Due: 4/1/2022  FOTO: 1/ 3      Precautions: Standard     Time In: 1441PM   Time Out: 1325PM  Total Appointment Time (timed & untimed codes):  30 min    Subjective     Pt reports: her back is doing better, but her left knee is bothering with popping.      She was compliant with home exercise program.  Response to previous treatment: no adverse response  Functional change: on going    Pain: 7/10  Location: back    Objective   Observation: genu recurvatum of the LLE    Lumbar Range of Motion:     Degrees Pain   Flexion WNL    Pain in low back          Extension WNL    Pain in the low back      Left Side Bending WNL none         Right Side Bending WNL none      Left rotation    WNL Pain in L lower back         Right Rotation    WNL none               Lower Extremity Strength  Right LE   Left LE     Knee extension: 4/5 Knee extension: 4/5   Knee flexion: 4/5 Knee flexion: 4/5   Hip flexion: 4/5 Hip flexion: 4/5   Hip extension:  4-/5 Hip extension: 4-/5   Hip abduction: 4-/5 Hip abduction: 4-/5   Hip adduction: 4-/5 Hip adduction 4-/5   Ankle dorsiflexion: 5/5 Ankle dorsiflexion: 5/5   Ankle plantarflexion: 5/5 Ankle plantarflexion: 5/5   Core MMT: 2+/5     Neuro Dynamic Testing:               Sciatic nerve:                                       SLR:    R = "tightness"                                      L = "tightness"  Sensation: numbness on the plantar surface of the feet       TREATMENT     Fabiana received therapeutic exercises to " "develop strength, endurance, ROM, posture and core stabilization for 45 minutes including: (bolded=performed)    HSS      2 x 45"  Shuttle Squats +2 black +1 red bands  3x10   Standing hip abduction    2x10  Standing hip extension with 2 #  3x10  Standing marches with 2 #   3x10  Seated Lumbar Flexion w/ PB (3-ways)  x10 ea  +Hip Add w/ ball     20x  Ab Iso with PB    15x 5" hold  Mod. Dead bug    Not performed:  Recumbent Bike     6 min  SIdelying Openbooks   10 ea  PPT      10 x3"    LTR w/ ball     2 min  Bridges     2x10  Supine clams RTB    2 x 10 c/ 3" hold  sciatic nerve flossing    X 10   Piriformis stretch         2 x 30"      Fabiana received the following manual therapy techniques: Joint mobilizations and Soft tissue Mobilization were applied for 00 minutes, including:  B long axis distraction    Fabiana received hot pack for 10 minutes to mid back in Z lying position (not billed)      Home Exercises Provided and Patient Education Provided     Education provided:       Written Home Exercises Provided: Patient instructed to cont prior HEP.  Exercises were reviewed and Fabiana was able to demonstrate them prior to the end of the session.  Fabiana demonstrated good  understanding of the education provided.     See EMR under Patient Instructions for exercises provided prior visit.    Assessment   Fabiana tolerated the above tx session well with minimal c/o pain. Continued with therex from previous session and there were no adverse effects. Verbal cueing for proper form and technique continues to be required throughout. Will benefit from continued mobility and strengthening exercises with progressions as tolerated. Pt inquired about aquatic therapy visits, and evaluating PT was messaged but no response just yet.       Fabiana Is progressing well towards her goals.   Pt prognosis is Good.     Pt will continue to benefit from skilled outpatient physical therapy to address the deficits " listed in the problem list box on initial evaluation, provide pt/family education and to maximize pt's level of independence in the home and community environment.     Pt's spiritual, cultural and educational needs considered and pt agreeable to plan of care and goals.    Anticipated barriers to physical therapy: chronicity of condition    Goals:Short Term Goals:  4 weeks  1.Report decreased in pain at worse less than  <   / =  6/10  to increase tolerance for functional mobility. (ongoing, 3/2/2022)  2. Increased B hip MMT >/ = 4/5 to increase tolerance for ADL and work activities.  (ongoing, 3/2/2022)  3.  Pt to tolerate HEP to improve ROM and independence with ADL's (met, 3/2/2022)     Long Term Goals: 8 weeks  1.Report decreased in pain at worse less than  <   / =  2/10  to increase tolerance for functional mobility (ongoing, 3/2/2022)  2. Pt will report 0/10 p! When walking for >/=10 minutes (ongoing, 3/2/2022)  3.Increased B hip MMT >/ = 4/5 to increase tolerance for ADL and work activities.(ongoing, 3/2/2022)  4. Pt will report at </=40% limitation on FOTO  to demonstrate increase in LE function with every day tasks. (ongoing, 3/2/2022)  5. Pt to be Independent with HEP to improve ROM and independence with ADL's(ongoing, 3/2/2022)    Plan     Plan of care Certification: 1/26/2022 to 3/25/2022.     Outpatient Physical Therapy 2 times weekly for 8 weeks to include the following interventions: Cervical/Lumbar Traction, Electrical Stimulation  , Gait Training, Manual Therapy, Moist Heat/ Ice, Neuromuscular Re-ed, Orthotic Management and Training, Patient Education, Self Care, Therapeutic Activities and Therapeutic Exercise.     Cont current POC and progress exercises as tolerated.     Shae Irizarry, PTA   03/08/2022

## 2022-03-08 ENCOUNTER — CLINICAL SUPPORT (OUTPATIENT)
Dept: REHABILITATION | Facility: HOSPITAL | Age: 47
End: 2022-03-08
Attending: ANESTHESIOLOGY
Payer: MEDICARE

## 2022-03-08 ENCOUNTER — ANESTHESIA EVENT (OUTPATIENT)
Dept: ENDOSCOPY | Facility: HOSPITAL | Age: 47
End: 2022-03-08
Payer: MEDICARE

## 2022-03-08 DIAGNOSIS — R29.898 DECREASED STRENGTH OF LOWER EXTREMITY: Primary | ICD-10-CM

## 2022-03-08 PROCEDURE — 97110 THERAPEUTIC EXERCISES: CPT | Mod: PN,CQ

## 2022-03-09 ENCOUNTER — ANESTHESIA (OUTPATIENT)
Dept: ENDOSCOPY | Facility: HOSPITAL | Age: 47
End: 2022-03-09
Payer: MEDICARE

## 2022-03-09 ENCOUNTER — HOSPITAL ENCOUNTER (OUTPATIENT)
Facility: HOSPITAL | Age: 47
Discharge: HOME | End: 2022-03-09
Attending: INTERNAL MEDICINE | Admitting: INTERNAL MEDICINE
Payer: MEDICARE

## 2022-03-09 VITALS
BODY MASS INDEX: 40.18 KG/M2 | WEIGHT: 250 LBS | HEIGHT: 66 IN | DIASTOLIC BLOOD PRESSURE: 70 MMHG | SYSTOLIC BLOOD PRESSURE: 123 MMHG | TEMPERATURE: 98 F | OXYGEN SATURATION: 98 % | RESPIRATION RATE: 18 BRPM | HEART RATE: 76 BPM

## 2022-03-09 DIAGNOSIS — Z12.11 COLON CANCER SCREENING: ICD-10-CM

## 2022-03-09 LAB — POCT GLUCOSE: 347 MG/DL (ref 70–110)

## 2022-03-09 PROCEDURE — D9220A PRA ANESTHESIA: ICD-10-PCS | Mod: ANES,,, | Performed by: ANESTHESIOLOGY

## 2022-03-09 PROCEDURE — G0121 COLON CA SCRN NOT HI RSK IND: HCPCS | Mod: ,,, | Performed by: INTERNAL MEDICINE

## 2022-03-09 PROCEDURE — G0121 COLON CA SCRN NOT HI RSK IND: ICD-10-PCS | Mod: ,,, | Performed by: INTERNAL MEDICINE

## 2022-03-09 PROCEDURE — 25000003 PHARM REV CODE 250: Performed by: STUDENT IN AN ORGANIZED HEALTH CARE EDUCATION/TRAINING PROGRAM

## 2022-03-09 PROCEDURE — 25000003 PHARM REV CODE 250: Performed by: ANESTHESIOLOGY

## 2022-03-09 PROCEDURE — G0121 COLON CA SCRN NOT HI RSK IND: HCPCS | Performed by: INTERNAL MEDICINE

## 2022-03-09 PROCEDURE — 63600175 PHARM REV CODE 636 W HCPCS: Performed by: STUDENT IN AN ORGANIZED HEALTH CARE EDUCATION/TRAINING PROGRAM

## 2022-03-09 PROCEDURE — 37000009 HC ANESTHESIA EA ADD 15 MINS: Performed by: INTERNAL MEDICINE

## 2022-03-09 PROCEDURE — 37000008 HC ANESTHESIA 1ST 15 MINUTES: Performed by: INTERNAL MEDICINE

## 2022-03-09 PROCEDURE — D9220A PRA ANESTHESIA: Mod: CRNA,,, | Performed by: STUDENT IN AN ORGANIZED HEALTH CARE EDUCATION/TRAINING PROGRAM

## 2022-03-09 PROCEDURE — 63600175 PHARM REV CODE 636 W HCPCS: Performed by: INTERNAL MEDICINE

## 2022-03-09 PROCEDURE — D9220A PRA ANESTHESIA: ICD-10-PCS | Mod: CRNA,,, | Performed by: STUDENT IN AN ORGANIZED HEALTH CARE EDUCATION/TRAINING PROGRAM

## 2022-03-09 PROCEDURE — D9220A PRA ANESTHESIA: Mod: ANES,,, | Performed by: ANESTHESIOLOGY

## 2022-03-09 RX ORDER — ETOMIDATE 2 MG/ML
INJECTION INTRAVENOUS
Status: COMPLETED
Start: 2022-03-09 | End: 2022-03-09

## 2022-03-09 RX ORDER — LIDOCAINE HYDROCHLORIDE 20 MG/ML
INJECTION INTRAVENOUS
Status: DISCONTINUED | OUTPATIENT
Start: 2022-03-09 | End: 2022-03-09

## 2022-03-09 RX ORDER — ETOMIDATE 2 MG/ML
INJECTION INTRAVENOUS
Status: DISCONTINUED | OUTPATIENT
Start: 2022-03-09 | End: 2022-03-09

## 2022-03-09 RX ORDER — ONDANSETRON 2 MG/ML
INJECTION INTRAMUSCULAR; INTRAVENOUS
Status: DISCONTINUED | OUTPATIENT
Start: 2022-03-09 | End: 2022-03-09

## 2022-03-09 RX ORDER — EPHEDRINE SULFATE 50 MG/ML
INJECTION, SOLUTION INTRAVENOUS
Status: DISCONTINUED
Start: 2022-03-09 | End: 2022-03-09 | Stop reason: WASHOUT

## 2022-03-09 RX ORDER — KETAMINE HYDROCHLORIDE 100 MG/ML
INJECTION, SOLUTION INTRAMUSCULAR; INTRAVENOUS
Status: DISCONTINUED | OUTPATIENT
Start: 2022-03-09 | End: 2022-03-09

## 2022-03-09 RX ORDER — ONDANSETRON 2 MG/ML
INJECTION INTRAMUSCULAR; INTRAVENOUS
Status: DISCONTINUED
Start: 2022-03-09 | End: 2022-03-09 | Stop reason: HOSPADM

## 2022-03-09 RX ORDER — SODIUM CHLORIDE 9 MG/ML
INJECTION, SOLUTION INTRAVENOUS CONTINUOUS
Status: DISCONTINUED | OUTPATIENT
Start: 2022-03-09 | End: 2022-03-09 | Stop reason: HOSPADM

## 2022-03-09 RX ADMIN — KETAMINE HYDROCHLORIDE 30 MG: 100 INJECTION, SOLUTION, CONCENTRATE INTRAMUSCULAR; INTRAVENOUS at 09:03

## 2022-03-09 RX ADMIN — SODIUM CHLORIDE: 0.9 INJECTION, SOLUTION INTRAVENOUS at 08:03

## 2022-03-09 RX ADMIN — ONDANSETRON 4 MG: 2 INJECTION, SOLUTION INTRAMUSCULAR; INTRAVENOUS at 09:03

## 2022-03-09 RX ADMIN — ETOMIDATE 2 MG: 2 INJECTION, SOLUTION INTRAVENOUS at 09:03

## 2022-03-09 RX ADMIN — INSULIN HUMAN 5 UNITS: 100 INJECTION, SOLUTION PARENTERAL at 09:03

## 2022-03-09 RX ADMIN — ETOMIDATE 8 MG: 2 INJECTION, SOLUTION INTRAVENOUS at 09:03

## 2022-03-09 RX ADMIN — LIDOCAINE HYDROCHLORIDE 100 MG: 20 INJECTION, SOLUTION INTRAVENOUS at 09:03

## 2022-03-09 NOTE — ANESTHESIA PREPROCEDURE EVALUATION
"                                                                                                             03/09/2022  Fabiana Chanel is a 46 y.o., female.  Pre-operative evaluation for Procedure(s) (LRB):  COLONOSCOPY (N/A)    NPO >8 food  METS 1-3; DIAZ    Eliquis last dose 3/7    Patient Active Problem List   Diagnosis    Type 2 diabetes mellitus without complication, without long-term current use of insulin    Iron deficiency anemia    Essential hypertension    Paroxysmal atrial fibrillation    Obesity with body mass index (BMI) of 30.0 to 39.9    Mixed hyperlipidemia    Chronic combined systolic and diastolic heart failure    Dyspnea    Pulmonary edema    Seizure    MILE treated with BiPAP    Obesity hypoventilation syndrome    Vulvar ulceration    NICM (nonischemic cardiomyopathy)    Implantable cardioverter-defibrillator (ICD) in situ    Fatigue    GERD (gastroesophageal reflux disease)    Psoriasis    Elevated troponin    DM (diabetes mellitus)    Abnormal uterine bleeding    Stage 3a chronic kidney disease    Type 2 diabetes, uncontrolled, with neuropathy    Pneumonia due to COVID-19 virus    Acute respiratory failure due to COVID-19    Decreased strength of lower extremity    Lumbar radiculopathy       Review of patient's allergies indicates:   Allergen Reactions    Metformin      Diarrhea on metformin XR     Pneumococcal 23-abimbola ps vaccine        No current facility-administered medications on file prior to encounter.     Current Outpatient Medications on File Prior to Encounter   Medication Sig Dispense Refill    albuterol (VENTOLIN HFA) 90 mcg/actuation inhaler Inhale 2 puffs into the lungs every 6 (six) hours as needed for Wheezing or Shortness of Breath. Rescue 18 g 2    apixaban (ELIQUIS) 5 mg Tab Take 1 tablet (5 mg total) by mouth 2 (two) times daily. 180 tablet 3    BD LORI 2ND GEN PEN NEEDLE 32 gauge x 5/32" Ndle USE 1 PEN NEEDLE 4 TIMES DAILY 400 each 3    " blood glucose control, high Soln 1 each by Misc.(Non-Drug; Combo Route) route once. for 1 dose 1 each 3    blood glucose control, low Soln 1 each by Misc.(Non-Drug; Combo Route) route once. for 1 dose 1 each 3    BLOOD PRESSURE CUFF Misc 1 kit by Misc.(Non-Drug; Combo Route) route 2 (two) times daily. 1 each 0    blood sugar diagnostic Strp Check blood glucose 3x/day. 300 strip 3    blood-glucose meter (TRUE METRIX AIR GLUCOSE METER) Misc 1 each by Misc.(Non-Drug; Combo Route) route 3 (three) times daily. 1 each 0    cetirizine (ZYRTEC) 10 MG tablet Take 1 tablet (10 mg total) by mouth once daily. 30 tablet 0    diclofenac sodium (VOLTAREN) 1 % Gel Apply 2 g topically 2 (two) times daily. 100 g 1    fluticasone propionate (FLONASE) 50 mcg/actuation nasal spray 1 spray (50 mcg total) by Each Nostril route 2 (two) times daily as needed for Rhinitis. 15 g 0    furosemide (LASIX) 40 MG tablet Take 1 tablet (40 mg total) by mouth once daily. Please hold while not drinking. 90 tablet 1    gabapentin (NEURONTIN) 400 MG capsule Take 1 capsule (400 mg total) by mouth 2 (two) times daily as needed. 180 capsule 1    insulin lispro (HUMALOG KWIKPEN INSULIN) 100 unit/mL pen Inject 28 Units into the skin 3 (three) times daily before meals. 2 Box 5    medroxyPROGESTERone (PROVERA) 10 MG tablet Take 10 mg by mouth once daily.      metoprolol succinate (TOPROL-XL) 50 MG 24 hr tablet Take 1 tablet (50 mg total) by mouth once daily. Hold SBP <120 90 tablet 1    mupirocin (BACTROBAN) 2 % ointment Apply topically nightly.      ondansetron (ZOFRAN) 4 MG tablet Take 1 tablet (4 mg total) by mouth every 8 (eight) hours as needed for Nausea. 8 tablet 0    rosuvastatin (CRESTOR) 40 MG Tab Take 1 tablet (40 mg total) by mouth every evening. 90 tablet 1    spironolactone (ALDACTONE) 50 MG tablet Take 1 tablet (50 mg total) by mouth once daily. Hold until eating and drinking improves. Need to weight self daily 90 tablet 3     triamcinolone acetonide 0.1% (KENALOG) 0.1 % cream 2 (two) times daily. Apply to affected area  4       Past Surgical History:   Procedure Laterality Date    CARDIAC CATHETERIZATION      COLONOSCOPY      DILATION AND CURETTAGE OF UTERUS      ESOPHAGOGASTRODUODENOSCOPY N/A 5/9/2019    Procedure: EGD (ESOPHAGOGASTRODUODENOSCOPY);  Surgeon: Vielka Burrell MD;  Location: [facility] ENDO;  Service: Endoscopy;  Laterality: N/A;    TRANSFORAMINAL EPIDURAL INJECTION OF STEROID N/A 1/6/2022    Procedure: Transforaminal ANKITA L4/L5 L5/S1;  Surgeon: Jed Yeager MD;  Location: [facility] PAIN MGT;  Service: Pain Management;  Laterality: N/A;       Social History     Socioeconomic History    Marital status:    Tobacco Use    Smoking status: Never Smoker    Smokeless tobacco: Never Used    Tobacco comment: smokes cigars on occasion   Substance and Sexual Activity    Alcohol use: Yes     Comment: occasional    Drug use: Yes     Types: Marijuana     Comment: occ    Sexual activity: Yes     Partners: Male         Lab Results   Component Value Date    WBC 6.71 10/14/2021    HGB 12.7 10/14/2021    HCT 41.9 10/14/2021    MCV 93 10/14/2021     10/14/2021       CMP  Sodium   Date Value Ref Range Status   10/14/2021 136 136 - 145 mmol/L Final     Potassium   Date Value Ref Range Status   10/14/2021 4.3 3.5 - 5.1 mmol/L Final     Chloride   Date Value Ref Range Status   10/14/2021 101 95 - 110 mmol/L Final     CO2   Date Value Ref Range Status   10/14/2021 25 23 - 29 mmol/L Final     Glucose   Date Value Ref Range Status   10/14/2021 194 (H) 70 - 110 mg/dL Final     BUN   Date Value Ref Range Status   10/14/2021 18 6 - 20 mg/dL Final     Creatinine   Date Value Ref Range Status   10/14/2021 1.5 (H) 0.5 - 1.4 mg/dL Final     Calcium   Date Value Ref Range Status   10/14/2021 9.4 8.7 - 10.5 mg/dL Final     Total Protein   Date Value Ref Range Status   10/14/2021 7.0 6.0 - 8.4 g/dL Final     Albumin   Date Value Ref  Range Status   10/14/2021 3.5 3.5 - 5.2 g/dL Final     Total Bilirubin   Date Value Ref Range Status   10/14/2021 0.4 0.1 - 1.0 mg/dL Final     Comment:     For infants and newborns, interpretation of results should be based  on gestational age, weight and in agreement with clinical  observations.    Premature Infant recommended reference ranges:  Up to 24 hours.............<8.0 mg/dL  Up to 48 hours............<12.0 mg/dL  3-5 days..................<15.0 mg/dL  6-29 days.................<15.0 mg/dL       Alkaline Phosphatase   Date Value Ref Range Status   10/14/2021 77 55 - 135 U/L Final     AST   Date Value Ref Range Status   10/14/2021 19 10 - 40 U/L Final     ALT   Date Value Ref Range Status   10/14/2021 20 10 - 44 U/L Final     Anion Gap   Date Value Ref Range Status   10/14/2021 10 8 - 16 mmol/L Final     eGFR if    Date Value Ref Range Status   10/14/2021 48.2 (A) >60 mL/min/1.73 m^2 Final     eGFR if non    Date Value Ref Range Status   10/14/2021 41.8 (A) >60 mL/min/1.73 m^2 Final     Comment:     Calculation used to obtain the estimated glomerular filtration  rate (eGFR) is the CKD-EPI equation.            Diagnostic Studies:      EKG:  Vent. Rate : 096 BPM     Atrial Rate : 096 BPM      P-R Int : 138 ms          QRS Dur : 112 ms       QT Int : 374 ms       P-R-T Axes : 048 034 -11 degrees      QTc Int : 472 ms     Normal sinus rhythm   T wave abnormality, consider inferior ischemia   Abnormal ECG   When compared with ECG of 19-JUL-2021 23:04,   Significant changes have occurred   Confirmed by Jimmie SINGH, Emmie MATTHEWS ([id number]) on 7/28/2021 10:19:06 PM    2D Echo:  5/17/20  · Severely decreased left ventricular systolic function. The estimated ejection fraction is 15%.  · Moderate left ventricular enlargement.  · Eccentric left ventricular hypertrophy.  · Grade I (mild) left ventricular diastolic dysfunction consistent with impaired relaxation.  · Normal right ventricular systolic  function.  · Mild mitral regurgitation.  · Normal central venous pressure (3 mmHg).  · The estimated PA systolic pressure is 36 mmHg.      Results for orders placed or performed during the hospital encounter of 03/12/18   2D Echo w/ Color Flow Doppler   Result Value Ref Range    EF + QEF 15 (A) 55 - 65    Mitral Valve Regurgitation MILD     Diastolic Dysfunction Yes (A)     Est. PA Systolic Pressure 35.26     Pericardial Effusion NONE     Mitral Valve Mobility NORMAL     Tricuspid Valve Regurgitation TRIVIAL TO MILD            Pre-op Assessment    I have reviewed the Patient Summary Reports.     I have reviewed the Nursing Notes. I have reviewed the NPO Status.   I have reviewed the Medications.     Review of Systems  Anesthesia Hx:  No problems with previous Anesthesia  History of prior surgery of interest to airway management or planning:  Denies Personal Hx of Anesthesia complications.   Social:  Non-Smoker, Alcohol Use THC use   Cardiovascular:   Exercise tolerance: good Hypertension Dysrhythmias (eliquis) atrial fibrillation CHF hyperlipidemia ECG has been reviewed.    Pulmonary:   Sleep Apnea    Renal/:   Chronic Renal Disease    Hepatic/GI:   Bowel Prep. GERD    Neurological:   Neuromuscular Disease, Seizures    Endocrine:   Diabetes, type 2  Obesity / BMI > 30      Physical Exam  General: Well nourished    Airway:  Mallampati: III   Mouth Opening: Small, but > 3cm  TM Distance: 4 - 6 cm  Neck ROM: Normal ROM    Dental:  Intact, Partial Dentures        Anesthesia Plan  Type of Anesthesia, risks & benefits discussed:    Anesthesia Type: Gen Natural Airway, MAC  Intra-op Monitoring Plan: Standard ASA Monitors  Induction:  IV  Informed Consent: Informed consent signed with the Patient and all parties understand the risks and agree with anesthesia plan.  All questions answered.   ASA Score: 3  Day of Surgery Review of History & Physical: H&P Update referred to the surgeon/provider.  Anesthesia Plan Notes:  Etomidate/ketamine    Ready For Surgery From Anesthesia Perspective.     .

## 2022-03-09 NOTE — H&P
History and Physical      Chief complaints: Requesting screening colonscopy    History of Presenting Illness    Patient requesting colonoscopy.  Patient denies any abdominal pain, weight loss or blood in the stool.  There is no family history of colon cancer.    Review of Systems   Constitutional: Negative for fever and appetite change.   HENT: Negative for sore throat, trouble swallowing and neck pain.   Eyes: Negative for photophobia and visual disturbance.   Respiratory: Negative for wheezing.   Cardiovascular: Negative for chest pain and palpitations.   Gastrointestinal: See HPI for details   Musculoskeletal: Negative for joint swelling and arthralgias.   Skin: Negative for rash and wound.   Neurological: Negative for dizziness, tremors and weakness.   Hematological: Negative.   Psychiatric/Behavioral: Negative for suicidal ideas and behavioral problems.     Past Medical History:   Diagnosis Date    Atrial fibrillation     Blood clot associated with vein wall inflammation     not dvt    Cardiomyopathy     Normal cors on cath 11/2017    CHF (congestive heart failure)     DM (diabetes mellitus) 9/19/2013    Hyperlipidemia     Hypertension     Psoriasis     Sleep apnea        Past Surgical History:   Procedure Laterality Date    CARDIAC CATHETERIZATION      COLONOSCOPY      DILATION AND CURETTAGE OF UTERUS      ESOPHAGOGASTRODUODENOSCOPY N/A 5/9/2019    Procedure: EGD (ESOPHAGOGASTRODUODENOSCOPY);  Surgeon: Vielka Burrell MD;  [facility];  Service: Endoscopy;  Laterality: N/A;    TRANSFORAMINAL EPIDURAL INJECTION OF STEROID N/A 1/6/2022    Procedure: Transforaminal ANKITA L4/L5 L5/S1;  Surgeon: Jed Yeager MD;  Location: [facility] PAIN T;  Service: Pain Management;  Laterality: N/A;       Family History   Problem Relation Age of Onset    Hypertension Mother     Hypertension Father     Diabetes Father     Diabetes Maternal Grandmother     Diabetes Paternal Grandmother      Breast cancer Neg Hx     Colon cancer Neg Hx     Ovarian cancer Neg Hx        Social History     Socioeconomic History    Marital status:    Tobacco Use    Smoking status: Never Smoker    Smokeless tobacco: Never Used    Tobacco comment: smokes cigars on occasion   Substance and Sexual Activity    Alcohol use: Yes     Comment: occasional    Drug use: Yes     Types: Marijuana     Comment: occ    Sexual activity: Yes     Partners: Male       Current Facility-Administered Medications   Medication Dose Route Frequency Provider Last Rate Last Admin    0.9%  NaCl infusion   Intravenous Continuous Jac Olvera MD   Stopped at 03/09/22 0932    insulin regular injection 5 Units  5 Units Subcutaneous Once PRN Vielka Burrell MD        ondansetron 4 mg/2 mL injection                Review of patient's allergies indicates:   Allergen Reactions    Metformin      Diarrhea on metformin XR     Pneumococcal 23-abimbola ps vaccine        Objective:      Vitals:    03/09/22 0931   BP: 116/65   Pulse: 81   Resp: 18   Temp: 97.7 °F (36.5 °C)     Physical Exam   Constitutional: Patient is oriented to person, place, and time. Appears well-nourished.   HENT:   Mouth/Throat: Oropharynx is clear and moist.   Eyes: Pupils are equal, round, and reactive to light.   Neck: Neck supple.   Cardiovascular: Normal heart sounds.   Pulmonary/Chest: Effort normal and breath sounds normal.   Abdominal: Soft. Exhibits no mass. There is no tenderness. There is no guarding.   Musculoskeletal: Normal range of motion.   Lymphadenopathy: Has no cervical adenopathy.   Neurological:Alert and oriented to person, place, and time.   Skin: Skin is warm. No rash noted.   Psychiatric: Has a normal mood and affect.     Assessment:  Colon cancer screening    Plan:  Colonoscopy       I have reviewed the patient's medical history in detail and updated the computerized patient record

## 2022-03-09 NOTE — PROVATION PATIENT INSTRUCTIONS
Discharge Summary/Instructions after an Endoscopic Procedure  Patient Name: Fabiana Chanel  Patient MRN: [id number]  Patient YOB: 1975  Wednesday, March 9, 2022  Vielka Burrell MD  Dear patient,  As a result of recent federal legislation (The Federal Cures Act), you may   receive lab or pathology results from your procedure in your [facility]   account before your physician is able to contact you. Your physician or   their representative will relay the results to you with their   recommendations at their soonest availability.  Thank you,  RESTRICTIONS:  During your procedure today, you received medications for sedation.  These   medications may affect your judgment, balance and coordination.  Therefore,   for 24 hours, you have the following restrictions:   - DO NOT drive a car, operate machinery, make legal/financial decisions,   sign important papers or drink alcohol.    ACTIVITY:  Today: no heavy lifting, straining or running due to procedural   sedation/anesthesia.  The following day: return to full activity including work.  DIET:  Eat and drink normally unless instructed otherwise.     TREATMENT FOR COMMON SIDE EFFECTS:  - Mild abdominal pain, nausea, belching, bloating or excessive gas:  rest,   eat lightly and use a heating pad.  - Sore Throat: treat with throat lozenges and/or gargle with warm salt   water.  - Because air was used during the procedure, expelling large amounts of air   from your rectum or belching is normal.  - If a bowel prep was taken, you may not have a bowel movement for 1-3 days.    This is normal.  SYMPTOMS TO WATCH FOR AND REPORT TO YOUR PHYSICIAN:  1. Abdominal pain or bloating, other than gas cramps.  2. Chest pain.  3. Back pain.  4. Signs of infection such as: chills or fever occurring within 24 hours   after the procedure.  5. Rectal bleeding, which would show as bright red, maroon, or black stools.   (A tablespoon of blood from the rectum is not serious,  especially if   hemorrhoids are present.)  6. Vomiting.  7. Weakness or dizziness.  GO DIRECTLY TO THE NEAREST EMERGENCY ROOM IF YOU HAVE ANY OF THE FOLLOWING:      Difficulty breathing              Chills and/or fever over 101 F   Persistent vomiting and/or vomiting blood   Severe abdominal pain   Severe chest pain   Black, tarry stools   Bleeding- more than one tablespoon   Any other symptom or condition that you feel may need urgent attention  Your doctor recommends these additional instructions:  If any biopsies were taken, your doctors clinic will contact you in 1 to 2   weeks with any results.  - Discharge patient to home.   - Repeat colonoscopy in 10 years for screening purposes.  For questions, problems or results please call your physician - Vielka Burrell MD at Work:  [phone].  [facility] can be reached at [phone]     IF A COMPLICATION OR EMERGENCY SITUATION ARISES AND YOU ARE UNABLE TO REACH   YOUR PHYSICIAN - GO DIRECTLY TO THE EMERGENCY ROOM.  Vielka Burrell MD  3/9/2022 9:32:54 AM  This report has been verified and signed electronically.  Dear patient,  As a result of recent federal legislation (The Federal Cures Act), you may   receive lab or pathology results from your procedure in your [facility]   account before your physician is able to contact you. Your physician or   their representative will relay the results to you with their   recommendations at their soonest availability.  Thank you,  PROVATION

## 2022-03-09 NOTE — ANESTHESIA POSTPROCEDURE EVALUATION
Anesthesia Post Evaluation    Patient: Fabiana Chanel    Procedure(s) Performed: Procedure(s) (LRB):  COLONOSCOPY (N/A)    Final Anesthesia Type: general      Patient location during evaluation: GI PACU  Patient participation: Yes- Able to Participate  Level of consciousness: awake and alert  Post-procedure vital signs: reviewed and stable  Pain management: adequate  Airway patency: patent    PONV status at discharge: No PONV  Anesthetic complications: no      Cardiovascular status: blood pressure returned to baseline  Respiratory status: unassisted and spontaneous ventilation  Hydration status: euvolemic  Follow-up not needed.          Vitals Value Taken Time   /65 03/09/22 0931   Temp 36.5 °C (97.7 °F) 03/09/22 0931   Pulse 81 03/09/22 0931   Resp 20 03/09/22 0946   SpO2 99 % 03/09/22 0946         No case tracking events are documented in the log.      Pain/Moris Score: Moris Score: 9 (3/9/2022  9:46 AM)

## 2022-03-09 NOTE — OR NURSING
PROCEDURE AND RECOVERY COMPLETED. DR. MONTES WITH PATIENT AND SPOUSE; PATIENT GIVEN REGULAR 5 UNITS SUBCUTANEOUSLY AS ORDERED; REPEAT CBG   ; PATIENT DENIES ANY BLURRED VISION,  NOT DIAPHORETIC, AAOX4, PATIENT READY FOR DISCHARGE; INSTRUCTED TO TAKE HOME MEDICATION AS PRESCRIBED FOR DIABETES. PATIENT READY FOR DISCHARGE.

## 2022-03-09 NOTE — TRANSFER OF CARE
"Anesthesia Transfer of Care Note    Patient: Fabiana Chanel    Procedure(s) Performed: Procedure(s) (LRB):  COLONOSCOPY (N/A)    Patient location: GI    Anesthesia Type: general    Transport from OR: Transported from OR on room air with adequate spontaneous ventilation    Post pain: adequate analgesia    Post assessment: no apparent anesthetic complications and tolerated procedure well    Post vital signs: stable    Level of consciousness: awake    Nausea/Vomiting: no nausea/vomiting    Complications: none    Transfer of care protocol was followed      Last vitals:   Visit Vitals  /65   Pulse 81   Temp 36.5 °C (97.7 °F) (Oral)   Resp 18   Ht 5' 6" (1.676 m)   Wt 113.4 kg (250 lb)   SpO2 98%   Breastfeeding No   BMI 40.35 kg/m²     "

## 2022-03-10 ENCOUNTER — OFFICE VISIT (OUTPATIENT)
Dept: ENDOCRINOLOGY | Facility: CLINIC | Age: 47
End: 2022-03-10
Payer: MEDICARE

## 2022-03-10 VITALS
TEMPERATURE: 99 F | WEIGHT: 256.19 LBS | HEART RATE: 86 BPM | SYSTOLIC BLOOD PRESSURE: 122 MMHG | DIASTOLIC BLOOD PRESSURE: 83 MMHG | BODY MASS INDEX: 41.35 KG/M2

## 2022-03-10 DIAGNOSIS — E66.01 MORBID OBESITY, UNSPECIFIED OBESITY TYPE: ICD-10-CM

## 2022-03-10 DIAGNOSIS — I10 HYPERTENSION, UNSPECIFIED TYPE: ICD-10-CM

## 2022-03-10 PROCEDURE — 1160F PR REVIEW ALL MEDS BY PRESCRIBER/CLIN PHARMACIST DOCUMENTED: ICD-10-PCS | Mod: CPTII,S$GLB,, | Performed by: NURSE PRACTITIONER

## 2022-03-10 PROCEDURE — 3046F PR MOST RECENT HEMOGLOBIN A1C LEVEL > 9.0%: ICD-10-PCS | Mod: CPTII,S$GLB,, | Performed by: NURSE PRACTITIONER

## 2022-03-10 PROCEDURE — 1159F PR MEDICATION LIST DOCUMENTED IN MEDICAL RECORD: ICD-10-PCS | Mod: CPTII,S$GLB,, | Performed by: NURSE PRACTITIONER

## 2022-03-10 PROCEDURE — 1159F MED LIST DOCD IN RCRD: CPT | Mod: CPTII,S$GLB,, | Performed by: NURSE PRACTITIONER

## 2022-03-10 PROCEDURE — 1160F RVW MEDS BY RX/DR IN RCRD: CPT | Mod: CPTII,S$GLB,, | Performed by: NURSE PRACTITIONER

## 2022-03-10 PROCEDURE — 3079F PR MOST RECENT DIASTOLIC BLOOD PRESSURE 80-89 MM HG: ICD-10-PCS | Mod: CPTII,S$GLB,, | Performed by: NURSE PRACTITIONER

## 2022-03-10 PROCEDURE — 99214 PR OFFICE/OUTPT VISIT, EST, LEVL IV, 30-39 MIN: ICD-10-PCS | Mod: S$GLB,,, | Performed by: NURSE PRACTITIONER

## 2022-03-10 PROCEDURE — 3008F PR BODY MASS INDEX (BMI) DOCUMENTED: ICD-10-PCS | Mod: CPTII,S$GLB,, | Performed by: NURSE PRACTITIONER

## 2022-03-10 PROCEDURE — 3046F HEMOGLOBIN A1C LEVEL >9.0%: CPT | Mod: CPTII,S$GLB,, | Performed by: NURSE PRACTITIONER

## 2022-03-10 PROCEDURE — 3008F BODY MASS INDEX DOCD: CPT | Mod: CPTII,S$GLB,, | Performed by: NURSE PRACTITIONER

## 2022-03-10 PROCEDURE — 3079F DIAST BP 80-89 MM HG: CPT | Mod: CPTII,S$GLB,, | Performed by: NURSE PRACTITIONER

## 2022-03-10 PROCEDURE — 99214 OFFICE O/P EST MOD 30 MIN: CPT | Mod: S$GLB,,, | Performed by: NURSE PRACTITIONER

## 2022-03-10 PROCEDURE — 3074F PR MOST RECENT SYSTOLIC BLOOD PRESSURE < 130 MM HG: ICD-10-PCS | Mod: CPTII,S$GLB,, | Performed by: NURSE PRACTITIONER

## 2022-03-10 PROCEDURE — 99999 PR PBB SHADOW E&M-EST. PATIENT-LVL V: CPT | Mod: PBBFAC,,, | Performed by: NURSE PRACTITIONER

## 2022-03-10 PROCEDURE — 99999 PR PBB SHADOW E&M-EST. PATIENT-LVL V: ICD-10-PCS | Mod: PBBFAC,,, | Performed by: NURSE PRACTITIONER

## 2022-03-10 PROCEDURE — 3074F SYST BP LT 130 MM HG: CPT | Mod: CPTII,S$GLB,, | Performed by: NURSE PRACTITIONER

## 2022-03-10 RX ORDER — INSULIN LISPRO 100 [IU]/ML
40 INJECTION, SOLUTION INTRAVENOUS; SUBCUTANEOUS
Qty: 45 ML | Refills: 1 | Status: SHIPPED | OUTPATIENT
Start: 2022-03-10 | End: 2022-05-10 | Stop reason: SDUPTHER

## 2022-03-10 RX ORDER — SEMAGLUTIDE 1.34 MG/ML
0.5 INJECTION, SOLUTION SUBCUTANEOUS
Qty: 1 PEN | Refills: 2 | Status: ON HOLD | OUTPATIENT
Start: 2022-03-10 | End: 2022-05-01 | Stop reason: HOSPADM

## 2022-03-10 RX ORDER — PEN NEEDLE, DIABETIC 30 GX3/16"
NEEDLE, DISPOSABLE MISCELLANEOUS
Qty: 400 EACH | Refills: 3 | Status: SHIPPED | OUTPATIENT
Start: 2022-03-10

## 2022-03-10 NOTE — PATIENT INSTRUCTIONS
Stop Trulicity and per pt's preference, start Ozempic 0.5 mg weekly. Reviewed potential s/e.     Increase Humalog from 34 to 40 units before each meal.     Continue Tresiba 88 units daily.     Return to clinic in 6 weeks.   Please keep a food/insulin diary about a week prior to visits. Bring Dexcom .

## 2022-03-10 NOTE — PROGRESS NOTES
CC: This 46 y.o. Black or  female  is here for evaluation of  T2DM along with comorbidities indicated in the Visit Diagnosis section of this encounter.    HPI: Fabiana Chanel was diagnosed with T2DM in 2013. Metformin and a sulfonylurea started at the time of diagnosis.       Prior visit 11/2021  Pt has not been seen for several months. A1c is a bit better from > 14% in April to now 11.8%. she has been to ED multiple times in the last few months for back pain.   She hasn't been taking Trulicity for the last several months.   Her Tresiba dose has been increased gradually from 60 units once daily to almost double - 55 units BID. She has gained 6 lb over the last year.   Plan Basal and prandial insulin doses very uneven with basal dose double the amount of prandial. Adjust insulin doses to even it out:   Decrease Tresiba to 44 units twice daily. -- ok to change to 88 units ONCE DAILY.   Increase Humalog to 34 units before meals.   Resume Trulicity 0.75 mg once weekly.   Orders sent for Dexcom CGM. Call to schedule appointment with diabetes education for training when Dexcom is received.   Return to clinic in 1 month. Ok to extend a couple weeks later if Dexcom is newly started.       Interval history  a1c is mildly down from 11.8 to 10%.   She did start using Dexcom CGM but is not wearing now because she was awaiting a shipment of supplies. Will resume soon.   She does enjoy using Dexcom. Finding that it's been easier to control her BGs because she is able to see how certain foods affect her BGs. She did not realize that juice and Snapple had sugar.     She did resume Trulicity, has some mild nausea with it but it's tolerable. She is interested in Ozempic.         PMHX: CHF,  MILE on cpap, atrial fibrillation, MI, ICD placement, NICM (cath 11/2017) EF 20% by echo 12/2018    LAST DIABETES EDUCATION: 6/19/19, 11/2021 for Dexcom start       RECENT ILLNESSES/HOSPITALIZATIONS - -  Admitted 12/28/18 x 2  nights for acute on chronic HF      HOSPITALIZED FOR DIABETES  -  Yes - for hyperglycemia     DIABETES MEDICATIONS:    Tresiba 88 units once daily   Humalog Kwikpen  34 units ac  Trulicity 0.75 mg weekly      Misses medication doses - No    She injects Humalog immediately after she eats - but lately has been trying to to inject ac.     DM COMPLICATIONS: peripheral neuropathy and cardiovascular disease    SIGNIFICANT DIABETES MED HISTORY:   diarrhea on metformin 1000 mg instant release; cannot afford topical.    Pt unable to tolerate metformin ER d/t diarrhea even on 500 mg twice daily.   Trulicity and Novolog started 11/2019; unable to tolerate Trulicity 1.5 mg d/t nausea and bloating   Jardiance - recurrent  infection     SELF MONITORING BLOOD GLUCOSE: Checks blood glucose at home 3-4x/day.   FBGs 160s   After meals 300s     HYPOGLYCEMIC EPISODES: denies      CURRENT DIET: drinks water and juice.  Eats 3 meals/day. Diet is not always healthy.     CURRENT EXERCISE: can tolerate walking up 2 blocks before she gets DIAZ       /83   Pulse 86   Temp 98.6 °F (37 °C)   Wt 116.2 kg (256 lb 3.2 oz)   BMI 41.35 kg/m²       ROS:   CONSTITUTIONAL: Appetite low, + Fatigue   RESPIRATORY: + diaz   GI: + nausea intermittent       PHYSICAL EXAM:  GENERAL: Well developed, well nourished. No acute distress.   PSYCH: AAOx3,  conversant, well-groomed. Judgement and insight good. Appropriate mood and affect.    NEURO: Cranial nerves grossly intact. Speech clear, no tremor.   CHEST: Respirations even and unlabored. CTA, diminished bilaterally         Hemoglobin A1C   Date Value Ref Range Status   03/09/2022 10.0 (H) 4.0 - 5.6 % Final     Comment:     ADA Screening Guidelines:  5.7-6.4%  Consistent with prediabetes  >or=6.5%  Consistent with diabetes    High levels of fetal hemoglobin interfere with the HbA1C  assay. Heterozygous hemoglobin variants (HbS, HgC, etc)do  not significantly interfere with this assay.   However,  presence of multiple variants may affect accuracy.     10/14/2021 11.8 (H) 4.0 - 5.6 % Final     Comment:     ADA Screening Guidelines:  5.7-6.4%  Consistent with prediabetes  >or=6.5%  Consistent with diabetes    High levels of fetal hemoglobin interfere with the HbA1C  assay. Heterozygous hemoglobin variants (HbS, HgC, etc)do  not significantly interfere with this assay.   However, presence of multiple variants may affect accuracy.     04/28/2021 >14.0 (H) 4.0 - 5.6 % Final     Comment:     ADA Screening Guidelines:  5.7-6.4%  Consistent with prediabetes  >or=6.5%  Consistent with diabetes    High levels of fetal hemoglobin interfere with the HbA1C  assay. Heterozygous hemoglobin variants (HbS, HgC, etc)do  not significantly interfere with this assay.   However, presence of multiple variants may affect accuracy.             Chemistry        Component Value Date/Time     10/14/2021 0825    K 4.3 10/14/2021 0825     10/14/2021 0825    CO2 25 10/14/2021 0825    BUN 18 10/14/2021 0825    CREATININE 1.5 (H) 10/14/2021 0825     (H) 10/14/2021 0825        Component Value Date/Time    CALCIUM 9.4 10/14/2021 0825    ALKPHOS 77 10/14/2021 0825    AST 19 10/14/2021 0825    ALT 20 10/14/2021 0825    BILITOT 0.4 10/14/2021 0825    ESTGFRAFRICA 48.2 (A) 10/14/2021 0825    EGFRNONAA 41.8 (A) 10/14/2021 0825          Lab Results   Component Value Date    LDLCALC 58.4 (L) 10/14/2021            Ref. Range 10/14/2021 08:25   Cholesterol Latest Ref Range: 120 - 199 mg/dL 111 (L)   HDL Latest Ref Range: 40 - 75 mg/dL 38 (L)   HDL/Cholesterol Ratio Latest Ref Range: 20.0 - 50.0 % 34.2   LDL Cholesterol External Latest Ref Range: 63.0 - 159.0 mg/dL 58.4 (L)   Non-HDL Cholesterol Latest Units: mg/dL 73   Total Cholesterol/HDL Ratio Latest Ref Range: 2.0 - 5.0  2.9   Triglycerides Latest Ref Range: 30 - 150 mg/dL 73     Lab Results   Component Value Date    MICALBCREAT 103.2 (H) 12/29/2020           ASSESSMENT and  PLAN:    A1C GOAL: < 7 %     1. Type 2 diabetes, uncontrolled, with neuropathy  Stop Trulicity and per pt's preference, start Ozempic 0.5 mg weekly. Reviewed potential s/e.     Increase Humalog from 34 to 40 units before each meal.     Continue Tresiba 88 units daily.     Return to clinic in 6 weeks.   Please keep a food/insulin diary about a week prior to visits. Bring Dexcom .     insulin lispro (HUMALOG KWIKPEN INSULIN) 100 unit/mL pen   2. Hypertension, unspecified type  controlled   3. Morbid obesity, unspecified obesity type  May lose weight with Ozempic          No orders of the defined types were placed in this encounter.       Follow up in about 6 weeks (around 4/21/2022).       Patient is testing blood sugars 4x/day and taking 4 injections of insulin a day. The patient's insulin treatment regimen requires frequent adjustments by the patient on the basis of therapeutic CGM testing results.

## 2022-03-11 LAB
LEFT EYE DM RETINOPATHY: NEGATIVE
RIGHT EYE DM RETINOPATHY: NEGATIVE

## 2022-03-18 ENCOUNTER — HOSPITAL ENCOUNTER (EMERGENCY)
Facility: HOSPITAL | Age: 47
Discharge: HOME | End: 2022-03-18
Attending: EMERGENCY MEDICINE
Payer: MEDICARE

## 2022-03-18 VITALS
HEART RATE: 82 BPM | TEMPERATURE: 99 F | BODY MASS INDEX: 40.18 KG/M2 | WEIGHT: 250 LBS | RESPIRATION RATE: 16 BRPM | OXYGEN SATURATION: 98 % | HEIGHT: 66 IN | SYSTOLIC BLOOD PRESSURE: 114 MMHG | DIASTOLIC BLOOD PRESSURE: 59 MMHG

## 2022-03-18 DIAGNOSIS — R73.9 HYPERGLYCEMIA: ICD-10-CM

## 2022-03-18 DIAGNOSIS — M79.10 MYALGIA: Primary | ICD-10-CM

## 2022-03-18 LAB
ALBUMIN SERPL BCP-MCNC: 3.9 G/DL (ref 3.5–5.2)
ALP SERPL-CCNC: 112 U/L (ref 55–135)
ALT SERPL W/O P-5'-P-CCNC: 20 U/L (ref 10–44)
ANION GAP SERPL CALC-SCNC: 12 MMOL/L (ref 8–16)
AST SERPL-CCNC: 19 U/L (ref 10–40)
B-HCG UR QL: NEGATIVE
B-OH-BUTYR BLD STRIP-SCNC: 0 MMOL/L (ref 0–0.5)
BACTERIA #/AREA URNS HPF: NORMAL /HPF
BASOPHILS # BLD AUTO: 0.01 K/UL (ref 0–0.2)
BASOPHILS NFR BLD: 0.1 % (ref 0–1.9)
BILIRUB SERPL-MCNC: 0.9 MG/DL (ref 0.1–1)
BILIRUB UR QL STRIP: NEGATIVE
BUN SERPL-MCNC: 18 MG/DL (ref 6–20)
CALCIUM SERPL-MCNC: 9.6 MG/DL (ref 8.7–10.5)
CHLORIDE SERPL-SCNC: 98 MMOL/L (ref 95–110)
CK SERPL-CCNC: 248 U/L (ref 20–180)
CLARITY UR: CLEAR
CO2 SERPL-SCNC: 26 MMOL/L (ref 23–29)
COLOR UR: YELLOW
CREAT SERPL-MCNC: 1.9 MG/DL (ref 0.5–1.4)
CTP QC/QA: YES
CTP QC/QA: YES
DIFFERENTIAL METHOD: ABNORMAL
EOSINOPHIL # BLD AUTO: 0.2 K/UL (ref 0–0.5)
EOSINOPHIL NFR BLD: 2.4 % (ref 0–8)
ERYTHROCYTE [DISTWIDTH] IN BLOOD BY AUTOMATED COUNT: 15.1 % (ref 11.5–14.5)
EST. GFR  (AFRICAN AMERICAN): 36 ML/MIN/1.73 M^2
EST. GFR  (NON AFRICAN AMERICAN): 31 ML/MIN/1.73 M^2
GLUCOSE SERPL-MCNC: 245 MG/DL (ref 70–110)
GLUCOSE UR QL STRIP: ABNORMAL
HCT VFR BLD AUTO: 46.2 % (ref 37–48.5)
HGB BLD-MCNC: 14.5 G/DL (ref 12–16)
HGB UR QL STRIP: ABNORMAL
HYALINE CASTS #/AREA URNS LPF: 0 /LPF
IMM GRANULOCYTES # BLD AUTO: 0.02 K/UL (ref 0–0.04)
IMM GRANULOCYTES NFR BLD AUTO: 0.3 % (ref 0–0.5)
KETONES UR QL STRIP: NEGATIVE
LEUKOCYTE ESTERASE UR QL STRIP: NEGATIVE
LYMPHOCYTES # BLD AUTO: 1.9 K/UL (ref 1–4.8)
LYMPHOCYTES NFR BLD: 24.5 % (ref 18–48)
MCH RBC QN AUTO: 28.7 PG (ref 27–31)
MCHC RBC AUTO-ENTMCNC: 31.4 G/DL (ref 32–36)
MCV RBC AUTO: 92 FL (ref 82–98)
MICROSCOPIC COMMENT: NORMAL
MONOCYTES # BLD AUTO: 0.5 K/UL (ref 0.3–1)
MONOCYTES NFR BLD: 5.8 % (ref 4–15)
NEUTROPHILS # BLD AUTO: 5.2 K/UL (ref 1.8–7.7)
NEUTROPHILS NFR BLD: 66.9 % (ref 38–73)
NITRITE UR QL STRIP: NEGATIVE
NRBC BLD-RTO: 0 /100 WBC
PH UR STRIP: 6 [PH] (ref 5–8)
PLATELET # BLD AUTO: 249 K/UL (ref 150–450)
PMV BLD AUTO: 10.3 FL (ref 9.2–12.9)
POCT GLUCOSE: 230 MG/DL (ref 70–110)
POTASSIUM SERPL-SCNC: 3.6 MMOL/L (ref 3.5–5.1)
PROT SERPL-MCNC: 8.4 G/DL (ref 6–8.4)
PROT UR QL STRIP: ABNORMAL
RBC # BLD AUTO: 5.05 M/UL (ref 4–5.4)
RBC #/AREA URNS HPF: 1 /HPF (ref 0–4)
SARS-COV-2 RDRP RESP QL NAA+PROBE: NEGATIVE
SODIUM SERPL-SCNC: 136 MMOL/L (ref 136–145)
SP GR UR STRIP: 1.01 (ref 1–1.03)
SQUAMOUS #/AREA URNS HPF: 9 /HPF
URN SPEC COLLECT METH UR: ABNORMAL
UROBILINOGEN UR STRIP-ACNC: NEGATIVE EU/DL
WBC # BLD AUTO: 7.77 K/UL (ref 3.9–12.7)
WBC #/AREA URNS HPF: 2 /HPF (ref 0–5)
YEAST URNS QL MICRO: NORMAL

## 2022-03-18 PROCEDURE — 82550 ASSAY OF CK (CPK): CPT | Performed by: NURSE PRACTITIONER

## 2022-03-18 PROCEDURE — 85025 COMPLETE CBC W/AUTO DIFF WBC: CPT | Performed by: NURSE PRACTITIONER

## 2022-03-18 PROCEDURE — 63600175 PHARM REV CODE 636 W HCPCS: Performed by: PHYSICIAN ASSISTANT

## 2022-03-18 PROCEDURE — 81025 URINE PREGNANCY TEST: CPT | Performed by: NURSE PRACTITIONER

## 2022-03-18 PROCEDURE — 25000003 PHARM REV CODE 250: Performed by: NURSE PRACTITIONER

## 2022-03-18 PROCEDURE — 93005 ELECTROCARDIOGRAM TRACING: CPT

## 2022-03-18 PROCEDURE — 99285 EMERGENCY DEPT VISIT HI MDM: CPT | Mod: 25

## 2022-03-18 PROCEDURE — 82962 GLUCOSE BLOOD TEST: CPT

## 2022-03-18 PROCEDURE — 81000 URINALYSIS NONAUTO W/SCOPE: CPT | Performed by: NURSE PRACTITIONER

## 2022-03-18 PROCEDURE — 82010 KETONE BODYS QUAN: CPT | Performed by: NURSE PRACTITIONER

## 2022-03-18 PROCEDURE — 93010 EKG 12-LEAD: ICD-10-PCS | Mod: ,,, | Performed by: INTERNAL MEDICINE

## 2022-03-18 PROCEDURE — 93010 ELECTROCARDIOGRAM REPORT: CPT | Mod: ,,, | Performed by: INTERNAL MEDICINE

## 2022-03-18 PROCEDURE — 96374 THER/PROPH/DIAG INJ IV PUSH: CPT

## 2022-03-18 PROCEDURE — U0002 COVID-19 LAB TEST NON-CDC: HCPCS | Performed by: NURSE PRACTITIONER

## 2022-03-18 PROCEDURE — 80053 COMPREHEN METABOLIC PANEL: CPT | Performed by: NURSE PRACTITIONER

## 2022-03-18 RX ORDER — KETOROLAC TROMETHAMINE 30 MG/ML
15 INJECTION, SOLUTION INTRAMUSCULAR; INTRAVENOUS
Status: COMPLETED | OUTPATIENT
Start: 2022-03-18 | End: 2022-03-18

## 2022-03-18 RX ADMIN — SODIUM CHLORIDE 1000 ML: 0.9 INJECTION, SOLUTION INTRAVENOUS at 06:03

## 2022-03-18 RX ADMIN — KETOROLAC TROMETHAMINE 15 MG: 30 INJECTION, SOLUTION INTRAMUSCULAR at 08:03

## 2022-03-18 NOTE — MEDICAL/APP STUDENT
History     Chief Complaint   Patient presents with    Generalized Body Aches     Pt presents to triage c/o of body aches and weakness x1 week.  Pt denies urinary s/s, cough or increase temp. Pt a/o, skin w/d, resp easy. Pt denies chest pain or SOB during triage.      The patient is a 46 year old female with a previous medical history of DMII, atrial fibrillation, CHF, HTN, HLD, and blood clots who presents with symptoms of generalized body aches for one week accompanied by weakness, decreased appetite and fatigue. Patient reports one episode of nausea Wednesday and intermittent palpitations. Patient reports that one week ago she received a colonoscopy and had diarrhea for four days after but bowel movements have returned to normal with last one being yesterday evening. Patient reports that trulicity injectable was changed to ozempic one week ago.     The history is provided by the patient. No  was used.       Past Medical History:   Diagnosis Date    Atrial fibrillation     Blood clot associated with vein wall inflammation     not dvt    Cardiomyopathy     Normal cors on cath 11/2017    CHF (congestive heart failure)     DM (diabetes mellitus) 9/19/2013    Hyperlipidemia     Hypertension     Psoriasis     Sleep apnea        Past Surgical History:   Procedure Laterality Date    CARDIAC CATHETERIZATION      COLONOSCOPY      COLONOSCOPY N/A 3/9/2022    Procedure: COLONOSCOPY;  Surgeon: Vielka Burrell MD;  Location: [facility];  Service: Endoscopy;  Laterality: N/A;    DILATION AND CURETTAGE OF UTERUS      ESOPHAGOGASTRODUODENOSCOPY N/A 5/9/2019    Procedure: EGD (ESOPHAGOGASTRODUODENOSCOPY);  Surgeon: Vielka Burrell MD;  Location: [facility];  Service: Endoscopy;  Laterality: N/A;    TRANSFORAMINAL EPIDURAL INJECTION OF STEROID N/A 1/6/2022    Procedure: Transforaminal ANKITA L4/L5 L5/S1;  Surgeon: Jed Yeager MD;  Location: [facility];  Service: Pain  Management;  Laterality: N/A;       Family History   Problem Relation Age of Onset    Hypertension Mother     Hypertension Father     Diabetes Father     Diabetes Maternal Grandmother     Diabetes Paternal Grandmother     Breast cancer Neg Hx     Colon cancer Neg Hx     Ovarian cancer Neg Hx        Social History     Tobacco Use    Smoking status: Never Smoker    Smokeless tobacco: Never Used    Tobacco comment: smokes cigars on occasion   Substance Use Topics    Alcohol use: Yes     Comment: occasional    Drug use: Yes     Types: Marijuana     Comment: occ       Review of Systems   Constitutional: Positive for activity change, appetite change and fatigue. Negative for fever.   HENT: Negative for congestion, ear pain, facial swelling, rhinorrhea, sore throat, trouble swallowing and voice change.    Eyes: Negative for pain, redness and visual disturbance.   Respiratory: Negative for cough, chest tightness, shortness of breath and wheezing.    Cardiovascular: Positive for palpitations. Negative for chest pain and leg swelling.   Gastrointestinal: Negative for abdominal distention, abdominal pain, blood in stool, constipation, diarrhea, nausea and vomiting.   Endocrine: Positive for polydipsia. Negative for polyphagia and polyuria.   Genitourinary: Negative for decreased urine volume, difficulty urinating, dysuria and hematuria.   Musculoskeletal: Positive for myalgias. Negative for arthralgias, back pain, gait problem, joint swelling, neck pain and neck stiffness.   Skin: Positive for pallor. Negative for color change, rash and wound.   Allergic/Immunologic: Negative for environmental allergies, food allergies and immunocompromised state.   Neurological: Positive for weakness. Negative for dizziness, tremors, seizures, syncope, facial asymmetry, speech difficulty, light-headedness, numbness and headaches.   Hematological: Does not bruise/bleed easily.   Psychiatric/Behavioral: Negative for agitation,  "behavioral problems and confusion.       Physical Exam   /80 (BP Location: Left arm, Patient Position: Sitting)   Pulse 94   Temp 98.7 °F (37.1 °C) (Oral)   Resp 16   Ht 5' 6" (1.676 m)   Wt 113.4 kg (250 lb)   SpO2 (!) 94%   BMI 40.35 kg/m²     Physical Exam    Vitals reviewed.  Constitutional: She appears well-developed. She appears ill.   HENT:   Head: Normocephalic and atraumatic.   Mouth/Throat: Uvula is midline. Mucous membranes are pale and dry. No oropharyngeal exudate, posterior oropharyngeal edema, posterior oropharyngeal erythema or tonsillar abscesses.   Eyes: Pupils are equal, round, and reactive to light.   Neck: Trachea normal. Neck supple.   Normal range of motion.  Cardiovascular: Intact distal pulses. An irregular rhythm present.    Pulses:       Radial pulses are 2+ on the right side and 2+ on the left side.        Dorsalis pedis pulses are 2+ on the right side and 2+ on the left side.   Pulmonary/Chest: Effort normal and breath sounds normal. No respiratory distress. She has no decreased breath sounds. She has no wheezes. She exhibits no tenderness.   Abdominal: Abdomen is soft. Bowel sounds are normal. She exhibits no distension. There is no abdominal tenderness.   Musculoskeletal:         General: Normal range of motion.      Cervical back: Normal range of motion and neck supple.     Lymphadenopathy:     She has no cervical adenopathy.   Neurological: She is alert and oriented to person, place, and time. She has normal strength. GCS score is 15. GCS eye subscore is 4. GCS verbal subscore is 5. GCS motor subscore is 6.   Skin: Skin is warm and dry. Capillary refill takes less than 2 seconds. There is pallor.   Psychiatric: She has a normal mood and affect.         ED Course     46 year old female presents for generalized muscle aches and weakness accompanied by fatigue. The following differential diagnoses are included but not limited to the following: DKA, HHS, rhabdomyolysis, and " infection. CBC,CMP, beta hydroxybutyrate, chest x-ray,CK, and urinalysis pending.

## 2022-03-19 NOTE — ED PROVIDER NOTES
Encounter Date: 3/18/2022       History     Chief Complaint   Patient presents with    Generalized Body Aches     Pt presents to triage c/o of body aches and weakness x1 week.  Pt denies urinary s/s, cough or increase temp. Pt a/o, skin w/d, resp easy. Pt denies chest pain or SOB during triage.      HPI     The patient is a 46 year old female with a previous medical history of DMII, atrial fibrillation, CHF, HTN, HLD, and blood clots who presents with symptoms of generalized body aches for one week accompanied by weakness, decreased appetite and fatigue. Patient reports one episode of nausea Wednesday and intermittent palpitations. Patient reports that one week ago she received a colonoscopy and had diarrhea for four days after but bowel movements have returned to normal with last one being yesterday evening. Patient reports that trulicity injectable was changed to ozempic one week ago.      The history is provided by the patient. No  was used.   Review of patient's allergies indicates:   Allergen Reactions    Metformin      Diarrhea on metformin XR     Pneumococcal 23-abimbola ps vaccine      Past Medical History:   Diagnosis Date    Atrial fibrillation     Blood clot associated with vein wall inflammation     not dvt    Cardiomyopathy     Normal cors on cath 11/2017    CHF (congestive heart failure)     DM (diabetes mellitus) 9/19/2013    Hyperlipidemia     Hypertension     Psoriasis     Sleep apnea      Past Surgical History:   Procedure Laterality Date    CARDIAC CATHETERIZATION      COLONOSCOPY      COLONOSCOPY N/A 3/9/2022    Procedure: COLONOSCOPY;  Surgeon: Vielka Burrell MD;  Location: [facility];  Service: Endoscopy;  Laterality: N/A;    DILATION AND CURETTAGE OF UTERUS      ESOPHAGOGASTRODUODENOSCOPY N/A 5/9/2019    Procedure: EGD (ESOPHAGOGASTRODUODENOSCOPY);  Surgeon: Vielka Burrell MD;  Location: [facility];  Service: Endoscopy;  Laterality: N/A;     TRANSFORAMINAL EPIDURAL INJECTION OF STEROID N/A 1/6/2022    Procedure: Transforaminal ANKITA L4/L5 L5/S1;  Surgeon: Jed Yeager MD;  Location: [facility];  Service: Pain Management;  Laterality: N/A;     Family History   Problem Relation Age of Onset    Hypertension Mother     Hypertension Father     Diabetes Father     Diabetes Maternal Grandmother     Diabetes Paternal Grandmother     Breast cancer Neg Hx     Colon cancer Neg Hx     Ovarian cancer Neg Hx      Social History     Tobacco Use    Smoking status: Never Smoker    Smokeless tobacco: Never Used    Tobacco comment: smokes cigars on occasion   Substance Use Topics    Alcohol use: Yes     Comment: occasional    Drug use: Yes     Types: Marijuana     Comment: occ     Review of Systems  Constitutional: Positive for activity change, appetite change and fatigue. Negative for fever.   HENT: Negative for congestion, ear pain, facial swelling, rhinorrhea, sore throat, trouble swallowing and voice change.    Eyes: Negative for pain, redness and visual disturbance.   Respiratory: Negative for cough, chest tightness, shortness of breath and wheezing.    Cardiovascular: Positive for palpitations. Negative for chest pain and leg swelling.   Gastrointestinal: Negative for abdominal distention, abdominal pain, blood in stool, constipation, diarrhea, nausea and vomiting.   Endocrine: Positive for polydipsia. Negative for polyphagia and polyuria.   Genitourinary: Negative for decreased urine volume, difficulty urinating, dysuria and hematuria.   Musculoskeletal: Positive for myalgias. Negative for arthralgias, back pain, gait problem, joint swelling, neck pain and neck stiffness.   Skin: Positive for pallor. Negative for color change, rash and wound.   Allergic/Immunologic: Negative for environmental allergies, food allergies and immunocompromised state.   Neurological: Positive for weakness. Negative for dizziness, tremors, seizures, syncope, facial  asymmetry, speech difficulty, light-headedness, numbness and headaches.   Hematological: Does not bruise/bleed easily.   Psychiatric/Behavioral: Negative for agitation, behavioral problems and confusion.   Physical Exam     Initial Vitals [03/18/22 1821]   BP Pulse Resp Temp SpO2   124/80 94 16 98.7 °F (37.1 °C) (!) 94 %      MAP       --         Physical Exam  Vitals reviewed.  Constitutional: She appears well-developed. She appears ill.   HENT:   Head: Normocephalic and atraumatic.   Mouth/Throat: Uvula is midline. Mucous membranes are pale and dry. No oropharyngeal exudate, posterior oropharyngeal edema, posterior oropharyngeal erythema or tonsillar abscesses.   Eyes: Pupils are equal, round, and reactive to light.   Neck: Trachea normal. Neck supple.   Normal range of motion.  Cardiovascular: Intact distal pulses. An irregular rhythm present.    Pulses:       Radial pulses are 2+ on the right side and 2+ on the left side.        Dorsalis pedis pulses are 2+ on the right side and 2+ on the left side.   Pulmonary/Chest: Effort normal and breath sounds normal. No respiratory distress. She has no decreased breath sounds. She has no wheezes. She exhibits no tenderness.   Abdominal: Abdomen is soft. Bowel sounds are normal. She exhibits no distension. There is no abdominal tenderness.   Musculoskeletal:         General: Normal range of motion.      Cervical back: Normal range of motion and neck supple.     Lymphadenopathy:     She has no cervical adenopathy.   Neurological: She is alert and oriented to person, place, and time. She has normal strength. GCS score is 15. GCS eye subscore is 4. GCS verbal subscore is 5. GCS motor subscore is 6.   Skin: Skin is warm and dry. Capillary refill takes less than 2 seconds. There is pallor.   Psychiatric: She has a normal mood and affect.   ED Course   Procedures  Labs Reviewed   CBC W/ AUTO DIFFERENTIAL - Abnormal; Notable for the following components:       Result Value     MCHC 31.4 (*)     RDW 15.1 (*)     All other components within normal limits   COMPREHENSIVE METABOLIC PANEL - Abnormal; Notable for the following components:    Glucose 245 (*)     Creatinine 1.9 (*)     eGFR if  36 (*)     eGFR if non  31 (*)     All other components within normal limits   URINALYSIS, REFLEX TO URINE CULTURE - Abnormal; Notable for the following components:    Protein, UA 2+ (*)     Glucose, UA 3+ (*)     Occult Blood UA 2+ (*)     All other components within normal limits    Narrative:     Specimen Source->Urine   CK - Abnormal; Notable for the following components:     (*)     All other components within normal limits   POCT GLUCOSE - Abnormal; Notable for the following components:    POCT Glucose 230 (*)     All other components within normal limits   BETA - HYDROXYBUTYRATE, SERUM   URINALYSIS MICROSCOPIC    Narrative:     Specimen Source->Urine   POCT URINE PREGNANCY   SARS-COV-2 RDRP GENE        ECG Results          EKG 12-lead (Final result)  Result time 03/20/22 19:20:57    Final result by Interface, Lab In [facility] (03/20/22 19:20:57)                 Narrative:    Test Reason : R06.02,    Vent. Rate : 104 BPM     Atrial Rate : 104 BPM     P-R Int : 138 ms          QRS Dur : 118 ms      QT Int : 380 ms       P-R-T Axes : 034 -09 054 degrees     QTc Int : 499 ms    Sinus tachycardia with occasional Premature ventricular complexes  LVH with QRS widening  Abnormal ECG  When compared with ECG of 24-JUL-2021 17:50,  Significant changes have occurred  Confirmed by Valentino Magallanes MD ([id number]) on 3/20/2022 7:20:53 PM    Referred By: [email or web address]   SELF           Confirmed By:Valentino Magallanes MD                            Imaging Results          X-Ray Chest PA And Lateral (Final result)  Result time 03/18/22 19:11:30    Final result by Corey Anthony MD (03/18/22 19:11:30)                 Impression:      No acute process.      Electronically signed by: Corey Anthony  "MD  Date:    03/18/2022  Time:    19:11             Narrative:    EXAMINATION:  XR CHEST PA AND LATERAL    CLINICAL HISTORY:  Hyperglycemia, unspecified    TECHNIQUE:  PA and lateral views of the chest were performed.    COMPARISON:  07/24/2021.    FINDINGS:  There is stable position of left-sided AICD.  The trachea is unremarkable.  The cardiomediastinal silhouette is within limits.  The hemidiaphragms are unremarkable.  There are no pleural effusions.  There is no evidence of a pneumothorax.  There is no evidence of pneumomediastinum.  No airspace opacity is present.  The osseous structures are unremarkable.  The subcutaneous tissues are unremarkable.                                 Medications   sodium chloride 0.9% bolus 1,000 mL (1,000 mLs Intravenous New Bag 3/18/22 1858)   ketorolac injection 15 mg (15 mg Intravenous Given 3/18/22 2049)           APC / Resident Notes:   46 year old female presents for generalized muscle aches and weakness accompanied by fatigue. The following differential diagnoses are included but not limited to the following: DKA, HHS, rhabdomyolysis, and infection. CBC,CMP, beta hydroxybutyrate, chest x-ray,CK, and urinalysis pending.     This is Flaco Carter dictating:  I entered the room and notified the patient that all her labs and diagnostics were noncontributory for her complaints.  I explained that she may feel better after getting the full liter of ivf, and noticed it wasn't flowing so I exited the room and flushed it per protocol, it still wasn't flowing.  Pt requested a bed and I explained that none were available, she had made this same request several times to [facility]PS ADRIAN Ruff, who did convey those complaints to me, but none were available.  She said "this is ridiculous, I could lay in my bed at home and watch my blood sugar go down, I don't need to be here".  I explained that the IVF were optional and she could be d/c'ed and she said she would prefer that so I exited the " room and prepared her d/c paperwork.  I was notified by nursing staff after noticing that the patient had been moved to room 15, main ED, that she complained that I was rude and not helpful.          ED Course as of 03/25/22 2111   Fri Mar 18, 2022   1826 BP: 124/80 [VC]   1826 Temp: 98.7 °F (37.1 °C) [VC]   1826 Temp src: Oral [VC]   1826 Pulse: 94 [VC]   1826 Resp: 16 [VC]   1826 SpO2(!): 94 % [VC]   1835 STUDENT REPORT: trulicity was switched to ozempic, had colonscopy last week.  For one week has weakness, fatigue, muscle cramps- arms whole body, palpitations, subjective chills. Pe: pale. Bbs cta. Rrr no mcr.   [VC]   1853 POCT Glucose(!): 230 [VC]   1855 Preg Test, Ur: Negative [VC]   1905 Beta-Hydroxybutyrate: 0.0 [VC]   1905 CBC auto differential(!)  Normal cbc. [VC]   1926 CPK(!): 248 [VC]   1926 Urinalysis, Reflex to Urine Culture Urine, Clean Catch(!)  Neg for uti. [VC]   1926 Comprehensive metabolic panel(!) [VC]   1926 Comprehensive metabolic panel(!)  Normal for this patient. [VC]   1926 Urinalysis Microscopic  Normal urinalysis. [VC]   1926 CPK(!): 248  Elevated but not rhabdomyolysis. [VC]   1927 X-Ray Chest PA And Lateral    No acute process. [VC]   1942 INDEPENDENT EKG INTERPRETATION:  RATE 104 NSR, one unifocal pvc. No stemi.   [VC]   2043 Sbar given to Víctor Daniel, BRYAN, my care ends now. [VC]      ED Course User Index  [VC] Flaco Carter DNP             Clinical Impression:   Final diagnoses:  [R73.9] Hyperglycemia  [M79.10] Myalgia (Primary)          ED Disposition Condition    Discharge Stable        ED Prescriptions     None        Follow-up Information     Follow up With Specialties Details Why Contact Info    Gil Leal MD Family Medicine Schedule an appointment as soon as possible for a visit   [address] [place] [id number] [phone]             Flaco Carter [facility]  03/18/22 2036       Flaco Carter, KARLEE  03/25/22 2117

## 2022-03-19 NOTE — DISCHARGE INSTRUCTIONS
Push fluids.  Take all medications as prescribed.  Tylenol/ibuprofen for body aches.  Return to the Emergency Department for any worsening, change in condition, or any emergent concerns.

## 2022-03-19 NOTE — ED NOTES
Bed: 15main  Expected date: 3/18/22  Expected time: 7:14 PM  Means of arrival:   Comments:  closed

## 2022-03-31 DIAGNOSIS — I50.42 CHRONIC COMBINED SYSTOLIC AND DIASTOLIC HEART FAILURE: ICD-10-CM

## 2022-03-31 NOTE — TELEPHONE ENCOUNTER
No new care gaps identified.  Powered by [employer]. Reference number: [id number].   3/31/2022 3:49:01 PM CDT

## 2022-03-31 NOTE — TELEPHONE ENCOUNTER
This Rx Request does not qualify for assessment with the OR   Please review protocol details and the Care Due Message for extra clinical information    Reasons Rx Request may be deferred:  Labs/Vitals abnormal  Patient has been seen in the ED/Hospital since the last PCP visit    Note composed:3:51 PM 03/31/2022

## 2022-04-02 RX ORDER — FUROSEMIDE 40 MG/1
TABLET ORAL
Qty: 90 TABLET | Refills: 0 | Status: SHIPPED | OUTPATIENT
Start: 2022-04-02 | End: 2022-06-10 | Stop reason: SDUPTHER

## 2022-04-24 ENCOUNTER — HOSPITAL ENCOUNTER (EMERGENCY)
Facility: HOSPITAL | Age: 47
Discharge: HOME | End: 2022-04-24
Attending: EMERGENCY MEDICINE
Payer: MEDICARE

## 2022-04-24 VITALS
SYSTOLIC BLOOD PRESSURE: 142 MMHG | HEIGHT: 65 IN | HEART RATE: 77 BPM | OXYGEN SATURATION: 98 % | WEIGHT: 250 LBS | BODY MASS INDEX: 41.65 KG/M2 | DIASTOLIC BLOOD PRESSURE: 81 MMHG | TEMPERATURE: 99 F | RESPIRATION RATE: 18 BRPM

## 2022-04-24 DIAGNOSIS — R11.10 VOMITING, INTRACTABILITY OF VOMITING NOT SPECIFIED, PRESENCE OF NAUSEA NOT SPECIFIED, UNSPECIFIED VOMITING TYPE: ICD-10-CM

## 2022-04-24 DIAGNOSIS — R07.9 CHEST PAIN: ICD-10-CM

## 2022-04-24 DIAGNOSIS — R52 BODY ACHES: ICD-10-CM

## 2022-04-24 DIAGNOSIS — R07.89 CHEST PAIN, NON-CARDIAC: Primary | ICD-10-CM

## 2022-04-24 DIAGNOSIS — R53.83 FATIGUE, UNSPECIFIED TYPE: ICD-10-CM

## 2022-04-24 DIAGNOSIS — J06.9 VIRAL URI WITH COUGH: ICD-10-CM

## 2022-04-24 LAB
ALBUMIN SERPL BCP-MCNC: 3.6 G/DL (ref 3.5–5.2)
ALP SERPL-CCNC: 115 U/L (ref 55–135)
ALT SERPL W/O P-5'-P-CCNC: 28 U/L (ref 10–44)
ANION GAP SERPL CALC-SCNC: 11 MMOL/L (ref 8–16)
AST SERPL-CCNC: 26 U/L (ref 10–40)
B-HCG UR QL: NEGATIVE
BACTERIA #/AREA URNS HPF: ABNORMAL /HPF
BASOPHILS # BLD AUTO: 0.02 K/UL (ref 0–0.2)
BASOPHILS NFR BLD: 0.3 % (ref 0–1.9)
BILIRUB SERPL-MCNC: 0.7 MG/DL (ref 0.1–1)
BILIRUB UR QL STRIP: NEGATIVE
BNP SERPL-MCNC: 15 PG/ML (ref 0–99)
BUN SERPL-MCNC: 21 MG/DL (ref 6–20)
CALCIUM SERPL-MCNC: 9.6 MG/DL (ref 8.7–10.5)
CHLORIDE SERPL-SCNC: 95 MMOL/L (ref 95–110)
CLARITY UR: CLEAR
CO2 SERPL-SCNC: 28 MMOL/L (ref 23–29)
COLOR UR: YELLOW
CREAT SERPL-MCNC: 2.3 MG/DL (ref 0.5–1.4)
CTP QC/QA: YES
DIFFERENTIAL METHOD: ABNORMAL
EOSINOPHIL # BLD AUTO: 0.2 K/UL (ref 0–0.5)
EOSINOPHIL NFR BLD: 2.3 % (ref 0–8)
ERYTHROCYTE [DISTWIDTH] IN BLOOD BY AUTOMATED COUNT: 14.8 % (ref 11.5–14.5)
EST. GFR  (AFRICAN AMERICAN): 29 ML/MIN/1.73 M^2
EST. GFR  (NON AFRICAN AMERICAN): 25 ML/MIN/1.73 M^2
GLUCOSE SERPL-MCNC: 458 MG/DL (ref 70–110)
GLUCOSE UR QL STRIP: ABNORMAL
HCT VFR BLD AUTO: 44.4 % (ref 37–48.5)
HGB BLD-MCNC: 13.9 G/DL (ref 12–16)
HGB UR QL STRIP: ABNORMAL
HYALINE CASTS #/AREA URNS LPF: 0 /LPF
IMM GRANULOCYTES # BLD AUTO: 0.04 K/UL (ref 0–0.04)
IMM GRANULOCYTES NFR BLD AUTO: 0.5 % (ref 0–0.5)
KETONES UR QL STRIP: NEGATIVE
LEUKOCYTE ESTERASE UR QL STRIP: NEGATIVE
LIPASE SERPL-CCNC: 17 U/L (ref 4–60)
LYMPHOCYTES # BLD AUTO: 2.1 K/UL (ref 1–4.8)
LYMPHOCYTES NFR BLD: 26.9 % (ref 18–48)
MCH RBC QN AUTO: 28.7 PG (ref 27–31)
MCHC RBC AUTO-ENTMCNC: 31.3 G/DL (ref 32–36)
MCV RBC AUTO: 92 FL (ref 82–98)
MICROSCOPIC COMMENT: ABNORMAL
MONOCYTES # BLD AUTO: 0.5 K/UL (ref 0.3–1)
MONOCYTES NFR BLD: 6.2 % (ref 4–15)
NEUTROPHILS # BLD AUTO: 5.1 K/UL (ref 1.8–7.7)
NEUTROPHILS NFR BLD: 63.8 % (ref 38–73)
NITRITE UR QL STRIP: NEGATIVE
NRBC BLD-RTO: 0 /100 WBC
PH UR STRIP: 7 [PH] (ref 5–8)
PLATELET # BLD AUTO: 241 K/UL (ref 150–450)
PMV BLD AUTO: 10.5 FL (ref 9.2–12.9)
POC MOLECULAR INFLUENZA A AGN: NEGATIVE
POC MOLECULAR INFLUENZA B AGN: NEGATIVE
POCT GLUCOSE: 289 MG/DL (ref 70–110)
POCT GLUCOSE: 308 MG/DL (ref 70–110)
POCT GLUCOSE: 408 MG/DL (ref 70–110)
POTASSIUM SERPL-SCNC: 3.6 MMOL/L (ref 3.5–5.1)
PROT SERPL-MCNC: 7.8 G/DL (ref 6–8.4)
PROT UR QL STRIP: ABNORMAL
RBC # BLD AUTO: 4.84 M/UL (ref 4–5.4)
RBC #/AREA URNS HPF: 1 /HPF (ref 0–4)
SARS-COV-2 RDRP RESP QL NAA+PROBE: NEGATIVE
SODIUM SERPL-SCNC: 134 MMOL/L (ref 136–145)
SP GR UR STRIP: 1.02 (ref 1–1.03)
SQUAMOUS #/AREA URNS HPF: 13 /HPF
TROPONIN I SERPL DL<=0.01 NG/ML-MCNC: 0.02 NG/ML (ref 0–0.03)
TROPONIN I SERPL DL<=0.01 NG/ML-MCNC: 0.03 NG/ML (ref 0–0.03)
TSH SERPL DL<=0.005 MIU/L-ACNC: 1.46 UIU/ML (ref 0.4–4)
URN SPEC COLLECT METH UR: ABNORMAL
UROBILINOGEN UR STRIP-ACNC: NEGATIVE EU/DL
WBC # BLD AUTO: 7.91 K/UL (ref 3.9–12.7)
WBC #/AREA URNS HPF: 4 /HPF (ref 0–5)
YEAST URNS QL MICRO: ABNORMAL

## 2022-04-24 PROCEDURE — 85025 COMPLETE CBC W/AUTO DIFF WBC: CPT | Performed by: EMERGENCY MEDICINE

## 2022-04-24 PROCEDURE — 81025 URINE PREGNANCY TEST: CPT | Performed by: EMERGENCY MEDICINE

## 2022-04-24 PROCEDURE — U0002 COVID-19 LAB TEST NON-CDC: HCPCS | Performed by: EMERGENCY MEDICINE

## 2022-04-24 PROCEDURE — 80053 COMPREHEN METABOLIC PANEL: CPT | Performed by: EMERGENCY MEDICINE

## 2022-04-24 PROCEDURE — 96374 THER/PROPH/DIAG INJ IV PUSH: CPT

## 2022-04-24 PROCEDURE — 25000003 PHARM REV CODE 250: Performed by: EMERGENCY MEDICINE

## 2022-04-24 PROCEDURE — 93005 ELECTROCARDIOGRAM TRACING: CPT

## 2022-04-24 PROCEDURE — 96375 TX/PRO/DX INJ NEW DRUG ADDON: CPT

## 2022-04-24 PROCEDURE — 84443 ASSAY THYROID STIM HORMONE: CPT | Performed by: EMERGENCY MEDICINE

## 2022-04-24 PROCEDURE — 83690 ASSAY OF LIPASE: CPT | Performed by: EMERGENCY MEDICINE

## 2022-04-24 PROCEDURE — 84484 ASSAY OF TROPONIN QUANT: CPT | Performed by: EMERGENCY MEDICINE

## 2022-04-24 PROCEDURE — 63600175 PHARM REV CODE 636 W HCPCS: Performed by: EMERGENCY MEDICINE

## 2022-04-24 PROCEDURE — 93010 ELECTROCARDIOGRAM REPORT: CPT | Mod: ,,, | Performed by: INTERNAL MEDICINE

## 2022-04-24 PROCEDURE — 87502 INFLUENZA DNA AMP PROBE: CPT

## 2022-04-24 PROCEDURE — 82962 GLUCOSE BLOOD TEST: CPT | Mod: 91

## 2022-04-24 PROCEDURE — 83880 ASSAY OF NATRIURETIC PEPTIDE: CPT | Performed by: EMERGENCY MEDICINE

## 2022-04-24 PROCEDURE — 99285 EMERGENCY DEPT VISIT HI MDM: CPT | Mod: 25

## 2022-04-24 PROCEDURE — 93010 EKG 12-LEAD: ICD-10-PCS | Mod: ,,, | Performed by: INTERNAL MEDICINE

## 2022-04-24 PROCEDURE — 81000 URINALYSIS NONAUTO W/SCOPE: CPT | Performed by: EMERGENCY MEDICINE

## 2022-04-24 PROCEDURE — 84484 ASSAY OF TROPONIN QUANT: CPT | Mod: 91 | Performed by: EMERGENCY MEDICINE

## 2022-04-24 RX ORDER — ONDANSETRON 4 MG/1
4 TABLET, FILM COATED ORAL EVERY 6 HOURS PRN
Qty: 12 TABLET | Refills: 0 | Status: SHIPPED | OUTPATIENT
Start: 2022-04-24 | End: 2022-05-10

## 2022-04-24 RX ORDER — MORPHINE SULFATE 4 MG/ML
2 INJECTION, SOLUTION INTRAMUSCULAR; INTRAVENOUS
Status: COMPLETED | OUTPATIENT
Start: 2022-04-24 | End: 2022-04-24

## 2022-04-24 RX ORDER — ONDANSETRON 4 MG/1
4 TABLET, ORALLY DISINTEGRATING ORAL
Status: COMPLETED | OUTPATIENT
Start: 2022-04-24 | End: 2022-04-24

## 2022-04-24 RX ADMIN — MORPHINE SULFATE 2 MG: 4 INJECTION INTRAVENOUS at 08:04

## 2022-04-24 RX ADMIN — INSULIN HUMAN 10 UNITS: 100 INJECTION, SOLUTION PARENTERAL at 05:04

## 2022-04-24 RX ADMIN — ONDANSETRON 4 MG: 4 TABLET, ORALLY DISINTEGRATING ORAL at 08:04

## 2022-04-24 NOTE — ED PROVIDER NOTES
"Encounter Date: 4/24/2022    SCRIBE #1 NOTE: I, Miriam Arreola, am scribing for, and in the presence of,  Cristian Treadwell MD. I have scribed the following portions of the note - Other sections scribed: HPI, ROS.       History     Chief Complaint   Patient presents with    Chest Pain     Patient reports mid sternal chest pain that is squeezing like a tight fist that started last night, states N/V and fatigue as well, also started last night.      Fabiana Chanel is a 46 y.o female with a PMHx of atrial fibrillation, cardiomyopathy, CHF, DM, and HTN presenting to the ED with a chief complaint of chest pain onset 2 days ago. The patient complains of mid-sternal chest pain described as "squeezing like a tight first" that is not exacerbated by deep breath and is associated with fatigue, body aches, cough, rhinorrhea, 2 episodes of emesis in the past 24 hours, subjective fever, and tightness to the lateral aspect of the left thigh. Patient denies shortness of breath, chills, abdominal pain, diarrhea, dysuria, headaches, sore throat, eye pain, ear pain, rash, or other associated symptoms.    The history is provided by the patient. No  was used.     Review of patient's allergies indicates:   Allergen Reactions    Metformin      Diarrhea on metformin XR     Pneumococcal 23-abimbola ps vaccine      Past Medical History:   Diagnosis Date    Atrial fibrillation     Blood clot associated with vein wall inflammation     not dvt    Cardiomyopathy     Normal cors on cath 11/2017    CHF (congestive heart failure)     DM (diabetes mellitus) 9/19/2013    Hyperlipidemia     Hypertension     Psoriasis     Sleep apnea      Past Surgical History:   Procedure Laterality Date    CARDIAC CATHETERIZATION      COLONOSCOPY      COLONOSCOPY N/A 3/9/2022    Procedure: COLONOSCOPY;  Surgeon: Vielka Burrell MD;  Location: [facility];  Service: Endoscopy;  Laterality: N/A;    DILATION AND CURETTAGE OF UTERUS "      ESOPHAGOGASTRODUODENOSCOPY N/A 5/9/2019    Procedure: EGD (ESOPHAGOGASTRODUODENOSCOPY);  Surgeon: Vielka Burrell MD;  Location: [facility];  Service: Endoscopy;  Laterality: N/A;    TRANSFORAMINAL EPIDURAL INJECTION OF STEROID N/A 1/6/2022    Procedure: Transforaminal ANKITA L4/L5 L5/S1;  Surgeon: Jed Yeager MD;  Location: [facility];  Service: Pain Management;  Laterality: N/A;     Family History   Problem Relation Age of Onset    Hypertension Mother     Hypertension Father     Diabetes Father     Diabetes Maternal Grandmother     Diabetes Paternal Grandmother     Breast cancer Neg Hx     Colon cancer Neg Hx     Ovarian cancer Neg Hx      Social History     Tobacco Use    Smoking status: Never Smoker    Smokeless tobacco: Never Used    Tobacco comment: smokes cigars on occasion   Substance Use Topics    Alcohol use: Yes     Comment: occasional    Drug use: Yes     Types: Marijuana     Comment: occ     Review of Systems   Constitutional: Positive for fatigue and fever (subjective). Negative for chills and diaphoresis.   HENT: Positive for rhinorrhea. Negative for ear pain and sore throat.    Eyes: Negative for pain.   Respiratory: Positive for cough. Negative for shortness of breath.    Cardiovascular: Positive for chest pain.   Gastrointestinal: Positive for vomiting. Negative for abdominal pain and diarrhea.   Genitourinary: Negative for dysuria.   Musculoskeletal: Positive for myalgias (body aches).        (+) tightness to the lateral aspect of the left thigh   Skin: Negative for rash.   Neurological: Negative for headaches.       Physical Exam     Initial Vitals [04/24/22 1610]   BP Pulse Resp Temp SpO2   129/86 88 20 98.3 °F (36.8 °C) 95 %      MAP       --         Physical Exam  The patient was examined specifically for the following:   General:No significant distress, Good color, Warm and dry. Head and neck:Scalp atraumatic, Neck supple. Neurological:Appropriate conversation,  Gross motor deficits. Eyes:Conjugate gaze, Clear corneas. ENT: No epistaxis. Cardiac: Regular rate and rhythm, Grossly normal heart tones. Pulmonary: Wheezing, Rales. Gastrointestinal: Abdominal tenderness, Abdominal distention. Musculoskeletal: Extremity deformity, Apparent pain with range of motion of the joints. Skin: Rash.   The findings on examination were normal except for the following:  Patient is marked sternal tenderness.  She has an elevated BMI.  She has very thin legs.  There is no significant lower extremity swelling or tenderness.  There is no evidence of respiratory distress.  The patient is afebrile.  Lungs are clear and free of wheezing rales rubs or rhonchi.  Heart tones are normal.  The patient has regular rate and rhythm.  The abdomen is nontender.  ED Course   Procedures  Labs Reviewed   CBC W/ AUTO DIFFERENTIAL - Abnormal; Notable for the following components:       Result Value    MCHC 31.3 (*)     RDW 14.8 (*)     All other components within normal limits   COMPREHENSIVE METABOLIC PANEL - Abnormal; Notable for the following components:    Sodium 134 (*)     Glucose 458 (*)     BUN 21 (*)     Creatinine 2.3 (*)     eGFR if  29 (*)     eGFR if non  25 (*)     All other components within normal limits    Narrative:        GLUCOSE critical result(s) called and verbal readback obtained   from JULIET VARGAS by RM6 04/24/2022 17:27   TROPONIN I - Abnormal; Notable for the following components:    Troponin I 0.030 (*)     All other components within normal limits   POCT GLUCOSE - Abnormal; Notable for the following components:    POCT Glucose 408 (*)     All other components within normal limits   POCT GLUCOSE - Abnormal; Notable for the following components:    POCT Glucose 289 (*)     All other components within normal limits   B-TYPE NATRIURETIC PEPTIDE   TSH   TSH   TROPONIN I   LIPASE   URINALYSIS, REFLEX TO URINE CULTURE   POCT URINE PREGNANCY   SARS-COV-2 RDRP  GENE   POCT INFLUENZA A/B MOLECULAR   POCT GLUCOSE MONITORING CONTINUOUS     EKG Readings: (Independently Interpreted)   This patient is in a sinus rhythm with a heart rate of 90. The patient has a left bundle branch block.  There is a prolonged QT interval.  There is no definite evidence of acute myocardial infarction or malignant arrhythmia.       Imaging Results          X-Ray Chest PA And Lateral (Final result)  Result time 04/24/22 16:41:55    Final result by Jamie Frank MD (04/24/22 16:41:55)                 Impression:      No acute radiographic abnormality.      Electronically signed by: Jamie Frank  Date:    04/24/2022  Time:    16:41             Narrative:    EXAMINATION:  XR CHEST PA AND LATERAL    CLINICAL HISTORY:  Chest pain, unspecified    TECHNIQUE:  PA and lateral views of the chest were performed.    COMPARISON:  03/18/2022    FINDINGS:  Cardiac defibrillator device on the left.    The lungs are clear, with normal appearance of pulmonary vasculature and no pleural effusion or pneumothorax.    The cardiac silhouette is normal in size. The hilar and mediastinal contours are unremarkable.    Bones are intact.    No significant change.                              Medical decision making:  This patient presents to the emergency with a history of cardiomyopathy with a pacemaker.  The patient had coronary arteries by angiogram in 2017.  She is here with a chest pain syndrome that seems noncardiac.  Pain is worse with deep breathing and coughing.  The sternum is very tender where she complains of pain.  At the same time her troponin is elevated at 0.030.   The patient has fatigue cough vomiting generalized body aches.  I am wondering whether she has a viral syndrome.  There is no abdominal pain.  The abdomen is soft.  I will repeat the troponin.  If the 2nd troponin is the same, I believe this patient could be dispositioned to outpatient evaluation and treatment.  I doubt sepsis.  Dr. Shah will  provide the final disposition.          Medications   insulin regular injection 10 Units (10 Units Intravenous Given 4/24/22 1741)   morphine injection 2 mg (2 mg Intravenous Given 4/24/22 2034)   ondansetron disintegrating tablet 4 mg (4 mg Oral Given 4/24/22 2033)              Scribe Attestation:   Scribe #1: I performed the above scribed service and the documentation accurately describes the services I performed. I attest to the accuracy of the note.                 Clinical Impression:   Final diagnoses:  [R07.9] Chest pain  [R07.89] Chest pain, non-cardiac (Primary)  [J06.9] Viral URI with cough  [R11.10] Vomiting, intractability of vomiting not specified, presence of nausea not specified, unspecified vomiting type  [R52] Body aches  [R53.83] Fatigue, unspecified type       I personally performed the services described in this documentation.  All medical record  entries made by the scribe are at my direction and in my presence.  Signed, Dr. Eben Treadwell MD  04/24/22 7423

## 2022-04-24 NOTE — ED TRIAGE NOTES
Patient reports mid sternal chest pain that is squeezing like a tight fist that started last night, states N/V and fatigue as well, also started last night

## 2022-04-25 ENCOUNTER — CLINICAL SUPPORT (OUTPATIENT)
Dept: CARDIOLOGY | Facility: HOSPITAL | Age: 47
End: 2022-04-25
Payer: MEDICARE

## 2022-04-25 DIAGNOSIS — Z95.810 PRESENCE OF AUTOMATIC (IMPLANTABLE) CARDIAC DEFIBRILLATOR: ICD-10-CM

## 2022-04-25 PROCEDURE — 93296 REM INTERROG EVL PM/IDS: CPT | Performed by: INTERNAL MEDICINE

## 2022-04-25 NOTE — ED PROVIDER NOTES
[place] of Care Note:    I assumed care of this patient at shift change from Dr. Treadwell pending labs, reassessment, and final disposition. Briefly, this is a 46 year old female with history of afib (on Eliquis), DM, cardiomyoapthy (last EF 15%, defibrilator in place) who presented today with multiple complaints- mid sternal CP, fatigue, body aches, cough, rhinorrhea, and emesis.  On my assessment, the patient reports continued fatigue but feels her chest pain has improved.  Per report from Dr. Treadwell, her chest pain was reproducible on exam, the patient confirms this when I take her history.  She reports she has a cardiac device check tomorrow and has follow-up with her endocrinologist, Dr. Daniel, in 3 days.  Work up was initiated with labs and CXR. Labs and imaging significant  For: initial troponin minimally elevated, repeat negative.  Patient's creatinine 2.3, baseline appears to range between 1.5 up to 2. Patient was hyperglycemic.  Bicarb level normal, no anion gap.  Lipase within normal limits.  ECG shows normal sinus rhythm, rate 90 beats per minute, normal IA interval,  milliseconds, nonspecific intraventricular conduction delay.  No STEMI.  The chest x-ray was negative for acute finding.  Flu and COVID testing were negative.  Lipase was negative.  The patient was treated with small dose of morphine and insulin for her blood sugar.  Fluids were not given due to history of severe cardiomyopathy. Suspect viral illness, costochondritis. I reviewed all lab and imaging with patient, recommend follow up with her primary care physician, her endocrinologist as scheduled, her cardiologist. Will prescribe Zofran to use at home if needed for nausea vomiting. Reviewed return precautions including any new or worsening symptoms. All questions answered, patient understands and agrees with plan.     Rosio Shah MD   9:41 PM         Rosio Shah MD  04/24/22 5200

## 2022-04-27 ENCOUNTER — HOSPITAL ENCOUNTER (EMERGENCY)
Facility: HOSPITAL | Age: 47
Discharge: HOME | End: 2022-04-28
Attending: EMERGENCY MEDICINE
Payer: MEDICARE

## 2022-04-27 ENCOUNTER — PATIENT MESSAGE (OUTPATIENT)
Dept: ENDOCRINOLOGY | Facility: CLINIC | Age: 47
End: 2022-04-27
Payer: MEDICARE

## 2022-04-27 DIAGNOSIS — M79.10 MYALGIA: Primary | ICD-10-CM

## 2022-04-27 DIAGNOSIS — R73.9 HYPERGLYCEMIA: ICD-10-CM

## 2022-04-27 LAB
BACTERIA #/AREA URNS HPF: NORMAL /HPF
BILIRUB UR QL STRIP: NEGATIVE
CLARITY UR: CLEAR
COLOR UR: COLORLESS
GLUCOSE UR QL STRIP: ABNORMAL
HGB UR QL STRIP: ABNORMAL
HYALINE CASTS #/AREA URNS LPF: 0 /LPF
KETONES UR QL STRIP: NEGATIVE
LEUKOCYTE ESTERASE UR QL STRIP: NEGATIVE
MICROSCOPIC COMMENT: NORMAL
NITRITE UR QL STRIP: NEGATIVE
PH UR STRIP: 7 [PH] (ref 5–8)
POCT GLUCOSE: 393 MG/DL (ref 70–110)
PROT UR QL STRIP: ABNORMAL
RBC #/AREA URNS HPF: 0 /HPF (ref 0–4)
SP GR UR STRIP: 1.01 (ref 1–1.03)
SQUAMOUS #/AREA URNS HPF: 3 /HPF
URN SPEC COLLECT METH UR: ABNORMAL
UROBILINOGEN UR STRIP-ACNC: NEGATIVE EU/DL
WBC #/AREA URNS HPF: 1 /HPF (ref 0–5)
YEAST URNS QL MICRO: NORMAL

## 2022-04-27 PROCEDURE — 83690 ASSAY OF LIPASE: CPT | Performed by: PHYSICIAN ASSISTANT

## 2022-04-27 PROCEDURE — 83880 ASSAY OF NATRIURETIC PEPTIDE: CPT | Performed by: PHYSICIAN ASSISTANT

## 2022-04-27 PROCEDURE — 82010 KETONE BODYS QUAN: CPT | Performed by: PHYSICIAN ASSISTANT

## 2022-04-27 PROCEDURE — 81000 URINALYSIS NONAUTO W/SCOPE: CPT | Performed by: PHYSICIAN ASSISTANT

## 2022-04-27 PROCEDURE — 93005 ELECTROCARDIOGRAM TRACING: CPT

## 2022-04-27 PROCEDURE — 93010 EKG 12-LEAD: ICD-10-PCS | Mod: ,,, | Performed by: INTERNAL MEDICINE

## 2022-04-27 PROCEDURE — 99284 EMERGENCY DEPT VISIT MOD MDM: CPT | Mod: 25

## 2022-04-27 PROCEDURE — 93010 ELECTROCARDIOGRAM REPORT: CPT | Mod: ,,, | Performed by: INTERNAL MEDICINE

## 2022-04-27 PROCEDURE — 87502 INFLUENZA DNA AMP PROBE: CPT

## 2022-04-27 PROCEDURE — 96374 THER/PROPH/DIAG INJ IV PUSH: CPT

## 2022-04-27 PROCEDURE — 82962 GLUCOSE BLOOD TEST: CPT

## 2022-04-27 PROCEDURE — 84443 ASSAY THYROID STIM HORMONE: CPT | Performed by: PHYSICIAN ASSISTANT

## 2022-04-27 PROCEDURE — 84484 ASSAY OF TROPONIN QUANT: CPT | Performed by: PHYSICIAN ASSISTANT

## 2022-04-27 PROCEDURE — 83735 ASSAY OF MAGNESIUM: CPT | Performed by: PHYSICIAN ASSISTANT

## 2022-04-27 PROCEDURE — 85025 COMPLETE CBC W/AUTO DIFF WBC: CPT | Performed by: PHYSICIAN ASSISTANT

## 2022-04-27 PROCEDURE — 80053 COMPREHEN METABOLIC PANEL: CPT | Performed by: PHYSICIAN ASSISTANT

## 2022-04-27 PROCEDURE — 96375 TX/PRO/DX INJ NEW DRUG ADDON: CPT

## 2022-04-27 RX ORDER — MORPHINE SULFATE 4 MG/ML
4 INJECTION, SOLUTION INTRAMUSCULAR; INTRAVENOUS
Status: COMPLETED | OUTPATIENT
Start: 2022-04-28 | End: 2022-04-28

## 2022-04-27 RX ORDER — ONDANSETRON 2 MG/ML
4 INJECTION INTRAMUSCULAR; INTRAVENOUS
Status: COMPLETED | OUTPATIENT
Start: 2022-04-28 | End: 2022-04-28

## 2022-04-28 VITALS
WEIGHT: 250 LBS | DIASTOLIC BLOOD PRESSURE: 63 MMHG | HEART RATE: 97 BPM | TEMPERATURE: 99 F | SYSTOLIC BLOOD PRESSURE: 103 MMHG | BODY MASS INDEX: 42.68 KG/M2 | OXYGEN SATURATION: 98 % | RESPIRATION RATE: 16 BRPM | HEIGHT: 64 IN

## 2022-04-28 LAB
ALBUMIN SERPL BCP-MCNC: 4 G/DL (ref 3.5–5.2)
ALLENS TEST: ABNORMAL
ALP SERPL-CCNC: 123 U/L (ref 55–135)
ALT SERPL W/O P-5'-P-CCNC: 29 U/L (ref 10–44)
ANION GAP SERPL CALC-SCNC: 14 MMOL/L (ref 8–16)
AST SERPL-CCNC: 30 U/L (ref 10–40)
B-OH-BUTYR BLD STRIP-SCNC: 0.1 MMOL/L (ref 0–0.5)
BASOPHILS # BLD AUTO: 0.04 K/UL (ref 0–0.2)
BASOPHILS NFR BLD: 0.4 % (ref 0–1.9)
BILIRUB SERPL-MCNC: 0.7 MG/DL (ref 0.1–1)
BNP SERPL-MCNC: 11 PG/ML (ref 0–99)
BUN SERPL-MCNC: 19 MG/DL (ref 6–20)
CALCIUM SERPL-MCNC: 9.8 MG/DL (ref 8.7–10.5)
CHLORIDE SERPL-SCNC: 97 MMOL/L (ref 95–110)
CO2 SERPL-SCNC: 26 MMOL/L (ref 23–29)
CREAT SERPL-MCNC: 2.7 MG/DL (ref 0.5–1.4)
CTP QC/QA: YES
DELSYS: ABNORMAL
DIFFERENTIAL METHOD: ABNORMAL
EOSINOPHIL # BLD AUTO: 0.3 K/UL (ref 0–0.5)
EOSINOPHIL NFR BLD: 2.5 % (ref 0–8)
ERYTHROCYTE [DISTWIDTH] IN BLOOD BY AUTOMATED COUNT: 14.9 % (ref 11.5–14.5)
EST. GFR  (AFRICAN AMERICAN): 24 ML/MIN/1.73 M^2
EST. GFR  (NON AFRICAN AMERICAN): 21 ML/MIN/1.73 M^2
GLUCOSE SERPL-MCNC: 303 MG/DL (ref 70–110)
HCO3 UR-SCNC: 34.7 MMOL/L (ref 24–28)
HCT VFR BLD AUTO: 48.6 % (ref 37–48.5)
HGB BLD-MCNC: 15.3 G/DL (ref 12–16)
IMM GRANULOCYTES # BLD AUTO: 0.05 K/UL (ref 0–0.04)
IMM GRANULOCYTES NFR BLD AUTO: 0.5 % (ref 0–0.5)
LIPASE SERPL-CCNC: 44 U/L (ref 4–60)
LYMPHOCYTES # BLD AUTO: 3.1 K/UL (ref 1–4.8)
LYMPHOCYTES NFR BLD: 30.1 % (ref 18–48)
MAGNESIUM SERPL-MCNC: 2.7 MG/DL (ref 1.6–2.6)
MCH RBC QN AUTO: 28.9 PG (ref 27–31)
MCHC RBC AUTO-ENTMCNC: 31.5 G/DL (ref 32–36)
MCV RBC AUTO: 92 FL (ref 82–98)
MODE: ABNORMAL
MONOCYTES # BLD AUTO: 0.7 K/UL (ref 0.3–1)
MONOCYTES NFR BLD: 7.1 % (ref 4–15)
NEUTROPHILS # BLD AUTO: 6.1 K/UL (ref 1.8–7.7)
NEUTROPHILS NFR BLD: 59.4 % (ref 38–73)
NRBC BLD-RTO: 0 /100 WBC
PCO2 BLDA: 65.1 MMHG (ref 35–45)
PH SMN: 7.33 [PH] (ref 7.35–7.45)
PLATELET # BLD AUTO: 252 K/UL (ref 150–450)
PMV BLD AUTO: 10.9 FL (ref 9.2–12.9)
PO2 BLDA: 39 MMHG (ref 40–60)
POC BE: 6 MMOL/L
POC MOLECULAR INFLUENZA A AGN: NEGATIVE
POC MOLECULAR INFLUENZA B AGN: NEGATIVE
POC SATURATED O2: 68 % (ref 95–100)
POC TCO2: 37 MMOL/L (ref 24–29)
POTASSIUM SERPL-SCNC: 4.2 MMOL/L (ref 3.5–5.1)
PROT SERPL-MCNC: 8.9 G/DL (ref 6–8.4)
RBC # BLD AUTO: 5.29 M/UL (ref 4–5.4)
SAMPLE: ABNORMAL
SARS-COV-2 RDRP RESP QL NAA+PROBE: NEGATIVE
SITE: ABNORMAL
SODIUM SERPL-SCNC: 137 MMOL/L (ref 136–145)
TROPONIN I SERPL DL<=0.01 NG/ML-MCNC: 0.03 NG/ML (ref 0–0.03)
TROPONIN I SERPL DL<=0.01 NG/ML-MCNC: 0.03 NG/ML (ref 0–0.03)
TSH SERPL DL<=0.005 MIU/L-ACNC: 1.69 UIU/ML (ref 0.4–4)
WBC # BLD AUTO: 10.3 K/UL (ref 3.9–12.7)

## 2022-04-28 PROCEDURE — 99900035 HC TECH TIME PER 15 MIN (STAT)

## 2022-04-28 PROCEDURE — 84484 ASSAY OF TROPONIN QUANT: CPT | Performed by: EMERGENCY MEDICINE

## 2022-04-28 PROCEDURE — 82803 BLOOD GASES ANY COMBINATION: CPT

## 2022-04-28 PROCEDURE — U0002 COVID-19 LAB TEST NON-CDC: HCPCS | Performed by: EMERGENCY MEDICINE

## 2022-04-28 PROCEDURE — 63600175 PHARM REV CODE 636 W HCPCS: Performed by: EMERGENCY MEDICINE

## 2022-04-28 RX ORDER — OXYCODONE AND ACETAMINOPHEN 5; 325 MG/1; MG/1
1 TABLET ORAL EVERY 4 HOURS PRN
Qty: 13 TABLET | Refills: 0 | Status: SHIPPED | OUTPATIENT
Start: 2022-04-28 | End: 2022-05-17

## 2022-04-28 RX ORDER — ONDANSETRON 4 MG/1
4 TABLET, FILM COATED ORAL EVERY 6 HOURS
Qty: 12 TABLET | Refills: 0 | Status: SHIPPED | OUTPATIENT
Start: 2022-04-28 | End: 2022-05-10

## 2022-04-28 RX ADMIN — ONDANSETRON 4 MG: 2 INJECTION INTRAMUSCULAR; INTRAVENOUS at 12:04

## 2022-04-28 RX ADMIN — MORPHINE SULFATE 4 MG: 4 INJECTION INTRAVENOUS at 12:04

## 2022-04-28 NOTE — RESPIRATORY THERAPY
Latest Reference Range & Units 04/28/22 00:26   POC PH 7.35 - 7.45  7.334 (L)   POC PCO2 35 - 45 mmHg 65.1 (H)   POC PO2 40 - 60 mmHg 39 (L)   POC BE -2 to 2 mmol/L 6   POC HCO3 24 - 28 mmol/L 34.7 (H)   POC SATURATED O2 95 - 100 % 68 (L)   POC TCO2 24 - 29 mmol/L 37 (H)   Sample  VENOUS   DelSys  Room Air   Allens Test  N/A   Site  Other   Mode  SPONT   (L): Data is abnormally low  (H): Data is abnormally high

## 2022-04-28 NOTE — ED PROVIDER NOTES
Encounter Date: 4/27/2022    SCRIBE #1 NOTE: I, Radhasilvia Vicente, am scribing for, and in the presence of, Panda Lincoln MD.       History     Chief Complaint   Patient presents with    Fatigue     PT with generalized weakness 4 days and new mild chest pain today.      46 year old female, with pertinent medical history of atrial fibrillation, cardiomyopathy, CHF, HLD, HTN, stage 3 CKD, DM, and a defibrillator in place, presents to the ED to evaluate her generalized body aches that are most prominently located in her chest, back, belly, arms, and legs. Patient describes the pain as aching and states it began on Sunday. She reports the pain feels the same all over her body. She also reports vomiting and weakness that began on Sunday. Patient had visited the ED 3 days ago for chest pain and associated body aches but states the pain is worse this time. She also endorses a high blood sugar of about 300 when her normal is in the 200s. Patient reports she is compliant with her prescribed medications. She states her last normal bowel movement was yesterday. Patient denies any recent travel or changes to her lifestyle. She denies any recent falls or trauma. Patient states there are no known sick contacts. Patient denies fever, cough, congestion, sore throat, dysuria, frequency of urination, vaginal bleeding or discharge, rashes, lesions, or other associated symptoms.    The history is provided by the patient. No  was used.     Review of patient's allergies indicates:   Allergen Reactions    Metformin      Diarrhea on metformin XR     Pneumococcal 23-abimbola ps vaccine      Past Medical History:   Diagnosis Date    Atrial fibrillation     Blood clot associated with vein wall inflammation     not dvt    Cardiomyopathy     Normal cors on cath 11/2017    CHF (congestive heart failure)     DM (diabetes mellitus) 9/19/2013    Hyperlipidemia     Hypertension     Psoriasis     Sleep apnea      Past Surgical  History:   Procedure Laterality Date    CARDIAC CATHETERIZATION      COLONOSCOPY      COLONOSCOPY N/A 3/9/2022    Procedure: COLONOSCOPY;  Surgeon: Vielka Burrell MD;  Location[facility];  Service: Endoscopy;  Laterality: N/A;    DILATION AND CURETTAGE OF UTERUS      ESOPHAGOGASTRODUODENOSCOPY N/A 5/9/2019    Procedure: EGD (ESOPHAGOGASTRODUODENOSCOPY);  Surgeon: Vielka Burrell MD;  Location: [facility];  Service: Endoscopy;  Laterality: N/A;    TRANSFORAMINAL EPIDURAL INJECTION OF STEROID N/A 1/6/2022    Procedure: Transforaminal ANKITA L4/L5 L5/S1;  Surgeon: Jed Yeager MD;  Location[facility];  Service: Pain Management;  Laterality: N/A;     Family History   Problem Relation Age of Onset    Hypertension Mother     Hypertension Father     Diabetes Father     Diabetes Maternal Grandmother     Diabetes Paternal Grandmother     Breast cancer Neg Hx     Colon cancer Neg Hx     Ovarian cancer Neg Hx      Social History     Tobacco Use    Smoking status: Never Smoker    Smokeless tobacco: Never Used    Tobacco comment: smokes cigars on occasion   Substance Use Topics    Alcohol use: Yes     Comment: occasional    Drug use: Yes     Types: Marijuana     Comment: occ     Review of Systems   Constitutional: Negative for chills, fatigue and fever.   HENT: Negative for congestion and sore throat.    Eyes: Negative for pain and visual disturbance.   Respiratory: Negative for cough, chest tightness and shortness of breath.    Cardiovascular: Negative for chest pain.   Gastrointestinal: Positive for vomiting. Negative for nausea.   Endocrine: Negative for polydipsia and polyuria.   Genitourinary: Negative for dysuria, flank pain, frequency, vaginal bleeding and vaginal discharge.   Musculoskeletal: Positive for back pain, myalgias (generalized) and neck pain. Negative for neck stiffness.   Skin: Negative for rash and wound.   Allergic/Immunologic: Negative for immunocompromised state.    Neurological: Positive for weakness. Negative for dizziness.   Hematological: Does not bruise/bleed easily.   Psychiatric/Behavioral: Negative for agitation and behavioral problems.   All other systems reviewed and are negative.      Physical Exam     Initial Vitals [04/27/22 2106]   BP Pulse Resp Temp SpO2   116/78 97 16 98.7 °F (37.1 °C) 95 %      MAP       --         Physical Exam    Nursing note and vitals reviewed.  Constitutional:   Patient is tearful.   Pulmonary/Chest: Breath sounds normal. No respiratory distress.   Abdominal: Abdomen is soft. There is no abdominal tenderness.   Musculoskeletal:      Comments: Tenderness all over the patient's body.      Skin: No lesion and no rash noted.         ED Course   Procedures  Labs Reviewed   CBC W/ AUTO DIFFERENTIAL - Abnormal; Notable for the following components:       Result Value    Hematocrit 48.6 (*)     MCHC 31.5 (*)     RDW 14.9 (*)     Immature Grans (Abs) 0.05 (*)     All other components within normal limits   COMPREHENSIVE METABOLIC PANEL - Abnormal; Notable for the following components:    Glucose 303 (*)     Creatinine 2.7 (*)     Total Protein 8.9 (*)     eGFR if  24 (*)     eGFR if non  21 (*)     All other components within normal limits   URINALYSIS, REFLEX TO URINE CULTURE - Abnormal; Notable for the following components:    Color, UA Colorless (*)     Protein, UA 1+ (*)     Glucose, UA 4+ (*)     Occult Blood UA 2+ (*)     All other components within normal limits    Narrative:     Specimen Source->Urine   MAGNESIUM - Abnormal; Notable for the following components:    Magnesium 2.7 (*)     All other components within normal limits   TROPONIN I - Abnormal; Notable for the following components:    Troponin I 0.032 (*)     All other components within normal limits   TROPONIN I - Abnormal; Notable for the following components:    Troponin I 0.032 (*)     All other components within normal limits   POCT GLUCOSE -  "Abnormal; Notable for the following components:    POCT Glucose 393 (*)     All other components within normal limits   ISTAT PROCEDURE - Abnormal; Notable for the following components:    POC PH 7.334 (*)     POC PCO2 65.1 (*)     POC PO2 39 (*)     POC HCO3 34.7 (*)     POC SATURATED O2 68 (*)     POC TCO2 37 (*)     All other components within normal limits   BETA - HYDROXYBUTYRATE, SERUM   TSH   URINALYSIS MICROSCOPIC    Narrative:     Specimen Source->Urine   SARS-COV-2 RNA AMPLIFICATION, QUAL   B-TYPE NATRIURETIC PEPTIDE   LIPASE   POCT INFLUENZA A/B MOLECULAR     EKG Readings: (Independently Interpreted)     EKG done 01/20/2003 showing normal sinus rhythm occasional PVCs the rate 98. No ST elevation.  Late atrial enlargement.  LVH.  QRS is 120 QTC is 495.  Nonspecific abnormal EKG.       Imaging Results          X-Ray Chest AP Portable (Final result)  Result time 04/27/22 22:03:36    Final result by Sujit Menendez MD (04/27/22 22:03:36)                 Impression:      No detrimental change when compared with 04/24/2022.      Electronically signed by: Sujit Menendez MD  Date:    04/27/2022  Time:    22:03             Narrative:    EXAMINATION:  XR CHEST AP PORTABLE    CLINICAL HISTORY:  Provided history is "hyperglycemia;  ".    TECHNIQUE:  One view of the chest.    COMPARISON:  04/24/2022.    FINDINGS:  Lung volumes are relatively low, accentuating basilar markings.  Cardiomediastinal silhouette is stable, likely mildly enlarged.  Left chest wall AICD is present with transvenous leads overlying the heart.  Prominent perihilar and lower lung zone interstitial markings.  Elevated right hemidiaphragm.  No large focal consolidation.  No sizable pleural effusion.  No pneumothorax.  No detrimental change when compared with the prior study.                                 Medications   morphine injection 4 mg (4 mg Intravenous Given 4/28/22 0007)   ondansetron injection 4 mg (4 mg Intravenous Given 4/28/22 " 0005)     Medical Decision Making:   History:   Old Medical Records: I decided to obtain old medical records.  Old Records Summarized: records from clinic visits.       <> Summary of Records: Per chart review, patient was seen on 4/24/22 at the ED for chest pain and associated body aches. Patient's first troponin was elevated but her second troponin was decreased. Thus, patient was discharged. Patient was also seen for myalgias on 3/18/22 and was discharged after evaluation.   Initial Assessment:     46-year-old female presenting secondary to full body pain.  Vitals reassuring.  Seen multiple times previously.  Unsure exactly the etiology.  Lungs are clear.  No respiratory distress.  Given 1 dose of morphine with marked improvement of her symptoms and she is resting comfortably.  This is on multiple reassessments.  Labs really notable for slightly worsening creatinine but still not a GFR drop when the 50%.  Patient's troponins baseline at 0.032.  Second troponin baseline.  No respiratory distress.  Not tachycardic.  Not hypotensive.  No sign cellulitis or abscess.  No white count.  Labs not consistent with DKA.  Slightly hyperglycemic.  Discharged with outpatient follow-up. I discussed with the patient/family the diagnosis, treatment plan, indications for return to the emergency department, and for expected follow-up. The patient/family verbalized an understanding. The patient/family is asked if there are any questions or concerns. We discuss the case, until all issues are addressed to the patient/familys satisfaction. Patient/family understands and is agreeable to the plan.   Panda Lincoln      Independently Interpreted Test(s):   I have ordered and independently interpreted EKG Reading(s) - see prior notes  Clinical Tests:   Lab Tests: Ordered and Reviewed  Radiological Study: Ordered and Reviewed  Medical Tests: Ordered and Reviewed          Scribe Attestation:   Scribe #1: I performed the above scribed service  and the documentation accurately describes the services I performed. I attest to the accuracy of the note.                 Clinical Impression:   Final diagnoses:  [R73.9] Hyperglycemia  [M79.10] Myalgia (Primary)          ED Disposition Condition    Discharge Stable        ED Prescriptions     Medication Sig Dispense Start Date End Date Auth. Provider    oxyCODONE-acetaminophen (PERCOCET) 5-325 mg per tablet Take 1 tablet by mouth every 4 (four) hours as needed. 13 tablet 4/28/2022  Panda Solano MD    ondansetron (ZOFRAN) 4 MG tablet Take 1 tablet (4 mg total) by mouth every 6 (six) hours. 12 tablet 4/28/2022  Panda Solano MD        Follow-up Information     Follow up With Specialties Details Why Contact Info    Gil Leal MD Family Medicine Schedule an appointment as soon as possible for a visit in 2 days  [address] [place] [address]  [phone]         I, panda solano, personally performed the services described in this documentation. All medical record entries made by the scribe were at my direction and in my presence. I have reviewed the chart and agree that the record reflects my personal performance and is accurate and complete.     Panda Solano MD  04/28/22 0413

## 2022-04-28 NOTE — DISCHARGE INSTRUCTIONS
Thank you for coming to our Emergency Department today. It is important to remember that some problems are difficult to diagnose and may not be found during your first visit. Be sure to follow up with your primary care doctor and review any labs/imaging that was performed with them. If you do not have a primary care doctor, you may contact the one listed on your discharge paperwork or you may also call the [facility] Appointment Desk at [phone] to schedule an appointment with one.     All medications may potentially have side effects and it is impossible to predict which medications may give you side effects. If you feel that you are having a negative effect of any medication you should immediately stop taking them and seek medical attention.    Return to the ER with any questions/concerns, new/concerning symptoms, worsening or failure to improve. Do not drive or make any important decisions for 24 hours if you have received any pain medications, sedatives or mood altering drugs during your ER visit.

## 2022-04-29 ENCOUNTER — HOSPITAL ENCOUNTER (OUTPATIENT)
Facility: HOSPITAL | Age: 47
Discharge: HOME | End: 2022-05-02
Attending: EMERGENCY MEDICINE | Admitting: INTERNAL MEDICINE
Payer: MEDICARE

## 2022-04-29 DIAGNOSIS — Z79.4 TYPE 2 DIABETES MELLITUS WITH STAGE 4 CHRONIC KIDNEY DISEASE, WITH LONG-TERM CURRENT USE OF INSULIN: ICD-10-CM

## 2022-04-29 DIAGNOSIS — I50.9 CHF (CONGESTIVE HEART FAILURE): ICD-10-CM

## 2022-04-29 DIAGNOSIS — N17.9 ACUTE RENAL FAILURE SUPERIMPOSED ON STAGE 3B CHRONIC KIDNEY DISEASE, UNSPECIFIED ACUTE RENAL FAILURE TYPE: ICD-10-CM

## 2022-04-29 DIAGNOSIS — E11.9 TYPE 2 DIABETES MELLITUS WITHOUT COMPLICATION, WITHOUT LONG-TERM CURRENT USE OF INSULIN: ICD-10-CM

## 2022-04-29 DIAGNOSIS — D50.9 IRON DEFICIENCY ANEMIA, UNSPECIFIED IRON DEFICIENCY ANEMIA TYPE: ICD-10-CM

## 2022-04-29 DIAGNOSIS — E11.65 UNCONTROLLED TYPE 2 DIABETES MELLITUS WITH HYPERGLYCEMIA: ICD-10-CM

## 2022-04-29 DIAGNOSIS — N18.4 STAGE 4 CHRONIC KIDNEY DISEASE: Primary | ICD-10-CM

## 2022-04-29 DIAGNOSIS — R74.8 ELEVATED CPK: ICD-10-CM

## 2022-04-29 DIAGNOSIS — G47.33 OSA TREATED WITH BIPAP: ICD-10-CM

## 2022-04-29 DIAGNOSIS — E11.22 TYPE 2 DIABETES MELLITUS WITH STAGE 4 CHRONIC KIDNEY DISEASE, WITH LONG-TERM CURRENT USE OF INSULIN: ICD-10-CM

## 2022-04-29 DIAGNOSIS — E66.9 OBESITY WITH BODY MASS INDEX (BMI) OF 30.0 TO 39.9: ICD-10-CM

## 2022-04-29 DIAGNOSIS — N18.4 TYPE 2 DIABETES MELLITUS WITH STAGE 4 CHRONIC KIDNEY DISEASE, WITH LONG-TERM CURRENT USE OF INSULIN: ICD-10-CM

## 2022-04-29 DIAGNOSIS — R73.9 HYPERGLYCEMIA: ICD-10-CM

## 2022-04-29 DIAGNOSIS — N18.32 ACUTE RENAL FAILURE SUPERIMPOSED ON STAGE 3B CHRONIC KIDNEY DISEASE, UNSPECIFIED ACUTE RENAL FAILURE TYPE: ICD-10-CM

## 2022-04-29 PROBLEM — N18.30 ACUTE RENAL FAILURE SUPERIMPOSED ON STAGE 3 CHRONIC KIDNEY DISEASE: Status: ACTIVE | Noted: 2022-04-29

## 2022-04-29 LAB
ALBUMIN SERPL BCP-MCNC: 3.8 G/DL (ref 3.5–5.2)
ALLENS TEST: ABNORMAL
ALP SERPL-CCNC: 123 U/L (ref 55–135)
ALT SERPL W/O P-5'-P-CCNC: 25 U/L (ref 10–44)
ANION GAP SERPL CALC-SCNC: 12 MMOL/L (ref 8–16)
AST SERPL-CCNC: 17 U/L (ref 10–40)
B-HCG UR QL: NEGATIVE
B-OH-BUTYR BLD STRIP-SCNC: 0 MMOL/L (ref 0–0.5)
BACTERIA #/AREA URNS AUTO: NORMAL /HPF
BASOPHILS # BLD AUTO: 0.04 K/UL (ref 0–0.2)
BASOPHILS NFR BLD: 0.4 % (ref 0–1.9)
BILIRUB SERPL-MCNC: 0.7 MG/DL (ref 0.1–1)
BILIRUB UR QL STRIP: NEGATIVE
BNP SERPL-MCNC: 17 PG/ML (ref 0–99)
BUN SERPL-MCNC: 18 MG/DL (ref 6–20)
CALCIUM SERPL-MCNC: 10 MG/DL (ref 8.7–10.5)
CHLORIDE SERPL-SCNC: 96 MMOL/L (ref 95–110)
CK SERPL-CCNC: 252 U/L (ref 20–180)
CLARITY UR REFRACT.AUTO: CLEAR
CO2 SERPL-SCNC: 28 MMOL/L (ref 23–29)
COLOR UR AUTO: ABNORMAL
CREAT SERPL-MCNC: 2.8 MG/DL (ref 0.5–1.4)
CTP QC/QA: YES
DELSYS: ABNORMAL
DIFFERENTIAL METHOD: ABNORMAL
EOSINOPHIL # BLD AUTO: 0.2 K/UL (ref 0–0.5)
EOSINOPHIL NFR BLD: 2.2 % (ref 0–8)
ERYTHROCYTE [DISTWIDTH] IN BLOOD BY AUTOMATED COUNT: 14.6 % (ref 11.5–14.5)
EST. GFR  (AFRICAN AMERICAN): 22.6 ML/MIN/1.73 M^2
EST. GFR  (NON AFRICAN AMERICAN): 19.6 ML/MIN/1.73 M^2
GLUCOSE SERPL-MCNC: 450 MG/DL (ref 70–110)
GLUCOSE UR QL STRIP: ABNORMAL
HCO3 UR-SCNC: 33 MMOL/L (ref 24–28)
HCT VFR BLD AUTO: 46.1 % (ref 37–48.5)
HGB BLD-MCNC: 14.3 G/DL (ref 12–16)
HGB UR QL STRIP: ABNORMAL
HYALINE CASTS UR QL AUTO: 0 /LPF
IMM GRANULOCYTES # BLD AUTO: 0.04 K/UL (ref 0–0.04)
IMM GRANULOCYTES NFR BLD AUTO: 0.4 % (ref 0–0.5)
KETONES UR QL STRIP: NEGATIVE
LEUKOCYTE ESTERASE UR QL STRIP: NEGATIVE
LIPASE SERPL-CCNC: 52 U/L (ref 4–60)
LYMPHOCYTES # BLD AUTO: 2.1 K/UL (ref 1–4.8)
LYMPHOCYTES NFR BLD: 22.9 % (ref 18–48)
MCH RBC QN AUTO: 28.5 PG (ref 27–31)
MCHC RBC AUTO-ENTMCNC: 31 G/DL (ref 32–36)
MCV RBC AUTO: 92 FL (ref 82–98)
MICROSCOPIC COMMENT: NORMAL
MONOCYTES # BLD AUTO: 0.4 K/UL (ref 0.3–1)
MONOCYTES NFR BLD: 3.9 % (ref 4–15)
NEUTROPHILS # BLD AUTO: 6.3 K/UL (ref 1.8–7.7)
NEUTROPHILS NFR BLD: 70.2 % (ref 38–73)
NITRITE UR QL STRIP: NEGATIVE
NRBC BLD-RTO: 0 /100 WBC
PCO2 BLDA: 61.7 MMHG (ref 35–45)
PH SMN: 7.33 [PH] (ref 7.35–7.45)
PH UR STRIP: 7 [PH] (ref 5–8)
PLATELET # BLD AUTO: 249 K/UL (ref 150–450)
PMV BLD AUTO: 10.8 FL (ref 9.2–12.9)
PO2 BLDA: 40 MMHG (ref 40–60)
POC BE: 7 MMOL/L
POC SATURATED O2: 70 % (ref 95–100)
POC TCO2: 35 MMOL/L (ref 24–29)
POCT GLUCOSE: 355 MG/DL (ref 70–110)
POCT GLUCOSE: >500 MG/DL (ref 70–110)
POTASSIUM SERPL-SCNC: 3.7 MMOL/L (ref 3.5–5.1)
PROT SERPL-MCNC: 8.4 G/DL (ref 6–8.4)
PROT UR QL STRIP: ABNORMAL
RBC # BLD AUTO: 5.02 M/UL (ref 4–5.4)
RBC #/AREA URNS AUTO: 2 /HPF (ref 0–4)
SAMPLE: ABNORMAL
SITE: ABNORMAL
SODIUM SERPL-SCNC: 136 MMOL/L (ref 136–145)
SP GR UR STRIP: 1.01 (ref 1–1.03)
SQUAMOUS #/AREA URNS AUTO: 1 /HPF
TROPONIN I SERPL DL<=0.01 NG/ML-MCNC: 0.02 NG/ML (ref 0–0.03)
URN SPEC COLLECT METH UR: ABNORMAL
WBC # BLD AUTO: 9.04 K/UL (ref 3.9–12.7)
WBC #/AREA URNS AUTO: 2 /HPF (ref 0–5)
YEAST UR QL AUTO: NORMAL

## 2022-04-29 PROCEDURE — G0378 HOSPITAL OBSERVATION PER HR: HCPCS

## 2022-04-29 PROCEDURE — 96374 THER/PROPH/DIAG INJ IV PUSH: CPT

## 2022-04-29 PROCEDURE — 96375 TX/PRO/DX INJ NEW DRUG ADDON: CPT

## 2022-04-29 PROCEDURE — 99900035 HC TECH TIME PER 15 MIN (STAT)

## 2022-04-29 PROCEDURE — 99285 EMERGENCY DEPT VISIT HI MDM: CPT | Mod: ,,, | Performed by: PHYSICIAN ASSISTANT

## 2022-04-29 PROCEDURE — 93010 EKG 12-LEAD: ICD-10-PCS | Mod: ,,, | Performed by: INTERNAL MEDICINE

## 2022-04-29 PROCEDURE — 81025 URINE PREGNANCY TEST: CPT | Performed by: EMERGENCY MEDICINE

## 2022-04-29 PROCEDURE — 83690 ASSAY OF LIPASE: CPT | Performed by: EMERGENCY MEDICINE

## 2022-04-29 PROCEDURE — 80053 COMPREHEN METABOLIC PANEL: CPT | Performed by: EMERGENCY MEDICINE

## 2022-04-29 PROCEDURE — 82962 GLUCOSE BLOOD TEST: CPT | Mod: 91

## 2022-04-29 PROCEDURE — 63600175 PHARM REV CODE 636 W HCPCS: Performed by: HOSPITALIST

## 2022-04-29 PROCEDURE — 93010 ELECTROCARDIOGRAM REPORT: CPT | Mod: ,,, | Performed by: INTERNAL MEDICINE

## 2022-04-29 PROCEDURE — 85025 COMPLETE CBC W/AUTO DIFF WBC: CPT | Performed by: EMERGENCY MEDICINE

## 2022-04-29 PROCEDURE — 83880 ASSAY OF NATRIURETIC PEPTIDE: CPT | Performed by: EMERGENCY MEDICINE

## 2022-04-29 PROCEDURE — 96361 HYDRATE IV INFUSION ADD-ON: CPT

## 2022-04-29 PROCEDURE — 82010 KETONE BODYS QUAN: CPT | Performed by: EMERGENCY MEDICINE

## 2022-04-29 PROCEDURE — 99285 PR EMERGENCY DEPT VISIT,LEVEL V: ICD-10-PCS | Mod: ,,, | Performed by: PHYSICIAN ASSISTANT

## 2022-04-29 PROCEDURE — 63600175 PHARM REV CODE 636 W HCPCS: Performed by: PHYSICIAN ASSISTANT

## 2022-04-29 PROCEDURE — 96372 THER/PROPH/DIAG INJ SC/IM: CPT | Mod: 59 | Performed by: HOSPITALIST

## 2022-04-29 PROCEDURE — 25000003 PHARM REV CODE 250: Performed by: EMERGENCY MEDICINE

## 2022-04-29 PROCEDURE — 93005 ELECTROCARDIOGRAM TRACING: CPT

## 2022-04-29 PROCEDURE — 82550 ASSAY OF CK (CPK): CPT | Performed by: PHYSICIAN ASSISTANT

## 2022-04-29 PROCEDURE — 94761 N-INVAS EAR/PLS OXIMETRY MLT: CPT

## 2022-04-29 PROCEDURE — 82803 BLOOD GASES ANY COMBINATION: CPT

## 2022-04-29 PROCEDURE — 81001 URINALYSIS AUTO W/SCOPE: CPT | Performed by: EMERGENCY MEDICINE

## 2022-04-29 PROCEDURE — 25000003 PHARM REV CODE 250: Performed by: HOSPITALIST

## 2022-04-29 PROCEDURE — 84484 ASSAY OF TROPONIN QUANT: CPT | Performed by: EMERGENCY MEDICINE

## 2022-04-29 PROCEDURE — C9399 UNCLASSIFIED DRUGS OR BIOLOG: HCPCS | Performed by: HOSPITALIST

## 2022-04-29 PROCEDURE — 99285 EMERGENCY DEPT VISIT HI MDM: CPT | Mod: 25

## 2022-04-29 RX ORDER — GLUCAGON 1 MG
1 KIT INJECTION
Status: DISCONTINUED | OUTPATIENT
Start: 2022-04-29 | End: 2022-05-02 | Stop reason: HOSPADM

## 2022-04-29 RX ORDER — INSULIN ASPART 100 [IU]/ML
1-10 INJECTION, SOLUTION INTRAVENOUS; SUBCUTANEOUS
Status: DISCONTINUED | OUTPATIENT
Start: 2022-04-29 | End: 2022-05-02 | Stop reason: HOSPADM

## 2022-04-29 RX ORDER — SODIUM CHLORIDE 0.9 % (FLUSH) 0.9 %
10 SYRINGE (ML) INJECTION
Status: DISCONTINUED | OUTPATIENT
Start: 2022-04-29 | End: 2022-05-02 | Stop reason: HOSPADM

## 2022-04-29 RX ORDER — TALC
6 POWDER (GRAM) TOPICAL NIGHTLY PRN
Status: DISCONTINUED | OUTPATIENT
Start: 2022-04-29 | End: 2022-05-02 | Stop reason: HOSPADM

## 2022-04-29 RX ORDER — SODIUM CHLORIDE 9 MG/ML
INJECTION, SOLUTION INTRAVENOUS CONTINUOUS
Status: ACTIVE | OUTPATIENT
Start: 2022-04-30 | End: 2022-04-30

## 2022-04-29 RX ORDER — IBUPROFEN 200 MG
16 TABLET ORAL
Status: DISCONTINUED | OUTPATIENT
Start: 2022-04-29 | End: 2022-05-02 | Stop reason: HOSPADM

## 2022-04-29 RX ORDER — IBUPROFEN 200 MG
24 TABLET ORAL
Status: DISCONTINUED | OUTPATIENT
Start: 2022-04-29 | End: 2022-05-02 | Stop reason: HOSPADM

## 2022-04-29 RX ORDER — IPRATROPIUM BROMIDE AND ALBUTEROL SULFATE 2.5; .5 MG/3ML; MG/3ML
3 SOLUTION RESPIRATORY (INHALATION) EVERY 6 HOURS PRN
Status: DISCONTINUED | OUTPATIENT
Start: 2022-04-29 | End: 2022-05-02 | Stop reason: HOSPADM

## 2022-04-29 RX ORDER — ACETAMINOPHEN 325 MG/1
650 TABLET ORAL EVERY 4 HOURS PRN
Status: DISCONTINUED | OUTPATIENT
Start: 2022-04-29 | End: 2022-05-02 | Stop reason: HOSPADM

## 2022-04-29 RX ORDER — ONDANSETRON 2 MG/ML
4 INJECTION INTRAMUSCULAR; INTRAVENOUS EVERY 8 HOURS PRN
Status: DISCONTINUED | OUTPATIENT
Start: 2022-04-29 | End: 2022-05-02 | Stop reason: HOSPADM

## 2022-04-29 RX ORDER — PROCHLORPERAZINE EDISYLATE 5 MG/ML
5 INJECTION INTRAMUSCULAR; INTRAVENOUS EVERY 6 HOURS PRN
Status: DISCONTINUED | OUTPATIENT
Start: 2022-04-29 | End: 2022-05-02 | Stop reason: HOSPADM

## 2022-04-29 RX ORDER — NALOXONE HCL 0.4 MG/ML
0.02 VIAL (ML) INJECTION
Status: DISCONTINUED | OUTPATIENT
Start: 2022-04-29 | End: 2022-05-02 | Stop reason: HOSPADM

## 2022-04-29 RX ORDER — METOCLOPRAMIDE HYDROCHLORIDE 5 MG/ML
5 INJECTION INTRAMUSCULAR; INTRAVENOUS
Status: COMPLETED | OUTPATIENT
Start: 2022-04-29 | End: 2022-04-29

## 2022-04-29 RX ADMIN — INSULIN HUMAN 6 UNITS: 100 INJECTION, SOLUTION PARENTERAL at 07:04

## 2022-04-29 RX ADMIN — INSULIN DETEMIR 35 UNITS: 100 INJECTION, SOLUTION SUBCUTANEOUS at 10:04

## 2022-04-29 RX ADMIN — METOCLOPRAMIDE 5 MG: 5 INJECTION, SOLUTION INTRAMUSCULAR; INTRAVENOUS at 07:04

## 2022-04-29 RX ADMIN — SODIUM CHLORIDE 1000 ML: 0.9 INJECTION, SOLUTION INTRAVENOUS at 06:04

## 2022-04-29 RX ADMIN — INSULIN ASPART 5 UNITS: 100 INJECTION, SOLUTION INTRAVENOUS; SUBCUTANEOUS at 10:04

## 2022-04-29 NOTE — ED TRIAGE NOTES
Patient has complaints of full body aches, vomiting, fatigued, and headache. Reports a change in medication about a month ago from Trulicity to [place] (?). Patient has been feeling nauseous since Sunday. Normally it only lasts two days.

## 2022-04-30 LAB
ALBUMIN SERPL BCP-MCNC: 3.2 G/DL (ref 3.5–5.2)
ANION GAP SERPL CALC-SCNC: 11 MMOL/L (ref 8–16)
BASOPHILS # BLD AUTO: 0.03 K/UL (ref 0–0.2)
BASOPHILS NFR BLD: 0.4 % (ref 0–1.9)
BUN SERPL-MCNC: 17 MG/DL (ref 6–20)
CALCIUM SERPL-MCNC: 8.9 MG/DL (ref 8.7–10.5)
CHLORIDE SERPL-SCNC: 101 MMOL/L (ref 95–110)
CO2 SERPL-SCNC: 25 MMOL/L (ref 23–29)
CREAT SERPL-MCNC: 2.7 MG/DL (ref 0.5–1.4)
CREAT UR-MCNC: 25 MG/DL (ref 15–325)
DIFFERENTIAL METHOD: ABNORMAL
EOSINOPHIL # BLD AUTO: 0.2 K/UL (ref 0–0.5)
EOSINOPHIL NFR BLD: 2.2 % (ref 0–8)
ERYTHROCYTE [DISTWIDTH] IN BLOOD BY AUTOMATED COUNT: 14.6 % (ref 11.5–14.5)
EST. GFR  (AFRICAN AMERICAN): 23.7 ML/MIN/1.73 M^2
EST. GFR  (NON AFRICAN AMERICAN): 20.5 ML/MIN/1.73 M^2
GLUCOSE SERPL-MCNC: 254 MG/DL (ref 70–110)
HCT VFR BLD AUTO: 40.9 % (ref 37–48.5)
HGB BLD-MCNC: 12.5 G/DL (ref 12–16)
IMM GRANULOCYTES # BLD AUTO: 0.05 K/UL (ref 0–0.04)
IMM GRANULOCYTES NFR BLD AUTO: 0.6 % (ref 0–0.5)
LYMPHOCYTES # BLD AUTO: 2.5 K/UL (ref 1–4.8)
LYMPHOCYTES NFR BLD: 29 % (ref 18–48)
MCH RBC QN AUTO: 28.6 PG (ref 27–31)
MCHC RBC AUTO-ENTMCNC: 30.6 G/DL (ref 32–36)
MCV RBC AUTO: 94 FL (ref 82–98)
MONOCYTES # BLD AUTO: 0.5 K/UL (ref 0.3–1)
MONOCYTES NFR BLD: 6.2 % (ref 4–15)
NEUTROPHILS # BLD AUTO: 5.2 K/UL (ref 1.8–7.7)
NEUTROPHILS NFR BLD: 61.6 % (ref 38–73)
NRBC BLD-RTO: 0 /100 WBC
PHOSPHATE SERPL-MCNC: 3.3 MG/DL (ref 2.7–4.5)
PLATELET # BLD AUTO: 218 K/UL (ref 150–450)
PMV BLD AUTO: 10.9 FL (ref 9.2–12.9)
POCT GLUCOSE: 159 MG/DL (ref 70–110)
POCT GLUCOSE: 208 MG/DL (ref 70–110)
POCT GLUCOSE: 237 MG/DL (ref 70–110)
POCT GLUCOSE: 307 MG/DL (ref 70–110)
POCT GLUCOSE: 326 MG/DL (ref 70–110)
POCT GLUCOSE: 386 MG/DL (ref 70–110)
POTASSIUM SERPL-SCNC: 3.7 MMOL/L (ref 3.5–5.1)
RBC # BLD AUTO: 4.37 M/UL (ref 4–5.4)
SODIUM SERPL-SCNC: 137 MMOL/L (ref 136–145)
SODIUM UR-SCNC: 36 MMOL/L (ref 20–250)
UUN UR-MCNC: 112 MG/DL (ref 140–1050)
WBC # BLD AUTO: 8.49 K/UL (ref 3.9–12.7)

## 2022-04-30 PROCEDURE — 99900035 HC TECH TIME PER 15 MIN (STAT)

## 2022-04-30 PROCEDURE — 99214 PR OFFICE/OUTPT VISIT, EST, LEVL IV, 30-39 MIN: ICD-10-PCS | Mod: ,,, | Performed by: NURSE PRACTITIONER

## 2022-04-30 PROCEDURE — 84300 ASSAY OF URINE SODIUM: CPT | Performed by: INTERNAL MEDICINE

## 2022-04-30 PROCEDURE — 99220 PR INITIAL OBSERVATION CARE,LEVL III: ICD-10-PCS | Mod: ,,, | Performed by: HOSPITALIST

## 2022-04-30 PROCEDURE — 84540 ASSAY OF URINE/UREA-N: CPT | Performed by: HOSPITALIST

## 2022-04-30 PROCEDURE — 82570 ASSAY OF URINE CREATININE: CPT | Performed by: INTERNAL MEDICINE

## 2022-04-30 PROCEDURE — 99214 OFFICE O/P EST MOD 30 MIN: CPT | Mod: ,,, | Performed by: NURSE PRACTITIONER

## 2022-04-30 PROCEDURE — 85025 COMPLETE CBC W/AUTO DIFF WBC: CPT | Performed by: HOSPITALIST

## 2022-04-30 PROCEDURE — 25000003 PHARM REV CODE 250: Performed by: HOSPITALIST

## 2022-04-30 PROCEDURE — 96372 THER/PROPH/DIAG INJ SC/IM: CPT | Mod: 59 | Performed by: HOSPITALIST

## 2022-04-30 PROCEDURE — G0378 HOSPITAL OBSERVATION PER HR: HCPCS

## 2022-04-30 PROCEDURE — 27000221 HC OXYGEN, UP TO 24 HOURS

## 2022-04-30 PROCEDURE — 80069 RENAL FUNCTION PANEL: CPT | Performed by: HOSPITALIST

## 2022-04-30 PROCEDURE — 63600175 PHARM REV CODE 636 W HCPCS: Performed by: HOSPITALIST

## 2022-04-30 PROCEDURE — 27000190 HC CPAP FULL FACE MASK W/VALVE

## 2022-04-30 PROCEDURE — 96361 HYDRATE IV INFUSION ADD-ON: CPT

## 2022-04-30 PROCEDURE — 94660 CPAP INITIATION&MGMT: CPT

## 2022-04-30 PROCEDURE — 99900031 HC PATIENT EDUCATION (STAT)

## 2022-04-30 PROCEDURE — 99220 PR INITIAL OBSERVATION CARE,LEVL III: CPT | Mod: ,,, | Performed by: HOSPITALIST

## 2022-04-30 PROCEDURE — 36415 COLL VENOUS BLD VENIPUNCTURE: CPT | Performed by: HOSPITALIST

## 2022-04-30 PROCEDURE — 94761 N-INVAS EAR/PLS OXIMETRY MLT: CPT

## 2022-04-30 PROCEDURE — 25000003 PHARM REV CODE 250: Performed by: STUDENT IN AN ORGANIZED HEALTH CARE EDUCATION/TRAINING PROGRAM

## 2022-04-30 RX ORDER — GABAPENTIN 400 MG/1
400 CAPSULE ORAL DAILY
Status: DISCONTINUED | OUTPATIENT
Start: 2022-04-30 | End: 2022-05-02 | Stop reason: HOSPADM

## 2022-04-30 RX ORDER — INSULIN ASPART 100 [IU]/ML
20 INJECTION, SOLUTION INTRAVENOUS; SUBCUTANEOUS
Refills: 1 | Status: DISCONTINUED | OUTPATIENT
Start: 2022-04-30 | End: 2022-05-01

## 2022-04-30 RX ORDER — BACLOFEN 5 MG/1
5 TABLET ORAL 2 TIMES DAILY
Status: DISCONTINUED | OUTPATIENT
Start: 2022-04-30 | End: 2022-05-02 | Stop reason: HOSPADM

## 2022-04-30 RX ORDER — ATORVASTATIN CALCIUM 40 MG/1
40 TABLET, FILM COATED ORAL DAILY
Refills: 1 | Status: DISCONTINUED | OUTPATIENT
Start: 2022-04-30 | End: 2022-05-02 | Stop reason: HOSPADM

## 2022-04-30 RX ORDER — METOPROLOL SUCCINATE 50 MG/1
50 TABLET, EXTENDED RELEASE ORAL DAILY
Status: DISCONTINUED | OUTPATIENT
Start: 2022-04-30 | End: 2022-05-02 | Stop reason: HOSPADM

## 2022-04-30 RX ORDER — CETIRIZINE HYDROCHLORIDE 10 MG/1
10 TABLET ORAL DAILY
Status: DISCONTINUED | OUTPATIENT
Start: 2022-04-30 | End: 2022-05-02 | Stop reason: HOSPADM

## 2022-04-30 RX ORDER — BACLOFEN 10 MG/1
10 TABLET ORAL 3 TIMES DAILY
Status: DISCONTINUED | OUTPATIENT
Start: 2022-04-30 | End: 2022-04-30

## 2022-04-30 RX ADMIN — INSULIN DETEMIR 35 UNITS: 100 INJECTION, SOLUTION SUBCUTANEOUS at 08:04

## 2022-04-30 RX ADMIN — BACLOFEN 5 MG: 5 TABLET ORAL at 08:04

## 2022-04-30 RX ADMIN — INSULIN ASPART 20 UNITS: 100 INJECTION, SOLUTION INTRAVENOUS; SUBCUTANEOUS at 08:04

## 2022-04-30 RX ADMIN — INSULIN ASPART 20 UNITS: 100 INJECTION, SOLUTION INTRAVENOUS; SUBCUTANEOUS at 12:04

## 2022-04-30 RX ADMIN — INSULIN ASPART 4 UNITS: 100 INJECTION, SOLUTION INTRAVENOUS; SUBCUTANEOUS at 08:04

## 2022-04-30 RX ADMIN — BACLOFEN 5 MG: 5 TABLET ORAL at 01:04

## 2022-04-30 RX ADMIN — METOPROLOL SUCCINATE 50 MG: 50 TABLET, EXTENDED RELEASE ORAL at 08:04

## 2022-04-30 RX ADMIN — GABAPENTIN 400 MG: 400 CAPSULE ORAL at 01:04

## 2022-04-30 RX ADMIN — APIXABAN 5 MG: 5 TABLET, FILM COATED ORAL at 08:04

## 2022-04-30 RX ADMIN — ACETAMINOPHEN 650 MG: 325 TABLET ORAL at 08:04

## 2022-04-30 RX ADMIN — CETIRIZINE HYDROCHLORIDE 10 MG: 10 TABLET, FILM COATED ORAL at 08:04

## 2022-04-30 RX ADMIN — SODIUM CHLORIDE 500 ML: 0.9 INJECTION, SOLUTION INTRAVENOUS at 02:04

## 2022-04-30 RX ADMIN — ACETAMINOPHEN 650 MG: 325 TABLET ORAL at 04:04

## 2022-04-30 RX ADMIN — INSULIN ASPART 4 UNITS: 100 INJECTION, SOLUTION INTRAVENOUS; SUBCUTANEOUS at 12:04

## 2022-04-30 RX ADMIN — INSULIN ASPART 4 UNITS: 100 INJECTION, SOLUTION INTRAVENOUS; SUBCUTANEOUS at 04:04

## 2022-04-30 RX ADMIN — ATORVASTATIN CALCIUM 40 MG: 40 TABLET, FILM COATED ORAL at 08:04

## 2022-04-30 RX ADMIN — SODIUM CHLORIDE: 0.9 INJECTION, SOLUTION INTRAVENOUS at 12:04

## 2022-04-30 RX ADMIN — INSULIN ASPART 20 UNITS: 100 INJECTION, SOLUTION INTRAVENOUS; SUBCUTANEOUS at 05:04

## 2022-04-30 NOTE — ASSESSMENT & PLAN NOTE
-Last TTE 5/2020 shows estimated ejection fraction is 15%  -Pt. Appears hypovolemic on exam, BNP <50, no edema on CXR  -gently hydrate and hold home diuretics aldactone and lasix  -Continue Toprol-Xl for GDMT, not on ACEi/ARB due to renal function

## 2022-04-30 NOTE — ED NOTES
Telemetry Verification   Patient placed on Telemetry Box  Verified with War Room ( Marcela to check and call [facility] in regards to tele box states multiple patient got registered to a different tele box. War room made aware pt is en route to [id number]  Box # [id number]   Monitor Tech    Rate    Rhythm

## 2022-04-30 NOTE — HPI
45 yo F with PMHx HFrEF (15%) 2/2 NICM, CKD3, T2DM, HTN, HLD, AF on Eliquis, and MILE on CPAP who presents to the ED with generalized malaise, body aches, and nausea x a week. Pt. Reports generalized body aches that are most prominently located in her chest, back, belly, arms, and legs. She has had poor appetite and associated nausea with a few episodes of clear, non-bloody, non-bilous emesis.  Pt. Has presented to the ED a couple other times this week with similar symptoms, but she states that her symptoms have only worsened with increasing vomiting frequency, and she has been unable to tolerate much PO intake. She denies any diarrhea, cough, fevers, chills, SOB, or edema. She does report some upper abdomen/lower mid chest pain that has been present this week, particularly when she vomits. Pt. Also expresses concerns that her symptoms seem to have worsening since she witched from trulicity to ozempic last month.

## 2022-04-30 NOTE — ASSESSMENT & PLAN NOTE
Managed per primary team  Avoid hypoglycemia  Titrate insulin slowly to avoid hypoglycemia as the risk of hypoglycemia increases with decreased creatinine clearance.  Estimated Creatinine Clearance: 33.2 mL/min (A) (based on SCr of 2.7 mg/dL (H)).

## 2022-04-30 NOTE — ASSESSMENT & PLAN NOTE
Body mass index is 42.01 kg/m².  Increased abdominal adiposity may increase insulin resistance.

## 2022-04-30 NOTE — ASSESSMENT & PLAN NOTE
-Pt. In NSR on admit, no acute issues  -Continue home metoprolol for rate control and eliquis for AC

## 2022-04-30 NOTE — HPI
Reason for Consult: Management of T2DM, Hyperglycemia     Diabetes diagnosis year: 2011    Home Diabetes Medications:  Humalog 40 units TIDWM; Tresiba 88 units nightly; and Ozempic 0.5 mg weekly.     How often checking glucose at home? 1-3 x day   BG readings on regimen: 200-300's  Hypoglycemia on the regimen?  No  Missed doses on regimen?  No    Diabetes Complications include:     Hyperglycemia    Complicating diabetes co morbidities:   HLD; MILE; and N/V      HPI: 47 yo F with PMHx HFrEF (15%) 2/2 NICM, CKD3, T2DM, HTN, HLD, AF on Eliquis, and MILE on CPAP who presents to the ED with generalized malaise, body aches, and nausea x a week. Pt. Reports generalized body aches that are most prominently located in her chest, back, belly, arms, and legs. She has had poor appetite and associated nausea with a few episodes of clear, non-bloody, non-bilous emesis.  Pt. Has presented to the ED a couple other times this week with similar symptoms, but she states that her symptoms have only worsened with increasing vomiting frequency, and she has been unable to tolerate much PO intake. She denies any diarrhea, cough, fevers, chills, SOB, or edema. She does report some upper abdomen/lower mid chest pain that has been present this week, particularly when she vomits. Pt. Also expresses concerns that her symptoms seem to have worsening since she witched from trulicity to ozempic last month. Endocrine consulted to manage hyperglycemia and type 2 diabetes.     Lab Results   Component Value Date    HGBA1C 10.0 (H) 03/09/2022

## 2022-04-30 NOTE — SUBJECTIVE & OBJECTIVE
Interval HPI:   Overnight events: No acute events overnight. Patient on the OBS unit in room 7097/7097 A. Blood glucose improving. BG at and above goal on current insulin regimen (SSI, prandial, and basal insulin ). Steroid use- None.    Renal function- Abnormal - Creatinine 2.7   Vasopressors-  None       Diet diabetic [facility];  Calorie     Eatin%  Nausea: No  Hypoglycemia and intervention: No  Fever: No  TPN and/or TF: No    PMH, PSH, FH, SH updated and reviewed     ROS:  Review of Systems  Constitutional: Negative for weight changes.  Eyes: Negative for visual disturbance.  Respiratory: Negative for cough.   Cardiovascular: Negative for chest pain.  Gastrointestinal: Positive for nausea.  Endocrine: Positive for polyuria, polydipsia.  Musculoskeletal: Negative for back pain.  Skin: Negative for rash.  Neurological: Negative for syncope.  Psychiatric/Behavioral: Negative for depression.     Current Medications and/or Treatments Impacting Glycemic Control  Immunotherapy:    Immunosuppressants       None          Steroids:   Hormones (From admission, onward)                Start     Stop Route Frequency Ordered    22  melatonin tablet 6 mg         -- Oral Nightly PRN 22          Pressors:    Autonomic Drugs (From admission, onward)                None          Hyperglycemia/Diabetes Medications:   Antihyperglycemics (From admission, onward)                Start     Stop Route Frequency Ordered    22 0715  insulin aspart U-100 pen 20 Units         -- SubQ 3 times daily with meals 22 0011    22 223  insulin detemir U-100 pen 35 Units         -- SubQ 2 times daily 22  insulin aspart U-100 pen 1-10 Units         -- SubQ Before meals & nightly PRN 22             PHYSICAL EXAMINATION:  Vitals:    22 1204   BP: (!) 106/58   Pulse: 83   Resp: 18   Temp: 97.7 °F (36.5 °C)     Body mass index is 42.01 kg/m².    Physical  Exam  Constitutional: Well developed, well nourished, NAD.  ENT: External ears no masses with nose patent; normal hearing.  Neck: Supple; trachea midline.  Cardiovascular: Normal heart sounds, no LE edema. DP +2 bilaterally.  Lungs: Normal effort; lungs anterior bilaterally clear to auscultation.  Abdomen: Soft, no masses, no hernias.  MS: No clubbing or cyanosis of nails noted; unable to assess gait.  Skin: No rashes, lesions, or ulcers; no nodules. Injection sites are ok. No lipo hypertropthy or atrophy.  Psychiatric: Good judgement and insight; normal mood and affect.  Neurological: Cranial nerves are grossly intact.   Foot: Nails in fair condition, no amputations noted.

## 2022-04-30 NOTE — H&P
[facility] Zhang - Telemetry [facility] ([address] Medicine  History & Physical    Patient Name: Fabiana Chanel  MRN: [id number]  Patient Class: OP- Observation  Admission Date: 4/29/2022  Attending Physician: Leon Jolly MD   Primary Care Provider: Gil Leal MD         Patient information was obtained from patient, past medical records and ER records.     Subjective:     Principal Problem:Acute renal failure superimposed on stage 3 chronic kidney disease    Chief Complaint:   Chief Complaint   Patient presents with    Nausea    Vomiting        HPI: 47 yo F with PMHx HFrEF (15%) 2/2 NICM, CKD3, T2DM, HTN, HLD, AF on Eliquis, and MILE on CPAP who presents to the ED with generalized malaise, body aches, and nausea x a week. Pt. Reports generalized body aches that are most prominently located in her chest, back, belly, arms, and legs. She has had poor appetite and associated nausea with a few episodes of clear, non-bloody, non-bilous emesis.  Pt. Has presented to the ED a couple other times this week with similar symptoms, but she states that her symptoms have only worsened with increasing vomiting frequency, and she has been unable to tolerate much PO intake. She denies any diarrhea, cough, fevers, chills, SOB, or edema. She does report some upper abdomen/lower mid chest pain that has been present this week, particularly when she vomits. Pt. Also expresses concerns that her symptoms seem to have worsening since she witched from trulicity to ozempic last month.      Past Medical History:   Diagnosis Date    Atrial fibrillation     Blood clot associated with vein wall inflammation     not dvt    Cardiomyopathy     Normal cors on cath 11/2017    CHF (congestive heart failure)     DM (diabetes mellitus) 9/19/2013    Hyperlipidemia     Hypertension     Psoriasis     Sleep apnea        Past Surgical History:   Procedure Laterality Date    CARDIAC CATHETERIZATION      COLONOSCOPY       COLONOSCOPY N/A 3/9/2022    Procedure: COLONOSCOPY;  Surgeon: Vielka Burrell MD;  Location: [facility] ENDO;  Service: Endoscopy;  Laterality: N/A;    DILATION AND CURETTAGE OF UTERUS      ESOPHAGOGASTRODUODENOSCOPY N/A 5/9/2019    Procedure: EGD (ESOPHAGOGASTRODUODENOSCOPY);  Surgeon: Vielka Burrell MD;  Location: [facility] ENDO;  Service: Endoscopy;  Laterality: N/A;    TRANSFORAMINAL EPIDURAL INJECTION OF STEROID N/A 1/6/2022    Procedure: Transforaminal ANKITA L4/L5 L5/S1;  Surgeon: Jed Yeager MD;  Location: [facility] PAIN MGT;  Service: Pain Management;  Laterality: N/A;       Review of patient's allergies indicates:   Allergen Reactions    Metformin      Diarrhea on metformin XR     Pneumococcal 23-abimbola ps vaccine        No current facility-administered medications on file prior to encounter.     Current Outpatient Medications on File Prior to Encounter   Medication Sig    albuterol (VENTOLIN HFA) 90 mcg/actuation inhaler Inhale 2 puffs into the lungs every 6 (six) hours as needed for Wheezing or Shortness of Breath. Rescue    apixaban (ELIQUIS) 5 mg Tab Take 1 tablet (5 mg total) by mouth 2 (two) times daily.    baclofen (LIORESAL) 10 MG tablet Take 1 tablet (10 mg total) by mouth 3 (three) times daily.    blood glucose control, high Soln 1 each by Misc.(Non-Drug; Combo Route) route once. for 1 dose    blood glucose control, low Soln 1 each by Misc.(Non-Drug; Combo Route) route once. for 1 dose    BLOOD PRESSURE CUFF Misc 1 kit by Misc.(Non-Drug; Combo Route) route 2 (two) times daily.    blood sugar diagnostic Strp Check blood glucose 3x/day.    blood-glucose meter (TRUE METRIX AIR GLUCOSE METER) Misc 1 each by Misc.(Non-Drug; Combo Route) route 3 (three) times daily.    cetirizine (ZYRTEC) 10 MG tablet Take 1 tablet (10 mg total) by mouth once daily.    diclofenac sodium (VOLTAREN) 1 % Gel Apply 2 g topically 2 (two) times daily.    famotidine (PEPCID) 20 MG tablet Take 1 tablet (20 mg  "total) by mouth 2 (two) times daily.    fluticasone propionate (FLONASE) 50 mcg/actuation nasal spray 1 spray (50 mcg total) by Each Nostril route 2 (two) times daily as needed for Rhinitis.    furosemide (LASIX) 40 MG tablet TAKE 1 TABLET BY MOUTH ONCE DAILY *PLEASE  HOLD  WHILE  NOT  DRINKING*    gabapentin (NEURONTIN) 400 MG capsule Take 1 capsule (400 mg total) by mouth 2 (two) times daily as needed.    HYDROcodone-acetaminophen (NORCO) 5-325 mg per tablet Take 1 tablet by mouth every 6 (six) hours as needed for Pain.    insulin degludec (TRESIBA FLEXTOUCH U-200) 200 unit/mL (3 mL) insulin pen Inject 88 Units into the skin once daily.    insulin lispro (HUMALOG KWIKPEN INSULIN) 100 unit/mL pen Inject 40 Units into the skin 3 (three) times daily before meals.    medroxyPROGESTERone (PROVERA) 10 MG tablet Take 10 mg by mouth once daily.    metoprolol succinate (TOPROL-XL) 50 MG 24 hr tablet Take 1 tablet (50 mg total) by mouth once daily. Hold SBP <120    mupirocin (BACTROBAN) 2 % ointment Apply topically nightly.    ondansetron (ZOFRAN) 4 MG tablet Take 1 tablet (4 mg total) by mouth every 8 (eight) hours as needed for Nausea.    ondansetron (ZOFRAN) 4 MG tablet Take 1 tablet (4 mg total) by mouth every 6 (six) hours as needed for Nausea.    ondansetron (ZOFRAN) 4 MG tablet Take 1 tablet (4 mg total) by mouth every 6 (six) hours.    oxyCODONE-acetaminophen (PERCOCET) 5-325 mg per tablet Take 1 tablet by mouth every 4 (four) hours as needed.    pen needle, diabetic (BD LROI 2ND GEN PEN NEEDLE) 32 gauge x 5/32" Ndle USE 1 PEN NEEDLE 4 TIMES DAILY    rosuvastatin (CRESTOR) 40 MG Tab Take 1 tablet (40 mg total) by mouth every evening.    semaglutide (OZEMPIC) 0.25 mg or 0.5 mg(2 mg/1.5 mL) pen injector Inject 0.5 mg into the skin every 7 days.    spironolactone (ALDACTONE) 50 MG tablet Take 1 tablet (50 mg total) by mouth once daily. Hold until eating and drinking improves. Need to weight self daily "    triamcinolone acetonide 0.1% (KENALOG) 0.1 % cream 2 (two) times daily. Apply to affected area    [DISCONTINUED] blood-glucose meter,continuous (DEXCOM G6 ) Misc Use with dexcom G6 system (Patient not taking: No sig reported)    [DISCONTINUED] blood-glucose sensor (DEXCOM G6 SENSOR) Lupe Change sensor every 10 days (Patient not taking: No sig reported)    [DISCONTINUED] blood-glucose transmitter (DEXCOM G6 TRANSMITTER) Lupe Change every 3 months (Patient not taking: No sig reported)     Family History       Problem Relation (Age of Onset)    Diabetes Father, Maternal Grandmother, Paternal Grandmother    Hypertension Mother, Father          Tobacco Use    Smoking status: Never Smoker    Smokeless tobacco: Never Used    Tobacco comment: smokes cigars on occasion   Substance and Sexual Activity    Alcohol use: Yes     Comment: occasional    Drug use: Yes     Types: Marijuana     Comment: occ    Sexual activity: Yes     Partners: Male     Review of Systems   Constitutional:  Positive for appetite change and fatigue. Negative for activity change, chills, fever and unexpected weight change.   HENT:  Negative for congestion and sore throat.    Respiratory:  Negative for cough and shortness of breath.    Cardiovascular:  Negative for chest pain, palpitations and leg swelling.   Gastrointestinal:  Positive for abdominal pain and nausea. Negative for abdominal distention, blood in stool, constipation, diarrhea and vomiting.   Genitourinary:  Negative for difficulty urinating, dysuria and hematuria.   Musculoskeletal:  Positive for myalgias. Negative for arthralgias.   Skin:  Negative for color change and rash.   Neurological:  Negative for dizziness, tremors and seizures.   Objective:     Vital Signs (Most Recent):  Temp: 98.4 °F (36.9 °C) (04/29/22 2155)  Pulse: 92 (04/29/22 2155)  Resp: 16 (04/29/22 2155)  BP: 97/68 (04/29/22 2155)  SpO2: (!) 93 % (04/29/22 2155)   Vital Signs (24h Range):  Temp:  [97.9  °F (36.6 °C)-98.4 °F (36.9 °C)] 98.4 °F (36.9 °C)  Pulse:  [51-92] 92  Resp:  [16-19] 16  SpO2:  [92 %-99 %] 93 %  BP: ()/(63-70) 97/68     Weight: 114.5 kg (252 lb 6.8 oz)  Body mass index is 42.01 kg/m².    Physical Exam  Vitals reviewed.   Constitutional:       General: She is not in acute distress.     Appearance: She is well-developed.   HENT:      Head: Normocephalic and atraumatic.   Eyes:      Extraocular Movements: Extraocular movements intact.      Pupils: Pupils are equal, round, and reactive to light.   Neck:      Vascular: No JVD.      Trachea: No tracheal deviation.   Cardiovascular:      Rate and Rhythm: Normal rate and regular rhythm.      Heart sounds: No murmur heard.    No friction rub. No gallop.   Pulmonary:      Effort: No respiratory distress.      Breath sounds: Normal breath sounds. No wheezing or rales.   Abdominal:      General: Bowel sounds are normal. There is no distension.      Palpations: Abdomen is soft. There is no mass.      Tenderness: There is no abdominal tenderness.   Musculoskeletal:         General: No deformity.      Cervical back: Neck supple.   Lymphadenopathy:      Cervical: No cervical adenopathy.   Skin:     General: Skin is warm and dry.      Findings: No rash.   Neurological:      Mental Status: She is alert and oriented to person, place, and time.         CRANIAL NERVES     CN III, IV, VI   Pupils are equal, round, and reactive to light.     Significant Labs: All pertinent labs within the past 24 hours have been reviewed.    Significant Imaging: I have reviewed all pertinent imaging results/findings within the past 24 hours.    Assessment/Plan:     Acute renal failure superimposed on stage 3 chronic kidney disease  -Cr 2.8 on admit, increased from baseline 1.5-2  -Pt. With poor PO intake, vomiting, suspect prerenal etiology. Urine electrolytes and renal U/S ordered for further work-up  -IV 1L NS given in ED, continue gentle hydration, hold home  diuretics  -Renally avoid meds and avoid nephrotoxins      Chronic combined systolic and diastolic heart failure  -Last TTE 5/2020 shows estimated ejection fraction is 15%  -Pt. Appears hypovolemic on exam, BNP <50, no edema on CXR  -gently hydrate and hold home diuretics aldactone and lasix  -Continue Toprol-Xl for GDMT, not on ACEi/ARB due to renal function          Mixed hyperlipidemia  -Continue home statin      Paroxysmal atrial fibrillation  -Pt. In NSR on admit, no acute issues  -Continue home metoprolol for rate control and eliquis for AC        Essential hypertension  -Continue home metoprolol      Type 2 diabetes mellitus without complication, without long-term current use of insulin  -Pt. With poorly controlled DM2, A1c 10.0 3/2022  - on admit, labs not consistent with DKA. IV insulin given in ED  -Home regimen tresiba 88 units and lispro 40 units TID WM  -Levemir 35 units BID ordered with SSI, increase as needed. Endocrine consulted for further assistance with management as pt. Has poorly controlled DM2 and severe hyperglycemia with mild HHS may be contributing to her symptoms        VTE Risk Mitigation (From admission, onward)         Ordered     apixaban tablet 5 mg  2 times daily         04/30/22 0011     IP VTE HIGH RISK PATIENT  Once         04/29/22 2126     Place sequential compression device  Until discontinued         04/29/22 2126     IP VTE HIGH RISK PATIENT  Once         04/29/22 2126                   Celio Villalta MD  Department of Hospital Medicine   [facility] Zhang - Telemetry Stepdown ([place]-)

## 2022-04-30 NOTE — ED PROVIDER NOTES
Encounter Date: 4/29/2022       History     Chief Complaint   Patient presents with    Nausea    Vomiting     Patient is a 46-year-old female who presents for myalgias and fatigue; pertinent PMH obesity, CHF (EF 15% echo 2020), AICD, pAfib, CKD, DM 2, HLD, HTN, MILE.  Patient presents for several days of recurrence of myalgias, fatigue. Seen multiple times over the past few months for same.  She reports new persistent nausea and decreased PO intake since switching from Trulicity to Ozempic last month. Associated with upper abdominal discomfort. Denies significant emesis, constipation or diarrhea.  She checks her weight and notes it has been stable. No CP, SOB, leg swelling.  The patients available PMH, PSH, Social History, medications, allergies, and triage vital signs were reviewed just prior to their medical evaluation.  A ten point review of systems was completed and is negative except as documented above.  Patient denies any other acute medical complaint.    Please be advised this text was dictated with [employer] software and may contain errors due to translation.           Review of patient's allergies indicates:   Allergen Reactions    Metformin      Diarrhea on metformin XR     Pneumococcal 23-abimbola ps vaccine      Past Medical History:   Diagnosis Date    Atrial fibrillation     Blood clot associated with vein wall inflammation     not dvt    Cardiomyopathy     Normal cors on cath 11/2017    CHF (congestive heart failure)     DM (diabetes mellitus) 9/19/2013    Hyperlipidemia     Hypertension     Psoriasis     Sleep apnea      Past Surgical History:   Procedure Laterality Date    CARDIAC CATHETERIZATION      COLONOSCOPY      COLONOSCOPY N/A 3/9/2022    Procedure: COLONOSCOPY;  Surgeon: Vielka Burrell MD;  Location: [facility];  Service: Endoscopy;  Laterality: N/A;    DILATION AND CURETTAGE OF UTERUS      ESOPHAGOGASTRODUODENOSCOPY N/A 5/9/2019    Procedure: EGD  (ESOPHAGOGASTRODUODENOSCOPY);  Surgeon: Vielka Burrell MD;  Location: [facility] ENDO;  Service: Endoscopy;  Laterality: N/A;    TRANSFORAMINAL EPIDURAL INJECTION OF STEROID N/A 1/6/2022    Procedure: Transforaminal ANKITA L4/L5 L5/S1;  Surgeon: Jed Yeager MD;  Location: [facility];  Service: Pain Management;  Laterality: N/A;     Family History   Problem Relation Age of Onset    Hypertension Mother     Hypertension Father     Diabetes Father     Diabetes Maternal Grandmother     Diabetes Paternal Grandmother     Breast cancer Neg Hx     Colon cancer Neg Hx     Ovarian cancer Neg Hx      Social History     Tobacco Use    Smoking status: Never Smoker    Smokeless tobacco: Never Used    Tobacco comment: smokes cigars on occasion   Substance Use Topics    Alcohol use: Yes     Comment: occasional    Drug use: Yes     Types: Marijuana     Comment: occ     Review of Systems   Constitutional: Positive for fatigue. Negative for chills and fever.   HENT: Negative for sore throat and trouble swallowing.    Respiratory: Negative for shortness of breath.    Cardiovascular: Negative for chest pain.   Gastrointestinal: Positive for abdominal pain and nausea. Negative for constipation, diarrhea and vomiting.   Genitourinary: Negative for dysuria, flank pain, frequency and hematuria.   Musculoskeletal: Positive for myalgias. Negative for back pain.   Skin: Negative for rash.   Neurological: Negative for dizziness, weakness and headaches.   Hematological: Does not bruise/bleed easily.   Psychiatric/Behavioral: Negative for confusion.       Physical Exam     Initial Vitals   BP Pulse Resp Temp SpO2   04/29/22 1759 04/29/22 1759 04/29/22 1759 04/29/22 1759 04/29/22 1850   114/64 (!) 51 16 97.9 °F (36.6 °C) (!) 92 %      MAP       --                Physical Exam    Nursing note and vitals reviewed.  Constitutional: She appears well-developed and well-nourished. She is not diaphoretic. No distress.   HENT:   Head:  Normocephalic and atraumatic.   Right Ear: External ear normal.   Left Ear: External ear normal.   Mouth/Throat: Oropharynx is clear and moist.   Eyes: Conjunctivae and EOM are normal. Pupils are equal, round, and reactive to light. No scleral icterus.   Neck: No JVD present.   Cardiovascular: Normal rate, regular rhythm and intact distal pulses.   Pulmonary/Chest: Breath sounds normal. No respiratory distress. She has no wheezes. She has no rhonchi. She has no rales.   Abdominal: Abdomen is soft. There is no abdominal tenderness (mild epigastric).   Slowed BS There is no rebound and no guarding.   Musculoskeletal:         General: No edema (none). Normal range of motion.     Neurological: She is alert and oriented to person, place, and time. She has normal strength. No cranial nerve deficit or sensory deficit.   Skin: Skin is warm and dry. Capillary refill takes less than 2 seconds. No rash noted. No erythema. No pallor.   Psychiatric: She has a normal mood and affect. Her behavior is normal. Judgment and thought content normal.         ED Course   Procedures  Labs Reviewed   CBC W/ AUTO DIFFERENTIAL - Abnormal; Notable for the following components:       Result Value    MCHC 31.0 (*)     RDW 14.6 (*)     Mono % 3.9 (*)     All other components within normal limits   COMPREHENSIVE METABOLIC PANEL - Abnormal; Notable for the following components:    Glucose 450 (*)     Creatinine 2.8 (*)     eGFR if  22.6 (*)     eGFR if non  19.6 (*)     All other components within normal limits   CK - Abnormal; Notable for the following components:     (*)     All other components within normal limits   POCT GLUCOSE - Abnormal; Notable for the following components:    POCT Glucose >500 (*)     All other components within normal limits   ISTAT PROCEDURE - Abnormal; Notable for the following components:    POC PH 7.335 (*)     POC PCO2 61.7 (*)     POC HCO3 33.0 (*)     POC SATURATED O2 70 (*)      POC TCO2 35 (*)     All other components within normal limits   POCT GLUCOSE - Abnormal; Notable for the following components:    POCT Glucose 355 (*)     All other components within normal limits   BETA - HYDROXYBUTYRATE, SERUM   B-TYPE NATRIURETIC PEPTIDE   TROPONIN I   LIPASE   LIPASE    Narrative:     ADD LIPASE PER DR JOVANNA MALONE/ORDER# [id number] @ 20:18 4/29/2022   B-TYPE NATRIURETIC PEPTIDE    Narrative:     Add on TROP #[id number] per Dr. Jovanna Malone  04/29/2022  20:51   ADD LIPASE PER DR JOVANNA MALONE/ORDER# [id number] @ 20:18 4/29/2022  ADD ON BNP TO ORDER #[id number] PER JOVANNA MALONE MD  04/29/2022    20:46    TROPONIN I    Narrative:     Add on TROP #[id number] per Dr. Jovanna Malone  04/29/2022  20:51   ADD LIPASE PER DR JOVANNA MALONE/ORDER# [id number] @ 20:18 4/29/2022  ADD ON BNP TO ORDER #[id number] PER JOVANNA MALONE MD  04/29/2022    20:46    SODIUM, URINE, RANDOM   CREATININE, URINE, RANDOM   POCT URINE PREGNANCY   POCT GLUCOSE MONITORING CONTINUOUS        ECG Results          EKG 12-lead (Final result)  Result time 04/30/22 08:25:21    Final result by Interface, Lab In [facility] (04/30/22 08:25:21)                 Narrative:    Test Reason : R73.9,    Vent. Rate : 091 BPM     Atrial Rate : 091 BPM     P-R Int : 142 ms          QRS Dur : 114 ms      QT Int : 388 ms       P-R-T Axes : 036 002 -01 degrees     QTc Int : 477 ms    Sinus rhythm with occasional Premature ventricular complexes  Voltage criteria for left ventricular hypertrophy  Nonspecific T wave abnormality Now present  Abnormal ECG  When compared with ECG of 27-APR-2022 21:03,    Confirmed by DANIELA MARR MD ([id number]) on 4/30/2022 8:25:12 AM    Referred By: SHIREEN   SELF           Confirmed By:DANIELA MARR MD                            Imaging Results          X-Ray Chest AP Portable (Final result)  Result time 04/29/22 19:12:34    Final result by Francois Saxena MD (04/29/22 19:12:34)                 Impression:       1. Hypoventilatory exam, no convincing acute cardiopulmonary process.      Electronically signed by: Francois Saxena MD  Date:    04/29/2022  Time:    19:12             Narrative:    EXAMINATION:  XR CHEST AP PORTABLE    CLINICAL HISTORY:  hyperglycemia;    TECHNIQUE:  Single frontal view of the chest was performed.    COMPARISON:  04/27/2022    FINDINGS:  The cardiomediastinal silhouette is prominent, similar to the previous examination noting magnified by technique.  There is elevation of the right hemidiaphragm.  Left chest wall pacer noted..  There is no pleural effusion.  The trachea is midline.  The lungs are symmetrically expanded bilaterally with minimally coarse central hilar interstitial attenuation accentuated by shallow inspiratory effort..  No large focal consolidation seen.  There is no pneumothorax.  The osseous structures are remarkable for degenerative changes..                                 Medications   sodium chloride 0.9% flush 10 mL (has no administration in time range)   melatonin tablet 6 mg (has no administration in time range)   sodium chloride 0.9% flush 10 mL (has no administration in time range)   albuterol-ipratropium 2.5 mg-0.5 mg/3 mL nebulizer solution 3 mL (has no administration in time range)   ondansetron injection 4 mg (has no administration in time range)   prochlorperazine injection Soln 5 mg (has no administration in time range)   acetaminophen tablet 650 mg (650 mg Oral Given 4/30/22 6083)   naloxone 0.4 mg/mL injection 0.02 mg (has no administration in time range)   glucose chewable tablet 16 g (has no administration in time range)   glucose chewable tablet 24 g (has no administration in time range)   insulin detemir U-100 pen 35 Units (35 Units Subcutaneous Given 4/30/22 9468)   glucagon (human recombinant) injection 1 mg (has no administration in time range)   dextrose 10% bolus 125 mL (has no administration in time range)   dextrose 10% bolus 250 mL (has no  administration in time range)   insulin aspart U-100 pen 1-10 Units (4 Units Subcutaneous Given 4/30/22 0443)   0.9%  NaCl infusion ( Intravenous New Bag 4/30/22 0001)   apixaban tablet 5 mg (5 mg Oral Given 4/30/22 0845)   cetirizine tablet 10 mg (10 mg Oral Given 4/30/22 0845)   insulin aspart U-100 pen 20 Units (20 Units Subcutaneous Given 4/30/22 0846)   metoprolol succinate (TOPROL-XL) 24 hr tablet 50 mg (50 mg Oral Given 4/30/22 0845)   atorvastatin tablet 40 mg (40 mg Oral Given 4/30/22 0845)   sodium chloride 0.9% bolus 1,000 mL (0 mLs Intravenous Stopped 4/29/22 1950)   metoclopramide HCl injection 5 mg (5 mg Intravenous Given 4/29/22 1950)   insulin regular injection 6 Units (6 Units Intravenous Given 4/29/22 1949)     Medical Decision Making:   History:   Old Medical Records: I decided to obtain old medical records.  Old Records Summarized: records from clinic visits and records from previous admission(s).  Initial Assessment:   Patient presents for continuation of myalgias, fatigue (intermittent x months). +nausea, dec PO intake since starting Ozempic x 1 month. Glu >500 triage. VSS, afebrile  Differential Diagnosis:   DDx includes dehydration, DKA, electrolyte disturbance, rhabdo, UTI. Physical exam and history taking lower clinical suspicion for acute abdomen, HHS, HF exacerbation  Independently Interpreted Test(s):   I have ordered and independently interpreted X-rays - see prior notes.  I have ordered and independently interpreted EKG Reading(s) - see summary below       <> Summary of EKG Reading(s): EKG shows NSR with PVC noted- intraventricular conduction delay apparent. TWA in inferior leads similar to prior ECGs  Clinical Tests:   Lab Tests: Ordered and Reviewed  Radiological Study: Ordered and Reviewed  Medical Tests: Ordered and Reviewed            Attending Attestation:     Physician Attestation Statement for NP/PA:   I discussed this assessment and plan of this patient with the NP/PA, but I  did not personally examine the patient. The face to face encounter was performed by the NP/PA.              ED Course as of 04/30/22 1128   Fri Apr 29, 2022   1858 SpO2(!): 72 %  Made in error, 95% on RA [MF]   1900 Beta-Hydroxybutyrate: 0.0 [MF]   1911 CBC auto differential(!)  unremarkable [MF]   1912 POC PH(!): 7.335  Chronic mild retention, bicarb 33 [MF]   1925 CPK(!): 252  Stable from 1 mo ago   [MF]   1925 X-Ray Chest AP Portable  No acute finding- ICD noted [MF]   1937 Creatinine(!): 2.8  1.9 last month, slowly progressing. [MF]   1950 Fluid bolus paused after 100mLs [MF]   2016 Patient would likely benefit from slow fluids/tight glucose control with progressed CKD. Observation  [MF]      ED Course User Index  [MF] Ciarra Oneil PA-C          Patient agreed to plan of care and voiced understanding.    Ciarra Oneil PA-C  04/30/2022    I discussed the following case, diagnosis and plan of care with attending physician.      Clinical Impression:   Final diagnoses:  [R73.9] Hyperglycemia  [N18.4] Stage 4 chronic kidney disease (Primary)  [E11.22, N18.4, Z79.4] Type 2 diabetes mellitus with stage 4 chronic kidney disease, with long-term current use of insulin  [R74.8] Elevated CPK          ED Disposition Condition    Observation               Ciarra Oneil PA-C  04/30/22 1129       Jovanna Malone MD  05/05/22 6374

## 2022-04-30 NOTE — ASSESSMENT & PLAN NOTE
-Pt. With poorly controlled DM2, A1c 10.0 3/2022  - on admit, labs not consistent with DKA. IV insulin given in ED  -Home regimen tresiba 88 units and lispro 40 units TID WM  -Levemir 35 units BID ordered with SSI, increase as needed. Endocrine consulted for further assistance with management as pt. Has poorly controlled DM2 and severe hyperglycemia with mild HHS may be contributing to her symptoms

## 2022-04-30 NOTE — CONSULTS
[facility] - Telemetry Stepdown ([place] [address])  Endocrinology  Diabetes Consult Note    Consult Requested by: Davonte Felton MD   Reason for admit: Acute renal failure superimposed on stage 3 chronic kidney disease    HISTORY OF PRESENT ILLNESS:  Reason for Consult: Management of T2DM, Hyperglycemia     Diabetes diagnosis year: 2011    Home Diabetes Medications:  Humalog 40 units TIDWM; Tresiba 88 units nightly; and Ozempic 0.5 mg weekly.     How often checking glucose at home? 1-3 x day   BG readings on regimen: 200-300's  Hypoglycemia on the regimen?  No  Missed doses on regimen?  No    Diabetes Complications include:     Hyperglycemia    Complicating diabetes co morbidities:   HLD; MILE; and N/V      HPI: 47 yo F with PMHx HFrEF (15%) 2/2 NICM, CKD3, T2DM, HTN, HLD, AF on Eliquis, and MILE on CPAP who presents to the ED with generalized malaise, body aches, and nausea x a week. Pt. Reports generalized body aches that are most prominently located in her chest, back, belly, arms, and legs. She has had poor appetite and associated nausea with a few episodes of clear, non-bloody, non-bilous emesis.  Pt. Has presented to the ED a couple other times this week with similar symptoms, but she states that her symptoms have only worsened with increasing vomiting frequency, and she has been unable to tolerate much PO intake. She denies any diarrhea, cough, fevers, chills, SOB, or edema. She does report some upper abdomen/lower mid chest pain that has been present this week, particularly when she vomits. Pt. Also expresses concerns that her symptoms seem to have worsening since she witched from trulicity to ozempic last month. Endocrine consulted to manage hyperglycemia and type 2 diabetes.     Lab Results   Component Value Date    HGBA1C 10.0 (H) 03/09/2022                Interval HPI:   Overnight events: No acute events overnight. Patient on the OBS unit in room 7097/7097 A. Blood glucose improving. BG at and above goal on  current insulin regimen (SSI, prandial, and basal insulin ). Steroid use- None.    Renal function- Abnormal - Creatinine 2.7   Vasopressors-  None       Diet diabetic [facility];  Calorie     Eatin%  Nausea: No  Hypoglycemia and intervention: No  Fever: No  TPN and/or TF: No    PMH, PSH, FH, SH updated and reviewed     ROS:  Review of Systems  Constitutional: Negative for weight changes.  Eyes: Negative for visual disturbance.  Respiratory: Negative for cough.   Cardiovascular: Negative for chest pain.  Gastrointestinal: Positive for nausea.  Endocrine: Positive for polyuria, polydipsia.  Musculoskeletal: Negative for back pain.  Skin: Negative for rash.  Neurological: Negative for syncope.  Psychiatric/Behavioral: Negative for depression.     Current Medications and/or Treatments Impacting Glycemic Control  Immunotherapy:    Immunosuppressants       None          Steroids:   Hormones (From admission, onward)                Start     Stop Route Frequency Ordered    22  melatonin tablet 6 mg         -- Oral Nightly PRN 22          Pressors:    Autonomic Drugs (From admission, onward)                None          Hyperglycemia/Diabetes Medications:   Antihyperglycemics (From admission, onward)                Start     Stop Route Frequency Ordered    22 0715  insulin aspart U-100 pen 20 Units         -- SubQ 3 times daily with meals 22 0011    22 2230  insulin detemir U-100 pen 35 Units         -- SubQ 2 times daily 22 21322 222  insulin aspart U-100 pen 1-10 Units         -- SubQ Before meals & nightly PRN 22             PHYSICAL EXAMINATION:  Vitals:    22 1204   BP: (!) 106/58   Pulse: 83   Resp: 18   Temp: 97.7 °F (36.5 °C)     Body mass index is 42.01 kg/m².    Physical Exam  Constitutional: Well developed, well nourished, NAD.  ENT: External ears no masses with nose patent; normal hearing.  Neck: Supple; trachea  midline.  Cardiovascular: Normal heart sounds, no LE edema. DP +2 bilaterally.  Lungs: Normal effort; lungs anterior bilaterally clear to auscultation.  Abdomen: Soft, no masses, no hernias.  MS: No clubbing or cyanosis of nails noted; unable to assess gait.  Skin: No rashes, lesions, or ulcers; no nodules. Injection sites are ok. No lipo hypertropthy or atrophy.  Psychiatric: Good judgement and insight; normal mood and affect.  Neurological: Cranial nerves are grossly intact.   Foot: Nails in fair condition, no amputations noted.        Labs Reviewed and Include   Recent Labs   Lab 04/29/22  1846 04/30/22  0658   * 254*   CALCIUM 10.0 8.9   ALBUMIN 3.8 3.2*   PROT 8.4  --     137   K 3.7 3.7   CO2 28 25   CL 96 101   BUN 18 17   CREATININE 2.8* 2.7*   ALKPHOS 123  --    ALT 25  --    AST 17  --    BILITOT 0.7  --      Lab Results   Component Value Date    WBC 8.49 04/30/2022    HGB 12.5 04/30/2022    HCT 40.9 04/30/2022    MCV 94 04/30/2022     04/30/2022     Recent Labs   Lab 04/24/22  1647 04/27/22  2355   TSH 1.461 1.689     Lab Results   Component Value Date    HGBA1C 10.0 (H) 03/09/2022       Nutritional status:   Body mass index is 42.01 kg/m².  Lab Results   Component Value Date    ALBUMIN 3.2 (L) 04/30/2022    ALBUMIN 3.8 04/29/2022    ALBUMIN 4.0 04/27/2022     Lab Results   Component Value Date    PREALBUMIN 16 (L) 05/15/2014       Estimated Creatinine Clearance: 33.2 mL/min (A) (based on SCr of 2.7 mg/dL (H)).    Accu-Checks  Recent Labs     04/27/22  2111 04/29/22  1802 04/29/22  2034 04/29/22  2214 04/30/22  0418 04/30/22  0746 04/30/22  1141   POCTGLUCOSE 393* >500* 355* 386* 307* 237* 208*        ASSESSMENT and PLAN    * Acute renal failure superimposed on stage 3 chronic kidney disease  Managed per primary team  Avoid hypoglycemia  Titrate insulin slowly to avoid hypoglycemia as the risk of hypoglycemia increases with decreased creatinine clearance.  Estimated Creatinine  Clearance: 33.2 mL/min (A) (based on SCr of 2.7 mg/dL (H)).        Type 2 diabetes mellitus without complication, without long-term current use of insulin  Endocrinology consulted for BG management.   BG goal 140-180    - Levemir Flex Pen 35 units BID  - Humalog (lispro) insulin 20 Units SQ TIDWM and prn for BG excursions MDC SSI (150/25).  - BG checks AC/HS/0200  - Hypoglycemia protocol in place  - If blood glucose greater than 300, please ask patient not to eat food or drink anything other than water until correctional insulin has brought it back below 250    ** Please notify Endocrine for any change and/or advance in diet**  ** Please call Endocrine for any BG related issues **    Discharge Planning:   TBD. Please notify endocrinology prior to discharge.        Obesity with body mass index (BMI) of 30.0 to 39.9  Body mass index is 42.01 kg/m².  Increased abdominal adiposity may increase insulin resistance.       Iron deficiency anemia  May impact the accuracy of the A1C.       MILE treated with BiPAP  May affect BG readings if uncontrolled            Plan discussed with patient, family, and RN at bedside.      Robert Rader, DNP, FNP  Endocrinology  [facility] - Telemetry Stepdown ([place]

## 2022-04-30 NOTE — SUBJECTIVE & OBJECTIVE
Past Medical History:   Diagnosis Date    Atrial fibrillation     Blood clot associated with vein wall inflammation     not dvt    Cardiomyopathy     Normal cors on cath 11/2017    CHF (congestive heart failure)     DM (diabetes mellitus) 9/19/2013    Hyperlipidemia     Hypertension     Psoriasis     Sleep apnea        Past Surgical History:   Procedure Laterality Date    CARDIAC CATHETERIZATION      COLONOSCOPY      COLONOSCOPY N/A 3/9/2022    Procedure: COLONOSCOPY;  Surgeon: Vielka Burrell MD;  Location: [facility] ENDO;  Service: Endoscopy;  Laterality: N/A;    DILATION AND CURETTAGE OF UTERUS      ESOPHAGOGASTRODUODENOSCOPY N/A 5/9/2019    Procedure: EGD (ESOPHAGOGASTRODUODENOSCOPY);  Surgeon: Vielka Burrell MD;  Location: [facility] ENDO;  Service: Endoscopy;  Laterality: N/A;    TRANSFORAMINAL EPIDURAL INJECTION OF STEROID N/A 1/6/2022    Procedure: Transforaminal ANKITA L4/L5 L5/S1;  Surgeon: Jed Yeager MD;  Location: [facility];  Service: Pain Management;  Laterality: N/A;       Review of patient's allergies indicates:   Allergen Reactions    Metformin      Diarrhea on metformin XR     Pneumococcal 23-abimbola ps vaccine        No current facility-administered medications on file prior to encounter.     Current Outpatient Medications on File Prior to Encounter   Medication Sig    albuterol (VENTOLIN HFA) 90 mcg/actuation inhaler Inhale 2 puffs into the lungs every 6 (six) hours as needed for Wheezing or Shortness of Breath. Rescue    apixaban (ELIQUIS) 5 mg Tab Take 1 tablet (5 mg total) by mouth 2 (two) times daily.    baclofen (LIORESAL) 10 MG tablet Take 1 tablet (10 mg total) by mouth 3 (three) times daily.    blood glucose control, high Soln 1 each by Misc.(Non-Drug; Combo Route) route once. for 1 dose    blood glucose control, low Soln 1 each by Misc.(Non-Drug; Combo Route) route once. for 1 dose    BLOOD PRESSURE CUFF Misc 1 kit by Misc.(Non-Drug; Combo Route) route 2 (two) times daily.    blood  "sugar diagnostic Strp Check blood glucose 3x/day.    blood-glucose meter (TRUE METRIX AIR GLUCOSE METER) Misc 1 each by Misc.(Non-Drug; Combo Route) route 3 (three) times daily.    cetirizine (ZYRTEC) 10 MG tablet Take 1 tablet (10 mg total) by mouth once daily.    diclofenac sodium (VOLTAREN) 1 % Gel Apply 2 g topically 2 (two) times daily.    famotidine (PEPCID) 20 MG tablet Take 1 tablet (20 mg total) by mouth 2 (two) times daily.    fluticasone propionate (FLONASE) 50 mcg/actuation nasal spray 1 spray (50 mcg total) by Each Nostril route 2 (two) times daily as needed for Rhinitis.    furosemide (LASIX) 40 MG tablet TAKE 1 TABLET BY MOUTH ONCE DAILY *PLEASE  HOLD  WHILE  NOT  DRINKING*    gabapentin (NEURONTIN) 400 MG capsule Take 1 capsule (400 mg total) by mouth 2 (two) times daily as needed.    HYDROcodone-acetaminophen (NORCO) 5-325 mg per tablet Take 1 tablet by mouth every 6 (six) hours as needed for Pain.    insulin degludec (TRESIBA FLEXTOUCH U-200) 200 unit/mL (3 mL) insulin pen Inject 88 Units into the skin once daily.    insulin lispro (HUMALOG KWIKPEN INSULIN) 100 unit/mL pen Inject 40 Units into the skin 3 (three) times daily before meals.    medroxyPROGESTERone (PROVERA) 10 MG tablet Take 10 mg by mouth once daily.    metoprolol succinate (TOPROL-XL) 50 MG 24 hr tablet Take 1 tablet (50 mg total) by mouth once daily. Hold SBP <120    mupirocin (BACTROBAN) 2 % ointment Apply topically nightly.    ondansetron (ZOFRAN) 4 MG tablet Take 1 tablet (4 mg total) by mouth every 8 (eight) hours as needed for Nausea.    ondansetron (ZOFRAN) 4 MG tablet Take 1 tablet (4 mg total) by mouth every 6 (six) hours as needed for Nausea.    ondansetron (ZOFRAN) 4 MG tablet Take 1 tablet (4 mg total) by mouth every 6 (six) hours.    oxyCODONE-acetaminophen (PERCOCET) 5-325 mg per tablet Take 1 tablet by mouth every 4 (four) hours as needed.    pen needle, diabetic (BD LORI 2ND GEN PEN NEEDLE) 32 gauge x 5/32" Ndle USE 1 " PEN NEEDLE 4 TIMES DAILY    rosuvastatin (CRESTOR) 40 MG Tab Take 1 tablet (40 mg total) by mouth every evening.    semaglutide (OZEMPIC) 0.25 mg or 0.5 mg(2 mg/1.5 mL) pen injector Inject 0.5 mg into the skin every 7 days.    spironolactone (ALDACTONE) 50 MG tablet Take 1 tablet (50 mg total) by mouth once daily. Hold until eating and drinking improves. Need to weight self daily    triamcinolone acetonide 0.1% (KENALOG) 0.1 % cream 2 (two) times daily. Apply to affected area    [DISCONTINUED] blood-glucose meter,continuous (DEXCOM G6 ) Misc Use with dexcom G6 system (Patient not taking: No sig reported)    [DISCONTINUED] blood-glucose sensor (DEXCOM G6 SENSOR) Lupe Change sensor every 10 days (Patient not taking: No sig reported)    [DISCONTINUED] blood-glucose transmitter (DEXCOM G6 TRANSMITTER) Lupe Change every 3 months (Patient not taking: No sig reported)     Family History       Problem Relation (Age of Onset)    Diabetes Father, Maternal Grandmother, Paternal Grandmother    Hypertension Mother, Father          Tobacco Use    Smoking status: Never Smoker    Smokeless tobacco: Never Used    Tobacco comment: smokes cigars on occasion   Substance and Sexual Activity    Alcohol use: Yes     Comment: occasional    Drug use: Yes     Types: Marijuana     Comment: occ    Sexual activity: Yes     Partners: Male     Review of Systems   Constitutional:  Positive for appetite change and fatigue. Negative for activity change, chills, fever and unexpected weight change.   HENT:  Negative for congestion and sore throat.    Respiratory:  Negative for cough and shortness of breath.    Cardiovascular:  Negative for chest pain, palpitations and leg swelling.   Gastrointestinal:  Positive for abdominal pain and nausea. Negative for abdominal distention, blood in stool, constipation, diarrhea and vomiting.   Genitourinary:  Negative for difficulty urinating, dysuria and hematuria.   Musculoskeletal:  Positive for  myalgias. Negative for arthralgias.   Skin:  Negative for color change and rash.   Neurological:  Negative for dizziness, tremors and seizures.   Objective:     Vital Signs (Most Recent):  Temp: 98.4 °F (36.9 °C) (04/29/22 2155)  Pulse: 92 (04/29/22 2155)  Resp: 16 (04/29/22 2155)  BP: 97/68 (04/29/22 2155)  SpO2: (!) 93 % (04/29/22 2155)   Vital Signs (24h Range):  Temp:  [97.9 °F (36.6 °C)-98.4 °F (36.9 °C)] 98.4 °F (36.9 °C)  Pulse:  [51-92] 92  Resp:  [16-19] 16  SpO2:  [92 %-99 %] 93 %  BP: ()/(63-70) 97/68     Weight: 114.5 kg (252 lb 6.8 oz)  Body mass index is 42.01 kg/m².    Physical Exam  Vitals reviewed.   Constitutional:       General: She is not in acute distress.     Appearance: She is well-developed.   HENT:      Head: Normocephalic and atraumatic.   Eyes:      Extraocular Movements: Extraocular movements intact.      Pupils: Pupils are equal, round, and reactive to light.   Neck:      Vascular: No JVD.      Trachea: No tracheal deviation.   Cardiovascular:      Rate and Rhythm: Normal rate and regular rhythm.      Heart sounds: No murmur heard.    No friction rub. No gallop.   Pulmonary:      Effort: No respiratory distress.      Breath sounds: Normal breath sounds. No wheezing or rales.   Abdominal:      General: Bowel sounds are normal. There is no distension.      Palpations: Abdomen is soft. There is no mass.      Tenderness: There is no abdominal tenderness.   Musculoskeletal:         General: No deformity.      Cervical back: Neck supple.   Lymphadenopathy:      Cervical: No cervical adenopathy.   Skin:     General: Skin is warm and dry.      Findings: No rash.   Neurological:      Mental Status: She is alert and oriented to person, place, and time.         CRANIAL NERVES     CN III, IV, VI   Pupils are equal, round, and reactive to light.     Significant Labs: All pertinent labs within the past 24 hours have been reviewed.    Significant Imaging: I have reviewed all pertinent imaging  results/findings within the past 24 hours.

## 2022-04-30 NOTE — ASSESSMENT & PLAN NOTE
Endocrinology consulted for BG management.   BG goal 140-180    - Levemir Flex Pen 35 units BID  - Humalog (lispro) insulin 20 Units SQ TIDWM and prn for BG excursions [facility] [place], [place] SSI (150/25).  - BG checks /HS/0200  - Hypoglycemia protocol in place  - If blood glucose greater than 300, please ask patient not to eat food or drink anything other than water until correctional insulin has brought it back below 250    ** Please notify Endocrine for any change and/or advance in diet**  ** Please call Endocrine for any BG related issues **    Discharge Planning:   TBD. Please notify endocrinology prior to discharge.

## 2022-04-30 NOTE — ASSESSMENT & PLAN NOTE
-Cr 2.8 on admit, increased from baseline 1.5-2  -Pt. With poor PO intake, vomiting, suspect prerenal etiology. Urine electrolytes and renal U/S ordered for further work-up  -IV 1L NS given in ED, continue gentle hydration, hold home diuretics  -Renally avoid meds and avoid nephrotoxins

## 2022-05-01 ENCOUNTER — PATIENT MESSAGE (OUTPATIENT)
Dept: ENDOCRINOLOGY | Facility: HOSPITAL | Age: 47
End: 2022-05-01
Payer: MEDICARE

## 2022-05-01 PROBLEM — I24.0 LV (LEFT VENTRICULAR) MURAL THROMBUS WITHOUT MI: Status: ACTIVE | Noted: 2022-05-01

## 2022-05-01 LAB
ALBUMIN SERPL BCP-MCNC: 3.3 G/DL (ref 3.5–5.2)
ALP SERPL-CCNC: 96 U/L (ref 55–135)
ALT SERPL W/O P-5'-P-CCNC: 18 U/L (ref 10–44)
ANION GAP SERPL CALC-SCNC: 9 MMOL/L (ref 8–16)
ASCENDING AORTA: 2.5 CM
AST SERPL-CCNC: 15 U/L (ref 10–40)
AV INDEX (PROSTH): 0.42
AV MEAN GRADIENT: 4 MMHG
AV PEAK GRADIENT: 7 MMHG
AV VALVE AREA: 1.33 CM2
AV VELOCITY RATIO: 0.44
BASOPHILS # BLD AUTO: 0.03 K/UL (ref 0–0.2)
BASOPHILS NFR BLD: 0.4 % (ref 0–1.9)
BILIRUB SERPL-MCNC: 0.5 MG/DL (ref 0.1–1)
BSA FOR ECHO PROCEDURE: 2.29 M2
BUN SERPL-MCNC: 22 MG/DL (ref 6–20)
CALCIUM SERPL-MCNC: 9.6 MG/DL (ref 8.7–10.5)
CHLORIDE SERPL-SCNC: 105 MMOL/L (ref 95–110)
CO2 SERPL-SCNC: 25 MMOL/L (ref 23–29)
CREAT SERPL-MCNC: 3 MG/DL (ref 0.5–1.4)
CV ECHO LV RWT: 0.26 CM
DIFFERENTIAL METHOD: ABNORMAL
DOP CALC AO PEAK VEL: 1.35 M/S
DOP CALC AO VTI: 22.03 CM
DOP CALC LVOT AREA: 3.2 CM2
DOP CALC LVOT DIAMETER: 2.01 CM
DOP CALC LVOT PEAK VEL: 0.6 M/S
DOP CALC LVOT STROKE VOLUME: 29.3 CM3
DOP CALCLVOT PEAK VEL VTI: 9.24 CM
E WAVE DECELERATION TIME: 147.49 MSEC
E/A RATIO: 0.92
E/E' RATIO: 11 M/S
ECHO LV POSTERIOR WALL: 0.86 CM (ref 0.6–1.1)
EJECTION FRACTION: 20 %
EOSINOPHIL # BLD AUTO: 0.2 K/UL (ref 0–0.5)
EOSINOPHIL NFR BLD: 2.2 % (ref 0–8)
ERYTHROCYTE [DISTWIDTH] IN BLOOD BY AUTOMATED COUNT: 14.5 % (ref 11.5–14.5)
EST. GFR  (AFRICAN AMERICAN): 20.8 ML/MIN/1.73 M^2
EST. GFR  (NON AFRICAN AMERICAN): 18.1 ML/MIN/1.73 M^2
FRACTIONAL SHORTENING: 16 % (ref 28–44)
GLUCOSE SERPL-MCNC: 272 MG/DL (ref 70–110)
HCT VFR BLD AUTO: 40.5 % (ref 37–48.5)
HGB BLD-MCNC: 12.2 G/DL (ref 12–16)
IMM GRANULOCYTES # BLD AUTO: 0.04 K/UL (ref 0–0.04)
IMM GRANULOCYTES NFR BLD AUTO: 0.5 % (ref 0–0.5)
INTERVENTRICULAR SEPTUM: 0.76 CM (ref 0.6–1.1)
IVRT: 114.18 MSEC
LA MAJOR: 5.28 CM
LA MINOR: 5.05 CM
LA WIDTH: 3.93 CM
LEFT ATRIUM SIZE: 3.74 CM
LEFT ATRIUM VOLUME INDEX MOD: 17.5 ML/M2
LEFT ATRIUM VOLUME INDEX: 29.6 ML/M2
LEFT ATRIUM VOLUME MOD: 38.11 CM3
LEFT ATRIUM VOLUME: 64.5 CM3
LEFT INTERNAL DIMENSION IN SYSTOLE: 5.51 CM (ref 2.1–4)
LEFT VENTRICLE DIASTOLIC VOLUME INDEX: 99.95 ML/M2
LEFT VENTRICLE DIASTOLIC VOLUME: 217.89 ML
LEFT VENTRICLE MASS INDEX: 101 G/M2
LEFT VENTRICLE SYSTOLIC VOLUME INDEX: 67.8 ML/M2
LEFT VENTRICLE SYSTOLIC VOLUME: 147.76 ML
LEFT VENTRICULAR INTERNAL DIMENSION IN DIASTOLE: 6.53 CM (ref 3.5–6)
LEFT VENTRICULAR MASS: 219.33 G
LV LATERAL E/E' RATIO: 9.17 M/S
LV SEPTAL E/E' RATIO: 13.75 M/S
LYMPHOCYTES # BLD AUTO: 2.7 K/UL (ref 1–4.8)
LYMPHOCYTES NFR BLD: 32.3 % (ref 18–48)
MCH RBC QN AUTO: 27.9 PG (ref 27–31)
MCHC RBC AUTO-ENTMCNC: 30.1 G/DL (ref 32–36)
MCV RBC AUTO: 93 FL (ref 82–98)
MONOCYTES # BLD AUTO: 0.6 K/UL (ref 0.3–1)
MONOCYTES NFR BLD: 6.6 % (ref 4–15)
MV PEAK A VEL: 0.6 M/S
MV PEAK E VEL: 0.55 M/S
MV STENOSIS PRESSURE HALF TIME: 42.77 MS
MV VALVE AREA P 1/2 METHOD: 5.14 CM2
NEUTROPHILS # BLD AUTO: 4.9 K/UL (ref 1.8–7.7)
NEUTROPHILS NFR BLD: 58 % (ref 38–73)
NRBC BLD-RTO: 0 /100 WBC
PHOSPHATE SERPL-MCNC: 3.5 MG/DL (ref 2.7–4.5)
PISA TR MAX VEL: 2.38 M/S
PLATELET # BLD AUTO: 231 K/UL (ref 150–450)
PMV BLD AUTO: 10.9 FL (ref 9.2–12.9)
POCT GLUCOSE: 236 MG/DL (ref 70–110)
POCT GLUCOSE: 278 MG/DL (ref 70–110)
POCT GLUCOSE: 280 MG/DL (ref 70–110)
POCT GLUCOSE: 281 MG/DL (ref 70–110)
POCT GLUCOSE: 294 MG/DL (ref 70–110)
POTASSIUM SERPL-SCNC: 4.1 MMOL/L (ref 3.5–5.1)
PROT SERPL-MCNC: 6.6 G/DL (ref 6–8.4)
PTH-INTACT SERPL-MCNC: 132.7 PG/ML (ref 9–77)
RA MAJOR: 3.85 CM
RA PRESSURE: 3 MMHG
RA WIDTH: 3.07 CM
RBC # BLD AUTO: 4.37 M/UL (ref 4–5.4)
RV TISSUE DOPPLER FREE WALL SYSTOLIC VELOCITY 1 (APICAL 4 CHAMBER VIEW): 10.08 CM/S
SINUS: 2.79 CM
SODIUM SERPL-SCNC: 139 MMOL/L (ref 136–145)
STJ: 2.11 CM
TDI LATERAL: 0.06 M/S
TDI SEPTAL: 0.04 M/S
TDI: 0.05 M/S
TR MAX PG: 23 MMHG
TRICUSPID ANNULAR PLANE SYSTOLIC EXCURSION: 1.41 CM
TV REST PULMONARY ARTERY PRESSURE: 26 MMHG
WBC # BLD AUTO: 8.47 K/UL (ref 3.9–12.7)

## 2022-05-01 PROCEDURE — 83970 ASSAY OF PARATHORMONE: CPT | Performed by: STUDENT IN AN ORGANIZED HEALTH CARE EDUCATION/TRAINING PROGRAM

## 2022-05-01 PROCEDURE — 94660 CPAP INITIATION&MGMT: CPT

## 2022-05-01 PROCEDURE — 99214 PR OFFICE/OUTPT VISIT, EST, LEVL IV, 30-39 MIN: ICD-10-PCS | Mod: GC,,, | Performed by: INTERNAL MEDICINE

## 2022-05-01 PROCEDURE — 84100 ASSAY OF PHOSPHORUS: CPT | Performed by: STUDENT IN AN ORGANIZED HEALTH CARE EDUCATION/TRAINING PROGRAM

## 2022-05-01 PROCEDURE — 99214 OFFICE O/P EST MOD 30 MIN: CPT | Mod: GC,,, | Performed by: INTERNAL MEDICINE

## 2022-05-01 PROCEDURE — 25000003 PHARM REV CODE 250: Performed by: HOSPITALIST

## 2022-05-01 PROCEDURE — 99226 PR SUBSEQUENT OBSERVATION CARE,LEVEL III: ICD-10-PCS | Mod: ,,, | Performed by: STUDENT IN AN ORGANIZED HEALTH CARE EDUCATION/TRAINING PROGRAM

## 2022-05-01 PROCEDURE — 80053 COMPREHEN METABOLIC PANEL: CPT | Performed by: STUDENT IN AN ORGANIZED HEALTH CARE EDUCATION/TRAINING PROGRAM

## 2022-05-01 PROCEDURE — 94761 N-INVAS EAR/PLS OXIMETRY MLT: CPT

## 2022-05-01 PROCEDURE — 36415 COLL VENOUS BLD VENIPUNCTURE: CPT | Performed by: STUDENT IN AN ORGANIZED HEALTH CARE EDUCATION/TRAINING PROGRAM

## 2022-05-01 PROCEDURE — 99214 PR OFFICE/OUTPT VISIT, EST, LEVL IV, 30-39 MIN: ICD-10-PCS | Mod: ,,, | Performed by: NURSE PRACTITIONER

## 2022-05-01 PROCEDURE — 99214 OFFICE O/P EST MOD 30 MIN: CPT | Mod: ,,, | Performed by: NURSE PRACTITIONER

## 2022-05-01 PROCEDURE — 99226 PR SUBSEQUENT OBSERVATION CARE,LEVEL III: CPT | Mod: ,,, | Performed by: STUDENT IN AN ORGANIZED HEALTH CARE EDUCATION/TRAINING PROGRAM

## 2022-05-01 PROCEDURE — 99900035 HC TECH TIME PER 15 MIN (STAT)

## 2022-05-01 PROCEDURE — 25000003 PHARM REV CODE 250: Performed by: STUDENT IN AN ORGANIZED HEALTH CARE EDUCATION/TRAINING PROGRAM

## 2022-05-01 PROCEDURE — 85025 COMPLETE CBC W/AUTO DIFF WBC: CPT | Performed by: STUDENT IN AN ORGANIZED HEALTH CARE EDUCATION/TRAINING PROGRAM

## 2022-05-01 PROCEDURE — G0378 HOSPITAL OBSERVATION PER HR: HCPCS

## 2022-05-01 PROCEDURE — 63600175 PHARM REV CODE 636 W HCPCS: Performed by: STUDENT IN AN ORGANIZED HEALTH CARE EDUCATION/TRAINING PROGRAM

## 2022-05-01 PROCEDURE — 96372 THER/PROPH/DIAG INJ SC/IM: CPT | Performed by: STUDENT IN AN ORGANIZED HEALTH CARE EDUCATION/TRAINING PROGRAM

## 2022-05-01 PROCEDURE — 96361 HYDRATE IV INFUSION ADD-ON: CPT

## 2022-05-01 RX ORDER — GABAPENTIN 400 MG/1
400 CAPSULE ORAL NIGHTLY
Qty: 180 CAPSULE | Refills: 1
Start: 2022-05-01 | End: 2022-10-25 | Stop reason: SDUPTHER

## 2022-05-01 RX ORDER — BLOOD-GLUCOSE,RECEIVER,CONT
EACH MISCELLANEOUS
Qty: 1 EACH | Refills: 0 | Status: SHIPPED | OUTPATIENT
Start: 2022-05-01 | End: 2023-08-23

## 2022-05-01 RX ORDER — ENOXAPARIN SODIUM 100 MG/ML
120 INJECTION SUBCUTANEOUS DAILY
Qty: 36 ML | Refills: 3 | Status: SHIPPED | OUTPATIENT
Start: 2022-05-01 | End: 2022-05-01 | Stop reason: SDUPTHER

## 2022-05-01 RX ORDER — ENOXAPARIN SODIUM 150 MG/ML
120 INJECTION SUBCUTANEOUS EVERY 24 HOURS
Status: DISCONTINUED | OUTPATIENT
Start: 2022-05-01 | End: 2022-05-02 | Stop reason: HOSPADM

## 2022-05-01 RX ORDER — BLOOD-GLUCOSE SENSOR
EACH MISCELLANEOUS
Qty: 12 EACH | Refills: 3 | Status: SHIPPED | OUTPATIENT
Start: 2022-05-01 | End: 2023-04-06 | Stop reason: SDUPTHER

## 2022-05-01 RX ORDER — BLOOD-GLUCOSE TRANSMITTER
EACH MISCELLANEOUS
Qty: 1 EACH | Refills: 3 | Status: SHIPPED | OUTPATIENT
Start: 2022-05-01 | End: 2023-04-06 | Stop reason: SDUPTHER

## 2022-05-01 RX ORDER — MICONAZOLE NITRATE 2 %
POWDER (GRAM) TOPICAL 2 TIMES DAILY
Status: DISCONTINUED | OUTPATIENT
Start: 2022-05-01 | End: 2022-05-02 | Stop reason: HOSPADM

## 2022-05-01 RX ORDER — BACLOFEN 10 MG/1
10 TABLET ORAL 2 TIMES DAILY
Qty: 90 TABLET | Refills: 0
Start: 2022-05-01 | End: 2022-06-10 | Stop reason: SDUPTHER

## 2022-05-01 RX ORDER — INSULIN ASPART 100 [IU]/ML
24 INJECTION, SOLUTION INTRAVENOUS; SUBCUTANEOUS
Status: DISCONTINUED | OUTPATIENT
Start: 2022-05-01 | End: 2022-05-02

## 2022-05-01 RX ORDER — ENOXAPARIN SODIUM 150 MG/ML
120 INJECTION SUBCUTANEOUS DAILY
Qty: 24 ML | Refills: 3 | Status: SHIPPED | OUTPATIENT
Start: 2022-05-01 | End: 2022-05-13 | Stop reason: SDUPTHER

## 2022-05-01 RX ADMIN — GABAPENTIN 400 MG: 400 CAPSULE ORAL at 08:05

## 2022-05-01 RX ADMIN — ENOXAPARIN SODIUM 120 MG: 120 INJECTION SUBCUTANEOUS at 04:05

## 2022-05-01 RX ADMIN — INSULIN ASPART 6 UNITS: 100 INJECTION, SOLUTION INTRAVENOUS; SUBCUTANEOUS at 05:05

## 2022-05-01 RX ADMIN — INSULIN ASPART 6 UNITS: 100 INJECTION, SOLUTION INTRAVENOUS; SUBCUTANEOUS at 12:05

## 2022-05-01 RX ADMIN — APIXABAN 5 MG: 5 TABLET, FILM COATED ORAL at 08:05

## 2022-05-01 RX ADMIN — INSULIN ASPART 3 UNITS: 100 INJECTION, SOLUTION INTRAVENOUS; SUBCUTANEOUS at 09:05

## 2022-05-01 RX ADMIN — INSULIN ASPART 24 UNITS: 100 INJECTION, SOLUTION INTRAVENOUS; SUBCUTANEOUS at 12:05

## 2022-05-01 RX ADMIN — ATORVASTATIN CALCIUM 40 MG: 40 TABLET, FILM COATED ORAL at 08:05

## 2022-05-01 RX ADMIN — BACLOFEN 5 MG: 5 TABLET ORAL at 09:05

## 2022-05-01 RX ADMIN — CETIRIZINE HYDROCHLORIDE 10 MG: 10 TABLET, FILM COATED ORAL at 08:05

## 2022-05-01 RX ADMIN — INSULIN DETEMIR 35 UNITS: 100 INJECTION, SOLUTION SUBCUTANEOUS at 07:05

## 2022-05-01 RX ADMIN — MICONAZOLE NITRATE 2 % TOPICAL POWDER: at 09:05

## 2022-05-01 RX ADMIN — INSULIN ASPART 24 UNITS: 100 INJECTION, SOLUTION INTRAVENOUS; SUBCUTANEOUS at 05:05

## 2022-05-01 RX ADMIN — INSULIN ASPART 4 UNITS: 100 INJECTION, SOLUTION INTRAVENOUS; SUBCUTANEOUS at 07:05

## 2022-05-01 RX ADMIN — INSULIN ASPART 20 UNITS: 100 INJECTION, SOLUTION INTRAVENOUS; SUBCUTANEOUS at 07:05

## 2022-05-01 RX ADMIN — METOPROLOL SUCCINATE 50 MG: 50 TABLET, EXTENDED RELEASE ORAL at 08:05

## 2022-05-01 RX ADMIN — MICONAZOLE NITRATE 2 % TOPICAL POWDER: at 12:05

## 2022-05-01 NOTE — ASSESSMENT & PLAN NOTE
Managed per primary team  Avoid hypoglycemia  Titrate insulin slowly to avoid hypoglycemia as the risk of hypoglycemia increases with decreased creatinine clearance.  Estimated Creatinine Clearance: 29.9 mL/min (A) (based on SCr of 3 mg/dL (H)).

## 2022-05-01 NOTE — CONSULTS
[facility] - Telemetry Stepdown ([place] [id number])  Cardiology  Consult Note    Patient Name: Fabiana Chanel  MRN: [id number]  Admission Date: 4/29/2022  Hospital Length of Stay: 0 days  Code Status: Full Code   Attending Provider: Davonte Felton MD   Consulting Provider: Mike Bustos MD  Primary Care Physician: Gil Leal MD  Principal Problem:Acute renal failure superimposed on stage 3 chronic kidney disease    Patient information was obtained from patient, past medical records and ER records.     Inpatient consult to Cardiology  Consult performed by: Mike Bustos MD  Consult ordered by: Davonte Felton MD  Reason for consult: LV thrombus while on Eliquis  Assessment/Recommendations: Please see A/P        Subjective:     Reason for consult:  LV thrombus while on Eliquis     HPI:   Patient is a 47 yo female with history of NICM (TTE 04/2022 showed LVEF 20%), CKD3, T2DM, HTN, HLD, AF on Eliquis, and MILE on CPAP who presents to the ED with generalized malaise, body aches, and nausea x a week. Associated with poor appetite, nausea, and non-bloody vomiting x3 episodes Friday. She has been having progressively worsening symptoms despite being evaluated in the ED on 04/24 and 04/27. Patient reported that she has been able to tolerate any medicines without any issues. She reports she takes her Eliquis twice a day. She states that she recently changed diabetic medications and believes this may have prompted her symptoms. She was evaluated with a TTE with contrast on 04/30/2022 and was found to have a small layered thrombus in the left ventricle.     Past Medical History:   Diagnosis Date    Atrial fibrillation     Blood clot associated with vein wall inflammation     not dvt    Cardiomyopathy     Normal cors on cath 11/2017    CHF (congestive heart failure)     DM (diabetes mellitus) 9/19/2013    Hyperlipidemia     Hypertension     Psoriasis     Sleep apnea        Past Surgical History:   Procedure  Laterality Date    CARDIAC CATHETERIZATION      COLONOSCOPY      COLONOSCOPY N/A 3/9/2022    Procedure: COLONOSCOPY;  Surgeon: Vielka Burrell MD;  Location: [facility] ENDO;  Service: Endoscopy;  Laterality: N/A;    DILATION AND CURETTAGE OF UTERUS      ESOPHAGOGASTRODUODENOSCOPY N/A 5/9/2019    Procedure: EGD (ESOPHAGOGASTRODUODENOSCOPY);  Surgeon: Vielka Burrell MD;  Location: [facility] ENDO;  Service: Endoscopy;  Laterality: N/A;    TRANSFORAMINAL EPIDURAL INJECTION OF STEROID N/A 1/6/2022    Procedure: Transforaminal ANKITA L4/L5 L5/S1;  Surgeon: Jed Yeager MD;  Location: [facility] PAIN MGT;  Service: Pain Management;  Laterality: N/A;       Review of patient's allergies indicates:   Allergen Reactions    Metformin      Diarrhea on metformin XR     Pneumococcal 23-abimbola ps vaccine        No current facility-administered medications on file prior to encounter.     Current Outpatient Medications on File Prior to Encounter   Medication Sig    albuterol (VENTOLIN HFA) 90 mcg/actuation inhaler Inhale 2 puffs into the lungs every 6 (six) hours as needed for Wheezing or Shortness of Breath. Rescue    apixaban (ELIQUIS) 5 mg Tab Take 1 tablet (5 mg total) by mouth 2 (two) times daily.    furosemide (LASIX) 40 MG tablet TAKE 1 TABLET BY MOUTH ONCE DAILY *PLEASE  HOLD  WHILE  NOT  DRINKING*    oxyCODONE-acetaminophen (PERCOCET) 5-325 mg per tablet Take 1 tablet by mouth every 4 (four) hours as needed.    rosuvastatin (CRESTOR) 40 MG Tab Take 1 tablet (40 mg total) by mouth every evening.    spironolactone (ALDACTONE) 50 MG tablet Take 1 tablet (50 mg total) by mouth once daily. Hold until eating and drinking improves. Need to weight self daily    [DISCONTINUED] baclofen (LIORESAL) 10 MG tablet Take 1 tablet (10 mg total) by mouth 3 (three) times daily.    [DISCONTINUED] semaglutide (OZEMPIC) 0.25 mg or 0.5 mg(2 mg/1.5 mL) pen injector Inject 0.5 mg into the skin every 7 days.    blood glucose control,  "high Soln 1 each by Misc.(Non-Drug; Combo Route) route once. for 1 dose    blood glucose control, low Soln 1 each by Misc.(Non-Drug; Combo Route) route once. for 1 dose    BLOOD PRESSURE CUFF Misc 1 kit by Misc.(Non-Drug; Combo Route) route 2 (two) times daily.    blood sugar diagnostic Strp Check blood glucose 3x/day.    blood-glucose meter (TRUE METRIX AIR GLUCOSE METER) Misc 1 each by Misc.(Non-Drug; Combo Route) route 3 (three) times daily.    cetirizine (ZYRTEC) 10 MG tablet Take 1 tablet (10 mg total) by mouth once daily.    diclofenac sodium (VOLTAREN) 1 % Gel Apply 2 g topically 2 (two) times daily.    famotidine (PEPCID) 20 MG tablet Take 1 tablet (20 mg total) by mouth 2 (two) times daily.    fluticasone propionate (FLONASE) 50 mcg/actuation nasal spray 1 spray (50 mcg total) by Each Nostril route 2 (two) times daily as needed for Rhinitis.    HYDROcodone-acetaminophen (NORCO) 5-325 mg per tablet Take 1 tablet by mouth every 6 (six) hours as needed for Pain.    insulin degludec (TRESIBA FLEXTOUCH U-200) 200 unit/mL (3 mL) insulin pen Inject 88 Units into the skin once daily.    insulin lispro (HUMALOG KWIKPEN INSULIN) 100 unit/mL pen Inject 40 Units into the skin 3 (three) times daily before meals.    medroxyPROGESTERone (PROVERA) 10 MG tablet Take 10 mg by mouth once daily.    metoprolol succinate (TOPROL-XL) 50 MG 24 hr tablet Take 1 tablet (50 mg total) by mouth once daily. Hold SBP <120    mupirocin (BACTROBAN) 2 % ointment Apply topically nightly.    ondansetron (ZOFRAN) 4 MG tablet Take 1 tablet (4 mg total) by mouth every 8 (eight) hours as needed for Nausea.    ondansetron (ZOFRAN) 4 MG tablet Take 1 tablet (4 mg total) by mouth every 6 (six) hours as needed for Nausea.    ondansetron (ZOFRAN) 4 MG tablet Take 1 tablet (4 mg total) by mouth every 6 (six) hours.    pen needle, diabetic (BD LORI 2ND GEN PEN NEEDLE) 32 gauge x 5/32" Ndle USE 1 PEN NEEDLE 4 TIMES DAILY    " triamcinolone acetonide 0.1% (KENALOG) 0.1 % cream 2 (two) times daily. Apply to affected area    [DISCONTINUED] gabapentin (NEURONTIN) 400 MG capsule Take 1 capsule (400 mg total) by mouth 2 (two) times daily as needed.     Family History       Problem Relation (Age of Onset)    Diabetes Father, Maternal Grandmother, Paternal Grandmother    Hypertension Mother, Father          Tobacco Use    Smoking status: Never Smoker    Smokeless tobacco: Never Used    Tobacco comment: smokes cigars on occasion   Substance and Sexual Activity    Alcohol use: Yes     Comment: occasional    Drug use: Yes     Types: Marijuana     Comment: occ    Sexual activity: Yes     Partners: Male     Review of Systems   Constitutional: Negative for chills and fever.   Cardiovascular:  Negative for chest pain, orthopnea and palpitations.   Respiratory:  Negative for cough and shortness of breath.    Gastrointestinal:  Positive for abdominal pain, nausea and vomiting.   Neurological:  Positive for weakness. Negative for dizziness, headaches and light-headedness.   Psychiatric/Behavioral:  Negative for altered mental status.    Objective:     Vital Signs (Most Recent):  Temp: 97.8 °F (36.6 °C) (05/01/22 1200)  Pulse: 73 (05/01/22 1200)  Resp: 16 (05/01/22 1200)  BP: 110/71 (05/01/22 1200)  SpO2: (!) 93 % (05/01/22 1200)   Vital Signs (24h Range):  Temp:  [97.7 °F (36.5 °C)-98.4 °F (36.9 °C)] 97.8 °F (36.6 °C)  Pulse:  [65-94] 73  Resp:  [16-18] 16  SpO2:  [92 %-97 %] 93 %  BP: (101-126)/(54-85) 110/71     Weight: 114.3 kg (252 lb)  Body mass index is 41.93 kg/m².    SpO2: (!) 93 %  O2 Device (Oxygen Therapy): room air    No intake or output data in the 24 hours ending 05/01/22 1410    Lines/Drains/Airways       None                   Physical Exam  Vitals reviewed.   Constitutional:       General: She is not in acute distress.     Appearance: Normal appearance. She is obese. She is ill-appearing. She is not toxic-appearing.   HENT:       Head: Normocephalic and atraumatic.      Mouth/Throat:      Mouth: Mucous membranes are moist.   Cardiovascular:      Rate and Rhythm: Normal rate and regular rhythm.      Heart sounds: No murmur heard.    No friction rub. No gallop.   Pulmonary:      Effort: Pulmonary effort is normal. No respiratory distress.      Breath sounds: Normal breath sounds.   Abdominal:      Palpations: Abdomen is soft.   Musculoskeletal:      Right lower leg: No edema.      Left lower leg: No edema.   Skin:     General: Skin is warm.   Neurological:      Mental Status: She is alert. Mental status is at baseline.     Significant Labs: BMP:   Recent Labs   Lab 04/29/22 1846 04/30/22  0658 05/01/22  0503   * 254* 272*    137 139   K 3.7 3.7 4.1   CL 96 101 105   CO2 28 25 25   BUN 18 17 22*   CREATININE 2.8* 2.7* 3.0*   CALCIUM 10.0 8.9 9.6   , CMP   Recent Labs   Lab 04/29/22 1846 04/30/22  0658 05/01/22  0503    137 139   K 3.7 3.7 4.1   CL 96 101 105   CO2 28 25 25   * 254* 272*   BUN 18 17 22*   CREATININE 2.8* 2.7* 3.0*   CALCIUM 10.0 8.9 9.6   PROT 8.4  --  6.6   ALBUMIN 3.8 3.2* 3.3*   BILITOT 0.7  --  0.5   ALKPHOS 123  --  96   AST 17  --  15   ALT 25  --  18   ANIONGAP 12 11 9   ESTGFRAFRICA 22.6* 23.7* 20.8*   EGFRNONAA 19.6* 20.5* 18.1*   , CBC   Recent Labs   Lab 04/29/22  1846 04/30/22  0658 05/01/22  0503   WBC 9.04 8.49 8.47   HGB 14.3 12.5 12.2   HCT 46.1 40.9 40.5    218 231   , and All pertinent lab results from the last 24 hours have been reviewed.    Significant Imaging: Echocardiogram: 2D echo with color flow doppler:   Results for orders placed or performed during the hospital encounter of 03/12/18   2D Echo w/ Color Flow Doppler   Result Value Ref Range    EF + QEF 15 (A) 55 - 65    Mitral Valve Regurgitation MILD     Diastolic Dysfunction Yes (A)     Est. PA Systolic Pressure 35.26     Pericardial Effusion NONE     Mitral Valve Mobility NORMAL     Tricuspid Valve Regurgitation  TRIVIAL TO MILD     Narrative    Date of Procedure: 03/12/2018        TEST DESCRIPTION   Technical Quality: This is a technically adequate study.     Aorta: The aortic root is normal in size, measuring 2.2 cm at sinotubular junction and 3.2 cm at Sinuses of Valsalva. The proximal ascending aorta is normal in size, measuring 2.6 cm across.     Left Atrium: The left atrial volume index is normal, measuring 33.04 cc/m2.     Left Ventricle: The left ventricle is severely enlarged, with an end-diastolic diameter of 7.0 cm, and an end-systolic diameter of 6.3 cm. LV wall thickness is normal, with the septum and the posterior wall each measuring 1.1 cm across. Relative wall   thickness was normal at 0.31, and the LV mass index was increased at 201.6 g/m2 consistent with eccentric left ventricular hypertrophy. There are no regional wall motion abnormalities. Left ventricular systolic function appears severely depressed.   Visually estimated ejection fraction is 15-20%. The LV Doppler derived stroke volume equals 26.0 ccs.     Diastolic indices: E wave velocity 0.9 m/s, E/A ratio 1.4,  msec., E/e' ratio(avg) 23. There is pseudonormalization of mitral inflow pattern consistent with significant diastolic dysfunction.     Right Atrium: The right atrium is normal in size, measuring 3.9 cm in length and 4.0 cm in width in the apical view.     Right Ventricle: The right ventricle is normal in size measuring 3.8 cm at the base in the apical right ventricle-focused view. Global right ventricular systolic function appears mildly depressed. Tricuspid annular plane systolic excursion (TAPSE) is 1.9   cm. Tissue Doppler-derived tricuspid annular peak systolic velocity (S prime) is 10.4 cm/s. The estimated PA systolic pressure is 35 mmHg.     Aortic Valve:  The aortic valve is normal in structure with normal leaflet mobility.     Mitral Valve:  The mitral valve is normal in structure with normal leaflet mobility. There is mild  mitral regurgitation.     Tricuspid Valve:  The tricuspid valve is normal in structure with normal leaflet mobility. There is trivial to mild tricuspid regurgitation.     Pulmonary Valve:  The pulmonic valve is not well seen. There is trivial pulmonic regurgitation.     Pericardium: There is no evidence of pericardial effusion.      IVC: IVC is normal in size and collapses > 50% with a sniff, suggesting normal right atrial pressure of 3 mmHg.     Intracavitary: There is no evidence of intracavity mass, thrombi, or vegetation.         CONCLUSIONS     1 - Severely depressed left ventricular systolic function (EF 15-20%).  Severe global hypokinesis.     2 - Severe left ventricular enlargement.     3 - Eccentric hypertrophy.     4 - Impaired LV relaxation, elevated LAP (grade 2 diastolic dysfunction).     5 - Mildly depressed right ventricular systolic function .     6 - Mild mitral regurgitation.     7 - Trivial to mild tricuspid regurgitation.     8 - The estimated PA systolic pressure is 35 mmHg.             This document has been electronically    SIGNED BY: Oziel Yanez MD On: 03/12/2018 19:59    and Transthoracic echo (TTE) complete (Cupid Only):   Results for orders placed or performed during the hospital encounter of 04/29/22   Echo Saline Bubble? No   Result Value Ref Range    Ascending aorta 2.50 cm    STJ 2.11 cm    AV mean gradient 4 mmHg    Ao peak isai 1.35 m/s    Ao VTI 22.03 cm    IVRT 114.18 msec    IVS 0.76 0.6 - 1.1 cm    LA size 3.74 cm    Left Atrium Major Axis 5.28 cm    Left Atrium Minor Axis 5.05 cm    LVIDd 6.53 (A) 3.5 - 6.0 cm    LVIDs 5.51 (A) 2.1 - 4.0 cm    LVOT diameter 2.01 cm    LVOT peak VTI 9.24 cm    Posterior Wall 0.86 0.6 - 1.1 cm    MV Peak A Isai 0.60 m/s    E wave deceleration time 147.49 msec    MV Peak E Isai 0.55 m/s    RA Major Axis 3.85 cm    RA Width 3.07 cm    Sinus 2.79 cm    TAPSE 1.41 cm    TR Max Isai 2.38 m/s    TDI LATERAL 0.06 m/s    TDI SEPTAL 0.04 m/s    LA  WIDTH 3.93 cm    MV stenosis pressure 1/2 time 42.77 ms    LV Diastolic Volume 217.89 mL    LV Systolic Volume 147.76 mL    LVOT peak isai 0.60 m/s    LA volume (mod) 38.11 cm3    RV S' 10.08 cm/s    LV LATERAL E/E' RATIO 9.17 m/s    LV SEPTAL E/E' RATIO 13.75 m/s    FS 16 %    LA volume 64.50 cm3    LV mass 219.33 g    Left Ventricle Relative Wall Thickness 0.26 cm    AV valve area 1.33 cm2    AV Velocity Ratio 0.44     AV index (prosthetic) 0.42     MV valve area p 1/2 method 5.14 cm2    E/A ratio 0.92     Mean e' 0.05 m/s    LVOT area 3.2 cm2    LVOT stroke volume 29.30 cm3    AV peak gradient 7 mmHg    E/E' ratio 11.00 m/s    LV Systolic Volume Index 67.8 mL/m2    LV Diastolic Volume Index 99.95 mL/m2    LA Volume Index 29.6 mL/m2    LV Mass Index 101 g/m2    Triscuspid Valve Regurgitation Peak Gradient 23 mmHg    LA Volume Index (Mod) 17.5 mL/m2    BSA 2.29 m2    Right Atrial Pressure (from IVC) 3 mmHg    EF 20 %    TV rest pulmonary artery pressure 26 mmHg    Narrative    · The left ventricle is moderately enlarged with eccentric hypertrophy and   severely decreased systolic function.  · The estimated ejection fraction is 20%.  · There is severe left ventricular global hypokinesis.  · There appears to be a thrombus along the apical lateral wall of the left   ventricle.  · Grade I left ventricular diastolic dysfunction.  · Normal right ventricular size with mildly reduced right ventricular   systolic function.  · The estimated PA systolic pressure is 26 mmHg.  · Normal central venous pressure (3 mmHg).        Assessment and Plan:     LV (left ventricular) mural thrombus without MI  Patient is a 46 year old female that was admitted for JADA. Incidentally found to have a left ventricular thrombus with contrast 2D echo. Patient is currently on apixaban for atrial fibrillation anticoagulation. Differentials include medication intolerance versus medication absorption issue, especially because patient presented with  significant amount of vomiting. Elevated BMI (41 kg/m2) is no longer considered a limiting factor in DOAC use for AC per recent evidence online. After discussing with patient, she states that she had tried Xarelto and had a come x 3 days with it. She reports she absolutely does not want to use Coumadin due to the headaches and frequent monitoring it requires. With shared decision making, we have come to the conclusion of Lovenox for treatment of the LV thrombus.   - Recommend considering transition to weight based Lovenox tonight (12 hrs from the last dose of Eliquis) 1 mg /kg SC q daily considering reduced renal function (per primary pharmacy is okay with Lovenox noting that CrCl is < 30) . Additionally, this is per patient preference, patient cautioned on risks of bleeding and lipodystrophy from injections, still agreeable to taking Lovenox over alternative therapies  - Recommend repeat echocardiogram with contrast in 3 months with outpatient Cardiologist  - Follow up with Dr Ynaez (outpatient Cardiologist) in 3-4 weeks     Paroxysmal atrial fibrillation  Transition from Eliquis to Lovenox based on patient choice due to presence of LV thrombus on Eliquis  Follows with Dr Christine outpatient      VTE Risk Mitigation (From admission, onward)         Ordered     apixaban tablet 5 mg  2 times daily         04/30/22 0011     IP VTE HIGH RISK PATIENT  Once         04/29/22 2126     Place sequential compression device  Until discontinued         04/29/22 2126     IP VTE HIGH RISK PATIENT  Once         04/29/22 2126              Thank you for your consult. We will sign off. Please contact us if you have any additional questions.    Plan of care was discussed with staff, Dr Martin.    Mike Bustos MD  Cardiology Fellow PGY4  [facility] - Telemetry Stepdown ([place]-)

## 2022-05-01 NOTE — ASSESSMENT & PLAN NOTE
Endocrinology consulted for BG management.   BG goal 140-180    - Levemir Flex Pen 40 units BID (20% increase due to FBG above goal)  - Novolog (aspart) insulin 24 Units SQ TIDWM and prn for BG excursions MDC SSI (150/25). (20% increase due to prandial BG above goal)  - BG checks AC/HS/0200  - Hypoglycemia protocol in place  - If blood glucose greater than 300, please ask patient not to eat food or drink anything other than water until correctional insulin has brought it back below 250    ** Please notify Endocrine for any change and/or advance in diet**  ** Please call Endocrine for any BG related issues **    Discharge Planning:   TBD. Please notify endocrinology prior to discharge.

## 2022-05-01 NOTE — PLAN OF CARE
Problem: Adult Inpatient Plan of Care  Goal: Plan of Care Review  Outcome: Ongoing, Progressing  Goal: Patient-Specific Goal (Individualized)  Outcome: Ongoing, Progressing  Goal: Absence of Hospital-Acquired Illness or Injury  Outcome: Ongoing, Progressing  Goal: Optimal Comfort and Wellbeing  Outcome: Ongoing, Progressing  Goal: Readiness for Transition of Care  Outcome: Ongoing, Progressing     Problem: Diabetes Comorbidity  Goal: Blood Glucose Level Within Targeted Range  Outcome: Ongoing, Progressing  Problem: Fluid and Electrolyte Imbalance (Acute Kidney Injury/Impairment)  Goal: Fluid and Electrolyte Balance  Outcome: Ongoing, Progressing  Patient in bed, eyes closed resting. Patient easy to arouse and verbalizes no needs or concerns. No acute episodes during shift. Safety measures maintained, bed in lowest position, call bell and personal items are within reach. Patient encouraged to call for any assistance.

## 2022-05-01 NOTE — HPI
Patient is a 47 yo female with history of NICM (TTE 04/2022 showed LVEF 20%), CKD3, T2DM, HTN, HLD, AF on Eliquis, and MILE on CPAP who presents to the ED with generalized malaise, body aches, and nausea x a week. Associated with poor appetite, nausea, and non-bloody vomiting. She has been having progressively worsening symptoms despite being evaluated in the ED on 04/24 and 04/27. Patient reported that she has not been able to tolerate any medicines in addition to the above mention of PO intake. She states that she recently changed diabetic medications and believes this may have prompted her symptoms. She was evaluated with a TTE with contrast on 04/30/2022 and was found to have a small layered thrombus in the left ventricle.

## 2022-05-01 NOTE — PLAN OF CARE
Problem: Adult Inpatient Plan of Care  Goal: Plan of Care Review  Outcome: Met  Goal: Patient-Specific Goal (Individualized)  Outcome: Met  Goal: Absence of Hospital-Acquired Illness or Injury  Outcome: Met  Goal: Optimal Comfort and Wellbeing  Outcome: Met  Goal: Readiness for Transition of Care  Outcome: Met     Problem: Bariatric Environmental Safety  Goal: Safety Maintained with Care  Outcome: Met     Problem: Diabetes Comorbidity  Goal: Blood Glucose Level Within Targeted Range  Outcome: Met     Problem: Fluid and Electrolyte Imbalance (Acute Kidney Injury/Impairment)  Goal: Fluid and Electrolyte Balance  Outcome: Met     Problem: Oral Intake Inadequate (Acute Kidney Injury/Impairment)  Goal: Optimal Nutrition Intake  Outcome: Met     Problem: Renal Function Impairment (Acute Kidney Injury/Impairment)  Goal: Effective Renal Function  Outcome: Met

## 2022-05-01 NOTE — PROGRESS NOTES
[facility] - Telemetry Stepdown ([place] [id number])  Endocrinology  Progress Note    Admit Date: 4/29/2022     Reason for Consult: Management of T2DM, Hyperglycemia     Diabetes diagnosis year: 2011    Home Diabetes Medications:  Humalog 40 units TIDWM; Tresiba 88 units nightly; and Ozempic 0.5 mg weekly.     How often checking glucose at home? 1-3 x day   BG readings on regimen: 200-300's  Hypoglycemia on the regimen?  No  Missed doses on regimen?  No    Diabetes Complications include:     Hyperglycemia    Complicating diabetes co morbidities:   HLD; MILE; and N/V      HPI: 47 yo F with PMHx HFrEF (15%) 2/2 NICM, CKD3, T2DM, HTN, HLD, AF on Eliquis, and MILE on CPAP who presents to the ED with generalized malaise, body aches, and nausea x a week. Pt. Reports generalized body aches that are most prominently located in her chest, back, belly, arms, and legs. She has had poor appetite and associated nausea with a few episodes of clear, non-bloody, non-bilous emesis.  Pt. Has presented to the ED a couple other times this week with similar symptoms, but she states that her symptoms have only worsened with increasing vomiting frequency, and she has been unable to tolerate much PO intake. She denies any diarrhea, cough, fevers, chills, SOB, or edema. She does report some upper abdomen/lower mid chest pain that has been present this week, particularly when she vomits. Pt. Also expresses concerns that her symptoms seem to have worsening since she witched from trulicity to ozempic last month. Endocrine consulted to manage hyperglycemia and type 2 diabetes.     Lab Results   Component Value Date    HGBA1C 10.0 (H) 03/09/2022                Interval HPI:   Overnight events: No acute events overnight. Patient on the OBS Unit in room 7097/7097 A. Blood glucose improving. BG at and above goal on current insulin regimen (SSI, prandial, and basal insulin ). Steroid use- None .    Renal function- Abnormal - Creatinine 3.0   Vasopressors-   "None       Diet diabetic [facility];  Calorie     Eatin%  Nausea: No  Hypoglycemia and intervention: No  Fever: No  TPN and/or TF: No    /85   Pulse 85   Temp 97.7 °F (36.5 °C) (Axillary)   Resp 18   Ht 5' 5" (1.651 m)   Wt 114.3 kg (252 lb)   LMP  (LMP Unknown)   SpO2 (!) 92%   Breastfeeding No   BMI 41.93 kg/m²     Labs Reviewed and Include    Recent Labs   Lab 22  0503   *   CALCIUM 9.6   ALBUMIN 3.3*   PROT 6.6      K 4.1   CO2 25      BUN 22*   CREATININE 3.0*   ALKPHOS 96   ALT 18   AST 15   BILITOT 0.5     Lab Results   Component Value Date    WBC 8.47 2022    HGB 12.2 2022    HCT 40.5 2022    MCV 93 2022     2022     Recent Labs   Lab 22  1647 22  2355   TSH 1.461 1.689     Lab Results   Component Value Date    HGBA1C 10.0 (H) 2022       Nutritional status:   Body mass index is 41.93 kg/m².  Lab Results   Component Value Date    ALBUMIN 3.3 (L) 2022    ALBUMIN 3.2 (L) 2022    ALBUMIN 3.8 2022     Lab Results   Component Value Date    PREALBUMIN 16 (L) 05/15/2014       Estimated Creatinine Clearance: 29.9 mL/min (A) (based on SCr of 3 mg/dL (H)).    Accu-Checks  Recent Labs     22  1802 22  2034 22  2214 22  0418 22  0746 22  1141 22  1638 22  1927 22  0237 22  0722   POCTGLUCOSE >500* 355* 386* 307* 237* 208* 159* 326* 294* 236*       Current Medications and/or Treatments Impacting Glycemic Control  Immunotherapy:    Immunosuppressants       None          Steroids:   Hormones (From admission, onward)                Start     Stop Route Frequency Ordered    22  melatonin tablet 6 mg         -- Oral Nightly PRN 22          Pressors:    Autonomic Drugs (From admission, onward)                None          Hyperglycemia/Diabetes Medications:   Antihyperglycemics (From admission, onward)                " Start     Stop Route Frequency Ordered    05/01/22 2100  insulin detemir U-100 pen 40 Units         -- SubQ 2 times daily 05/01/22 1000    05/01/22 1130  insulin aspart U-100 pen 24 Units         -- SubQ 3 times daily with meals 05/01/22 1000    04/29/22 2229  insulin aspart U-100 pen 1-10 Units         -- SubQ Before meals & nightly PRN 04/29/22 2130            ASSESSMENT and PLAN    * Acute renal failure superimposed on stage 3 chronic kidney disease  Managed per primary team  Avoid hypoglycemia  Titrate insulin slowly to avoid hypoglycemia as the risk of hypoglycemia increases with decreased creatinine clearance.  Estimated Creatinine Clearance: 29.9 mL/min (A) (based on SCr of 3 mg/dL (H)).        Type 2 diabetes mellitus without complication, without long-term current use of insulin  Endocrinology consulted for BG management.   BG goal 140-180    - Levemir Flex Pen 40 units BID (20% increase due to FBG above goal)  - Novolog (aspart) insulin 24 Units SQ TIDWM and prn for BG excursions MDC SSI (150/25). (20% increase due to prandial BG above goal)  - BG checks AC/HS/0200  - Hypoglycemia protocol in place  - If blood glucose greater than 300, please ask patient not to eat food or drink anything other than water until correctional insulin has brought it back below 250    ** Please notify Endocrine for any change and/or advance in diet**  ** Please call Endocrine for any BG related issues **    Discharge Planning:   - Resume home regimen on d/c.  - Send BG logs in 4 days  - F/U in D/C endocrine clinic in two weeks  - Dexcom G6 sample given.         Obesity with body mass index (BMI) of 30.0 to 39.9  Body mass index is 41.93 kg/m².  Increased abdominal adiposity may increase insulin resistance.       Iron deficiency anemia  May impact the accuracy of the A1C.       MILE treated with BiPAP  May affect BG readings if uncontrolled             Robert Rader, DNP, FNP  Endocrinology  [facility] - Tele[facility] ([place]  [address])

## 2022-05-01 NOTE — SUBJECTIVE & OBJECTIVE
Past Medical History:   Diagnosis Date    Atrial fibrillation     Blood clot associated with vein wall inflammation     not dvt    Cardiomyopathy     Normal cors on cath 11/2017    CHF (congestive heart failure)     DM (diabetes mellitus) 9/19/2013    Hyperlipidemia     Hypertension     Psoriasis     Sleep apnea        Past Surgical History:   Procedure Laterality Date    CARDIAC CATHETERIZATION      COLONOSCOPY      COLONOSCOPY N/A 3/9/2022    Procedure: COLONOSCOPY;  Surgeon: Vielka Burrell MD;  Location: [facility] ENDO;  Service: Endoscopy;  Laterality: N/A;    DILATION AND CURETTAGE OF UTERUS      ESOPHAGOGASTRODUODENOSCOPY N/A 5/9/2019    Procedure: EGD (ESOPHAGOGASTRODUODENOSCOPY);  Surgeon: Vielka Burrell MD;  Location: [facility] ENDO;  Service: Endoscopy;  Laterality: N/A;    TRANSFORAMINAL EPIDURAL INJECTION OF STEROID N/A 1/6/2022    Procedure: Transforaminal ANKITA L4/L5 L5/S1;  Surgeon: Jed Yeager MD;  [facility];  Service: Pain Management;  Laterality: N/A;       Review of patient's allergies indicates:   Allergen Reactions    Metformin      Diarrhea on metformin XR     Pneumococcal 23-abimbola ps vaccine        No current facility-administered medications on file prior to encounter.     Current Outpatient Medications on File Prior to Encounter   Medication Sig    albuterol (VENTOLIN HFA) 90 mcg/actuation inhaler Inhale 2 puffs into the lungs every 6 (six) hours as needed for Wheezing or Shortness of Breath. Rescue    apixaban (ELIQUIS) 5 mg Tab Take 1 tablet (5 mg total) by mouth 2 (two) times daily.    furosemide (LASIX) 40 MG tablet TAKE 1 TABLET BY MOUTH ONCE DAILY *PLEASE  HOLD  WHILE  NOT  DRINKING*    oxyCODONE-acetaminophen (PERCOCET) 5-325 mg per tablet Take 1 tablet by mouth every 4 (four) hours as needed.    rosuvastatin (CRESTOR) 40 MG Tab Take 1 tablet (40 mg total) by mouth every evening.    spironolactone (ALDACTONE) 50 MG tablet Take 1 tablet (50  mg total) by mouth once daily. Hold until eating and drinking improves. Need to weight self daily    [DISCONTINUED] baclofen (LIORESAL) 10 MG tablet Take 1 tablet (10 mg total) by mouth 3 (three) times daily.    [DISCONTINUED] semaglutide (OZEMPIC) 0.25 mg or 0.5 mg(2 mg/1.5 mL) pen injector Inject 0.5 mg into the skin every 7 days.    blood glucose control, high Soln 1 each by Misc.(Non-Drug; Combo Route) route once. for 1 dose    blood glucose control, low Soln 1 each by Misc.(Non-Drug; Combo Route) route once. for 1 dose    BLOOD PRESSURE CUFF Misc 1 kit by Misc.(Non-Drug; Combo Route) route 2 (two) times daily.    blood sugar diagnostic Strp Check blood glucose 3x/day.    blood-glucose meter (TRUE METRIX AIR GLUCOSE METER) Misc 1 each by Misc.(Non-Drug; Combo Route) route 3 (three) times daily.    cetirizine (ZYRTEC) 10 MG tablet Take 1 tablet (10 mg total) by mouth once daily.    diclofenac sodium (VOLTAREN) 1 % Gel Apply 2 g topically 2 (two) times daily.    famotidine (PEPCID) 20 MG tablet Take 1 tablet (20 mg total) by mouth 2 (two) times daily.    fluticasone propionate (FLONASE) 50 mcg/actuation nasal spray 1 spray (50 mcg total) by Each Nostril route 2 (two) times daily as needed for Rhinitis.    HYDROcodone-acetaminophen (NORCO) 5-325 mg per tablet Take 1 tablet by mouth every 6 (six) hours as needed for Pain.    insulin degludec (TRESIBA FLEXTOUCH U-200) 200 unit/mL (3 mL) insulin pen Inject 88 Units into the skin once daily.    insulin lispro (HUMALOG KWIKPEN INSULIN) 100 unit/mL pen Inject 40 Units into the skin 3 (three) times daily before meals.    medroxyPROGESTERone (PROVERA) 10 MG tablet Take 10 mg by mouth once daily.    metoprolol succinate (TOPROL-XL) 50 MG 24 hr tablet Take 1 tablet (50 mg total) by mouth once daily. Hold SBP <120    mupirocin (BACTROBAN) 2 % ointment Apply topically nightly.    ondansetron (ZOFRAN) 4 MG tablet Take 1 tablet (4 mg total) by mouth every 8  "(eight) hours as needed for Nausea.    ondansetron (ZOFRAN) 4 MG tablet Take 1 tablet (4 mg total) by mouth every 6 (six) hours as needed for Nausea.    ondansetron (ZOFRAN) 4 MG tablet Take 1 tablet (4 mg total) by mouth every 6 (six) hours.    pen needle, diabetic (BD LORI 2ND GEN PEN NEEDLE) 32 gauge x 5/32" Ndle USE 1 PEN NEEDLE 4 TIMES DAILY    triamcinolone acetonide 0.1% (KENALOG) 0.1 % cream 2 (two) times daily. Apply to affected area    [DISCONTINUED] gabapentin (NEURONTIN) 400 MG capsule Take 1 capsule (400 mg total) by mouth 2 (two) times daily as needed.     Family History       Problem Relation (Age of Onset)    Diabetes Father, Maternal Grandmother, Paternal Grandmother    Hypertension Mother, Father          Tobacco Use    Smoking status: Never Smoker    Smokeless tobacco: Never Used    Tobacco comment: smokes cigars on occasion   Substance and Sexual Activity    Alcohol use: Yes     Comment: occasional    Drug use: Yes     Types: Marijuana     Comment: occ    Sexual activity: Yes     Partners: Male     Review of Systems   Constitutional: Negative for chills and fever.   Cardiovascular:  Negative for chest pain, orthopnea and palpitations.   Respiratory:  Negative for cough and shortness of breath.    Gastrointestinal:  Positive for abdominal pain, nausea and vomiting.   Neurological:  Positive for weakness. Negative for dizziness, headaches and light-headedness.   Psychiatric/Behavioral:  Negative for altered mental status.    Objective:     Vital Signs (Most Recent):  Temp: 97.8 °F (36.6 °C) (05/01/22 1200)  Pulse: 73 (05/01/22 1200)  Resp: 16 (05/01/22 1200)  BP: 110/71 (05/01/22 1200)  SpO2: (!) 93 % (05/01/22 1200)   Vital Signs (24h Range):  Temp:  [97.7 °F (36.5 °C)-98.4 °F (36.9 °C)] 97.8 °F (36.6 °C)  Pulse:  [65-94] 73  Resp:  [16-18] 16  SpO2:  [92 %-97 %] 93 %  BP: (101-126)/(54-85) 110/71     Weight: 114.3 kg (252 lb)  Body mass index is 41.93 kg/m².    SpO2: (!) 93 %  O2 " Device (Oxygen Therapy): room air    No intake or output data in the 24 hours ending 05/01/22 1410    Lines/Drains/Airways       None                   Physical Exam  Vitals reviewed.   Constitutional:       General: She is not in acute distress.     Appearance: Normal appearance. She is obese. She is ill-appearing. She is not toxic-appearing.   HENT:      Head: Normocephalic and atraumatic.      Mouth/Throat:      Mouth: Mucous membranes are moist.   Cardiovascular:      Rate and Rhythm: Normal rate and regular rhythm.      Heart sounds: No murmur heard.    No friction rub. No gallop.   Pulmonary:      Effort: Pulmonary effort is normal. No respiratory distress.      Breath sounds: Normal breath sounds.   Abdominal:      Palpations: Abdomen is soft.   Musculoskeletal:      Right lower leg: No edema.      Left lower leg: No edema.   Skin:     General: Skin is warm.   Neurological:      Mental Status: She is alert. Mental status is at baseline.     Significant Labs: BMP:   Recent Labs   Lab 04/29/22 1846 04/30/22  0658 05/01/22  0503   * 254* 272*    137 139   K 3.7 3.7 4.1   CL 96 101 105   CO2 28 25 25   BUN 18 17 22*   CREATININE 2.8* 2.7* 3.0*   CALCIUM 10.0 8.9 9.6   , CMP   Recent Labs   Lab 04/29/22 1846 04/30/22  0658 05/01/22  0503    137 139   K 3.7 3.7 4.1   CL 96 101 105   CO2 28 25 25   * 254* 272*   BUN 18 17 22*   CREATININE 2.8* 2.7* 3.0*   CALCIUM 10.0 8.9 9.6   PROT 8.4  --  6.6   ALBUMIN 3.8 3.2* 3.3*   BILITOT 0.7  --  0.5   ALKPHOS 123  --  96   AST 17  --  15   ALT 25  --  18   ANIONGAP 12 11 9   ESTGFRAFRICA 22.6* 23.7* 20.8*   EGFRNONAA 19.6* 20.5* 18.1*   , CBC   Recent Labs   Lab 04/29/22 1846 04/30/22  0658 05/01/22  0503   WBC 9.04 8.49 8.47   HGB 14.3 12.5 12.2   HCT 46.1 40.9 40.5    218 231   , and All pertinent lab results from the last 24 hours have been reviewed.    Significant Imaging: Echocardiogram: 2D echo with color flow doppler:   Results for  orders placed or performed during the hospital encounter of 03/12/18   2D Echo w/ Color Flow Doppler   Result Value Ref Range    EF + QEF 15 (A) 55 - 65    Mitral Valve Regurgitation MILD     Diastolic Dysfunction Yes (A)     Est. PA Systolic Pressure 35.26     Pericardial Effusion NONE     Mitral Valve Mobility NORMAL     Tricuspid Valve Regurgitation TRIVIAL TO MILD     Narrative    Date of Procedure: 03/12/2018        TEST DESCRIPTION   Technical Quality: This is a technically adequate study.     Aorta: The aortic root is normal in size, measuring 2.2 cm at sinotubular junction and 3.2 cm at Sinuses of Valsalva. The proximal ascending aorta is normal in size, measuring 2.6 cm across.     Left Atrium: The left atrial volume index is normal, measuring 33.04 cc/m2.     Left Ventricle: The left ventricle is severely enlarged, with an end-diastolic diameter of 7.0 cm, and an end-systolic diameter of 6.3 cm. LV wall thickness is normal, with the septum and the posterior wall each measuring 1.1 cm across. Relative wall   thickness was normal at 0.31, and the LV mass index was increased at 201.6 g/m2 consistent with eccentric left ventricular hypertrophy. There are no regional wall motion abnormalities. Left ventricular systolic function appears severely depressed.   Visually estimated ejection fraction is 15-20%. The LV Doppler derived stroke volume equals 26.0 ccs.     Diastolic indices: E wave velocity 0.9 m/s, E/A ratio 1.4,  msec., E/e' ratio(avg) 23. There is pseudonormalization of mitral inflow pattern consistent with significant diastolic dysfunction.     Right Atrium: The right atrium is normal in size, measuring 3.9 cm in length and 4.0 cm in width in the apical view.     Right Ventricle: The right ventricle is normal in size measuring 3.8 cm at the base in the apical right ventricle-focused view. Global right ventricular systolic function appears mildly depressed. Tricuspid annular plane systolic  excursion (TAPSE) is 1.9   cm. Tissue Doppler-derived tricuspid annular peak systolic velocity (S prime) is 10.4 cm/s. The estimated PA systolic pressure is 35 mmHg.     Aortic Valve:  The aortic valve is normal in structure with normal leaflet mobility.     Mitral Valve:  The mitral valve is normal in structure with normal leaflet mobility. There is mild mitral regurgitation.     Tricuspid Valve:  The tricuspid valve is normal in structure with normal leaflet mobility. There is trivial to mild tricuspid regurgitation.     Pulmonary Valve:  The pulmonic valve is not well seen. There is trivial pulmonic regurgitation.     Pericardium: There is no evidence of pericardial effusion.      IVC: IVC is normal in size and collapses > 50% with a sniff, suggesting normal right atrial pressure of 3 mmHg.     Intracavitary: There is no evidence of intracavity mass, thrombi, or vegetation.         CONCLUSIONS     1 - Severely depressed left ventricular systolic function (EF 15-20%).  Severe global hypokinesis.     2 - Severe left ventricular enlargement.     3 - Eccentric hypertrophy.     4 - Impaired LV relaxation, elevated LAP (grade 2 diastolic dysfunction).     5 - Mildly depressed right ventricular systolic function .     6 - Mild mitral regurgitation.     7 - Trivial to mild tricuspid regurgitation.     8 - The estimated PA systolic pressure is 35 mmHg.             This document has been electronically    SIGNED BY: Oziel Yanez MD On: 03/12/2018 19:59    and Transthoracic echo (TTE) complete (Cupid Only):   Results for orders placed or performed during the hospital encounter of 04/29/22   Echo Saline Bubble? No   Result Value Ref Range    Ascending aorta 2.50 cm    STJ 2.11 cm    AV mean gradient 4 mmHg    Ao peak isai 1.35 m/s    Ao VTI 22.03 cm    IVRT 114.18 msec    IVS 0.76 0.6 - 1.1 cm    LA size 3.74 cm    Left Atrium Major Axis 5.28 cm    Left Atrium Minor Axis 5.05 cm    LVIDd 6.53 (A) 3.5 - 6.0 cm    LVIDs  5.51 (A) 2.1 - 4.0 cm    LVOT diameter 2.01 cm    LVOT peak VTI 9.24 cm    Posterior Wall 0.86 0.6 - 1.1 cm    MV Peak A Jose 0.60 m/s    E wave deceleration time 147.49 msec    MV Peak E Jose 0.55 m/s    RA Major Axis 3.85 cm    RA Width 3.07 cm    Sinus 2.79 cm    TAPSE 1.41 cm    TR Max Jose 2.38 m/s    TDI LATERAL 0.06 m/s    TDI SEPTAL 0.04 m/s    LA WIDTH 3.93 cm    MV stenosis pressure 1/2 time 42.77 ms    LV Diastolic Volume 217.89 mL    LV Systolic Volume 147.76 mL    LVOT peak jose 0.60 m/s    LA volume (mod) 38.11 cm3    RV S' 10.08 cm/s    LV LATERAL E/E' RATIO 9.17 m/s    LV SEPTAL E/E' RATIO 13.75 m/s    FS 16 %    LA volume 64.50 cm3    LV mass 219.33 g    Left Ventricle Relative Wall Thickness 0.26 cm    AV valve area 1.33 cm2    AV Velocity Ratio 0.44     AV index (prosthetic) 0.42     MV valve area p 1/2 method 5.14 cm2    E/A ratio 0.92     Mean e' 0.05 m/s    LVOT area 3.2 cm2    LVOT stroke volume 29.30 cm3    AV peak gradient 7 mmHg    E/E' ratio 11.00 m/s    LV Systolic Volume Index 67.8 mL/m2    LV Diastolic Volume Index 99.95 mL/m2    LA Volume Index 29.6 mL/m2    LV Mass Index 101 g/m2    Triscuspid Valve Regurgitation Peak Gradient 23 mmHg    LA Volume Index (Mod) 17.5 mL/m2    BSA 2.29 m2    Right Atrial Pressure (from IVC) 3 mmHg    EF 20 %    TV rest pulmonary artery pressure 26 mmHg    Narrative    · The left ventricle is moderately enlarged with eccentric hypertrophy and   severely decreased systolic function.  · The estimated ejection fraction is 20%.  · There is severe left ventricular global hypokinesis.  · There appears to be a thrombus along the apical lateral wall of the left   ventricle.  · Grade I left ventricular diastolic dysfunction.  · Normal right ventricular size with mildly reduced right ventricular   systolic function.  · The estimated PA systolic pressure is 26 mmHg.  · Normal central venous pressure (3 mmHg).

## 2022-05-01 NOTE — ASSESSMENT & PLAN NOTE
Transition from Eliquis to Lovenox based on patient choice due to presence of LV thrombus on Eliquis

## 2022-05-01 NOTE — ASSESSMENT & PLAN NOTE
Body mass index is 41.93 kg/m².  Increased abdominal adiposity may increase insulin resistance.

## 2022-05-01 NOTE — SUBJECTIVE & OBJECTIVE
"Interval HPI:   Overnight events: No acute events overnight. Patient on the OBS Unit in room 7097/7097 A. Blood glucose improving. BG at and above goal on current insulin regimen (SSI, prandial, and basal insulin ). Steroid use- None .    Renal function- Abnormal - Creatinine 3.0   Vasopressors-  None       Diet diabetic [facility];  Calorie     Eatin%  Nausea: No  Hypoglycemia and intervention: No  Fever: No  TPN and/or TF: No    /85   Pulse 85   Temp 97.7 °F (36.5 °C) (Axillary)   Resp 18   Ht 5' 5" (1.651 m)   Wt 114.3 kg (252 lb)   LMP  (LMP Unknown)   SpO2 (!) 92%   Breastfeeding No   BMI 41.93 kg/m²     Labs Reviewed and Include    Recent Labs   Lab 22  0503   *   CALCIUM 9.6   ALBUMIN 3.3*   PROT 6.6      K 4.1   CO2 25      BUN 22*   CREATININE 3.0*   ALKPHOS 96   ALT 18   AST 15   BILITOT 0.5     Lab Results   Component Value Date    WBC 8.47 2022    HGB 12.2 2022    HCT 40.5 2022    MCV 93 2022     2022     Recent Labs   Lab 22  1647 22  2355   TSH 1.461 1.689     Lab Results   Component Value Date    HGBA1C 10.0 (H) 2022       Nutritional status:   Body mass index is 41.93 kg/m².  Lab Results   Component Value Date    ALBUMIN 3.3 (L) 2022    ALBUMIN 3.2 (L) 2022    ALBUMIN 3.8 2022     Lab Results   Component Value Date    PREALBUMIN 16 (L) 05/15/2014       Estimated Creatinine Clearance: 29.9 mL/min (A) (based on SCr of 3 mg/dL (H)).    Accu-Checks  Recent Labs     22  1802 22  2034 22  2214 22  0418 22  0746 22  1141 22  1638 22  1927 22  0237 22  0722   POCTGLUCOSE >500* 355* 386* 307* 237* 208* 159* 326* 294* 236*       Current Medications and/or Treatments Impacting Glycemic Control  Immunotherapy:    Immunosuppressants       None          Steroids:   Hormones (From admission, onward)                Start     " Stop Route Frequency Ordered    04/29/22 2126  melatonin tablet 6 mg         -- Oral Nightly PRN 04/29/22 2126          Pressors:    Autonomic Drugs (From admission, onward)                None          Hyperglycemia/Diabetes Medications:   Antihyperglycemics (From admission, onward)                Start     Stop Route Frequency Ordered    05/01/22 2100  insulin detemir U-100 pen 40 Units         -- SubQ 2 times daily 05/01/22 1000    05/01/22 1130  insulin aspart U-100 pen 24 Units         -- SubQ 3 times daily with meals 05/01/22 1000    04/29/22 2229  insulin aspart U-100 pen 1-10 Units         -- SubQ Before meals & nightly PRN 04/29/22 2130

## 2022-05-01 NOTE — DISCHARGE INSTRUCTIONS
You were admitted for nausea and vomiting likely due to you diabetes medication. Symptoms improved with medication stoppage of Ozempic. Please continue to stop taking ozempic at discharge. Follow up requested with primary care and endocrine.    It was discovered that you have a blood clot in your heart and your anticoagulation was switched to Lovenox. Please be sure to take famotidine twice daily to help protect your stomach. You will follow up with cardiology for repeat echo in 3 months.    Due to progression of your kidney function, your doses of gabapentin and baclofen were reduced. Follow up requested with nephrology.

## 2022-05-02 VITALS
HEART RATE: 80 BPM | BODY MASS INDEX: 41.99 KG/M2 | RESPIRATION RATE: 19 BRPM | HEIGHT: 65 IN | DIASTOLIC BLOOD PRESSURE: 75 MMHG | OXYGEN SATURATION: 100 % | SYSTOLIC BLOOD PRESSURE: 102 MMHG | WEIGHT: 252 LBS | TEMPERATURE: 98 F

## 2022-05-02 LAB
ALBUMIN SERPL BCP-MCNC: 3.4 G/DL (ref 3.5–5.2)
ALP SERPL-CCNC: 100 U/L (ref 55–135)
ALT SERPL W/O P-5'-P-CCNC: 19 U/L (ref 10–44)
ANION GAP SERPL CALC-SCNC: 11 MMOL/L (ref 8–16)
AST SERPL-CCNC: 17 U/L (ref 10–40)
BASOPHILS # BLD AUTO: 0.03 K/UL (ref 0–0.2)
BASOPHILS NFR BLD: 0.3 % (ref 0–1.9)
BILIRUB SERPL-MCNC: 0.5 MG/DL (ref 0.1–1)
BUN SERPL-MCNC: 32 MG/DL (ref 6–20)
CALCIUM SERPL-MCNC: 9.7 MG/DL (ref 8.7–10.5)
CHLORIDE SERPL-SCNC: 105 MMOL/L (ref 95–110)
CO2 SERPL-SCNC: 21 MMOL/L (ref 23–29)
CREAT SERPL-MCNC: 3 MG/DL (ref 0.5–1.4)
DIFFERENTIAL METHOD: ABNORMAL
EOSINOPHIL # BLD AUTO: 0.2 K/UL (ref 0–0.5)
EOSINOPHIL NFR BLD: 2.3 % (ref 0–8)
ERYTHROCYTE [DISTWIDTH] IN BLOOD BY AUTOMATED COUNT: 14.9 % (ref 11.5–14.5)
EST. GFR  (AFRICAN AMERICAN): 20.8 ML/MIN/1.73 M^2
EST. GFR  (NON AFRICAN AMERICAN): 18.1 ML/MIN/1.73 M^2
GLUCOSE SERPL-MCNC: 240 MG/DL (ref 70–110)
HCT VFR BLD AUTO: 41.1 % (ref 37–48.5)
HGB BLD-MCNC: 12.7 G/DL (ref 12–16)
IMM GRANULOCYTES # BLD AUTO: 0.05 K/UL (ref 0–0.04)
IMM GRANULOCYTES NFR BLD AUTO: 0.6 % (ref 0–0.5)
LYMPHOCYTES # BLD AUTO: 2.5 K/UL (ref 1–4.8)
LYMPHOCYTES NFR BLD: 28.2 % (ref 18–48)
MCH RBC QN AUTO: 28.7 PG (ref 27–31)
MCHC RBC AUTO-ENTMCNC: 30.9 G/DL (ref 32–36)
MCV RBC AUTO: 93 FL (ref 82–98)
MONOCYTES # BLD AUTO: 0.5 K/UL (ref 0.3–1)
MONOCYTES NFR BLD: 6 % (ref 4–15)
NEUTROPHILS # BLD AUTO: 5.5 K/UL (ref 1.8–7.7)
NEUTROPHILS NFR BLD: 62.6 % (ref 38–73)
NRBC BLD-RTO: 0 /100 WBC
PHOSPHATE SERPL-MCNC: 2.9 MG/DL (ref 2.7–4.5)
PLATELET # BLD AUTO: 224 K/UL (ref 150–450)
PMV BLD AUTO: 10.7 FL (ref 9.2–12.9)
POCT GLUCOSE: 194 MG/DL (ref 70–110)
POCT GLUCOSE: 202 MG/DL (ref 70–110)
POTASSIUM SERPL-SCNC: 4.4 MMOL/L (ref 3.5–5.1)
PROT SERPL-MCNC: 6.6 G/DL (ref 6–8.4)
RBC # BLD AUTO: 4.43 M/UL (ref 4–5.4)
SODIUM SERPL-SCNC: 137 MMOL/L (ref 136–145)
WBC # BLD AUTO: 8.85 K/UL (ref 3.9–12.7)

## 2022-05-02 PROCEDURE — 99214 PR OFFICE/OUTPT VISIT, EST, LEVL IV, 30-39 MIN: ICD-10-PCS | Mod: ,,, | Performed by: NURSE PRACTITIONER

## 2022-05-02 PROCEDURE — 36415 COLL VENOUS BLD VENIPUNCTURE: CPT | Performed by: STUDENT IN AN ORGANIZED HEALTH CARE EDUCATION/TRAINING PROGRAM

## 2022-05-02 PROCEDURE — 94761 N-INVAS EAR/PLS OXIMETRY MLT: CPT

## 2022-05-02 PROCEDURE — 25000003 PHARM REV CODE 250: Performed by: HOSPITALIST

## 2022-05-02 PROCEDURE — 80053 COMPREHEN METABOLIC PANEL: CPT | Performed by: STUDENT IN AN ORGANIZED HEALTH CARE EDUCATION/TRAINING PROGRAM

## 2022-05-02 PROCEDURE — 94660 CPAP INITIATION&MGMT: CPT

## 2022-05-02 PROCEDURE — 99214 OFFICE O/P EST MOD 30 MIN: CPT | Mod: ,,, | Performed by: NURSE PRACTITIONER

## 2022-05-02 PROCEDURE — G0378 HOSPITAL OBSERVATION PER HR: HCPCS

## 2022-05-02 PROCEDURE — 25000003 PHARM REV CODE 250: Performed by: STUDENT IN AN ORGANIZED HEALTH CARE EDUCATION/TRAINING PROGRAM

## 2022-05-02 PROCEDURE — 99900035 HC TECH TIME PER 15 MIN (STAT)

## 2022-05-02 PROCEDURE — 84100 ASSAY OF PHOSPHORUS: CPT | Performed by: STUDENT IN AN ORGANIZED HEALTH CARE EDUCATION/TRAINING PROGRAM

## 2022-05-02 PROCEDURE — 85025 COMPLETE CBC W/AUTO DIFF WBC: CPT | Performed by: STUDENT IN AN ORGANIZED HEALTH CARE EDUCATION/TRAINING PROGRAM

## 2022-05-02 PROCEDURE — 99217 PR OBSERVATION CARE DISCHARGE: CPT | Mod: ,,, | Performed by: STUDENT IN AN ORGANIZED HEALTH CARE EDUCATION/TRAINING PROGRAM

## 2022-05-02 PROCEDURE — 99217 PR OBSERVATION CARE DISCHARGE: ICD-10-PCS | Mod: ,,, | Performed by: STUDENT IN AN ORGANIZED HEALTH CARE EDUCATION/TRAINING PROGRAM

## 2022-05-02 RX ORDER — INSULIN ASPART 100 [IU]/ML
30 INJECTION, SOLUTION INTRAVENOUS; SUBCUTANEOUS
Status: DISCONTINUED | OUTPATIENT
Start: 2022-05-02 | End: 2022-05-02 | Stop reason: HOSPADM

## 2022-05-02 RX ORDER — PANTOPRAZOLE SODIUM 40 MG/1
40 TABLET, DELAYED RELEASE ORAL DAILY
Status: DISCONTINUED | OUTPATIENT
Start: 2022-05-02 | End: 2022-05-02 | Stop reason: HOSPADM

## 2022-05-02 RX ADMIN — CETIRIZINE HYDROCHLORIDE 10 MG: 10 TABLET, FILM COATED ORAL at 08:05

## 2022-05-02 RX ADMIN — GABAPENTIN 400 MG: 400 CAPSULE ORAL at 08:05

## 2022-05-02 RX ADMIN — METOPROLOL SUCCINATE 50 MG: 50 TABLET, EXTENDED RELEASE ORAL at 08:05

## 2022-05-02 RX ADMIN — MICONAZOLE NITRATE 2 % TOPICAL POWDER: at 09:05

## 2022-05-02 RX ADMIN — INSULIN ASPART 4 UNITS: 100 INJECTION, SOLUTION INTRAVENOUS; SUBCUTANEOUS at 08:05

## 2022-05-02 RX ADMIN — PANTOPRAZOLE SODIUM 40 MG: 40 TABLET, DELAYED RELEASE ORAL at 08:05

## 2022-05-02 RX ADMIN — INSULIN ASPART 30 UNITS: 100 INJECTION, SOLUTION INTRAVENOUS; SUBCUTANEOUS at 08:05

## 2022-05-02 RX ADMIN — ATORVASTATIN CALCIUM 40 MG: 40 TABLET, FILM COATED ORAL at 08:05

## 2022-05-02 RX ADMIN — BACLOFEN 5 MG: 5 TABLET ORAL at 08:05

## 2022-05-02 RX ADMIN — INSULIN ASPART 30 UNITS: 100 INJECTION, SOLUTION INTRAVENOUS; SUBCUTANEOUS at 01:05

## 2022-05-02 NOTE — NURSING
Patient blood glucose 284, patient covered with insulin . Patient requested for sandwich, and gram crackers. Educated on the need to monitor her diet and maintain adherence. Patient complained that she is hungry all the time. No other concerns voiced. Bed in lowest position, placed on CPAP and call light placed within reach.

## 2022-05-02 NOTE — PROGRESS NOTES
[facility] - Telemetry Stepdown ([place] [address])  Endocrinology  Progress Note    Admit Date: 4/29/2022     Reason for Consult: Management of T2DM, Hyperglycemia     Diabetes diagnosis year: 2011    Home Diabetes Medications:  Humalog 40 units TIDWM; Tresiba 88 units nightly; and Ozempic 0.5 mg weekly.     How often checking glucose at home? 1-3 x day   BG readings on regimen: 200-300's  Hypoglycemia on the regimen?  No  Missed doses on regimen?  No    Diabetes Complications include:     Hyperglycemia    Complicating diabetes co morbidities:   HLD; MILE; and N/V      HPI: 45 yo F with PMHx HFrEF (15%) 2/2 NICM, CKD3, T2DM, HTN, HLD, AF on Eliquis, and MILE on CPAP who presents to the ED with generalized malaise, body aches, and nausea x a week. Pt. Reports generalized body aches that are most prominently located in her chest, back, belly, arms, and legs. She has had poor appetite and associated nausea with a few episodes of clear, non-bloody, non-bilous emesis.  Pt. Has presented to the ED a couple other times this week with similar symptoms, but she states that her symptoms have only worsened with increasing vomiting frequency, and she has been unable to tolerate much PO intake. She denies any diarrhea, cough, fevers, chills, SOB, or edema. She does report some upper abdomen/lower mid chest pain that has been present this week, particularly when she vomits. Pt. Also expresses concerns that her symptoms seem to have worsening since she witched from trulicity to ozempic last month. Endocrine consulted to manage hyperglycemia and type 2 diabetes.     Lab Results   Component Value Date    HGBA1C 10.0 (H) 03/09/2022                Interval HPI:   Overnight events: No acute events overnight. Patient on the OBS Unit in room 7097/7097 A. Blood glucose improving. BG at and above goal on current insulin regimen (SSI, prandial, and basal insulin ). Steroid use- None .    Renal function- Abnormal - Creatinine 3.0   Vasopressors-   "None       Diet diabetic [facility];  Calorie  Diet diabetic     Eatin%  Nausea: No  Hypoglycemia and intervention: No  Fever: No  TPN and/or TF: No    /75   Pulse 90   Temp 97.6 °F (36.4 °C)   Resp 20   Ht 5' 5" (1.651 m)   Wt 114.3 kg (252 lb)   LMP  (LMP Unknown)   SpO2 100%   Breastfeeding No   BMI 41.93 kg/m²     Labs Reviewed and Include    Recent Labs   Lab 22  0246   *   CALCIUM 9.7   ALBUMIN 3.4*   PROT 6.6      K 4.4   CO2 21*      BUN 32*   CREATININE 3.0*   ALKPHOS 100   ALT 19   AST 17   BILITOT 0.5     Lab Results   Component Value Date    WBC 8.85 2022    HGB 12.7 2022    HCT 41.1 2022    MCV 93 2022     2022     Recent Labs   Lab 22  2355   TSH 1.689     Lab Results   Component Value Date    HGBA1C 10.0 (H) 2022       Nutritional status:   Body mass index is 41.93 kg/m².  Lab Results   Component Value Date    ALBUMIN 3.4 (L) 2022    ALBUMIN 3.3 (L) 2022    ALBUMIN 3.2 (L) 2022     Lab Results   Component Value Date    PREALBUMIN 16 (L) 05/15/2014       Estimated Creatinine Clearance: 29.9 mL/min (A) (based on SCr of 3 mg/dL (H)).    Accu-Checks  Recent Labs     22  0746 22  1141 22  1638 22  1927 22  0237 22  0722 22  1129 22  1547 22  2003 22  0731   POCTGLUCOSE 237* 208* 159* 326* 294* 236* 281* 278* 280* 202*       Current Medications and/or Treatments Impacting Glycemic Control  Immunotherapy:    Immunosuppressants       None          Steroids:   Hormones (From admission, onward)                Start     Stop Route Frequency Ordered    22  melatonin tablet 6 mg         -- Oral Nightly PRN 22          Pressors:    Autonomic Drugs (From admission, onward)                None          Hyperglycemia/Diabetes Medications:   Antihyperglycemics (From admission, onward)                Start     Stop " Route Frequency Ordered    05/02/22 1130  insulin aspart U-100 pen 30 Units         -- SubQ 3 times daily with meals 05/02/22 0836    05/02/22 0900  insulin detemir U-100 pen 45 Units         -- SubQ 2 times daily 05/02/22 0836    04/29/22 2229  insulin aspart U-100 pen 1-10 Units         -- SubQ Before meals & nightly PRN 04/29/22 2130            ASSESSMENT and PLAN    * Acute renal failure superimposed on stage 3 chronic kidney disease  Managed per primary team  Avoid hypoglycemia  Titrate insulin slowly to avoid hypoglycemia as the risk of hypoglycemia increases with decreased creatinine clearance.  Estimated Creatinine Clearance: 29.9 mL/min (A) (based on SCr of 3 mg/dL (H)).        Type 2 diabetes mellitus without complication, without long-term current use of insulin  Endocrinology consulted for BG management.   BG goal 140-180    - Levemir Flex Pen 45 units BID (10% increase due to FBG above goal)  - Novolog (aspart) insulin 30 Units SQ TIDWM and prn for BG excursions MDC SSI (150/25). (30% increase due to prandial BG above goal)  - BG checks AC/HS/0200  - Hypoglycemia protocol in place  - If blood glucose greater than 300, please ask patient not to eat food or drink anything other than water until correctional insulin has brought it back below 250    ** Please notify Endocrine for any change and/or advance in diet**  ** Please call Endocrine for any BG related issues **    Discharge Planning:   TBD. Please notify endocrinology prior to discharge.        Obesity with body mass index (BMI) of 30.0 to 39.9  Body mass index is 41.93 kg/m².  Increased abdominal adiposity may increase insulin resistance.       Iron deficiency anemia  May impact the accuracy of the A1C.       MILE treated with BiPAP  May affect BG readings if uncontrolled             Robert Rader, DNP, FNP  Endocrinology  [facility] - Tele[facility] Stepdown ([place])

## 2022-05-02 NOTE — PLAN OF CARE
[facility] - Telemetry Stepdown ([place]  Initial Discharge Assessment       Primary Care Provider: Gil Leal MD    Admission Diagnosis: Hyperglycemia [R73.9]  Elevated CPK [R74.8]  Type 2 diabetes mellitus with stage 4 chronic kidney disease, with long-term current use of insulin [E11.22, N18.4, Z79.4]  Stage 4 chronic kidney disease [N18.4]    Admission Date: 4/29/2022  Expected Discharge Date: 5/2/2022         Payor: [employer] MEDICARE / Plan: [employer] / Product Type: Medicare Advantage /     Extended Emergency Contact Information  Primary Emergency Contact: Carla Chanel   [facility]  Home Phone: [phone]  Mobile Phone: [phone]  Relation: Spouse  Secondary Emergency Contact: Benjamin Trivedi   [facility]  Home Phone: [phone]  Mobile Phone: [phone]  Relation: Sister    Discharge Plan A: (P) Home with family  Discharge Plan B: (P) Home with family      [id number] - [place] - [address]  [address] [place] [address]  Phone: [phone] Fax: [phone]    [id number] - [place], [place] - [address]  [address] [facility] [address]  Phone: [phone] Fax: [phone]             SW completed Discharge Planning Assessment with patient via bedside. Discharge planning booklet given to patient/family and whiteboard updated with JOHNY and phone #. All questions answered.    Patient reported that she will have transportation upon discharge.     Patient reported that she lives at home with her  and prior to hospitalization she was independent with her ADL's. Patient reported that she is not on dialysis and she does not go to a Coumadin clinic.      Patient lives in a [facility] with 0 steps to enter.       Maria Luz Monroy LMSW  [facility]  Ext. [phone]

## 2022-05-02 NOTE — ASSESSMENT & PLAN NOTE
- TTE with incedental LV thrombus while on Apixaban   - Cardiology consulted  -- After discussion with patient, decision made to transition to Lovenox  -- Starting Lovenox 1 mg/kg daily for 3 months with repeat TTE with contrast  -- Follow up with cardiology in 3-4 weeks  -- Working with pharmacy on affordability of medicine

## 2022-05-02 NOTE — ASSESSMENT & PLAN NOTE
Body mass index is 41.93 kg/m². Morbid obesity complicates all aspects of disease management from diagnostic modalities to treatment. Weight loss encouraged and health benefits explained to patient.

## 2022-05-02 NOTE — NURSING
Pt was given and explained discharge information.  She demonstrated ability to give herself her lovenox injections.  Pt left floor with wheelchair transport at 1412.

## 2022-05-02 NOTE — ASSESSMENT & PLAN NOTE
Endocrinology consulted for BG management.   BG goal 140-180    - Levemir Flex Pen 45 units BID (10% increase due to FBG above goal)  - Novolog (aspart) insulin 30 Units SQ TIDWM and prn for BG excursions MDC SSI (150/25). (30% increase due to prandial BG above goal)  - BG checks AC/HS/0200  - Hypoglycemia protocol in place  - If blood glucose greater than 300, please ask patient not to eat food or drink anything other than water until correctional insulin has brought it back below 250    ** Please notify Endocrine for any change and/or advance in diet**  ** Please call Endocrine for any BG related issues **    Discharge Planning:   TBD. Please notify endocrinology prior to discharge.

## 2022-05-02 NOTE — PLAN OF CARE
Problem: Fall Injury Risk  Goal: Absence of Fall and Fall-Related Injury  Outcome: Ongoing, Progressing     Problem: Skin Injury Risk Increased  Goal: Skin Health and Integrity  Outcome: Ongoing, Progressing

## 2022-05-02 NOTE — ASSESSMENT & PLAN NOTE
- Cr 2.8 on admit, increased from baseline 1.5-2. Patient with likely progression on renal disease with poor PO intake, vomiting.  - IV 1L NS given in ED, continue gentle hydration, holding home diuretics  - TTE 5/1: EF 20%, severe LV global hypokinesis, normal CVP  - FENa and FEUrea consistent with intrinsic renal disease  - Renal US: without cortical thinning, no hydronephrosis   - Renally avoid meds and avoid nephrotoxins  - Outpatient nephrology referral placed

## 2022-05-02 NOTE — ASSESSMENT & PLAN NOTE
- Last TTE 5/2020 shows estimated ejection fraction is 15%  - Pt. Appears hypovolemic on exam, BNP <50, no edema on CXR  - Gently hydrate and hold home diuretics aldactone and lasix  - Continue Toprol-Xl for GDMT, not on ACEi/ARB due to renal function

## 2022-05-02 NOTE — SUBJECTIVE & OBJECTIVE
Interval History:   No events overnight. Patient reports resolution of nausea and vomiting. Tolerating diet and reports improvement of energy. TTE obtained for evaluation of renal function revealed LV thrombus. Cardiology consulted.      Review of Systems   Constitutional:  Positive for appetite change and fatigue. Negative for activity change, chills, fever and unexpected weight change.   HENT:  Negative for congestion and sore throat.    Respiratory:  Negative for cough and shortness of breath.    Cardiovascular:  Negative for chest pain, palpitations and leg swelling.   Gastrointestinal:  Positive for abdominal pain and nausea. Negative for abdominal distention, blood in stool, constipation, diarrhea and vomiting.   Genitourinary:  Negative for difficulty urinating, dysuria and hematuria.   Musculoskeletal:  Positive for myalgias. Negative for arthralgias.   Skin:  Negative for color change and rash.   Neurological:  Negative for dizziness, tremors and seizures.   Objective:     Vital Signs (Most Recent):  Temp: 97.5 °F (36.4 °C) (05/01/22 1635)  Pulse: 79 (05/01/22 1635)  Resp: 20 (05/01/22 1635)  BP: 114/80 (05/01/22 1635)  SpO2: 95 % (05/01/22 1635) Vital Signs (24h Range):  Temp:  [97.5 °F (36.4 °C)-98.4 °F (36.9 °C)] 97.5 °F (36.4 °C)  Pulse:  [73-94] 79  Resp:  [16-20] 20  SpO2:  [92 %-97 %] 95 %  BP: (106-126)/(54-85) 114/80     Weight: 114.3 kg (252 lb)  Body mass index is 41.93 kg/m².  No intake or output data in the 24 hours ending 05/01/22 1940   Physical Exam  Vitals reviewed.   Constitutional:       General: She is not in acute distress.     Appearance: She is well-developed.   HENT:      Head: Normocephalic and atraumatic.   Eyes:      Extraocular Movements: Extraocular movements intact.      Pupils: Pupils are equal, round, and reactive to light.   Neck:      Vascular: No JVD.      Trachea: No tracheal deviation.   Cardiovascular:      Rate and Rhythm: Normal rate and regular rhythm.      Heart  sounds: No murmur heard.    No friction rub. No gallop.   Pulmonary:      Effort: No respiratory distress.      Breath sounds: Normal breath sounds. No wheezing or rales.   Abdominal:      General: Bowel sounds are normal. There is no distension.      Palpations: Abdomen is soft. There is no mass.      Tenderness: There is no abdominal tenderness.   Musculoskeletal:         General: No deformity.      Cervical back: Neck supple.   Lymphadenopathy:      Cervical: No cervical adenopathy.   Skin:     General: Skin is warm and dry.      Findings: No rash.   Neurological:      Mental Status: She is alert and oriented to person, place, and time.       Significant Labs: CBC:   Recent Labs   Lab 04/30/22  0658 05/01/22  0503   WBC 8.49 8.47   HGB 12.5 12.2   HCT 40.9 40.5    231     CMP:   Recent Labs   Lab 04/30/22  0658 05/01/22  0503    139   K 3.7 4.1    105   CO2 25 25   * 272*   BUN 17 22*   CREATININE 2.7* 3.0*   CALCIUM 8.9 9.6   PROT  --  6.6   ALBUMIN 3.2* 3.3*   BILITOT  --  0.5   ALKPHOS  --  96   AST  --  15   ALT  --  18   ANIONGAP 11 9   EGFRNONAA 20.5* 18.1*       Significant Imaging: I have reviewed all pertinent imaging results/findings within the past 24 hours.

## 2022-05-02 NOTE — PROGRESS NOTES
[facility] [facility]  Progress Note    Patient Name: Fabiana Chanel  MRN: [id number]  Patient Class: OP- Observation   Admission Date: 4/29/2022  Length of Stay: 0 days  Attending Physician: Davonte Fetlon MD  Primary Care Provider: Gil Leal MD        Subjective:     Principal Problem:Acute renal failure superimposed on stage 3 chronic kidney disease        HPI:  47 yo F with PMHx HFrEF (15%) 2/2 NICM, CKD3, T2DM, HTN, HLD, AF on Eliquis, and MILE on CPAP who presents to the ED with generalized malaise, body aches, and nausea x a week. Pt. Reports generalized body aches that are most prominently located in her chest, back, belly, arms, and legs. She has had poor appetite and associated nausea with a few episodes of clear, non-bloody, non-bilous emesis.  Pt. Has presented to the ED a couple other times this week with similar symptoms, but she states that her symptoms have only worsened with increasing vomiting frequency, and she has been unable to tolerate much PO intake. She denies any diarrhea, cough, fevers, chills, SOB, or edema. She does report some upper abdomen/lower mid chest pain that has been present this week, particularly when she vomits. Pt. Also expresses concerns that her symptoms seem to have worsening since she witched from trulicity to ozempic last month.      Overview/Hospital Course:  No notes on file    Interval History:   No events overnight. Patient reports resolution of nausea and vomiting. Tolerating diet and reports improvement of energy. TTE obtained for evaluation of renal function revealed LV thrombus. Cardiology consulted.      Review of Systems   Constitutional:  Positive for appetite change and fatigue. Negative for activity change, chills, fever and unexpected weight change.   HENT:  Negative for congestion and sore throat.    Respiratory:  Negative for cough and shortness of breath.    Cardiovascular:  Negative for chest pain,  palpitations and leg swelling.   Gastrointestinal:  Positive for abdominal pain and nausea. Negative for abdominal distention, blood in stool, constipation, diarrhea and vomiting.   Genitourinary:  Negative for difficulty urinating, dysuria and hematuria.   Musculoskeletal:  Positive for myalgias. Negative for arthralgias.   Skin:  Negative for color change and rash.   Neurological:  Negative for dizziness, tremors and seizures.   Objective:     Vital Signs (Most Recent):  Temp: 97.5 °F (36.4 °C) (05/01/22 1635)  Pulse: 79 (05/01/22 1635)  Resp: 20 (05/01/22 1635)  BP: 114/80 (05/01/22 1635)  SpO2: 95 % (05/01/22 1635) Vital Signs (24h Range):  Temp:  [97.5 °F (36.4 °C)-98.4 °F (36.9 °C)] 97.5 °F (36.4 °C)  Pulse:  [73-94] 79  Resp:  [16-20] 20  SpO2:  [92 %-97 %] 95 %  BP: (106-126)/(54-85) 114/80     Weight: 114.3 kg (252 lb)  Body mass index is 41.93 kg/m².  No intake or output data in the 24 hours ending 05/01/22 1940   Physical Exam  Vitals reviewed.   Constitutional:       General: She is not in acute distress.     Appearance: She is well-developed.   HENT:      Head: Normocephalic and atraumatic.   Eyes:      Extraocular Movements: Extraocular movements intact.      Pupils: Pupils are equal, round, and reactive to light.   Neck:      Vascular: No JVD.      Trachea: No tracheal deviation.   Cardiovascular:      Rate and Rhythm: Normal rate and regular rhythm.      Heart sounds: No murmur heard.    No friction rub. No gallop.   Pulmonary:      Effort: No respiratory distress.      Breath sounds: Normal breath sounds. No wheezing or rales.   Abdominal:      General: Bowel sounds are normal. There is no distension.      Palpations: Abdomen is soft. There is no mass.      Tenderness: There is no abdominal tenderness.   Musculoskeletal:         General: No deformity.      Cervical back: Neck supple.   Lymphadenopathy:      Cervical: No cervical adenopathy.   Skin:     General: Skin is warm and dry.      Findings:  No rash.   Neurological:      Mental Status: She is alert and oriented to person, place, and time.       Significant Labs: CBC:   Recent Labs   Lab 04/30/22  0658 05/01/22  0503   WBC 8.49 8.47   HGB 12.5 12.2   HCT 40.9 40.5    231     CMP:   Recent Labs   Lab 04/30/22  0658 05/01/22  0503    139   K 3.7 4.1    105   CO2 25 25   * 272*   BUN 17 22*   CREATININE 2.7* 3.0*   CALCIUM 8.9 9.6   PROT  --  6.6   ALBUMIN 3.2* 3.3*   BILITOT  --  0.5   ALKPHOS  --  96   AST  --  15   ALT  --  18   ANIONGAP 11 9   EGFRNONAA 20.5* 18.1*       Significant Imaging: I have reviewed all pertinent imaging results/findings within the past 24 hours.      Assessment/Plan:      * Acute renal failure superimposed on stage 3 chronic kidney disease  - Cr 2.8 on admit, increased from baseline 1.5-2. Patient with likely progression on renal disease with poor PO intake, vomiting.  - IV 1L NS given in ED, continue gentle hydration, holding home diuretics  - TTE 5/1: EF 20%, severe LV global hypokinesis, normal CVP  - FENa and FEUrea consistent with intrinsic renal disease  - Renal US: without cortical thinning, no hydronephrosis   - Renally avoid meds and avoid nephrotoxins  - Outpatient nephrology referral placed    LV (left ventricular) mural thrombus without MI  - TTE with incedental LV thrombus while on Apixaban   - Cardiology consulted  -- After discussion with patient, decision made to transition to Lovenox  -- Starting Lovenox 1 mg/kg daily for 3 months with repeat TTE with contrast  -- Follow up with cardiology in 3-4 weeks  -- Working with pharmacy on affordability of medicine    Type 2 diabetes mellitus without complication, without long-term current use of insulin  - Pt. With poorly controlled DM2, A1c 10.0 3/2022  -  on admit, labs not consistent with DKA. IV insulin given in ED  - Home regimen tresiba 88 units and lispro 40 units TID WM  - Levemir 35 units BID ordered with SSI, increase as  needed. Endocrine consulted for further assistance with management as pt. Has poorly controlled DM2 and severe hyperglycemia with mild HHS may be contributing to her symptoms      MILE treated with BiPAP  - Continue CPAP qhs and naps      Chronic combined systolic and diastolic heart failure  - Last TTE 5/2020 shows estimated ejection fraction is 15%  - Pt. Appears hypovolemic on exam, BNP <50, no edema on CXR  - Gently hydrate and hold home diuretics aldactone and lasix  - Continue Toprol-Xl for GDMT, not on ACEi/ARB due to renal function          Mixed hyperlipidemia  - Continue home statin      Obesity with body mass index (BMI) of 30.0 to 39.9  Body mass index is 41.93 kg/m². Morbid obesity complicates all aspects of disease management from diagnostic modalities to treatment. Weight loss encouraged and health benefits explained to patient.         Paroxysmal atrial fibrillation  - Pt. In NSR on admit, no acute issues  - Continue home metoprolol for rate control  - AC as above      Essential hypertension  - Continue home metoprolol      Iron deficiency anemia  - Hgb 12.2  - Trend CBC      VTE Risk Mitigation (From admission, onward)         Ordered     enoxaparin injection 120 mg  Daily         05/01/22 1535     IP VTE HIGH RISK PATIENT  Once         04/29/22 2126     Place sequential compression device  Until discontinued         04/29/22 2126     IP VTE HIGH RISK PATIENT  Once         04/29/22 2126                Discharge Planning   JOHNY: 5/2/2022     Code Status: Full Code   Is the patient medically ready for discharge?: No    Reason for patient still in hospital (select all that apply): Patient new problem, Patient trending condition, Laboratory test, Treatment, Imaging, Consult recommendations and Pending disposition                     Davonte Felton MD  Department of Hospital Medicine   [facility] - Telemetry Stepdown ([place]

## 2022-05-02 NOTE — ASSESSMENT & PLAN NOTE
- Pt. In NSR on admit, no acute issues  - Continue home metoprolol for rate control  - AC as above

## 2022-05-02 NOTE — SUBJECTIVE & OBJECTIVE
"Interval HPI:   Overnight events: No acute events overnight. Patient on the OBS Unit in room 7097/7097 A. Blood glucose improving. BG at and above goal on current insulin regimen (SSI, prandial, and basal insulin ). Steroid use- None .    Renal function- Abnormal - Creatinine 3.0   Vasopressors-  None       Diet diabetic [facility];  Calorie  Diet diabetic     Eatin%  Nausea: No  Hypoglycemia and intervention: No  Fever: No  TPN and/or TF: No    /75   Pulse 90   Temp 97.6 °F (36.4 °C)   Resp 20   Ht 5' 5" (1.651 m)   Wt 114.3 kg (252 lb)   LMP  (LMP Unknown)   SpO2 100%   Breastfeeding No   BMI 41.93 kg/m²     Labs Reviewed and Include    Recent Labs   Lab 22  0246   *   CALCIUM 9.7   ALBUMIN 3.4*   PROT 6.6      K 4.4   CO2 21*      BUN 32*   CREATININE 3.0*   ALKPHOS 100   ALT 19   AST 17   BILITOT 0.5     Lab Results   Component Value Date    WBC 8.85 2022    HGB 12.7 2022    HCT 41.1 2022    MCV 93 2022     2022     Recent Labs   Lab 22  2355   TSH 1.689     Lab Results   Component Value Date    HGBA1C 10.0 (H) 2022       Nutritional status:   Body mass index is 41.93 kg/m².  Lab Results   Component Value Date    ALBUMIN 3.4 (L) 2022    ALBUMIN 3.3 (L) 2022    ALBUMIN 3.2 (L) 2022     Lab Results   Component Value Date    PREALBUMIN 16 (L) 05/15/2014       Estimated Creatinine Clearance: 29.9 mL/min (A) (based on SCr of 3 mg/dL (H)).    Accu-Checks  Recent Labs     22  0746 22  1141 22  1638 22  1927 22  0237 22  0722 22  1129 22  1547 22  2003 22  0731   POCTGLUCOSE 237* 208* 159* 326* 294* 236* 281* 278* 280* 202*       Current Medications and/or Treatments Impacting Glycemic Control  Immunotherapy:    Immunosuppressants       None          Steroids:   Hormones (From admission, onward)                Start     Stop Route " Frequency Ordered    04/29/22 2126  melatonin tablet 6 mg         -- Oral Nightly PRN 04/29/22 2126          Pressors:    Autonomic Drugs (From admission, onward)                None          Hyperglycemia/Diabetes Medications:   Antihyperglycemics (From admission, onward)                Start     Stop Route Frequency Ordered    05/02/22 1130  insulin aspart U-100 pen 30 Units         -- SubQ 3 times daily with meals 05/02/22 0836    05/02/22 0900  insulin detemir U-100 pen 45 Units         -- SubQ 2 times daily 05/02/22 0836    04/29/22 2229  insulin aspart U-100 pen 1-10 Units         -- SubQ Before meals & nightly PRN 04/29/22 2130

## 2022-05-02 NOTE — ASSESSMENT & PLAN NOTE
- Pt. With poorly controlled DM2, A1c 10.0 3/2022  -  on admit, labs not consistent with DKA. IV insulin given in ED  - Home regimen tresiba 88 units and lispro 40 units TID WM  - Levemir 35 units BID ordered with SSI, increase as needed. Endocrine consulted for further assistance with management as pt. Has poorly controlled DM2 and severe hyperglycemia with mild HHS may be contributing to her symptoms

## 2022-05-02 NOTE — CONSULTS
Nutrition-Related Diabetes Education      Time Spent: 20 minutes    Learners: Patient    Current HbA1c: 10.0    Is patient aware of their A1c and their goal A1c? Yes    Home diabetes medication(s): Tresiba, Humalog    Nutrition Education with handouts: Educated pt on CHO counting for people with diabetes. Pt verbalized understanding. Emphasized the importance of diet adherence. Pt with no additional questions. No other needs identified. Left all education material with pt at bedside.    Comments: Pt reports a lot of stress surrounding all the side effects of her medications and not being able to get her health under control Wt stable. Denies any significant wt changes. No indicators of malnutrition.      Barriers to Learning: None; pt very involved in education.    Follow up: Yes     Please consult as needed.  Thank you!    Yesika Hartley, MS, RD, LDN  Ext: [phone]

## 2022-05-03 NOTE — PLAN OF CARE
[facility] - Telemetry Stepdown ([facility]-)  Discharge Final Note    Primary Care Provider: Gil Leal MD    Expected Discharge Date: 5/2/2022    Patient discharged to home via personal transportation.     Patient's bedside nurse and patient notified of the above.      Final Discharge Note (most recent)       Final Note - 05/03/22 1637          Final Note    Assessment Type Final Discharge Note (P)      Anticipated Discharge Disposition Home or Self Care (P)         Post-Acute Status    Post-Acute Authorization Other (P)      Other Status No Post-Acute Service Needs (P)                      Important Message from Medicare                 Future Appointments   Date Time Provider Department Center   5/10/2022  8:00 AM Vania Foster PA-C [facility] FAM MED [place]   5/10/2022  2:00 PM Xiomara Daniel NP [facility] ENDOCN [facility]   7/24/2022 10:00 AM HOME MONITOR DEVICE CHECK, [facility]        scheduled post-discharge follow-up appointment and information added to AVS.     Maria Luz Monroy LMSW  [facility]  Ext. [phone]

## 2022-05-04 NOTE — DISCHARGE SUMMARY
[facility] [address]  [facility]  Discharge Summary      Patient Name: Fabiana Chanel  MRN: [id number]  Patient Class: OP- Observation  Admission Date: 4/29/2022  Hospital Length of Stay: 3 days  Discharge Date and Time: 5/2/2022  2:13 PM  Attending Physician: No att. providers found   Discharging Provider: Davonte Felton MD  Primary Care Provider: Gil Leal MD  [facility] Team: [facility]OS[facility] MED R Davnote Felton MD    HPI:   45 yo F with PMHx HFrEF (15%) 2/2 NICM, CKD3, T2DM, HTN, HLD, AF on Eliquis, and MILE on CPAP who presents to the ED with generalized malaise, body aches, and nausea x a week. Pt. Reports generalized body aches that are most prominently located in her chest, back, belly, arms, and legs. She has had poor appetite and associated nausea with a few episodes of clear, non-bloody, non-bilous emesis.  Pt. Has presented to the ED a couple other times this week with similar symptoms, but she states that her symptoms have only worsened with increasing vomiting frequency, and she has been unable to tolerate much PO intake. She denies any diarrhea, cough, fevers, chills, SOB, or edema. She does report some upper abdomen/lower mid chest pain that has been present this week, particularly when she vomits. Pt. Also expresses concerns that her symptoms seem to have worsening since she witched from trulicity to ozempic last month.      * No surgery found *      Hospital Course:   Acute renal failure superimposed on stage 3 chronic kidney disease  - Cr 2.8 on admit. Patient with likely progression on renal disease with poor PO intake, vomiting.  - CKD likely secondary to poor controlled diabetes  - IV 1L NS given in ED, continue gentle hydration, holding home diuretics  - TTE 5/1: EF 20%, severe LV global hypokinesis, normal CVP  - FENa and FEUrea consistent with intrinsic renal disease  - Renal US: without cortical thinning, no hydronephrosis   - Renally avoid  meds and avoid nephrotoxins  - Patient able tolerate diet well without nausea or vomiting prior to discharge  - Outpatient nephrology referral placed       LV (left ventricular) mural thrombus without MI  - TTE with incedental LV thrombus while on Apixaban   - Cardiology consulted  -- After discussion with patient, decision made to transition to Lovenox  -- Starting Lovenox 1 mg/kg daily for 3 months with repeat TTE with contrast  -- Follow up with cardiology in 3-4 weeks       Type 2 diabetes mellitus without complication, without long-term current use of insulin  - Pt. With poorly controlled DM2, A1c 10.0 3/2022  -  on admit, labs not consistent with DKA. IV insulin given in ED  - Home regimen tresiba 88 units and lispro 40 units TID WM  - To stop Ozempic at discharge given concerns for adverse effects of nausea, vomiting        MILE treated with BiPAP  - Continue CPAP qhs and naps        Chronic combined systolic and diastolic heart failure  - Last TTE 5/2020 shows estimated ejection fraction is 15%  - Pt. Appears hypovolemic on exam, BNP <50, no edema on CXR  - Gently hydrate and hold home diuretics aldactone and lasix  - Continue Toprol-Xl for GDMT, not on ACEi/ARB due to renal function        Mixed hyperlipidemia  - Continue home statin        Obesity with body mass index (BMI) of 30.0 to 39.9  Body mass index is 41.93 kg/m². Morbid obesity complicates all aspects of disease management from diagnostic modalities to treatment. Weight loss encouraged and health benefits explained to patient.                Paroxysmal atrial fibrillation  - Pt. In NSR on admit, no acute issues  - Continue home metoprolol for rate control  - AC as above        Essential hypertension  - Continue home metoprolol       Goals of Care Treatment Preferences:  Code Status: Full Code      Consults:   Consults (From admission, onward)        Status Ordering Provider     Inpatient consult to Registered Dietitian/Nutritionist  Once         Provider:  (Not yet assigned)    Completed ASHLEY JIMENEZ     Inpatient consult to Cardiology  Once        Provider:  (Not yet assigned)    Completed ASHLEY JIMENEZ     Inpatient consult to Endocrinology  Once        Provider:  (Not yet assigned)    Completed TIFFANY TAYLOR          No new Assessment & Plan notes have been filed under this hospital service since the last note was generated.  Service: Hospital Medicine    Final Active Diagnoses:    Diagnosis Date Noted POA    PRINCIPAL PROBLEM:  Acute renal failure superimposed on stage 3 chronic kidney disease [N17.9, N18.30] 04/29/2022 Unknown    LV (left ventricular) mural thrombus without MI [I24.0] 05/01/2022 Yes    Type 2 diabetes mellitus without complication, without long-term current use of insulin [E11.9] 09/20/2013 Yes    MILE treated with BiPAP [G47.33] 05/16/2015 Yes    Mixed hyperlipidemia [E78.2] 05/16/2014 Yes    Chronic combined systolic and diastolic heart failure [I50.42] 05/16/2014 Yes    Essential hypertension [I10] 05/14/2014 Yes    Paroxysmal atrial fibrillation [I48.0] 05/14/2014 Yes    Obesity with body mass index (BMI) of 30.0 to 39.9 [E66.9] 05/14/2014 Yes    Iron deficiency anemia [D50.9] 09/20/2013 Yes      Problems Resolved During this Admission:       Discharged Condition: good    Disposition: Home or Self Care    Follow Up:    Patient Instructions:      Ambulatory referral/consult to Nephrology   Standing Status: Future   Referral Priority: Routine Referral Type: Consultation   Referral Reason: Specialty Services Required   Requested Specialty: Nephrology     Ambulatory referral/consult to Internal Medicine   Standing Status: Future   Referral Priority: Routine Referral Type: Consultation   Referral Reason: Specialty Services Required   Requested Specialty: Internal Medicine   Number of Visits Requested: 1     Diet diabetic     Notify your health care provider if you experience any of the following:  increased  confusion or weakness     Notify your health care provider if you experience any of the following:  persistent dizziness, light-headedness, or visual disturbances     Notify your health care provider if you experience any of the following:  worsening rash     Notify your health care provider if you experience any of the following:  severe persistent headache     Notify your health care provider if you experience any of the following:  difficulty breathing or increased cough     Notify your health care provider if you experience any of the following:  redness, tenderness, or signs of infection (pain, swelling, redness, odor or green/yellow discharge around incision site)     Notify your health care provider if you experience any of the following:  severe uncontrolled pain     Notify your health care provider if you experience any of the following:  persistent nausea and vomiting or diarrhea     Notify your health care provider if you experience any of the following:  temperature >100.4     Activity as tolerated       Significant Diagnostic Studies: Labs: All labs within the past 24 hours have been reviewed    Pending Diagnostic Studies:     None         Medications:  Reconciled Home Medications:      Medication List      START taking these medications    enoxaparin 120 mg/0.8 mL Syrg  Commonly known as: LOVENOX  Inject 0.8 mLs (120 mg total) into the skin once daily.        CHANGE how you take these medications    baclofen 10 MG tablet  Commonly known as: LIORESAL  Take 1 tablet (10 mg total) by mouth 2 (two) times daily.  What changed: when to take this     gabapentin 400 MG capsule  Commonly known as: NEURONTIN  Take 1 capsule (400 mg total) by mouth every evening.  What changed:   · when to take this  · reasons to take this        CONTINUE taking these medications    albuterol 90 mcg/actuation inhaler  Commonly known as: VENTOLIN HFA  Inhale 2 puffs into the lungs every 6 (six) hours as needed for Wheezing or  Shortness of Breath. Rescue     blood glucose control, high Soln  1 each by Misc.(Non-Drug; Combo Route) route once. for 1 dose     blood glucose control, low Soln  1 each by Misc.(Non-Drug; Combo Route) route once. for 1 dose     BLOOD PRESSURE CUFF Misc  Generic drug: miscellaneous medical supply  1 kit by Misc.(Non-Drug; Combo Route) route 2 (two) times daily.     blood sugar diagnostic Strp  Check blood glucose 3x/day.     blood-glucose meter Misc  Commonly known as: TRUE METRIX AIR GLUCOSE METER  1 each by Misc.(Non-Drug; Combo Route) route 3 (three) times daily.     cetirizine 10 MG tablet  Commonly known as: ZYRTEC  Take 1 tablet (10 mg total) by mouth once daily.     DEXCOM G6  Misc  Generic drug: blood-glucose meter,continuous  Use with dexcom G6 system     DEXCOM G6 SENSOR Lupe  Generic drug: blood-glucose sensor  Change sensor every 10 days     DEXCOM G6 TRANSMITTER Lupe  Generic drug: blood-glucose transmitter  Change every 3 months     diclofenac sodium 1 % Gel  Commonly known as: VOLTAREN  Apply 2 g topically 2 (two) times daily.     famotidine 20 MG tablet  Commonly known as: PEPCID  Take 1 tablet (20 mg total) by mouth 2 (two) times daily.     fluticasone propionate 50 mcg/actuation nasal spray  Commonly known as: FLONASE  1 spray (50 mcg total) by Each Nostril route 2 (two) times daily as needed for Rhinitis.     furosemide 40 MG tablet  Commonly known as: LASIX  TAKE 1 TABLET BY MOUTH ONCE DAILY *PLEASE  HOLD  WHILE  NOT  DRINKING*     HYDROcodone-acetaminophen 5-325 mg per tablet  Commonly known as: NORCO  Take 1 tablet by mouth every 6 (six) hours as needed for Pain.     insulin lispro 100 unit/mL pen  Commonly known as: HumaLOG KwikPen Insulin  Inject 40 Units into the skin 3 (three) times daily before meals.     medroxyPROGESTERone 10 MG tablet  Commonly known as: PROVERA  Take 10 mg by mouth once daily.     metoprolol succinate 50 MG 24 hr tablet  Commonly known as: TOPROL-XL  Take 1  "tablet (50 mg total) by mouth once daily. Hold SBP <120     mupirocin 2 % ointment  Commonly known as: BACTROBAN  Apply topically nightly.     * ondansetron 4 MG tablet  Commonly known as: ZOFRAN  Take 1 tablet (4 mg total) by mouth every 8 (eight) hours as needed for Nausea.     * ondansetron 4 MG tablet  Commonly known as: ZOFRAN  Take 1 tablet (4 mg total) by mouth every 6 (six) hours as needed for Nausea.     * ondansetron 4 MG tablet  Commonly known as: ZOFRAN  Take 1 tablet (4 mg total) by mouth every 6 (six) hours.     oxyCODONE-acetaminophen 5-325 mg per tablet  Commonly known as: PERCOCET  Take 1 tablet by mouth every 4 (four) hours as needed.     pen needle, diabetic 32 gauge x 5/32" Ndle  Commonly known as: BD LORI 2ND GEN PEN NEEDLE  USE 1 PEN NEEDLE 4 TIMES DAILY     rosuvastatin 40 MG Tab  Commonly known as: CRESTOR  Take 1 tablet (40 mg total) by mouth every evening.     spironolactone 50 MG tablet  Commonly known as: ALDACTONE  Take 1 tablet (50 mg total) by mouth once daily. Hold until eating and drinking improves. Need to weight self daily     TRESIBA FLEXTOUCH U-200 200 unit/mL (3 mL) insulin pen  Generic drug: insulin degludec  Inject 88 Units into the skin once daily.     triamcinolone acetonide 0.1% 0.1 % cream  Commonly known as: KENALOG  2 (two) times daily. Apply to affected area         * This list has 3 medication(s) that are the same as other medications prescribed for you. Read the directions carefully, and ask your doctor or other care provider to review them with you.            STOP taking these medications    apixaban 5 mg Tab  Commonly known as: ELIQUIS     OZEMPIC 0.25 mg or 0.5 mg(2 mg/1.5 mL) pen injector  Generic drug: semaglutide            Indwelling Lines/Drains at time of discharge:   Lines/Drains/Airways     None                 Time spent on the discharge of patient: 35 minutes         Davonte Felton MD  Department of Hospital Medicine  [facility] - Telemetry Stepdown " (\[facility]\""er-7)

## 2022-05-04 NOTE — HOSPITAL COURSE
Acute renal failure superimposed on stage 3 chronic kidney disease  - Cr 2.8 on admit. Patient with likely progression on renal disease with poor PO intake, vomiting.  - IV 1L NS given in ED, continue gentle hydration, holding home diuretics  - TTE 5/1: EF 20%, severe LV global hypokinesis, normal CVP  - FENa and FEUrea consistent with intrinsic renal disease  - Renal US: without cortical thinning, no hydronephrosis   - Renally avoid meds and avoid nephrotoxins  - Outpatient nephrology referral placed       LV (left ventricular) mural thrombus without MI  - TTE with incedental LV thrombus while on Apixaban   - Cardiology consulted  -- After discussion with patient, decision made to transition to Lovenox  -- Starting Lovenox 1 mg/kg daily for 3 months with repeat TTE with contrast  -- Follow up with cardiology in 3-4 weeks       Type 2 diabetes mellitus without complication, without long-term current use of insulin  - Pt. With poorly controlled DM2, A1c 10.0 3/2022  -  on admit, labs not consistent with DKA. IV insulin given in ED  - Home regimen tresiba 88 units and lispro 40 units TID WM  - To stop Ozempic at discharge given concerns for adverse effects of nausea, vomiting        MILE treated with BiPAP  - Continue CPAP qhs and naps        Chronic combined systolic and diastolic heart failure  - Last TTE 5/2020 shows estimated ejection fraction is 15%  - Pt. Appears hypovolemic on exam, BNP <50, no edema on CXR  - Gently hydrate and hold home diuretics aldactone and lasix  - Continue Toprol-Xl for GDMT, not on ACEi/ARB due to renal function        Mixed hyperlipidemia  - Continue home statin        Obesity with body mass index (BMI) of 30.0 to 39.9  Body mass index is 41.93 kg/m². Morbid obesity complicates all aspects of disease management from diagnostic modalities to treatment. Weight loss encouraged and health benefits explained to patient.                Paroxysmal atrial fibrillation  - Pt. In NSR on  admit, no acute issues  - Continue home metoprolol for rate control  - AC as above        Essential hypertension  - Continue home metoprolol

## 2022-05-10 ENCOUNTER — OFFICE VISIT (OUTPATIENT)
Dept: FAMILY MEDICINE | Facility: CLINIC | Age: 47
End: 2022-05-10
Payer: MEDICARE

## 2022-05-10 ENCOUNTER — OFFICE VISIT (OUTPATIENT)
Dept: ENDOCRINOLOGY | Facility: CLINIC | Age: 47
End: 2022-05-10
Payer: MEDICARE

## 2022-05-10 VITALS
HEART RATE: 72 BPM | SYSTOLIC BLOOD PRESSURE: 98 MMHG | RESPIRATION RATE: 18 BRPM | BODY MASS INDEX: 41.73 KG/M2 | TEMPERATURE: 98 F | WEIGHT: 250.44 LBS | DIASTOLIC BLOOD PRESSURE: 72 MMHG | OXYGEN SATURATION: 97 % | HEIGHT: 65 IN

## 2022-05-10 VITALS
WEIGHT: 250.13 LBS | HEART RATE: 69 BPM | BODY MASS INDEX: 41.62 KG/M2 | TEMPERATURE: 98 F | RESPIRATION RATE: 18 BRPM | DIASTOLIC BLOOD PRESSURE: 79 MMHG | SYSTOLIC BLOOD PRESSURE: 111 MMHG

## 2022-05-10 DIAGNOSIS — I51.3 LEFT VENTRICULAR THROMBUS: ICD-10-CM

## 2022-05-10 DIAGNOSIS — N18.4 TYPE 2 DIABETES MELLITUS WITH STAGE 4 CHRONIC KIDNEY DISEASE, WITHOUT LONG-TERM CURRENT USE OF INSULIN: ICD-10-CM

## 2022-05-10 DIAGNOSIS — N18.4 ACUTE RENAL FAILURE SUPERIMPOSED ON STAGE 4 CHRONIC KIDNEY DISEASE, UNSPECIFIED ACUTE RENAL FAILURE TYPE: Primary | ICD-10-CM

## 2022-05-10 DIAGNOSIS — E11.22 TYPE 2 DIABETES MELLITUS WITH STAGE 4 CHRONIC KIDNEY DISEASE, WITHOUT LONG-TERM CURRENT USE OF INSULIN: ICD-10-CM

## 2022-05-10 DIAGNOSIS — Z12.31 ENCOUNTER FOR SCREENING MAMMOGRAM FOR MALIGNANT NEOPLASM OF BREAST: ICD-10-CM

## 2022-05-10 DIAGNOSIS — E66.01 MORBID OBESITY, UNSPECIFIED OBESITY TYPE: ICD-10-CM

## 2022-05-10 DIAGNOSIS — R11.0 NAUSEA: ICD-10-CM

## 2022-05-10 DIAGNOSIS — I50.42 CHRONIC COMBINED SYSTOLIC AND DIASTOLIC HEART FAILURE: ICD-10-CM

## 2022-05-10 DIAGNOSIS — N17.9 ACUTE RENAL FAILURE SUPERIMPOSED ON STAGE 4 CHRONIC KIDNEY DISEASE, UNSPECIFIED ACUTE RENAL FAILURE TYPE: Primary | ICD-10-CM

## 2022-05-10 PROBLEM — N18.31 STAGE 3A CHRONIC KIDNEY DISEASE: Status: RESOLVED | Noted: 2021-04-15 | Resolved: 2022-05-10

## 2022-05-10 PROCEDURE — 1160F RVW MEDS BY RX/DR IN RCRD: CPT | Mod: CPTII,S$GLB,, | Performed by: PHYSICIAN ASSISTANT

## 2022-05-10 PROCEDURE — 99499 RISK ADDL DX/OHS AUDIT: ICD-10-PCS | Mod: S$GLB,,, | Performed by: PHYSICIAN ASSISTANT

## 2022-05-10 PROCEDURE — 1159F PR MEDICATION LIST DOCUMENTED IN MEDICAL RECORD: ICD-10-PCS | Mod: CPTII,S$GLB,, | Performed by: PHYSICIAN ASSISTANT

## 2022-05-10 PROCEDURE — 3074F SYST BP LT 130 MM HG: CPT | Mod: CPTII,S$GLB,, | Performed by: PHYSICIAN ASSISTANT

## 2022-05-10 PROCEDURE — 3046F HEMOGLOBIN A1C LEVEL >9.0%: CPT | Mod: CPTII,S$GLB,, | Performed by: NURSE PRACTITIONER

## 2022-05-10 PROCEDURE — 99214 OFFICE O/P EST MOD 30 MIN: CPT | Mod: S$GLB,,, | Performed by: PHYSICIAN ASSISTANT

## 2022-05-10 PROCEDURE — 99499 UNLISTED E&M SERVICE: CPT | Mod: S$GLB,,, | Performed by: PHYSICIAN ASSISTANT

## 2022-05-10 PROCEDURE — 3074F PR MOST RECENT SYSTOLIC BLOOD PRESSURE < 130 MM HG: ICD-10-PCS | Mod: CPTII,S$GLB,, | Performed by: PHYSICIAN ASSISTANT

## 2022-05-10 PROCEDURE — 99999 PR PBB SHADOW E&M-EST. PATIENT-LVL V: ICD-10-PCS | Mod: PBBFAC,,, | Performed by: PHYSICIAN ASSISTANT

## 2022-05-10 PROCEDURE — 3046F HEMOGLOBIN A1C LEVEL >9.0%: CPT | Mod: CPTII,S$GLB,, | Performed by: PHYSICIAN ASSISTANT

## 2022-05-10 PROCEDURE — 3078F PR MOST RECENT DIASTOLIC BLOOD PRESSURE < 80 MM HG: ICD-10-PCS | Mod: CPTII,S$GLB,, | Performed by: PHYSICIAN ASSISTANT

## 2022-05-10 PROCEDURE — 3078F DIAST BP <80 MM HG: CPT | Mod: CPTII,S$GLB,, | Performed by: NURSE PRACTITIONER

## 2022-05-10 PROCEDURE — 99499 RISK ADDL DX/OHS AUDIT: ICD-10-PCS | Mod: S$GLB,,, | Performed by: NURSE PRACTITIONER

## 2022-05-10 PROCEDURE — 99999 PR PBB SHADOW E&M-EST. PATIENT-LVL V: CPT | Mod: PBBFAC,,, | Performed by: NURSE PRACTITIONER

## 2022-05-10 PROCEDURE — 1159F PR MEDICATION LIST DOCUMENTED IN MEDICAL RECORD: ICD-10-PCS | Mod: CPTII,S$GLB,, | Performed by: NURSE PRACTITIONER

## 2022-05-10 PROCEDURE — 1159F MED LIST DOCD IN RCRD: CPT | Mod: CPTII,S$GLB,, | Performed by: PHYSICIAN ASSISTANT

## 2022-05-10 PROCEDURE — 3074F PR MOST RECENT SYSTOLIC BLOOD PRESSURE < 130 MM HG: ICD-10-PCS | Mod: CPTII,S$GLB,, | Performed by: NURSE PRACTITIONER

## 2022-05-10 PROCEDURE — 1160F PR REVIEW ALL MEDS BY PRESCRIBER/CLIN PHARMACIST DOCUMENTED: ICD-10-PCS | Mod: CPTII,S$GLB,, | Performed by: PHYSICIAN ASSISTANT

## 2022-05-10 PROCEDURE — 99999 PR PBB SHADOW E&M-EST. PATIENT-LVL V: ICD-10-PCS | Mod: PBBFAC,,, | Performed by: NURSE PRACTITIONER

## 2022-05-10 PROCEDURE — 3078F DIAST BP <80 MM HG: CPT | Mod: CPTII,S$GLB,, | Performed by: PHYSICIAN ASSISTANT

## 2022-05-10 PROCEDURE — 3008F BODY MASS INDEX DOCD: CPT | Mod: CPTII,S$GLB,, | Performed by: PHYSICIAN ASSISTANT

## 2022-05-10 PROCEDURE — 3046F PR MOST RECENT HEMOGLOBIN A1C LEVEL > 9.0%: ICD-10-PCS | Mod: CPTII,S$GLB,, | Performed by: PHYSICIAN ASSISTANT

## 2022-05-10 PROCEDURE — 3078F PR MOST RECENT DIASTOLIC BLOOD PRESSURE < 80 MM HG: ICD-10-PCS | Mod: CPTII,S$GLB,, | Performed by: NURSE PRACTITIONER

## 2022-05-10 PROCEDURE — 3008F BODY MASS INDEX DOCD: CPT | Mod: CPTII,S$GLB,, | Performed by: NURSE PRACTITIONER

## 2022-05-10 PROCEDURE — 1159F MED LIST DOCD IN RCRD: CPT | Mod: CPTII,S$GLB,, | Performed by: NURSE PRACTITIONER

## 2022-05-10 PROCEDURE — 99999 PR PBB SHADOW E&M-EST. PATIENT-LVL V: CPT | Mod: PBBFAC,,, | Performed by: PHYSICIAN ASSISTANT

## 2022-05-10 PROCEDURE — 99214 OFFICE O/P EST MOD 30 MIN: CPT | Mod: S$GLB,,, | Performed by: NURSE PRACTITIONER

## 2022-05-10 PROCEDURE — 99214 PR OFFICE/OUTPT VISIT, EST, LEVL IV, 30-39 MIN: ICD-10-PCS | Mod: S$GLB,,, | Performed by: NURSE PRACTITIONER

## 2022-05-10 PROCEDURE — 3046F PR MOST RECENT HEMOGLOBIN A1C LEVEL > 9.0%: ICD-10-PCS | Mod: CPTII,S$GLB,, | Performed by: NURSE PRACTITIONER

## 2022-05-10 PROCEDURE — 3008F PR BODY MASS INDEX (BMI) DOCUMENTED: ICD-10-PCS | Mod: CPTII,S$GLB,, | Performed by: PHYSICIAN ASSISTANT

## 2022-05-10 PROCEDURE — 99214 PR OFFICE/OUTPT VISIT, EST, LEVL IV, 30-39 MIN: ICD-10-PCS | Mod: S$GLB,,, | Performed by: PHYSICIAN ASSISTANT

## 2022-05-10 PROCEDURE — 3074F SYST BP LT 130 MM HG: CPT | Mod: CPTII,S$GLB,, | Performed by: NURSE PRACTITIONER

## 2022-05-10 PROCEDURE — 99499 UNLISTED E&M SERVICE: CPT | Mod: S$GLB,,, | Performed by: NURSE PRACTITIONER

## 2022-05-10 PROCEDURE — 3008F PR BODY MASS INDEX (BMI) DOCUMENTED: ICD-10-PCS | Mod: CPTII,S$GLB,, | Performed by: NURSE PRACTITIONER

## 2022-05-10 RX ORDER — INSULIN LISPRO 100 [IU]/ML
40 INJECTION, SOLUTION INTRAVENOUS; SUBCUTANEOUS
Qty: 45 ML | Refills: 5 | Status: SHIPPED | OUTPATIENT
Start: 2022-05-10 | End: 2022-05-25 | Stop reason: SDUPTHER

## 2022-05-10 RX ORDER — ACETAMINOPHEN AND CODEINE PHOSPHATE 300; 30 MG/1; MG/1
1 TABLET ORAL
COMMUNITY
Start: 2022-01-31 | End: 2022-05-10

## 2022-05-10 RX ORDER — IBUPROFEN 800 MG/1
TABLET ORAL
COMMUNITY
Start: 2022-02-16 | End: 2022-05-10

## 2022-05-10 RX ORDER — IBUPROFEN 600 MG/1
600 TABLET ORAL EVERY 6 HOURS PRN
COMMUNITY
Start: 2022-01-13 | End: 2022-05-10

## 2022-05-10 RX ORDER — POLYETHYLENE GLYCOL-3350 AND ELECTROLYTES WITH FLAVOR PACK 240; 5.84; 2.98; 6.72; 22.72 G/278.26G; G/278.26G; G/278.26G; G/278.26G; G/278.26G
POWDER, FOR SOLUTION ORAL
COMMUNITY
Start: 2022-03-07 | End: 2022-05-17

## 2022-05-10 RX ORDER — METOPROLOL SUCCINATE 50 MG/1
50 TABLET, EXTENDED RELEASE ORAL DAILY
Qty: 90 TABLET | Refills: 1 | Status: SHIPPED | OUTPATIENT
Start: 2022-05-10 | End: 2022-09-02 | Stop reason: SDUPTHER

## 2022-05-10 RX ORDER — INSULIN DEGLUDEC 200 U/ML
54 INJECTION, SOLUTION SUBCUTANEOUS 2 TIMES DAILY
Qty: 6 PEN | Refills: 5 | Status: SHIPPED | OUTPATIENT
Start: 2022-05-10 | End: 2022-12-30

## 2022-05-10 NOTE — PATIENT INSTRUCTIONS
Increase Tresiba from 88 to 106 units per day - inject 54 units TWICE daily.     Continue Humalog 40 units before each meal but ADD correction scale -  150-200=+2  201-250=+4  251-300=+6  301-350=+8  351-400=+10     RESUME Dexcom CGM.     Return to clinic in 2 weeks. Please keep a food diary about 1 week prior to the visit.          Patient was brought to the angio suite, placed on x-ray table, placed on standard monitor by anesthesiologist. B/l groins were prepped and draped in usual sterile fashion.   8 Iranian long sheath was used in the right common femoral artery for access. A diagnostic angiogram was performed under general anesthesia care. B/l ICAs were injected and studied. 4,000 units of heparin bolus given.     There is a small residual aneurysm filling of the RIGHT PCOMM with evidence of a flow diverting stent with mild in-stent stenosis. The flow diverting stent is well positioned across the aneurysm neck with good wall apposition.   There is a dysplastic, multilobulated LEFT PCOMM aneurysm measuring 4.8x4.4mm. A flow diverting stent was placed across the neck of the aneurysm with good wall apposition.     Full report to follow  Patient tolerated the procedure well and at baseline neurological condition.   Right groin vascular access site was closed with perclose and applied manual compression.   There is no hematoma.   Patient was transferred to PACU.    Above discussed with Dr. Jc

## 2022-05-10 NOTE — PROGRESS NOTES
Subjective:       Patient ID: Fabiana Chanel is a 46 y.o. female.    Chief Complaint: Hospital Follow Up    Transitional Care Note    Family and/or Caretaker present at visit?  No.  Diagnostic tests reviewed/disposition: I have reviewed all completed as well as pending diagnostic tests at the time of discharge.  Disease/illness education: diet, no nsaids  Home health/community services discussion/referrals: Patient does not have home health established from hospital visit.  They do not need home health.  If needed, we will set up home health for the patient.   Establishment or re-establishment of referral orders for community resources: No other necessary community resources.   Discussion with other health care providers: No discussion with other health care providers necessary.     HPI: 45 yo female with uncontrolled DMII, A-fib (was on eliquis), CHF presents for hospital f/u. Presented on 4/29 for malaise, body aches, chest pain and nausea/vomiting x1 week. Glucose over 400. Cr 2.8, JADA. Echo showed possible thrombus in left ventricle; transition from eliquis to lovenox. Injecting daily. Has bruising of her abdomen. Pt states since being home, nausea has resolved but she lacks an appetite. She ate chicken and mac n cheese last night for dinner. Took her mealtime and basal insulin. 2 hours later glucose was 200, however this am fasting glucose was 300. She is not scheduled to see nephrology or cardiology. She has an endocrine appt today.  Social History     Socioeconomic History    Marital status:    Tobacco Use    Smoking status: Never Smoker    Smokeless tobacco: Never Used    Tobacco comment: smokes cigars on occasion   Substance and Sexual Activity    Alcohol use: Yes     Comment: occasional    Drug use: Yes     Types: Marijuana     Comment: occ    Sexual activity: Yes     Partners: Male       Review of Systems   Constitutional: Positive for appetite change.   Respiratory: Negative for  shortness of breath.    Cardiovascular: Negative for chest pain and leg swelling.   Gastrointestinal: Negative for change in bowel habit and change in bowel habit.   Genitourinary: Negative for dysuria.   Neurological: Negative for headaches.   Hematological: Bruises/bleeds easily.         Objective:      Physical Exam  Constitutional:       Appearance: Normal appearance. She is obese.   HENT:      Head: Normocephalic and atraumatic.   Cardiovascular:      Rate and Rhythm: Normal rate.   Pulmonary:      Effort: Pulmonary effort is normal.      Breath sounds: Normal breath sounds.   Abdominal:       Neurological:      Mental Status: She is alert.   Psychiatric:         Mood and Affect: Mood normal.         Behavior: Behavior normal.         Assessment:       Problem List Items Addressed This Visit     Type 2 diabetes mellitus with stage 4 chronic kidney disease, without long-term current use of insulin    Relevant Orders    Microalbumin/creatinine urine ratio    Ambulatory referral/consult to Nephrology    Chronic combined systolic and diastolic heart failure    Relevant Medications    metoprolol succinate (TOPROL-XL) 50 MG 24 hr tablet    Acute renal failure superimposed on stage 4 chronic kidney disease - Primary      Other Visit Diagnoses     Left ventricular thrombus        Encounter for screening mammogram for malignant neoplasm of breast        Relevant Orders    Mammo Digital Screening Bilat          Plan:         Fabiana was seen today for hospital follow up.    Diagnoses and all orders for this visit:    Acute renal failure superimposed on stage 4 chronic kidney disease, unspecified acute renal failure type  Ref to nephrology placed. Discussed hydrating and avoidance of all nsaids    Type 2 diabetes mellitus with stage 4 chronic kidney disease, without long-term current use of insulin  -     Microalbumin/creatinine urine ratio  -     Ambulatory referral/consult to Nephrology; Future    Left ventricular  thrombus  On lovenox, discussed coumadin with pt as she is already injecting 4 insuline shots per day. appt with cardiology Friday    Chronic combined systolic and diastolic heart failure  -     metoprolol succinate (TOPROL-XL) 50 MG 24 hr tablet; Take 1 tablet (50 mg total) by mouth once daily. Hold SBP <120    Encounter for screening mammogram for malignant neoplasm of breast  -     Mammo Digital Screening Bilat; Future                                                                                                                                                                                                                                                                                                                                                                                                                                     F/u in 1 month with pcp

## 2022-05-10 NOTE — PROGRESS NOTES
CC: This 46 y.o. Black or  female  is here for evaluation of  T2DM along with comorbidities indicated in the Visit Diagnosis section of this encounter.    HPI: Fabiana Chanel was diagnosed with T2DM in 2013. Metformin and a sulfonylurea started at the time of diagnosis.       Prior visit 11/2021  Pt has not been seen for several months. A1c is a bit better from > 14% in April to now 11.8%. she has been to ED multiple times in the last few months for back pain.   She hasn't been taking Trulicity for the last several months.   Her Tresiba dose has been increased gradually from 60 units once daily to almost double - 55 units BID. She has gained 6 lb over the last year.   Plan Basal and prandial insulin doses very uneven with basal dose double the amount of prandial. Adjust insulin doses to even it out:   Decrease Tresiba to 44 units twice daily. -- ok to change to 88 units ONCE DAILY.   Increase Humalog to 34 units before meals.   Resume Trulicity 0.75 mg once weekly.   Orders sent for Dexcom CGM. Call to schedule appointment with diabetes education for training when Dexcom is received.   Return to clinic in 1 month. Ok to extend a couple weeks later if Dexcom is newly started.       Prior visit 3/2022  a1c is mildly down from 11.8 to 10%.   She did start using Dexcom CGM but is not wearing now because she was awaiting a shipment of supplies. Will resume soon.   She does enjoy using Dexcom. Finding that it's been easier to control her BGs because she is able to see how certain foods affect her BGs. She did not realize that juice and Snapple had sugar.   She did resume Trulicity, has some mild nausea with it but it's tolerable. She is interested in Ozempic.   Plan Stop Trulicity and per pt's preference, start Ozempic 0.5 mg weekly. Reviewed potential s/e.   Increase Humalog from 34 to 40 units before each meal.   Continue Tresiba 88 units daily. Return to clinic in 6 weeks.   Please keep a  food/insulin diary about a week prior to visits. Bring Dexcom .     Interval history  She has switched from Trulicity to Ozempic. She has been to the hospital 3x since lov. Recently admitted last week to Observation for malaise, body aches and n/v. Ozempic was stopped upon discharge.     C/o high BGs despite eating much.   BGs are 200-300s. Just got a new transmitter since her last one didn't work. Will resume Dexcom today.   FBG usually in the 200s but today was in the 300s, gradually came down to 220 several hours later.     Pt has LV thrombus, now on Lovenox.     PMHX: CHF,  MILE on cpap, atrial fibrillation, MI, ICD placement, NICM (cath 11/2017) EF 20% by echo 12/2018    LAST DIABETES EDUCATION: 6/19/19, 11/2021 for Dexcom start       RECENT ILLNESSES/HOSPITALIZATIONS - -  Admitted 12/28/18 x 2 nights for acute on chronic HF      HOSPITALIZED FOR DIABETES  -  Yes - for hyperglycemia     DIABETES MEDICATIONS:    Tresiba 88 units once daily   Humalog Kwikpen 40 units ac       Misses medication doses - No    She injects Humalog immediately after she eats - but lately has been trying to to inject ac.     DM COMPLICATIONS: peripheral neuropathy and cardiovascular disease    SIGNIFICANT DIABETES MED HISTORY:   diarrhea on metformin 1000 mg instant release; cannot afford topical.    Pt unable to tolerate metformin ER d/t diarrhea even on 500 mg twice daily.   Trulicity and Novolog started 11/2019; unable to tolerate Trulicity 1.5 mg d/t nausea and bloating   Jardiance - recurrent  infection     SELF MONITORING BLOOD GLUCOSE: Checks blood glucose at home 4x/day.     HYPOGLYCEMIC EPISODES: denies      CURRENT DIET: drinks water. No more juice or tea.  Eats 3 meals/day. Diet is not always healthy.     CURRENT EXERCISE: can tolerate walking up 2 blocks before she gets DIAZ       /79 (BP Location: Right arm, Patient Position: Sitting, BP Method: Large (Automatic))   Pulse 69   Temp 98.2 °F (36.8 °C) (Oral)    Resp 18   Wt 113.4 kg (250 lb 1.6 oz)   LMP  (LMP Unknown)   BMI 41.62 kg/m²       ROS:   CONSTITUTIONAL: Appetite low, + Fatigue   GI: + nausea, much improved since stopping Ozempic.       PHYSICAL EXAM:  GENERAL: Well developed, well nourished. No acute distress.   PSYCH: AAOx3,  conversant, well-groomed. Judgement and insight good. Appropriate mood and affect.   NEURO: Cranial nerves grossly intact. Speech clear, no tremor.   CHEST: Respirations even and unlabored. CTA, diminished bilaterally         Hemoglobin A1C   Date Value Ref Range Status   03/09/2022 10.0 (H) 4.0 - 5.6 % Final     Comment:     ADA Screening Guidelines:  5.7-6.4%  Consistent with prediabetes  >or=6.5%  Consistent with diabetes    High levels of fetal hemoglobin interfere with the HbA1C  assay. Heterozygous hemoglobin variants (HbS, HgC, etc)do  not significantly interfere with this assay.   However, presence of multiple variants may affect accuracy.     10/14/2021 11.8 (H) 4.0 - 5.6 % Final     Comment:     ADA Screening Guidelines:  5.7-6.4%  Consistent with prediabetes  >or=6.5%  Consistent with diabetes    High levels of fetal hemoglobin interfere with the HbA1C  assay. Heterozygous hemoglobin variants (HbS, HgC, etc)do  not significantly interfere with this assay.   However, presence of multiple variants may affect accuracy.     04/28/2021 >14.0 (H) 4.0 - 5.6 % Final     Comment:     ADA Screening Guidelines:  5.7-6.4%  Consistent with prediabetes  >or=6.5%  Consistent with diabetes    High levels of fetal hemoglobin interfere with the HbA1C  assay. Heterozygous hemoglobin variants (HbS, HgC, etc)do  not significantly interfere with this assay.   However, presence of multiple variants may affect accuracy.             Chemistry        Component Value Date/Time     05/02/2022 0246    K 4.4 05/02/2022 0246     05/02/2022 0246    CO2 21 (L) 05/02/2022 0246    BUN 32 (H) 05/02/2022 0246    CREATININE 3.0 (H) 05/02/2022 0246      (H) 05/02/2022 0246        Component Value Date/Time    CALCIUM 9.7 05/02/2022 0246    ALKPHOS 100 05/02/2022 0246    AST 17 05/02/2022 0246    ALT 19 05/02/2022 0246    BILITOT 0.5 05/02/2022 0246    ESTGFRAFRICA 20.8 (A) 05/02/2022 0246    EGFRNONAA 18.1 (A) 05/02/2022 0246          Lab Results   Component Value Date    LDLCALC 58.4 (L) 10/14/2021            Ref. Range 10/14/2021 08:25   Cholesterol Latest Ref Range: 120 - 199 mg/dL 111 (L)   HDL Latest Ref Range: 40 - 75 mg/dL 38 (L)   HDL/Cholesterol Ratio Latest Ref Range: 20.0 - 50.0 % 34.2   LDL Cholesterol External Latest Ref Range: 63.0 - 159.0 mg/dL 58.4 (L)   Non-HDL Cholesterol Latest Units: mg/dL 73   Total Cholesterol/HDL Ratio Latest Ref Range: 2.0 - 5.0  2.9   Triglycerides Latest Ref Range: 30 - 150 mg/dL 73     Lab Results   Component Value Date    MICALBCREAT 103.2 (H) 12/29/2020           ASSESSMENT and PLAN:    A1C GOAL: < 7 %     1. Type 2 diabetes, uncontrolled, with neuropathy  Increase Tresiba from 88 to 106 units per day - inject 54 units TWICE daily.     Continue Humalog 40 units before each meal but ADD correction scale -  150-200=+2  201-250=+4  251-300=+6  301-350=+8  351-400=+10     RESUME Dexcom CGM.     Return to clinic in 2 weeks. Please keep a food diary about 1 week prior to the visit.          2. Nausea  Improved off Ozempic, will consider resuming Trulicity at next visit.    3. Morbid obesity, unspecified obesity type  Increases insulin resistance.      4.  CKD stage 4  Pt will see Nephrology.        No orders of the defined types were placed in this encounter.       Follow up in about 2 weeks (around 5/24/2022).       Patient is testing blood sugars 4x/day and taking 4 injections of insulin a day. The patient's insulin treatment regimen requires frequent adjustments by the patient on the basis of therapeutic CGM testing results.

## 2022-05-11 ENCOUNTER — TELEPHONE (OUTPATIENT)
Dept: FAMILY MEDICINE | Facility: CLINIC | Age: 47
End: 2022-05-11
Payer: MEDICARE

## 2022-05-11 PROBLEM — N18.4 ACUTE RENAL FAILURE SUPERIMPOSED ON STAGE 4 CHRONIC KIDNEY DISEASE: Status: ACTIVE | Noted: 2022-04-29

## 2022-05-13 ENCOUNTER — OFFICE VISIT (OUTPATIENT)
Dept: CARDIOLOGY | Facility: CLINIC | Age: 47
End: 2022-05-13
Payer: MEDICARE

## 2022-05-13 ENCOUNTER — PATIENT OUTREACH (OUTPATIENT)
Dept: ADMINISTRATIVE | Facility: OTHER | Age: 47
End: 2022-05-13
Payer: MEDICARE

## 2022-05-13 VITALS
HEART RATE: 84 BPM | HEIGHT: 65 IN | WEIGHT: 253.5 LBS | SYSTOLIC BLOOD PRESSURE: 125 MMHG | OXYGEN SATURATION: 94 % | RESPIRATION RATE: 18 BRPM | DIASTOLIC BLOOD PRESSURE: 75 MMHG | BODY MASS INDEX: 42.24 KG/M2

## 2022-05-13 DIAGNOSIS — I24.0 LV (LEFT VENTRICULAR) MURAL THROMBUS WITHOUT MI: Primary | ICD-10-CM

## 2022-05-13 DIAGNOSIS — E11.22 TYPE 2 DIABETES MELLITUS WITH STAGE 4 CHRONIC KIDNEY DISEASE, WITHOUT LONG-TERM CURRENT USE OF INSULIN: ICD-10-CM

## 2022-05-13 DIAGNOSIS — E66.9 OBESITY WITH BODY MASS INDEX (BMI) OF 30.0 TO 39.9: ICD-10-CM

## 2022-05-13 DIAGNOSIS — I42.8 NICM (NONISCHEMIC CARDIOMYOPATHY): ICD-10-CM

## 2022-05-13 DIAGNOSIS — G47.33 OSA TREATED WITH BIPAP: ICD-10-CM

## 2022-05-13 DIAGNOSIS — I48.0 PAROXYSMAL ATRIAL FIBRILLATION: ICD-10-CM

## 2022-05-13 DIAGNOSIS — I50.42 CHRONIC COMBINED SYSTOLIC AND DIASTOLIC HEART FAILURE: ICD-10-CM

## 2022-05-13 DIAGNOSIS — Z95.810 IMPLANTABLE CARDIOVERTER-DEFIBRILLATOR (ICD) IN SITU: ICD-10-CM

## 2022-05-13 DIAGNOSIS — E78.2 MIXED HYPERLIPIDEMIA: ICD-10-CM

## 2022-05-13 DIAGNOSIS — N18.4 TYPE 2 DIABETES MELLITUS WITH STAGE 4 CHRONIC KIDNEY DISEASE, WITHOUT LONG-TERM CURRENT USE OF INSULIN: ICD-10-CM

## 2022-05-13 DIAGNOSIS — I10 ESSENTIAL HYPERTENSION: ICD-10-CM

## 2022-05-13 PROCEDURE — 3074F PR MOST RECENT SYSTOLIC BLOOD PRESSURE < 130 MM HG: ICD-10-PCS | Mod: CPTII,S$GLB,, | Performed by: INTERNAL MEDICINE

## 2022-05-13 PROCEDURE — 99214 OFFICE O/P EST MOD 30 MIN: CPT | Mod: S$GLB,,, | Performed by: INTERNAL MEDICINE

## 2022-05-13 PROCEDURE — 99499 RISK ADDL DX/OHS AUDIT: ICD-10-PCS | Mod: S$GLB,,, | Performed by: INTERNAL MEDICINE

## 2022-05-13 PROCEDURE — 3008F BODY MASS INDEX DOCD: CPT | Mod: CPTII,S$GLB,, | Performed by: INTERNAL MEDICINE

## 2022-05-13 PROCEDURE — 99999 PR PBB SHADOW E&M-EST. PATIENT-LVL V: CPT | Mod: PBBFAC,,, | Performed by: INTERNAL MEDICINE

## 2022-05-13 PROCEDURE — 99214 PR OFFICE/OUTPT VISIT, EST, LEVL IV, 30-39 MIN: ICD-10-PCS | Mod: S$GLB,,, | Performed by: INTERNAL MEDICINE

## 2022-05-13 PROCEDURE — 3046F HEMOGLOBIN A1C LEVEL >9.0%: CPT | Mod: CPTII,S$GLB,, | Performed by: INTERNAL MEDICINE

## 2022-05-13 PROCEDURE — 1159F PR MEDICATION LIST DOCUMENTED IN MEDICAL RECORD: ICD-10-PCS | Mod: CPTII,S$GLB,, | Performed by: INTERNAL MEDICINE

## 2022-05-13 PROCEDURE — 3078F PR MOST RECENT DIASTOLIC BLOOD PRESSURE < 80 MM HG: ICD-10-PCS | Mod: CPTII,S$GLB,, | Performed by: INTERNAL MEDICINE

## 2022-05-13 PROCEDURE — 3008F PR BODY MASS INDEX (BMI) DOCUMENTED: ICD-10-PCS | Mod: CPTII,S$GLB,, | Performed by: INTERNAL MEDICINE

## 2022-05-13 PROCEDURE — 1160F PR REVIEW ALL MEDS BY PRESCRIBER/CLIN PHARMACIST DOCUMENTED: ICD-10-PCS | Mod: CPTII,S$GLB,, | Performed by: INTERNAL MEDICINE

## 2022-05-13 PROCEDURE — 3074F SYST BP LT 130 MM HG: CPT | Mod: CPTII,S$GLB,, | Performed by: INTERNAL MEDICINE

## 2022-05-13 PROCEDURE — 1159F MED LIST DOCD IN RCRD: CPT | Mod: CPTII,S$GLB,, | Performed by: INTERNAL MEDICINE

## 2022-05-13 PROCEDURE — 3046F PR MOST RECENT HEMOGLOBIN A1C LEVEL > 9.0%: ICD-10-PCS | Mod: CPTII,S$GLB,, | Performed by: INTERNAL MEDICINE

## 2022-05-13 PROCEDURE — 99499 UNLISTED E&M SERVICE: CPT | Mod: S$GLB,,, | Performed by: INTERNAL MEDICINE

## 2022-05-13 PROCEDURE — 99999 PR PBB SHADOW E&M-EST. PATIENT-LVL V: ICD-10-PCS | Mod: PBBFAC,,, | Performed by: INTERNAL MEDICINE

## 2022-05-13 PROCEDURE — 3078F DIAST BP <80 MM HG: CPT | Mod: CPTII,S$GLB,, | Performed by: INTERNAL MEDICINE

## 2022-05-13 PROCEDURE — 1160F RVW MEDS BY RX/DR IN RCRD: CPT | Mod: CPTII,S$GLB,, | Performed by: INTERNAL MEDICINE

## 2022-05-13 RX ORDER — ENOXAPARIN SODIUM 150 MG/ML
1 INJECTION SUBCUTANEOUS DAILY
Qty: 144 ML | Refills: 3 | Status: SHIPPED | OUTPATIENT
Start: 2022-05-13 | End: 2022-08-31

## 2022-05-13 NOTE — PROGRESS NOTES
CARDIOVASCULAR PROGRESS NOTE    REASON FOR CONSULT:   Fabiana Chanel is a 46 y.o. female who presents for follow up of NICM.    PCP: Elvis  EP: Nanci  HISTORY OF PRESENT ILLNESS:   Last seen April 2021.    The patient is seen after greater than 1 year hiatus.  She reports generally stable status today without angina dyspnea, palpitations, syncope, or defibrillator discharges.  She was recently hospitalized with heart failure exacerbation.  Echocardiogram noted LV thrombus, the patient apparently had been compliant with her Eliquis.  She was seen by Cardiology (Dr. Martin) and switched to Lovenox 120 mg subQ b.i.d..  She appears to be tolerating this treatment, although she does have some bruising of her abdomen at the injection sites.  There has otherwise been no PND, orthopnea, melena, hematuria, or claudicant symptoms.    At this point, I plan to refer her to the Heart failure Service [facility] to discuss potential advanced options if available.  I also plan repeat contrast echo in 3 months with follow-up thereafter.  Hopefully we get her back on an oral regimen of anticoagulation.    CARDIOVASCULAR HISTORY:   NICM (cath 11/2017) EF 20% by echo 12/2018  [employer] ICD (4/17/18 Dr. Christine)  PAF (on enox for LV apical thrombus 4/2022)  MILE on CPAP  LV Thrombus (echo 4/2022) now on enox bid    PAST MEDICAL HISTORY:     Past Medical History:   Diagnosis Date    Atrial fibrillation     Blood clot associated with vein wall inflammation     not dvt    Cardiomyopathy     Normal cors on cath 11/2017    CHF (congestive heart failure)     DM (diabetes mellitus) 9/19/2013    Hyperlipidemia     Hypertension     Psoriasis     Sleep apnea        PAST SURGICAL HISTORY:     Past Surgical History:   Procedure Laterality Date    CARDIAC CATHETERIZATION      COLONOSCOPY      COLONOSCOPY N/A 3/9/2022    Procedure: COLONOSCOPY;  Surgeon: Vielka Burrell MD;  Location[facility];  Service: Endoscopy;   Laterality: N/A;    DILATION AND CURETTAGE OF UTERUS      ESOPHAGOGASTRODUODENOSCOPY N/A 5/9/2019    Procedure: EGD (ESOPHAGOGASTRODUODENOSCOPY);  Surgeon: Vielka Burrell MD;  Location[facility];  Service: Endoscopy;  Laterality: N/A;    TRANSFORAMINAL EPIDURAL INJECTION OF STEROID N/A 1/6/2022    Procedure: Transforaminal ANKITA L4/L5 L5/S1;  Surgeon: Jed Yeager MD;  Location: [facility];  Service: Pain Management;  Laterality: N/A;       ALLERGIES AND MEDICATION:     Review of patient's allergies indicates:   Allergen Reactions    Pneumococcal 23-abimbola ps vaccine      Previous Medications    ALBUTEROL (VENTOLIN HFA) 90 MCG/ACTUATION INHALER    Inhale 2 puffs into the lungs every 6 (six) hours as needed for Wheezing or Shortness of Breath. Rescue    BACLOFEN (LIORESAL) 10 MG TABLET    Take 1 tablet (10 mg total) by mouth 2 (two) times daily.    BLOOD GLUCOSE CONTROL, HIGH SOLN    1 each by Misc.(Non-Drug; Combo Route) route once. for 1 dose    BLOOD GLUCOSE CONTROL, LOW SOLN    1 each by Misc.(Non-Drug; Combo Route) route once. for 1 dose    BLOOD PRESSURE CUFF MISC    1 kit by Misc.(Non-Drug; Combo Route) route 2 (two) times daily.    BLOOD SUGAR DIAGNOSTIC STRP    Check blood glucose 3x/day.    BLOOD-GLUCOSE METER (TRUE METRIX AIR GLUCOSE METER) MISC    1 each by Misc.(Non-Drug; Combo Route) route 3 (three) times daily.    BLOOD-GLUCOSE METER,CONTINUOUS (DEXCOM G6 ) MISC    Use with dexcom G6 system    BLOOD-GLUCOSE SENSOR (DEXCOM G6 SENSOR) FIDEL    Change sensor every 10 days    BLOOD-GLUCOSE TRANSMITTER (DEXCOM G6 TRANSMITTER) FIDEL    Change every 3 months    CETIRIZINE (ZYRTEC) 10 MG TABLET    Take 1 tablet (10 mg total) by mouth once daily.    ENOXAPARIN (LOVENOX) 120 MG/0.8 ML SYRG    Inject 0.8 mLs (120 mg total) into the skin once daily.    FLUTICASONE PROPIONATE (FLONASE) 50 MCG/ACTUATION NASAL SPRAY    1 spray (50 mcg total) by Each Nostril route 2 (two) times daily as needed for  "Rhinitis.    FUROSEMIDE (LASIX) 40 MG TABLET    TAKE 1 TABLET BY MOUTH ONCE DAILY *PLEASE  HOLD  WHILE  NOT  DRINKING*    GABAPENTIN (NEURONTIN) 400 MG CAPSULE    Take 1 capsule (400 mg total) by mouth every evening.    GAVILYTE-C 240-22.72-6.72 -5.84 GRAM SOLR    DRINK 1/2 ON 3/8/22, THEN DRINK 1/2 ON 3/9/22 AS DIRECTED    INSULIN DEGLUDEC (TRESIBA FLEXTOUCH U-200) 200 UNIT/ML (3 ML) INSULIN PEN    Inject 54 Units into the skin 2 (two) times a day.    INSULIN LISPRO (HUMALOG KWIKPEN INSULIN) 100 UNIT/ML PEN    Inject 40 Units into the skin 3 (three) times daily before meals.    MEDROXYPROGESTERONE (PROVERA) 10 MG TABLET    Take 10 mg by mouth once daily.    METOPROLOL SUCCINATE (TOPROL-XL) 50 MG 24 HR TABLET    Take 1 tablet (50 mg total) by mouth once daily. Hold SBP <120    MUPIROCIN (BACTROBAN) 2 % OINTMENT    Apply topically nightly.    OXYCODONE-ACETAMINOPHEN (PERCOCET) 5-325 MG PER TABLET    Take 1 tablet by mouth every 4 (four) hours as needed.    PEN NEEDLE, DIABETIC (BD LORI 2ND GEN PEN NEEDLE) 32 GAUGE X 5/32" NDLE    USE 1 PEN NEEDLE 4 TIMES DAILY    ROSUVASTATIN (CRESTOR) 40 MG TAB    Take 1 tablet (40 mg total) by mouth every evening.    SPIRONOLACTONE (ALDACTONE) 50 MG TABLET    Take 1 tablet (50 mg total) by mouth once daily. Hold until eating and drinking improves. Need to weight self daily    TRIAMCINOLONE ACETONIDE 0.1% (KENALOG) 0.1 % CREAM    2 (two) times daily. Apply to affected area       SOCIAL HISTORY:     Social History     Socioeconomic History    Marital status:    Tobacco Use    Smoking status: Never Smoker    Smokeless tobacco: Never Used    Tobacco comment: smokes cigars on occasion   Substance and Sexual Activity    Alcohol use: Yes     Comment: occasional    Drug use: Yes     Types: Marijuana     Comment: occ    Sexual activity: Yes     Partners: Male       FAMILY HISTORY:     Family History   Problem Relation Age of Onset    Hypertension Mother     Hypertension " "Father     Diabetes Father     Diabetes Maternal Grandmother     Diabetes Paternal Grandmother     Breast cancer Neg Hx     Colon cancer Neg Hx     Ovarian cancer Neg Hx        REVIEW OF SYSTEMS:   Review of Systems   Constitutional: Negative for chills, diaphoresis and fever.   HENT: Negative for nosebleeds.    Eyes: Negative for blurred vision, double vision and photophobia.   Respiratory: Negative for hemoptysis, shortness of breath and wheezing.    Cardiovascular: Negative for chest pain, palpitations, orthopnea, claudication, leg swelling and PND.   Gastrointestinal: Negative for abdominal pain, blood in stool, heartburn, melena, nausea and vomiting.   Genitourinary: Negative for flank pain and hematuria.   Musculoskeletal: Negative for falls, myalgias and neck pain.   Skin: Negative for rash.   Neurological: Negative for dizziness, seizures, loss of consciousness, weakness and headaches.   Endo/Heme/Allergies: Negative for polydipsia. Does not bruise/bleed easily.   Psychiatric/Behavioral: Negative for depression and memory loss. The patient is not nervous/anxious.        PHYSICAL EXAM:     Vitals:    05/13/22 1046   BP: 125/75   Pulse: 84   Resp: 18    Body mass index is 42.19 kg/m².  Weight: 115 kg (253 lb 8.5 oz)   Height: 5' 5" (165.1 cm)     Physical Exam  Vitals reviewed.   Constitutional:       General: She is not in acute distress.     Appearance: She is well-developed. She is obese. She is not ill-appearing, toxic-appearing or diaphoretic.   HENT:      Head: Normocephalic and atraumatic.   Eyes:      General: No scleral icterus.     Extraocular Movements: Extraocular movements intact.      Conjunctiva/sclera: Conjunctivae normal.      Pupils: Pupils are equal, round, and reactive to light.   Neck:      Thyroid: No thyromegaly.      Vascular: Normal carotid pulses. No carotid bruit or JVD.      Trachea: Trachea normal. No tracheal deviation.   Cardiovascular:      Rate and Rhythm: Normal rate and " regular rhythm.      Pulses:           Carotid pulses are 2+ on the right side and 2+ on the left side.     Heart sounds: S1 normal and S2 normal. No murmur heard.    No friction rub. No gallop.      Comments: L side ICD in place.  Pulmonary:      Effort: Pulmonary effort is normal. No respiratory distress.      Breath sounds: Normal breath sounds. No stridor. No wheezing, rhonchi or rales.   Chest:      Chest wall: No tenderness.   Abdominal:      General: There is no distension.      Palpations: Abdomen is soft.   Musculoskeletal:         General: No swelling or tenderness. Normal range of motion.      Cervical back: Normal range of motion and neck supple. No edema or rigidity.      Right lower leg: No edema.      Left lower leg: No edema.   Feet:      Right foot:      Skin integrity: No ulcer.      Left foot:      Skin integrity: No ulcer.   Skin:     General: Skin is warm and dry.      Coloration: Skin is not jaundiced.   Neurological:      General: No focal deficit present.      Mental Status: She is alert and oriented to person, place, and time.      Cranial Nerves: No cranial nerve deficit.   Psychiatric:         Mood and Affect: Mood normal.         Speech: Speech normal.         Behavior: Behavior normal. Behavior is cooperative.         DATA:   EKG: (personally reviewed tracing)  4/29/22 SR 91, LVH, PRWP, PVC    Laboratory:  CBC:  Recent Labs   Lab 04/30/22  0658 05/01/22  0503 05/02/22  0246   WBC 8.49 8.47 8.85   Hemoglobin 12.5 12.2 12.7   Hematocrit 40.9 40.5 41.1   Platelets 218 231 224       CHEMISTRIES:  Recent Labs   Lab 04/30/21  0401 05/02/21  0558 05/26/21  2104 04/27/22  2355 04/29/22  1846 04/30/22  0658 05/01/22  0503 05/02/22  0246   Glucose 344 H 290 H   < > 303 H   < > 254 H 272 H 240 H   Sodium 133 L 137   < > 137   < > 137 139 137   Potassium 4.4 4.4   < > 4.2   < > 3.7 4.1 4.4   BUN 30 H 29 H   < > 19   < > 17 22 H 32 H   Creatinine 1.8 H 1.5 H   < > 2.7 H   < > 2.7 H 3.0 H 3.0 H   eGFR  if  39 A 48 A   < > 24 A   < > 23.7 A 20.8 A 20.8 A   eGFR if non  34 A 42 A   < > 21 A   < > 20.5 A 18.1 A 18.1 A   Calcium 8.6 L 9.2   < > 9.8   < > 8.9 9.6 9.7   Magnesium 2.4 2.4  --  2.7 H  --   --   --   --     < > = values in this interval not displayed.       CARDIAC BIOMARKERS:  Recent Labs   Lab 05/02/21  0558 05/26/21  2104 03/18/22  1847 04/24/22  1647 04/27/22  2355 04/28/22  0309 04/29/22  1846   CPK 72  --  248 H  --   --   --  252 H   Troponin I  --    < >  --    < > 0.032 H 0.032 H 0.016    < > = values in this interval not displayed.       COAGS:  Recent Labs   Lab 11/04/20  1003 02/26/21  0103   INR 0.9 1.0       LIPIDS/LFTS:  Recent Labs   Lab 02/19/20  1022 04/12/20  1512 12/29/20  0808 02/26/21  0031 10/14/21  0825 03/18/22  1847 04/29/22  1846 05/01/22  0503 05/02/22  0246   Cholesterol 143  --  135  --  111 L  --   --   --   --    Triglycerides 95  --  158 H  --  73  --   --   --   --    HDL 42  --  42  --  38 L  --   --   --   --    LDL Cholesterol 82.0  --  61.4 L  --  58.4 L  --   --   --   --    Non-HDL Cholesterol 101  --  93  --  73  --   --   --   --    AST  --    < >  --    < > 19   < > 17 15 17   ALT  --    < >  --    < > 20   < > 25 18 19    < > = values in this interval not displayed.       Lab Results   Component Value Date    TSH 1.689 04/27/2022       Cardiovascular Testing:  Echo 4/20/22 (compared to report 10/2019: EF unchanged, LV thrombus new)  · The left ventricle is moderately enlarged with eccentric hypertrophy and severely decreased systolic function.  · The estimated ejection fraction is 20%.  · There is severe left ventricular global hypokinesis.  · There appears to be a thrombus along the apical lateral wall of the left ventricle.  · Grade I left ventricular diastolic dysfunction.  · Normal right ventricular size with mildly reduced right ventricular systolic function.  · The estimated PA systolic pressure is 26 mmHg.  · Normal central  venous pressure (3 mmHg).    Cath 11/27/17  RA 5  RV 61/6  PA 60/29/42  PAWP 18  /41  Ao 163/107/130  CO 5.6 L/min  LVEF: 20% by echo with severe LV dilation  Wall Motion: Severe global HK  Dominance: Co-dom  LM: normal  LAD: normal  LCx: normal  RCA: normal  Hemostasis:  RFA/V manual compression  Impression:  Normal cors  Elevated LVEDP with transmission of pressures to pulmonary circuit  Above c/w Severe NICM  RFA/V manual compression  Plan:  Cont med rx  Stop Plavix  Stop amlodipine  Start Hydrala/Imdur/Lasix  Goal net neg 1L/day  Cont BBl/ACEi  Repeat echo in 3 months for reassessment of LV fxn and need for ICD  Follow up with Dr. Yanez 1 week after discharge     Stress Test: L MPI 9/20/13  Nuclear Quantitative Functional Analysis:   LVEF: 31 %  Impression: NORMAL MYOCARDIAL PERFUSION  1. The perfusion scan is free of evidence for myocardial ischemia or injury.   2. There is a moderate intensity fixed defect in the inferior wall of the left ventricle, secondary to diaphragm attenuation.   3. There is abnormal wall motion at rest showing moderate global hypokinesis of the left ventricle.   4. There is resting LV dysfunction with a reduced ejection fraction of 31 %.   5. The ventricular volumes are normal at rest and stress.   6. The extracardiac distribution of radioactivity is normal.     ASSESSMENT:   # NICM, EF persistently reduced 20% by echo 10/2019.  Euvolemic on exam.  # LV thrombus on echo 4/2022 (was on eliquis->enox 120mg bid)  # PAF, in SR (prev on eliquis, intol of Xarelto, now on enox for LVthrombus)  # S/P [employer] ICD 4/2018 (Dr. Christine), atrial lead placed for AF monitoring  # HTN, controlled  # HLP, on rosuva 40mg  # DM  # BMI 42, up 2 unit(s) vs last OV  # MILE, on CPAP  # CKD4    PLAN:   Cont med rx  Cont enox 120mg sq bid  Refer to CHF  ICD follow up at [facility] as planned.  RTC 3 months with surveillance echo (with contrast) in 3 months (Aug 2022).  ?able to switch back to  OAC.      Oziel Ynaez MD, FACC

## 2022-05-13 NOTE — PROGRESS NOTES
Health Maintenance Due   Topic Date Due    COVID-19 Vaccine (1) Never done    Pneumococcal Vaccines (Age 0-64) (1 - PCV) Never done    Diabetes Urine Screening  12/29/2021    Mammogram  04/15/2022     Updates were requested from care everywhere.  Chart was reviewed for overdue Proactive [facility] Encounters (MATTHIEU) topics (CRS, Breast Cancer Screening, Eye exam)  Health Maintenance has been updated.  LINKS immunization registry triggered.  Immunizations were reconciled.

## 2022-05-17 ENCOUNTER — OFFICE VISIT (OUTPATIENT)
Dept: TRANSPLANT | Facility: CLINIC | Age: 47
End: 2022-05-17
Payer: MEDICARE

## 2022-05-17 ENCOUNTER — HOSPITAL ENCOUNTER (OUTPATIENT)
Dept: RADIOLOGY | Facility: HOSPITAL | Age: 47
Discharge: HOME | End: 2022-05-17
Attending: PHYSICIAN ASSISTANT
Payer: MEDICARE

## 2022-05-17 ENCOUNTER — HOSPITAL ENCOUNTER (OUTPATIENT)
Facility: HOSPITAL | Age: 47
Discharge: HOME | End: 2022-05-18
Attending: EMERGENCY MEDICINE | Admitting: HOSPITALIST
Payer: MEDICARE

## 2022-05-17 VITALS
DIASTOLIC BLOOD PRESSURE: 71 MMHG | HEART RATE: 73 BPM | HEIGHT: 65 IN | BODY MASS INDEX: 42.24 KG/M2 | SYSTOLIC BLOOD PRESSURE: 137 MMHG | WEIGHT: 253.5 LBS

## 2022-05-17 VITALS — WEIGHT: 253.5 LBS | BODY MASS INDEX: 42.24 KG/M2 | HEIGHT: 65 IN

## 2022-05-17 DIAGNOSIS — I50.42 CHRONIC COMBINED SYSTOLIC AND DIASTOLIC HEART FAILURE: Primary | ICD-10-CM

## 2022-05-17 DIAGNOSIS — R07.9 CHEST PAIN: ICD-10-CM

## 2022-05-17 DIAGNOSIS — N18.4 TYPE 2 DIABETES MELLITUS WITH STAGE 4 CHRONIC KIDNEY DISEASE, WITH LONG-TERM CURRENT USE OF INSULIN: ICD-10-CM

## 2022-05-17 DIAGNOSIS — R09.89 PULMONARY VASCULAR CONGESTION: Primary | ICD-10-CM

## 2022-05-17 DIAGNOSIS — I48.0 PAROXYSMAL ATRIAL FIBRILLATION: ICD-10-CM

## 2022-05-17 DIAGNOSIS — E11.22 TYPE 2 DIABETES MELLITUS WITH STAGE 4 CHRONIC KIDNEY DISEASE, WITH LONG-TERM CURRENT USE OF INSULIN: ICD-10-CM

## 2022-05-17 DIAGNOSIS — Z12.31 ENCOUNTER FOR SCREENING MAMMOGRAM FOR MALIGNANT NEOPLASM OF BREAST: ICD-10-CM

## 2022-05-17 DIAGNOSIS — G47.33 OSA TREATED WITH BIPAP: ICD-10-CM

## 2022-05-17 DIAGNOSIS — Z79.4 TYPE 2 DIABETES MELLITUS WITH STAGE 4 CHRONIC KIDNEY DISEASE, WITH LONG-TERM CURRENT USE OF INSULIN: ICD-10-CM

## 2022-05-17 DIAGNOSIS — I24.0 LV (LEFT VENTRICULAR) MURAL THROMBUS WITHOUT MI: ICD-10-CM

## 2022-05-17 DIAGNOSIS — I42.8 NICM (NONISCHEMIC CARDIOMYOPATHY): ICD-10-CM

## 2022-05-17 DIAGNOSIS — Z95.810 IMPLANTABLE CARDIOVERTER-DEFIBRILLATOR (ICD) IN SITU: ICD-10-CM

## 2022-05-17 DIAGNOSIS — N18.4 CKD (CHRONIC KIDNEY DISEASE), STAGE IV: ICD-10-CM

## 2022-05-17 DIAGNOSIS — I10 PRIMARY HYPERTENSION: ICD-10-CM

## 2022-05-17 LAB
ALBUMIN SERPL BCP-MCNC: 3.4 G/DL (ref 3.5–5.2)
ALP SERPL-CCNC: 84 U/L (ref 55–135)
ALT SERPL W/O P-5'-P-CCNC: 35 U/L (ref 10–44)
ANION GAP SERPL CALC-SCNC: 9 MMOL/L (ref 8–16)
AST SERPL-CCNC: 19 U/L (ref 10–40)
BASOPHILS # BLD AUTO: 0.03 K/UL (ref 0–0.2)
BASOPHILS NFR BLD: 0.3 % (ref 0–1.9)
BILIRUB SERPL-MCNC: 0.7 MG/DL (ref 0.1–1)
BNP SERPL-MCNC: 11 PG/ML (ref 0–99)
BUN SERPL-MCNC: 21 MG/DL (ref 6–20)
CALCIUM SERPL-MCNC: 9.6 MG/DL (ref 8.7–10.5)
CHLORIDE SERPL-SCNC: 97 MMOL/L (ref 95–110)
CO2 SERPL-SCNC: 32 MMOL/L (ref 23–29)
CREAT SERPL-MCNC: 2.1 MG/DL (ref 0.5–1.4)
CTP QC/QA: YES
DIFFERENTIAL METHOD: ABNORMAL
EOSINOPHIL # BLD AUTO: 0.2 K/UL (ref 0–0.5)
EOSINOPHIL NFR BLD: 1.6 % (ref 0–8)
ERYTHROCYTE [DISTWIDTH] IN BLOOD BY AUTOMATED COUNT: 15.1 % (ref 11.5–14.5)
EST. GFR  (AFRICAN AMERICAN): 32 ML/MIN/1.73 M^2
EST. GFR  (NON AFRICAN AMERICAN): 28 ML/MIN/1.73 M^2
GLUCOSE SERPL-MCNC: 233 MG/DL (ref 70–110)
HCT VFR BLD AUTO: 41 % (ref 37–48.5)
HGB BLD-MCNC: 12.9 G/DL (ref 12–16)
IMM GRANULOCYTES # BLD AUTO: 0.05 K/UL (ref 0–0.04)
IMM GRANULOCYTES NFR BLD AUTO: 0.5 % (ref 0–0.5)
LYMPHOCYTES # BLD AUTO: 1.8 K/UL (ref 1–4.8)
LYMPHOCYTES NFR BLD: 19.1 % (ref 18–48)
MCH RBC QN AUTO: 29.5 PG (ref 27–31)
MCHC RBC AUTO-ENTMCNC: 31.5 G/DL (ref 32–36)
MCV RBC AUTO: 94 FL (ref 82–98)
MONOCYTES # BLD AUTO: 0.6 K/UL (ref 0.3–1)
MONOCYTES NFR BLD: 6.4 % (ref 4–15)
NEUTROPHILS # BLD AUTO: 6.8 K/UL (ref 1.8–7.7)
NEUTROPHILS NFR BLD: 72.1 % (ref 38–73)
NRBC BLD-RTO: 0 /100 WBC
PLATELET # BLD AUTO: 258 K/UL (ref 150–450)
PMV BLD AUTO: 10.9 FL (ref 9.2–12.9)
POCT GLUCOSE: 185 MG/DL (ref 70–110)
POCT GLUCOSE: 219 MG/DL (ref 70–110)
POTASSIUM SERPL-SCNC: 3.2 MMOL/L (ref 3.5–5.1)
PROT SERPL-MCNC: 7.4 G/DL (ref 6–8.4)
RBC # BLD AUTO: 4.37 M/UL (ref 4–5.4)
SARS-COV-2 RDRP RESP QL NAA+PROBE: NEGATIVE
SODIUM SERPL-SCNC: 138 MMOL/L (ref 136–145)
TROPONIN I SERPL DL<=0.01 NG/ML-MCNC: 0.02 NG/ML (ref 0–0.03)
TROPONIN I SERPL DL<=0.01 NG/ML-MCNC: 0.03 NG/ML (ref 0–0.03)
WBC # BLD AUTO: 9.39 K/UL (ref 3.9–12.7)

## 2022-05-17 PROCEDURE — 96372 THER/PROPH/DIAG INJ SC/IM: CPT

## 2022-05-17 PROCEDURE — 99900035 HC TECH TIME PER 15 MIN (STAT)

## 2022-05-17 PROCEDURE — 77063 BREAST TOMOSYNTHESIS BI: CPT | Mod: 26,,, | Performed by: RADIOLOGY

## 2022-05-17 PROCEDURE — 99291 CRITICAL CARE FIRST HOUR: CPT | Mod: 25

## 2022-05-17 PROCEDURE — 77063 BREAST TOMOSYNTHESIS BI: CPT | Mod: TC

## 2022-05-17 PROCEDURE — 27000190 HC CPAP FULL FACE MASK W/VALVE

## 2022-05-17 PROCEDURE — 94760 N-INVAS EAR/PLS OXIMETRY 1: CPT

## 2022-05-17 PROCEDURE — 85025 COMPLETE CBC W/AUTO DIFF WBC: CPT

## 2022-05-17 PROCEDURE — 84484 ASSAY OF TROPONIN QUANT: CPT | Mod: 91

## 2022-05-17 PROCEDURE — A4216 STERILE WATER/SALINE, 10 ML: HCPCS | Performed by: PHYSICIAN ASSISTANT

## 2022-05-17 PROCEDURE — 96361 HYDRATE IV INFUSION ADD-ON: CPT

## 2022-05-17 PROCEDURE — 3078F DIAST BP <80 MM HG: CPT | Mod: CPTII,S$GLB,, | Performed by: INTERNAL MEDICINE

## 2022-05-17 PROCEDURE — 25000003 PHARM REV CODE 250

## 2022-05-17 PROCEDURE — G0378 HOSPITAL OBSERVATION PER HR: HCPCS

## 2022-05-17 PROCEDURE — 94660 CPAP INITIATION&MGMT: CPT

## 2022-05-17 PROCEDURE — 96374 THER/PROPH/DIAG INJ IV PUSH: CPT

## 2022-05-17 PROCEDURE — 25000003 PHARM REV CODE 250: Performed by: EMERGENCY MEDICINE

## 2022-05-17 PROCEDURE — 99205 OFFICE O/P NEW HI 60 MIN: CPT | Mod: S$GLB,,, | Performed by: INTERNAL MEDICINE

## 2022-05-17 PROCEDURE — 36415 COLL VENOUS BLD VENIPUNCTURE: CPT | Performed by: PHYSICIAN ASSISTANT

## 2022-05-17 PROCEDURE — 99499 RISK ADDL DX/OHS AUDIT: ICD-10-PCS | Mod: S$GLB,,, | Performed by: INTERNAL MEDICINE

## 2022-05-17 PROCEDURE — 84484 ASSAY OF TROPONIN QUANT: CPT | Performed by: PHYSICIAN ASSISTANT

## 2022-05-17 PROCEDURE — U0002 COVID-19 LAB TEST NON-CDC: HCPCS | Performed by: EMERGENCY MEDICINE

## 2022-05-17 PROCEDURE — 3008F PR BODY MASS INDEX (BMI) DOCUMENTED: ICD-10-PCS | Mod: CPTII,S$GLB,, | Performed by: INTERNAL MEDICINE

## 2022-05-17 PROCEDURE — 63600175 PHARM REV CODE 636 W HCPCS

## 2022-05-17 PROCEDURE — 25000003 PHARM REV CODE 250: Performed by: PHYSICIAN ASSISTANT

## 2022-05-17 PROCEDURE — 93010 ELECTROCARDIOGRAM REPORT: CPT | Mod: ,,, | Performed by: INTERNAL MEDICINE

## 2022-05-17 PROCEDURE — 93005 ELECTROCARDIOGRAM TRACING: CPT

## 2022-05-17 PROCEDURE — 77067 SCR MAMMO BI INCL CAD: CPT | Mod: 26,,, | Performed by: RADIOLOGY

## 2022-05-17 PROCEDURE — 63600175 PHARM REV CODE 636 W HCPCS: Performed by: EMERGENCY MEDICINE

## 2022-05-17 PROCEDURE — 99205 PR OFFICE/OUTPT VISIT, NEW, LEVL V, 60-74 MIN: ICD-10-PCS | Mod: S$GLB,,, | Performed by: INTERNAL MEDICINE

## 2022-05-17 PROCEDURE — 82962 GLUCOSE BLOOD TEST: CPT

## 2022-05-17 PROCEDURE — 3046F PR MOST RECENT HEMOGLOBIN A1C LEVEL > 9.0%: ICD-10-PCS | Mod: CPTII,S$GLB,, | Performed by: INTERNAL MEDICINE

## 2022-05-17 PROCEDURE — 99499 UNLISTED E&M SERVICE: CPT | Mod: S$GLB,,, | Performed by: INTERNAL MEDICINE

## 2022-05-17 PROCEDURE — 99999 PR PBB SHADOW E&M-EST. PATIENT-LVL III: CPT | Mod: PBBFAC,,, | Performed by: INTERNAL MEDICINE

## 2022-05-17 PROCEDURE — 99999 PR PBB SHADOW E&M-EST. PATIENT-LVL III: ICD-10-PCS | Mod: PBBFAC,,, | Performed by: INTERNAL MEDICINE

## 2022-05-17 PROCEDURE — 77067 SCR MAMMO BI INCL CAD: CPT | Mod: TC

## 2022-05-17 PROCEDURE — 93010 EKG 12-LEAD: ICD-10-PCS | Mod: ,,, | Performed by: INTERNAL MEDICINE

## 2022-05-17 PROCEDURE — 83880 ASSAY OF NATRIURETIC PEPTIDE: CPT

## 2022-05-17 PROCEDURE — 3075F PR MOST RECENT SYSTOLIC BLOOD PRESS GE 130-139MM HG: ICD-10-PCS | Mod: CPTII,S$GLB,, | Performed by: INTERNAL MEDICINE

## 2022-05-17 PROCEDURE — 3075F SYST BP GE 130 - 139MM HG: CPT | Mod: CPTII,S$GLB,, | Performed by: INTERNAL MEDICINE

## 2022-05-17 PROCEDURE — 77063 MAMMO DIGITAL SCREENING BILAT WITH TOMO: ICD-10-PCS | Mod: 26,,, | Performed by: RADIOLOGY

## 2022-05-17 PROCEDURE — 80053 COMPREHEN METABOLIC PANEL: CPT

## 2022-05-17 PROCEDURE — 77067 MAMMO DIGITAL SCREENING BILAT WITH TOMO: ICD-10-PCS | Mod: 26,,, | Performed by: RADIOLOGY

## 2022-05-17 PROCEDURE — 3008F BODY MASS INDEX DOCD: CPT | Mod: CPTII,S$GLB,, | Performed by: INTERNAL MEDICINE

## 2022-05-17 PROCEDURE — 3078F PR MOST RECENT DIASTOLIC BLOOD PRESSURE < 80 MM HG: ICD-10-PCS | Mod: CPTII,S$GLB,, | Performed by: INTERNAL MEDICINE

## 2022-05-17 PROCEDURE — 3046F HEMOGLOBIN A1C LEVEL >9.0%: CPT | Mod: CPTII,S$GLB,, | Performed by: INTERNAL MEDICINE

## 2022-05-17 RX ORDER — FUROSEMIDE 40 MG/1
40 TABLET ORAL DAILY
Status: DISCONTINUED | OUTPATIENT
Start: 2022-05-18 | End: 2022-05-18 | Stop reason: HOSPADM

## 2022-05-17 RX ORDER — NALOXONE HCL 0.4 MG/ML
0.02 VIAL (ML) INJECTION
Status: DISCONTINUED | OUTPATIENT
Start: 2022-05-17 | End: 2022-05-18 | Stop reason: HOSPADM

## 2022-05-17 RX ORDER — NITROGLYCERIN 0.4 MG/1
0.4 TABLET SUBLINGUAL
Status: DISCONTINUED | OUTPATIENT
Start: 2022-05-17 | End: 2022-05-17

## 2022-05-17 RX ORDER — IBUPROFEN 200 MG
16 TABLET ORAL
Status: DISCONTINUED | OUTPATIENT
Start: 2022-05-17 | End: 2022-05-18 | Stop reason: HOSPADM

## 2022-05-17 RX ORDER — ATORVASTATIN CALCIUM 40 MG/1
80 TABLET, FILM COATED ORAL DAILY
Refills: 1 | Status: DISCONTINUED | OUTPATIENT
Start: 2022-05-18 | End: 2022-05-18 | Stop reason: HOSPADM

## 2022-05-17 RX ORDER — AMOXICILLIN 250 MG
1 CAPSULE ORAL DAILY PRN
Status: DISCONTINUED | OUTPATIENT
Start: 2022-05-17 | End: 2022-05-18 | Stop reason: HOSPADM

## 2022-05-17 RX ORDER — SODIUM CHLORIDE 0.9 % (FLUSH) 0.9 %
10 SYRINGE (ML) INJECTION EVERY 8 HOURS
Status: DISCONTINUED | OUTPATIENT
Start: 2022-05-17 | End: 2022-05-17

## 2022-05-17 RX ORDER — INSULIN ASPART 100 [IU]/ML
0-5 INJECTION, SOLUTION INTRAVENOUS; SUBCUTANEOUS
Status: DISCONTINUED | OUTPATIENT
Start: 2022-05-17 | End: 2022-05-18 | Stop reason: HOSPADM

## 2022-05-17 RX ORDER — MORPHINE SULFATE 4 MG/ML
4 INJECTION, SOLUTION INTRAMUSCULAR; INTRAVENOUS ONCE AS NEEDED
Status: DISCONTINUED | OUTPATIENT
Start: 2022-05-17 | End: 2022-05-18 | Stop reason: HOSPADM

## 2022-05-17 RX ORDER — ENOXAPARIN SODIUM 150 MG/ML
1 INJECTION SUBCUTANEOUS DAILY
Status: DISCONTINUED | OUTPATIENT
Start: 2022-05-18 | End: 2022-05-18 | Stop reason: HOSPADM

## 2022-05-17 RX ORDER — LANOLIN ALCOHOL/MO/W.PET/CERES
800 CREAM (GRAM) TOPICAL
Status: DISCONTINUED | OUTPATIENT
Start: 2022-05-17 | End: 2022-05-18 | Stop reason: HOSPADM

## 2022-05-17 RX ORDER — IBUPROFEN 200 MG
24 TABLET ORAL
Status: DISCONTINUED | OUTPATIENT
Start: 2022-05-17 | End: 2022-05-18 | Stop reason: HOSPADM

## 2022-05-17 RX ORDER — TALC
6 POWDER (GRAM) TOPICAL NIGHTLY PRN
Status: DISCONTINUED | OUTPATIENT
Start: 2022-05-17 | End: 2022-05-18 | Stop reason: HOSPADM

## 2022-05-17 RX ORDER — IBUPROFEN 200 MG
24 TABLET ORAL
Status: DISCONTINUED | OUTPATIENT
Start: 2022-05-17 | End: 2022-05-17

## 2022-05-17 RX ORDER — ENOXAPARIN SODIUM 150 MG/ML
1 INJECTION SUBCUTANEOUS
Status: COMPLETED | OUTPATIENT
Start: 2022-05-17 | End: 2022-05-17

## 2022-05-17 RX ORDER — SODIUM CHLORIDE 0.9 % (FLUSH) 0.9 %
10 SYRINGE (ML) INJECTION
Status: DISCONTINUED | OUTPATIENT
Start: 2022-05-17 | End: 2022-05-18 | Stop reason: HOSPADM

## 2022-05-17 RX ORDER — ASPIRIN 325 MG
325 TABLET, DELAYED RELEASE (ENTERIC COATED) ORAL
Status: COMPLETED | OUTPATIENT
Start: 2022-05-17 | End: 2022-05-17

## 2022-05-17 RX ORDER — INSULIN ASPART 100 [IU]/ML
8 INJECTION, SOLUTION INTRAVENOUS; SUBCUTANEOUS
Status: DISCONTINUED | OUTPATIENT
Start: 2022-05-18 | End: 2022-05-18 | Stop reason: HOSPADM

## 2022-05-17 RX ORDER — METOPROLOL SUCCINATE 50 MG/1
50 TABLET, EXTENDED RELEASE ORAL DAILY
Status: DISCONTINUED | OUTPATIENT
Start: 2022-05-18 | End: 2022-05-18 | Stop reason: HOSPADM

## 2022-05-17 RX ORDER — INSULIN ASPART 100 [IU]/ML
15 INJECTION, SOLUTION INTRAVENOUS; SUBCUTANEOUS
Status: DISCONTINUED | OUTPATIENT
Start: 2022-05-18 | End: 2022-05-17

## 2022-05-17 RX ORDER — SPIRONOLACTONE 25 MG/1
50 TABLET ORAL DAILY
Status: DISCONTINUED | OUTPATIENT
Start: 2022-05-18 | End: 2022-05-18 | Stop reason: HOSPADM

## 2022-05-17 RX ORDER — ACETAMINOPHEN 325 MG/1
650 TABLET ORAL EVERY 4 HOURS PRN
Status: DISCONTINUED | OUTPATIENT
Start: 2022-05-17 | End: 2022-05-18 | Stop reason: HOSPADM

## 2022-05-17 RX ORDER — MORPHINE SULFATE 4 MG/ML
4 INJECTION, SOLUTION INTRAMUSCULAR; INTRAVENOUS
Status: COMPLETED | OUTPATIENT
Start: 2022-05-17 | End: 2022-05-17

## 2022-05-17 RX ORDER — GLUCAGON 1 MG
1 KIT INJECTION
Status: DISCONTINUED | OUTPATIENT
Start: 2022-05-17 | End: 2022-05-18 | Stop reason: HOSPADM

## 2022-05-17 RX ADMIN — NITROGLYCERIN 0.5 INCH: 20 OINTMENT TOPICAL at 07:05

## 2022-05-17 RX ADMIN — ASPIRIN 325 MG: 325 TABLET, COATED ORAL at 05:05

## 2022-05-17 RX ADMIN — Medication 10 ML: at 09:05

## 2022-05-17 RX ADMIN — MORPHINE SULFATE 4 MG: 4 INJECTION INTRAVENOUS at 06:05

## 2022-05-17 RX ADMIN — SODIUM CHLORIDE 1000 ML: 0.9 INJECTION, SOLUTION INTRAVENOUS at 07:05

## 2022-05-17 RX ADMIN — ENOXAPARIN SODIUM 120 MG: 120 INJECTION SUBCUTANEOUS at 05:05

## 2022-05-17 NOTE — ED PROVIDER NOTES
"Encounter Date: 5/17/2022       History     Chief Complaint   Patient presents with    Chest Pain     Pt reporting chest pain that began last night. Pt reporting left sided chest pain that feels like tightness. Pt denies nausea, vomiting, and SOB. Pt states, "they just told me I have a blood clot on the left side of my heart". Pt has a hx of CHF and DM.      Patient is a 46-year-old female with history of a head, and tracheal vomited recently started on IM Lovenox, CHF with an ejection fraction of 20 %, diabetes presented emergency department chest pain.  Patient reports that chest pain in a splint.  She has been consistent and worsening.  She initially had a feeling gas and discomfort handed states that the pain has transformed into a chest tightness.  Patient reports that on the 13 to recent transition from Eliquis to IM Lovenox.  Patient reports her a active chest pressure and pain.  Denies shortness of breath, nausea, vomiting, fevers and cough or urinary symptoms.  She from taking a headache patient had home and denies any aggravating or alleviating factors.        Review of patient's allergies indicates:   Allergen Reactions    Metformin      Diarrhea on metformin XR     Pneumococcal 23-abimbola ps vaccine      Past Medical History:   Diagnosis Date    Atrial fibrillation     Blood clot associated with vein wall inflammation     not dvt    Cardiomyopathy     Normal cors on cath 11/2017    CHF (congestive heart failure)     DM (diabetes mellitus) 9/19/2013    Hyperlipidemia     Hypertension     Psoriasis     Sleep apnea      Past Surgical History:   Procedure Laterality Date    CARDIAC CATHETERIZATION      COLONOSCOPY      COLONOSCOPY N/A 3/9/2022    Procedure: COLONOSCOPY;  Surgeon: Vielka Burrell MD;  Location: [facility];  Service: Endoscopy;  Laterality: N/A;    DILATION AND CURETTAGE OF UTERUS      ESOPHAGOGASTRODUODENOSCOPY N/A 5/9/2019    Procedure: EGD (ESOPHAGOGASTRODUODENOSCOPY);  " Surgeon: Vielka Burrell MD;  Location: [facility] ENDO;  Service: Endoscopy;  Laterality: N/A;    TRANSFORAMINAL EPIDURAL INJECTION OF STEROID N/A 1/6/2022    Procedure: Transforaminal ANKITA L4/L5 L5/S1;  Surgeon: Jed Yeager MD;  Location: [facility]GT;  Service: Pain Management;  Laterality: N/A;     Family History   Problem Relation Age of Onset    Hypertension Mother     Hypertension Father     Diabetes Father     Diabetes Maternal Grandmother     Diabetes Paternal Grandmother     Breast cancer Neg Hx     Colon cancer Neg Hx     Ovarian cancer Neg Hx      Social History     Tobacco Use    Smoking status: Never Smoker    Smokeless tobacco: Never Used    Tobacco comment: smokes cigars on occasion   Substance Use Topics    Alcohol use: Not Currently     Comment: occasional    Drug use: Not Currently     Types: Marijuana     Comment: occ     Review of Systems   Constitutional: Negative for fever.   HENT: Negative for sore throat.    Respiratory: Positive for chest tightness. Negative for shortness of breath.    Cardiovascular: Positive for chest pain.   Gastrointestinal: Negative for nausea.   Genitourinary: Negative for dysuria.   Musculoskeletal: Negative for back pain.   Skin: Negative for rash.   Neurological: Negative for weakness.   Hematological: Does not bruise/bleed easily.       Physical Exam     Initial Vitals [05/17/22 1557]   BP Pulse Resp Temp SpO2   126/71 88 18 98.2 °F (36.8 °C) 95 %      MAP       --         Physical Exam    Nursing note and vitals reviewed.  Constitutional: She appears well-developed and well-nourished.   HENT:   Head: Normocephalic and atraumatic.   Eyes: EOM are normal. Pupils are equal, round, and reactive to light.   Neck: Neck supple. No JVD present.   Normal range of motion.  Cardiovascular: Normal rate, regular rhythm, normal heart sounds and intact distal pulses.   Pulmonary/Chest: Breath sounds normal. She exhibits no tenderness.   Abdominal: Abdomen is  soft. Bowel sounds are normal. She exhibits no distension.   Musculoskeletal:         General: No tenderness. Normal range of motion.      Cervical back: Normal range of motion and neck supple.     Lymphadenopathy:     She has no cervical adenopathy.   Neurological: She is alert and oriented to person, place, and time. She has normal strength and normal reflexes. GCS score is 15. GCS eye subscore is 4. GCS verbal subscore is 5. GCS motor subscore is 6.   Skin: Skin is warm and dry. Capillary refill takes less than 2 seconds.         ED Course   Procedures  Labs Reviewed   CBC W/ AUTO DIFFERENTIAL - Abnormal; Notable for the following components:       Result Value    MCHC 31.5 (*)     RDW 15.1 (*)     Immature Grans (Abs) 0.05 (*)     All other components within normal limits   COMPREHENSIVE METABOLIC PANEL   TROPONIN I   B-TYPE NATRIURETIC PEPTIDE     EKG Readings: (Independently Interpreted)   Initial Reading: No STEMI. Previous EKG: Compared with most recent EKG Rhythm: Normal Sinus Rhythm.   Isolated T-wave inversions noted in lead III seen on prior EKG       Imaging Results          X-Ray Chest AP Portable (Final result)  Result time 05/17/22 17:33:04    Final result by Rober Gonsales MD (05/17/22 17:33:04)                 Impression:      As above.      Electronically signed by: Rober Gonsales MD  Date:    05/17/2022  Time:    17:33             Narrative:    EXAMINATION:  XR CHEST AP PORTABLE    CLINICAL HISTORY:  Other complications following infusion, transfusion and therapeutic injection, initial encounter    TECHNIQUE:  Single frontal view of the chest was performed.    COMPARISON:  04/29/2022.    FINDINGS:  Cardiac silhouette is enlarged but stable in size.  Left-sided pacer device is seen.  Lungs are expanded with mild elevation of the right hemidiaphragm seen.  There is prominence of central pulmonary vasculature with mild perihilar opacity which could reflect possible central pulmonary vascular  congestion and mild edema.  Otherwise no evidence of focal consolidative process, pneumothorax, or significant pleural effusion.                                 Medications   nitroGLYCERIN SL tablet 0.4 mg (0.4 mg Sublingual Not Given 5/17/22 1715)   enoxaparin injection 120 mg (120 mg Subcutaneous Given 5/17/22 1742)   aspirin EC tablet 325 mg (325 mg Oral Given 5/17/22 1732)     Medical Decision Making:   Initial Assessment:   45 yo female who presented to the emergency department for chest pain for the last 12 hours.  Patient is alert oriented in no acute distress.  Patient is afebrile with vitals within normal limits.  No focal neurologic deficits  Differential Diagnosis:   NSTEMI  STEMI  Doubt pulmonary embolism  Clinical Tests:   Lab Tests: Ordered  Radiological Study: Ordered  Medical Tests: Ordered  ED Management:  EKG obtained with no ST elevations.  Labs ordered to evaluate troponin and BNP given previous cardiac history and risk factors.  The pain CMP obtained to evaluate for pneumonia, leukocytosis, metabolic derangement.  All labs and imaging pending at this time.  Patient care continued by attending physician Dr. Rock.              ED Course as of 05/17/22 1803   Tue May 17, 2022   1741 WBC: 9.39  Relatively unremarkable CBC [NO]      ED Course User Index  [NO] Alexandria Rock MD             Clinical Impression:   Final diagnoses:  [R07.9] Chest pain  [T80.89XA, R07.9, Y84.8] Chest pain as manifestation of blood transfusion reaction                 Porter Mullen MD  Resident  05/17/22 1803

## 2022-05-17 NOTE — ED TRIAGE NOTES
"Pt to the ED with complaints of constant left anterior chest pain that radiates to under her left breast since last night, described as pressure/tightness/squeezing. Pt denies shortness of breath, N/V/D, weakness in extremities, headache. Pt reports hx of congestive heart failure. Pt states she was in the hospital two weeks ago for dehydration and was told she has a "blood clot in the bottom of her heart".   "

## 2022-05-17 NOTE — PROGRESS NOTES
Subjective:     HPI:  Ms. Chanel is a 46 y.o. year old black female with stage C HFrEF (EF=20%, LVEDD=6.5 cm), NICMP, type DM (uncontrolled), obesity, MILE who is referred by our colleague Dr. Yanez for evaluation for advanced HF options. Clinically reports NYHA class II symptoms. No PND or orthopnea. She has not had any HF admissions in the last 3 years. Most recent hospital admission was 2 weeks ago for Miya (nausea and vomiting. Per patient  thought to be related to Ozempic). Her current HF regimen  includes; Metoprolol succinate 50 mg daily and aldactone 50 mg daily. Of note she was on Entresto in the past but that was discontinued due to worsening creatinine (baseline 1.8-1.9 but with recent MIYA now her creat~3.0). She is warm on exam and her BP today is 137/71 mm of Hg. She has an ICD and last ICD shock was in 2020.     2D Echo with done on 4/30/2022  · The left ventricle is moderately enlarged with eccentric hypertrophy and severely decreased systolic function.  · The estimated ejection fraction is 20%.  · There is severe left ventricular global hypokinesis.  · There appears to be a thrombus along the apical lateral wall of the left ventricle.  · Grade I left ventricular diastolic dysfunction.  · Normal right ventricular size with mildly reduced right ventricular systolic function.  · The estimated PA systolic pressure is 26 mmHg.  · Normal central venous pressure (3 mmHg).         Past Medical History:   Diagnosis Date    Atrial fibrillation     Blood clot associated with vein wall inflammation     not dvt    Cardiomyopathy     Normal cors on cath 11/2017    CHF (congestive heart failure)     DM (diabetes mellitus) 9/19/2013    Hyperlipidemia     Hypertension     Psoriasis     Sleep apnea      Past Surgical History:   Procedure Laterality Date    CARDIAC CATHETERIZATION      COLONOSCOPY      COLONOSCOPY N/A 3/9/2022    Procedure: COLONOSCOPY;  Surgeon: Vielka Burrell MD;  Location: [facility]  "ENDO;  Service: Endoscopy;  Laterality: N/A;    DILATION AND CURETTAGE OF UTERUS      ESOPHAGOGASTRODUODENOSCOPY N/A 2019    Procedure: EGD (ESOPHAGOGASTRODUODENOSCOPY);  Surgeon: Vielka Burrell MD;  Location: [facility] ENDO;  Service: Endoscopy;  Laterality: N/A;    TRANSFORAMINAL EPIDURAL INJECTION OF STEROID N/A 2022    Procedure: Transforaminal ANKITA L4/L5 L5/S1;  Surgeon: Jed Yeager MD;  Location: [facility];  Service: Pain Management;  Laterality: N/A;     OB History        0    Para        Term   0            AB        Living           SAB        IAB        Ectopic        Multiple        Live Births                   Review of Systems   Constitutional: Negative. Negative for chills, decreased appetite, diaphoresis, fever, malaise/fatigue, night sweats, weight gain and weight loss.   Eyes: Negative.    Cardiovascular: Positive for dyspnea on exertion. Negative for chest pain, claudication, cyanosis, irregular heartbeat, leg swelling, near-syncope, orthopnea, palpitations, paroxysmal nocturnal dyspnea and syncope.   Respiratory: Negative for cough, hemoptysis and shortness of breath.    Endocrine: Negative.    Hematologic/Lymphatic: Negative.    Skin: Negative for color change, dry skin and nail changes.   Musculoskeletal: Negative.    Gastrointestinal: Negative.    Genitourinary: Negative.    Neurological: Negative for weakness.       Objective:   Blood pressure 137/71, pulse 73, height 5' 5" (1.651 m), weight 115 kg (253 lb 8.5 oz).body mass index is 42.19 kg/m².  Physical Exam  Vitals and nursing note reviewed.   Constitutional:       Appearance: She is well-developed.   HENT:      Head: Normocephalic.   Eyes:      Pupils: Pupils are equal, round, and reactive to light.   Neck:      Vascular: No JVD.   Cardiovascular:      Rate and Rhythm: Normal rate and regular rhythm.      Chest Wall: PMI is displaced.      Pulses: Intact distal pulses.      Heart sounds: Normal heart " sounds. No murmur heard.    No friction rub. No gallop.   Pulmonary:      Effort: Pulmonary effort is normal.      Breath sounds: Normal breath sounds. No wheezing or rales.   Abdominal:      General: Bowel sounds are normal.      Palpations: Abdomen is soft.   Musculoskeletal:      Cervical back: Neck supple.   Neurological:      Mental Status: She is alert and oriented to person, place, and time.         Labs:    Chemistry        Component Value Date/Time     05/02/2022 0246    K 4.4 05/02/2022 0246     05/02/2022 0246    CO2 21 (L) 05/02/2022 0246    BUN 32 (H) 05/02/2022 0246    CREATININE 3.0 (H) 05/02/2022 0246     (H) 05/02/2022 0246        Component Value Date/Time    CALCIUM 9.7 05/02/2022 0246    ALKPHOS 100 05/02/2022 0246    AST 17 05/02/2022 0246    ALT 19 05/02/2022 0246    BILITOT 0.5 05/02/2022 0246    ESTGFRAFRICA 20.8 (A) 05/02/2022 0246    EGFRNONAA 18.1 (A) 05/02/2022 0246          Magnesium   Date Value Ref Range Status   04/27/2022 2.7 (H) 1.6 - 2.6 mg/dL Final     Lab Results   Component Value Date    WBC 8.85 05/02/2022    HGB 12.7 05/02/2022    HCT 41.1 05/02/2022     05/02/2022     Lab Results   Component Value Date    INR 1.0 02/26/2021    INR 0.9 11/04/2020    INR 1.0 12/27/2018     BNP   Date Value Ref Range Status   04/29/2022 17 0 - 99 pg/mL Final     Comment:     Values of less than 100 pg/ml are consistent with non-CHF populations.   04/27/2022 11 0 - 99 pg/mL Final     Comment:     Values of less than 100 pg/ml are consistent with non-CHF populations.   04/24/2022 15 0 - 99 pg/mL Final     Comment:     Values of less than 100 pg/ml are consistent with non-CHF populations.     LD   Date Value Ref Range Status   04/28/2021 276 (H) 110 - 260 U/L Final     Comment:     Results are increased in hemolyzed samples.       Assessment:      1. Chronic combined systolic and diastolic heart failure    2. NICM (nonischemic cardiomyopathy)    3. Implantable  cardioverter-defibrillator (ICD) in situ    4. Primary hypertension    5. Type 2 diabetes mellitus with stage 4 chronic kidney disease, with long-term current use of insulin    6. CKD (chronic kidney disease), stage IV    7. MILE treated with BiPAP    8. Paroxysmal atrial fibrillation        Plan:   In summary, Ms. Jung is a very pleasant 45 yo black female with stage C HFrEF (EF=20%, LVEDD=6.5 cm), NICMP, type DM (uncontrolled), obesity BMI=41) and MILE who is referred for evaluation for advanced HF options.   In my opinion in view of her current symptoms (NYHA class II-III symptoms) and no HF admissions she does not need advanced HF options at this time. I am concerned however about her poorly controlled DM (HGBA1C= 10) and her morbid obesity (BMI=41) and current kidney function (Creat~3.0, GFR=20) as these would preclude OHTx or LVAD should she need that in the future.   I will put an urgent referral to Nephrology (her CKD is likely related to poorly controlled type 2 DM)  Her DM is managed by her pCP who recently increased her insulin dose.   In view of her current low GFR an inability to use an ARNI, recommend to consider a combination of hydralazine and isosorbide dinitrate.   Continue current dose of metoprolol   She has an LV thrombus. She was on a NOAC prior but this was swtiched to Lovenox during her last admission? It is unclear to me why the choice of lovenox over a NOAC.    Recommend 2 gram sodium restriction and 1500cc fluid restriction.  Encourage physical activity with graded exercise program.  Requested patient to weigh themselves daily, and to notify us if their weight increases by more than 3 lbs in 1 day or 5 lbs in 1 week.       Transplant candidacy:  Patient is a 46 y.o. year old female with heart failure is being seen for possible LVAD and OHT. she is scheduled for medical management only. The patient will follow up with referring provider.    Thank you for allowing me to participate in  the care of this very pleasant patient. Do not hesitate to contact me should you have any questions.     Venecia Blandon MD

## 2022-05-18 VITALS
BODY MASS INDEX: 42.61 KG/M2 | SYSTOLIC BLOOD PRESSURE: 112 MMHG | TEMPERATURE: 98 F | WEIGHT: 255.75 LBS | RESPIRATION RATE: 20 BRPM | HEIGHT: 65 IN | HEART RATE: 72 BPM | OXYGEN SATURATION: 96 % | DIASTOLIC BLOOD PRESSURE: 72 MMHG

## 2022-05-18 DIAGNOSIS — E11.9 TYPE 2 DIABETES MELLITUS WITHOUT COMPLICATION: ICD-10-CM

## 2022-05-18 LAB
ALBUMIN SERPL BCP-MCNC: 2.9 G/DL (ref 3.5–5.2)
ALP SERPL-CCNC: 74 U/L (ref 55–135)
ALT SERPL W/O P-5'-P-CCNC: 25 U/L (ref 10–44)
ANION GAP SERPL CALC-SCNC: 7 MMOL/L (ref 8–16)
AST SERPL-CCNC: 15 U/L (ref 10–40)
BASOPHILS # BLD AUTO: 0.04 K/UL (ref 0–0.2)
BASOPHILS NFR BLD: 0.4 % (ref 0–1.9)
BILIRUB SERPL-MCNC: 0.5 MG/DL (ref 0.1–1)
BUN SERPL-MCNC: 18 MG/DL (ref 6–20)
CALCIUM SERPL-MCNC: 8.6 MG/DL (ref 8.7–10.5)
CHLORIDE SERPL-SCNC: 103 MMOL/L (ref 95–110)
CO2 SERPL-SCNC: 30 MMOL/L (ref 23–29)
CREAT SERPL-MCNC: 1.9 MG/DL (ref 0.5–1.4)
DIFFERENTIAL METHOD: ABNORMAL
EOSINOPHIL # BLD AUTO: 0.2 K/UL (ref 0–0.5)
EOSINOPHIL NFR BLD: 2.1 % (ref 0–8)
ERYTHROCYTE [DISTWIDTH] IN BLOOD BY AUTOMATED COUNT: 15.2 % (ref 11.5–14.5)
EST. GFR  (AFRICAN AMERICAN): 36 ML/MIN/1.73 M^2
EST. GFR  (NON AFRICAN AMERICAN): 31 ML/MIN/1.73 M^2
GLUCOSE SERPL-MCNC: 160 MG/DL (ref 70–110)
HCT VFR BLD AUTO: 38.5 % (ref 37–48.5)
HGB BLD-MCNC: 11.5 G/DL (ref 12–16)
IMM GRANULOCYTES # BLD AUTO: 0.06 K/UL (ref 0–0.04)
IMM GRANULOCYTES NFR BLD AUTO: 0.6 % (ref 0–0.5)
LYMPHOCYTES # BLD AUTO: 2.4 K/UL (ref 1–4.8)
LYMPHOCYTES NFR BLD: 25.3 % (ref 18–48)
MCH RBC QN AUTO: 28.3 PG (ref 27–31)
MCHC RBC AUTO-ENTMCNC: 29.9 G/DL (ref 32–36)
MCV RBC AUTO: 95 FL (ref 82–98)
MONOCYTES # BLD AUTO: 0.7 K/UL (ref 0.3–1)
MONOCYTES NFR BLD: 7.2 % (ref 4–15)
NEUTROPHILS # BLD AUTO: 6.2 K/UL (ref 1.8–7.7)
NEUTROPHILS NFR BLD: 64.4 % (ref 38–73)
NRBC BLD-RTO: 0 /100 WBC
PLATELET # BLD AUTO: 238 K/UL (ref 150–450)
PMV BLD AUTO: 10.3 FL (ref 9.2–12.9)
POCT GLUCOSE: 146 MG/DL (ref 70–110)
POCT GLUCOSE: 167 MG/DL (ref 70–110)
POTASSIUM SERPL-SCNC: 3.6 MMOL/L (ref 3.5–5.1)
PROT SERPL-MCNC: 5.9 G/DL (ref 6–8.4)
RBC # BLD AUTO: 4.06 M/UL (ref 4–5.4)
SODIUM SERPL-SCNC: 140 MMOL/L (ref 136–145)
TROPONIN I SERPL DL<=0.01 NG/ML-MCNC: 0.02 NG/ML (ref 0–0.03)
WBC # BLD AUTO: 9.62 K/UL (ref 3.9–12.7)

## 2022-05-18 PROCEDURE — 99220 PR INITIAL OBSERVATION CARE,LEVL III: CPT | Mod: ,,, | Performed by: INTERNAL MEDICINE

## 2022-05-18 PROCEDURE — 85025 COMPLETE CBC W/AUTO DIFF WBC: CPT | Performed by: PHYSICIAN ASSISTANT

## 2022-05-18 PROCEDURE — 36415 COLL VENOUS BLD VENIPUNCTURE: CPT | Performed by: PHYSICIAN ASSISTANT

## 2022-05-18 PROCEDURE — 99220 PR INITIAL OBSERVATION CARE,LEVL III: ICD-10-PCS | Mod: ,,, | Performed by: INTERNAL MEDICINE

## 2022-05-18 PROCEDURE — G0378 HOSPITAL OBSERVATION PER HR: HCPCS

## 2022-05-18 PROCEDURE — 84484 ASSAY OF TROPONIN QUANT: CPT | Performed by: PHYSICIAN ASSISTANT

## 2022-05-18 PROCEDURE — 80053 COMPREHEN METABOLIC PANEL: CPT | Performed by: PHYSICIAN ASSISTANT

## 2022-05-18 PROCEDURE — 25000003 PHARM REV CODE 250: Performed by: PHYSICIAN ASSISTANT

## 2022-05-18 RX ADMIN — SPIRONOLACTONE 50 MG: 25 TABLET ORAL at 08:05

## 2022-05-18 RX ADMIN — ATORVASTATIN CALCIUM 80 MG: 40 TABLET, FILM COATED ORAL at 08:05

## 2022-05-18 RX ADMIN — FUROSEMIDE 40 MG: 40 TABLET ORAL at 08:05

## 2022-05-18 RX ADMIN — METOPROLOL SUCCINATE 50 MG: 50 TABLET, EXTENDED RELEASE ORAL at 08:05

## 2022-05-18 NOTE — ASSESSMENT & PLAN NOTE
Echo 4/2022 with EF of 20%. Seen today by heart transplant recommending continued medical management  Continue home metoprolol, lasix, sprinolactone  Daily weights/strict intake and output/telemetry

## 2022-05-18 NOTE — SUBJECTIVE & OBJECTIVE
Past Medical History:   Diagnosis Date    Atrial fibrillation     Blood clot associated with vein wall inflammation     not dvt    Cardiomyopathy     Normal cors on cath 11/2017    CHF (congestive heart failure)     DM (diabetes mellitus) 9/19/2013    Hyperlipidemia     Hypertension     Psoriasis     Sleep apnea        Past Surgical History:   Procedure Laterality Date    CARDIAC CATHETERIZATION      COLONOSCOPY      COLONOSCOPY N/A 3/9/2022    Procedure: COLONOSCOPY;  Surgeon: Vielka Burrell MD;  Location: [facility] ENDO;  Service: Endoscopy;  Laterality: N/A;    DILATION AND CURETTAGE OF UTERUS      ESOPHAGOGASTRODUODENOSCOPY N/A 5/9/2019    Procedure: EGD (ESOPHAGOGASTRODUODENOSCOPY);  Surgeon: Vielka Burrell MD;  Location: [facility] ENDO;  Service: Endoscopy;  Laterality: N/A;    TRANSFORAMINAL EPIDURAL INJECTION OF STEROID N/A 1/6/2022    Procedure: Transforaminal ANKITA L4/L5 L5/S1;  Surgeon: Jed Yeager MD;  Location: [facility] MGT;  Service: Pain Management;  Laterality: N/A;       Review of patient's allergies indicates:   Allergen Reactions    Metformin      Diarrhea on metformin XR     Pneumococcal 23-abimbola ps vaccine        No current facility-administered medications on file prior to encounter.     Current Outpatient Medications on File Prior to Encounter   Medication Sig    baclofen (LIORESAL) 10 MG tablet Take 1 tablet (10 mg total) by mouth 2 (two) times daily.    blood-glucose meter,continuous (DEXCOM G6 ) Misc Use with dexcom G6 system    blood-glucose sensor (DEXCOM G6 SENSOR) Lupe Change sensor every 10 days    blood-glucose transmitter (DEXCOM G6 TRANSMITTER) Lupe Change every 3 months    enoxaparin (LOVENOX) 120 mg/0.8 mL Syrg Inject 0.8 mLs (120 mg total) into the skin twice daily    fluticasone propionate (FLONASE) 50 mcg/actuation nasal spray 1 spray (50 mcg total) by Each Nostril route 2 (two) times daily as needed for Rhinitis.    furosemide (LASIX) 40 MG tablet TAKE 1 TABLET  "BY MOUTH ONCE DAILY *PLEASE  HOLD  WHILE  NOT  DRINKING*    gabapentin (NEURONTIN) 400 MG capsule Take 1 capsule (400 mg total) by mouth every evening.    insulin degludec (TRESIBA FLEXTOUCH U-200) 200 unit/mL (3 mL) insulin pen Inject 54 Units into the skin 2 (two) times a day.    insulin lispro (HUMALOG KWIKPEN INSULIN) 100 unit/mL pen Inject 40 Units into the skin 3 (three) times daily before meals.    metoprolol succinate (TOPROL-XL) 50 MG 24 hr tablet Take 1 tablet (50 mg total) by mouth once daily. Hold SBP <120    rosuvastatin (CRESTOR) 40 MG Tab Take 1 tablet (40 mg total) by mouth every evening.    spironolactone (ALDACTONE) 50 MG tablet Take 1 tablet (50 mg total) by mouth once daily. Hold until eating and drinking improves. Need to weight self daily    albuterol (VENTOLIN HFA) 90 mcg/actuation inhaler Inhale 2 puffs into the lungs every 6 (six) hours as needed for Wheezing or Shortness of Breath. Rescue    blood glucose control, high Soln 1 each by Misc.(Non-Drug; Combo Route) route once. for 1 dose    blood glucose control, low Soln 1 each by Misc.(Non-Drug; Combo Route) route once. for 1 dose    BLOOD PRESSURE CUFF Misc 1 kit by Misc.(Non-Drug; Combo Route) route 2 (two) times daily.    blood sugar diagnostic Strp Check blood glucose 3x/day.    blood-glucose meter (TRUE METRIX AIR GLUCOSE METER) Misc 1 each by Misc.(Non-Drug; Combo Route) route 3 (three) times daily.    mupirocin (BACTROBAN) 2 % ointment Apply topically nightly.    pen needle, diabetic (BD LORI 2ND GEN PEN NEEDLE) 32 gauge x 5/32" Ndle USE 1 PEN NEEDLE 4 TIMES DAILY    triamcinolone acetonide 0.1% (KENALOG) 0.1 % cream 2 (two) times daily. Apply to affected area    [DISCONTINUED] cetirizine (ZYRTEC) 10 MG tablet Take 1 tablet (10 mg total) by mouth once daily.    [DISCONTINUED] GAVILYTE-C 240-22.72-6.72 -5.84 gram SolR DRINK 1/2 ON 3/8/22, THEN DRINK 1/2 ON 3/9/22 AS DIRECTED    [DISCONTINUED] medroxyPROGESTERone (PROVERA) 10 MG tablet " Take 10 mg by mouth once daily.    [DISCONTINUED] oxyCODONE-acetaminophen (PERCOCET) 5-325 mg per tablet Take 1 tablet by mouth every 4 (four) hours as needed.     Family History       Problem Relation (Age of Onset)    Diabetes Father, Maternal Grandmother, Paternal Grandmother    Hypertension Mother, Father          Tobacco Use    Smoking status: Never Smoker    Smokeless tobacco: Never Used    Tobacco comment: smokes cigars on occasion   Substance and Sexual Activity    Alcohol use: Not Currently     Comment: occasional    Drug use: Not Currently     Types: Marijuana     Comment: occ    Sexual activity: Yes     Partners: Male     Review of Systems   Constitutional:  Negative for chills and fever.   HENT:  Negative for nosebleeds and tinnitus.    Eyes:  Negative for photophobia and visual disturbance.   Respiratory:  Negative for shortness of breath and wheezing.    Cardiovascular:  Positive for chest pain. Negative for palpitations and leg swelling.   Gastrointestinal:  Negative for abdominal distention, nausea and vomiting.   Genitourinary:  Negative for dysuria, flank pain and hematuria.   Musculoskeletal:  Negative for gait problem and joint swelling.   Skin:  Negative for rash and wound.   Neurological:  Negative for seizures and syncope.   Objective:     Vital Signs (Most Recent):  Temp: 97.9 °F (36.6 °C) (05/17/22 2054)  Pulse: 85 (05/17/22 2054)  Resp: 20 (05/17/22 2054)  BP: 138/65 (05/17/22 2054)  SpO2: 95 % (05/17/22 2054) Vital Signs (24h Range):  Temp:  [97.9 °F (36.6 °C)-98.2 °F (36.8 °C)] 97.9 °F (36.6 °C)  Pulse:  [73-88] 85  Resp:  [18-22] 20  SpO2:  [95 %-99 %] 95 %  BP: (108-138)/(64-80) 138/65     Weight: 116 kg (255 lb 11.7 oz)  Body mass index is 42.56 kg/m².    Physical Exam  Vitals and nursing note reviewed.   Constitutional:       General: She is not in acute distress.     Appearance: She is well-developed. She is not diaphoretic.   HENT:      Head: Normocephalic and atraumatic.      Right  Ear: External ear normal.      Left Ear: External ear normal.   Eyes:      General:         Right eye: No discharge.         Left eye: No discharge.      Conjunctiva/sclera: Conjunctivae normal.   Neck:      Thyroid: No thyromegaly.   Cardiovascular:      Rate and Rhythm: Normal rate and regular rhythm.      Heart sounds: No murmur heard.  Pulmonary:      Effort: Pulmonary effort is normal. No respiratory distress.      Breath sounds: Normal breath sounds.   Abdominal:      General: Bowel sounds are normal. There is no distension.      Palpations: Abdomen is soft. There is no mass.      Tenderness: There is no abdominal tenderness.   Musculoskeletal:         General: No deformity.      Cervical back: Normal range of motion and neck supple.      Right lower leg: No edema.      Left lower leg: No edema.   Skin:     General: Skin is warm and dry.   Neurological:      Mental Status: She is alert and oriented to person, place, and time.      Sensory: No sensory deficit.   Psychiatric:         Mood and Affect: Mood normal.         Behavior: Behavior normal.           Significant Labs: CBC:   Recent Labs   Lab 05/17/22  1727   WBC 9.39   HGB 12.9   HCT 41.0        CMP:   Recent Labs   Lab 05/17/22  1827      K 3.2*   CL 97   CO2 32*   *   BUN 21*   CREATININE 2.1*   CALCIUM 9.6   PROT 7.4   ALBUMIN 3.4*   BILITOT 0.7   ALKPHOS 84   AST 19   ALT 35   ANIONGAP 9   EGFRNONAA 28*     Cardiac Markers:   Recent Labs   Lab 05/17/22  1727   BNP 11     Troponin:   Recent Labs   Lab 05/17/22  1727 05/17/22  2116   TROPONINI 0.026 0.021       Significant Imaging:   Imaging Results              X-Ray Chest AP Portable (Final result)  Result time 05/17/22 17:33:04      Final result by Rober Gonsales MD (05/17/22 17:33:04)                   Impression:      As above.      Electronically signed by: Rober Gonsales MD  Date:    05/17/2022  Time:    17:33               Narrative:    EXAMINATION:  XR CHEST AP  PORTABLE    CLINICAL HISTORY:  Other complications following infusion, transfusion and therapeutic injection, initial encounter    TECHNIQUE:  Single frontal view of the chest was performed.    COMPARISON:  04/29/2022.    FINDINGS:  Cardiac silhouette is enlarged but stable in size.  Left-sided pacer device is seen.  Lungs are expanded with mild elevation of the right hemidiaphragm seen.  There is prominence of central pulmonary vasculature with mild perihilar opacity which could reflect possible central pulmonary vascular congestion and mild edema.  Otherwise no evidence of focal consolidative process, pneumothorax, or significant pleural effusion.

## 2022-05-18 NOTE — DISCHARGE SUMMARY
[facility] Medicine  Discharge Summary      Patient Name: Fabiana Chanel  MRN: [id number]  Patient Class: OP- Observation  Admission Date: 5/17/2022  Hospital Length of Stay: 0 days  Discharge Date and Time: 5/18/2022  2:01 PM  Attending Physician: Nancy att. providers found   Discharging Provider: Jaquelin Norris DO  Primary Care Provider: Gil Leal MD      HPI:   Fabiana Chanel 46 y.o. female with CHF, CKD, HTN, DM, history of left mural thrombus, Afib presents to the hospital chief complaint chest pain.  She reports beginning yesterday she began to experience intermittent left-sided chest pain under her left breast described as a tightness.  She has not attempted any treatment at home for this.  She denies any aggravating or alleviating factors.  She initially attributed this to gas.  She was seen day by heart transplant in clinic.  She reports she is compliant with her home fluid restriction and diuretic.  She denies fever nausea vomiting abdominal pain leg swelling syncope dizziness dysuria melena hematuria hematemesis and shortness of breath.    In the ED, creatinine 2.1 troponin negative EKG without ST elevation, chest x-ray with mild pulmonary vascular congestion, COVID negative.      * No surgery found *      Hospital Course:   46 y.o. female with CHF, CKD, HTN, DM, history of left mural thrombus, Afib admitted to observation on 05/17/2022 for evaluation of chest pain.  Initial and subsequent troponin negative.  EKG without ST elevation or acute ischemic changes. CXR with possible central pulmonary vascular congestion and mild edema. Cardiology consulted. Patient with known NICM and has no plan to repeat ischemic evaluation. Recommends follow up with Dr. Yanez.     Patient is feeling better overall.  No longer has chest pain.  States she is frustrated with her health and her outside providers and states she is trying to get better overall. Pt denies any fever, headaches,  vision changes, chest pain, shortness of breath, palpitations, abdominal pain, nausea, vomiting, or any new weaknesses. Patient's exam on discharge was as follow: Patient is alert and oriented, appears in no acute distress, heart with regular rate and rhythm, lungs clear to asculation with non-labored breathing, abdomen soft and non tender, and no new weaknesses or focal deficits seen.  Bilateral lower extremity without any edema or calf tenderness.    Patient was counseled regarding any abnormal labs, differential diagnosis, treatment options, risk-benefit, lifestyle changes, prognosis, current condition, and medications. Patient was interactive and attentive.  Patient's questions were answered in a respectful and timely manner. Patient was instructed to follow-up with PCP within 1 week and to continue taking medications as prescribed.  Instructed to follow up with her cardiologist, Dr. Yanez as well. Also, extensively discussed the risks, benefits, and side effects of patient's medications. Discussed with patient about any medication changes. Patient verbalized understanding and agrees to treatment plan.  Patient is stable for discharge.  Patient has no other questions or concerns at this time.  ED precautions discussed with the patient.    Vital signs are stable. Ambulating without any difficulty. Tolerating p.o. intake without any nausea or vomiting. Afebrile for over 24 hours. Patient is in stable condition and has no questions or concerns. Patient will be discharge to home.          Goals of Care Treatment Preferences:  Code Status: Full Code      Consults:   Consults (From admission, onward)        Status Ordering Provider     Case Management/  Once        Provider:  (Not yet assigned)    Completed JESSY RIOS     Inpatient consult to Cardiology  Once        Provider:  Valentino Magallanes MD    Acknowledged JESSY RIOS          No new Assessment & Plan notes have been filed under this  hospital service since the last note was generated.  Service: Hospital Medicine    Final Active Diagnoses:    Diagnosis Date Noted POA    PRINCIPAL PROBLEM:  Chest pain [R07.9] 05/13/2014 Unknown    LV (left ventricular) mural thrombus without MI [I24.0] 05/01/2022 Yes    Implantable cardioverter-defibrillator (ICD) in situ [Z95.810] 06/21/2018 Yes    MILE treated with BiPAP [G47.33] 05/16/2015 Yes    Mixed hyperlipidemia [E78.2] 05/16/2014 Yes    Chronic combined systolic and diastolic heart failure [I50.42] 05/16/2014 Yes    Essential hypertension [I10] 05/14/2014 Yes    Paroxysmal atrial fibrillation [I48.0] 05/14/2014 Yes    Obesity with body mass index (BMI) of 30.0 to 39.9 [E66.9] 05/14/2014 Yes    Type 2 diabetes mellitus with stage 4 chronic kidney disease, without long-term current use of insulin [E11.22, N18.4] 09/20/2013 Yes      Problems Resolved During this Admission:       Discharged Condition: stable    Disposition: Home or Self Care    Follow Up:   Follow-up Information     Gil Leal MD Follow up.    Specialty: Family Medicine  Contact information:  [address] [place] [address]  [phone]             Oziel Yanez MD Follow up on 6/16/2022.    Specialties: Cardiology, Interventional Cardiology  Why: at 8:40am- added to the waitlist should a sooner appointment become available  Contact information:  [address] [facility] [address]  [address]  [facility] [address]  [phone]                       Patient Instructions:      Diet Cardiac     Diet diabetic     Notify your health care provider if you experience any of the following:  severe uncontrolled pain     Notify your health care provider if you experience any of the following:  difficulty breathing or increased cough     Notify your health care provider if you experience any of the following:  persistent dizziness, light-headedness, or visual disturbances     Notify your health care provider if you experience any of the  following:  increased confusion or weakness     Activity as tolerated       Significant Diagnostic Studies:   Imaging Results          X-Ray Chest AP Portable (Final result)  Result time 05/17/22 17:33:04    Final result by Rober Gonsales MD (05/17/22 17:33:04)                 Impression:      As above.      Electronically signed by: Rober Gonsales MD  Date:    05/17/2022  Time:    17:33             Narrative:    EXAMINATION:  XR CHEST AP PORTABLE    CLINICAL HISTORY:  Other complications following infusion, transfusion and therapeutic injection, initial encounter    TECHNIQUE:  Single frontal view of the chest was performed.    COMPARISON:  04/29/2022.    FINDINGS:  Cardiac silhouette is enlarged but stable in size.  Left-sided pacer device is seen.  Lungs are expanded with mild elevation of the right hemidiaphragm seen.  There is prominence of central pulmonary vasculature with mild perihilar opacity which could reflect possible central pulmonary vascular congestion and mild edema.  Otherwise no evidence of focal consolidative process, pneumothorax, or significant pleural effusion.                                  Pending Diagnostic Studies:     None         Medications:  Reconciled Home Medications:      Medication List      CONTINUE taking these medications    albuterol 90 mcg/actuation inhaler  Commonly known as: VENTOLIN HFA  Inhale 2 puffs into the lungs every 6 (six) hours as needed for Wheezing or Shortness of Breath. Rescue     baclofen 10 MG tablet  Commonly known as: LIORESAL  Take 1 tablet (10 mg total) by mouth 2 (two) times daily.     blood glucose control, high Soln  1 each by Misc.(Non-Drug; Combo Route) route once. for 1 dose     blood glucose control, low Soln  1 each by Misc.(Non-Drug; Combo Route) route once. for 1 dose     BLOOD PRESSURE CUFF Misc  Generic drug: miscellaneous medical supply  1 kit by Misc.(Non-Drug; Combo Route) route 2 (two) times daily.     blood sugar diagnostic  "Strp  Check blood glucose 3x/day.     blood-glucose meter Misc  Commonly known as: TRUE METRIX AIR GLUCOSE METER  1 each by Misc.(Non-Drug; Combo Route) route 3 (three) times daily.     DEXCOM G6  Misc  Generic drug: blood-glucose meter,continuous  Use with dexcom G6 system     DEXCOM G6 SENSOR Lupe  Generic drug: blood-glucose sensor  Change sensor every 10 days     DEXCOM G6 TRANSMITTER Lupe  Generic drug: blood-glucose transmitter  Change every 3 months     enoxaparin 120 mg/0.8 mL Syrg  Commonly known as: LOVENOX  Inject 0.8 mLs (120 mg total) into the skin twice daily     fluticasone propionate 50 mcg/actuation nasal spray  Commonly known as: FLONASE  1 spray (50 mcg total) by Each Nostril route 2 (two) times daily as needed for Rhinitis.     furosemide 40 MG tablet  Commonly known as: LASIX  TAKE 1 TABLET BY MOUTH ONCE DAILY *PLEASE  HOLD  WHILE  NOT  DRINKING*     gabapentin 400 MG capsule  Commonly known as: NEURONTIN  Take 1 capsule (400 mg total) by mouth every evening.     insulin lispro 100 unit/mL pen  Commonly known as: HumaLOG KwikPen Insulin  Inject 40 Units into the skin 3 (three) times daily before meals.     metoprolol succinate 50 MG 24 hr tablet  Commonly known as: TOPROL-XL  Take 1 tablet (50 mg total) by mouth once daily. Hold SBP <120     mupirocin 2 % ointment  Commonly known as: BACTROBAN  Apply topically nightly.     pen needle, diabetic 32 gauge x 5/32" Ndle  Commonly known as: BD LORI 2ND GEN PEN NEEDLE  USE 1 PEN NEEDLE 4 TIMES DAILY     rosuvastatin 40 MG Tab  Commonly known as: CRESTOR  Take 1 tablet (40 mg total) by mouth every evening.     spironolactone 50 MG tablet  Commonly known as: ALDACTONE  Take 1 tablet (50 mg total) by mouth once daily. Hold until eating and drinking improves. Need to weight self daily     TRESIBA FLEXTOUCH U-200 200 unit/mL (3 mL) insulin pen  Generic drug: insulin degludec  Inject 54 Units into the skin 2 (two) times a day.     triamcinolone " acetonide 0.1% 0.1 % cream  Commonly known as: KENALOG  2 (two) times daily. Apply to affected area            Indwelling Lines/Drains at time of discharge:   Lines/Drains/Airways     None                 Time spent on the discharge of patient: Greater than 35 minutes         Jaquelin Norris DO  Department of Hospital Medicine  [facility] [place] [facility]

## 2022-05-18 NOTE — PLAN OF CARE
05/18/22 1001   Discharge Planning   Assessment Type Discharge Planning Brief Assessment   Resource/Environmental Concerns none   Support Systems Spouse/significant other   Equipment Currently Used at Home none   Current Living Arrangements home/apartment/condo   Patient/Family Anticipates Transition to home   Patient/Family Anticipated Services at Transition none   DME Needed Upon Discharge  none   Discharge Plan A Home with family  (with follow up)     [id number] - [place] - [address]  [address]  [place] [address]  Phone: [phone] Fax: [phone]    [id number] - [place], [place] - [address] [address] [facility] [address]  Phone: [phone] Fax: [phone]

## 2022-05-18 NOTE — PLAN OF CARE
05/18/22 1008   Final Note   Assessment Type Final Discharge Note   Anticipated Discharge Disposition Home   Hospital Resources/Appts/Education Provided Appointments scheduled and added to AVS   Post-Acute Status   Post-Acute Authorization Other   Other Status No Post-Acute Service Needs   Pts nurse Sterling notified that the pt can d/c from CM standpoint

## 2022-05-18 NOTE — ASSESSMENT & PLAN NOTE
Lab Results   Component Value Date    HGBA1C 10.0 (H) 03/09/2022   will start 20 units levemir, 8 units aspart prandial, sliding scale insulin, diabetic/renal diet, goal -180    Cr today of 2.1. baseline 1.9-2.1. recently hospitalized with JADA attributed to N/V. Renal dose medications avoid nephrotoxic drugs, monitor intake and output

## 2022-05-18 NOTE — PROGRESS NOTES
WRITTEN HEALTHCARE DISCHARGE INFORMATION      Things that YOU are RESPONSIBLE for to Manage Your Care At Home:     1. Getting your prescriptions filled.  2. Taking you medications as directed. DO NOT MISS ANY DOSES!  3. Going to your follow-up doctor appointments. This is important because it allows the doctor to monitor your progress and to determine if any changes need to be made to your treatment plan.     If you are unable to make your follow up appointments, please call the number listed and reschedule this appointment.      ____________HELP AT HOME____________________     Experiencing any SIGNS or SYMPTOMS: YOU CAN     Schedule a same day appopintment with your Primary Care Doctor or  you can call [facility] On Call Nurse Care Line for 24/7 assistance at [phone]     If you are experience any signs or symptoms that have become severe, Call 911 and come to your nearest Emergency Room.     Thank you for choosing [facility] and allowing us to care for you.   From your care management team:      You should receive a call from [facility] Discharge Department within 48-72 hours to help manage your care after discharge. Please try to make sure that you answer your phone for this important phone call.       Follow-up Information     Gil Leal MD Follow up.    Specialty: Family Medicine  Contact information:  [address] [place] [address]  [phone]             Oziel Yanez MD Follow up on 6/16/2022.    Specialties: Cardiology, Interventional Cardiology  Why: at 8:40am- added to the waitlist should a sooner appointment become available  Contact information:  [address] [facility] [address]  [address]  [facility] [address]  [phone]

## 2022-05-18 NOTE — HPI
HPI: Fabiana Chanel 46 y.o. female with CHF, CKD, HTN, DM, history of left mural thrombus, Afib presents to the hospital chief complaint chest pain.  She reports beginning yesterday she began to experience intermittent left-sided chest pain under her left breast described as a tightness.  She has not attempted any treatment at home for this.  She denies any aggravating or alleviating factors.  She initially attributed this to gas.  She was seen day by heart transplant in clinic.  She reports she is compliant with her home fluid restriction and diuretic.  She denies fever nausea vomiting abdominal pain leg swelling syncope dizziness dysuria melena hematuria hematemesis and shortness of breath.    Currently CP free  Known NICM - recent LV thrombus - on lovenox  Troponin negative x 3  Cr 1.9  EKG NSR NSSTT changes    Followed by Dr Yanez  # NICM, EF persistently reduced 20% by echo 10/2019.  Euvolemic on exam.  # LV thrombus on echo 4/2022 (was on eliquis->enox 120mg bid)  # PAF, in SR (prev on eliquis, intol of Xarelto, now on enox for LVthrombus)  # S/P [facility] [place] ICD 4/2018 (Dr. Christine), atrial lead placed for AF monitoring  # HTN, controlled  # HLP, on rosuva 40mg  # DM  # BMI 42, up 2 unit(s) vs last OV  # MILE, on CPAP  # CKD4    Echo 4/30/22  The left ventricle is moderately enlarged with eccentric hypertrophy and severely decreased systolic function.  The estimated ejection fraction is 20%.  There is severe left ventricular global hypokinesis.  There appears to be a thrombus along the apical lateral wall of the left ventricle.  Grade I left ventricular diastolic dysfunction.  Normal right ventricular size with mildly reduced right ventricular systolic function.  The estimated PA systolic pressure is 26 mmHg.  Normal central venous pressure (3 mmHg).     LHC 11/27/17    Normal cors.     Elevated LVEDP with transmission of pressures to pulmonary circuit.     Above c/w Severe NICM.

## 2022-05-18 NOTE — H&P
"[facility]  History & Physical    Patient Name: Fabiana Chanel  MRN: [id number]  Patient Class: OP- Observation  Admission Date: 5/17/2022  Attending Physician: Lima Hall MD   Primary Care Provider: Gil Leal MD         Patient information was obtained from patient, past medical records and ER records.     Subjective:     Principal Problem:Chest pain    Chief Complaint:   Chief Complaint   Patient presents with    Chest Pain     Pt reporting chest pain that began last night. Pt reporting left sided chest pain that feels like tightness. Pt denies nausea, vomiting, and SOB. Pt states, "they just told me I have a blood clot on the left side of my heart". Pt has a hx of CHF and DM.         HPI: Fabiana Chanel 46 y.o. female with CHF, CKD, HTN, DM, history of left mural thrombus, Afib presents to the hospital chief complaint chest pain.  She reports beginning yesterday she began to experience intermittent left-sided chest pain under her left breast described as a tightness.  She has not attempted any treatment at home for this.  She denies any aggravating or alleviating factors.  She initially attributed this to gas.  She was seen day by heart transplant in clinic.  She reports she is compliant with her home fluid restriction and diuretic.  She denies fever nausea vomiting abdominal pain leg swelling syncope dizziness dysuria melena hematuria hematemesis and shortness of breath.    In the ED, creatinine 2.1 troponin negative EKG without ST elevation, chest x-ray with mild pulmonary vascular congestion, COVID negative.      Past Medical History:   Diagnosis Date    Atrial fibrillation     Blood clot associated with vein wall inflammation     not dvt    Cardiomyopathy     Normal cors on cath 11/2017    CHF (congestive heart failure)     DM (diabetes mellitus) 9/19/2013    Hyperlipidemia     Hypertension     Psoriasis     Sleep apnea        Past Surgical " History:   Procedure Laterality Date    CARDIAC CATHETERIZATION      COLONOSCOPY      COLONOSCOPY N/A 3/9/2022    Procedure: COLONOSCOPY;  Surgeon: Vielka Burrell MD;  Location: [facility] ENDO;  Service: Endoscopy;  Laterality: N/A;    DILATION AND CURETTAGE OF UTERUS      ESOPHAGOGASTRODUODENOSCOPY N/A 5/9/2019    Procedure: EGD (ESOPHAGOGASTRODUODENOSCOPY);  Surgeon: Vielka Burrell MD;  Location: [facility] ENDO;  Service: Endoscopy;  Laterality: N/A;    TRANSFORAMINAL EPIDURAL INJECTION OF STEROID N/A 1/6/2022    Procedure: Transforaminal ANKITA L4/L5 L5/S1;  Surgeon: Jed Yeager MD;  Location: [facility] PAIN MGT;  Service: Pain Management;  Laterality: N/A;       Review of patient's allergies indicates:   Allergen Reactions    Metformin      Diarrhea on metformin XR     Pneumococcal 23-abimbola ps vaccine        No current facility-administered medications on file prior to encounter.     Current Outpatient Medications on File Prior to Encounter   Medication Sig    baclofen (LIORESAL) 10 MG tablet Take 1 tablet (10 mg total) by mouth 2 (two) times daily.    blood-glucose meter,continuous (DEXCOM G6 ) Misc Use with dexcom G6 system    blood-glucose sensor (DEXCOM G6 SENSOR) Lupe Change sensor every 10 days    blood-glucose transmitter (DEXCOM G6 TRANSMITTER) Lupe Change every 3 months    enoxaparin (LOVENOX) 120 mg/0.8 mL Syrg Inject 0.8 mLs (120 mg total) into the skin twice daily    fluticasone propionate (FLONASE) 50 mcg/actuation nasal spray 1 spray (50 mcg total) by Each Nostril route 2 (two) times daily as needed for Rhinitis.    furosemide (LASIX) 40 MG tablet TAKE 1 TABLET BY MOUTH ONCE DAILY *PLEASE  HOLD  WHILE  NOT  DRINKING*    gabapentin (NEURONTIN) 400 MG capsule Take 1 capsule (400 mg total) by mouth every evening.    insulin degludec (TRESIBA FLEXTOUCH U-200) 200 unit/mL (3 mL) insulin pen Inject 54 Units into the skin 2 (two) times a day.    insulin lispro (HUMALOG KWIKPEN  "INSULIN) 100 unit/mL pen Inject 40 Units into the skin 3 (three) times daily before meals.    metoprolol succinate (TOPROL-XL) 50 MG 24 hr tablet Take 1 tablet (50 mg total) by mouth once daily. Hold SBP <120    rosuvastatin (CRESTOR) 40 MG Tab Take 1 tablet (40 mg total) by mouth every evening.    spironolactone (ALDACTONE) 50 MG tablet Take 1 tablet (50 mg total) by mouth once daily. Hold until eating and drinking improves. Need to weight self daily    albuterol (VENTOLIN HFA) 90 mcg/actuation inhaler Inhale 2 puffs into the lungs every 6 (six) hours as needed for Wheezing or Shortness of Breath. Rescue    blood glucose control, high Soln 1 each by Misc.(Non-Drug; Combo Route) route once. for 1 dose    blood glucose control, low Soln 1 each by Misc.(Non-Drug; Combo Route) route once. for 1 dose    BLOOD PRESSURE CUFF Misc 1 kit by Misc.(Non-Drug; Combo Route) route 2 (two) times daily.    blood sugar diagnostic Strp Check blood glucose 3x/day.    blood-glucose meter (TRUE METRIX AIR GLUCOSE METER) Misc 1 each by Misc.(Non-Drug; Combo Route) route 3 (three) times daily.    mupirocin (BACTROBAN) 2 % ointment Apply topically nightly.    pen needle, diabetic (BD LORI 2ND GEN PEN NEEDLE) 32 gauge x 5/32" Ndle USE 1 PEN NEEDLE 4 TIMES DAILY    triamcinolone acetonide 0.1% (KENALOG) 0.1 % cream 2 (two) times daily. Apply to affected area    [DISCONTINUED] cetirizine (ZYRTEC) 10 MG tablet Take 1 tablet (10 mg total) by mouth once daily.    [DISCONTINUED] GAVILYTE-C 240-22.72-6.72 -5.84 gram SolR DRINK 1/2 ON 3/8/22, THEN DRINK 1/2 ON 3/9/22 AS DIRECTED    [DISCONTINUED] medroxyPROGESTERone (PROVERA) 10 MG tablet Take 10 mg by mouth once daily.    [DISCONTINUED] oxyCODONE-acetaminophen (PERCOCET) 5-325 mg per tablet Take 1 tablet by mouth every 4 (four) hours as needed.     Family History       Problem Relation (Age of Onset)    Diabetes Father, Maternal Grandmother, Paternal Grandmother    Hypertension " Mother, Father          Tobacco Use    Smoking status: Never Smoker    Smokeless tobacco: Never Used    Tobacco comment: smokes cigars on occasion   Substance and Sexual Activity    Alcohol use: Not Currently     Comment: occasional    Drug use: Not Currently     Types: Marijuana     Comment: occ    Sexual activity: Yes     Partners: Male     Review of Systems   Constitutional:  Negative for chills and fever.   HENT:  Negative for nosebleeds and tinnitus.    Eyes:  Negative for photophobia and visual disturbance.   Respiratory:  Negative for shortness of breath and wheezing.    Cardiovascular:  Positive for chest pain. Negative for palpitations and leg swelling.   Gastrointestinal:  Negative for abdominal distention, nausea and vomiting.   Genitourinary:  Negative for dysuria, flank pain and hematuria.   Musculoskeletal:  Negative for gait problem and joint swelling.   Skin:  Negative for rash and wound.   Neurological:  Negative for seizures and syncope.   Objective:     Vital Signs (Most Recent):  Temp: 97.9 °F (36.6 °C) (05/17/22 2054)  Pulse: 85 (05/17/22 2054)  Resp: 20 (05/17/22 2054)  BP: 138/65 (05/17/22 2054)  SpO2: 95 % (05/17/22 2054) Vital Signs (24h Range):  Temp:  [97.9 °F (36.6 °C)-98.2 °F (36.8 °C)] 97.9 °F (36.6 °C)  Pulse:  [73-88] 85  Resp:  [18-22] 20  SpO2:  [95 %-99 %] 95 %  BP: (108-138)/(64-80) 138/65     Weight: 116 kg (255 lb 11.7 oz)  Body mass index is 42.56 kg/m².    Physical Exam  Vitals and nursing note reviewed.   Constitutional:       General: She is not in acute distress.     Appearance: She is well-developed. She is not diaphoretic.   HENT:      Head: Normocephalic and atraumatic.      Right Ear: External ear normal.      Left Ear: External ear normal.   Eyes:      General:         Right eye: No discharge.         Left eye: No discharge.      Conjunctiva/sclera: Conjunctivae normal.   Neck:      Thyroid: No thyromegaly.   Cardiovascular:      Rate and Rhythm: Normal rate and  regular rhythm.      Heart sounds: No murmur heard.  Pulmonary:      Effort: Pulmonary effort is normal. No respiratory distress.      Breath sounds: Normal breath sounds.   Abdominal:      General: Bowel sounds are normal. There is no distension.      Palpations: Abdomen is soft. There is no mass.      Tenderness: There is no abdominal tenderness.   Musculoskeletal:         General: No deformity.      Cervical back: Normal range of motion and neck supple.      Right lower leg: No edema.      Left lower leg: No edema.   Skin:     General: Skin is warm and dry.   Neurological:      Mental Status: She is alert and oriented to person, place, and time.      Sensory: No sensory deficit.   Psychiatric:         Mood and Affect: Mood normal.         Behavior: Behavior normal.           Significant Labs: CBC:   Recent Labs   Lab 05/17/22  1727   WBC 9.39   HGB 12.9   HCT 41.0        CMP:   Recent Labs   Lab 05/17/22  1827      K 3.2*   CL 97   CO2 32*   *   BUN 21*   CREATININE 2.1*   CALCIUM 9.6   PROT 7.4   ALBUMIN 3.4*   BILITOT 0.7   ALKPHOS 84   AST 19   ALT 35   ANIONGAP 9   EGFRNONAA 28*     Cardiac Markers:   Recent Labs   Lab 05/17/22  1727   BNP 11     Troponin:   Recent Labs   Lab 05/17/22  1727 05/17/22  2116   TROPONINI 0.026 0.021       Significant Imaging:   Imaging Results              X-Ray Chest AP Portable (Final result)  Result time 05/17/22 17:33:04      Final result by Rober Gonsales MD (05/17/22 17:33:04)                   Impression:      As above.      Electronically signed by: Rober Gonsales MD  Date:    05/17/2022  Time:    17:33               Narrative:    EXAMINATION:  XR CHEST AP PORTABLE    CLINICAL HISTORY:  Other complications following infusion, transfusion and therapeutic injection, initial encounter    TECHNIQUE:  Single frontal view of the chest was performed.    COMPARISON:  04/29/2022.    FINDINGS:  Cardiac silhouette is enlarged but stable in size.  Left-sided  pacer device is seen.  Lungs are expanded with mild elevation of the right hemidiaphragm seen.  There is prominence of central pulmonary vasculature with mild perihilar opacity which could reflect possible central pulmonary vascular congestion and mild edema.  Otherwise no evidence of focal consolidative process, pneumothorax, or significant pleural effusion.                                        Assessment/Plan:     * Chest pain  [facility] 2017 with normal coronaries. Complains of chest tightness under left breast. EKG without ST elevation, troponin negative x2, chest x-ray with mild pulmonary edema  -continue home metoprolol/statin  -troponin trend  -telemetry  -cardiology consulted    LV (left ventricular) mural thrombus without MI  Recently changed from eliquis to lovenox  Continue home lovenox  Cardiology consulted    Implantable cardioverter-defibrillator (ICD) in situ  Stable denies any complaints of discharge    Chronic combined systolic and diastolic heart failure  Echo 4/2022 with EF of 20%. Seen today by heart transplant recommending continued medical management  Continue home metoprolol, lasix, sprinolactone  Daily weights/strict intake and output/telemetry    Mixed hyperlipidemia  substitute home rosuvastatin for atorvastatin inpt    Obesity with body mass index (BMI) of 30.0 to 39.9  Body mass index is 42.56 kg/m². Morbid obesity complicates all aspects of disease management from diagnostic modalities to treatment. Weight loss encouraged and health benefits explained to patient.     Paroxysmal atrial fibrillation  Her EKG today is of NSR.   Continue home metoprolol and lovenox  Monitor on telemetry  Cardiology consulted    Essential hypertension  Well controlled continue home metoprolol, lasix, and sprinolactone    Type 2 diabetes mellitus with stage 4 chronic kidney disease, without long-term current use of insulin  Lab Results   Component Value Date    HGBA1C 10.0 (H) 03/09/2022   will start 20 units  levemir, 8 units aspart prandial, sliding scale insulin, diabetic/renal diet, goal -180    Cr today of 2.1. baseline 1.9-2.1. recently hospitalized with JADA attributed to N/V. Renal dose medications avoid nephrotoxic drugs, monitor intake and output      VTE Risk Mitigation (From admission, onward)         Ordered     enoxaparin injection 120 mg  Daily         05/17/22 2155     IP VTE HIGH RISK PATIENT  Once         05/17/22 2045     Place sequential compression device  Until discontinued         05/17/22 2045              VTE: lovenox  Code: full  Diet: diabetic/cardiac  Dispo: pending troponin trend and cardiology eval  As clarification, on 5/17/2022, patient should be admitted for hospital observation services under my care in collaboration with Lima Hall MD. Eben Escudero PA-C  Department of Hospital Medicine   [facility] - [place], [place] - Telemetry

## 2022-05-18 NOTE — NURSING
pt stable and ambulatory, discharge instructions reviewed, IV removed, pt leaving by personal vehicle with family once they arrive.

## 2022-05-18 NOTE — SUBJECTIVE & OBJECTIVE
Past Medical History:   Diagnosis Date    Atrial fibrillation     Blood clot associated with vein wall inflammation     not dvt    Cardiomyopathy     Normal cors on cath 11/2017    CHF (congestive heart failure)     DM (diabetes mellitus) 9/19/2013    Hyperlipidemia     Hypertension     Psoriasis     Sleep apnea        Past Surgical History:   Procedure Laterality Date    CARDIAC CATHETERIZATION      COLONOSCOPY      COLONOSCOPY N/A 3/9/2022    Procedure: COLONOSCOPY;  Surgeon: Vielka Burrell MD;  Location: [facility] ENDO;  Service: Endoscopy;  Laterality: N/A;    DILATION AND CURETTAGE OF UTERUS      ESOPHAGOGASTRODUODENOSCOPY N/A 5/9/2019    Procedure: EGD (ESOPHAGOGASTRODUODENOSCOPY);  Surgeon: Vielka Burrell MD;  Location: [facility] ENDO;  Service: Endoscopy;  Laterality: N/A;    TRANSFORAMINAL EPIDURAL INJECTION OF STEROID N/A 1/6/2022    Procedure: Transforaminal ANKITA L4/L5 L5/S1;  Surgeon: Jed Yeager MD;  Location: [facility] MGT;  Service: Pain Management;  Laterality: N/A;       Review of patient's allergies indicates:   Allergen Reactions    Metformin      Diarrhea on metformin XR     Pneumococcal 23-abimbola ps vaccine        No current facility-administered medications on file prior to encounter.     Current Outpatient Medications on File Prior to Encounter   Medication Sig    baclofen (LIORESAL) 10 MG tablet Take 1 tablet (10 mg total) by mouth 2 (two) times daily.    blood-glucose meter,continuous (DEXCOM G6 ) Misc Use with dexcom G6 system    blood-glucose sensor (DEXCOM G6 SENSOR) Lupe Change sensor every 10 days    blood-glucose transmitter (DEXCOM G6 TRANSMITTER) Lupe Change every 3 months    enoxaparin (LOVENOX) 120 mg/0.8 mL Syrg Inject 0.8 mLs (120 mg total) into the skin twice daily    fluticasone propionate (FLONASE) 50 mcg/actuation nasal spray 1 spray (50 mcg total) by Each Nostril route 2 (two) times daily as needed for Rhinitis.    furosemide (LASIX) 40 MG tablet TAKE 1 TABLET  "BY MOUTH ONCE DAILY *PLEASE  HOLD  WHILE  NOT  DRINKING*    gabapentin (NEURONTIN) 400 MG capsule Take 1 capsule (400 mg total) by mouth every evening.    insulin degludec (TRESIBA FLEXTOUCH U-200) 200 unit/mL (3 mL) insulin pen Inject 54 Units into the skin 2 (two) times a day.    insulin lispro (HUMALOG KWIKPEN INSULIN) 100 unit/mL pen Inject 40 Units into the skin 3 (three) times daily before meals.    metoprolol succinate (TOPROL-XL) 50 MG 24 hr tablet Take 1 tablet (50 mg total) by mouth once daily. Hold SBP <120    rosuvastatin (CRESTOR) 40 MG Tab Take 1 tablet (40 mg total) by mouth every evening.    spironolactone (ALDACTONE) 50 MG tablet Take 1 tablet (50 mg total) by mouth once daily. Hold until eating and drinking improves. Need to weight self daily    albuterol (VENTOLIN HFA) 90 mcg/actuation inhaler Inhale 2 puffs into the lungs every 6 (six) hours as needed for Wheezing or Shortness of Breath. Rescue    blood glucose control, high Soln 1 each by Misc.(Non-Drug; Combo Route) route once. for 1 dose    blood glucose control, low Soln 1 each by Misc.(Non-Drug; Combo Route) route once. for 1 dose    BLOOD PRESSURE CUFF Misc 1 kit by Misc.(Non-Drug; Combo Route) route 2 (two) times daily.    blood sugar diagnostic Strp Check blood glucose 3x/day.    blood-glucose meter (TRUE METRIX AIR GLUCOSE METER) Misc 1 each by Misc.(Non-Drug; Combo Route) route 3 (three) times daily.    mupirocin (BACTROBAN) 2 % ointment Apply topically nightly.    pen needle, diabetic (BD LORI 2ND GEN PEN NEEDLE) 32 gauge x 5/32" Ndle USE 1 PEN NEEDLE 4 TIMES DAILY    triamcinolone acetonide 0.1% (KENALOG) 0.1 % cream 2 (two) times daily. Apply to affected area     Family History       Problem Relation (Age of Onset)    Diabetes Father, Maternal Grandmother, Paternal Grandmother    Hypertension Mother, Father          Tobacco Use    Smoking status: Never Smoker    Smokeless tobacco: Never Used    Tobacco comment: smokes cigars on " occasion   Substance and Sexual Activity    Alcohol use: Not Currently     Comment: occasional    Drug use: Not Currently     Types: Marijuana     Comment: occ    Sexual activity: Yes     Partners: Male     Review of Systems   Constitutional: Negative for decreased appetite.   HENT:  Negative for ear discharge.    Eyes:  Negative for blurred vision.   Respiratory:  Negative for hemoptysis.    Endocrine: Negative for polyphagia.   Hematologic/Lymphatic: Negative for adenopathy.   Skin:  Negative for color change.   Musculoskeletal:  Negative for joint swelling.   Genitourinary:  Negative for bladder incontinence.   Neurological:  Negative for brief paralysis.   Psychiatric/Behavioral:  Negative for hallucinations.    Allergic/Immunologic: Negative for hives.   Objective:     Vital Signs (Most Recent):  Temp: 97.7 °F (36.5 °C) (05/18/22 0745)  Pulse: 85 (05/18/22 0745)  Resp: 19 (05/18/22 0745)  BP: 119/75 (05/18/22 0745)  SpO2: 95 % (05/18/22 0745) Vital Signs (24h Range):  Temp:  [97.7 °F (36.5 °C)-98.2 °F (36.8 °C)] 97.7 °F (36.5 °C)  Pulse:  [73-95] 85  Resp:  [18-35] 19  SpO2:  [92 %-100 %] 95 %  BP: (105-138)/(55-80) 119/75     Weight: 116 kg (255 lb 11.7 oz)  Body mass index is 42.56 kg/m².    SpO2: 95 %  O2 Device (Oxygen Therapy): CPAP    No intake or output data in the 24 hours ending 05/18/22 0918    Lines/Drains/Airways       Peripheral Intravenous Line  Duration                  Peripheral IV - Single Lumen 05/17/22 1728 20 G Right Forearm <1 day                    Physical Exam  Constitutional:       Appearance: She is well-developed.   HENT:      Head: Normocephalic and atraumatic.   Eyes:      Conjunctiva/sclera: Conjunctivae normal.      Pupils: Pupils are equal, round, and reactive to light.   Cardiovascular:      Rate and Rhythm: Normal rate.      Pulses: Intact distal pulses.      Heart sounds: Normal heart sounds.   Pulmonary:      Effort: Pulmonary effort is normal.      Breath sounds: Normal  breath sounds.   Abdominal:      General: Bowel sounds are normal.      Palpations: Abdomen is soft.   Musculoskeletal:         General: Normal range of motion.      Cervical back: Normal range of motion and neck supple.   Skin:     General: Skin is warm and dry.   Neurological:      Mental Status: She is alert and oriented to person, place, and time.       Significant Labs: All pertinent lab results from the last 24 hours have been reviewed.    Significant Imaging: Echocardiogram: Transthoracic echo (TTE) complete (Cupid Only):   Results for orders placed or performed during the hospital encounter of 04/29/22   Echo Saline Bubble? No   Result Value Ref Range    Ascending aorta 2.50 cm    STJ 2.11 cm    AV mean gradient 4 mmHg    Ao peak jose 1.35 m/s    Ao VTI 22.03 cm    IVRT 114.18 msec    IVS 0.76 0.6 - 1.1 cm    LA size 3.74 cm    Left Atrium Major Axis 5.28 cm    Left Atrium Minor Axis 5.05 cm    LVIDd 6.53 (A) 3.5 - 6.0 cm    LVIDs 5.51 (A) 2.1 - 4.0 cm    LVOT diameter 2.01 cm    LVOT peak VTI 9.24 cm    Posterior Wall 0.86 0.6 - 1.1 cm    MV Peak A Jose 0.60 m/s    E wave deceleration time 147.49 msec    MV Peak E Jose 0.55 m/s    RA Major Axis 3.85 cm    RA Width 3.07 cm    Sinus 2.79 cm    TAPSE 1.41 cm    TR Max Jose 2.38 m/s    TDI LATERAL 0.06 m/s    TDI SEPTAL 0.04 m/s    LA WIDTH 3.93 cm    MV stenosis pressure 1/2 time 42.77 ms    LV Diastolic Volume 217.89 mL    LV Systolic Volume 147.76 mL    LVOT peak jose 0.60 m/s    LA volume (mod) 38.11 cm3    RV S' 10.08 cm/s    LV LATERAL E/E' RATIO 9.17 m/s    LV SEPTAL E/E' RATIO 13.75 m/s    FS 16 %    LA volume 64.50 cm3    LV mass 219.33 g    Left Ventricle Relative Wall Thickness 0.26 cm    AV valve area 1.33 cm2    AV Velocity Ratio 0.44     AV index (prosthetic) 0.42     MV valve area p 1/2 method 5.14 cm2    E/A ratio 0.92     Mean e' 0.05 m/s    LVOT area 3.2 cm2    LVOT stroke volume 29.30 cm3    AV peak gradient 7 mmHg    E/E' ratio 11.00 m/s    LV  Systolic Volume Index 67.8 mL/m2    LV Diastolic Volume Index 99.95 mL/m2    LA Volume Index 29.6 mL/m2    LV Mass Index 101 g/m2    Triscuspid Valve Regurgitation Peak Gradient 23 mmHg    LA Volume Index (Mod) 17.5 mL/m2    BSA 2.29 m2    Right Atrial Pressure (from IVC) 3 mmHg    EF 20 %    TV rest pulmonary artery pressure 26 mmHg    Narrative    · The left ventricle is moderately enlarged with eccentric hypertrophy and   severely decreased systolic function.  · The estimated ejection fraction is 20%.  · There is severe left ventricular global hypokinesis.  · There appears to be a thrombus along the apical lateral wall of the left   ventricle.  · Grade I left ventricular diastolic dysfunction.  · Normal right ventricular size with mildly reduced right ventricular   systolic function.  · The estimated PA systolic pressure is 26 mmHg.  · Normal central venous pressure (3 mmHg).

## 2022-05-18 NOTE — ASSESSMENT & PLAN NOTE
[facility] 2017 with normal coronaries. Complains of chest tightness under left breast. EKG without ST elevation, troponin negative x2, chest x-ray with mild pulmonary edema  -continue home metoprolol/statin  -troponin trend  -telemetry  -cardiology consulted

## 2022-05-18 NOTE — HPI
Fabiana Chanel 46 y.o. female with CHF, CKD, HTN, DM, history of left mural thrombus, Afib presents to the hospital chief complaint chest pain.  She reports beginning yesterday she began to experience intermittent left-sided chest pain under her left breast described as a tightness.  She has not attempted any treatment at home for this.  She denies any aggravating or alleviating factors.  She initially attributed this to gas.  She was seen day by heart transplant in clinic.  She reports she is compliant with her home fluid restriction and diuretic.  She denies fever nausea vomiting abdominal pain leg swelling syncope dizziness dysuria melena hematuria hematemesis and shortness of breath.    In the ED, creatinine 2.1 troponin negative EKG without ST elevation, chest x-ray with mild pulmonary vascular congestion, COVID negative.

## 2022-05-18 NOTE — CONSULTS
[place] - Telemetry  Cardiology  Consult Note    Patient Name: Fabiana Chanel  MRN: [id number]  Admission Date: 5/17/2022  Hospital Length of Stay: 0 days  Code Status: Full Code   Attending Provider: Jaquelin Norris DO   Consulting Provider: Valentino Magallanes MD  Primary Care Physician: Gil Leal MD  Principal Problem:Chest pain    Patient information was obtained from patient and ER records.     Consults  Subjective:     Chief Complaint:  CP        HPI: Fabiana Chanel 46 y.o. female with CHF, CKD, HTN, DM, history of left mural thrombus, Afib presents to the hospital chief complaint chest pain.  She reports beginning yesterday she began to experience intermittent left-sided chest pain under her left breast described as a tightness.  She has not attempted any treatment at home for this.  She denies any aggravating or alleviating factors.  She initially attributed this to gas.  She was seen day by heart transplant in clinic.  She reports she is compliant with her home fluid restriction and diuretic.  She denies fever nausea vomiting abdominal pain leg swelling syncope dizziness dysuria melena hematuria hematemesis and shortness of breath.    Currently CP free  Known NICM - recent LV thrombus - on lovenox  Troponin negative x 3  Cr 1.9  EKG NSR NSSTT changes    Followed by Dr Yanez  # NICM, EF persistently reduced 20% by echo 10/2019.  Euvolemic on exam.  # LV thrombus on echo 4/2022 (was on eliquis->enox 120mg bid)  # PAF, in SR (prev on eliquis, intol of Xarelto, now on enox for LVthrombus)  # S/P [facility] ICD 4/2018 (Dr. Christine), atrial lead placed for AF monitoring  # HTN, controlled  # HLP, on rosuva 40mg  # DM  # BMI 42, up 2 unit(s) vs last OV  # MILE, on CPAP  # CKD4    Echo 4/30/22  · The left ventricle is moderately enlarged with eccentric hypertrophy and severely decreased systolic function.  · The estimated ejection fraction is 20%.  · There is severe left ventricular global  hypokinesis.  · There appears to be a thrombus along the apical lateral wall of the left ventricle.  · Grade I left ventricular diastolic dysfunction.  · Normal right ventricular size with mildly reduced right ventricular systolic function.  · The estimated PA systolic pressure is 26 mmHg.  · Normal central venous pressure (3 mmHg).     [facility] 11/27/17    Normal cors.     Elevated LVEDP with transmission of pressures to pulmonary circuit.     Above c/w Severe NICM.       Past Medical History:   Diagnosis Date    Atrial fibrillation     Blood clot associated with vein wall inflammation     not dvt    Cardiomyopathy     Normal cors on cath 11/2017    CHF (congestive heart failure)     DM (diabetes mellitus) 9/19/2013    Hyperlipidemia     Hypertension     Psoriasis     Sleep apnea        Past Surgical History:   Procedure Laterality Date    CARDIAC CATHETERIZATION      COLONOSCOPY      COLONOSCOPY N/A 3/9/2022    Procedure: COLONOSCOPY;  Surgeon: Vielka Burrell MD;  Location: [facility];  Service: Endoscopy;  Laterality: N/A;    DILATION AND CURETTAGE OF UTERUS      ESOPHAGOGASTRODUODENOSCOPY N/A 5/9/2019    Procedure: EGD (ESOPHAGOGASTRODUODENOSCOPY);  Surgeon: Vielka Burrell MD;  Location: [facility];  Service: Endoscopy;  Laterality: N/A;    TRANSFORAMINAL EPIDURAL INJECTION OF STEROID N/A 1/6/2022    Procedure: Transforaminal ANKITA L4/L5 L5/S1;  Surgeon: Jed Yeager MD;  Location: [facility];  Service: Pain Management;  Laterality: N/A;       Review of patient's allergies indicates:   Allergen Reactions    Metformin      Diarrhea on metformin XR     Pneumococcal 23-abimbola ps vaccine        No current facility-administered medications on file prior to encounter.     Current Outpatient Medications on File Prior to Encounter   Medication Sig    baclofen (LIORESAL) 10 MG tablet Take 1 tablet (10 mg total) by mouth 2 (two) times daily.    blood-glucose meter,continuous (DEXCOM G6  ) Misc Use with dexcom G6 system    blood-glucose sensor (DEXCOM G6 SENSOR) Lupe Change sensor every 10 days    blood-glucose transmitter (DEXCOM G6 TRANSMITTER) Lupe Change every 3 months    enoxaparin (LOVENOX) 120 mg/0.8 mL Syrg Inject 0.8 mLs (120 mg total) into the skin twice daily    fluticasone propionate (FLONASE) 50 mcg/actuation nasal spray 1 spray (50 mcg total) by Each Nostril route 2 (two) times daily as needed for Rhinitis.    furosemide (LASIX) 40 MG tablet TAKE 1 TABLET BY MOUTH ONCE DAILY *PLEASE  HOLD  WHILE  NOT  DRINKING*    gabapentin (NEURONTIN) 400 MG capsule Take 1 capsule (400 mg total) by mouth every evening.    insulin degludec (TRESIBA FLEXTOUCH U-200) 200 unit/mL (3 mL) insulin pen Inject 54 Units into the skin 2 (two) times a day.    insulin lispro (HUMALOG KWIKPEN INSULIN) 100 unit/mL pen Inject 40 Units into the skin 3 (three) times daily before meals.    metoprolol succinate (TOPROL-XL) 50 MG 24 hr tablet Take 1 tablet (50 mg total) by mouth once daily. Hold SBP <120    rosuvastatin (CRESTOR) 40 MG Tab Take 1 tablet (40 mg total) by mouth every evening.    spironolactone (ALDACTONE) 50 MG tablet Take 1 tablet (50 mg total) by mouth once daily. Hold until eating and drinking improves. Need to weight self daily    albuterol (VENTOLIN HFA) 90 mcg/actuation inhaler Inhale 2 puffs into the lungs every 6 (six) hours as needed for Wheezing or Shortness of Breath. Rescue    blood glucose control, high Soln 1 each by Misc.(Non-Drug; Combo Route) route once. for 1 dose    blood glucose control, low Soln 1 each by Misc.(Non-Drug; Combo Route) route once. for 1 dose    BLOOD PRESSURE CUFF Misc 1 kit by Misc.(Non-Drug; Combo Route) route 2 (two) times daily.    blood sugar diagnostic Strp Check blood glucose 3x/day.    blood-glucose meter (TRUE METRIX AIR GLUCOSE METER) Misc 1 each by Misc.(Non-Drug; Combo Route) route 3 (three) times daily.    mupirocin (BACTROBAN) 2 %  "ointment Apply topically nightly.    pen needle, diabetic (BD LORI 2ND GEN PEN NEEDLE) 32 gauge x 5/32" Ndle USE 1 PEN NEEDLE 4 TIMES DAILY    triamcinolone acetonide 0.1% (KENALOG) 0.1 % cream 2 (two) times daily. Apply to affected area     Family History       Problem Relation (Age of Onset)    Diabetes Father, Maternal Grandmother, Paternal Grandmother    Hypertension Mother, Father          Tobacco Use    Smoking status: Never Smoker    Smokeless tobacco: Never Used    Tobacco comment: smokes cigars on occasion   Substance and Sexual Activity    Alcohol use: Not Currently     Comment: occasional    Drug use: Not Currently     Types: Marijuana     Comment: occ    Sexual activity: Yes     Partners: Male     Review of Systems   Constitutional: Negative for decreased appetite.   HENT:  Negative for ear discharge.    Eyes:  Negative for blurred vision.   Respiratory:  Negative for hemoptysis.    Endocrine: Negative for polyphagia.   Hematologic/Lymphatic: Negative for adenopathy.   Skin:  Negative for color change.   Musculoskeletal:  Negative for joint swelling.   Genitourinary:  Negative for bladder incontinence.   Neurological:  Negative for brief paralysis.   Psychiatric/Behavioral:  Negative for hallucinations.    Allergic/Immunologic: Negative for hives.   Objective:     Vital Signs (Most Recent):  Temp: 97.7 °F (36.5 °C) (05/18/22 0745)  Pulse: 85 (05/18/22 0745)  Resp: 19 (05/18/22 0745)  BP: 119/75 (05/18/22 0745)  SpO2: 95 % (05/18/22 0745) Vital Signs (24h Range):  Temp:  [97.7 °F (36.5 °C)-98.2 °F (36.8 °C)] 97.7 °F (36.5 °C)  Pulse:  [73-95] 85  Resp:  [18-35] 19  SpO2:  [92 %-100 %] 95 %  BP: (105-138)/(55-80) 119/75     Weight: 116 kg (255 lb 11.7 oz)  Body mass index is 42.56 kg/m².    SpO2: 95 %  O2 Device (Oxygen Therapy): CPAP    No intake or output data in the 24 hours ending 05/18/22 0918    Lines/Drains/Airways       Peripheral Intravenous Line  Duration                  Peripheral IV - " Single Lumen 05/17/22 1728 20 G Right Forearm <1 day                    Physical Exam  Constitutional:       Appearance: She is well-developed.   HENT:      Head: Normocephalic and atraumatic.   Eyes:      Conjunctiva/sclera: Conjunctivae normal.      Pupils: Pupils are equal, round, and reactive to light.   Cardiovascular:      Rate and Rhythm: Normal rate.      Pulses: Intact distal pulses.      Heart sounds: Normal heart sounds.   Pulmonary:      Effort: Pulmonary effort is normal.      Breath sounds: Normal breath sounds.   Abdominal:      General: Bowel sounds are normal.      Palpations: Abdomen is soft.   Musculoskeletal:         General: Normal range of motion.      Cervical back: Normal range of motion and neck supple.   Skin:     General: Skin is warm and dry.   Neurological:      Mental Status: She is alert and oriented to person, place, and time.       Significant Labs: All pertinent lab results from the last 24 hours have been reviewed.    Significant Imaging: Echocardiogram: Transthoracic echo (TTE) complete (Cupid Only):   Results for orders placed or performed during the hospital encounter of 04/29/22   Echo Saline Bubble? No   Result Value Ref Range    Ascending aorta 2.50 cm    STJ 2.11 cm    AV mean gradient 4 mmHg    Ao peak jose 1.35 m/s    Ao VTI 22.03 cm    IVRT 114.18 msec    IVS 0.76 0.6 - 1.1 cm    LA size 3.74 cm    Left Atrium Major Axis 5.28 cm    Left Atrium Minor Axis 5.05 cm    LVIDd 6.53 (A) 3.5 - 6.0 cm    LVIDs 5.51 (A) 2.1 - 4.0 cm    LVOT diameter 2.01 cm    LVOT peak VTI 9.24 cm    Posterior Wall 0.86 0.6 - 1.1 cm    MV Peak A Jose 0.60 m/s    E wave deceleration time 147.49 msec    MV Peak E Jose 0.55 m/s    RA Major Axis 3.85 cm    RA Width 3.07 cm    Sinus 2.79 cm    TAPSE 1.41 cm    TR Max Jose 2.38 m/s    TDI LATERAL 0.06 m/s    TDI SEPTAL 0.04 m/s    LA WIDTH 3.93 cm    MV stenosis pressure 1/2 time 42.77 ms    LV Diastolic Volume 217.89 mL    LV Systolic Volume 147.76 mL     LVOT peak isai 0.60 m/s    LA volume (mod) 38.11 cm3    RV S' 10.08 cm/s    LV LATERAL E/E' RATIO 9.17 m/s    LV SEPTAL E/E' RATIO 13.75 m/s    FS 16 %    LA volume 64.50 cm3    LV mass 219.33 g    Left Ventricle Relative Wall Thickness 0.26 cm    AV valve area 1.33 cm2    AV Velocity Ratio 0.44     AV index (prosthetic) 0.42     MV valve area p 1/2 method 5.14 cm2    E/A ratio 0.92     Mean e' 0.05 m/s    LVOT area 3.2 cm2    LVOT stroke volume 29.30 cm3    AV peak gradient 7 mmHg    E/E' ratio 11.00 m/s    LV Systolic Volume Index 67.8 mL/m2    LV Diastolic Volume Index 99.95 mL/m2    LA Volume Index 29.6 mL/m2    LV Mass Index 101 g/m2    Triscuspid Valve Regurgitation Peak Gradient 23 mmHg    LA Volume Index (Mod) 17.5 mL/m2    BSA 2.29 m2    Right Atrial Pressure (from IVC) 3 mmHg    EF 20 %    TV rest pulmonary artery pressure 26 mmHg    Narrative    · The left ventricle is moderately enlarged with eccentric hypertrophy and   severely decreased systolic function.  · The estimated ejection fraction is 20%.  · There is severe left ventricular global hypokinesis.  · There appears to be a thrombus along the apical lateral wall of the left   ventricle.  · Grade I left ventricular diastolic dysfunction.  · Normal right ventricular size with mildly reduced right ventricular   systolic function.  · The estimated PA systolic pressure is 26 mmHg.  · Normal central venous pressure (3 mmHg).        Assessment and Plan:     * Chest pain  Atypical. Known NICM. No plan to repeat ischemic evaluation. Ok for d/c and f/u with Dr Yanez    LV (left ventricular) mural thrombus without MI  Currently on lovenox. Will defer restarting OAC to Dr Yanez    Implantable cardioverter-defibrillator (ICD) in situ  Followed at [facility]    Chronic combined systolic and diastolic heart failure  Severe NICM - EF 20%. Volume stable    Mixed hyperlipidemia  On statin    Paroxysmal atrial fibrillation  Currently NSR    Type 2 diabetes mellitus with  stage 4 chronic kidney disease, without long-term current use of insulin  Per primary        VTE Risk Mitigation (From admission, onward)         Ordered     enoxaparin injection 120 mg  Daily         05/17/22 2155     IP VTE HIGH RISK PATIENT  Once         05/17/22 2045     Place sequential compression device  Until discontinued         05/17/22 2045                Thank you for your consult. I will sign off. Please contact us if you have any additional questions.    Valentino Magallanes MD  Cardiology   [facility]

## 2022-05-18 NOTE — ASSESSMENT & PLAN NOTE
Body mass index is 42.56 kg/m². Morbid obesity complicates all aspects of disease management from diagnostic modalities to treatment. Weight loss encouraged and health benefits explained to patient.

## 2022-05-18 NOTE — HOSPITAL COURSE
46 y.o. female with CHF, CKD, HTN, DM, history of left mural thrombus, Afib admitted to observation on 05/17/2022 for evaluation of chest pain.  Initial and subsequent troponin negative.  EKG without ST elevation or acute ischemic changes. CXR with possible central pulmonary vascular congestion and mild edema. Cardiology consulted. Patient with known NICM and has no plan to repeat ischemic evaluation. Recommends follow up with Dr. Yanez.     Patient is feeling better overall.  No longer has chest pain.  States she is frustrated with her health and her outside providers and states she is trying to get better overall. Pt denies any fever, headaches, vision changes, chest pain, shortness of breath, palpitations, abdominal pain, nausea, vomiting, or any new weaknesses. Patient's exam on discharge was as follow: Patient is alert and oriented, appears in no acute distress, heart with regular rate and rhythm, lungs clear to asculation with non-labored breathing, abdomen soft and non tender, and no new weaknesses or focal deficits seen.  Bilateral lower extremity without any edema or calf tenderness.    Patient was counseled regarding any abnormal labs, differential diagnosis, treatment options, risk-benefit, lifestyle changes, prognosis, current condition, and medications. Patient was interactive and attentive.  Patient's questions were answered in a respectful and timely manner. Patient was instructed to follow-up with PCP within 1 week and to continue taking medications as prescribed.  Instructed to follow up with her cardiologist, Dr. Yanez as well. Also, extensively discussed the risks, benefits, and side effects of patient's medications. Discussed with patient about any medication changes. Patient verbalized understanding and agrees to treatment plan.  Patient is stable for discharge.  Patient has no other questions or concerns at this time.  ED precautions discussed with the patient.    Vital signs are stable.  Ambulating without any difficulty. Tolerating p.o. intake without any nausea or vomiting. Afebrile for over 24 hours. Patient is in stable condition and has no questions or concerns. Patient will be discharge to home.

## 2022-05-18 NOTE — ASSESSMENT & PLAN NOTE
Her EKG today is of NSR.   Continue home metoprolol and lovenox  Monitor on telemetry  Cardiology consulted

## 2022-05-24 NOTE — PROGRESS NOTES
CC: This 46 y.o. Black or  female  is here for evaluation of  T2DM along with comorbidities indicated in the Visit Diagnosis section of this encounter.    HPI: Fabiana Chanel was diagnosed with T2DM in 2013. Metformin and a sulfonylurea started at the time of diagnosis.         Prior visit 5/10/22  She has switched from Trulicity to Ozempic. She has been to the hospital 3x since lov. Recently admitted last week to Observation for malaise, body aches and n/v. Ozempic was stopped upon discharge.   C/o high BGs despite eating much.   BGs are 200-300s. Just got a new transmitter since her last one didn't work. Will resume Dexcom today.   FBG usually in the 200s but today was in the 300s, gradually came down to 220 several hours later.   Pt has LV thrombus, now on Lovenox.   Plan Increase Tresiba from 88 to 106 units per day - inject 54 units TWICE daily.   Continue Humalog 40 units before each meal but ADD correction scale -  150-200=+2  201-250=+4  251-300=+6  301-350=+8  351-400=+10   RESUME Dexcom CGM.   Return to clinic in 2 weeks. Please keep a food diary about 1 week prior to the visit.       Interval history  BGs have improved since she increased insulin doses after lov.   She is interested in resuming Trulicity.         PMHX: CHF,  MILE on cpap, atrial fibrillation, MI, ICD placement, NICM (cath 11/2017) EF 20% by echo 12/2018    LAST DIABETES EDUCATION: 6/19/19, 11/2021 for Dexcom start       RECENT ILLNESSES/HOSPITALIZATIONS - -  Admitted 12/28/18 x 2 nights for acute on chronic HF      HOSPITALIZED FOR DIABETES  -  Yes - for hyperglycemia     DIABETES MEDICATIONS:    Tresiba 54 units bid   Humalog Kwikpen 40 units ac with correction scale : 150-200=+2, 201-250=+4; 251-300=+6; 301-350=+8, over 350=+10 units        Misses medication doses - No    She injects Humalog immediately after she eats    DM COMPLICATIONS: peripheral neuropathy and cardiovascular disease    SIGNIFICANT DIABETES MED  HISTORY:   diarrhea on metformin 1000 mg instant release; cannot afford topical.    Pt unable to tolerate metformin ER d/t diarrhea even on 500 mg twice daily.   Trulicity and Novolog started 11/2019; unable to tolerate Trulicity 1.5 mg d/t nausea and bloating   Jardiance - recurrent  infection   Ozempic - nausea       SELF MONITORING BLOOD GLUCOSE:monitors glucose with Dexcom CGM .     CGM interpretation: BG rises around 2 am indicating Amie Phenomenon. She does tend to have large spikes in BG after meals. No hypoglycemia. Overall BGs much improved from last visit.         HYPOGLYCEMIC EPISODES: denies      CURRENT DIET: drinks water. No more juice or tea.  Eats 2-3 meals/day.     CURRENT EXERCISE: can tolerate walking up 2 blocks before she gets DIAZ       /87   Pulse 89   Temp 98.1 °F (36.7 °C)   Wt 113.4 kg (249 lb 14.4 oz)   LMP  (LMP Unknown)   BMI 41.59 kg/m²       ROS:   CONSTITUTIONAL: Appetite low, + Fatigue   GI:  nausea, resolved     PHYSICAL EXAM:  GENERAL: Well developed, well nourished. No acute distress.   PSYCH: AAOx3,  conversant, well-groomed. Judgement and insight good. Appropriate mood and affect.   NEURO: Cranial nerves grossly intact. Speech clear, no tremor.           Hemoglobin A1C   Date Value Ref Range Status   03/09/2022 10.0 (H) 4.0 - 5.6 % Final     Comment:     ADA Screening Guidelines:  5.7-6.4%  Consistent with prediabetes  >or=6.5%  Consistent with diabetes    High levels of fetal hemoglobin interfere with the HbA1C  assay. Heterozygous hemoglobin variants (HbS, HgC, etc)do  not significantly interfere with this assay.   However, presence of multiple variants may affect accuracy.     10/14/2021 11.8 (H) 4.0 - 5.6 % Final     Comment:     ADA Screening Guidelines:  5.7-6.4%  Consistent with prediabetes  >or=6.5%  Consistent with diabetes    High levels of fetal hemoglobin interfere with the HbA1C  assay. Heterozygous hemoglobin variants (HbS, HgC, etc)do  not  significantly interfere with this assay.   However, presence of multiple variants may affect accuracy.     04/28/2021 >14.0 (H) 4.0 - 5.6 % Final     Comment:     ADA Screening Guidelines:  5.7-6.4%  Consistent with prediabetes  >or=6.5%  Consistent with diabetes    High levels of fetal hemoglobin interfere with the HbA1C  assay. Heterozygous hemoglobin variants (HbS, HgC, etc)do  not significantly interfere with this assay.   However, presence of multiple variants may affect accuracy.             Chemistry        Component Value Date/Time     05/18/2022 0506    K 3.6 05/18/2022 0506     05/18/2022 0506    CO2 30 (H) 05/18/2022 0506    BUN 18 05/18/2022 0506    CREATININE 1.9 (H) 05/18/2022 0506     (H) 05/18/2022 0506        Component Value Date/Time    CALCIUM 8.6 (L) 05/18/2022 0506    ALKPHOS 74 05/18/2022 0506    AST 15 05/18/2022 0506    ALT 25 05/18/2022 0506    BILITOT 0.5 05/18/2022 0506    ESTGFRAFRICA 36 (A) 05/18/2022 0506    EGFRNONAA 31 (A) 05/18/2022 0506          Lab Results   Component Value Date    LDLCALC 58.4 (L) 10/14/2021            Ref. Range 10/14/2021 08:25   Cholesterol Latest Ref Range: 120 - 199 mg/dL 111 (L)   HDL Latest Ref Range: 40 - 75 mg/dL 38 (L)   HDL/Cholesterol Ratio Latest Ref Range: 20.0 - 50.0 % 34.2   LDL Cholesterol External Latest Ref Range: 63.0 - 159.0 mg/dL 58.4 (L)   Non-HDL Cholesterol Latest Units: mg/dL 73   Total Cholesterol/HDL Ratio Latest Ref Range: 2.0 - 5.0  2.9   Triglycerides Latest Ref Range: 30 - 150 mg/dL 73     Lab Results   Component Value Date    MICALBCREAT 103.2 (H) 12/29/2020           ASSESSMENT and PLAN:    A1C GOAL: < 7 %     1. Type 2 diabetes, uncontrolled, with neuropathy  Increase Humalog from 40 to 46 units before each meal with correction scale.   Continue Tresiba at 54 units twice daily for now.   Start Humulin N 24 units nightly at 10-11 pm. Will titrate up as needed.   Contact [employer] for Dexcom supplies.   Resume  Trulicity but at low dose of 0.75 mg weekly. Cannot tolerate 1.5 mg dose.   rtc in 1 mo       insulin lispro (HUMALOG KWIKPEN INSULIN) 100 unit/mL pen   2. Morbid obesity, unspecified obesity type  Increases insulin resistance.      3. Hypertension, unspecified type  Continue current tx     4. CKD (chronic kidney disease) stage 4, GFR 15-29 ml/min  Advised pt: Call [phone] for appointment with kidney doctor.        No orders of the defined types were placed in this encounter.       Follow up in about 4 weeks (around 6/22/2022).       Patient is testing blood sugars 4x/day and taking 4 injections of insulin a day. The patient's insulin treatment regimen requires frequent adjustments by the patient on the basis of therapeutic CGM testing results.

## 2022-05-25 ENCOUNTER — OFFICE VISIT (OUTPATIENT)
Dept: ENDOCRINOLOGY | Facility: CLINIC | Age: 47
End: 2022-05-25
Payer: MEDICARE

## 2022-05-25 ENCOUNTER — TELEPHONE (OUTPATIENT)
Dept: ENDOCRINOLOGY | Facility: CLINIC | Age: 47
End: 2022-05-25

## 2022-05-25 VITALS
DIASTOLIC BLOOD PRESSURE: 87 MMHG | WEIGHT: 249.88 LBS | BODY MASS INDEX: 41.59 KG/M2 | HEART RATE: 89 BPM | SYSTOLIC BLOOD PRESSURE: 138 MMHG | TEMPERATURE: 98 F

## 2022-05-25 DIAGNOSIS — N18.4 CKD (CHRONIC KIDNEY DISEASE) STAGE 4, GFR 15-29 ML/MIN: ICD-10-CM

## 2022-05-25 DIAGNOSIS — E66.01 MORBID OBESITY, UNSPECIFIED OBESITY TYPE: ICD-10-CM

## 2022-05-25 DIAGNOSIS — I10 HYPERTENSION, UNSPECIFIED TYPE: ICD-10-CM

## 2022-05-25 PROCEDURE — 99499 RISK ADDL DX/OHS AUDIT: ICD-10-PCS | Mod: S$GLB,,, | Performed by: NURSE PRACTITIONER

## 2022-05-25 PROCEDURE — 3046F PR MOST RECENT HEMOGLOBIN A1C LEVEL > 9.0%: ICD-10-PCS | Mod: CPTII,S$GLB,, | Performed by: NURSE PRACTITIONER

## 2022-05-25 PROCEDURE — 95251 CONT GLUC MNTR ANALYSIS I&R: CPT | Mod: S$GLB,,, | Performed by: NURSE PRACTITIONER

## 2022-05-25 PROCEDURE — 3008F BODY MASS INDEX DOCD: CPT | Mod: CPTII,S$GLB,, | Performed by: NURSE PRACTITIONER

## 2022-05-25 PROCEDURE — 99214 OFFICE O/P EST MOD 30 MIN: CPT | Mod: S$GLB,,, | Performed by: NURSE PRACTITIONER

## 2022-05-25 PROCEDURE — 3046F HEMOGLOBIN A1C LEVEL >9.0%: CPT | Mod: CPTII,S$GLB,, | Performed by: NURSE PRACTITIONER

## 2022-05-25 PROCEDURE — 3079F DIAST BP 80-89 MM HG: CPT | Mod: CPTII,S$GLB,, | Performed by: NURSE PRACTITIONER

## 2022-05-25 PROCEDURE — 95251 PR GLUCOSE MONITOR, 72 HOUR, PHYS INTERP: ICD-10-PCS | Mod: S$GLB,,, | Performed by: NURSE PRACTITIONER

## 2022-05-25 PROCEDURE — 1160F RVW MEDS BY RX/DR IN RCRD: CPT | Mod: CPTII,S$GLB,, | Performed by: NURSE PRACTITIONER

## 2022-05-25 PROCEDURE — 99999 PR PBB SHADOW E&M-EST. PATIENT-LVL V: ICD-10-PCS | Mod: PBBFAC,,, | Performed by: NURSE PRACTITIONER

## 2022-05-25 PROCEDURE — 99214 PR OFFICE/OUTPT VISIT, EST, LEVL IV, 30-39 MIN: ICD-10-PCS | Mod: S$GLB,,, | Performed by: NURSE PRACTITIONER

## 2022-05-25 PROCEDURE — 3075F SYST BP GE 130 - 139MM HG: CPT | Mod: CPTII,S$GLB,, | Performed by: NURSE PRACTITIONER

## 2022-05-25 PROCEDURE — 1159F PR MEDICATION LIST DOCUMENTED IN MEDICAL RECORD: ICD-10-PCS | Mod: CPTII,S$GLB,, | Performed by: NURSE PRACTITIONER

## 2022-05-25 PROCEDURE — 99999 PR PBB SHADOW E&M-EST. PATIENT-LVL V: CPT | Mod: PBBFAC,,, | Performed by: NURSE PRACTITIONER

## 2022-05-25 PROCEDURE — 3075F PR MOST RECENT SYSTOLIC BLOOD PRESS GE 130-139MM HG: ICD-10-PCS | Mod: CPTII,S$GLB,, | Performed by: NURSE PRACTITIONER

## 2022-05-25 PROCEDURE — 3079F PR MOST RECENT DIASTOLIC BLOOD PRESSURE 80-89 MM HG: ICD-10-PCS | Mod: CPTII,S$GLB,, | Performed by: NURSE PRACTITIONER

## 2022-05-25 PROCEDURE — 3008F PR BODY MASS INDEX (BMI) DOCUMENTED: ICD-10-PCS | Mod: CPTII,S$GLB,, | Performed by: NURSE PRACTITIONER

## 2022-05-25 PROCEDURE — 1159F MED LIST DOCD IN RCRD: CPT | Mod: CPTII,S$GLB,, | Performed by: NURSE PRACTITIONER

## 2022-05-25 PROCEDURE — 99499 UNLISTED E&M SERVICE: CPT | Mod: S$GLB,,, | Performed by: NURSE PRACTITIONER

## 2022-05-25 PROCEDURE — 1160F PR REVIEW ALL MEDS BY PRESCRIBER/CLIN PHARMACIST DOCUMENTED: ICD-10-PCS | Mod: CPTII,S$GLB,, | Performed by: NURSE PRACTITIONER

## 2022-05-25 RX ORDER — INSULIN HUMAN 100 [IU]/ML
INJECTION, SUSPENSION SUBCUTANEOUS
Qty: 15 ML | Refills: 0 | Status: SHIPPED | OUTPATIENT
Start: 2022-05-25 | End: 2022-06-16 | Stop reason: SDUPTHER

## 2022-05-25 RX ORDER — INSULIN LISPRO 100 [IU]/ML
46 INJECTION, SOLUTION INTRAVENOUS; SUBCUTANEOUS
Qty: 45 ML | Refills: 5 | Status: SHIPPED | OUTPATIENT
Start: 2022-05-25 | End: 2022-06-16 | Stop reason: SDUPTHER

## 2022-05-25 RX ORDER — DULAGLUTIDE 0.75 MG/.5ML
0.75 INJECTION, SOLUTION SUBCUTANEOUS
Qty: 4 PEN | Refills: 1 | Status: SHIPPED | OUTPATIENT
Start: 2022-05-25 | End: 2022-06-16 | Stop reason: SDUPTHER

## 2022-05-25 NOTE — PATIENT INSTRUCTIONS
Increase Humalog from 40 to 46 units before each meal with correction scale.   Continue Tresiba at 54 units twice daily for now.   Start Humulin N 24 units nightly at 10-11 pm. Will titrate up as needed.   Contact [employer] for Dexcom supplies.   Resume Trulicity but at low dose of 0.75 mg weekly. Cannot tolerate 1.5 mg dose.     Call [phone] for appointment with kidney doctor.

## 2022-05-25 NOTE — TELEPHONE ENCOUNTER
----- Message from Renetta Hurley sent at 5/25/2022  2:33 PM CDT -----  Type:  Pharmacy Calling to Clarify an RX    Name of Caller: Shanna Guerrero    Pharmacy Name: [facility]    Prescription Name: insulin NPH isoph U-100 human (HUMULIN N NPH INSULIN KWIKPEN) 100 unit/mL (3 mL) InPn    What do they need to clarify?  dosage    Can you be contacted via [facility]?no    Best Call Back Number: [phone]

## 2022-06-08 ENCOUNTER — TELEPHONE (OUTPATIENT)
Dept: NEPHROLOGY | Facility: CLINIC | Age: 47
End: 2022-06-08
Payer: MEDICARE

## 2022-06-08 DIAGNOSIS — N18.4 STAGE 4 CHRONIC KIDNEY DISEASE: Primary | ICD-10-CM

## 2022-06-08 DIAGNOSIS — E11.9 TYPE 2 DIABETES MELLITUS WITHOUT COMPLICATION, UNSPECIFIED WHETHER LONG TERM INSULIN USE: ICD-10-CM

## 2022-06-10 ENCOUNTER — OFFICE VISIT (OUTPATIENT)
Dept: FAMILY MEDICINE | Facility: CLINIC | Age: 47
End: 2022-06-10
Payer: MEDICARE

## 2022-06-10 VITALS
OXYGEN SATURATION: 95 % | WEIGHT: 252.19 LBS | RESPIRATION RATE: 18 BRPM | DIASTOLIC BLOOD PRESSURE: 70 MMHG | SYSTOLIC BLOOD PRESSURE: 108 MMHG | TEMPERATURE: 98 F | HEART RATE: 75 BPM | HEIGHT: 65 IN | BODY MASS INDEX: 42.02 KG/M2

## 2022-06-10 DIAGNOSIS — D68.59 OTHER PRIMARY THROMBOPHILIA: ICD-10-CM

## 2022-06-10 DIAGNOSIS — F32.1 MAJOR DEPRESSIVE DISORDER, SINGLE EPISODE, MODERATE: ICD-10-CM

## 2022-06-10 DIAGNOSIS — I50.42 CHRONIC COMBINED SYSTOLIC AND DIASTOLIC HEART FAILURE: ICD-10-CM

## 2022-06-10 DIAGNOSIS — E11.22 TYPE 2 DIABETES MELLITUS WITH STAGE 4 CHRONIC KIDNEY DISEASE, WITHOUT LONG-TERM CURRENT USE OF INSULIN: ICD-10-CM

## 2022-06-10 DIAGNOSIS — R56.9 CONVULSIONS, UNSPECIFIED CONVULSION TYPE: ICD-10-CM

## 2022-06-10 DIAGNOSIS — N18.4 TYPE 2 DIABETES MELLITUS WITH STAGE 4 CHRONIC KIDNEY DISEASE, WITHOUT LONG-TERM CURRENT USE OF INSULIN: ICD-10-CM

## 2022-06-10 DIAGNOSIS — I24.0 LV (LEFT VENTRICULAR) MURAL THROMBUS WITHOUT MI: Primary | ICD-10-CM

## 2022-06-10 PROCEDURE — 99499 UNLISTED E&M SERVICE: CPT | Mod: S$PBB,,, | Performed by: FAMILY MEDICINE

## 2022-06-10 PROCEDURE — 3008F PR BODY MASS INDEX (BMI) DOCUMENTED: ICD-10-PCS | Mod: CPTII,S$GLB,, | Performed by: FAMILY MEDICINE

## 2022-06-10 PROCEDURE — 3078F DIAST BP <80 MM HG: CPT | Mod: CPTII,S$GLB,, | Performed by: FAMILY MEDICINE

## 2022-06-10 PROCEDURE — 3074F PR MOST RECENT SYSTOLIC BLOOD PRESSURE < 130 MM HG: ICD-10-PCS | Mod: CPTII,S$GLB,, | Performed by: FAMILY MEDICINE

## 2022-06-10 PROCEDURE — 99499 RISK ADDL DX/OHS AUDIT: ICD-10-PCS | Mod: S$PBB,,, | Performed by: FAMILY MEDICINE

## 2022-06-10 PROCEDURE — 3052F PR MOST RECENT HEMOGLOBIN A1C LEVEL 8.0 - < 9.0%: ICD-10-PCS | Mod: CPTII,S$GLB,, | Performed by: FAMILY MEDICINE

## 2022-06-10 PROCEDURE — 3074F SYST BP LT 130 MM HG: CPT | Mod: CPTII,S$GLB,, | Performed by: FAMILY MEDICINE

## 2022-06-10 PROCEDURE — 99215 PR OFFICE/OUTPT VISIT, EST, LEVL V, 40-54 MIN: ICD-10-PCS | Mod: S$GLB,,, | Performed by: FAMILY MEDICINE

## 2022-06-10 PROCEDURE — 3078F PR MOST RECENT DIASTOLIC BLOOD PRESSURE < 80 MM HG: ICD-10-PCS | Mod: CPTII,S$GLB,, | Performed by: FAMILY MEDICINE

## 2022-06-10 PROCEDURE — 99215 OFFICE O/P EST HI 40 MIN: CPT | Mod: S$GLB,,, | Performed by: FAMILY MEDICINE

## 2022-06-10 PROCEDURE — 3008F BODY MASS INDEX DOCD: CPT | Mod: CPTII,S$GLB,, | Performed by: FAMILY MEDICINE

## 2022-06-10 PROCEDURE — 3052F HG A1C>EQUAL 8.0%<EQUAL 9.0%: CPT | Mod: CPTII,S$GLB,, | Performed by: FAMILY MEDICINE

## 2022-06-10 PROCEDURE — 99999 PR PBB SHADOW E&M-EST. PATIENT-LVL IV: CPT | Mod: PBBFAC,,, | Performed by: FAMILY MEDICINE

## 2022-06-10 PROCEDURE — 99999 PR PBB SHADOW E&M-EST. PATIENT-LVL IV: ICD-10-PCS | Mod: PBBFAC,,, | Performed by: FAMILY MEDICINE

## 2022-06-10 RX ORDER — ROSUVASTATIN CALCIUM 40 MG/1
40 TABLET, COATED ORAL NIGHTLY
Qty: 90 TABLET | Refills: 1 | Status: SHIPPED | OUTPATIENT
Start: 2022-06-10 | End: 2022-06-16 | Stop reason: SDUPTHER

## 2022-06-10 RX ORDER — BACLOFEN 10 MG/1
10 TABLET ORAL 2 TIMES DAILY
Qty: 90 TABLET | Refills: 0 | Status: SHIPPED | OUTPATIENT
Start: 2022-06-10 | End: 2023-03-03 | Stop reason: ALTCHOICE

## 2022-06-10 RX ORDER — FUROSEMIDE 40 MG/1
TABLET ORAL
Qty: 90 TABLET | Refills: 1 | Status: SHIPPED | OUTPATIENT
Start: 2022-06-10 | End: 2023-01-10 | Stop reason: SDUPTHER

## 2022-06-10 RX ORDER — SPIRONOLACTONE 50 MG/1
50 TABLET, FILM COATED ORAL DAILY
Qty: 90 TABLET | Refills: 3 | Status: SHIPPED | OUTPATIENT
Start: 2022-06-10 | End: 2022-06-16 | Stop reason: SDUPTHER

## 2022-06-10 NOTE — PROGRESS NOTES
Health Maintenance Due   Topic     COVID-19 Vaccine (1)     Pneumococcal Vaccines (Age 0-64) (1 - PCV)     Diabetes Urine Screening  Consult pcp    Hemoglobin A1c  Consult pcp

## 2022-06-13 DIAGNOSIS — I48.0 PAROXYSMAL ATRIAL FIBRILLATION: Primary | ICD-10-CM

## 2022-06-16 ENCOUNTER — OFFICE VISIT (OUTPATIENT)
Dept: ENDOCRINOLOGY | Facility: CLINIC | Age: 47
End: 2022-06-16
Payer: MEDICARE

## 2022-06-16 ENCOUNTER — LAB VISIT (OUTPATIENT)
Dept: LAB | Facility: HOSPITAL | Age: 47
End: 2022-06-16
Attending: INTERNAL MEDICINE
Payer: MEDICARE

## 2022-06-16 ENCOUNTER — OFFICE VISIT (OUTPATIENT)
Dept: CARDIOLOGY | Facility: CLINIC | Age: 47
End: 2022-06-16
Payer: MEDICARE

## 2022-06-16 VITALS
HEART RATE: 85 BPM | TEMPERATURE: 99 F | BODY MASS INDEX: 41.93 KG/M2 | WEIGHT: 252 LBS | DIASTOLIC BLOOD PRESSURE: 60 MMHG | SYSTOLIC BLOOD PRESSURE: 100 MMHG

## 2022-06-16 VITALS
HEART RATE: 92 BPM | WEIGHT: 253.06 LBS | HEIGHT: 65 IN | SYSTOLIC BLOOD PRESSURE: 128 MMHG | BODY MASS INDEX: 42.16 KG/M2 | RESPIRATION RATE: 18 BRPM | OXYGEN SATURATION: 92 % | DIASTOLIC BLOOD PRESSURE: 80 MMHG

## 2022-06-16 DIAGNOSIS — E11.22 TYPE 2 DIABETES MELLITUS WITH STAGE 4 CHRONIC KIDNEY DISEASE, WITHOUT LONG-TERM CURRENT USE OF INSULIN: ICD-10-CM

## 2022-06-16 DIAGNOSIS — E66.01 MORBID OBESITY, UNSPECIFIED OBESITY TYPE: ICD-10-CM

## 2022-06-16 DIAGNOSIS — I48.0 PAROXYSMAL ATRIAL FIBRILLATION: ICD-10-CM

## 2022-06-16 DIAGNOSIS — N18.4 TYPE 2 DIABETES MELLITUS WITH STAGE 4 CHRONIC KIDNEY DISEASE, WITHOUT LONG-TERM CURRENT USE OF INSULIN: ICD-10-CM

## 2022-06-16 DIAGNOSIS — E11.9 TYPE 2 DIABETES MELLITUS WITHOUT COMPLICATION, UNSPECIFIED WHETHER LONG TERM INSULIN USE: ICD-10-CM

## 2022-06-16 DIAGNOSIS — G47.33 OSA TREATED WITH BIPAP: ICD-10-CM

## 2022-06-16 DIAGNOSIS — I24.0 LV (LEFT VENTRICULAR) MURAL THROMBUS WITHOUT MI: Primary | ICD-10-CM

## 2022-06-16 DIAGNOSIS — I50.42 CHRONIC COMBINED SYSTOLIC AND DIASTOLIC HEART FAILURE: ICD-10-CM

## 2022-06-16 DIAGNOSIS — I10 ESSENTIAL HYPERTENSION: ICD-10-CM

## 2022-06-16 DIAGNOSIS — I42.8 NICM (NONISCHEMIC CARDIOMYOPATHY): ICD-10-CM

## 2022-06-16 DIAGNOSIS — N18.4 STAGE 4 CHRONIC KIDNEY DISEASE: ICD-10-CM

## 2022-06-16 DIAGNOSIS — N18.4 CKD (CHRONIC KIDNEY DISEASE) STAGE 4, GFR 15-29 ML/MIN: ICD-10-CM

## 2022-06-16 DIAGNOSIS — Z95.810 IMPLANTABLE CARDIOVERTER-DEFIBRILLATOR (ICD) IN SITU: ICD-10-CM

## 2022-06-16 DIAGNOSIS — E78.2 MIXED HYPERLIPIDEMIA: ICD-10-CM

## 2022-06-16 DIAGNOSIS — N18.31 STAGE 3A CHRONIC KIDNEY DISEASE: ICD-10-CM

## 2022-06-16 LAB
ALBUMIN SERPL BCP-MCNC: 3.5 G/DL (ref 3.5–5.2)
ALP SERPL-CCNC: 73 U/L (ref 55–135)
ALT SERPL W/O P-5'-P-CCNC: 32 U/L (ref 10–44)
ANION GAP SERPL CALC-SCNC: 10 MMOL/L (ref 8–16)
AST SERPL-CCNC: 28 U/L (ref 10–40)
BASOPHILS # BLD AUTO: 0.05 K/UL (ref 0–0.2)
BASOPHILS NFR BLD: 0.6 % (ref 0–1.9)
BILIRUB SERPL-MCNC: 0.7 MG/DL (ref 0.1–1)
BUN SERPL-MCNC: 19 MG/DL (ref 6–20)
CALCIUM SERPL-MCNC: 9.6 MG/DL (ref 8.7–10.5)
CHLORIDE SERPL-SCNC: 98 MMOL/L (ref 95–110)
CHOLEST SERPL-MCNC: 110 MG/DL (ref 120–199)
CHOLEST/HDLC SERPL: 3 {RATIO} (ref 2–5)
CO2 SERPL-SCNC: 32 MMOL/L (ref 23–29)
CREAT SERPL-MCNC: 1.8 MG/DL (ref 0.5–1.4)
DIFFERENTIAL METHOD: ABNORMAL
EOSINOPHIL # BLD AUTO: 0.1 K/UL (ref 0–0.5)
EOSINOPHIL NFR BLD: 1.7 % (ref 0–8)
ERYTHROCYTE [DISTWIDTH] IN BLOOD BY AUTOMATED COUNT: 14.6 % (ref 11.5–14.5)
EST. GFR  (AFRICAN AMERICAN): 39 ML/MIN/1.73 M^2
EST. GFR  (NON AFRICAN AMERICAN): 34 ML/MIN/1.73 M^2
ESTIMATED AVG GLUCOSE: 192 MG/DL (ref 68–131)
GLUCOSE SERPL-MCNC: 107 MG/DL (ref 70–110)
HBA1C MFR BLD: 8.3 % (ref 4–5.6)
HCT VFR BLD AUTO: 44.4 % (ref 37–48.5)
HDLC SERPL-MCNC: 37 MG/DL (ref 40–75)
HDLC SERPL: 33.6 % (ref 20–50)
HGB BLD-MCNC: 13.4 G/DL (ref 12–16)
IMM GRANULOCYTES # BLD AUTO: 0.03 K/UL (ref 0–0.04)
IMM GRANULOCYTES NFR BLD AUTO: 0.4 % (ref 0–0.5)
LDLC SERPL CALC-MCNC: 56.4 MG/DL (ref 63–159)
LYMPHOCYTES # BLD AUTO: 2.1 K/UL (ref 1–4.8)
LYMPHOCYTES NFR BLD: 26.5 % (ref 18–48)
MCH RBC QN AUTO: 28.5 PG (ref 27–31)
MCHC RBC AUTO-ENTMCNC: 30.2 G/DL (ref 32–36)
MCV RBC AUTO: 95 FL (ref 82–98)
MONOCYTES # BLD AUTO: 0.6 K/UL (ref 0.3–1)
MONOCYTES NFR BLD: 7.5 % (ref 4–15)
NEUTROPHILS # BLD AUTO: 5.1 K/UL (ref 1.8–7.7)
NEUTROPHILS NFR BLD: 63.3 % (ref 38–73)
NONHDLC SERPL-MCNC: 73 MG/DL
NRBC BLD-RTO: 0 /100 WBC
PHOSPHATE SERPL-MCNC: 4.6 MG/DL (ref 2.7–4.5)
PLATELET # BLD AUTO: 279 K/UL (ref 150–450)
PMV BLD AUTO: 9.9 FL (ref 9.2–12.9)
POTASSIUM SERPL-SCNC: 3.7 MMOL/L (ref 3.5–5.1)
PROT SERPL-MCNC: 7.8 G/DL (ref 6–8.4)
PTH-INTACT SERPL-MCNC: 363.8 PG/ML (ref 9–77)
RBC # BLD AUTO: 4.7 M/UL (ref 4–5.4)
SODIUM SERPL-SCNC: 140 MMOL/L (ref 136–145)
TRIGL SERPL-MCNC: 83 MG/DL (ref 30–150)
URATE SERPL-MCNC: 4.2 MG/DL (ref 2.4–5.7)
WBC # BLD AUTO: 8.09 K/UL (ref 3.9–12.7)

## 2022-06-16 PROCEDURE — 83036 HEMOGLOBIN GLYCOSYLATED A1C: CPT | Performed by: INTERNAL MEDICINE

## 2022-06-16 PROCEDURE — 3074F SYST BP LT 130 MM HG: CPT | Mod: CPTII,S$GLB,, | Performed by: INTERNAL MEDICINE

## 2022-06-16 PROCEDURE — 3074F PR MOST RECENT SYSTOLIC BLOOD PRESSURE < 130 MM HG: ICD-10-PCS | Mod: CPTII,S$GLB,, | Performed by: NURSE PRACTITIONER

## 2022-06-16 PROCEDURE — 80053 COMPREHEN METABOLIC PANEL: CPT | Performed by: INTERNAL MEDICINE

## 2022-06-16 PROCEDURE — 3078F DIAST BP <80 MM HG: CPT | Mod: CPTII,S$GLB,, | Performed by: NURSE PRACTITIONER

## 2022-06-16 PROCEDURE — 99214 PR OFFICE/OUTPT VISIT, EST, LEVL IV, 30-39 MIN: ICD-10-PCS | Mod: S$GLB,,, | Performed by: INTERNAL MEDICINE

## 2022-06-16 PROCEDURE — 99499 UNLISTED E&M SERVICE: CPT | Mod: S$PBB,,, | Performed by: INTERNAL MEDICINE

## 2022-06-16 PROCEDURE — 80061 LIPID PANEL: CPT | Performed by: INTERNAL MEDICINE

## 2022-06-16 PROCEDURE — 1160F RVW MEDS BY RX/DR IN RCRD: CPT | Mod: CPTII,S$GLB,, | Performed by: NURSE PRACTITIONER

## 2022-06-16 PROCEDURE — 1159F PR MEDICATION LIST DOCUMENTED IN MEDICAL RECORD: ICD-10-PCS | Mod: CPTII,S$GLB,, | Performed by: NURSE PRACTITIONER

## 2022-06-16 PROCEDURE — 3079F PR MOST RECENT DIASTOLIC BLOOD PRESSURE 80-89 MM HG: ICD-10-PCS | Mod: CPTII,S$GLB,, | Performed by: INTERNAL MEDICINE

## 2022-06-16 PROCEDURE — 3078F PR MOST RECENT DIASTOLIC BLOOD PRESSURE < 80 MM HG: ICD-10-PCS | Mod: CPTII,S$GLB,, | Performed by: NURSE PRACTITIONER

## 2022-06-16 PROCEDURE — 99499 UNLISTED E&M SERVICE: CPT | Mod: S$GLB,,, | Performed by: NURSE PRACTITIONER

## 2022-06-16 PROCEDURE — 3046F HEMOGLOBIN A1C LEVEL >9.0%: CPT | Mod: CPTII,S$GLB,, | Performed by: INTERNAL MEDICINE

## 2022-06-16 PROCEDURE — 36415 COLL VENOUS BLD VENIPUNCTURE: CPT | Performed by: INTERNAL MEDICINE

## 2022-06-16 PROCEDURE — 3008F PR BODY MASS INDEX (BMI) DOCUMENTED: ICD-10-PCS | Mod: CPTII,S$GLB,, | Performed by: NURSE PRACTITIONER

## 2022-06-16 PROCEDURE — 3008F PR BODY MASS INDEX (BMI) DOCUMENTED: ICD-10-PCS | Mod: CPTII,S$GLB,, | Performed by: INTERNAL MEDICINE

## 2022-06-16 PROCEDURE — 1159F MED LIST DOCD IN RCRD: CPT | Mod: CPTII,S$GLB,, | Performed by: NURSE PRACTITIONER

## 2022-06-16 PROCEDURE — 3008F BODY MASS INDEX DOCD: CPT | Mod: CPTII,S$GLB,, | Performed by: NURSE PRACTITIONER

## 2022-06-16 PROCEDURE — 99499 RISK ADDL DX/OHS AUDIT: ICD-10-PCS | Mod: S$PBB,,, | Performed by: INTERNAL MEDICINE

## 2022-06-16 PROCEDURE — 1160F PR REVIEW ALL MEDS BY PRESCRIBER/CLIN PHARMACIST DOCUMENTED: ICD-10-PCS | Mod: CPTII,S$GLB,, | Performed by: NURSE PRACTITIONER

## 2022-06-16 PROCEDURE — 95251 CONT GLUC MNTR ANALYSIS I&R: CPT | Mod: S$GLB,,, | Performed by: NURSE PRACTITIONER

## 2022-06-16 PROCEDURE — 99214 PR OFFICE/OUTPT VISIT, EST, LEVL IV, 30-39 MIN: ICD-10-PCS | Mod: S$GLB,,, | Performed by: NURSE PRACTITIONER

## 2022-06-16 PROCEDURE — 99999 PR PBB SHADOW E&M-EST. PATIENT-LVL V: ICD-10-PCS | Mod: PBBFAC,,, | Performed by: INTERNAL MEDICINE

## 2022-06-16 PROCEDURE — 84550 ASSAY OF BLOOD/URIC ACID: CPT | Performed by: INTERNAL MEDICINE

## 2022-06-16 PROCEDURE — 83970 ASSAY OF PARATHORMONE: CPT | Performed by: INTERNAL MEDICINE

## 2022-06-16 PROCEDURE — 3046F PR MOST RECENT HEMOGLOBIN A1C LEVEL > 9.0%: ICD-10-PCS | Mod: CPTII,S$GLB,, | Performed by: INTERNAL MEDICINE

## 2022-06-16 PROCEDURE — 3046F HEMOGLOBIN A1C LEVEL >9.0%: CPT | Mod: CPTII,S$GLB,, | Performed by: NURSE PRACTITIONER

## 2022-06-16 PROCEDURE — 99999 PR PBB SHADOW E&M-EST. PATIENT-LVL V: CPT | Mod: PBBFAC,,, | Performed by: INTERNAL MEDICINE

## 2022-06-16 PROCEDURE — 95251 PR GLUCOSE MONITOR, 72 HOUR, PHYS INTERP: ICD-10-PCS | Mod: S$GLB,,, | Performed by: NURSE PRACTITIONER

## 2022-06-16 PROCEDURE — 99999 PR PBB SHADOW E&M-EST. PATIENT-LVL V: ICD-10-PCS | Mod: PBBFAC,,, | Performed by: NURSE PRACTITIONER

## 2022-06-16 PROCEDURE — 3079F DIAST BP 80-89 MM HG: CPT | Mod: CPTII,S$GLB,, | Performed by: INTERNAL MEDICINE

## 2022-06-16 PROCEDURE — 99214 OFFICE O/P EST MOD 30 MIN: CPT | Mod: S$GLB,,, | Performed by: INTERNAL MEDICINE

## 2022-06-16 PROCEDURE — 85025 COMPLETE CBC W/AUTO DIFF WBC: CPT | Performed by: INTERNAL MEDICINE

## 2022-06-16 PROCEDURE — 99214 OFFICE O/P EST MOD 30 MIN: CPT | Mod: S$GLB,,, | Performed by: NURSE PRACTITIONER

## 2022-06-16 PROCEDURE — 1159F PR MEDICATION LIST DOCUMENTED IN MEDICAL RECORD: ICD-10-PCS | Mod: CPTII,S$GLB,, | Performed by: INTERNAL MEDICINE

## 2022-06-16 PROCEDURE — 99999 PR PBB SHADOW E&M-EST. PATIENT-LVL V: CPT | Mod: PBBFAC,,, | Performed by: NURSE PRACTITIONER

## 2022-06-16 PROCEDURE — 99499 RISK ADDL DX/OHS AUDIT: ICD-10-PCS | Mod: S$GLB,,, | Performed by: NURSE PRACTITIONER

## 2022-06-16 PROCEDURE — 84100 ASSAY OF PHOSPHORUS: CPT | Performed by: INTERNAL MEDICINE

## 2022-06-16 PROCEDURE — 3046F PR MOST RECENT HEMOGLOBIN A1C LEVEL > 9.0%: ICD-10-PCS | Mod: CPTII,S$GLB,, | Performed by: NURSE PRACTITIONER

## 2022-06-16 PROCEDURE — 1160F PR REVIEW ALL MEDS BY PRESCRIBER/CLIN PHARMACIST DOCUMENTED: ICD-10-PCS | Mod: CPTII,S$GLB,, | Performed by: INTERNAL MEDICINE

## 2022-06-16 PROCEDURE — 3074F SYST BP LT 130 MM HG: CPT | Mod: CPTII,S$GLB,, | Performed by: NURSE PRACTITIONER

## 2022-06-16 PROCEDURE — 1159F MED LIST DOCD IN RCRD: CPT | Mod: CPTII,S$GLB,, | Performed by: INTERNAL MEDICINE

## 2022-06-16 PROCEDURE — 1160F RVW MEDS BY RX/DR IN RCRD: CPT | Mod: CPTII,S$GLB,, | Performed by: INTERNAL MEDICINE

## 2022-06-16 PROCEDURE — 3074F PR MOST RECENT SYSTOLIC BLOOD PRESSURE < 130 MM HG: ICD-10-PCS | Mod: CPTII,S$GLB,, | Performed by: INTERNAL MEDICINE

## 2022-06-16 PROCEDURE — 3008F BODY MASS INDEX DOCD: CPT | Mod: CPTII,S$GLB,, | Performed by: INTERNAL MEDICINE

## 2022-06-16 RX ORDER — DULAGLUTIDE 0.75 MG/.5ML
0.75 INJECTION, SOLUTION SUBCUTANEOUS
Qty: 4 PEN | Refills: 5 | Status: SHIPPED | OUTPATIENT
Start: 2022-06-16 | End: 2023-04-06

## 2022-06-16 RX ORDER — SPIRONOLACTONE 50 MG/1
50 TABLET, FILM COATED ORAL DAILY
Qty: 90 TABLET | Refills: 3 | Status: SHIPPED | OUTPATIENT
Start: 2022-06-16 | End: 2022-07-13 | Stop reason: SDUPTHER

## 2022-06-16 RX ORDER — INSULIN LISPRO 100 [IU]/ML
40 INJECTION, SOLUTION INTRAVENOUS; SUBCUTANEOUS
Qty: 45 ML | Refills: 5 | Status: SHIPPED | OUTPATIENT
Start: 2022-06-16 | End: 2023-04-06 | Stop reason: SDUPTHER

## 2022-06-16 RX ORDER — INSULIN HUMAN 100 [IU]/ML
INJECTION, SUSPENSION SUBCUTANEOUS
Qty: 15 ML | Refills: 2 | Status: SHIPPED | OUTPATIENT
Start: 2022-06-16 | End: 2023-04-06 | Stop reason: SDUPTHER

## 2022-06-16 RX ORDER — ROSUVASTATIN CALCIUM 40 MG/1
40 TABLET, COATED ORAL NIGHTLY
Qty: 90 TABLET | Refills: 3 | Status: SHIPPED | OUTPATIENT
Start: 2022-06-16 | End: 2023-07-25

## 2022-06-16 NOTE — PATIENT INSTRUCTIONS
Glucoses are much improved.   Patient would like to continue Trulicity with hopes that nausea will improve with time.   Continue Trulicity 0.75 mg weekly, Tresiba 54 units twice daily, and Humulin 24 units at night.   Reduce Humalog from 46 to 40 units before meals to avoid afternoon lows.   Reviewed how to correct low blood sugars - try 1/3 bottle of OJ instead the whole bottle.   Return to clinic 2 months with a1c prior.

## 2022-06-16 NOTE — PROGRESS NOTES
CARDIOVASCULAR PROGRESS NOTE    REASON FOR CONSULT:   Fabiana Chanel is a 46 y.o. female who presents for follow up of NICM/LV thrombus.    PCP: Elvis  EP: Nanci  Endo: María  CHF: Barber  HISTORY OF PRESENT ILLNESS:   The patient returns for follow-up.  In the interim since her last office visit, she was seen by the heart failure service without plans for advanced therapy, primarily because of her renal insufficiency, uncontrolled diabetes, and BMI.  She has also recently hospitalized for chest pain with negative troponins.  She continues take her Lovenox for history of LV apical thrombus.  She denies intercurrent angina, palpitations, syncope, or defibrillator discharges.  She is describing some nausea since starting a diabetic medication and plans follow-up with her diabetologist today.  She otherwise denies PND, orthopnea, or lower extremity edema.  There has been no melena, hematuria, or claudicant symptoms.    CARDIOVASCULAR HISTORY:   NICM (cath 11/2017) EF 20% by echo 12/2018  [employer] ICD (4/17/18 Dr. Christine)  PAF (on enox for LV apical thrombus 4/2022)  MILE on CPAP  LV Thrombus (echo 4/2022) now on enox bid    PAST MEDICAL HISTORY:     Past Medical History:   Diagnosis Date    Atrial fibrillation     Blood clot associated with vein wall inflammation     not dvt    Cardiomyopathy     Normal cors on cath 11/2017    CHF (congestive heart failure)     DM (diabetes mellitus) 9/19/2013    Hyperlipidemia     Hypertension     Psoriasis     Sleep apnea        PAST SURGICAL HISTORY:     Past Surgical History:   Procedure Laterality Date    CARDIAC CATHETERIZATION      COLONOSCOPY      COLONOSCOPY N/A 3/9/2022    Procedure: COLONOSCOPY;  Surgeon: Vielka Burrell MD;  Location: [facility];  Service: Endoscopy;  Laterality: N/A;    DILATION AND CURETTAGE OF UTERUS      ESOPHAGOGASTRODUODENOSCOPY N/A 5/9/2019    Procedure: EGD (ESOPHAGOGASTRODUODENOSCOPY);  Surgeon: Vielka Burrell MD;   Location: [facility] ENDO;  Service: Endoscopy;  Laterality: N/A;    TRANSFORAMINAL EPIDURAL INJECTION OF STEROID N/A 1/6/2022    Procedure: Transforaminal ANKITA L4/L5 L5/S1;  Surgeon: Jed Yeager MD;  Location: [facility];  Service: Pain Management;  Laterality: N/A;       ALLERGIES AND MEDICATION:     Review of patient's allergies indicates:   Allergen Reactions    Pneumococcal 23-abimbola ps vaccine      Previous Medications    ALBUTEROL (VENTOLIN HFA) 90 MCG/ACTUATION INHALER    Inhale 2 puffs into the lungs every 6 (six) hours as needed for Wheezing or Shortness of Breath. Rescue    BACLOFEN (LIORESAL) 10 MG TABLET    Take 1 tablet (10 mg total) by mouth 2 (two) times daily.    BLOOD GLUCOSE CONTROL, HIGH SOLN    1 each by Misc.(Non-Drug; Combo Route) route once. for 1 dose    BLOOD GLUCOSE CONTROL, LOW SOLN    1 each by Misc.(Non-Drug; Combo Route) route once. for 1 dose    BLOOD PRESSURE CUFF MISC    1 kit by Misc.(Non-Drug; Combo Route) route 2 (two) times daily.    BLOOD SUGAR DIAGNOSTIC STRP    Check blood glucose 3x/day.    BLOOD-GLUCOSE METER (TRUE METRIX AIR GLUCOSE METER) MISC    1 each by Misc.(Non-Drug; Combo Route) route 3 (three) times daily.    BLOOD-GLUCOSE METER,CONTINUOUS (DEXCOM G6 ) MISC    Use with dexcom G6 system    BLOOD-GLUCOSE SENSOR (DEXCOM G6 SENSOR) FIDEL    Change sensor every 10 days    BLOOD-GLUCOSE TRANSMITTER (DEXCOM G6 TRANSMITTER) FIDEL    Change every 3 months    DULAGLUTIDE (TRULICITY) 0.75 MG/0.5 ML PEN INJECTOR    Inject 0.75 mg into the skin every 7 days.    ENOXAPARIN (LOVENOX) 120 MG/0.8 ML SYRG    Inject 0.8 mLs (120 mg total) into the skin twice daily    FLUTICASONE PROPIONATE (FLONASE) 50 MCG/ACTUATION NASAL SPRAY    1 spray (50 mcg total) by Each Nostril route 2 (two) times daily as needed for Rhinitis.    FUROSEMIDE (LASIX) 40 MG TABLET    TAKE 1 TABLET BY MOUTH ONCE DAILY *PLEASE  HOLD  WHILE  NOT  DRINKING*    GABAPENTIN (NEURONTIN) 400 MG CAPSULE    Take 1  "capsule (400 mg total) by mouth every evening.    INSULIN DEGLUDEC (TRESIBA FLEXTOUCH U-200) 200 UNIT/ML (3 ML) INSULIN PEN    Inject 54 Units into the skin 2 (two) times a day.    INSULIN LISPRO (HUMALOG KWIKPEN INSULIN) 100 UNIT/ML PEN    Inject 46 Units into the skin 3 (three) times daily before meals.    INSULIN NPH ISOPH U-100 HUMAN (HUMULIN N NPH INSULIN KWIKPEN) 100 UNIT/ML (3 ML) INPN    Inject 24 units nightly at 10 pm    METOPROLOL SUCCINATE (TOPROL-XL) 50 MG 24 HR TABLET    Take 1 tablet (50 mg total) by mouth once daily. Hold SBP <120    MUPIROCIN (BACTROBAN) 2 % OINTMENT    Apply topically nightly.    PEN NEEDLE, DIABETIC (BD LORI 2ND GEN PEN NEEDLE) 32 GAUGE X 5/32" NDLE    USE 1 PEN NEEDLE 4 TIMES DAILY    ROSUVASTATIN (CRESTOR) 40 MG TAB    Take 1 tablet (40 mg total) by mouth every evening.    SPIRONOLACTONE (ALDACTONE) 50 MG TABLET    Take 1 tablet (50 mg total) by mouth once daily. Hold until eating and drinking improves. Need to weight self daily    TRIAMCINOLONE ACETONIDE 0.1% (KENALOG) 0.1 % CREAM    2 (two) times daily. Apply to affected area       SOCIAL HISTORY:     Social History     Socioeconomic History    Marital status:    Tobacco Use    Smoking status: Never Smoker    Smokeless tobacco: Never Used    Tobacco comment: smokes cigars on occasion   Substance and Sexual Activity    Alcohol use: Not Currently     Comment: occasional    Drug use: Not Currently     Types: Marijuana     Comment: occ    Sexual activity: Yes     Partners: Male       FAMILY HISTORY:     Family History   Problem Relation Age of Onset    Hypertension Mother     Hypertension Father     Diabetes Father     Diabetes Maternal Grandmother     Diabetes Paternal Grandmother     Breast cancer Neg Hx     Colon cancer Neg Hx     Ovarian cancer Neg Hx        REVIEW OF SYSTEMS:   Review of Systems   Constitutional: Negative for chills, diaphoresis and fever.   HENT: Negative for nosebleeds.    Eyes: " "Negative for blurred vision, double vision and photophobia.   Respiratory: Negative for hemoptysis, shortness of breath and wheezing.    Cardiovascular: Negative for chest pain, palpitations, orthopnea, claudication, leg swelling and PND.   Gastrointestinal: Positive for nausea. Negative for abdominal pain, blood in stool, heartburn, melena and vomiting.   Genitourinary: Negative for flank pain and hematuria.   Musculoskeletal: Negative for falls, myalgias and neck pain.   Skin: Negative for rash.   Neurological: Negative for dizziness, seizures, loss of consciousness, weakness and headaches.   Endo/Heme/Allergies: Negative for polydipsia. Does not bruise/bleed easily.   Psychiatric/Behavioral: Negative for depression and memory loss. The patient is not nervous/anxious.        PHYSICAL EXAM:     Vitals:    06/16/22 0841   BP: 128/80   Pulse: 92   Resp: 18    Body mass index is 42.12 kg/m².  Weight: 114.8 kg (253 lb 1.4 oz)   Height: 5' 5" (165.1 cm)     Physical Exam  Vitals reviewed.   Constitutional:       General: She is not in acute distress.     Appearance: She is well-developed. She is obese. She is not ill-appearing, toxic-appearing or diaphoretic.   HENT:      Head: Normocephalic and atraumatic.   Eyes:      General: No scleral icterus.     Extraocular Movements: Extraocular movements intact.      Conjunctiva/sclera: Conjunctivae normal.      Pupils: Pupils are equal, round, and reactive to light.   Neck:      Thyroid: No thyromegaly.      Vascular: Normal carotid pulses. No carotid bruit or JVD.      Trachea: Trachea normal. No tracheal deviation.   Cardiovascular:      Rate and Rhythm: Normal rate and regular rhythm.      Pulses:           Carotid pulses are 2+ on the right side and 2+ on the left side.     Heart sounds: S1 normal and S2 normal. No murmur heard.    No friction rub. No gallop.      Comments: L side ICD in place.  Pulmonary:      Effort: Pulmonary effort is normal. No respiratory distress. "      Breath sounds: Normal breath sounds. No stridor. No wheezing, rhonchi or rales.   Chest:      Chest wall: No tenderness.   Abdominal:      General: There is no distension.      Palpations: Abdomen is soft.   Musculoskeletal:         General: No swelling or tenderness. Normal range of motion.      Cervical back: Normal range of motion and neck supple. No edema or rigidity.      Right lower leg: No edema.      Left lower leg: No edema.   Feet:      Right foot:      Skin integrity: No ulcer.      Left foot:      Skin integrity: No ulcer.   Skin:     General: Skin is warm and dry.      Coloration: Skin is not jaundiced.   Neurological:      General: No focal deficit present.      Mental Status: She is alert and oriented to person, place, and time.      Cranial Nerves: No cranial nerve deficit.   Psychiatric:         Mood and Affect: Mood normal.         Speech: Speech normal.         Behavior: Behavior normal. Behavior is cooperative.         DATA:   EKG: (personally reviewed tracing)  4/29/22 SR 91, LVH, PRWP, PVC    Laboratory:  CBC:  Recent Labs   Lab 05/02/22  0246 05/17/22  1727 05/18/22  0506   WBC 8.85 9.39 9.62   Hemoglobin 12.7 12.9 11.5 L   Hematocrit 41.1 41.0 38.5   Platelets 224 258 238       CHEMISTRIES:  Recent Labs   Lab 04/30/21  0401 05/02/21  0558 05/26/21  2104 04/27/22  2355 04/29/22  1846 05/02/22  0246 05/17/22  1827 05/18/22  0506   Glucose 344 H 290 H   < > 303 H   < > 240 H 233 H 160 H   Sodium 133 L 137   < > 137   < > 137 138 140   Potassium 4.4 4.4   < > 4.2   < > 4.4 3.2 L 3.6   BUN 30 H 29 H   < > 19   < > 32 H 21 H 18   Creatinine 1.8 H 1.5 H   < > 2.7 H   < > 3.0 H 2.1 H 1.9 H   eGFR if  39 A 48 A   < > 24 A   < > 20.8 A 32 A 36 A   eGFR if non  34 A 42 A   < > 21 A   < > 18.1 A 28 A 31 A   Calcium 8.6 L 9.2   < > 9.8   < > 9.7 9.6 8.6 L   Magnesium 2.4 2.4  --  2.7 H  --   --   --   --     < > = values in this interval not displayed.       CARDIAC  BIOMARKERS:  Recent Labs   Lab 05/02/21  0558 05/26/21  2104 03/18/22  1847 04/24/22  1647 04/29/22  1846 05/17/22  1727 05/17/22  2116 05/18/22  0506   CPK 72  --  248 H  --  252 H  --   --   --    Troponin I  --    < >  --    < > 0.016 0.026 0.021 0.017    < > = values in this interval not displayed.       COAGS:  Recent Labs   Lab 11/04/20  1003 02/26/21  0103   INR 0.9 1.0       LIPIDS/LFTS:  Recent Labs   Lab 02/19/20  1022 04/12/20  1512 12/29/20  0808 02/26/21  0031 10/14/21  0825 03/18/22  1847 05/02/22  0246 05/17/22  1827 05/18/22  0506   Cholesterol 143  --  135  --  111 L  --   --   --   --    Triglycerides 95  --  158 H  --  73  --   --   --   --    HDL 42  --  42  --  38 L  --   --   --   --    LDL Cholesterol 82.0  --  61.4 L  --  58.4 L  --   --   --   --    Non-HDL Cholesterol 101  --  93  --  73  --   --   --   --    AST  --    < >  --    < > 19   < > 17 19 15   ALT  --    < >  --    < > 20   < > 19 35 25    < > = values in this interval not displayed.       Lab Results   Component Value Date    TSH 1.689 04/27/2022       Cardiovascular Testing:  Echo 4/30/22 (compared to report 10/2019: EF unchanged, LV thrombus new)  · The left ventricle is moderately enlarged with eccentric hypertrophy and severely decreased systolic function.  · The estimated ejection fraction is 20%.  · There is severe left ventricular global hypokinesis.  · There appears to be a thrombus along the apical lateral wall of the left ventricle.  · Grade I left ventricular diastolic dysfunction.  · Normal right ventricular size with mildly reduced right ventricular systolic function.  · The estimated PA systolic pressure is 26 mmHg.  · Normal central venous pressure (3 mmHg).    Cath 11/27/17  RA 5  RV 61/6  PA 60/29/42  PAWP 18  /41  Ao 163/107/130  CO 5.6 L/min  LVEF: 20% by echo with severe LV dilation  Wall Motion: Severe global HK  Dominance: Co-dom  LM: normal  LAD: normal  LCx: normal  RCA: normal  Hemostasis:  RFA/V  manual compression  Impression:  Normal cors  Elevated LVEDP with transmission of pressures to pulmonary circuit  Above c/w Severe NICM  RFA/V manual compression  Plan:  Cont med rx  Stop Plavix  Stop amlodipine  Start Hydrala/Imdur/Lasix  Goal net neg 1L/day  Cont BBl/ACEi  Repeat echo in 3 months for reassessment of LV fxn and need for ICD  Follow up with Dr. Yanez 1 week after discharge     Stress Test: L MPI 9/20/13  Nuclear Quantitative Functional Analysis:   LVEF: 31 %  Impression: NORMAL MYOCARDIAL PERFUSION  1. The perfusion scan is free of evidence for myocardial ischemia or injury.   2. There is a moderate intensity fixed defect in the inferior wall of the left ventricle, secondary to diaphragm attenuation.   3. There is abnormal wall motion at rest showing moderate global hypokinesis of the left ventricle.   4. There is resting LV dysfunction with a reduced ejection fraction of 31 %.   5. The ventricular volumes are normal at rest and stress.   6. The extracardiac distribution of radioactivity is normal.     ASSESSMENT:   # NICM, EF persistently reduced 20% by echo 10/2019.  Euvolemic on exam.  # LV thrombus on echo 4/2022 (was on eliquis->enox 120mg bid)  # PAF, in SR (prev on eliquis, intol of Xarelto, now on enox for LVthrombus)  # S/P [employer] [facility] ICD 4/2018 (Dr. Christine), atrial lead placed for AF monitoring  # HTN, controlled  # HLP, on rosuva 40mg  # DM  # BMI 42, up 2 unit(s) vs last OV  # MILE, on CPAP  # CKD3b    PLAN:   Cont med rx  Cont enox 120mg sq bid  Enroll in digital med programs  [facility]  Follow up with endocrine as planned today  Follow up with neph as planned 7/6/22  RTC 1 months with surveillance echo (with contrast).  If LV thrombus resolved, will change enox to eliquis 5mg bid.      Oziel Yanez MD, [facility]

## 2022-06-16 NOTE — PROGRESS NOTES
CC: This 46 y.o. Black or  female  is here for evaluation of  T2DM along with comorbidities indicated in the Visit Diagnosis section of this encounter.    HPI: Fabiana Chanel was diagnosed with T2DM in 2013. Metformin and a sulfonylurea started at the time of diagnosis.       Prior visit 5/25/22  BGs have improved since she increased insulin doses after lov.   She is interested in resuming Trulicity.   Plan Increase Humalog from 40 to 46 units before each meal with correction scale.   Continue Tresiba at 54 units twice daily for now.   Start Humulin N 24 units nightly at 10-11 pm. Will titrate up as needed.   Contact [employer] for Dexcom supplies.   Resume Trulicity but at low dose of 0.75 mg weekly. Cannot tolerate 1.5 mg dose.   rtc in 1 mo     Interval history  She has started Humaulin 24 units hs and Trulicity 0.75 mg weekly. Had diarrhea and nausea initially x 2 weeks. No more diarrhea now but continues with nausea. Denies vomiting.   BGs have improved significantly.       PMHX: CHF,  MILE on cpap, atrial fibrillation, MI, ICD placement, NICM (cath 11/2017) EF 20% by echo 12/2018    LAST DIABETES EDUCATION: 6/19/19, 11/2021 for Dexcom start       RECENT ILLNESSES/HOSPITALIZATIONS - -  Admitted 12/28/18 x 2 nights for acute on chronic HF      HOSPITALIZED FOR DIABETES  -  Yes - for hyperglycemia     DIABETES MEDICATIONS:    Humulin N 24 units hs 10 pm   Trulicity 0.75 mg weekly on Sundays   Tresiba 54 units bid   Humalog Kwikpen 46 units ac with correction scale : 150-200=+2, 201-250=+4; 251-300=+6; 301-350=+8, over 350=+10 units        Misses medication doses - No    She injects Humalog immediately after she eats    DM COMPLICATIONS: peripheral neuropathy and cardiovascular disease    SIGNIFICANT DIABETES MED HISTORY:   diarrhea on metformin 1000 mg instant release; cannot afford topical.    Pt unable to tolerate metformin ER d/t diarrhea even on 500 mg twice daily.   Trulicity and Novolog  started 11/2019; unable to tolerate Trulicity 1.5 mg d/t nausea and bloating   Jardiance - recurrent  infection   Ozempic - nausea       SELF MONITORING BLOOD GLUCOSE:monitors glucose with Dexcom CGM .     CGM interpretation: glucoses overnight much better controlled and overall lower than at lov. Highest glucose noted after mild hypoglycemia, presumably d/t overcorrection. Lows noted in the afternoon, d/t excessive Tresiba versus Humalog dose.       HYPOGLYCEMIC EPISODES: corrects with an 11 oz bottle of juice (40 grams CHO)   Symptoms start at 60s, feels shaky.      CURRENT DIET: drinks water. No more juice or tea.  Eats 2-3 meals/day.     CURRENT EXERCISE: can tolerate walking up 2 blocks before she gets DIAZ       /60   Pulse 85   Temp 98.6 °F (37 °C)   Wt 114.3 kg (252 lb)   BMI 41.93 kg/m²       ROS:   CONSTITUTIONAL: Appetite low, + Fatigue   GI:  + Nausea     PHYSICAL EXAM:  GENERAL: Well developed, well nourished. No acute distress.   PSYCH: AAOx3,  conversant, well-groomed. Judgement and insight good. Appropriate mood and affect.   NEURO: Cranial nerves grossly intact. Speech clear, no tremor.           Hemoglobin A1C   Date Value Ref Range Status   03/09/2022 10.0 (H) 4.0 - 5.6 % Final     Comment:     ADA Screening Guidelines:  5.7-6.4%  Consistent with prediabetes  >or=6.5%  Consistent with diabetes    High levels of fetal hemoglobin interfere with the HbA1C  assay. Heterozygous hemoglobin variants (HbS, HgC, etc)do  not significantly interfere with this assay.   However, presence of multiple variants may affect accuracy.     10/14/2021 11.8 (H) 4.0 - 5.6 % Final     Comment:     ADA Screening Guidelines:  5.7-6.4%  Consistent with prediabetes  >or=6.5%  Consistent with diabetes    High levels of fetal hemoglobin interfere with the HbA1C  assay. Heterozygous hemoglobin variants (HbS, HgC, etc)do  not significantly interfere with this assay.   However, presence of multiple variants may  affect accuracy.     04/28/2021 >14.0 (H) 4.0 - 5.6 % Final     Comment:     ADA Screening Guidelines:  5.7-6.4%  Consistent with prediabetes  >or=6.5%  Consistent with diabetes    High levels of fetal hemoglobin interfere with the HbA1C  assay. Heterozygous hemoglobin variants (HbS, HgC, etc)do  not significantly interfere with this assay.   However, presence of multiple variants may affect accuracy.             Chemistry        Component Value Date/Time     05/18/2022 0506    K 3.6 05/18/2022 0506     05/18/2022 0506    CO2 30 (H) 05/18/2022 0506    BUN 18 05/18/2022 0506    CREATININE 1.9 (H) 05/18/2022 0506     (H) 05/18/2022 0506        Component Value Date/Time    CALCIUM 8.6 (L) 05/18/2022 0506    ALKPHOS 74 05/18/2022 0506    AST 15 05/18/2022 0506    ALT 25 05/18/2022 0506    BILITOT 0.5 05/18/2022 0506    ESTGFRAFRICA 36 (A) 05/18/2022 0506    EGFRNONAA 31 (A) 05/18/2022 0506          Lab Results   Component Value Date    LDLCALC 58.4 (L) 10/14/2021            Ref. Range 10/14/2021 08:25   Cholesterol Latest Ref Range: 120 - 199 mg/dL 111 (L)   HDL Latest Ref Range: 40 - 75 mg/dL 38 (L)   HDL/Cholesterol Ratio Latest Ref Range: 20.0 - 50.0 % 34.2   LDL Cholesterol External Latest Ref Range: 63.0 - 159.0 mg/dL 58.4 (L)   Non-HDL Cholesterol Latest Units: mg/dL 73   Total Cholesterol/HDL Ratio Latest Ref Range: 2.0 - 5.0  2.9   Triglycerides Latest Ref Range: 30 - 150 mg/dL 73     Lab Results   Component Value Date    MICALBCREAT 103.2 (H) 12/29/2020           ASSESSMENT and PLAN:    A1C GOAL: < 7 %     1. Type 2 diabetes, uncontrolled, with neuropathy  Glucoses are much improved.   Patient would like to continue Trulicity with hopes that nausea will improve with time.   Continue Trulicity 0.75 mg weekly, Tresiba 54 units twice daily, and Humulin 24 units at night.   Reduce Humalog from 46 to 40 units before meals to avoid afternoon lows.   Reviewed how to correct low blood sugars - try  1/3 bottle of OJ instead the whole bottle.   Return to clinic 2 months with a1c prior.     insulin lispro (HUMALOG KWIKPEN INSULIN) 100 unit/mL pen    Hemoglobin A1C   2. Morbid obesity, unspecified obesity type  Increases insulin resistance.      3. CKD (chronic kidney disease) stage 4, GFR 15-29 ml/min  Avoid hypoglycemia.   Has appt with Nephrology next month.          Orders Placed This Encounter   Procedures    Hemoglobin A1C     Standing Status:   Future     Standing Expiration Date:   8/15/2023        Follow up in about 2 months (around 8/16/2022).       Patient is testing blood sugars 4x/day and taking 4 injections of insulin a day. The patient's insulin treatment regimen requires frequent adjustments by the patient on the basis of therapeutic CGM testing results.

## 2022-06-21 ENCOUNTER — PATIENT MESSAGE (OUTPATIENT)
Dept: ADMINISTRATIVE | Facility: OTHER | Age: 47
End: 2022-06-21
Payer: MEDICARE

## 2022-06-28 NOTE — PROGRESS NOTES
Subjective:       Patient ID: Fabiana Chanel is a 46 y.o. female.    Chief Complaint: Follow-up      HPI  46-year-old female presents for hospital follow-up.  Went to ED for shortness of breath.  Was found to have it done but is in her left ventricle.  Had a follow-up with Cardiology.  States she is taking her medications.      Review of Systems   Constitutional: Negative.    HENT: Negative.    Respiratory: Negative.    Cardiovascular: Negative.    Gastrointestinal: Negative.    Endocrine: Negative.    Genitourinary: Negative.    Musculoskeletal: Negative.    Neurological: Negative.    Psychiatric/Behavioral: Negative.           Past Medical History:   Diagnosis Date    Atrial fibrillation     Blood clot associated with vein wall inflammation     not dvt    Cardiomyopathy     Normal cors on cath 11/2017    CHF (congestive heart failure)     DM (diabetes mellitus) 9/19/2013    Hyperlipidemia     Hypertension     Psoriasis     Sleep apnea      Past Surgical History:   Procedure Laterality Date    CARDIAC CATHETERIZATION      COLONOSCOPY      COLONOSCOPY N/A 3/9/2022    Procedure: COLONOSCOPY;  Surgeon: Vielka Burrell MD;  Location: [facility];  Service: Endoscopy;  Laterality: N/A;    DILATION AND CURETTAGE OF UTERUS      ESOPHAGOGASTRODUODENOSCOPY N/A 5/9/2019    Procedure: EGD (ESOPHAGOGASTRODUODENOSCOPY);  Surgeon: Vielka Burrell MD;  Location: [facility];  Service: Endoscopy;  Laterality: N/A;    TRANSFORAMINAL EPIDURAL INJECTION OF STEROID N/A 1/6/2022    Procedure: Transforaminal ANKITA L4/L5 L5/S1;  Surgeon: Jed Yeager MD;  Location: [facility];  Service: Pain Management;  Laterality: N/A;     Family History   Problem Relation Age of Onset    Hypertension Mother     Hypertension Father     Diabetes Father     Diabetes Maternal Grandmother     Diabetes Paternal Grandmother     Breast cancer Neg Hx     Colon cancer Neg Hx     Ovarian cancer Neg Hx      Social  "History     Socioeconomic History    Marital status:    Tobacco Use    Smoking status: Never Smoker    Smokeless tobacco: Never Used    Tobacco comment: smokes cigars on occasion   Substance and Sexual Activity    Alcohol use: Not Currently     Comment: occasional    Drug use: Not Currently     Types: Marijuana     Comment: occ    Sexual activity: Yes     Partners: Male       Current Outpatient Medications:     BLOOD PRESSURE CUFF Misc, 1 kit by Misc.(Non-Drug; Combo Route) route 2 (two) times daily., Disp: 1 each, Rfl: 0    blood sugar diagnostic Strp, Check blood glucose 3x/day., Disp: 300 strip, Rfl: 3    blood-glucose meter,continuous (DEXCOM G6 ) Misc, Use with dexcom G6 system, Disp: 1 each, Rfl: 0    blood-glucose sensor (DEXCOM G6 SENSOR) Lupe, Change sensor every 10 days, Disp: 12 each, Rfl: 3    blood-glucose transmitter (DEXCOM G6 TRANSMITTER) Lupe, Change every 3 months, Disp: 1 each, Rfl: 3    enoxaparin (LOVENOX) 120 mg/0.8 mL Syrg, Inject 0.8 mLs (120 mg total) into the skin twice daily, Disp: 144 mL, Rfl: 3    fluticasone propionate (FLONASE) 50 mcg/actuation nasal spray, 1 spray (50 mcg total) by Each Nostril route 2 (two) times daily as needed for Rhinitis., Disp: 15 g, Rfl: 0    gabapentin (NEURONTIN) 400 MG capsule, Take 1 capsule (400 mg total) by mouth every evening., Disp: 180 capsule, Rfl: 1    insulin degludec (TRESIBA FLEXTOUCH U-200) 200 unit/mL (3 mL) insulin pen, Inject 54 Units into the skin 2 (two) times a day., Disp: 6 pen, Rfl: 5    metoprolol succinate (TOPROL-XL) 50 MG 24 hr tablet, Take 1 tablet (50 mg total) by mouth once daily. Hold SBP <120, Disp: 90 tablet, Rfl: 1    pen needle, diabetic (BD LORI 2ND GEN PEN NEEDLE) 32 gauge x 5/32" Ndle, USE 1 PEN NEEDLE 4 TIMES DAILY, Disp: 400 each, Rfl: 3    triamcinolone acetonide 0.1% (KENALOG) 0.1 % cream, 2 (two) times daily. Apply to affected area, Disp: , Rfl: 4    albuterol (VENTOLIN HFA) 90 " "mcg/actuation inhaler, Inhale 2 puffs into the lungs every 6 (six) hours as needed for Wheezing or Shortness of Breath. Rescue, Disp: 18 g, Rfl: 2    baclofen (LIORESAL) 10 MG tablet, Take 1 tablet (10 mg total) by mouth 2 (two) times daily., Disp: 90 tablet, Rfl: 0    blood glucose control, high Soln, 1 each by Misc.(Non-Drug; Combo Route) route once. for 1 dose, Disp: 1 each, Rfl: 3    blood glucose control, low Soln, 1 each by Misc.(Non-Drug; Combo Route) route once. for 1 dose, Disp: 1 each, Rfl: 3    blood-glucose meter (TRUE METRIX AIR GLUCOSE METER) Misc, 1 each by Misc.(Non-Drug; Combo Route) route 3 (three) times daily., Disp: 1 each, Rfl: 0    dulaglutide (TRULICITY) 0.75 mg/0.5 mL pen injector, Inject 0.75 mg into the skin every 7 days., Disp: 4 pen, Rfl: 5    furosemide (LASIX) 40 MG tablet, TAKE 1 TABLET BY MOUTH ONCE DAILY *PLEASE  HOLD  WHILE  NOT  DRINKING*, Disp: 90 tablet, Rfl: 1    insulin lispro (HUMALOG KWIKPEN INSULIN) 100 unit/mL pen, Inject 40 Units into the skin 3 (three) times daily before meals., Disp: 45 mL, Rfl: 5    insulin NPH isoph U-100 human (HUMULIN N NPH INSULIN KWIKPEN) 100 unit/mL (3 mL) InPn, Inject 24 units nightly at 10 pm, Disp: 15 mL, Rfl: 2    mupirocin (BACTROBAN) 2 % ointment, Apply topically nightly., Disp: , Rfl:     rosuvastatin (CRESTOR) 40 MG Tab, Take 1 tablet (40 mg total) by mouth every evening., Disp: 90 tablet, Rfl: 3    spironolactone (ALDACTONE) 50 MG tablet, Take 1 tablet (50 mg total) by mouth once daily. Hold until eating and drinking improves. Need to weight self daily, Disp: 90 tablet, Rfl: 3   Objective:      Vitals:    06/10/22 1019   BP: 108/70   BP Location: Right arm   Patient Position: Sitting   BP Method: Small (Manual)   Pulse: 75   Resp: 18   Temp: 98.1 °F (36.7 °C)   TempSrc: Oral   SpO2: 95%   Weight: 114.4 kg (252 lb 3.3 oz)   Height: 5' 5" (1.651 m)       Physical Exam  Constitutional:       General: She is not in acute " distress.  HENT:      Head: Normocephalic and atraumatic.   Eyes:      Conjunctiva/sclera: Conjunctivae normal.   Cardiovascular:      Rate and Rhythm: Normal rate and regular rhythm.      Heart sounds: Normal heart sounds. No murmur heard.    No friction rub. No gallop.   Pulmonary:      Effort: Pulmonary effort is normal.      Breath sounds: Normal breath sounds. No wheezing or rales.   Musculoskeletal:      Cervical back: Neck supple.   Skin:     General: Skin is warm and dry.   Neurological:      Mental Status: She is alert and oriented to person, place, and time.   Psychiatric:         Behavior: Behavior normal.         Thought Content: Thought content normal.         Judgment: Judgment normal.            Assessment:       1. LV (left ventricular) mural thrombus without MI    2. Other primary thrombophilia    3. Major depressive disorder, single episode, moderate    4. Convulsions, unspecified convulsion type    5. Type 2 diabetes mellitus with stage 4 chronic kidney disease, without long-term current use of insulin    6. Chronic combined systolic and diastolic heart failure        Plan:       LV (left ventricular) mural thrombus without MI    Other primary thrombophilia    Major depressive disorder, single episode, moderate    Convulsions, unspecified convulsion type    Type 2 diabetes mellitus with stage 4 chronic kidney disease, without long-term current use of insulin    Chronic combined systolic and diastolic heart failure  -     Discontinue: rosuvastatin (CRESTOR) 40 MG Tab; Take 1 tablet (40 mg total) by mouth every evening.  Dispense: 90 tablet; Refill: 1  -     furosemide (LASIX) 40 MG tablet; TAKE 1 TABLET BY MOUTH ONCE DAILY *PLEASE  HOLD  WHILE  NOT  DRINKING*  Dispense: 90 tablet; Refill: 1    Other orders  -     Discontinue: spironolactone (ALDACTONE) 50 MG tablet; Take 1 tablet (50 mg total) by mouth once daily. Hold until eating and drinking improves. Need to weight self daily  Dispense: 90  tablet; Refill: 3  -     baclofen (LIORESAL) 10 MG tablet; Take 1 tablet (10 mg total) by mouth 2 (two) times daily.  Dispense: 90 tablet; Refill: 0    Continue meds.  Advised patient continue follow-up with her specialists patient need refills on her other medications.    40 minute visit with more than 50% of time spent on counseling.               Patient note was created using [employer].  Any errors in syntax or even information may not have been identified and edited on initial review prior to signing this note.

## 2022-07-01 ENCOUNTER — LAB VISIT (OUTPATIENT)
Dept: LAB | Facility: HOSPITAL | Age: 47
End: 2022-07-01
Attending: INTERNAL MEDICINE
Payer: MEDICARE

## 2022-07-01 DIAGNOSIS — N18.4 CHRONIC KIDNEY DISEASE, STAGE IV (SEVERE): Primary | ICD-10-CM

## 2022-07-01 LAB
ALBUMIN SERPL BCP-MCNC: 3.7 G/DL (ref 3.5–5.2)
ALP SERPL-CCNC: 73 U/L (ref 55–135)
ALT SERPL W/O P-5'-P-CCNC: 17 U/L (ref 10–44)
ANION GAP SERPL CALC-SCNC: 11 MMOL/L (ref 8–16)
AST SERPL-CCNC: 17 U/L (ref 10–40)
BACTERIA #/AREA URNS HPF: NORMAL /HPF
BASOPHILS # BLD AUTO: 0.04 K/UL (ref 0–0.2)
BASOPHILS NFR BLD: 0.5 % (ref 0–1.9)
BILIRUB SERPL-MCNC: 0.6 MG/DL (ref 0.1–1)
BILIRUB UR QL STRIP: NEGATIVE
BUN SERPL-MCNC: 20 MG/DL (ref 6–20)
CALCIUM SERPL-MCNC: 9.5 MG/DL (ref 8.7–10.5)
CHLORIDE SERPL-SCNC: 98 MMOL/L (ref 95–110)
CHOLEST SERPL-MCNC: 105 MG/DL (ref 120–199)
CHOLEST/HDLC SERPL: 3.5 {RATIO} (ref 2–5)
CLARITY UR: CLEAR
CO2 SERPL-SCNC: 32 MMOL/L (ref 23–29)
COLOR UR: YELLOW
CREAT SERPL-MCNC: 1.9 MG/DL (ref 0.5–1.4)
CREAT UR-MCNC: 183.2 MG/DL (ref 15–325)
DIFFERENTIAL METHOD: ABNORMAL
EOSINOPHIL # BLD AUTO: 0.1 K/UL (ref 0–0.5)
EOSINOPHIL NFR BLD: 1.1 % (ref 0–8)
ERYTHROCYTE [DISTWIDTH] IN BLOOD BY AUTOMATED COUNT: 14.3 % (ref 11.5–14.5)
EST. GFR  (AFRICAN AMERICAN): 36 ML/MIN/1.73 M^2
EST. GFR  (NON AFRICAN AMERICAN): 31 ML/MIN/1.73 M^2
GLUCOSE SERPL-MCNC: 73 MG/DL (ref 70–110)
GLUCOSE UR QL STRIP: NEGATIVE
HCT VFR BLD AUTO: 44.2 % (ref 37–48.5)
HDLC SERPL-MCNC: 30 MG/DL (ref 40–75)
HDLC SERPL: 28.6 % (ref 20–50)
HGB BLD-MCNC: 13.9 G/DL (ref 12–16)
HGB UR QL STRIP: ABNORMAL
HYALINE CASTS #/AREA URNS LPF: 0 /LPF
IMM GRANULOCYTES # BLD AUTO: 0.03 K/UL (ref 0–0.04)
IMM GRANULOCYTES NFR BLD AUTO: 0.4 % (ref 0–0.5)
KETONES UR QL STRIP: NEGATIVE
LDLC SERPL CALC-MCNC: 58.4 MG/DL (ref 63–159)
LEUKOCYTE ESTERASE UR QL STRIP: NEGATIVE
LYMPHOCYTES # BLD AUTO: 1.9 K/UL (ref 1–4.8)
LYMPHOCYTES NFR BLD: 24.1 % (ref 18–48)
MCH RBC QN AUTO: 28.3 PG (ref 27–31)
MCHC RBC AUTO-ENTMCNC: 31.4 G/DL (ref 32–36)
MCV RBC AUTO: 90 FL (ref 82–98)
MICROSCOPIC COMMENT: NORMAL
MONOCYTES # BLD AUTO: 0.6 K/UL (ref 0.3–1)
MONOCYTES NFR BLD: 7.5 % (ref 4–15)
NEUTROPHILS # BLD AUTO: 5.2 K/UL (ref 1.8–7.7)
NEUTROPHILS NFR BLD: 66.4 % (ref 38–73)
NITRITE UR QL STRIP: NEGATIVE
NONHDLC SERPL-MCNC: 75 MG/DL
NRBC BLD-RTO: 0 /100 WBC
PH UR STRIP: 6 [PH] (ref 5–8)
PHOSPHATE SERPL-MCNC: 2.9 MG/DL (ref 2.7–4.5)
PLATELET # BLD AUTO: 281 K/UL (ref 150–450)
PMV BLD AUTO: 9.8 FL (ref 9.2–12.9)
POTASSIUM SERPL-SCNC: 3.1 MMOL/L (ref 3.5–5.1)
PROT SERPL-MCNC: 8 G/DL (ref 6–8.4)
PROT UR QL STRIP: ABNORMAL
PROT UR-MCNC: 113 MG/DL
PROT/CREAT UR: 0.62 MG/G{CREAT} (ref 0–0.2)
PTH-INTACT SERPL-MCNC: 312.3 PG/ML (ref 9–77)
RBC # BLD AUTO: 4.91 M/UL (ref 4–5.4)
RBC #/AREA URNS HPF: 0 /HPF (ref 0–4)
SODIUM SERPL-SCNC: 141 MMOL/L (ref 136–145)
SP GR UR STRIP: 1.01 (ref 1–1.03)
SQUAMOUS #/AREA URNS HPF: 4 /HPF
TRIGL SERPL-MCNC: 83 MG/DL (ref 30–150)
URATE SERPL-MCNC: 5.2 MG/DL (ref 2.4–5.7)
URN SPEC COLLECT METH UR: ABNORMAL
UROBILINOGEN UR STRIP-ACNC: NEGATIVE EU/DL
WBC # BLD AUTO: 7.84 K/UL (ref 3.9–12.7)
WBC #/AREA URNS HPF: 1 /HPF (ref 0–5)

## 2022-07-01 PROCEDURE — 83970 ASSAY OF PARATHORMONE: CPT | Performed by: INTERNAL MEDICINE

## 2022-07-01 PROCEDURE — 83036 HEMOGLOBIN GLYCOSYLATED A1C: CPT | Mod: GA | Performed by: INTERNAL MEDICINE

## 2022-07-01 PROCEDURE — 85025 COMPLETE CBC W/AUTO DIFF WBC: CPT | Performed by: INTERNAL MEDICINE

## 2022-07-01 PROCEDURE — 81000 URINALYSIS NONAUTO W/SCOPE: CPT | Performed by: INTERNAL MEDICINE

## 2022-07-01 PROCEDURE — 80061 LIPID PANEL: CPT | Performed by: INTERNAL MEDICINE

## 2022-07-01 PROCEDURE — 80053 COMPREHEN METABOLIC PANEL: CPT | Performed by: INTERNAL MEDICINE

## 2022-07-01 PROCEDURE — 84550 ASSAY OF BLOOD/URIC ACID: CPT | Performed by: INTERNAL MEDICINE

## 2022-07-01 PROCEDURE — 36415 COLL VENOUS BLD VENIPUNCTURE: CPT | Performed by: INTERNAL MEDICINE

## 2022-07-01 PROCEDURE — 84100 ASSAY OF PHOSPHORUS: CPT | Performed by: INTERNAL MEDICINE

## 2022-07-01 PROCEDURE — 82570 ASSAY OF URINE CREATININE: CPT | Performed by: INTERNAL MEDICINE

## 2022-07-02 LAB
ESTIMATED AVG GLUCOSE: 174 MG/DL (ref 68–131)
HBA1C MFR BLD: 7.7 % (ref 4–5.6)

## 2022-07-06 ENCOUNTER — OFFICE VISIT (OUTPATIENT)
Dept: NEPHROLOGY | Facility: CLINIC | Age: 47
End: 2022-07-06
Payer: MEDICARE

## 2022-07-06 VITALS
WEIGHT: 244.69 LBS | SYSTOLIC BLOOD PRESSURE: 100 MMHG | HEART RATE: 88 BPM | DIASTOLIC BLOOD PRESSURE: 78 MMHG | BODY MASS INDEX: 40.72 KG/M2 | OXYGEN SATURATION: 95 %

## 2022-07-06 DIAGNOSIS — E78.2 MIXED HYPERLIPIDEMIA: ICD-10-CM

## 2022-07-06 DIAGNOSIS — N18.4 TYPE 2 DIABETES MELLITUS WITH STAGE 4 CHRONIC KIDNEY DISEASE, WITHOUT LONG-TERM CURRENT USE OF INSULIN: ICD-10-CM

## 2022-07-06 DIAGNOSIS — N18.4 STAGE 4 CHRONIC KIDNEY DISEASE: ICD-10-CM

## 2022-07-06 DIAGNOSIS — I50.42 CHRONIC COMBINED SYSTOLIC AND DIASTOLIC HEART FAILURE: ICD-10-CM

## 2022-07-06 DIAGNOSIS — E11.22 TYPE 2 DIABETES MELLITUS WITH STAGE 4 CHRONIC KIDNEY DISEASE, WITHOUT LONG-TERM CURRENT USE OF INSULIN: ICD-10-CM

## 2022-07-06 DIAGNOSIS — R80.9 PROTEINURIA, UNSPECIFIED TYPE: ICD-10-CM

## 2022-07-06 DIAGNOSIS — G47.33 OSA TREATED WITH BIPAP: ICD-10-CM

## 2022-07-06 DIAGNOSIS — I10 ESSENTIAL HYPERTENSION: ICD-10-CM

## 2022-07-06 DIAGNOSIS — N18.32 STAGE 3B CHRONIC KIDNEY DISEASE: Primary | ICD-10-CM

## 2022-07-06 PROCEDURE — 3066F NEPHROPATHY DOC TX: CPT | Mod: CPTII,S$GLB,, | Performed by: INTERNAL MEDICINE

## 2022-07-06 PROCEDURE — 3078F DIAST BP <80 MM HG: CPT | Mod: CPTII,S$GLB,, | Performed by: INTERNAL MEDICINE

## 2022-07-06 PROCEDURE — 99999 PR PBB SHADOW E&M-EST. PATIENT-LVL IV: CPT | Mod: PBBFAC,,, | Performed by: INTERNAL MEDICINE

## 2022-07-06 PROCEDURE — 3051F PR MOST RECENT HEMOGLOBIN A1C LEVEL 7.0 - < 8.0%: ICD-10-PCS | Mod: CPTII,S$GLB,, | Performed by: INTERNAL MEDICINE

## 2022-07-06 PROCEDURE — 3008F PR BODY MASS INDEX (BMI) DOCUMENTED: ICD-10-PCS | Mod: CPTII,S$GLB,, | Performed by: INTERNAL MEDICINE

## 2022-07-06 PROCEDURE — 3078F PR MOST RECENT DIASTOLIC BLOOD PRESSURE < 80 MM HG: ICD-10-PCS | Mod: CPTII,S$GLB,, | Performed by: INTERNAL MEDICINE

## 2022-07-06 PROCEDURE — 1159F PR MEDICATION LIST DOCUMENTED IN MEDICAL RECORD: ICD-10-PCS | Mod: CPTII,S$GLB,, | Performed by: INTERNAL MEDICINE

## 2022-07-06 PROCEDURE — 99205 PR OFFICE/OUTPT VISIT, NEW, LEVL V, 60-74 MIN: ICD-10-PCS | Mod: S$GLB,,, | Performed by: INTERNAL MEDICINE

## 2022-07-06 PROCEDURE — 1160F RVW MEDS BY RX/DR IN RCRD: CPT | Mod: CPTII,S$GLB,, | Performed by: INTERNAL MEDICINE

## 2022-07-06 PROCEDURE — 99999 PR PBB SHADOW E&M-EST. PATIENT-LVL IV: ICD-10-PCS | Mod: PBBFAC,,, | Performed by: INTERNAL MEDICINE

## 2022-07-06 PROCEDURE — 3051F HG A1C>EQUAL 7.0%<8.0%: CPT | Mod: CPTII,S$GLB,, | Performed by: INTERNAL MEDICINE

## 2022-07-06 PROCEDURE — 99499 RISK ADDL DX/OHS AUDIT: ICD-10-PCS | Mod: S$GLB,,, | Performed by: INTERNAL MEDICINE

## 2022-07-06 PROCEDURE — 3074F PR MOST RECENT SYSTOLIC BLOOD PRESSURE < 130 MM HG: ICD-10-PCS | Mod: CPTII,S$GLB,, | Performed by: INTERNAL MEDICINE

## 2022-07-06 PROCEDURE — 3074F SYST BP LT 130 MM HG: CPT | Mod: CPTII,S$GLB,, | Performed by: INTERNAL MEDICINE

## 2022-07-06 PROCEDURE — 99205 OFFICE O/P NEW HI 60 MIN: CPT | Mod: S$GLB,,, | Performed by: INTERNAL MEDICINE

## 2022-07-06 PROCEDURE — 3066F PR DOCUMENTATION OF TREATMENT FOR NEPHROPATHY: ICD-10-PCS | Mod: CPTII,S$GLB,, | Performed by: INTERNAL MEDICINE

## 2022-07-06 PROCEDURE — 99499 UNLISTED E&M SERVICE: CPT | Mod: S$GLB,,, | Performed by: INTERNAL MEDICINE

## 2022-07-06 PROCEDURE — 3008F BODY MASS INDEX DOCD: CPT | Mod: CPTII,S$GLB,, | Performed by: INTERNAL MEDICINE

## 2022-07-06 PROCEDURE — 1160F PR REVIEW ALL MEDS BY PRESCRIBER/CLIN PHARMACIST DOCUMENTED: ICD-10-PCS | Mod: CPTII,S$GLB,, | Performed by: INTERNAL MEDICINE

## 2022-07-06 PROCEDURE — 1159F MED LIST DOCD IN RCRD: CPT | Mod: CPTII,S$GLB,, | Performed by: INTERNAL MEDICINE

## 2022-07-06 RX ORDER — OLMESARTAN MEDOXOMIL 5 MG/1
5 TABLET ORAL DAILY
Qty: 90 TABLET | Refills: 3 | Status: SHIPPED | OUTPATIENT
Start: 2022-07-06 | End: 2023-02-06

## 2022-07-06 NOTE — PROGRESS NOTES
New Consultation Report  Nephrology      Consult Requested By: PCP  Reason for Consult: CKD    History of Present Illness:  Patient is a 47 y.o. female presents for a new consultation for evaluation of elevated serum creatinine and presumed chronic kidney disease. The patient was found to have an elevated serum creatinine during routine laboratory testing, at 1.9 mg/dL. She has had elevated serum Cr for at least 1.5 years. She was diagnosed with type 2 diabetes mellitus about 4 years ago. She also has dilated cardiomyopathy. Her maternal grandmother was on dialysis.    The patient denies SOB, LE edema, hematuria or foamy urine. The patient denies taking NSAIDs or new antibiotics, recreational drugs, recent episode of dehydration, diarrhea, nausea or vomiting, acute illness, hospitalization or exposure to IV radiocontrast.     Past Medical History:   Diagnosis Date    Atrial fibrillation     Blood clot associated with vein wall inflammation     not dvt    Cardiomyopathy     Normal cors on cath 11/2017    CHF (congestive heart failure)     DM (diabetes mellitus) 9/19/2013    Hyperlipidemia     Hypertension     Psoriasis     Sleep apnea          Current Outpatient Medications:     albuterol (VENTOLIN HFA) 90 mcg/actuation inhaler, Inhale 2 puffs into the lungs every 6 (six) hours as needed for Wheezing or Shortness of Breath. Rescue, Disp: 18 g, Rfl: 2    baclofen (LIORESAL) 10 MG tablet, Take 1 tablet (10 mg total) by mouth 2 (two) times daily., Disp: 90 tablet, Rfl: 0    BLOOD PRESSURE CUFF Misc, 1 kit by Misc.(Non-Drug; Combo Route) route 2 (two) times daily., Disp: 1 each, Rfl: 0    blood sugar diagnostic Strp, Check blood glucose 3x/day., Disp: 300 strip, Rfl: 3    blood-glucose meter (TRUE METRIX AIR GLUCOSE METER) Misc, 1 each by Misc.(Non-Drug; Combo Route) route 3 (three) times daily., Disp: 1 each, Rfl: 0    blood-glucose meter,continuous (DEXCOM G6 ) Misc, Use with dexcom G6 system,  "Disp: 1 each, Rfl: 0    blood-glucose sensor (DEXCOM G6 SENSOR) Lupe, Change sensor every 10 days, Disp: 12 each, Rfl: 3    blood-glucose transmitter (DEXCOM G6 TRANSMITTER) Lupe, Change every 3 months, Disp: 1 each, Rfl: 3    dulaglutide (TRULICITY) 0.75 mg/0.5 mL pen injector, Inject 0.75 mg into the skin every 7 days., Disp: 4 pen, Rfl: 5    enoxaparin (LOVENOX) 120 mg/0.8 mL Syrg, Inject 0.8 mLs (120 mg total) into the skin twice daily, Disp: 144 mL, Rfl: 3    fluticasone propionate (FLONASE) 50 mcg/actuation nasal spray, 1 spray (50 mcg total) by Each Nostril route 2 (two) times daily as needed for Rhinitis., Disp: 15 g, Rfl: 0    furosemide (LASIX) 40 MG tablet, TAKE 1 TABLET BY MOUTH ONCE DAILY *PLEASE  HOLD  WHILE  NOT  DRINKING*, Disp: 90 tablet, Rfl: 1    gabapentin (NEURONTIN) 400 MG capsule, Take 1 capsule (400 mg total) by mouth every evening., Disp: 180 capsule, Rfl: 1    insulin degludec (TRESIBA FLEXTOUCH U-200) 200 unit/mL (3 mL) insulin pen, Inject 54 Units into the skin 2 (two) times a day., Disp: 6 pen, Rfl: 5    insulin lispro (HUMALOG KWIKPEN INSULIN) 100 unit/mL pen, Inject 40 Units into the skin 3 (three) times daily before meals., Disp: 45 mL, Rfl: 5    insulin NPH isoph U-100 human (HUMULIN N NPH INSULIN KWIKPEN) 100 unit/mL (3 mL) InPn, Inject 24 units nightly at 10 pm, Disp: 15 mL, Rfl: 2    metoprolol succinate (TOPROL-XL) 50 MG 24 hr tablet, Take 1 tablet (50 mg total) by mouth once daily. Hold SBP <120, Disp: 90 tablet, Rfl: 1    mupirocin (BACTROBAN) 2 % ointment, Apply topically nightly., Disp: , Rfl:     pen needle, diabetic (BD LORI 2ND GEN PEN NEEDLE) 32 gauge x 5/32" Ndle, USE 1 PEN NEEDLE 4 TIMES DAILY, Disp: 400 each, Rfl: 3    rosuvastatin (CRESTOR) 40 MG Tab, Take 1 tablet (40 mg total) by mouth every evening., Disp: 90 tablet, Rfl: 3    spironolactone (ALDACTONE) 50 MG tablet, Take 1 tablet (50 mg total) by mouth once daily. Hold until eating and drinking " improves. Need to weight self daily, Disp: 90 tablet, Rfl: 3    triamcinolone acetonide 0.1% (KENALOG) 0.1 % cream, 2 (two) times daily. Apply to affected area, Disp: , Rfl: 4    blood glucose control, high Soln, 1 each by Misc.(Non-Drug; Combo Route) route once. for 1 dose, Disp: 1 each, Rfl: 3    blood glucose control, low Soln, 1 each by Misc.(Non-Drug; Combo Route) route once. for 1 dose, Disp: 1 each, Rfl: 3  Review of patient's allergies indicates:   Allergen Reactions    Metformin      Diarrhea on metformin XR     Pneumococcal 23-abimbola ps vaccine         Past Surgical History:   Procedure Laterality Date    CARDIAC CATHETERIZATION      COLONOSCOPY      COLONOSCOPY N/A 3/9/2022    Procedure: COLONOSCOPY;  Surgeon: Vielka Burrell MD;  Location: [facility];  Service: Endoscopy;  Laterality: N/A;    DILATION AND CURETTAGE OF UTERUS      ESOPHAGOGASTRODUODENOSCOPY N/A 5/9/2019    Procedure: EGD (ESOPHAGOGASTRODUODENOSCOPY);  Surgeon: Vielka Burrell MD;  Location: [facility];  Service: Endoscopy;  Laterality: N/A;    TRANSFORAMINAL EPIDURAL INJECTION OF STEROID N/A 1/6/2022    Procedure: Transforaminal ANKITA L4/L5 L5/S1;  Surgeon: Jed Yeager MD;  Location: [facility];  Service: Pain Management;  Laterality: N/A;     Family History   Problem Relation Age of Onset    Hypertension Mother     Hypertension Father     Diabetes Father     Diabetes Maternal Grandmother     Diabetes Paternal Grandmother     Breast cancer Neg Hx     Colon cancer Neg Hx     Ovarian cancer Neg Hx      Social History     Tobacco Use    Smoking status: Never Smoker    Smokeless tobacco: Never Used    Tobacco comment: smokes cigars on occasion   Substance Use Topics    Alcohol use: Not Currently     Comment: occasional    Drug use: Not Currently     Types: Marijuana     Comment: occ       Review of Systems   Constitutional: Negative.    Respiratory: Positive for shortness of breath. Negative for cough.     Cardiovascular: Negative for leg swelling.   Gastrointestinal: Negative.    Genitourinary: Negative.    Skin: Negative.    All other systems reviewed and are negative.      Vitals:    07/06/22 1011   BP: 100/78   Pulse: 88       PHYSICAL EXAMINATION:  General: no distress, well nourished  Skin: color, texture, turgor normal. No rash or lesions  HEENT: Eyes: reactive pupils, normal conjunctiva. Oral mucosa moist, no ulcers. Throat: no erythema.  Neck: supple, symmetrical, trachea midline, no JVD, no carotid bruit  Lungs: clear to auscultation bilaterally and normal respiratory effort  Cardiovascular: Heart: regular rate and rhythm, S1, S2 normal, no murmur, rub or gallop.  Abdomen: bowel sounds present, no abdominal bruit, soft, non-tender non-distented; no masses, organomegaly or ascites.   Musculoskeletal: no pitting edema in lower extremities, no clubbing or cyanosis  Lymph Nodes: No cervical or supraclavicular adenopathy  Neurologic: AAOx3, normal strength and tone. No focal deficit. No asterixis.       LABORATORY DATA:  Lab Results   Component Value Date    CREATININE 1.9 (H) 07/01/2022       Prot/Creat Ratio, Urine   Date Value Ref Range Status   07/01/2022 0.62 (H) 0.00 - 0.20 Final   06/16/2022 0.34 (H) 0.00 - 0.20 Final       Lab Results   Component Value Date     07/01/2022    K 3.1 (L) 07/01/2022    CO2 32 (H) 07/01/2022       Lab Results   Component Value Date    .3 (H) 07/01/2022    CALCIUM 9.5 07/01/2022    PHOS 2.9 07/01/2022       Lab Results   Component Value Date    HGB 13.9 07/01/2022        Lab Results   Component Value Date    HGBA1C 7.7 (H) 07/01/2022       Lab Results   Component Value Date    LDLCALC 58.4 (L) 07/01/2022       Fairly well controlled on spironolactone and metoprolol  IMAGING STUDIES  Kidney US: Reviewed  Echocardiogram: Reviewed    IMPRESSION / RECOMMENDATIONS:     1. Stage 3b chronic kidney disease  Unknown etiology, possible diabetic nephropathy although she  "does not have overt proteinuria, possible component of cardiorenal syndrome type 2 and hypertensive nephrosclerosis. Also, with family history of ESRD, APOL1-associated nephropathy/FGGS is a consideration, will genotype her. Was on ACEI/ARB but it was stopped due to "affecting her kidneys". Will resume a low dose today. Asked to stop baclofen due to toxicity risk.    2. Stage 4 chronic kidney disease  As above, eGFR fluctuating   3. Type 2 diabetes, uncontrolled, with neuropathy  Encouraged to maintain tight control, not on SGLT2i, will consider it    4. Proteinuria, unspecified type  Low grade, plan to add RASi   5. Type 2 diabetes mellitus with stage 4 chronic kidney disease, without long-term current use of insulin  As above, managed by PCP   6. Essential hypertension  Fairly well controlled on metoprolol and spironolactone   7. MILE treated with BiPAP  Managed by PCP   8. Mixed hyperlipidemia  On Crestor   9. Chronic combined systolic and diastolic heart failure  On furosemide, MRA and BB. Will add RASi         SUMMARY OF PLAN:  1. Start olmesartan low dose  2. APOL1 genotyping  3. Avoid NSAIDs  4. Stop baclofen  5. Continue furosemide, spironolactone  6. Labs in 1 mo  7. RTC in 4 mo    "

## 2022-07-08 ENCOUNTER — TELEPHONE (OUTPATIENT)
Dept: FAMILY MEDICINE | Facility: CLINIC | Age: 47
End: 2022-07-08
Payer: MEDICARE

## 2022-07-08 NOTE — TELEPHONE ENCOUNTER
"Re:[facility], System Message  ARASH Marshall  Fabiana Chanel was enrolled in the [facility] [facility] program by Oziel Yanez on 6/16/2022 but but has not completed the program consent questionnaire or setup of the required Digital Medicine devices.     Please reach out to the patient and inform them that Dr. Oziel Yanez is expecting their at home readings. The patient should log into their [employer] account to complete the "[facility] Patient Consent" questionnaire.     Instructions will automatically be sent via [employer] message after consent is obtained.       If the patient has technical issues, please have her follow up with the Digital Medicine Support Line at [phone].       Sent [facility] message.  "

## 2022-07-13 ENCOUNTER — OFFICE VISIT (OUTPATIENT)
Dept: FAMILY MEDICINE | Facility: CLINIC | Age: 47
End: 2022-07-13
Payer: MEDICARE

## 2022-07-13 VITALS
HEART RATE: 89 BPM | BODY MASS INDEX: 41.77 KG/M2 | WEIGHT: 250.69 LBS | OXYGEN SATURATION: 98 % | SYSTOLIC BLOOD PRESSURE: 116 MMHG | TEMPERATURE: 98 F | DIASTOLIC BLOOD PRESSURE: 60 MMHG | RESPIRATION RATE: 18 BRPM | HEIGHT: 65 IN

## 2022-07-13 DIAGNOSIS — N18.32 STAGE 3B CHRONIC KIDNEY DISEASE: ICD-10-CM

## 2022-07-13 DIAGNOSIS — I24.0 LV (LEFT VENTRICULAR) MURAL THROMBUS WITHOUT MI: Primary | ICD-10-CM

## 2022-07-13 PROCEDURE — 1159F MED LIST DOCD IN RCRD: CPT | Mod: CPTII,S$GLB,, | Performed by: FAMILY MEDICINE

## 2022-07-13 PROCEDURE — 4010F ACE/ARB THERAPY RXD/TAKEN: CPT | Mod: CPTII,S$GLB,, | Performed by: FAMILY MEDICINE

## 2022-07-13 PROCEDURE — 99214 PR OFFICE/OUTPT VISIT, EST, LEVL IV, 30-39 MIN: ICD-10-PCS | Mod: S$GLB,,, | Performed by: FAMILY MEDICINE

## 2022-07-13 PROCEDURE — 3074F PR MOST RECENT SYSTOLIC BLOOD PRESSURE < 130 MM HG: ICD-10-PCS | Mod: CPTII,S$GLB,, | Performed by: FAMILY MEDICINE

## 2022-07-13 PROCEDURE — 99999 PR PBB SHADOW E&M-EST. PATIENT-LVL IV: ICD-10-PCS | Mod: PBBFAC,,, | Performed by: FAMILY MEDICINE

## 2022-07-13 PROCEDURE — 99999 PR PBB SHADOW E&M-EST. PATIENT-LVL IV: CPT | Mod: PBBFAC,,, | Performed by: FAMILY MEDICINE

## 2022-07-13 PROCEDURE — 3074F SYST BP LT 130 MM HG: CPT | Mod: CPTII,S$GLB,, | Performed by: FAMILY MEDICINE

## 2022-07-13 PROCEDURE — 3066F NEPHROPATHY DOC TX: CPT | Mod: CPTII,S$GLB,, | Performed by: FAMILY MEDICINE

## 2022-07-13 PROCEDURE — 3008F BODY MASS INDEX DOCD: CPT | Mod: CPTII,S$GLB,, | Performed by: FAMILY MEDICINE

## 2022-07-13 PROCEDURE — 3078F DIAST BP <80 MM HG: CPT | Mod: CPTII,S$GLB,, | Performed by: FAMILY MEDICINE

## 2022-07-13 PROCEDURE — 1159F PR MEDICATION LIST DOCUMENTED IN MEDICAL RECORD: ICD-10-PCS | Mod: CPTII,S$GLB,, | Performed by: FAMILY MEDICINE

## 2022-07-13 PROCEDURE — 3066F PR DOCUMENTATION OF TREATMENT FOR NEPHROPATHY: ICD-10-PCS | Mod: CPTII,S$GLB,, | Performed by: FAMILY MEDICINE

## 2022-07-13 PROCEDURE — 3051F PR MOST RECENT HEMOGLOBIN A1C LEVEL 7.0 - < 8.0%: ICD-10-PCS | Mod: CPTII,S$GLB,, | Performed by: FAMILY MEDICINE

## 2022-07-13 PROCEDURE — 3078F PR MOST RECENT DIASTOLIC BLOOD PRESSURE < 80 MM HG: ICD-10-PCS | Mod: CPTII,S$GLB,, | Performed by: FAMILY MEDICINE

## 2022-07-13 PROCEDURE — 3051F HG A1C>EQUAL 7.0%<8.0%: CPT | Mod: CPTII,S$GLB,, | Performed by: FAMILY MEDICINE

## 2022-07-13 PROCEDURE — 3008F PR BODY MASS INDEX (BMI) DOCUMENTED: ICD-10-PCS | Mod: CPTII,S$GLB,, | Performed by: FAMILY MEDICINE

## 2022-07-13 PROCEDURE — 4010F PR ACE/ARB THEARPY RXD/TAKEN: ICD-10-PCS | Mod: CPTII,S$GLB,, | Performed by: FAMILY MEDICINE

## 2022-07-13 PROCEDURE — 99214 OFFICE O/P EST MOD 30 MIN: CPT | Mod: S$GLB,,, | Performed by: FAMILY MEDICINE

## 2022-07-13 RX ORDER — SPIRONOLACTONE 25 MG/1
25 TABLET ORAL DAILY
Qty: 90 TABLET | Refills: 1 | Status: SHIPPED | OUTPATIENT
Start: 2022-07-13 | End: 2023-08-23

## 2022-07-13 NOTE — PROGRESS NOTES
Subjective:       Patient ID: Fabiana Chanel is a 47 y.o. female.    Chief Complaint: Follow-up      HPI  47-year-old female presents for one-month follow-up.  Patient had follow-up with her specialists.  States she is taking medications.  Feels almost started has been giving her fatigue and weakness.  Has not been checking her blood pressure during those episodes.      Review of Systems   Constitutional: Negative.    HENT: Negative.    Respiratory: Negative.    Cardiovascular: Negative.    Gastrointestinal: Negative.    Endocrine: Negative.    Genitourinary: Negative.    Musculoskeletal: Negative.    Neurological: Negative.    Psychiatric/Behavioral: Negative.           Past Medical History:   Diagnosis Date    Atrial fibrillation     Blood clot associated with vein wall inflammation     not dvt    Cardiomyopathy     Normal cors on cath 11/2017    CHF (congestive heart failure)     DM (diabetes mellitus) 9/19/2013    Hyperlipidemia     Hypertension     Psoriasis     Sleep apnea      Past Surgical History:   Procedure Laterality Date    CARDIAC CATHETERIZATION      COLONOSCOPY      COLONOSCOPY N/A 3/9/2022    Procedure: COLONOSCOPY;  Surgeon: Vielka Burrell MD;  Location: [facility];  Service: Endoscopy;  Laterality: N/A;    DILATION AND CURETTAGE OF UTERUS      ESOPHAGOGASTRODUODENOSCOPY N/A 5/9/2019    Procedure: EGD (ESOPHAGOGASTRODUODENOSCOPY);  Surgeon: Vielka Burrell MD;  Location: [facility];  Service: Endoscopy;  Laterality: N/A;    TRANSFORAMINAL EPIDURAL INJECTION OF STEROID N/A 1/6/2022    Procedure: Transforaminal ANKITA L4/L5 L5/S1;  Surgeon: Jed Yeager MD;  [facility];  Service: Pain Management;  Laterality: N/A;     Family History   Problem Relation Age of Onset    Hypertension Mother     Hypertension Father     Diabetes Father     Diabetes Maternal Grandmother     Diabetes Paternal Grandmother     Breast cancer Neg Hx     Colon cancer Neg Hx      Ovarian cancer Neg Hx      Social History     Socioeconomic History    Marital status:    Tobacco Use    Smoking status: Never Smoker    Smokeless tobacco: Never Used    Tobacco comment: smokes cigars on occasion   Substance and Sexual Activity    Alcohol use: Not Currently     Comment: occasional    Drug use: Not Currently     Types: Marijuana     Comment: occ    Sexual activity: Yes     Partners: Male       Current Outpatient Medications:     BLOOD PRESSURE CUFF Misc, 1 kit by Misc.(Non-Drug; Combo Route) route 2 (two) times daily., Disp: 1 each, Rfl: 0    blood sugar diagnostic Strp, Check blood glucose 3x/day., Disp: 300 strip, Rfl: 3    blood-glucose meter,continuous (DEXCOM G6 ) Misc, Use with dexcom G6 system, Disp: 1 each, Rfl: 0    blood-glucose sensor (DEXCOM G6 SENSOR) Lupe, Change sensor every 10 days, Disp: 12 each, Rfl: 3    blood-glucose transmitter (DEXCOM G6 TRANSMITTER) Lupe, Change every 3 months, Disp: 1 each, Rfl: 3    dulaglutide (TRULICITY) 0.75 mg/0.5 mL pen injector, Inject 0.75 mg into the skin every 7 days., Disp: 4 pen, Rfl: 5    enoxaparin (LOVENOX) 120 mg/0.8 mL Syrg, Inject 0.8 mLs (120 mg total) into the skin twice daily, Disp: 144 mL, Rfl: 3    fluticasone propionate (FLONASE) 50 mcg/actuation nasal spray, 1 spray (50 mcg total) by Each Nostril route 2 (two) times daily as needed for Rhinitis., Disp: 15 g, Rfl: 0    furosemide (LASIX) 40 MG tablet, TAKE 1 TABLET BY MOUTH ONCE DAILY *PLEASE  HOLD  WHILE  NOT  DRINKING*, Disp: 90 tablet, Rfl: 1    gabapentin (NEURONTIN) 400 MG capsule, Take 1 capsule (400 mg total) by mouth every evening., Disp: 180 capsule, Rfl: 1    insulin degludec (TRESIBA FLEXTOUCH U-200) 200 unit/mL (3 mL) insulin pen, Inject 54 Units into the skin 2 (two) times a day., Disp: 6 pen, Rfl: 5    insulin lispro (HUMALOG KWIKPEN INSULIN) 100 unit/mL pen, Inject 40 Units into the skin 3 (three) times daily before meals., Disp: 45  "mL, Rfl: 5    insulin NPH isoph U-100 human (HUMULIN N NPH INSULIN KWIKPEN) 100 unit/mL (3 mL) InPn, Inject 24 units nightly at 10 pm, Disp: 15 mL, Rfl: 2    metoprolol succinate (TOPROL-XL) 50 MG 24 hr tablet, Take 1 tablet (50 mg total) by mouth once daily. Hold SBP <120, Disp: 90 tablet, Rfl: 1    mupirocin (BACTROBAN) 2 % ointment, Apply topically nightly., Disp: , Rfl:     olmesartan (BENICAR) 5 MG Tab, Take 1 tablet (5 mg total) by mouth once daily., Disp: 90 tablet, Rfl: 3    pen needle, diabetic (BD LORI 2ND GEN PEN NEEDLE) 32 gauge x 5/32" Ndle, USE 1 PEN NEEDLE 4 TIMES DAILY, Disp: 400 each, Rfl: 3    rosuvastatin (CRESTOR) 40 MG Tab, Take 1 tablet (40 mg total) by mouth every evening., Disp: 90 tablet, Rfl: 3    triamcinolone acetonide 0.1% (KENALOG) 0.1 % cream, 2 (two) times daily. Apply to affected area, Disp: , Rfl: 4    albuterol (VENTOLIN HFA) 90 mcg/actuation inhaler, Inhale 2 puffs into the lungs every 6 (six) hours as needed for Wheezing or Shortness of Breath. Rescue, Disp: 18 g, Rfl: 2    baclofen (LIORESAL) 10 MG tablet, Take 1 tablet (10 mg total) by mouth 2 (two) times daily., Disp: 90 tablet, Rfl: 0    blood glucose control, high Soln, 1 each by Misc.(Non-Drug; Combo Route) route once. for 1 dose, Disp: 1 each, Rfl: 3    blood glucose control, low Soln, 1 each by Misc.(Non-Drug; Combo Route) route once. for 1 dose, Disp: 1 each, Rfl: 3    blood-glucose meter (TRUE METRIX AIR GLUCOSE METER) Misc, 1 each by Misc.(Non-Drug; Combo Route) route 3 (three) times daily., Disp: 1 each, Rfl: 0    spironolactone (ALDACTONE) 25 MG tablet, Take 1 tablet (25 mg total) by mouth once daily. Hold until eating and drinking improves. Need to weight self daily, Disp: 90 tablet, Rfl: 1   Objective:      Vitals:    07/13/22 0812   BP: 116/60   BP Location: Right arm   Patient Position: Sitting   BP Method: Small (Manual)   Pulse: 89   Resp: 18   Temp: 98.1 °F (36.7 °C)   TempSrc: Oral   SpO2: 98% " "  Weight: 113.7 kg (250 lb 10.6 oz)   Height: 5' 5" (1.651 m)       Physical Exam  Constitutional:       General: She is not in acute distress.     Appearance: She is not diaphoretic.   HENT:      Head: Normocephalic and atraumatic.   Eyes:      Conjunctiva/sclera: Conjunctivae normal.   Pulmonary:      Effort: Pulmonary effort is normal.   Musculoskeletal:         General: Normal range of motion.      Cervical back: Neck supple.   Skin:     Findings: No rash.   Neurological:      Mental Status: She is alert and oriented to person, place, and time.   Psychiatric:         Behavior: Behavior normal.         Thought Content: Thought content normal.         Judgment: Judgment normal.            Assessment:       1. LV (left ventricular) mural thrombus without MI    2. Stage 3b chronic kidney disease    3. Type 2 diabetes, uncontrolled, with neuropathy        Plan:       LV (left ventricular) mural thrombus without MI    Stage 3b chronic kidney disease    Type 2 diabetes, uncontrolled, with neuropathy    Other orders  -     spironolactone (ALDACTONE) 25 MG tablet; Take 1 tablet (25 mg total) by mouth once daily. Hold until eating and drinking improves. Need to weight self daily  Dispense: 90 tablet; Refill: 1      Decreased aldactone to 25mg. Advised pt to restart olmesartan. Cont other meds          Patient note was created using [employer].  Any errors in syntax or even information may not have been identified and edited on initial review prior to signing this note.  "

## 2022-07-13 NOTE — PROGRESS NOTES
Health Maintenance Due   Topic     COVID-19 Vaccine (1)     Pneumococcal Vaccines (Age 0-64) (1 - PCV)     Diabetes Urine Screening  Consult pcp

## 2022-07-24 ENCOUNTER — CLINICAL SUPPORT (OUTPATIENT)
Dept: CARDIOLOGY | Facility: HOSPITAL | Age: 47
End: 2022-07-24
Payer: MEDICARE

## 2022-07-24 DIAGNOSIS — I48.91 UNSPECIFIED ATRIAL FIBRILLATION: ICD-10-CM

## 2022-07-24 DIAGNOSIS — I50.9 HEART FAILURE, UNSPECIFIED: ICD-10-CM

## 2022-07-24 DIAGNOSIS — Z95.810 PRESENCE OF AUTOMATIC (IMPLANTABLE) CARDIAC DEFIBRILLATOR: ICD-10-CM

## 2022-07-24 DIAGNOSIS — I47.20 VENTRICULAR TACHYCARDIA: ICD-10-CM

## 2022-07-24 PROCEDURE — 93295 CARDIAC DEVICE CHECK - REMOTE: ICD-10-PCS | Mod: ,,, | Performed by: INTERNAL MEDICINE

## 2022-07-24 PROCEDURE — 93296 REM INTERROG EVL PM/IDS: CPT | Performed by: INTERNAL MEDICINE

## 2022-07-24 PROCEDURE — 93295 DEV INTERROG REMOTE 1/2/MLT: CPT | Mod: ,,, | Performed by: INTERNAL MEDICINE

## 2022-08-01 ENCOUNTER — HOSPITAL ENCOUNTER (OUTPATIENT)
Dept: CARDIOLOGY | Facility: HOSPITAL | Age: 47
Discharge: HOME | End: 2022-08-01
Attending: INTERNAL MEDICINE
Payer: MEDICARE

## 2022-08-01 VITALS — BODY MASS INDEX: 41.65 KG/M2 | WEIGHT: 250 LBS | HEIGHT: 65 IN

## 2022-08-01 DIAGNOSIS — I24.0 LV (LEFT VENTRICULAR) MURAL THROMBUS WITHOUT MI: ICD-10-CM

## 2022-08-01 LAB
AORTIC ROOT ANNULUS: 3.34 CM
AORTIC VALVE CUSP SEPERATION: 2.11 CM
ASCENDING AORTA: 2.55 CM
AV INDEX (PROSTH): 0.47
AV MEAN GRADIENT: 6 MMHG
AV PEAK GRADIENT: 10 MMHG
AV VALVE AREA: 1.6 CM2
AV VELOCITY RATIO: 0.46
BSA FOR ECHO PROCEDURE: 2.28 M2
CV ECHO LV RWT: 0.43 CM
DOP CALC AO PEAK VEL: 1.55 M/S
DOP CALC AO VTI: 28.52 CM
DOP CALC LVOT AREA: 3.4 CM2
DOP CALC LVOT DIAMETER: 2.09 CM
DOP CALC LVOT PEAK VEL: 0.72 M/S
DOP CALC LVOT STROKE VOLUME: 45.54 CM3
DOP CALCLVOT PEAK VEL VTI: 13.28 CM
E WAVE DECELERATION TIME: 190.74 MSEC
E/A RATIO: 0.69
E/E' RATIO: 7.67 M/S
ECHO LV POSTERIOR WALL: 1.4 CM (ref 0.6–1.1)
EJECTION FRACTION: 25 %
FRACTIONAL SHORTENING: 12 % (ref 28–44)
INTERVENTRICULAR SEPTUM: 1.33 CM (ref 0.6–1.1)
IVRT: 140.82 MSEC
LA MAJOR: 5.37 CM
LA MINOR: 4.74 CM
LA WIDTH: 3.9 CM
LEFT ATRIUM SIZE: 3.48 CM
LEFT ATRIUM VOLUME INDEX: 26.8 ML/M2
LEFT ATRIUM VOLUME: 58.09 CM3
LEFT INTERNAL DIMENSION IN SYSTOLE: 5.71 CM (ref 2.1–4)
LEFT VENTRICLE DIASTOLIC VOLUME INDEX: 98.04 ML/M2
LEFT VENTRICLE DIASTOLIC VOLUME: 212.74 ML
LEFT VENTRICLE MASS INDEX: 194 G/M2
LEFT VENTRICLE SYSTOLIC VOLUME INDEX: 74.2 ML/M2
LEFT VENTRICLE SYSTOLIC VOLUME: 160.91 ML
LEFT VENTRICULAR INTERNAL DIMENSION IN DIASTOLE: 6.46 CM (ref 3.5–6)
LEFT VENTRICULAR MASS: 422.06 G
LV LATERAL E/E' RATIO: 6.57 M/S
LV SEPTAL E/E' RATIO: 9.2 M/S
MV PEAK A VEL: 0.67 M/S
MV PEAK E VEL: 0.46 M/S
MV STENOSIS PRESSURE HALF TIME: 55.31 MS
MV VALVE AREA P 1/2 METHOD: 3.98 CM2
PISA TR MAX VEL: 2.47 M/S
PULM VEIN S/D RATIO: 1.07
PV PEAK D VEL: 0.42 M/S
PV PEAK S VEL: 0.45 M/S
PV PEAK VELOCITY: 1.19 CM/S
RA MAJOR: 4.57 CM
RA PRESSURE: 3 MMHG
RA WIDTH: 4.17 CM
RIGHT VENTRICULAR END-DIASTOLIC DIMENSION: 3.76 CM
RV TISSUE DOPPLER FREE WALL SYSTOLIC VELOCITY 1 (APICAL 4 CHAMBER VIEW): 7.62 CM/S
SINUS: 2.9 CM
STJ: 1.85 CM
TDI LATERAL: 0.07 M/S
TDI SEPTAL: 0.05 M/S
TDI: 0.06 M/S
TR MAX PG: 24 MMHG
TRICUSPID ANNULAR PLANE SYSTOLIC EXCURSION: 1.65 CM
TV REST PULMONARY ARTERY PRESSURE: 27 MMHG

## 2022-08-01 PROCEDURE — C8929 TTE W OR WO FOL WCON,DOPPLER: HCPCS

## 2022-08-01 PROCEDURE — 93306 TTE W/DOPPLER COMPLETE: CPT | Mod: 26,,, | Performed by: INTERNAL MEDICINE

## 2022-08-01 PROCEDURE — 93306 ECHO (CUPID ONLY): ICD-10-PCS | Mod: 26,,, | Performed by: INTERNAL MEDICINE

## 2022-08-05 ENCOUNTER — HOSPITAL ENCOUNTER (OUTPATIENT)
Facility: HOSPITAL | Age: 47
Discharge: HOME | End: 2022-08-06
Attending: EMERGENCY MEDICINE | Admitting: HOSPITALIST
Payer: MEDICARE

## 2022-08-05 DIAGNOSIS — R07.9 CHEST PAIN: ICD-10-CM

## 2022-08-05 DIAGNOSIS — I47.20 VENTRICULAR TACHYCARDIA: ICD-10-CM

## 2022-08-05 LAB
ALBUMIN SERPL BCP-MCNC: 3.8 G/DL (ref 3.5–5.2)
ALP SERPL-CCNC: 81 U/L (ref 55–135)
ALT SERPL W/O P-5'-P-CCNC: 17 U/L (ref 10–44)
ANION GAP SERPL CALC-SCNC: 12 MMOL/L (ref 8–16)
APTT BLDCRRT: 29 SEC (ref 21–32)
AST SERPL-CCNC: 17 U/L (ref 10–40)
BASOPHILS # BLD AUTO: 0.05 K/UL (ref 0–0.2)
BASOPHILS NFR BLD: 0.5 % (ref 0–1.9)
BILIRUB SERPL-MCNC: 0.6 MG/DL (ref 0.1–1)
BNP SERPL-MCNC: 21 PG/ML (ref 0–99)
BUN SERPL-MCNC: 21 MG/DL (ref 6–20)
CALCIUM SERPL-MCNC: 10.1 MG/DL (ref 8.7–10.5)
CHLORIDE SERPL-SCNC: 100 MMOL/L (ref 95–110)
CO2 SERPL-SCNC: 29 MMOL/L (ref 23–29)
CREAT SERPL-MCNC: 1.8 MG/DL (ref 0.5–1.4)
DIFFERENTIAL METHOD: ABNORMAL
EOSINOPHIL # BLD AUTO: 0.2 K/UL (ref 0–0.5)
EOSINOPHIL NFR BLD: 1.4 % (ref 0–8)
ERYTHROCYTE [DISTWIDTH] IN BLOOD BY AUTOMATED COUNT: 14.9 % (ref 11.5–14.5)
EST. GFR  (NO RACE VARIABLE): 35 ML/MIN/1.73 M^2
GLUCOSE SERPL-MCNC: 56 MG/DL (ref 70–110)
HCT VFR BLD AUTO: 44 % (ref 37–48.5)
HGB BLD-MCNC: 13.8 G/DL (ref 12–16)
IMM GRANULOCYTES # BLD AUTO: 0.06 K/UL (ref 0–0.04)
IMM GRANULOCYTES NFR BLD AUTO: 0.6 % (ref 0–0.5)
INR PPP: 1 (ref 0.8–1.2)
LYMPHOCYTES # BLD AUTO: 2.7 K/UL (ref 1–4.8)
LYMPHOCYTES NFR BLD: 26.2 % (ref 18–48)
MAGNESIUM SERPL-MCNC: 2.3 MG/DL (ref 1.6–2.6)
MCH RBC QN AUTO: 27.9 PG (ref 27–31)
MCHC RBC AUTO-ENTMCNC: 31.4 G/DL (ref 32–36)
MCV RBC AUTO: 89 FL (ref 82–98)
MONOCYTES # BLD AUTO: 0.6 K/UL (ref 0.3–1)
MONOCYTES NFR BLD: 5.9 % (ref 4–15)
NEUTROPHILS # BLD AUTO: 6.8 K/UL (ref 1.8–7.7)
NEUTROPHILS NFR BLD: 65.4 % (ref 38–73)
NRBC BLD-RTO: 0 /100 WBC
PHOSPHATE SERPL-MCNC: 3.8 MG/DL (ref 2.7–4.5)
PLATELET # BLD AUTO: 252 K/UL (ref 150–450)
PMV BLD AUTO: 10.2 FL (ref 9.2–12.9)
POTASSIUM SERPL-SCNC: 3.6 MMOL/L (ref 3.5–5.1)
PROT SERPL-MCNC: 8.4 G/DL (ref 6–8.4)
PROTHROMBIN TIME: 10.3 SEC (ref 9–12.5)
RBC # BLD AUTO: 4.94 M/UL (ref 4–5.4)
SODIUM SERPL-SCNC: 141 MMOL/L (ref 136–145)
TROPONIN I SERPL DL<=0.01 NG/ML-MCNC: 0.01 NG/ML (ref 0–0.03)
WBC # BLD AUTO: 10.42 K/UL (ref 3.9–12.7)

## 2022-08-05 PROCEDURE — 80053 COMPREHEN METABOLIC PANEL: CPT | Performed by: EMERGENCY MEDICINE

## 2022-08-05 PROCEDURE — 99285 EMERGENCY DEPT VISIT HI MDM: CPT | Mod: 25

## 2022-08-05 PROCEDURE — 85025 COMPLETE CBC W/AUTO DIFF WBC: CPT | Performed by: EMERGENCY MEDICINE

## 2022-08-05 PROCEDURE — 84100 ASSAY OF PHOSPHORUS: CPT | Performed by: EMERGENCY MEDICINE

## 2022-08-05 PROCEDURE — 83735 ASSAY OF MAGNESIUM: CPT | Performed by: EMERGENCY MEDICINE

## 2022-08-05 PROCEDURE — 93005 ELECTROCARDIOGRAM TRACING: CPT

## 2022-08-05 PROCEDURE — 84484 ASSAY OF TROPONIN QUANT: CPT | Performed by: EMERGENCY MEDICINE

## 2022-08-05 PROCEDURE — 96374 THER/PROPH/DIAG INJ IV PUSH: CPT

## 2022-08-05 PROCEDURE — 85610 PROTHROMBIN TIME: CPT | Performed by: EMERGENCY MEDICINE

## 2022-08-05 PROCEDURE — 93010 ELECTROCARDIOGRAM REPORT: CPT | Mod: ,,, | Performed by: INTERNAL MEDICINE

## 2022-08-05 PROCEDURE — 83880 ASSAY OF NATRIURETIC PEPTIDE: CPT | Performed by: EMERGENCY MEDICINE

## 2022-08-05 PROCEDURE — 93010 EKG 12-LEAD: ICD-10-PCS | Mod: ,,, | Performed by: INTERNAL MEDICINE

## 2022-08-05 PROCEDURE — 85730 THROMBOPLASTIN TIME PARTIAL: CPT | Performed by: EMERGENCY MEDICINE

## 2022-08-05 RX ORDER — DEXTROSE 50 % IN WATER (D50W) INTRAVENOUS SYRINGE
25
Status: COMPLETED | OUTPATIENT
Start: 2022-08-06 | End: 2022-08-06

## 2022-08-06 VITALS
BODY MASS INDEX: 42.02 KG/M2 | SYSTOLIC BLOOD PRESSURE: 102 MMHG | HEART RATE: 86 BPM | DIASTOLIC BLOOD PRESSURE: 60 MMHG | RESPIRATION RATE: 16 BRPM | HEIGHT: 65 IN | WEIGHT: 252.19 LBS | TEMPERATURE: 98 F | OXYGEN SATURATION: 94 %

## 2022-08-06 PROBLEM — E16.2 HYPOGLYCEMIA: Status: ACTIVE | Noted: 2022-08-06

## 2022-08-06 PROBLEM — N18.9 ACUTE KIDNEY INJURY SUPERIMPOSED ON CHRONIC KIDNEY DISEASE: Status: ACTIVE | Noted: 2022-04-29

## 2022-08-06 PROBLEM — N17.9 AKI (ACUTE KIDNEY INJURY): Status: RESOLVED | Noted: 2022-04-29 | Resolved: 2022-08-06

## 2022-08-06 PROBLEM — I47.20 VENTRICULAR TACHYCARDIA: Status: ACTIVE | Noted: 2022-08-06

## 2022-08-06 LAB
ALBUMIN SERPL BCP-MCNC: 3.3 G/DL (ref 3.5–5.2)
ALP SERPL-CCNC: 70 U/L (ref 55–135)
ALT SERPL W/O P-5'-P-CCNC: 14 U/L (ref 10–44)
ANION GAP SERPL CALC-SCNC: 7 MMOL/L (ref 8–16)
AST SERPL-CCNC: 14 U/L (ref 10–40)
BASOPHILS # BLD AUTO: 0.04 K/UL (ref 0–0.2)
BASOPHILS NFR BLD: 0.4 % (ref 0–1.9)
BILIRUB SERPL-MCNC: 0.6 MG/DL (ref 0.1–1)
BUN SERPL-MCNC: 20 MG/DL (ref 6–20)
CALCIUM SERPL-MCNC: 9.2 MG/DL (ref 8.7–10.5)
CHLORIDE SERPL-SCNC: 101 MMOL/L (ref 95–110)
CO2 SERPL-SCNC: 30 MMOL/L (ref 23–29)
CREAT SERPL-MCNC: 1.6 MG/DL (ref 0.5–1.4)
CTP QC/QA: YES
DIFFERENTIAL METHOD: ABNORMAL
EOSINOPHIL # BLD AUTO: 0.2 K/UL (ref 0–0.5)
EOSINOPHIL NFR BLD: 1.8 % (ref 0–8)
ERYTHROCYTE [DISTWIDTH] IN BLOOD BY AUTOMATED COUNT: 15 % (ref 11.5–14.5)
EST. GFR  (NO RACE VARIABLE): 40 ML/MIN/1.73 M^2
GLUCOSE SERPL-MCNC: 110 MG/DL (ref 70–110)
HCT VFR BLD AUTO: 40.2 % (ref 37–48.5)
HGB BLD-MCNC: 12.4 G/DL (ref 12–16)
IMM GRANULOCYTES # BLD AUTO: 0.04 K/UL (ref 0–0.04)
IMM GRANULOCYTES NFR BLD AUTO: 0.4 % (ref 0–0.5)
LYMPHOCYTES # BLD AUTO: 2.6 K/UL (ref 1–4.8)
LYMPHOCYTES NFR BLD: 26.1 % (ref 18–48)
MAGNESIUM SERPL-MCNC: 2.3 MG/DL (ref 1.6–2.6)
MCH RBC QN AUTO: 27.3 PG (ref 27–31)
MCHC RBC AUTO-ENTMCNC: 30.8 G/DL (ref 32–36)
MCV RBC AUTO: 89 FL (ref 82–98)
MONOCYTES # BLD AUTO: 0.6 K/UL (ref 0.3–1)
MONOCYTES NFR BLD: 6.4 % (ref 4–15)
NEUTROPHILS # BLD AUTO: 6.4 K/UL (ref 1.8–7.7)
NEUTROPHILS NFR BLD: 64.9 % (ref 38–73)
NRBC BLD-RTO: 0 /100 WBC
PHOSPHATE SERPL-MCNC: 3.9 MG/DL (ref 2.7–4.5)
PLATELET # BLD AUTO: 242 K/UL (ref 150–450)
PMV BLD AUTO: 9.8 FL (ref 9.2–12.9)
POCT GLUCOSE: 102 MG/DL (ref 70–110)
POCT GLUCOSE: 110 MG/DL (ref 70–110)
POCT GLUCOSE: 136 MG/DL (ref 70–110)
POCT GLUCOSE: 196 MG/DL (ref 70–110)
POCT GLUCOSE: 56 MG/DL (ref 70–110)
POCT GLUCOSE: 98 MG/DL (ref 70–110)
POTASSIUM SERPL-SCNC: 3.4 MMOL/L (ref 3.5–5.1)
PROT SERPL-MCNC: 7.1 G/DL (ref 6–8.4)
RBC # BLD AUTO: 4.54 M/UL (ref 4–5.4)
SARS-COV-2 RDRP RESP QL NAA+PROBE: NEGATIVE
SODIUM SERPL-SCNC: 138 MMOL/L (ref 136–145)
TROPONIN I SERPL DL<=0.01 NG/ML-MCNC: 0.14 NG/ML (ref 0–0.03)
TROPONIN I SERPL DL<=0.01 NG/ML-MCNC: 0.16 NG/ML (ref 0–0.03)
TROPONIN I SERPL DL<=0.01 NG/ML-MCNC: 0.21 NG/ML (ref 0–0.03)
WBC # BLD AUTO: 9.8 K/UL (ref 3.9–12.7)

## 2022-08-06 PROCEDURE — 25000003 PHARM REV CODE 250: Performed by: EMERGENCY MEDICINE

## 2022-08-06 PROCEDURE — 84484 ASSAY OF TROPONIN QUANT: CPT | Mod: 91 | Performed by: NURSE PRACTITIONER

## 2022-08-06 PROCEDURE — 82962 GLUCOSE BLOOD TEST: CPT | Mod: 91

## 2022-08-06 PROCEDURE — 84100 ASSAY OF PHOSPHORUS: CPT | Performed by: NURSE PRACTITIONER

## 2022-08-06 PROCEDURE — U0002 COVID-19 LAB TEST NON-CDC: HCPCS | Performed by: HOSPITALIST

## 2022-08-06 PROCEDURE — 99220 PR INITIAL OBSERVATION CARE,LEVL III: CPT | Mod: ,,, | Performed by: INTERNAL MEDICINE

## 2022-08-06 PROCEDURE — 85025 COMPLETE CBC W/AUTO DIFF WBC: CPT | Performed by: NURSE PRACTITIONER

## 2022-08-06 PROCEDURE — 63600175 PHARM REV CODE 636 W HCPCS: Performed by: NURSE PRACTITIONER

## 2022-08-06 PROCEDURE — G0378 HOSPITAL OBSERVATION PER HR: HCPCS

## 2022-08-06 PROCEDURE — 80053 COMPREHEN METABOLIC PANEL: CPT | Performed by: NURSE PRACTITIONER

## 2022-08-06 PROCEDURE — 84484 ASSAY OF TROPONIN QUANT: CPT | Performed by: EMERGENCY MEDICINE

## 2022-08-06 PROCEDURE — 63600175 PHARM REV CODE 636 W HCPCS: Performed by: EMERGENCY MEDICINE

## 2022-08-06 PROCEDURE — 83735 ASSAY OF MAGNESIUM: CPT | Performed by: NURSE PRACTITIONER

## 2022-08-06 PROCEDURE — 99220 PR INITIAL OBSERVATION CARE,LEVL III: ICD-10-PCS | Mod: ,,, | Performed by: INTERNAL MEDICINE

## 2022-08-06 PROCEDURE — 36415 COLL VENOUS BLD VENIPUNCTURE: CPT | Performed by: NURSE PRACTITIONER

## 2022-08-06 PROCEDURE — 96372 THER/PROPH/DIAG INJ SC/IM: CPT | Performed by: NURSE PRACTITIONER

## 2022-08-06 PROCEDURE — 96372 THER/PROPH/DIAG INJ SC/IM: CPT | Performed by: EMERGENCY MEDICINE

## 2022-08-06 PROCEDURE — 25000003 PHARM REV CODE 250: Performed by: NURSE PRACTITIONER

## 2022-08-06 RX ORDER — ENOXAPARIN SODIUM 150 MG/ML
1 INJECTION SUBCUTANEOUS ONCE
Status: DISCONTINUED | OUTPATIENT
Start: 2022-08-06 | End: 2022-08-06 | Stop reason: HOSPADM

## 2022-08-06 RX ORDER — LOSARTAN POTASSIUM 25 MG/1
25 TABLET ORAL DAILY
Refills: 3 | Status: DISCONTINUED | OUTPATIENT
Start: 2022-08-06 | End: 2022-08-06 | Stop reason: HOSPADM

## 2022-08-06 RX ORDER — SPIRONOLACTONE 25 MG/1
25 TABLET ORAL DAILY
Status: DISCONTINUED | OUTPATIENT
Start: 2022-08-06 | End: 2022-08-06 | Stop reason: HOSPADM

## 2022-08-06 RX ORDER — GABAPENTIN 400 MG/1
400 CAPSULE ORAL NIGHTLY
Status: DISCONTINUED | OUTPATIENT
Start: 2022-08-06 | End: 2022-08-06 | Stop reason: HOSPADM

## 2022-08-06 RX ORDER — METOPROLOL SUCCINATE 50 MG/1
50 TABLET, EXTENDED RELEASE ORAL DAILY
Status: DISCONTINUED | OUTPATIENT
Start: 2022-08-06 | End: 2022-08-06 | Stop reason: HOSPADM

## 2022-08-06 RX ORDER — SODIUM CHLORIDE 0.9 % (FLUSH) 0.9 %
10 SYRINGE (ML) INJECTION
Status: DISCONTINUED | OUTPATIENT
Start: 2022-08-06 | End: 2022-08-06 | Stop reason: HOSPADM

## 2022-08-06 RX ORDER — ATORVASTATIN CALCIUM 40 MG/1
80 TABLET, FILM COATED ORAL NIGHTLY
Refills: 3 | Status: DISCONTINUED | OUTPATIENT
Start: 2022-08-06 | End: 2022-08-06 | Stop reason: HOSPADM

## 2022-08-06 RX ORDER — FUROSEMIDE 40 MG/1
40 TABLET ORAL DAILY
Status: DISCONTINUED | OUTPATIENT
Start: 2022-08-06 | End: 2022-08-06 | Stop reason: HOSPADM

## 2022-08-06 RX ORDER — IBUPROFEN 200 MG
24 TABLET ORAL
Status: DISCONTINUED | OUTPATIENT
Start: 2022-08-06 | End: 2022-08-06 | Stop reason: HOSPADM

## 2022-08-06 RX ORDER — GLUCAGON 1 MG
1 KIT INJECTION
Status: DISCONTINUED | OUTPATIENT
Start: 2022-08-06 | End: 2022-08-06 | Stop reason: HOSPADM

## 2022-08-06 RX ORDER — ENOXAPARIN SODIUM 150 MG/ML
1 INJECTION SUBCUTANEOUS
Status: DISCONTINUED | OUTPATIENT
Start: 2022-08-06 | End: 2022-08-06 | Stop reason: HOSPADM

## 2022-08-06 RX ORDER — IBUPROFEN 200 MG
16 TABLET ORAL
Status: DISCONTINUED | OUTPATIENT
Start: 2022-08-06 | End: 2022-08-06 | Stop reason: HOSPADM

## 2022-08-06 RX ADMIN — SPIRONOLACTONE 25 MG: 25 TABLET, FILM COATED ORAL at 09:08

## 2022-08-06 RX ADMIN — DEXTROSE MONOHYDRATE 25 G: 25 INJECTION, SOLUTION INTRAVENOUS at 12:08

## 2022-08-06 RX ADMIN — LOSARTAN POTASSIUM 25 MG: 25 TABLET, FILM COATED ORAL at 09:08

## 2022-08-06 RX ADMIN — FUROSEMIDE 40 MG: 40 TABLET ORAL at 09:08

## 2022-08-06 RX ADMIN — ENOXAPARIN SODIUM 120 MG: 120 INJECTION SUBCUTANEOUS at 04:08

## 2022-08-06 NOTE — H&P
[facility] Medicine  History & Physical    Patient Name: Fabiana Chanel  MRN: [id number]  Patient Class: OP- Observation  Admission Date: 8/5/2022  Attending Physician: Cristian Adame MD   Primary Care Provider: Gil Leal MD         Patient information was obtained from patient, past medical records and ER records.     Subjective:     Principal Problem:Implantable cardioverter-defibrillator (ICD) in situ    Chief Complaint:   Chief Complaint   Patient presents with    AICD Fired      Patient with history of atrial fibrillation, cardiomyopathy, CHF, HLD, HTN, stage 3 CKD, DM, and a defibrillator in place, presents to the ED after defibrillator fired tonight.         HPI: 47 year old female presents to the ED to evaluate her AICD after it shocked her PTA. Patient states she was sleeping in bed when the defibrillator went off. She denies chest pain prior to sleeping. She reports she did not receive a call from [facility]. Her cardiologist is Dr. Yanez. Patient is compliant with her Lovenox injection once a day. Patient states her chest feels no pain currently. Patient reports her last defibrillator shock was 3 years ago and it was placed in 2018. Patient denies shortness of breath, nausea, vomiting, or other associated symptoms.     PMHx: AICD, Afib, cardiomyopathy, CHF, HLD, HTN, DM, and CKD      Past Medical History:   Diagnosis Date    Atrial fibrillation     Blood clot associated with vein wall inflammation     not dvt    Cardiomyopathy     Normal cors on cath 11/2017    CHF (congestive heart failure)     DM (diabetes mellitus) 9/19/2013    Hyperlipidemia     Hypertension     Psoriasis     Sleep apnea        Past Surgical History:   Procedure Laterality Date    CARDIAC CATHETERIZATION      COLONOSCOPY      COLONOSCOPY N/A 3/9/2022    Procedure: COLONOSCOPY;  Surgeon: Vielka Burrell MD;  Location: [facility];  Service: Endoscopy;  Laterality: N/A;    DILATION  AND CURETTAGE OF UTERUS      ESOPHAGOGASTRODUODENOSCOPY N/A 5/9/2019    Procedure: EGD (ESOPHAGOGASTRODUODENOSCOPY);  Surgeon: Vielka Burrell MD;  Location: [facility];  Service: Endoscopy;  Laterality: N/A;    TRANSFORAMINAL EPIDURAL INJECTION OF STEROID N/A 1/6/2022    Procedure: Transforaminal ANKITA L4/L5 L5/S1;  Surgeon: Jed Yeager MD;  Location: [facility];  Service: Pain Management;  Laterality: N/A;       Review of patient's allergies indicates:   Allergen Reactions    Metformin      Diarrhea on metformin XR     Pneumococcal 23-abimbola ps vaccine        No current facility-administered medications on file prior to encounter.     Current Outpatient Medications on File Prior to Encounter   Medication Sig    dulaglutide (TRULICITY) 0.75 mg/0.5 mL pen injector Inject 0.75 mg into the skin every 7 days.    fluticasone propionate (FLONASE) 50 mcg/actuation nasal spray 1 spray (50 mcg total) by Each Nostril route 2 (two) times daily as needed for Rhinitis.    furosemide (LASIX) 40 MG tablet TAKE 1 TABLET BY MOUTH ONCE DAILY *PLEASE  HOLD  WHILE  NOT  DRINKING*    gabapentin (NEURONTIN) 400 MG capsule Take 1 capsule (400 mg total) by mouth every evening.    insulin degludec (TRESIBA FLEXTOUCH U-200) 200 unit/mL (3 mL) insulin pen Inject 54 Units into the skin 2 (two) times a day.    insulin lispro (HUMALOG KWIKPEN INSULIN) 100 unit/mL pen Inject 40 Units into the skin 3 (three) times daily before meals.    insulin NPH isoph U-100 human (HUMULIN N NPH INSULIN KWIKPEN) 100 unit/mL (3 mL) InPn Inject 24 units nightly at 10 pm    metoprolol succinate (TOPROL-XL) 50 MG 24 hr tablet Take 1 tablet (50 mg total) by mouth once daily. Hold SBP <120    mupirocin (BACTROBAN) 2 % ointment Apply topically nightly.    rosuvastatin (CRESTOR) 40 MG Tab Take 1 tablet (40 mg total) by mouth every evening.    spironolactone (ALDACTONE) 25 MG tablet Take 1 tablet (25 mg total) by mouth once daily. Hold until  "eating and drinking improves. Need to weight self daily    triamcinolone acetonide 0.1% (KENALOG) 0.1 % cream 2 (two) times daily. Apply to affected area    albuterol (VENTOLIN HFA) 90 mcg/actuation inhaler Inhale 2 puffs into the lungs every 6 (six) hours as needed for Wheezing or Shortness of Breath. Rescue    baclofen (LIORESAL) 10 MG tablet Take 1 tablet (10 mg total) by mouth 2 (two) times daily. (Patient not taking: Reported on 8/5/2022)    blood glucose control, high Soln 1 each by Misc.(Non-Drug; Combo Route) route once. for 1 dose    blood glucose control, low Soln 1 each by Misc.(Non-Drug; Combo Route) route once. for 1 dose    BLOOD PRESSURE CUFF Misc 1 kit by Misc.(Non-Drug; Combo Route) route 2 (two) times daily.    blood sugar diagnostic Strp Check blood glucose 3x/day.    blood-glucose meter (TRUE METRIX AIR GLUCOSE METER) Misc 1 each by Misc.(Non-Drug; Combo Route) route 3 (three) times daily.    blood-glucose meter,continuous (DEXCOM G6 ) Misc Use with dexcom G6 system    blood-glucose sensor (DEXCOM G6 SENSOR) Lupe Change sensor every 10 days    blood-glucose transmitter (DEXCOM G6 TRANSMITTER) Lupe Change every 3 months    enoxaparin (LOVENOX) 120 mg/0.8 mL Syrg Inject 0.8 mLs (120 mg total) into the skin twice daily (Patient taking differently: Inject 1 mg/kg into the skin once daily. States she takes once daily)    olmesartan (BENICAR) 5 MG Tab Take 1 tablet (5 mg total) by mouth once daily. (Patient not taking: Reported on 8/5/2022)    pen needle, diabetic (BD LORI 2ND GEN PEN NEEDLE) 32 gauge x 5/32" Ndle USE 1 PEN NEEDLE 4 TIMES DAILY     Family History       Problem Relation (Age of Onset)    Diabetes Father, Maternal Grandmother, Paternal Grandmother    Hypertension Mother, Father          Tobacco Use    Smoking status: Never Smoker    Smokeless tobacco: Never Used    Tobacco comment: smokes cigars on occasion   Substance and Sexual Activity    Alcohol use: Not " Currently     Comment: occasional    Drug use: Not Currently     Types: Marijuana     Comment: occ    Sexual activity: Not Currently     Partners: Male     Review of Systems  Constitutional: Negative.    HENT: Negative.    Eyes: Negative.    Respiratory: Negative.    Cardiovascular: Negative for chest pain.        (+) AICD   Gastrointestinal: Negative.    Genitourinary: Negative.    Musculoskeletal: Negative.    Skin: Negative.    Neurological: Negative.       Objective:     Vital Signs (Most Recent):  Temp: 98.1 °F (36.7 °C) (08/05/22 2217)  Pulse: 87 (08/06/22 0316)  Resp: 16 (08/06/22 0316)  BP: 110/63 (08/06/22 0316)  SpO2: 95 % (08/06/22 0316) Vital Signs (24h Range):  Temp:  [98.1 °F (36.7 °C)] 98.1 °F (36.7 °C)  Pulse:  [] 87  Resp:  [16-19] 16  SpO2:  [95 %-96 %] 95 %  BP: (110-129)/(63-84) 110/63     Weight: 108.9 kg (240 lb)  Body mass index is 39.94 kg/m².    Physical Exam   Vitals and nursing note reviewed.   Constitutional:       General: She is not in acute distress.     Appearance: She is well-developed. She is not diaphoretic.   HENT:      Head: Normocephalic and atraumatic.      Right Ear: External ear normal.      Left Ear: External ear normal.   Eyes:      General:         Right eye: No discharge.         Left eye: No discharge.      Conjunctiva/sclera: Conjunctivae normal.   Neck:      Thyroid: No thyromegaly.   Cardiovascular:      Rate and Rhythm: Normal rate and regular rhythm.      Heart sounds: No murmur heard.  Pulmonary:      Effort: Pulmonary effort is normal. No respiratory distress.      Breath sounds: Normal breath sounds.   Abdominal:      General: Bowel sounds are normal. There is no distension.      Palpations: Abdomen is soft. There is no mass.      Tenderness: There is no abdominal tenderness.   Musculoskeletal:         General: No deformity.      Cervical back: Normal range of motion and neck supple.      Right lower leg: No edema.      Left lower leg: No edema.   Skin:      General: Skin is warm and dry.   Neurological:      Mental Status: She is alert and oriented to person, place, and time.      Sensory: No sensory deficit.   Psychiatric:         Mood and Affect: Mood normal.         Behavior: Behavior normal.         Significant Labs: All pertinent labs within the past 24 hours have been reviewed.    Significant Imaging: I have reviewed all pertinent imaging results/findings within the past 24 hours.    Assessment/Plan:     * Implantable cardioverter-defibrillator (ICD) in situ  Consult cards AICD fired  Tele monitoring   EKG as needed   Elevated troponin trend       Class 2 obesity in adult  Body mass index is 39.94 kg/m². Morbid obesity complicates all aspects of disease management from diagnostic modalities to treatment. Weight loss encouraged and health benefits explained to patient.         Ventricular tachycardia  See AICD       Hypoglycemia  On admission blood glucose 56  Hold all insulin  Hypoglycemic protocol  Monitor blood glucose every 4 hours         LV (left ventricular) mural thrombus without MI  Patient on lovenox (per ED physician patient was only taking once a day supposed to be BID  Resume Lovenox  Cards consulted       Acute kidney injury superimposed on chronic kidney disease  Labs reviewed- Renal function/electrolytes with Estimated Creatinine Clearance: 48 mL/min (A) (based on SCr of 1.8 mg/dL (H)). according to latest data. Monitor urine output and serial BMP and adjust therapy as needed. Avoid nephrotoxins and renally dose meds for GFR listed above.             Elevated troponin  On Lovenox  Trend trop 0.015, 0.159  Tele monitoring  EKG as needed  Cards consulted       Chronic combined systolic and diastolic heart failure  Echo 6/16/22  · The left ventricle is severely enlarged with mild concentric hypertrophy and severely decreased systolic function.  · The estimated ejection fraction is 25%.  · Grade I left ventricular diastolic dysfunction    On  metoprolol, lasix, and sprinolactone   On admission BNP 21  Strict I&Os, daily wgt, FR  CXR: Cardiomegaly.  Central vascular congestion and mild perihilar edema          Mixed hyperlipidemia  Resume statin         Paroxysmal atrial fibrillation  Her EKG today is ventricular tachycardia   Continue home metoprolol and lovenox  Monitor on telemetry  Cardiology consulted      Essential hypertension  Well controlled continue home metoprolol, lasix, and sprinolactone       VTE Risk Mitigation (From admission, onward)         Ordered     enoxaparin injection 120 mg  Every 12 hours (non-standard times)         08/06/22 0355     enoxaparin injection 105 mg  Once         08/06/22 0231               As clarification, on 8/6/22 @0400, patient should be placed in hospital observation services under my care in collaboration with MD Seth Langford NP  Department of Hospital Medicine   [facility] [place] - Emergency Dept

## 2022-08-06 NOTE — ASSESSMENT & PLAN NOTE
Patient on lovenox (per ED physician patient was only taking once a day supposed to be BID  Resume Lovenox  Cards consulted

## 2022-08-06 NOTE — ED PROVIDER NOTES
Encounter Date: 8/5/2022    SCRIBE #1 NOTE: I, Radha Vicente, am scribing for, and in the presence of,  Eliseo Lopez MD. I have scribed the following portions of the note - Other sections scribed: HPI, ROS.       History     Chief Complaint   Patient presents with    AICD Fired      Patient with history of atrial fibrillation, cardiomyopathy, CHF, HLD, HTN, stage 3 CKD, DM, and a defibrillator in place, presents to the ED after defibrillator fired tonight.      47 year old female, with pertinent medical history of Afib, cardiomyopathy, CHF, HLD, HTN, DM, and CKD presents to the ED to evaluate her AICD after it shocked her PTA. Patient states she was sleeping in bed when the defibrillator went off. She denies chest pain prior to sleeping. She reports she did not receive a call from [facility]. Her cardiologist is Dr. Yanez. Patient is compliant with her Lovenox injection once a day. Patient states her chest feels no pain currently. Patient reports her last defibrillator shock was 3 years ago and it was placed in 2018. Patient denies shortness of breath, nausea, vomiting, or other associated symptoms.    The history is provided by the patient. No  was used.     Review of patient's allergies indicates:   Allergen Reactions    Metformin      Diarrhea on metformin XR     Pneumococcal 23-abimbola ps vaccine      Past Medical History:   Diagnosis Date    Atrial fibrillation     Blood clot associated with vein wall inflammation     not dvt    Cardiomyopathy     Normal cors on cath 11/2017    CHF (congestive heart failure)     DM (diabetes mellitus) 9/19/2013    Hyperlipidemia     Hypertension     Psoriasis     Sleep apnea      Past Surgical History:   Procedure Laterality Date    CARDIAC CATHETERIZATION      COLONOSCOPY      COLONOSCOPY N/A 3/9/2022    Procedure: COLONOSCOPY;  Surgeon: Vielka Burrell MD;  Location: [facility];  Service: Endoscopy;  Laterality: N/A;    DILATION AND  CURETTAGE OF UTERUS      ESOPHAGOGASTRODUODENOSCOPY N/A 5/9/2019    Procedure: EGD (ESOPHAGOGASTRODUODENOSCOPY);  Surgeon: Vielka Burrell MD;  [facility];  Service: Endoscopy;  Laterality: N/A;    TRANSFORAMINAL EPIDURAL INJECTION OF STEROID N/A 1/6/2022    Procedure: Transforaminal ANKITA L4/L5 L5/S1;  Surgeon: Jed Yeager MD;  Location: [facility];  Service: Pain Management;  Laterality: N/A;     Family History   Problem Relation Age of Onset    Hypertension Mother     Hypertension Father     Diabetes Father     Diabetes Maternal Grandmother     Diabetes Paternal Grandmother     Breast cancer Neg Hx     Colon cancer Neg Hx     Ovarian cancer Neg Hx      Social History     Tobacco Use    Smoking status: Never Smoker    Smokeless tobacco: Never Used    Tobacco comment: smokes cigars on occasion   Substance Use Topics    Alcohol use: Not Currently     Comment: occasional    Drug use: Not Currently     Types: Marijuana     Comment: occ     Review of Systems   Constitutional: Negative.    HENT: Negative.    Eyes: Negative.    Respiratory: Negative.    Cardiovascular: Negative for chest pain.        (+) AICD   Gastrointestinal: Negative.    Genitourinary: Negative.    Musculoskeletal: Negative.    Skin: Negative.    Neurological: Negative.        Physical Exam     Initial Vitals [08/05/22 2217]   BP Pulse Resp Temp SpO2   129/84 102 19 98.1 °F (36.7 °C) 96 %      MAP       --         Physical Exam    Nursing note and vitals reviewed.  Constitutional: She appears well-developed and well-nourished. She is not diaphoretic. No distress.   HENT:   Head: Normocephalic and atraumatic.   Nose: Nose normal.   Eyes: EOM are normal. Pupils are equal, round, and reactive to light.   Neck: Neck supple. No JVD present.   Normal range of motion.  Cardiovascular: Normal rate, regular rhythm, normal heart sounds and intact distal pulses.   Pulmonary/Chest: Breath sounds normal. No stridor. No  respiratory distress. She has no wheezes. She has no rales.   Abdominal: Abdomen is soft. Bowel sounds are normal. She exhibits no distension. There is no abdominal tenderness.   Musculoskeletal:         General: No tenderness or edema. Normal range of motion.      Cervical back: Normal range of motion and neck supple.     Neurological: She is alert and oriented to person, place, and time. She has normal strength.   Skin: Skin is warm and dry. Capillary refill takes less than 2 seconds. No rash noted. No erythema.         ED Course   Procedures  Labs Reviewed   CBC W/ AUTO DIFFERENTIAL - Abnormal; Notable for the following components:       Result Value    MCHC 31.4 (*)     RDW 14.9 (*)     Immature Granulocytes 0.6 (*)     Immature Grans (Abs) 0.06 (*)     All other components within normal limits   COMPREHENSIVE METABOLIC PANEL - Abnormal; Notable for the following components:    Glucose 56 (*)     BUN 21 (*)     Creatinine 1.8 (*)     eGFR 35 (*)     All other components within normal limits   TROPONIN I - Abnormal; Notable for the following components:    Troponin I 0.159 (*)     All other components within normal limits   POCT GLUCOSE - Abnormal; Notable for the following components:    POCT Glucose 56 (*)     All other components within normal limits   POCT GLUCOSE - Abnormal; Notable for the following components:    POCT Glucose 196 (*)     All other components within normal limits   TROPONIN I   B-TYPE NATRIURETIC PEPTIDE   MAGNESIUM   PHOSPHORUS   APTT   PROTIME-INR   CBC W/ AUTO DIFFERENTIAL   MAGNESIUM   COMPREHENSIVE METABOLIC PANEL   PHOSPHORUS   POCT GLUCOSE   POCT GLUCOSE MONITORING CONTINUOUS   POCT GLUCOSE MONITORING CONTINUOUS   POCT GLUCOSE MONITORING CONTINUOUS   POCT GLUCOSE MONITORING CONTINUOUS   POCT GLUCOSE MONITORING CONTINUOUS   POCT GLUCOSE MONITORING CONTINUOUS   POCT GLUCOSE MONITORING CONTINUOUS          Imaging Results          X-Ray Chest AP Portable (Final result)  Result time  08/05/22 23:42:02    Final result by Mingo Duran MD (08/05/22 23:42:02)                 Impression:      Cardiomegaly.  Central vascular congestion and mild perihilar edema.      Electronically signed by: Mingo Duran MD  Date:    08/05/2022  Time:    23:42             Narrative:    EXAMINATION:  XR CHEST AP PORTABLE    CLINICAL HISTORY:  Chest Pain;    TECHNIQUE:  Single frontal view of the chest was performed.    COMPARISON:  05/17/2022    FINDINGS:  There is a left chest wall cardiac pacing device present.  Cardiac monitoring leads overlie the chest.  The cardiac silhouette is enlarged.  There is prominence of the central pulmonary vasculature as well as mildly accentuated bilateral perihilar interstitial markings.  No large confluent airspace consolidation identified.  No significant volume of pleural fluid or pneumothorax appreciated.  Osseous structures appear intact.                                 Medications   enoxaparin injection 105 mg (0 mg Subcutaneous Hold 8/6/22 0315)   gabapentin capsule 400 mg (has no administration in time range)   metoprolol succinate (TOPROL-XL) 24 hr tablet 50 mg (has no administration in time range)   losartan tablet 25 mg (has no administration in time range)   atorvastatin tablet 80 mg (has no administration in time range)   enoxaparin injection 120 mg (has no administration in time range)   glucose chewable tablet 16 g (has no administration in time range)   glucose chewable tablet 24 g (has no administration in time range)   dextrose 50% injection 12.5 g (has no administration in time range)   dextrose 50% injection 25 g (has no administration in time range)   glucagon (human recombinant) injection 1 mg (has no administration in time range)   furosemide tablet 40 mg (has no administration in time range)   spironolactone tablet 25 mg (has no administration in time range)   sodium chloride 0.9% flush 10 mL (has no administration in time range)   dextrose 50 % in  water (D50W) injection 25 g (25 g Intravenous Given 8/6/22 0018)     Medical Decision Making:   History:   Old Medical Records: I decided to obtain old medical records.    MDM:    47-year-old female with past medical history as noted above presenting with AICD discharge.  Physical exam as noted above.  Pacemaker interrogated and show 1 episode of V tach that self-terminated, she had an additional episode which led to her defibrillator discharging.  Additional lab work returns unremarkable and baseline, troponin repeated showing a slight elevation.  Discussed further with Cardiology, Dr. Magallanes, who requests observation and continued management on telemetry.  Patient is currently asymptomatic in and feels well.  Discussed further with observation team will transfer the floor in stable condition.        Scribe Attestation:   Scribe #1: I performed the above scribed service and the documentation accurately describes the services I performed. I attest to the accuracy of the note.                 Clinical Impression:   Final diagnoses:  [R07.9] Chest pain  [I47.2] Ventricular tachycardia          ED Disposition Condition    Observation           I, Eliseo Lopez M.D., personally performed the services described in this documentation. All medical record entries made by the scribe were at my direction and in my presence. I have reviewed the chart and agree that the record reflects my personal performance and is accurate and complete.     Eliseo Lopez MD  08/06/22 0400

## 2022-08-06 NOTE — CONSULTS
[facility]  Cardiology  Consult Note    Patient Name: Fabiana Chanel  MRN: [id number]  Admission Date: 8/5/2022  Hospital Length of Stay: 0 days  Code Status: Full Code   Attending Provider: Albino Mariee MD   Consulting Provider: Valentino Magallanes MD  Primary Care Physician: Gil Leal MD  Principal Problem:Implantable cardioverter-defibrillator (ICD) in situ    Patient information was obtained from patient and ER records.     Consults  Subjective:     Chief Complaint:  VT        HPI: 47 year old female presents to the ED to evaluate her AICD after it shocked her PTA. Patient states she was sleeping in bed when the defibrillator went off. She denies chest pain prior to sleeping. She reports she did not receive a call from [facility]. Her cardiologist is Dr. Yanez. Patient is compliant with her Lovenox injection once a day. Patient states her chest feels no pain currently. Patient reports her last defibrillator shock was 3 years ago and it was placed in 2018. Patient denies shortness of breath, nausea, vomiting, or other associated symptoms    [facility] check in ER showed 2 VT episodes - first self terminated. Second terminated after shock  Telemetry NSR - on 10 beat VT overnight  Denies CP or SOB  EKG sinus tachycardia 105 LVH NSSTT changes  Troponin peak 0.2 after AICD shock    Followed by Dr Yanez and Dr Christine  # NICM, EF persistently reduced 20% by echo 10/2019.  Euvolemic on exam.  # LV thrombus on echo 4/2022 (was on eliquis->enox 120mg bid)  # PAF, in SR (prev on eliquis, intol of Xarelto, now on enox for LVthrombus)  # S/P [facility] ICD 4/2018 (Dr. Christine), atrial lead placed for AF monitoring  # HTN, controlled  # HLP, on rosuva 40mg  # DM  # BMI 42, up 2 unit(s) vs last OV  # MILE, on CPAP  # CKD4    Echo 8/1/22  · The left ventricle is severely enlarged with mild concentric hypertrophy and severely decreased systolic function.  · The estimated ejection fraction is 25%.  · Grade I left  ventricular diastolic dysfunction.  · Normal right ventricular size with normal right ventricular systolic function.  · Normal central venous pressure (3 mmHg).  · The estimated PA systolic pressure is 27 mmHg.  · There is no pulmonary hypertension.  · No LV thrombus.       Echo 4/30/22  · The left ventricle is moderately enlarged with eccentric hypertrophy and severely decreased systolic function.  · The estimated ejection fraction is 20%.  · There is severe left ventricular global hypokinesis.  · There appears to be a thrombus along the apical lateral wall of the left ventricle.  · Grade I left ventricular diastolic dysfunction.  · Normal right ventricular size with mildly reduced right ventricular systolic function.  · The estimated PA systolic pressure is 26 mmHg.  · Normal central venous pressure (3 mmHg).     LHC 11/27/17    Normal cors.     Elevated LVEDP with transmission of pressures to pulmonary circuit.     Above c/w Severe NICM.            Past Medical History:   Diagnosis Date    Atrial fibrillation     Blood clot associated with vein wall inflammation     not dvt    Cardiomyopathy     Normal cors on cath 11/2017    CHF (congestive heart failure)     DM (diabetes mellitus) 9/19/2013    Hyperlipidemia     Hypertension     Psoriasis     Sleep apnea        Past Surgical History:   Procedure Laterality Date    CARDIAC CATHETERIZATION      COLONOSCOPY      COLONOSCOPY N/A 3/9/2022    Procedure: COLONOSCOPY;  Surgeon: Vielka Burrell MD;  Location: [facility];  Service: Endoscopy;  Laterality: N/A;    DILATION AND CURETTAGE OF UTERUS      ESOPHAGOGASTRODUODENOSCOPY N/A 5/9/2019    Procedure: EGD (ESOPHAGOGASTRODUODENOSCOPY);  Surgeon: Vielka Burrell MD;  Location: [facility];  Service: Endoscopy;  Laterality: N/A;    TRANSFORAMINAL EPIDURAL INJECTION OF STEROID N/A 1/6/2022    Procedure: Transforaminal ANKITA L4/L5 L5/S1;  Surgeon: Jed Yeager MD;  Location: [facility];   Service: Pain Management;  Laterality: N/A;       Review of patient's allergies indicates:   Allergen Reactions    Metformin      Diarrhea on metformin XR     Pneumococcal 23-abimbola ps vaccine        No current facility-administered medications on file prior to encounter.     Current Outpatient Medications on File Prior to Encounter   Medication Sig    dulaglutide (TRULICITY) 0.75 mg/0.5 mL pen injector Inject 0.75 mg into the skin every 7 days.    furosemide (LASIX) 40 MG tablet TAKE 1 TABLET BY MOUTH ONCE DAILY *PLEASE  HOLD  WHILE  NOT  DRINKING*    gabapentin (NEURONTIN) 400 MG capsule Take 1 capsule (400 mg total) by mouth every evening.    insulin degludec (TRESIBA FLEXTOUCH U-200) 200 unit/mL (3 mL) insulin pen Inject 54 Units into the skin 2 (two) times a day.    insulin lispro (HUMALOG KWIKPEN INSULIN) 100 unit/mL pen Inject 40 Units into the skin 3 (three) times daily before meals.    insulin NPH isoph U-100 human (HUMULIN N NPH INSULIN KWIKPEN) 100 unit/mL (3 mL) InPn Inject 24 units nightly at 10 pm    metoprolol succinate (TOPROL-XL) 50 MG 24 hr tablet Take 1 tablet (50 mg total) by mouth once daily. Hold SBP <120    mupirocin (BACTROBAN) 2 % ointment Apply topically nightly.    rosuvastatin (CRESTOR) 40 MG Tab Take 1 tablet (40 mg total) by mouth every evening.    spironolactone (ALDACTONE) 25 MG tablet Take 1 tablet (25 mg total) by mouth once daily. Hold until eating and drinking improves. Need to weight self daily    triamcinolone acetonide 0.1% (KENALOG) 0.1 % cream 2 (two) times daily. Apply to affected area    albuterol (VENTOLIN HFA) 90 mcg/actuation inhaler Inhale 2 puffs into the lungs every 6 (six) hours as needed for Wheezing or Shortness of Breath. Rescue    baclofen (LIORESAL) 10 MG tablet Take 1 tablet (10 mg total) by mouth 2 (two) times daily. (Patient not taking: No sig reported)    blood glucose control, high Soln 1 each by Misc.(Non-Drug; Combo Route) route once. for 1  "dose    blood glucose control, low Soln 1 each by Misc.(Non-Drug; Combo Route) route once. for 1 dose    BLOOD PRESSURE CUFF Misc 1 kit by Misc.(Non-Drug; Combo Route) route 2 (two) times daily.    blood sugar diagnostic Strp Check blood glucose 3x/day.    blood-glucose meter (TRUE METRIX AIR GLUCOSE METER) Misc 1 each by Misc.(Non-Drug; Combo Route) route 3 (three) times daily.    blood-glucose meter,continuous (DEXCOM G6 ) Misc Use with dexcom G6 system    blood-glucose sensor (DEXCOM G6 SENSOR) Lupe Change sensor every 10 days    blood-glucose transmitter (DEXCOM G6 TRANSMITTER) Lupe Change every 3 months    enoxaparin (LOVENOX) 120 mg/0.8 mL Syrg Inject 0.8 mLs (120 mg total) into the skin twice daily (Patient taking differently: Inject 1 mg/kg into the skin once daily. States she takes once daily)    fluticasone propionate (FLONASE) 50 mcg/actuation nasal spray 1 spray (50 mcg total) by Each Nostril route 2 (two) times daily as needed for Rhinitis.    olmesartan (BENICAR) 5 MG Tab Take 1 tablet (5 mg total) by mouth once daily.    pen needle, diabetic (BD LORI 2ND GEN PEN NEEDLE) 32 gauge x 5/32" Ndle USE 1 PEN NEEDLE 4 TIMES DAILY     Family History       Problem Relation (Age of Onset)    Diabetes Father, Maternal Grandmother, Paternal Grandmother    Hypertension Mother, Father          Tobacco Use    Smoking status: Never Smoker    Smokeless tobacco: Never Used    Tobacco comment: smokes cigars on occasion   Substance and Sexual Activity    Alcohol use: Not Currently     Comment: occasional    Drug use: Not Currently     Types: Marijuana     Comment: occ    Sexual activity: Not Currently     Partners: Male     Review of Systems   Constitutional: Negative for decreased appetite.   HENT:  Negative for ear discharge.    Eyes:  Negative for blurred vision.   Respiratory:  Negative for hemoptysis.    Endocrine: Negative for polyphagia.   Hematologic/Lymphatic: Negative for adenopathy. "   Skin:  Negative for color change.   Musculoskeletal:  Negative for joint swelling.   Genitourinary:  Negative for bladder incontinence.   Neurological:  Negative for brief paralysis.   Psychiatric/Behavioral:  Negative for hallucinations.    Allergic/Immunologic: Negative for hives.   Objective:     Vital Signs (Most Recent):  Temp: 98 °F (36.7 °C) (08/06/22 0735)  Pulse: 82 (08/06/22 0735)  Resp: 18 (08/06/22 0735)  BP: 118/74 (08/06/22 0735)  SpO2: 96 % (08/06/22 0735) Vital Signs (24h Range):  Temp:  [98 °F (36.7 °C)-98.1 °F (36.7 °C)] 98 °F (36.7 °C)  Pulse:  [] 82  Resp:  [16-19] 18  SpO2:  [95 %-97 %] 96 %  BP: (110-129)/(63-84) 118/74     Weight: 114.4 kg (252 lb 3.3 oz)  Body mass index is 41.97 kg/m².    SpO2: 96 %  O2 Device (Oxygen Therapy): room air    No intake or output data in the 24 hours ending 08/06/22 1119    Lines/Drains/Airways       Peripheral Intravenous Line  Duration                  Peripheral IV - Single Lumen 05/17/22 1728 20 G Right Forearm 80 days         Peripheral IV - Single Lumen 08/05/22 2240 20 G Right Forearm <1 day                    Physical Exam  Constitutional:       Appearance: She is well-developed.   HENT:      Head: Normocephalic and atraumatic.   Eyes:      Conjunctiva/sclera: Conjunctivae normal.      Pupils: Pupils are equal, round, and reactive to light.   Cardiovascular:      Rate and Rhythm: Normal rate.      Pulses: Intact distal pulses.      Heart sounds: Normal heart sounds.   Pulmonary:      Effort: Pulmonary effort is normal.      Breath sounds: Normal breath sounds.   Abdominal:      General: Bowel sounds are normal.      Palpations: Abdomen is soft.   Musculoskeletal:         General: Normal range of motion.      Cervical back: Normal range of motion and neck supple.   Skin:     General: Skin is warm and dry.   Neurological:      Mental Status: She is alert and oriented to person, place, and time.       Significant Labs: All pertinent lab results from  the last 24 hours have been reviewed.    Significant Imaging: Echocardiogram: Transthoracic echo (TTE) complete (Cupid Only):   Results for orders placed or performed during the hospital encounter of 08/01/22   Echo Saline Bubble? No   Result Value Ref Range    Ascending aorta 2.55 cm    STJ 1.85 cm    AV mean gradient 6 mmHg    Ao peak jose 1.55 m/s    Ao VTI 28.52 cm    IVRT 140.82 msec    IVS 1.33 (A) 0.6 - 1.1 cm    LA size 3.48 cm    Left Atrium Major Axis 5.37 cm    Left Atrium Minor Axis 4.74 cm    LVIDd 6.46 (A) 3.5 - 6.0 cm    LVIDs 5.71 (A) 2.1 - 4.0 cm    LVOT diameter 2.09 cm    LVOT peak VTI 13.28 cm    Posterior Wall 1.40 (A) 0.6 - 1.1 cm    MV Peak A Jose 0.67 m/s    E wave deceleration time 190.74 msec    MV Peak E Jose 0.46 m/s    PV Peak D Jose 0.42 m/s    PV Peak S Jose 0.45 m/s    RA Major Axis 4.57 cm    RA Width 4.17 cm    RVDD 3.76 cm    TAPSE 1.65 cm    TR Max Jose 2.47 m/s    LA WIDTH 3.90 cm    Ao root annulus 3.34 cm    AORTIC VALVE CUSP SEPERATION 2.11 cm    PV PEAK VELOCITY 1.19 cm/s    MV stenosis pressure 1/2 time 55.31 ms    LV Diastolic Volume 212.74 mL    LV Systolic Volume 160.91 mL    LVOT peak jose 0.72 m/s    TDI LATERAL 0.07 m/s    TDI SEPTAL 0.05 m/s    RV S' 7.62 cm/s    LV LATERAL E/E' RATIO 6.57 m/s    LV SEPTAL E/E' RATIO 9.20 m/s    FS 12 %    LA volume 58.09 cm3    LV mass 422.06 g    Left Ventricle Relative Wall Thickness 0.43 cm    AV valve area 1.60 cm2    AV Velocity Ratio 0.46     AV index (prosthetic) 0.47     MV valve area p 1/2 method 3.98 cm2    E/A ratio 0.69     Mean e' 0.06 m/s    Pulm vein S/D ratio 1.07     LVOT area 3.4 cm2    LVOT stroke volume 45.54 cm3    AV peak gradient 10 mmHg    E/E' ratio 7.67 m/s    Triscuspid Valve Regurgitation Peak Gradient 24 mmHg    BSA 2.28 m2    Sinus 2.90 cm    LV Systolic Volume Index 74.2 mL/m2    LV Diastolic Volume Index 98.04 mL/m2    LA Volume Index 26.8 mL/m2    LV Mass Index 194 g/m2    Right Atrial Pressure (from IVC) 3  mmHg    EF 25 %    TV rest pulmonary artery pressure 27 mmHg    Narrative    · The left ventricle is severely enlarged with mild concentric hypertrophy   and severely decreased systolic function.  · The estimated ejection fraction is 25%.  · Grade I left ventricular diastolic dysfunction.  · Normal right ventricular size with normal right ventricular systolic   function.  · Normal central venous pressure (3 mmHg).  · The estimated PA systolic pressure is 27 mmHg.  · There is no pulmonary hypertension.  · No LV thrombus.        Assessment and Plan:     * Implantable cardioverter-defibrillator (ICD) in situ  Had appropriate shock for VT. One 10 beat VT on telemetry overnight. Continue BB for now. Consider adding amiodarone if VT episodes continue. Ok for d/c and f/u with Dr Yanez next week    LV (left ventricular) mural thrombus without MI  On lovenox but only taking it once a day. LV thrombus resolved on echo 8/1/22. Will defer anticoagulation management to Dr Yanez    Chronic combined systolic and diastolic heart failure  Volume stable    Mixed hyperlipidemia  On statin    Paroxysmal atrial fibrillation  Currently NSR        VTE Risk Mitigation (From admission, onward)         Ordered     enoxaparin injection 120 mg  Every 12 hours (non-standard times)         08/06/22 0355     enoxaparin injection 105 mg  Once         08/06/22 0231                Thank you for your consult. I will sign off. Please contact us if you have any additional questions.    Valentino Magallanes MD  Cardiology   [facility]

## 2022-08-06 NOTE — NURSING
In preparation for discharge, removal of patient's saline lock and heart monitor per policy. Discharge instructions giving to patient. Patient verbalized understanding. Patient is waiting for her  to take her home. No distress noted.

## 2022-08-06 NOTE — ASSESSMENT & PLAN NOTE
Labs reviewed- Renal function/electrolytes with Estimated Creatinine Clearance: 48 mL/min (A) (based on SCr of 1.8 mg/dL (H)). according to latest data. Monitor urine output and serial BMP and adjust therapy as needed. Avoid nephrotoxins and renally dose meds for GFR listed above.

## 2022-08-06 NOTE — HOSPITAL COURSE
Had appropriate shock for VT. One 10 beat VT on telemetry overnight. Continue BB for now. Consider adding amiodarone if VT episodes continue. Ok for d/c and f/u with Dr Yanez next week

## 2022-08-06 NOTE — ED TRIAGE NOTES
Reports defibrillator fired while making bed at home approx 20 min ag. Denies pain but is tearful. Also denies SOB, defibrillator placed on [facility] in 2018 by Dr Jones.

## 2022-08-06 NOTE — HPI
47 year old female presents to the ED to evaluate her AICD after it shocked her PTA. Patient states she was sleeping in bed when the defibrillator went off. She denies chest pain prior to sleeping. She reports she did not receive a call from [facility]. Her cardiologist is Dr. Yanez. Patient is compliant with her Lovenox injection once a day. Patient states her chest feels no pain currently. Patient reports her last defibrillator shock was 3 years ago and it was placed in 2018. Patient denies shortness of breath, nausea, vomiting, or other associated symptoms.     PMHx: AICD, Afib, cardiomyopathy, CHF, HLD, HTN, DM, and CKD

## 2022-08-06 NOTE — SUBJECTIVE & OBJECTIVE
Past Medical History:   Diagnosis Date    Atrial fibrillation     Blood clot associated with vein wall inflammation     not dvt    Cardiomyopathy     Normal cors on cath 11/2017    CHF (congestive heart failure)     DM (diabetes mellitus) 9/19/2013    Hyperlipidemia     Hypertension     Psoriasis     Sleep apnea        Past Surgical History:   Procedure Laterality Date    CARDIAC CATHETERIZATION      COLONOSCOPY      COLONOSCOPY N/A 3/9/2022    Procedure: COLONOSCOPY;  Surgeon: Vielka Burrell MD;  Location: [facility] ENDO;  Service: Endoscopy;  Laterality: N/A;    DILATION AND CURETTAGE OF UTERUS      ESOPHAGOGASTRODUODENOSCOPY N/A 5/9/2019    Procedure: EGD (ESOPHAGOGASTRODUODENOSCOPY);  Surgeon: Vielka Burrell MD;  Location: [facility] ENDO;  Service: Endoscopy;  Laterality: N/A;    TRANSFORAMINAL EPIDURAL INJECTION OF STEROID N/A 1/6/2022    Procedure: Transforaminal ANKITA L4/L5 L5/S1;  Surgeon: Jed Yeager MD;  Location: [facility] MGT;  Service: Pain Management;  Laterality: N/A;       Review of patient's allergies indicates:   Allergen Reactions    Metformin      Diarrhea on metformin XR     Pneumococcal 23-abimbola ps vaccine        No current facility-administered medications on file prior to encounter.     Current Outpatient Medications on File Prior to Encounter   Medication Sig    dulaglutide (TRULICITY) 0.75 mg/0.5 mL pen injector Inject 0.75 mg into the skin every 7 days.    fluticasone propionate (FLONASE) 50 mcg/actuation nasal spray 1 spray (50 mcg total) by Each Nostril route 2 (two) times daily as needed for Rhinitis.    furosemide (LASIX) 40 MG tablet TAKE 1 TABLET BY MOUTH ONCE DAILY *PLEASE  HOLD  WHILE  NOT  DRINKING*    gabapentin (NEURONTIN) 400 MG capsule Take 1 capsule (400 mg total) by mouth every evening.    insulin degludec (TRESIBA FLEXTOUCH U-200) 200 unit/mL (3 mL) insulin pen Inject 54 Units into the skin 2 (two) times a day.    insulin lispro (HUMALOG KWIKPEN INSULIN) 100 unit/mL  pen Inject 40 Units into the skin 3 (three) times daily before meals.    insulin NPH isoph U-100 human (HUMULIN N NPH INSULIN KWIKPEN) 100 unit/mL (3 mL) InPn Inject 24 units nightly at 10 pm    metoprolol succinate (TOPROL-XL) 50 MG 24 hr tablet Take 1 tablet (50 mg total) by mouth once daily. Hold SBP <120    mupirocin (BACTROBAN) 2 % ointment Apply topically nightly.    rosuvastatin (CRESTOR) 40 MG Tab Take 1 tablet (40 mg total) by mouth every evening.    spironolactone (ALDACTONE) 25 MG tablet Take 1 tablet (25 mg total) by mouth once daily. Hold until eating and drinking improves. Need to weight self daily    triamcinolone acetonide 0.1% (KENALOG) 0.1 % cream 2 (two) times daily. Apply to affected area    albuterol (VENTOLIN HFA) 90 mcg/actuation inhaler Inhale 2 puffs into the lungs every 6 (six) hours as needed for Wheezing or Shortness of Breath. Rescue    baclofen (LIORESAL) 10 MG tablet Take 1 tablet (10 mg total) by mouth 2 (two) times daily. (Patient not taking: Reported on 8/5/2022)    blood glucose control, high Soln 1 each by Misc.(Non-Drug; Combo Route) route once. for 1 dose    blood glucose control, low Soln 1 each by Misc.(Non-Drug; Combo Route) route once. for 1 dose    BLOOD PRESSURE CUFF Misc 1 kit by Misc.(Non-Drug; Combo Route) route 2 (two) times daily.    blood sugar diagnostic Strp Check blood glucose 3x/day.    blood-glucose meter (TRUE METRIX AIR GLUCOSE METER) Misc 1 each by Misc.(Non-Drug; Combo Route) route 3 (three) times daily.    blood-glucose meter,continuous (DEXCOM G6 ) Misc Use with dexcom G6 system    blood-glucose sensor (DEXCOM G6 SENSOR) Lupe Change sensor every 10 days    blood-glucose transmitter (DEXCOM G6 TRANSMITTER) Lupe Change every 3 months    enoxaparin (LOVENOX) 120 mg/0.8 mL Syrg Inject 0.8 mLs (120 mg total) into the skin twice daily (Patient taking differently: Inject 1 mg/kg into the skin once daily. States she takes once daily)    olmesartan  "(BENICAR) 5 MG Tab Take 1 tablet (5 mg total) by mouth once daily. (Patient not taking: Reported on 8/5/2022)    pen needle, diabetic (BD LORI 2ND GEN PEN NEEDLE) 32 gauge x 5/32" Ndle USE 1 PEN NEEDLE 4 TIMES DAILY     Family History       Problem Relation (Age of Onset)    Diabetes Father, Maternal Grandmother, Paternal Grandmother    Hypertension Mother, Father          Tobacco Use    Smoking status: Never Smoker    Smokeless tobacco: Never Used    Tobacco comment: smokes cigars on occasion   Substance and Sexual Activity    Alcohol use: Not Currently     Comment: occasional    Drug use: Not Currently     Types: Marijuana     Comment: occ    Sexual activity: Not Currently     Partners: Male     Review of Systems  Constitutional: Negative.    HENT: Negative.    Eyes: Negative.    Respiratory: Negative.    Cardiovascular: Negative for chest pain.        (+) AICD   Gastrointestinal: Negative.    Genitourinary: Negative.    Musculoskeletal: Negative.    Skin: Negative.    Neurological: Negative.       Objective:     Vital Signs (Most Recent):  Temp: 98.1 °F (36.7 °C) (08/05/22 2217)  Pulse: 87 (08/06/22 0316)  Resp: 16 (08/06/22 0316)  BP: 110/63 (08/06/22 0316)  SpO2: 95 % (08/06/22 0316) Vital Signs (24h Range):  Temp:  [98.1 °F (36.7 °C)] 98.1 °F (36.7 °C)  Pulse:  [] 87  Resp:  [16-19] 16  SpO2:  [95 %-96 %] 95 %  BP: (110-129)/(63-84) 110/63     Weight: 108.9 kg (240 lb)  Body mass index is 39.94 kg/m².    Physical Exam   Vitals and nursing note reviewed.   Constitutional:       General: She is not in acute distress.     Appearance: She is well-developed. She is not diaphoretic.   HENT:      Head: Normocephalic and atraumatic.      Right Ear: External ear normal.      Left Ear: External ear normal.   Eyes:      General:         Right eye: No discharge.         Left eye: No discharge.      Conjunctiva/sclera: Conjunctivae normal.   Neck:      Thyroid: No thyromegaly.   Cardiovascular:      Rate and Rhythm: " Normal rate and regular rhythm.      Heart sounds: No murmur heard.  Pulmonary:      Effort: Pulmonary effort is normal. No respiratory distress.      Breath sounds: Normal breath sounds.   Abdominal:      General: Bowel sounds are normal. There is no distension.      Palpations: Abdomen is soft. There is no mass.      Tenderness: There is no abdominal tenderness.   Musculoskeletal:         General: No deformity.      Cervical back: Normal range of motion and neck supple.      Right lower leg: No edema.      Left lower leg: No edema.   Skin:     General: Skin is warm and dry.   Neurological:      Mental Status: She is alert and oriented to person, place, and time.      Sensory: No sensory deficit.   Psychiatric:         Mood and Affect: Mood normal.         Behavior: Behavior normal.         Significant Labs: All pertinent labs within the past 24 hours have been reviewed.    Significant Imaging: I have reviewed all pertinent imaging results/findings within the past 24 hours.

## 2022-08-06 NOTE — NURSING
[facility]  Nurses Note -- 4 Eyes      8/6/2022       Skin assessed on: Admission      [x] No Pressure Injuries Present    []Prevention Measures Documented    [] Yes LDA  for Pressure Injury Previously documented     [] Yes New Pressure Injury Discovered   [] LDA for New Pressure Injury Added      Attending RN:  Ale Macias RN     Second RN:  FRENCH Quiroga

## 2022-08-06 NOTE — ASSESSMENT & PLAN NOTE
Her EKG today is ventricular tachycardia   Continue home metoprolol and lovenox  Monitor on telemetry  Cardiology consulted

## 2022-08-06 NOTE — HPI
HPI: 47 year old female presents to the ED to evaluate her AICD after it shocked her PTA. Patient states she was sleeping in bed when the defibrillator went off. She denies chest pain prior to sleeping. She reports she did not receive a call from [facility]. Her cardiologist is Dr. Yanez. Patient is compliant with her Lovenox injection once a day. Patient states her chest feels no pain currently. Patient reports her last defibrillator shock was 3 years ago and it was placed in 2018. Patient denies shortness of breath, nausea, vomiting, or other associated symptoms    [facility] check in ER showed 2 VT episodes - first self terminated. Second terminated after shock  Telemetry NSR - on 10 beat VT overnight  Denies CP or SOB  EKG sinus tachycardia 105 LVH NSSTT changes  Troponin peak 0.2 after AICD shock    Followed by Dr Yanez and Dr Christine  # NICM, EF persistently reduced 20% by echo 10/2019.  Euvolemic on exam.  # LV thrombus on echo 4/2022 (was on eliquis->enox 120mg bid)  # PAF, in SR (prev on eliquis, intol of Xarelto, now on enox for LVthrombus)  # S/P [facility] ICD 4/2018 (Dr. Christine), atrial lead placed for AF monitoring  # HTN, controlled  # HLP, on rosuva 40mg  # DM  # BMI 42, up 2 unit(s) vs last OV  # MILE, on CPAP  # CKD4    Echo 8/1/22  The left ventricle is severely enlarged with mild concentric hypertrophy and severely decreased systolic function.  The estimated ejection fraction is 25%.  Grade I left ventricular diastolic dysfunction.  Normal right ventricular size with normal right ventricular systolic function.  Normal central venous pressure (3 mmHg).  The estimated PA systolic pressure is 27 mmHg.  There is no pulmonary hypertension.  No LV thrombus.       Echo 4/30/22  The left ventricle is moderately enlarged with eccentric hypertrophy and severely decreased systolic function.  The estimated ejection fraction is 20%.  There is severe left ventricular global hypokinesis.  There appears to be a  thrombus along the apical lateral wall of the left ventricle.  Grade I left ventricular diastolic dysfunction.  Normal right ventricular size with mildly reduced right ventricular systolic function.  The estimated PA systolic pressure is 26 mmHg.  Normal central venous pressure (3 mmHg).     [facility] 11/27/17    Normal cors.     Elevated LVEDP with transmission of pressures to pulmonary circuit.     Above c/w Severe NICM.         No

## 2022-08-06 NOTE — DISCHARGE SUMMARY
[facility]  Discharge Summary      Patient Name: Fabiana Chanel  MRN: [id number]  Patient Class: OP- Observation  Admission Date: 8/5/2022  Hospital Length of Stay: 0 days  Discharge Date and Time:  08/06/2022 2:00 PM  Attending Physician: Albino Mariee MD   Discharging Provider: Albino Mariee MD  Primary Care Provider: Gil Leal MD      HPI:   47 year old female presents to the ED to evaluate her AICD after it shocked her PTA. Patient states she was sleeping in bed when the defibrillator went off. She denies chest pain prior to sleeping. She reports she did not receive a call from [facility]. Her cardiologist is Dr. Yanez. Patient is compliant with her Lovenox injection once a day. Patient states her chest feels no pain currently. Patient reports her last defibrillator shock was 3 years ago and it was placed in 2018. Patient denies shortness of breath, nausea, vomiting, or other associated symptoms.     PMHx: AICD, Afib, cardiomyopathy, CHF, HLD, HTN, DM, and CKD      * No surgery found *      Hospital Course:   Had appropriate shock for VT. One 10 beat VT on telemetry overnight. Continue BB for now. Consider adding amiodarone if VT episodes continue. Ok for d/c and f/u with Dr Yanez next week          Goals of Care Treatment Preferences:  Code Status: Full Code      Consults:   Consults (From admission, onward)        Status Ordering Provider     Inpatient consult to Cardiology  Once        Provider:  (Not yet assigned)    Acknowledged YOHANA NEIL          No new Assessment & Plan notes have been filed under this hospital service since the last note was generated.  Service: Hospital Medicine    Final Active Diagnoses:    Diagnosis Date Noted POA    PRINCIPAL PROBLEM:  Implantable cardioverter-defibrillator (ICD) in situ [Z95.810] 06/21/2018 Yes    Hypoglycemia [E16.2] 08/06/2022 Unknown    Ventricular tachycardia [I47.2] 08/06/2022 Unknown    LV (left  ventricular) mural thrombus without MI [I24.0] 05/01/2022 Yes    Acute kidney injury superimposed on chronic kidney disease [N17.9, N18.9] 04/29/2022 Unknown    Elevated troponin [R77.8] 05/17/2020 Yes    Mixed hyperlipidemia [E78.2] 05/16/2014 Yes    Chronic combined systolic and diastolic heart failure [I50.42] 05/16/2014 Yes    Class 2 obesity in adult [E66.9] 05/14/2014 Yes    Paroxysmal atrial fibrillation [I48.0] 05/14/2014 Yes    Essential hypertension [I10] 05/14/2014 Yes      Problems Resolved During this Admission:       Discharged Condition: good    Disposition:     Follow Up:    Patient Instructions:   No discharge procedures on file.    Significant Diagnostic Studies:     Pending Diagnostic Studies:     Procedure Component Value Units Date/Time    Troponin I [[id number]] Collected: 08/06/22 1351    Order Status: Sent Lab Status: In process Updated: 08/06/22 1351    Specimen: Blood          Medications:  Reconciled Home Medications:      Medication List      ASK your doctor about these medications    albuterol 90 mcg/actuation inhaler  Commonly known as: VENTOLIN HFA  Inhale 2 puffs into the lungs every 6 (six) hours as needed for Wheezing or Shortness of Breath. Rescue     baclofen 10 MG tablet  Commonly known as: LIORESAL  Take 1 tablet (10 mg total) by mouth 2 (two) times daily.     blood glucose control, high Soln  1 each by Misc.(Non-Drug; Combo Route) route once. for 1 dose     blood glucose control, low Soln  1 each by Misc.(Non-Drug; Combo Route) route once. for 1 dose     BLOOD PRESSURE CUFF Misc  Generic drug: miscellaneous medical supply  1 kit by Misc.(Non-Drug; Combo Route) route 2 (two) times daily.     blood sugar diagnostic Strp  Check blood glucose 3x/day.     blood-glucose meter Misc  Commonly known as: TRUE METRIX AIR GLUCOSE METER  1 each by Misc.(Non-Drug; Combo Route) route 3 (three) times daily.     DEXCOM G6  Misc  Generic drug: blood-glucose meter,continuous  Use  "with dexcom G6 system     DEXCOM G6 SENSOR Lupe  Generic drug: blood-glucose sensor  Change sensor every 10 days     DEXCOM G6 TRANSMITTER Lupe  Generic drug: blood-glucose transmitter  Change every 3 months     enoxaparin 120 mg/0.8 mL Syrg  Commonly known as: LOVENOX  Inject 0.8 mLs (120 mg total) into the skin twice daily     fluticasone propionate 50 mcg/actuation nasal spray  Commonly known as: FLONASE  1 spray (50 mcg total) by Each Nostril route 2 (two) times daily as needed for Rhinitis.     furosemide 40 MG tablet  Commonly known as: LASIX  TAKE 1 TABLET BY MOUTH ONCE DAILY *PLEASE  HOLD  WHILE  NOT  DRINKING*     gabapentin 400 MG capsule  Commonly known as: NEURONTIN  Take 1 capsule (400 mg total) by mouth every evening.     HumuLIN N NPH Insulin KwikPen 100 unit/mL (3 mL) Inpn  Generic drug: insulin NPH isoph U-100 human  Inject 24 units nightly at 10 pm     insulin lispro 100 unit/mL pen  Commonly known as: HumaLOG KwikPen Insulin  Inject 40 Units into the skin 3 (three) times daily before meals.     metoprolol succinate 50 MG 24 hr tablet  Commonly known as: TOPROL-XL  Take 1 tablet (50 mg total) by mouth once daily. Hold SBP <120     mupirocin 2 % ointment  Commonly known as: BACTROBAN  Apply topically nightly.     olmesartan 5 MG Tab  Commonly known as: BENICAR  Take 1 tablet (5 mg total) by mouth once daily.     pen needle, diabetic 32 gauge x 5/32" Ndle  Commonly known as: BD LORI 2ND GEN PEN NEEDLE  USE 1 PEN NEEDLE 4 TIMES DAILY     rosuvastatin 40 MG Tab  Commonly known as: CRESTOR  Take 1 tablet (40 mg total) by mouth every evening.     spironolactone 25 MG tablet  Commonly known as: ALDACTONE  Take 1 tablet (25 mg total) by mouth once daily. Hold until eating and drinking improves. Need to weight self daily     TRESIBA FLEXTOUCH U-200 200 unit/mL (3 mL) insulin pen  Generic drug: insulin degludec  Inject 54 Units into the skin 2 (two) times a day.     triamcinolone acetonide 0.1% 0.1 % " cream  Commonly known as: KENALOG  2 (two) times daily. Apply to affected area     TRULICITY 0.75 mg/0.5 mL pen injector  Generic drug: dulaglutide  Inject 0.75 mg into the skin every 7 days.            Indwelling Lines/Drains at time of discharge:   Lines/Drains/Airways     None                 Time spent on the discharge of patient: 45 minutes         Albino Mariee MD  Department of Hospital Medicine  [facility]

## 2022-08-06 NOTE — ASSESSMENT & PLAN NOTE
Body mass index is 39.94 kg/m². Morbid obesity complicates all aspects of disease management from diagnostic modalities to treatment. Weight loss encouraged and health benefits explained to patient.

## 2022-08-06 NOTE — PLAN OF CARE
Problem: Adult Inpatient Plan of Care  Goal: Plan of Care Review  Outcome: Ongoing, Progressing  Flowsheets (Taken 8/6/2022 0830)  Plan of Care Reviewed With: patient  Goal: Patient-Specific Goal (Individualized)  Outcome: Ongoing, Progressing     Problem: Diabetes Comorbidity  Goal: Blood Glucose Level Within Targeted Range  Outcome: Ongoing, Progressing  Intervention: Monitor and Manage Glycemia  Flowsheets (Taken 8/6/2022 0815)  Glycemic Management: blood glucose monitored     Problem: Adult Inpatient Plan of Care  Goal: Plan of Care Review  Outcome: Ongoing, Progressing  Flowsheets (Taken 8/6/2022 0830)  Plan of Care Reviewed With: patient     Problem: Adult Inpatient Plan of Care  Goal: Plan of Care Review  Outcome: Ongoing, Progressing  Flowsheets (Taken 8/6/2022 0830)  Plan of Care Reviewed With: patient     Problem: Adult Inpatient Plan of Care  Goal: Patient-Specific Goal (Individualized)  Outcome: Ongoing, Progressing     Problem: Adult Inpatient Plan of Care  Goal: Patient-Specific Goal (Individualized)  Outcome: Ongoing, Progressing     Problem: Diabetes Comorbidity  Goal: Blood Glucose Level Within Targeted Range  Outcome: Ongoing, Progressing  Intervention: Monitor and Manage Glycemia  Flowsheets (Taken 8/6/2022 0815)  Glycemic Management: blood glucose monitored     Problem: Diabetes Comorbidity  Goal: Blood Glucose Level Within Targeted Range  Outcome: Ongoing, Progressing     Problem: Diabetes Comorbidity  Goal: Blood Glucose Level Within Targeted Range  Intervention: Monitor and Manage Glycemia  Flowsheets (Taken 8/6/2022 0815)  Glycemic Management: blood glucose monitored     Problem: Diabetes Comorbidity  Goal: Blood Glucose Level Within Targeted Range  Intervention: Monitor and Manage Glycemia  Flowsheets (Taken 8/6/2022 0815)  Glycemic Management: blood glucose monitored

## 2022-08-06 NOTE — ED NOTES
[facility]  AICD device # [id number]  Implant date 4/17/2018    Card scanned into media records

## 2022-08-06 NOTE — ASSESSMENT & PLAN NOTE
Echo 6/16/22  · The left ventricle is severely enlarged with mild concentric hypertrophy and severely decreased systolic function.  · The estimated ejection fraction is 25%.  · Grade I left ventricular diastolic dysfunction    On metoprolol, lasix, and sprinolactone   On admission BNP 21  Strict I&Os, daily wgt, FR  CXR: Cardiomegaly.  Central vascular congestion and mild perihilar edema

## 2022-08-06 NOTE — PLAN OF CARE
Patient from home and lives with spouse, who will be her help at home. Pt has previously scheduled appointment with Dr. Yanez for 8/11/22. [place] to continue to follow for discharge needs.    08/06/22 0850   Discharge Planning   Assessment Type Discharge Planning Brief Assessment   Resource/Environmental Concerns none   Support Systems Spouse/significant other   Equipment Currently Used at Home other (see comments);CPAP   Current Living Arrangements home/apartment/condo   Patient/Family Anticipates Transition to home   Patient/Family Anticipated Services at Transition none   DME Needed Upon Discharge  none   Discharge Plan A Home with family   Discharge Plan B Home

## 2022-08-06 NOTE — SUBJECTIVE & OBJECTIVE
Past Medical History:   Diagnosis Date    Atrial fibrillation     Blood clot associated with vein wall inflammation     not dvt    Cardiomyopathy     Normal cors on cath 11/2017    CHF (congestive heart failure)     DM (diabetes mellitus) 9/19/2013    Hyperlipidemia     Hypertension     Psoriasis     Sleep apnea        Past Surgical History:   Procedure Laterality Date    CARDIAC CATHETERIZATION      COLONOSCOPY      COLONOSCOPY N/A 3/9/2022    Procedure: COLONOSCOPY;  Surgeon: Vielka Burrell MD;  Location: [facility] END[facility];  Service: Endoscopy;  Laterality: N/A;    DILATION AND CURETTAGE OF UTERUS      ESOPHAGOGASTRODUODENOSCOPY N/A 5/9/2019    Procedure: EGD (ESOPHAGOGASTRODUODENOSCOPY);  Surgeon: Vielka Burrell MD;  Location: [facility] END[facility];  Service: Endoscopy;  Laterality: N/A;    TRANSFORAMINAL EPIDURAL INJECTION OF STEROID N/A 1/6/2022    Procedure: Transforaminal ANKITA L4/L5 L5/S1;  Surgeon: Jed Yeager MD;  Location: [facility];  Service: Pain Management;  Laterality: N/A;       Review of patient's allergies indicates:   Allergen Reactions    Metformin      Diarrhea on metformin XR     Pneumococcal 23-abimbola ps vaccine        No current facility-administered medications on file prior to encounter.     Current Outpatient Medications on File Prior to Encounter   Medication Sig    dulaglutide (TRULICITY) 0.75 mg/0.5 mL pen injector Inject 0.75 mg into the skin every 7 days.    furosemide (LASIX) 40 MG tablet TAKE 1 TABLET BY MOUTH ONCE DAILY *PLEASE  HOLD  WHILE  NOT  DRINKING*    gabapentin (NEURONTIN) 400 MG capsule Take 1 capsule (400 mg total) by mouth every evening.    insulin degludec (TRESIBA FLEXTOUCH U-200) 200 unit/mL (3 mL) insulin pen Inject 54 Units into the skin 2 (two) times a day.    insulin lispro (HUMALOG KWIKPEN INSULIN) 100 unit/mL pen Inject 40 Units into the skin 3 (three) times daily before meals.    insulin NPH isoph U-100 human (HUMULIN N NPH INSULIN KWIKPEN) 100 unit/mL (3 mL)  InPn Inject 24 units nightly at 10 pm    metoprolol succinate (TOPROL-XL) 50 MG 24 hr tablet Take 1 tablet (50 mg total) by mouth once daily. Hold SBP <120    mupirocin (BACTROBAN) 2 % ointment Apply topically nightly.    rosuvastatin (CRESTOR) 40 MG Tab Take 1 tablet (40 mg total) by mouth every evening.    spironolactone (ALDACTONE) 25 MG tablet Take 1 tablet (25 mg total) by mouth once daily. Hold until eating and drinking improves. Need to weight self daily    triamcinolone acetonide 0.1% (KENALOG) 0.1 % cream 2 (two) times daily. Apply to affected area    albuterol (VENTOLIN HFA) 90 mcg/actuation inhaler Inhale 2 puffs into the lungs every 6 (six) hours as needed for Wheezing or Shortness of Breath. Rescue    baclofen (LIORESAL) 10 MG tablet Take 1 tablet (10 mg total) by mouth 2 (two) times daily. (Patient not taking: No sig reported)    blood glucose control, high Soln 1 each by Misc.(Non-Drug; Combo Route) route once. for 1 dose    blood glucose control, low Soln 1 each by Misc.(Non-Drug; Combo Route) route once. for 1 dose    BLOOD PRESSURE CUFF Misc 1 kit by Misc.(Non-Drug; Combo Route) route 2 (two) times daily.    blood sugar diagnostic Strp Check blood glucose 3x/day.    blood-glucose meter (TRUE METRIX AIR GLUCOSE METER) Misc 1 each by Misc.(Non-Drug; Combo Route) route 3 (three) times daily.    blood-glucose meter,continuous (DEXCOM G6 ) Misc Use with dexcom G6 system    blood-glucose sensor (DEXCOM G6 SENSOR) Lupe Change sensor every 10 days    blood-glucose transmitter (DEXCOM G6 TRANSMITTER) Lupe Change every 3 months    enoxaparin (LOVENOX) 120 mg/0.8 mL Syrg Inject 0.8 mLs (120 mg total) into the skin twice daily (Patient taking differently: Inject 1 mg/kg into the skin once daily. States she takes once daily)    fluticasone propionate (FLONASE) 50 mcg/actuation nasal spray 1 spray (50 mcg total) by Each Nostril route 2 (two) times daily as needed for Rhinitis.    olmesartan (BENICAR) 5  "MG Tab Take 1 tablet (5 mg total) by mouth once daily.    pen needle, diabetic (BD LORI 2ND GEN PEN NEEDLE) 32 gauge x 5/32" Ndle USE 1 PEN NEEDLE 4 TIMES DAILY     Family History       Problem Relation (Age of Onset)    Diabetes Father, Maternal Grandmother, Paternal Grandmother    Hypertension Mother, Father          Tobacco Use    Smoking status: Never Smoker    Smokeless tobacco: Never Used    Tobacco comment: smokes cigars on occasion   Substance and Sexual Activity    Alcohol use: Not Currently     Comment: occasional    Drug use: Not Currently     Types: Marijuana     Comment: occ    Sexual activity: Not Currently     Partners: Male     Review of Systems   Constitutional: Negative for decreased appetite.   HENT:  Negative for ear discharge.    Eyes:  Negative for blurred vision.   Respiratory:  Negative for hemoptysis.    Endocrine: Negative for polyphagia.   Hematologic/Lymphatic: Negative for adenopathy.   Skin:  Negative for color change.   Musculoskeletal:  Negative for joint swelling.   Genitourinary:  Negative for bladder incontinence.   Neurological:  Negative for brief paralysis.   Psychiatric/Behavioral:  Negative for hallucinations.    Allergic/Immunologic: Negative for hives.   Objective:     Vital Signs (Most Recent):  Temp: 98 °F (36.7 °C) (08/06/22 0735)  Pulse: 82 (08/06/22 0735)  Resp: 18 (08/06/22 0735)  BP: 118/74 (08/06/22 0735)  SpO2: 96 % (08/06/22 0735) Vital Signs (24h Range):  Temp:  [98 °F (36.7 °C)-98.1 °F (36.7 °C)] 98 °F (36.7 °C)  Pulse:  [] 82  Resp:  [16-19] 18  SpO2:  [95 %-97 %] 96 %  BP: (110-129)/(63-84) 118/74     Weight: 114.4 kg (252 lb 3.3 oz)  Body mass index is 41.97 kg/m².    SpO2: 96 %  O2 Device (Oxygen Therapy): room air    No intake or output data in the 24 hours ending 08/06/22 1119    Lines/Drains/Airways       Peripheral Intravenous Line  Duration                  Peripheral IV - Single Lumen 05/17/22 1728 20 G Right Forearm 80 days         Peripheral IV " - Single Lumen 08/05/22 2240 20 G Right Forearm <1 day                    Physical Exam  Constitutional:       Appearance: She is well-developed.   HENT:      Head: Normocephalic and atraumatic.   Eyes:      Conjunctiva/sclera: Conjunctivae normal.      Pupils: Pupils are equal, round, and reactive to light.   Cardiovascular:      Rate and Rhythm: Normal rate.      Pulses: Intact distal pulses.      Heart sounds: Normal heart sounds.   Pulmonary:      Effort: Pulmonary effort is normal.      Breath sounds: Normal breath sounds.   Abdominal:      General: Bowel sounds are normal.      Palpations: Abdomen is soft.   Musculoskeletal:         General: Normal range of motion.      Cervical back: Normal range of motion and neck supple.   Skin:     General: Skin is warm and dry.   Neurological:      Mental Status: She is alert and oriented to person, place, and time.       Significant Labs: All pertinent lab results from the last 24 hours have been reviewed.    Significant Imaging: Echocardiogram: Transthoracic echo (TTE) complete (Cupid Only):   Results for orders placed or performed during the hospital encounter of 08/01/22   Echo Saline Bubble? No   Result Value Ref Range    Ascending aorta 2.55 cm    STJ 1.85 cm    AV mean gradient 6 mmHg    Ao peak jose 1.55 m/s    Ao VTI 28.52 cm    IVRT 140.82 msec    IVS 1.33 (A) 0.6 - 1.1 cm    LA size 3.48 cm    Left Atrium Major Axis 5.37 cm    Left Atrium Minor Axis 4.74 cm    LVIDd 6.46 (A) 3.5 - 6.0 cm    LVIDs 5.71 (A) 2.1 - 4.0 cm    LVOT diameter 2.09 cm    LVOT peak VTI 13.28 cm    Posterior Wall 1.40 (A) 0.6 - 1.1 cm    MV Peak A Jose 0.67 m/s    E wave deceleration time 190.74 msec    MV Peak E Jose 0.46 m/s    PV Peak D Jose 0.42 m/s    PV Peak S Jose 0.45 m/s    RA Major Axis 4.57 cm    RA Width 4.17 cm    RVDD 3.76 cm    TAPSE 1.65 cm    TR Max Jose 2.47 m/s    LA WIDTH 3.90 cm    Ao root annulus 3.34 cm    AORTIC VALVE CUSP SEPERATION 2.11 cm    PV PEAK VELOCITY 1.19 cm/s     MV stenosis pressure 1/2 time 55.31 ms    LV Diastolic Volume 212.74 mL    LV Systolic Volume 160.91 mL    LVOT peak isai 0.72 m/s    TDI LATERAL 0.07 m/s    TDI SEPTAL 0.05 m/s    RV S' 7.62 cm/s    LV LATERAL E/E' RATIO 6.57 m/s    LV SEPTAL E/E' RATIO 9.20 m/s    FS 12 %    LA volume 58.09 cm3    LV mass 422.06 g    Left Ventricle Relative Wall Thickness 0.43 cm    AV valve area 1.60 cm2    AV Velocity Ratio 0.46     AV index (prosthetic) 0.47     MV valve area p 1/2 method 3.98 cm2    E/A ratio 0.69     Mean e' 0.06 m/s    Pulm vein S/D ratio 1.07     LVOT area 3.4 cm2    LVOT stroke volume 45.54 cm3    AV peak gradient 10 mmHg    E/E' ratio 7.67 m/s    Triscuspid Valve Regurgitation Peak Gradient 24 mmHg    BSA 2.28 m2    Sinus 2.90 cm    LV Systolic Volume Index 74.2 mL/m2    LV Diastolic Volume Index 98.04 mL/m2    LA Volume Index 26.8 mL/m2    LV Mass Index 194 g/m2    Right Atrial Pressure (from IVC) 3 mmHg    EF 25 %    TV rest pulmonary artery pressure 27 mmHg    Narrative    · The left ventricle is severely enlarged with mild concentric hypertrophy   and severely decreased systolic function.  · The estimated ejection fraction is 25%.  · Grade I left ventricular diastolic dysfunction.  · Normal right ventricular size with normal right ventricular systolic   function.  · Normal central venous pressure (3 mmHg).  · The estimated PA systolic pressure is 27 mmHg.  · There is no pulmonary hypertension.  · No LV thrombus.

## 2022-08-06 NOTE — ASSESSMENT & PLAN NOTE
On lovenox but only taking it once a day. LV thrombus resolved on echo 8/1/22. Will defer anticoagulation management to Dr Yanez

## 2022-08-08 ENCOUNTER — LAB VISIT (OUTPATIENT)
Dept: LAB | Facility: HOSPITAL | Age: 47
End: 2022-08-08
Attending: INTERNAL MEDICINE
Payer: MEDICARE

## 2022-08-08 DIAGNOSIS — N18.32 STAGE 3B CHRONIC KIDNEY DISEASE: ICD-10-CM

## 2022-08-08 LAB
ALBUMIN SERPL BCP-MCNC: 3.4 G/DL (ref 3.5–5.2)
ANION GAP SERPL CALC-SCNC: 7 MMOL/L (ref 8–16)
BUN SERPL-MCNC: 23 MG/DL (ref 6–20)
CALCIUM SERPL-MCNC: 9.5 MG/DL (ref 8.7–10.5)
CHLORIDE SERPL-SCNC: 101 MMOL/L (ref 95–110)
CO2 SERPL-SCNC: 31 MMOL/L (ref 23–29)
CREAT SERPL-MCNC: 1.7 MG/DL (ref 0.5–1.4)
EST. GFR  (NO RACE VARIABLE): 37 ML/MIN/1.73 M^2
GLUCOSE SERPL-MCNC: 140 MG/DL (ref 70–110)
PHOSPHATE SERPL-MCNC: 4.1 MG/DL (ref 2.7–4.5)
POTASSIUM SERPL-SCNC: 3.7 MMOL/L (ref 3.5–5.1)
SODIUM SERPL-SCNC: 139 MMOL/L (ref 136–145)

## 2022-08-08 PROCEDURE — 80069 RENAL FUNCTION PANEL: CPT | Performed by: INTERNAL MEDICINE

## 2022-08-08 PROCEDURE — 36415 COLL VENOUS BLD VENIPUNCTURE: CPT | Performed by: INTERNAL MEDICINE

## 2022-08-11 ENCOUNTER — HOSPITAL ENCOUNTER (EMERGENCY)
Facility: HOSPITAL | Age: 47
Discharge: HOME | End: 2022-08-11
Attending: EMERGENCY MEDICINE
Payer: MEDICARE

## 2022-08-11 VITALS
SYSTOLIC BLOOD PRESSURE: 109 MMHG | HEART RATE: 86 BPM | OXYGEN SATURATION: 96 % | WEIGHT: 252 LBS | BODY MASS INDEX: 41.99 KG/M2 | HEIGHT: 65 IN | RESPIRATION RATE: 17 BRPM | DIASTOLIC BLOOD PRESSURE: 74 MMHG | TEMPERATURE: 98 F

## 2022-08-11 DIAGNOSIS — M54.50 MIDLINE LOW BACK PAIN WITHOUT SCIATICA, UNSPECIFIED CHRONICITY: Primary | ICD-10-CM

## 2022-08-11 LAB
B-HCG UR QL: NEGATIVE
CTP QC/QA: YES

## 2022-08-11 PROCEDURE — 99284 EMERGENCY DEPT VISIT MOD MDM: CPT | Mod: 25

## 2022-08-11 PROCEDURE — 81025 URINE PREGNANCY TEST: CPT | Performed by: EMERGENCY MEDICINE

## 2022-08-11 PROCEDURE — 96372 THER/PROPH/DIAG INJ SC/IM: CPT | Performed by: PHYSICIAN ASSISTANT

## 2022-08-11 PROCEDURE — 63600175 PHARM REV CODE 636 W HCPCS: Performed by: PHYSICIAN ASSISTANT

## 2022-08-11 RX ORDER — METHOCARBAMOL 500 MG/1
500 TABLET, FILM COATED ORAL 3 TIMES DAILY
Qty: 30 TABLET | Refills: 0 | Status: SHIPPED | OUTPATIENT
Start: 2022-08-11 | End: 2022-10-07 | Stop reason: SDUPTHER

## 2022-08-11 RX ORDER — HYDROMORPHONE HYDROCHLORIDE 2 MG/ML
1 INJECTION, SOLUTION INTRAMUSCULAR; INTRAVENOUS; SUBCUTANEOUS
Status: COMPLETED | OUTPATIENT
Start: 2022-08-11 | End: 2022-08-11

## 2022-08-11 RX ORDER — OXYCODONE AND ACETAMINOPHEN 5; 325 MG/1; MG/1
1 TABLET ORAL EVERY 4 HOURS PRN
Qty: 10 TABLET | Refills: 0 | Status: SHIPPED | OUTPATIENT
Start: 2022-08-11 | End: 2022-11-08 | Stop reason: SDUPTHER

## 2022-08-11 RX ADMIN — HYDROMORPHONE HYDROCHLORIDE 1 MG: 2 INJECTION, SOLUTION INTRAMUSCULAR; INTRAVENOUS; SUBCUTANEOUS at 06:08

## 2022-08-11 NOTE — ED TRIAGE NOTES
Pt reports to the ED with C/O lower back pain that started 1-2 days ago. Pt denies any falls or trauma. Pt has a hx of sciatica. Pt AAOx4, RESP easy and unlabored. ED workup in progress. Will monitor.

## 2022-08-11 NOTE — ED PROVIDER NOTES
Encounter Date: 8/11/2022       History     Chief Complaint   Patient presents with    Back Pain     48 yo female to triage for severe back pain x 1-2 days, Hx of sciatica. VSS, NAD     Chief Complaint:  Back pain  History of  Present Illness: History obtained from patient. This 47 y.o. female who has past medical history of atrial fibrillation, CHF, hyperlipidemia, hypertension presents to the ED complaining of lower back pain that began yesterday.  Patient reports history of sciatica on the right side but reports no radiation pain with this exacerbation of back pain.  No prior treatment for symptoms.  Denies urinary incontinence, bowel incontinence, saddle anesthesia, numbness, tingling, weakness, abdominal pain, nausea, vomiting, fever.        Review of patient's allergies indicates:   Allergen Reactions    Metformin      Diarrhea on metformin XR     Pneumococcal 23-abimbola ps vaccine      Past Medical History:   Diagnosis Date    Atrial fibrillation     Blood clot associated with vein wall inflammation     not dvt    Cardiomyopathy     Normal cors on cath 11/2017    CHF (congestive heart failure)     DM (diabetes mellitus) 9/19/2013    Hyperlipidemia     Hypertension     Psoriasis     Sleep apnea      Past Surgical History:   Procedure Laterality Date    CARDIAC CATHETERIZATION      COLONOSCOPY      COLONOSCOPY N/A 3/9/2022    Procedure: COLONOSCOPY;  Surgeon: Vielka Burrell MD;  Location: [facility];  Service: Endoscopy;  Laterality: N/A;    DILATION AND CURETTAGE OF UTERUS      ESOPHAGOGASTRODUODENOSCOPY N/A 5/9/2019    Procedure: EGD (ESOPHAGOGASTRODUODENOSCOPY);  Surgeon: Vielka Burrell MD;  Location: [facility];  Service: Endoscopy;  Laterality: N/A;    TRANSFORAMINAL EPIDURAL INJECTION OF STEROID N/A 1/6/2022    Procedure: Transforaminal ANKITA L4/L5 L5/S1;  Surgeon: Jed Yeager MD;  Location: [facility];  Service: Pain Management;  Laterality: N/A;     Family History    Problem Relation Age of Onset    Hypertension Mother     Hypertension Father     Diabetes Father     Diabetes Maternal Grandmother     Diabetes Paternal Grandmother     Breast cancer Neg Hx     Colon cancer Neg Hx     Ovarian cancer Neg Hx      Social History     Tobacco Use    Smoking status: Never Smoker    Smokeless tobacco: Never Used    Tobacco comment: smokes cigars on occasion   Substance Use Topics    Alcohol use: Not Currently     Comment: occasional    Drug use: Not Currently     Types: Marijuana     Comment: occ     Review of Systems   Constitutional: Negative for chills and fever.   HENT: Negative for congestion, rhinorrhea and sore throat.    Eyes: Negative for visual disturbance.   Respiratory: Negative for cough and shortness of breath.    Cardiovascular: Negative for chest pain.   Gastrointestinal: Negative for abdominal pain, diarrhea, nausea and vomiting.   Genitourinary: Negative for dysuria, frequency and hematuria.   Musculoskeletal: Positive for back pain.   Skin: Negative for rash.   Neurological: Negative for dizziness, weakness and headaches.        (-) urinary incontinence  (-) bowel incontinence  (-) saddle anesthesia  (-) urinary retention         Physical Exam     Initial Vitals [08/11/22 1725]   BP Pulse Resp Temp SpO2   109/74 86 17 98.2 °F (36.8 °C) 96 %      MAP       --         Physical Exam    Nursing note and vitals reviewed.  Constitutional: She appears well-developed and well-nourished. No distress.   HENT:   Head: Normocephalic and atraumatic.   Right Ear: Tympanic membrane normal.   Left Ear: Tympanic membrane normal.   Nose: Nose normal.   Mouth/Throat: Uvula is midline, oropharynx is clear and moist and mucous membranes are normal.   Eyes: EOM are normal. Pupils are equal, round, and reactive to light.   Neck: Trachea normal and phonation normal. Neck supple. No stridor present.   Normal range of motion.   Full passive range of motion without pain.      Cardiovascular: Normal rate, regular rhythm and normal heart sounds. Exam reveals no gallop and no friction rub.    No murmur heard.  Pulmonary/Chest: Effort normal and breath sounds normal. No respiratory distress. She has no wheezes. She has no rhonchi. She has no rales.   Abdominal: Abdomen is soft. Bowel sounds are normal. There is no abdominal tenderness. There is no rebound and no guarding.   Musculoskeletal:         General: Normal range of motion.      Cervical back: Full passive range of motion without pain, normal range of motion and neck supple. No rigidity. No spinous process tenderness or muscular tenderness. Normal range of motion.      Comments: No C-spine, T-spine tenderness.  There is mild tenderness to midline lumbar spine.  There is full range of motion of the bilateral upper and lower extremities.         Neurological: She is alert and oriented to person, place, and time. She has normal strength. No cranial nerve deficit or sensory deficit.   Skin: Skin is warm and dry. Capillary refill takes less than 2 seconds.   Psychiatric: She has a normal mood and affect.         ED Course   Procedures  Labs Reviewed   POCT URINE PREGNANCY          Imaging Results          X-Ray Lumbar Spine Ap And Lateral (Final result)  Result time 08/11/22 20:02:28    Final result by Rober Gonsales MD (08/11/22 20:02:28)                 Impression:      No acute lumbar spine abnormalities identified.      Electronically signed by: Rober Gonsales MD  Date:    08/11/2022  Time:    20:02             Narrative:    EXAMINATION:  XR LUMBAR SPINE AP AND LATERAL    CLINICAL HISTORY:  back pain;    TECHNIQUE:  AP, lateral and spot images were performed of the lumbar spine.    COMPARISON:  December 2021.    FINDINGS:  Lumbar spine alignment is within normal limits.  No evidence of acute lumbar spine fracture or subluxation.  Intervertebral disc spaces appear fairly well maintained.  Visualized sacrum is unremarkable.                                  Medications   HYDROmorphone (PF) injection 1 mg (1 mg Intramuscular Given 8/11/22 1850)     Medical Decision Making:   ED Management:  This is an evaluation of a 47 y.o. female that presents to the Emergency Department for back pain.  Denies fever, rash, night sweats, weight loss, dysuria, bowel/bladder incontinence, or IV\SQ drug use. The patient is a non-toxic, afebrile, and well appearing female. On physical exam, there is tenderness to the lumbar back. There is no abdominal pain to palpation, CVA tenderness, or saddle anesthesia. Strength and sensation are symmetric bilaterally.  Upper and lower extremity pulses are symmetrical. There is no rash, erythema, open wounds, or increased warmth over the back.      Vital signs reassuring.     X-ray lumbar spine shows acute bony abnormalities.    Given the above findings, my overall impression is acute on chronic back pain. Given the above findings, I do not think the patient has acute vertebral fracture, subluxation, dislocation, sciatica, epidural abscess, cauda equina, shingles, UTI.    Will discharge patient home to follow-up with neurosurgery.  The diagnosis, treatment plan, instructions for follow-up and reevaluation with primary care as well as ED return precautions were discussed and understanding was verbalized. All questions or concerns have been addressed.                         Clinical Impression:   Final diagnoses:  [M54.50] Midline low back pain without sciatica, unspecified chronicity (Primary)          ED Disposition Condition    Discharge Stable        ED Prescriptions     Medication Sig Dispense Start Date End Date Auth. Provider    oxyCODONE-acetaminophen (PERCOCET) 5-325 mg per tablet Take 1 tablet by mouth every 4 (four) hours as needed for Pain. 10 tablet 8/11/2022  Víctor Daniel PA-C    methocarbamoL (ROBAXIN) 500 MG Tab Take 1 tablet (500 mg total) by mouth 3 (three) times daily. 30 tablet 8/11/2022  Víctor Daniel  MEE        Follow-up Information     Follow up With Specialties Details Why Contact Info    Samantha Gerardo PA-C Neurosurgery   [address]  [address]  [facility] [address]  [phone]      [facility] Emergency Medicine Go in 1 day If symptoms worsen [address]  [facility] [id number] [phone]           Víctor Daniel PA-C  08/11/22 2124

## 2022-08-25 ENCOUNTER — TELEPHONE (OUTPATIENT)
Dept: NEUROSURGERY | Facility: CLINIC | Age: 47
End: 2022-08-25
Payer: MEDICARE

## 2022-08-25 NOTE — TELEPHONE ENCOUNTER
Tashi pt and she states her condition (pinched nerve) has improved significantly post steroid injection.  Pt is not interested in being evaluated in the NS at this time.  Referral deferred per pt request.  V/U.

## 2022-08-26 ENCOUNTER — TELEPHONE (OUTPATIENT)
Dept: CARDIOLOGY | Facility: CLINIC | Age: 47
End: 2022-08-26

## 2022-08-26 NOTE — TELEPHONE ENCOUNTER
----- Message from Amarilis Pham sent at 8/15/2022 11:31 AM CDT -----  Regarding: self  .Type: Patient Call Back    Who called: self    What is the request in detail: Pt is requesting to get an fu appt asap     Can the clinic reply by [facility]? Call back    Would the patient rather a call back or a response via [facility]?  Call back    Best call back number: .[phone]

## 2022-09-01 ENCOUNTER — TELEPHONE (OUTPATIENT)
Dept: ELECTROPHYSIOLOGY | Facility: CLINIC | Age: 47
End: 2022-09-01
Payer: MEDICARE

## 2022-09-02 ENCOUNTER — CLINICAL SUPPORT (OUTPATIENT)
Dept: CARDIOLOGY | Facility: HOSPITAL | Age: 47
End: 2022-09-02
Attending: INTERNAL MEDICINE
Payer: MEDICARE

## 2022-09-02 ENCOUNTER — HOSPITAL ENCOUNTER (OUTPATIENT)
Dept: CARDIOLOGY | Facility: CLINIC | Age: 47
Discharge: HOME | End: 2022-09-02
Payer: MEDICARE

## 2022-09-02 ENCOUNTER — OFFICE VISIT (OUTPATIENT)
Dept: ELECTROPHYSIOLOGY | Facility: CLINIC | Age: 47
End: 2022-09-02
Payer: MEDICARE

## 2022-09-02 VITALS
BODY MASS INDEX: 41.07 KG/M2 | HEART RATE: 80 BPM | WEIGHT: 246.5 LBS | HEIGHT: 65 IN | SYSTOLIC BLOOD PRESSURE: 116 MMHG | DIASTOLIC BLOOD PRESSURE: 74 MMHG

## 2022-09-02 DIAGNOSIS — I48.0 PAROXYSMAL ATRIAL FIBRILLATION: ICD-10-CM

## 2022-09-02 DIAGNOSIS — Z95.810 IMPLANTABLE CARDIOVERTER-DEFIBRILLATOR (ICD) IN SITU: ICD-10-CM

## 2022-09-02 DIAGNOSIS — E11.69 OBESITY, DIABETES, AND HYPERTENSION SYNDROME: ICD-10-CM

## 2022-09-02 DIAGNOSIS — N18.4 TYPE 2 DIABETES MELLITUS WITH STAGE 4 CHRONIC KIDNEY DISEASE, WITH LONG-TERM CURRENT USE OF INSULIN: ICD-10-CM

## 2022-09-02 DIAGNOSIS — I15.2 OBESITY, DIABETES, AND HYPERTENSION SYNDROME: ICD-10-CM

## 2022-09-02 DIAGNOSIS — R07.2 PRECORDIAL PAIN: Primary | ICD-10-CM

## 2022-09-02 DIAGNOSIS — Z79.4 TYPE 2 DIABETES MELLITUS WITH STAGE 4 CHRONIC KIDNEY DISEASE, WITH LONG-TERM CURRENT USE OF INSULIN: ICD-10-CM

## 2022-09-02 DIAGNOSIS — I50.42 CHRONIC COMBINED SYSTOLIC AND DIASTOLIC HEART FAILURE: ICD-10-CM

## 2022-09-02 DIAGNOSIS — I47.20 VENTRICULAR TACHYCARDIA: ICD-10-CM

## 2022-09-02 DIAGNOSIS — E66.01 MORBID OBESITY WITH BMI OF 40.0-44.9, ADULT: ICD-10-CM

## 2022-09-02 DIAGNOSIS — E11.22 TYPE 2 DIABETES MELLITUS WITH STAGE 4 CHRONIC KIDNEY DISEASE, WITH LONG-TERM CURRENT USE OF INSULIN: ICD-10-CM

## 2022-09-02 DIAGNOSIS — E66.9 OBESITY, DIABETES, AND HYPERTENSION SYNDROME: ICD-10-CM

## 2022-09-02 DIAGNOSIS — G47.33 OSA TREATED WITH BIPAP: ICD-10-CM

## 2022-09-02 DIAGNOSIS — E11.59 OBESITY, DIABETES, AND HYPERTENSION SYNDROME: ICD-10-CM

## 2022-09-02 DIAGNOSIS — I42.8 NICM (NONISCHEMIC CARDIOMYOPATHY): ICD-10-CM

## 2022-09-02 PROCEDURE — 3066F NEPHROPATHY DOC TX: CPT | Mod: CPTII,S$GLB,, | Performed by: INTERNAL MEDICINE

## 2022-09-02 PROCEDURE — 93010 RHYTHM STRIP: ICD-10-PCS | Mod: S$GLB,,, | Performed by: INTERNAL MEDICINE

## 2022-09-02 PROCEDURE — 93283 CARDIAC DEVICE CHECK - IN CLINIC & HOSPITAL: ICD-10-PCS | Mod: 26,,, | Performed by: INTERNAL MEDICINE

## 2022-09-02 PROCEDURE — 3051F HG A1C>EQUAL 7.0%<8.0%: CPT | Mod: CPTII,S$GLB,, | Performed by: INTERNAL MEDICINE

## 2022-09-02 PROCEDURE — 3008F PR BODY MASS INDEX (BMI) DOCUMENTED: ICD-10-PCS | Mod: CPTII,S$GLB,, | Performed by: INTERNAL MEDICINE

## 2022-09-02 PROCEDURE — 93010 ELECTROCARDIOGRAM REPORT: CPT | Mod: S$GLB,,, | Performed by: INTERNAL MEDICINE

## 2022-09-02 PROCEDURE — 3066F PR DOCUMENTATION OF TREATMENT FOR NEPHROPATHY: ICD-10-PCS | Mod: CPTII,S$GLB,, | Performed by: INTERNAL MEDICINE

## 2022-09-02 PROCEDURE — 99214 PR OFFICE/OUTPT VISIT, EST, LEVL IV, 30-39 MIN: ICD-10-PCS | Mod: S$GLB,,, | Performed by: INTERNAL MEDICINE

## 2022-09-02 PROCEDURE — 99214 OFFICE O/P EST MOD 30 MIN: CPT | Mod: S$GLB,,, | Performed by: INTERNAL MEDICINE

## 2022-09-02 PROCEDURE — 93283 PRGRMG EVAL IMPLANTABLE DFB: CPT

## 2022-09-02 PROCEDURE — 93005 RHYTHM STRIP: ICD-10-PCS | Mod: S$GLB,,, | Performed by: INTERNAL MEDICINE

## 2022-09-02 PROCEDURE — 3074F PR MOST RECENT SYSTOLIC BLOOD PRESSURE < 130 MM HG: ICD-10-PCS | Mod: CPTII,S$GLB,, | Performed by: INTERNAL MEDICINE

## 2022-09-02 PROCEDURE — 93283 PRGRMG EVAL IMPLANTABLE DFB: CPT | Mod: 26,,, | Performed by: INTERNAL MEDICINE

## 2022-09-02 PROCEDURE — 99999 PR PBB SHADOW E&M-EST. PATIENT-LVL IV: ICD-10-PCS | Mod: PBBFAC,,, | Performed by: INTERNAL MEDICINE

## 2022-09-02 PROCEDURE — 4010F PR ACE/ARB THEARPY RXD/TAKEN: ICD-10-PCS | Mod: CPTII,S$GLB,, | Performed by: INTERNAL MEDICINE

## 2022-09-02 PROCEDURE — 3008F BODY MASS INDEX DOCD: CPT | Mod: CPTII,S$GLB,, | Performed by: INTERNAL MEDICINE

## 2022-09-02 PROCEDURE — 3078F PR MOST RECENT DIASTOLIC BLOOD PRESSURE < 80 MM HG: ICD-10-PCS | Mod: CPTII,S$GLB,, | Performed by: INTERNAL MEDICINE

## 2022-09-02 PROCEDURE — 1160F PR REVIEW ALL MEDS BY PRESCRIBER/CLIN PHARMACIST DOCUMENTED: ICD-10-PCS | Mod: CPTII,S$GLB,, | Performed by: INTERNAL MEDICINE

## 2022-09-02 PROCEDURE — 3051F PR MOST RECENT HEMOGLOBIN A1C LEVEL 7.0 - < 8.0%: ICD-10-PCS | Mod: CPTII,S$GLB,, | Performed by: INTERNAL MEDICINE

## 2022-09-02 PROCEDURE — 99499 RISK ADDL DX/OHS AUDIT: ICD-10-PCS | Mod: S$PBB,,, | Performed by: INTERNAL MEDICINE

## 2022-09-02 PROCEDURE — 3074F SYST BP LT 130 MM HG: CPT | Mod: CPTII,S$GLB,, | Performed by: INTERNAL MEDICINE

## 2022-09-02 PROCEDURE — 3078F DIAST BP <80 MM HG: CPT | Mod: CPTII,S$GLB,, | Performed by: INTERNAL MEDICINE

## 2022-09-02 PROCEDURE — 1159F MED LIST DOCD IN RCRD: CPT | Mod: CPTII,S$GLB,, | Performed by: INTERNAL MEDICINE

## 2022-09-02 PROCEDURE — 99499 UNLISTED E&M SERVICE: CPT | Mod: S$PBB,,, | Performed by: INTERNAL MEDICINE

## 2022-09-02 PROCEDURE — 93005 ELECTROCARDIOGRAM TRACING: CPT | Mod: S$GLB,,, | Performed by: INTERNAL MEDICINE

## 2022-09-02 PROCEDURE — 4010F ACE/ARB THERAPY RXD/TAKEN: CPT | Mod: CPTII,S$GLB,, | Performed by: INTERNAL MEDICINE

## 2022-09-02 PROCEDURE — 1159F PR MEDICATION LIST DOCUMENTED IN MEDICAL RECORD: ICD-10-PCS | Mod: CPTII,S$GLB,, | Performed by: INTERNAL MEDICINE

## 2022-09-02 PROCEDURE — 1160F RVW MEDS BY RX/DR IN RCRD: CPT | Mod: CPTII,S$GLB,, | Performed by: INTERNAL MEDICINE

## 2022-09-02 PROCEDURE — 99999 PR PBB SHADOW E&M-EST. PATIENT-LVL IV: CPT | Mod: PBBFAC,,, | Performed by: INTERNAL MEDICINE

## 2022-09-02 RX ORDER — METOPROLOL SUCCINATE 50 MG/1
50 TABLET, EXTENDED RELEASE ORAL 2 TIMES DAILY
Qty: 180 TABLET | Refills: 3 | Status: SHIPPED | OUTPATIENT
Start: 2022-09-02 | End: 2022-12-29

## 2022-09-02 NOTE — PROGRESS NOTES
"  Subjective:   Patient ID:  Fabiana Chanel is a 47 y.o. female who presents for follow-up of NICM, PAF    Ms. Chanel is a 47 y.o. female with NICM, ICD (4/2018), DM, HTN, MILE, pAF here for follow up.    "Karmen Peraza" is referred by Dr Yanez.  NICM (cath proved; 11/17)  CHF, Class III  DM on meds   hyperlipidemia on meds  HTN on meds  MILE  dysfunctional vaginal bleeding    PAF hx, on coumadin and xarelto in past, c/b vaginal bleeding  NYHA III sx despite OMT.  Occasional palpitations. No syncope ever. No CP.  3/18 15-20% LVEF. global HK.    Underwent dual chamber ICD implantation on 4/17/2018. EF 15-20% 2018.  6/17/2019: >4 hours AF found on ICD. Eliquis started for CVA prophylaxis. Didn't tolerate xarelto. Later changed to lovenox when LV thrombus found (now resolved).  Good ICD function.    While making bed 8/5/22, had ICd shock. Interrogation showed VT -> ATP fail -> shock success. Feels well since. No recurrence.  Echo 6/22 25%. no thrombus.    My interpretation of today's ECG is NSR    Current Outpatient Medications   Medication Sig    albuterol (PROVENTIL/VENTOLIN HFA) 90 mcg/actuation inhaler Inhale 1-2 puffs into the lungs every 6 (six) hours as needed for Shortness of Breath. Rescue    apixaban (ELIQUIS) 5 mg Tab Take 1 tablet (5 mg total) by mouth 2 (two) times daily.    aspirin 81 MG Chew Take 81 mg by mouth once daily.    BD ULTRA-FINE LORI PEN NEEDLE 32 gauge x 5/32" Ndle USE 1 UNIT TWICE DAILY    BLOOD PRESSURE CUFF Misc 1 kit by Misc.(Non-Drug; Combo Route) route 2 (two) times daily.    blood sugar diagnostic Strp Check blood glucose 3x/day.    diclofenac sodium (VOLTAREN) 1 % Gel Apply 2 g topically 2 (two) times daily.    empagliflozin (JARDIANCE) 10 mg Tab Take 10 mg by mouth once daily.    fluticasone (FLONASE) 50 mcg/actuation nasal spray 1 spray (50 mcg total) by Each Nare route once daily.    furosemide (LASIX) 40 MG tablet Take 1 tablet (40 mg total) by mouth 2 (two) times daily.    " gabapentin (NEURONTIN) 400 MG capsule Take 1 capsule (400 mg total) by mouth 3 (three) times daily as needed (pain).    glimepiride (AMARYL) 4 MG tablet Take 1 tablet (4 mg total) by mouth daily with breakfast.    HUMIRA,CF, PEN 40 mg/0.4 mL PnKt INJECT ONE 40MG INJECTION SUBCUTANEOUSLY EVERY OTHER WEEK    HUMIRA,CF, PEN ASJH-AK-LRZZ HS 80 mg/0.8 mL-40 mg/0.4 mL PnKt INJECT ONE 80MG INJECTION SUBCUTANEOUSLY ON DAY 1 THEN INJECT ONE 40MG INJECTION ON DAY 8 THEN INJECT ONE 40MG INJECTION ON DAY 22    HYDROcodone-acetaminophen (NORCO) 5-325 mg per tablet Take 1 tablet by mouth every 6 (six) hours as needed for Pain.    insulin detemir U-100 (LEVEMIR FLEXTOUCH) 100 unit/mL (3 mL) SubQ InPn pen Inject 28 Units into the skin 2 (two) times daily.    ketoconazole (NIZORAL) 2 % cream APPLY TO BREAST TWICE A DAY    lancets Misc Check blood glucose 3x/day.    metoprolol succinate (TOPROL-XL) 100 MG 24 hr tablet Take 1 tablet (100 mg total) by mouth once daily.    rosuvastatin (CRESTOR) 40 MG Tab Take 1 tablet (40 mg total) by mouth every evening.    sacubitril-valsartan (ENTRESTO) 49-51 mg per tablet Take 1 tablet by mouth 2 (two) times daily.    spironolactone (ALDACTONE) 50 MG tablet Take 1 tablet (50 mg total) by mouth once daily.    triamcinolone acetonide 0.1% (KENALOG) 0.1 % cream 2 (two) times daily. Apply to affected area    baclofen (LIORESAL) 10 MG tablet Take 1 tablet (10 mg total) by mouth 3 (three) times daily. (Patient not taking: Reported on 10/10/2019)    blood glucose control, high Soln 1 each by Misc.(Non-Drug; Combo Route) route once. for 1 dose    blood glucose control, low Soln 1 each by Misc.(Non-Drug; Combo Route) route once. for 1 dose    blood-glucose meter (TRUE METRIX AIR GLUCOSE METER) Misc 1 each by Misc.(Non-Drug; Combo Route) route 3 (three) times daily.    cetirizine (ZYRTEC) 10 MG tablet Take 1 tablet (10 mg total) by mouth once daily. for 14 days (Patient taking differently: Take 10 mg by  "mouth daily as needed. )    ibuprofen (ADVIL,MOTRIN) 600 MG tablet Take 1 tablet (600 mg total) by mouth every 8 (eight) hours as needed for Pain. (Patient not taking: Reported on 10/10/2019)     No current facility-administered medications for this visit.        Review of Systems   Constitutional: Negative. Negative for malaise/fatigue.   HENT: Negative.  Negative for ear pain and tinnitus.    Eyes:  Negative for blurred vision.   Cardiovascular:  Positive for dyspnea on exertion. Negative for chest pain, irregular heartbeat, leg swelling, near-syncope, palpitations and syncope.   Respiratory: Negative.  Negative for shortness of breath.    Endocrine: Negative.  Negative for polyuria.   Hematologic/Lymphatic: Negative for bleeding problem. Does not bruise/bleed easily.   Skin: Negative.  Negative for rash.   Musculoskeletal: Negative.  Negative for joint pain, muscle weakness and myalgias.   Gastrointestinal: Negative.  Negative for abdominal pain, change in bowel habit, hematemesis, hematochezia and nausea.   Genitourinary:  Positive for non-menstrual bleeding. Negative for frequency and hematuria.   Neurological: Negative.  Negative for dizziness, light-headedness and weakness.   Psychiatric/Behavioral: Negative.  Negative for altered mental status and depression. The patient is not nervous/anxious.    Allergic/Immunologic: Negative for environmental allergies and persistent infections.   Objective:        /74   Pulse 80   Ht 5' 5" (1.651 m)   Wt 111.8 kg (246 lb 7.6 oz)   BMI 41.02 kg/m²     Physical Exam  Vitals and nursing note reviewed.   Constitutional:       Appearance: Normal appearance. She is well-developed.   HENT:      Head: Normocephalic and atraumatic.      Nose: Nose normal.   Eyes:      Conjunctiva/sclera: Conjunctivae normal.      Pupils: Pupils are equal, round, and reactive to light.   Neck:      Thyroid: No thyroid mass or thyromegaly.      Trachea: No tracheal deviation. "   Cardiovascular:      Rate and Rhythm: Normal rate and regular rhythm.      Pulses:           Radial pulses are 2+ on the right side and 2+ on the left side.   Pulmonary:      Effort: Pulmonary effort is normal. No respiratory distress.      Breath sounds: Normal breath sounds. No wheezing.   Abdominal:      General: There is no distension.   Musculoskeletal:         General: Normal range of motion.      Cervical back: Normal range of motion. No edema.   Skin:     General: Skin is warm and dry.      Findings: No erythema or rash.   Neurological:      Mental Status: She is alert and oriented to person, place, and time.      Coordination: Coordination normal.   Psychiatric:         Speech: Speech normal.         Behavior: Behavior normal. Behavior is cooperative.         Thought Content: Thought content normal.     Lab Results   Component Value Date     08/08/2022    K 3.7 08/08/2022    MG 2.3 08/06/2022    BUN 23 (H) 08/08/2022    CREATININE 1.7 (H) 08/08/2022    ALT 14 08/06/2022    AST 14 08/06/2022    HGB 12.4 08/06/2022    HCT 40.2 08/06/2022    HCT 43 12/27/2018    TSH 1.689 04/27/2022    LDLCALC 58.4 (L) 07/01/2022       Recent Labs   Lab 11/04/20  1003 02/26/21  0103 08/05/22  2243   INR 0.9 1.0 1.0         Assessment:     1. Precordial pain    2. Chronic combined systolic and diastolic heart failure    3. Implantable cardioverter-defibrillator (ICD) in situ    4. NICM (nonischemic cardiomyopathy)    5. Paroxysmal atrial fibrillation    6. Ventricular tachycardia    7. Morbid obesity with BMI of 40.0-44.9, adult    8. Type 2 diabetes mellitus with stage 4 chronic kidney disease, with long-term current use of insulin    9. MILE treated with BiPAP    10. Obesity, diabetes, and hypertension syndrome      Plan:     In summary, Ms. Chanel is a 44 y.o. female with NICM, ICD (4/2018), DM, HTN, MILE, pAF (off OAC due to vaginal bleeding) here for follow up.    Ms. Chanel is doing well from a device perspective  with stable lead and device function. No recent  arrhythmia noted. pAF on device and now on eliquis for CVA prophylaxis. No RV pacing.    Continue f/u with Dr. Yanez re: CHF.  Now that thrombus resolved, back to eliquis.  Increase toprol to 50 bid. Reserve amio for now, given pt youth; consider it if VT recurs.  Return in 1 year with echo, or earlier prn.    HPI

## 2022-10-07 ENCOUNTER — HOSPITAL ENCOUNTER (EMERGENCY)
Facility: HOSPITAL | Age: 47
Discharge: HOME | End: 2022-10-07
Attending: EMERGENCY MEDICINE
Payer: MEDICARE

## 2022-10-07 VITALS
OXYGEN SATURATION: 96 % | HEART RATE: 72 BPM | BODY MASS INDEX: 39.99 KG/M2 | TEMPERATURE: 98 F | RESPIRATION RATE: 20 BRPM | HEIGHT: 65 IN | DIASTOLIC BLOOD PRESSURE: 79 MMHG | SYSTOLIC BLOOD PRESSURE: 147 MMHG | WEIGHT: 240 LBS

## 2022-10-07 DIAGNOSIS — M54.50 LUMBAR PAIN ON PALPATION: ICD-10-CM

## 2022-10-07 DIAGNOSIS — M54.41 CHRONIC BILATERAL LOW BACK PAIN WITH RIGHT-SIDED SCIATICA: Primary | ICD-10-CM

## 2022-10-07 DIAGNOSIS — G89.29 CHRONIC BILATERAL LOW BACK PAIN WITH RIGHT-SIDED SCIATICA: Primary | ICD-10-CM

## 2022-10-07 LAB
B-HCG UR QL: NEGATIVE
CTP QC/QA: YES
POCT GLUCOSE: 84 MG/DL (ref 70–110)

## 2022-10-07 PROCEDURE — 96372 THER/PROPH/DIAG INJ SC/IM: CPT | Performed by: EMERGENCY MEDICINE

## 2022-10-07 PROCEDURE — 81025 URINE PREGNANCY TEST: CPT | Performed by: STUDENT IN AN ORGANIZED HEALTH CARE EDUCATION/TRAINING PROGRAM

## 2022-10-07 PROCEDURE — 25000003 PHARM REV CODE 250: Performed by: STUDENT IN AN ORGANIZED HEALTH CARE EDUCATION/TRAINING PROGRAM

## 2022-10-07 PROCEDURE — 63600175 PHARM REV CODE 636 W HCPCS: Performed by: EMERGENCY MEDICINE

## 2022-10-07 PROCEDURE — 82962 GLUCOSE BLOOD TEST: CPT

## 2022-10-07 PROCEDURE — 99284 EMERGENCY DEPT VISIT MOD MDM: CPT

## 2022-10-07 RX ORDER — METHOCARBAMOL 500 MG/1
1000 TABLET, FILM COATED ORAL
Status: COMPLETED | OUTPATIENT
Start: 2022-10-07 | End: 2022-10-07

## 2022-10-07 RX ORDER — LIDOCAINE 50 MG/G
1 PATCH TOPICAL DAILY
Qty: 15 PATCH | Refills: 0 | Status: SHIPPED | OUTPATIENT
Start: 2022-10-07 | End: 2022-12-06

## 2022-10-07 RX ORDER — ACETAMINOPHEN 500 MG
1000 TABLET ORAL
Status: COMPLETED | OUTPATIENT
Start: 2022-10-07 | End: 2022-10-07

## 2022-10-07 RX ORDER — LIDOCAINE 50 MG/G
1 PATCH TOPICAL
Status: DISCONTINUED | OUTPATIENT
Start: 2022-10-07 | End: 2022-10-07 | Stop reason: HOSPADM

## 2022-10-07 RX ORDER — METHOCARBAMOL 500 MG/1
1000 TABLET, FILM COATED ORAL 3 TIMES DAILY
Qty: 30 TABLET | Refills: 0 | Status: SHIPPED | OUTPATIENT
Start: 2022-10-07 | End: 2022-10-16 | Stop reason: SDUPTHER

## 2022-10-07 RX ORDER — HYDROMORPHONE HYDROCHLORIDE 1 MG/ML
1 INJECTION, SOLUTION INTRAMUSCULAR; INTRAVENOUS; SUBCUTANEOUS
Status: COMPLETED | OUTPATIENT
Start: 2022-10-07 | End: 2022-10-07

## 2022-10-07 RX ADMIN — HYDROMORPHONE HYDROCHLORIDE 1 MG: 1 INJECTION, SOLUTION INTRAMUSCULAR; INTRAVENOUS; SUBCUTANEOUS at 05:10

## 2022-10-07 RX ADMIN — METHOCARBAMOL 1000 MG: 500 TABLET ORAL at 05:10

## 2022-10-07 RX ADMIN — LIDOCAINE 1 PATCH: 50 PATCH TOPICAL at 05:10

## 2022-10-07 RX ADMIN — ACETAMINOPHEN 1000 MG: 500 TABLET ORAL at 05:10

## 2022-10-07 NOTE — DISCHARGE INSTRUCTIONS
It was a pleasure taking care of you today!     Home Care:   - Continue home medications as prescribed  - You may take Robaxin (1000mg) three times daily for back pain  - You may also apply a Lidoderm patch (12 hours on, 12 hours off)    Follow-Up Plan:  - You need to go see Dr. Yeager for follow-up, and I have included his contact information below  - Follow-up with primary care doctor within 3 - 5 days to discuss your recent ER visit and any additional concerns that you may have.  - Additional testing and/or evaluation as directed by your primary doctor    Return to the Emergency Department for symptoms including but not limited to: persistence or worsening of symptoms, inability to control your urination/bowel movements, muscle weakness or numbness, shortness of breath or chest pain, inability to drink without vomiting, passing out/fainting/ loss of consciousness, or if you have other concerns.

## 2022-10-07 NOTE — ED PROVIDER NOTES
Encounter Date: 10/7/2022       History     Chief Complaint   Patient presents with    Sciatica     Endorses 3 days of bilat sciatica pain 9/10. NO meds PTA. Denies numbness or tingling.     47-year-old female with PMH of chronic lower back pain with sciatica, AFib on Eliquis, CHF, hypertension, and diabetes on insulin presents with lower back pain.  The lower back pain is acute on chronic, worse over the past 2 days, 9/10 intensity, radiating down the bilateral posterior legs, aggravated with certain positioning, no relieving factors, and associated with generalized body aches.  Patient denies fever, chills, injury/trauma, urinary/fecal incontinence, difficulty urinating, decreased perineal sensation, and numbness/weakness.  Patient denies history of IV drug use.  ROS otherwise negative.  She states she took a Percocet a few days ago which helped but she has no more.  She follows with a physician at [facility] but has not seen him since February.  She states that usually we give her a shot which helps.    The history is provided by the patient and medical records.   Review of patient's allergies indicates:   Allergen Reactions    Metformin      Diarrhea on metformin XR     Pneumococcal 23-abimbola ps vaccine      Past Medical History:   Diagnosis Date    Atrial fibrillation     Blood clot associated with vein wall inflammation     not dvt    Cardiomyopathy     Normal cors on cath 11/2017    CHF (congestive heart failure)     DM (diabetes mellitus) 9/19/2013    Hyperlipidemia     Hypertension     Psoriasis     Sleep apnea      Past Surgical History:   Procedure Laterality Date    CARDIAC CATHETERIZATION      COLONOSCOPY      COLONOSCOPY N/A 3/9/2022    Procedure: COLONOSCOPY;  Surgeon: Vielka Burrell MD;  Location: [facility];  Service: Endoscopy;  Laterality: N/A;    DILATION AND CURETTAGE OF UTERUS      ESOPHAGOGASTRODUODENOSCOPY N/A 5/9/2019    Procedure: EGD (ESOPHAGOGASTRODUODENOSCOPY);  Surgeon:  Vielka Burrell MD;  Location: [facility] ENDO;  Service: Endoscopy;  Laterality: N/A;    TRANSFORAMINAL EPIDURAL INJECTION OF STEROID N/A 1/6/2022    Procedure: Transforaminal ANKITA L4/L5 L5/S1;  Surgeon: Jed Yeager MD;  Location: [facility];  Service: Pain Management;  Laterality: N/A;     Family History   Problem Relation Age of Onset    Hypertension Mother     Hypertension Father     Diabetes Father     Diabetes Maternal Grandmother     Diabetes Paternal Grandmother     Breast cancer Neg Hx     Colon cancer Neg Hx     Ovarian cancer Neg Hx      Social History     Tobacco Use    Smoking status: Never    Smokeless tobacco: Never    Tobacco comments:     smokes cigars on occasion   Substance Use Topics    Alcohol use: Not Currently     Comment: occasional    Drug use: Not Currently     Types: Marijuana     Comment: occ     Review of Systems   Constitutional:  Negative for chills and fever.   HENT:  Negative for congestion and sinus pain.    Eyes:  Negative for pain and visual disturbance.   Respiratory:  Negative for cough and shortness of breath.    Cardiovascular:  Negative for chest pain and leg swelling.   Gastrointestinal:  Negative for abdominal pain, constipation, diarrhea, nausea and vomiting.   Endocrine: Negative for polydipsia and polyuria.   Genitourinary:  Negative for decreased urine volume, difficulty urinating, dysuria and hematuria.   Musculoskeletal:  Positive for back pain. Negative for arthralgias.   Skin:  Negative for color change and rash.   Neurological:  Negative for dizziness, weakness, light-headedness, numbness and headaches.     Physical Exam     Initial Vitals [10/07/22 1647]   BP Pulse Resp Temp SpO2   99/74 72 16 98.3 °F (36.8 °C) 96 %      MAP       --         Physical Exam    Nursing note and vitals reviewed.  Constitutional: She appears well-developed and well-nourished. She is not diaphoretic. No distress.   Patient ambulating to room upon my arrival   HENT:   Head:  Normocephalic and atraumatic.   Eyes: Conjunctivae and EOM are normal. Pupils are equal, round, and reactive to light.   Neck: Neck supple.   Normal range of motion.  Cardiovascular:  Normal rate, regular rhythm, normal heart sounds and intact distal pulses.           No murmur heard.  Pulmonary/Chest: Breath sounds normal. No respiratory distress. She has no wheezes. She has no rhonchi. She has no rales.   Abdominal: Abdomen is soft. She exhibits no distension. There is no abdominal tenderness. There is no rebound and no guarding.   Musculoskeletal:         General: No tenderness or edema.      Cervical back: Normal range of motion and neck supple.      Comments: Back: lumbar TTP midline. Bilateral buttocks TTP superiorly. Negative straight leg raise test.     Normal ambulation.      Neurological: She is alert and oriented to person, place, and time. She has normal strength. No sensory deficit. GCS score is 15. GCS eye subscore is 4. GCS verbal subscore is 5. GCS motor subscore is 6.   Skin: Skin is warm and dry. Capillary refill takes less than 2 seconds.       ED Course   Procedures  Labs Reviewed   POCT URINE PREGNANCY   POCT GLUCOSE   POCT GLUCOSE MONITORING CONTINUOUS          Imaging Results              X-Ray Lumbar Spine Ap And Lateral (Final result)  Result time 10/07/22 17:39:30      Final result by Rober Gonsales MD (10/07/22 17:39:30)                   Impression:      No acute lumbar spine abnormalities identified.      Electronically signed by: Rober Gonsales MD  Date:    10/07/2022  Time:    17:39               Narrative:    EXAMINATION:  XR LUMBAR SPINE AP AND LATERAL    CLINICAL HISTORY:  midline tenderness;    TECHNIQUE:  AP, lateral and spot images were performed of the lumbar spine.    COMPARISON:  08/11/2022.    FINDINGS:  Lumbar spine alignment is within normal limits.  No evidence of acute lumbar spine fracture or subluxation.  Mild intervertebral disc space narrowing is visualized at the  L5-S1 level.  Remaining lumbar intervertebral disc spaces appear fairly well maintained.  Visualized sacrum is unremarkable.                                       Medications   LIDOcaine 5 % patch 1 patch (1 patch Transdermal Patch Applied 10/7/22 1748)   acetaminophen tablet 1,000 mg (1,000 mg Oral Given 10/7/22 1748)   methocarbamoL tablet 1,000 mg (1,000 mg Oral Given 10/7/22 1748)   HYDROmorphone injection 1 mg (1 mg Intramuscular Given 10/7/22 1749)     Medical Decision Making:   History:   Old Medical Records: I decided to obtain old medical records.  Old Records Summarized: records from clinic visits.       <> Summary of Records: Patient seen in August with similar complaints and had a negative x-ray and was referred to nurse surgery.  She has not followed up with him.  Initial Assessment:   Hemodynamically stable 47-year-old female with acute on chronic lower back pain with sciatica and no red flag symptoms  - tylenol, lidoderm, robaxin, IM dilaudid  - UPT, POCT glucose  - Xray lumbar spine  Differential Diagnosis:   Bilateral sciatica, lower back pain (chronic), musculoskeletal pain, doubt conus medullaris, vertebral fracture  Clinical Tests:   Lab Tests: Ordered and Reviewed  Radiological Study: Ordered and Reviewed  ED Management:  See ED course           ED Course as of 10/07/22 1815   Fri Oct 07, 2022   1758 Preg Test, Ur: Negative [BD]   1758 X-Ray Lumbar Spine Ap And Lateral  Xray lumbar spine, shows no acute fracture/dislocation [BD]   1809 POCT Glucose: 84 [BD]   1814 Pt's workup is unremarkable and she is feeling moderately better after our initial treatment. I have provided her the phone number for Dr. Seay who performed her previous spinal injection. I have prescribed robaxin and Lidoderm patches. She remains hemodynamically and clinically stable, so she will be discharged at this time.  I suspect her symptoms are secondary to chronic back pain with sciatica.  Patient agrees with and is  comfortable with the plan. [BD]      ED Course User Index  [BD] Emiliano Stone MD                 Clinical Impression:   Final diagnoses:  [M54.50] Lumbar pain on palpation  [M54.41, G89.29] Chronic bilateral low back pain with right-sided sciatica (Primary)      ED Disposition Condition    Discharge Stable          ED Prescriptions       Medication Sig Dispense Start Date End Date Auth. Provider    methocarbamoL (ROBAXIN) 500 MG Tab Take 2 tablets (1,000 mg total) by mouth 3 (three) times daily. 30 tablet 10/7/2022 -- Emiliano Stone MD    LIDOcaine (LIDODERM) 5 % Place 1 patch onto the skin once daily. Remove & Discard patch within 12 hours or as directed by MD 15 patch 10/7/2022 -- Emiliano Stone MD          Follow-up Information       Follow up With Specialties Details Why Contact Info    Gil Leal MD Family Medicine Schedule an appointment as soon as possible for a visit  To discuss your recent ER visit and any additional concerns that you may have [address] [place] [address]  [phone]      [facility] Emergency Medicine Go to  As needed, If symptoms worsen [address]  [facility] [address] [phone]    Jed Yeager MD Pain Medicine   [address]  [facility] [address]  [phone]               Emiliano Sotne MD  Resident  10/07/22 [id number]

## 2022-10-15 ENCOUNTER — HOSPITAL ENCOUNTER (EMERGENCY)
Facility: HOSPITAL | Age: 47
Discharge: HOME | End: 2022-10-16
Attending: EMERGENCY MEDICINE
Payer: MEDICARE

## 2022-10-15 DIAGNOSIS — J06.9 VIRAL URI: Primary | ICD-10-CM

## 2022-10-15 DIAGNOSIS — R73.9 HYPERGLYCEMIA: ICD-10-CM

## 2022-10-15 DIAGNOSIS — R07.9 CHEST PAIN: ICD-10-CM

## 2022-10-15 DIAGNOSIS — R52 BODY ACHES: ICD-10-CM

## 2022-10-15 LAB
ALBUMIN SERPL BCP-MCNC: 3.5 G/DL (ref 3.5–5.2)
ALP SERPL-CCNC: 99 U/L (ref 55–135)
ALT SERPL W/O P-5'-P-CCNC: 26 U/L (ref 10–44)
ANION GAP SERPL CALC-SCNC: 12 MMOL/L (ref 8–16)
AST SERPL-CCNC: 20 U/L (ref 10–40)
BASOPHILS # BLD AUTO: 0.03 K/UL (ref 0–0.2)
BASOPHILS NFR BLD: 0.3 % (ref 0–1.9)
BILIRUB SERPL-MCNC: 0.8 MG/DL (ref 0.1–1)
BUN SERPL-MCNC: 26 MG/DL (ref 6–20)
CALCIUM SERPL-MCNC: 9.2 MG/DL (ref 8.7–10.5)
CHLORIDE SERPL-SCNC: 98 MMOL/L (ref 95–110)
CO2 SERPL-SCNC: 25 MMOL/L (ref 23–29)
CREAT SERPL-MCNC: 2.2 MG/DL (ref 0.5–1.4)
CTP QC/QA: YES
CTP QC/QA: YES
DIFFERENTIAL METHOD: ABNORMAL
EOSINOPHIL # BLD AUTO: 0.2 K/UL (ref 0–0.5)
EOSINOPHIL NFR BLD: 1.9 % (ref 0–8)
ERYTHROCYTE [DISTWIDTH] IN BLOOD BY AUTOMATED COUNT: 15.3 % (ref 11.5–14.5)
EST. GFR  (NO RACE VARIABLE): 27 ML/MIN/1.73 M^2
GLUCOSE SERPL-MCNC: 337 MG/DL (ref 70–110)
HCT VFR BLD AUTO: 40.1 % (ref 37–48.5)
HGB BLD-MCNC: 13.1 G/DL (ref 12–16)
IMM GRANULOCYTES # BLD AUTO: 0.09 K/UL (ref 0–0.04)
IMM GRANULOCYTES NFR BLD AUTO: 1 % (ref 0–0.5)
LYMPHOCYTES # BLD AUTO: 2.6 K/UL (ref 1–4.8)
LYMPHOCYTES NFR BLD: 29.7 % (ref 18–48)
MCH RBC QN AUTO: 27.8 PG (ref 27–31)
MCHC RBC AUTO-ENTMCNC: 32.7 G/DL (ref 32–36)
MCV RBC AUTO: 85 FL (ref 82–98)
MONOCYTES # BLD AUTO: 0.5 K/UL (ref 0.3–1)
MONOCYTES NFR BLD: 5.3 % (ref 4–15)
NEUTROPHILS # BLD AUTO: 5.4 K/UL (ref 1.8–7.7)
NEUTROPHILS NFR BLD: 61.8 % (ref 38–73)
NRBC BLD-RTO: 0 /100 WBC
PLATELET # BLD AUTO: 229 K/UL (ref 150–450)
PMV BLD AUTO: 10.5 FL (ref 9.2–12.9)
POC MOLECULAR INFLUENZA A AGN: NEGATIVE
POC MOLECULAR INFLUENZA B AGN: NEGATIVE
POTASSIUM SERPL-SCNC: 3.5 MMOL/L (ref 3.5–5.1)
PROT SERPL-MCNC: 7.5 G/DL (ref 6–8.4)
RBC # BLD AUTO: 4.72 M/UL (ref 4–5.4)
SARS-COV-2 RDRP RESP QL NAA+PROBE: NEGATIVE
SODIUM SERPL-SCNC: 135 MMOL/L (ref 136–145)
TROPONIN I SERPL DL<=0.01 NG/ML-MCNC: 0.01 NG/ML (ref 0–0.03)
WBC # BLD AUTO: 8.72 K/UL (ref 3.9–12.7)

## 2022-10-15 PROCEDURE — 93010 ELECTROCARDIOGRAM REPORT: CPT | Mod: ,,, | Performed by: INTERNAL MEDICINE

## 2022-10-15 PROCEDURE — 96374 THER/PROPH/DIAG INJ IV PUSH: CPT

## 2022-10-15 PROCEDURE — 93010 EKG 12-LEAD: ICD-10-PCS | Mod: ,,, | Performed by: INTERNAL MEDICINE

## 2022-10-15 PROCEDURE — 84484 ASSAY OF TROPONIN QUANT: CPT | Performed by: EMERGENCY MEDICINE

## 2022-10-15 PROCEDURE — 63600175 PHARM REV CODE 636 W HCPCS: Performed by: EMERGENCY MEDICINE

## 2022-10-15 PROCEDURE — 96375 TX/PRO/DX INJ NEW DRUG ADDON: CPT

## 2022-10-15 PROCEDURE — 87502 INFLUENZA DNA AMP PROBE: CPT

## 2022-10-15 PROCEDURE — 87635 SARS-COV-2 COVID-19 AMP PRB: CPT | Performed by: PHYSICIAN ASSISTANT

## 2022-10-15 PROCEDURE — 85025 COMPLETE CBC W/AUTO DIFF WBC: CPT | Performed by: EMERGENCY MEDICINE

## 2022-10-15 PROCEDURE — 99285 EMERGENCY DEPT VISIT HI MDM: CPT | Mod: 25

## 2022-10-15 PROCEDURE — 93005 ELECTROCARDIOGRAM TRACING: CPT

## 2022-10-15 PROCEDURE — 80053 COMPREHEN METABOLIC PANEL: CPT | Performed by: EMERGENCY MEDICINE

## 2022-10-15 PROCEDURE — 83880 ASSAY OF NATRIURETIC PEPTIDE: CPT | Performed by: EMERGENCY MEDICINE

## 2022-10-15 RX ORDER — ONDANSETRON 2 MG/ML
4 INJECTION INTRAMUSCULAR; INTRAVENOUS
Status: COMPLETED | OUTPATIENT
Start: 2022-10-15 | End: 2022-10-15

## 2022-10-15 RX ORDER — MORPHINE SULFATE 4 MG/ML
4 INJECTION, SOLUTION INTRAMUSCULAR; INTRAVENOUS
Status: COMPLETED | OUTPATIENT
Start: 2022-10-15 | End: 2022-10-15

## 2022-10-15 RX ADMIN — MORPHINE SULFATE 4 MG: 4 INJECTION INTRAVENOUS at 11:10

## 2022-10-15 RX ADMIN — ONDANSETRON 4 MG: 2 INJECTION INTRAMUSCULAR; INTRAVENOUS at 11:10

## 2022-10-16 VITALS
WEIGHT: 240 LBS | OXYGEN SATURATION: 99 % | HEIGHT: 67 IN | RESPIRATION RATE: 24 BRPM | BODY MASS INDEX: 37.67 KG/M2 | TEMPERATURE: 99 F | DIASTOLIC BLOOD PRESSURE: 75 MMHG | HEART RATE: 61 BPM | SYSTOLIC BLOOD PRESSURE: 127 MMHG

## 2022-10-16 LAB
BACTERIA #/AREA URNS HPF: NORMAL /HPF
BILIRUB UR QL STRIP: NEGATIVE
BNP SERPL-MCNC: 24 PG/ML (ref 0–99)
CLARITY UR: CLEAR
COLOR UR: YELLOW
GLUCOSE UR QL STRIP: ABNORMAL
HGB UR QL STRIP: ABNORMAL
HYALINE CASTS #/AREA URNS LPF: 0 /LPF
KETONES UR QL STRIP: NEGATIVE
LEUKOCYTE ESTERASE UR QL STRIP: NEGATIVE
MICROSCOPIC COMMENT: NORMAL
NITRITE UR QL STRIP: NEGATIVE
PH UR STRIP: 7 [PH] (ref 5–8)
POCT GLUCOSE: 176 MG/DL (ref 70–110)
PROT UR QL STRIP: ABNORMAL
RBC #/AREA URNS HPF: 0 /HPF (ref 0–4)
SP GR UR STRIP: 1.01 (ref 1–1.03)
TROPONIN I SERPL DL<=0.01 NG/ML-MCNC: 0.01 NG/ML (ref 0–0.03)
URN SPEC COLLECT METH UR: ABNORMAL
UROBILINOGEN UR STRIP-ACNC: NEGATIVE EU/DL
WBC #/AREA URNS HPF: 0 /HPF (ref 0–5)
YEAST URNS QL MICRO: NORMAL

## 2022-10-16 PROCEDURE — 81000 URINALYSIS NONAUTO W/SCOPE: CPT | Performed by: EMERGENCY MEDICINE

## 2022-10-16 PROCEDURE — 82962 GLUCOSE BLOOD TEST: CPT

## 2022-10-16 PROCEDURE — 25000003 PHARM REV CODE 250: Performed by: EMERGENCY MEDICINE

## 2022-10-16 PROCEDURE — 84484 ASSAY OF TROPONIN QUANT: CPT | Performed by: EMERGENCY MEDICINE

## 2022-10-16 PROCEDURE — 96361 HYDRATE IV INFUSION ADD-ON: CPT

## 2022-10-16 RX ORDER — ACETAMINOPHEN 500 MG
1000 TABLET ORAL EVERY 6 HOURS PRN
Qty: 60 TABLET | Refills: 0 | Status: SHIPPED | OUTPATIENT
Start: 2022-10-16 | End: 2023-03-03

## 2022-10-16 RX ORDER — ONDANSETRON 4 MG/1
4 TABLET, ORALLY DISINTEGRATING ORAL EVERY 8 HOURS PRN
Qty: 20 TABLET | Refills: 0 | Status: SHIPPED | OUTPATIENT
Start: 2022-10-16 | End: 2023-09-05

## 2022-10-16 RX ORDER — METHOCARBAMOL 500 MG/1
1000 TABLET, FILM COATED ORAL 3 TIMES DAILY
Qty: 30 TABLET | Refills: 0 | Status: SHIPPED | OUTPATIENT
Start: 2022-10-16 | End: 2022-11-07 | Stop reason: SDUPTHER

## 2022-10-16 RX ADMIN — SODIUM CHLORIDE 1000 ML: 0.9 INJECTION, SOLUTION INTRAVENOUS at 12:10

## 2022-10-16 NOTE — ED PROVIDER NOTES
Encounter Date: 10/15/2022    SCRIBE #1 NOTE: I, Lakhwinder Lyon, am scribing for, and in the presence of,  Eliseo Lopez MD. I have scribed the following portions of the note - Other sections scribed: HPI, ROS.     History     Chief Complaint   Patient presents with    Chest Pain     LEFT sided tightness that radiates to neck x30 minutes. Denies N/V, SOB. + dizziness. Denies tx    Generalized Body Aches     X1 week with nasal drainage and  Subj fever. Denies cough, chills     Fabiana Chanel is a 47 y. O female with a Pmhx of A-fib, CHF, HTN, HLD, blood clot, and heart attack, that comes to the ED complaining of generalized body aches beginning 1 week ago. Patient reports complaints of constant generalized myalgias, rhinorrhea, back pain, and BLE pain, endorsing complaints have been ongoing for 1 week. Patient also reports complaints of left sided chest pain that radiates up to the eft sided neck, as well as neck stiffness beginning 30 minutes PTA. Patient endorses her chest pain feels similar to her past heart attack episode, and her myalgias feel similar to past covid infection. No medications taken PTA. No alleviating or exacerbating factors noted. Denies abdominal pain, N/V/D, fever, or other associated symptoms. Patient endorses following with Dr Yanez in cardiology. Allergic to metformin.    The history is provided by the patient. No  was used.   Review of patient's allergies indicates:   Allergen Reactions    Metformin      Diarrhea on metformin XR     Pneumococcal 23-abimbola ps vaccine      Past Medical History:   Diagnosis Date    Atrial fibrillation     Blood clot associated with vein wall inflammation     not dvt    Cardiomyopathy     Normal cors on cath 11/2017    CHF (congestive heart failure)     DM (diabetes mellitus) 9/19/2013    Hyperlipidemia     Hypertension     Psoriasis     Sleep apnea      Past Surgical History:   Procedure Laterality Date    CARDIAC CATHETERIZATION       COLONOSCOPY      COLONOSCOPY N/A 3/9/2022    Procedure: COLONOSCOPY;  Surgeon: Vielka Burrell MD;  Location: [facility] ENDO;  Service: Endoscopy;  Laterality: N/A;    DILATION AND CURETTAGE OF UTERUS      ESOPHAGOGASTRODUODENOSCOPY N/A 5/9/2019    Procedure: EGD (ESOPHAGOGASTRODUODENOSCOPY);  Surgeon: Vielka Burrell MD;  Location: [facility] ENDO;  Service: Endoscopy;  Laterality: N/A;    TRANSFORAMINAL EPIDURAL INJECTION OF STEROID N/A 1/6/2022    Procedure: Transforaminal ANKITA L4/L5 L5/S1;  Surgeon: Jed Yeager MD;  Location: [facility];  Service: Pain Management;  Laterality: N/A;     Family History   Problem Relation Age of Onset    Hypertension Mother     Hypertension Father     Diabetes Father     Diabetes Maternal Grandmother     Diabetes Paternal Grandmother     Breast cancer Neg Hx     Colon cancer Neg Hx     Ovarian cancer Neg Hx      Social History     Tobacco Use    Smoking status: Never    Smokeless tobacco: Never    Tobacco comments:     smokes cigars on occasion   Substance Use Topics    Alcohol use: Not Currently     Comment: occasional    Drug use: Not Currently     Types: Marijuana     Comment: occ     Review of Systems   Constitutional: Negative.    HENT:  Positive for rhinorrhea. Negative for sore throat.    Eyes: Negative.    Respiratory: Negative.     Cardiovascular:  Positive for chest pain (left sided).   Gastrointestinal: Negative.    Genitourinary: Negative.    Musculoskeletal:  Positive for back pain, myalgias (generalized), neck pain (left sided) and neck stiffness.   Skin: Negative.    Neurological: Negative.      Physical Exam     Initial Vitals [10/15/22 2217]   BP Pulse Resp Temp SpO2   119/67 84 17 98.6 °F (37 °C) 96 %      MAP       --         Physical Exam    Nursing note and vitals reviewed.  Constitutional: She appears well-developed. She is not diaphoretic. She appears distressed (mildly).   HENT:   Head: Normocephalic and atraumatic.   Nose: Nose normal.   Eyes:  EOM are normal. Pupils are equal, round, and reactive to light.   Neck: Neck supple. No JVD present.   Normal range of motion.  Cardiovascular:  Normal rate, regular rhythm, normal heart sounds and intact distal pulses.           Pulmonary/Chest: Breath sounds normal. No stridor. No respiratory distress. She has no wheezes. She has no rales.   Abdominal: Abdomen is soft. Bowel sounds are normal. She exhibits no distension. There is no abdominal tenderness.   Musculoskeletal:         General: No tenderness or edema. Normal range of motion.      Cervical back: Normal range of motion and neck supple.     Neurological: She is alert and oriented to person, place, and time. She has normal strength.   Skin: Skin is warm and dry. Capillary refill takes less than 2 seconds. No rash noted. No erythema.       ED Course   Procedures  Labs Reviewed   CBC W/ AUTO DIFFERENTIAL - Abnormal; Notable for the following components:       Result Value    RDW 15.3 (*)     Immature Granulocytes 1.0 (*)     Immature Grans (Abs) 0.09 (*)     All other components within normal limits   COMPREHENSIVE METABOLIC PANEL - Abnormal; Notable for the following components:    Sodium 135 (*)     Glucose 337 (*)     BUN 26 (*)     Creatinine 2.2 (*)     eGFR 27 (*)     All other components within normal limits   URINALYSIS, REFLEX TO URINE CULTURE - Abnormal; Notable for the following components:    Protein, UA 1+ (*)     Glucose, UA 3+ (*)     Occult Blood UA 2+ (*)     All other components within normal limits    Narrative:     Specimen Source->Urine   POCT GLUCOSE - Abnormal; Notable for the following components:    POCT Glucose 176 (*)     All other components within normal limits   TROPONIN I   B-TYPE NATRIURETIC PEPTIDE   URINALYSIS MICROSCOPIC    Narrative:     Specimen Source->Urine   TROPONIN I   POCT INFLUENZA A/B MOLECULAR   SARS-COV-2 RDRP GENE   POCT GLUCOSE MONITORING CONTINUOUS          Imaging Results              X-Ray Chest 1 View  (Final result)  Result time 10/15/22 23:38:59      Final result by Corey Anthony MD (10/15/22 23:38:59)                   Impression:      Cardiomegaly with decreased pulmonary vascular congestion.      Electronically signed by: Corey Anthony MD  Date:    10/15/2022  Time:    23:38               Narrative:    EXAMINATION:  XR CHEST 1 VIEW    CLINICAL HISTORY:  Pain, unspecified    TECHNIQUE:  Single frontal view of the chest was performed.    COMPARISON:  08/05/2022 and 04/27/2022.    FINDINGS:  Monitoring EKG leads are present.  The left-sided AICD is in satisfactory position.    The trachea is unremarkable.  The cardiomediastinal silhouette is stable.  There is no evidence of free air beneath the hemidiaphragms.  There are no pleural effusions.  There is no evidence of a pneumothorax.  There is no evidence of pneumomediastinum.  There is decrease in the pulmonary vascular congestion.  There is no focal consolidation.  The osseous structures are unremarkable.                                       Medications   morphine injection 4 mg (4 mg Intravenous Given 10/15/22 2308)   ondansetron injection 4 mg (4 mg Intravenous Given 10/15/22 2308)   sodium chloride 0.9% bolus 1,000 mL (0 mLs Intravenous Stopped 10/16/22 0212)     Medical Decision Making:   History:   Old Medical Records: I decided to obtain old medical records.  Clinical Tests:   Lab Tests: Ordered and Reviewed  Radiological Study: Ordered and Reviewed  Medical Tests: Ordered and Reviewed       MDM:    47 y.o.female with PMHx as noted above, presents with chest pain. Physical exam as noted above.  ED workup remarkable for EKG - nsr, rate 82 bpm, LVH, qtc 481 ms, no STEMI, trop - 0.006/0.014, BNP - 24, CBC/CMP - glucose 337, Cr 2.2, BUN 26, Flu/COVID negative, CXR - cardiomegaly. Pt presentation consistent with chest pain, diffuse body aches.  Symptoms appear more consistent with viral illness, negative flu, negative COVID.  Delta troponin is negative, no  signs of cardiac involvement at this point, given IV fluid with improvement in hyperglycemia, no evidence of volume overload this point.  Patient symptomatically improved upon reassessment.  At this time given patient's history, physical exam, and ED workup do not suspect sepsis, septic shock, arrhythmia, MI/ACS, PE, PTX, aortic dissection, pericarditis, PNA, shingles, or any further malignant cause.  Discussed diagnosis and further treatment with patient including f/u, return precautions given and all questions answered.  Patient in understanding of plan.  Pt discharged to home improved and stable.       Scribe Attestation:   Scribe #1: I performed the above scribed service and the documentation accurately describes the services I performed. I attest to the accuracy of the note.                   Clinical Impression:   Final diagnoses:  [R07.9] Chest pain  [R52] Body aches  [J06.9] Viral URI (Primary)  [R73.9] Hyperglycemia        ED Disposition Condition    Discharge Stable        I, Eliseo Lopez M.D., personally performed the services described in this documentation. All medical record entries made by the scribe were at my direction and in my presence. I have reviewed the chart and agree that the record reflects my personal performance and is accurate and complete.    ED Prescriptions       Medication Sig Dispense Start Date End Date Auth. Provider    ondansetron (ZOFRAN-ODT) 4 MG TbDL Take 1 tablet (4 mg total) by mouth every 8 (eight) hours as needed. 20 tablet 10/16/2022 -- Eliseo Lopez MD    acetaminophen (TYLENOL) 500 MG tablet Take 2 tablets (1,000 mg total) by mouth every 6 (six) hours as needed for Pain. 60 tablet 10/16/2022 -- Eliseo Lopez MD    methocarbamoL (ROBAXIN) 500 MG Tab Take 2 tablets (1,000 mg total) by mouth 3 (three) times daily. 30 tablet 10/16/2022 -- Eliseo Lopez MD          Follow-up Information       Follow up With Specialties Details Why Contact Info     [facility] Emergency Medicine Go to  If symptoms worsen [address] [place] [id number] [phone]    Gil Leal MD Family Medicine Go in 1 week As needed [address] [place] [address]  [phone]               Eliseo Lopez MD  10/16/22 3593

## 2022-10-21 ENCOUNTER — HOSPITAL ENCOUNTER (EMERGENCY)
Facility: HOSPITAL | Age: 47
Discharge: HOME | End: 2022-10-21
Attending: EMERGENCY MEDICINE
Payer: MEDICARE

## 2022-10-21 VITALS
RESPIRATION RATE: 16 BRPM | WEIGHT: 240 LBS | SYSTOLIC BLOOD PRESSURE: 108 MMHG | DIASTOLIC BLOOD PRESSURE: 62 MMHG | HEIGHT: 67 IN | TEMPERATURE: 97 F | OXYGEN SATURATION: 96 % | HEART RATE: 72 BPM | BODY MASS INDEX: 37.67 KG/M2

## 2022-10-21 DIAGNOSIS — R52 GENERALIZED BODY ACHES: Primary | ICD-10-CM

## 2022-10-21 DIAGNOSIS — B34.9 VIRAL SYNDROME: ICD-10-CM

## 2022-10-21 LAB
ALBUMIN SERPL BCP-MCNC: 3.9 G/DL (ref 3.5–5.2)
ALP SERPL-CCNC: 105 U/L (ref 55–135)
ALT SERPL W/O P-5'-P-CCNC: 28 U/L (ref 10–44)
ANION GAP SERPL CALC-SCNC: 11 MMOL/L (ref 8–16)
AST SERPL-CCNC: 27 U/L (ref 10–40)
B-OH-BUTYR BLD STRIP-SCNC: 0.1 MMOL/L (ref 0–0.5)
BACTERIA #/AREA URNS HPF: ABNORMAL /HPF
BASOPHILS # BLD AUTO: 0.05 K/UL (ref 0–0.2)
BASOPHILS NFR BLD: 0.6 % (ref 0–1.9)
BILIRUB SERPL-MCNC: 1.1 MG/DL (ref 0.1–1)
BILIRUB UR QL STRIP: NEGATIVE
BUN SERPL-MCNC: 21 MG/DL (ref 6–20)
CALCIUM SERPL-MCNC: 10 MG/DL (ref 8.7–10.5)
CHLORIDE SERPL-SCNC: 100 MMOL/L (ref 95–110)
CK SERPL-CCNC: 350 U/L (ref 20–180)
CLARITY UR: CLEAR
CO2 SERPL-SCNC: 28 MMOL/L (ref 23–29)
COLOR UR: YELLOW
CREAT SERPL-MCNC: 2.2 MG/DL (ref 0.5–1.4)
CTP QC/QA: YES
CTP QC/QA: YES
DIFFERENTIAL METHOD: ABNORMAL
EOSINOPHIL # BLD AUTO: 0.4 K/UL (ref 0–0.5)
EOSINOPHIL NFR BLD: 4.9 % (ref 0–8)
ERYTHROCYTE [DISTWIDTH] IN BLOOD BY AUTOMATED COUNT: 15.8 % (ref 11.5–14.5)
EST. GFR  (NO RACE VARIABLE): 27 ML/MIN/1.73 M^2
GLUCOSE SERPL-MCNC: 112 MG/DL (ref 70–110)
GLUCOSE UR QL STRIP: NEGATIVE
HCT VFR BLD AUTO: 43.8 % (ref 37–48.5)
HGB BLD-MCNC: 14 G/DL (ref 12–16)
HGB UR QL STRIP: ABNORMAL
HYALINE CASTS #/AREA URNS LPF: 1 /LPF
IMM GRANULOCYTES # BLD AUTO: 0.03 K/UL (ref 0–0.04)
IMM GRANULOCYTES NFR BLD AUTO: 0.4 % (ref 0–0.5)
KETONES UR QL STRIP: NEGATIVE
LEUKOCYTE ESTERASE UR QL STRIP: NEGATIVE
LIPASE SERPL-CCNC: 5 U/L (ref 4–60)
LYMPHOCYTES # BLD AUTO: 2.6 K/UL (ref 1–4.8)
LYMPHOCYTES NFR BLD: 30.7 % (ref 18–48)
MCH RBC QN AUTO: 28.1 PG (ref 27–31)
MCHC RBC AUTO-ENTMCNC: 32 G/DL (ref 32–36)
MCV RBC AUTO: 88 FL (ref 82–98)
MICROSCOPIC COMMENT: ABNORMAL
MONOCYTES # BLD AUTO: 0.5 K/UL (ref 0.3–1)
MONOCYTES NFR BLD: 6 % (ref 4–15)
NEUTROPHILS # BLD AUTO: 4.8 K/UL (ref 1.8–7.7)
NEUTROPHILS NFR BLD: 57.4 % (ref 38–73)
NITRITE UR QL STRIP: NEGATIVE
NRBC BLD-RTO: 0 /100 WBC
PH UR STRIP: 7 [PH] (ref 5–8)
PLATELET # BLD AUTO: 254 K/UL (ref 150–450)
PMV BLD AUTO: 10.5 FL (ref 9.2–12.9)
POC MOLECULAR INFLUENZA A AGN: NEGATIVE
POC MOLECULAR INFLUENZA B AGN: NEGATIVE
POTASSIUM SERPL-SCNC: 4.1 MMOL/L (ref 3.5–5.1)
PROT SERPL-MCNC: 8.4 G/DL (ref 6–8.4)
PROT UR QL STRIP: ABNORMAL
RBC # BLD AUTO: 4.99 M/UL (ref 4–5.4)
RBC #/AREA URNS HPF: 2 /HPF (ref 0–4)
SARS-COV-2 RDRP RESP QL NAA+PROBE: NEGATIVE
SODIUM SERPL-SCNC: 139 MMOL/L (ref 136–145)
SP GR UR STRIP: 1.02 (ref 1–1.03)
SQUAMOUS #/AREA URNS HPF: 7 /HPF
TROPONIN I SERPL DL<=0.01 NG/ML-MCNC: 0.01 NG/ML (ref 0–0.03)
TSH SERPL DL<=0.005 MIU/L-ACNC: 1.35 UIU/ML (ref 0.4–4)
URN SPEC COLLECT METH UR: ABNORMAL
UROBILINOGEN UR STRIP-ACNC: NEGATIVE EU/DL
WBC # BLD AUTO: 8.4 K/UL (ref 3.9–12.7)
WBC #/AREA URNS HPF: 1 /HPF (ref 0–5)
WBC CLUMPS URNS QL MICRO: ABNORMAL

## 2022-10-21 PROCEDURE — 87635 SARS-COV-2 COVID-19 AMP PRB: CPT | Performed by: NURSE PRACTITIONER

## 2022-10-21 PROCEDURE — 93010 ELECTROCARDIOGRAM REPORT: CPT | Mod: ,,, | Performed by: INTERNAL MEDICINE

## 2022-10-21 PROCEDURE — 93005 ELECTROCARDIOGRAM TRACING: CPT

## 2022-10-21 PROCEDURE — 81000 URINALYSIS NONAUTO W/SCOPE: CPT | Performed by: NURSE PRACTITIONER

## 2022-10-21 PROCEDURE — 25000003 PHARM REV CODE 250: Performed by: NURSE PRACTITIONER

## 2022-10-21 PROCEDURE — 63600175 PHARM REV CODE 636 W HCPCS: Performed by: NURSE PRACTITIONER

## 2022-10-21 PROCEDURE — 80053 COMPREHEN METABOLIC PANEL: CPT | Performed by: NURSE PRACTITIONER

## 2022-10-21 PROCEDURE — 93010 EKG 12-LEAD: ICD-10-PCS | Mod: ,,, | Performed by: INTERNAL MEDICINE

## 2022-10-21 PROCEDURE — 96375 TX/PRO/DX INJ NEW DRUG ADDON: CPT

## 2022-10-21 PROCEDURE — 82010 KETONE BODYS QUAN: CPT | Performed by: NURSE PRACTITIONER

## 2022-10-21 PROCEDURE — 96361 HYDRATE IV INFUSION ADD-ON: CPT

## 2022-10-21 PROCEDURE — 84484 ASSAY OF TROPONIN QUANT: CPT | Performed by: NURSE PRACTITIONER

## 2022-10-21 PROCEDURE — 96374 THER/PROPH/DIAG INJ IV PUSH: CPT

## 2022-10-21 PROCEDURE — 87502 INFLUENZA DNA AMP PROBE: CPT

## 2022-10-21 PROCEDURE — 82550 ASSAY OF CK (CPK): CPT | Performed by: NURSE PRACTITIONER

## 2022-10-21 PROCEDURE — 99285 EMERGENCY DEPT VISIT HI MDM: CPT | Mod: 25

## 2022-10-21 PROCEDURE — 85025 COMPLETE CBC W/AUTO DIFF WBC: CPT | Performed by: NURSE PRACTITIONER

## 2022-10-21 PROCEDURE — 84443 ASSAY THYROID STIM HORMONE: CPT | Performed by: NURSE PRACTITIONER

## 2022-10-21 PROCEDURE — 83690 ASSAY OF LIPASE: CPT | Performed by: NURSE PRACTITIONER

## 2022-10-21 RX ORDER — ACETAMINOPHEN 500 MG
1000 TABLET ORAL
Status: COMPLETED | OUTPATIENT
Start: 2022-10-21 | End: 2022-10-21

## 2022-10-21 RX ORDER — MORPHINE SULFATE 4 MG/ML
4 INJECTION, SOLUTION INTRAMUSCULAR; INTRAVENOUS
Status: COMPLETED | OUTPATIENT
Start: 2022-10-21 | End: 2022-10-21

## 2022-10-21 RX ORDER — ONDANSETRON 2 MG/ML
8 INJECTION INTRAMUSCULAR; INTRAVENOUS
Status: COMPLETED | OUTPATIENT
Start: 2022-10-21 | End: 2022-10-21

## 2022-10-21 RX ORDER — KETOROLAC TROMETHAMINE 30 MG/ML
15 INJECTION, SOLUTION INTRAMUSCULAR; INTRAVENOUS
Status: DISCONTINUED | OUTPATIENT
Start: 2022-10-21 | End: 2022-10-21

## 2022-10-21 RX ADMIN — MORPHINE SULFATE 4 MG: 4 INJECTION INTRAVENOUS at 08:10

## 2022-10-21 RX ADMIN — SODIUM CHLORIDE 1000 ML: 0.9 INJECTION, SOLUTION INTRAVENOUS at 07:10

## 2022-10-21 RX ADMIN — ACETAMINOPHEN 1000 MG: 500 TABLET ORAL at 08:10

## 2022-10-21 RX ADMIN — ONDANSETRON 8 MG: 2 INJECTION INTRAMUSCULAR; INTRAVENOUS at 07:10

## 2022-10-21 NOTE — FIRST PROVIDER EVALUATION
Medical screening examination initiated.  I have conducted a focused provider triage encounter, findings are as follows:    Brief history of present illness:  body aches, fever, chills since this am    There were no vitals filed for this visit.    Pertinent physical exam:  NAD    Brief workup plan:  covid/ flu    Preliminary workup initiated; this workup will be continued and followed by the physician or advanced practice provider that is assigned to the patient when roomed.

## 2022-10-21 NOTE — ED PROVIDER NOTES
Encounter Date: 10/21/2022    SCRIBE #1 NOTE: I, Kendra Leslie am scribing for, and in the presence of,  Collin Morse NP. I have scribed the following portions of the note - Other sections scribed: HPI, KESHIA.     History     Chief Complaint   Patient presents with    Fever    Vomiting    Generalized Body Aches     Pt presents with generalized body aches, fever and chest pain since this am. No OTC meds taken ROSE Chanel is a 47 y.o. female, with a PMHx of HTN, HLD, and CHF, who presents to the ED with generalized body aches onset this morning. Patient reports associated abdominal pain, nausea, vomiting, headache, and rhinorrhea. Patient endorses she was seen in the ED for similar symptoms 1 week ago and was given fluids to alleviate her symptoms. She states the fluids relieved her symptoms, but they returned earlier today. No other exacerbating or alleviating factors. Denies fever, cough or other associated symptoms. No known sick contact.     The history is provided by the patient.   Review of patient's allergies indicates:   Allergen Reactions    Metformin      Diarrhea on metformin XR     Pneumococcal 23-abimbola ps vaccine      Past Medical History:   Diagnosis Date    Atrial fibrillation     Blood clot associated with vein wall inflammation     not dvt    Cardiomyopathy     Normal cors on cath 11/2017    CHF (congestive heart failure)     DM (diabetes mellitus) 9/19/2013    Hyperlipidemia     Hypertension     Psoriasis     Sleep apnea      Past Surgical History:   Procedure Laterality Date    CARDIAC CATHETERIZATION      COLONOSCOPY      COLONOSCOPY N/A 3/9/2022    Procedure: COLONOSCOPY;  Surgeon: Vielka Burrell MD;  Location: [facility];  Service: Endoscopy;  Laterality: N/A;    DILATION AND CURETTAGE OF UTERUS      ESOPHAGOGASTRODUODENOSCOPY N/A 5/9/2019    Procedure: EGD (ESOPHAGOGASTRODUODENOSCOPY);  Surgeon: Vielka Burrell MD;  Location: [facility];  Service: Endoscopy;   Laterality: N/A;    TRANSFORAMINAL EPIDURAL INJECTION OF STEROID N/A 1/6/2022    Procedure: Transforaminal ANKITA L4/L5 L5/S1;  Surgeon: Jed Yeager MD;  Location: [facility];  Service: Pain Management;  Laterality: N/A;     Family History   Problem Relation Age of Onset    Hypertension Mother     Hypertension Father     Diabetes Father     Diabetes Maternal Grandmother     Diabetes Paternal Grandmother     Breast cancer Neg Hx     Colon cancer Neg Hx     Ovarian cancer Neg Hx      Social History     Tobacco Use    Smoking status: Never    Smokeless tobacco: Never    Tobacco comments:     smokes cigars on occasion   Substance Use Topics    Alcohol use: Not Currently     Comment: occasional    Drug use: Not Currently     Types: Marijuana     Comment: occ     Review of Systems   Constitutional:  Negative for fever.   HENT:  Positive for rhinorrhea. Negative for sore throat.    Respiratory:  Negative for cough and shortness of breath.    Cardiovascular:  Negative for chest pain.   Gastrointestinal:  Positive for abdominal pain, nausea and vomiting.   Genitourinary:  Negative for dysuria.   Musculoskeletal:  Positive for myalgias (Generalized body aches). Negative for back pain.   Skin:  Negative for rash.   Neurological:  Positive for headaches. Negative for weakness.   Hematological:  Does not bruise/bleed easily.     Physical Exam     Initial Vitals [10/21/22 1706]   BP Pulse Resp Temp SpO2   119/62 78 18 98.4 °F (36.9 °C) 98 %      MAP       --         Physical Exam    Nursing note and vitals reviewed.  Constitutional: She appears well-developed and well-nourished. She is not diaphoretic. No distress.   HENT:   Head: Normocephalic and atraumatic.   Right Ear: External ear normal.   Left Ear: External ear normal.   Nose: Nose normal.   Eyes: Conjunctivae and EOM are normal. Right eye exhibits no discharge. Left eye exhibits no discharge.   Neck: Neck supple. No tracheal deviation present.   Normal range of  motion.  Cardiovascular:  Normal rate.           Pulmonary/Chest: No stridor. No respiratory distress.   Abdominal: Abdomen is soft. She exhibits no distension. There is no abdominal tenderness.   Musculoskeletal:         General: No tenderness. Normal range of motion.      Cervical back: Normal range of motion and neck supple.     Neurological: She is alert and oriented to person, place, and time. She has normal strength. No cranial nerve deficit or sensory deficit.   Skin: Skin is warm and dry.   Psychiatric: She has a normal mood and affect. Her behavior is normal. Judgment and thought content normal.       ED Course   Procedures  Labs Reviewed   CBC W/ AUTO DIFFERENTIAL - Abnormal; Notable for the following components:       Result Value    RDW 15.8 (*)     All other components within normal limits   COMPREHENSIVE METABOLIC PANEL - Abnormal; Notable for the following components:    Glucose 112 (*)     BUN 21 (*)     Creatinine 2.2 (*)     Total Bilirubin 1.1 (*)     eGFR 27 (*)     All other components within normal limits   URINALYSIS, REFLEX TO URINE CULTURE - Abnormal; Notable for the following components:    Protein, UA 2+ (*)     Occult Blood UA 1+ (*)     All other components within normal limits    Narrative:     Specimen Source->Urine   URINALYSIS MICROSCOPIC - Abnormal; Notable for the following components:    Bacteria Many (*)     All other components within normal limits    Narrative:     Specimen Source->Urine   CK - Abnormal; Notable for the following components:     (*)     All other components within normal limits   LIPASE   BETA - HYDROXYBUTYRATE, SERUM   TROPONIN I   TSH   POCT INFLUENZA A/B MOLECULAR   SARS-COV-2 RDRP GENE   POCT STREP A MOLECULAR          Imaging Results              X-Ray Chest 1 View (Final result)  Result time 10/21/22 22:32:52      Final result by Rober Gonsales MD (10/21/22 22:32:52)                   Impression:      Stable cardiomegaly with possible mild  pulmonary interstitial edema.      Electronically signed by: Rober Gonsales MD  Date:    10/21/2022  Time:    22:32               Narrative:    EXAMINATION:  XR CHEST 1 VIEW    CLINICAL HISTORY:  Viral infection, unspecified    TECHNIQUE:  Single frontal view of the chest was performed.    COMPARISON:  10/15/2022.    FINDINGS:  Cardiac silhouette is enlarged but stable in size.  Left-sided pacer device is seen.  Lungs are hypoinflated which accentuates pulmonary vascular markings.  There is mild elevation of the right hemidiaphragm.  Increased interstitial attenuation is seen which could reflect mild pulmonary interstitial edema.  Otherwise no evidence of new focal consolidation, pneumothorax, or large pleural effusion.                                       Medications   acetaminophen tablet 1,000 mg (1,000 mg Oral Given 10/21/22 2038)   sodium chloride 0.9% bolus 1,000 mL (0 mLs Intravenous Stopped 10/21/22 2035)   ondansetron injection 8 mg (8 mg Intravenous Given 10/21/22 1924)   morphine injection 4 mg (4 mg Intravenous Given 10/21/22 2051)     Medical Decision Making:   History:   Old Medical Records: I decided to obtain old medical records.  Clinical Tests:   Lab Tests: Ordered and Reviewed  Radiological Study: Ordered and Reviewed  Medical Tests: Ordered and Reviewed  ED Management:  HPI and physical exam as above.  Patient well-appearing in in no distress.  Complaining of flu-like symptoms.  Labs consistent with baseline.  CPK not concerning we elevated.  TSH within normal limits.  Troponin 0.011.  Urinalysis contaminated.  No evidence of UTI.  Patient denies chest pain at this time and EKG without concerning acute changes.  78 beats per minute without evidence of acute ischemia, ectopy, or malignant arrhythmia.  Chest x-ray shows shows cardiomegaly but consistent with baseline.  No acute abnormalities. Vitals stable and within normal limits throughout the ED course.  Given the above I have considered but  doubt acute cardiopulmonary disease, sepsis, pneumonia, meningitis, or other emergent pathology.  Symptoms seem most consistent with a viral syndrome although COVID-19 and flu are negative.  Patient reports that she feels much better following treatment with medications in the ED. Advised her to follow up with her PCP.  ED return precautions given.  She expressed understanding of diagnosis and discharge instructions.        Scribe Attestation:   Scribe #1: I performed the above scribed service and the documentation accurately describes the services I performed. I attest to the accuracy of the note.                   Clinical Impression:   Final diagnoses:  [R52] Generalized body aches (Primary)  [B34.9] Viral syndrome      ED Disposition Condition    Discharge Stable        I, Collin Morse NP, personally performed the services described in this documentation. All medical record entries made by the scribe were at my direction and in my presence. I have reviewed the chart and agree that the record reflects my personal performance and is accurate and complete.   ED Prescriptions    None       Follow-up Information       Follow up With Specialties Details Why Contact Info    Gil Leal MD Family Medicine Schedule an appointment as soon as possible for a visit in 1 week For further evaluation [address] [place] [address]  [phone]      [facility] Emergency Dept Emergency Medicine Go to  If symptoms worsen, As needed [address]  [facility] [id number] [phone]             Collin Morse NP  10/21/22 7562

## 2022-10-21 NOTE — ED NOTES
"Pt was roomed but stated "I want a bed" Pt informed that this was the next room available. Pt chose to return to lobby to "wait for a bed"  "

## 2022-10-22 ENCOUNTER — CLINICAL SUPPORT (OUTPATIENT)
Dept: CARDIOLOGY | Facility: HOSPITAL | Age: 47
End: 2022-10-22
Payer: MEDICARE

## 2022-10-22 DIAGNOSIS — I50.42 CHRONIC COMBINED SYSTOLIC (CONGESTIVE) AND DIASTOLIC (CONGESTIVE) HEART FAILURE: ICD-10-CM

## 2022-10-22 DIAGNOSIS — I47.20 VENTRICULAR TACHYCARDIA: ICD-10-CM

## 2022-10-22 DIAGNOSIS — I48.91 UNSPECIFIED ATRIAL FIBRILLATION: ICD-10-CM

## 2022-10-22 DIAGNOSIS — Z95.810 PRESENCE OF AUTOMATIC (IMPLANTABLE) CARDIAC DEFIBRILLATOR: ICD-10-CM

## 2022-10-22 DIAGNOSIS — I42.0 DILATED CARDIOMYOPATHY: ICD-10-CM

## 2022-10-22 PROCEDURE — 93296 REM INTERROG EVL PM/IDS: CPT | Performed by: INTERNAL MEDICINE

## 2022-10-22 NOTE — DISCHARGE INSTRUCTIONS

## 2022-11-07 ENCOUNTER — OFFICE VISIT (OUTPATIENT)
Dept: PAIN MEDICINE | Facility: CLINIC | Age: 47
End: 2022-11-07
Payer: MEDICARE

## 2022-11-07 VITALS
HEIGHT: 67 IN | DIASTOLIC BLOOD PRESSURE: 94 MMHG | SYSTOLIC BLOOD PRESSURE: 137 MMHG | WEIGHT: 252.44 LBS | BODY MASS INDEX: 39.62 KG/M2 | HEART RATE: 69 BPM

## 2022-11-07 DIAGNOSIS — M54.16 LUMBAR RADICULOPATHY: Primary | ICD-10-CM

## 2022-11-07 PROCEDURE — 99999 PR PBB SHADOW E&M-EST. PATIENT-LVL II: CPT | Mod: PBBFAC,,, | Performed by: NURSE PRACTITIONER

## 2022-11-07 PROCEDURE — 4010F ACE/ARB THERAPY RXD/TAKEN: CPT | Mod: CPTII,S$GLB,, | Performed by: NURSE PRACTITIONER

## 2022-11-07 PROCEDURE — 99215 OFFICE O/P EST HI 40 MIN: CPT | Mod: S$GLB,,, | Performed by: NURSE PRACTITIONER

## 2022-11-07 PROCEDURE — 3066F PR DOCUMENTATION OF TREATMENT FOR NEPHROPATHY: ICD-10-PCS | Mod: CPTII,S$GLB,, | Performed by: NURSE PRACTITIONER

## 2022-11-07 PROCEDURE — 3051F HG A1C>EQUAL 7.0%<8.0%: CPT | Mod: CPTII,S$GLB,, | Performed by: NURSE PRACTITIONER

## 2022-11-07 PROCEDURE — 3080F DIAST BP >= 90 MM HG: CPT | Mod: CPTII,S$GLB,, | Performed by: NURSE PRACTITIONER

## 2022-11-07 PROCEDURE — 1160F RVW MEDS BY RX/DR IN RCRD: CPT | Mod: CPTII,S$GLB,, | Performed by: NURSE PRACTITIONER

## 2022-11-07 PROCEDURE — 99215 PR OFFICE/OUTPT VISIT, EST, LEVL V, 40-54 MIN: ICD-10-PCS | Mod: S$GLB,,, | Performed by: NURSE PRACTITIONER

## 2022-11-07 PROCEDURE — 99999 PR PBB SHADOW E&M-EST. PATIENT-LVL II: ICD-10-PCS | Mod: PBBFAC,,, | Performed by: NURSE PRACTITIONER

## 2022-11-07 PROCEDURE — 3051F PR MOST RECENT HEMOGLOBIN A1C LEVEL 7.0 - < 8.0%: ICD-10-PCS | Mod: CPTII,S$GLB,, | Performed by: NURSE PRACTITIONER

## 2022-11-07 PROCEDURE — 3080F PR MOST RECENT DIASTOLIC BLOOD PRESSURE >= 90 MM HG: ICD-10-PCS | Mod: CPTII,S$GLB,, | Performed by: NURSE PRACTITIONER

## 2022-11-07 PROCEDURE — 3008F PR BODY MASS INDEX (BMI) DOCUMENTED: ICD-10-PCS | Mod: CPTII,S$GLB,, | Performed by: NURSE PRACTITIONER

## 2022-11-07 PROCEDURE — 4010F PR ACE/ARB THEARPY RXD/TAKEN: ICD-10-PCS | Mod: CPTII,S$GLB,, | Performed by: NURSE PRACTITIONER

## 2022-11-07 PROCEDURE — 1159F PR MEDICATION LIST DOCUMENTED IN MEDICAL RECORD: ICD-10-PCS | Mod: CPTII,S$GLB,, | Performed by: NURSE PRACTITIONER

## 2022-11-07 PROCEDURE — 1160F PR REVIEW ALL MEDS BY PRESCRIBER/CLIN PHARMACIST DOCUMENTED: ICD-10-PCS | Mod: CPTII,S$GLB,, | Performed by: NURSE PRACTITIONER

## 2022-11-07 PROCEDURE — 3008F BODY MASS INDEX DOCD: CPT | Mod: CPTII,S$GLB,, | Performed by: NURSE PRACTITIONER

## 2022-11-07 PROCEDURE — 3075F SYST BP GE 130 - 139MM HG: CPT | Mod: CPTII,S$GLB,, | Performed by: NURSE PRACTITIONER

## 2022-11-07 PROCEDURE — 3066F NEPHROPATHY DOC TX: CPT | Mod: CPTII,S$GLB,, | Performed by: NURSE PRACTITIONER

## 2022-11-07 PROCEDURE — 3075F PR MOST RECENT SYSTOLIC BLOOD PRESS GE 130-139MM HG: ICD-10-PCS | Mod: CPTII,S$GLB,, | Performed by: NURSE PRACTITIONER

## 2022-11-07 PROCEDURE — 1159F MED LIST DOCD IN RCRD: CPT | Mod: CPTII,S$GLB,, | Performed by: NURSE PRACTITIONER

## 2022-11-07 RX ORDER — METHOCARBAMOL 500 MG/1
500 TABLET, FILM COATED ORAL EVERY 8 HOURS PRN
Qty: 90 TABLET | Refills: 0 | Status: SHIPPED | OUTPATIENT
Start: 2022-11-07 | End: 2022-12-07
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
## 2022-11-08 ENCOUNTER — TELEPHONE (OUTPATIENT)
Dept: PAIN MEDICINE | Facility: OTHER | Age: 47
End: 2022-11-08
Payer: MEDICARE

## 2022-11-08 ENCOUNTER — TELEPHONE (OUTPATIENT)
Dept: PAIN MEDICINE | Facility: CLINIC | Age: 47
End: 2022-11-08
Payer: MEDICARE

## 2022-11-08 RX ORDER — OXYCODONE AND ACETAMINOPHEN 5; 325 MG/1; MG/1
1 TABLET ORAL EVERY 12 HOURS PRN
Qty: 30 TABLET | Refills: 0 | Status: SHIPPED | OUTPATIENT
Start: 2022-11-08 | End: 2022-11-23

## 2022-11-08 NOTE — TELEPHONE ENCOUNTER
Patient was contact and informed that refill request was sent over to her pharmacy for [place].    Verbalized understanding

## 2022-11-08 NOTE — TELEPHONE ENCOUNTER
----- Message from Lita Humphries MD sent at 11/8/2022  9:29 AM CST -----  Regarding: RE: Procedure  Sure  ----- Message -----  From: Jed Yeager MD  Sent: 11/8/2022   9:15 AM CST  To: Lita Humphries MD, Francois Aburto NP, #  Subject: RE: Procedure                                    No problem.      ----- Message -----  From: Francois Aburto NP  Sent: 11/8/2022   7:12 AM CST  To: Lita Humphries MD, Rhianna Avelar, #  Subject: RE: Procedure                                    As long as it is ok with the physicians. I included them in the message. Francois Allen   ----- Message -----  From: Rhianna Avelar  Sent: 11/7/2022   3:42 PM CST  To: Francois Aburto NP  Subject: Procedure                                        Errol Parrish,    I offered her Dr. Yeager next available which is 12/8. He is out for 2 weeks. She is stating she can't wait that long and wants to know if she can have it with another provider. The soonest I could schedule her procedure is 2 weeks because of her insurance and that will be on 11/21 with Dr. Humphries if that is okay?    Thanks   Rhianna

## 2022-11-08 NOTE — TELEPHONE ENCOUNTER
----- Message from Troy Guerin sent at 11/8/2022 11:20 AM CST -----  Regarding: Pharm Call  Contact: [facility] ([facility])  Type:  Pharmacy Calling to Clarify an RX    Name of Caller: [facility]     Pharmacy Name: [facility]    Prescription Name: oxyCODONE-acetaminophen (PERCOCET) 5-325 mg per tablet    What do they need to clarify?:diagnosis needed    Best Call Back Number: [phone]    Additional Information:

## 2022-11-10 ENCOUNTER — TELEPHONE (OUTPATIENT)
Dept: PAIN MEDICINE | Facility: CLINIC | Age: 47
End: 2022-11-10
Payer: MEDICARE

## 2022-11-10 NOTE — TELEPHONE ENCOUNTER
----- Message from Jossy Aburto sent at 11/10/2022 10:57 AM CST -----  Regarding: medication and procedure  Name of Who is Calling: FARRUKH FERREIRA [[id number]]      What is the request in detail: #patient is requesting a call back she states the pharmacy advised her that they were waiting for something from the doctor office concerning her oxyCODONE-acetaminophen (PERCOCET) 5-325 mg per tablet medication she is requesting a call back to discuss this matter and also she needs some one to call and schedule her procedure       Can the clinic reply by [facility]: no      What Number to Call Back if not in [facility]: [phone]

## 2022-11-10 NOTE — TELEPHONE ENCOUNTER
Staff lvm to discuss patient concerns but no answer informed patient over voicemail to call the office back at [phone].

## 2022-11-15 ENCOUNTER — PATIENT MESSAGE (OUTPATIENT)
Dept: PAIN MEDICINE | Facility: OTHER | Age: 47
End: 2022-11-15
Payer: MEDICARE

## 2022-11-16 ENCOUNTER — TELEPHONE (OUTPATIENT)
Dept: PAIN MEDICINE | Facility: CLINIC | Age: 47
End: 2022-11-16
Payer: MEDICARE

## 2022-11-16 NOTE — TELEPHONE ENCOUNTER
Staff tried contacting the pharmacy to get patient last fill date to resubmit medication request.    Pharmacy did not answer. Staff will try on next business day.    Patient was informed    Verbalized understanding

## 2022-11-17 ENCOUNTER — TELEPHONE (OUTPATIENT)
Dept: PAIN MEDICINE | Facility: CLINIC | Age: 47
End: 2022-11-17
Payer: MEDICARE

## 2022-11-17 DIAGNOSIS — M54.16 LUMBAR RADICULOPATHY: Primary | ICD-10-CM

## 2022-11-28 ENCOUNTER — TELEPHONE (OUTPATIENT)
Dept: PAIN MEDICINE | Facility: CLINIC | Age: 47
End: 2022-11-28
Payer: MEDICARE

## 2022-11-28 DIAGNOSIS — M54.16 LUMBAR RADICULOPATHY: Primary | ICD-10-CM

## 2022-11-28 DIAGNOSIS — G89.4 CHRONIC PAIN SYNDROME: ICD-10-CM

## 2022-11-28 RX ORDER — OXYCODONE AND ACETAMINOPHEN 5; 325 MG/1; MG/1
1 TABLET ORAL EVERY 12 HOURS PRN
Qty: 30 TABLET | Refills: 0 | Status: SHIPPED | OUTPATIENT
Start: 2022-11-28 | End: 2022-12-11 | Stop reason: ALTCHOICE

## 2022-11-28 NOTE — TELEPHONE ENCOUNTER
----- Message from Ines Cruz sent at 11/28/2022  1:26 PM CST -----  Can the clinic reply in [facility]:no              Please refill the medication(s) listed below. Please call the patient when the prescription(s) is ready for  at this phone number  [phone]            Medication #1oxyCODONE-acetaminophen (PERCOCET) 5-325 mg per tablet            Medication #2              Preferred Pharmacy:[id number] [place] [address]     DISCHARGE PLANNING     Discharge to home or other facility with appropriate resources Progressing        INFECTION - ADULT     Absence or prevention of progression during hospitalization Progressing        Nutrition/Hydration-ADULT     Nutrient/Hydration intake appropriate for improving, restoring or maintaining nutritional needs Progressing        PAIN - ADULT     Verbalizes/displays adequate comfort level or baseline comfort level Progressing        Potential for Falls     Patient will remain free of falls Progressing        Prexisting or High Potential for Compromised Skin Integrity     Skin integrity is maintained or improved Progressing        SAFETY ADULT     Patient will remain free of falls Progressing     Maintain or return to baseline ADL function Progressing     Maintain or return mobility status to optimal level Progressing

## 2022-11-28 NOTE — TELEPHONE ENCOUNTER
Staff lvm in regards to pt refill request has been received and will be submitted to the provider for review.

## 2022-12-01 ENCOUNTER — HOSPITAL ENCOUNTER (OUTPATIENT)
Facility: OTHER | Age: 47
Discharge: HOME | End: 2022-12-01
Attending: ANESTHESIOLOGY | Admitting: ANESTHESIOLOGY
Payer: MEDICARE

## 2022-12-01 VITALS
HEIGHT: 66 IN | BODY MASS INDEX: 38.57 KG/M2 | SYSTOLIC BLOOD PRESSURE: 121 MMHG | WEIGHT: 240 LBS | TEMPERATURE: 98 F | RESPIRATION RATE: 14 BRPM | HEART RATE: 70 BPM | OXYGEN SATURATION: 97 % | DIASTOLIC BLOOD PRESSURE: 75 MMHG

## 2022-12-01 DIAGNOSIS — M54.16 LUMBAR RADICULOPATHY: Primary | ICD-10-CM

## 2022-12-01 DIAGNOSIS — M54.17 LUMBOSACRAL RADICULOPATHY: ICD-10-CM

## 2022-12-01 DIAGNOSIS — G89.29 CHRONIC PAIN: ICD-10-CM

## 2022-12-01 DIAGNOSIS — M51.36 DDD (DEGENERATIVE DISC DISEASE), LUMBAR: ICD-10-CM

## 2022-12-01 LAB — POCT GLUCOSE: 48 MG/DL (ref 70–110)

## 2022-12-01 PROCEDURE — 64483 NJX AA&/STRD TFRM EPI L/S 1: CPT | Mod: RT,,, | Performed by: ANESTHESIOLOGY

## 2022-12-01 PROCEDURE — 64484 NJX AA&/STRD TFRM EPI L/S EA: CPT | Mod: RT,,, | Performed by: ANESTHESIOLOGY

## 2022-12-01 PROCEDURE — 64484 PRA INJECT ANES/STEROID FORAMEN LUMBAR/SACRAL W IMG GUIDE ,EA ADD LEVEL: ICD-10-PCS | Mod: RT,,, | Performed by: ANESTHESIOLOGY

## 2022-12-01 PROCEDURE — 64483 PR EPIDURAL INJ, ANES/STEROID, TRANSFORAMINAL, LUMB/SACR, SNGL LEVL: ICD-10-PCS | Mod: RT,,, | Performed by: ANESTHESIOLOGY

## 2022-12-01 PROCEDURE — 63600175 PHARM REV CODE 636 W HCPCS: Performed by: ANESTHESIOLOGY

## 2022-12-01 PROCEDURE — 64483 NJX AA&/STRD TFRM EPI L/S 1: CPT | Mod: RT | Performed by: ANESTHESIOLOGY

## 2022-12-01 PROCEDURE — 25500020 PHARM REV CODE 255: Performed by: ANESTHESIOLOGY

## 2022-12-01 PROCEDURE — 25000003 PHARM REV CODE 250: Performed by: ANESTHESIOLOGY

## 2022-12-01 PROCEDURE — 64484 NJX AA&/STRD TFRM EPI L/S EA: CPT | Mod: RT | Performed by: ANESTHESIOLOGY

## 2022-12-01 RX ORDER — LIDOCAINE HYDROCHLORIDE 10 MG/ML
INJECTION, SOLUTION EPIDURAL; INFILTRATION; INTRACAUDAL; PERINEURAL
Status: DISCONTINUED | OUTPATIENT
Start: 2022-12-01 | End: 2022-12-01 | Stop reason: HOSPADM

## 2022-12-01 RX ORDER — FENTANYL CITRATE 50 UG/ML
INJECTION, SOLUTION INTRAMUSCULAR; INTRAVENOUS
Status: DISCONTINUED | OUTPATIENT
Start: 2022-12-01 | End: 2022-12-01 | Stop reason: HOSPADM

## 2022-12-01 RX ORDER — DEXAMETHASONE SODIUM PHOSPHATE 10 MG/ML
INJECTION INTRAMUSCULAR; INTRAVENOUS
Status: DISCONTINUED | OUTPATIENT
Start: 2022-12-01 | End: 2022-12-01 | Stop reason: HOSPADM

## 2022-12-01 RX ORDER — SODIUM CHLORIDE 9 MG/ML
500 INJECTION, SOLUTION INTRAVENOUS CONTINUOUS
Status: DISCONTINUED | OUTPATIENT
Start: 2022-12-01 | End: 2022-12-01 | Stop reason: HOSPADM

## 2022-12-01 RX ORDER — MIDAZOLAM HYDROCHLORIDE 1 MG/ML
INJECTION INTRAMUSCULAR; INTRAVENOUS
Status: DISCONTINUED | OUTPATIENT
Start: 2022-12-01 | End: 2022-12-01 | Stop reason: HOSPADM

## 2022-12-01 NOTE — DISCHARGE INSTRUCTIONS

## 2022-12-01 NOTE — DISCHARGE SUMMARY
Discharge Note  Short Stay      SUMMARY     Admit Date: 12/1/2022    Attending Physician: Jed Yeager      Discharge Physician: Jed Yeager      Discharge Date: 12/1/2022 4:50 PM    Procedure(s) (LRB):  INJECTION, STEROID, EPIDURAL, TRANSFORAMINAL APPROACH, RIGHT L4/L5 & L5/S1 CONTRAST (Right)    Final Diagnosis: Lumbar radiculopathy [M54.16]    Disposition: Home or self care    Patient Instructions:   Current Discharge Medication List        CONTINUE these medications which have NOT CHANGED    Details   acetaminophen (TYLENOL) 500 MG tablet Take 2 tablets (1,000 mg total) by mouth every 6 (six) hours as needed for Pain.  Qty: 60 tablet, Refills: 0      albuterol (VENTOLIN HFA) 90 mcg/actuation inhaler Inhale 2 puffs into the lungs every 6 (six) hours as needed for Wheezing or Shortness of Breath. Rescue  Qty: 18 g, Refills: 2    Associated Diagnoses: COVID-19      apixaban (ELIQUIS) 5 mg Tab Take 1 tablet (5 mg total) by mouth 2 (two) times daily.  Qty: 180 tablet, Refills: 3      baclofen (LIORESAL) 10 MG tablet Take 1 tablet (10 mg total) by mouth 2 (two) times daily.  Qty: 90 tablet, Refills: 0      blood glucose control, high Soln 1 each by Misc.(Non-Drug; Combo Route) route once. for 1 dose  Qty: 1 each, Refills: 3    Associated Diagnoses: Type 2 diabetes mellitus without complication, without long-term current use of insulin      blood glucose control, low Soln 1 each by Misc.(Non-Drug; Combo Route) route once. for 1 dose  Qty: 1 each, Refills: 3    Associated Diagnoses: Type 2 diabetes mellitus without complication, without long-term current use of insulin      BLOOD PRESSURE CUFF Misc 1 kit by Misc.(Non-Drug; Combo Route) route 2 (two) times daily.  Qty: 1 each, Refills: 0    Associated Diagnoses: Chronic combined systolic and diastolic heart failure; Essential hypertension; Nonischemic cardiomyopathy      blood sugar diagnostic Strp Check blood glucose 3x/day.  Qty: 300 strip, Refills: 3       blood-glucose meter (TRUE METRIX AIR GLUCOSE METER) Misc 1 each by Misc.(Non-Drug; Combo Route) route 3 (three) times daily.  Qty: 1 each, Refills: 0    Associated Diagnoses: Type 2 diabetes mellitus without complication, without long-term current use of insulin      blood-glucose meter,continuous (DEXCOM G6 ) Misc Use with dexcom G6 system  Qty: 1 each, Refills: 0      blood-glucose sensor (DEXCOM G6 SENSOR) Lupe Change sensor every 10 days  Qty: 12 each, Refills: 3      blood-glucose transmitter (DEXCOM G6 TRANSMITTER) Lupe Change every 3 months  Qty: 1 each, Refills: 3      dulaglutide (TRULICITY) 0.75 mg/0.5 mL pen injector Inject 0.75 mg into the skin every 7 days.  Qty: 4 pen, Refills: 5      fluticasone propionate (FLONASE) 50 mcg/actuation nasal spray 1 spray (50 mcg total) by Each Nostril route 2 (two) times daily as needed for Rhinitis.  Qty: 15 g, Refills: 0      furosemide (LASIX) 40 MG tablet TAKE 1 TABLET BY MOUTH ONCE DAILY *PLEASE  HOLD  WHILE  NOT  DRINKING*  Qty: 90 tablet, Refills: 1    Associated Diagnoses: Chronic combined systolic and diastolic heart failure      gabapentin (NEURONTIN) 400 MG capsule TAKE 1 CAPSULE BY MOUTH TWICE DAILY AS NEEDED  Qty: 180 capsule, Refills: 0    Associated Diagnoses: Uncontrolled type 2 diabetes mellitus with hyperglycemia      insulin degludec (TRESIBA FLEXTOUCH U-200) 200 unit/mL (3 mL) insulin pen Inject 54 Units into the skin 2 (two) times a day.  Qty: 6 pen, Refills: 5    Associated Diagnoses: Type 2 diabetes, uncontrolled, with neuropathy      insulin lispro (HUMALOG KWIKPEN INSULIN) 100 unit/mL pen Inject 40 Units into the skin 3 (three) times daily before meals.  Qty: 45 mL, Refills: 5    Associated Diagnoses: Type 2 diabetes, uncontrolled, with neuropathy      insulin NPH isoph U-100 human (HUMULIN N NPH INSULIN KWIKPEN) 100 unit/mL (3 mL) InPn Inject 24 units nightly at 10 pm  Qty: 15 mL, Refills: 2      LIDOcaine (LIDODERM) 5 % Place 1 patch  "onto the skin once daily. Remove & Discard patch within 12 hours or as directed by MD  Qty: 15 patch, Refills: 0      methocarbamoL (ROBAXIN) 500 MG Tab Take 1 tablet (500 mg total) by mouth every 8 (eight) hours as needed (muscle spasm).  Qty: 90 tablet, Refills: 0      metoprolol succinate (TOPROL-XL) 50 MG 24 hr tablet Take 1 tablet (50 mg total) by mouth 2 (two) times daily.  Qty: 180 tablet, Refills: 3    Comments: .  Associated Diagnoses: Chronic combined systolic and diastolic heart failure      mupirocin (BACTROBAN) 2 % ointment Apply topically nightly.      olmesartan (BENICAR) 5 MG Tab Take 1 tablet (5 mg total) by mouth once daily.  Qty: 90 tablet, Refills: 3    Comments: .  Associated Diagnoses: Stage 3b chronic kidney disease; Essential hypertension; Proteinuria, unspecified type      ondansetron (ZOFRAN-ODT) 4 MG TbDL Take 1 tablet (4 mg total) by mouth every 8 (eight) hours as needed.  Qty: 20 tablet, Refills: 0      oxyCODONE-acetaminophen (PERCOCET) 5-325 mg per tablet Take 1 tablet by mouth every 12 (twelve) hours as needed for Pain.  Qty: 30 tablet, Refills: 0    Comments: Quantity prescribed more than 7 day supply? Yes, quantity medically necessary  Associated Diagnoses: Lumbar radiculopathy; Chronic pain syndrome      pen needle, diabetic (BD LORI 2ND GEN PEN NEEDLE) 32 gauge x 5/32" Ndle USE 1 PEN NEEDLE 4 TIMES DAILY  Qty: 400 each, Refills: 3      rosuvastatin (CRESTOR) 40 MG Tab Take 1 tablet (40 mg total) by mouth every evening.  Qty: 90 tablet, Refills: 3    Associated Diagnoses: Chronic combined systolic and diastolic heart failure      spironolactone (ALDACTONE) 25 MG tablet Take 1 tablet (25 mg total) by mouth once daily. Hold until eating and drinking improves. Need to weight self daily  Qty: 90 tablet, Refills: 1    Comments: .      triamcinolone acetonide 0.1% (KENALOG) 0.1 % cream 2 (two) times daily. Apply to affected area  Refills: 4                 Discharge Diagnosis: Lumbar " radiculopathy [M54.16]  Condition on Discharge: Stable with no complications to procedure   Diet on Discharge: Same as before.  Activity: as per instruction sheet.  Discharge to: Home with a responsible adult.  Follow up: 2-4 weeks

## 2022-12-06 ENCOUNTER — HOSPITAL ENCOUNTER (EMERGENCY)
Facility: HOSPITAL | Age: 47
Discharge: HOME | End: 2022-12-06
Attending: EMERGENCY MEDICINE
Payer: MEDICARE

## 2022-12-06 VITALS
DIASTOLIC BLOOD PRESSURE: 78 MMHG | OXYGEN SATURATION: 98 % | HEART RATE: 78 BPM | TEMPERATURE: 98 F | RESPIRATION RATE: 18 BRPM | WEIGHT: 240 LBS | SYSTOLIC BLOOD PRESSURE: 122 MMHG | BODY MASS INDEX: 37.67 KG/M2 | HEIGHT: 67 IN

## 2022-12-06 DIAGNOSIS — M54.31 SCIATICA OF RIGHT SIDE: Primary | ICD-10-CM

## 2022-12-06 PROCEDURE — 25000003 PHARM REV CODE 250: Performed by: PHYSICIAN ASSISTANT

## 2022-12-06 PROCEDURE — 99284 EMERGENCY DEPT VISIT MOD MDM: CPT

## 2022-12-06 PROCEDURE — 96372 THER/PROPH/DIAG INJ SC/IM: CPT | Performed by: PHYSICIAN ASSISTANT

## 2022-12-06 PROCEDURE — 63600175 PHARM REV CODE 636 W HCPCS: Performed by: PHYSICIAN ASSISTANT

## 2022-12-06 RX ORDER — LIDOCAINE 50 MG/G
1 PATCH TOPICAL DAILY
Qty: 20 PATCH | Refills: 0 | Status: SHIPPED | OUTPATIENT
Start: 2022-12-06 | End: 2022-12-26

## 2022-12-06 RX ORDER — HYDROMORPHONE HYDROCHLORIDE 1 MG/ML
1 INJECTION, SOLUTION INTRAMUSCULAR; INTRAVENOUS; SUBCUTANEOUS
Status: COMPLETED | OUTPATIENT
Start: 2022-12-06 | End: 2022-12-06

## 2022-12-06 RX ORDER — LIDOCAINE 50 MG/G
1 PATCH TOPICAL
Status: DISCONTINUED | OUTPATIENT
Start: 2022-12-06 | End: 2022-12-06 | Stop reason: HOSPADM

## 2022-12-06 RX ADMIN — HYDROMORPHONE HYDROCHLORIDE 1 MG: 1 INJECTION, SOLUTION INTRAMUSCULAR; INTRAVENOUS; SUBCUTANEOUS at 06:12

## 2022-12-06 RX ADMIN — LIDOCAINE 1 PATCH: 50 PATCH TOPICAL at 06:12

## 2022-12-06 NOTE — ED NOTES
"Pt with a history of sciatica, states she has had right sided "sciatica" pain for "quite a while" but worsened last pm .  Denies trauma, dysuria or vaginal discharge.  Pt is AAOx3, resp even and unlabored, skin warm and dry.   "

## 2022-12-07 NOTE — ED PROVIDER NOTES
Encounter Date: 12/6/2022    SCRIBE #1 NOTE: I, Kendra Leslie, am scribing for, and in the presence of,  BRANDON Allison. I have scribed the following portions of the note - Other sections scribed: HPI, ROS.     History     Chief Complaint   Patient presents with    Back Pain     Pt c/o chronic sciatic pain which progressed last night. Pt states she tylenol at 1300 today without relief. Pt denies cp, sob, n/v/d.     Fabiana Chnael is a 47 y.o. female, with a PMHx of HTN, DM, and Sciatica, who presents to the ED with right-sided back pain onset a couple of months ago. Patient reports she was seen by a pain management doctor 5 days ago and was given a steroid injection in her side, but her back pain was not alleviated. Patient states she was prescribed Percocet, but she used them all. She notes she has associated tingling that radiates down her legs to her feet. No other exacerbating or alleviating factors. Denies fall, fever, chills, nausea, vomiting, fecal incontinence, urinary incontinence, or other associated symptoms. Patient endorses she is on muscle relaxants and blood thinners.    The history is provided by the patient.   Review of patient's allergies indicates:   Allergen Reactions    Metformin      Diarrhea on metformin XR     Pneumococcal 23-abimbola ps vaccine      Past Medical History:   Diagnosis Date    Atrial fibrillation     Blood clot associated with vein wall inflammation     not dvt    Cardiomyopathy     Normal cors on cath 11/2017    CHF (congestive heart failure)     DM (diabetes mellitus) 9/19/2013    Hyperlipidemia     Hypertension     Psoriasis     Sleep apnea      Past Surgical History:   Procedure Laterality Date    CARDIAC CATHETERIZATION      COLONOSCOPY      COLONOSCOPY N/A 3/9/2022    Procedure: COLONOSCOPY;  Surgeon: Vielka Burrell MD;  Location: [facility];  Service: Endoscopy;  Laterality: N/A;    DILATION AND CURETTAGE OF UTERUS      ESOPHAGOGASTRODUODENOSCOPY N/A  5/9/2019    Procedure: EGD (ESOPHAGOGASTRODUODENOSCOPY);  Surgeon: Vielka Burrell MD;  Location: [facility];  Service: Endoscopy;  Laterality: N/A;    TRANSFORAMINAL EPIDURAL INJECTION OF STEROID N/A 1/6/2022    Procedure: Transforaminal ANKITA L4/L5 L5/S1;  Surgeon: Jed Yeager MD;  Location: [facility];  Service: Pain Management;  Laterality: N/A;    TRANSFORAMINAL EPIDURAL INJECTION OF STEROID Right 12/1/2022    Procedure: INJECTION, STEROID, EPIDURAL, TRANSFORAMINAL APPROACH, RIGHT L4/L5 & L5/S1 CONTRAST;  Surgeon: Jed Yeager MD;  Location: [facility];  Service: Pain Management;  Laterality: Right;     Family History   Problem Relation Age of Onset    Hypertension Mother     Hypertension Father     Diabetes Father     Diabetes Maternal Grandmother     Diabetes Paternal Grandmother     Breast cancer Neg Hx     Colon cancer Neg Hx     Ovarian cancer Neg Hx      Social History     Tobacco Use    Smoking status: Never    Smokeless tobacco: Never    Tobacco comments:     smokes cigars on occasion   Substance Use Topics    Alcohol use: Not Currently     Comment: occasional    Drug use: Not Currently     Types: Marijuana     Comment: occ     Review of Systems   Constitutional:  Negative for chills and fever.        Negative for fall.    HENT:  Negative for congestion, ear pain, nosebleeds, rhinorrhea, sore throat and trouble swallowing.    Eyes:  Negative for redness.   Respiratory:  Negative for cough, shortness of breath and stridor.    Cardiovascular:  Negative for chest pain.   Gastrointestinal:  Negative for abdominal pain, constipation, diarrhea, nausea and vomiting.        Negative for fecal incontinence.    Genitourinary:  Negative for decreased urine volume, dysuria, frequency, hematuria and urgency.        Negative for urinary incontinence.    Musculoskeletal:  Positive for back pain (Right-sided). Negative for neck pain.   Skin:  Negative for rash and wound.   Neurological:  Negative for  dizziness, speech difficulty, weakness, light-headedness, numbness and headaches.        Positive for tingling that radiates down her legs to her feet.    Hematological:  Does not bruise/bleed easily.   Psychiatric/Behavioral:  Negative for confusion.      Physical Exam     Initial Vitals [12/06/22 1738]   BP Pulse Resp Temp SpO2   120/76 72 15 97.9 °F (36.6 °C) 96 %      MAP       --         Physical Exam    Nursing note and vitals reviewed.  Constitutional: She appears well-developed and well-nourished. No distress.   HENT:   Head: Normocephalic.   Right Ear: External ear normal.   Left Ear: External ear normal.   Eyes: Conjunctivae are normal. Right eye exhibits no discharge. Left eye exhibits no discharge. No scleral icterus.   Neck: No tracheal deviation present.   Cardiovascular:            Pulses:       Dorsalis pedis pulses are 2+ on the right side and 2+ on the left side.   Pulmonary/Chest: No stridor. No respiratory distress.   Abdominal: Abdomen is soft. She exhibits no distension. There is no abdominal tenderness. There is no rebound and no guarding.   Musculoskeletal:         General: Normal range of motion.      Comments: Sitting comfortably.  Tenderness over the lumbar paraspinous musculature.     Neurological: She is alert. No sensory deficit.   Skin: Skin is warm and dry. No rash noted. No erythema.   Psychiatric: She has a normal mood and affect. Her behavior is normal. Judgment and thought content normal.       ED Course   Procedures  Labs Reviewed - No data to display       Imaging Results    None          Medications   LIDOcaine 5 % patch 1 patch (1 patch Transdermal Patch Applied 12/6/22 1826)   HYDROmorphone injection 1 mg (1 mg Intramuscular Given 12/6/22 1827)     Medical Decision Making:   ED Management:  47-year-old female presenting for evaluation of acute exacerbation of her chronic lumbar back pain.  She is followed by pain management.  She is out of her Percocet.  She has previously  received epidural injections.  She received an injection in the right lumbar region which did not relieve her symptoms.  She denies any falls or trauma.  Denies any fever, chills, urinary symptoms, abdominal pain, bowel or bladder incontinence or saddle anesthesia.  Considered doubt cauda equina, epidural abscess or spinal cord compression.  IM morphine given in the ED as well as Lidoderm patch.  She states that she is currently prescribed muscle relaxers.  She does have pain contract with pain management.  Will have her return to the emergency department worsening symptoms or as needed.        Scribe Attestation:   Scribe #1: I performed the above scribed service and the documentation accurately describes the services I performed. I attest to the accuracy of the note.                   Clinical Impression:   Final diagnoses:  [M54.31] Sciatica of right side (Primary)       I, holly garcia pa-c, personally performed the services described in this documentation. All medical record entries made by the scribe were at my direction and in my presence. I have reviewed the chart and agree that the record reflects my personal performance and is accurate and complete.      ED Disposition Condition    Discharge Stable          ED Prescriptions       Medication Sig Dispense Start Date End Date Auth. Provider    LIDOcaine (LIDODERM) 5 % Place 1 patch onto the skin once daily. Remove & Discard patch within 12 hours or as directed by MD. May use 4% over the counter if not covered by insurance for 20 days 20 patch 12/6/2022 12/26/2022 Holly Garcia PA-C          Follow-up Information       Follow up With Specialties Details Why Contact Info    Gil Leal MD Family Medicine Schedule an appointment as soon as possible for a visit in 2 days for follow up [address] [place] [address]  [phone]      [facility] [place] [facility] Emergency Medicine Go to  As needed, If symptoms worsen [address] [place] [address]  [phone]             Neema Garcia PA-C  12/06/22 2006

## 2022-12-07 NOTE — DISCHARGE INSTRUCTIONS

## 2022-12-10 ENCOUNTER — HOSPITAL ENCOUNTER (EMERGENCY)
Facility: HOSPITAL | Age: 47
Discharge: HOME | End: 2022-12-11
Attending: EMERGENCY MEDICINE
Payer: MEDICARE

## 2022-12-10 DIAGNOSIS — G89.4 CHRONIC PAIN SYNDROME: ICD-10-CM

## 2022-12-10 DIAGNOSIS — G89.29 CHRONIC RIGHT-SIDED LOW BACK PAIN WITH RIGHT-SIDED SCIATICA: Primary | ICD-10-CM

## 2022-12-10 DIAGNOSIS — M54.41 CHRONIC RIGHT-SIDED LOW BACK PAIN WITH RIGHT-SIDED SCIATICA: Primary | ICD-10-CM

## 2022-12-10 DIAGNOSIS — M54.16 LUMBAR RADICULOPATHY: ICD-10-CM

## 2022-12-10 LAB
B-HCG UR QL: NEGATIVE
BACTERIA #/AREA URNS HPF: NORMAL /HPF
BILIRUB UR QL STRIP: NEGATIVE
CLARITY UR: CLEAR
COLOR UR: YELLOW
CTP QC/QA: YES
GLUCOSE UR QL STRIP: ABNORMAL
HGB UR QL STRIP: ABNORMAL
HYALINE CASTS #/AREA URNS LPF: 0 /LPF
KETONES UR QL STRIP: NEGATIVE
LEUKOCYTE ESTERASE UR QL STRIP: NEGATIVE
MICROSCOPIC COMMENT: NORMAL
NITRITE UR QL STRIP: NEGATIVE
PH UR STRIP: 7 [PH] (ref 5–8)
PROT UR QL STRIP: ABNORMAL
RBC #/AREA URNS HPF: 0 /HPF (ref 0–4)
SP GR UR STRIP: 1.02 (ref 1–1.03)
SQUAMOUS #/AREA URNS HPF: 4 /HPF
URN SPEC COLLECT METH UR: ABNORMAL
UROBILINOGEN UR STRIP-ACNC: ABNORMAL EU/DL
WBC #/AREA URNS HPF: 0 /HPF (ref 0–5)

## 2022-12-10 PROCEDURE — 81025 URINE PREGNANCY TEST: CPT | Performed by: EMERGENCY MEDICINE

## 2022-12-10 PROCEDURE — 99284 EMERGENCY DEPT VISIT MOD MDM: CPT | Mod: 25

## 2022-12-10 PROCEDURE — 96372 THER/PROPH/DIAG INJ SC/IM: CPT | Mod: 59 | Performed by: PHYSICIAN ASSISTANT

## 2022-12-10 PROCEDURE — 63600175 PHARM REV CODE 636 W HCPCS: Performed by: PHYSICIAN ASSISTANT

## 2022-12-10 PROCEDURE — 96374 THER/PROPH/DIAG INJ IV PUSH: CPT

## 2022-12-10 PROCEDURE — 81000 URINALYSIS NONAUTO W/SCOPE: CPT | Performed by: EMERGENCY MEDICINE

## 2022-12-10 RX ORDER — DEXAMETHASONE SODIUM PHOSPHATE 4 MG/ML
12 INJECTION, SOLUTION INTRA-ARTICULAR; INTRALESIONAL; INTRAMUSCULAR; INTRAVENOUS; SOFT TISSUE
Status: COMPLETED | OUTPATIENT
Start: 2022-12-10 | End: 2022-12-10

## 2022-12-10 RX ORDER — MORPHINE SULFATE 4 MG/ML
4 INJECTION, SOLUTION INTRAMUSCULAR; INTRAVENOUS
Status: COMPLETED | OUTPATIENT
Start: 2022-12-10 | End: 2022-12-10

## 2022-12-10 RX ADMIN — DEXAMETHASONE SODIUM PHOSPHATE 12 MG: 4 INJECTION INTRA-ARTICULAR; INTRALESIONAL; INTRAMUSCULAR; INTRAVENOUS; SOFT TISSUE at 11:12

## 2022-12-10 RX ADMIN — MORPHINE SULFATE 4 MG: 4 INJECTION, SOLUTION INTRAMUSCULAR; INTRAVENOUS at 11:12

## 2022-12-11 VITALS
RESPIRATION RATE: 20 BRPM | OXYGEN SATURATION: 99 % | DIASTOLIC BLOOD PRESSURE: 89 MMHG | WEIGHT: 240 LBS | HEIGHT: 67 IN | TEMPERATURE: 98 F | SYSTOLIC BLOOD PRESSURE: 135 MMHG | BODY MASS INDEX: 37.67 KG/M2 | HEART RATE: 80 BPM

## 2022-12-11 RX ORDER — OXYCODONE AND ACETAMINOPHEN 5; 325 MG/1; MG/1
1 TABLET ORAL EVERY 8 HOURS PRN
Qty: 12 TABLET | Refills: 0 | Status: SHIPPED | OUTPATIENT
Start: 2022-12-11 | End: 2022-12-15 | Stop reason: SDUPTHER

## 2022-12-11 NOTE — ED PROVIDER NOTES
"Encounter Date: 12/10/2022       History     Chief Complaint   Patient presents with    Back Pain     Pt states "my sciatica is acting up" and c/o lower back pain x5 days; pt seen in ED on 12/6/22 for same c/o but states she wasn't able to get the lidocaine patch RX filled; pt taking muscle relaxers with no relief     46yo F with chief complaint acute worsening of chronic R low back pain x 5d.    No trauma or recent injury. No falls. Recently underwent ANKITA on 12/1 without any relief of her pain. No fever, chills. No HA. No neck pain/stiffness. No leg weakness. No bowel bladder incontinence or retention.  No saddle anesthesia.  She is able to tolerate weight-bearing with antalgic gait.  No abdominal pain.  No urinary complaints.  Admits to constant dull pressure, soreness to her right-sided low back and right-sided gluteal region.  Pain is sharp with certain movements, radiates down her right-sided posterior thigh, down her posterior leg into her foot.  She states she is had similar radicular symptoms in the past.  Denies any new numbness.  She states there is only temporary relief with Percocet, however took all of her previously prescribed Percocet.    PMH:  NICM  CHF  IDDM  HTN  HLD  MILE  DUB  PAF on Xarelto  Dual chamber ICD in place    Review of patient's allergies indicates:   Allergen Reactions    Metformin      Diarrhea on metformin XR     Pneumococcal 23-abimbola ps vaccine      Past Medical History:   Diagnosis Date    Atrial fibrillation     Blood clot associated with vein wall inflammation     not dvt    Cardiomyopathy     Normal cors on cath 11/2017    CHF (congestive heart failure)     DM (diabetes mellitus) 9/19/2013    Hyperlipidemia     Hypertension     Psoriasis     Sleep apnea      Past Surgical History:   Procedure Laterality Date    CARDIAC CATHETERIZATION      COLONOSCOPY      COLONOSCOPY N/A 3/9/2022    Procedure: COLONOSCOPY;  Surgeon: Vielka Burrell MD;  Location: [facility];  Service: " Endoscopy;  Laterality: N/A;    DILATION AND CURETTAGE OF UTERUS      ESOPHAGOGASTRODUODENOSCOPY N/A 5/9/2019    Procedure: EGD (ESOPHAGOGASTRODUODENOSCOPY);  Surgeon: Vielka Burrell MD;  Location: [facility];  Service: Endoscopy;  Laterality: N/A;    TRANSFORAMINAL EPIDURAL INJECTION OF STEROID N/A 1/6/2022    Procedure: Transforaminal ANKITA L4/L5 L5/S1;  Surgeon: Jed Yeager MD;  Location: [facility] [place] [facility];  Service: Pain Management;  Laterality: N/A;    TRANSFORAMINAL EPIDURAL INJECTION OF STEROID Right 12/1/2022    Procedure: INJECTION, STEROID, EPIDURAL, TRANSFORAMINAL APPROACH, RIGHT L4/L5 & L5/S1 CONTRAST;  Surgeon: Jed Yeager MD;  Location: [facility] [place] [facility];  Service: Pain Management;  Laterality: Right;     Family History   Problem Relation Age of Onset    Hypertension Mother     Hypertension Father     Diabetes Father     Diabetes Maternal Grandmother     Diabetes Paternal Grandmother     Breast cancer Neg Hx     Colon cancer Neg Hx     Ovarian cancer Neg Hx      Social History     Tobacco Use    Smoking status: Never    Smokeless tobacco: Never    Tobacco comments:     smokes cigars on occasion   Substance Use Topics    Alcohol use: Not Currently     Comment: occasional    Drug use: Not Currently     Types: Marijuana     Comment: occ     Review of Systems   Constitutional:  Negative for fever.   Gastrointestinal:  Negative for abdominal pain.   Genitourinary:  Negative for dysuria and flank pain.   Musculoskeletal:  Positive for back pain and gait problem. Negative for joint swelling, neck pain and neck stiffness.   Neurological:  Negative for weakness and headaches.     Physical Exam     Initial Vitals [12/10/22 2242]   BP Pulse Resp Temp SpO2   (!) 131/90 85 20 98.4 °F (36.9 °C) 98 %      MAP       --         Physical Exam    Nursing note and vitals reviewed.  Constitutional: She appears well-developed and well-nourished. She is not diaphoretic. No distress.   Uncomfortable, nontoxic.   Neck:  Neck supple.   Normal range of motion.  Cardiovascular:            Normal sinus rhythm.    Palpable ICD L infraclavicular chest   Pulmonary/Chest: No respiratory distress.   Musculoskeletal:         General: Normal range of motion.      Cervical back: Normal range of motion and neck supple.      Comments: Mild TTP to the right-sided lumbar paraspinal musculature, posterior pelvis upper gluteal musculature.  No midline spinal tenderness.  Positive right straight leg at approximately 40°.  Mild discomfort with right hip adduction.      Neurological: She is alert and oriented to person, place, and time. GCS score is 15. GCS eye subscore is 4. GCS verbal subscore is 5. GCS motor subscore is 6.   Grossly symmetric knee extension, knee flexion strength bilaterally.  Pain with L hip flexion against resistance. Able to heel walk and toe walk with some discomfort.   Skin: Skin is warm. Capillary refill takes less than 2 seconds.   Psychiatric: She has a normal mood and affect. Thought content normal.       ED Course   Procedures  Labs Reviewed   URINALYSIS, REFLEX TO URINE CULTURE - Abnormal; Notable for the following components:       Result Value    Protein, UA 1+ (*)     Glucose, UA Trace (*)     Occult Blood UA Trace (*)     Urobilinogen, UA 2.0-3.0 (*)     All other components within normal limits    Narrative:     Specimen Source->Urine   URINALYSIS MICROSCOPIC    Narrative:     Specimen Source->Urine   POCT URINE PREGNANCY          Imaging Results    None          Medications   dexAMETHasone injection 12 mg (12 mg Intramuscular Given 12/10/22 2330)   morphine injection 4 mg (4 mg Intravenous Given 12/10/22 2330)     Medical Decision Making:   Differential Diagnosis:   Cauda equina, lumbago, radiculopathy  Clinical Tests:   Lab Tests: Ordered and Reviewed  ED Management:  No GI/ issues, no bowel or bladder incontinence or retention. No saddle anesthesia. No trauma. No paresthesia. Doubt cauda equina or cord  compression. No fever, no recent infection, no IV drug use. No HA. No neck pain/stiffness. There is no meningismus. Doubt epidural abscess or acute infectious spinal process. No midline spinal ttp. No focal weakness. No ripping/tearing sensation. No hx AAA or aortic issues.  Neurovascularly intact distally. Overall, suspect acute on chronic pain. Advised f/u with previous pain medicine physician. Given decadron in ED, advised to keep close eye on glucose. Continue Percocet only as needed for severe/breakthrough pain. Pain improved following Morphine IM in ED. Red flags and return precautions discussed.  Patient ambulated out the ED.                        Clinical Impression:   Final diagnoses:  [M54.41, G89.29] Chronic right-sided low back pain with right-sided sciatica (Primary)        ED Disposition Condition    Discharge Stable          ED Prescriptions       Medication Sig Dispense Start Date End Date Auth. Provider    oxyCODONE-acetaminophen (PERCOCET) 5-325 mg per tablet Take 1 tablet by mouth every 8 (eight) hours as needed for Pain. 12 tablet 12/11/2022 -- Esteban Barraza PA-C          Follow-up Information       Follow up With Specialties Details Why Contact Info    Jed Yeager MD Pain Medicine Schedule an appointment as soon as possible for a visit  For reevaluation [address] [facility]  [address] [place] [address]  [phone]               Esteban Barraza PA-C  12/11/22 0216

## 2022-12-11 NOTE — ED NOTES
Bed: 35qTrk  Expected date:   Expected time:   Means of arrival: Personal Transportation  Comments:

## 2022-12-11 NOTE — DISCHARGE INSTRUCTIONS
Follow-up with your pain medicine physician for re-evaluation.  Begin gentle stretching and range-of-motion exercises.  Percocet only as needed for severe or breakthrough pain. Be aware, this medication is sedating.  Do not mix with alcohol or any other sedating medications.  Do not drive or operate machinery when taking this medication.  Keep a close eye on your blood sugar for the next few days.    Please return to this ED if worsening pain despite treatment, if unable to walk or bear weight, if you develop leg weakness, if any other problems occur.    Thank you for coming to our Emergency Department today. It is important to remember that some problems or medical conditions are difficult to diagnose and may not be found or addressed during your Emergency Department visit.     Be sure to follow up with your primary care doctor and review all labs/imaging/tests that were performed during your ER visit with them. Some labs/imaging/tests may be outside of the normal range, and require non-emergent follow-up and/or further investigation/treatment/procedures/testing to help diagnose/exclude/prevent complications or other potentially serious medical conditions that were not discussed or addressed during your ER visit.    If you do not have a primary care doctor, you may contact the one listed on your discharge paperwork or you may also call the [facility] Appointment Desk at [phone] to schedule an appointment and establish care with one. It is important to your health that you have a primary care doctor.    Please take all medications as directed. All medications may potentially have side-effects and it is impossible to predict which medications may give you side-effects or what side-effects (if any) they will give you.. If you feel that you are having a negative effect or side-effect of any medication you should immediately stop taking them and seek medical attention. If you feel that you are having a  life-threatening reaction call 911.    Return to the ER with any questions/concerns, new/concerning symptoms, worsening or failure to improve.     Do not drive, swim, climb to height, take a bath, operate heavy machinery, drink alcohol or take potentially sedating medications, sign any legal documents or make any important decisions for 24 hours if you have received any pain medications, sedatives or mood altering drugs during your ER visit or within 24 hours of taking them if they have been prescribed to you.     You can find additional resources for Dentists, hearing aids, durable medical equipment, low cost pharmacies and other resources at [email or web address]

## 2022-12-14 ENCOUNTER — TELEPHONE (OUTPATIENT)
Dept: PAIN MEDICINE | Facility: CLINIC | Age: 47
End: 2022-12-14
Payer: MEDICARE

## 2022-12-15 ENCOUNTER — OFFICE VISIT (OUTPATIENT)
Dept: PAIN MEDICINE | Facility: CLINIC | Age: 47
End: 2022-12-15
Payer: MEDICARE

## 2022-12-15 VITALS
HEART RATE: 87 BPM | DIASTOLIC BLOOD PRESSURE: 87 MMHG | WEIGHT: 247 LBS | SYSTOLIC BLOOD PRESSURE: 117 MMHG | RESPIRATION RATE: 18 BRPM | HEIGHT: 67 IN | TEMPERATURE: 98 F | BODY MASS INDEX: 38.77 KG/M2

## 2022-12-15 DIAGNOSIS — M54.16 LUMBAR RADICULOPATHY: ICD-10-CM

## 2022-12-15 DIAGNOSIS — M54.9 DORSALGIA, UNSPECIFIED: Primary | ICD-10-CM

## 2022-12-15 DIAGNOSIS — M54.17 LUMBOSACRAL RADICULOPATHY: ICD-10-CM

## 2022-12-15 DIAGNOSIS — M51.36 DDD (DEGENERATIVE DISC DISEASE), LUMBAR: ICD-10-CM

## 2022-12-15 DIAGNOSIS — G89.4 CHRONIC PAIN SYNDROME: ICD-10-CM

## 2022-12-15 PROCEDURE — 3008F PR BODY MASS INDEX (BMI) DOCUMENTED: ICD-10-PCS | Mod: CPTII,S$GLB,, | Performed by: NURSE PRACTITIONER

## 2022-12-15 PROCEDURE — 3074F PR MOST RECENT SYSTOLIC BLOOD PRESSURE < 130 MM HG: ICD-10-PCS | Mod: CPTII,S$GLB,, | Performed by: NURSE PRACTITIONER

## 2022-12-15 PROCEDURE — 99999 PR PBB SHADOW E&M-EST. PATIENT-LVL III: CPT | Mod: PBBFAC,,, | Performed by: NURSE PRACTITIONER

## 2022-12-15 PROCEDURE — 3079F PR MOST RECENT DIASTOLIC BLOOD PRESSURE 80-89 MM HG: ICD-10-PCS | Mod: CPTII,S$GLB,, | Performed by: NURSE PRACTITIONER

## 2022-12-15 PROCEDURE — 99214 OFFICE O/P EST MOD 30 MIN: CPT | Mod: S$GLB,,, | Performed by: NURSE PRACTITIONER

## 2022-12-15 PROCEDURE — 99214 PR OFFICE/OUTPT VISIT, EST, LEVL IV, 30-39 MIN: ICD-10-PCS | Mod: S$GLB,,, | Performed by: NURSE PRACTITIONER

## 2022-12-15 PROCEDURE — 3008F BODY MASS INDEX DOCD: CPT | Mod: CPTII,S$GLB,, | Performed by: NURSE PRACTITIONER

## 2022-12-15 PROCEDURE — 3051F HG A1C>EQUAL 7.0%<8.0%: CPT | Mod: CPTII,S$GLB,, | Performed by: NURSE PRACTITIONER

## 2022-12-15 PROCEDURE — 3066F NEPHROPATHY DOC TX: CPT | Mod: CPTII,S$GLB,, | Performed by: NURSE PRACTITIONER

## 2022-12-15 PROCEDURE — 99999 PR PBB SHADOW E&M-EST. PATIENT-LVL III: ICD-10-PCS | Mod: PBBFAC,,, | Performed by: NURSE PRACTITIONER

## 2022-12-15 PROCEDURE — 3066F PR DOCUMENTATION OF TREATMENT FOR NEPHROPATHY: ICD-10-PCS | Mod: CPTII,S$GLB,, | Performed by: NURSE PRACTITIONER

## 2022-12-15 PROCEDURE — 4010F ACE/ARB THERAPY RXD/TAKEN: CPT | Mod: CPTII,S$GLB,, | Performed by: NURSE PRACTITIONER

## 2022-12-15 PROCEDURE — 3079F DIAST BP 80-89 MM HG: CPT | Mod: CPTII,S$GLB,, | Performed by: NURSE PRACTITIONER

## 2022-12-15 PROCEDURE — 3074F SYST BP LT 130 MM HG: CPT | Mod: CPTII,S$GLB,, | Performed by: NURSE PRACTITIONER

## 2022-12-15 PROCEDURE — 4010F PR ACE/ARB THEARPY RXD/TAKEN: ICD-10-PCS | Mod: CPTII,S$GLB,, | Performed by: NURSE PRACTITIONER

## 2022-12-15 PROCEDURE — 3051F PR MOST RECENT HEMOGLOBIN A1C LEVEL 7.0 - < 8.0%: ICD-10-PCS | Mod: CPTII,S$GLB,, | Performed by: NURSE PRACTITIONER

## 2022-12-15 RX ORDER — OXYCODONE AND ACETAMINOPHEN 5; 325 MG/1; MG/1
1 TABLET ORAL
Qty: 30 TABLET | Refills: 0 | Status: SHIPPED | OUTPATIENT
Start: 2022-12-15 | End: 2022-12-21 | Stop reason: SDUPTHER

## 2022-12-15 RX ORDER — METHOCARBAMOL 750 MG/1
750 TABLET, FILM COATED ORAL 3 TIMES DAILY PRN
Qty: 30 TABLET | Refills: 0 | Status: SHIPPED | OUTPATIENT
Start: 2022-12-15 | End: 2022-12-25
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
## 2022-12-21 DIAGNOSIS — M54.16 LUMBAR RADICULOPATHY: ICD-10-CM

## 2022-12-21 DIAGNOSIS — G89.4 CHRONIC PAIN SYNDROME: ICD-10-CM

## 2022-12-21 RX ORDER — OXYCODONE AND ACETAMINOPHEN 5; 325 MG/1; MG/1
1 TABLET ORAL
Qty: 30 TABLET | Refills: 0 | Status: SHIPPED | OUTPATIENT
Start: 2022-12-27 | End: 2023-01-27 | Stop reason: SDUPTHER

## 2022-12-21 NOTE — TELEPHONE ENCOUNTER
Patient requesting refill on Oxycodone 5-325mg   Last office visit 12/15/22   shows last refill on 11/28/22  Patient does not have a pain contract on file with [facility] department  Patient last UDS none was not consistent with current therapy

## 2022-12-21 NOTE — TELEPHONE ENCOUNTER
----- Message from Darlin Brown sent at 12/21/2022  2:43 PM CST -----  Regarding: Rx consult  Contact: FARRUKH CHANEL [[id number]]  Name of Who is Calling: Farrukh Chanel            What is the request in detail: Pt states the pharmacy advised they haven't received rx oxyCODONE-acetaminophen (PERCOCET) 5-325 mg per tablet, she is requesting staff to sent rx     [id number] [place] [address]     Please advise         Can the clinic reply by [facility]: No           What Number to Call Back if not in [facility]:[phone]

## 2022-12-22 ENCOUNTER — HOSPITAL ENCOUNTER (OUTPATIENT)
Dept: RADIOLOGY | Facility: HOSPITAL | Age: 47
Discharge: HOME | End: 2022-12-22
Attending: NURSE PRACTITIONER
Payer: MEDICARE

## 2022-12-22 DIAGNOSIS — M54.9 DORSALGIA, UNSPECIFIED: ICD-10-CM

## 2022-12-22 PROCEDURE — 72131 CT LUMBAR SPINE W/O DYE: CPT | Mod: TC

## 2022-12-22 PROCEDURE — 72131 CT LUMBAR SPINE WITHOUT CONTRAST: ICD-10-PCS | Mod: 26,,, | Performed by: RADIOLOGY

## 2022-12-22 PROCEDURE — 72131 CT LUMBAR SPINE W/O DYE: CPT | Mod: 26,,, | Performed by: RADIOLOGY

## 2023-01-06 ENCOUNTER — TELEPHONE (OUTPATIENT)
Dept: PAIN MEDICINE | Facility: CLINIC | Age: 48
End: 2023-01-06
Payer: MEDICARE

## 2023-01-09 ENCOUNTER — OFFICE VISIT (OUTPATIENT)
Dept: PAIN MEDICINE | Facility: CLINIC | Age: 48
End: 2023-01-09
Payer: MEDICARE

## 2023-01-09 ENCOUNTER — PATIENT MESSAGE (OUTPATIENT)
Dept: PAIN MEDICINE | Facility: OTHER | Age: 48
End: 2023-01-09
Payer: MEDICARE

## 2023-01-09 DIAGNOSIS — M54.17 LUMBOSACRAL RADICULOPATHY: ICD-10-CM

## 2023-01-09 DIAGNOSIS — M54.16 LUMBAR RADICULOPATHY: Primary | ICD-10-CM

## 2023-01-09 DIAGNOSIS — M54.9 DORSALGIA, UNSPECIFIED: ICD-10-CM

## 2023-01-09 PROCEDURE — 99499 RISK ADDL DX/OHS AUDIT: ICD-10-PCS | Mod: 95,,, | Performed by: NURSE PRACTITIONER

## 2023-01-09 PROCEDURE — 99214 PR OFFICE/OUTPT VISIT, EST, LEVL IV, 30-39 MIN: ICD-10-PCS | Mod: 95,,, | Performed by: NURSE PRACTITIONER

## 2023-01-09 PROCEDURE — 99499 UNLISTED E&M SERVICE: CPT | Mod: 95,,, | Performed by: NURSE PRACTITIONER

## 2023-01-09 PROCEDURE — 99214 OFFICE O/P EST MOD 30 MIN: CPT | Mod: 95,,, | Performed by: NURSE PRACTITIONER
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
## 2023-01-18 ENCOUNTER — HOSPITAL ENCOUNTER (EMERGENCY)
Facility: HOSPITAL | Age: 48
Discharge: HOME | End: 2023-01-19
Attending: EMERGENCY MEDICINE
Payer: MEDICARE

## 2023-01-18 VITALS
SYSTOLIC BLOOD PRESSURE: 111 MMHG | WEIGHT: 240 LBS | OXYGEN SATURATION: 97 % | HEIGHT: 67 IN | RESPIRATION RATE: 26 BRPM | TEMPERATURE: 98 F | HEART RATE: 79 BPM | BODY MASS INDEX: 37.67 KG/M2 | DIASTOLIC BLOOD PRESSURE: 75 MMHG

## 2023-01-18 DIAGNOSIS — R07.9 CHEST PAIN: ICD-10-CM

## 2023-01-18 DIAGNOSIS — M54.6 ACUTE MIDLINE THORACIC BACK PAIN: ICD-10-CM
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
## 2023-01-18 DIAGNOSIS — M79.10 MYALGIA: Primary | ICD-10-CM

## 2023-01-18 LAB
ALBUMIN SERPL BCP-MCNC: 3.6 G/DL (ref 3.5–5.2)
ALP SERPL-CCNC: 111 U/L (ref 55–135)
ALT SERPL W/O P-5'-P-CCNC: 16 U/L (ref 10–44)
ANION GAP SERPL CALC-SCNC: 9 MMOL/L (ref 8–16)
AST SERPL-CCNC: 16 U/L (ref 10–40)
BACTERIA #/AREA URNS HPF: NORMAL /HPF
BASOPHILS # BLD AUTO: 0.02 K/UL (ref 0–0.2)
BASOPHILS NFR BLD: 0.2 % (ref 0–1.9)
BILIRUB SERPL-MCNC: 0.6 MG/DL (ref 0.1–1)
BILIRUB UR QL STRIP: NEGATIVE
BNP SERPL-MCNC: 24 PG/ML (ref 0–99)
BUN SERPL-MCNC: 22 MG/DL (ref 6–20)
CALCIUM SERPL-MCNC: 9.4 MG/DL (ref 8.7–10.5)
CHLORIDE SERPL-SCNC: 97 MMOL/L (ref 95–110)
CLARITY UR: CLEAR
CO2 SERPL-SCNC: 30 MMOL/L (ref 23–29)
COLOR UR: COLORLESS
CREAT SERPL-MCNC: 1.9 MG/DL (ref 0.5–1.4)
CTP QC/QA: YES
CTP QC/QA: YES
DIFFERENTIAL METHOD: ABNORMAL
EOSINOPHIL # BLD AUTO: 0.2 K/UL (ref 0–0.5)
EOSINOPHIL NFR BLD: 1.9 % (ref 0–8)
ERYTHROCYTE [DISTWIDTH] IN BLOOD BY AUTOMATED COUNT: 15.3 % (ref 11.5–14.5)
EST. GFR  (NO RACE VARIABLE): 33 ML/MIN/1.73 M^2
GLUCOSE SERPL-MCNC: 391 MG/DL (ref 70–110)
GLUCOSE UR QL STRIP: ABNORMAL
HCT VFR BLD AUTO: 42.1 % (ref 37–48.5)
HGB BLD-MCNC: 13.3 G/DL (ref 12–16)
HGB UR QL STRIP: ABNORMAL
IMM GRANULOCYTES # BLD AUTO: 0.03 K/UL (ref 0–0.04)
IMM GRANULOCYTES NFR BLD AUTO: 0.4 % (ref 0–0.5)
KETONES UR QL STRIP: NEGATIVE
LEUKOCYTE ESTERASE UR QL STRIP: NEGATIVE
LYMPHOCYTES # BLD AUTO: 2.4 K/UL (ref 1–4.8)
LYMPHOCYTES NFR BLD: 28.3 % (ref 18–48)
MCH RBC QN AUTO: 27.6 PG (ref 27–31)
MCHC RBC AUTO-ENTMCNC: 31.6 G/DL (ref 32–36)
MCV RBC AUTO: 87 FL (ref 82–98)
MICROSCOPIC COMMENT: NORMAL
MONOCYTES # BLD AUTO: 0.6 K/UL (ref 0.3–1)
MONOCYTES NFR BLD: 6.8 % (ref 4–15)
NEUTROPHILS # BLD AUTO: 5.3 K/UL (ref 1.8–7.7)
NEUTROPHILS NFR BLD: 62.4 % (ref 38–73)
NITRITE UR QL STRIP: NEGATIVE
NRBC BLD-RTO: 0 /100 WBC
PH UR STRIP: 7 [PH] (ref 5–8)
PLATELET # BLD AUTO: 201 K/UL (ref 150–450)
PMV BLD AUTO: 10.7 FL (ref 9.2–12.9)
POC MOLECULAR INFLUENZA A AGN: NEGATIVE
POC MOLECULAR INFLUENZA B AGN: NEGATIVE
POTASSIUM SERPL-SCNC: 3.6 MMOL/L (ref 3.5–5.1)
PROT SERPL-MCNC: 7.2 G/DL (ref 6–8.4)
PROT UR QL STRIP: ABNORMAL
RBC # BLD AUTO: 4.82 M/UL (ref 4–5.4)
RBC #/AREA URNS HPF: 0 /HPF (ref 0–4)
SARS-COV-2 RDRP RESP QL NAA+PROBE: NEGATIVE
SODIUM SERPL-SCNC: 136 MMOL/L (ref 136–145)
SP GR UR STRIP: 1.01 (ref 1–1.03)
SQUAMOUS #/AREA URNS HPF: 3 /HPF
TROPONIN I SERPL DL<=0.01 NG/ML-MCNC: 0.01 NG/ML (ref 0–0.03)
URN SPEC COLLECT METH UR: ABNORMAL
UROBILINOGEN UR STRIP-ACNC: NEGATIVE EU/DL
WBC # BLD AUTO: 8.41 K/UL (ref 3.9–12.7)
YEAST URNS QL MICRO: NORMAL

## 2023-01-18 PROCEDURE — 63600175 PHARM REV CODE 636 W HCPCS: Performed by: EMERGENCY MEDICINE

## 2023-01-18 PROCEDURE — 85025 COMPLETE CBC W/AUTO DIFF WBC: CPT | Performed by: STUDENT IN AN ORGANIZED HEALTH CARE EDUCATION/TRAINING PROGRAM

## 2023-01-18 PROCEDURE — 81000 URINALYSIS NONAUTO W/SCOPE: CPT | Performed by: EMERGENCY MEDICINE

## 2023-01-18 PROCEDURE — 84484 ASSAY OF TROPONIN QUANT: CPT | Performed by: STUDENT IN AN ORGANIZED HEALTH CARE EDUCATION/TRAINING PROGRAM

## 2023-01-18 PROCEDURE — 93005 ELECTROCARDIOGRAM TRACING: CPT

## 2023-01-18 PROCEDURE — 99285 EMERGENCY DEPT VISIT HI MDM: CPT | Mod: 25

## 2023-01-18 PROCEDURE — 80053 COMPREHEN METABOLIC PANEL: CPT | Performed by: STUDENT IN AN ORGANIZED HEALTH CARE EDUCATION/TRAINING PROGRAM

## 2023-01-18 PROCEDURE — 25000003 PHARM REV CODE 250: Performed by: EMERGENCY MEDICINE

## 2023-01-18 PROCEDURE — 87635 SARS-COV-2 COVID-19 AMP PRB: CPT | Performed by: EMERGENCY MEDICINE

## 2023-01-18 PROCEDURE — 93010 EKG 12-LEAD: ICD-10-PCS | Mod: ,,, | Performed by: INTERNAL MEDICINE

## 2023-01-18 PROCEDURE — 87502 INFLUENZA DNA AMP PROBE: CPT

## 2023-01-18 PROCEDURE — 83880 ASSAY OF NATRIURETIC PEPTIDE: CPT | Performed by: STUDENT IN AN ORGANIZED HEALTH CARE EDUCATION/TRAINING PROGRAM

## 2023-01-18 PROCEDURE — 93010 ELECTROCARDIOGRAM REPORT: CPT | Mod: ,,, | Performed by: INTERNAL MEDICINE

## 2023-01-18 PROCEDURE — 25000003 PHARM REV CODE 250: Performed by: STUDENT IN AN ORGANIZED HEALTH CARE EDUCATION/TRAINING PROGRAM

## 2023-01-18 RX ORDER — METHOCARBAMOL 500 MG/1
500 TABLET, FILM COATED ORAL
Status: COMPLETED | OUTPATIENT
Start: 2023-01-18 | End: 2023-01-18

## 2023-01-18 RX ORDER — ASPIRIN 325 MG
325 TABLET ORAL
Status: COMPLETED | OUTPATIENT
Start: 2023-01-18 | End: 2023-01-18

## 2023-01-18 RX ORDER — ACETAMINOPHEN 325 MG/1
650 TABLET ORAL
Status: COMPLETED | OUTPATIENT
Start: 2023-01-18 | End: 2023-01-18

## 2023-01-18 RX ADMIN — SODIUM CHLORIDE, POTASSIUM CHLORIDE, SODIUM LACTATE AND CALCIUM CHLORIDE 500 ML: 600; 310; 30; 20 INJECTION, SOLUTION INTRAVENOUS at 11:01

## 2023-01-18 RX ADMIN — METHOCARBAMOL 500 MG: 500 TABLET ORAL at 11:01

## 2023-01-18 RX ADMIN — ASPIRIN 325 MG ORAL TABLET 325 MG: 325 PILL ORAL at 08:01

## 2023-01-18 RX ADMIN — ACETAMINOPHEN 650 MG: 325 TABLET ORAL at 08:01

## 2023-01-19 NOTE — ED PROVIDER NOTES
"Encounter Date: 1/18/2023    SCRIBE #1 NOTE: I, DEION RAMAN, am scribing for, and in the presence of,  Cristian Garcia PA-C. I have scribed the following portions of the note - Other sections scribed: HPI, ROS, PE.     History     Chief Complaint   Patient presents with    Chest Pain     Midsternal tightness x1 hour with lightheadeness and cramoing to legs and hands. Denies SOB, fever, cough. No tx used     47-year-old female with a PMHx of atrial fibrillation, blood clot associated with vein wall inflammation, cardiomyopathy, CHF, DM, HLD, and HTN presents with a chief complaint of CP onset two hours ago. Patient states that she is having intermittent CP that feels "tight", bilateral calf cramping, and bilateral toe pain. She reports additional symptoms of generalized myalgias, mild headaches, light-headedness, fatigue, and middle back pain. She states that she did not check her blood sugar level today. Denies any tobacco, EtOH, or illicit drug usage. No other exacerbating or alleviating factors. Denies any nausea, emesis, cough, rhinorrhea, diarrhea, dysuria, sore throat, urinary frequency, abdominal pain, or other associated symptoms.     The history is provided by the patient. No  was used.   Review of patient's allergies indicates:   Allergen Reactions    Metformin      Diarrhea on metformin XR     Pneumococcal 23-abimbola ps vaccine      Past Medical History:   Diagnosis Date    Atrial fibrillation     Blood clot associated with vein wall inflammation     not dvt    Cardiomyopathy     Normal cors on cath 11/2017    CHF (congestive heart failure)     DM (diabetes mellitus) 9/19/2013    Hyperlipidemia     Hypertension     Psoriasis     Sleep apnea      Past Surgical History:   Procedure Laterality Date    CARDIAC CATHETERIZATION      COLONOSCOPY      COLONOSCOPY N/A 3/9/2022    Procedure: COLONOSCOPY;  Surgeon: Vielka Burrell MD;  Location: [facility];  Service: Endoscopy;  Laterality: " "N/A;    DILATION AND CURETTAGE OF UTERUS      ESOPHAGOGASTRODUODENOSCOPY N/A 5/9/2019    Procedure: EGD (ESOPHAGOGASTRODUODENOSCOPY);  Surgeon: Vielka Burrell MD;  Location: [facility];  Service: Endoscopy;  Laterality: N/A;    TRANSFORAMINAL EPIDURAL INJECTION OF STEROID N/A 1/6/2022    Procedure: Transforaminal ANKITA L4/L5 L5/S1;  Surgeon: Jed Yeager MD;  Location: [facility];  Service: Pain Management;  Laterality: N/A;    TRANSFORAMINAL EPIDURAL INJECTION OF STEROID Right 12/1/2022    Procedure: INJECTION, STEROID, EPIDURAL, TRANSFORAMINAL APPROACH, RIGHT L4/L5 & L5/S1 CONTRAST;  Surgeon: Jed Yeager MD;  Location: [facility];  Service: Pain Management;  Laterality: Right;     Family History   Problem Relation Age of Onset    Hypertension Mother     Hypertension Father     Diabetes Father     Diabetes Maternal Grandmother     Diabetes Paternal Grandmother     Breast cancer Neg Hx     Colon cancer Neg Hx     Ovarian cancer Neg Hx      Social History     Tobacco Use    Smoking status: Never    Smokeless tobacco: Never    Tobacco comments:     smokes cigars on occasion   Substance Use Topics    Alcohol use: Not Currently     Comment: occasional    Drug use: Not Currently     Types: Marijuana     Comment: occ     Review of Systems   Constitutional:  Positive for fatigue. Negative for chills and fever.   HENT:  Negative for rhinorrhea and sore throat.    Respiratory:  Negative for cough and shortness of breath.    Cardiovascular:  Positive for chest pain.   Gastrointestinal:  Negative for abdominal pain, diarrhea, nausea and vomiting.   Genitourinary:  Negative for dysuria and frequency.   Musculoskeletal:  Positive for arthralgias, back pain and myalgias.        (+) Bilateral calf "cramping".    Skin:  Negative for rash.   Neurological:  Positive for light-headedness and headaches. Negative for weakness.   Psychiatric/Behavioral:  Negative for confusion.      Physical Exam     Initial Vitals " [01/18/23 1951]   BP Pulse Resp Temp SpO2   120/73 101 18 98.1 °F (36.7 °C) 99 %      MAP       --         Physical Exam    Nursing note and vitals reviewed.  Constitutional: She appears well-developed and well-nourished. She does not appear ill. No distress.   HENT:   Head: Normocephalic and atraumatic.   Mouth/Throat: Oropharynx is clear and moist.   Eyes: Conjunctivae and EOM are normal.   Neck:   Normal range of motion.  Cardiovascular:  Normal rate, regular rhythm and normal heart sounds.           No murmur heard.  Pulses:       Dorsalis pedis pulses are 1+ on the right side and 1+ on the left side.   Pulmonary/Chest: Breath sounds normal. No respiratory distress. She has no wheezes.   Lungs clear bilaterally.    Abdominal: Abdomen is soft. There is no abdominal tenderness.   No right CVA tenderness.  No left CVA tenderness.   Musculoskeletal:         General: No edema.      Cervical back: Normal range of motion.      Right lower leg: No tenderness. No edema.      Left lower leg: No tenderness. No edema.     Neurological: She is alert. GCS score is 15.   Feet warm to touch.    Skin: Skin is warm.   Psychiatric: She has a normal mood and affect.       ED Course   Procedures  Labs Reviewed   CBC W/ AUTO DIFFERENTIAL - Abnormal; Notable for the following components:       Result Value    MCHC 31.6 (*)     RDW 15.3 (*)     All other components within normal limits   COMPREHENSIVE METABOLIC PANEL - Abnormal; Notable for the following components:    CO2 30 (*)     Glucose 391 (*)     BUN 22 (*)     Creatinine 1.9 (*)     eGFR 33 (*)     All other components within normal limits   URINALYSIS, REFLEX TO URINE CULTURE - Abnormal; Notable for the following components:    Color, UA Colorless (*)     Protein, UA Trace (*)     Glucose, UA 4+ (*)     Occult Blood UA Trace (*)     All other components within normal limits    Narrative:     Specimen Source->Urine   TROPONIN I   B-TYPE NATRIURETIC PEPTIDE   URINALYSIS  MICROSCOPIC    Narrative:     Specimen Source->Urine   SARS-COV-2 RDRP GENE   POCT INFLUENZA A/B MOLECULAR          Imaging Results              X-Ray Thoracic Spine AP Lateral (Final result)  Result time 01/19/23 00:18:30      Final result by Rosa M Murphy MD (01/19/23 00:18:30)                   Impression:      No definite acute bony abnormality.  If persistent back pain, consider additional imaging.      Electronically signed by: Rosa M Murphy  Date:    01/19/2023  Time:    00:18               Narrative:    EXAMINATION:  THORACIC SPINE    CLINICAL HISTORY:  <Pain.>    TECHNIQUE:  AP and lateral view of the thoracic spine    COMPARISON:  <None. >    FINDINGS:  AP and lateral images of the thoracic spine demonstrates mild scoliotic changes of the spine..  There is no definite acute fracture or subluxation.  The vertebra at the cervicothoracic junction are difficult to visualized secondary to overlying additional bony structures.  Incidental note is made of a left subclavian pacer.                                       X-Ray Chest PA And Lateral (Final result)  Result time 01/18/23 20:49:47      Final result by Rosa M Murphy MD (01/18/23 20:49:47)                   Impression:      No definite focal consolidation.  Question history of pulmonary hypertension.      Electronically signed by: Rosa M Murphy  Date:    01/18/2023  Time:    20:49               Narrative:    EXAMINATION:  CHEST PA AND LATERAL    CLINICAL HISTORY:  Chest Pain;    TECHNIQUE:  PA and lateral chest radiograph    COMPARISON:  10/21/2022    FINDINGS:  There is a left subclavian pacer.  Examination is limited by body habitus.  The cardiac silhouette is within normal limits. There is a slight prominence at the AP window, which may suggest pulmonary hypertension.  There is no focal consolidation, pneumothorax, or pleural effusion.                                       Medications   aspirin tablet 325 mg (325 mg Oral Given 1/18/23  2029)   acetaminophen tablet 650 mg (650 mg Oral Given 1/18/23 2051)   methocarbamoL tablet 500 mg (500 mg Oral Given 1/18/23 2324)   lactated ringers bolus 500 mL (0 mLs Intravenous Stopped 1/19/23 0003)     Medical Decision Making:   History:   Old Medical Records: I decided to obtain old medical records.  Initial Assessment:   47-year-old female presenting with myalgias and chest discomfort.  Patient reports diffuse body myalgias.  Denies any cough rhinorrhea sore throat.  Clear lung sounds on exam.  No infiltrate noted on chest x-ray.  No UTI on UA.  No abdominal tenderness on palpation.  No rash noted to the torso or the extremities.  Pulses intact bilateral lower extremities.  Patient did report fall in the last few days.  X-ray shows no fracture.  No midline back tenderness.  Patient otherwise afebrile without leukocytosis.  Given overall well appearance of the patient recommendations to follow up closely with primary care provider.  Echocardiogram 6/22:  EF 25%.  Differential Diagnosis:     URI, UTI, ACS  Independently Interpreted Test(s):   I have ordered and independently interpreted EKG Reading(s) - see prior notes  Clinical Tests:   Lab Tests: Ordered and Reviewed  Radiological Study: Ordered and Reviewed  Medical Tests: Ordered and Reviewed  ED Management:  Please see workup for ECG interpretation.  Imaging independently reviewed.  Agree with radiologist's interpretation.  No fracture on thoracic spine.  No infiltrate on chest x-ray  All labs reviewed and considered in the patient's differential diagnosis.  No UTI on UA.  No leukocytosis no renal failure  Prior records were evaluated and considered a differential diagnosis.    Plan was discussed with the patient.  This includes imaging, labs, follow up          Scribe Attestation:   Scribe #1: I performed the above scribed service and the documentation accurately describes the services I performed. I attest to the accuracy of the note.      ED Course as  of 01/19/23 0213 Wed Jan 18, 2023 2039 EKG 12-lead  Time 7:48 p.m.     Rate 81, sinus, regular rhythm, normal axis.    .  1 mm ST V2.  No ST depression.  No T-wave inversion.  Q-wave aVL.  Infrequent PVCs.      Normal sinus rhythm with infrequent PVCs. [JM]   5016 Re-examination the patient.  The patient states she still feels myalgias.  Patient also reports she had a fall 4 days prior.  Patient states that she fell forward in the bathroom.  Patient reports mid back pain.  Denies any weakness of the lower extremities or sensory loss.  Denies any difficulty urinating.  No midline back tenderness on palpation. [JM]      ED Course User Index  [JM] Cristian Garcia MD                 Scribe attestation: I, Cristian Garcia, personally performed the services described in this documentation. All medical record entries made by the scribe were at my direction and in my presence.  I have reviewed the chart and agree that the record reflects my personal performance and is accurate and complete.   Clinical Impression:   Final diagnoses:  [R07.9] Chest pain  [M79.10] Myalgia (Primary)  [M54.6] Acute midline thoracic back pain  [W19.XXXA] Fall, initial encounter        ED Disposition Condition    Discharge Stable          ED Prescriptions    None       Follow-up Information       Follow up With Specialties Details Why Contact Info    Gil Leal MD Family Medicine Schedule an appointment as soon as possible for a visit in 2 days  [address] [place] [address]  [phone]      [facility] Emergency Medicine  If symptoms worsen [facility] [address]  [facility] [id number] [phone]             Cristian Garcia MD  01/19/23 0213

## 2023-01-19 NOTE — ED TRIAGE NOTES
Fabiana Chanel, a 47 y.o. female presents to the ED w/ complaint of chest pain and malaise x a few hours. PT denies SOB, fever/chills, n/v/d and urinary symptoms.     Triage note:  Chief Complaint   Patient presents with    Chest Pain     Midsternal tightness x1 hour with lightheadeness and cramoing to legs and hands. Denies SOB, fever, cough. No tx used     Review of patient's allergies indicates:   Allergen Reactions    Metformin      Diarrhea on metformin XR     Pneumococcal 23-abimbola ps vaccine      Past Medical History:   Diagnosis Date    Atrial fibrillation     Blood clot associated with vein wall inflammation     not dvt    Cardiomyopathy     Normal cors on cath 11/2017    CHF (congestive heart failure)     DM (diabetes mellitus) 9/19/2013    Hyperlipidemia     Hypertension     Psoriasis     Sleep apnea

## 2023-01-19 NOTE — ED NOTES
Bed: 05main  Expected date: 1/18/23  Expected time: 8:37 PM  Means of arrival:   Comments:  [facility]

## 2023-01-19 NOTE — DISCHARGE INSTRUCTIONS
Seek medical if symptoms worsen or any concerns.  Follow up with the primary care provider for further management of symptoms.

## 2023-01-19 NOTE — ED NOTES
Pt states that tylenol didn't really help her feel any better, still c/o body aches and generally feeling bad

## 2023-01-19 NOTE — FIRST PROVIDER EVALUATION
"Medical screening examination initiated.  I have conducted a focused provider triage encounter, findings are as follows:    Brief history of present illness:  47-year-old female with past medical history of hypertension, diabetes, CHF, hyperlipidemia sleep apnea AFib who presents a 1 hour history of midsternal chest pain which she describes as a tightness.  Patient also endorses associated lightheadedness and extremity cramps. No treatment prior to ED arrival    Vitals:    01/18/23 1951   BP: 120/73   BP Location: Right arm   Patient Position: Sitting   Pulse: 101   Resp: 18   Temp: 98.1 °F (36.7 °C)   TempSrc: Oral   SpO2: 99%   Weight: 108.9 kg (240 lb)   Height: 5' 7" (1.702 m)       Pertinent physical exam:  Non-toxic appearing, no respiratory distress, no focal neurologic deficits, ambulatory without difficulty or assistance.       Brief workup plan:  Labs, chest x-ray, EKG    Preliminary workup initiated; this workup will be continued and followed by the physician or advanced practice provider that is assigned to the patient when roomed.  "

## 2023-01-20 ENCOUNTER — CLINICAL SUPPORT (OUTPATIENT)
Dept: CARDIOLOGY | Facility: HOSPITAL | Age: 48
End: 2023-01-20
Payer: MEDICARE

## 2023-01-20 DIAGNOSIS — I47.29 OTHER VENTRICULAR TACHYCARDIA: ICD-10-CM

## 2023-01-20 DIAGNOSIS — I42.0 DILATED CARDIOMYOPATHY: ICD-10-CM

## 2023-01-20 DIAGNOSIS — I48.91 UNSPECIFIED ATRIAL FIBRILLATION: ICD-10-CM

## 2023-01-20 DIAGNOSIS — I50.42 CHRONIC COMBINED SYSTOLIC (CONGESTIVE) AND DIASTOLIC (CONGESTIVE) HEART FAILURE: ICD-10-CM

## 2023-01-20 DIAGNOSIS — Z95.810 PRESENCE OF AUTOMATIC (IMPLANTABLE) CARDIAC DEFIBRILLATOR: ICD-10-CM

## 2023-01-20 PROCEDURE — 93295 CARDIAC DEVICE CHECK - REMOTE: ICD-10-PCS | Mod: ,,, | Performed by: INTERNAL MEDICINE

## 2023-01-20 PROCEDURE — 93296 REM INTERROG EVL PM/IDS: CPT | Performed by: INTERNAL MEDICINE

## 2023-01-20 PROCEDURE — 93295 DEV INTERROG REMOTE 1/2/MLT: CPT | Mod: ,,, | Performed by: INTERNAL MEDICINE

## 2023-01-25 DIAGNOSIS — M54.16 LUMBAR RADICULOPATHY: Primary | ICD-10-CM

## 2023-01-30 ENCOUNTER — TELEPHONE (OUTPATIENT)
Dept: PAIN MEDICINE | Facility: OTHER | Age: 48
End: 2023-01-30
Payer: MEDICARE

## 2023-01-30 ENCOUNTER — TELEPHONE (OUTPATIENT)
Dept: CARDIOLOGY | Facility: CLINIC | Age: 48
End: 2023-01-30
Payer: MEDICARE

## 2023-01-30 NOTE — TELEPHONE ENCOUNTER
----- Message from Gregoria Sauceda RN sent at 1/30/2023  8:34 AM CST -----  Regarding: RE: Clearance    CLEARANCE TO HOLD ORAL ANTICOAGULATION THERAPY  A request for clearance has been received to hold ELIQUIS prior to a scheduled procedure: L5/S1 & S1 TF ANKITA  .  It has been determined, per the current guidelines, that the above patient has been cleared to hold ELIQUIS for 3 days prior to the scheduled procedure, and resumed post procedure as soon as possible per the surgeon's discretion.  If you have any further questions please contact our office at [phone].    Sincerely,     Eben Christine MD, MPH, [facility]    [facility]  Cardiac Electrophysiolgy       ----- Message -----  From: Mingo Ledezma MA  Sent: 1/30/2023   8:26 AM CST  To: Gregoria Sauceda RN  Subject: FW: Clearance                                      ----- Message -----  From: Rhianna Avelar  Sent: 1/30/2023   8:20 AM CST  To: Eben Christine MD, Oziel Yanez MD, #  Subject: FW: Clearance                                      ----- Message -----  From: Rhianna Avelar  Sent: 1/9/2023   4:07 PM CST  To: Eben Christine MD, Rhianna Avelar  Subject: Clearance                                        Good Afternoon,    Requesting clearance for patient to hold Eliquis for 3 days to have right L5/S1 & S1 TF ANKITA with Dr Yeager on 2/9/23. Please advise.    Thanks

## 2023-02-06 ENCOUNTER — OFFICE VISIT (OUTPATIENT)
Dept: CARDIOLOGY | Facility: CLINIC | Age: 48
End: 2023-02-06
Payer: MEDICARE

## 2023-02-06 VITALS
BODY MASS INDEX: 39.12 KG/M2 | RESPIRATION RATE: 18 BRPM | DIASTOLIC BLOOD PRESSURE: 70 MMHG | OXYGEN SATURATION: 96 % | SYSTOLIC BLOOD PRESSURE: 108 MMHG | WEIGHT: 249.81 LBS | HEART RATE: 78 BPM

## 2023-02-06 DIAGNOSIS — G47.33 OSA TREATED WITH BIPAP: ICD-10-CM

## 2023-02-06 DIAGNOSIS — I42.8 NICM (NONISCHEMIC CARDIOMYOPATHY): ICD-10-CM

## 2023-02-06 DIAGNOSIS — E66.01 MORBID OBESITY, UNSPECIFIED OBESITY TYPE: ICD-10-CM

## 2023-02-06 DIAGNOSIS — E11.9 TYPE 2 DIABETES MELLITUS WITHOUT COMPLICATION, WITHOUT LONG-TERM CURRENT USE OF INSULIN: ICD-10-CM

## 2023-02-06 DIAGNOSIS — N18.4 TYPE 2 DIABETES MELLITUS WITH STAGE 4 CHRONIC KIDNEY DISEASE, WITHOUT LONG-TERM CURRENT USE OF INSULIN: ICD-10-CM

## 2023-02-06 DIAGNOSIS — N18.32 STAGE 3B CHRONIC KIDNEY DISEASE: ICD-10-CM

## 2023-02-06 DIAGNOSIS — I42.0 DILATED CARDIOMYOPATHY: ICD-10-CM

## 2023-02-06 DIAGNOSIS — Z86.79 HISTORY OF VENTRICULAR TACHYCARDIA: ICD-10-CM

## 2023-02-06 DIAGNOSIS — E78.2 MIXED HYPERLIPIDEMIA: ICD-10-CM

## 2023-02-06 DIAGNOSIS — E11.22 TYPE 2 DIABETES MELLITUS WITH STAGE 4 CHRONIC KIDNEY DISEASE, WITHOUT LONG-TERM CURRENT USE OF INSULIN: ICD-10-CM

## 2023-02-06 DIAGNOSIS — I48.0 PAROXYSMAL ATRIAL FIBRILLATION: ICD-10-CM

## 2023-02-06 DIAGNOSIS — I50.42 CHRONIC COMBINED SYSTOLIC AND DIASTOLIC HEART FAILURE: Primary | ICD-10-CM

## 2023-02-06 DIAGNOSIS — Z95.810 PRESENCE OF AUTOMATIC (IMPLANTABLE) CARDIAC DEFIBRILLATOR: ICD-10-CM

## 2023-02-06 PROCEDURE — 99214 OFFICE O/P EST MOD 30 MIN: CPT | Mod: S$GLB,,, | Performed by: INTERNAL MEDICINE

## 2023-02-06 PROCEDURE — 99999 PR PBB SHADOW E&M-EST. PATIENT-LVL V: ICD-10-PCS | Mod: PBBFAC,,, | Performed by: INTERNAL MEDICINE

## 2023-02-06 PROCEDURE — 3078F PR MOST RECENT DIASTOLIC BLOOD PRESSURE < 80 MM HG: ICD-10-PCS | Mod: CPTII,S$GLB,, | Performed by: INTERNAL MEDICINE

## 2023-02-06 PROCEDURE — 1160F PR REVIEW ALL MEDS BY PRESCRIBER/CLIN PHARMACIST DOCUMENTED: ICD-10-PCS | Mod: CPTII,S$GLB,, | Performed by: INTERNAL MEDICINE

## 2023-02-06 PROCEDURE — 3008F PR BODY MASS INDEX (BMI) DOCUMENTED: ICD-10-PCS | Mod: CPTII,S$GLB,, | Performed by: INTERNAL MEDICINE

## 2023-02-06 PROCEDURE — 3074F SYST BP LT 130 MM HG: CPT | Mod: CPTII,S$GLB,, | Performed by: INTERNAL MEDICINE

## 2023-02-06 PROCEDURE — 1159F MED LIST DOCD IN RCRD: CPT | Mod: CPTII,S$GLB,, | Performed by: INTERNAL MEDICINE

## 2023-02-06 PROCEDURE — 1160F RVW MEDS BY RX/DR IN RCRD: CPT | Mod: CPTII,S$GLB,, | Performed by: INTERNAL MEDICINE

## 2023-02-06 PROCEDURE — 1159F PR MEDICATION LIST DOCUMENTED IN MEDICAL RECORD: ICD-10-PCS | Mod: CPTII,S$GLB,, | Performed by: INTERNAL MEDICINE

## 2023-02-06 PROCEDURE — 3078F DIAST BP <80 MM HG: CPT | Mod: CPTII,S$GLB,, | Performed by: INTERNAL MEDICINE

## 2023-02-06 PROCEDURE — 99214 PR OFFICE/OUTPT VISIT, EST, LEVL IV, 30-39 MIN: ICD-10-PCS | Mod: S$GLB,,, | Performed by: INTERNAL MEDICINE

## 2023-02-06 PROCEDURE — 3074F PR MOST RECENT SYSTOLIC BLOOD PRESSURE < 130 MM HG: ICD-10-PCS | Mod: CPTII,S$GLB,, | Performed by: INTERNAL MEDICINE

## 2023-02-06 PROCEDURE — 99999 PR PBB SHADOW E&M-EST. PATIENT-LVL V: CPT | Mod: PBBFAC,,, | Performed by: INTERNAL MEDICINE

## 2023-02-06 PROCEDURE — 3008F BODY MASS INDEX DOCD: CPT | Mod: CPTII,S$GLB,, | Performed by: INTERNAL MEDICINE

## 2023-02-06 NOTE — PROGRESS NOTES
CARDIOVASCULAR PROGRESS NOTE    REASON FOR CONSULT:   Fabiana Chanel is a 47 y.o. female who presents for follow up of NICM/LV thrombus.    PCP: Elvis  EP: Nanci  Endo: María  CHF: Barber  HISTORY OF PRESENT ILLNESS:   Last seen June 2022.    The patient returns for follow-up.  In the interim since her last visit, she was seen by Dr. Christine.  Her LV thrombus had resolved and she is now back on Eliquis.  She denies angina, dyspnea, palpitations, syncope, or recent defibrillator discharges.  There has been no PND, orthopnea, melena, hematuria, or claudicant symptoms.  She tells me that she is no longer taking olmesartan as it was causing her heaviness.  Her blood pressure and heart rate appear to be adequately well controlled today.    CARDIOVASCULAR HISTORY:   NICM (cath 11/2017) EF 20% by echo 12/2018  [employer] [facility] ICD (4/17/18 Dr. Christine)  PAF (on enox for LV apical thrombus 4/2022)  MILE on CPAP  LV Thrombus (echo 4/2022) now on enox bid    PAST MEDICAL HISTORY:     Past Medical History:   Diagnosis Date    Atrial fibrillation     Blood clot associated with vein wall inflammation     not dvt    Cardiomyopathy     Normal cors on cath 11/2017    CHF (congestive heart failure)     DM (diabetes mellitus) 9/19/2013    Hyperlipidemia     Hypertension     Psoriasis     Sleep apnea        PAST SURGICAL HISTORY:     Past Surgical History:   Procedure Laterality Date    CARDIAC CATHETERIZATION      COLONOSCOPY      COLONOSCOPY N/A 3/9/2022    Procedure: COLONOSCOPY;  Surgeon: Vielka Burrell MD;  Location: [facility];  Service: Endoscopy;  Laterality: N/A;    DILATION AND CURETTAGE OF UTERUS      ESOPHAGOGASTRODUODENOSCOPY N/A 5/9/2019    Procedure: EGD (ESOPHAGOGASTRODUODENOSCOPY);  Surgeon: Vielka Burrell MD;  Location: [facility];  Service: Endoscopy;  Laterality: N/A;    TRANSFORAMINAL EPIDURAL INJECTION OF STEROID N/A 1/6/2022    Procedure: Transforaminal ANKITA L4/L5 L5/S1;  Surgeon: Jed Yeager MD;   Location: [facility];  Service: Pain Management;  Laterality: N/A;    TRANSFORAMINAL EPIDURAL INJECTION OF STEROID Right 12/1/2022    Procedure: INJECTION, STEROID, EPIDURAL, TRANSFORAMINAL APPROACH, RIGHT L4/L5 & L5/S1 CONTRAST;  Surgeon: Jed Yeager MD;  Location: [facility];  Service: Pain Management;  Laterality: Right;       ALLERGIES AND MEDICATION:     Review of patient's allergies indicates:   Allergen Reactions    Pneumococcal 23-abimbola ps vaccine      Previous Medications    ACETAMINOPHEN (TYLENOL) 500 MG TABLET    Take 2 tablets (1,000 mg total) by mouth every 6 (six) hours as needed for Pain.    ALBUTEROL (VENTOLIN HFA) 90 MCG/ACTUATION INHALER    Inhale 2 puffs into the lungs every 6 (six) hours as needed for Wheezing or Shortness of Breath. Rescue    APIXABAN (ELIQUIS) 5 MG TAB    Take 1 tablet (5 mg total) by mouth 2 (two) times daily.    BACLOFEN (LIORESAL) 10 MG TABLET    Take 1 tablet (10 mg total) by mouth 2 (two) times daily.    BLOOD GLUCOSE CONTROL, HIGH SOLN    1 each by Misc.(Non-Drug; Combo Route) route once. for 1 dose    BLOOD GLUCOSE CONTROL, LOW SOLN    1 each by Misc.(Non-Drug; Combo Route) route once. for 1 dose    BLOOD PRESSURE CUFF MISC    1 kit by Misc.(Non-Drug; Combo Route) route 2 (two) times daily.    BLOOD SUGAR DIAGNOSTIC STRP    Check blood glucose 3x/day.    BLOOD-GLUCOSE METER (TRUE METRIX AIR GLUCOSE METER) MISC    1 each by Misc.(Non-Drug; Combo Route) route 3 (three) times daily.    BLOOD-GLUCOSE METER,CONTINUOUS (DEXCOM G6 ) MISC    Use with dexcom G6 system    BLOOD-GLUCOSE SENSOR (DEXCOM G6 SENSOR) FIDEL    Change sensor every 10 days    BLOOD-GLUCOSE TRANSMITTER (DEXCOM G6 TRANSMITTER) FIDEL    Change every 3 months    DULAGLUTIDE (TRULICITY) 0.75 MG/0.5 ML PEN INJECTOR    Inject 0.75 mg into the skin every 7 days.    FLUTICASONE PROPIONATE (FLONASE) 50 MCG/ACTUATION NASAL SPRAY    1 spray (50 mcg total) by Each Nostril route 2 (two) times daily as  "needed for Rhinitis.    FUROSEMIDE (LASIX) 40 MG TABLET    TAKE 1 TABLET BY MOUTH ONCE DAILY *PLEASE  HOLD  WHILE  NOT  DRINKING*    GABAPENTIN (NEURONTIN) 400 MG CAPSULE    TAKE 1 CAPSULE BY MOUTH TWICE DAILY AS NEEDED    INSULIN DEGLUDEC (TRESIBA FLEXTOUCH U-200) 200 UNIT/ML (3 ML) INSULIN PEN    Inject 54 Units into the skin 2 (two) times a day.    INSULIN LISPRO (HUMALOG KWIKPEN INSULIN) 100 UNIT/ML PEN    Inject 40 Units into the skin 3 (three) times daily before meals.    INSULIN NPH ISOPH U-100 HUMAN (HUMULIN N NPH INSULIN KWIKPEN) 100 UNIT/ML (3 ML) INPN    Inject 24 units nightly at 10 pm    METOPROLOL SUCCINATE (TOPROL-XL) 50 MG 24 HR TABLET    TAKE 1 TABLET BY MOUTH ONCE DAILY HOLD  IF  SBP< 120    MUPIROCIN (BACTROBAN) 2 % OINTMENT    Apply topically nightly.    ONDANSETRON (ZOFRAN-ODT) 4 MG TBDL    Take 1 tablet (4 mg total) by mouth every 8 (eight) hours as needed.    OXYCODONE-ACETAMINOPHEN (PERCOCET) 5-325 MG PER TABLET    Take 1 tablet by mouth every 24 hours as needed for Pain.    PEN NEEDLE, DIABETIC (BD LORI 2ND GEN PEN NEEDLE) 32 GAUGE X 5/32" NDLE    USE 1 PEN NEEDLE 4 TIMES DAILY    ROSUVASTATIN (CRESTOR) 40 MG TAB    Take 1 tablet (40 mg total) by mouth every evening.    SPIRONOLACTONE (ALDACTONE) 25 MG TABLET    Take 1 tablet (25 mg total) by mouth once daily. Hold until eating and drinking improves. Need to weight self daily    TRIAMCINOLONE ACETONIDE 0.1% (KENALOG) 0.1 % CREAM    2 (two) times daily. Apply to affected area       SOCIAL HISTORY:     Social History     Socioeconomic History    Marital status:    Tobacco Use    Smoking status: Never    Smokeless tobacco: Never    Tobacco comments:     smokes cigars on occasion   Substance and Sexual Activity    Alcohol use: Not Currently     Comment: occasional    Drug use: Not Currently     Types: Marijuana     Comment: occ    Sexual activity: Not Currently     Partners: Male       FAMILY HISTORY:     Family History   Problem Relation " Age of Onset    Hypertension Mother     Hypertension Father     Diabetes Father     Diabetes Maternal Grandmother     Diabetes Paternal Grandmother     Breast cancer Neg Hx     Colon cancer Neg Hx     Ovarian cancer Neg Hx        REVIEW OF SYSTEMS:   Review of Systems   Constitutional:  Negative for chills, diaphoresis and fever.   HENT:  Negative for nosebleeds.    Eyes:  Negative for blurred vision, double vision and photophobia.   Respiratory:  Negative for hemoptysis, shortness of breath and wheezing.    Cardiovascular:  Negative for chest pain, palpitations, orthopnea, claudication, leg swelling and PND.   Gastrointestinal:  Negative for abdominal pain, blood in stool, heartburn, melena, nausea and vomiting.   Genitourinary:  Negative for flank pain and hematuria.   Musculoskeletal:  Negative for falls, myalgias and neck pain.   Skin:  Negative for rash.   Neurological:  Negative for dizziness, seizures, loss of consciousness, weakness and headaches.   Endo/Heme/Allergies:  Negative for polydipsia. Does not bruise/bleed easily.   Psychiatric/Behavioral:  Negative for depression and memory loss. The patient is not nervous/anxious.      PHYSICAL EXAM:     Vitals:    02/06/23 1408   BP: 108/70   Pulse: 78   Resp: 18    Body mass index is 39.12 kg/m².  Weight: 113.3 kg (249 lb 12.5 oz)         Physical Exam  Vitals reviewed.   Constitutional:       General: She is not in acute distress.     Appearance: She is well-developed. She is obese. She is not ill-appearing, toxic-appearing or diaphoretic.   HENT:      Head: Normocephalic and atraumatic.   Eyes:      General: No scleral icterus.     Extraocular Movements: Extraocular movements intact.      Conjunctiva/sclera: Conjunctivae normal.      Pupils: Pupils are equal, round, and reactive to light.   Neck:      Thyroid: No thyromegaly.      Vascular: Normal carotid pulses. No carotid bruit or JVD.      Trachea: Trachea normal. No tracheal deviation.   Cardiovascular:       Rate and Rhythm: Normal rate and regular rhythm.      Pulses:           Carotid pulses are 2+ on the right side and 2+ on the left side.     Heart sounds: S1 normal and S2 normal. No murmur heard.    No friction rub. No gallop.      Comments: L side ICD in place.  Pulmonary:      Effort: Pulmonary effort is normal. No respiratory distress.      Breath sounds: Normal breath sounds. No stridor. No wheezing, rhonchi or rales.   Chest:      Chest wall: No tenderness.   Abdominal:      General: There is no distension.      Palpations: Abdomen is soft.   Musculoskeletal:         General: No swelling or tenderness. Normal range of motion.      Cervical back: Normal range of motion and neck supple. No edema or rigidity.      Right lower leg: No edema.      Left lower leg: No edema.   Feet:      Right foot:      Skin integrity: No ulcer.      Left foot:      Skin integrity: No ulcer.   Skin:     General: Skin is warm and dry.      Coloration: Skin is not jaundiced.   Neurological:      General: No focal deficit present.      Mental Status: She is alert and oriented to person, place, and time.      Cranial Nerves: No cranial nerve deficit.   Psychiatric:         Mood and Affect: Mood normal.         Speech: Speech normal.         Behavior: Behavior normal. Behavior is cooperative.       DATA:   EKG: (personally reviewed tracing)  1/18/23 SR 81, PVC, LVH    Laboratory:  CBC:  Recent Labs   Lab 10/15/22  2313 10/21/22  1920 01/18/23  2040   WBC 8.72 8.40 8.41   Hemoglobin 13.1 14.0 13.3   Hematocrit 40.1 43.8 42.1   Platelets 229 254 201         CHEMISTRIES:  Recent Labs   Lab 04/27/22  2355 04/29/22  1846 05/18/22  0506 06/16/22  1020 07/01/22  1317 08/05/22  2243 08/06/22  0517 08/08/22  1313 10/15/22  2313 10/21/22  1920 01/18/23  2040   Glucose 303 H   < > 160 H 107 73 56 L 110   < > 337 H 112 H 391 H   Sodium 137   < > 140 140 141 141 138   < > 135 L 139 136   Potassium 4.2   < > 3.6 3.7 3.1 L 3.6 3.4 L   < > 3.5 4.1  3.6   BUN 19   < > 18 19 20 21 H 20   < > 26 H 21 H 22 H   Creatinine 2.7 H   < > 1.9 H 1.8 H 1.9 H 1.8 H 1.6 H   < > 2.2 H 2.2 H 1.9 H   eGFR if  24 A   < > 36 A 39 A 36 A  --   --   --   --   --   --    eGFR if non  21 A   < > 31 A 34 A 31 A  --   --   --   --   --   --    Calcium 9.8   < > 8.6 L 9.6 9.5 10.1 9.2   < > 9.2 10.0 9.4   Magnesium 2.7 H  --   --   --   --  2.3 2.3  --   --   --   --     < > = values in this interval not displayed.         CARDIAC BIOMARKERS:  Recent Labs   Lab 03/18/22 1847 04/24/22  1647 04/29/22  1846 05/17/22  1727 10/16/22  0132 10/21/22  1920 01/18/23  2040    H  --  252 H  --   --  350 H  --    Troponin I  --    < > 0.016   < > 0.014 0.011 0.012    < > = values in this interval not displayed.         COAGS:  Recent Labs   Lab 11/04/20  1003 02/26/21  0103 08/05/22  2243   INR 0.9 1.0 1.0         LIPIDS/LFTS:  Recent Labs   Lab 10/14/21  0825 03/18/22  1847 06/16/22  1020 07/01/22  1317 08/05/22  2243 10/15/22  2313 10/21/22  1920 01/18/23  2040   Cholesterol 111 L  --  110 L 105 L  --   --   --   --    Triglycerides 73  --  83 83  --   --   --   --    HDL 38 L  --  37 L 30 L  --   --   --   --    LDL Cholesterol 58.4 L  --  56.4 L 58.4 L  --   --   --   --    Non-HDL Cholesterol 73  --  73 75  --   --   --   --    AST 19   < > 28 17   < > 20 27 16   ALT 20   < > 32 17   < > 26 28 16    < > = values in this interval not displayed.         Lab Results   Component Value Date    TSH 1.355 10/21/2022       Cardiovascular Testing:  Echo 8/1/22  The left ventricle is severely enlarged with mild concentric hypertrophy and severely decreased systolic function.  The estimated ejection fraction is 25%.  Grade I left ventricular diastolic dysfunction.  Normal right ventricular size with normal right ventricular systolic function.  Normal central venous pressure (3 mmHg).  The estimated PA systolic pressure is 27 mmHg.  There is no pulmonary  hypertension.  No LV thrombus.    Cath 11/27/17  RA 5  RV 61/6  PA 60/29/42  PAWP 18  /41  Ao 163/107/130  CO 5.6 L/min  LVEF: 20% by echo with severe LV dilation  Wall Motion: Severe global HK  Dominance: Co-dom  LM: normal  LAD: normal  LCx: normal  RCA: normal  Hemostasis:  RFA/V manual compression  Impression:  Normal cors  Elevated LVEDP with transmission of pressures to pulmonary circuit  Above c/w Severe NICM  RFA/V manual compression  Plan:  Cont med rx  Stop Plavix  Stop amlodipine  Start Hydrala/Imdur/Lasix  Goal net neg 1L/day  Cont BBl/ACEi  Repeat echo in 3 months for reassessment of LV fxn and need for ICD  Follow up with Dr. Yanez 1 week after discharge     Stress Test: L MPI 9/20/13  Nuclear Quantitative Functional Analysis:   LVEF: 31 %  Impression: NORMAL MYOCARDIAL PERFUSION  1. The perfusion scan is free of evidence for myocardial ischemia or injury.   2. There is a moderate intensity fixed defect in the inferior wall of the left ventricle, secondary to diaphragm attenuation.   3. There is abnormal wall motion at rest showing moderate global hypokinesis of the left ventricle.   4. There is resting LV dysfunction with a reduced ejection fraction of 31 %.   5. The ventricular volumes are normal at rest and stress.   6. The extracardiac distribution of radioactivity is normal.     ASSESSMENT:   # NICM, EF persistently reduced 20% by echo 10/2019.  Euvolemic on exam.  # LV thrombus on echo 4/2022 (was on eliquis->enox 120mg bid), now resolved and back on eliquis.  # PAF, in SR, on eliquis (intol of Xarelto)  # S/P [facility] [facility] ICD 4/2018 (Dr. Christine), atrial lead placed for AF monitoring  # HTN, controlled (?intol of olmesartan)  # HLP, on rosuva 40mg  # DM  # BMI 39, down 3 unit(s) vs last OV  # MILE, on CPAP  # CKD3b    PLAN:   Cont med rx  Cont eliquis 5mg bid  Consider addition of entresto in future if BP will allow  ICD follow up at [facility] as planned  Diet/exercise/weight loss  RTC 6 months  (Aug 2023)      Oziel Yanez MD, FACC

## 2023-02-15 DIAGNOSIS — E11.9 TYPE 2 DIABETES MELLITUS WITHOUT COMPLICATION: ICD-10-CM

## 2023-02-23 ENCOUNTER — PATIENT MESSAGE (OUTPATIENT)
Dept: PAIN MEDICINE | Facility: OTHER | Age: 48
End: 2023-02-23
Payer: MEDICARE

## 2023-02-23 ENCOUNTER — OFFICE VISIT (OUTPATIENT)
Dept: PAIN MEDICINE | Facility: CLINIC | Age: 48
End: 2023-02-23
Payer: MEDICARE

## 2023-02-23 DIAGNOSIS — M54.17 LUMBOSACRAL RADICULOPATHY: ICD-10-CM

## 2023-02-23 DIAGNOSIS — M54.16 LUMBAR RADICULOPATHY: Primary | ICD-10-CM

## 2023-02-23 PROCEDURE — 1159F MED LIST DOCD IN RCRD: CPT | Mod: CPTII,95,, | Performed by: NURSE PRACTITIONER

## 2023-02-23 PROCEDURE — 1159F PR MEDICATION LIST DOCUMENTED IN MEDICAL RECORD: ICD-10-PCS | Mod: CPTII,95,, | Performed by: NURSE PRACTITIONER

## 2023-02-23 PROCEDURE — 1160F PR REVIEW ALL MEDS BY PRESCRIBER/CLIN PHARMACIST DOCUMENTED: ICD-10-PCS | Mod: CPTII,95,, | Performed by: NURSE PRACTITIONER

## 2023-02-23 PROCEDURE — 1160F RVW MEDS BY RX/DR IN RCRD: CPT | Mod: CPTII,95,, | Performed by: NURSE PRACTITIONER

## 2023-02-23 PROCEDURE — 99214 OFFICE O/P EST MOD 30 MIN: CPT | Mod: 95,,, | Performed by: NURSE PRACTITIONER

## 2023-02-23 PROCEDURE — 99214 PR OFFICE/OUTPT VISIT, EST, LEVL IV, 30-39 MIN: ICD-10-PCS | Mod: 95,,, | Performed by: NURSE PRACTITIONER

## 2023-02-23 RX ORDER — METHOCARBAMOL 500 MG/1
500 TABLET, FILM COATED ORAL DAILY PRN
Qty: 40 TABLET | Refills: 0 | OUTPATIENT
Start: 2023-02-23 | End: 2023-03-03
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
## 2023-02-24 DIAGNOSIS — G89.4 CHRONIC PAIN SYNDROME: ICD-10-CM

## 2023-02-24 DIAGNOSIS — M54.16 LUMBAR RADICULOPATHY: ICD-10-CM

## 2023-02-24 RX ORDER — OXYCODONE AND ACETAMINOPHEN 5; 325 MG/1; MG/1
1 TABLET ORAL
Qty: 30 TABLET | Refills: 0 | Status: SHIPPED | OUTPATIENT
Start: 2023-02-24 | End: 2023-02-24 | Stop reason: SDUPTHER

## 2023-03-03 ENCOUNTER — HOSPITAL ENCOUNTER (EMERGENCY)
Facility: HOSPITAL | Age: 48
Discharge: HOME | End: 2023-03-03
Attending: EMERGENCY MEDICINE
Payer: MEDICARE

## 2023-03-03 VITALS
TEMPERATURE: 99 F | OXYGEN SATURATION: 100 % | SYSTOLIC BLOOD PRESSURE: 105 MMHG | BODY MASS INDEX: 38.37 KG/M2 | RESPIRATION RATE: 16 BRPM | HEART RATE: 84 BPM | DIASTOLIC BLOOD PRESSURE: 59 MMHG | WEIGHT: 245 LBS

## 2023-03-03 DIAGNOSIS — R52 BODY ACHES: ICD-10-CM

## 2023-03-03 DIAGNOSIS — R11.2 NAUSEA AND VOMITING, UNSPECIFIED VOMITING TYPE: ICD-10-CM

## 2023-03-03 DIAGNOSIS — E11.65 UNCONTROLLED TYPE 2 DIABETES MELLITUS WITH HYPERGLYCEMIA: Primary | ICD-10-CM

## 2023-03-03 DIAGNOSIS — E86.0 DEHYDRATION: ICD-10-CM

## 2023-03-03 DIAGNOSIS — R25.2 LEG CRAMPS: ICD-10-CM

## 2023-03-03 LAB
ALBUMIN SERPL BCP-MCNC: 3.9 G/DL (ref 3.5–5.2)
ALLENS TEST: ABNORMAL
ALP SERPL-CCNC: 119 U/L (ref 55–135)
ALT SERPL W/O P-5'-P-CCNC: 20 U/L (ref 10–44)
ANION GAP SERPL CALC-SCNC: 12 MMOL/L (ref 8–16)
ANION GAP SERPL CALC-SCNC: 9 MMOL/L (ref 8–16)
AST SERPL-CCNC: 18 U/L (ref 10–40)
B-OH-BUTYR BLD STRIP-SCNC: 0.1 MMOL/L (ref 0–0.5)
BACTERIA #/AREA URNS HPF: NORMAL /HPF
BASOPHILS # BLD AUTO: 0.03 K/UL (ref 0–0.2)
BASOPHILS NFR BLD: 0.3 % (ref 0–1.9)
BILIRUB SERPL-MCNC: 0.6 MG/DL (ref 0.1–1)
BILIRUB UR QL STRIP: NEGATIVE
BUN SERPL-MCNC: 28 MG/DL (ref 6–20)
BUN SERPL-MCNC: 40 MG/DL (ref 6–30)
CALCIUM SERPL-MCNC: 9.7 MG/DL (ref 8.7–10.5)
CHLORIDE SERPL-SCNC: 95 MMOL/L (ref 95–110)
CHLORIDE SERPL-SCNC: 97 MMOL/L (ref 95–110)
CK SERPL-CCNC: 198 U/L (ref 20–180)
CLARITY UR: CLEAR
CO2 SERPL-SCNC: 24 MMOL/L (ref 23–29)
COLOR UR: COLORLESS
CREAT SERPL-MCNC: 2 MG/DL (ref 0.5–1.4)
CREAT SERPL-MCNC: 2.2 MG/DL (ref 0.5–1.4)
CTP QC/QA: YES
CTP QC/QA: YES
DELSYS: ABNORMAL
DIFFERENTIAL METHOD: ABNORMAL
EOSINOPHIL # BLD AUTO: 0.2 K/UL (ref 0–0.5)
EOSINOPHIL NFR BLD: 1.9 % (ref 0–8)
ERYTHROCYTE [DISTWIDTH] IN BLOOD BY AUTOMATED COUNT: 15 % (ref 11.5–14.5)
EST. GFR  (NO RACE VARIABLE): 27 ML/MIN/1.73 M^2
GLUCOSE SERPL-MCNC: 414 MG/DL (ref 70–110)
GLUCOSE SERPL-MCNC: 423 MG/DL (ref 70–110)
GLUCOSE UR QL STRIP: ABNORMAL
HCO3 UR-SCNC: 30.3 MMOL/L (ref 24–28)
HCT VFR BLD AUTO: 43.7 % (ref 37–48.5)
HCT VFR BLD CALC: 47 %PCV (ref 36–54)
HGB BLD-MCNC: 14.2 G/DL (ref 12–16)
HGB UR QL STRIP: ABNORMAL
HYALINE CASTS #/AREA URNS LPF: 0 /LPF
IMM GRANULOCYTES # BLD AUTO: 0.04 K/UL (ref 0–0.04)
IMM GRANULOCYTES NFR BLD AUTO: 0.4 % (ref 0–0.5)
KETONES UR QL STRIP: NEGATIVE
LEUKOCYTE ESTERASE UR QL STRIP: NEGATIVE
LYMPHOCYTES # BLD AUTO: 2.5 K/UL (ref 1–4.8)
LYMPHOCYTES NFR BLD: 25.6 % (ref 18–48)
MCH RBC QN AUTO: 27.7 PG (ref 27–31)
MCHC RBC AUTO-ENTMCNC: 32.5 G/DL (ref 32–36)
MCV RBC AUTO: 85 FL (ref 82–98)
MICROSCOPIC COMMENT: NORMAL
MONOCYTES # BLD AUTO: 0.5 K/UL (ref 0.3–1)
MONOCYTES NFR BLD: 5.5 % (ref 4–15)
NEUTROPHILS # BLD AUTO: 6.4 K/UL (ref 1.8–7.7)
NEUTROPHILS NFR BLD: 66.3 % (ref 38–73)
NITRITE UR QL STRIP: NEGATIVE
NRBC BLD-RTO: 0 /100 WBC
PCO2 BLDA: 50.3 MMHG (ref 35–45)
PH SMN: 7.39 [PH] (ref 7.35–7.45)
PH UR STRIP: 7 [PH] (ref 5–8)
PLATELET # BLD AUTO: 267 K/UL (ref 150–450)
PMV BLD AUTO: 10 FL (ref 9.2–12.9)
PO2 BLDA: 45 MMHG (ref 40–60)
POC BE: 4 MMOL/L
POC IONIZED CALCIUM: 1.24 MMOL/L (ref 1.06–1.42)
POC MOLECULAR INFLUENZA A AGN: NEGATIVE
POC MOLECULAR INFLUENZA B AGN: NEGATIVE
POC SATURATED O2: 79 % (ref 95–100)
POC TCO2 (MEASURED): 30 MMOL/L (ref 23–29)
POC TCO2: 32 MMOL/L (ref 24–29)
POCT GLUCOSE: 278 MG/DL (ref 70–110)
POCT GLUCOSE: 381 MG/DL (ref 70–110)
POCT GLUCOSE: 399 MG/DL (ref 70–110)
POTASSIUM BLD-SCNC: 4.7 MMOL/L (ref 3.5–5.1)
POTASSIUM SERPL-SCNC: 4.3 MMOL/L (ref 3.5–5.1)
PROT SERPL-MCNC: 8.2 G/DL (ref 6–8.4)
PROT UR QL STRIP: ABNORMAL
RBC # BLD AUTO: 5.13 M/UL (ref 4–5.4)
RBC #/AREA URNS HPF: 0 /HPF (ref 0–4)
SAMPLE: ABNORMAL
SAMPLE: ABNORMAL
SARS-COV-2 RDRP RESP QL NAA+PROBE: NEGATIVE
SITE: ABNORMAL
SODIUM BLD-SCNC: 131 MMOL/L (ref 136–145)
SODIUM SERPL-SCNC: 130 MMOL/L (ref 136–145)
SP GR UR STRIP: 1.02 (ref 1–1.03)
URN SPEC COLLECT METH UR: ABNORMAL
UROBILINOGEN UR STRIP-ACNC: NEGATIVE EU/DL
WBC # BLD AUTO: 9.58 K/UL (ref 3.9–12.7)
WBC #/AREA URNS HPF: 0 /HPF (ref 0–5)
YEAST URNS QL MICRO: NORMAL

## 2023-03-03 PROCEDURE — 82010 KETONE BODYS QUAN: CPT | Performed by: EMERGENCY MEDICINE

## 2023-03-03 PROCEDURE — 96374 THER/PROPH/DIAG INJ IV PUSH: CPT

## 2023-03-03 PROCEDURE — 99284 EMERGENCY DEPT VISIT MOD MDM: CPT | Mod: 25

## 2023-03-03 PROCEDURE — 96361 HYDRATE IV INFUSION ADD-ON: CPT

## 2023-03-03 PROCEDURE — 99900035 HC TECH TIME PER 15 MIN (STAT)

## 2023-03-03 PROCEDURE — 81000 URINALYSIS NONAUTO W/SCOPE: CPT | Performed by: EMERGENCY MEDICINE

## 2023-03-03 PROCEDURE — 85025 COMPLETE CBC W/AUTO DIFF WBC: CPT | Performed by: EMERGENCY MEDICINE

## 2023-03-03 PROCEDURE — 63600175 PHARM REV CODE 636 W HCPCS: Performed by: EMERGENCY MEDICINE

## 2023-03-03 PROCEDURE — 25000003 PHARM REV CODE 250: Performed by: EMERGENCY MEDICINE

## 2023-03-03 PROCEDURE — 85014 HEMATOCRIT: CPT

## 2023-03-03 PROCEDURE — 82550 ASSAY OF CK (CPK): CPT | Performed by: EMERGENCY MEDICINE

## 2023-03-03 PROCEDURE — 84295 ASSAY OF SERUM SODIUM: CPT

## 2023-03-03 PROCEDURE — 82962 GLUCOSE BLOOD TEST: CPT

## 2023-03-03 PROCEDURE — 96376 TX/PRO/DX INJ SAME DRUG ADON: CPT

## 2023-03-03 PROCEDURE — 80053 COMPREHEN METABOLIC PANEL: CPT | Performed by: EMERGENCY MEDICINE

## 2023-03-03 PROCEDURE — 82803 BLOOD GASES ANY COMBINATION: CPT

## 2023-03-03 PROCEDURE — 84132 ASSAY OF SERUM POTASSIUM: CPT | Mod: 59

## 2023-03-03 PROCEDURE — 80047 BASIC METABLC PNL IONIZED CA: CPT | Mod: 59

## 2023-03-03 PROCEDURE — 82330 ASSAY OF CALCIUM: CPT

## 2023-03-03 PROCEDURE — 87502 INFLUENZA DNA AMP PROBE: CPT

## 2023-03-03 PROCEDURE — 82565 ASSAY OF CREATININE: CPT

## 2023-03-03 RX ORDER — METHOCARBAMOL 500 MG/1
1000 TABLET, FILM COATED ORAL 3 TIMES DAILY
Qty: 30 TABLET | Refills: 0 | Status: SHIPPED | OUTPATIENT
Start: 2023-03-03 | End: 2023-03-08

## 2023-03-03 RX ORDER — METHOCARBAMOL 500 MG/1
1000 TABLET, FILM COATED ORAL
Status: COMPLETED | OUTPATIENT
Start: 2023-03-03 | End: 2023-03-03

## 2023-03-03 RX ORDER — ACETAMINOPHEN 500 MG
1000 TABLET ORAL
Status: COMPLETED | OUTPATIENT
Start: 2023-03-03 | End: 2023-03-03

## 2023-03-03 RX ORDER — ONDANSETRON 4 MG/1
4 TABLET, ORALLY DISINTEGRATING ORAL
Status: COMPLETED | OUTPATIENT
Start: 2023-03-03 | End: 2023-03-03

## 2023-03-03 RX ORDER — ONDANSETRON 4 MG/1
4 TABLET, FILM COATED ORAL EVERY 8 HOURS PRN
Qty: 12 TABLET | Refills: 1 | Status: SHIPPED | OUTPATIENT
Start: 2023-03-03

## 2023-03-03 RX ORDER — ACETAMINOPHEN 500 MG
1000 TABLET ORAL EVERY 8 HOURS PRN
Qty: 40 TABLET | Refills: 0 | Status: SHIPPED | OUTPATIENT
Start: 2023-03-03 | End: 2023-08-23

## 2023-03-03 RX ADMIN — INSULIN HUMAN 10 UNITS: 100 INJECTION, SOLUTION PARENTERAL at 06:03

## 2023-03-03 RX ADMIN — ONDANSETRON 4 MG: 4 TABLET, ORALLY DISINTEGRATING ORAL at 05:03

## 2023-03-03 RX ADMIN — SODIUM CHLORIDE 2000 ML: 9 INJECTION, SOLUTION INTRAVENOUS at 05:03

## 2023-03-03 RX ADMIN — ACETAMINOPHEN 1000 MG: 500 TABLET ORAL at 06:03

## 2023-03-03 RX ADMIN — METHOCARBAMOL 1000 MG: 500 TABLET ORAL at 06:03

## 2023-03-03 RX ADMIN — INSULIN HUMAN 10 UNITS: 100 INJECTION, SOLUTION PARENTERAL at 05:03

## 2023-03-03 NOTE — ED TRIAGE NOTES
Pt reports body aches and fatigue with nausea and vomiting x 2 days. BS at home was 421. Unknown if she has had fever and has not taken any medication.  Pt denies any other symptoms.   Pt [id number]

## 2023-03-03 NOTE — ED PROVIDER NOTES
Encounter Date: 3/3/2023       History     Chief Complaint   Patient presents with    Generalized Body Aches     Pt reports body aches and fatigue with nausea and vomiting x 2 days.  BS at home was 421.   Unknown if she has had fever and has not taken any medication.       HPI   This 47-year-old white female presents emergency with a history of insulin-dependent diabetes complaining of nausea and vomiting 2 or 2 days.  The patient feels achy all over.  She reports her blood sugar at home was 397.  There is no history of fever chills runny nose sore throat or cough.  The patient has had body aches all over when she is had elevated blood sugars in the past.  She reports she is never been admitted for diabetic ketoacidosis she denies painful urination vomiting diarrhea abdominal pain.  She is essentially otherwise well.  Review of patient's allergies indicates:   Allergen Reactions    Metformin      Diarrhea on metformin XR     Pneumococcal 23-abimbola ps vaccine      Past Medical History:   Diagnosis Date    Atrial fibrillation     Blood clot associated with vein wall inflammation     not dvt    Cardiomyopathy     Normal cors on cath 11/2017    CHF (congestive heart failure)     DM (diabetes mellitus) 9/19/2013    Hyperlipidemia     Hypertension     Psoriasis     Sleep apnea      Past Surgical History:   Procedure Laterality Date    CARDIAC CATHETERIZATION      COLONOSCOPY      COLONOSCOPY N/A 3/9/2022    Procedure: COLONOSCOPY;  Surgeon: Vielka Burrell MD;  Location: [facility] ENDO;  Service: Endoscopy;  Laterality: N/A;    DILATION AND CURETTAGE OF UTERUS      ESOPHAGOGASTRODUODENOSCOPY N/A 5/9/2019    Procedure: EGD (ESOPHAGOGASTRODUODENOSCOPY);  Surgeon: Vielka Burrell MD;  Location: [facility] ENDO;  Service: Endoscopy;  Laterality: N/A;    TRANSFORAMINAL EPIDURAL INJECTION OF STEROID N/A 1/6/2022    Procedure: Transforaminal ANKITA L4/L5 L5/S1;  Surgeon: Jed Yeager MD;  Location: [facility] PAIN MGT;  Service: Pain  Management;  Laterality: N/A;    TRANSFORAMINAL EPIDURAL INJECTION OF STEROID Right 12/1/2022    Procedure: INJECTION, STEROID, EPIDURAL, TRANSFORAMINAL APPROACH, RIGHT L4/L5 & L5/S1 CONTRAST;  Surgeon: Jed Yeager MD;  Location: [facility] PAIN MGT;  Service: Pain Management;  Laterality: Right;     Family History   Problem Relation Age of Onset    Hypertension Mother     Hypertension Father     Diabetes Father     Diabetes Maternal Grandmother     Diabetes Paternal Grandmother     Breast cancer Neg Hx     Colon cancer Neg Hx     Ovarian cancer Neg Hx      Social History     Tobacco Use    Smoking status: Never    Smokeless tobacco: Never    Tobacco comments:     smokes cigars on occasion   Substance Use Topics    Alcohol use: Not Currently     Comment: occasional    Drug use: Not Currently     Types: Marijuana     Comment: occ     Review of Systems  The patient was questioned specifically with regard to the following.  General: Fever, chills, sweats. Neuro: Headache. Eyes: eye problems. ENT: Ear pain, sore throat. Cardiovascular: Chest pain. Respiratory: Cough, shortness of breath. Gastrointestinal: Abdominal pain, vomiting, diarrhea. Genitourinary: Painful urination.  Musculoskeletal: Arm and leg problems. Skin: Rash.  The review of systems was negative except for the following:  Nausea vomiting high blood sugars, generalized body aches.  Physical Exam     Initial Vitals [03/03/23 1630]   BP Pulse Resp Temp SpO2   123/75 75 20 98.7 °F (37.1 °C) 95 %      MAP       --         Physical Exam  The patient was examined specifically for the following:   General:No significant distress, Good color, Warm and dry. Head and neck:Scalp atraumatic, Neck supple. Neurological:Appropriate conversation, Gross motor deficits. Eyes:Conjugate gaze, Clear corneas. ENT: No epistaxis. Cardiac: Regular rate and rhythm, Grossly normal heart tones. Pulmonary: Wheezing, Rales. Gastrointestinal: Abdominal tenderness, Abdominal distention.  Musculoskeletal: Extremity deformity, Apparent pain with range of motion of the joints. Skin: Rash.   The findings on examination were normal except for the following:  Patient has an elevated BMI.  The lungs are clear.  The heart tones are normal.  The abdomen is nontender.  ED Course   Procedures  Labs Reviewed   COMPREHENSIVE METABOLIC PANEL - Abnormal; Notable for the following components:       Result Value    Sodium 130 (*)     Glucose 423 (*)     BUN 28 (*)     Creatinine 2.2 (*)     eGFR 27 (*)     All other components within normal limits   CBC W/ AUTO DIFFERENTIAL - Abnormal; Notable for the following components:    RDW 15.0 (*)     All other components within normal limits   URINALYSIS, REFLEX TO URINE CULTURE - Abnormal; Notable for the following components:    Color, UA Colorless (*)     Protein, UA 1+ (*)     Glucose, UA 4+ (*)     Occult Blood UA 1+ (*)     All other components within normal limits    Narrative:     Specimen Source->Urine   CK - Abnormal; Notable for the following components:     (*)     All other components within normal limits   POCT GLUCOSE - Abnormal; Notable for the following components:    POCT Glucose 381 (*)     All other components within normal limits   ISTAT PROCEDURE - Abnormal; Notable for the following components:    POC Glucose 414 (*)     POC BUN 40 (*)     POC Creatinine 2.0 (*)     POC Sodium 131 (*)     POC TCO2 (MEASURED) 30 (*)     All other components within normal limits   ISTAT PROCEDURE - Abnormal; Notable for the following components:    POC PCO2 50.3 (*)     POC HCO3 30.3 (*)     POC SATURATED O2 79 (*)     POC TCO2 32 (*)     All other components within normal limits   POCT GLUCOSE - Abnormal; Notable for the following components:    POCT Glucose 399 (*)     All other components within normal limits   BETA - HYDROXYBUTYRATE, SERUM   URINALYSIS MICROSCOPIC    Narrative:     Specimen Source->Urine   SARS-COV-2 RDRP GENE   POCT INFLUENZA A/B MOLECULAR    ISTAT CHEM8   POCT GLUCOSE MONITORING CONTINUOUS          Imaging Results    None       Medical decision making:  Given the above this patient presents emergency with a history of diabetes.  The patient is on very large doses of insulin 40 before meals and 52 units at night.  The patient was treated with IV insulin in the emergency room.  Her repeat blood sugar was 278, after 10 units of insulin IV and 2 L of fluid IV.  The patient has congestive heart failure with an ejection fraction of 20%.  Despite her fluid resuscitation she was not short of breath.  She is not coughing.  She reports a small mass on the right labia.  I examined this.  There is a 1.5 cm nodular mass.  But there is no surrounding erythema warmth or induration.  I doubt this as a focus for systemic infection.  Sepsis is considered but the patient's vital signs are normal at discharge.  Oxygen saturations are 95% and the patient's blood pressure is 110/66.  The patient had no respiratory complaints I doubt pneumonia.  She had generalized body aches but no fever.  COVID and flu were negative.  The patient reports she vomited 3 times today but there is no diarrhea.  I will discharge on Zofran.  I will advise lots of liquids home.  I will have her continue usual medicines.  I will have her return for fever or if she gets worse or if new problems develop.  I will discharge to outpatient evaluation and treatment.                 Medications   acetaminophen tablet 1,000 mg (has no administration in time range)   methocarbamoL tablet 1,000 mg (has no administration in time range)   sodium chloride 0.9% bolus 2,000 mL 2,000 mL (0 mLs Intravenous Stopped 3/3/23 1840)   insulin regular injection 10 Units 0.1 mL (10 Units Intravenous Given 3/3/23 1724)   ondansetron disintegrating tablet 4 mg (4 mg Oral Given 3/3/23 1725)   insulin regular injection 10 Units 0.1 mL (10 Units Intravenous Given 3/3/23 1836)                              Clinical Impression:    Final diagnoses:  [E11.65] Uncontrolled type 2 diabetes mellitus with hyperglycemia (Primary)  [R11.2] Nausea and vomiting, unspecified vomiting type  [E86.0] Dehydration  [R52] Body aches  [R25.2] Leg cramps        ED Disposition Condition    Discharge Stable          ED Prescriptions       Medication Sig Dispense Start Date End Date Auth. Provider    acetaminophen (TYLENOL) 500 MG tablet Take 2 tablets (1,000 mg total) by mouth every 8 (eight) hours as needed. 40 tablet 3/3/2023 -- Cristian Treadwell MD    ondansetron (ZOFRAN) 4 MG tablet Take 1 tablet (4 mg total) by mouth every 8 (eight) hours as needed (Nausea and vomiting). 12 tablet 3/3/2023 -- Cristian Treadwell MD    methocarbamoL (ROBAXIN) 500 MG Tab Take 2 tablets (1,000 mg total) by mouth 3 (three) times daily. for 5 days 30 tablet 3/3/2023 3/8/2023 Cristian Treadwell MD          Follow-up Information       Follow up With Specialties Details Why Contact Info    Gil Leal MD Family Medicine In 3 days  [address] [place] [address]  [phone]               Cristian Treadwell MD  03/03/23 6505

## 2023-03-31 DIAGNOSIS — M54.9 DORSALGIA, UNSPECIFIED: ICD-10-CM

## 2023-03-31 DIAGNOSIS — M54.17 LUMBOSACRAL RADICULOPATHY: ICD-10-CM

## 2023-03-31 DIAGNOSIS — M54.16 LUMBAR RADICULOPATHY: Primary | ICD-10-CM

## 2023-03-31 DIAGNOSIS — M51.36 DDD (DEGENERATIVE DISC DISEASE), LUMBAR: ICD-10-CM

## 2023-03-31 RX ORDER — OXYCODONE AND ACETAMINOPHEN 5; 325 MG/1; MG/1
1 TABLET ORAL
Qty: 30 TABLET | Refills: 0 | Status: SHIPPED | OUTPATIENT
Start: 2023-03-31 | End: 2023-05-11

## 2023-03-31 NOTE — TELEPHONE ENCOUNTER
Patient requesting refill on oxycodone 5 mg   Last office visit 2/23/23   shows last refill on 2/24/23  Patient does not have a pain contract on file with [facility]  Patient last UDS none on file  was not consistent with current therapy

## 2023-04-06 ENCOUNTER — LAB VISIT (OUTPATIENT)
Dept: LAB | Facility: HOSPITAL | Age: 48
End: 2023-04-06
Attending: NURSE PRACTITIONER
Payer: MEDICARE

## 2023-04-06 ENCOUNTER — DOCUMENTATION ONLY (OUTPATIENT)
Dept: ENDOCRINOLOGY | Facility: CLINIC | Age: 48
End: 2023-04-06

## 2023-04-06 ENCOUNTER — OFFICE VISIT (OUTPATIENT)
Dept: ENDOCRINOLOGY | Facility: CLINIC | Age: 48
End: 2023-04-06
Payer: MEDICARE

## 2023-04-06 VITALS
SYSTOLIC BLOOD PRESSURE: 139 MMHG | TEMPERATURE: 97 F | BODY MASS INDEX: 39.78 KG/M2 | HEART RATE: 82 BPM | DIASTOLIC BLOOD PRESSURE: 87 MMHG | WEIGHT: 254 LBS

## 2023-04-06 DIAGNOSIS — Z79.4 TYPE 2 DIABETES MELLITUS WITH HYPERGLYCEMIA, WITH LONG-TERM CURRENT USE OF INSULIN: Primary | ICD-10-CM

## 2023-04-06 DIAGNOSIS — Z79.4 TYPE 2 DIABETES MELLITUS WITH HYPERGLYCEMIA, WITH LONG-TERM CURRENT USE OF INSULIN: ICD-10-CM

## 2023-04-06 DIAGNOSIS — E11.65 TYPE 2 DIABETES MELLITUS WITH HYPERGLYCEMIA, WITH LONG-TERM CURRENT USE OF INSULIN: Primary | ICD-10-CM

## 2023-04-06 DIAGNOSIS — E66.01 MORBID OBESITY, UNSPECIFIED OBESITY TYPE: ICD-10-CM

## 2023-04-06 DIAGNOSIS — E11.65 TYPE 2 DIABETES MELLITUS WITH HYPERGLYCEMIA, WITH LONG-TERM CURRENT USE OF INSULIN: ICD-10-CM

## 2023-04-06 DIAGNOSIS — N18.4 CKD (CHRONIC KIDNEY DISEASE) STAGE 4, GFR 15-29 ML/MIN: ICD-10-CM

## 2023-04-06 LAB
ESTIMATED AVG GLUCOSE: 286 MG/DL (ref 68–131)
HBA1C MFR BLD: 11.6 % (ref 4–5.6)

## 2023-04-06 PROCEDURE — 3079F PR MOST RECENT DIASTOLIC BLOOD PRESSURE 80-89 MM HG: ICD-10-PCS | Mod: CPTII,S$GLB,, | Performed by: NURSE PRACTITIONER

## 2023-04-06 PROCEDURE — 3075F PR MOST RECENT SYSTOLIC BLOOD PRESS GE 130-139MM HG: ICD-10-PCS | Mod: CPTII,S$GLB,, | Performed by: NURSE PRACTITIONER

## 2023-04-06 PROCEDURE — 3079F DIAST BP 80-89 MM HG: CPT | Mod: CPTII,S$GLB,, | Performed by: NURSE PRACTITIONER

## 2023-04-06 PROCEDURE — 3075F SYST BP GE 130 - 139MM HG: CPT | Mod: CPTII,S$GLB,, | Performed by: NURSE PRACTITIONER

## 2023-04-06 PROCEDURE — 1160F RVW MEDS BY RX/DR IN RCRD: CPT | Mod: CPTII,S$GLB,, | Performed by: NURSE PRACTITIONER

## 2023-04-06 PROCEDURE — 3008F PR BODY MASS INDEX (BMI) DOCUMENTED: ICD-10-PCS | Mod: CPTII,S$GLB,, | Performed by: NURSE PRACTITIONER

## 2023-04-06 PROCEDURE — 3008F BODY MASS INDEX DOCD: CPT | Mod: CPTII,S$GLB,, | Performed by: NURSE PRACTITIONER

## 2023-04-06 PROCEDURE — 83036 HEMOGLOBIN GLYCOSYLATED A1C: CPT | Performed by: NURSE PRACTITIONER

## 2023-04-06 PROCEDURE — 99214 PR OFFICE/OUTPT VISIT, EST, LEVL IV, 30-39 MIN: ICD-10-PCS | Mod: S$GLB,,, | Performed by: NURSE PRACTITIONER

## 2023-04-06 PROCEDURE — 1160F PR REVIEW ALL MEDS BY PRESCRIBER/CLIN PHARMACIST DOCUMENTED: ICD-10-PCS | Mod: CPTII,S$GLB,, | Performed by: NURSE PRACTITIONER

## 2023-04-06 PROCEDURE — 1159F PR MEDICATION LIST DOCUMENTED IN MEDICAL RECORD: ICD-10-PCS | Mod: CPTII,S$GLB,, | Performed by: NURSE PRACTITIONER

## 2023-04-06 PROCEDURE — 36415 COLL VENOUS BLD VENIPUNCTURE: CPT | Performed by: NURSE PRACTITIONER

## 2023-04-06 PROCEDURE — 99214 OFFICE O/P EST MOD 30 MIN: CPT | Mod: S$GLB,,, | Performed by: NURSE PRACTITIONER

## 2023-04-06 PROCEDURE — 1159F MED LIST DOCD IN RCRD: CPT | Mod: CPTII,S$GLB,, | Performed by: NURSE PRACTITIONER

## 2023-04-06 PROCEDURE — 99999 PR PBB SHADOW E&M-EST. PATIENT-LVL IV: CPT | Mod: PBBFAC,,, | Performed by: NURSE PRACTITIONER

## 2023-04-06 PROCEDURE — 99999 PR PBB SHADOW E&M-EST. PATIENT-LVL IV: ICD-10-PCS | Mod: PBBFAC,,, | Performed by: NURSE PRACTITIONER

## 2023-04-06 RX ORDER — INSULIN DEGLUDEC 200 U/ML
INJECTION, SOLUTION SUBCUTANEOUS
Qty: 24 PEN | Refills: 2 | Status: SHIPPED | OUTPATIENT
Start: 2023-04-06 | End: 2023-07-17 | Stop reason: SDUPTHER

## 2023-04-06 RX ORDER — BLOOD-GLUCOSE SENSOR
EACH MISCELLANEOUS
Qty: 12 EACH | Refills: 3 | Status: SHIPPED | OUTPATIENT
Start: 2023-04-06 | End: 2023-08-23

## 2023-04-06 RX ORDER — TIRZEPATIDE 2.5 MG/.5ML
2.5 INJECTION, SOLUTION SUBCUTANEOUS
Qty: 4 PEN | Refills: 0 | Status: SHIPPED | OUTPATIENT
Start: 2023-04-06 | End: 2023-08-23

## 2023-04-06 RX ORDER — BLOOD-GLUCOSE TRANSMITTER
EACH MISCELLANEOUS
Qty: 1 EACH | Refills: 3 | Status: SHIPPED | OUTPATIENT
Start: 2023-04-06 | End: 2023-08-23

## 2023-04-06 RX ORDER — INSULIN HUMAN 100 [IU]/ML
INJECTION, SUSPENSION SUBCUTANEOUS
Qty: 30 ML | Refills: 2 | Status: SHIPPED | OUTPATIENT
Start: 2023-04-06

## 2023-04-06 RX ORDER — INSULIN LISPRO 100 [IU]/ML
46 INJECTION, SOLUTION INTRAVENOUS; SUBCUTANEOUS
Qty: 135 ML | Refills: 2 | Status: SHIPPED | OUTPATIENT
Start: 2023-04-06 | End: 2023-06-16

## 2023-04-06 RX ORDER — TIRZEPATIDE 5 MG/.5ML
INJECTION, SOLUTION SUBCUTANEOUS
Qty: 4 PEN | Refills: 0 | Status: SHIPPED | OUTPATIENT
Start: 2023-04-06 | End: 2023-08-23

## 2023-04-06 NOTE — PATIENT INSTRUCTIONS
Increase Tresiba from 54 to 64 units twice daily.   Increase Humalog to 46 units before each meal.   Continue Humulin N 24 units nightly.   Start Mounjaro 2.5 mg once weekly for 4 weeks. Then increase to Mounjaro 5 mg once weekly. Prescription for Mounjaro sent to [facility] pharmacy.   Return to clinic in 6 weeks. Resume Dexcom asap - orders sent to [facility].

## 2023-04-06 NOTE — PROGRESS NOTES
CC: This 47 y.o. Black or  female  is here for evaluation of  T2DM along with comorbidities indicated in the Visit Diagnosis section of this encounter.    HPI: Fabiana Chanel was diagnosed with T2DM in 2013. Metformin and a sulfonylurea started at the time of diagnosis.         Prior visit 6/2022  She has started Humaulin 24 units hs and Trulicity 0.75 mg weekly. Had diarrhea and nausea initially x 2 weeks. No more diarrhea now but continues with nausea. Denies vomiting.   BGs have improved significantly.   Plan Glucoses are much improved.   Patient would like to continue Trulicity with hopes that nausea will improve with time.   Continue Trulicity 0.75 mg weekly, Tresiba 54 units twice daily, and Humulin 24 units at night.   Reduce Humalog from 46 to 40 units before meals to avoid afternoon lows.   Reviewed how to correct low blood sugars - try 1/3 bottle of OJ instead the whole bottle.   Return to clinic 2 months with a1c prior.       Interval hx  Pt has not been seen in close to a year. No recent a1c available.   Pt had to stop Trulicity d/t diarrhea. Last dose was several months ago.   Interested to try another weekly injection. Has not been using Dexcom CGM d/t lack of refills.         PMHX: CHF,  MILE on cpap, atrial fibrillation, MI, ICD placement, NICM (cath 11/2017) EF 20% by echo 12/2018    LAST DIABETES EDUCATION: 6/19/19, 11/2021 for Dexcom start       RECENT ILLNESSES/HOSPITALIZATIONS - -  Admitted 12/28/18 x 2 nights for acute on chronic HF      HOSPITALIZED FOR DIABETES  -  Yes - for hyperglycemia     DIABETES MEDICATIONS:    Humulin N 24 units hs 10 pm   Tresiba 54 units bid   Humalog Kwikpen 40 units ac with correction scale : 150-200=+2, 201-250=+4; 251-300=+6; 301-350=+8, over 350=+10 units        Misses medication doses - No but she does run out of Tresiba a week before she's able to get refill from pharmacy.     She injects Humalog before she eats    DM COMPLICATIONS:  peripheral neuropathy and cardiovascular disease    SIGNIFICANT DIABETES MED HISTORY:   diarrhea on metformin 1000 mg instant release; cannot afford topical.    Pt unable to tolerate metformin ER d/t diarrhea even on 500 mg twice daily.   Novolog started 11/2019;   Trulicity 0.75 - diarrhea; Trulicity 1.5 mg - nausea and bloating.   Jardiance - recurrent  infection   Ozempic - nausea   Humulin N at bedtime started 5/2022      SELF MONITORING BLOOD GLUCOSE: monitors glucose currently with fingersticks 3x/day. When she's on her meds, recalls:   FBGs are low 200s.   Prelunch 200s  Predinner - 200-300s.       As high as 400s when she's out of tresiba     HYPOGLYCEMIC EPISODES: none recent;   corrects with an 11 oz bottle of juice (40 grams CHO)   Symptoms start at 60s, feels shaky.      CURRENT DIET: drinks water. No more juice or tea.  Eats 2-3 meals/day. Denies candy, eats peppermints for dry mouth which she notes spike BGs.     Diet recall: dinner was sandwich, water. Lunch was skipped. Breakfast was sandwich.     CURRENT EXERCISE: can tolerate walking up 2 blocks before she gets DIAZ       /87   Pulse 82   Temp 97.3 °F (36.3 °C)   Wt 115.2 kg (254 lb)   BMI 39.78 kg/m²       ROS:   CONSTITUTIONAL: Appetite low, + Fatigue   GI:  + Nausea - occasional     PHYSICAL EXAM:  GENERAL: Well developed, well nourished. No acute distress.   PSYCH: AAOx3,  conversant, well-groomed. Judgement and insight good. Appropriate mood and affect.   NEURO: Cranial nerves grossly intact. Speech clear, no tremor.           Hemoglobin A1C   Date Value Ref Range Status   08/01/2022 7.0 (H) 4.0 - 5.6 % Final     Comment:     ADA Screening Guidelines:  5.7-6.4%  Consistent with prediabetes  >or=6.5%  Consistent with diabetes    High levels of fetal hemoglobin interfere with the HbA1C  assay. Heterozygous hemoglobin variants (HbS, HgC, etc)do  not significantly interfere with this assay.   However, presence of multiple variants may  affect accuracy.     07/01/2022 7.7 (H) 4.0 - 5.6 % Final     Comment:     ADA Screening Guidelines:  5.7-6.4%  Consistent with prediabetes  >or=6.5%  Consistent with diabetes    High levels of fetal hemoglobin interfere with the HbA1C  assay. Heterozygous hemoglobin variants (HbS, HgC, etc)do  not significantly interfere with this assay.   However, presence of multiple variants may affect accuracy.     06/16/2022 8.3 (H) 4.0 - 5.6 % Final     Comment:     ADA Screening Guidelines:  5.7-6.4%  Consistent with prediabetes  >or=6.5%  Consistent with diabetes    High levels of fetal hemoglobin interfere with the HbA1C  assay. Heterozygous hemoglobin variants (HbS, HgC, etc)do  not significantly interfere with this assay.   However, presence of multiple variants may affect accuracy.             Chemistry        Component Value Date/Time     (L) 03/03/2023 1718    K 4.3 03/03/2023 1718    CL 97 03/03/2023 1718    CO2 24 03/03/2023 1718    BUN 28 (H) 03/03/2023 1718    CREATININE 2.2 (H) 03/03/2023 1718     (H) 03/03/2023 1718        Component Value Date/Time    CALCIUM 9.7 03/03/2023 1718    ALKPHOS 119 03/03/2023 1718    AST 18 03/03/2023 1718    ALT 20 03/03/2023 1718    BILITOT 0.6 03/03/2023 1718    ESTGFRAFRICA 36 (A) 07/01/2022 1317    EGFRNONAA 31 (A) 07/01/2022 1317           Latest Reference Range & Units 03/03/23 17:18   BUN 6 - 20 mg/dL 28 (H)   Creatinine 0.5 - 1.4 mg/dL 2.2 (H)   eGFR >60 mL/min/1.73 m^2 27 !   (H): Data is abnormally high  !: Data is abnormal    Lab Results   Component Value Date    LDLCALC 58.4 (L) 07/01/2022            Ref. Range 10/14/2021 08:25   Cholesterol Latest Ref Range: 120 - 199 mg/dL 111 (L)   HDL Latest Ref Range: 40 - 75 mg/dL 38 (L)   HDL/Cholesterol Ratio Latest Ref Range: 20.0 - 50.0 % 34.2   LDL Cholesterol External Latest Ref Range: 63.0 - 159.0 mg/dL 58.4 (L)   Non-HDL Cholesterol Latest Units: mg/dL 73   Total Cholesterol/HDL Ratio Latest Ref Range: 2.0 -  5.0  2.9   Triglycerides Latest Ref Range: 30 - 150 mg/dL 73     Lab Results   Component Value Date    MICALBCREAT 103.2 (H) 12/29/2020           ASSESSMENT and PLAN:    A1C GOAL: < 7 %     1. Type 2 diabetes mellitus with hyperglycemia, with long-term current use of insulin  Increase Tresiba from 54 to 64 units twice daily.   Increase Humalog to 46 units before each meal.   Continue Humulin N 24 units nightly.   Start Mounjaro 2.5 mg once weekly for 4 weeks. Then increase to Mounjaro 5 mg once weekly. Prescription for Mounjaro sent to [facility].   Return to clinic in 6 weeks. Resume Dexcom asap - orders sent to [facility].     Hemoglobin A1C    insulin lispro (HUMALOG KWIKPEN INSULIN) 100 unit/mL pen      2. CKD (chronic kidney disease) stage 4, GFR 15-29 ml/min  Improve glycemic control.         3. Morbid obesity, unspecified obesity type  Increases insulin resistance.             Orders Placed This Encounter   Procedures    Hemoglobin A1C     Standing Status:   Future     Standing Expiration Date:   6/4/2024        Follow up in about 6 weeks (around 5/18/2023).       Patient is testing blood sugars 3x/day and taking 4 injections of insulin a day. The patient's insulin treatment regimen requires frequent adjustments by the patient on the basis of therapeutic CGM testing results.

## 2023-04-12 ENCOUNTER — PATIENT MESSAGE (OUTPATIENT)
Dept: ADMINISTRATIVE | Facility: HOSPITAL | Age: 48
End: 2023-04-12
Payer: MEDICARE

## 2023-04-14 ENCOUNTER — HOSPITAL ENCOUNTER (OUTPATIENT)
Facility: OTHER | Age: 48
Discharge: HOME | End: 2023-04-14
Attending: ANESTHESIOLOGY | Admitting: ANESTHESIOLOGY
Payer: MEDICARE

## 2023-04-14 VITALS
WEIGHT: 245 LBS | RESPIRATION RATE: 14 BRPM | BODY MASS INDEX: 38.45 KG/M2 | HEIGHT: 67 IN | DIASTOLIC BLOOD PRESSURE: 80 MMHG | SYSTOLIC BLOOD PRESSURE: 117 MMHG | OXYGEN SATURATION: 92 % | HEART RATE: 81 BPM | TEMPERATURE: 98 F

## 2023-04-14 DIAGNOSIS — M54.16 LUMBAR RADICULOPATHY: Primary | ICD-10-CM

## 2023-04-14 DIAGNOSIS — G89.29 CHRONIC PAIN: ICD-10-CM

## 2023-04-14 LAB — POCT GLUCOSE: 101 MG/DL (ref 70–110)

## 2023-04-14 PROCEDURE — 25000003 PHARM REV CODE 250: Performed by: ANESTHESIOLOGY

## 2023-04-14 PROCEDURE — 64483 PR EPIDURAL INJ, ANES/STEROID, TRANSFORAMINAL, LUMB/SACR, SNGL LEVL: ICD-10-PCS | Mod: RT,,, | Performed by: ANESTHESIOLOGY

## 2023-04-14 PROCEDURE — 64483 NJX AA&/STRD TFRM EPI L/S 1: CPT | Mod: RT,,, | Performed by: ANESTHESIOLOGY

## 2023-04-14 PROCEDURE — 63600175 PHARM REV CODE 636 W HCPCS: Performed by: ANESTHESIOLOGY

## 2023-04-14 PROCEDURE — 25000003 PHARM REV CODE 250: Performed by: STUDENT IN AN ORGANIZED HEALTH CARE EDUCATION/TRAINING PROGRAM

## 2023-04-14 PROCEDURE — 64483 NJX AA&/STRD TFRM EPI L/S 1: CPT | Mod: RT | Performed by: ANESTHESIOLOGY

## 2023-04-14 RX ORDER — LIDOCAINE HYDROCHLORIDE 20 MG/ML
INJECTION, SOLUTION INFILTRATION; PERINEURAL
Status: DISCONTINUED | OUTPATIENT
Start: 2023-04-14 | End: 2023-04-14 | Stop reason: HOSPADM

## 2023-04-14 RX ORDER — SODIUM CHLORIDE 9 MG/ML
500 INJECTION, SOLUTION INTRAVENOUS CONTINUOUS
Status: DISCONTINUED | OUTPATIENT
Start: 2023-04-14 | End: 2023-04-14 | Stop reason: HOSPADM

## 2023-04-14 RX ORDER — FENTANYL CITRATE 50 UG/ML
INJECTION, SOLUTION INTRAMUSCULAR; INTRAVENOUS
Status: DISCONTINUED | OUTPATIENT
Start: 2023-04-14 | End: 2023-04-14 | Stop reason: HOSPADM

## 2023-04-14 RX ORDER — MIDAZOLAM HYDROCHLORIDE 1 MG/ML
INJECTION INTRAMUSCULAR; INTRAVENOUS
Status: DISCONTINUED | OUTPATIENT
Start: 2023-04-14 | End: 2023-04-14 | Stop reason: HOSPADM

## 2023-04-14 NOTE — DISCHARGE SUMMARY
Discharge Note  Short Stay      SUMMARY     Admit Date: 4/14/2023    Attending Physician: Jed Yeager      Discharge Physician: Jed Yeager      Discharge Date: 4/14/2023 2:07 PM    Procedure(s) (LRB):  INJECTION, STEROID, EPIDURAL, TRANSFORAMINAL APPROACH RIGHT S1 (Right)    Final Diagnosis: Lumbar radiculopathy [M54.16]  Lumbosacral radiculopathy [M54.17]    Disposition: Home or self care    Patient Instructions:   Current Discharge Medication List        CONTINUE these medications which have NOT CHANGED    Details   acetaminophen (TYLENOL) 500 MG tablet Take 2 tablets (1,000 mg total) by mouth every 8 (eight) hours as needed.  Qty: 40 tablet, Refills: 0      albuterol (VENTOLIN HFA) 90 mcg/actuation inhaler Inhale 2 puffs into the lungs every 6 (six) hours as needed for Wheezing or Shortness of Breath. Rescue  Qty: 18 g, Refills: 2    Associated Diagnoses: COVID-19      apixaban (ELIQUIS) 5 mg Tab Take 1 tablet (5 mg total) by mouth 2 (two) times daily.  Qty: 180 tablet, Refills: 3      blood glucose control, high Soln 1 each by Misc.(Non-Drug; Combo Route) route once. for 1 dose  Qty: 1 each, Refills: 3    Associated Diagnoses: Type 2 diabetes mellitus without complication, without long-term current use of insulin      blood glucose control, low Soln 1 each by Misc.(Non-Drug; Combo Route) route once. for 1 dose  Qty: 1 each, Refills: 3    Associated Diagnoses: Type 2 diabetes mellitus without complication, without long-term current use of insulin      BLOOD PRESSURE CUFF Misc 1 kit by Misc.(Non-Drug; Combo Route) route 2 (two) times daily.  Qty: 1 each, Refills: 0    Associated Diagnoses: Chronic combined systolic and diastolic heart failure; Essential hypertension; Nonischemic cardiomyopathy      blood sugar diagnostic Strp Check blood glucose 3x/day.  Qty: 300 strip, Refills: 3      blood-glucose meter (TRUE METRIX AIR GLUCOSE METER) Misc 1 each by Misc.(Non-Drug; Combo Route) route 3 (three) times  daily.  Qty: 1 each, Refills: 0    Associated Diagnoses: Type 2 diabetes mellitus without complication, without long-term current use of insulin      blood-glucose meter,continuous (DEXCOM G6 ) Misc Use with dexcom G6 system  Qty: 1 each, Refills: 0      blood-glucose sensor (DEXCOM G6 SENSOR) Lupe Change sensor every 10 days  Qty: 12 each, Refills: 3      blood-glucose transmitter (DEXCOM G6 TRANSMITTER) Lupe Change every 3 months  Qty: 1 each, Refills: 3      fluticasone propionate (FLONASE) 50 mcg/actuation nasal spray 1 spray (50 mcg total) by Each Nostril route 2 (two) times daily as needed for Rhinitis.  Qty: 15 g, Refills: 0      furosemide (LASIX) 40 MG tablet TAKE 1 TABLET BY MOUTH ONCE DAILY **PLEASE  HOLD  WHILE  NOT  DRINKING**  Qty: 30 tablet, Refills: 0    Associated Diagnoses: Chronic combined systolic and diastolic heart failure      gabapentin (NEURONTIN) 400 MG capsule TAKE 1 CAPSULE BY MOUTH TWICE DAILY AS NEEDED  Qty: 180 capsule, Refills: 0    Associated Diagnoses: Uncontrolled type 2 diabetes mellitus with hyperglycemia      insulin degludec (TRESIBA FLEXTOUCH U-200) 200 unit/mL (3 mL) insulin pen Inject 64-80 units twice daily  Qty: 24 pen, Refills: 2    Comments: Dispense 90  day supply      insulin lispro (HUMALOG KWIKPEN INSULIN) 100 unit/mL pen Inject 46 Units into the skin 3 (three) times daily before meals.  Qty: 135 mL, Refills: 2    Comments: 90 day supply  Associated Diagnoses: Type 2 diabetes mellitus with hyperglycemia, with long-term current use of insulin      insulin NPH isoph U-100 human (HUMULIN N NPH INSULIN KWIKPEN) 100 unit/mL (3 mL) InPn Inject 24 units nightly at 10 pm  Qty: 30 mL, Refills: 2      metoprolol succinate (TOPROL-XL) 50 MG 24 hr tablet TAKE 1 TABLET BY MOUTH ONCE DAILY *HOLD  IF  SYSTOLIC  BLOOD  PRESSURE  IS  LESS  THAN  120**  Qty: 90 tablet, Refills: 0    Associated Diagnoses: Chronic combined systolic and diastolic heart failure      mupirocin  "(BACTROBAN) 2 % ointment Apply topically nightly.      ondansetron (ZOFRAN) 4 MG tablet Take 1 tablet (4 mg total) by mouth every 8 (eight) hours as needed (Nausea and vomiting).  Qty: 12 tablet, Refills: 1      ondansetron (ZOFRAN-ODT) 4 MG TbDL Take 1 tablet (4 mg total) by mouth every 8 (eight) hours as needed.  Qty: 20 tablet, Refills: 0      oxyCODONE-acetaminophen (PERCOCET) 5-325 mg per tablet Take 1 tablet by mouth every 24 hours as needed for Pain.  Qty: 30 tablet, Refills: 0    Comments: Quantity prescribed more than 7 day supply? Yes, quantity medically necessary  Associated Diagnoses: Lumbar radiculopathy; Dorsalgia, unspecified; DDD (degenerative disc disease), lumbar      pen needle, diabetic (BD LORI 2ND GEN PEN NEEDLE) 32 gauge x 5/32" Ndle USE 1 PEN NEEDLE 4 TIMES DAILY  Qty: 400 each, Refills: 3      rosuvastatin (CRESTOR) 40 MG Tab Take 1 tablet (40 mg total) by mouth every evening.  Qty: 90 tablet, Refills: 3    Associated Diagnoses: Chronic combined systolic and diastolic heart failure      spironolactone (ALDACTONE) 25 MG tablet Take 1 tablet (25 mg total) by mouth once daily. Hold until eating and drinking improves. Need to weight self daily  Qty: 90 tablet, Refills: 1    Comments: .      tirzepatide (MOUNJARO) 2.5 mg/0.5 mL PnIj Start Mounjaro 2.5 mg into the skin once weekly for 4 weeks. Then increase to Mounjaro 5 mg once weekly.  Qty: 4 pen, Refills: 0      tirzepatide (MOUNJARO) 5 mg/0.5 mL PnIj Start Mounjaro 2.5 mg once weekly for 4 weeks. Then increase to Mounjaro 5 mg once weekly.  Qty: 4 pen, Refills: 0      triamcinolone acetonide 0.1% (KENALOG) 0.1 % cream 2 (two) times daily. Apply to affected area  Refills: 4                 Discharge Diagnosis: Lumbar radiculopathy [M54.16]  Lumbosacral radiculopathy [M54.17]  Condition on Discharge: Stable with no complications to procedure   Diet on Discharge: Same as before.  Activity: as per instruction sheet.  Discharge to: Home with a " responsible adult.  Follow up: 2 weeks

## 2023-04-14 NOTE — OP NOTE
Lumbar Transforaminal Epidural Steroid Injection under Fluoroscopic Guidance    The procedure, risks, benefits, and options were discussed with the patient. There are no contraindications to the procedure. The patent expressed understanding and agreed to the procedure. Informed written consent was obtained prior to the start of the procedure and can be found in the patient's chart.    PATIENT NAME: Fabiana Chanel   MRN: [id number]     DATE OF PROCEDURE: 04/14/2023    PROCEDURE:  Right  S1 Lumbar Transforaminal Epidural Steroid Injection under Fluoroscopic Guidance    PRE-OP DIAGNOSIS: Lumbar radiculopathy [M54.16]  Lumbosacral radiculopathy [M54.17] Lumbar radiculopathy [M54.16]    POST-OP DIAGNOSIS: Same    PHYSICIAN: Jed Yeager MD    ASSISTANTS: Dr. Hyman     MEDICATIONS INJECTED: Preservative-free Decadron 10mg with 2cc of Lidocaine 1% MPF     LOCAL ANESTHETIC INJECTED: Xylocaine 2%     SEDATION: Versed 2mg and Fentanyl 50mcg                                                                                                                                                                                     Conscious sedation ordered by M.D. Patient re-evaluation prior to administration of conscious sedation. No changes noted in patient's status from initial evaluation. The patient's vital signs were monitored by RN and patient remained hemodynamically stable throughout the procedure.    Event Time In   Sedation Start 1403   Sedation End 1406       ESTIMATED BLOOD LOSS: None    COMPLICATIONS: None    TECHNIQUE: Time-out was performed to identify the patient and procedure to be performed. With the patient laying in a prone position, the surgical area was prepped and draped in the usual sterile fashion using ChloraPrep and a fenestrated drape.The levels were determined under fluoroscopy guidance. Skin anesthesia was achieved by injecting Lidocaine 2% over the injection sites. The transforaminal spaces were then  approached with a 25 gauge, 3.5 inch spinal quinke needle that was introduced under fluoroscopic guidance in the AP and Lateral views. Once the needle tip was in the area of the transforaminal space, and there was no blood, CSF or paraesthesias, contrast dye Omnipaque (300mg/mL) was injected to confirm placement and there was no vascular runoff. Fluoroscopic imaging in the AP and lateral views revealed a clear outline of the spinal nerve with proximal spread of agent through the neural foramen into the epidural space. 3 mL of the medication mixture listed above was injected slowly at each site. Displacement of the radio opaque contrast after injection of the medication confirmed that the medication went into the area of the transforaminal spaces. The needles were removed and bleeding was nil. A sterile dressing was applied. No specimens collected. The patient tolerated the procedure well.       The patient was monitored after the procedure in the recovery area. They were given post-procedure and discharge instructions to follow at home. The patient was discharged in a stable condition.        Jed Yeager MD

## 2023-04-14 NOTE — H&P
HPI  Patient presenting for Procedure(s) (LRB):  INJECTION, STEROID, EPIDURAL, TRANSFORAMINAL APPROACH RIGHT S1 (Right)     Patient on Anti-coagulation Yes, stopped Eliquis on Tuesday, 4/11/23.     No health changes since previous encounter    Past Medical History:   Diagnosis Date    Atrial fibrillation     Blood clot associated with vein wall inflammation     not dvt    Cardiomyopathy     Normal cors on cath 11/2017    CHF (congestive heart failure)     DM (diabetes mellitus) 9/19/2013    Hyperlipidemia     Hypertension     Psoriasis     Sleep apnea      Past Surgical History:   Procedure Laterality Date    CARDIAC CATHETERIZATION      COLONOSCOPY      COLONOSCOPY N/A 3/9/2022    Procedure: COLONOSCOPY;  Surgeon: Vielka Burrell MD;  Location: [facility] ENDO;  Service: Endoscopy;  Laterality: N/A;    DILATION AND CURETTAGE OF UTERUS      ESOPHAGOGASTRODUODENOSCOPY N/A 5/9/2019    Procedure: EGD (ESOPHAGOGASTRODUODENOSCOPY);  Surgeon: Vielka Burrell MD;  Location: [facility] ENDO;  Service: Endoscopy;  Laterality: N/A;    TRANSFORAMINAL EPIDURAL INJECTION OF STEROID N/A 1/6/2022    Procedure: Transforaminal ANKITA L4/L5 L5/S1;  Surgeon: Jed Yeager MD;  Location: [facility] PAIN [facility]GT;  Service: Pain Management;  Laterality: N/A;    TRANSFORAMINAL EPIDURAL INJECTION OF STEROID Right 12/1/2022    Procedure: INJECTION, STEROID, EPIDURAL, TRANSFORAMINAL APPROACH, RIGHT L4/L5 & L5/S1 CONTRAST;  Surgeon: Jed Yeager MD;  Location: [facility] PAIN [facility]GT;  Service: Pain Management;  Laterality: Right;     Review of patient's allergies indicates:   Allergen Reactions    Metformin      Diarrhea on metformin XR     Pneumococcal 23-abimbola ps vaccine       Current Facility-Administered Medications   Medication    0.9%  NaCl infusion       PMHx, PSHx, Allergies, Medications reviewed in epic    ROS negative except pain complaints in HPI    OBJECTIVE:    There were no vitals taken for this visit.    PHYSICAL EXAMINATION:    GENERAL: Well  appearing, in no acute distress, alert and oriented x3.  PSYCH:  Mood and affect appropriate.  SKIN: Skin color, texture, turgor normal, no rashes or lesions which will impact the procedure.  CV: RRR with palpation of the radial artery.  PULM: No evidence of respiratory difficulty, symmetric chest rise. Clear to auscultation.  NEURO: Cranial nerves grossly intact.    Plan:    Proceed with procedure as planned Procedure(s) (LRB):  INJECTION, STEROID, EPIDURAL, TRANSFORAMINAL APPROACH RIGHT S1 (Right)    Bartolome Hyman  04/14/2023

## 2023-04-14 NOTE — DISCHARGE INSTRUCTIONS
Thank you for allowing us to care for you today. You may receive a survey about the care we provided. Your feedback is valuable and helps us provide excellent care throughout the community.     Home Care Instructions for Pain Management:    1. DIET:   You may resume your normal diet today.   2. BATHING:   You may shower with luke warm water. No tub baths or anything that will soak injection sites under water for the next 24 hours.  3. DRESSING:   You may remove your bandage today.   4. ACTIVITY LEVEL:   You may resume your normal activities 24 hrs after your procedure. Nothing strenuous today.  5. MEDICATIONS:   You may resume your normal medications today. To restart blood thinners, ask your doctor.  6. DRIVING    If you have received any sedatives by mouth today, you may not drive for 12 hours.    If you have received any sedation through your IV, you may not drive for 24 hrs.   7. SPECIAL INSTRUCTIONS:   No heat to the injection site for 24 hrs including, hot bath or shower, heating pad, moist heat, or hot tubs.    Use ice pack to injection site for any pain or discomfort.  Apply ice packs for 20 minute intervals as needed.    IF you have diabetes, be sure to monitor your blood sugar more closely. IF your injection contained steroids your blood sugar levels may become higher than normal.    If you are still having pain upon discharge:  Your pain may improve over the next 48 hours. The anesthetic (numbing medication) works immediately to 48 hours. IF your injection contained a steroid (anti-inflammatory medication), it takes approximately 3 days to start feeling relief and 7-10 days to see your greatest results from the medication. It is possible you may need subsequent injections. This would be discussed at your follow up appointment with pain management or your referring doctor.    Please call the PAIN MANAGEMENT office at [phone] or ON CALL pager at [phone] if you experienced any:   -Weakness or  loss of sensation  -Fever > 101.5  -Pain uncontrolled with oral medications   -Persistent nausea, vomiting, or diarrhea  -Redness or drainage from the injection sites, or any other worrisome concerns.   If physician on call was not reached or could not communicate with our office for any reason please go to the nearest emergency department. Adult Procedural Sedation Instructions    Recovery After Procedural Sedation (Adult)  You have been given medicine by vein to make you sleep during your surgery. This may have included both a pain medicine and sleeping medicine. Most of the effects have worn off. But you may still have some drowsiness for the next 6 to 8 hours.  Home care  Follow these guidelines when you get home:  For the next 8 hours, you should be watched by a responsible adult. This person should make sure your condition is not getting worse.  Don't drink any alcohol for the next 24 hours.  Don't drive, operate dangerous machinery, or make important business or personal decisions during the next 24 hours.  Note: Your healthcare provider may tell you not to take any medicine by mouth for pain or sleep in the next 4 hours. These medicines may react with the medicines you were given in the hospital. This could cause a much stronger response than usual.  Follow-up care  Follow up with your healthcare provider if you are not alert and back to your usual level of activity within 12 hours.  When to seek medical advice  Call your healthcare provider right away if any of these occur:  Drowsiness gets worse  Weakness or dizziness gets worse  Repeated vomiting  You can't be awakened   Date Last Reviewed: 10/18/2016  © 7183-8409 [employer]. [address] [place] [address], [place] [address]. All rights reserved. This information is not intended as a substitute for professional medical care. Always follow your healthcare professional's instructions.

## 2023-04-20 ENCOUNTER — CLINICAL SUPPORT (OUTPATIENT)
Dept: CARDIOLOGY | Facility: HOSPITAL | Age: 48
End: 2023-04-20
Payer: MEDICARE

## 2023-04-20 DIAGNOSIS — I50.42 CHRONIC COMBINED SYSTOLIC (CONGESTIVE) AND DIASTOLIC (CONGESTIVE) HEART FAILURE: ICD-10-CM

## 2023-04-20 DIAGNOSIS — Z95.810 PRESENCE OF AUTOMATIC (IMPLANTABLE) CARDIAC DEFIBRILLATOR: ICD-10-CM

## 2023-04-20 DIAGNOSIS — I47.29 OTHER VENTRICULAR TACHYCARDIA: ICD-10-CM

## 2023-04-20 DIAGNOSIS — I42.0 DILATED CARDIOMYOPATHY: ICD-10-CM

## 2023-04-20 DIAGNOSIS — I48.91 UNSPECIFIED ATRIAL FIBRILLATION: ICD-10-CM

## 2023-04-20 PROCEDURE — 93296 REM INTERROG EVL PM/IDS: CPT | Performed by: INTERNAL MEDICINE

## 2023-04-27 ENCOUNTER — PATIENT OUTREACH (OUTPATIENT)
Dept: ADMINISTRATIVE | Facility: HOSPITAL | Age: 48
End: 2023-04-27
Payer: MEDICARE

## 2023-04-27 ENCOUNTER — PATIENT MESSAGE (OUTPATIENT)
Dept: ADMINISTRATIVE | Facility: HOSPITAL | Age: 48
End: 2023-04-27
Payer: MEDICARE

## 2023-04-27 DIAGNOSIS — E11.65 UNCONTROLLED TYPE 2 DIABETES MELLITUS WITH HYPERGLYCEMIA: ICD-10-CM

## 2023-04-27 NOTE — TELEPHONE ENCOUNTER
No care due was identified.  [facility] Embedded Care Due Messages. Reference number: [id number].   4/27/2023 12:02:18 PM CDT

## 2023-04-27 NOTE — PROGRESS NOTES
HM and immunization's reviewed and updated. Due for eye exam, mammogram, and pap smear. Unable to reach. Sent [facility] message.

## 2023-04-28 ENCOUNTER — OFFICE VISIT (OUTPATIENT)
Dept: PAIN MEDICINE | Facility: CLINIC | Age: 48
End: 2023-04-28
Payer: MEDICARE

## 2023-04-28 DIAGNOSIS — M54.9 DORSALGIA, UNSPECIFIED: ICD-10-CM

## 2023-04-28 DIAGNOSIS — M54.16 LUMBAR RADICULOPATHY: Primary | ICD-10-CM

## 2023-04-28 PROCEDURE — 99214 PR OFFICE/OUTPT VISIT, EST, LEVL IV, 30-39 MIN: ICD-10-PCS | Mod: 95,,, | Performed by: NURSE PRACTITIONER

## 2023-04-28 PROCEDURE — 1160F PR REVIEW ALL MEDS BY PRESCRIBER/CLIN PHARMACIST DOCUMENTED: ICD-10-PCS | Mod: CPTII,95,, | Performed by: NURSE PRACTITIONER

## 2023-04-28 PROCEDURE — 3046F PR MOST RECENT HEMOGLOBIN A1C LEVEL > 9.0%: ICD-10-PCS | Mod: CPTII,95,, | Performed by: NURSE PRACTITIONER

## 2023-04-28 PROCEDURE — 1159F PR MEDICATION LIST DOCUMENTED IN MEDICAL RECORD: ICD-10-PCS | Mod: CPTII,95,, | Performed by: NURSE PRACTITIONER

## 2023-04-28 PROCEDURE — 1159F MED LIST DOCD IN RCRD: CPT | Mod: CPTII,95,, | Performed by: NURSE PRACTITIONER

## 2023-04-28 PROCEDURE — 99214 OFFICE O/P EST MOD 30 MIN: CPT | Mod: 95,,, | Performed by: NURSE PRACTITIONER

## 2023-04-28 PROCEDURE — 3046F HEMOGLOBIN A1C LEVEL >9.0%: CPT | Mod: CPTII,95,, | Performed by: NURSE PRACTITIONER

## 2023-04-28 PROCEDURE — 1160F RVW MEDS BY RX/DR IN RCRD: CPT | Mod: CPTII,95,, | Performed by: NURSE PRACTITIONER

## 2023-04-28 RX ORDER — GABAPENTIN 400 MG/1
CAPSULE ORAL
Qty: 180 CAPSULE | Refills: 0 | Status: SHIPPED | OUTPATIENT
Start: 2023-04-28 | End: 2023-12-20

## 2023-04-28 RX ORDER — METHOCARBAMOL 750 MG/1
750 TABLET, FILM COATED ORAL EVERY 8 HOURS PRN
Qty: 90 TABLET | Refills: 5 | Status: SHIPPED | OUTPATIENT
Start: 2023-04-28 | End: 2023-05-28
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
## 2023-05-08 ENCOUNTER — PATIENT MESSAGE (OUTPATIENT)
Dept: ADMINISTRATIVE | Facility: HOSPITAL | Age: 48
End: 2023-05-08
Payer: MEDICARE

## 2023-05-08 ENCOUNTER — PATIENT OUTREACH (OUTPATIENT)
Dept: ADMINISTRATIVE | Facility: HOSPITAL | Age: 48
End: 2023-05-08
Payer: MEDICARE

## 2023-05-08 NOTE — PROGRESS NOTES
HM and immunization's reviewed and updated. Due for eye exam, mammogram, and pap smear. Left message for patient to return call.. Sent [facility] message.     Calling for overdue health maintenance above, will need to get recent records of where patient had done, if not done can put in orders to get scheduled. Please forward messages to me.

## 2023-05-11 ENCOUNTER — HOSPITAL ENCOUNTER (EMERGENCY)
Facility: HOSPITAL | Age: 48
Discharge: HOSPICE | End: 2023-05-11
Attending: EMERGENCY MEDICINE
Payer: MEDICARE

## 2023-05-11 VITALS
TEMPERATURE: 98 F | SYSTOLIC BLOOD PRESSURE: 142 MMHG | WEIGHT: 240 LBS | BODY MASS INDEX: 37.67 KG/M2 | HEART RATE: 88 BPM | OXYGEN SATURATION: 100 % | HEIGHT: 67 IN | RESPIRATION RATE: 16 BRPM | DIASTOLIC BLOOD PRESSURE: 94 MMHG

## 2023-05-11 DIAGNOSIS — K61.0 PERIANAL ABSCESS: Primary | ICD-10-CM

## 2023-05-11 PROCEDURE — 25000003 PHARM REV CODE 250: Performed by: PHYSICIAN ASSISTANT

## 2023-05-11 PROCEDURE — 25000003 PHARM REV CODE 250

## 2023-05-11 PROCEDURE — 99284 EMERGENCY DEPT VISIT MOD MDM: CPT | Mod: 25

## 2023-05-11 PROCEDURE — 56405 I&D VULVA/PERINEAL ABSCESS: CPT

## 2023-05-11 RX ORDER — AMOXICILLIN AND CLAVULANATE POTASSIUM 875; 125 MG/1; MG/1
1 TABLET, FILM COATED ORAL 2 TIMES DAILY
Qty: 14 TABLET | Refills: 0 | Status: SHIPPED | OUTPATIENT
Start: 2023-05-11 | End: 2023-05-18

## 2023-05-11 RX ORDER — LIDOCAINE HYDROCHLORIDE 10 MG/ML
10 INJECTION INFILTRATION; PERINEURAL
Status: COMPLETED | OUTPATIENT
Start: 2023-05-11 | End: 2023-05-11

## 2023-05-11 RX ORDER — AMOXICILLIN AND CLAVULANATE POTASSIUM 875; 125 MG/1; MG/1
1 TABLET, FILM COATED ORAL
Status: COMPLETED | OUTPATIENT
Start: 2023-05-11 | End: 2023-05-11

## 2023-05-11 RX ORDER — OXYCODONE AND ACETAMINOPHEN 5; 325 MG/1; MG/1
1 TABLET ORAL EVERY 6 HOURS PRN
Qty: 12 TABLET | Refills: 0 | Status: SHIPPED | OUTPATIENT
Start: 2023-05-11 | End: 2023-11-20 | Stop reason: SDUPTHER

## 2023-05-11 RX ADMIN — AMOXICILLIN AND CLAVULANATE POTASSIUM 1 TABLET: 875; 125 TABLET, FILM COATED ORAL at 09:05

## 2023-05-11 RX ADMIN — LIDOCAINE HYDROCHLORIDE 10 ML: 10 INJECTION, SOLUTION INFILTRATION; PERINEURAL at 09:05

## 2023-05-12 DIAGNOSIS — I50.42 CHRONIC COMBINED SYSTOLIC AND DIASTOLIC HEART FAILURE: ICD-10-CM

## 2023-05-12 RX ORDER — FUROSEMIDE 40 MG/1
TABLET ORAL
Qty: 30 TABLET | Refills: 0 | Status: SHIPPED | OUTPATIENT
Start: 2023-05-12 | End: 2023-06-16

## 2023-05-12 NOTE — TELEPHONE ENCOUNTER
No care due was identified.  [facility] Embedded Care Due Messages. Reference number: [id number].   5/12/2023 2:41:26 PM CDT

## 2023-05-12 NOTE — ED NOTES
Pt reports having an abscess to rectum years ago and reports noticing one above the anus two days ago that results in pain with defecation, pt d

## 2023-05-12 NOTE — ED PROVIDER NOTES
Encounter Date: 5/11/2023       History     Chief Complaint   Patient presents with    Abscess     Pt c/o abscess to rectum x 2 days. Pt states she experienced an abscess to rectum in the past which required draining.       46 y/o y/ F w/ Hx of atrial fibrillation (eliquis), cardiomyopathy, CHF, DMII, HLD, HTN, psoriasis, and MILE presenting today for evaluation of an abscess. Pt reports noticing an abscess between her buttocks 2 days ago that has become painful as of today. She reports having an abscess in the same spot in the past but notes that it normally drains on its own after a hot bath. She denies drainage, bleeding, fever, chills, nausea, vomiting, chest pain, diarrhea.     The history is provided by the patient. No  was used.   Review of patient's allergies indicates:   Allergen Reactions    Metformin      Diarrhea on metformin XR     Pneumococcal 23-abimbola ps vaccine      Past Medical History:   Diagnosis Date    Atrial fibrillation     Blood clot associated with vein wall inflammation     not dvt    Cardiomyopathy     Normal cors on cath 11/2017    CHF (congestive heart failure)     DM (diabetes mellitus) 9/19/2013    Hyperlipidemia     Hypertension     Psoriasis     Sleep apnea      Past Surgical History:   Procedure Laterality Date    CARDIAC CATHETERIZATION      COLONOSCOPY      COLONOSCOPY N/A 3/9/2022    Procedure: COLONOSCOPY;  Surgeon: Vielka Burrell MD;  Location: [facility];  Service: Endoscopy;  Laterality: N/A;    DILATION AND CURETTAGE OF UTERUS      ESOPHAGOGASTRODUODENOSCOPY N/A 5/9/2019    Procedure: EGD (ESOPHAGOGASTRODUODENOSCOPY);  Surgeon: Vielka Burrell MD;  Location: [facility] ENDO;  Service: Endoscopy;  Laterality: N/A;    TRANSFORAMINAL EPIDURAL INJECTION OF STEROID N/A 1/6/2022    Procedure: Transforaminal ANKITA L4/L5 L5/S1;  Surgeon: Jed Yeager MD;  Location: [facility] PAIN MGT;  Service: Pain Management;  Laterality: N/A;    TRANSFORAMINAL EPIDURAL  INJECTION OF STEROID Right 12/1/2022    Procedure: INJECTION, STEROID, EPIDURAL, TRANSFORAMINAL APPROACH, RIGHT L4/L5 & L5/S1 CONTRAST;  Surgeon: Jed Yeager MD;  Location: [facility], [place], operated by [facility] PAIN MGT;  Service: Pain Management;  Laterality: Right;    TRANSFORAMINAL EPIDURAL INJECTION OF STEROID Right 4/14/2023    Procedure: INJECTION, STEROID, EPIDURAL, TRANSFORAMINAL APPROACH RIGHT S1;  Surgeon: Jed Yeager MD;  Location: [facility], [place], operated by [facility] PAIN MGT;  Service: Pain Management;  Laterality: Right;  3/14 AUTH     Family History   Problem Relation Age of Onset    Hypertension Mother     Hypertension Father     Diabetes Father     Diabetes Maternal Grandmother     Diabetes Paternal Grandmother     Breast cancer Neg Hx     Colon cancer Neg Hx     Ovarian cancer Neg Hx      Social History     Tobacco Use    Smoking status: Never    Smokeless tobacco: Never    Tobacco comments:     smokes cigars on occasion   Substance Use Topics    Alcohol use: Not Currently     Comment: occasional    Drug use: Not Currently     Types: Marijuana     Comment: occ     Review of Systems   Constitutional:  Negative for fever.   HENT:  Negative for sore throat.    Respiratory:  Negative for shortness of breath.    Cardiovascular:  Negative for chest pain.   Gastrointestinal:  Negative for abdominal pain, blood in stool, constipation, diarrhea, nausea and rectal pain.        Abscess between buttocks   Genitourinary:  Negative for dysuria.   Musculoskeletal:  Negative for back pain.   Skin:  Positive for wound. Negative for rash.   Neurological:  Negative for weakness.   Hematological:  Does not bruise/bleed easily.     Physical Exam     Initial Vitals [05/11/23 1832]   BP Pulse Resp Temp SpO2   136/80 91 19 98.7 °F (37.1 °C) 96 %      MAP       --         Physical Exam    Nursing note and vitals reviewed.  Constitutional: She appears well-developed and well-nourished. She is not diaphoretic. No distress.   HENT:   Head: Normocephalic and atraumatic.   Right  Ear: External ear normal.   Left Ear: External ear normal.   Eyes: EOM are normal.   Neck:   Normal range of motion.  Cardiovascular:  Normal rate, regular rhythm, normal heart sounds and intact distal pulses.           Pulmonary/Chest: Breath sounds normal. No respiratory distress. She has no wheezes.   Abdominal: Abdomen is soft.   Genitourinary:    Rectum normal.      Genitourinary Comments: Odilia, RN and Abbey, RN present during rectal exam         Musculoskeletal:      Cervical back: Normal range of motion.     Neurological: She is alert.   Psychiatric: She has a normal mood and affect. Her behavior is normal. Judgment and thought content normal.       ED Course   I & D - Incision and Drainage    Date/Time: 5/11/2023 9:30 PM  Location procedure was performed: [facility] EMERGENCY DEPARTMENT  Performed by: Esteban Barraza PA-C  Authorized by: Shun Olguin MD   Type: abscess  Body area: anogenital  Location details: perianal  Anesthesia: local infiltration    Anesthesia:  Local Anesthetic: lidocaine 1% without epinephrine  Anesthetic total: 6 mL    Patient sedated: no  Scalpel size: 11  Incision type: single straight  Complexity: complex  Drainage: pus and serosanguinous  Drainage amount: moderate  Wound treatment: incision, wound left open, drainage, deloculation, expression of material, clot removal and wound packed  Packing material: 1/4 in gauze  Complications: No  Specimens: No  Patient tolerance: Patient tolerated the procedure well with no immediate complications  Comments: Dark blood, clots, and purulence. Left over from previous abscess? No obvious rectocutaneous fistula.      Labs Reviewed - No data to display       Imaging Results    None          Medications   LIDOcaine HCL 10 mg/ml (1%) injection 10 mL (10 mLs Infiltration Given 5/11/23 2125)   amoxicillin-clavulanate 875-125mg per tablet 1 tablet (1 tablet Oral Given 5/11/23 2146)     Medical Decision Making:   Differential Diagnosis:   Pt presenting  2/2 abscess. Patient did/did not have surrounding cellulitis. No systemic complaints or vital signs to think patient is septic. Please see procedure note for I and D which patient tolerated. Patient dc home with Augmentin given uncontrolled DM. No other complaints and Neurovascularly intact.     Cautious return precautions discussed with patient and/or family with understanding. Prompt f/u with primary care physician discussed. All questions answered. Patient comfortable with plan.                           Clinical Impression:   Final diagnoses:  [K61.0] Perianal abscess (Primary)        ED Disposition Condition    Discharge Stable          ED Prescriptions       Medication Sig Dispense Start Date End Date Auth. Provider    amoxicillin-clavulanate 875-125mg (AUGMENTIN) 875-125 mg per tablet Take 1 tablet by mouth 2 (two) times daily. for 7 days 14 tablet 5/11/2023 5/18/2023 Esteban Barraza PA-C    oxyCODONE-acetaminophen (PERCOCET) 5-325 mg per tablet Take 1 tablet by mouth every 6 (six) hours as needed (severe pain). 12 tablet 5/11/2023 -- Esteban Barraza PA-C          Follow-up Information       Follow up With Specialties Details Why Contact Info    Francois Pruitt MD General Surgery, Surgery Schedule an appointment as soon as possible for a visit  For reevaluation [address]  [address] [place] [address] [id number] [phone]               Esteban Barraza PA-C  05/12/23 0408

## 2023-05-12 NOTE — DISCHARGE INSTRUCTIONS
Keep current dressing in place for 24 hours.  After that, change dressing once or twice daily as needed.  Try to remove the packing.    Return to this ED in 3 days for wound recheck, packing removal.    Take all antibiotics as prescribed, try to take with meals to limit nausea.  Percocet only as needed for severe pain. Be aware, this medication is sedating.  Do not mix with alcohol or any other sedating medications.  Do not drive or operate machinery when taking this medication.     Follow-up and establish care with a local general surgeon for re-evaluation, definitive care.    Return to this ED if you begin with fever, if you experience worsening pain and swelling despite treatment, if uncontrolled bleeding from the area, if any other problems occur.

## 2023-05-14 ENCOUNTER — HOSPITAL ENCOUNTER (EMERGENCY)
Facility: HOSPITAL | Age: 48
Discharge: HOME | End: 2023-05-15
Attending: EMERGENCY MEDICINE
Payer: MEDICARE

## 2023-05-14 VITALS
BODY MASS INDEX: 37.67 KG/M2 | OXYGEN SATURATION: 95 % | RESPIRATION RATE: 16 BRPM | HEART RATE: 95 BPM | TEMPERATURE: 98 F | DIASTOLIC BLOOD PRESSURE: 81 MMHG | HEIGHT: 67 IN | WEIGHT: 240 LBS | SYSTOLIC BLOOD PRESSURE: 122 MMHG

## 2023-05-14 DIAGNOSIS — Z51.89 WOUND CHECK, ABSCESS: Primary | ICD-10-CM

## 2023-05-14 DIAGNOSIS — L02.91 ABSCESS: ICD-10-CM

## 2023-05-14 PROCEDURE — 99282 EMERGENCY DEPT VISIT SF MDM: CPT

## 2023-05-15 ENCOUNTER — PATIENT OUTREACH (OUTPATIENT)
Dept: EMERGENCY MEDICINE | Facility: HOSPITAL | Age: 48
End: 2023-05-15
Payer: MEDICARE

## 2023-05-15 NOTE — DISCHARGE INSTRUCTIONS

## 2023-05-26 DIAGNOSIS — Z12.31 ENCOUNTER FOR SCREENING MAMMOGRAM FOR MALIGNANT NEOPLASM OF BREAST: Primary | ICD-10-CM

## 2023-05-30 ENCOUNTER — HOSPITAL ENCOUNTER (OUTPATIENT)
Dept: RADIOLOGY | Facility: HOSPITAL | Age: 48
Discharge: HOME | End: 2023-05-30
Attending: OBSTETRICS & GYNECOLOGY
Payer: MEDICARE

## 2023-05-30 VITALS — WEIGHT: 240.06 LBS | BODY MASS INDEX: 37.68 KG/M2 | HEIGHT: 67 IN

## 2023-05-30 DIAGNOSIS — Z12.31 ENCOUNTER FOR SCREENING MAMMOGRAM FOR MALIGNANT NEOPLASM OF BREAST: ICD-10-CM

## 2023-05-30 PROCEDURE — 77063 BREAST TOMOSYNTHESIS BI: CPT | Mod: 26,,, | Performed by: RADIOLOGY

## 2023-05-30 PROCEDURE — 77067 SCR MAMMO BI INCL CAD: CPT | Mod: 26,,, | Performed by: RADIOLOGY

## 2023-05-30 PROCEDURE — 77067 MAMMO DIGITAL SCREENING BILAT WITH TOMO: ICD-10-PCS | Mod: 26,,, | Performed by: RADIOLOGY

## 2023-05-30 PROCEDURE — 77067 SCR MAMMO BI INCL CAD: CPT | Mod: TC

## 2023-05-30 PROCEDURE — 77063 MAMMO DIGITAL SCREENING BILAT WITH TOMO: ICD-10-PCS | Mod: 26,,, | Performed by: RADIOLOGY

## 2023-06-15 DIAGNOSIS — I50.42 CHRONIC COMBINED SYSTOLIC AND DIASTOLIC HEART FAILURE: ICD-10-CM

## 2023-06-15 DIAGNOSIS — E11.65 TYPE 2 DIABETES MELLITUS WITH HYPERGLYCEMIA, WITH LONG-TERM CURRENT USE OF INSULIN: ICD-10-CM

## 2023-06-15 DIAGNOSIS — Z79.4 TYPE 2 DIABETES MELLITUS WITH HYPERGLYCEMIA, WITH LONG-TERM CURRENT USE OF INSULIN: ICD-10-CM

## 2023-06-15 NOTE — TELEPHONE ENCOUNTER
No care due was identified.  [facility] Embedded Care Due Messages. Reference number: [id number].   6/15/2023 5:27:22 PM CDT

## 2023-06-15 NOTE — TELEPHONE ENCOUNTER
No care due was identified.  [facility] Embedded Care Due Messages. Reference number: [id number].   6/15/2023 5:28:36 PM CDT

## 2023-06-16 RX ORDER — METOPROLOL SUCCINATE 50 MG/1
TABLET, EXTENDED RELEASE ORAL
Qty: 90 TABLET | Refills: 0 | Status: SHIPPED | OUTPATIENT
Start: 2023-06-16 | End: 2023-07-21 | Stop reason: SDUPTHER

## 2023-06-16 RX ORDER — FUROSEMIDE 40 MG/1
TABLET ORAL
Qty: 30 TABLET | Refills: 0 | Status: SHIPPED | OUTPATIENT
Start: 2023-06-16 | End: 2023-07-21 | Stop reason: SDUPTHER

## 2023-06-16 RX ORDER — INSULIN LISPRO 100 [IU]/ML
INJECTION, SOLUTION INTRAVENOUS; SUBCUTANEOUS
Qty: 45 ML | Refills: 5 | Status: SHIPPED | OUTPATIENT
Start: 2023-06-16

## 2023-06-16 NOTE — TELEPHONE ENCOUNTER
Last Office Visit Info:   The patient's last visit with Gil Leal MD was on 7/13/2022.    The patient's last visit in current department was on Visit date not found.        Last CBC Results:   Lab Results   Component Value Date    WBC 9.58 03/03/2023    HGB 14.2 03/03/2023    HCT 43.7 03/03/2023     03/03/2023       Last CMP Results  Lab Results   Component Value Date     (L) 03/03/2023    K 4.3 03/03/2023    CL 97 03/03/2023    CO2 24 03/03/2023    BUN 28 (H) 03/03/2023    CREATININE 2.2 (H) 03/03/2023    CALCIUM 9.7 03/03/2023    ALBUMIN 3.9 03/03/2023    AST 18 03/03/2023    ALT 20 03/03/2023       Last Lipids  Lab Results   Component Value Date    CHOL 105 (L) 07/01/2022    TRIG 83 07/01/2022    HDL 30 (L) 07/01/2022    LDLCALC 58.4 (L) 07/01/2022       Last A1C  Lab Results   Component Value Date    HGBA1C 11.6 (H) 04/06/2023       Last TSH  Lab Results   Component Value Date    TSH 1.355 10/21/2022             Current Med Refills  Medication List with Changes/Refills   Current Medications    ACETAMINOPHEN (TYLENOL) 500 MG TABLET    Take 2 tablets (1,000 mg total) by mouth every 8 (eight) hours as needed.       Start Date: 3/3/2023  End Date: --    ALBUTEROL (VENTOLIN HFA) 90 MCG/ACTUATION INHALER    Inhale 2 puffs into the lungs every 6 (six) hours as needed for Wheezing or Shortness of Breath. Rescue       Start Date: 5/18/2021 End Date: 12/15/2022    APIXABAN (ELIQUIS) 5 MG TAB    Take 1 tablet (5 mg total) by mouth 2 (two) times daily.       Start Date: 9/2/2022  End Date: --    BLOOD GLUCOSE CONTROL, HIGH SOLN    1 each by Misc.(Non-Drug; Combo Route) route once. for 1 dose       Start Date: 7/18/2018 End Date: 12/15/2022    BLOOD GLUCOSE CONTROL, LOW SOLN    1 each by Misc.(Non-Drug; Combo Route) route once. for 1 dose       Start Date: 7/18/2018 End Date: 12/15/2022    BLOOD PRESSURE CUFF MISC    1 kit by Misc.(Non-Drug; Combo Route) route 2 (two) times daily.       Start Date:  3/26/2018 End Date: --    BLOOD SUGAR DIAGNOSTIC STRP    Check blood glucose 3x/day.       Start Date: 8/29/2019 End Date: --    BLOOD-GLUCOSE METER (TRUE METRIX AIR GLUCOSE METER) MISC    1 each by Misc.(Non-Drug; Combo Route) route 3 (three) times daily.       Start Date: 7/18/2018 End Date: 12/15/2022    BLOOD-GLUCOSE METER,CONTINUOUS (DEXCOM G6 ) MISC    Use with dexcom G6 system       Start Date: 5/1/2022  End Date: --    BLOOD-GLUCOSE SENSOR (DEXCOM G6 SENSOR) FIDEL    Change sensor every 10 days       Start Date: 4/6/2023  End Date: --    BLOOD-GLUCOSE TRANSMITTER (DEXCOM G6 TRANSMITTER) FIDEL    Change every 3 months       Start Date: 4/6/2023  End Date: --    FLUTICASONE PROPIONATE (FLONASE) 50 MCG/ACTUATION NASAL SPRAY    1 spray (50 mcg total) by Each Nostril route 2 (two) times daily as needed for Rhinitis.       Start Date: 7/9/2021  End Date: --    FUROSEMIDE (LASIX) 40 MG TABLET    TAKE 1 TABLET BY MOUTH ONCE DAILY *PLEASE  HOLD  WHILE  NOT  DRINKING*       Start Date: 5/12/2023 End Date: --    GABAPENTIN (NEURONTIN) 400 MG CAPSULE    TAKE 1 CAPSULE BY MOUTH TWICE DAILY AS NEEDED       Start Date: 4/28/2023 End Date: --    INSULIN DEGLUDEC (TRESIBA FLEXTOUCH U-200) 200 UNIT/ML (3 ML) INSULIN PEN    Inject 64-80 units twice daily       Start Date: 4/6/2023  End Date: --    INSULIN LISPRO (HUMALOG KWIKPEN INSULIN) 100 UNIT/ML PEN    Inject 46 Units into the skin 3 (three) times daily before meals.       Start Date: 4/6/2023  End Date: 4/5/2024    INSULIN NPH ISOPH U-100 HUMAN (HUMULIN N NPH INSULIN KWIKPEN) 100 UNIT/ML (3 ML) INPN    Inject 24 units nightly at 10 pm       Start Date: 4/6/2023  End Date: --    METOPROLOL SUCCINATE (TOPROL-XL) 50 MG 24 HR TABLET    TAKE 1 TABLET BY MOUTH ONCE DAILY *HOLD  IF  SYSTOLIC  BLOOD  PRESSURE  IS  LESS  THAN  120**       Start Date: 2/23/2023 End Date: --    MUPIROCIN (BACTROBAN) 2 % OINTMENT    Apply topically nightly.       Start Date: 4/22/2021 End Date:  "--    ONDANSETRON (ZOFRAN) 4 MG TABLET    Take 1 tablet (4 mg total) by mouth every 8 (eight) hours as needed (Nausea and vomiting).       Start Date: 3/3/2023  End Date: --    ONDANSETRON (ZOFRAN-ODT) 4 MG TBDL    Take 1 tablet (4 mg total) by mouth every 8 (eight) hours as needed.       Start Date: 10/16/2022End Date: --    OXYCODONE-ACETAMINOPHEN (PERCOCET) 5-325 MG PER TABLET    Take 1 tablet by mouth every 6 (six) hours as needed (severe pain).       Start Date: 5/11/2023 End Date: --    PEN NEEDLE, DIABETIC (BD LORI 2ND GEN PEN NEEDLE) 32 GAUGE X 5/32" NDLE    USE 1 PEN NEEDLE 4 TIMES DAILY       Start Date: 3/10/2022 End Date: --    ROSUVASTATIN (CRESTOR) 40 MG TAB    Take 1 tablet (40 mg total) by mouth every evening.       Start Date: 6/16/2022 End Date: --    SPIRONOLACTONE (ALDACTONE) 25 MG TABLET    Take 1 tablet (25 mg total) by mouth once daily. Hold until eating and drinking improves. Need to weight self daily       Start Date: 7/13/2022 End Date: --    TIRZEPATIDE (MOUNJARO) 2.5 MG/0.5 ML PNIJ    Start Mounjaro 2.5 mg into the skin once weekly for 4 weeks. Then increase to Mounjaro 5 mg once weekly.       Start Date: 4/6/2023  End Date: --    TIRZEPATIDE (MOUNJARO) 5 MG/0.5 ML PNIJ    Start Mounjaro 2.5 mg once weekly for 4 weeks. Then increase to Mounjaro 5 mg once weekly.       Start Date: 4/6/2023  End Date: --    TRIAMCINOLONE ACETONIDE 0.1% (KENALOG) 0.1 % CREAM    2 (two) times daily. Apply to affected area       Start Date: 5/24/2019 End Date: --       Order(s) placed per written order guidelines: none    Please advise.              "

## 2023-07-03 NOTE — TELEPHONE ENCOUNTER
No care due was identified.  [facility] Embedded Care Due Messages. Reference number: [id number].   7/03/2023 2:58:32 PM CDT

## 2023-07-05 DIAGNOSIS — E11.9 TYPE 2 DIABETES MELLITUS WITHOUT COMPLICATION: ICD-10-CM

## 2023-07-05 RX ORDER — SPIRONOLACTONE 50 MG/1
TABLET, FILM COATED ORAL
Qty: 90 TABLET | Refills: 0 | OUTPATIENT
Start: 2023-07-05

## 2023-07-06 ENCOUNTER — OFFICE VISIT (OUTPATIENT)
Dept: SPINE | Facility: CLINIC | Age: 48
End: 2023-07-06
Payer: MEDICARE

## 2023-07-06 VITALS
WEIGHT: 240 LBS | BODY MASS INDEX: 37.59 KG/M2 | TEMPERATURE: 99 F | HEART RATE: 81 BPM | OXYGEN SATURATION: 100 % | RESPIRATION RATE: 18 BRPM | DIASTOLIC BLOOD PRESSURE: 66 MMHG | SYSTOLIC BLOOD PRESSURE: 129 MMHG

## 2023-07-06 DIAGNOSIS — G89.4 CHRONIC PAIN DISORDER: ICD-10-CM

## 2023-07-06 DIAGNOSIS — M54.16 LUMBAR RADICULOPATHY: Primary | ICD-10-CM

## 2023-07-06 DIAGNOSIS — M48.061 SPINAL STENOSIS OF LUMBAR REGION, UNSPECIFIED WHETHER NEUROGENIC CLAUDICATION PRESENT: ICD-10-CM

## 2023-07-06 PROCEDURE — 99999 PR PBB SHADOW E&M-EST. PATIENT-LVL III: ICD-10-PCS | Mod: PBBFAC,,, | Performed by: ANESTHESIOLOGY

## 2023-07-06 PROCEDURE — 3074F SYST BP LT 130 MM HG: CPT | Mod: CPTII,S$GLB,, | Performed by: ANESTHESIOLOGY

## 2023-07-06 PROCEDURE — 1159F MED LIST DOCD IN RCRD: CPT | Mod: CPTII,S$GLB,, | Performed by: ANESTHESIOLOGY

## 2023-07-06 PROCEDURE — 3008F PR BODY MASS INDEX (BMI) DOCUMENTED: ICD-10-PCS | Mod: CPTII,S$GLB,, | Performed by: ANESTHESIOLOGY

## 2023-07-06 PROCEDURE — 99999 PR PBB SHADOW E&M-EST. PATIENT-LVL III: CPT | Mod: PBBFAC,,, | Performed by: ANESTHESIOLOGY

## 2023-07-06 PROCEDURE — 3074F PR MOST RECENT SYSTOLIC BLOOD PRESSURE < 130 MM HG: ICD-10-PCS | Mod: CPTII,S$GLB,, | Performed by: ANESTHESIOLOGY

## 2023-07-06 PROCEDURE — 1160F PR REVIEW ALL MEDS BY PRESCRIBER/CLIN PHARMACIST DOCUMENTED: ICD-10-PCS | Mod: CPTII,S$GLB,, | Performed by: ANESTHESIOLOGY

## 2023-07-06 PROCEDURE — 99214 PR OFFICE/OUTPT VISIT, EST, LEVL IV, 30-39 MIN: ICD-10-PCS | Mod: GC,S$GLB,, | Performed by: ANESTHESIOLOGY

## 2023-07-06 PROCEDURE — 3046F HEMOGLOBIN A1C LEVEL >9.0%: CPT | Mod: CPTII,S$GLB,, | Performed by: ANESTHESIOLOGY

## 2023-07-06 PROCEDURE — 80326 AMPHETAMINES 5 OR MORE: CPT | Performed by: ANESTHESIOLOGY

## 2023-07-06 PROCEDURE — 1159F PR MEDICATION LIST DOCUMENTED IN MEDICAL RECORD: ICD-10-PCS | Mod: CPTII,S$GLB,, | Performed by: ANESTHESIOLOGY

## 2023-07-06 PROCEDURE — 1160F RVW MEDS BY RX/DR IN RCRD: CPT | Mod: CPTII,S$GLB,, | Performed by: ANESTHESIOLOGY

## 2023-07-06 PROCEDURE — 99214 OFFICE O/P EST MOD 30 MIN: CPT | Mod: GC,S$GLB,, | Performed by: ANESTHESIOLOGY

## 2023-07-06 PROCEDURE — 3078F DIAST BP <80 MM HG: CPT | Mod: CPTII,S$GLB,, | Performed by: ANESTHESIOLOGY

## 2023-07-06 PROCEDURE — 3008F BODY MASS INDEX DOCD: CPT | Mod: CPTII,S$GLB,, | Performed by: ANESTHESIOLOGY

## 2023-07-06 PROCEDURE — 3046F PR MOST RECENT HEMOGLOBIN A1C LEVEL > 9.0%: ICD-10-PCS | Mod: CPTII,S$GLB,, | Performed by: ANESTHESIOLOGY

## 2023-07-06 PROCEDURE — 3078F PR MOST RECENT DIASTOLIC BLOOD PRESSURE < 80 MM HG: ICD-10-PCS | Mod: CPTII,S$GLB,, | Performed by: ANESTHESIOLOGY

## 2023-07-06 RX ORDER — OXYCODONE AND ACETAMINOPHEN 5; 325 MG/1; MG/1
1 TABLET ORAL
Qty: 30 TABLET | Refills: 0 | Status: SHIPPED | OUTPATIENT
Start: 2023-07-06 | End: 2023-08-03 | Stop reason: SDUPTHER
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
## 2023-07-07 RX ORDER — SPIRONOLACTONE 25 MG/1
25 TABLET ORAL DAILY
Qty: 90 TABLET | Refills: 3 | Status: SHIPPED | OUTPATIENT
Start: 2023-07-07 | End: 2023-08-23

## 2023-07-10 ENCOUNTER — PATIENT MESSAGE (OUTPATIENT)
Dept: ADMINISTRATIVE | Facility: HOSPITAL | Age: 48
End: 2023-07-10
Payer: MEDICARE

## 2023-07-11 LAB
6MAM UR QL: NOT DETECTED
7AMINOCLONAZEPAM UR QL: NOT DETECTED
A-OH ALPRAZ UR QL: NOT DETECTED
ALPHA-OH-MIDAZOLAM: NOT DETECTED
ALPRAZ UR QL: NOT DETECTED
AMPHET UR QL SCN: NOT DETECTED
ANNOTATION COMMENT IMP: NORMAL
ANNOTATION COMMENT IMP: NORMAL
BARBITURATES UR QL: NOT DETECTED
BUPRENORPHINE UR QL: NOT DETECTED
BZE UR QL: NOT DETECTED
CARBOXYTHC UR QL: NOT DETECTED
CARISOPRODOL UR QL: NOT DETECTED
CLONAZEPAM UR QL: NOT DETECTED
CODEINE UR QL: NOT DETECTED
CREAT UR-MCNC: 141.4 MG/DL (ref 20–400)
DIAZEPAM UR QL: NOT DETECTED
ETHYL GLUCURONIDE UR QL: NOT DETECTED
FENTANYL UR QL: NOT DETECTED
GABAPENTIN: PRESENT
HYDROCODONE UR QL: NOT DETECTED
HYDROMORPHONE UR QL: NOT DETECTED
LORAZEPAM UR QL: NOT DETECTED
MDA UR QL: NOT DETECTED
MDEA UR QL: NOT DETECTED
MDMA UR QL: NOT DETECTED
ME-PHENIDATE UR QL: NOT DETECTED
METHADONE UR QL: NOT DETECTED
METHAMPHET UR QL: NOT DETECTED
MIDAZOLAM UR QL SCN: NOT DETECTED
MORPHINE UR QL: NOT DETECTED
NALOXONE: NOT DETECTED
NORBUPRENORPHINE UR QL CFM: NOT DETECTED
NORDIAZEPAM UR QL: NOT DETECTED
NORFENTANYL UR QL: NOT DETECTED
NORHYDROCODONE UR QL CFM: NOT DETECTED
NORMEPERIDINE UR QL CFM: NOT DETECTED
NOROXYCODONE UR QL CFM: PRESENT
NOROXYMORPHONE UR QL SCN: NOT DETECTED
OXAZEPAM UR QL: NOT DETECTED
OXYCODONE UR QL: PRESENT
OXYMORPHONE UR QL: PRESENT
PATHOLOGY STUDY: NORMAL
PCP UR QL: NOT DETECTED
PHENTERMINE UR QL: NOT DETECTED
PREGABALIN: NOT DETECTED
SERVICE CMNT-IMP: NORMAL
TAPENTADOL UR QL SCN: NOT DETECTED
TAPENTADOL UR QL SCN: NOT DETECTED
TEMAZEPAM UR QL: NOT DETECTED
TRAMADOL UR QL: NOT DETECTED
ZOLPIDEM METABOLITE: NOT DETECTED
ZOLPIDEM UR QL: NOT DETECTED

## 2023-07-13 ENCOUNTER — TELEPHONE (OUTPATIENT)
Dept: PAIN MEDICINE | Facility: CLINIC | Age: 48
End: 2023-07-13
Payer: MEDICARE

## 2023-07-13 NOTE — TELEPHONE ENCOUNTER
----- Message from Reina Dowell MD sent at 7/12/2023  4:33 PM CDT -----  Regarding: patient should be scheduled with spine surgery  She is scheduled to see me friday, but Dr. Yeager is sending her to spine surgery for surgical eval, please cancel appointment and get her scheduled with spine surgery, either ortho or neuro.  He did not specify.  A midlevel is ok

## 2023-07-13 NOTE — TELEPHONE ENCOUNTER
Plan to schedule patient for L4/L5 ILESI  - we will also place consultation to spine surgery for surgical evaluation of worsening right L5, S1 radiculopathy given disc protrusion extending into the right lateral recess of L5/S1    Staff contacted patient, patient expressed that she would like to move forward with having the injections.

## 2023-07-17 DIAGNOSIS — Z79.4 TYPE 2 DIABETES MELLITUS WITH HYPERGLYCEMIA, WITH LONG-TERM CURRENT USE OF INSULIN: Primary | ICD-10-CM

## 2023-07-17 DIAGNOSIS — E11.65 TYPE 2 DIABETES MELLITUS WITH HYPERGLYCEMIA, WITH LONG-TERM CURRENT USE OF INSULIN: Primary | ICD-10-CM

## 2023-07-17 RX ORDER — INSULIN DEGLUDEC 200 U/ML
INJECTION, SOLUTION SUBCUTANEOUS
Qty: 9 PEN | Refills: 2 | Status: SHIPPED | OUTPATIENT
Start: 2023-07-17 | End: 2023-09-22

## 2023-07-19 ENCOUNTER — CLINICAL SUPPORT (OUTPATIENT)
Dept: CARDIOLOGY | Facility: HOSPITAL | Age: 48
End: 2023-07-19
Payer: MEDICARE

## 2023-07-19 DIAGNOSIS — Z95.810 PRESENCE OF AUTOMATIC (IMPLANTABLE) CARDIAC DEFIBRILLATOR: ICD-10-CM

## 2023-07-19 DIAGNOSIS — I42.0 DILATED CARDIOMYOPATHY: ICD-10-CM

## 2023-07-19 DIAGNOSIS — I48.91 UNSPECIFIED ATRIAL FIBRILLATION: ICD-10-CM

## 2023-07-19 DIAGNOSIS — I50.42 CHRONIC COMBINED SYSTOLIC (CONGESTIVE) AND DIASTOLIC (CONGESTIVE) HEART FAILURE: ICD-10-CM

## 2023-07-19 DIAGNOSIS — I47.29 OTHER VENTRICULAR TACHYCARDIA: ICD-10-CM

## 2023-07-19 PROCEDURE — 93296 REM INTERROG EVL PM/IDS: CPT | Performed by: INTERNAL MEDICINE

## 2023-07-19 PROCEDURE — 93295 CARDIAC DEVICE CHECK - REMOTE: ICD-10-PCS | Mod: ,,, | Performed by: INTERNAL MEDICINE

## 2023-07-19 PROCEDURE — 93295 DEV INTERROG REMOTE 1/2/MLT: CPT | Mod: ,,, | Performed by: INTERNAL MEDICINE

## 2023-07-21 DIAGNOSIS — I50.42 CHRONIC COMBINED SYSTOLIC AND DIASTOLIC HEART FAILURE: ICD-10-CM

## 2023-07-21 RX ORDER — FUROSEMIDE 40 MG/1
TABLET ORAL
Qty: 30 TABLET | Refills: 0 | Status: SHIPPED | OUTPATIENT
Start: 2023-07-21 | End: 2023-08-23 | Stop reason: SDUPTHER

## 2023-07-21 RX ORDER — METOPROLOL SUCCINATE 50 MG/1
TABLET, EXTENDED RELEASE ORAL
Qty: 90 TABLET | Refills: 0 | Status: SHIPPED | OUTPATIENT
Start: 2023-07-21 | End: 2023-08-23 | Stop reason: SDUPTHER

## 2023-07-25 RX ORDER — ROSUVASTATIN CALCIUM 40 MG/1
TABLET, COATED ORAL
Qty: 90 TABLET | Refills: 1 | Status: SHIPPED | OUTPATIENT
Start: 2023-07-25 | End: 2023-08-23 | Stop reason: SDUPTHER

## 2023-07-27 ENCOUNTER — PATIENT OUTREACH (OUTPATIENT)
Dept: ADMINISTRATIVE | Facility: HOSPITAL | Age: 48
End: 2023-07-27
Payer: MEDICARE

## 2023-07-27 NOTE — PROGRESS NOTES
Population Health Chart Review & Patient Outreach Details:     Additional Care Coordinator Notes:      Further Action Needed If Patient Returns Outreach:      Reason for Outreach Encounter:   []  Pre-Visit Chart Review   []  Non-Compliant Report   [x]  Payor Report ([employer], PHN, BCBS, MSSP, MCIP, C, etc.)      Updates Requested / Reviewed:   []  Immunizations reconciled   []  Care Everywhere    []  Care Team Updated   []     []  External Sources ([facility], [employer], DIS, etc.)    Patient Outreach Method:  []  Telephone Outreach Completed   [] Successful   [] Left Voicemail   [] Unable to Contact (wrong number, no voicemail)  []  [facility] Outreach Sent  []  Letter Outreach Mailed  []  Fax Sent for External Records  []  External Records Upload                Health Maintenance Topics Addressed and Outreach Outcomes / Actions Taken:          []          Colorectal Cancer Screening []  Colonoscopy Case Request or Referral Placed     []  External Records Requested     []  Added Reminder to give during Upcoming Primary Care Appt     []  Fit Kit Order Placed             [x]      Diabetic Eye Exam []  Eye Camera Scheduled or Optometry Referral Placed     [x]  External Records Requested     []  Added Reminder to Complete to Upcoming Primary Care Appt Notes             []       HbA1c/Urine & Other Labs []  Lab Appt/Urine Scheduled for Due Labs     []  Primary Care Follow Up Visit Scheduled      []  Added Reminder to Complete to Upcoming Primary Care Appt Notes

## 2023-07-27 NOTE — LETTER
AUTHORIZATION FOR RELEASE OF   CONFIDENTIAL INFORMATION    Dear Roxana Zamora,    We are seeing Fabiana Chanel, date of birth 1975, in the clinic at [facility]. Gil Leal MD is the patient's PCP. Fabiana Chanel has an outstanding lab/procedure at the time we reviewed her chart. In order to help keep her health information updated, she has authorized us to request the following medical record(s):        (  )  MAMMOGRAM                                      (  )  COLONOSCOPY      (  )  PAP SMEAR                                          (  )  OUTSIDE LAB RESULTS     (  )  DEXA SCAN                                          ( x )  EYE EXAM            (  )  FOOT EXAM                                          (  )  ENTIRE RECORD     (  )  OUTSIDE IMMUNIZATIONS                 (  )  _______________         Please fax records to [facility], Anshul S Acharya, MD, FAX # [phone]  Any questions please contact Fernanda Neal at [phone]           Patient Name: Fabiana Chanel  : 1975  Patient Phone #: [phone]

## 2023-08-03 ENCOUNTER — PATIENT MESSAGE (OUTPATIENT)
Dept: PAIN MEDICINE | Facility: OTHER | Age: 48
End: 2023-08-03
Payer: MEDICARE

## 2023-08-03 DIAGNOSIS — U07.1 COVID-19: ICD-10-CM

## 2023-08-03 NOTE — TELEPHONE ENCOUNTER
No care due was identified.  [facility] Embedded Care Due Messages. Reference number: [id number].   8/03/2023 6:53:40 PM CDT

## 2023-08-04 ENCOUNTER — PATIENT MESSAGE (OUTPATIENT)
Dept: ADMINISTRATIVE | Facility: OTHER | Age: 48
End: 2023-08-04
Payer: MEDICARE

## 2023-08-04 RX ORDER — OXYCODONE AND ACETAMINOPHEN 5; 325 MG/1; MG/1
1 TABLET ORAL
Qty: 30 TABLET | Refills: 0 | Status: SHIPPED | OUTPATIENT
Start: 2023-08-04 | End: 2023-09-03

## 2023-08-04 RX ORDER — ALBUTEROL SULFATE 90 UG/1
2 AEROSOL, METERED RESPIRATORY (INHALATION) EVERY 6 HOURS PRN
Qty: 18 G | Refills: 2 | Status: SHIPPED | OUTPATIENT
Start: 2023-08-04 | End: 2024-08-03

## 2023-08-04 NOTE — TELEPHONE ENCOUNTER
Patient requesting refill on oxycodone (percocet) 5-325mg  Last office visit 07/06/2023   shows last refill on 07/07/2023 * Confirmed by Pharmacy   Patient does not have a pain contract on file with [facility] department  Patient last UDS 07/06/2023 was consistent with current therapy      CODEINE  Not Detected          MORPHINE  Not Detected          6-ACETYLMORPHINE  Not Detected          OXYCODONE  Present          NOROYXCODONE  Present          OXYMORPHONE  Present          NOROXYMORPHONE  Not Detected          HYDROCODONE  Not Detected          NORHYDROCODONE  Not Detected          HYDROMORPHONE  Not Detected          BUPRENORPHINE  Not Detected          NORUBPRENORPHINE  Not Detected          FENTANYL  Not Detected          NORFENTANYL  Not Detected          MEPERIDINE METABOLITE  Not Detected          TAPENTADOL  Not Detected          TAPENTADOL-O-SULF  Not Detected          METHADONE  Not Detected          TRAMADOL  Not Detected          AMPHETAMINE  Not Detected          METHAMPHETAMINE  Not Detected          MDMA- ECSTASY  Not Detected          MDA  Not Detected          MDEA- Irma  Not Detected          METHYLPHENIDATE  Not Detected          PHENTERMINE  Not Detected          BENZOYLECGONINE  Not Detected          ALPRAZOLAM  Not Detected          ALPHA-OH-ALPRAZOLAM  Not Detected          CLONAZEPAM  Not Detected          7-AMINOCLONAZEPAM  Not Detected          DIAZEPAM  Not Detected          NORDIAZEPAM  Not Detected          OXAZEPAM  Not Detected          TEMAZEPAM  Not Detected          Lorazepam  Not Detected          MIDAZOLAM  Not Detected          ZOLPIDEM  Not Detected          BARBITURATES  Not Detected          Creatinine, Urine 20.0 - 400.0 mg/dL 141.4  183.2 R  121.5 R  25.0 R  31.0 R, CM  76.0 R, CM  26.8 R, CM    ETHYL GLUCURONIDE  Not Detected          MARIJUANA METABOLITE  Not Detected          Comment: INTERPRETIVE INFORMATION: Marijuana Metabolite   The cutoff  for Marijuana Metabolite (immunoassay) was   changed from 20 ng/mL to 50 ng/mL,  effective May 15, 2023.    PCP  Not Detected          CARISOPRODOL  Not Detected          Comment: The carisoprodol immunoassay has cross-reactivity to   carisoprodol and meprobamate.    Naloxone  Not Detected          Gabapentin  Present          Pregabalin  Not Detected          Alpha-OH-Midazolam  Not Detected          Zolpidem Metabolite  Not Detected          PAIN MANAGEMENT DRUG PANEL  See Below

## 2023-08-11 ENCOUNTER — HOSPITAL ENCOUNTER (OUTPATIENT)
Facility: OTHER | Age: 48
Discharge: HOME | End: 2023-08-11
Attending: ANESTHESIOLOGY | Admitting: ANESTHESIOLOGY
Payer: MEDICARE

## 2023-08-11 VITALS
OXYGEN SATURATION: 95 % | HEART RATE: 71 BPM | WEIGHT: 240 LBS | HEIGHT: 67 IN | SYSTOLIC BLOOD PRESSURE: 123 MMHG | BODY MASS INDEX: 37.67 KG/M2 | RESPIRATION RATE: 16 BRPM | DIASTOLIC BLOOD PRESSURE: 81 MMHG | TEMPERATURE: 99 F

## 2023-08-11 DIAGNOSIS — G89.29 CHRONIC PAIN: ICD-10-CM

## 2023-08-11 DIAGNOSIS — M51.36 DDD (DEGENERATIVE DISC DISEASE), LUMBAR: ICD-10-CM

## 2023-08-11 DIAGNOSIS — M54.16 LUMBAR RADICULOPATHY: Primary | ICD-10-CM

## 2023-08-11 LAB — POCT GLUCOSE: 91 MG/DL (ref 70–110)

## 2023-08-11 PROCEDURE — 63600175 PHARM REV CODE 636 W HCPCS: Performed by: ANESTHESIOLOGY

## 2023-08-11 PROCEDURE — 82947 ASSAY GLUCOSE BLOOD QUANT: CPT | Performed by: ANESTHESIOLOGY

## 2023-08-11 PROCEDURE — 25500020 PHARM REV CODE 255: Performed by: ANESTHESIOLOGY

## 2023-08-11 PROCEDURE — 25000003 PHARM REV CODE 250: Performed by: ANESTHESIOLOGY

## 2023-08-11 PROCEDURE — 25000003 PHARM REV CODE 250: Performed by: STUDENT IN AN ORGANIZED HEALTH CARE EDUCATION/TRAINING PROGRAM

## 2023-08-11 PROCEDURE — 62323 NJX INTERLAMINAR LMBR/SAC: CPT | Mod: ,,, | Performed by: ANESTHESIOLOGY

## 2023-08-11 PROCEDURE — 62323 NJX INTERLAMINAR LMBR/SAC: CPT | Performed by: ANESTHESIOLOGY

## 2023-08-11 PROCEDURE — 62323 PR INJ LUMBAR/SACRAL, W/IMAGING GUIDANCE: ICD-10-PCS | Mod: ,,, | Performed by: ANESTHESIOLOGY

## 2023-08-11 RX ORDER — FENTANYL CITRATE 50 UG/ML
INJECTION, SOLUTION INTRAMUSCULAR; INTRAVENOUS
Status: DISCONTINUED | OUTPATIENT
Start: 2023-08-11 | End: 2023-08-11 | Stop reason: HOSPADM

## 2023-08-11 RX ORDER — MIDAZOLAM HYDROCHLORIDE 1 MG/ML
INJECTION INTRAMUSCULAR; INTRAVENOUS
Status: DISCONTINUED | OUTPATIENT
Start: 2023-08-11 | End: 2023-08-11 | Stop reason: HOSPADM

## 2023-08-11 RX ORDER — DEXAMETHASONE SODIUM PHOSPHATE 10 MG/ML
INJECTION INTRAMUSCULAR; INTRAVENOUS
Status: DISCONTINUED | OUTPATIENT
Start: 2023-08-11 | End: 2023-08-11 | Stop reason: HOSPADM

## 2023-08-11 RX ORDER — LIDOCAINE HYDROCHLORIDE 20 MG/ML
INJECTION, SOLUTION INFILTRATION; PERINEURAL
Status: DISCONTINUED | OUTPATIENT
Start: 2023-08-11 | End: 2023-08-11 | Stop reason: HOSPADM

## 2023-08-11 RX ORDER — LIDOCAINE HYDROCHLORIDE 10 MG/ML
INJECTION, SOLUTION EPIDURAL; INFILTRATION; INTRACAUDAL; PERINEURAL
Status: DISCONTINUED | OUTPATIENT
Start: 2023-08-11 | End: 2023-08-11 | Stop reason: HOSPADM

## 2023-08-11 RX ORDER — SODIUM CHLORIDE 9 MG/ML
INJECTION, SOLUTION INTRAVENOUS CONTINUOUS
Status: DISCONTINUED | OUTPATIENT
Start: 2023-08-11 | End: 2023-08-11 | Stop reason: HOSPADM

## 2023-08-11 NOTE — DISCHARGE SUMMARY
Discharge Note  Short Stay      SUMMARY     Admit Date: 8/11/2023    Attending Physician: Jed Yeager      Discharge Physician: Emiliano Allen      Discharge Date: 8/11/2023 2:45 PM    Procedure(s) (LRB):  INJECTION, STEROID, EPIDURAL, L5/S1 SOONER DATE    clear to hold Eliquis (N/A)    Final Diagnosis: Lumbar radiculopathy [M54.16]    Disposition: Home or self care    Patient Instructions:   Current Discharge Medication List        CONTINUE these medications which have NOT CHANGED    Details   furosemide (LASIX) 40 MG tablet TAKE 1 TABLET BY MOUTH ONCE DAILY *PLEASE  HOLD  WHILE  NOT  DRINKING*  Qty: 30 tablet, Refills: 0    Associated Diagnoses: Chronic combined systolic and diastolic heart failure      metoprolol succinate (TOPROL-XL) 50 MG 24 hr tablet TAKE 1 TABLET BY MOUTH ONCE DAILY *HOLD  IF  SYSTOLIC  BLOOD  PRESSURE  IS  LESS  THAN  120*  Qty: 90 tablet, Refills: 0    Associated Diagnoses: Chronic combined systolic and diastolic heart failure      acetaminophen (TYLENOL) 500 MG tablet Take 2 tablets (1,000 mg total) by mouth every 8 (eight) hours as needed.  Qty: 40 tablet, Refills: 0      albuterol (VENTOLIN HFA) 90 mcg/actuation inhaler Inhale 2 puffs into the lungs every 6 (six) hours as needed for Wheezing or Shortness of Breath. Rescue  Qty: 18 g, Refills: 2    Associated Diagnoses: COVID-19      apixaban (ELIQUIS) 5 mg Tab Take 1 tablet (5 mg total) by mouth 2 (two) times daily.  Qty: 180 tablet, Refills: 3      blood glucose control, high Soln 1 each by Misc.(Non-Drug; Combo Route) route once. for 1 dose  Qty: 1 each, Refills: 3    Associated Diagnoses: Type 2 diabetes mellitus without complication, without long-term current use of insulin      blood glucose control, low Soln 1 each by Misc.(Non-Drug; Combo Route) route once. for 1 dose  Qty: 1 each, Refills: 3    Associated Diagnoses: Type 2 diabetes mellitus without complication, without long-term current use of insulin      BLOOD PRESSURE CUFF  Misc 1 kit by Misc.(Non-Drug; Combo Route) route 2 (two) times daily.  Qty: 1 each, Refills: 0    Associated Diagnoses: Chronic combined systolic and diastolic heart failure; Essential hypertension; Nonischemic cardiomyopathy      blood sugar diagnostic Strp Check blood glucose 3x/day.  Qty: 300 strip, Refills: 3      blood-glucose meter (TRUE METRIX AIR GLUCOSE METER) Misc 1 each by Misc.(Non-Drug; Combo Route) route 3 (three) times daily.  Qty: 1 each, Refills: 0    Associated Diagnoses: Type 2 diabetes mellitus without complication, without long-term current use of insulin      blood-glucose meter,continuous (DEXCOM G6 ) Misc Use with dexcom G6 system  Qty: 1 each, Refills: 0      blood-glucose sensor (DEXCOM G6 SENSOR) Lupe Change sensor every 10 days  Qty: 12 each, Refills: 3      blood-glucose transmitter (DEXCOM G6 TRANSMITTER) Lupe Change every 3 months  Qty: 1 each, Refills: 3      fluticasone propionate (FLONASE) 50 mcg/actuation nasal spray 1 spray (50 mcg total) by Each Nostril route 2 (two) times daily as needed for Rhinitis.  Qty: 15 g, Refills: 0      gabapentin (NEURONTIN) 400 MG capsule TAKE 1 CAPSULE BY MOUTH TWICE DAILY AS NEEDED  Qty: 180 capsule, Refills: 0    Associated Diagnoses: Uncontrolled type 2 diabetes mellitus with hyperglycemia      insulin degludec (TRESIBA FLEXTOUCH U-200) 200 unit/mL (3 mL) insulin pen Inject 64 units twice daily  Qty: 9 pen , Refills: 2    Comments: Dispense 90  day supply      insulin lispro 100 unit/mL pen INJECT 46 UNITS SUBCUTANEOUSLY THREE TIMES DAILY BEFORE MEAL(S)  Qty: 45 mL, Refills: 5    Associated Diagnoses: Type 2 diabetes mellitus with hyperglycemia, with long-term current use of insulin      insulin NPH isoph U-100 human (HUMULIN N NPH INSULIN KWIKPEN) 100 unit/mL (3 mL) InPn Inject 24 units nightly at 10 pm  Qty: 30 mL, Refills: 2      mupirocin (BACTROBAN) 2 % ointment Apply topically nightly.      ondansetron (ZOFRAN) 4 MG tablet Take 1  "tablet (4 mg total) by mouth every 8 (eight) hours as needed (Nausea and vomiting).  Qty: 12 tablet, Refills: 1      ondansetron (ZOFRAN-ODT) 4 MG TbDL Take 1 tablet (4 mg total) by mouth every 8 (eight) hours as needed.  Qty: 20 tablet, Refills: 0      !! oxyCODONE-acetaminophen (PERCOCET) 5-325 mg per tablet Take 1 tablet by mouth every 6 (six) hours as needed (severe pain).  Qty: 12 tablet, Refills: 0    Comments: Quantity prescribed more than 7 day supply? No      !! oxyCODONE-acetaminophen (PERCOCET) 5-325 mg per tablet Take 1 tablet by mouth every 24 hours as needed for Pain.  Qty: 30 tablet, Refills: 0    Comments: Quantity prescribed more than 7 day supply? Yes, quantity medically necessary      pen needle, diabetic (BD LORI 2ND GEN PEN NEEDLE) 32 gauge x 5/32" Ndle USE 1 PEN NEEDLE 4 TIMES DAILY  Qty: 400 each, Refills: 3      rosuvastatin (CRESTOR) 40 MG Tab TAKE 1 TABLET BY MOUTH ONCE DAILY IN THE EVENING  Qty: 90 tablet, Refills: 1    Associated Diagnoses: Chronic combined systolic and diastolic heart failure      !! spironolactone (ALDACTONE) 25 MG tablet Take 1 tablet (25 mg total) by mouth once daily. Hold until eating and drinking improves. Need to weight self daily  Qty: 90 tablet, Refills: 1    Comments: .      !! spironolactone (ALDACTONE) 25 MG tablet Take 1 tablet (25 mg total) by mouth once daily.  Qty: 90 tablet, Refills: 3      tirzepatide (MOUNJARO) 2.5 mg/0.5 mL PnIj Start Mounjaro 2.5 mg into the skin once weekly for 4 weeks. Then increase to Mounjaro 5 mg once weekly.  Qty: 4 pen, Refills: 0      tirzepatide (MOUNJARO) 5 mg/0.5 mL PnIj Start Mounjaro 2.5 mg once weekly for 4 weeks. Then increase to Mounjaro 5 mg once weekly.  Qty: 4 pen, Refills: 0      triamcinolone acetonide 0.1% (KENALOG) 0.1 % cream 2 (two) times daily. Apply to affected area  Refills: 4       !! - Potential duplicate medications found. Please discuss with provider.              Discharge Diagnosis: Lumbar " radiculopathy [M54.16]  Condition on Discharge: Stable with no complications to procedure   Diet on Discharge: Same as before.  Activity: as per instruction sheet.  Discharge to: Home with a responsible adult.  Follow up: 2-4 weeks       Please call my office or pager at [phone] if experienced any weakness or loss of sensation, fever > 101.5, pain uncontrolled with oral medications, persistent nausea/vomiting/or diarrhea, redness or drainage from the incisions, or any other worrisome concerns. If physician on call was not reached or could not communicate with our office for any reason please go to the nearest emergency department

## 2023-08-11 NOTE — H&P
HPI  Patient presenting for Procedure(s) (LRB):  INJECTION, STEROID, EPIDURAL, L5/S1 SOONER DATE    clear to hold Eliquis (N/A)     Patient on Anti-coagulation Yes    No health changes since previous encounter    Past Medical History:   Diagnosis Date    Atrial fibrillation     Blood clot associated with vein wall inflammation     not dvt    Cardiomyopathy     Normal cors on cath 11/2017    CHF (congestive heart failure)     DM (diabetes mellitus) 9/19/2013    Hyperlipidemia     Hypertension     Psoriasis     Sleep apnea      Past Surgical History:   Procedure Laterality Date    CARDIAC CATHETERIZATION      COLONOSCOPY      COLONOSCOPY N/A 3/9/2022    Procedure: COLONOSCOPY;  Surgeon: Vielka Burrell MD;  Location: [facility] ENDO;  Service: Endoscopy;  Laterality: N/A;    DILATION AND CURETTAGE OF UTERUS      ESOPHAGOGASTRODUODENOSCOPY N/A 5/9/2019    Procedure: EGD (ESOPHAGOGASTRODUODENOSCOPY);  Surgeon: Vielka Burrell MD;  Location: [facility] ENDO;  Service: Endoscopy;  Laterality: N/A;    TRANSFORAMINAL EPIDURAL INJECTION OF STEROID N/A 1/6/2022    Procedure: Transforaminal ANKITA L4/L5 L5/S1;  Surgeon: Jed Yeager MD;  Location: [facility] PAIN MGT;  Service: Pain Management;  Laterality: N/A;    TRANSFORAMINAL EPIDURAL INJECTION OF STEROID Right 12/1/2022    Procedure: INJECTION, STEROID, EPIDURAL, TRANSFORAMINAL APPROACH, RIGHT L4/L5 & L5/S1 CONTRAST;  Surgeon: Jed Yeager MD;  Location: [facility] PAIN [facility]GT;  Service: Pain Management;  Laterality: Right;    TRANSFORAMINAL EPIDURAL INJECTION OF STEROID Right 4/14/2023    Procedure: INJECTION, STEROID, EPIDURAL, TRANSFORAMINAL APPROACH RIGHT S1;  Surgeon: Jed Yeager MD;  Location: [facility] PAIN [facility]GT;  Service: Pain Management;  Laterality: Right;  3/14 AUTH     Review of patient's allergies indicates:   Allergen Reactions    Metformin      Diarrhea on metformin XR     Pneumococcal 23-abimbola ps vaccine       No current facility-administered medications for this  encounter.       PMHx, PSHx, Allergies, Medications reviewed in epic    ROS negative except pain complaints in HPI    OBJECTIVE:    There were no vitals taken for this visit.    PHYSICAL EXAMINATION:    GENERAL: Well appearing, in no acute distress, alert and oriented x3.  PSYCH:  Mood and affect appropriate.  SKIN: Skin color, texture, turgor normal, no rashes or lesions which will impact the procedure.  CV: RRR with palpation of the radial artery.  PULM: No evidence of respiratory difficulty, symmetric chest rise. Clear to auscultation.  NEURO: Cranial nerves grossly intact.    Plan:    Proceed with procedure as planned Procedure(s) (LRB):  INJECTION, STEROID, EPIDURAL, L5/S1 SOONER DATE    clear to hold Eliquis (N/A)    Sugey Villalta  08/11/2023

## 2023-08-11 NOTE — DISCHARGE INSTRUCTIONS

## 2023-08-11 NOTE — OP NOTE
Lumbar Interlaminar Epidural Steroid Injection under Fluoroscopic Guidance    The procedure, risks, benefits, and options were discussed with the patient. There are no contraindications to the procedure. The patent expressed understanding and agreed to the procedure. Informed written consent was obtained prior to the start of the procedure and can be found in the patient's chart.    PATIENT NAME: Fabiana Chanel   MRN: [id number]     DATE OF PROCEDURE: 08/11/2023    PROCEDURE: Lumbar Interlaminar Epidural Steroid Injection L5/S1 under Fluoroscopic Guidance    PRE-OP DIAGNOSIS: Lumbar radiculopathy [M54.16] Lumbar radiculopathy [M54.16]    POST-OP DIAGNOSIS: Same    PHYSICIAN: Jed Yeager MD    ASSISTANTS: Eimliano Allen MD Fellow    MEDICATIONS INJECTED: Preservative-free Decadron 10mg with 4cc of Lidocaine 1% MPF and preservative free normal saline    LOCAL ANESTHETIC INJECTED: Xylocaine 2%     SEDATION: Versed 2mg and Fentanyl 75mcg                                                                                                                                                                                     Conscious sedation ordered by MVIOLA Patient re-evaluation prior to administration of conscious sedation. No changes noted in patient's status from initial evaluation. The patient's vital signs were monitored by RN and patient remained hemodynamically stable throughout the procedure.    Event Time In   Sedation Start 1438   Sedation End 1443       ESTIMATED BLOOD LOSS: None    COMPLICATIONS: None    TECHNIQUE: Time-out was performed to identify the patient and procedure to be performed. With the patient laying in a prone position, the surgical area was prepped and draped in the usual sterile fashion using ChloraPrep and a fenestrated drape. The level was determined under fluoroscopy guidance. Skin anesthesia was achieved by injecting Lidocaine 2% over the injection site. The interlaminar space was then  approached with a 20 gauge,  4.5 inch Tuohy needle that was introduced under fluoroscopic guidance in the AP, lateral and/or contralateral oblique imaging. Once the Ligamentum flavum was encountered loss of resistance to saline was used to enter the epidural space. With positive loss of resistance and negative aspiration for CSF or Blood, contrast dye Omnipaque (300mg/mL) was injected to confirm placement and there was no vascular runoff. 5 mL of the medication mixture listed above was injected slowly. Displacement of the radio opaque contrast after injection of the medication confirmed that the medication went into the area of the epidural space. The needles were removed and bleeding was nil. A sterile dressing was applied. No specimens collected. The patient tolerated the procedure well.     The patient was monitored after the procedure in the recovery area. They were given post-procedure and discharge instructions to follow at home. The patient was discharged in a stable condition.    Emiliano Allen MD    I reviewed and edited the fellow's note. I conducted my own interview and physical examination. I agree with the findings. I was present and supervising all critical portions of the procedure.    Jed Yeager MD

## 2023-08-14 ENCOUNTER — TELEPHONE (OUTPATIENT)
Dept: EMERGENCY MEDICINE | Facility: HOSPITAL | Age: 48
End: 2023-08-14
Payer: MEDICARE

## 2023-08-14 NOTE — ED TRIAGE NOTES
Pt c/o chest pain, gradual HA(frontal) with body aches. Pt states chest pain is mid sternal and doesn't radiate. Pt states it started today and the body aches w/ sore throat started yesterday. Pt describes pain as tight.Denies SOB, fever. Pain rating 8/10  
Home

## 2023-08-15 DIAGNOSIS — I50.42 CHRONIC COMBINED SYSTOLIC AND DIASTOLIC HEART FAILURE: ICD-10-CM

## 2023-08-15 NOTE — TELEPHONE ENCOUNTER
No care due was identified.  [facility] Embedded Care Due Messages. Reference number: [id number].   8/15/2023 9:39:06 AM CDT

## 2023-08-16 RX ORDER — FUROSEMIDE 40 MG/1
TABLET ORAL
Qty: 30 TABLET | Refills: 0 | OUTPATIENT
Start: 2023-08-16

## 2023-08-23 ENCOUNTER — OFFICE VISIT (OUTPATIENT)
Dept: CARDIOLOGY | Facility: CLINIC | Age: 48
End: 2023-08-23
Payer: MEDICARE

## 2023-08-23 ENCOUNTER — PATIENT MESSAGE (OUTPATIENT)
Dept: SPINE | Facility: CLINIC | Age: 48
End: 2023-08-23
Payer: MEDICARE

## 2023-08-23 VITALS
HEART RATE: 74 BPM | DIASTOLIC BLOOD PRESSURE: 74 MMHG | WEIGHT: 255.75 LBS | RESPIRATION RATE: 18 BRPM | HEIGHT: 67 IN | SYSTOLIC BLOOD PRESSURE: 128 MMHG | BODY MASS INDEX: 40.14 KG/M2 | OXYGEN SATURATION: 98 %

## 2023-08-23 DIAGNOSIS — E78.2 MIXED HYPERLIPIDEMIA: ICD-10-CM

## 2023-08-23 DIAGNOSIS — I42.0 DILATED CARDIOMYOPATHY: ICD-10-CM

## 2023-08-23 DIAGNOSIS — E66.01 MORBID OBESITY, UNSPECIFIED OBESITY TYPE: ICD-10-CM

## 2023-08-23 DIAGNOSIS — I48.0 PAROXYSMAL ATRIAL FIBRILLATION: ICD-10-CM

## 2023-08-23 DIAGNOSIS — E11.22 TYPE 2 DIABETES MELLITUS WITH STAGE 4 CHRONIC KIDNEY DISEASE, WITHOUT LONG-TERM CURRENT USE OF INSULIN: ICD-10-CM

## 2023-08-23 DIAGNOSIS — G89.4 CHRONIC PAIN DISORDER: Primary | ICD-10-CM

## 2023-08-23 DIAGNOSIS — I50.42 CHRONIC COMBINED SYSTOLIC AND DIASTOLIC HEART FAILURE: Primary | ICD-10-CM

## 2023-08-23 DIAGNOSIS — N18.4 TYPE 2 DIABETES MELLITUS WITH STAGE 4 CHRONIC KIDNEY DISEASE, WITHOUT LONG-TERM CURRENT USE OF INSULIN: ICD-10-CM

## 2023-08-23 DIAGNOSIS — Z95.810 PRESENCE OF AUTOMATIC (IMPLANTABLE) CARDIAC DEFIBRILLATOR: ICD-10-CM

## 2023-08-23 DIAGNOSIS — Z09 FOLLOW-UP EXAM: ICD-10-CM

## 2023-08-23 DIAGNOSIS — N18.32 STAGE 3B CHRONIC KIDNEY DISEASE: ICD-10-CM

## 2023-08-23 DIAGNOSIS — I42.8 NICM (NONISCHEMIC CARDIOMYOPATHY): ICD-10-CM

## 2023-08-23 PROCEDURE — 1159F PR MEDICATION LIST DOCUMENTED IN MEDICAL RECORD: ICD-10-PCS | Mod: CPTII,S$GLB,, | Performed by: INTERNAL MEDICINE

## 2023-08-23 PROCEDURE — 3008F PR BODY MASS INDEX (BMI) DOCUMENTED: ICD-10-PCS | Mod: CPTII,S$GLB,, | Performed by: INTERNAL MEDICINE

## 2023-08-23 PROCEDURE — 3046F PR MOST RECENT HEMOGLOBIN A1C LEVEL > 9.0%: ICD-10-PCS | Mod: CPTII,S$GLB,, | Performed by: INTERNAL MEDICINE

## 2023-08-23 PROCEDURE — 3074F PR MOST RECENT SYSTOLIC BLOOD PRESSURE < 130 MM HG: ICD-10-PCS | Mod: CPTII,S$GLB,, | Performed by: INTERNAL MEDICINE

## 2023-08-23 PROCEDURE — 3078F PR MOST RECENT DIASTOLIC BLOOD PRESSURE < 80 MM HG: ICD-10-PCS | Mod: CPTII,S$GLB,, | Performed by: INTERNAL MEDICINE

## 2023-08-23 PROCEDURE — 99999 PR PBB SHADOW E&M-EST. PATIENT-LVL V: ICD-10-PCS | Mod: PBBFAC,,, | Performed by: INTERNAL MEDICINE

## 2023-08-23 PROCEDURE — 99999 PR PBB SHADOW E&M-EST. PATIENT-LVL V: CPT | Mod: PBBFAC,,, | Performed by: INTERNAL MEDICINE

## 2023-08-23 PROCEDURE — 3008F BODY MASS INDEX DOCD: CPT | Mod: CPTII,S$GLB,, | Performed by: INTERNAL MEDICINE

## 2023-08-23 PROCEDURE — 1159F MED LIST DOCD IN RCRD: CPT | Mod: CPTII,S$GLB,, | Performed by: INTERNAL MEDICINE

## 2023-08-23 PROCEDURE — 99215 PR OFFICE/OUTPT VISIT, EST, LEVL V, 40-54 MIN: ICD-10-PCS | Mod: S$GLB,,, | Performed by: INTERNAL MEDICINE

## 2023-08-23 PROCEDURE — 3046F HEMOGLOBIN A1C LEVEL >9.0%: CPT | Mod: CPTII,S$GLB,, | Performed by: INTERNAL MEDICINE

## 2023-08-23 PROCEDURE — 1160F RVW MEDS BY RX/DR IN RCRD: CPT | Mod: CPTII,S$GLB,, | Performed by: INTERNAL MEDICINE

## 2023-08-23 PROCEDURE — 93000 ELECTROCARDIOGRAM COMPLETE: CPT | Mod: S$GLB,,, | Performed by: INTERNAL MEDICINE

## 2023-08-23 PROCEDURE — 93000 EKG 12-LEAD: ICD-10-PCS | Mod: S$GLB,,, | Performed by: INTERNAL MEDICINE

## 2023-08-23 PROCEDURE — 3078F DIAST BP <80 MM HG: CPT | Mod: CPTII,S$GLB,, | Performed by: INTERNAL MEDICINE

## 2023-08-23 PROCEDURE — 99215 OFFICE O/P EST HI 40 MIN: CPT | Mod: S$GLB,,, | Performed by: INTERNAL MEDICINE

## 2023-08-23 PROCEDURE — 1160F PR REVIEW ALL MEDS BY PRESCRIBER/CLIN PHARMACIST DOCUMENTED: ICD-10-PCS | Mod: CPTII,S$GLB,, | Performed by: INTERNAL MEDICINE

## 2023-08-23 PROCEDURE — 3074F SYST BP LT 130 MM HG: CPT | Mod: CPTII,S$GLB,, | Performed by: INTERNAL MEDICINE

## 2023-08-23 RX ORDER — FUROSEMIDE 40 MG/1
40 TABLET ORAL DAILY
Qty: 90 TABLET | Refills: 3 | Status: SHIPPED | OUTPATIENT
Start: 2023-08-23

## 2023-08-23 RX ORDER — ROSUVASTATIN CALCIUM 40 MG/1
40 TABLET, COATED ORAL NIGHTLY
Qty: 90 TABLET | Refills: 3 | Status: SHIPPED | OUTPATIENT
Start: 2023-08-23

## 2023-08-23 RX ORDER — METOPROLOL SUCCINATE 50 MG/1
50 TABLET, EXTENDED RELEASE ORAL DAILY
Qty: 90 TABLET | Refills: 3 | Status: SHIPPED | OUTPATIENT
Start: 2023-08-23

## 2023-08-23 RX ORDER — SPIRONOLACTONE 25 MG/1
25 TABLET ORAL DAILY
Qty: 90 TABLET | Refills: 3 | Status: SHIPPED | OUTPATIENT
Start: 2023-08-23

## 2023-08-23 RX ORDER — SACUBITRIL AND VALSARTAN 24; 26 MG/1; MG/1
1 TABLET, FILM COATED ORAL 2 TIMES DAILY
Qty: 180 TABLET | Refills: 3 | Status: SHIPPED | OUTPATIENT
Start: 2023-08-23

## 2023-08-23 NOTE — PROGRESS NOTES
CARDIOVASCULAR PROGRESS NOTE    REASON FOR CONSULT:   Fabiana Chanel is a 48 y.o. female who presents for follow up of NICM/LV thrombus.    PCP: Elvis  EP: Nanci  Endo: María  CHF: Barber  HISTORY OF PRESENT ILLNESS:   The patient returns for follow up.  She reports generally asymptomatic status without angina, dyspnea, palpitations, or syncope.  There have been no defibrillator discharges.  She denies PND, orthopnea, or lower extremity edema.  There has been no melena, hematuria, or claudication symptoms.    CARDIOVASCULAR HISTORY:   NICM (cath 11/2017) EF 20% by echo 12/2018  [employer] ICD (4/17/18 Dr. Christine)  PAF (on enox for LV apical thrombus 4/2022)  MILE on CPAP  LV Thrombus (echo 4/2022)     PAST MEDICAL HISTORY:     Past Medical History:   Diagnosis Date    Atrial fibrillation     Blood clot associated with vein wall inflammation     not dvt    Cardiomyopathy     Normal cors on cath 11/2017    CHF (congestive heart failure)     DM (diabetes mellitus) 9/19/2013    Hyperlipidemia     Hypertension     Psoriasis     Sleep apnea        PAST SURGICAL HISTORY:     Past Surgical History:   Procedure Laterality Date    CARDIAC CATHETERIZATION      COLONOSCOPY      COLONOSCOPY N/A 3/9/2022    Procedure: COLONOSCOPY;  Surgeon: Vielka Burrell MD;  Location: [facility];  Service: Endoscopy;  Laterality: N/A;    DILATION AND CURETTAGE OF UTERUS      EPIDURAL STEROID INJECTION N/A 8/11/2023    Procedure: INJECTION, STEROID, EPIDURAL, L5/S1 SOONER DATE    clear to hold Eliquis;  Surgeon: Jed Yeager MD;  Location: [facility];  Service: Pain Management;  Laterality: N/A;    ESOPHAGOGASTRODUODENOSCOPY N/A 5/9/2019    Procedure: EGD (ESOPHAGOGASTRODUODENOSCOPY);  Surgeon: Vielka Burrell MD;  Location: [facility];  Service: Endoscopy;  Laterality: N/A;    TRANSFORAMINAL EPIDURAL INJECTION OF STEROID N/A 1/6/2022    Procedure: Transforaminal ANKITA L4/L5 L5/S1;  Surgeon: Jed Yeager MD;  Location:  [facility]IN [facility];  Service: Pain Management;  Laterality: N/A;    TRANSFORAMINAL EPIDURAL INJECTION OF STEROID Right 12/1/2022    Procedure: INJECTION, STEROID, EPIDURAL, TRANSFORAMINAL APPROACH, RIGHT L4/L5 & L5/S1 CONTRAST;  Surgeon: Jde Yeager MD;  Location: [facility];  Service: Pain Management;  Laterality: Right;    TRANSFORAMINAL EPIDURAL INJECTION OF STEROID Right 4/14/2023    Procedure: INJECTION, STEROID, EPIDURAL, TRANSFORAMINAL APPROACH RIGHT S1;  Surgeon: Jed Yeager MD;  Location: [facility];  Service: Pain Management;  Laterality: Right;  3/14 AUTH       ALLERGIES AND MEDICATION:     Review of patient's allergies indicates:   Allergen Reactions    Pneumococcal 23-abimbola ps vaccine      Previous Medications    ALBUTEROL (VENTOLIN HFA) 90 MCG/ACTUATION INHALER    Inhale 2 puffs into the lungs every 6 (six) hours as needed for Wheezing or Shortness of Breath. Rescue    APIXABAN (ELIQUIS) 5 MG TAB    Take 1 tablet (5 mg total) by mouth 2 (two) times daily.    BLOOD GLUCOSE CONTROL, HIGH SOLN    1 each by Misc.(Non-Drug; Combo Route) route once. for 1 dose    BLOOD GLUCOSE CONTROL, LOW SOLN    1 each by Misc.(Non-Drug; Combo Route) route once. for 1 dose    BLOOD PRESSURE CUFF MISC    1 kit by Misc.(Non-Drug; Combo Route) route 2 (two) times daily.    BLOOD SUGAR DIAGNOSTIC STRP    Check blood glucose 3x/day.    BLOOD-GLUCOSE METER (TRUE METRIX AIR GLUCOSE METER) MISC    1 each by Misc.(Non-Drug; Combo Route) route 3 (three) times daily.    FUROSEMIDE (LASIX) 40 MG TABLET    TAKE 1 TABLET BY MOUTH ONCE DAILY *PLEASE  HOLD  WHILE  NOT  DRINKING*    GABAPENTIN (NEURONTIN) 400 MG CAPSULE    TAKE 1 CAPSULE BY MOUTH TWICE DAILY AS NEEDED    INSULIN DEGLUDEC (TRESIBA FLEXTOUCH U-200) 200 UNIT/ML (3 ML) INSULIN PEN    Inject 64 units twice daily    INSULIN LISPRO 100 UNIT/ML PEN    INJECT 46 UNITS SUBCUTANEOUSLY THREE TIMES DAILY BEFORE MEAL(S)    INSULIN NPH ISOPH U-100 HUMAN (HUMULIN N NPH INSULIN  "KWIKPEN) 100 UNIT/ML (3 ML) INPN    Inject 24 units nightly at 10 pm    METOPROLOL SUCCINATE (TOPROL-XL) 50 MG 24 HR TABLET    TAKE 1 TABLET BY MOUTH ONCE DAILY *HOLD  IF  SYSTOLIC  BLOOD  PRESSURE  IS  LESS  THAN  120*    MUPIROCIN (BACTROBAN) 2 % OINTMENT    Apply topically nightly.    ONDANSETRON (ZOFRAN) 4 MG TABLET    Take 1 tablet (4 mg total) by mouth every 8 (eight) hours as needed (Nausea and vomiting).    ONDANSETRON (ZOFRAN-ODT) 4 MG TBDL    Take 1 tablet (4 mg total) by mouth every 8 (eight) hours as needed.    OXYCODONE-ACETAMINOPHEN (PERCOCET) 5-325 MG PER TABLET    Take 1 tablet by mouth every 6 (six) hours as needed (severe pain).    OXYCODONE-ACETAMINOPHEN (PERCOCET) 5-325 MG PER TABLET    Take 1 tablet by mouth every 24 hours as needed for Pain.    PEN NEEDLE, DIABETIC (BD LORI 2ND GEN PEN NEEDLE) 32 GAUGE X 5/32" NDLE    USE 1 PEN NEEDLE 4 TIMES DAILY    ROSUVASTATIN (CRESTOR) 40 MG TAB    TAKE 1 TABLET BY MOUTH ONCE DAILY IN THE EVENING    SPIRONOLACTONE (ALDACTONE) 25 MG TABLET    Take 1 tablet (25 mg total) by mouth once daily. Hold until eating and drinking improves. Need to weight self daily    SPIRONOLACTONE (ALDACTONE) 25 MG TABLET    Take 1 tablet (25 mg total) by mouth once daily.    TIRZEPATIDE (MOUNJARO) 2.5 MG/0.5 ML PNIJ    Start Mounjaro 2.5 mg into the skin once weekly for 4 weeks. Then increase to Mounjaro 5 mg once weekly.    TIRZEPATIDE (MOUNJARO) 5 MG/0.5 ML PNIJ    Start Mounjaro 2.5 mg once weekly for 4 weeks. Then increase to Mounjaro 5 mg once weekly.    TRIAMCINOLONE ACETONIDE 0.1% (KENALOG) 0.1 % CREAM    2 (two) times daily. Apply to affected area       SOCIAL HISTORY:     Social History     Socioeconomic History    Marital status:    Tobacco Use    Smoking status: Never    Smokeless tobacco: Never    Tobacco comments:     smokes cigars on occasion   Substance and Sexual Activity    Alcohol use: Not Currently     Comment: occasional    Drug use: Not Currently     " "Types: Marijuana     Comment: occ    Sexual activity: Not Currently     Partners: Male     Social Determinants of Health     Stress: Stress Concern Present (7/9/2019)    [employer] [place] - Occupational Stress Questionnaire     Feeling of Stress : Rather much       FAMILY HISTORY:     Family History   Problem Relation Age of Onset    Hypertension Mother     Hypertension Father     Diabetes Father     Diabetes Maternal Grandmother     Diabetes Paternal Grandmother     Breast cancer Neg Hx     Colon cancer Neg Hx     Ovarian cancer Neg Hx        REVIEW OF SYSTEMS:   Review of Systems   Constitutional:  Negative for chills, diaphoresis and fever.   HENT:  Negative for nosebleeds.    Eyes:  Negative for blurred vision, double vision and photophobia.   Respiratory:  Negative for hemoptysis, shortness of breath and wheezing.    Cardiovascular:  Negative for chest pain, palpitations, orthopnea, claudication, leg swelling and PND.   Gastrointestinal:  Negative for abdominal pain, blood in stool, heartburn, melena, nausea and vomiting.   Genitourinary:  Negative for flank pain and hematuria.   Musculoskeletal:  Negative for falls, myalgias and neck pain.   Skin:  Negative for rash.   Neurological:  Negative for dizziness, seizures, loss of consciousness, weakness and headaches.   Endo/Heme/Allergies:  Negative for polydipsia. Does not bruise/bleed easily.   Psychiatric/Behavioral:  Negative for depression and memory loss. The patient is not nervous/anxious.        PHYSICAL EXAM:     Vitals:    08/23/23 1144   BP: 128/74   Pulse: 74   Resp: 18    Body mass index is 40.05 kg/m².  Weight: 116 kg (255 lb 11.7 oz)   Height: 5' 7" (170.2 cm)     Physical Exam  Vitals reviewed.   Constitutional:       General: She is not in acute distress.     Appearance: She is well-developed. She is obese. She is not ill-appearing, toxic-appearing or diaphoretic.   HENT:      Head: Normocephalic and atraumatic.   Eyes:      " General: No scleral icterus.     Extraocular Movements: Extraocular movements intact.      Conjunctiva/sclera: Conjunctivae normal.      Pupils: Pupils are equal, round, and reactive to light.   Neck:      Thyroid: No thyromegaly.      Vascular: Normal carotid pulses. No carotid bruit or JVD.      Trachea: Trachea normal. No tracheal deviation.   Cardiovascular:      Rate and Rhythm: Normal rate and regular rhythm.      Pulses:           Carotid pulses are 2+ on the right side and 2+ on the left side.     Heart sounds: S1 normal and S2 normal. No murmur heard.     No friction rub. No gallop.      Comments: L side ICD in place.  Pulmonary:      Effort: Pulmonary effort is normal. No respiratory distress.      Breath sounds: Normal breath sounds. No stridor. No wheezing, rhonchi or rales.   Chest:      Chest wall: No tenderness.   Abdominal:      General: There is no distension.      Palpations: Abdomen is soft.   Musculoskeletal:         General: No swelling or tenderness. Normal range of motion.      Cervical back: Normal range of motion and neck supple. No edema or rigidity.      Right lower leg: No edema.      Left lower leg: No edema.   Feet:      Right foot:      Skin integrity: No ulcer.      Left foot:      Skin integrity: No ulcer.   Skin:     General: Skin is warm and dry.      Coloration: Skin is not jaundiced.   Neurological:      General: No focal deficit present.      Mental Status: She is alert and oriented to person, place, and time.      Cranial Nerves: No cranial nerve deficit.   Psychiatric:         Mood and Affect: Mood normal.         Speech: Speech normal.         Behavior: Behavior normal. Behavior is cooperative.         DATA:   EKG: (personally reviewed tracing)  8/23/23 SR 72, LVH/PRWP/LAD    Laboratory:  CBC:  Recent Labs   Lab 10/21/22  1920 01/18/23  2040 03/03/23  1717 03/03/23  1718   WBC 8.40 8.41  --  9.58   Hemoglobin 14.0 13.3  --  14.2   POC Hematocrit  --   --  47  --    Hematocrit  43.8 42.1  --  43.7   Platelets 254 201  --  267         CHEMISTRIES:  Recent Labs   Lab 04/27/22  2355 04/29/22  1846 05/18/22  0506 06/16/22  1020 07/01/22  1317 08/05/22 2243 08/06/22  0517 08/08/22  1313 10/21/22  1920 01/18/23  2040 03/03/23  1718   Glucose 303 H   < > 160 H 107 73 56 L 110   < > 112 H 391 H 423 H   Sodium 137   < > 140 140 141 141 138   < > 139 136 130 L   Potassium 4.2   < > 3.6 3.7 3.1 L 3.6 3.4 L   < > 4.1 3.6 4.3   BUN 19   < > 18 19 20 21 H 20   < > 21 H 22 H 28 H   Creatinine 2.7 H   < > 1.9 H 1.8 H 1.9 H 1.8 H 1.6 H   < > 2.2 H 1.9 H 2.2 H   eGFR if  24 A   < > 36 A 39 A 36 A  --   --   --   --   --   --    eGFR if non  21 A   < > 31 A 34 A 31 A  --   --   --   --   --   --    Calcium 9.8   < > 8.6 L 9.6 9.5 10.1 9.2   < > 10.0 9.4 9.7   Magnesium 2.7 H  --   --   --   --  2.3 2.3  --   --   --   --     < > = values in this interval not displayed.         CARDIAC BIOMARKERS:  Recent Labs   Lab 04/29/22 1846 05/17/22  1727 10/16/22  0132 10/21/22  1920 01/18/23  2040 03/03/23  1718    H  --   --  350 H  --  198 H   Troponin I 0.016   < > 0.014 0.011 0.012  --     < > = values in this interval not displayed.         COAGS:  Recent Labs   Lab 11/04/20  1003 02/26/21  0103 08/05/22  2243   INR 0.9 1.0 1.0         LIPIDS/LFTS:  Recent Labs   Lab 10/14/21  0825 03/18/22  1847 06/16/22  1020 07/01/22  1317 08/05/22  2243 10/21/22  1920 01/18/23 2040 03/03/23  1718   Cholesterol 111 L  --  110 L 105 L  --   --   --   --    Triglycerides 73  --  83 83  --   --   --   --    HDL 38 L  --  37 L 30 L  --   --   --   --    LDL Cholesterol 58.4 L  --  56.4 L 58.4 L  --   --   --   --    Non-HDL Cholesterol 73  --  73 75  --   --   --   --    AST 19   < > 28 17   < > 27 16 18   ALT 20   < > 32 17   < > 28 16 20    < > = values in this interval not displayed.         Lab Results   Component Value Date    TSH 1.355 10/21/2022       Cardiovascular Testing:  Echo  8/1/22  The left ventricle is severely enlarged with mild concentric hypertrophy and severely decreased systolic function.  The estimated ejection fraction is 25%.  Grade I left ventricular diastolic dysfunction.  Normal right ventricular size with normal right ventricular systolic function.  Normal central venous pressure (3 mmHg).  The estimated PA systolic pressure is 27 mmHg.  There is no pulmonary hypertension.  No LV thrombus.    Cath 11/27/17  RA 5  RV 61/6  PA 60/29/42  PAWP 18  /41  Ao 163/107/130  CO 5.6 L/min  LVEF: 20% by echo with severe LV dilation  Wall Motion: Severe global HK  Dominance: Co-dom  LM: normal  LAD: normal  LCx: normal  RCA: normal  Hemostasis:  RFA/V manual compression  Impression:  Normal cors  Elevated LVEDP with transmission of pressures to pulmonary circuit  Above c/w Severe NICM  RFA/V manual compression  Plan:  Cont med rx  Stop Plavix  Stop amlodipine  Start Hydrala/Imdur/Lasix  Goal net neg 1L/day  Cont BBl/ACEi  Repeat echo in 3 months for reassessment of LV fxn and need for ICD  Follow up with Dr. Yanez 1 week after discharge     Stress Test: L MPI 9/20/13  Nuclear Quantitative Functional Analysis:   LVEF: 31 %  Impression: NORMAL MYOCARDIAL PERFUSION  1. The perfusion scan is free of evidence for myocardial ischemia or injury.   2. There is a moderate intensity fixed defect in the inferior wall of the left ventricle, secondary to diaphragm attenuation.   3. There is abnormal wall motion at rest showing moderate global hypokinesis of the left ventricle.   4. There is resting LV dysfunction with a reduced ejection fraction of 31 %.   5. The ventricular volumes are normal at rest and stress.   6. The extracardiac distribution of radioactivity is normal.     ASSESSMENT:   # NICM, EF persistently reduced 20% by echo 10/2019.  Euvolemic on exam.  # LV thrombus on echo 4/2022 (was on eliquis->enox 120mg bid), now resolved and back on eliquis.  # PAF, in SR, on eliquis  (intol of Xarelto)  # S/P [employer] ICD 4/2018 (Dr. Christine), atrial lead placed for AF monitoring  # HTN, controlled (?intol of olmesartan)  # HLP, on rosuva 40mg  # DM  # BMI 40, up 1 unit(s) vs last OV  # MILE, on CPAP  # CKD3b    PLAN:   Cont med rx  Cont eliquis 5mg bid  Start entresto 24/26mg bid, titrate as BP will allow.  Check BMP 1 week  [facility] follow [facility] at [facility] as planned  Diet/exercise/weight loss  RTC 3 months (Nov 2023)      Oziel Yanez MD, FACC

## 2023-08-25 ENCOUNTER — TELEPHONE (OUTPATIENT)
Dept: PAIN MEDICINE | Facility: CLINIC | Age: 48
End: 2023-08-25
Payer: MEDICARE

## 2023-08-25 RX ORDER — OXYCODONE AND ACETAMINOPHEN 10; 325 MG/1; MG/1
0.5 TABLET ORAL EVERY 12 HOURS PRN
Qty: 15 TABLET | Refills: 0 | Status: SHIPPED | OUTPATIENT
Start: 2023-08-25 | End: 2023-09-24

## 2023-08-25 NOTE — TELEPHONE ENCOUNTER
----- Message from Jed Yeager MD sent at 8/25/2023 11:39 AM CDT -----  I sent Percocet 10 to take half a tablet 2 times a day

## 2023-08-30 ENCOUNTER — PATIENT OUTREACH (OUTPATIENT)
Dept: ADMINISTRATIVE | Facility: HOSPITAL | Age: 48
End: 2023-08-30
Payer: MEDICARE

## 2023-08-30 ENCOUNTER — PATIENT MESSAGE (OUTPATIENT)
Dept: ADMINISTRATIVE | Facility: HOSPITAL | Age: 48
End: 2023-08-30
Payer: MEDICARE

## 2023-08-30 DIAGNOSIS — E11.9 TYPE 2 DIABETES MELLITUS WITHOUT COMPLICATION, UNSPECIFIED WHETHER LONG TERM INSULIN USE: Primary | ICD-10-CM

## 2023-08-30 NOTE — PROGRESS NOTES
Population Health Chart Review & Patient Outreach Details:     Reason for Outreach Encounter:     []  Non-Compliant Report   [x]  Payor Report ([employer], PHN, BCBS, MSSP, MCIP, UHC, etc.)   []  Pre-Visit Chart Review     Updates Requested / Reviewed:     [x]  Care Everywhere    []     []  External Sources ([facility], [facility], DIS, etc.)   []  Care Team Updated    Patient Outreach Method:    [x]  Telephone Outreach Completed   [] Successful   [x] Left Voicemail   [] Unable to Contact (wrong number, no voicemail)  [x]  [facility] Outreach Sent  []  Letter Outreach Mailed  []  Fax Sent for External Records  []  External Records Upload    Health Maintenance Topics Addressed and Outreach Outcomes / Actions Taken:        []      Breast Cancer Screening []  Mammo Scheduled      []  External Records Requested     []  Added Reminder to Complete to Upcoming Primary Care Appt Notes     []  Patient Declined     []  Patient Will Call Back to Schedule     []  Patient Will Schedule with External Provider / Order Routed if Applicable             []       Cervical Cancer Screening []  Pap Scheduled      []  External Records Requested     []  Added Reminder to Complete to Upcoming Primary Care Appt Notes     []  Patient Declined     []  Patient Will Call Back to Schedule     []  Patient Will Schedule with External Provider               []          Colorectal Cancer Screening []  Colonoscopy Case Request or Referral Placed     []  External Records Requested     []  Added Reminder to Complete to Upcoming Primary Care Appt Notes     []  Patient Declined     []  Patient Will Call Back to Schedule     []  Patient Will Schedule with External Provider     []  Fit Kit Mailed (add the SmartPhrase under additional notes)     []  Reminded Patient to Complete Home Test             [x]      Diabetic Eye Exam []  Eye Camera Scheduled or Optometry Referral Placed     []  External Records Requested     []  Added Reminder to Complete to  Upcoming Primary Care Appt Notes     []  Patient Declined     []  Patient Will Call Back to Schedule     []  Patient Will Schedule with External Provider             []      Blood Pressure Control []  Primary Care Follow Up Visit Scheduled     []  Remote Blood Pressure Reading Captured     []  Added Reminder to Complete to Upcoming Primary Care Appt Notes     []  Patient Declined     []  Patient Will Call Back / Patient Will Send Portal Message with Reading     []  Patient Will Call Back to Schedule Provider Visit             [x]       HbA1c & Other Labs []  Lab Appt Scheduled for Due Labs     []  Primary Care Follow Up Visit Scheduled      []  Reminded Patient to Complete Home Test     []  Added Reminder to Complete to Upcoming Primary Care Appt Notes     []  Patient Declined     []  Patient Will Call Back to Schedule     []  Patient Will Schedule with External Provider / Order Routed if Applicable           []    Schedule Primary Care Appt []  Primary Care Appt Scheduled     []  Patient Declined     []  Patient Will Call Back to Schedule     []  Pt Established with External Provider & Updated Care Team             []      Medication Adherence []  Primary Care Appointment Scheduled     []  Added Reminder to Upcoming Primary Care Appt Notes     []  Patient Reminded to  Prescription     []  Patient Declined, Provider Notified if Needed     []  Sent Provider Message to Review and/or Add Exclusion to Problem List             []      Osteoporosis Screening []  DXA Appointment Scheduled     []  External Records Requested     []  Added Reminder to Complete to Upcoming Primary Care Appt Notes     []  Patient Declined     []  Patient Will Call Back to Schedule     []  Patient Will Schedule with External Provider / Order Routed if Applicable     Additional Care Coordinator Notes:         Further Action Needed If Patient Returns Outreach:     Due for overdue HM below, need to get recent records of where patient had  done, if not done can help get it scheduled. Please forward messages to me.   Health Maintenance Due   Topic    Diabetes Urine Screening     Eye Exam     Lipid Panel     Hemoglobin A1c & CMP

## 2023-09-05 ENCOUNTER — HOSPITAL ENCOUNTER (EMERGENCY)
Facility: HOSPITAL | Age: 48
Discharge: HOME | End: 2023-09-05
Attending: EMERGENCY MEDICINE
Payer: MEDICARE

## 2023-09-05 ENCOUNTER — PATIENT OUTREACH (OUTPATIENT)
Dept: ADMINISTRATIVE | Facility: HOSPITAL | Age: 48
End: 2023-09-05
Payer: MEDICARE

## 2023-09-05 VITALS
RESPIRATION RATE: 18 BRPM | HEIGHT: 67 IN | BODY MASS INDEX: 38.45 KG/M2 | TEMPERATURE: 99 F | WEIGHT: 245 LBS | HEART RATE: 84 BPM | OXYGEN SATURATION: 96 % | SYSTOLIC BLOOD PRESSURE: 123 MMHG | DIASTOLIC BLOOD PRESSURE: 79 MMHG

## 2023-09-05 DIAGNOSIS — J10.1 INFLUENZA B: Primary | ICD-10-CM

## 2023-09-05 LAB
CTP QC/QA: YES
CTP QC/QA: YES
POC MOLECULAR INFLUENZA A AGN: NEGATIVE
POC MOLECULAR INFLUENZA B AGN: POSITIVE
SARS-COV-2 RDRP RESP QL NAA+PROBE: NEGATIVE

## 2023-09-05 PROCEDURE — 99284 EMERGENCY DEPT VISIT MOD MDM: CPT

## 2023-09-05 PROCEDURE — 25000003 PHARM REV CODE 250: Performed by: PHYSICIAN ASSISTANT

## 2023-09-05 PROCEDURE — 87635 SARS-COV-2 COVID-19 AMP PRB: CPT | Performed by: EMERGENCY MEDICINE

## 2023-09-05 PROCEDURE — 87502 INFLUENZA DNA AMP PROBE: CPT

## 2023-09-05 RX ORDER — ACETAMINOPHEN 500 MG
1000 TABLET ORAL
Status: COMPLETED | OUTPATIENT
Start: 2023-09-05 | End: 2023-09-05

## 2023-09-05 RX ORDER — ONDANSETRON 4 MG/1
4 TABLET, ORALLY DISINTEGRATING ORAL
Status: COMPLETED | OUTPATIENT
Start: 2023-09-05 | End: 2023-09-05

## 2023-09-05 RX ORDER — OSELTAMIVIR PHOSPHATE 75 MG/1
75 CAPSULE ORAL 2 TIMES DAILY
Qty: 10 CAPSULE | Refills: 0 | Status: SHIPPED | OUTPATIENT
Start: 2023-09-05 | End: 2023-09-10

## 2023-09-05 RX ORDER — ONDANSETRON 4 MG/1
4 TABLET, ORALLY DISINTEGRATING ORAL EVERY 6 HOURS PRN
Qty: 15 TABLET | Refills: 0 | Status: SHIPPED | OUTPATIENT
Start: 2023-09-05 | End: 2024-03-13

## 2023-09-05 RX ADMIN — ACETAMINOPHEN 1000 MG: 500 TABLET ORAL at 10:09

## 2023-09-05 RX ADMIN — ONDANSETRON 4 MG: 4 TABLET, ORALLY DISINTEGRATING ORAL at 10:09

## 2023-09-05 NOTE — ED PROVIDER NOTES
"Encounter Date: 9/5/2023       History     Chief Complaint   Patient presents with    COVID-19 Concerns     Pt reports generalized body aches, fatigue,  productive cough, and chills x4 days. Pt denies chest pain or shortness of breath. Pt states "I just want to get tested for covid".     This is a 48 year old female with PMH of A fib, CHF, DM, HLD, Psoriasis and HTN who presents to the ED complaining of running nose, coughing, headaches, nausea, fatigue and body aches that started on Sunday.  Her  was sick with similar symptoms but has since improved.  She has been drinking hot tea but has not attempted other types of treatment.  There are no other alleviating or exacerbating factors.  No other associated symptoms.  No other sick contacts.      The history is provided by the patient.     Review of patient's allergies indicates:   Allergen Reactions    Metformin      Diarrhea on metformin XR     Pneumococcal 23-abimbola ps vaccine      Past Medical History:   Diagnosis Date    Atrial fibrillation     Blood clot associated with vein wall inflammation     not dvt    Cardiomyopathy     Normal cors on cath 11/2017    CHF (congestive heart failure)     DM (diabetes mellitus) 9/19/2013    Hyperlipidemia     Hypertension     Psoriasis     Sleep apnea      Past Surgical History:   Procedure Laterality Date    CARDIAC CATHETERIZATION      COLONOSCOPY      COLONOSCOPY N/A 3/9/2022    Procedure: COLONOSCOPY;  Surgeon: Vielka Burrell MD;  Location: [facility];  Service: Endoscopy;  Laterality: N/A;    DILATION AND CURETTAGE OF UTERUS      EPIDURAL STEROID INJECTION N/A 8/11/2023    Procedure: INJECTION, STEROID, EPIDURAL, L5/S1 SOONER DATE    clear to hold Eliquis;  Surgeon: Jed Yeager MD;  [facility];  Service: Pain Management;  Laterality: N/A;    ESOPHAGOGASTRODUODENOSCOPY N/A 5/9/2019    Procedure: EGD (ESOPHAGOGASTRODUODENOSCOPY);  Surgeon: Vielka Burrell MD;  Location: [facility];  " Service: Endoscopy;  Laterality: N/A;    TRANSFORAMINAL EPIDURAL INJECTION OF STEROID N/A 1/6/2022    Procedure: Transforaminal ANKITA L4/L5 L5/S1;  Surgeon: Jed Yeager MD;  Location: [facility];  Service: Pain Management;  Laterality: N/A;    TRANSFORAMINAL EPIDURAL INJECTION OF STEROID Right 12/1/2022    Procedure: INJECTION, STEROID, EPIDURAL, TRANSFORAMINAL APPROACH, RIGHT L4/L5 & L5/S1 CONTRAST;  Surgeon: Jed Yeager MD;  Location: [facility];  Service: Pain Management;  Laterality: Right;    TRANSFORAMINAL EPIDURAL INJECTION OF STEROID Right 4/14/2023    Procedure: INJECTION, STEROID, EPIDURAL, TRANSFORAMINAL APPROACH RIGHT S1;  Surgeon: Jed Yeager MD;  Location: [facility];  Service: Pain Management;  Laterality: Right;  3/14 AUTH     Family History   Problem Relation Age of Onset    Hypertension Mother     Hypertension Father     Diabetes Father     Diabetes Maternal Grandmother     Diabetes Paternal Grandmother     Breast cancer Neg Hx     Colon cancer Neg Hx     Ovarian cancer Neg Hx      Social History     Tobacco Use    Smoking status: Never    Smokeless tobacco: Never    Tobacco comments:     smokes cigars on occasion   Substance Use Topics    Alcohol use: Not Currently     Comment: occasional    Drug use: Not Currently     Types: Marijuana     Comment: occ     Review of Systems   Constitutional:  Positive for fatigue. Negative for chills and fever.   HENT:  Positive for congestion and rhinorrhea. Negative for ear pain, sneezing and sore throat.    Eyes:  Negative for pain.   Respiratory:  Positive for cough. Negative for choking and shortness of breath.    Gastrointestinal:  Positive for nausea. Negative for abdominal pain, diarrhea and vomiting.   Genitourinary:  Negative for dysuria and hematuria.   Musculoskeletal:  Positive for myalgias.   Skin:  Negative for rash and wound.   Neurological:  Positive for headaches.   All other systems reviewed and are negative.      Physical  Exam     Initial Vitals [09/05/23 0951]   BP Pulse Resp Temp SpO2   123/79 84 18 98.8 °F (37.1 °C) 96 %      MAP       --         Physical Exam    Nursing note and vitals reviewed.  Constitutional: She appears well-developed and well-nourished. She is not diaphoretic. No distress.   HENT:   Head: Normocephalic and atraumatic.   Nose: Nose normal.   There is mild posterior pharyngeal erythema noted without tonsillar exudate.  The tonsils are symmetrical and the uvula is midline.  The bilateral TMs are clear and intact.   Eyes: EOM are normal. Pupils are equal, round, and reactive to light.   Neck: Neck supple.   Normal range of motion.  Cardiovascular:  Normal rate and regular rhythm.           No murmur heard.  Pulmonary/Chest: Breath sounds normal. No respiratory distress. She has no wheezes. She has no rhonchi. She has no rales.   Abdominal: Abdomen is soft. Bowel sounds are normal. She exhibits no distension. There is no abdominal tenderness. There is no rebound and no guarding.   Musculoskeletal:         General: No tenderness or edema. Normal range of motion.      Cervical back: Normal range of motion and neck supple.     Neurological: She is alert and oriented to person, place, and time. No cranial nerve deficit.   Skin: Skin is warm. No rash noted. No erythema.         ED Course   Procedures  Labs Reviewed   POCT INFLUENZA A/B MOLECULAR - Abnormal; Notable for the following components:       Result Value    POC Molecular Influenza B Ag Positive (*)     All other components within normal limits   SARS-COV-2 RDRP GENE          Imaging Results    None          Medications   acetaminophen tablet 1,000 mg (has no administration in time range)   ondansetron disintegrating tablet 4 mg (has no administration in time range)     Medical Decision Making  Amount and/or Complexity of Data Reviewed  Labs: ordered.    Risk  OTC drugs.  Prescription drug management.         APC / Resident Notes:   This is a 48-year-old  female who presents to the emergency department with flu-like symptoms.  She states that her  was sick with similar symptoms but is now improved.      The patient is currently afebrile and nontoxic in appearance.  Vital signs are stable.  Physical exam revealed mild posterior pharyngeal erythema without evidence of peritonsillar abscess or exudates.  I doubt strep pharyngitis, meningitis.  The bilateral tympanic membranes are clear and intact.  I doubt otitis media, otitis externa.  The bilateral lungs are clear to auscultation.  I doubt pneumonia.  The remaining physical exam is unremarkable.  A rapid COVID test was performed which was negative.  Influenza test was performed which was positive for influenza B.  I will treat with Zofran and Tamiflu.  Will have her rest and follow-up with primary care.  Return precautions were thoroughly reviewed with her.  She verbalized understanding and agreement.  She is currently safe and stable for discharge at this time.                        Clinical Impression:   Final diagnoses:  [J10.1] Influenza B (Primary)        ED Disposition Condition    Discharge Stable          ED Prescriptions       Medication Sig Dispense Start Date End Date Auth. Provider    ondansetron (ZOFRAN-ODT) 4 MG TbDL Take 1 tablet (4 mg total) by mouth every 6 (six) hours as needed (nausea). 15 tablet 9/5/2023 -- Yamileth Morales PA-C    oseltamivir (TAMIFLU) 75 MG capsule Take 1 capsule (75 mg total) by mouth 2 (two) times daily. for 5 days 10 capsule 9/5/2023 9/10/2023 Yamileth Morales PA-C          Follow-up Information       Follow up With Specialties Details Why Contact Info    Gil Leal MD Family Medicine   [address] [place] [address]  [phone]               Yamileth Morales PA-C  09/05/23 9426

## 2023-09-05 NOTE — LETTER
AUTHORIZATION FOR RELEASE OF   CONFIDENTIAL INFORMATION    Dear Dr Schmidt and Dr Roe,    We are seeing Fabiana Chanel, date of birth 1975, in the clinic at [facility]. Gil Leal MD is the patient's PCP. Fabiana Chanel has an outstanding lab/procedure at the time we reviewed her chart. In order to help keep her health information updated, she has authorized us to request the following medical record(s):        (  )  MAMMOGRAM                                      (  )  COLONOSCOPY      (  )  PAP SMEAR                                          (  )  OUTSIDE LAB RESULTS     (  )  DEXA SCAN                                          ( x )  EYE EXAM            (  )  FOOT EXAM                                          (  )  ENTIRE RECORD     (  )  OUTSIDE IMMUNIZATIONS                 (  )  _______________         Please fax records to [facility], Acharya, Anshul S., MD,                                    FAX # [phone]  Any questions please contact Fernanda Neal at [phone]           Patient Name: Fabiana Chanel  : 1975  Patient Phone #: [phone]

## 2023-09-05 NOTE — DISCHARGE INSTRUCTIONS
You have the flu.  Rest.  Drink plenty of fluids.  You may take over-the-counter Tylenol or Motrin for body aches.  Stay away from others.  Uses Zofran for nausea.  Please follow-up with your primary care doctor.  Return to the emergency department for any change or concerning symptoms.

## 2023-09-05 NOTE — PROGRESS NOTES
Population Health Chart Review & Patient Outreach Details:     Additional Care Coordinator Notes:      Further Action Needed If Patient Returns Outreach:      Reason for Outreach Encounter:   []  Pre-Visit Chart Review   []  Non-Compliant Report   [x]  Payor Report ([employer], PHN, BCBS, MSSP, MCIP, UHC, etc.)      Updates Requested / Reviewed:   [x]  Immunizations reconciled   [x]  Care Everywhere    [x]  Care Team Updated   []     []  External Sources ([employer], [employer], [employer], etc.)    Patient Outreach Method:  []  Telephone Outreach Completed   [] Successful   [] Left Voicemail   [] Unable to Contact (wrong number, no voicemail)  []  [facility] Outreach Sent  []  Letter Outreach Mailed  [x]  Fax Sent for External Records  []  External Records Upload                Health Maintenance Topics Addressed and Outreach Outcomes / Actions Taken:          []          Colorectal Cancer Screening []  Colonoscopy Case Request or Referral Placed     []  External Records Requested     []  Added Reminder to give during Upcoming Primary Care Appt     []  Fit Kit Order Placed             []      Diabetic Eye Exam []  Eye Camera Scheduled or Optometry Referral Placed     []  External Records Requested     []  Added Reminder to Complete to Upcoming Primary Care Appt Notes             []       HbA1c/Urine & Other Labs []  Lab Appt/Urine Scheduled for Due Labs     []  Primary Care Follow Up Visit Scheduled      []  Added Reminder to Complete to Upcoming Primary Care Appt Notes

## 2023-09-07 ENCOUNTER — PATIENT OUTREACH (OUTPATIENT)
Dept: EMERGENCY MEDICINE | Facility: HOSPITAL | Age: 48
End: 2023-09-07

## 2023-09-22 RX ORDER — INSULIN DEGLUDEC 200 U/ML
INJECTION, SOLUTION SUBCUTANEOUS
Qty: 21 PEN | Refills: 2 | Status: SHIPPED | OUTPATIENT
Start: 2023-09-22 | End: 2023-10-31

## 2023-09-27 ENCOUNTER — PATIENT MESSAGE (OUTPATIENT)
Dept: CARDIOLOGY | Facility: CLINIC | Age: 48
End: 2023-09-27
Payer: MEDICARE

## 2023-09-27 ENCOUNTER — PATIENT MESSAGE (OUTPATIENT)
Dept: SPINE | Facility: CLINIC | Age: 48
End: 2023-09-27
Payer: MEDICARE

## 2023-09-28 ENCOUNTER — LAB VISIT (OUTPATIENT)
Dept: LAB | Facility: HOSPITAL | Age: 48
End: 2023-09-28
Attending: INTERNAL MEDICINE
Payer: MEDICARE

## 2023-09-28 DIAGNOSIS — I50.42 CHRONIC COMBINED SYSTOLIC AND DIASTOLIC HEART FAILURE: ICD-10-CM

## 2023-09-28 LAB
ANION GAP SERPL CALC-SCNC: 8 MMOL/L (ref 8–16)
BUN SERPL-MCNC: 29 MG/DL (ref 6–20)
CALCIUM SERPL-MCNC: 9 MG/DL (ref 8.7–10.5)
CHLORIDE SERPL-SCNC: 101 MMOL/L (ref 95–110)
CO2 SERPL-SCNC: 29 MMOL/L (ref 23–29)
CREAT SERPL-MCNC: 2.1 MG/DL (ref 0.5–1.4)
EST. GFR  (NO RACE VARIABLE): 29 ML/MIN/1.73 M^2
GLUCOSE SERPL-MCNC: 169 MG/DL (ref 70–110)
POTASSIUM SERPL-SCNC: 3.7 MMOL/L (ref 3.5–5.1)
SODIUM SERPL-SCNC: 138 MMOL/L (ref 136–145)

## 2023-09-28 PROCEDURE — 36415 COLL VENOUS BLD VENIPUNCTURE: CPT | Performed by: INTERNAL MEDICINE

## 2023-09-28 PROCEDURE — 80048 BASIC METABOLIC PNL TOTAL CA: CPT | Performed by: INTERNAL MEDICINE

## 2023-10-04 DIAGNOSIS — E11.9 TYPE 2 DIABETES MELLITUS WITHOUT COMPLICATION, UNSPECIFIED WHETHER LONG TERM INSULIN USE: ICD-10-CM

## 2023-10-06 RX ORDER — APIXABAN 5 MG/1
5 TABLET, FILM COATED ORAL 2 TIMES DAILY
Qty: 180 TABLET | Refills: 3 | Status: SHIPPED | OUTPATIENT
Start: 2023-10-06

## 2023-10-09 ENCOUNTER — PATIENT MESSAGE (OUTPATIENT)
Dept: ADMINISTRATIVE | Facility: HOSPITAL | Age: 48
End: 2023-10-09
Payer: MEDICARE

## 2023-10-17 ENCOUNTER — OFFICE VISIT (OUTPATIENT)
Dept: PAIN MEDICINE | Facility: CLINIC | Age: 48
End: 2023-10-17
Payer: MEDICARE

## 2023-10-17 ENCOUNTER — CLINICAL SUPPORT (OUTPATIENT)
Dept: CARDIOLOGY | Facility: HOSPITAL | Age: 48
End: 2023-10-17
Payer: MEDICAID

## 2023-10-17 ENCOUNTER — TELEPHONE (OUTPATIENT)
Dept: PAIN MEDICINE | Facility: CLINIC | Age: 48
End: 2023-10-17
Payer: MEDICARE

## 2023-10-17 VITALS
BODY MASS INDEX: 40.36 KG/M2 | OXYGEN SATURATION: 100 % | HEART RATE: 84 BPM | SYSTOLIC BLOOD PRESSURE: 123 MMHG | WEIGHT: 257.69 LBS | TEMPERATURE: 98 F | RESPIRATION RATE: 18 BRPM | DIASTOLIC BLOOD PRESSURE: 79 MMHG

## 2023-10-17 DIAGNOSIS — Z95.810 PRESENCE OF AUTOMATIC (IMPLANTABLE) CARDIAC DEFIBRILLATOR: ICD-10-CM

## 2023-10-17 DIAGNOSIS — M51.36 DDD (DEGENERATIVE DISC DISEASE), LUMBAR: ICD-10-CM

## 2023-10-17 DIAGNOSIS — M54.16 LUMBAR RADICULOPATHY: Primary | ICD-10-CM

## 2023-10-17 DIAGNOSIS — I48.91 UNSPECIFIED ATRIAL FIBRILLATION: ICD-10-CM

## 2023-10-17 DIAGNOSIS — G89.4 CHRONIC PAIN SYNDROME: ICD-10-CM

## 2023-10-17 DIAGNOSIS — I50.9 HEART FAILURE, UNSPECIFIED: ICD-10-CM

## 2023-10-17 PROCEDURE — 99999 PR PBB SHADOW E&M-EST. PATIENT-LVL V: ICD-10-PCS | Mod: PBBFAC,,, | Performed by: ANESTHESIOLOGY

## 2023-10-17 PROCEDURE — 1160F RVW MEDS BY RX/DR IN RCRD: CPT | Mod: CPTII,S$GLB,, | Performed by: ANESTHESIOLOGY

## 2023-10-17 PROCEDURE — 4010F ACE/ARB THERAPY RXD/TAKEN: CPT | Mod: CPTII,S$GLB,, | Performed by: ANESTHESIOLOGY

## 2023-10-17 PROCEDURE — 99214 OFFICE O/P EST MOD 30 MIN: CPT | Mod: GC,S$GLB,, | Performed by: ANESTHESIOLOGY

## 2023-10-17 PROCEDURE — 99999 PR PBB SHADOW E&M-EST. PATIENT-LVL V: CPT | Mod: PBBFAC,,, | Performed by: ANESTHESIOLOGY

## 2023-10-17 PROCEDURE — 3046F HEMOGLOBIN A1C LEVEL >9.0%: CPT | Mod: CPTII,S$GLB,, | Performed by: ANESTHESIOLOGY

## 2023-10-17 PROCEDURE — 93296 REM INTERROG EVL PM/IDS: CPT | Performed by: INTERNAL MEDICINE

## 2023-10-17 PROCEDURE — 99214 PR OFFICE/OUTPT VISIT, EST, LEVL IV, 30-39 MIN: ICD-10-PCS | Mod: GC,S$GLB,, | Performed by: ANESTHESIOLOGY

## 2023-10-17 PROCEDURE — 1159F PR MEDICATION LIST DOCUMENTED IN MEDICAL RECORD: ICD-10-PCS | Mod: CPTII,S$GLB,, | Performed by: ANESTHESIOLOGY

## 2023-10-17 PROCEDURE — 1159F MED LIST DOCD IN RCRD: CPT | Mod: CPTII,S$GLB,, | Performed by: ANESTHESIOLOGY

## 2023-10-17 PROCEDURE — 3074F PR MOST RECENT SYSTOLIC BLOOD PRESSURE < 130 MM HG: ICD-10-PCS | Mod: CPTII,S$GLB,, | Performed by: ANESTHESIOLOGY

## 2023-10-17 PROCEDURE — 3078F DIAST BP <80 MM HG: CPT | Mod: CPTII,S$GLB,, | Performed by: ANESTHESIOLOGY

## 2023-10-17 PROCEDURE — 3046F PR MOST RECENT HEMOGLOBIN A1C LEVEL > 9.0%: ICD-10-PCS | Mod: CPTII,S$GLB,, | Performed by: ANESTHESIOLOGY

## 2023-10-17 PROCEDURE — 3008F PR BODY MASS INDEX (BMI) DOCUMENTED: ICD-10-PCS | Mod: CPTII,S$GLB,, | Performed by: ANESTHESIOLOGY

## 2023-10-17 PROCEDURE — 3078F PR MOST RECENT DIASTOLIC BLOOD PRESSURE < 80 MM HG: ICD-10-PCS | Mod: CPTII,S$GLB,, | Performed by: ANESTHESIOLOGY

## 2023-10-17 PROCEDURE — 1160F PR REVIEW ALL MEDS BY PRESCRIBER/CLIN PHARMACIST DOCUMENTED: ICD-10-PCS | Mod: CPTII,S$GLB,, | Performed by: ANESTHESIOLOGY

## 2023-10-17 PROCEDURE — 3074F SYST BP LT 130 MM HG: CPT | Mod: CPTII,S$GLB,, | Performed by: ANESTHESIOLOGY

## 2023-10-17 PROCEDURE — 4010F PR ACE/ARB THEARPY RXD/TAKEN: ICD-10-PCS | Mod: CPTII,S$GLB,, | Performed by: ANESTHESIOLOGY

## 2023-10-17 PROCEDURE — 3008F BODY MASS INDEX DOCD: CPT | Mod: CPTII,S$GLB,, | Performed by: ANESTHESIOLOGY

## 2023-10-17 RX ORDER — METHOCARBAMOL 500 MG/1
500 TABLET, FILM COATED ORAL NIGHTLY PRN
Qty: 30 TABLET | Refills: 0 | Status: SHIPPED | OUTPATIENT
Start: 2023-10-17 | End: 2023-11-16

## 2023-10-17 RX ORDER — OXYCODONE AND ACETAMINOPHEN 5; 325 MG/1; MG/1
1 TABLET ORAL
Qty: 30 TABLET | Refills: 0 | Status: SHIPPED | OUTPATIENT
Start: 2023-10-17 | End: 2023-11-16

## 2023-10-17 NOTE — TELEPHONE ENCOUNTER
----- Message from Inessa David sent at 10/17/2023 12:30 PM CDT -----  Regarding: diagnoses code needed  Type:  Pharmacy Calling to Clarify an RX    Name of Caller:Shanna  Pharmacy Name:[facility]  Prescription Name:oxyCODONE-acetaminophen (PERCOCET) 5-325 mg per tablet  What do they need to clarify?:diagnoses code is needed  Best Call Back Number:[phone]  Additional Information:

## 2023-10-17 NOTE — TELEPHONE ENCOUNTER
Staff spoke with pt's pharmacy and verified pt's diagnosis code. Staff called pt to inform her we spoke with her pharmacy.    ----- Message from Inessa David sent at 10/17/2023 12:30 PM CDT -----  Regarding: diagnoses code needed  Type:  Pharmacy Calling to Clarify an RX    Name of Caller:Shanna  Pharmacy Name:[facility]  Prescription Name:oxyCODONE-acetaminophen (PERCOCET) 5-325 mg per tablet  What do they need to clarify?:diagnoses code is needed  Best Call Back Number:[phone]  Additional Information:
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
## 2023-10-25 ENCOUNTER — PATIENT OUTREACH (OUTPATIENT)
Dept: ADMINISTRATIVE | Facility: HOSPITAL | Age: 48
End: 2023-10-25
Payer: MEDICARE

## 2023-10-27 ENCOUNTER — HOSPITAL ENCOUNTER (EMERGENCY)
Facility: HOSPITAL | Age: 48
Discharge: HOME | End: 2023-10-27
Attending: STUDENT IN AN ORGANIZED HEALTH CARE EDUCATION/TRAINING PROGRAM
Payer: MEDICARE

## 2023-10-27 VITALS
OXYGEN SATURATION: 99 % | TEMPERATURE: 98 F | HEIGHT: 65 IN | HEART RATE: 83 BPM | SYSTOLIC BLOOD PRESSURE: 110 MMHG | BODY MASS INDEX: 39.99 KG/M2 | DIASTOLIC BLOOD PRESSURE: 69 MMHG | WEIGHT: 240 LBS | RESPIRATION RATE: 20 BRPM

## 2023-10-27 DIAGNOSIS — U07.1 COVID: Primary | ICD-10-CM

## 2023-10-27 DIAGNOSIS — U07.1 COVID-19 VIRUS DETECTED: ICD-10-CM

## 2023-10-27 LAB
CTP QC/QA: YES
MOLECULAR STREP A: NEGATIVE
POC MOLECULAR INFLUENZA A AGN: NEGATIVE
POC MOLECULAR INFLUENZA B AGN: NEGATIVE
POCT GLUCOSE: 270 MG/DL (ref 70–110)
SARS-COV-2 RDRP RESP QL NAA+PROBE: POSITIVE

## 2023-10-27 PROCEDURE — 87635 SARS-COV-2 COVID-19 AMP PRB: CPT | Performed by: NURSE PRACTITIONER

## 2023-10-27 PROCEDURE — 99284 EMERGENCY DEPT VISIT MOD MDM: CPT

## 2023-10-27 PROCEDURE — 87502 INFLUENZA DNA AMP PROBE: CPT

## 2023-10-27 PROCEDURE — 25000003 PHARM REV CODE 250

## 2023-10-27 PROCEDURE — 87880 STREP A ASSAY W/OPTIC: CPT

## 2023-10-27 RX ORDER — LIDOCAINE 50 MG/G
1 PATCH TOPICAL DAILY
Qty: 15 PATCH | Refills: 0 | Status: SHIPPED | OUTPATIENT
Start: 2023-10-27 | End: 2023-10-27 | Stop reason: SDUPTHER

## 2023-10-27 RX ORDER — ACETAMINOPHEN 500 MG
500 TABLET ORAL EVERY 6 HOURS PRN
Qty: 30 TABLET | Refills: 0 | Status: SHIPPED | OUTPATIENT
Start: 2023-10-27 | End: 2024-01-24

## 2023-10-27 RX ORDER — GUAIFENESIN/DEXTROMETHORPHAN 100-10MG/5
5 SYRUP ORAL EVERY 6 HOURS
Qty: 473 ML | Refills: 0 | Status: SHIPPED | OUTPATIENT
Start: 2023-10-27 | End: 2023-11-06

## 2023-10-27 RX ORDER — FLUTICASONE PROPIONATE 50 MCG
1 SPRAY, SUSPENSION (ML) NASAL 2 TIMES DAILY PRN
Qty: 15 G | Refills: 0 | Status: SHIPPED | OUTPATIENT
Start: 2023-10-27 | End: 2023-10-27 | Stop reason: SDUPTHER

## 2023-10-27 RX ORDER — ACETAMINOPHEN 500 MG
500 TABLET ORAL EVERY 6 HOURS PRN
Qty: 30 TABLET | Refills: 0 | Status: SHIPPED | OUTPATIENT
Start: 2023-10-27 | End: 2023-10-27 | Stop reason: SDUPTHER

## 2023-10-27 RX ORDER — FLUTICASONE PROPIONATE 50 MCG
1 SPRAY, SUSPENSION (ML) NASAL 2 TIMES DAILY PRN
Qty: 15 G | Refills: 0 | Status: SHIPPED | OUTPATIENT
Start: 2023-10-27

## 2023-10-27 RX ORDER — ACETAMINOPHEN 500 MG
1000 TABLET ORAL
Status: COMPLETED | OUTPATIENT
Start: 2023-10-27 | End: 2023-10-27

## 2023-10-27 RX ORDER — LIDOCAINE 50 MG/G
1 PATCH TOPICAL DAILY
Qty: 15 PATCH | Refills: 0 | Status: SHIPPED | OUTPATIENT
Start: 2023-10-27 | End: 2024-01-24

## 2023-10-27 RX ORDER — GUAIFENESIN/DEXTROMETHORPHAN 100-10MG/5
5 SYRUP ORAL EVERY 6 HOURS
Qty: 473 ML | Refills: 0 | Status: SHIPPED | OUTPATIENT
Start: 2023-10-27 | End: 2023-10-27 | Stop reason: SDUPTHER

## 2023-10-27 RX ORDER — PHENOL 1.4 %
AEROSOL, SPRAY (ML) MUCOUS MEMBRANE
Qty: 177 ML | Refills: 0 | Status: SHIPPED | OUTPATIENT
Start: 2023-10-27 | End: 2023-10-27 | Stop reason: SDUPTHER

## 2023-10-27 RX ORDER — PHENOL 1.4 %
AEROSOL, SPRAY (ML) MUCOUS MEMBRANE
Qty: 177 ML | Refills: 0 | Status: SHIPPED | OUTPATIENT
Start: 2023-10-27 | End: 2024-03-13

## 2023-10-27 RX ADMIN — ACETAMINOPHEN 1000 MG: 500 TABLET ORAL at 09:10

## 2023-10-28 NOTE — ED PROVIDER NOTES
"Encounter Date: 10/27/2023       History     Chief Complaint   Patient presents with    Generalized Body Aches     Arrives to ER complaining of generalized body aches and pain since last night states "I am hurting all over and it feels like my throat is closing." Reports hx of MILE, wears CPAP at night, no n/v/d, fever, no urinary complaints, SOB or CP. Has not taken anything for s/s     HPI    48-year-old female with a past medical history of hypertension, CHF, AFib taking Eliquis presenting to the emergency department today complaining of general body aches, cough, congestion x1 day.  Patient states she is not taken anything for her symptoms in her symptoms are generally worse at night.  Patient does not know if she has any sick contacts.  Patient denies any fever, chest pain, shortness on breath, abdominal pain, N/V/D, dysuria.    Review of patient's allergies indicates:   Allergen Reactions    Metformin      Diarrhea on metformin XR     Pneumococcal 23-abimbola ps vaccine      Past Medical History:   Diagnosis Date    Atrial fibrillation     Blood clot associated with vein wall inflammation     not dvt    Cardiomyopathy     Normal cors on cath 11/2017    CHF (congestive heart failure)     DM (diabetes mellitus) 9/19/2013    Hyperlipidemia     Hypertension     Psoriasis     Sleep apnea      Past Surgical History:   Procedure Laterality Date    CARDIAC CATHETERIZATION      COLONOSCOPY      COLONOSCOPY N/A 3/9/2022    Procedure: COLONOSCOPY;  Surgeon: Vielka Burrell MD;  Location: [facility] ENDO;  Service: Endoscopy;  Laterality: N/A;    DILATION AND CURETTAGE OF UTERUS      EPIDURAL STEROID INJECTION N/A 8/11/2023    Procedure: INJECTION, STEROID, EPIDURAL, L5/S1 SOONER DATE    clear to hold Eliquis;  Surgeon: Jed Yeager MD;  Location: [facility] PAIN MGT;  Service: Pain Management;  Laterality: N/A;    ESOPHAGOGASTRODUODENOSCOPY N/A 5/9/2019    Procedure: EGD (ESOPHAGOGASTRODUODENOSCOPY);  Surgeon: Vileka FARNSWORTH" MD Indra;  Location: [facility] ENDO;  Service: Endoscopy;  Laterality: N/A;    TRANSFORAMINAL EPIDURAL INJECTION OF STEROID N/A 1/6/2022    Procedure: Transforaminal ANKITA L4/L5 L5/S1;  Surgeon: Jed Yeager MD;  Location: [facility] PAIN MGT;  Service: Pain Management;  Laterality: N/A;    TRANSFORAMINAL EPIDURAL INJECTION OF STEROID Right 12/1/2022    Procedure: INJECTION, STEROID, EPIDURAL, TRANSFORAMINAL APPROACH, RIGHT L4/L5 & L5/S1 CONTRAST;  Surgeon: Jed Yeager MD;  Location: [facility] PAIN MGT;  Service: Pain Management;  Laterality: Right;    TRANSFORAMINAL EPIDURAL INJECTION OF STEROID Right 4/14/2023    Procedure: INJECTION, STEROID, EPIDURAL, TRANSFORAMINAL APPROACH RIGHT S1;  Surgeon: Jed Yeager MD;  Location: [facility] PAIN MGT;  Service: Pain Management;  Laterality: Right;  3/14 AUTH     Family History   Problem Relation Age of Onset    Hypertension Mother     Hypertension Father     Diabetes Father     Diabetes Maternal Grandmother     Diabetes Paternal Grandmother     Breast cancer Neg Hx     Colon cancer Neg Hx     Ovarian cancer Neg Hx      Social History     Tobacco Use    Smoking status: Never    Smokeless tobacco: Never    Tobacco comments:     smokes cigars on occasion   Substance Use Topics    Alcohol use: Not Currently     Comment: occasional    Drug use: Not Currently     Types: Marijuana     Comment: occ     Review of Systems   Constitutional:  Positive for fatigue. Negative for chills, diaphoresis and fever.   HENT:  Positive for congestion, nosebleeds and rhinorrhea. Negative for postnasal drip, sinus pressure, sinus pain, sneezing, sore throat and trouble swallowing.    Eyes:  Negative for photophobia, pain, redness and visual disturbance.   Respiratory:  Positive for cough. Negative for shortness of breath and wheezing.    Cardiovascular:  Negative for chest pain, palpitations and leg swelling.   Gastrointestinal:  Negative for abdominal distention, abdominal pain, diarrhea, nausea and  vomiting.   Genitourinary:  Negative for difficulty urinating, dysuria, flank pain, frequency, hematuria and pelvic pain.   Musculoskeletal:  Positive for myalgias (General body aches). Negative for arthralgias, back pain, gait problem, joint swelling, neck pain and neck stiffness.   Skin:  Negative for rash.   Neurological:  Negative for dizziness, weakness, light-headedness and headaches.   Hematological:  Does not bruise/bleed easily.   Psychiatric/Behavioral:  Negative for decreased concentration.        Physical Exam     Initial Vitals [10/27/23 2007]   BP Pulse Resp Temp SpO2   110/69 83 20 98.2 °F (36.8 °C) 99 %      MAP       --         Physical Exam    Nursing note and vitals reviewed.  Constitutional: Vital signs are normal. She appears well-developed and well-nourished. She is not diaphoretic. No distress.   HENT:   Head: Normocephalic and atraumatic.   Right Ear: Tympanic membrane, external ear and ear canal normal.   Left Ear: Tympanic membrane, external ear and ear canal normal.   Nose: Mucosal edema and rhinorrhea (Clear) present. No sinus tenderness, nasal deformity, septal deviation or nasal septal hematoma. No epistaxis.  No foreign bodies. Right sinus exhibits no maxillary sinus tenderness and no frontal sinus tenderness. Left sinus exhibits no maxillary sinus tenderness and no frontal sinus tenderness.   Mouth/Throat: Uvula is midline, oropharynx is clear and moist and mucous membranes are normal. No oropharyngeal exudate, posterior oropharyngeal edema, posterior oropharyngeal erythema or tonsillar abscesses.   Eyes: Conjunctivae and EOM are normal. Pupils are equal, round, and reactive to light. Right eye exhibits no discharge. Left eye exhibits no discharge.   Neck: Trachea normal. No JVD present.   Normal range of motion.   Full passive range of motion without pain.     Cardiovascular:  Normal rate, regular rhythm, normal heart sounds, intact distal pulses and normal pulses.            Pulmonary/Chest: Effort normal and breath sounds normal. No respiratory distress.   Abdominal: Abdomen is soft. Bowel sounds are normal. She exhibits no distension and no pulsatile midline mass. There is no abdominal tenderness.   No right CVA tenderness.  No left CVA tenderness. There is no rebound and no guarding.   Musculoskeletal:         General: Normal range of motion.      Right shoulder: Normal.      Left shoulder: Normal.      Right elbow: Normal.      Left elbow: Normal.      Right wrist: Normal.      Left wrist: Normal.      Cervical back: Normal, full passive range of motion without pain and normal range of motion. No edema, erythema or rigidity. No spinous process tenderness or muscular tenderness. Normal range of motion.      Thoracic back: Normal.      Lumbar back: Normal.      Right hip: Normal.      Left hip: Normal.      Right knee: Normal.      Left knee: Normal.      Right lower leg: Normal.      Left lower leg: Normal.      Right ankle: Normal.      Left ankle: Normal.      Comments: No peripheral edema     Lymphadenopathy:     She has no cervical adenopathy.   Neurological: She is alert and oriented to person, place, and time. She has normal strength. No cranial nerve deficit or sensory deficit.   Skin: Skin is warm and dry. Capillary refill takes less than 2 seconds. No bruising and no ecchymosis noted. No erythema.   Psychiatric: She has a normal mood and affect. Her speech is normal and behavior is normal. Thought content normal.         ED Course   Procedures  Labs Reviewed   SARS-COV-2 RDRP GENE - Abnormal; Notable for the following components:       Result Value    POC Rapid COVID Positive (*)     All other components within normal limits   POCT GLUCOSE - Abnormal; Notable for the following components:    POCT Glucose 270 (*)     All other components within normal limits   POCT STREP A MOLECULAR   POCT INFLUENZA A/B MOLECULAR          Imaging Results    None          Medications    acetaminophen tablet 1,000 mg (has no administration in time range)     Medical Decision Making  48-year-old female with a past medical history of hypertension, CHF, AFib taking Eliquis presenting to the emergency department today complaining of general body aches, cough, congestion x1 day.  Patient states she is not taken anything for her symptoms in her symptoms are generally worse at night.  Patient does not know if she has any sick contacts.  Patient denies any fever, chest pain, shortness on breath, abdominal pain, N/V/D, dysuria.  Patient's chart and medical history reviewed.  Patient's vitals reviewed.  They are afebrile, no respiratory distress, nontoxic-appearing in the ED.  COVID  Flu  Strep throat  Viral URI  Patient is a afebrile, well-appearing appearing 48 y.o. female who presents for evaluation of cough, congestion, general body aches symptoms x 1 days. VSS. Denies fever, chest pain, SOB, wheezing, difficulty swallowing, neck pain, neck stiffness. Vital signs do not suggest sepsis. Lung sounds are clear and not consistent with pneumonia. There is no neck pain or limited ROM to suggest retropharyngeal abscess or meningitis. Tolerating PO, non-toxic appearing, no hypoxia. Uvula midline, without tonsillar edema, without tonsillar erythema, without tonsillar exudate. Given this, will not patient be tested for strep pharyngitis. Patient also tested for COVID and flu. The patient remained comfortable and stable during their visit in the ED.  Details of ED course documented in ED workup.     Differential Diagnosis includes, but is not limited to: COVID, influenza, viral URI, bacterial/viral pharyngitis    COVID test positive. Influenza test negative. Strep test not done.    All historical, clinical, and laboratory findings reviewed. Patient with constellation of symptoms most consistent with a viral URI. There are no concerning features on physical exam to suggest an emergent or life threatening condition  or an invasive bacterial infection, including, but not limited to: bacterial otitis media/externa, sinusitis, pharyngitis, or peritonsillar abscess. No further intervention is indicated at this time. The patient is at low risk for an emergent/life threatening medical condition at this time, and I am of the belief that that it is safe to discharge the patient from the emergency department.     Patient instructed to follow up with PCP in 2-3 days for recheck of today's complaints. Patient has been counseled regarding the need for follow-up as well as the indications to return to the emergency room should new or worrisome developments. Shared decision making done about Mulnopiravir and will send home with medication. Discharge and follow-up instructions discussed with the patient who expressed understanding and willingness to comply with recommendations. Patient discharged from the emergency department in stable condition, in no acute distress.     Amount and/or Complexity of Data Reviewed  External Data Reviewed: labs, radiology, ECG and notes.  Labs: ordered.    Risk  OTC drugs.  Prescription drug management.  Diagnosis or treatment significantly limited by social determinants of health.                               Clinical Impression:   Final diagnoses:  [U07.1] COVID (Primary)        ED Disposition Condition    Discharge Stable          ED Prescriptions       Medication Sig Dispense Start Date End Date Auth. Provider    molnupiravir 200 mg capsule (EUA) Take 4 capsules (800 mg total) by mouth every 12 (twelve) hours. for 5 days 40 capsule 10/27/2023 11/1/2023 tSewart Joseph PA-C    acetaminophen (TYLENOL) 500 MG tablet Take 1 tablet (500 mg total) by mouth every 6 (six) hours as needed for Pain. 30 tablet 10/27/2023 -- Stewart Joseph PA-C    dextromethorphan-guaiFENesin  mg/5 ml (ROBITUSSIN-DM)  mg/5 mL liquid Take 5 mLs by mouth every 6 (six) hours. for 10 days 473 mL 10/27/2023 11/6/2023 Opal  Stewart HAMPTON PA-C    fluticasone propionate (FLONASE) 50 mcg/actuation nasal spray 1 spray (50 mcg total) by Each Nostril route 2 (two) times daily as needed for Rhinitis. 15 g 10/27/2023 -- Stewart Joseph PA-C    LIDOcaine (LIDODERM) 5 % Place 1 patch onto the skin once daily. Apply patch for 12 hours and then leave off for 12 hours 15 patch 10/27/2023 -- Stewart Joseph PA-C    phenoL (CHLORASEPTIC THROAT SPRAY) 1.4 % SprA by Mucous Membrane route every 2 (two) hours. 177 mL 10/27/2023 -- Stewart Joseph PA-C          Follow-up Information       Follow up With Specialties Details Why Contact Info    Gil Leal MD Family Medicine Schedule an appointment as soon as possible for a visit in 3 days  [address] [place] [address]  [phone]               Stewart Joseph PA-C  10/27/23 2122

## 2023-10-28 NOTE — DISCHARGE INSTRUCTIONS

## 2023-10-30 ENCOUNTER — PATIENT OUTREACH (OUTPATIENT)
Dept: EMERGENCY MEDICINE | Facility: HOSPITAL | Age: 48
End: 2023-10-30

## 2023-10-31 ENCOUNTER — PATIENT MESSAGE (OUTPATIENT)
Dept: ADMINISTRATIVE | Facility: HOSPITAL | Age: 48
End: 2023-10-31
Payer: MEDICARE

## 2023-10-31 ENCOUNTER — PATIENT OUTREACH (OUTPATIENT)
Dept: ADMINISTRATIVE | Facility: HOSPITAL | Age: 48
End: 2023-10-31
Payer: MEDICARE

## 2023-10-31 RX ORDER — INSULIN DEGLUDEC 200 U/ML
INJECTION, SOLUTION SUBCUTANEOUS
Qty: 9 PEN | Refills: 3 | Status: SHIPPED | OUTPATIENT
Start: 2023-10-31 | End: 2024-03-14

## 2023-10-31 NOTE — PROGRESS NOTES
Population Health Chart Review & Patient Outreach Details      Further Action Needed If Patient Returns Outreach:      Due for overdue HM (eye exam and labs), need to get recent records if already done. If not done, orders/referrals need to be place and schedule. Please forward messages to me.       Updates Requested / Reviewed:     [x]  Care Everywhere    []     []  External Sources ([facility], [facility], [facility], etc.)    [] [facility]   [] [facility]   [] Other:    []  Care Team Updated   []  Removed  or Duplicate Orders   []  Immunization Reconciliation Completed / Queried    [] [place]   [] [place]   [] [place]   [] [place]      Health Maintenance Topics Addressed and Outreach Outcomes / Actions Taken:             Breast Cancer Screening []  Mammogram Order Placed    []  Mammogram Screening Scheduled    []  External Records Requested & Care Team Updated if Applicable    []  External Records Uploaded & Care Team Updated if Applicable    []  Pt Declined Scheduling Mammogram    []  Pt Will Schedule with External Provider / Order Routed & Care Team Updated if Applicable              Cervical Cancer Screening []  Pap Smear Scheduled in Primary Care or OBGYN    []  External Records Requested & Care Team Updated if Applicable       []  External Records Uploaded, Care Team Updated, & History Updated if Applicable    []  Patient Declined Scheduling Pap Smear    []  Patient Will Schedule with External Provider & Care Team Updated if Applicable                  Colorectal Cancer Screening []  Colonoscopy Case Request / Referral / Home Test Order Placed    []  External Records Requested & Care Team Updated if Applicable    []  External Records Uploaded, Care Team Updated, & History Updated if Applicable    []  Patient Declined Completing Colon Cancer Screening    []  Patient Will Schedule with External Provider & Care Team Updated if Applicable    []  Fit Kit Mailed (add the SmartPhrase under additional notes)    []   Reminded Patient to Complete Home Test                Diabetic Eye Exam []  Eye Exam Screening Order Placed    []  Eye Camera Scheduled or Optometry/Ophthalmology Referral Placed    []  External Records Requested & Care Team Updated if Applicable    []  External Records Uploaded, Care Team Updated, & History Updated if Applicable    []  Patient Declined Scheduling Eye Exam    []  Patient Will Schedule with External Provider & Care Team Updated if Applicable             Blood Pressure Control []  Primary Care Follow Up Visit Scheduled     []  Remote Blood Pressure Reading Captured    []  Patient Declined Remote Reading or Scheduling Appt - Escalated to PCP    []  Patient Will Call Back or Send Portal Message with Reading                 HbA1c & Other Labs []  Overdue Lab(s) Ordered    []  Overdue Lab(s) Scheduled    []  External Records Uploaded & Care Team Updated if Applicable    []  Primary Care Follow Up Visit Scheduled     []  Reminded Patient to Complete A1c Home Test    []  Patient Declined Scheduling Labs or Will Call Back to Schedule    []  Patient Will Schedule with External Provider / Order Routed, & Care Team Updated if Applicable           Primary Care Appointment []  Primary Care Appt Scheduled    []  Patient Declined Scheduling or Will Call Back to Schedule    []  Pt Established with External Provider, Updated Care Team, & Informed Pt to Notify Payor if Applicable           Medication Adherence /    Statin Use []  Primary Care Appointment Scheduled    []  Patient Reminded to  Prescription    []  Patient Declined, Provider Notified if Needed    []  Sent Provider Message to Review to Evaluate Pt for Statin, Add Exclusion Dx Codes, Document   Exclusion in Problem List, Change Statin Intensity Level to Moderate or High Intensity if Applicable                Osteoporosis Screening []  Dexa Order Placed    []  Dexa Appointment Scheduled    []  External Records Requested & Care Team Updated    []   External Records Uploaded, Care Team Updated, & History Updated if Applicable    []  Patient Declined Scheduling Dexa or Will Call Back to Schedule    []  Patient Will Schedule with External Provider / Order Routed & Care Team Updated if Applicable       Additional Notes:     Due for eye exam and labs. Fax forms to Dr Schmidt office.  Left message for patient to return call.Sent [facility] message.

## 2023-11-20 ENCOUNTER — PATIENT MESSAGE (OUTPATIENT)
Dept: PAIN MEDICINE | Facility: CLINIC | Age: 48
End: 2023-11-20
Payer: MEDICARE

## 2023-11-20 DIAGNOSIS — E11.9 TYPE 2 DIABETES MELLITUS WITHOUT COMPLICATION, WITHOUT LONG-TERM CURRENT USE OF INSULIN: ICD-10-CM

## 2023-11-20 RX ORDER — GABAPENTIN 400 MG/1
CAPSULE ORAL
Qty: 90 CAPSULE | Refills: 0 | Status: SHIPPED | OUTPATIENT
Start: 2023-11-20 | End: 2023-12-20 | Stop reason: SDUPTHER

## 2023-11-20 RX ORDER — METHOCARBAMOL 500 MG/1
1000 TABLET, FILM COATED ORAL 3 TIMES DAILY PRN
Qty: 30 TABLET | Refills: 0 | OUTPATIENT
Start: 2023-11-20 | End: 2023-11-27

## 2023-11-20 RX ORDER — OXYCODONE AND ACETAMINOPHEN 5; 325 MG/1; MG/1
1 TABLET ORAL EVERY 6 HOURS PRN
Qty: 12 TABLET | Refills: 0 | Status: SHIPPED | OUTPATIENT
Start: 2023-11-20 | End: 2023-12-20 | Stop reason: SDUPTHER

## 2023-11-20 NOTE — TELEPHONE ENCOUNTER
Patient requesting refill on oxyCODONE-acetaminophen (PERCOCET) 5-325 mg   Last office visit 10/17/23   shows last refill on 10/17/23  Patient does not have a pain contract on file with [facility] department  Patient last UDS 07/06/23 was consistent with current therapy                 CODEINE  Not Detected         MORPHINE  Not Detected         6-ACETYLMORPHINE  Not Detected         OXYCODONE  Present         NOROYXCODONE  Present         OXYMORPHONE  Present         NOROXYMORPHONE  Not Detected         HYDROCODONE  Not Detected         NORHYDROCODONE  Not Detected         HYDROMORPHONE  Not Detected         BUPRENORPHINE  Not Detected         NORUBPRENORPHINE  Not Detected         FENTANYL  Not Detected         NORFENTANYL  Not Detected         MEPERIDINE METABOLITE  Not Detected         TAPENTADOL  Not Detected         TAPENTADOL-O-SULF  Not Detected         METHADONE  Not Detected         TRAMADOL  Not Detected         AMPHETAMINE  Not Detected         METHAMPHETAMINE  Not Detected         MDMA- ECSTASY  Not Detected         MDA  Not Detected         MDEA- Irma  Not Detected         METHYLPHENIDATE  Not Detected         PHENTERMINE  Not Detected         BENZOYLECGONINE  Not Detected         ALPRAZOLAM  Not Detected         ALPHA-OH-ALPRAZOLAM  Not Detected         CLONAZEPAM  Not Detected         7-AMINOCLONAZEPAM  Not Detected         DIAZEPAM  Not Detected         NORDIAZEPAM  Not Detected         OXAZEPAM  Not Detected         TEMAZEPAM  Not Detected         Lorazepam  Not Detected         MIDAZOLAM  Not Detected         ZOLPIDEM  Not Detected         BARBITURATES  Not Detected         Creatinine, Urine 20.0 - 400.0 mg/dL 141.4 183.2 R 121.5 R 25.0 R 31.0 R, CM 76.0 R, CM 26.8 R, CM   ETHYL GLUCURONIDE  Not Detected         MARIJUANA METABOLITE  Not Detected         Comment: INTERPRETIVE INFORMATION: Marijuana Metabolite  The cutoff for Marijuana Metabolite (immunoassay) was  changed  from 20 ng/mL to 50 ng/mL,  effective May 15, 2023.   PCP  Not Detected         CARISOPRODOL  Not Detected         Comment: The carisoprodol immunoassay has cross-reactivity to  carisoprodol and meprobamate.   Naloxone  Not Detected         Gabapentin  Present         Pregabalin  Not Detected         Alpha-OH-Midazolam  Not Detected         Zolpidem Metabolite  Not Detected         PAIN MANAGEMENT DRUG PANEL  See Below

## 2023-11-25 ENCOUNTER — HOSPITAL ENCOUNTER (EMERGENCY)
Facility: HOSPITAL | Age: 48
Discharge: HOME | End: 2023-11-25
Attending: EMERGENCY MEDICINE
Payer: MEDICARE

## 2023-11-25 VITALS
OXYGEN SATURATION: 98 % | TEMPERATURE: 98 F | RESPIRATION RATE: 16 BRPM | HEIGHT: 67 IN | DIASTOLIC BLOOD PRESSURE: 64 MMHG | BODY MASS INDEX: 38.45 KG/M2 | WEIGHT: 245 LBS | SYSTOLIC BLOOD PRESSURE: 110 MMHG | HEART RATE: 77 BPM

## 2023-11-25 DIAGNOSIS — M54.31 RIGHT SIDED SCIATICA: Primary | ICD-10-CM

## 2023-11-25 PROCEDURE — 63600175 PHARM REV CODE 636 W HCPCS

## 2023-11-25 PROCEDURE — 96372 THER/PROPH/DIAG INJ SC/IM: CPT

## 2023-11-25 PROCEDURE — 99284 EMERGENCY DEPT VISIT MOD MDM: CPT

## 2023-11-25 PROCEDURE — 25000003 PHARM REV CODE 250

## 2023-11-25 RX ORDER — HYDROCODONE BITARTRATE AND ACETAMINOPHEN 5; 325 MG/1; MG/1
1 TABLET ORAL EVERY 6 HOURS PRN
Qty: 11 TABLET | Refills: 0 | Status: SHIPPED | OUTPATIENT
Start: 2023-11-25 | End: 2023-12-20

## 2023-11-25 RX ORDER — MORPHINE SULFATE 4 MG/ML
8 INJECTION, SOLUTION INTRAMUSCULAR; INTRAVENOUS
Status: COMPLETED | OUTPATIENT
Start: 2023-11-25 | End: 2023-11-25

## 2023-11-25 RX ORDER — LIDOCAINE 50 MG/G
1 PATCH TOPICAL ONCE
Qty: 15 PATCH | Refills: 0 | Status: SHIPPED | OUTPATIENT
Start: 2023-11-25 | End: 2023-11-25

## 2023-11-25 RX ORDER — ONDANSETRON 4 MG/1
4 TABLET, ORALLY DISINTEGRATING ORAL
Status: COMPLETED | OUTPATIENT
Start: 2023-11-25 | End: 2023-11-25

## 2023-11-25 RX ORDER — ACETAMINOPHEN 500 MG
500 TABLET ORAL EVERY 4 HOURS PRN
Qty: 30 TABLET | Refills: 0 | Status: SHIPPED | OUTPATIENT
Start: 2023-11-25 | End: 2024-01-24

## 2023-11-25 RX ADMIN — ONDANSETRON 4 MG: 4 TABLET, ORALLY DISINTEGRATING ORAL at 06:11

## 2023-11-25 RX ADMIN — MORPHINE SULFATE 8 MG: 4 INJECTION, SOLUTION INTRAMUSCULAR; INTRAVENOUS at 06:11

## 2023-11-26 NOTE — ED PROVIDER NOTES
Encounter Date: 11/25/2023       History     Chief Complaint   Patient presents with    Sciatica     Pt to ER with reports of right back pain radiating down leg. Pt with hx of sciatica     48-year-old female with past medical history of AFib, CHF, diabetes type 2, hyperlipidemia, hypertension, sciatica presents to ED for emergent evaluation of right lumbar back pain that radiates down her right lower extremity that started yesterday.  Patient states her pain feels consistent with her usual sciatica in the past.  She states she is been diagnosed with sciatica for the past 2 years.  She sees a back and spine specialist.  She has had steroid injections into her back in the past.  She attempted a muscle relaxer at 2:00 p.m. today with transient relief.  She denies any associated trauma, fall, head trauma, loss consciousness.  She denies any bladder/bowel incontinence, saddle anesthesia, fever, chills, chest pain, shortness of breath, abdominal pain, nausea, vomiting, diarrhea, dysuria, hematuria.  No other symptoms reported.    The history is provided by the patient. No  was used.     Review of patient's allergies indicates:   Allergen Reactions    Metformin      Diarrhea on metformin XR     Pneumococcal 23-abimbola ps vaccine      Past Medical History:   Diagnosis Date    Atrial fibrillation     Blood clot associated with vein wall inflammation     not dvt    Cardiomyopathy     Normal cors on cath 11/2017    CHF (congestive heart failure)     DM (diabetes mellitus) 9/19/2013    Hyperlipidemia     Hypertension     Psoriasis     Sleep apnea      Past Surgical History:   Procedure Laterality Date    CARDIAC CATHETERIZATION      COLONOSCOPY      COLONOSCOPY N/A 3/9/2022    Procedure: COLONOSCOPY;  Surgeon: Vielka Burrell MD;  Location: [facility];  Service: Endoscopy;  Laterality: N/A;    DILATION AND CURETTAGE OF UTERUS      EPIDURAL STEROID INJECTION N/A 8/11/2023    Procedure: INJECTION, STEROID,  EPIDURAL, L5/S1 SOONER DATE    clear to hold Eliquis;  Surgeon: Jed Yeager MD;  Location: [facility];  Service: Pain Management;  Laterality: N/A;    ESOPHAGOGASTRODUODENOSCOPY N/A 5/9/2019    Procedure: EGD (ESOPHAGOGASTRODUODENOSCOPY);  Surgeon: Vielka Burrell MD;  Location: [facility];  Service: Endoscopy;  Laterality: N/A;    TRANSFORAMINAL EPIDURAL INJECTION OF STEROID N/A 1/6/2022    Procedure: Transforaminal ANKITA L4/L5 L5/S1;  Surgeon: Jed Yeager MD;  Location: [facility];  Service: Pain Management;  Laterality: N/A;    TRANSFORAMINAL EPIDURAL INJECTION OF STEROID Right 12/1/2022    Procedure: INJECTION, STEROID, EPIDURAL, TRANSFORAMINAL APPROACH, RIGHT L4/L5 & L5/S1 CONTRAST;  Surgeon: Jed Yeager MD;  Location: [facility];  Service: Pain Management;  Laterality: Right;    TRANSFORAMINAL EPIDURAL INJECTION OF STEROID Right 4/14/2023    Procedure: INJECTION, STEROID, EPIDURAL, TRANSFORAMINAL APPROACH RIGHT S1;  Surgeon: Jed Yeager MD;  Location: [facility];  Service: Pain Management;  Laterality: Right;  3/14 AUTH     Family History   Problem Relation Age of Onset    Hypertension Mother     Hypertension Father     Diabetes Father     Diabetes Maternal Grandmother     Diabetes Paternal Grandmother     Breast cancer Neg Hx     Colon cancer Neg Hx     Ovarian cancer Neg Hx      Social History     Tobacco Use    Smoking status: Never    Smokeless tobacco: Never    Tobacco comments:     smokes cigars on occasion   Substance Use Topics    Alcohol use: Not Currently     Comment: occasional    Drug use: Not Currently     Types: Marijuana     Comment: occ     Review of Systems   Constitutional:  Negative for chills and fever.   HENT:  Negative for congestion, ear pain, rhinorrhea and sore throat.    Eyes:  Negative for redness.   Respiratory:  Negative for cough and shortness of breath.    Cardiovascular:  Negative for chest pain.   Gastrointestinal:  Negative for abdominal  pain, diarrhea, nausea and vomiting.   Genitourinary:  Negative for decreased urine volume, difficulty urinating, dysuria, frequency, hematuria and urgency.        (-) bladder/bowel incontinence  (-) saddle anesthesia   Musculoskeletal:  Positive for back pain. Negative for neck pain.   Skin:  Negative for rash.   Neurological:  Negative for headaches.        (-) head trauma  (-) LOC   Psychiatric/Behavioral:  Negative for confusion.        Physical Exam     Initial Vitals [11/25/23 1723]   BP Pulse Resp Temp SpO2   (!) 111/58 80 18 98.1 °F (36.7 °C) 95 %      MAP       --         Physical Exam    Nursing note and vitals reviewed.  Constitutional: She appears well-developed and well-nourished.  Non-toxic appearance. She does not appear ill.   HENT:   Head: Normocephalic and atraumatic.   Right Ear: Hearing, tympanic membrane, external ear and ear canal normal. Tympanic membrane is not perforated, not erythematous and not bulging.   Left Ear: Hearing, tympanic membrane, external ear and ear canal normal. Tympanic membrane is not perforated, not erythematous and not bulging.   Nose: Nose normal.   Mouth/Throat: Uvula is midline, oropharynx is clear and moist and mucous membranes are normal.   Eyes: Conjunctivae and EOM are normal.   Neck: Neck supple.   Normal range of motion.   Full passive range of motion without pain.     Cardiovascular:  Normal rate and regular rhythm.           Pulses:       Radial pulses are 2+ on the right side and 2+ on the left side.   Pulmonary/Chest: Effort normal and breath sounds normal. No accessory muscle usage. No respiratory distress. She has no decreased breath sounds.   Abdominal: Abdomen is soft. Bowel sounds are normal. She exhibits no distension. There is no abdominal tenderness.   No right CVA tenderness.  No left CVA tenderness. There is no rebound and no guarding.   Musculoskeletal:         General: Normal range of motion.      Cervical back: Full passive range of motion  without pain, normal range of motion and neck supple. No rigidity.      Comments: Full ROM of neck. No neck rigidity. No midline tenderness to cervical, thoracic, or lumbar spine.  No bony step-offs.  Full range of motion of bilateral upper and lower extremities.  Strength and sensation intact bilateral upper and lower extremities.  No erythema, edema, bruising, rash, or cellulitis patient's back.      Neurological: She is alert. No cranial nerve deficit.   Neuro intact.  Strength and sensation intact bilateral upper and lower extremities.   Skin: Skin is warm and dry.   Psychiatric: She has a normal mood and affect.         ED Course   Procedures  Labs Reviewed - No data to display       Imaging Results    None          Medications   morphine injection 8 mg (8 mg Intramuscular Given 11/25/23 1823)   ondansetron disintegrating tablet 4 mg (4 mg Oral Given 11/25/23 1822)     Medical Decision Making  This is a 48-year-old female with past medical history of AFib, CHF, diabetes type 2, hyperlipidemia, hypertension, sciatica presents to ED for emergent evaluation of right lumbar back pain that radiates down her right lower extremity that started yesterday. On physical exam, patient is well-appearing and in no acute distress.  Nontoxic appearing.  Lungs are clear to auscultation bilaterally.  Abdomen is soft and nontender.  No guarding, rigidity, rebound.  2+ radial pulses bilaterally.  Posterior oropharynx is not erythematous.  No edema or exudate.  Uvula midline.  Bilateral tympanic membrane is normal.  No erythema, bulging, or perforations.  Neuro intact.  Strength and sensation intact bilateral upper and lower extremities. Full ROM of neck. No neck rigidity. No midline tenderness to cervical, thoracic, or lumbar spine.  No bony step-offs.  Full range of motion of bilateral upper and lower extremities.  Strength and sensation intact bilateral upper and lower extremities. No erythema, edema, bruising, rash, or  cellulitis patient's back.  Without any trauma, I do not believe patient needs any imaging at this time.  Morphine and Zofran ordered.  Will discharge patient on Tylenol, Lidoderm patches, and short course of pain medicine.  Urged prompt follow-up with back and spine specialist and PCP for further evaluation.    Strict return precautions given. I discussed with the patient/family the diagnosis, treatment plan, indications for return to the emergency department, and for expected follow-up. The patient/family verbalized an understanding. The patient/family is asked if there are any questions or concerns. We discuss the case, until all issues are addressed to the patient/family's satisfaction. Patient/family understands and is agreeable to the plan. Patient is stable and ready for discharge.      Risk  Prescription drug management.                                   Clinical Impression:  Final diagnoses:  [M54.31] Right sided sciatica (Primary)          ED Disposition Condition    Discharge Stable          ED Prescriptions       Medication Sig Dispense Start Date End Date Auth. Provider    acetaminophen (TYLENOL) 500 MG tablet Take 1 tablet (500 mg total) by mouth every 4 (four) hours as needed for Pain or Temperature greater than (100.5 or greater). 30 tablet 11/25/2023 -- Michele Olvera PA-C    LIDOcaine (LIDODERM) 5 % (Expires today) Place 1 patch onto the skin once. Remove & Discard patch within 12 hours or as directed by MD for 1 dose 15 patch 11/25/2023 11/25/2023 Michele Olvera PA-C    HYDROcodone-acetaminophen (NORCO) 5-325 mg per tablet Take 1 tablet by mouth every 6 (six) hours as needed for Pain. 11 tablet 11/25/2023 -- Michele Olvera PA-C          Follow-up Information       Follow up With Specialties Details Why Contact Info    Gil Leal MD Family Medicine In 2 days for further evaluation [facility] [address] [place] [id number] [phone]      [facility] Emergency Medicine  In 2 days If symptoms worsen [address] [facility] [place] [address] [phone]             Michele Olvera PA-C  11/25/23 1821

## 2023-11-26 NOTE — DISCHARGE INSTRUCTIONS
Please return to the Emergency Department for any new or worsening symptoms including: bladder/bowel incontinence, saddle anesthesia, fever, chest pain, shortness of breath, loss of consciousness, dizziness, weakness, or any other concerns.     Please follow up with your Primary Care Provider within in the week. If you do not have one, you may contact the one listed on your discharge paperwork or you may also call the [facility] Appointment Desk at [phone] to schedule an appointment with one.     Please take all medication as prescribed.

## 2023-11-27 ENCOUNTER — PATIENT OUTREACH (OUTPATIENT)
Dept: EMERGENCY MEDICINE | Facility: HOSPITAL | Age: 48
End: 2023-11-27

## 2023-12-20 ENCOUNTER — OFFICE VISIT (OUTPATIENT)
Dept: SPINE | Facility: CLINIC | Age: 48
End: 2023-12-20
Payer: MEDICARE

## 2023-12-20 VITALS
HEART RATE: 78 BPM | DIASTOLIC BLOOD PRESSURE: 62 MMHG | SYSTOLIC BLOOD PRESSURE: 114 MMHG | HEIGHT: 67 IN | OXYGEN SATURATION: 100 % | BODY MASS INDEX: 40.18 KG/M2 | RESPIRATION RATE: 12 BRPM | WEIGHT: 256 LBS

## 2023-12-20 DIAGNOSIS — M79.18 MYOFASCIAL PAIN: ICD-10-CM

## 2023-12-20 DIAGNOSIS — M51.36 DDD (DEGENERATIVE DISC DISEASE), LUMBAR: ICD-10-CM

## 2023-12-20 DIAGNOSIS — G89.4 CHRONIC PAIN DISORDER: Primary | ICD-10-CM

## 2023-12-20 DIAGNOSIS — M48.061 SPINAL STENOSIS OF LUMBAR REGION, UNSPECIFIED WHETHER NEUROGENIC CLAUDICATION PRESENT: ICD-10-CM

## 2023-12-20 DIAGNOSIS — M54.16 LUMBAR RADICULOPATHY: ICD-10-CM

## 2023-12-20 DIAGNOSIS — M54.16 LUMBAR RADICULOPATHY: Primary | ICD-10-CM

## 2023-12-20 PROCEDURE — 3074F PR MOST RECENT SYSTOLIC BLOOD PRESSURE < 130 MM HG: ICD-10-PCS | Mod: CPTII,S$GLB,, | Performed by: NURSE PRACTITIONER

## 2023-12-20 PROCEDURE — 4010F ACE/ARB THERAPY RXD/TAKEN: CPT | Mod: CPTII,S$GLB,, | Performed by: NURSE PRACTITIONER

## 2023-12-20 PROCEDURE — 4010F PR ACE/ARB THEARPY RXD/TAKEN: ICD-10-PCS | Mod: CPTII,S$GLB,, | Performed by: NURSE PRACTITIONER

## 2023-12-20 PROCEDURE — 99214 OFFICE O/P EST MOD 30 MIN: CPT | Mod: S$GLB,,, | Performed by: NURSE PRACTITIONER

## 2023-12-20 PROCEDURE — 1160F PR REVIEW ALL MEDS BY PRESCRIBER/CLIN PHARMACIST DOCUMENTED: ICD-10-PCS | Mod: CPTII,S$GLB,, | Performed by: NURSE PRACTITIONER

## 2023-12-20 PROCEDURE — 3046F PR MOST RECENT HEMOGLOBIN A1C LEVEL > 9.0%: ICD-10-PCS | Mod: CPTII,S$GLB,, | Performed by: NURSE PRACTITIONER

## 2023-12-20 PROCEDURE — 3078F DIAST BP <80 MM HG: CPT | Mod: CPTII,S$GLB,, | Performed by: NURSE PRACTITIONER

## 2023-12-20 PROCEDURE — 3074F SYST BP LT 130 MM HG: CPT | Mod: CPTII,S$GLB,, | Performed by: NURSE PRACTITIONER

## 2023-12-20 PROCEDURE — 1159F PR MEDICATION LIST DOCUMENTED IN MEDICAL RECORD: ICD-10-PCS | Mod: CPTII,S$GLB,, | Performed by: NURSE PRACTITIONER

## 2023-12-20 PROCEDURE — 3046F HEMOGLOBIN A1C LEVEL >9.0%: CPT | Mod: CPTII,S$GLB,, | Performed by: NURSE PRACTITIONER

## 2023-12-20 PROCEDURE — 1159F MED LIST DOCD IN RCRD: CPT | Mod: CPTII,S$GLB,, | Performed by: NURSE PRACTITIONER

## 2023-12-20 PROCEDURE — 99999 PR PBB SHADOW E&M-EST. PATIENT-LVL V: ICD-10-PCS | Mod: PBBFAC,,, | Performed by: NURSE PRACTITIONER

## 2023-12-20 PROCEDURE — 99214 PR OFFICE/OUTPT VISIT, EST, LEVL IV, 30-39 MIN: ICD-10-PCS | Mod: S$GLB,,, | Performed by: NURSE PRACTITIONER

## 2023-12-20 PROCEDURE — 3078F PR MOST RECENT DIASTOLIC BLOOD PRESSURE < 80 MM HG: ICD-10-PCS | Mod: CPTII,S$GLB,, | Performed by: NURSE PRACTITIONER

## 2023-12-20 PROCEDURE — 99999 PR PBB SHADOW E&M-EST. PATIENT-LVL V: CPT | Mod: PBBFAC,,, | Performed by: NURSE PRACTITIONER

## 2023-12-20 PROCEDURE — 3008F BODY MASS INDEX DOCD: CPT | Mod: CPTII,S$GLB,, | Performed by: NURSE PRACTITIONER

## 2023-12-20 PROCEDURE — 3008F PR BODY MASS INDEX (BMI) DOCUMENTED: ICD-10-PCS | Mod: CPTII,S$GLB,, | Performed by: NURSE PRACTITIONER

## 2023-12-20 PROCEDURE — 1160F RVW MEDS BY RX/DR IN RCRD: CPT | Mod: CPTII,S$GLB,, | Performed by: NURSE PRACTITIONER

## 2023-12-20 RX ORDER — METHOCARBAMOL 750 MG/1
750 TABLET, FILM COATED ORAL 2 TIMES DAILY PRN
Qty: 60 TABLET | Refills: 2 | Status: SHIPPED | OUTPATIENT
Start: 2023-12-20 | End: 2024-03-05 | Stop reason: SDUPTHER

## 2023-12-20 RX ORDER — OXYCODONE AND ACETAMINOPHEN 5; 325 MG/1; MG/1
1 TABLET ORAL
Qty: 30 TABLET | Refills: 0 | Status: SHIPPED | OUTPATIENT
Start: 2023-12-20 | End: 2024-03-05 | Stop reason: SDUPTHER

## 2023-12-20 RX ORDER — GABAPENTIN 400 MG/1
400 CAPSULE ORAL 3 TIMES DAILY
Qty: 90 CAPSULE | Refills: 3 | Status: SHIPPED | OUTPATIENT
Start: 2023-12-20
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
## 2023-12-21 ENCOUNTER — TELEPHONE (OUTPATIENT)
Dept: PAIN MEDICINE | Facility: OTHER | Age: 48
End: 2023-12-21
Payer: MEDICARE

## 2023-12-21 NOTE — TELEPHONE ENCOUNTER
----- Message from Eben Christine MD sent at 12/21/2023  2:38 PM CST -----  Regarding: RE: Request to hold Eliquis  ok    ----- Message -----  From: Fernanda Iraheta LPN  Sent: 12/20/2023   1:41 PM CST  To: Eben Christine MD  Subject: Request to hold Eliquis                          Good afternoon,    Patient will be scheduled to have a procedure with Dr. Yeager, Right L5/S1 and S1 Trandforaminal Epidural Steroid Injection. Staff is requesting to hold Eliquis for 3 days prior to procedure. Please advise.    Thank you,  Fernanda

## 2023-12-26 ENCOUNTER — PATIENT MESSAGE (OUTPATIENT)
Dept: PAIN MEDICINE | Facility: OTHER | Age: 48
End: 2023-12-26
Payer: MEDICARE

## 2024-01-12 ENCOUNTER — HOSPITAL ENCOUNTER (OUTPATIENT)
Facility: OTHER | Age: 49
Discharge: HOME | End: 2024-01-12
Attending: ANESTHESIOLOGY | Admitting: ANESTHESIOLOGY
Payer: MEDICARE

## 2024-01-12 VITALS
DIASTOLIC BLOOD PRESSURE: 57 MMHG | RESPIRATION RATE: 14 BRPM | SYSTOLIC BLOOD PRESSURE: 108 MMHG | HEIGHT: 67 IN | WEIGHT: 250 LBS | BODY MASS INDEX: 39.24 KG/M2 | TEMPERATURE: 98 F | HEART RATE: 68 BPM | OXYGEN SATURATION: 96 %

## 2024-01-12 DIAGNOSIS — M54.16 LUMBAR RADICULOPATHY: Primary | ICD-10-CM

## 2024-01-12 DIAGNOSIS — G89.29 CHRONIC PAIN: ICD-10-CM

## 2024-01-12 LAB — POCT GLUCOSE: 141 MG/DL (ref 70–110)

## 2024-01-12 PROCEDURE — 25500020 PHARM REV CODE 255: Performed by: ANESTHESIOLOGY

## 2024-01-12 PROCEDURE — 63600175 PHARM REV CODE 636 W HCPCS: Performed by: ANESTHESIOLOGY

## 2024-01-12 PROCEDURE — 25000003 PHARM REV CODE 250: Performed by: ANESTHESIOLOGY

## 2024-01-12 PROCEDURE — 64483 NJX AA&/STRD TFRM EPI L/S 1: CPT | Mod: RT,,, | Performed by: ANESTHESIOLOGY

## 2024-01-12 PROCEDURE — 64483 NJX AA&/STRD TFRM EPI L/S 1: CPT | Mod: RT | Performed by: ANESTHESIOLOGY

## 2024-01-12 RX ORDER — FENTANYL CITRATE 50 UG/ML
INJECTION, SOLUTION INTRAMUSCULAR; INTRAVENOUS
Status: DISCONTINUED | OUTPATIENT
Start: 2024-01-12 | End: 2024-01-12 | Stop reason: HOSPADM

## 2024-01-12 RX ORDER — MIDAZOLAM HYDROCHLORIDE 1 MG/ML
INJECTION INTRAMUSCULAR; INTRAVENOUS
Status: DISCONTINUED | OUTPATIENT
Start: 2024-01-12 | End: 2024-01-12 | Stop reason: HOSPADM

## 2024-01-12 RX ORDER — LIDOCAINE HYDROCHLORIDE 20 MG/ML
INJECTION, SOLUTION INFILTRATION; PERINEURAL
Status: DISCONTINUED | OUTPATIENT
Start: 2024-01-12 | End: 2024-01-12 | Stop reason: HOSPADM

## 2024-01-12 RX ORDER — SODIUM CHLORIDE 9 MG/ML
INJECTION, SOLUTION INTRAVENOUS CONTINUOUS
Status: DISCONTINUED | OUTPATIENT
Start: 2024-01-12 | End: 2024-01-12 | Stop reason: HOSPADM

## 2024-01-12 RX ORDER — DEXAMETHASONE SODIUM PHOSPHATE 10 MG/ML
INJECTION INTRAMUSCULAR; INTRAVENOUS
Status: DISCONTINUED | OUTPATIENT
Start: 2024-01-12 | End: 2024-01-12 | Stop reason: HOSPADM

## 2024-01-12 RX ORDER — LIDOCAINE HYDROCHLORIDE 10 MG/ML
INJECTION, SOLUTION EPIDURAL; INFILTRATION; INTRACAUDAL; PERINEURAL
Status: DISCONTINUED | OUTPATIENT
Start: 2024-01-12 | End: 2024-01-12 | Stop reason: HOSPADM

## 2024-01-12 NOTE — DISCHARGE SUMMARY
Discharge Note  Short Stay      SUMMARY     Admit Date: 1/12/2024    Attending Physician: Emiliano Allen      Discharge Physician: Emiliano Allen      Discharge Date: 1/12/2024 1:31 PM    Procedure(s) (LRB):  LUMBAR TRANSFORAMINAL RIGHT L5/S1 AND S1 *ELIQUIS CLEARANCE IN CHART* (Right)    Final Diagnosis: Lumbar radiculopathy [M54.16]    Disposition: Home or self care    Patient Instructions:   Current Discharge Medication List        CONTINUE these medications which have NOT CHANGED    Details   !! acetaminophen (TYLENOL) 500 MG tablet Take 1 tablet (500 mg total) by mouth every 6 (six) hours as needed for Pain.  Qty: 30 tablet, Refills: 0      !! acetaminophen (TYLENOL) 500 MG tablet Take 1 tablet (500 mg total) by mouth every 4 (four) hours as needed for Pain or Temperature greater than (100.5 or greater).  Qty: 30 tablet, Refills: 0      albuterol (VENTOLIN HFA) 90 mcg/actuation inhaler Inhale 2 puffs into the lungs every 6 (six) hours as needed for Wheezing or Shortness of Breath. Rescue  Qty: 18 g, Refills: 2    Associated Diagnoses: COVID-19      apixaban (ELIQUIS) 5 mg Tab Take 1 tablet by mouth twice daily  Qty: 180 tablet, Refills: 3      blood glucose control, high Soln 1 each by Misc.(Non-Drug; Combo Route) route once. for 1 dose  Qty: 1 each, Refills: 3    Associated Diagnoses: Type 2 diabetes mellitus without complication, without long-term current use of insulin      blood glucose control, low Soln 1 each by Misc.(Non-Drug; Combo Route) route once. for 1 dose  Qty: 1 each, Refills: 3    Associated Diagnoses: Type 2 diabetes mellitus without complication, without long-term current use of insulin      BLOOD PRESSURE CUFF Misc 1 kit by Misc.(Non-Drug; Combo Route) route 2 (two) times daily.  Qty: 1 each, Refills: 0    Associated Diagnoses: Chronic combined systolic and diastolic heart failure; Essential hypertension; Nonischemic cardiomyopathy      blood sugar diagnostic Strp Check blood glucose  3x/day.  Qty: 300 strip, Refills: 3      blood-glucose meter (TRUE METRIX AIR GLUCOSE METER) Misc 1 each by Misc.(Non-Drug; Combo Route) route 3 (three) times daily.  Qty: 1 each, Refills: 0    Associated Diagnoses: Type 2 diabetes mellitus without complication, without long-term current use of insulin      fluticasone propionate (FLONASE) 50 mcg/actuation nasal spray 1 spray (50 mcg total) by Each Nostril route 2 (two) times daily as needed for Rhinitis.  Qty: 15 g, Refills: 0      furosemide (LASIX) 40 MG tablet Take 1 tablet (40 mg total) by mouth once daily.  Qty: 90 tablet, Refills: 3    Associated Diagnoses: Chronic combined systolic and diastolic heart failure      gabapentin (NEURONTIN) 400 MG capsule Take 1 capsule (400 mg total) by mouth 3 (three) times daily. TAKE 1 CAPSULE BY MOUTH THREE TIMES DAILY AS NEEDED FOR PAIN  Qty: 90 capsule, Refills: 3    Associated Diagnoses: Lumbar radiculopathy      insulin degludec (TRESIBA FLEXTOUCH U-200) 200 unit/mL (3 mL) insulin pen INJECT 64 UNITS SUBCUTANEOUSLY TWICE DAILY  Qty: 9 pen , Refills: 3      insulin lispro 100 unit/mL pen INJECT 46 UNITS SUBCUTANEOUSLY THREE TIMES DAILY BEFORE MEAL(S)  Qty: 45 mL, Refills: 5    Associated Diagnoses: Type 2 diabetes mellitus with hyperglycemia, with long-term current use of insulin      insulin NPH isoph U-100 human (HUMULIN N NPH INSULIN KWIKPEN) 100 unit/mL (3 mL) InPn Inject 24 units nightly at 10 pm  Qty: 30 mL, Refills: 2      LIDOcaine (LIDODERM) 5 % Place 1 patch onto the skin once daily. Apply patch for 12 hours and then leave off for 12 hours  Qty: 15 patch, Refills: 0      methocarbamoL (ROBAXIN) 750 MG Tab Take 1 tablet (750 mg total) by mouth 2 (two) times daily as needed (muscle pain).  Qty: 60 tablet, Refills: 2    Associated Diagnoses: Myofascial pain      metoprolol succinate (TOPROL-XL) 50 MG 24 hr tablet Take 1 tablet (50 mg total) by mouth once daily.  Qty: 90 tablet, Refills: 3    Comments:  ".  Associated Diagnoses: Chronic combined systolic and diastolic heart failure      mupirocin (BACTROBAN) 2 % ointment Apply topically nightly.      ondansetron (ZOFRAN) 4 MG tablet Take 1 tablet (4 mg total) by mouth every 8 (eight) hours as needed (Nausea and vomiting).  Qty: 12 tablet, Refills: 1      ondansetron (ZOFRAN-ODT) 4 MG TbDL Take 1 tablet (4 mg total) by mouth every 6 (six) hours as needed (nausea).  Qty: 15 tablet, Refills: 0      oxyCODONE-acetaminophen (PERCOCET) 5-325 mg per tablet Take 1 tablet by mouth every 24 hours as needed (severe pain).  Qty: 30 tablet, Refills: 0    Comments: Quantity prescribed more than 7 day supply? No      pen needle, diabetic (BD LORI 2ND GEN PEN NEEDLE) 32 gauge x 5/32" Ndle USE 1 PEN NEEDLE 4 TIMES DAILY  Qty: 400 each, Refills: 3      phenoL (CHLORASEPTIC THROAT SPRAY) 1.4 % SprA by Mucous Membrane route every 2 (two) hours.  Qty: 177 mL, Refills: 0      rosuvastatin (CRESTOR) 40 MG Tab Take 1 tablet (40 mg total) by mouth every evening.  Qty: 90 tablet, Refills: 3    Associated Diagnoses: Chronic combined systolic and diastolic heart failure      sacubitriL-valsartan (ENTRESTO) 24-26 mg per tablet Take 1 tablet by mouth 2 (two) times daily.  Qty: 180 tablet, Refills: 3      spironolactone (ALDACTONE) 25 MG tablet Take 1 tablet (25 mg total) by mouth once daily. Hold until eating and drinking improves. Need to weight self daily  Qty: 90 tablet, Refills: 3    Comments: .      triamcinolone acetonide 0.1% (KENALOG) 0.1 % cream 2 (two) times daily. Apply to affected area  Refills: 4       !! - Potential duplicate medications found. Please discuss with provider.              Discharge Diagnosis: Lumbar radiculopathy [M54.16]  Condition on Discharge: Stable with no complications to procedure   Diet on Discharge: Same as before.  Activity: as per instruction sheet.  Discharge to: Home with a responsible adult.  Follow up: 2-4 weeks       Please call my office or pager at " [phone] if experienced any weakness or loss of sensation, fever > 101.5, pain uncontrolled with oral medications, persistent nausea/vomiting/or diarrhea, redness or drainage from the incisions, or any other worrisome concerns. If physician on call was not reached or could not communicate with our office for any reason please go to the nearest emergency department

## 2024-01-12 NOTE — DISCHARGE INSTRUCTIONS

## 2024-01-12 NOTE — INTERVAL H&P NOTE
The patient has been examined and the H&P has been reviewed:    She last took her Eliquis Monday 1/8/2024    I concur with the findings and no changes have occurred since H&P was written.    Procedure risks, benefits and alternative options discussed and understood by patient/family.          There are no hospital problems to display for this patient.

## 2024-01-12 NOTE — OP NOTE
Lumbar Transforaminal Epidural Steroid Injection under Fluoroscopic Guidance    The procedure, risks, benefits, and options were discussed with the patient. There are no contraindications to the procedure. The patent expressed understanding and agreed to the procedure. Informed written consent was obtained prior to the start of the procedure and can be found in the patient's chart.    PATIENT NAME: Fabiana Chanel   MRN: [id number]     DATE OF PROCEDURE: 01/12/2024    PROCEDURE:  Right  L5/S1 and S1 Lumbar Transforaminal Epidural Steroid Injection under Fluoroscopic Guidance    PRE-OP DIAGNOSIS: Lumbar radiculopathy [M54.16] Lumbar radiculopathy [M54.16]    POST-OP DIAGNOSIS: Same    PHYSICIAN: Jed Yeager MD    ASSISTANTS: BLADIMIR Tracyssilva Pain Fellow       MEDICATIONS INJECTED: Preservative-free Decadron 10mg with 5cc of Lidocaine 1% MPF     LOCAL ANESTHETIC INJECTED: Xylocaine 2%     SEDATION: Versed 2mg and Fentanyl 50mcg                                                                                                                                                                                     Conscious sedation ordered by M.D. Patient re-evaluation prior to administration of conscious sedation. No changes noted in patient's status from initial evaluation. The patient's vital signs were monitored by RN and patient remained hemodynamically stable throughout the procedure.    Event Time In   Sedation Start 1321   Sedation End 1328       ESTIMATED BLOOD LOSS: None    COMPLICATIONS: None    TECHNIQUE: Time-out was performed to identify the patient and procedure to be performed. With the patient laying in a prone position, the surgical area was prepped and draped in the usual sterile fashion using ChloraPrep and a fenestrated drape.The levels were determined under fluoroscopy guidance. Skin anesthesia was achieved by injecting Lidocaine 2% over the injection sites. The transforaminal spaces  were then approached with a 22 gauge, 5 inch spinal quinke needle that was introduced under fluoroscopic guidance in the AP and Lateral views. Once the needle tip was in the area of the transforaminal space, and there was no blood, CSF or paraesthesias, contrast dye Omnipaque (300mg/mL) was injected to confirm placement and there was no vascular runoff. Fluoroscopic imaging in the AP and lateral views revealed a clear outline of the spinal nerve with proximal spread of agent through the neural foramen into the epidural space. 3 mL of the medication mixture listed above was injected slowly at each site. Displacement of the radio opaque contrast after injection of the medication confirmed that the medication went into the area of the transforaminal spaces. The needles were removed and bleeding was nil. A sterile dressing was applied. No specimens collected. The patient tolerated the procedure well.     Emiliano Allen MD    The patient was monitored after the procedure in the recovery area. They were given post-procedure and discharge instructions to follow at home. The patient was discharged in a stable condition.    I reviewed and edited the fellow's note. I conducted my own interview and physical examination. I agree with the findings. I was present and supervising all critical portions of the procedure.    Jed Yeager MD

## 2024-01-17 ENCOUNTER — PATIENT MESSAGE (OUTPATIENT)
Dept: ADMINISTRATIVE | Facility: HOSPITAL | Age: 49
End: 2024-01-17
Payer: MEDICARE

## 2024-01-17 ENCOUNTER — PATIENT OUTREACH (OUTPATIENT)
Dept: ADMINISTRATIVE | Facility: HOSPITAL | Age: 49
End: 2024-01-17
Payer: MEDICARE

## 2024-01-17 NOTE — PROGRESS NOTES
Population Health Chart Review & Patient Outreach Details  HCC due for visit with PCP. Sent portal message.     Further Action Needed If Patient Returns Outreach:            Updates Requested / Reviewed:     [x]  Care Everywhere    []     []  External Sources ([facility], [facility], DIS, etc.)    [] [facility]   [] [facility]   [] Other:    [x]  Care Team Updated   []  Removed  or Duplicate Orders   []  Immunization Reconciliation Completed / Queried    [] [place]   [] [place]   [] [place]   [] [place]      Health Maintenance Topics Addressed and Outreach Outcomes / Actions Taken:             Breast Cancer Screening []  Mammogram Order Placed    []  Mammogram Screening Scheduled    []  External Records Requested & Care Team Updated if Applicable    []  External Records Uploaded & Care Team Updated if Applicable    []  Pt Declined Scheduling Mammogram    []  Pt Will Schedule with External Provider / Order Routed & Care Team Updated if Applicable              Cervical Cancer Screening []  Pap Smear Scheduled in Primary Care or OBGYN    []  External Records Requested & Care Team Updated if Applicable       []  External Records Uploaded, Care Team Updated, & History Updated if Applicable    []  Patient Declined Scheduling Pap Smear    []  Patient Will Schedule with External Provider & Care Team Updated if Applicable                  Colorectal Cancer Screening []  Colonoscopy Case Request / Referral / Home Test Order Placed    []  External Records Requested & Care Team Updated if Applicable    []  External Records Uploaded, Care Team Updated, & History Updated if Applicable    []  Patient Declined Completing Colon Cancer Screening    []  Patient Will Schedule with External Provider & Care Team Updated if Applicable    []  Fit Kit Mailed (add the SmartPhrase under additional notes)    []  Reminded Patient to Complete Home Test                Diabetic Eye Exam []  Eye Exam Screening Order Placed    []  Eye Camera  Scheduled or Optometry/Ophthalmology Referral Placed    []  External Records Requested & Care Team Updated if Applicable    []  External Records Uploaded, Care Team Updated, & History Updated if Applicable    []  Patient Declined Scheduling Eye Exam    []  Patient Will Schedule with External Provider & Care Team Updated if Applicable             Blood Pressure Control []  Primary Care Follow Up Visit Scheduled     []  Remote Blood Pressure Reading Captured    []  Patient Declined Remote Reading or Scheduling Appt - Escalated to PCP    []  Patient Will Call Back or Send Portal Message with Reading                 HbA1c & Other Labs []  Overdue Lab(s) Ordered    []  Overdue Lab(s) Scheduled    []  External Records Uploaded & Care Team Updated if Applicable    []  Primary Care Follow Up Visit Scheduled     []  Reminded Patient to Complete A1c Home Test    []  Patient Declined Scheduling Labs or Will Call Back to Schedule    []  Patient Will Schedule with External Provider / Order Routed, & Care Team Updated if Applicable           Primary Care Appointment []  Primary Care Appt Scheduled    []  Patient Declined Scheduling or Will Call Back to Schedule    []  Pt Established with External Provider, Updated Care Team, & Informed Pt to Notify Payor if Applicable           Medication Adherence /    Statin Use []  Primary Care Appointment Scheduled    []  Patient Reminded to  Prescription    []  Patient Declined, Provider Notified if Needed    []  Sent Provider Message to Review to Evaluate Pt for Statin, Add Exclusion Dx Codes, Document   Exclusion in Problem List, Change Statin Intensity Level to Moderate or High Intensity if Applicable                Osteoporosis Screening []  Dexa Order Placed    []  Dexa Appointment Scheduled    []  External Records Requested & Care Team Updated    []  External Records Uploaded, Care Team Updated, & History Updated if Applicable    []  Patient Declined Scheduling Dexa or Will Call  Back to Schedule    []  Patient Will Schedule with External Provider / Order Routed & Care Team Updated if Applicable       Additional Notes:

## 2024-01-22 ENCOUNTER — PATIENT OUTREACH (OUTPATIENT)
Dept: ADMINISTRATIVE | Facility: HOSPITAL | Age: 49
End: 2024-01-22
Payer: MEDICARE

## 2024-01-22 NOTE — LETTER
January 22, 2024    Fabiana Chanel  [address]  [place] [address]             [facility] [address] [place] [address]  Phone: [phone]   Dear Delashandra, [facility] is committed to your overall health.  Our records indicate that you are due for an annual checkup with your primary care provider, Gil Leal MD .  Please call [phone] or [phone] to schedule a routine physical exam. You may also be due for the following test and/or procedures:    Health Maintenance Due    Diabetes Urine Screening     Eye Exam     Lipid Panel     Hemoglobin A1c     Cervical Cancer Screening          If you have had any of the above done at another facility, please let us know so that we may obtain copies from that facility.  If you have a copy of these records, please provide a copy for us to scan into your chart.  You are welcome to request that the report be faxed to us at  FAX: [phone].    If no longer primary care, please let us know who you are establish care with for us to update your chart.      Thank you for choosing [facility] Mariane Vo - MA  Care Coordinator

## 2024-01-22 NOTE — PROGRESS NOTES
Population Health Chart Review & Patient Outreach Details  HCC Report need to see PCP this year. -- Due physical  Left message for patient to return call. Sent letter.     Further Action Needed If Patient Returns Outreach:            Updates Requested / Reviewed:     [x]  Care Everywhere    []     []  External Sources ([facility], [facility], DIS, etc.)    [] [facility]   [] [facility]   [] Other:    []  Care Team Updated   []  Removed  or Duplicate Orders   []  Immunization Reconciliation Completed / Queried    [] [place]   [] [place]   [] [place]   [] [place]      Health Maintenance Topics Addressed and Outreach Outcomes / Actions Taken:             Breast Cancer Screening []  Mammogram Order Placed    []  Mammogram Screening Scheduled    []  External Records Requested & Care Team Updated if Applicable    []  External Records Uploaded & Care Team Updated if Applicable    []  Pt Declined Scheduling Mammogram    []  Pt Will Schedule with External Provider / Order Routed & Care Team Updated if Applicable              Cervical Cancer Screening []  Pap Smear Scheduled in Primary Care or OBGYN    []  External Records Requested & Care Team Updated if Applicable       []  External Records Uploaded, Care Team Updated, & History Updated if Applicable    []  Patient Declined Scheduling Pap Smear    []  Patient Will Schedule with External Provider & Care Team Updated if Applicable                  Colorectal Cancer Screening []  Colonoscopy Case Request / Referral / Home Test Order Placed    []  External Records Requested & Care Team Updated if Applicable    []  External Records Uploaded, Care Team Updated, & History Updated if Applicable    []  Patient Declined Completing Colon Cancer Screening    []  Patient Will Schedule with External Provider & Care Team Updated if Applicable    []  Fit Kit Mailed (add the SmartPhrase under additional notes)    []  Reminded Patient to Complete Home Test                Diabetic Eye  Exam []  Eye Exam Screening Order Placed    []  Eye Camera Scheduled or Optometry/Ophthalmology Referral Placed    []  External Records Requested & Care Team Updated if Applicable    []  External Records Uploaded, Care Team Updated, & History Updated if Applicable    []  Patient Declined Scheduling Eye Exam    []  Patient Will Schedule with External Provider & Care Team Updated if Applicable             Blood Pressure Control []  Primary Care Follow Up Visit Scheduled     []  Remote Blood Pressure Reading Captured    []  Patient Declined Remote Reading or Scheduling Appt - Escalated to PCP    []  Patient Will Call Back or Send Portal Message with Reading                 HbA1c & Other Labs []  Overdue Lab(s) Ordered    []  Overdue Lab(s) Scheduled    []  External Records Uploaded & Care Team Updated if Applicable    []  Primary Care Follow Up Visit Scheduled     []  Reminded Patient to Complete A1c Home Test    []  Patient Declined Scheduling Labs or Will Call Back to Schedule    []  Patient Will Schedule with External Provider / Order Routed, & Care Team Updated if Applicable           Primary Care Appointment []  Primary Care Appt Scheduled    []  Patient Declined Scheduling or Will Call Back to Schedule    []  Pt Established with External Provider, Updated Care Team, & Informed Pt to Notify Payor if Applicable           Medication Adherence /    Statin Use []  Primary Care Appointment Scheduled    []  Patient Reminded to  Prescription    []  Patient Declined, Provider Notified if Needed    []  Sent Provider Message to Review to Evaluate Pt for Statin, Add Exclusion Dx Codes, Document   Exclusion in Problem List, Change Statin Intensity Level to Moderate or High Intensity if Applicable                Osteoporosis Screening []  Dexa Order Placed    []  Dexa Appointment Scheduled    []  External Records Requested & Care Team Updated    []  External Records Uploaded, Care Team Updated, & History Updated if  Applicable    []  Patient Declined Scheduling Dexa or Will Call Back to Schedule    []  Patient Will Schedule with External Provider / Order Routed & Care Team Updated if Applicable       Additional Notes:

## 2024-01-24 ENCOUNTER — HOSPITAL ENCOUNTER (EMERGENCY)
Facility: HOSPITAL | Age: 49
Discharge: HOME | End: 2024-01-24
Attending: EMERGENCY MEDICINE
Payer: MEDICARE

## 2024-01-24 VITALS
OXYGEN SATURATION: 97 % | DIASTOLIC BLOOD PRESSURE: 73 MMHG | RESPIRATION RATE: 18 BRPM | HEART RATE: 69 BPM | SYSTOLIC BLOOD PRESSURE: 127 MMHG | WEIGHT: 250 LBS | TEMPERATURE: 98 F | BODY MASS INDEX: 39.16 KG/M2

## 2024-01-24 DIAGNOSIS — M54.40 CHRONIC LOW BACK PAIN WITH SCIATICA, SCIATICA LATERALITY UNSPECIFIED, UNSPECIFIED BACK PAIN LATERALITY: ICD-10-CM

## 2024-01-24 DIAGNOSIS — S39.012A LUMBAR STRAIN, INITIAL ENCOUNTER: Primary | ICD-10-CM

## 2024-01-24 DIAGNOSIS — G89.29 CHRONIC LOW BACK PAIN WITH SCIATICA, SCIATICA LATERALITY UNSPECIFIED, UNSPECIFIED BACK PAIN LATERALITY: ICD-10-CM

## 2024-01-24 LAB
ALBUMIN SERPL BCP-MCNC: 3.5 G/DL (ref 3.5–5.2)
ALP SERPL-CCNC: 88 U/L (ref 55–135)
ALT SERPL W/O P-5'-P-CCNC: 14 U/L (ref 10–44)
ANION GAP SERPL CALC-SCNC: 7 MMOL/L (ref 8–16)
AST SERPL-CCNC: 13 U/L (ref 10–40)
BACTERIA #/AREA URNS HPF: NORMAL /HPF
BASOPHILS # BLD AUTO: 0.04 K/UL (ref 0–0.2)
BASOPHILS NFR BLD: 0.5 % (ref 0–1.9)
BILIRUB SERPL-MCNC: 0.5 MG/DL (ref 0.1–1)
BILIRUB UR QL STRIP: NEGATIVE
BUN SERPL-MCNC: 20 MG/DL (ref 6–20)
CALCIUM SERPL-MCNC: 9.1 MG/DL (ref 8.7–10.5)
CHLORIDE SERPL-SCNC: 101 MMOL/L (ref 95–110)
CLARITY UR: CLEAR
CO2 SERPL-SCNC: 28 MMOL/L (ref 23–29)
COLOR UR: COLORLESS
CREAT SERPL-MCNC: 2.2 MG/DL (ref 0.5–1.4)
DIFFERENTIAL METHOD BLD: ABNORMAL
EOSINOPHIL # BLD AUTO: 0.2 K/UL (ref 0–0.5)
EOSINOPHIL NFR BLD: 2 % (ref 0–8)
ERYTHROCYTE [DISTWIDTH] IN BLOOD BY AUTOMATED COUNT: 15.8 % (ref 11.5–14.5)
EST. GFR  (NO RACE VARIABLE): 27 ML/MIN/1.73 M^2
GLUCOSE SERPL-MCNC: 260 MG/DL (ref 70–110)
GLUCOSE UR QL STRIP: ABNORMAL
HCT VFR BLD AUTO: 42.7 % (ref 37–48.5)
HGB BLD-MCNC: 12.8 G/DL (ref 12–16)
HGB UR QL STRIP: ABNORMAL
HYALINE CASTS #/AREA URNS LPF: 0 /LPF
IMM GRANULOCYTES # BLD AUTO: 0.04 K/UL (ref 0–0.04)
IMM GRANULOCYTES NFR BLD AUTO: 0.5 % (ref 0–0.5)
KETONES UR QL STRIP: NEGATIVE
LEUKOCYTE ESTERASE UR QL STRIP: NEGATIVE
LYMPHOCYTES # BLD AUTO: 2.5 K/UL (ref 1–4.8)
LYMPHOCYTES NFR BLD: 28.8 % (ref 18–48)
MCH RBC QN AUTO: 25.8 PG (ref 27–31)
MCHC RBC AUTO-ENTMCNC: 30 G/DL (ref 32–36)
MCV RBC AUTO: 86 FL (ref 82–98)
MICROSCOPIC COMMENT: NORMAL
MONOCYTES # BLD AUTO: 0.5 K/UL (ref 0.3–1)
MONOCYTES NFR BLD: 5.4 % (ref 4–15)
NEUTROPHILS # BLD AUTO: 5.5 K/UL (ref 1.8–7.7)
NEUTROPHILS NFR BLD: 62.8 % (ref 38–73)
NITRITE UR QL STRIP: NEGATIVE
NRBC BLD-RTO: 0 /100 WBC
PH UR STRIP: 7 [PH] (ref 5–8)
PLATELET # BLD AUTO: 235 K/UL (ref 150–450)
PMV BLD AUTO: 10.2 FL (ref 9.2–12.9)
POTASSIUM SERPL-SCNC: 3.5 MMOL/L (ref 3.5–5.1)
PROT SERPL-MCNC: 7.3 G/DL (ref 6–8.4)
PROT UR QL STRIP: ABNORMAL
RBC # BLD AUTO: 4.96 M/UL (ref 4–5.4)
RBC #/AREA URNS HPF: 0 /HPF (ref 0–4)
SODIUM SERPL-SCNC: 136 MMOL/L (ref 136–145)
SP GR UR STRIP: 1.01 (ref 1–1.03)
URN SPEC COLLECT METH UR: ABNORMAL
UROBILINOGEN UR STRIP-ACNC: NEGATIVE EU/DL
WBC # BLD AUTO: 8.67 K/UL (ref 3.9–12.7)
WBC #/AREA URNS HPF: 0 /HPF (ref 0–5)
YEAST URNS QL MICRO: NORMAL

## 2024-01-24 PROCEDURE — 25000003 PHARM REV CODE 250

## 2024-01-24 PROCEDURE — 85025 COMPLETE CBC W/AUTO DIFF WBC: CPT

## 2024-01-24 PROCEDURE — 81000 URINALYSIS NONAUTO W/SCOPE: CPT

## 2024-01-24 PROCEDURE — 99285 EMERGENCY DEPT VISIT HI MDM: CPT

## 2024-01-24 PROCEDURE — 80053 COMPREHEN METABOLIC PANEL: CPT

## 2024-01-24 RX ORDER — LIDOCAINE 50 MG/G
1 PATCH TOPICAL DAILY
Qty: 15 PATCH | Refills: 0 | Status: SHIPPED | OUTPATIENT
Start: 2024-01-24 | End: 2024-03-13 | Stop reason: SDUPTHER

## 2024-01-24 RX ORDER — METHOCARBAMOL 500 MG/1
500 TABLET, FILM COATED ORAL
Status: COMPLETED | OUTPATIENT
Start: 2024-01-24 | End: 2024-01-24

## 2024-01-24 RX ORDER — LIDOCAINE 50 MG/G
1 PATCH TOPICAL ONCE
Status: DISCONTINUED | OUTPATIENT
Start: 2024-01-24 | End: 2024-01-24 | Stop reason: HOSPADM

## 2024-01-24 RX ORDER — ACETAMINOPHEN 500 MG
500 TABLET ORAL EVERY 6 HOURS PRN
Qty: 30 TABLET | Refills: 0 | Status: SHIPPED | OUTPATIENT
Start: 2024-01-24 | End: 2024-03-13 | Stop reason: DRUGHIGH

## 2024-01-24 RX ADMIN — METHOCARBAMOL 500 MG: 500 TABLET ORAL at 07:01

## 2024-01-24 RX ADMIN — LIDOCAINE 5% 1 PATCH: 700 PATCH TOPICAL at 07:01

## 2024-01-25 ENCOUNTER — PATIENT OUTREACH (OUTPATIENT)
Dept: EMERGENCY MEDICINE | Facility: HOSPITAL | Age: 49
End: 2024-01-25

## 2024-01-25 NOTE — DISCHARGE INSTRUCTIONS
Thank you for coming to our Emergency Department today. It is important to remember that some problems or medical conditions are difficult to diagnose and may not be found or addressed during your Emergency Department visit.  These conditions often start with non-specific symptoms and can only be diagnosed on follow up visits with your primary care physician or specialist when the symptoms continue or change. Please remember that all medical conditions can change, and we cannot predict how you will be feeling tomorrow or the next day. Return to the ER with any questions/concerns, new/concerning symptoms, worsening or failure to improve.   Be sure to follow up with your primary care doctor and review all labs/imaging/tests that were performed during your ER visit with them. It is very common for us to identify non-emergent incidental findings which must be followed up with your primary care physician.  Some labs/imaging/tests may be outside of the normal range, and require non-emergent follow-up and/or further investigation/treatment/procedures/testing to help diagnose/exclude/prevent complications or other potentially serious medical conditions. Some abnormalities may not have been discussed or addressed during your ER visit. Some lab results may not return during your ER visit but can be accessible by downloading the free [facility] brandyn or by visiting [email or web address] . It is important for you to review all labs/imaging/tests which are outside of the normal range with your physician.  An ER visit does not replace a primary care visit, and many screening tests or follow-up tests cannot be ordered by an ER doctor or performed by the ER. Some tests may even require pre-approval.  If you do not have a primary care doctor, you may contact the one listed on your discharge paperwork or you may also call the [facility] Appointment Desk at [phone] , or [address] [place] [address] at  [phone] to schedule an  appointment, or establish care with a primary care doctor or even a specialist and to obtain information about local resources. It is important to your health that you have a primary care doctor.  Please take all medications as directed. We have done our best to select a medication for you that will treat your condition however, all medications may potentially have side-effects and it is impossible to predict which medications may give you side-effects or what those side-effects (if any) those medications may give you.  If you feel that you are having a negative effect or side-effect of any medication you should stop taking those medications immediately and seek medical attention. If you feel that you are having a life-threatening reaction call 911.  Do not drive, swim, climb to height, take a bath, operate heavy machinery, drink alcohol or take potentially sedating medications, sign any legal documents or make any important decisions for 24 hours if you have received any pain medications, sedatives or mood altering drugs during your ER visit or within 24 hours of taking them if they have been prescribed to you.   You can find additional resources for Dentists, hearing aids, durable medical equipment, low cost pharmacies and other resources at [email or web address]  Patient agrees with this plan. Discussed with her strict return precautions, she verbalized understanding. Patient is stable for discharge.

## 2024-01-25 NOTE — ED NOTES
"Pt. Reports she has lower lower back pain with sciatica pain. Pt. Reports she feels as if she has " dehydration" no abd problems. N/v/d.  Explained to pt. What has been ordered for her care. Pt. Refused Covid/flu swab, POCT, meds and lab work. Pt. Reports she does not have any cold symptoms and has robaxin at home. Pt. Noted to be angry, upset and used of unnecessary language ( cursing). Asked pt. If she would like to speak with provider and she stated, " I dont want to talk to that motherfucker".    "

## 2024-01-25 NOTE — ED PROVIDER NOTES
"Encounter Date: 1/24/2024    SCRIBE #1 NOTE: I, Ijeoma Acevedoosmar Daniel, am scribing for, and in the presence of,  Stewart Joseph PA-C. I have scribed the following portions of the note - Other sections scribed: HPI, ROS.       History     Chief Complaint   Patient presents with    Back Pain     Hx sciatica, began a few hours ago with lower back pain with shooting pain to right hip. Last spinal injection 01/09. Denies recent trauma    Also states she has weakness, muscle cramps and feels "dehydrated" urine light yellow     48 y. o. female with a PMHx of A-Fib, blood clot associated with vein wall inflammation (not DVT), cardiomyopathy, CHF (last EF 25% in 2022), type 2 DM, HLD, HTN, sciatica, chronic back pain, CKD (stage 4), who presents to the ED for a chief complaint of lower bilateral back pain onset few days ago. Patient reports associated Sx of upper and lower extremities cramping, fatigue, and urinary frequency. Further reports Hx of similar Sx and states her lower back pain is similar to her regular sciatic pain without radiating down to her legs this time. Patient also reports her last epidural injection (lumbar transforaminal right L5/S1 and S1) was on 01/12/2024 by pain management physicians at [facility] and states her shots usually last from 2 to 4 months. She attempted Tx with 1 robaxin at 1 PM today. Endorses being hydrated all day but feels "dehydrated" and feels like she needs "IV fluids". Further endorses being compliant with Eliquis twice a day. No other alleviating or exacerbating factors. Denies any saddle anesthesia, urinary or bowel retention/incontinence, chest pain, shortness for breath, nausea, vomiting, diarrhea, abdominal pain, headache, dizziness, weakness, dysuria, hematuria.          The history is provided by the patient. No  was used.     Review of patient's allergies indicates:   Allergen Reactions    Metformin      Diarrhea on metformin XR     Pneumococcal " 23-abimbola ps vaccine      Past Medical History:   Diagnosis Date    Atrial fibrillation     Blood clot associated with vein wall inflammation     not dvt    Cardiomyopathy     Normal cors on cath 11/2017    CHF (congestive heart failure)     DM (diabetes mellitus) 9/19/2013    Hyperlipidemia     Hypertension     Psoriasis     Sleep apnea      Past Surgical History:   Procedure Laterality Date    CARDIAC CATHETERIZATION      COLONOSCOPY      COLONOSCOPY N/A 3/9/2022    Procedure: COLONOSCOPY;  Surgeon: Vielka Burrell MD;  Location: [facility] ENDO;  Service: Endoscopy;  Laterality: N/A;    DILATION AND CURETTAGE OF UTERUS      EPIDURAL STEROID INJECTION N/A 8/11/2023    Procedure: INJECTION, STEROID, EPIDURAL, L5/S1 SOONER DATE    clear to hold Eliquis;  Surgeon: Jed Yeager MD;  Location: [facility] PAIN MGT;  Service: Pain Management;  Laterality: N/A;    ESOPHAGOGASTRODUODENOSCOPY N/A 5/9/2019    Procedure: EGD (ESOPHAGOGASTRODUODENOSCOPY);  Surgeon: Vielka Burrell MD;  Location: [facility] ENDO;  Service: Endoscopy;  Laterality: N/A;    TRANSFORAMINAL EPIDURAL INJECTION OF STEROID N/A 1/6/2022    Procedure: Transforaminal ANKITA L4/L5 L5/S1;  Surgeon: Jed Yeager MD;  Location: [facility] PAIN MGT;  Service: Pain Management;  Laterality: N/A;    TRANSFORAMINAL EPIDURAL INJECTION OF STEROID Right 12/1/2022    Procedure: INJECTION, STEROID, EPIDURAL, TRANSFORAMINAL APPROACH, RIGHT L4/L5 & L5/S1 CONTRAST;  Surgeon: Jed Yeager MD;  Location: [facility] PAIN MGT;  Service: Pain Management;  Laterality: Right;    TRANSFORAMINAL EPIDURAL INJECTION OF STEROID Right 4/14/2023    Procedure: INJECTION, STEROID, EPIDURAL, TRANSFORAMINAL APPROACH RIGHT S1;  Surgeon: Jed Yeager MD;  Location: [facility] PAIN MGT;  Service: Pain Management;  Laterality: Right;  3/14 AUTH    TRANSFORAMINAL EPIDURAL INJECTION OF STEROID Right 1/12/2024    Procedure: LUMBAR TRANSFORAMINAL RIGHT L5/S1 AND S1 *ELIQUIS CLEARANCE IN CHART*;  Surgeon:  Jed Yeager MD;  Location: [facility] PAIN MGT;  Service: Pain Management;  Laterality: Right;  [phone]     Family History   Problem Relation Age of Onset    Hypertension Mother     Hypertension Father     Diabetes Father     Diabetes Maternal Grandmother     Diabetes Paternal Grandmother     Breast cancer Neg Hx     Colon cancer Neg Hx     Ovarian cancer Neg Hx      Social History     Tobacco Use    Smoking status: Never    Smokeless tobacco: Never    Tobacco comments:     smokes cigars on occasion   Substance Use Topics    Alcohol use: Not Currently     Comment: occasional    Drug use: Not Currently     Types: Marijuana     Comment: occ     Review of Systems   Constitutional:  Positive for fatigue. Negative for chills, diaphoresis and fever.   HENT:  Negative for congestion, rhinorrhea and sore throat.    Eyes:  Negative for redness and visual disturbance.   Respiratory:  Negative for cough and shortness of breath.    Cardiovascular:  Negative for chest pain, palpitations and leg swelling.   Gastrointestinal:  Negative for abdominal pain, diarrhea, nausea and vomiting.   Genitourinary:  Positive for frequency. Negative for difficulty urinating, dysuria and flank pain.   Musculoskeletal:  Positive for back pain (lower bilateral back). Negative for arthralgias, myalgias, neck pain and neck stiffness.        (+) muscle cramps in lower and upper extremities   Skin:  Negative for rash.   Neurological:  Positive for light-headedness. Negative for dizziness, weakness, numbness and headaches.   Hematological:  Does not bruise/bleed easily.       Physical Exam     Initial Vitals [01/24/24 1806]   BP Pulse Resp Temp SpO2   127/73 69 18 98.2 °F (36.8 °C) 97 %      MAP       --         Physical Exam    Nursing note and vitals reviewed.  Constitutional: She appears well-developed and well-nourished. She is not diaphoretic. No distress.   HENT:   Head: Normocephalic and atraumatic.   Right Ear: External ear normal.   Left  Ear: External ear normal.   Nose: Nose normal.   Mouth/Throat: Oropharynx is clear and moist.   Eyes: Conjunctivae and EOM are normal. Pupils are equal, round, and reactive to light. Right eye exhibits no discharge. Left eye exhibits no discharge.   Neck: Neck supple. No JVD present.   Normal range of motion.   Full passive range of motion without pain.     Cardiovascular:  Normal rate, regular rhythm, normal heart sounds, intact distal pulses and normal pulses.     Exam reveals no distant heart sounds and no friction rub.       Pulmonary/Chest: Effort normal and breath sounds normal. No respiratory distress.   Abdominal: Abdomen is soft. Bowel sounds are normal. She exhibits no distension, no pulsatile midline mass and no mass. There is no abdominal tenderness.   No right CVA tenderness.  There is left CVA tenderness. There is no rebound and no guarding.   Musculoskeletal:         General: Normal range of motion.      Cervical back: Normal, full passive range of motion without pain, normal range of motion and neck supple.      Thoracic back: Normal.      Lumbar back: Tenderness present. No swelling, edema, deformity, signs of trauma, lacerations, spasms or bony tenderness. Normal range of motion. Negative right straight leg raise test and negative left straight leg raise test. No scoliosis.        Back:       Right lower leg: Normal.      Left lower leg: Normal.     Neurological: She is alert and oriented to person, place, and time. She has normal strength. No cranial nerve deficit or sensory deficit. Gait normal.   Skin: Skin is warm, dry and intact. Capillary refill takes less than 2 seconds. No bruising, no ecchymosis and no rash noted. No pallor.   Psychiatric: She has a normal mood and affect. Her speech is normal and behavior is normal. Thought content normal.         ED Course   Procedures  Labs Reviewed   URINALYSIS, REFLEX TO URINE CULTURE - Abnormal; Notable for the following components:       Result  Value    Color, UA Colorless (*)     Protein, UA 1+ (*)     Glucose, UA 3+ (*)     Occult Blood UA 2+ (*)     All other components within normal limits    Narrative:     Specimen Source->Urine   CBC W/ AUTO DIFFERENTIAL - Abnormal; Notable for the following components:    MCH 25.8 (*)     MCHC 30.0 (*)     RDW 15.8 (*)     All other components within normal limits   COMPREHENSIVE METABOLIC PANEL - Abnormal; Notable for the following components:    Glucose 260 (*)     Creatinine 2.2 (*)     eGFR 27 (*)     Anion Gap 7 (*)     All other components within normal limits   URINALYSIS MICROSCOPIC    Narrative:     Specimen Source->Urine   POCT URINE PREGNANCY   POCT INFLUENZA A/B MOLECULAR   SARS-COV-2 RDRP GENE          Imaging Results              X-Ray Lumbar Spine Ap And Lateral (Final result)  Result time 01/24/24 19:03:02      Final result by Rober Gonsales MD (01/24/24 19:03:02)                   Impression:      No acute lumbar spine abnormalities identified.      Electronically signed by: Rober Gonsales MD  Date:    01/24/2024  Time:    19:03               Narrative:    EXAMINATION:  XR LUMBAR SPINE AP AND LATERAL    CLINICAL HISTORY:  lumbar pain;    TECHNIQUE:  AP, lateral and spot images were performed of the lumbar spine.    COMPARISON:  October 2022.    FINDINGS:  Lumbar spine alignment is within normal limits.  No evidence of acute lumbar spine fracture or subluxation.  Intervertebral disc space narrowing is visualized at the L5-S1 level.  Remaining lumbar intervertebral disc spaces appear fairly well maintained.  Visualized sacrum is unremarkable.                                       Medications   LIDOcaine 5 % patch 1 patch (1 patch Transdermal Patch Applied 1/24/24 1925)   methocarbamoL tablet 500 mg (500 mg Oral Given 1/24/24 1925)     Medical Decision Making  48 y. o. female with a PMHx of A-Fib, blood clot associated with vein wall inflammation (not DVT), cardiomyopathy, CHF (last EF 25% in  "2022), type 2 DM, HLD, HTN, sciatica, chronic back pain, CKD (stage 4), who presents to the ED for a chief complaint of lower bilateral back pain onset few days ago. Patient reports associated Sx of upper and lower extremities cramping, fatigue, and urinary frequency. Further reports Hx of similar Sx and states her lower back pain is similar to her regular sciatic pain without radiating down to her legs this time. Patient also reports her last epidural injection (lumbar transforaminal right L5/S1 and S1) was on 01/12/2024 by pain management physicians at [facility] and states her shots usually last from 2 to 4 months. She attempted Tx with 1 robaxin at 1 PM today. Endorses being hydrated all day but feels "dehydrated" and feels like she needs "IV fluids". Further endorses being compliant with Eliquis twice a day. No other alleviating or exacerbating factors. Denies any saddle anesthesia, urinary or bowel retention/incontinence, chest pain, shortness for breath, nausea, vomiting, diarrhea, abdominal pain, headache, dizziness, weakness, dysuria, hematuria.    Patient's chart and medical history reviewed.  Patient's vitals reviewed.  They are afebrile, no respiratory distress, nontoxic-appearing in the ED.  On exam, as above.  When nurse went to collect blood for CBC and CMP, patient was refusing all care as she was upset due to the fact that she was not receiving IV fluids.  I sat down and discussed with the patient that her last echo showed an EF of 25% and that she can not receive IV fluids due to risks associated.  Also discussed with the patient that physical exam findings do not require administration of IV fluids as she was normal skin turgor and moist mucous membranes normal conjunctiva and no other evidence of acute dehydration on exam.  Discussed with the patient that oral administration of fluids is actually better than IV administration in his instance.  Patient was upset about this news but after " discussing with her further she begins to calmed down and agrees to have oral administration of fluids.  Patient was now agreeing to having blood work done and imaging completed of her lower back.  - MSK strain: considered with lower lumbar muscular tenderness with no spinal tenderness.   - Pyelonephritis: unlikely with no CVA tenderness or fever or urinary complaints   - Spinal Fx: unlikely with no recent trauma/injury. Non-mechanical.  X-ray without any acute osseous abnormalities  - Discitis/Spinal abscess/Osteomyelitis:  Considered with recent spinal injection however unlikely with no risk factors including current immunosuppression, hemodialysis, current or recent injection drug use, current or recent endocarditis or bacteremia. no spinal tenderness, no fever. no neuro deficits.  - Cauda Equina: unlikely with no saddle anesthesia, urinary retention, bowel incontinence   - pyelonephritis:  Considered with left CVA tenderness will obtain urinalysis for further evaluation  - urinary tract infection:  UA ordered for evaluation.  - JADA: no evidence of acute injury with findings consistent with past 2 months on CMP.   Patient's vital signs are stable, patient was afebrile, discussed with the patient that presenting with acute on chronic back pain that we may not be able to provide adequate relief of her symptoms of low back pain as well as limitation of pain medication provided due to taking anticoagulation medications.  Patient agrees with this statement does not have questions for me at this time.  Will discharge with lidocaine patches and tylenol    Instructed to follow up with her primary care provider in the next 2-3 days for re-evaluation.  Patient also instructed to follow up with her pain management provider for evaluation of non controlled pain after spinal injection.    I discussed with the patient/family the diagnosis, treatment plan, indications for return to the emergency department, and for expected  follow-up. The patient/family verbalized an understanding. The patient/family is asked if there are any questions or concerns. We discuss the case, until all issues are addressed to the patient/family's satisfaction. Patient/family understands and is agreeable to the plan.     DISCLAIMER: This note was prepared with [employer] voice recognition transcription software. Garbled syntax, mangled pronouns, and other bizarre constructions may be attributed to that software system.        Amount and/or Complexity of Data Reviewed  Labs: ordered. Decision-making details documented in ED Course.  Radiology: ordered. Decision-making details documented in ED Course.    Risk  Prescription drug management.  Diagnosis or treatment significantly limited by social determinants of health.            Scribe Attestation:   Scribe #1: I performed the above scribed service and the documentation accurately describes the services I performed. I attest to the accuracy of the note.    Scribe attestation: I, Stewart Joseph PA-C, personally performed the services described in this documentation. All medical record entries made by the scribe were at my direction and in my presence.  I have reviewed the chart and agree that the record reflects my personal performance and is accurate and complete.                             Clinical Impression:  Final diagnoses:  [S39.012A] Lumbar strain, initial encounter (Primary)  [M54.40, G89.29] Chronic low back pain with sciatica, sciatica laterality unspecified, unspecified back pain laterality          ED Disposition Condition    Discharge Stable          ED Prescriptions       Medication Sig Dispense Start Date End Date Auth. Provider    LIDOcaine (LIDODERM) 5 % Place 1 patch onto the skin once daily. Apply patch for 12 hours and then leave off for 12 hours 15 patch 1/24/2024 -- Stewart Joseph PA-C    acetaminophen (TYLENOL) 500 MG tablet Take 1 tablet (500 mg total) by mouth every 6 (six) hours as  needed for Pain. 30 tablet 1/24/2024 -- Stewart Joseph PA-C          Follow-up Information       Follow up With Specialties Details Why Contact Info    Gil Leal MD Family Medicine Schedule an appointment as soon as possible for a visit in 3 days for follow up [address] [place] [address]  [phone]      Your pain management provider  Schedule an appointment as soon as possible for a visit  for follow up              Stewart Joseph PA-C  01/24/24 2015

## 2024-02-16 ENCOUNTER — HOSPITAL ENCOUNTER (EMERGENCY)
Facility: HOSPITAL | Age: 49
Discharge: HOME | End: 2024-02-17
Attending: EMERGENCY MEDICINE
Payer: MEDICARE

## 2024-02-16 VITALS
TEMPERATURE: 98 F | OXYGEN SATURATION: 100 % | WEIGHT: 250 LBS | DIASTOLIC BLOOD PRESSURE: 60 MMHG | SYSTOLIC BLOOD PRESSURE: 125 MMHG | HEART RATE: 80 BPM | RESPIRATION RATE: 19 BRPM | BODY MASS INDEX: 39.24 KG/M2 | HEIGHT: 67 IN

## 2024-02-16 DIAGNOSIS — N75.1 BARTHOLIN'S GLAND ABSCESS: Primary | ICD-10-CM

## 2024-02-16 LAB — POCT GLUCOSE: 212 MG/DL (ref 70–110)

## 2024-02-16 PROCEDURE — 87210 SMEAR WET MOUNT SALINE/INK: CPT | Performed by: EMERGENCY MEDICINE

## 2024-02-16 PROCEDURE — 99284 EMERGENCY DEPT VISIT MOD MDM: CPT

## 2024-02-16 PROCEDURE — 82962 GLUCOSE BLOOD TEST: CPT

## 2024-02-16 PROCEDURE — 87491 CHLMYD TRACH DNA AMP PROBE: CPT | Performed by: EMERGENCY MEDICINE

## 2024-02-16 PROCEDURE — 25000003 PHARM REV CODE 250: Performed by: EMERGENCY MEDICINE

## 2024-02-16 RX ORDER — OXYCODONE AND ACETAMINOPHEN 5; 325 MG/1; MG/1
1 TABLET ORAL
Status: COMPLETED | OUTPATIENT
Start: 2024-02-16 | End: 2024-02-16

## 2024-02-16 RX ORDER — DIAZEPAM 5 MG/1
5 TABLET ORAL
Status: COMPLETED | OUTPATIENT
Start: 2024-02-16 | End: 2024-02-16

## 2024-02-16 RX ORDER — LIDOCAINE HYDROCHLORIDE 10 MG/ML
5 INJECTION INFILTRATION; PERINEURAL
Status: COMPLETED | OUTPATIENT
Start: 2024-02-16 | End: 2024-02-16

## 2024-02-16 RX ADMIN — DIAZEPAM 5 MG: 5 TABLET ORAL at 10:02

## 2024-02-16 RX ADMIN — OXYCODONE AND ACETAMINOPHEN 1 TABLET: 5; 325 TABLET ORAL at 10:02

## 2024-02-16 RX ADMIN — LIDOCAINE HYDROCHLORIDE 5 ML: 10 INJECTION, SOLUTION INFILTRATION; PERINEURAL at 10:02

## 2024-02-16 RX ADMIN — Medication: at 10:02

## 2024-02-16 NOTE — Clinical Note
"Fabiana Hodgepricilla" Yanique was seen and treated in our emergency department on 2/16/2024.  She may return to work on 02/19/2024.       If you have any questions or concerns, please don't hesitate to call.      Cristian Garcia MD"

## 2024-02-17 LAB
BACTERIA GENITAL QL WET PREP: ABNORMAL
CLUE CELLS VAG QL WET PREP: ABNORMAL
FILAMENT FUNGI VAG WET PREP-#/AREA: ABNORMAL
SPECIMEN SOURCE: ABNORMAL
T VAGINALIS GENITAL QL WET PREP: ABNORMAL
WBC #/AREA VAG WET PREP: ABNORMAL
YEAST GENITAL QL WET PREP: ABNORMAL

## 2024-02-17 PROCEDURE — 56420 I&D BARTHOLINS GLAND ABSCESS: CPT

## 2024-02-17 RX ORDER — AMOXICILLIN AND CLAVULANATE POTASSIUM 875; 125 MG/1; MG/1
1 TABLET, FILM COATED ORAL 2 TIMES DAILY
Qty: 14 TABLET | Refills: 0 | Status: SHIPPED | OUTPATIENT
Start: 2024-02-17 | End: 2024-02-24

## 2024-02-17 RX ORDER — IBUPROFEN 800 MG/1
800 TABLET ORAL EVERY 6 HOURS PRN
Qty: 40 TABLET | Refills: 0 | Status: SHIPPED | OUTPATIENT
Start: 2024-02-17 | End: 2024-03-13 | Stop reason: ALTCHOICE

## 2024-02-17 RX ORDER — SULFAMETHOXAZOLE AND TRIMETHOPRIM 800; 160 MG/1; MG/1
1 TABLET ORAL 2 TIMES DAILY
Qty: 14 TABLET | Refills: 0 | Status: SHIPPED | OUTPATIENT
Start: 2024-02-17 | End: 2024-02-24

## 2024-02-17 NOTE — ED PROVIDER NOTES
"Encounter Date: 2/16/2024       History     Chief Complaint   Patient presents with    Cyst     Pt presents to ED c/o cyst to vaginal area x 2 days.  Denies drainage, fever, chills, n/v or any other symptoms.  Denies taking medication. " I soaked in a hot tub of water."  Pain 8/10.     48-year-old female presenting with lesion on the right labia majora.  History of diabetes mellitus, hypertension.  Symptom onset yesterday.  Patient reports the areas tender to palpation.  Patient states that she attempted warm soaks with minimal improvement.  No analgesia prior to arrival.  Denies any discharge from the lesion.  No fevers/myalgias reported.      Review of patient's allergies indicates:   Allergen Reactions    Metformin      Diarrhea on metformin XR     Pneumococcal 23-abimbola ps vaccine      Past Medical History:   Diagnosis Date    Atrial fibrillation     Blood clot associated with vein wall inflammation     not dvt    Cardiomyopathy     Normal cors on cath 11/2017    CHF (congestive heart failure)     DM (diabetes mellitus) 9/19/2013    Hyperlipidemia     Hypertension     Psoriasis     Sleep apnea      Past Surgical History:   Procedure Laterality Date    CARDIAC CATHETERIZATION      COLONOSCOPY      COLONOSCOPY N/A 3/9/2022    Procedure: COLONOSCOPY;  Surgeon: Vielka Burrell MD;  Location: [facility];  Service: Endoscopy;  Laterality: N/A;    DILATION AND CURETTAGE OF UTERUS      EPIDURAL STEROID INJECTION N/A 8/11/2023    Procedure: INJECTION, STEROID, EPIDURAL, L5/S1 SOONER DATE    clear to hold Eliquis;  Surgeon: Jed Yeager MD;  Location: [facility] PAIN [facility]GT;  Service: Pain Management;  Laterality: N/A;    ESOPHAGOGASTRODUODENOSCOPY N/A 5/9/2019    Procedure: EGD (ESOPHAGOGASTRODUODENOSCOPY);  Surgeon: Vielka Burrell MD;  Location: [facility];  Service: Endoscopy;  Laterality: N/A;    TRANSFORAMINAL EPIDURAL INJECTION OF STEROID N/A 1/6/2022    Procedure: Transforaminal ANKITA L4/L5 L5/S1;  Surgeon: " Jed Yeager MD;  Location: [facility];  Service: Pain Management;  Laterality: N/A;    TRANSFORAMINAL EPIDURAL INJECTION OF STEROID Right 12/1/2022    Procedure: INJECTION, STEROID, EPIDURAL, TRANSFORAMINAL APPROACH, RIGHT L4/L5 & L5/S1 CONTRAST;  Surgeon: Jed Yeager MD;  Location: [facility];  Service: Pain Management;  Laterality: Right;    TRANSFORAMINAL EPIDURAL INJECTION OF STEROID Right 4/14/2023    Procedure: INJECTION, STEROID, EPIDURAL, TRANSFORAMINAL APPROACH RIGHT S1;  Surgeon: Jed Yeager MD;  Location: [facility];  Service: Pain Management;  Laterality: Right;  3/14 AUTH    TRANSFORAMINAL EPIDURAL INJECTION OF STEROID Right 1/12/2024    Procedure: LUMBAR TRANSFORAMINAL RIGHT L5/S1 AND S1 *ELIQUIS CLEARANCE IN CHART*;  Surgeon: Jed Yeager MD;  Location: [facility] PAIN [facility]GT;  Service: Pain Management;  Laterality: Right;  [phone]     Family History   Problem Relation Age of Onset    Hypertension Mother     Hypertension Father     Diabetes Father     Diabetes Maternal Grandmother     Diabetes Paternal Grandmother     Breast cancer Neg Hx     Colon cancer Neg Hx     Ovarian cancer Neg Hx      Social History     Tobacco Use    Smoking status: Never    Smokeless tobacco: Never    Tobacco comments:     smokes cigars on occasion   Substance Use Topics    Alcohol use: Not Currently     Comment: occasional    Drug use: Not Currently     Types: Marijuana     Comment: occ     Review of Systems   Constitutional:  Negative for chills and fever.   Respiratory:  Negative for shortness of breath.    Gastrointestinal:  Negative for nausea.   Genitourinary:  Negative for dysuria.        Vaginal lesion, vaginal pain   Musculoskeletal:  Negative for back pain.   Skin:  Negative for rash.       Physical Exam     Initial Vitals [02/16/24 2053]   BP Pulse Resp Temp SpO2   (!) 120/57 83 18 98.3 °F (36.8 °C) 99 %      MAP       --         Physical Exam    Nursing note and vitals  "reviewed.  Constitutional: She appears well-developed and well-nourished.  Non-toxic appearance. She does not appear ill. No distress.   Higher BMI.   HENT:   Head: Normocephalic and atraumatic.   Mouth/Throat: Oropharynx is clear and moist.   Eyes: Conjunctivae and EOM are normal.   Neck:   Normal range of motion.  Cardiovascular:  Regular rhythm and normal heart sounds.   Tachycardia present.         No murmur heard.  Pulmonary/Chest: Breath sounds normal. No respiratory distress. She has no wheezes.   Abdominal: Abdomen is soft. There is no abdominal tenderness.   Genitourinary:    Genitourinary Comments: Yeimy Flores RN    Right labia majora: 3x1cm indurated lesion.  No surrounding erythema.  No purulence.     Musculoskeletal:         General: No edema.      Cervical back: Normal range of motion.     Neurological: She is alert. GCS score is 15.   Skin: Skin is warm.   Psychiatric: She has a normal mood and affect.         ED Course   I & D - Incision and Drainage    Date/Time: 2/17/2024 12:04 AM  Location procedure was performed: [facility] EMERGENCY DEPARTMENT    Performed by: Cristian Garcia MD  Authorized by: Cristian Garcia MD  Consent Done: Yes  Consent: Verbal consent obtained. Written consent obtained.  Risks and benefits: risks, benefits and alternatives were discussed  Consent given by: patient  Patient understanding: patient states understanding of the procedure being performed  Patient consent: the patient's understanding of the procedure matches consent given  Procedure consent: procedure consent matches procedure scheduled  Relevant documents: relevant documents present and verified  Patient identity confirmed: verbally with patient  Time out: Immediately prior to procedure a "time out" was called to verify the correct patient, procedure, equipment, support staff and site/side marked as required.  Type: abscess  Body area: anogenital  Location details: Bartholin's gland  Anesthesia: local " infiltration    Anesthesia:  Local Anesthetic: lidocaine 1% without epinephrine  Anesthetic total: 2 mL    Patient sedated: yes  Sedation type: anxiolysis    Sedatives: diazepam  Analgesia: Norco.  Risk factor: underlying major vessel  Scalpel size: 11  Incision type: single straight  Incision depth: dermal  Complexity: simple  Drainage: pus and bloody  Drainage amount: scant  Wound treatment: incision  Complications: No  Patient tolerance: Patient tolerated the procedure well with no immediate complications    Incision depth: dermal        Labs Reviewed   POCT GLUCOSE - Abnormal; Notable for the following components:       Result Value    POCT Glucose 212 (*)     All other components within normal limits   C. TRACHOMATIS/N. GONORRHOEAE BY AMP DNA   VAGINAL SCREEN   POCT GLUCOSE MONITORING CONTINUOUS          Imaging Results    None          Medications   oxyCODONE-acetaminophen 5-325 mg per tablet 1 tablet (1 tablet Oral Given 2/16/24 2248)   diazePAM tablet 5 mg (5 mg Oral Given 2/16/24 2248)   LETS (LIDOcaine-TETRAcaine-EPINEPHrine) gel solution ( Topical (Top) Given 2/16/24 2259)   LIDOcaine HCL 10 mg/ml (1%) injection 5 mL (5 mLs Infiltration Given 2/16/24 2259)     Medical Decision Making    48-year-old female presenting with right vaginal Bartholin abscess.  Glucose 212.  Denies any fevers myalgias.  Discussed risks and benefits incision and drainage.  Patient would like to proceed with incision and drainage.  Scant amount of purulence obtained.  Started on Bactrim and Augmentin.  Discussed ibuprofen or Tylenol as needed for pain control.  The patient states she has a follow up with her gynecologist within 1 week.  Discussed return precautions for signs of wound infection.      Differential diagnosis includes Bartholin cyst, Bartholin abscess, cellulitis    Amount and/or Complexity of Data Reviewed  Labs: ordered.    Risk  Prescription drug management.                                      Clinical  Impression:  Final diagnoses:  [N75.1] Bartholin's gland abscess (Primary)          ED Disposition Condition    Discharge Stable          ED Prescriptions       Medication Sig Dispense Start Date End Date Auth. Provider    sulfamethoxazole-trimethoprim 800-160mg (BACTRIM DS) 800-160 mg Tab Take 1 tablet by mouth 2 (two) times daily. for 7 days 14 tablet 2/17/2024 2/24/2024 Cristian Garcia MD    amoxicillin-clavulanate 875-125mg (AUGMENTIN) 875-125 mg per tablet Take 1 tablet by mouth 2 (two) times daily. for 7 days 14 tablet 2/17/2024 2/24/2024 Cristian Garcia MD    ibuprofen (ADVIL,MOTRIN) 800 MG tablet Take 1 tablet (800 mg total) by mouth every 6 (six) hours as needed for Pain. 40 tablet 2/17/2024 -- Cristian Garcia MD          Follow-up Information       Follow up With Specialties Details Why Contact Info    Umair Smith MD Obstetrics and Gynecology Schedule an appointment as soon as possible for a visit in 1 week Gynecology [address]  [address]  [facility] [address]  [phone]      [facility] Emergency Dept Emergency Medicine  If symptoms worsen [address] [facility] [place] [address] [phone]             Cristian Garcia MD  02/17/24 0007     Previously Declined (within the last year)

## 2024-02-17 NOTE — ED TRIAGE NOTES
Pt presents to ED with complaint of a cyst to vaginal area x2 days. Pt denies drainage, fever, chills.

## 2024-02-17 NOTE — DISCHARGE INSTRUCTIONS
Please follow up with a gynecologist within 2-3 days for re-evaluation.    Monitor for any signs of reinfection such as drainage, fever, swelling, pain.  Recommend Tylenol or ibuprofen as needed for pain control.

## 2024-02-18 ENCOUNTER — TELEPHONE (OUTPATIENT)
Dept: EMERGENCY MEDICINE | Facility: HOSPITAL | Age: 49
End: 2024-02-18
Payer: MEDICARE

## 2024-02-18 DIAGNOSIS — N76.0 BACTERIAL VAGINOSIS: Primary | ICD-10-CM

## 2024-02-18 DIAGNOSIS — B96.89 BACTERIAL VAGINOSIS: Primary | ICD-10-CM

## 2024-02-18 LAB
C TRACH DNA SPEC QL NAA+PROBE: NOT DETECTED
N GONORRHOEA DNA SPEC QL NAA+PROBE: NOT DETECTED

## 2024-02-18 RX ORDER — METRONIDAZOLE 500 MG/1
500 TABLET ORAL 2 TIMES DAILY
Qty: 14 TABLET | Refills: 0 | Status: SHIPPED | OUTPATIENT
Start: 2024-02-18 | End: 2024-02-25

## 2024-02-18 NOTE — TELEPHONE ENCOUNTER
Patient tested positive for BV.  She called back after receiving a voicemail.  Flagyl called into pharmacy.  No further questions or concerns.  Follow up complete.

## 2024-02-20 ENCOUNTER — PATIENT OUTREACH (OUTPATIENT)
Dept: EMERGENCY MEDICINE | Facility: HOSPITAL | Age: 49
End: 2024-02-20
Payer: MEDICARE

## 2024-02-21 NOTE — PROGRESS NOTES
Patient was seen in the ED on 2/1623. Phoned patient to assist  with Post ED Discharge Navigation. Patient scheduled for follow-up appointment with OB/GYN on 2/28/24.  Mohinder Chahal

## 2024-02-28 ENCOUNTER — PATIENT MESSAGE (OUTPATIENT)
Dept: PAIN MEDICINE | Facility: CLINIC | Age: 49
End: 2024-02-28
Payer: MEDICARE

## 2024-02-28 LAB
HUMAN PAPILLOMAVIRUS (HPV): POSITIVE
PAP RECOMMENDATION EXT: NORMAL

## 2024-03-05 ENCOUNTER — CLINICAL SUPPORT (OUTPATIENT)
Dept: CARDIOLOGY | Facility: HOSPITAL | Age: 49
End: 2024-03-05
Attending: INTERNAL MEDICINE
Payer: MEDICARE

## 2024-03-05 ENCOUNTER — CLINICAL SUPPORT (OUTPATIENT)
Dept: CARDIOLOGY | Facility: HOSPITAL | Age: 49
End: 2024-03-05
Payer: MEDICARE

## 2024-03-05 DIAGNOSIS — M79.18 MYOFASCIAL PAIN: ICD-10-CM

## 2024-03-05 DIAGNOSIS — Z95.810 PRESENCE OF AUTOMATIC (IMPLANTABLE) CARDIAC DEFIBRILLATOR: ICD-10-CM

## 2024-03-05 DIAGNOSIS — I42.9 CARDIOMYOPATHY, UNSPECIFIED: ICD-10-CM

## 2024-03-05 PROCEDURE — 93295 DEV INTERROG REMOTE 1/2/MLT: CPT | Mod: ,,, | Performed by: INTERNAL MEDICINE

## 2024-03-05 PROCEDURE — 93296 REM INTERROG EVL PM/IDS: CPT | Performed by: INTERNAL MEDICINE

## 2024-03-05 RX ORDER — METHOCARBAMOL 750 MG/1
750 TABLET, FILM COATED ORAL 2 TIMES DAILY PRN
Qty: 60 TABLET | Refills: 2 | Status: SHIPPED | OUTPATIENT
Start: 2024-03-05

## 2024-03-05 RX ORDER — OXYCODONE AND ACETAMINOPHEN 5; 325 MG/1; MG/1
1 TABLET ORAL
Qty: 30 TABLET | Refills: 0 | Status: SHIPPED | OUTPATIENT
Start: 2024-03-05 | End: 2024-04-15 | Stop reason: ALTCHOICE

## 2024-03-05 NOTE — TELEPHONE ENCOUNTER
Patient requesting refill on oxycodone  Last office visit 12-20-24   shows last refill on 12-20-23  Patient does not have a pain contract on file with [facility] department  Patient last UDS 7-6-23 was consistent with current therapy    CODEINE  Not Detected         MORPHINE  Not Detected         6-ACETYLMORPHINE  Not Detected         OXYCODONE  Present         NOROYXCODONE  Present         OXYMORPHONE  Present         NOROXYMORPHONE  Not Detected         HYDROCODONE  Not Detected         NORHYDROCODONE  Not Detected         HYDROMORPHONE  Not Detected         BUPRENORPHINE  Not Detected         NORUBPRENORPHINE  Not Detected         FENTANYL  Not Detected         NORFENTANYL  Not Detected         MEPERIDINE METABOLITE  Not Detected         TAPENTADOL  Not Detected         TAPENTADOL-O-SULF  Not Detected         METHADONE  Not Detected         TRAMADOL  Not Detected         AMPHETAMINE  Not Detected         METHAMPHETAMINE  Not Detected         MDMA- ECSTASY  Not Detected         MDA  Not Detected         MDEA- Irma  Not Detected         METHYLPHENIDATE  Not Detected         PHENTERMINE  Not Detected         BENZOYLECGONINE  Not Detected         ALPRAZOLAM  Not Detected         ALPHA-OH-ALPRAZOLAM  Not Detected         CLONAZEPAM  Not Detected         7-AMINOCLONAZEPAM  Not Detected         DIAZEPAM  Not Detected         NORDIAZEPAM  Not Detected         OXAZEPAM  Not Detected         TEMAZEPAM  Not Detected         Lorazepam  Not Detected         MIDAZOLAM  Not Detected         ZOLPIDEM  Not Detected         BARBITURATES  Not Detected         Creatinine, Urine 20.0 - 400.0 mg/dL 141.4 183.2 R 121.5 R 25.0 R 31.0 R, CM 76.0 R, CM 26.8 R, CM   ETHYL GLUCURONIDE  Not Detected         MARIJUANA METABOLITE  Not Detected         Comment: INTERPRETIVE INFORMATION: Marijuana Metabolite  The cutoff for Marijuana Metabolite (immunoassay) was  changed from 20 ng/mL to 50 ng/mL,  effective May 15, 2023.    PCP  Not Detected         CARISOPRODOL  Not Detected         Comment: The carisoprodol immunoassay has cross-reactivity to  carisoprodol and meprobamate.   Naloxone  Not Detected         Gabapentin  Present         Pregabalin  Not Detected         Alpha-OH-Midazolam  Not Detected         Zolpidem Metabolite  Not Detected         PAIN MANAGEMENT DRUG PANEL  See Below

## 2024-03-07 ENCOUNTER — PATIENT MESSAGE (OUTPATIENT)
Dept: PAIN MEDICINE | Facility: CLINIC | Age: 49
End: 2024-03-07
Payer: MEDICARE

## 2024-03-12 ENCOUNTER — TELEPHONE (OUTPATIENT)
Dept: FAMILY MEDICINE | Facility: CLINIC | Age: 49
End: 2024-03-12
Payer: MEDICARE

## 2024-03-13 ENCOUNTER — LAB VISIT (OUTPATIENT)
Dept: LAB | Facility: HOSPITAL | Age: 49
End: 2024-03-13
Attending: INTERNAL MEDICINE
Payer: MEDICARE

## 2024-03-13 ENCOUNTER — OFFICE VISIT (OUTPATIENT)
Dept: FAMILY MEDICINE | Facility: CLINIC | Age: 49
End: 2024-03-13
Payer: MEDICARE

## 2024-03-13 ENCOUNTER — TELEPHONE (OUTPATIENT)
Dept: SPINE | Facility: CLINIC | Age: 49
End: 2024-03-13
Payer: MEDICARE

## 2024-03-13 VITALS
DIASTOLIC BLOOD PRESSURE: 68 MMHG | SYSTOLIC BLOOD PRESSURE: 98 MMHG | HEART RATE: 77 BPM | WEIGHT: 260.56 LBS | BODY MASS INDEX: 40.81 KG/M2 | TEMPERATURE: 98 F | OXYGEN SATURATION: 98 %

## 2024-03-13 DIAGNOSIS — Z12.31 BREAST CANCER SCREENING BY MAMMOGRAM: ICD-10-CM

## 2024-03-13 DIAGNOSIS — N18.4 CKD STAGE 4 DUE TO TYPE 2 DIABETES MELLITUS: ICD-10-CM

## 2024-03-13 DIAGNOSIS — M79.601 RIGHT ARM PAIN: Primary | ICD-10-CM

## 2024-03-13 DIAGNOSIS — N18.4 TYPE 2 DIABETES MELLITUS WITH STAGE 4 CHRONIC KIDNEY DISEASE, WITH LONG-TERM CURRENT USE OF INSULIN: ICD-10-CM

## 2024-03-13 DIAGNOSIS — E11.22 TYPE 2 DIABETES MELLITUS WITH STAGE 4 CHRONIC KIDNEY DISEASE, WITH LONG-TERM CURRENT USE OF INSULIN: ICD-10-CM

## 2024-03-13 DIAGNOSIS — Z79.4 TYPE 2 DIABETES MELLITUS WITH STAGE 4 CHRONIC KIDNEY DISEASE, WITH LONG-TERM CURRENT USE OF INSULIN: ICD-10-CM

## 2024-03-13 DIAGNOSIS — E66.01 CLASS 3 SEVERE OBESITY DUE TO EXCESS CALORIES WITH SERIOUS COMORBIDITY AND BODY MASS INDEX (BMI) OF 40.0 TO 44.9 IN ADULT: ICD-10-CM

## 2024-03-13 DIAGNOSIS — M54.16 LUMBAR RADICULOPATHY: ICD-10-CM

## 2024-03-13 DIAGNOSIS — E11.22 CKD STAGE 4 DUE TO TYPE 2 DIABETES MELLITUS: ICD-10-CM

## 2024-03-13 PROBLEM — I47.20 VENTRICULAR TACHYCARDIA: Status: RESOLVED | Noted: 2022-08-06 | Resolved: 2024-03-13

## 2024-03-13 PROBLEM — U07.1 PNEUMONIA DUE TO COVID-19 VIRUS: Status: RESOLVED | Noted: 2021-04-28 | Resolved: 2024-03-13

## 2024-03-13 PROBLEM — R79.89 ELEVATED TROPONIN: Status: RESOLVED | Noted: 2020-05-17 | Resolved: 2024-03-13

## 2024-03-13 PROBLEM — J12.82 PNEUMONIA DUE TO COVID-19 VIRUS: Status: RESOLVED | Noted: 2021-04-28 | Resolved: 2024-03-13

## 2024-03-13 PROBLEM — J96.00 ACUTE RESPIRATORY FAILURE DUE TO COVID-19: Status: RESOLVED | Noted: 2021-04-29 | Resolved: 2024-03-13

## 2024-03-13 PROBLEM — U07.1 ACUTE RESPIRATORY FAILURE DUE TO COVID-19: Status: RESOLVED | Noted: 2021-04-29 | Resolved: 2024-03-13

## 2024-03-13 LAB
CHOLEST SERPL-MCNC: 162 MG/DL (ref 120–199)
CHOLEST/HDLC SERPL: 3.7 {RATIO} (ref 2–5)
ESTIMATED AVG GLUCOSE: 226 MG/DL (ref 68–131)
HBA1C MFR BLD: 9.5 % (ref 4–5.6)
HDLC SERPL-MCNC: 44 MG/DL (ref 40–75)
HDLC SERPL: 27.2 % (ref 20–50)
LDLC SERPL CALC-MCNC: 99.8 MG/DL (ref 63–159)
NONHDLC SERPL-MCNC: 118 MG/DL
OHS CV AF BURDEN PERCENT: < 1
OHS CV DC REMOTE DEVICE TYPE: NORMAL
OHS CV RV PACING PERCENT: 1 %
TRIGL SERPL-MCNC: 91 MG/DL (ref 30–150)

## 2024-03-13 PROCEDURE — 99999 PR PBB SHADOW E&M-EST. PATIENT-LVL V: CPT | Mod: PBBFAC,,, | Performed by: INTERNAL MEDICINE

## 2024-03-13 PROCEDURE — 99214 OFFICE O/P EST MOD 30 MIN: CPT | Mod: S$GLB,,, | Performed by: INTERNAL MEDICINE

## 2024-03-13 PROCEDURE — 36415 COLL VENOUS BLD VENIPUNCTURE: CPT | Performed by: INTERNAL MEDICINE

## 2024-03-13 PROCEDURE — 83036 HEMOGLOBIN GLYCOSYLATED A1C: CPT | Performed by: INTERNAL MEDICINE

## 2024-03-13 PROCEDURE — 80061 LIPID PANEL: CPT | Performed by: INTERNAL MEDICINE

## 2024-03-13 RX ORDER — FLASH GLUCOSE SCANNING READER
1 EACH MISCELLANEOUS
Qty: 1 EACH | Refills: 2 | Status: SHIPPED | OUTPATIENT
Start: 2024-03-13

## 2024-03-13 RX ORDER — DULAGLUTIDE 0.75 MG/.5ML
0.75 INJECTION, SOLUTION SUBCUTANEOUS
Qty: 4 PEN | Refills: 0 | Status: SHIPPED | OUTPATIENT
Start: 2024-03-13 | End: 2024-04-15 | Stop reason: SINTOL

## 2024-03-13 RX ORDER — LIDOCAINE 50 MG/G
1 PATCH TOPICAL DAILY
Qty: 15 PATCH | Refills: 0 | Status: SHIPPED | OUTPATIENT
Start: 2024-03-13

## 2024-03-13 RX ORDER — FLASH GLUCOSE SENSOR
1 KIT MISCELLANEOUS
Qty: 1 KIT | Refills: 2 | Status: SHIPPED | OUTPATIENT
Start: 2024-03-13

## 2024-03-13 RX ORDER — DEXTROMETHORPHAN HYDROBROMIDE, GUAIFENESIN 5; 100 MG/5ML; MG/5ML
650 LIQUID ORAL EVERY 8 HOURS PRN
Qty: 90 TABLET | Refills: 0 | Status: SHIPPED | OUTPATIENT
Start: 2024-03-13 | End: 2024-04-15

## 2024-03-13 NOTE — PROGRESS NOTES
Chief Complaint: Arm Pain (Right upper arm pain, pt states upper arm gets tight and it is hard for patient to fall asleep at night)      Fabiana Chanel  is a 48 y.o. year old patient who presents today for right arm pain     Reports muscle spasm, taking robaxin slowly improving        Past Surgical History:   Procedure Laterality Date    CARDIAC CATHETERIZATION      COLONOSCOPY      COLONOSCOPY N/A 3/9/2022    Procedure: COLONOSCOPY;  Surgeon: Vielka Burrell MD;  Location: [facility] ENDO;  Service: Endoscopy;  Laterality: N/A;    DILATION AND CURETTAGE OF UTERUS      EPIDURAL STEROID INJECTION N/A 8/11/2023    Procedure: INJECTION, STEROID, EPIDURAL, L5/S1 SOONER DATE    clear to hold Eliquis;  Surgeon: Jed Yeager MD;  Location: [facility] PAIN [facility]GT;  Service: Pain Management;  Laterality: N/A;    ESOPHAGOGASTRODUODENOSCOPY N/A 5/9/2019    Procedure: EGD (ESOPHAGOGASTRODUODENOSCOPY);  Surgeon: Vielka Burrell MD;  Location: [facility] ENDO;  Service: Endoscopy;  Laterality: N/A;    TRANSFORAMINAL EPIDURAL INJECTION OF STEROID N/A 1/6/2022    Procedure: Transforaminal ANKITA L4/L5 L5/S1;  Surgeon: Jed Yeager MD;  Location: [facility] PAIN [facility]GT;  Service: Pain Management;  Laterality: N/A;    TRANSFORAMINAL EPIDURAL INJECTION OF STEROID Right 12/1/2022    Procedure: INJECTION, STEROID, EPIDURAL, TRANSFORAMINAL APPROACH, RIGHT L4/L5 & L5/S1 CONTRAST;  Surgeon: Jed Yeager MD;  Location: [facility] PAIN [facility]GT;  Service: Pain Management;  Laterality: Right;    TRANSFORAMINAL EPIDURAL INJECTION OF STEROID Right 4/14/2023    Procedure: INJECTION, STEROID, EPIDURAL, TRANSFORAMINAL APPROACH RIGHT S1;  Surgeon: Jed Yeager MD;  Location: [facility]IN [facility]GT;  Service: Pain Management;  Laterality: Right;  3/14 AUTH    TRANSFORAMINAL EPIDURAL INJECTION OF STEROID Right 1/12/2024    Procedure: LUMBAR TRANSFORAMINAL RIGHT L5/S1 AND S1 *ELIQUIS CLEARANCE IN CHART*;  Surgeon: Jed Yeager MD;  Location: [facility]IN [facility]GT;   Service: Pain Management;  Laterality: Right;  [phone]        Family History   Problem Relation Age of Onset    Hypertension Mother     Hypertension Father     Diabetes Father     Diabetes Maternal Grandmother     Diabetes Paternal Grandmother     Breast cancer Neg Hx     Colon cancer Neg Hx     Ovarian cancer Neg Hx         Social History     Socioeconomic History    Marital status:    Tobacco Use    Smoking status: Never    Smokeless tobacco: Never    Tobacco comments:     smokes cigars on occasion   Substance and Sexual Activity    Alcohol use: Not Currently     Comment: occasional    Drug use: Not Currently     Types: Marijuana     Comment: occ    Sexual activity: Not Currently     Partners: Male     Social Determinants of Health     Stress: Stress Concern Present (7/9/2019)    [facility] [place] [facility] [employer] - Occupational Stress Questionnaire     Feeling of Stress : Rather much         Current Outpatient Medications:     albuterol (VENTOLIN HFA) 90 mcg/actuation inhaler, Inhale 2 puffs into the lungs every 6 (six) hours as needed for Wheezing or Shortness of Breath. Rescue, Disp: 18 g, Rfl: 2    apixaban (ELIQUIS) 5 mg Tab, Take 1 tablet by mouth twice daily, Disp: 180 tablet, Rfl: 3    BLOOD PRESSURE CUFF Misc, 1 kit by Misc.(Non-Drug; Combo Route) route 2 (two) times daily., Disp: 1 each, Rfl: 0    fluticasone propionate (FLONASE) 50 mcg/actuation nasal spray, 1 spray (50 mcg total) by Each Nostril route 2 (two) times daily as needed for Rhinitis., Disp: 15 g, Rfl: 0    furosemide (LASIX) 40 MG tablet, Take 1 tablet (40 mg total) by mouth once daily., Disp: 90 tablet, Rfl: 3    gabapentin (NEURONTIN) 400 MG capsule, Take 1 capsule (400 mg total) by mouth 3 (three) times daily. TAKE 1 CAPSULE BY MOUTH THREE TIMES DAILY AS NEEDED FOR PAIN, Disp: 90 capsule, Rfl: 3    insulin degludec (TRESIBA FLEXTOUCH U-200) 200 unit/mL (3 mL) insulin pen, INJECT 64 UNITS SUBCUTANEOUSLY TWICE DAILY, Disp:  "9 pen , Rfl: 3    insulin lispro 100 unit/mL pen, INJECT 46 UNITS SUBCUTANEOUSLY THREE TIMES DAILY BEFORE MEAL(S), Disp: 45 mL, Rfl: 5    insulin NPH isoph U-100 human (HUMULIN N NPH INSULIN KWIKPEN) 100 unit/mL (3 mL) InPn, Inject 24 units nightly at 10 pm, Disp: 30 mL, Rfl: 2    methocarbamoL (ROBAXIN) 750 MG Tab, Take 1 tablet (750 mg total) by mouth 2 (two) times daily as needed (muscle pain)., Disp: 60 tablet, Rfl: 2    metoprolol succinate (TOPROL-XL) 50 MG 24 hr tablet, Take 1 tablet (50 mg total) by mouth once daily., Disp: 90 tablet, Rfl: 3    mupirocin (BACTROBAN) 2 % ointment, Apply topically nightly., Disp: , Rfl:     ondansetron (ZOFRAN) 4 MG tablet, Take 1 tablet (4 mg total) by mouth every 8 (eight) hours as needed (Nausea and vomiting)., Disp: 12 tablet, Rfl: 1    oxyCODONE-acetaminophen (PERCOCET) 5-325 mg per tablet, Take 1 tablet by mouth every 24 hours as needed (severe pain)., Disp: 30 tablet, Rfl: 0    pen needle, diabetic (BD LORI 2ND GEN PEN NEEDLE) 32 gauge x 5/32" Ndle, USE 1 PEN NEEDLE 4 TIMES DAILY, Disp: 400 each, Rfl: 3    rosuvastatin (CRESTOR) 40 MG Tab, Take 1 tablet (40 mg total) by mouth every evening., Disp: 90 tablet, Rfl: 3    sacubitriL-valsartan (ENTRESTO) 24-26 mg per tablet, Take 1 tablet by mouth 2 (two) times daily., Disp: 180 tablet, Rfl: 3    spironolactone (ALDACTONE) 25 MG tablet, Take 1 tablet (25 mg total) by mouth once daily. Hold until eating and drinking improves. Need to weight self daily, Disp: 90 tablet, Rfl: 3    acetaminophen (TYLENOL) 650 MG TbSR, Take 1 tablet (650 mg total) by mouth every 8 (eight) hours as needed (pain)., Disp: 90 tablet, Rfl: 0    blood glucose control, high Soln, 1 each by Misc.(Non-Drug; Combo Route) route once. for 1 dose, Disp: 1 each, Rfl: 3    blood glucose control, low Soln, 1 each by Misc.(Non-Drug; Combo Route) route once. for 1 dose, Disp: 1 each, Rfl: 3    blood sugar diagnostic Strp, Check blood glucose 3x/day., Disp: 300 " strip, Rfl: 3    blood-glucose meter (TRUE METRIX AIR GLUCOSE METER) Misc, 1 each by Misc.(Non-Drug; Combo Route) route 3 (three) times daily., Disp: 1 each, Rfl: 0    dulaglutide (TRULICITY) 0.75 mg/0.5 mL pen injector, Inject 0.75 mg into the skin every 7 days., Disp: 4 pen , Rfl: 0    flash glucose scanning reader (FREESTYLE JEFFREY 2 READER) Misc, 1 each by Misc.(Non-Drug; Combo Route) route every 30 days., Disp: 1 each, Rfl: 2    flash glucose sensor (FREESTYLE JEFFREY 2 SENSOR) Kit, 1 each by Misc.(Non-Drug; Combo Route) route every 7 days., Disp: 1 kit, Rfl: 2    LIDOcaine (LIDODERM) 5 %, Place 1 patch onto the skin once daily. Apply patch for 12 hours and then leave off for 12 hours, Disp: 15 patch, Rfl: 0    triamcinolone acetonide 0.1% (KENALOG) 0.1 % cream, 2 (two) times daily. Apply to affected area, Disp: , Rfl: 4     Review of Systems   Musculoskeletal:  Positive for back pain and myalgias.        Objective:      Vitals:    03/13/24 0841   BP: 98/68   Pulse: 77   Temp: 97.9 °F (36.6 °C)       Physical Exam  Constitutional:       Appearance: Normal appearance. She is obese.   HENT:      Head: Normocephalic and atraumatic.   Musculoskeletal:         General: Normal range of motion.   Skin:     General: Skin is warm and dry.   Neurological:      General: No focal deficit present.      Mental Status: She is alert and oriented to person, place, and time.   Psychiatric:         Mood and Affect: Mood normal.         Behavior: Behavior normal.          Assessment:       1. Acute right arm pain    2. Breast cancer screening by mammogram    3. Lumbar radiculopathy    4. Type 2 diabetes mellitus with stage 4 chronic kidney disease, with long-term current use of insulin    5. CKD stage 4 due to type 2 diabetes mellitus    6. Class 3 severe obesity due to excess calories with serious comorbidity and body mass index (BMI) of 40.0 to 44.9 in adult          Plan:   1. Acute right arm pain  Assessment & Plan:  Improving      - continue robaxin, heating pad, lidocaine patch and tylenol PRN    Orders:  -     LIDOcaine (LIDODERM) 5 %; Place 1 patch onto the skin once daily. Apply patch for 12 hours and then leave off for 12 hours  Dispense: 15 patch; Refill: 0  -     acetaminophen (TYLENOL) 650 MG TbSR; Take 1 tablet (650 mg total) by mouth every 8 (eight) hours as needed (pain).  Dispense: 90 tablet; Refill: 0    2. Breast cancer screening by mammogram  -     Mammo Digital Screening Bilat w/ Fede; Future; Expected date: 03/13/2024    3. Lumbar radiculopathy  Assessment & Plan:  Follows with spine specialist and pain mgmt   Has been getting back injections   Was denied the surgery due to medical issues   Counseled patient about the risk of long term use of opioids    Orders:  -     acetaminophen (TYLENOL) 650 MG TbSR; Take 1 tablet (650 mg total) by mouth every 8 (eight) hours as needed (pain).  Dispense: 90 tablet; Refill: 0    4. Type 2 diabetes mellitus with stage 4 chronic kidney disease, with long-term current use of insulin  Assessment & Plan:  Chronic, uncontrolled. Patient's last A1c was   Lab Results   Component Value Date    HGBA1C 11.6 (H) 04/06/2023    .     -  recommended a diet with reduced processed carbs like rice and white bread, reduce sugar/dessert intake and encouraged weight loss.   - counseled to measure blood glucose preferably three times a day, if not at least fasting BG in the morning   - ordered freestyle jeffrey  - has appt with endo in June   - continue basal bolus insulin   - start trulicity  - follow up one months       Orders:  -     blood sugar diagnostic Strp; Check blood glucose 3x/day.  Dispense: 300 strip; Refill: 3  -     flash glucose scanning reader (FREESTYLE JEFFREY 2 READER) Misc; 1 each by Misc.(Non-Drug; Combo Route) route every 30 days.  Dispense: 1 each; Refill: 2  -     flash glucose sensor (FREESTYLE JEFFREY 2 SENSOR) Kit; 1 each by Misc.(Non-Drug; Combo Route) route every 7 days.  Dispense: 1  kit; Refill: 2  -     Lipid Panel; Future; Expected date: 03/13/2024  -     HEMOGLOBIN A1C; Future; Expected date: 03/13/2024  -     dulaglutide (TRULICITY) 0.75 mg/0.5 mL pen injector; Inject 0.75 mg into the skin every 7 days.  Dispense: 4 pen ; Refill: 0  -     Ambulatory referral/consult to Nephrology; Future; Expected date: 03/20/2024    5. CKD stage 4 due to type 2 diabetes mellitus  Assessment & Plan:  Reviewed labs with patient, has been worsening since 2019  Referral to nephrology   Strict mgmt of DM      6. Class 3 severe obesity due to excess calories with serious comorbidity and body mass index (BMI) of 40.0 to 44.9 in adult  Assessment & Plan:  - counseled of the importance of diet and exercise. Talked about calorie deficit and intermittent fasting. Cutting off carbs. Recommended exercise at least twice a week, with increased cardio and weight lifting.   - will start trulicity. Was on this in the past             Follow up in about 1 month (around 4/13/2024) for f/up DM.

## 2024-03-13 NOTE — ASSESSMENT & PLAN NOTE
- counseled of the importance of diet and exercise. Talked about calorie deficit and intermittent fasting. Cutting off carbs. Recommended exercise at least twice a week, with increased cardio and weight lifting.   - will start trulicity. Was on this in the past

## 2024-03-13 NOTE — ASSESSMENT & PLAN NOTE
Reviewed labs with patient, has been worsening since 2019  Referral to nephrology   Strict mgmt of DM

## 2024-03-13 NOTE — ASSESSMENT & PLAN NOTE
Follows with spine specialist and pain mgmt   Has been getting back injections   Was denied the surgery due to medical issues   Counseled patient about the risk of long term use of opioids

## 2024-03-13 NOTE — PROGRESS NOTES
Health Maintenance Due   Topic     COVID-19 Vaccine (1) Not offered at this office    Pneumococcal Vaccines (Age 0-64) (1 of 2 - PCV) Pt allergic    Sign Pain Contract  Consult pcp    Complete Opioid Risk Tool  Consult pcp    Diabetes Urine Screening  Consult pcp    Foot Exam  Consult pcp    Eye Exam  Consult pcp    Lipid Panel  Consult pcp    Hemoglobin A1c  Consult pcp    Influenza Vaccine (1) Pt decline    Cervical Cancer Screening  Consult pcp    Mammogram  Consult pcp

## 2024-03-13 NOTE — ASSESSMENT & PLAN NOTE
Chronic, uncontrolled. Patient's last A1c was   Lab Results   Component Value Date    HGBA1C 11.6 (H) 04/06/2023    .     -  recommended a diet with reduced processed carbs like rice and white bread, reduce sugar/dessert intake and encouraged weight loss.   - counseled to measure blood glucose preferably three times a day, if not at least fasting BG in the morning   - ordered freestyle geovanni  - has appt with endo in June   - continue basal bolus insulin   - start trulicity  - follow up one months

## 2024-03-13 NOTE — TELEPHONE ENCOUNTER
Patient wanted to reschedule appt on 03/14/24 with MD Chalino due to patient having car trouble. Patient appt was rescheduled to 04/09/24 for 10:45 am.

## 2024-03-14 RX ORDER — INSULIN DEGLUDEC 200 U/ML
INJECTION, SOLUTION SUBCUTANEOUS
Qty: 6 PEN | Refills: 3 | Status: SHIPPED | OUTPATIENT
Start: 2024-03-14 | End: 2024-04-15 | Stop reason: SDUPTHER

## 2024-03-15 ENCOUNTER — HOSPITAL ENCOUNTER (OUTPATIENT)
Dept: RADIOLOGY | Facility: HOSPITAL | Age: 49
Discharge: HOME | End: 2024-03-15
Attending: INTERNAL MEDICINE
Payer: MEDICARE

## 2024-03-15 DIAGNOSIS — Z12.31 BREAST CANCER SCREENING BY MAMMOGRAM: ICD-10-CM

## 2024-03-20 ENCOUNTER — HOSPITAL ENCOUNTER (EMERGENCY)
Facility: HOSPITAL | Age: 49
Discharge: HOME | End: 2024-03-20
Attending: EMERGENCY MEDICINE
Payer: MEDICARE

## 2024-03-20 VITALS
BODY MASS INDEX: 39.24 KG/M2 | OXYGEN SATURATION: 97 % | HEIGHT: 67 IN | RESPIRATION RATE: 18 BRPM | DIASTOLIC BLOOD PRESSURE: 86 MMHG | SYSTOLIC BLOOD PRESSURE: 120 MMHG | TEMPERATURE: 99 F | HEART RATE: 80 BPM | WEIGHT: 250 LBS

## 2024-03-20 DIAGNOSIS — R52 GENERALIZED BODY ACHES: ICD-10-CM

## 2024-03-20 DIAGNOSIS — J06.9 VIRAL URI WITH COUGH: Primary | ICD-10-CM

## 2024-03-20 DIAGNOSIS — R09.81 NASAL CONGESTION: ICD-10-CM

## 2024-03-20 DIAGNOSIS — R05.9 COUGH: ICD-10-CM

## 2024-03-20 LAB
B-HCG UR QL: NEGATIVE
BACTERIA #/AREA URNS HPF: NORMAL /HPF
BILIRUB UR QL STRIP: NEGATIVE
CLARITY UR: CLEAR
COLOR UR: YELLOW
CTP QC/QA: YES
GLUCOSE UR QL STRIP: NEGATIVE
HGB UR QL STRIP: ABNORMAL
HYALINE CASTS #/AREA URNS LPF: 0 /LPF
KETONES UR QL STRIP: NEGATIVE
LEUKOCYTE ESTERASE UR QL STRIP: NEGATIVE
MICROSCOPIC COMMENT: NORMAL
MOLECULAR STREP A: NEGATIVE
NITRITE UR QL STRIP: NEGATIVE
PH UR STRIP: 6 [PH] (ref 5–8)
POC MOLECULAR INFLUENZA A AGN: NEGATIVE
POC MOLECULAR INFLUENZA B AGN: NEGATIVE
PROT UR QL STRIP: ABNORMAL
RBC #/AREA URNS HPF: 0 /HPF (ref 0–4)
SARS-COV-2 RDRP RESP QL NAA+PROBE: NEGATIVE
SP GR UR STRIP: 1.01 (ref 1–1.03)
SQUAMOUS #/AREA URNS HPF: 7 /HPF
URN SPEC COLLECT METH UR: ABNORMAL
UROBILINOGEN UR STRIP-ACNC: NEGATIVE EU/DL
WBC #/AREA URNS HPF: 0 /HPF (ref 0–5)

## 2024-03-20 PROCEDURE — 96372 THER/PROPH/DIAG INJ SC/IM: CPT

## 2024-03-20 PROCEDURE — 63600175 PHARM REV CODE 636 W HCPCS: Mod: JZ,JG

## 2024-03-20 PROCEDURE — 87651 STREP A DNA AMP PROBE: CPT

## 2024-03-20 PROCEDURE — 87635 SARS-COV-2 COVID-19 AMP PRB: CPT

## 2024-03-20 PROCEDURE — 81025 URINE PREGNANCY TEST: CPT

## 2024-03-20 PROCEDURE — 81000 URINALYSIS NONAUTO W/SCOPE: CPT

## 2024-03-20 PROCEDURE — 87502 INFLUENZA DNA AMP PROBE: CPT

## 2024-03-20 PROCEDURE — 99284 EMERGENCY DEPT VISIT MOD MDM: CPT | Mod: 25

## 2024-03-20 RX ORDER — PROMETHAZINE HYDROCHLORIDE AND DEXTROMETHORPHAN HYDROBROMIDE 6.25; 15 MG/5ML; MG/5ML
5 SYRUP ORAL EVERY 4 HOURS PRN
Qty: 180 ML | Refills: 0 | Status: SHIPPED | OUTPATIENT
Start: 2024-03-20 | End: 2024-04-15 | Stop reason: ALTCHOICE

## 2024-03-20 RX ORDER — CETIRIZINE HYDROCHLORIDE 10 MG/1
10 TABLET ORAL DAILY PRN
Qty: 30 TABLET | Refills: 0 | Status: SHIPPED | OUTPATIENT
Start: 2024-03-20 | End: 2024-04-15

## 2024-03-20 RX ORDER — MORPHINE SULFATE 4 MG/ML
6 INJECTION, SOLUTION INTRAMUSCULAR; INTRAVENOUS
Status: COMPLETED | OUTPATIENT
Start: 2024-03-20 | End: 2024-03-20

## 2024-03-20 RX ORDER — ACETAMINOPHEN 500 MG
500 TABLET ORAL EVERY 6 HOURS PRN
Qty: 30 TABLET | Refills: 0 | Status: SHIPPED | OUTPATIENT
Start: 2024-03-20 | End: 2024-04-15

## 2024-03-20 RX ORDER — BENZONATATE 100 MG/1
100 CAPSULE ORAL 3 TIMES DAILY PRN
Qty: 30 CAPSULE | Refills: 0 | Status: SHIPPED | OUTPATIENT
Start: 2024-03-20 | End: 2024-04-15 | Stop reason: ALTCHOICE

## 2024-03-20 RX ORDER — FLUTICASONE PROPIONATE 50 MCG
1 SPRAY, SUSPENSION (ML) NASAL 2 TIMES DAILY PRN
Qty: 15 G | Refills: 0 | Status: SHIPPED | OUTPATIENT
Start: 2024-03-20

## 2024-03-20 RX ADMIN — MORPHINE SULFATE 6 MG: 4 INJECTION, SOLUTION INTRAMUSCULAR; INTRAVENOUS at 06:03

## 2024-03-20 NOTE — ED PROVIDER NOTES
Encounter Date: 3/20/2024       History     Chief Complaint   Patient presents with    Generalized Body Aches     Pt c/o of generalized body aches x3 days. Pt denied NVD.     48-year-old female with a past medical history CHF, AFib, hypertension, hyperlipidemia, and diabetes presents to ED for body patient states started on Sunday.  No medications prior to arrival.  No known sick contacts.  Patient admits to congestion, runny nose, wet cough, fatigue, nausea, vomiting x2, body aches, and intermittent headaches.  Patient denies fever, sweats, chills, sore throat, shortness breath, chest pain, abdominal pain, diarrhea, urinary symptoms, dizziness, lightheadedness, and rashes.      Review of patient's allergies indicates:   Allergen Reactions    Metformin      Diarrhea on metformin XR     Pneumococcal 23-abimbola ps vaccine      Past Medical History:   Diagnosis Date    Acute respiratory failure due to COVID-19 04/29/2021    Atrial fibrillation     Blood clot associated with vein wall inflammation     not dvt    Cardiomyopathy     Normal cors on cath 11/2017    CHF (congestive heart failure)     DM (diabetes mellitus) 09/19/2013    Essential hypertension 05/14/2014    Hyperlipidemia     Hypertension     Pneumonia due to COVID-19 virus 04/28/2021    Psoriasis     Sleep apnea     Ventricular tachycardia 08/06/2022     Past Surgical History:   Procedure Laterality Date    CARDIAC CATHETERIZATION      COLONOSCOPY      COLONOSCOPY N/A 3/9/2022    Procedure: COLONOSCOPY;  Surgeon: Vielka Burrell MD;  Location: [facility] ENDO;  Service: Endoscopy;  Laterality: N/A;    DILATION AND CURETTAGE OF UTERUS      EPIDURAL STEROID INJECTION N/A 8/11/2023    Procedure: INJECTION, STEROID, EPIDURAL, L5/S1 SOONER DATE    clear to hold Eliquis;  Surgeon: Jed Yeager MD;  Location: [facility] PAIN MGT;  Service: Pain Management;  Laterality: N/A;    ESOPHAGOGASTRODUODENOSCOPY N/A 5/9/2019    Procedure: EGD (ESOPHAGOGASTRODUODENOSCOPY);   Surgeon: Vielka Burrell MD;  Location: [facility] ENDO;  Service: Endoscopy;  Laterality: N/A;    TRANSFORAMINAL EPIDURAL INJECTION OF STEROID N/A 1/6/2022    Procedure: Transforaminal ANKITA L4/L5 L5/S1;  Surgeon: Jed Yeager MD;  Location: [facility] PAIN MGT;  Service: Pain Management;  Laterality: N/A;    TRANSFORAMINAL EPIDURAL INJECTION OF STEROID Right 12/1/2022    Procedure: INJECTION, STEROID, EPIDURAL, TRANSFORAMINAL APPROACH, RIGHT L4/L5 & L5/S1 CONTRAST;  Surgeon: Jed Yeager MD;  Location: [facility] PAIN MGT;  Service: Pain Management;  Laterality: Right;    TRANSFORAMINAL EPIDURAL INJECTION OF STEROID Right 4/14/2023    Procedure: INJECTION, STEROID, EPIDURAL, TRANSFORAMINAL APPROACH RIGHT S1;  Surgeon: Jed Yeager MD;  Location: [facility] PAIN MGT;  Service: Pain Management;  Laterality: Right;  3/14 AUTH    TRANSFORAMINAL EPIDURAL INJECTION OF STEROID Right 1/12/2024    Procedure: LUMBAR TRANSFORAMINAL RIGHT L5/S1 AND S1 *ELIQUIS CLEARANCE IN CHART*;  Surgeon: Jed Yeager MD;  Location: [facility] PAIN MGT;  Service: Pain Management;  Laterality: Right;  [phone]     Family History   Problem Relation Age of Onset    Hypertension Mother     Hypertension Father     Diabetes Father     Diabetes Maternal Grandmother     Diabetes Paternal Grandmother     Breast cancer Neg Hx     Colon cancer Neg Hx     Ovarian cancer Neg Hx      Social History     Tobacco Use    Smoking status: Never    Smokeless tobacco: Never    Tobacco comments:     smokes cigars on occasion   Substance Use Topics    Alcohol use: Not Currently     Comment: occasional    Drug use: Not Currently     Types: Marijuana     Comment: occ     Review of Systems   Constitutional:  Positive for fatigue. Negative for chills, diaphoresis and fever.   HENT:  Positive for congestion and rhinorrhea. Negative for sore throat.    Respiratory:  Positive for cough. Negative for shortness of breath.    Cardiovascular:  Negative for chest pain.    Gastrointestinal:  Positive for nausea and vomiting (x2). Negative for abdominal pain, constipation and diarrhea.   Genitourinary:  Negative for decreased urine volume, difficulty urinating, dysuria, frequency and urgency.   Musculoskeletal:  Positive for myalgias (body aches).   Skin:  Negative for rash.   Neurological:  Positive for headaches (intermittent). Negative for dizziness, weakness and light-headedness.       Physical Exam     Initial Vitals [03/20/24 1650]   BP Pulse Resp Temp SpO2   112/61 84 18 98.7 °F (37.1 °C) 96 %      MAP       --         Physical Exam    Nursing note and vitals reviewed.  Constitutional: Vital signs are normal. She appears well-developed and well-nourished. She is not diaphoretic. She is active. She does not appear ill. No distress.   HENT:   Head: Normocephalic and atraumatic.   Right Ear: External ear normal.   Left Ear: External ear normal.   Mouth/Throat: Uvula is midline, oropharynx is clear and moist and mucous membranes are normal.   Nasal congestion   Eyes: Conjunctivae, EOM and lids are normal. Pupils are equal, round, and reactive to light. Right eye exhibits no discharge. Left eye exhibits no discharge.   Neck: Phonation normal. Neck supple.   Normal range of motion.   Full passive range of motion without pain.     Cardiovascular:  Normal rate and regular rhythm.           Pulmonary/Chest: Effort normal and breath sounds normal. No respiratory distress.   Abdominal: She exhibits no distension.   Musculoskeletal:         General: Normal range of motion.      Cervical back: Full passive range of motion without pain, normal range of motion and neck supple.     Neurological: She is alert and oriented to person, place, and time. GCS eye subscore is 4. GCS verbal subscore is 5. GCS motor subscore is 6.   Skin: Skin is dry. Capillary refill takes less than 2 seconds.         ED Course   Procedures  Labs Reviewed   URINALYSIS, REFLEX TO URINE CULTURE - Abnormal; Notable for  the following components:       Result Value    Protein, UA 1+ (*)     Occult Blood UA Trace (*)     All other components within normal limits    Narrative:     Specimen Source->Urine   URINALYSIS MICROSCOPIC    Narrative:     Specimen Source->Urine   POCT URINE PREGNANCY   POCT INFLUENZA A/B MOLECULAR   SARS-COV-2 RDRP GENE   POCT STREP A MOLECULAR          Imaging Results    None          Medications   morphine injection 6 mg (6 mg Intramuscular Given 3/20/24 1830)     Medical Decision Making  48-year-old female with a past medical history CHF, AFib, hypertension, hyperlipidemia, and diabetes presents to ED for body patient states started on Sunday.  Patient's chart and medical history reviewed.    Ddx:  COVID  Flu  Strep throat  Viral URI  UTI    Patient's vitals reviewed.  Afebrile, no respiratory distress, and nontoxic-appearing in the ED. patient nasal congestion on exam, otherwise unremarkable.  UPT was negative.  Patient given morphine for pain.  UA unremarkable.  Patient is strep, flu, and covid negative.  Patient denied chest x-ray. Discussed with patient this is most likely a viral upper respiratory infection which will take time to clear from her system.  Discussed with patient to stay well rested and hydrated. Patient given will be sent home on Tylenol, Flonase, Zyrtec, Tessalon Perles, benzocaine lozenges, and promethazine DM cough syrup for symptomatic control.  Patient agrees with this plan. Discussed with her strict return precautions, she verbalized understanding. Patient is stable for discharge.     Amount and/or Complexity of Data Reviewed  External Data Reviewed: labs, radiology and notes.  Labs: ordered.  Radiology: ordered.    Risk  OTC drugs.  Prescription drug management.                                      Clinical Impression:  Final diagnoses:  [R05.9] Cough  [J06.9] Viral URI with cough (Primary)  [R52] Generalized body aches  [R09.81] Nasal congestion          ED Disposition Condition     Discharge Stable          ED Prescriptions       Medication Sig Dispense Start Date End Date Auth. Provider    cetirizine (ZYRTEC) 10 MG tablet Take 1 tablet (10 mg total) by mouth daily as needed for Allergies or Rhinitis. 30 tablet 3/20/2024 -- Holdsworth, Alayna, PA-C    fluticasone propionate (FLONASE) 50 mcg/actuation nasal spray 1 spray (50 mcg total) by Each Nostril route 2 (two) times daily as needed for Rhinitis or Allergies. 15 g 3/20/2024 -- Holdsworth, Alayna, PA-C    benzonatate (TESSALON) 100 MG capsule Take 1 capsule (100 mg total) by mouth 3 (three) times daily as needed for Cough. 30 capsule 3/20/2024 -- Holdsworth, Alayna, PA-C    promethazine-dextromethorphan (PROMETHAZINE-DM) 6.25-15 mg/5 mL Syrp Take 5 mLs by mouth every 4 (four) hours as needed (cough). 180 mL 3/20/2024 -- Holdsworth, Alayna, PA-C    acetaminophen (TYLENOL) 500 MG tablet Take 1 tablet (500 mg total) by mouth every 6 (six) hours as needed for Pain. 30 tablet 3/20/2024 -- Holdsworth, Alayna, PA-C          Follow-up Information       Follow up With Specialties Details Why Contact Info    Alivia Ayala MD Internal Medicine   [address] [place] [address]  [phone]               Holdsworth, Alayna, PA-C  03/20/24 8358

## 2024-03-21 ENCOUNTER — PATIENT OUTREACH (OUTPATIENT)
Dept: EMERGENCY MEDICINE | Facility: HOSPITAL | Age: 49
End: 2024-03-21
Payer: MEDICARE

## 2024-03-21 NOTE — PROGRESS NOTES
Patient was seen in the ED on on 3/20/24. Phoned patient on 2 separate occasions to assist with Post ED Discharge Navigation. Patient was unavailable. Encounter closed.  Mohinder Chahal

## 2024-03-21 NOTE — DISCHARGE INSTRUCTIONS

## 2024-04-09 ENCOUNTER — HOSPITAL ENCOUNTER (OUTPATIENT)
Dept: RADIOLOGY | Facility: OTHER | Age: 49
Discharge: HOME | End: 2024-04-09
Attending: STUDENT IN AN ORGANIZED HEALTH CARE EDUCATION/TRAINING PROGRAM
Payer: MEDICARE

## 2024-04-09 ENCOUNTER — TELEPHONE (OUTPATIENT)
Dept: PAIN MEDICINE | Facility: CLINIC | Age: 49
End: 2024-04-09
Payer: MEDICARE

## 2024-04-09 ENCOUNTER — OFFICE VISIT (OUTPATIENT)
Dept: PAIN MEDICINE | Facility: CLINIC | Age: 49
End: 2024-04-09
Payer: MEDICARE

## 2024-04-09 VITALS
WEIGHT: 260.38 LBS | RESPIRATION RATE: 18 BRPM | SYSTOLIC BLOOD PRESSURE: 109 MMHG | TEMPERATURE: 99 F | DIASTOLIC BLOOD PRESSURE: 75 MMHG | HEART RATE: 74 BPM | BODY MASS INDEX: 40.78 KG/M2 | OXYGEN SATURATION: 98 %

## 2024-04-09 DIAGNOSIS — G89.4 CHRONIC PAIN SYNDROME: ICD-10-CM

## 2024-04-09 DIAGNOSIS — M51.36 DDD (DEGENERATIVE DISC DISEASE), LUMBAR: ICD-10-CM

## 2024-04-09 DIAGNOSIS — M54.16 LUMBAR RADICULOPATHY: Primary | ICD-10-CM

## 2024-04-09 DIAGNOSIS — M25.511 RIGHT SHOULDER PAIN, UNSPECIFIED CHRONICITY: ICD-10-CM

## 2024-04-09 PROCEDURE — 1160F RVW MEDS BY RX/DR IN RCRD: CPT | Mod: CPTII,S$GLB,, | Performed by: ANESTHESIOLOGY

## 2024-04-09 PROCEDURE — 99214 OFFICE O/P EST MOD 30 MIN: CPT | Mod: GC,S$GLB,, | Performed by: ANESTHESIOLOGY

## 2024-04-09 PROCEDURE — 4010F ACE/ARB THERAPY RXD/TAKEN: CPT | Mod: CPTII,S$GLB,, | Performed by: ANESTHESIOLOGY

## 2024-04-09 PROCEDURE — 3046F HEMOGLOBIN A1C LEVEL >9.0%: CPT | Mod: CPTII,S$GLB,, | Performed by: ANESTHESIOLOGY

## 2024-04-09 PROCEDURE — 99999 PR PBB SHADOW E&M-EST. PATIENT-LVL V: CPT | Mod: PBBFAC,,, | Performed by: ANESTHESIOLOGY

## 2024-04-09 PROCEDURE — 3074F SYST BP LT 130 MM HG: CPT | Mod: CPTII,S$GLB,, | Performed by: ANESTHESIOLOGY

## 2024-04-09 PROCEDURE — 73030 X-RAY EXAM OF SHOULDER: CPT | Mod: TC,FY,RT

## 2024-04-09 PROCEDURE — 1159F MED LIST DOCD IN RCRD: CPT | Mod: CPTII,S$GLB,, | Performed by: ANESTHESIOLOGY

## 2024-04-09 PROCEDURE — 73030 X-RAY EXAM OF SHOULDER: CPT | Mod: 26,RT,, | Performed by: RADIOLOGY

## 2024-04-09 PROCEDURE — 3078F DIAST BP <80 MM HG: CPT | Mod: CPTII,S$GLB,, | Performed by: ANESTHESIOLOGY

## 2024-04-09 PROCEDURE — 3008F BODY MASS INDEX DOCD: CPT | Mod: CPTII,S$GLB,, | Performed by: ANESTHESIOLOGY

## 2024-04-09 NOTE — TELEPHONE ENCOUNTER
----- Message from Kari Rahman sent at 4/9/2024  3:42 PM CDT -----  Type:   RX Refill Request     Who called:pt   Refill or New RX:new  RX Name and Strength:muscle relaxer and percocet   Would the patient rather a call back or a response via [employer]? call  Best Call Back Number:[phone]  Additional Information:pt states she went to appt this morning and they stated they were sending over rx but when pt went to pharmacy they said it was never sent over.
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
## 2024-04-09 NOTE — TELEPHONE ENCOUNTER
Staff spoke with pt and verbalized to her. The provider is still in clinic once he is done with Clinic he will sign off any refill request that has not been responded to.    ----- Message from Kari Rahman sent at 4/9/2024  3:42 PM CDT -----  Type:   RX Refill Request     Who called:pt   Refill or New RX:new  RX Name and Strength:muscle relaxer and percocet   Would the patient rather a call back or a response via [facility]? call  Best Call Back Number:[phone]  Additional Information:pt states she went to appt this morning and they stated they were sending over rx but when pt went to pharmacy they said it was never sent over.

## 2024-04-10 ENCOUNTER — TELEPHONE (OUTPATIENT)
Dept: PAIN MEDICINE | Facility: CLINIC | Age: 49
End: 2024-04-10
Payer: MEDICARE

## 2024-04-10 RX ORDER — OXYCODONE AND ACETAMINOPHEN 5; 325 MG/1; MG/1
1 TABLET ORAL
Qty: 30 TABLET | Refills: 0 | Status: SHIPPED | OUTPATIENT
Start: 2024-04-10 | End: 2024-05-17 | Stop reason: SDUPTHER

## 2024-04-10 NOTE — TELEPHONE ENCOUNTER
----- Message from Adam Aj sent at 4/10/2024 10:38 AM CDT -----  Contact: urbano  Type:  Patient Call          Who Called: patient         Does the patient know what this is regarding?: Requesting a call back for a refill on her pain medication ; please asdsvise           Would the patient rather a call back or a response via [facility]? Call           Best Call Back Number: [phone]             Additional Information:   [id number] - [place] [address] - [address] [id number] [facility]  [place] [id number]  Phone: [phone] Fax: [phone]

## 2024-04-15 ENCOUNTER — OFFICE VISIT (OUTPATIENT)
Dept: FAMILY MEDICINE | Facility: CLINIC | Age: 49
End: 2024-04-15
Payer: MEDICARE

## 2024-04-15 VITALS
WEIGHT: 257.69 LBS | TEMPERATURE: 98 F | RESPIRATION RATE: 16 BRPM | HEIGHT: 67 IN | BODY MASS INDEX: 40.45 KG/M2 | DIASTOLIC BLOOD PRESSURE: 72 MMHG | OXYGEN SATURATION: 96 % | HEART RATE: 72 BPM | SYSTOLIC BLOOD PRESSURE: 114 MMHG

## 2024-04-15 DIAGNOSIS — N18.4 CKD STAGE 4 DUE TO TYPE 2 DIABETES MELLITUS: ICD-10-CM

## 2024-04-15 DIAGNOSIS — E11.22 CKD STAGE 4 DUE TO TYPE 2 DIABETES MELLITUS: ICD-10-CM

## 2024-04-15 DIAGNOSIS — N18.4 TYPE 2 DIABETES MELLITUS WITH STAGE 4 CHRONIC KIDNEY DISEASE, WITH LONG-TERM CURRENT USE OF INSULIN: Primary | ICD-10-CM

## 2024-04-15 DIAGNOSIS — Z79.4 TYPE 2 DIABETES MELLITUS WITH STAGE 4 CHRONIC KIDNEY DISEASE, WITH LONG-TERM CURRENT USE OF INSULIN: Primary | ICD-10-CM

## 2024-04-15 DIAGNOSIS — I48.0 PAROXYSMAL ATRIAL FIBRILLATION: ICD-10-CM

## 2024-04-15 DIAGNOSIS — E11.22 TYPE 2 DIABETES MELLITUS WITH STAGE 4 CHRONIC KIDNEY DISEASE, WITH LONG-TERM CURRENT USE OF INSULIN: Primary | ICD-10-CM

## 2024-04-15 PROBLEM — E16.2 HYPOGLYCEMIA: Status: RESOLVED | Noted: 2022-08-06 | Resolved: 2024-04-15

## 2024-04-15 PROBLEM — I15.2 OBESITY, DIABETES, AND HYPERTENSION SYNDROME: Status: RESOLVED | Noted: 2022-09-02 | Resolved: 2024-04-15

## 2024-04-15 PROBLEM — E66.9 OBESITY, DIABETES, AND HYPERTENSION SYNDROME: Status: RESOLVED | Noted: 2022-09-02 | Resolved: 2024-04-15

## 2024-04-15 PROBLEM — D68.59 OTHER PRIMARY THROMBOPHILIA: Status: RESOLVED | Noted: 2022-06-10 | Resolved: 2024-04-15

## 2024-04-15 PROBLEM — E11.59 OBESITY, DIABETES, AND HYPERTENSION SYNDROME: Status: RESOLVED | Noted: 2022-09-02 | Resolved: 2024-04-15

## 2024-04-15 PROBLEM — E11.69 OBESITY, DIABETES, AND HYPERTENSION SYNDROME: Status: RESOLVED | Noted: 2022-09-02 | Resolved: 2024-04-15

## 2024-04-15 PROCEDURE — 3078F DIAST BP <80 MM HG: CPT | Mod: CPTII,S$GLB,, | Performed by: INTERNAL MEDICINE

## 2024-04-15 PROCEDURE — 3046F HEMOGLOBIN A1C LEVEL >9.0%: CPT | Mod: CPTII,S$GLB,, | Performed by: INTERNAL MEDICINE

## 2024-04-15 PROCEDURE — 3074F SYST BP LT 130 MM HG: CPT | Mod: CPTII,S$GLB,, | Performed by: INTERNAL MEDICINE

## 2024-04-15 PROCEDURE — 3008F BODY MASS INDEX DOCD: CPT | Mod: CPTII,S$GLB,, | Performed by: INTERNAL MEDICINE

## 2024-04-15 PROCEDURE — 1159F MED LIST DOCD IN RCRD: CPT | Mod: CPTII,S$GLB,, | Performed by: INTERNAL MEDICINE

## 2024-04-15 PROCEDURE — 4010F ACE/ARB THERAPY RXD/TAKEN: CPT | Mod: CPTII,S$GLB,, | Performed by: INTERNAL MEDICINE

## 2024-04-15 PROCEDURE — 99999 PR PBB SHADOW E&M-EST. PATIENT-LVL V: CPT | Mod: PBBFAC,,, | Performed by: INTERNAL MEDICINE

## 2024-04-15 PROCEDURE — 1160F RVW MEDS BY RX/DR IN RCRD: CPT | Mod: CPTII,S$GLB,, | Performed by: INTERNAL MEDICINE

## 2024-04-15 PROCEDURE — 99214 OFFICE O/P EST MOD 30 MIN: CPT | Mod: S$GLB,,, | Performed by: INTERNAL MEDICINE

## 2024-04-15 RX ORDER — CLOBETASOL PROPIONATE 0.5 MG/G
OINTMENT TOPICAL
COMMUNITY
Start: 2024-01-16

## 2024-04-15 RX ORDER — INSULIN HUMAN 100 [IU]/ML
INJECTION, SUSPENSION SUBCUTANEOUS
Qty: 30 ML | Refills: 2 | Status: SHIPPED | OUTPATIENT
Start: 2024-04-15 | End: 2024-05-15 | Stop reason: ALTCHOICE

## 2024-04-15 RX ORDER — INSULIN DEGLUDEC 200 U/ML
56 INJECTION, SOLUTION SUBCUTANEOUS 2 TIMES DAILY
Qty: 17 PEN | Refills: 0 | Status: SHIPPED | OUTPATIENT
Start: 2024-04-15 | End: 2024-05-15 | Stop reason: SDUPTHER

## 2024-04-15 RX ORDER — KETOCONAZOLE 20 MG/G
CREAM TOPICAL 2 TIMES DAILY
COMMUNITY
Start: 2024-02-18

## 2024-04-15 RX ORDER — MOMETASONE FUROATE 1 MG/G
CREAM TOPICAL 2 TIMES DAILY
COMMUNITY
Start: 2024-02-18

## 2024-04-15 NOTE — PROGRESS NOTES
Chief Complaint: Follow-up      Fabiana Chanel  is a 48 y.o. year old patient who presents today for f/up    Has not been measuring her BG at home. Did not set up FreeStyle Brayden b/c she didn't know how to. Ran out of BG strips. Took trulicty for one month but had diarrhea. Reports a lot of stress when it comes to measuring her BG.     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    Pneumonia due to COVID-19 virus 04/28/2021    Psoriasis     Sleep apnea     Ventricular tachycardia 08/06/2022       Past Surgical History:   Procedure Laterality Date    CARDIAC CATHETERIZATION      COLONOSCOPY      COLONOSCOPY N/A 3/9/2022    Procedure: COLONOSCOPY;  Surgeon: Vielka Burrell MD;  Location: [facility];  Service: Endoscopy;  Laterality: N/A;    DILATION AND CURETTAGE OF UTERUS      EPIDURAL STEROID INJECTION N/A 8/11/2023    Procedure: INJECTION, STEROID, EPIDURAL, L5/S1 SOONER DATE    clear to hold Eliquis;  Surgeon: Jed Yeager MD;  Location: [facility];  Service: Pain Management;  Laterality: N/A;    ESOPHAGOGASTRODUODENOSCOPY N/A 5/9/2019    Procedure: EGD (ESOPHAGOGASTRODUODENOSCOPY);  Surgeon: Vielka Burrell MD;  Location: [facility];  Service: Endoscopy;  Laterality: N/A;    TRANSFORAMINAL EPIDURAL INJECTION OF STEROID N/A 1/6/2022    Procedure: Transforaminal ANKITA L4/L5 L5/S1;  Surgeon: Jed Yeager MD;  Location: [facility];  Service: Pain Management;  Laterality: N/A;    TRANSFORAMINAL EPIDURAL INJECTION OF STEROID Right 12/1/2022    Procedure: INJECTION, STEROID, EPIDURAL,  TRANSFORAMINAL APPROACH, RIGHT L4/L5 & L5/S1 CONTRAST;  Surgeon: Jed Yeager MD;  Location: [facility];  Service: Pain Management;  Laterality: Right;    TRANSFORAMINAL EPIDURAL INJECTION OF STEROID Right 4/14/2023    Procedure: INJECTION, STEROID, EPIDURAL, TRANSFORAMINAL APPROACH RIGHT S1;  Surgeon: Jed Yeager MD;  Location: [facility];  Service: Pain Management;  Laterality: Right;  3/14 AUTH    TRANSFORAMINAL EPIDURAL INJECTION OF STEROID Right 1/12/2024    Procedure: LUMBAR TRANSFORAMINAL RIGHT L5/S1 AND S1 *ELIQUIS CLEARANCE IN CHART*;  Surgeon: Jed Yeager MD;  Location: [facility];  Service: Pain Management;  Laterality: Right;  [phone]        Family History   Problem Relation Name Age of Onset    Hypertension Mother      Hypertension Father      Diabetes Father      Diabetes Maternal Grandmother      Diabetes Paternal Grandmother      Breast cancer Neg Hx      Colon cancer Neg Hx      Ovarian cancer Neg Hx          Social History     Socioeconomic History    Marital status:    Tobacco Use    Smoking status: Never    Smokeless tobacco: Never    Tobacco comments:     smokes cigars on occasion   Substance and Sexual Activity    Alcohol use: Not Currently     Comment: occasional    Drug use: Not Currently     Types: Marijuana     Comment: occ    Sexual activity: Not Currently     Partners: Male     Social Determinants of Health     Stress: Stress Concern Present (7/9/2019)    [employer] - Occupational Stress Questionnaire     Feeling of Stress : Rather much         Current Outpatient Medications:     albuterol (VENTOLIN HFA) 90 mcg/actuation inhaler, Inhale 2 puffs into the lungs every 6 (six) hours as needed for Wheezing or Shortness of Breath. Rescue, Disp: 18 g, Rfl: 2    apixaban (ELIQUIS) 5 mg Tab, Take 1 tablet by mouth twice daily, Disp: 180 tablet, Rfl: 3    blood glucose control, high Soln, 1 each by Misc.(Non-Drug; Combo Route) route  once. for 1 dose, Disp: 1 each, Rfl: 3    blood glucose control, low Soln, 1 each by Misc.(Non-Drug; Combo Route) route once. for 1 dose, Disp: 1 each, Rfl: 3    BLOOD PRESSURE CUFF Misc, 1 kit by Misc.(Non-Drug; Combo Route) route 2 (two) times daily., Disp: 1 each, Rfl: 0    blood sugar diagnostic Strp, Check blood glucose 3x/day., Disp: 300 strip, Rfl: 3    blood-glucose meter (TRUE METRIX AIR GLUCOSE METER) Misc, 1 each by Misc.(Non-Drug; Combo Route) route 3 (three) times daily., Disp: 1 each, Rfl: 0    clobetasol 0.05% (TEMOVATE) 0.05 % Oint, APPLY OINTMENT TOPICALLY TWICE DAILY, Disp: , Rfl:     empagliflozin (JARDIANCE) 10 mg tablet, Take 1 tablet (10 mg total) by mouth once daily., Disp: 30 tablet, Rfl: 2    flash glucose scanning reader (FREESTYLE JEFFREY 2 READER) Misc, 1 each by Misc.(Non-Drug; Combo Route) route every 30 days., Disp: 1 each, Rfl: 2    flash glucose sensor (FREESTYLE JEFFREY 2 SENSOR) Kit, 1 each by Misc.(Non-Drug; Combo Route) route every 7 days., Disp: 1 kit, Rfl: 2    fluticasone propionate (FLONASE) 50 mcg/actuation nasal spray, 1 spray (50 mcg total) by Each Nostril route 2 (two) times daily as needed for Rhinitis or Allergies., Disp: 15 g, Rfl: 0    furosemide (LASIX) 40 MG tablet, Take 1 tablet (40 mg total) by mouth once daily., Disp: 90 tablet, Rfl: 3    gabapentin (NEURONTIN) 400 MG capsule, Take 1 capsule (400 mg total) by mouth 3 (three) times daily. TAKE 1 CAPSULE BY MOUTH THREE TIMES DAILY AS NEEDED FOR PAIN, Disp: 90 capsule, Rfl: 3    insulin lispro 100 unit/mL pen, INJECT 46 UNITS SUBCUTANEOUSLY THREE TIMES DAILY BEFORE MEAL(S), Disp: 45 mL, Rfl: 5    ketoconazole (NIZORAL) 2 % cream, Apply topically 2 (two) times daily., Disp: , Rfl:     LIDOcaine (LIDODERM) 5 %, Place 1 patch onto the skin once daily. Apply patch for 12 hours and then leave off for 12 hours, Disp: 15 patch, Rfl: 0    methocarbamoL (ROBAXIN) 750 MG Tab, Take 1 tablet (750 mg total) by mouth 2 (two) times  "daily as needed (muscle pain)., Disp: 60 tablet, Rfl: 2    metoprolol succinate (TOPROL-XL) 50 MG 24 hr tablet, Take 1 tablet (50 mg total) by mouth once daily., Disp: 90 tablet, Rfl: 3    mometasone 0.1% (ELOCON) 0.1 % cream, Apply topically 2 (two) times daily., Disp: , Rfl:     mupirocin (BACTROBAN) 2 % ointment, Apply topically nightly., Disp: , Rfl:     oxyCODONE-acetaminophen (PERCOCET) 5-325 mg per tablet, Take 1 tablet by mouth every 24 hours as needed for Pain., Disp: 30 tablet, Rfl: 0    pen needle, diabetic (BD LORI 2ND GEN PEN NEEDLE) 32 gauge x 5/32" Ndle, USE 1 PEN NEEDLE 4 TIMES DAILY, Disp: 400 each, Rfl: 3    rosuvastatin (CRESTOR) 40 MG Tab, Take 1 tablet (40 mg total) by mouth every evening., Disp: 90 tablet, Rfl: 3    sacubitriL-valsartan (ENTRESTO) 24-26 mg per tablet, Take 1 tablet by mouth 2 (two) times daily., Disp: 180 tablet, Rfl: 3    spironolactone (ALDACTONE) 25 MG tablet, Take 1 tablet (25 mg total) by mouth once daily. Hold until eating and drinking improves. Need to weight self daily, Disp: 90 tablet, Rfl: 3    triamcinolone acetonide 0.1% (KENALOG) 0.1 % cream, 2 (two) times daily. Apply to affected area, Disp: , Rfl: 4    insulin degludec (TRESIBA FLEXTOUCH U-200) 200 unit/mL (3 mL) insulin pen, Inject 56 Units into the skin 2 (two) times a day., Disp: 17 pen , Rfl: 0    insulin NPH isoph U-100 human (HUMULIN N NPH INSULIN KWIKPEN) 100 unit/mL (3 mL) InPn, Inject 24 units nightly at 10 pm, Disp: 30 mL, Rfl: 2     Review of Systems   Musculoskeletal:  Positive for joint pain.   Neurological:  Positive for tingling.        Objective:      Vitals:    04/15/24 0832   BP: 114/72   Pulse: 72   Resp: 16   Temp: 98.1 °F (36.7 °C)       Physical Exam  Constitutional:       Appearance: Normal appearance. She is obese.   HENT:      Head: Normocephalic and atraumatic.   Skin:     General: Skin is warm and dry.   Neurological:      General: No focal deficit present.      Mental Status: She is " alert and oriented to person, place, and time.   Psychiatric:         Mood and Affect: Mood normal.         Behavior: Behavior normal.          Assessment:       1. Type 2 diabetes mellitus with stage 4 chronic kidney disease, with long-term current use of insulin    2. CKD stage 4 due to type 2 diabetes mellitus    3. Paroxysmal atrial fibrillation          Plan:   1. Type 2 diabetes mellitus with stage 4 chronic kidney disease, with long-term current use of insulin  Overview:  Diarrhea with Trulicity, Ozempic and Metformin   H/o yeast infections with Jardiance     Assessment & Plan:  Chronic, uncontrolled. Patient's last A1c was   Lab Results   Component Value Date    HGBA1C 9.5 (H) 03/13/2024   A1c slightly improved. Has not been measuring BG. Refer to HPI     -  recommended a diet with reduced processed carbs like rice and white bread, reduce sugar/dessert intake and encouraged weight loss.   - counseled to measure blood glucose preferably three times a day, if not at least fasting BG in the morning   - continue Insulin (on three different types)   - will restart Jardiance   - follow up one month       Orders:  -     blood sugar diagnostic Strp; Check blood glucose 3x/day.  Dispense: 300 strip; Refill: 3  -     empagliflozin (JARDIANCE) 10 mg tablet; Take 1 tablet (10 mg total) by mouth once daily.  Dispense: 30 tablet; Refill: 2  -     insulin degludec (TRESIBA FLEXTOUCH U-200) 200 unit/mL (3 mL) insulin pen; Inject 56 Units into the skin 2 (two) times a day.  Dispense: 17 pen ; Refill: 0  -     insulin NPH isoph U-100 human (HUMULIN N NPH INSULIN KWIKPEN) 100 unit/mL (3 mL) InPn; Inject 24 units nightly at 10 pm  Dispense: 30 mL; Refill: 2    2. CKD stage 4 due to type 2 diabetes mellitus  Assessment & Plan:  Chronic, stable.   BMP  Lab Results   Component Value Date     01/24/2024    K 3.5 01/24/2024     01/24/2024    CO2 28 01/24/2024    BUN 20 01/24/2024    CREATININE 2.2 (H) 01/24/2024     CALCIUM 9.1 01/24/2024    ANIONGAP 7 (L) 01/24/2024    EGFRNORACEVR 27 (A) 01/24/2024     - Urine Alb:cr     Orders:  -     Microalbumin/Creatinine Ratio, Urine  -     empagliflozin (JARDIANCE) 10 mg tablet; Take 1 tablet (10 mg total) by mouth once daily.  Dispense: 30 tablet; Refill: 2    3. Paroxysmal atrial fibrillation  Assessment & Plan:  Chronic, stable. HR controlled     - continue Eliquis and Toprol           Follow up in about 4 weeks (around 5/13/2024) for f/up DM.

## 2024-04-15 NOTE — ASSESSMENT & PLAN NOTE
Chronic, stable.   BMP  Lab Results   Component Value Date     01/24/2024    K 3.5 01/24/2024     01/24/2024    CO2 28 01/24/2024    BUN 20 01/24/2024    CREATININE 2.2 (H) 01/24/2024    CALCIUM 9.1 01/24/2024    ANIONGAP 7 (L) 01/24/2024    EGFRNORACEVR 27 (A) 01/24/2024     - Urine Alb:cr

## 2024-04-15 NOTE — ASSESSMENT & PLAN NOTE
Chronic, uncontrolled. Patient's last A1c was   Lab Results   Component Value Date    HGBA1C 9.5 (H) 03/13/2024   A1c slightly improved. Has not been measuring BG. Refer to HPI     -  recommended a diet with reduced processed carbs like rice and white bread, reduce sugar/dessert intake and encouraged weight loss.   - counseled to measure blood glucose preferably three times a day, if not at least fasting BG in the morning   - continue Insulin (on three different types)   - will restart Jardiance   - follow up one month

## 2024-04-15 NOTE — PROGRESS NOTES
Health Maintenance Due   Topic     Pneumococcal Vaccines (Age 0-64) (1 of 2 - PCV)     Sign Pain Contract      Complete Opioid Risk Tool      Diabetes Urine Screening      Foot Exam      Eye Exam      Influenza Vaccine (1)     COVID-19 Vaccine (1 - 2023-24 season)     Cervical Cancer Screening      Mammogram

## 2024-04-17 DIAGNOSIS — E11.9 TYPE 2 DIABETES MELLITUS WITHOUT COMPLICATION: ICD-10-CM

## 2024-04-18 ENCOUNTER — TELEPHONE (OUTPATIENT)
Dept: FAMILY MEDICINE | Facility: CLINIC | Age: 49
End: 2024-04-18
Payer: MEDICARE

## 2024-04-18 NOTE — TELEPHONE ENCOUNTER
"----- Message from Emily Rivera sent at 4/18/2024  1:27 PM CDT -----  Regarding: [facility] [phone]  .Type: Patient Call Back    Who called:[facility]    What is the request in detail: [facility] requesting a verbal "OKAY" to approve patient meter and lancets.     Can the clinic reply by [facility]?no    Would the patient rather a call back or a response via [facility]?call back    Best call back number [phone]  "

## 2024-05-14 ENCOUNTER — TELEPHONE (OUTPATIENT)
Dept: FAMILY MEDICINE | Facility: CLINIC | Age: 49
End: 2024-05-14
Payer: MEDICARE

## 2024-05-15 ENCOUNTER — PATIENT OUTREACH (OUTPATIENT)
Dept: ADMINISTRATIVE | Facility: HOSPITAL | Age: 49
End: 2024-05-15
Payer: MEDICARE

## 2024-05-15 ENCOUNTER — TELEPHONE (OUTPATIENT)
Dept: ADMINISTRATIVE | Facility: HOSPITAL | Age: 49
End: 2024-05-15
Payer: MEDICARE

## 2024-05-15 ENCOUNTER — TELEPHONE (OUTPATIENT)
Dept: ELECTROPHYSIOLOGY | Facility: CLINIC | Age: 49
End: 2024-05-15
Payer: MEDICARE

## 2024-05-15 ENCOUNTER — LAB VISIT (OUTPATIENT)
Dept: LAB | Facility: HOSPITAL | Age: 49
End: 2024-05-15
Attending: INTERNAL MEDICINE
Payer: MEDICARE

## 2024-05-15 ENCOUNTER — OFFICE VISIT (OUTPATIENT)
Dept: FAMILY MEDICINE | Facility: CLINIC | Age: 49
End: 2024-05-15
Payer: MEDICARE

## 2024-05-15 VITALS
HEART RATE: 72 BPM | OXYGEN SATURATION: 99 % | BODY MASS INDEX: 40.97 KG/M2 | DIASTOLIC BLOOD PRESSURE: 70 MMHG | HEIGHT: 67 IN | WEIGHT: 261 LBS | SYSTOLIC BLOOD PRESSURE: 108 MMHG | TEMPERATURE: 98 F

## 2024-05-15 DIAGNOSIS — N18.4 CKD STAGE 4 DUE TO TYPE 2 DIABETES MELLITUS: ICD-10-CM

## 2024-05-15 DIAGNOSIS — E21.3 HYPERPARATHYROIDISM: ICD-10-CM

## 2024-05-15 DIAGNOSIS — I48.0 PAROXYSMAL ATRIAL FIBRILLATION: Primary | ICD-10-CM

## 2024-05-15 DIAGNOSIS — Z79.4 TYPE 2 DIABETES MELLITUS WITH STAGE 4 CHRONIC KIDNEY DISEASE, WITH LONG-TERM CURRENT USE OF INSULIN: Primary | ICD-10-CM

## 2024-05-15 DIAGNOSIS — E11.22 CKD STAGE 4 DUE TO TYPE 2 DIABETES MELLITUS: ICD-10-CM

## 2024-05-15 DIAGNOSIS — E11.22 TYPE 2 DIABETES MELLITUS WITH STAGE 4 CHRONIC KIDNEY DISEASE, WITH LONG-TERM CURRENT USE OF INSULIN: Primary | ICD-10-CM

## 2024-05-15 DIAGNOSIS — E11.42 DIABETIC POLYNEUROPATHY ASSOCIATED WITH TYPE 2 DIABETES MELLITUS: ICD-10-CM

## 2024-05-15 DIAGNOSIS — N18.4 TYPE 2 DIABETES MELLITUS WITH STAGE 4 CHRONIC KIDNEY DISEASE, WITH LONG-TERM CURRENT USE OF INSULIN: Primary | ICD-10-CM

## 2024-05-15 DIAGNOSIS — B37.31 YEAST VAGINITIS: ICD-10-CM

## 2024-05-15 DIAGNOSIS — F32.1 MAJOR DEPRESSIVE DISORDER, SINGLE EPISODE, MODERATE: ICD-10-CM

## 2024-05-15 DIAGNOSIS — E11.9 TYPE 2 DIABETES MELLITUS WITHOUT COMPLICATION: ICD-10-CM

## 2024-05-15 DIAGNOSIS — J44.9 CHRONIC OBSTRUCTIVE PULMONARY DISEASE, UNSPECIFIED COPD TYPE: ICD-10-CM

## 2024-05-15 DIAGNOSIS — I50.42 CHRONIC COMBINED SYSTOLIC AND DIASTOLIC HEART FAILURE: ICD-10-CM

## 2024-05-15 PROBLEM — M79.601 RIGHT ARM PAIN: Status: RESOLVED | Noted: 2024-03-13 | Resolved: 2024-05-15

## 2024-05-15 PROBLEM — R53.83 FATIGUE: Status: RESOLVED | Noted: 2019-04-26 | Resolved: 2024-05-15

## 2024-05-15 LAB
ALBUMIN/CREAT UR: 80.7 UG/MG (ref 0–30)
CREAT UR-MCNC: 57 MG/DL (ref 15–325)
MICROALBUMIN UR DL<=1MG/L-MCNC: 46 UG/ML

## 2024-05-15 PROCEDURE — 82043 UR ALBUMIN QUANTITATIVE: CPT | Performed by: INTERNAL MEDICINE

## 2024-05-15 PROCEDURE — 3008F BODY MASS INDEX DOCD: CPT | Mod: CPTII,S$GLB,, | Performed by: INTERNAL MEDICINE

## 2024-05-15 PROCEDURE — 3074F SYST BP LT 130 MM HG: CPT | Mod: CPTII,S$GLB,, | Performed by: INTERNAL MEDICINE

## 2024-05-15 PROCEDURE — 3078F DIAST BP <80 MM HG: CPT | Mod: CPTII,S$GLB,, | Performed by: INTERNAL MEDICINE

## 2024-05-15 PROCEDURE — 1159F MED LIST DOCD IN RCRD: CPT | Mod: CPTII,S$GLB,, | Performed by: INTERNAL MEDICINE

## 2024-05-15 PROCEDURE — 99999 PR PBB SHADOW E&M-EST. PATIENT-LVL V: CPT | Mod: PBBFAC,,, | Performed by: INTERNAL MEDICINE

## 2024-05-15 PROCEDURE — 3046F HEMOGLOBIN A1C LEVEL >9.0%: CPT | Mod: CPTII,S$GLB,, | Performed by: INTERNAL MEDICINE

## 2024-05-15 PROCEDURE — 1160F RVW MEDS BY RX/DR IN RCRD: CPT | Mod: CPTII,S$GLB,, | Performed by: INTERNAL MEDICINE

## 2024-05-15 PROCEDURE — 4010F ACE/ARB THERAPY RXD/TAKEN: CPT | Mod: CPTII,S$GLB,, | Performed by: INTERNAL MEDICINE

## 2024-05-15 PROCEDURE — 99214 OFFICE O/P EST MOD 30 MIN: CPT | Mod: S$GLB,,, | Performed by: INTERNAL MEDICINE

## 2024-05-15 RX ORDER — FLUCONAZOLE 150 MG/1
150 TABLET ORAL DAILY
Qty: 1 TABLET | Refills: 0 | Status: SHIPPED | OUTPATIENT
Start: 2024-05-15 | End: 2024-05-16

## 2024-05-15 RX ORDER — INSULIN GLARGINE 100 [IU]/ML
60 INJECTION, SOLUTION SUBCUTANEOUS 2 TIMES DAILY
Qty: 108 ML | Refills: 0 | Status: SHIPPED | OUTPATIENT
Start: 2024-05-15 | End: 2024-08-13

## 2024-05-15 NOTE — PROGRESS NOTES
Chief Complaint: Follow-up and Rash (Diabetic rash, pt states they started feeling rash two days ago )      Fabiana Chanel  is a 48 y.o. year old patient who presents today for follow up     Fastin-217.   Post-prandial: 200+  Ran out of night time insulin (Humulin) yesterday. Reports compliance  Tresiba 56 units BID   Lispro 46 units TID   Humulin 24 night   Has been using finger stick, instead of Dexacom     Past Medical History:   Diagnosis Date    Acute respiratory failure due to COVID-19 2021    Atrial fibrillation     Blood clot associated with vein wall inflammation     not dvt    Cardiomyopathy     Normal cors on cath 2017    CHF (congestive heart failure)     DM (diabetes mellitus) 2013    Essential hypertension 2014    Hyperlipidemia     Hypertension     Iron deficiency anemia 2013    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    Pneumonia due to COVID-19 virus 2021    Psoriasis     Sleep apnea     Ventricular tachycardia 2022       Past Surgical History:   Procedure Laterality Date    CARDIAC CATHETERIZATION      COLONOSCOPY      COLONOSCOPY N/A 3/9/2022    Procedure: COLONOSCOPY;  Surgeon: Vielka Burrell MD;  Location: [facility];  Service: Endoscopy;  Laterality: N/A;    DILATION AND CURETTAGE OF UTERUS      EPIDURAL STEROID INJECTION N/A 2023    Procedure: INJECTION, STEROID, EPIDURAL, L5/S1 SOONER DATE    clear to hold Eliquis;  Surgeon: Jed Yeager MD;  Location: [facility];  Service: Pain Management;  Laterality: N/A;    ESOPHAGOGASTRODUODENOSCOPY N/A 2019    Procedure: EGD (ESOPHAGOGASTRODUODENOSCOPY);  Surgeon: Vielka Burrell MD;  Location: [facility] END[facility];  Service: Endoscopy;  Laterality: N/A;    TRANSFORAMINAL EPIDURAL INJECTION OF STEROID N/A 2022    Procedure: Transforaminal ANKITA L4/L5 L5/S1;  Surgeon: Jed Yeager MD;  Location: [facility];  Service: Pain Management;  Laterality: N/A;    TRANSFORAMINAL EPIDURAL  INJECTION OF STEROID Right 12/1/2022    Procedure: INJECTION, STEROID, EPIDURAL, TRANSFORAMINAL APPROACH, RIGHT L4/L5 & L5/S1 CONTRAST;  Surgeon: Jed Yeager MD;  Location: [facility];  Service: Pain Management;  Laterality: Right;    TRANSFORAMINAL EPIDURAL INJECTION OF STEROID Right 4/14/2023    Procedure: INJECTION, STEROID, EPIDURAL, TRANSFORAMINAL APPROACH RIGHT S1;  Surgeon: Jed Yeager MD;  Location: [facility];  Service: Pain Management;  Laterality: Right;  3/14 AUTH    TRANSFORAMINAL EPIDURAL INJECTION OF STEROID Right 1/12/2024    Procedure: LUMBAR TRANSFORAMINAL RIGHT L5/S1 AND S1 *ELIQUIS CLEARANCE IN CHART*;  Surgeon: Jed Yeager MD;  Location: [facility];  Service: Pain Management;  Laterality: Right;  [phone]        Family History   Problem Relation Name Age of Onset    Hypertension Mother      Hypertension Father      Diabetes Father      Diabetes Maternal Grandmother      Diabetes Paternal Grandmother      Breast cancer Neg Hx      Colon cancer Neg Hx      Ovarian cancer Neg Hx          Social History     Socioeconomic History    Marital status:    Tobacco Use    Smoking status: Never    Smokeless tobacco: Never    Tobacco comments:     smokes cigars on occasion   Substance and Sexual Activity    Alcohol use: Not Currently     Comment: occasional    Drug use: Not Currently     Types: Marijuana     Comment: occ    Sexual activity: Not Currently     Partners: Male     Social Determinants of Health     Stress: Stress Concern Present (7/9/2019)    [employer] - Occupational Stress Questionnaire     Feeling of Stress : Rather much         Current Outpatient Medications:     albuterol (VENTOLIN HFA) 90 mcg/actuation inhaler, Inhale 2 puffs into the lungs every 6 (six) hours as needed for Wheezing or Shortness of Breath. Rescue, Disp: 18 g, Rfl: 2    apixaban (ELIQUIS) 5 mg Tab, Take 1 tablet by mouth twice daily, Disp: 180 tablet, Rfl: 3     blood glucose control, high Soln, 1 each by Misc.(Non-Drug; Combo Route) route once. for 1 dose, Disp: 1 each, Rfl: 3    blood glucose control, low Soln, 1 each by Misc.(Non-Drug; Combo Route) route once. for 1 dose, Disp: 1 each, Rfl: 3    BLOOD PRESSURE CUFF Misc, 1 kit by Misc.(Non-Drug; Combo Route) route 2 (two) times daily., Disp: 1 each, Rfl: 0    blood sugar diagnostic Strp, Check blood glucose 3x/day., Disp: 300 strip, Rfl: 3    clobetasol 0.05% (TEMOVATE) 0.05 % Oint, APPLY OINTMENT TOPICALLY TWICE DAILY, Disp: , Rfl:     empagliflozin (JARDIANCE) 10 mg tablet, Take 1 tablet (10 mg total) by mouth once daily., Disp: 30 tablet, Rfl: 2    fluconazole (DIFLUCAN) 150 MG Tab, Take 1 tablet (150 mg total) by mouth once daily. for 1 day, Disp: 1 tablet, Rfl: 0    fluticasone propionate (FLONASE) 50 mcg/actuation nasal spray, 1 spray (50 mcg total) by Each Nostril route 2 (two) times daily as needed for Rhinitis or Allergies., Disp: 15 g, Rfl: 0    furosemide (LASIX) 40 MG tablet, Take 1 tablet (40 mg total) by mouth once daily., Disp: 90 tablet, Rfl: 3    gabapentin (NEURONTIN) 400 MG capsule, Take 1 capsule (400 mg total) by mouth 3 (three) times daily. TAKE 1 CAPSULE BY MOUTH THREE TIMES DAILY AS NEEDED FOR PAIN, Disp: 90 capsule, Rfl: 3    insulin lispro 100 unit/mL pen, INJECT 46 UNITS SUBCUTANEOUSLY THREE TIMES DAILY BEFORE MEAL(S), Disp: 45 mL, Rfl: 5    ketoconazole (NIZORAL) 2 % cream, Apply topically 2 (two) times daily., Disp: , Rfl:     LIDOcaine (LIDODERM) 5 %, Place 1 patch onto the skin once daily. Apply patch for 12 hours and then leave off for 12 hours, Disp: 15 patch, Rfl: 0    methocarbamoL (ROBAXIN) 750 MG Tab, Take 1 tablet (750 mg total) by mouth 2 (two) times daily as needed (muscle pain)., Disp: 60 tablet, Rfl: 2    metoprolol succinate (TOPROL-XL) 50 MG 24 hr tablet, Take 1 tablet (50 mg total) by mouth once daily., Disp: 90 tablet, Rfl: 3    mometasone 0.1% (ELOCON) 0.1 % cream, Apply  "topically 2 (two) times daily., Disp: , Rfl:     mupirocin (BACTROBAN) 2 % ointment, Apply topically nightly., Disp: , Rfl:     oxyCODONE-acetaminophen (PERCOCET) 5-325 mg per tablet, Take 1 tablet by mouth every 24 hours as needed for Pain., Disp: 30 tablet, Rfl: 0    pen needle, diabetic (BD LORI 2ND GEN PEN NEEDLE) 32 gauge x 5/32" Ndle, USE 1 PEN NEEDLE 4 TIMES DAILY, Disp: 400 each, Rfl: 3    rosuvastatin (CRESTOR) 40 MG Tab, Take 1 tablet (40 mg total) by mouth every evening., Disp: 90 tablet, Rfl: 3    sacubitriL-valsartan (ENTRESTO) 24-26 mg per tablet, Take 1 tablet by mouth 2 (two) times daily., Disp: 180 tablet, Rfl: 3    spironolactone (ALDACTONE) 25 MG tablet, Take 1 tablet (25 mg total) by mouth once daily. Hold until eating and drinking improves. Need to weight self daily, Disp: 90 tablet, Rfl: 3    triamcinolone acetonide 0.1% (KENALOG) 0.1 % cream, 2 (two) times daily. Apply to affected area, Disp: , Rfl: 4    blood-glucose meter (TRUE METRIX AIR GLUCOSE METER) Misc, 1 each by Misc.(Non-Drug; Combo Route) route 3 (three) times daily., Disp: 1 each, Rfl: 0    flash glucose scanning reader (FREESTYLE JEFFREY 2 READER) Misc, 1 each by Misc.(Non-Drug; Combo Route) route every 30 days., Disp: 1 each, Rfl: 2    flash glucose sensor (FREESTYLE JEFFREY 2 SENSOR) Kit, 1 each by Misc.(Non-Drug; Combo Route) route every 7 days., Disp: 1 kit, Rfl: 2    insulin glargine U-100, Lantus, (LANTUS SOLOSTAR U-100 INSULIN) 100 unit/mL (3 mL) InPn pen, Inject 60 Units into the skin 2 (two) times a day., Disp: 108 mL, Rfl: 0     Review of Systems   Genitourinary:         Vaginal discharge   Neurological:  Positive for sensory change.        Objective:      Vitals:    05/15/24 0837   BP: 108/70   Pulse: 72   Temp: 98 °F (36.7 °C)       Physical Exam  Constitutional:       Appearance: Normal appearance. She is obese.   HENT:      Head: Normocephalic and atraumatic.   Skin:     General: Skin is warm and dry.   Neurological:     "  General: No focal deficit present.      Mental Status: She is alert and oriented to person, place, and time.   Psychiatric:         Mood and Affect: Mood normal.         Behavior: Behavior normal.      Protective Sensation (w/ 10 gram monofilament):  Right: Absent  Left: Decreased    Visual Inspection:  Normal -  Bilateral    Pedal Pulses:   Right: Absent  Left: Absent    Posterior Tibialis Pulses:   Right:Absent  Left: Absent      Assessment:       1. Type 2 diabetes mellitus with stage 4 chronic kidney disease, with long-term current use of insulin    2. Yeast vaginitis    3. CKD stage 4 due to type 2 diabetes mellitus    4. Chronic obstructive pulmonary disease, unspecified COPD type    5. Hyperparathyroidism    6. Major depressive disorder, single episode, moderate    7. Chronic combined systolic and diastolic heart failure    8. Diabetic polyneuropathy associated with type 2 diabetes mellitus          Plan:     Follow up in about 3 months (around 8/15/2024) for f/up.

## 2024-05-15 NOTE — TELEPHONE ENCOUNTER
----- Message from Alivia Ayala MD sent at 5/15/2024  9:16 AM CDT -----  Regarding: eye  Patient went to [facility] two months ago. Thanks

## 2024-05-15 NOTE — PROGRESS NOTES
Health Maintenance Due   Topic     Pneumococcal Vaccines (Age 0-64) (1 of 2 - PCV)     Diabetes Urine Screening  Consult pcp    Foot Exam  Consult pcp    Eye Exam  Consult pcp    COVID-19 Vaccine (1 - 2023-24 season) Not offered at this office    Cervical Cancer Screening  Consult pcp    Mammogram  Already scheduled    Hemoglobin A1c  Consult pcp

## 2024-05-15 NOTE — PATIENT INSTRUCTIONS
Tresiba 56 units BID -> 60 units BID -> 62 units -> 64 units   Lispro 46 units TID    Stop Humulin     Goal sugars:   Fastin-120   After meal: 120-180

## 2024-05-15 NOTE — ASSESSMENT & PLAN NOTE
Chronic, uncontrolled. Patient's last A1c was   Lab Results   Component Value Date    HGBA1C 9.5 (H) 03/13/2024   A1c slightly improved. Has been measuring BG. Refer to HPI     -  recommended a diet with reduced processed carbs like rice and white bread, reduce sugar/dessert intake and encouraged weight loss.   - counseled to measure blood glucose preferably three times a day, if not at least fasting BG in the morning   - increase Tresiba to 60 units BID (advised to increase by 2 units if BG uncontrolled)   - continue lispro   - d/c Humulin   - continue Jardiance   - follow up one month with endo   - f/up 3 mo

## 2024-05-15 NOTE — PROGRESS NOTES
Population Health Chart Review & Patient Outreach Details      Additional [facility] Health Notes:    [place] VIRI to [facility] - per Dr Ayala stated had done recently.            Updates Requested / Reviewed:      Updated Care Coordination Note and Care Everywhere         Health Maintenance Topics Overdue:      [facility] Score: 3     Cervical Cancer Screening  Eye Exam  Foot Exam                       Health Maintenance Topic(s) Outreach Outcomes & Actions Taken:    Eye Exam - Outreach Outcomes & Actions Taken  : External Records Requested & Care Team Updated if Applicable

## 2024-05-15 NOTE — LETTER
We are seeing __________Fabiana Chanel, 1975_____________  in the clinic today at [facility].    ____Alivia Ayala MD__________ is their Primary Care Provider. When reviewing this patient's chart, they are overdue/due soon for a comprehensive diabetic eye exam. Please supply the following records and information:  Diabetic Eye Exam (within the last 24 months)    Date of Diabetic Eye Exam: __________________    Type of exam:    ____ Retinal Exam    ____ Dilated Eye Exam    Results of exam:  Diabetic Retinopathy (indicate which eye & Lime severity type)    ____ OD - Right Eye (mild, moderate, severe, proliferative)   ____ OS - Left Eye (mild, moderate, severe, proliferative)  Negative Diabetic Retinopathy    ____ OD - Right Eye  ____ OS - Left Eye  Macular Degeneration   NO:  ____ OD - Right Eye    ____ OS - Left Eye  YES:  ____ OD - Right Eye    ____ OS - Left Eye  Cataracts   NO:  ____ OD - Right Eye    ____ OS - Left Eye  YES:  ____ OD - Right Eye    ____ OS - Left Eye  Glaucoma  NO:  ____ OD - Right Eye    ____ OS - Left Eye  YES:  ____ OD - Right Eye    ____ OS - Left Eye    Additional Comments: _______________________________________________________________________  Performing Provider's Signature & Credentials      Please return this document with the office visit note to [facility] at [phone]  Facility: [facility] Fax: [phone]   Date: 05/15/2024 From: Ni   Thank you for taking the best care of our patients!  [facility]     In authorizing the release of the confidential information identified above, I hereby waive all restriction or privileges imposed by law and release [facility] and its affiliates and their staff from any restriction or privilege imposed by lay in connection with the disclosure or release of any professional record, observation, or communication. I do understand that the information that  is released may be subject to re-disclosure by the recipient and may no longer be protected. I understand that my treatment, payment enrollment or eligibility for benefits may not be conditioned on signing this authorization. This authorization may be revoked in writing at any time, except to the extent that [facility] and its affiliates have already acted in reliance on it. Letters to revoke this authorization should be addressed to [facility], Release of Information Department, [address] [place] [id number]. If previously revoked in writing, this authorization will terminate or  upon (state the specific date, event, or condition.

## 2024-05-15 NOTE — TELEPHONE ENCOUNTER
----- Message from Alivia Ayala MD sent at 5/15/2024  9:18 AM CDT -----  Regarding: pap [facility]  Hey she did her pap in [facility] in Feb. Thanks

## 2024-05-15 NOTE — ASSESSMENT & PLAN NOTE
Chronic, stable   Last ECHO 2022: EF 25%, G1LVDD    - has appt with cardiology tomorrow   - cont lasix, jardiance, aldactone, entresto and toprol

## 2024-05-15 NOTE — TELEPHONE ENCOUNTER
Casey MEREDITH to [facility] [place], thanks!

## 2024-05-15 NOTE — ASSESSMENT & PLAN NOTE
Foot exam done. Monofilament sens diminished/absent     - counseled on weekly feet checks, including inspecting the soles of the feet. Advised patient to keep the area between the toes dry and clean. Advised on proper nail cutting. Advised to wear home slippers.

## 2024-05-15 NOTE — PROGRESS NOTES
Population Health Chart Review & Patient Outreach Details      Additional Pop Health Notes:      Upload recent  pap smear and hpv         Updates Requested / Reviewed:      Updated Care Coordination Note, Care Everywhere, and Care Team Updated         Health Maintenance Topics Overdue:      VB Score: 3     Cervical Cancer Screening  Eye Exam  Foot Exam                       Health Maintenance Topic(s) Outreach Outcomes & Actions Taken:    Cervical Cancer Screening - Outreach Outcomes & Actions Taken  : External Records Uploaded & Care Team Updated if Applicable

## 2024-05-15 NOTE — ASSESSMENT & PLAN NOTE
Chronic, stable.   BMP  Lab Results   Component Value Date     01/24/2024    K 3.5 01/24/2024     01/24/2024    CO2 28 01/24/2024    BUN 20 01/24/2024    CREATININE 2.2 (H) 01/24/2024    CALCIUM 9.1 01/24/2024    ANIONGAP 7 (L) 01/24/2024    EGFRNORACEVR 27 (A) 01/24/2024   Discussed results     - Urine Alb:cr

## 2024-05-17 ENCOUNTER — PATIENT MESSAGE (OUTPATIENT)
Dept: PAIN MEDICINE | Facility: CLINIC | Age: 49
End: 2024-05-17
Payer: MEDICARE

## 2024-05-17 RX ORDER — OXYCODONE AND ACETAMINOPHEN 5; 325 MG/1; MG/1
1 TABLET ORAL
Qty: 30 TABLET | Refills: 0 | Status: SHIPPED | OUTPATIENT
Start: 2024-05-17

## 2024-05-17 NOTE — PROGRESS NOTES
Patient requesting refill on oxycodone  Last office visit 04-09-24   shows last refill on 04-10-24  Patient does not have a pain contract on file with [facility]  Patient last UDS 07-06-23 was consistent with current therapy    CODEINE  Not Detected         MORPHINE  Not Detected         6-ACETYLMORPHINE  Not Detected         OXYCODONE  Present         NOROYXCODONE  Present         OXYMORPHONE  Present         NOROXYMORPHONE  Not Detected         HYDROCODONE  Not Detected         NORHYDROCODONE  Not Detected         HYDROMORPHONE  Not Detected         BUPRENORPHINE  Not Detected         NORUBPRENORPHINE  Not Detected         FENTANYL  Not Detected         NORFENTANYL  Not Detected         MEPERIDINE METABOLITE  Not Detected         TAPENTADOL  Not Detected         TAPENTADOL-O-SULF  Not Detected         METHADONE  Not Detected         TRAMADOL  Not Detected         AMPHETAMINE  Not Detected         METHAMPHETAMINE  Not Detected         MDMA- ECSTASY  Not Detected         MDA  Not Detected         MDEA- Irma  Not Detected         METHYLPHENIDATE  Not Detected         PHENTERMINE  Not Detected         BENZOYLECGONINE  Not Detected         ALPRAZOLAM  Not Detected         ALPHA-OH-ALPRAZOLAM  Not Detected         CLONAZEPAM  Not Detected         7-AMINOCLONAZEPAM  Not Detected         DIAZEPAM  Not Detected         NORDIAZEPAM  Not Detected         OXAZEPAM  Not Detected         TEMAZEPAM  Not Detected         Lorazepam  Not Detected         MIDAZOLAM  Not Detected         ZOLPIDEM  Not Detected         BARBITURATES  Not Detected         Creatinine, Urine 20.0 - 400.0 mg/dL 141.4 183.2 R 121.5 R 25.0 R 31.0 R, CM 76.0 R, CM 26.8 R, CM   ETHYL GLUCURONIDE  Not Detected         MARIJUANA METABOLITE  Not Detected         Comment: INTERPRETIVE INFORMATION: Marijuana Metabolite  The cutoff for Marijuana Metabolite (immunoassay) was  changed from 20 ng/mL to 50 ng/mL,  effective May 15,  2023.   PCP  Not Detected         CARISOPRODOL  Not Detected         Comment: The carisoprodol immunoassay has cross-reactivity to  carisoprodol and meprobamate.   Naloxone  Not Detected         Gabapentin  Present         Pregabalin  Not Detected         Alpha-OH-Midazolam  Not Detected         Zolpidem Metabolite  Not Detected         PAIN MANAGEMENT DRUG PANEL  See Below

## 2024-05-20 ENCOUNTER — CLINICAL SUPPORT (OUTPATIENT)
Dept: REHABILITATION | Facility: OTHER | Age: 49
End: 2024-05-20
Attending: STUDENT IN AN ORGANIZED HEALTH CARE EDUCATION/TRAINING PROGRAM
Payer: MEDICARE

## 2024-05-20 DIAGNOSIS — M54.16 LUMBAR RADICULOPATHY: ICD-10-CM

## 2024-05-20 DIAGNOSIS — M25.511 RIGHT SHOULDER PAIN, UNSPECIFIED CHRONICITY: ICD-10-CM

## 2024-05-20 DIAGNOSIS — M51.36 DDD (DEGENERATIVE DISC DISEASE), LUMBAR: ICD-10-CM

## 2024-05-20 DIAGNOSIS — R29.898 DECREASED STRENGTH OF TRUNK AND BACK: ICD-10-CM

## 2024-05-20 DIAGNOSIS — M25.69 DECREASED ROM OF TRUNK AND BACK: Primary | ICD-10-CM

## 2024-05-20 PROCEDURE — 97161 PT EVAL LOW COMPLEX 20 MIN: CPT

## 2024-05-20 PROCEDURE — 97110 THERAPEUTIC EXERCISES: CPT

## 2024-05-20 PROCEDURE — 97530 THERAPEUTIC ACTIVITIES: CPT
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
## 2024-05-21 ENCOUNTER — TELEPHONE (OUTPATIENT)
Dept: ELECTROPHYSIOLOGY | Facility: CLINIC | Age: 49
End: 2024-05-21
Payer: MEDICARE

## 2024-05-22 ENCOUNTER — OFFICE VISIT (OUTPATIENT)
Dept: ELECTROPHYSIOLOGY | Facility: CLINIC | Age: 49
End: 2024-05-22
Payer: MEDICARE

## 2024-05-22 ENCOUNTER — CLINICAL SUPPORT (OUTPATIENT)
Dept: CARDIOLOGY | Facility: HOSPITAL | Age: 49
End: 2024-05-22
Attending: INTERNAL MEDICINE
Payer: MEDICARE

## 2024-05-22 VITALS
HEART RATE: 82 BPM | DIASTOLIC BLOOD PRESSURE: 73 MMHG | HEIGHT: 67 IN | WEIGHT: 261.25 LBS | BODY MASS INDEX: 41 KG/M2 | SYSTOLIC BLOOD PRESSURE: 85 MMHG

## 2024-05-22 DIAGNOSIS — Z79.4 TYPE 2 DIABETES MELLITUS WITH STAGE 4 CHRONIC KIDNEY DISEASE, WITH LONG-TERM CURRENT USE OF INSULIN: ICD-10-CM

## 2024-05-22 DIAGNOSIS — E11.22 CKD STAGE 4 DUE TO TYPE 2 DIABETES MELLITUS: ICD-10-CM

## 2024-05-22 DIAGNOSIS — E78.2 MIXED HYPERLIPIDEMIA: ICD-10-CM

## 2024-05-22 DIAGNOSIS — I48.0 PAROXYSMAL ATRIAL FIBRILLATION: ICD-10-CM

## 2024-05-22 DIAGNOSIS — G47.33 OSA TREATED WITH BIPAP: ICD-10-CM

## 2024-05-22 DIAGNOSIS — N18.4 CKD STAGE 4 DUE TO TYPE 2 DIABETES MELLITUS: ICD-10-CM

## 2024-05-22 DIAGNOSIS — N18.4 TYPE 2 DIABETES MELLITUS WITH STAGE 4 CHRONIC KIDNEY DISEASE, WITH LONG-TERM CURRENT USE OF INSULIN: ICD-10-CM

## 2024-05-22 DIAGNOSIS — E11.42 DIABETIC POLYNEUROPATHY ASSOCIATED WITH TYPE 2 DIABETES MELLITUS: Primary | ICD-10-CM

## 2024-05-22 DIAGNOSIS — E66.01 CLASS 3 SEVERE OBESITY DUE TO EXCESS CALORIES WITH SERIOUS COMORBIDITY AND BODY MASS INDEX (BMI) OF 40.0 TO 44.9 IN ADULT: ICD-10-CM

## 2024-05-22 DIAGNOSIS — I42.8 NICM (NONISCHEMIC CARDIOMYOPATHY): ICD-10-CM

## 2024-05-22 DIAGNOSIS — Z95.810 IMPLANTABLE CARDIOVERTER-DEFIBRILLATOR (ICD) IN SITU: ICD-10-CM

## 2024-05-22 DIAGNOSIS — E21.3 HYPERPARATHYROIDISM: ICD-10-CM

## 2024-05-22 DIAGNOSIS — E11.22 TYPE 2 DIABETES MELLITUS WITH STAGE 4 CHRONIC KIDNEY DISEASE, WITH LONG-TERM CURRENT USE OF INSULIN: ICD-10-CM

## 2024-05-22 LAB
OHS QRS DURATION: 116 MS
OHS QTC CALCULATION: 493 MS

## 2024-05-22 PROCEDURE — 99999 PR PBB SHADOW E&M-EST. PATIENT-LVL II: CPT | Mod: PBBFAC,,, | Performed by: INTERNAL MEDICINE

## 2024-05-22 PROCEDURE — 93283 PRGRMG EVAL IMPLANTABLE DFB: CPT

## 2024-05-22 PROCEDURE — 93283 PRGRMG EVAL IMPLANTABLE DFB: CPT | Mod: 26,,, | Performed by: INTERNAL MEDICINE

## 2024-05-22 PROCEDURE — 1159F MED LIST DOCD IN RCRD: CPT | Mod: CPTII,S$GLB,, | Performed by: INTERNAL MEDICINE

## 2024-05-22 PROCEDURE — 3078F DIAST BP <80 MM HG: CPT | Mod: CPTII,S$GLB,, | Performed by: INTERNAL MEDICINE

## 2024-05-22 PROCEDURE — 1160F RVW MEDS BY RX/DR IN RCRD: CPT | Mod: CPTII,S$GLB,, | Performed by: INTERNAL MEDICINE

## 2024-05-22 PROCEDURE — 3046F HEMOGLOBIN A1C LEVEL >9.0%: CPT | Mod: CPTII,S$GLB,, | Performed by: INTERNAL MEDICINE

## 2024-05-22 PROCEDURE — 3066F NEPHROPATHY DOC TX: CPT | Mod: CPTII,S$GLB,, | Performed by: INTERNAL MEDICINE

## 2024-05-22 PROCEDURE — 99215 OFFICE O/P EST HI 40 MIN: CPT | Mod: S$GLB,,, | Performed by: INTERNAL MEDICINE

## 2024-05-22 PROCEDURE — 93005 ELECTROCARDIOGRAM TRACING: CPT | Mod: S$GLB,,, | Performed by: INTERNAL MEDICINE

## 2024-05-22 PROCEDURE — 3074F SYST BP LT 130 MM HG: CPT | Mod: CPTII,S$GLB,, | Performed by: INTERNAL MEDICINE

## 2024-05-22 PROCEDURE — 93010 ELECTROCARDIOGRAM REPORT: CPT | Mod: S$GLB,,, | Performed by: INTERNAL MEDICINE

## 2024-05-22 PROCEDURE — 3008F BODY MASS INDEX DOCD: CPT | Mod: CPTII,S$GLB,, | Performed by: INTERNAL MEDICINE

## 2024-05-22 PROCEDURE — 4010F ACE/ARB THERAPY RXD/TAKEN: CPT | Mod: CPTII,S$GLB,, | Performed by: INTERNAL MEDICINE

## 2024-05-22 PROCEDURE — 3060F POS MICROALBUMINURIA REV: CPT | Mod: CPTII,S$GLB,, | Performed by: INTERNAL MEDICINE

## 2024-05-22 NOTE — PROGRESS NOTES
"  Subjective:   Patient ID:  Fabiana Chanel is a 48 y.o. female who presents for follow-up of NICM, PAF    Ms. Chanel is a 48 y.o. female with NICM, ICD (4/2018), DM, HTN, MILE, pAF here for follow up.    "Karmen Peraza" is referred by Dr Yanez.  NICM (cath proved; 11/17)  CHF, Class III  DM on meds   hyperlipidemia on meds  HTN on meds  MILE  dysfunctional vaginal bleeding  VT with appropriate ICD shock    PAF hx, on coumadin and xarelto in past, c/b vaginal bleeding  NYHA III sx despite OMT.  Occasional palpitations. No syncope ever. No CP.  3/18 15-20% LVEF. global HK.    Underwent dual chamber ICD implantation on 4/17/2018. EF 15-20% 2018.  6/17/2019: >4 hours AF found on ICD. Eliquis started for CVA prophylaxis. Didn't tolerate xarelto. Later changed to lovenox when LV thrombus found (now resolved).  Good ICD function.    While making bed 8/5/22, had ICD shock. Interrogation showed VT -> ATP fail -> shock success. Feels well since. No recurrence.  Echo 6/22 25%. no thrombus.    5/2024:  Feeling well overall. Exertion tolerance limited by sciatica.  Paced <1%.    My interpretation of today's ECG is NSR    Current Outpatient Medications   Medication Sig    albuterol (PROVENTIL/VENTOLIN HFA) 90 mcg/actuation inhaler Inhale 1-2 puffs into the lungs every 6 (six) hours as needed for Shortness of Breath. Rescue    apixaban (ELIQUIS) 5 mg Tab Take 1 tablet (5 mg total) by mouth 2 (two) times daily.    aspirin 81 MG Chew Take 81 mg by mouth once daily.    BD ULTRA-FINE LORI PEN NEEDLE 32 gauge x 5/32" Ndle USE 1 UNIT TWICE DAILY    BLOOD PRESSURE CUFF Misc 1 kit by Misc.(Non-Drug; Combo Route) route 2 (two) times daily.    blood sugar diagnostic Strp Check blood glucose 3x/day.    diclofenac sodium (VOLTAREN) 1 % Gel Apply 2 g topically 2 (two) times daily.    empagliflozin (JARDIANCE) 10 mg Tab Take 10 mg by mouth once daily.    fluticasone (FLONASE) 50 mcg/actuation nasal spray 1 spray (50 mcg total) by Each Nare " route once daily.    furosemide (LASIX) 40 MG tablet Take 1 tablet (40 mg total) by mouth 2 (two) times daily.    gabapentin (NEURONTIN) 400 MG capsule Take 1 capsule (400 mg total) by mouth 3 (three) times daily as needed (pain).    glimepiride (AMARYL) 4 MG tablet Take 1 tablet (4 mg total) by mouth daily with breakfast.    HUMIRA,CF, PEN 40 mg/0.4 mL PnKt INJECT ONE 40MG INJECTION SUBCUTANEOUSLY EVERY OTHER WEEK    HUMIRA,CF, PEN KIVK-FI-JUCU HS 80 mg/0.8 mL-40 mg/0.4 mL PnKt INJECT ONE 80MG INJECTION SUBCUTANEOUSLY ON DAY 1 THEN INJECT ONE 40MG INJECTION ON DAY 8 THEN INJECT ONE 40MG INJECTION ON DAY 22    HYDROcodone-acetaminophen (NORCO) 5-325 mg per tablet Take 1 tablet by mouth every 6 (six) hours as needed for Pain.    insulin detemir U-100 (LEVEMIR FLEXTOUCH) 100 unit/mL (3 mL) SubQ InPn pen Inject 28 Units into the skin 2 (two) times daily.    ketoconazole (NIZORAL) 2 % cream APPLY TO BREAST TWICE A DAY    lancets Misc Check blood glucose 3x/day.    metoprolol succinate (TOPROL-XL) 100 MG 24 hr tablet Take 1 tablet (100 mg total) by mouth once daily.    rosuvastatin (CRESTOR) 40 MG Tab Take 1 tablet (40 mg total) by mouth every evening.    sacubitril-valsartan (ENTRESTO) 49-51 mg per tablet Take 1 tablet by mouth 2 (two) times daily.    spironolactone (ALDACTONE) 50 MG tablet Take 1 tablet (50 mg total) by mouth once daily.    triamcinolone acetonide 0.1% (KENALOG) 0.1 % cream 2 (two) times daily. Apply to affected area    baclofen (LIORESAL) 10 MG tablet Take 1 tablet (10 mg total) by mouth 3 (three) times daily. (Patient not taking: Reported on 10/10/2019)    blood glucose control, high Soln 1 each by Misc.(Non-Drug; Combo Route) route once. for 1 dose    blood glucose control, low Soln 1 each by Misc.(Non-Drug; Combo Route) route once. for 1 dose    blood-glucose meter (TRUE METRIX AIR GLUCOSE METER) Misc 1 each by Misc.(Non-Drug; Combo Route) route 3 (three) times daily.    cetirizine (ZYRTEC) 10 MG  "tablet Take 1 tablet (10 mg total) by mouth once daily. for 14 days (Patient taking differently: Take 10 mg by mouth daily as needed. )    ibuprofen (ADVIL,MOTRIN) 600 MG tablet Take 1 tablet (600 mg total) by mouth every 8 (eight) hours as needed for Pain. (Patient not taking: Reported on 10/10/2019)     No current facility-administered medications for this visit.        Review of Systems   Constitutional: Negative. Negative for malaise/fatigue.   HENT: Negative.  Negative for ear pain and tinnitus.    Eyes:  Negative for blurred vision.   Cardiovascular:  Positive for dyspnea on exertion. Negative for chest pain, irregular heartbeat, leg swelling, near-syncope, palpitations and syncope.   Respiratory: Negative.  Negative for shortness of breath.    Endocrine: Negative.  Negative for polyuria.   Hematologic/Lymphatic: Negative for bleeding problem. Does not bruise/bleed easily.   Skin: Negative.  Negative for rash.   Musculoskeletal: Negative.  Negative for joint pain, muscle weakness and myalgias.   Gastrointestinal: Negative.  Negative for abdominal pain, change in bowel habit, hematemesis, hematochezia and nausea.   Genitourinary:  Positive for non-menstrual bleeding. Negative for frequency and hematuria.   Neurological: Negative.  Negative for dizziness, light-headedness and weakness.   Psychiatric/Behavioral: Negative.  Negative for altered mental status and depression. The patient is not nervous/anxious.    Allergic/Immunologic: Negative for environmental allergies and persistent infections.     Objective:        BP (!) 85/73   Pulse 82   Ht 5' 7" (1.702 m)   Wt 118.5 kg (261 lb 3.9 oz)   BMI 40.92 kg/m²     Physical Exam  Vitals and nursing note reviewed.   Constitutional:       Appearance: Normal appearance. She is well-developed.   HENT:      Head: Normocephalic and atraumatic.      Nose: Nose normal.   Eyes:      Conjunctiva/sclera: Conjunctivae normal.      Pupils: Pupils are equal, round, and " reactive to light.   Neck:      Thyroid: No thyroid mass or thyromegaly.      Trachea: No tracheal deviation.   Cardiovascular:      Rate and Rhythm: Normal rate and regular rhythm.      Pulses:           Radial pulses are 2+ on the right side and 2+ on the left side.   Pulmonary:      Effort: Pulmonary effort is normal. No respiratory distress.      Breath sounds: No wheezing.   Abdominal:      General: There is no distension.   Musculoskeletal:         General: Normal range of motion.      Cervical back: Normal range of motion. No edema.   Skin:     General: Skin is warm and dry.      Findings: No erythema or rash.   Neurological:      Mental Status: She is alert and oriented to person, place, and time.      Coordination: Coordination normal.   Psychiatric:         Speech: Speech normal.         Behavior: Behavior normal. Behavior is cooperative.         Thought Content: Thought content normal.       Lab Results   Component Value Date     01/24/2024    K 3.5 01/24/2024    MG 2.3 08/06/2022    BUN 20 01/24/2024    CREATININE 2.2 (H) 01/24/2024    ALT 14 01/24/2024    AST 13 01/24/2024    HGB 12.8 01/24/2024    HCT 42.7 01/24/2024    HCT 47 03/03/2023    TSH 1.355 10/21/2022    LDLCALC 99.8 03/13/2024       Recent Labs   Lab 08/05/22  2243   INR 1.0       Assessment:     1. Diabetic polyneuropathy associated with type 2 diabetes mellitus    2. Implantable cardioverter-defibrillator (ICD) in situ    3. Mixed hyperlipidemia    4. NICM (nonischemic cardiomyopathy)    5. CKD stage 4 due to type 2 diabetes mellitus    6. Class 3 severe obesity due to excess calories with serious comorbidity and body mass index (BMI) of 40.0 to 44.9 in adult    7. Type 2 diabetes mellitus with stage 4 chronic kidney disease, with long-term current use of insulin    8. Hyperparathyroidism    9. MILE treated with BiPAP      Plan:     In summary, Ms. Chanel is a 48 y.o. female with NICM, ICD (4/2018), DM, HTN, MILE, pAF here for follow  up.    Ms. Chanel is doing well from a device perspective with stable lead and device function. No recent  arrhythmia noted. pAF on device and now on eliquis for CVA prophylaxis. No RV pacing.    Continue f/u with Dr. Yanez re: CHF. Echo in preparation for her visit with him.  Now that thrombus resolved, back to eliquis.  Cont GDMT. Reserve amio for now, given pt youth; consider it if VT recurs.  Return in 1 year, or earlier prn.    HPI

## 2024-05-23 ENCOUNTER — PATIENT OUTREACH (OUTPATIENT)
Dept: ADMINISTRATIVE | Facility: HOSPITAL | Age: 49
End: 2024-05-23
Payer: MEDICARE

## 2024-05-23 LAB
OHS CV AF BURDEN PERCENT: < 1
OHS CV DC REMOTE DEVICE TYPE: NORMAL
OHS CV RV PACING PERCENT: 1 %

## 2024-05-23 NOTE — PROGRESS NOTES
Population Health Chart Review & Patient Outreach Details      Additional [facility] Health Notes:      PATIENT ON FREQ ED REPORT - ED VISIT 3/20/24 - SEEN DR ABDUL 4/15/2024 AND 5/15/2024          Updates Requested / Reviewed:      Updated Care Coordination Note and Care Everywhere         Health Maintenance Topics Overdue:      VB Score: 1     Eye Exam                       Health Maintenance Topic(s) Outreach Outcomes & Actions Taken:    Primary Care Appt - Outreach Outcomes & Actions Taken  : ED VISIT 3/20/24 - SEEN DR ABDUL 4/15/2024 AND 5/15/2024

## 2024-05-31 ENCOUNTER — HOSPITAL ENCOUNTER (OUTPATIENT)
Dept: CARDIOLOGY | Facility: HOSPITAL | Age: 49
Discharge: HOME | End: 2024-05-31
Attending: INTERNAL MEDICINE
Payer: MEDICARE

## 2024-05-31 ENCOUNTER — HOSPITAL ENCOUNTER (OUTPATIENT)
Dept: RADIOLOGY | Facility: HOSPITAL | Age: 49
Discharge: HOME | End: 2024-05-31
Attending: INTERNAL MEDICINE
Payer: MEDICARE

## 2024-05-31 DIAGNOSIS — I42.8 NICM (NONISCHEMIC CARDIOMYOPATHY): ICD-10-CM

## 2024-05-31 DIAGNOSIS — Z95.810 IMPLANTABLE CARDIOVERTER-DEFIBRILLATOR (ICD) IN SITU: ICD-10-CM

## 2024-05-31 LAB
ASCENDING AORTA: 2.7 CM
AV INDEX (PROSTH): 0.51
AV MEAN GRADIENT: 5 MMHG
AV PEAK GRADIENT: 7 MMHG
AV VALVE AREA BY VELOCITY RATIO: 1.79 CM²
AV VALVE AREA: 1.87 CM²
AV VELOCITY RATIO: 0.49
CV ECHO LV RWT: 0.31 CM
DOP CALC AO PEAK VEL: 1.32 M/S
DOP CALC AO VTI: 23.9 CM
DOP CALC LVOT AREA: 3.6 CM2
DOP CALC LVOT DIAMETER: 2.15 CM
DOP CALC LVOT PEAK VEL: 0.65 M/S
DOP CALC LVOT STROKE VOLUME: 44.63 CM3
DOP CALCLVOT PEAK VEL VTI: 12.3 CM
E WAVE DECELERATION TIME: 167.23 MSEC
E/A RATIO: 0.84
E/E' RATIO: 12.18 M/S
ECHO LV POSTERIOR WALL: 0.97 CM (ref 0.6–1.1)
FRACTIONAL SHORTENING: 8 % (ref 28–44)
INTERVENTRICULAR SEPTUM: 1.01 CM (ref 0.6–1.1)
IVC DIAMETER: 1.69 CM
IVRT: 116.08 MSEC
LA MAJOR: 5.3 CM
LA MINOR: 4.9 CM
LA WIDTH: 3.4 CM
LEFT ATRIUM SIZE: 4.23 CM
LEFT ATRIUM VOLUME: 62.25 CM3
LEFT INTERNAL DIMENSION IN SYSTOLE: 5.8 CM (ref 2.1–4)
LEFT VENTRICLE DIASTOLIC VOLUME: 202.35 ML
LEFT VENTRICLE SYSTOLIC VOLUME: 166.35 ML
LEFT VENTRICULAR INTERNAL DIMENSION IN DIASTOLE: 6.32 CM (ref 3.5–6)
LEFT VENTRICULAR MASS: 266.3 G
LV LATERAL E/E' RATIO: 11.17 M/S
LV SEPTAL E/E' RATIO: 13.4 M/S
LVOT MG: 1.04 MMHG
LVOT MV: 0.48 CM/S
MV PEAK A VEL: 0.8 M/S
MV PEAK E VEL: 0.67 M/S
MV STENOSIS PRESSURE HALF TIME: 48.5 MS
MV VALVE AREA P 1/2 METHOD: 4.54 CM2
OHS CV RV/LV RATIO: 0.46 CM
PISA TR MAX VEL: 2.57 M/S
PV PEAK GRADIENT: 6 MMHG
PV PEAK VELOCITY: 1.2 M/S
RA MAJOR: 4.8 CM
RA PRESSURE ESTIMATED: 3 MMHG
RA WIDTH: 3.3 CM
RIGHT VENTRICULAR END-DIASTOLIC DIMENSION: 2.9 CM
RV TB RVSP: 6 MMHG
RV TISSUE DOPPLER FREE WALL SYSTOLIC VELOCITY 1 (APICAL 4 CHAMBER VIEW): 8.17 CM/S
SINUS: 3.26 CM
STJ: 2.43 CM
TDI LATERAL: 0.06 M/S
TDI SEPTAL: 0.05 M/S
TDI: 0.06 M/S
TR MAX PG: 26 MMHG
TRICUSPID ANNULAR PLANE SYSTOLIC EXCURSION: 2.34 CM
TV PEAK GRADIENT: 1 MMHG
TV REST PULMONARY ARTERY PRESSURE: 29 MMHG

## 2024-05-31 PROCEDURE — 93306 TTE W/DOPPLER COMPLETE: CPT

## 2024-05-31 PROCEDURE — 77067 SCR MAMMO BI INCL CAD: CPT | Mod: 26,,, | Performed by: RADIOLOGY

## 2024-05-31 PROCEDURE — 77067 SCR MAMMO BI INCL CAD: CPT | Mod: TC

## 2024-05-31 PROCEDURE — 93306 TTE W/DOPPLER COMPLETE: CPT | Mod: 26,,, | Performed by: INTERNAL MEDICINE

## 2024-05-31 PROCEDURE — 77063 BREAST TOMOSYNTHESIS BI: CPT | Mod: 26,,, | Performed by: RADIOLOGY

## 2024-06-04 ENCOUNTER — CLINICAL SUPPORT (OUTPATIENT)
Dept: CARDIOLOGY | Facility: HOSPITAL | Age: 49
End: 2024-06-04
Payer: MEDICARE

## 2024-06-04 ENCOUNTER — CLINICAL SUPPORT (OUTPATIENT)
Dept: CARDIOLOGY | Facility: HOSPITAL | Age: 49
End: 2024-06-04
Attending: INTERNAL MEDICINE
Payer: MEDICARE

## 2024-06-04 DIAGNOSIS — Z95.810 PRESENCE OF AUTOMATIC (IMPLANTABLE) CARDIAC DEFIBRILLATOR: ICD-10-CM

## 2024-06-04 DIAGNOSIS — I42.9 CARDIOMYOPATHY, UNSPECIFIED: ICD-10-CM

## 2024-06-04 PROCEDURE — 93296 REM INTERROG EVL PM/IDS: CPT | Performed by: INTERNAL MEDICINE

## 2024-06-04 PROCEDURE — 93295 DEV INTERROG REMOTE 1/2/MLT: CPT | Mod: ,,, | Performed by: INTERNAL MEDICINE

## 2024-06-06 ENCOUNTER — LAB VISIT (OUTPATIENT)
Dept: LAB | Facility: HOSPITAL | Age: 49
End: 2024-06-06
Attending: NURSE PRACTITIONER
Payer: MEDICARE

## 2024-06-06 DIAGNOSIS — E11.65 TYPE 2 DIABETES MELLITUS WITH HYPERGLYCEMIA, WITH LONG-TERM CURRENT USE OF INSULIN: ICD-10-CM

## 2024-06-06 DIAGNOSIS — Z79.4 TYPE 2 DIABETES MELLITUS WITH HYPERGLYCEMIA, WITH LONG-TERM CURRENT USE OF INSULIN: ICD-10-CM

## 2024-06-06 LAB
ESTIMATED AVG GLUCOSE: 214 MG/DL (ref 68–131)
HBA1C MFR BLD: 9.1 % (ref 4–5.6)

## 2024-06-06 PROCEDURE — 83036 HEMOGLOBIN GLYCOSYLATED A1C: CPT | Performed by: NURSE PRACTITIONER

## 2024-06-06 PROCEDURE — 36415 COLL VENOUS BLD VENIPUNCTURE: CPT | Performed by: NURSE PRACTITIONER

## 2024-06-12 ENCOUNTER — HOSPITAL ENCOUNTER (EMERGENCY)
Facility: HOSPITAL | Age: 49
Discharge: HOME | End: 2024-06-13
Attending: EMERGENCY MEDICINE
Payer: MEDICARE

## 2024-06-12 DIAGNOSIS — R06.02 SOB (SHORTNESS OF BREATH): ICD-10-CM

## 2024-06-12 DIAGNOSIS — J44.1 COPD WITH ACUTE EXACERBATION: ICD-10-CM

## 2024-06-12 DIAGNOSIS — J02.9 PHARYNGITIS, UNSPECIFIED ETIOLOGY: Primary | ICD-10-CM

## 2024-06-12 LAB
BASOPHILS # BLD AUTO: 0.03 K/UL (ref 0–0.2)
BASOPHILS NFR BLD: 0.4 % (ref 0–1.9)
CTP QC/QA: YES
DIFFERENTIAL METHOD BLD: ABNORMAL
EOSINOPHIL # BLD AUTO: 0.2 K/UL (ref 0–0.5)
EOSINOPHIL NFR BLD: 3.4 % (ref 0–8)
ERYTHROCYTE [DISTWIDTH] IN BLOOD BY AUTOMATED COUNT: 17 % (ref 11.5–14.5)
HCT VFR BLD AUTO: 44.6 % (ref 37–48.5)
HGB BLD-MCNC: 13.1 G/DL (ref 12–16)
IMM GRANULOCYTES # BLD AUTO: 0.03 K/UL (ref 0–0.04)
IMM GRANULOCYTES NFR BLD AUTO: 0.4 % (ref 0–0.5)
LYMPHOCYTES # BLD AUTO: 2.4 K/UL (ref 1–4.8)
LYMPHOCYTES NFR BLD: 34.3 % (ref 18–48)
MCH RBC QN AUTO: 27.1 PG (ref 27–31)
MCHC RBC AUTO-ENTMCNC: 29.4 G/DL (ref 32–36)
MCV RBC AUTO: 92 FL (ref 82–98)
MOLECULAR STREP A: NEGATIVE
MONOCYTES # BLD AUTO: 0.5 K/UL (ref 0.3–1)
MONOCYTES NFR BLD: 7.5 % (ref 4–15)
NEUTROPHILS # BLD AUTO: 3.8 K/UL (ref 1.8–7.7)
NEUTROPHILS NFR BLD: 54 % (ref 38–73)
NRBC BLD-RTO: 0 /100 WBC
PLATELET # BLD AUTO: 268 K/UL (ref 150–450)
PMV BLD AUTO: 9.7 FL (ref 9.2–12.9)
POC MOLECULAR INFLUENZA A AGN: NEGATIVE
POC MOLECULAR INFLUENZA B AGN: NEGATIVE
POCT GLUCOSE: 104 MG/DL (ref 70–110)
RBC # BLD AUTO: 4.84 M/UL (ref 4–5.4)
SARS-COV-2 RDRP RESP QL NAA+PROBE: NEGATIVE
WBC # BLD AUTO: 7.06 K/UL (ref 3.9–12.7)

## 2024-06-12 PROCEDURE — 93010 ELECTROCARDIOGRAM REPORT: CPT | Mod: ,,, | Performed by: INTERNAL MEDICINE

## 2024-06-12 PROCEDURE — 80053 COMPREHEN METABOLIC PANEL: CPT

## 2024-06-12 PROCEDURE — 96372 THER/PROPH/DIAG INJ SC/IM: CPT

## 2024-06-12 PROCEDURE — 87651 STREP A DNA AMP PROBE: CPT

## 2024-06-12 PROCEDURE — 94640 AIRWAY INHALATION TREATMENT: CPT | Mod: XB

## 2024-06-12 PROCEDURE — 99285 EMERGENCY DEPT VISIT HI MDM: CPT | Mod: 25

## 2024-06-12 PROCEDURE — 85025 COMPLETE CBC W/AUTO DIFF WBC: CPT

## 2024-06-12 PROCEDURE — 87502 INFLUENZA DNA AMP PROBE: CPT

## 2024-06-12 PROCEDURE — 93005 ELECTROCARDIOGRAM TRACING: CPT

## 2024-06-12 PROCEDURE — 83880 ASSAY OF NATRIURETIC PEPTIDE: CPT

## 2024-06-12 PROCEDURE — 94761 N-INVAS EAR/PLS OXIMETRY MLT: CPT

## 2024-06-12 PROCEDURE — 87070 CULTURE OTHR SPECIMN AEROBIC: CPT

## 2024-06-12 PROCEDURE — 63600175 PHARM REV CODE 636 W HCPCS

## 2024-06-12 PROCEDURE — 84484 ASSAY OF TROPONIN QUANT: CPT

## 2024-06-12 PROCEDURE — 82962 GLUCOSE BLOOD TEST: CPT

## 2024-06-12 PROCEDURE — 25000242 PHARM REV CODE 250 ALT 637 W/ HCPCS

## 2024-06-12 PROCEDURE — 87635 SARS-COV-2 COVID-19 AMP PRB: CPT

## 2024-06-12 RX ORDER — ACETAMINOPHEN 500 MG
500 TABLET ORAL EVERY 6 HOURS PRN
Qty: 30 TABLET | Refills: 0 | Status: SHIPPED | OUTPATIENT
Start: 2024-06-12

## 2024-06-12 RX ORDER — CETIRIZINE HYDROCHLORIDE 10 MG/1
10 TABLET ORAL DAILY
Qty: 30 TABLET | Refills: 0 | Status: SHIPPED | OUTPATIENT
Start: 2024-06-12 | End: 2024-07-12

## 2024-06-12 RX ORDER — ALBUTEROL SULFATE 90 UG/1
1-2 AEROSOL, METERED RESPIRATORY (INHALATION) EVERY 6 HOURS PRN
Qty: 18 G | Refills: 0 | Status: SHIPPED | OUTPATIENT
Start: 2024-06-12 | End: 2025-06-12

## 2024-06-12 RX ORDER — AMOXICILLIN AND CLAVULANATE POTASSIUM 875; 125 MG/1; MG/1
1 TABLET, FILM COATED ORAL 2 TIMES DAILY
Qty: 20 TABLET | Refills: 0 | Status: SHIPPED | OUTPATIENT
Start: 2024-06-12 | End: 2024-06-22

## 2024-06-12 RX ORDER — IPRATROPIUM BROMIDE AND ALBUTEROL SULFATE 2.5; .5 MG/3ML; MG/3ML
3 SOLUTION RESPIRATORY (INHALATION)
Status: DISCONTINUED | OUTPATIENT
Start: 2024-06-12 | End: 2024-06-12

## 2024-06-12 RX ORDER — IPRATROPIUM BROMIDE AND ALBUTEROL SULFATE 2.5; .5 MG/3ML; MG/3ML
3 SOLUTION RESPIRATORY (INHALATION)
Status: COMPLETED | OUTPATIENT
Start: 2024-06-12 | End: 2024-06-12

## 2024-06-12 RX ORDER — DEXAMETHASONE SODIUM PHOSPHATE 4 MG/ML
6 INJECTION, SOLUTION INTRA-ARTICULAR; INTRALESIONAL; INTRAMUSCULAR; INTRAVENOUS; SOFT TISSUE
Status: COMPLETED | OUTPATIENT
Start: 2024-06-12 | End: 2024-06-12

## 2024-06-12 RX ADMIN — IPRATROPIUM BROMIDE AND ALBUTEROL SULFATE 3 ML: 2.5; .5 SOLUTION RESPIRATORY (INHALATION) at 09:06

## 2024-06-12 RX ADMIN — DEXAMETHASONE SODIUM PHOSPHATE 6 MG: 4 INJECTION INTRA-ARTICULAR; INTRALESIONAL; INTRAMUSCULAR; INTRAVENOUS; SOFT TISSUE at 11:06

## 2024-06-12 RX ADMIN — IPRATROPIUM BROMIDE AND ALBUTEROL SULFATE 3 ML: 2.5; .5 SOLUTION RESPIRATORY (INHALATION) at 10:06

## 2024-06-13 VITALS
DIASTOLIC BLOOD PRESSURE: 77 MMHG | RESPIRATION RATE: 20 BRPM | TEMPERATURE: 98 F | OXYGEN SATURATION: 95 % | BODY MASS INDEX: 40.81 KG/M2 | HEIGHT: 67 IN | HEART RATE: 106 BPM | SYSTOLIC BLOOD PRESSURE: 138 MMHG | WEIGHT: 260 LBS

## 2024-06-13 LAB
ALBUMIN SERPL BCP-MCNC: 3.6 G/DL (ref 3.5–5.2)
ALP SERPL-CCNC: 83 U/L (ref 55–135)
ALT SERPL W/O P-5'-P-CCNC: 20 U/L (ref 10–44)
ANION GAP SERPL CALC-SCNC: 9 MMOL/L (ref 8–16)
APTT PPP: 22.7 SEC (ref 21–32)
AST SERPL-CCNC: 18 U/L (ref 10–40)
BILIRUB SERPL-MCNC: 0.4 MG/DL (ref 0.1–1)
BNP SERPL-MCNC: 20 PG/ML (ref 0–99)
BUN SERPL-MCNC: 19 MG/DL (ref 6–20)
CALCIUM SERPL-MCNC: 9.8 MG/DL (ref 8.7–10.5)
CHLORIDE SERPL-SCNC: 101 MMOL/L (ref 95–110)
CO2 SERPL-SCNC: 31 MMOL/L (ref 23–29)
CREAT SERPL-MCNC: 1.9 MG/DL (ref 0.5–1.4)
D DIMER PPP IA.FEU-MCNC: 0.39 MG/L FEU
EST. GFR  (NO RACE VARIABLE): 32 ML/MIN/1.73 M^2
GLUCOSE SERPL-MCNC: 142 MG/DL (ref 70–110)
INR PPP: 1 (ref 0.8–1.2)
OHS CV AF BURDEN PERCENT: < 1
OHS CV DC REMOTE DEVICE TYPE: NORMAL
OHS CV RV PACING PERCENT: 1 %
OHS QRS DURATION: 116 MS
OHS QTC CALCULATION: 492 MS
POTASSIUM SERPL-SCNC: 3.2 MMOL/L (ref 3.5–5.1)
PROT SERPL-MCNC: 7.9 G/DL (ref 6–8.4)
PROTHROMBIN TIME: 10.5 SEC (ref 9–12.5)
SODIUM SERPL-SCNC: 141 MMOL/L (ref 136–145)
TROPONIN I SERPL DL<=0.01 NG/ML-MCNC: <0.006 NG/ML (ref 0–0.03)

## 2024-06-13 PROCEDURE — 25000003 PHARM REV CODE 250: Performed by: STUDENT IN AN ORGANIZED HEALTH CARE EDUCATION/TRAINING PROGRAM

## 2024-06-13 PROCEDURE — 85379 FIBRIN DEGRADATION QUANT: CPT | Performed by: STUDENT IN AN ORGANIZED HEALTH CARE EDUCATION/TRAINING PROGRAM

## 2024-06-13 PROCEDURE — 85610 PROTHROMBIN TIME: CPT | Performed by: STUDENT IN AN ORGANIZED HEALTH CARE EDUCATION/TRAINING PROGRAM

## 2024-06-13 PROCEDURE — 85730 THROMBOPLASTIN TIME PARTIAL: CPT | Performed by: STUDENT IN AN ORGANIZED HEALTH CARE EDUCATION/TRAINING PROGRAM

## 2024-06-13 RX ORDER — ALUMINUM HYDROXIDE, MAGNESIUM HYDROXIDE, AND SIMETHICONE 1200; 120; 1200 MG/30ML; MG/30ML; MG/30ML
30 SUSPENSION ORAL ONCE
Status: COMPLETED | OUTPATIENT
Start: 2024-06-13 | End: 2024-06-13

## 2024-06-13 RX ORDER — LIDOCAINE HYDROCHLORIDE 20 MG/ML
15 SOLUTION OROPHARYNGEAL ONCE
Status: COMPLETED | OUTPATIENT
Start: 2024-06-13 | End: 2024-06-13

## 2024-06-13 RX ADMIN — LIDOCAINE HYDROCHLORIDE 15 ML: 20 SOLUTION ORAL at 12:06

## 2024-06-13 RX ADMIN — ALUMINUM HYDROXIDE, MAGNESIUM HYDROXIDE, AND SIMETHICONE 30 ML: 200; 200; 20 SUSPENSION ORAL at 12:06

## 2024-06-13 NOTE — ED PROVIDER NOTES
Encounter Date: 6/12/2024       History     Chief Complaint   Patient presents with    Shortness of Breath     X1 hour pta. Hx COPD, states feels like exacerbation. Used albuterol inhaler with some relief. Pt also reports had several episodes of N/V en route to ED. Denies abd pain.      The history is provided by the patient and medical records.   48-year-old female past medical history of diabetes, CHF, hypertension, COPD, hyperlipidemia, AFib taking Eliquis presenting to the emergency department today with a complaint of nausea, vomiting, cough, congestion, runny nose, sore throat, shortness for breath with cough since Monday.  Patient states within the last hours she has been coughing a lot and feels like she may be having a exacerbation of her COPD.  Endorses taking albuterol prior to arrival without significant relief.  Denies any other complaints at this time.  Denies any fever, chest pain, abdominal pain, headache, dizziness, weakness, urinary symptoms.  Review of patient's allergies indicates:   Allergen Reactions    Metformin      Diarrhea on metformin XR     Pneumococcal 23-abimbola ps vaccine     Trulicity [dulaglutide] Diarrhea     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    Pneumonia due to COVID-19 virus 04/28/2021    Psoriasis     Sleep apnea     Ventricular tachycardia 08/06/2022     Past Surgical History:   Procedure Laterality Date    CARDIAC CATHETERIZATION      COLONOSCOPY      COLONOSCOPY N/A 3/9/2022    Procedure: COLONOSCOPY;  Surgeon: Vielka Burrell MD;  Location: [facility];   Service: Endoscopy;  Laterality: N/A;    DILATION AND CURETTAGE OF UTERUS      EPIDURAL STEROID INJECTION N/A 8/11/2023    Procedure: INJECTION, STEROID, EPIDURAL, L5/S1 SOONER DATE    clear to hold Eliquis;  Surgeon: Jed Yeager MD;  Location: [facility] [place] [facility];  Service: Pain Management;  Laterality: N/A;    ESOPHAGOGASTRODUODENOSCOPY N/A 5/9/2019    Procedure: EGD (ESOPHAGOGASTRODUODENOSCOPY);  Surgeon: Vielka Burrell MD;  Location: [facility];  Service: Endoscopy;  Laterality: N/A;    TRANSFORAMINAL EPIDURAL INJECTION OF STEROID N/A 1/6/2022    Procedure: Transforaminal ANKITA L4/L5 L5/S1;  Surgeon: Jed Yeager MD;  Location: [facility] [place] [facility];  Service: Pain Management;  Laterality: N/A;    TRANSFORAMINAL EPIDURAL INJECTION OF STEROID Right 12/1/2022    Procedure: INJECTION, STEROID, EPIDURAL, TRANSFORAMINAL APPROACH, RIGHT L4/L5 & L5/S1 CONTRAST;  Surgeon: Jed Yeager MD;  Location: [facility] [place] [facility];  Service: Pain Management;  Laterality: Right;    TRANSFORAMINAL EPIDURAL INJECTION OF STEROID Right 4/14/2023    Procedure: INJECTION, STEROID, EPIDURAL, TRANSFORAMINAL APPROACH RIGHT S1;  Surgeon: Jed Yeager MD;  Location: [facility] [place] [facility];  Service: Pain Management;  Laterality: Right;  3/14 AUTH    TRANSFORAMINAL EPIDURAL INJECTION OF STEROID Right 1/12/2024    Procedure: LUMBAR TRANSFORAMINAL RIGHT L5/S1 AND S1 *ELIQUIS CLEARANCE IN CHART*;  Surgeon: Jed Yeager MD;  Location: [facility] [place] [facility];  Service: Pain Management;  Laterality: Right;  [phone]     Family History   Problem Relation Name Age of Onset    Hypertension Mother      Hypertension Father      Diabetes Father      Diabetes Maternal Grandmother      Diabetes Paternal Grandmother      Breast cancer Neg Hx      Colon cancer Neg Hx      Ovarian cancer Neg Hx       Social History     Tobacco Use    Smoking status: Never    Smokeless tobacco: Never    Tobacco comments:     smokes cigars on occasion   Substance Use Topics    Alcohol  use: Not Currently     Comment: occasional    Drug use: Not Currently     Types: Marijuana     Comment: occ     Review of Systems   Constitutional:  Negative for chills, diaphoresis, fatigue and fever.   HENT:  Positive for congestion, rhinorrhea and sore throat. Negative for trouble swallowing.    Eyes:  Negative for redness and visual disturbance.   Respiratory:  Positive for cough and shortness of breath.    Cardiovascular:  Negative for chest pain, palpitations and leg swelling.   Gastrointestinal:  Negative for abdominal pain, diarrhea, nausea and vomiting.   Genitourinary:  Negative for difficulty urinating, dysuria, flank pain and frequency.   Musculoskeletal:  Negative for arthralgias, back pain, myalgias, neck pain and neck stiffness.   Skin:  Negative for rash.   Neurological:  Negative for dizziness, weakness, light-headedness and headaches.   Hematological:  Does not bruise/bleed easily.       Physical Exam     Initial Vitals [06/12/24 1841]   BP Pulse Resp Temp SpO2   118/69 88 19 98.2 °F (36.8 °C) 96 %      MAP       --         Physical Exam    Nursing note and vitals reviewed.  Constitutional: She appears well-developed and well-nourished. She is not diaphoretic. She does not appear ill. No distress.   HENT:   Head: Normocephalic and atraumatic.   Right Ear: Tympanic membrane, external ear and ear canal normal.   Left Ear: Tympanic membrane, external ear and ear canal normal.   Nose: Mucosal edema and rhinorrhea (clear) present. Right sinus exhibits no maxillary sinus tenderness and no frontal sinus tenderness. Left sinus exhibits no maxillary sinus tenderness and no frontal sinus tenderness.   Mouth/Throat: Uvula is midline and mucous membranes are normal. No trismus in the jaw. No uvula swelling. Oropharyngeal exudate and posterior oropharyngeal erythema present. No posterior oropharyngeal edema or tonsillar abscesses.   Postnasal drip noted.  Uvula midline without any erythema or swelling.  No  submandibular or sublingual swelling or erythema.  No trismus.  Normal jaw occlusion.  No evidence of tonsillar abscess.  No muffled voice.    Patient is tolerating secretions without difficulty.   Patient is speaking in full sentences on exam without difficulty.  There is no postauricular swelling, or overlying erythema or tenderness to palpation over mastoids bilaterally.   Eyes: Conjunctivae and EOM are normal. Pupils are equal, round, and reactive to light. Right eye exhibits no discharge. Left eye exhibits no discharge.   Neck: Neck supple. No tracheal tenderness present. No JVD present.   Normal range of motion.   Full passive range of motion without pain.     Cardiovascular:  Normal rate, regular rhythm, normal heart sounds and normal pulses.     Exam reveals no distant heart sounds and no friction rub.       Pulmonary/Chest: Effort normal and breath sounds normal. No accessory muscle usage. No tachypnea and no bradypnea. No respiratory distress. She has no wheezes. She has no rhonchi. She has no rales. She exhibits no tenderness and no bony tenderness.   Abdominal: Abdomen is soft and flat. Bowel sounds are normal. She exhibits no distension, no pulsatile midline mass and no mass. There is no splenomegaly or hepatomegaly. There is no abdominal tenderness.   No right CVA tenderness.  No left CVA tenderness. There is no rebound and no guarding.   Musculoskeletal:         General: Normal range of motion.      Cervical back: Normal, full passive range of motion without pain, normal range of motion and neck supple.      Thoracic back: Normal.      Lumbar back: Normal.      Right lower leg: Normal.      Left lower leg: Normal.     Lymphadenopathy:        Head (right side): No submental, no submandibular, no tonsillar, no preauricular, no posterior auricular and no occipital adenopathy present.        Head (left side): No submental, no submandibular, no tonsillar, no preauricular, no posterior auricular and no  occipital adenopathy present.     She has cervical adenopathy.        Right cervical: Superficial cervical adenopathy present. No deep cervical and no posterior cervical adenopathy present.       Left cervical: Superficial cervical adenopathy present. No deep cervical and no posterior cervical adenopathy present.   Neurological: She is alert and oriented to person, place, and time. She has normal strength. No cranial nerve deficit or sensory deficit.   Skin: Skin is warm and dry. Capillary refill takes less than 2 seconds. No bruising, no ecchymosis and no rash noted. No erythema. No pallor.   Psychiatric: She has a normal mood and affect. Her speech is normal and behavior is normal. Thought content normal.         ED Course   Procedures  Labs Reviewed   CULTURE, RESPIRATORY  - THROAT   CBC W/ AUTO DIFFERENTIAL   COMPREHENSIVE METABOLIC PANEL   TROPONIN I   B-TYPE NATRIURETIC PEPTIDE   D DIMER, QUANTITATIVE   PROTIME-INR   APTT   POCT STREP A MOLECULAR   POCT INFLUENZA A/B MOLECULAR   SARS-COV-2 RDRP GENE   POCT GLUCOSE          Imaging Results              X-Ray Chest AP Portable (Final result)  Result time 06/12/24 22:32:53      Final result by Rober Gonsales MD (06/12/24 22:32:53)                   Impression:      As above.      Electronically signed by: Rober Gonsales MD  Date:    06/12/2024  Time:    22:32               Narrative:    EXAMINATION:  XR CHEST AP PORTABLE    CLINICAL HISTORY:  Shortness of breath    TECHNIQUE:  Single frontal view of the chest was performed.    COMPARISON:  January 2023.    FINDINGS:  Cardiac silhouette is stable in size. Lungs are symmetrically expanded.  Left-sided pacer device obscures the left lower lung zone and left costophrenic angle.  Difficult to exclude potential left basilar opacity or small left pleural effusion.  Lungs otherwise no evidence of focal consolidative process, pneumothorax, or large right or left-sided pleural effusion.  No acute osseous abnormality  identified.                                       Medications   albuterol-ipratropium 2.5 mg-0.5 mg/3 mL nebulizer solution 3 mL (3 mLs Nebulization Given 6/12/24 2213)   dexAMETHasone injection 6 mg (6 mg Intramuscular Given 6/12/24 2320)     Medical Decision Making  48-year-old female past medical history of diabetes, CHF, hypertension, COPD, hyperlipidemia, AFib taking Eliquis presenting to the emergency department today with a complaint of nausea, vomiting, cough, congestion, runny nose, sore throat, shortness for breath with cough since Monday.  Patient states within the last hours she has been coughing a lot and feels like she may be having a exacerbation of her COPD.  Endorses taking albuterol prior to arrival without significant relief.  Denies any other complaints at this time.  Denies any fever, chest pain, abdominal pain, headache, dizziness, weakness, urinary symptoms.  Patient's chart and medical history reviewed.  Patient's vitals reviewed.  They are afebrile, no respiratory distress, nontoxic-appearing in the ED.  Patient is a well-appearing 48 y.o. female.  Lung sounds are clear and not consistent with pneumonia. There is no neck pain or limited ROM to suggest retropharyngeal abscess or meningitis. Tolerating PO, non-toxic appearing, no hypoxia. The patient remained comfortable and stable during their visit in the ED.  Details of ED course documented in ED workup.     Differential Diagnosis is considered, but is not limited to: COVID, Flu, Strep throat, Viral URI, pneumonia, acute otitis media, otitis externa, mastoiditis, sinusitis.  Also considered   - PTA/RPA: no hot potato voice, no uvular deviation, no pain with passive neck flexion.  - Esophageal rupture: No history of dysphagia.  - Unlikely deep space infection/Zeke's, no submandibular or sublingual erythema or swelling.  - Low suspicion for CNS infection/meningitis: no pain with passive neck flexion, no neck rigidity/stiffness, no neck  "tenderness, negative Brudzinski.  - Unlikely EBV as no splenomegaly.  - ACS: unlikely with no CP. ECG, troponin ordered for further evaluation.  - PE: considered with persistently lower SpO2 with significant RF. Dimer added.  - CHF/COPD: BNP added for further evaluation.  Cxr ordered for further evaluation. Will provide DUONEB and re-evaluate. Patient states if she does not receive a NEB treatment that "this is a waste of time and I will leave right now". On re-evaluation patient states she feels much better.  COVID test Negative., Influenza test Negative., and Strep test Negative. Due to exam findings will culture.    Pending labs, at the end of my shift, the patients case was discussed with and care transferred over to Case Gomez PA-C. If lab work is normal, plan is to dc home with Augmentin due to presentation as well as medications for symptomatic control. Patient agrees with this stated plan. Patient stable upon last contact.    Amount and/or Complexity of Data Reviewed  Labs: ordered.  Radiology: ordered.    Risk  OTC drugs.  Prescription drug management.               ED Course as of 06/12/24 2347 Wed Jun 12, 2024 2332 I assumed care of this patient at shift change.  In short patient with COPD, CHF, hypertension, atrial fibrillation with pacemaker on Eliquis.  Patient received Decadron and DuoNeb with improvement of dyspnea however remains persistently hypoxic.  She is clinically well-appearing.  On exam reportedly has physical exam consistent with likely bacterial pharyngitis, so will treat with Augmentin.  At shift change re-evaluated and with persistent hypoxia, will add labs for further evaluation. [AN]   2346 SpO2: 96 % [AF]   2346 Pulse: 88 [AF]   2346 Temp: 98.2 °F (36.8 °C) [AF]   2346 BP: 118/69 [AF]   2346 SpO2(!): 94 % [AF]   2346 SpO2(!): 93 %  ambulatory [AF]   2346 Pulse(!): 111  Ambulatory, possibly falsely elevated due to just receiving DuoNeb treatment [AF]   2347 SpO2(!): 94 %  After " ambulatory test [AF]   2347 Pulse: 106  After ambulatory test [AF]      ED Course User Index  [AF] Stewart Joseph PA-C  [AN] Case Gomez PA-C                             Clinical Impression:  Final diagnoses:  [R06.02] SOB (shortness of breath)  [J02.9] Pharyngitis, unspecified etiology (Primary)  [J44.1] COPD with acute exacerbation                 Stewart Joseph PA-C  06/12/24 [id number]

## 2024-06-13 NOTE — DISCHARGE INSTRUCTIONS
Thank you for coming to our Emergency Department today. It is important to remember that some problems or medical conditions are difficult to diagnose and may not be found or addressed during your Emergency Department visit.  These conditions often start with non-specific symptoms and can only be diagnosed on follow up visits with your primary care physician or specialist when the symptoms continue or change. Please remember that all medical conditions can change, and we cannot predict how you will be feeling tomorrow or the next day. Return to the ER with any questions/concerns, new/concerning symptoms including fever, chest pain, shortness of breath, loss of consciousness, dizziness, weakness, worsening symptoms, failure to improve, or any other concerns. Also, please follow up with your Primary Care Physician and/or Pediatrician in the next 1-2 days to review your ED visit in entirety and for re-evaluation.   Be sure to follow up with your primary care doctor and review all labs/imaging/tests that were performed during your ER visit with them. It is very common for us to identify non-emergent incidental findings which must be followed up with your primary care physician.  Some labs/imaging/tests may be outside of the normal range, and require non-emergent follow-up and/or further investigation/treatment/procedures/testing to help diagnose/exclude/prevent complications or other potentially serious medical conditions. Some abnormalities may not have been discussed or addressed during your ER visit. Some lab results may not return during your ER visit but can be accessible by downloading the free [facility] brandyn or by visiting [email or web address] . It is important for you to review all labs/imaging/tests which are outside of the normal range with your physician.  An ER visit does not replace a primary care visit, and many screening tests or follow-up tests cannot be ordered by an ER doctor or performed by  the ER. Some tests may even require pre-approval.  If you do not have a primary care doctor, you may contact the one listed on your discharge paperwork or you may also call the [facility] Appointment Desk at [phone] , or [address] [place] [address] at  [phone] to schedule an appointment, or establish care with a primary care doctor or even a specialist and to obtain information about local resources. It is important to your health that you have a primary care doctor.  Please take all medications as directed. We have done our best to select a medication for you that will treat your condition however, all medications may potentially have side-effects and it is impossible to predict which medications may give you side-effects or what those side-effects (if any) those medications may give you.  If you feel that you are having a negative effect or side-effect of any medication you should stop taking those medications immediately and seek medical attention. If you feel that you are having a life-threatening reaction call 911.  Do not drive, swim, climb to height, take a bath, operate heavy machinery, drink alcohol or take potentially sedating medications, sign any legal documents or make any important decisions for 24 hours if you have received any pain medications, sedatives or mood altering drugs during your ER visit or within 24 hours of taking them if they have been prescribed to you.   You can find additional resources for Dentists, hearing aids, durable medical equipment, low cost pharmacies and other resources at [email or web address]  Patient agrees with this plan. Discussed with her strict return precautions, they verbalized understanding. Patient is stable for discharge.   § Please take all medication as prescribed.

## 2024-06-14 ENCOUNTER — PATIENT OUTREACH (OUTPATIENT)
Dept: EMERGENCY MEDICINE | Facility: HOSPITAL | Age: 49
End: 2024-06-14
Payer: MEDICARE

## 2024-06-14 LAB — BACTERIA THROAT CULT: NORMAL

## 2024-06-14 NOTE — PROGRESS NOTES
Patient was seen in the ED on 6/13/24. Phoned patient on 2 separate occasions to assist with Post ED Discharge Navigation.  Patient was unavailable. Message left on voice mail urging patient to call if additional assistance is needed.  Encounter closed.  Mohinder Chahal

## 2024-06-14 NOTE — PROGRESS NOTES
Patient was seen in the ED on 6/13/24. Phoned patient on 2 separate occasions to assist with Post ED Discharge Navigation.  Patient was unavailable. Message left on voice mail. Encounter closed.  Mohinder Chahal

## 2024-07-10 ENCOUNTER — TELEPHONE (OUTPATIENT)
Dept: SPINE | Facility: CLINIC | Age: 49
End: 2024-07-10
Payer: MEDICARE

## 2024-07-11 ENCOUNTER — OFFICE VISIT (OUTPATIENT)
Dept: SPINE | Facility: CLINIC | Age: 49
End: 2024-07-11
Payer: MEDICARE

## 2024-07-11 ENCOUNTER — PATIENT MESSAGE (OUTPATIENT)
Dept: PAIN MEDICINE | Facility: OTHER | Age: 49
End: 2024-07-11
Payer: MEDICARE

## 2024-07-11 DIAGNOSIS — M79.18 MYOFASCIAL PAIN: ICD-10-CM

## 2024-07-11 DIAGNOSIS — G89.4 CHRONIC PAIN DISORDER: ICD-10-CM

## 2024-07-11 DIAGNOSIS — M54.16 LUMBAR RADICULOPATHY: Primary | ICD-10-CM

## 2024-07-11 PROCEDURE — 3060F POS MICROALBUMINURIA REV: CPT | Mod: CPTII,95,, | Performed by: ANESTHESIOLOGY

## 2024-07-11 PROCEDURE — 99213 OFFICE O/P EST LOW 20 MIN: CPT | Mod: 95,GC,, | Performed by: ANESTHESIOLOGY

## 2024-07-11 PROCEDURE — 3066F NEPHROPATHY DOC TX: CPT | Mod: CPTII,95,, | Performed by: ANESTHESIOLOGY

## 2024-07-11 PROCEDURE — 1159F MED LIST DOCD IN RCRD: CPT | Mod: CPTII,95,, | Performed by: ANESTHESIOLOGY

## 2024-07-11 PROCEDURE — 3046F HEMOGLOBIN A1C LEVEL >9.0%: CPT | Mod: CPTII,95,, | Performed by: ANESTHESIOLOGY

## 2024-07-11 PROCEDURE — 4010F ACE/ARB THERAPY RXD/TAKEN: CPT | Mod: CPTII,95,, | Performed by: ANESTHESIOLOGY

## 2024-07-11 PROCEDURE — 1160F RVW MEDS BY RX/DR IN RCRD: CPT | Mod: CPTII,95,, | Performed by: ANESTHESIOLOGY

## 2024-07-11 RX ORDER — METHOCARBAMOL 750 MG/1
750 TABLET, FILM COATED ORAL 2 TIMES DAILY PRN
Qty: 60 TABLET | Refills: 2 | Status: SHIPPED | OUTPATIENT
Start: 2024-07-11
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
## 2024-07-15 ENCOUNTER — TELEPHONE (OUTPATIENT)
Dept: PAIN MEDICINE | Facility: OTHER | Age: 49
End: 2024-07-15
Payer: MEDICARE

## 2024-07-15 RX ORDER — OXYCODONE AND ACETAMINOPHEN 5; 325 MG/1; MG/1
1 TABLET ORAL
Qty: 30 TABLET | Refills: 0 | Status: SHIPPED | OUTPATIENT
Start: 2024-07-15

## 2024-07-15 NOTE — TELEPHONE ENCOUNTER
----- Message from Eben Christine MD sent at 7/15/2024  9:40 AM CDT -----  Regarding: RE: Request to hold Eliquis  ok  ----- Message -----  From: Fernanda Iraheta LPN  Sent: 7/11/2024   1:19 PM CDT  To: Eben Christine MD  Subject: Request to hold Eliquis                          Good afternoon,    Patient will be scheduled to have a procedure with Dr. Yeager, Right L5/S1 ans S1 Transforaminal Epidural Steroid Injection . Staff is requesting to hold Eliquis for 3 days prior to procedure. Please advise.    Thank you,  Fernanda

## 2024-07-15 NOTE — TELEPHONE ENCOUNTER
Patient requesting refill on oxycodone  Last office visit 07-11-24   shows last refill on 05-18-24  Patient does not have a pain contract on file with [facility] department  Patient last UDS 07-06-23 was consistent with current therapy     CODEINE   Not Detected               MORPHINE   Not Detected               6-ACETYLMORPHINE   Not Detected               OXYCODONE   Present               NOROYXCODONE   Present               OXYMORPHONE   Present               NOROXYMORPHONE   Not Detected               HYDROCODONE   Not Detected               NORHYDROCODONE   Not Detected               HYDROMORPHONE   Not Detected               BUPRENORPHINE   Not Detected               NORUBPRENORPHINE   Not Detected               FENTANYL   Not Detected               NORFENTANYL   Not Detected               MEPERIDINE METABOLITE   Not Detected               TAPENTADOL   Not Detected               TAPENTADOL-O-SULF   Not Detected               METHADONE   Not Detected               TRAMADOL   Not Detected               AMPHETAMINE   Not Detected               METHAMPHETAMINE   Not Detected               MDMA- ECSTASY   Not Detected               MDA   Not Detected               MDEA- Irma   Not Detected               METHYLPHENIDATE   Not Detected               PHENTERMINE   Not Detected               BENZOYLECGONINE   Not Detected               ALPRAZOLAM   Not Detected               ALPHA-OH-ALPRAZOLAM   Not Detected               CLONAZEPAM   Not Detected               7-AMINOCLONAZEPAM   Not Detected               DIAZEPAM   Not Detected               NORDIAZEPAM   Not Detected               OXAZEPAM   Not Detected               TEMAZEPAM   Not Detected               Lorazepam   Not Detected               MIDAZOLAM   Not Detected               ZOLPIDEM   Not Detected               BARBITURATES   Not Detected               Creatinine, Urine 20.0 - 400.0 mg/dL 141.4 183.2 R 121.5 R 25.0 R 31.0 R, CM  76.0 R, CM 26.8 R, CM   ETHYL GLUCURONIDE   Not Detected               MARIJUANA METABOLITE   Not Detected               Comment: INTERPRETIVE INFORMATION: Marijuana Metabolite  The cutoff for Marijuana Metabolite (immunoassay) was  changed from 20 ng/mL to 50 ng/mL,  effective May 15, 2023.   PCP   Not Detected               CARISOPRODOL   Not Detected               Comment: The carisoprodol immunoassay has cross-reactivity to  carisoprodol and meprobamate.   Naloxone   Not Detected               Gabapentin   Present               Pregabalin   Not Detected               Alpha-OH-Midazolam   Not Detected               Zolpidem Metabolite   Not Detected               PAIN MANAGEMENT DRUG PANEL   See Below

## 2024-07-15 NOTE — TELEPHONE ENCOUNTER
----- Message from Kay De Leon sent at 7/15/2024  9:20 AM CDT -----  Type: RX REFILL REQUEST          Who Called: Patient          Refill or New Rx: REfill          Rx Name and Strength:  methocarbamoL (ROBAXIN) 750 MG Tab  oxyCODONE-acetaminophen (PERCOCET) 5-325 mg per table    Pt is completely out of med's ; please advise        Would the patient rather a call back or a response via [facility]? Call        Best Call Back Number: [phone]          Additional Information:

## 2024-07-23 ENCOUNTER — PATIENT MESSAGE (OUTPATIENT)
Dept: PAIN MEDICINE | Facility: OTHER | Age: 49
End: 2024-07-23
Payer: MEDICARE

## 2024-08-02 ENCOUNTER — HOSPITAL ENCOUNTER (OUTPATIENT)
Facility: OTHER | Age: 49
Discharge: HOME | End: 2024-08-02
Attending: ANESTHESIOLOGY | Admitting: ANESTHESIOLOGY
Payer: MEDICARE

## 2024-08-02 VITALS
TEMPERATURE: 98 F | OXYGEN SATURATION: 92 % | RESPIRATION RATE: 16 BRPM | BODY MASS INDEX: 37.67 KG/M2 | HEIGHT: 67 IN | DIASTOLIC BLOOD PRESSURE: 63 MMHG | WEIGHT: 240 LBS | SYSTOLIC BLOOD PRESSURE: 119 MMHG | HEART RATE: 72 BPM

## 2024-08-02 DIAGNOSIS — M54.16 LUMBAR RADICULOPATHY: Primary | ICD-10-CM

## 2024-08-02 DIAGNOSIS — G89.29 CHRONIC PAIN: ICD-10-CM

## 2024-08-02 LAB — POCT GLUCOSE: 130 MG/DL (ref 70–110)

## 2024-08-02 PROCEDURE — 25500020 PHARM REV CODE 255: Performed by: ANESTHESIOLOGY

## 2024-08-02 PROCEDURE — 63600175 PHARM REV CODE 636 W HCPCS: Performed by: ANESTHESIOLOGY

## 2024-08-02 PROCEDURE — 25000003 PHARM REV CODE 250: Performed by: STUDENT IN AN ORGANIZED HEALTH CARE EDUCATION/TRAINING PROGRAM

## 2024-08-02 PROCEDURE — 64483 NJX AA&/STRD TFRM EPI L/S 1: CPT | Mod: RT,,, | Performed by: ANESTHESIOLOGY

## 2024-08-02 PROCEDURE — 25000003 PHARM REV CODE 250: Performed by: ANESTHESIOLOGY

## 2024-08-02 PROCEDURE — 64483 NJX AA&/STRD TFRM EPI L/S 1: CPT | Mod: RT | Performed by: ANESTHESIOLOGY

## 2024-08-02 RX ORDER — MIDAZOLAM HYDROCHLORIDE 1 MG/ML
INJECTION INTRAMUSCULAR; INTRAVENOUS
Status: DISCONTINUED | OUTPATIENT
Start: 2024-08-02 | End: 2024-08-02 | Stop reason: HOSPADM

## 2024-08-02 RX ORDER — SODIUM CHLORIDE 9 MG/ML
INJECTION, SOLUTION INTRAVENOUS CONTINUOUS
Status: DISCONTINUED | OUTPATIENT
Start: 2024-08-02 | End: 2024-08-02 | Stop reason: HOSPADM

## 2024-08-02 RX ORDER — DEXAMETHASONE SODIUM PHOSPHATE 10 MG/ML
INJECTION INTRAMUSCULAR; INTRAVENOUS
Status: DISCONTINUED | OUTPATIENT
Start: 2024-08-02 | End: 2024-08-02 | Stop reason: HOSPADM

## 2024-08-02 RX ORDER — FENTANYL CITRATE 50 UG/ML
INJECTION, SOLUTION INTRAMUSCULAR; INTRAVENOUS
Status: DISCONTINUED | OUTPATIENT
Start: 2024-08-02 | End: 2024-08-02 | Stop reason: HOSPADM

## 2024-08-02 RX ORDER — LIDOCAINE HYDROCHLORIDE 20 MG/ML
INJECTION, SOLUTION INFILTRATION; PERINEURAL
Status: DISCONTINUED | OUTPATIENT
Start: 2024-08-02 | End: 2024-08-02 | Stop reason: HOSPADM

## 2024-08-02 RX ORDER — LIDOCAINE HYDROCHLORIDE 10 MG/ML
INJECTION, SOLUTION EPIDURAL; INFILTRATION; INTRACAUDAL; PERINEURAL
Status: DISCONTINUED | OUTPATIENT
Start: 2024-08-02 | End: 2024-08-02 | Stop reason: HOSPADM

## 2024-08-27 ENCOUNTER — PATIENT OUTREACH (OUTPATIENT)
Dept: ADMINISTRATIVE | Facility: HOSPITAL | Age: 49
End: 2024-08-27
Payer: MEDICARE

## 2024-08-30 ENCOUNTER — TELEPHONE (OUTPATIENT)
Dept: FAMILY MEDICINE | Facility: CLINIC | Age: 49
End: 2024-08-30
Payer: MEDICARE

## 2024-08-30 ENCOUNTER — PATIENT MESSAGE (OUTPATIENT)
Dept: FAMILY MEDICINE | Facility: CLINIC | Age: 49
End: 2024-08-30
Payer: MEDICARE

## 2024-08-30 NOTE — TELEPHONE ENCOUNTER
LM informing pt appt on 9/5 with Dr Ayala canceled due to provider out of office; please call to reschedule

## 2024-09-01 ENCOUNTER — HOSPITAL ENCOUNTER (EMERGENCY)
Facility: HOSPITAL | Age: 49
Discharge: HOME | End: 2024-09-01
Attending: EMERGENCY MEDICINE
Payer: MEDICARE

## 2024-09-01 VITALS
WEIGHT: 240 LBS | HEART RATE: 81 BPM | BODY MASS INDEX: 37.59 KG/M2 | RESPIRATION RATE: 18 BRPM | TEMPERATURE: 98 F | OXYGEN SATURATION: 97 % | DIASTOLIC BLOOD PRESSURE: 60 MMHG | SYSTOLIC BLOOD PRESSURE: 130 MMHG

## 2024-09-01 DIAGNOSIS — S39.012A LUMBAR STRAIN, INITIAL ENCOUNTER: Primary | ICD-10-CM

## 2024-09-01 DIAGNOSIS — G89.29 CHRONIC MIDLINE LOW BACK PAIN WITHOUT SCIATICA: ICD-10-CM

## 2024-09-01 DIAGNOSIS — M54.50 CHRONIC MIDLINE LOW BACK PAIN WITHOUT SCIATICA: ICD-10-CM

## 2024-09-01 LAB
B-HCG UR QL: NEGATIVE
BACTERIA #/AREA URNS HPF: NORMAL /HPF
BILIRUB UR QL STRIP: NEGATIVE
CLARITY UR: CLEAR
COLOR UR: YELLOW
CTP QC/QA: YES
GLUCOSE UR QL STRIP: ABNORMAL
HGB UR QL STRIP: NEGATIVE
KETONES UR QL STRIP: NEGATIVE
LEUKOCYTE ESTERASE UR QL STRIP: NEGATIVE
MICROSCOPIC COMMENT: NORMAL
NITRITE UR QL STRIP: NEGATIVE
PH UR STRIP: 7 [PH] (ref 5–8)
PROT UR QL STRIP: NEGATIVE
SP GR UR STRIP: 1.01 (ref 1–1.03)
URN SPEC COLLECT METH UR: ABNORMAL
UROBILINOGEN UR STRIP-ACNC: NEGATIVE EU/DL
YEAST URNS QL MICRO: NORMAL

## 2024-09-01 PROCEDURE — 81000 URINALYSIS NONAUTO W/SCOPE: CPT | Performed by: PHYSICIAN ASSISTANT

## 2024-09-01 PROCEDURE — 96372 THER/PROPH/DIAG INJ SC/IM: CPT | Performed by: PHYSICIAN ASSISTANT

## 2024-09-01 PROCEDURE — 63600175 PHARM REV CODE 636 W HCPCS: Performed by: PHYSICIAN ASSISTANT

## 2024-09-01 PROCEDURE — 81025 URINE PREGNANCY TEST: CPT | Performed by: PHYSICIAN ASSISTANT

## 2024-09-01 PROCEDURE — 99284 EMERGENCY DEPT VISIT MOD MDM: CPT | Mod: 25

## 2024-09-01 PROCEDURE — 25000003 PHARM REV CODE 250: Performed by: PHYSICIAN ASSISTANT

## 2024-09-01 RX ORDER — ACETAMINOPHEN 500 MG
500 TABLET ORAL EVERY 4 HOURS PRN
Qty: 20 TABLET | Refills: 0 | Status: SHIPPED | OUTPATIENT
Start: 2024-09-01 | End: 2024-09-06

## 2024-09-01 RX ORDER — OXYCODONE HYDROCHLORIDE 5 MG/1
5 TABLET ORAL EVERY 6 HOURS PRN
Qty: 12 TABLET | Refills: 0 | Status: SHIPPED | OUTPATIENT
Start: 2024-09-01 | End: 2024-09-04

## 2024-09-01 RX ORDER — HYDROMORPHONE HYDROCHLORIDE 1 MG/ML
1 INJECTION, SOLUTION INTRAMUSCULAR; INTRAVENOUS; SUBCUTANEOUS
Status: COMPLETED | OUTPATIENT
Start: 2024-09-01 | End: 2024-09-01

## 2024-09-01 RX ORDER — LIDOCAINE 50 MG/G
1 PATCH TOPICAL
Status: DISCONTINUED | OUTPATIENT
Start: 2024-09-01 | End: 2024-09-02 | Stop reason: HOSPADM

## 2024-09-01 RX ADMIN — HYDROMORPHONE HYDROCHLORIDE 1 MG: 1 INJECTION, SOLUTION INTRAMUSCULAR; INTRAVENOUS; SUBCUTANEOUS at 11:09

## 2024-09-01 RX ADMIN — LIDOCAINE 1 PATCH: 700 PATCH TOPICAL at 11:09

## 2024-09-02 ENCOUNTER — CLINICAL SUPPORT (OUTPATIENT)
Dept: CARDIOLOGY | Facility: HOSPITAL | Age: 49
End: 2024-09-02
Payer: MEDICARE

## 2024-09-02 DIAGNOSIS — Z95.810 PRESENCE OF AUTOMATIC (IMPLANTABLE) CARDIAC DEFIBRILLATOR: ICD-10-CM

## 2024-09-02 DIAGNOSIS — I42.9 CARDIOMYOPATHY, UNSPECIFIED: ICD-10-CM
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
## 2024-09-02 NOTE — ED TRIAGE NOTES
"Pt presents to ED c/o back pain onset "couple of days."  Pt reports her "sciatica," is acting up.  Denies any other symptoms or falls, trauma or injuries.    "

## 2024-09-02 NOTE — DISCHARGE INSTRUCTIONS

## 2024-09-03 ENCOUNTER — PATIENT OUTREACH (OUTPATIENT)
Dept: EMERGENCY MEDICINE | Facility: HOSPITAL | Age: 49
End: 2024-09-03
Payer: MEDICARE

## 2024-09-03 ENCOUNTER — CLINICAL SUPPORT (OUTPATIENT)
Dept: CARDIOLOGY | Facility: HOSPITAL | Age: 49
End: 2024-09-03
Attending: INTERNAL MEDICINE
Payer: MEDICARE

## 2024-09-03 DIAGNOSIS — I42.9 CARDIOMYOPATHY, UNSPECIFIED: ICD-10-CM

## 2024-09-03 DIAGNOSIS — Z95.810 PRESENCE OF AUTOMATIC (IMPLANTABLE) CARDIAC DEFIBRILLATOR: ICD-10-CM

## 2024-09-03 PROCEDURE — 93296 REM INTERROG EVL PM/IDS: CPT | Performed by: INTERNAL MEDICINE

## 2024-09-04 NOTE — PROGRESS NOTES
Patient was seen in the ED on 9/1/24. Phoned patient on 2 separate occasions to assist with Post ED Discharge Navigation. Patient was unavailable.  Mohinder Chahal

## 2024-09-05 LAB
OHS CV AF BURDEN PERCENT: < 1
OHS CV DC REMOTE DEVICE TYPE: NORMAL
OHS CV ICD SHOCK: NO
OHS CV RV PACING PERCENT: 1 %

## 2024-09-18 DIAGNOSIS — E11.9 TYPE 2 DIABETES MELLITUS WITHOUT COMPLICATION: ICD-10-CM

## 2024-09-25 DIAGNOSIS — E11.65 TYPE 2 DIABETES MELLITUS WITH HYPERGLYCEMIA, WITH LONG-TERM CURRENT USE OF INSULIN: ICD-10-CM

## 2024-09-25 DIAGNOSIS — Z79.4 TYPE 2 DIABETES MELLITUS WITH HYPERGLYCEMIA, WITH LONG-TERM CURRENT USE OF INSULIN: ICD-10-CM

## 2024-09-25 RX ORDER — APIXABAN 5 MG/1
5 TABLET, FILM COATED ORAL 2 TIMES DAILY
Qty: 180 TABLET | Refills: 3 | Status: SHIPPED | OUTPATIENT
Start: 2024-09-25

## 2024-09-25 RX ORDER — INSULIN LISPRO 100 [IU]/ML
INJECTION, SOLUTION INTRAVENOUS; SUBCUTANEOUS
Qty: 135 ML | Refills: 0 | Status: SHIPPED | OUTPATIENT
Start: 2024-09-25

## 2024-10-14 ENCOUNTER — PATIENT MESSAGE (OUTPATIENT)
Dept: SPINE | Facility: CLINIC | Age: 49
End: 2024-10-14
Payer: MEDICARE

## 2024-10-16 ENCOUNTER — TELEPHONE (OUTPATIENT)
Dept: PAIN MEDICINE | Facility: CLINIC | Age: 49
End: 2024-10-16
Payer: MEDICARE

## 2024-10-17 ENCOUNTER — OFFICE VISIT (OUTPATIENT)
Dept: PAIN MEDICINE | Facility: CLINIC | Age: 49
End: 2024-10-17
Payer: MEDICARE

## 2024-10-17 VITALS
DIASTOLIC BLOOD PRESSURE: 93 MMHG | BODY MASS INDEX: 41.09 KG/M2 | WEIGHT: 262.38 LBS | HEART RATE: 84 BPM | SYSTOLIC BLOOD PRESSURE: 129 MMHG

## 2024-10-17 DIAGNOSIS — F11.20 UNCOMPLICATED OPIOID DEPENDENCE: ICD-10-CM

## 2024-10-17 DIAGNOSIS — M54.16 LUMBAR RADICULOPATHY: ICD-10-CM

## 2024-10-17 DIAGNOSIS — M54.9 DORSALGIA, UNSPECIFIED: Primary | ICD-10-CM

## 2024-10-17 DIAGNOSIS — M51.360 DEGENERATION OF INTERVERTEBRAL DISC OF LUMBAR REGION WITH DISCOGENIC BACK PAIN: ICD-10-CM

## 2024-10-17 DIAGNOSIS — M79.18 MYOFASCIAL PAIN: ICD-10-CM

## 2024-10-17 DIAGNOSIS — G89.4 CHRONIC PAIN SYNDROME: ICD-10-CM

## 2024-10-17 DIAGNOSIS — M54.17 LUMBOSACRAL RADICULOPATHY: ICD-10-CM

## 2024-10-17 PROCEDURE — 3060F POS MICROALBUMINURIA REV: CPT | Mod: CPTII,S$GLB,,

## 2024-10-17 PROCEDURE — 99214 OFFICE O/P EST MOD 30 MIN: CPT | Mod: S$GLB,,,

## 2024-10-17 PROCEDURE — 3046F HEMOGLOBIN A1C LEVEL >9.0%: CPT | Mod: CPTII,S$GLB,,

## 2024-10-17 PROCEDURE — 3066F NEPHROPATHY DOC TX: CPT | Mod: CPTII,S$GLB,,

## 2024-10-17 PROCEDURE — 80364 OPIOID &OPIATE ANALOG 5/MORE: CPT

## 2024-10-17 PROCEDURE — 99999 PR PBB SHADOW E&M-EST. PATIENT-LVL III: CPT | Mod: PBBFAC,,,

## 2024-10-17 PROCEDURE — 1160F RVW MEDS BY RX/DR IN RCRD: CPT | Mod: CPTII,S$GLB,,

## 2024-10-17 PROCEDURE — 3074F SYST BP LT 130 MM HG: CPT | Mod: CPTII,S$GLB,,

## 2024-10-17 PROCEDURE — 1159F MED LIST DOCD IN RCRD: CPT | Mod: CPTII,S$GLB,,

## 2024-10-17 PROCEDURE — 80307 DRUG TEST PRSMV CHEM ANLYZR: CPT

## 2024-10-17 PROCEDURE — 3008F BODY MASS INDEX DOCD: CPT | Mod: CPTII,S$GLB,,

## 2024-10-17 PROCEDURE — 80347 BENZODIAZEPINES 13 OR MORE: CPT

## 2024-10-17 PROCEDURE — 4010F ACE/ARB THERAPY RXD/TAKEN: CPT | Mod: CPTII,S$GLB,,

## 2024-10-17 PROCEDURE — 3080F DIAST BP >= 90 MM HG: CPT | Mod: CPTII,S$GLB,,
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
## 2024-10-18 ENCOUNTER — PATIENT MESSAGE (OUTPATIENT)
Dept: SPINE | Facility: CLINIC | Age: 49
End: 2024-10-18
Payer: MEDICARE

## 2024-10-18 RX ORDER — OXYCODONE AND ACETAMINOPHEN 5; 325 MG/1; MG/1
1 TABLET ORAL
Qty: 30 TABLET | Refills: 0 | Status: SHIPPED | OUTPATIENT
Start: 2024-10-18

## 2024-10-18 NOTE — TELEPHONE ENCOUNTER
Patient requesting refill on oxyCODONE-acetaminophen (PERCOCET) 5-325 mg   Last office visit 10/17/24   shows last refill on 09/02/24  Patient does not have a pain contract on file with [facility] department  Patient last UDS 07/06/23 was consistent with current therapy                  CODEINE  Not Detected         MORPHINE  Not Detected         6-ACETYLMORPHINE  Not Detected         OXYCODONE  Present         NOROYXCODONE  Present         OXYMORPHONE  Present         NOROXYMORPHONE  Not Detected         HYDROCODONE  Not Detected         NORHYDROCODONE  Not Detected         HYDROMORPHONE  Not Detected         BUPRENORPHINE  Not Detected         NORUBPRENORPHINE  Not Detected         FENTANYL  Not Detected         NORFENTANYL  Not Detected         MEPERIDINE METABOLITE  Not Detected         TAPENTADOL  Not Detected         TAPENTADOL-O-SULF  Not Detected         METHADONE  Not Detected         TRAMADOL  Not Detected         AMPHETAMINE  Not Detected         METHAMPHETAMINE  Not Detected         MDMA- ECSTASY  Not Detected         MDA  Not Detected         MDEA- Irma  Not Detected         METHYLPHENIDATE  Not Detected         PHENTERMINE  Not Detected         BENZOYLECGONINE  Not Detected         ALPRAZOLAM  Not Detected         ALPHA-OH-ALPRAZOLAM  Not Detected         CLONAZEPAM  Not Detected         7-AMINOCLONAZEPAM  Not Detected         DIAZEPAM  Not Detected         NORDIAZEPAM  Not Detected         OXAZEPAM  Not Detected         TEMAZEPAM  Not Detected         Lorazepam  Not Detected         MIDAZOLAM  Not Detected         ZOLPIDEM  Not Detected         BARBITURATES  Not Detected         Creatinine, Urine 20.0 - 400.0 mg/dL 141.4 183.2 R 121.5 R 25.0 R 31.0 R, CM 76.0 R, CM 26.8 R, CM   ETHYL GLUCURONIDE  Not Detected         MARIJUANA METABOLITE  Not Detected         Comment: INTERPRETIVE INFORMATION: Marijuana Metabolite  The cutoff for Marijuana Metabolite (immunoassay) was  changed  from 20 ng/mL to 50 ng/mL,  effective May 15, 2023.   PCP  Not Detected         CARISOPRODOL  Not Detected         Comment: The carisoprodol immunoassay has cross-reactivity to  carisoprodol and meprobamate.   Naloxone  Not Detected         Gabapentin  Present         Pregabalin  Not Detected         Alpha-OH-Midazolam  Not Detected         Zolpidem Metabolite  Not Detected         PAIN MANAGEMENT DRUG PANEL  See Below

## 2024-10-18 NOTE — TELEPHONE ENCOUNTER
----- Message from [employer] sent at 10/18/2024  1:56 PM CDT -----  Name of Who is calling :  FARRUKH FERREIRA [[id number]]      What is the request in detail:  Pt would like a call back . Pt would like a call back about her pain med. Please assist       Can the clinic reply by [facility]:  No           What [facility]: [phone]

## 2024-10-22 ENCOUNTER — PATIENT MESSAGE (OUTPATIENT)
Dept: PAIN MEDICINE | Facility: CLINIC | Age: 49
End: 2024-10-22
Payer: MEDICARE

## 2024-10-22 LAB
1OH-MIDAZOLAM UR QL SCN: NOT DETECTED
6MAM UR QL: NOT DETECTED
7AMINOCLONAZEPAM UR QL: NOT DETECTED
A-OH ALPRAZ UR QL: NOT DETECTED
ALPRAZ UR QL: NOT DETECTED
AMPHET UR QL SCN: NOT DETECTED
ANNOTATION COMMENT IMP: NORMAL
BARBITURATES UR QL: NEGATIVE
BUPRENORPHINE UR QL: NOT DETECTED
BZE UR QL: NEGATIVE
CARBOXYTHC UR QL: NEGATIVE
CARISOPRODOL UR QL: NEGATIVE
CLONAZEPAM UR QL: NOT DETECTED
CODEINE UR QL: NOT DETECTED
CREAT UR-MCNC: 35.5 MG/DL (ref 20–400)
DIAZEPAM UR QL: NOT DETECTED
ETHYL GLUCURONIDE UR QL: NEGATIVE
FENTANYL UR QL: NOT DETECTED
GABAPENTIN UR QL CFM: PRESENT
HYDROCODONE UR QL: NOT DETECTED
HYDROMORPHONE UR QL: NOT DETECTED
LORAZEPAM UR QL: NOT DETECTED
MDA UR QL: NOT DETECTED
MDEA UR QL: NOT DETECTED
MDMA UR QL: NOT DETECTED
ME-PHENIDATE UR QL: NOT DETECTED
METHADONE UR QL: NEGATIVE
METHAMPHET UR QL: NOT DETECTED
MIDAZOLAM UR QL SCN: NOT DETECTED
MORPHINE UR QL: NOT DETECTED
NALOXONE UR QL CFM: NOT DETECTED
NORBUPRENORPHINE UR QL CFM: NOT DETECTED
NORDIAZEPAM UR QL: NOT DETECTED
NORFENTANYL UR QL: NOT DETECTED
NORHYDROCODONE UR QL CFM: NOT DETECTED
NORMEPERIDINE UR QL CFM: NOT DETECTED
NOROXYCODONE UR QL CFM: NOT DETECTED
NOROXYMORPHONE UR QL SCN: NOT DETECTED
OXAZEPAM UR QL: NOT DETECTED
OXYCODONE UR QL: NOT DETECTED
OXYMORPHONE UR QL: NOT DETECTED
PATHOLOGY STUDY: NORMAL
PCP UR QL: NEGATIVE
PHENTERMINE UR QL: NOT DETECTED
PREGABALIN UR QL CFM: NOT DETECTED
SERVICE CMNT-IMP: NORMAL
TAPENTADOL UR QL SCN: NOT DETECTED
TAPENTADOL UR QL SCN: NOT DETECTED
TEMAZEPAM UR QL: NOT DETECTED
TRAMADOL UR QL: NEGATIVE
ZOLPIDEM PHENYL-4-CARB UR QL SCN: NOT DETECTED
ZOLPIDEM UR QL: NOT DETECTED

## 2024-10-23 ENCOUNTER — HOSPITAL ENCOUNTER (OUTPATIENT)
Dept: RADIOLOGY | Facility: HOSPITAL | Age: 49
Discharge: HOME | End: 2024-10-23
Payer: MEDICARE

## 2024-10-23 DIAGNOSIS — M54.9 DORSALGIA, UNSPECIFIED: ICD-10-CM

## 2024-10-23 PROCEDURE — 72131 CT LUMBAR SPINE W/O DYE: CPT | Mod: 26,,, | Performed by: RADIOLOGY

## 2024-10-23 PROCEDURE — 72131 CT LUMBAR SPINE W/O DYE: CPT | Mod: TC

## 2024-10-28 ENCOUNTER — TELEPHONE (OUTPATIENT)
Dept: PAIN MEDICINE | Facility: CLINIC | Age: 49
End: 2024-10-28
Payer: MEDICARE

## 2024-11-11 ENCOUNTER — TELEPHONE (OUTPATIENT)
Dept: FAMILY MEDICINE | Facility: CLINIC | Age: 49
End: 2024-11-11
Payer: MEDICARE

## 2024-11-19 ENCOUNTER — LAB VISIT (OUTPATIENT)
Dept: LAB | Facility: HOSPITAL | Age: 49
End: 2024-11-19
Attending: INTERNAL MEDICINE
Payer: MEDICARE

## 2024-11-19 ENCOUNTER — OFFICE VISIT (OUTPATIENT)
Dept: FAMILY MEDICINE | Facility: CLINIC | Age: 49
End: 2024-11-19
Payer: MEDICARE

## 2024-11-19 VITALS
HEIGHT: 67 IN | OXYGEN SATURATION: 90 % | DIASTOLIC BLOOD PRESSURE: 88 MMHG | RESPIRATION RATE: 18 BRPM | BODY MASS INDEX: 40.42 KG/M2 | SYSTOLIC BLOOD PRESSURE: 136 MMHG | HEART RATE: 80 BPM | TEMPERATURE: 97 F | WEIGHT: 257.5 LBS

## 2024-11-19 DIAGNOSIS — E11.22 TYPE 2 DIABETES MELLITUS WITH STAGE 4 CHRONIC KIDNEY DISEASE, WITH LONG-TERM CURRENT USE OF INSULIN: ICD-10-CM

## 2024-11-19 DIAGNOSIS — R79.9 ABNORMAL BLOOD CHEMISTRY: ICD-10-CM

## 2024-11-19 DIAGNOSIS — N18.4 TYPE 2 DIABETES MELLITUS WITH STAGE 4 CHRONIC KIDNEY DISEASE, WITH LONG-TERM CURRENT USE OF INSULIN: ICD-10-CM

## 2024-11-19 DIAGNOSIS — Z79.4 TYPE 2 DIABETES MELLITUS WITH STAGE 4 CHRONIC KIDNEY DISEASE, WITH LONG-TERM CURRENT USE OF INSULIN: ICD-10-CM

## 2024-11-19 DIAGNOSIS — M54.16 LUMBAR RADICULOPATHY: ICD-10-CM

## 2024-11-19 DIAGNOSIS — I50.42 CHRONIC COMBINED SYSTOLIC AND DIASTOLIC HEART FAILURE: ICD-10-CM

## 2024-11-19 DIAGNOSIS — Z00.00 ANNUAL PHYSICAL EXAM: Primary | ICD-10-CM

## 2024-11-19 DIAGNOSIS — Z00.00 ANNUAL PHYSICAL EXAM: ICD-10-CM

## 2024-11-19 DIAGNOSIS — J44.9 CHRONIC OBSTRUCTIVE PULMONARY DISEASE, UNSPECIFIED COPD TYPE: ICD-10-CM

## 2024-11-19 DIAGNOSIS — E11.9 TYPE 2 DIABETES MELLITUS WITHOUT COMPLICATION: ICD-10-CM

## 2024-11-19 DIAGNOSIS — E78.5 HYPERLIPIDEMIA: ICD-10-CM

## 2024-11-19 DIAGNOSIS — Z95.810 IMPLANTABLE CARDIOVERTER-DEFIBRILLATOR (ICD) IN SITU: ICD-10-CM

## 2024-11-19 LAB
ALBUMIN SERPL BCP-MCNC: 3.5 G/DL (ref 3.5–5.2)
ALP SERPL-CCNC: 72 U/L (ref 40–150)
ALT SERPL W/O P-5'-P-CCNC: 14 U/L (ref 10–44)
ANION GAP SERPL CALC-SCNC: 10 MMOL/L (ref 8–16)
AST SERPL-CCNC: 15 U/L (ref 10–40)
BASOPHILS # BLD AUTO: 0.04 K/UL (ref 0–0.2)
BASOPHILS NFR BLD: 0.8 % (ref 0–1.9)
BILIRUB SERPL-MCNC: 0.3 MG/DL (ref 0.1–1)
BUN SERPL-MCNC: 12 MG/DL (ref 6–20)
CALCIUM SERPL-MCNC: 9.3 MG/DL (ref 8.7–10.5)
CHLORIDE SERPL-SCNC: 104 MMOL/L (ref 95–110)
CHOLEST SERPL-MCNC: 203 MG/DL (ref 120–199)
CHOLEST/HDLC SERPL: 4.1 {RATIO} (ref 2–5)
CO2 SERPL-SCNC: 26 MMOL/L (ref 23–29)
CREAT SERPL-MCNC: 1.1 MG/DL (ref 0.5–1.4)
DIFFERENTIAL METHOD BLD: ABNORMAL
EOSINOPHIL # BLD AUTO: 0.2 K/UL (ref 0–0.5)
EOSINOPHIL NFR BLD: 3.6 % (ref 0–8)
ERYTHROCYTE [DISTWIDTH] IN BLOOD BY AUTOMATED COUNT: 16.1 % (ref 11.5–14.5)
EST. GFR  (NO RACE VARIABLE): >60 ML/MIN/1.73 M^2
ESTIMATED AVG GLUCOSE: 203 MG/DL (ref 68–131)
GLUCOSE SERPL-MCNC: 190 MG/DL (ref 70–110)
HBA1C MFR BLD: 8.7 % (ref 4–5.6)
HCT VFR BLD AUTO: 37.8 % (ref 37–48.5)
HDLC SERPL-MCNC: 50 MG/DL (ref 40–75)
HDLC SERPL: 24.6 % (ref 20–50)
HGB BLD-MCNC: 11 G/DL (ref 12–16)
IMM GRANULOCYTES # BLD AUTO: 0.01 K/UL (ref 0–0.04)
IMM GRANULOCYTES NFR BLD AUTO: 0.2 % (ref 0–0.5)
LDLC SERPL CALC-MCNC: 134.4 MG/DL (ref 63–159)
LYMPHOCYTES # BLD AUTO: 1.9 K/UL (ref 1–4.8)
LYMPHOCYTES NFR BLD: 36.1 % (ref 18–48)
MCH RBC QN AUTO: 25.3 PG (ref 27–31)
MCHC RBC AUTO-ENTMCNC: 29.1 G/DL (ref 32–36)
MCV RBC AUTO: 87 FL (ref 82–98)
MONOCYTES # BLD AUTO: 0.4 K/UL (ref 0.3–1)
MONOCYTES NFR BLD: 7.1 % (ref 4–15)
NEUTROPHILS # BLD AUTO: 2.7 K/UL (ref 1.8–7.7)
NEUTROPHILS NFR BLD: 52.2 % (ref 38–73)
NONHDLC SERPL-MCNC: 153 MG/DL
NRBC BLD-RTO: 0 /100 WBC
PLATELET # BLD AUTO: 265 K/UL (ref 150–450)
PMV BLD AUTO: 11.4 FL (ref 9.2–12.9)
POTASSIUM SERPL-SCNC: 4 MMOL/L (ref 3.5–5.1)
PROT SERPL-MCNC: 7 G/DL (ref 6–8.4)
RBC # BLD AUTO: 4.35 M/UL (ref 4–5.4)
SODIUM SERPL-SCNC: 140 MMOL/L (ref 136–145)
TRIGL SERPL-MCNC: 93 MG/DL (ref 30–150)
WBC # BLD AUTO: 5.23 K/UL (ref 3.9–12.7)

## 2024-11-19 PROCEDURE — 83036 HEMOGLOBIN GLYCOSYLATED A1C: CPT | Performed by: INTERNAL MEDICINE

## 2024-11-19 PROCEDURE — 36415 COLL VENOUS BLD VENIPUNCTURE: CPT | Mod: PO | Performed by: INTERNAL MEDICINE

## 2024-11-19 PROCEDURE — 3066F NEPHROPATHY DOC TX: CPT | Mod: CPTII,S$GLB,,

## 2024-11-19 PROCEDURE — 99396 PREV VISIT EST AGE 40-64: CPT | Mod: S$GLB,,,

## 2024-11-19 PROCEDURE — 3075F SYST BP GE 130 - 139MM HG: CPT | Mod: CPTII,S$GLB,,

## 2024-11-19 PROCEDURE — 80061 LIPID PANEL: CPT

## 2024-11-19 PROCEDURE — 3008F BODY MASS INDEX DOCD: CPT | Mod: CPTII,S$GLB,,

## 2024-11-19 PROCEDURE — 85025 COMPLETE CBC W/AUTO DIFF WBC: CPT

## 2024-11-19 PROCEDURE — 1159F MED LIST DOCD IN RCRD: CPT | Mod: CPTII,S$GLB,,

## 2024-11-19 PROCEDURE — 4010F ACE/ARB THERAPY RXD/TAKEN: CPT | Mod: CPTII,S$GLB,,

## 2024-11-19 PROCEDURE — 3046F HEMOGLOBIN A1C LEVEL >9.0%: CPT | Mod: CPTII,S$GLB,,

## 2024-11-19 PROCEDURE — 80053 COMPREHEN METABOLIC PANEL: CPT

## 2024-11-19 PROCEDURE — 99999 PR PBB SHADOW E&M-EST. PATIENT-LVL IV: CPT | Mod: PBBFAC,,,

## 2024-11-19 PROCEDURE — 3060F POS MICROALBUMINURIA REV: CPT | Mod: CPTII,S$GLB,,

## 2024-11-19 PROCEDURE — 3079F DIAST BP 80-89 MM HG: CPT | Mod: CPTII,S$GLB,,

## 2024-11-19 RX ORDER — ROSUVASTATIN CALCIUM 40 MG/1
40 TABLET, COATED ORAL NIGHTLY
Qty: 90 TABLET | Refills: 3 | Status: SHIPPED | OUTPATIENT
Start: 2024-11-19

## 2024-11-19 RX ORDER — GABAPENTIN 400 MG/1
400 CAPSULE ORAL 3 TIMES DAILY
Qty: 90 CAPSULE | Refills: 3 | Status: SHIPPED | OUTPATIENT
Start: 2024-11-19

## 2024-11-19 RX ORDER — SACUBITRIL AND VALSARTAN 24; 26 MG/1; MG/1
1 TABLET, FILM COATED ORAL 2 TIMES DAILY
Qty: 180 TABLET | Refills: 3 | Status: CANCELLED | OUTPATIENT
Start: 2024-11-19

## 2024-11-19 RX ORDER — ALBUTEROL SULFATE 90 UG/1
1-2 INHALANT RESPIRATORY (INHALATION) EVERY 6 HOURS PRN
Qty: 18 G | Refills: 0 | Status: SHIPPED | OUTPATIENT
Start: 2024-11-19 | End: 2025-11-19

## 2024-11-19 RX ORDER — METHOCARBAMOL 750 MG/1
750 TABLET, FILM COATED ORAL 2 TIMES DAILY PRN
Qty: 60 TABLET | Refills: 2 | Status: CANCELLED | OUTPATIENT
Start: 2024-11-19

## 2024-11-19 RX ORDER — FUROSEMIDE 40 MG/1
40 TABLET ORAL DAILY
Qty: 90 TABLET | Refills: 3 | Status: SHIPPED | OUTPATIENT
Start: 2024-11-19

## 2024-11-19 RX ORDER — FLUTICASONE PROPIONATE 50 MCG
1 SPRAY, SUSPENSION (ML) NASAL 2 TIMES DAILY PRN
Qty: 15 G | Refills: 0 | Status: SHIPPED | OUTPATIENT
Start: 2024-11-19

## 2024-11-19 RX ORDER — METOPROLOL SUCCINATE 50 MG/1
50 TABLET, EXTENDED RELEASE ORAL DAILY
Qty: 90 TABLET | Refills: 3 | Status: SHIPPED | OUTPATIENT
Start: 2024-11-19

## 2024-11-19 RX ORDER — SPIRONOLACTONE 25 MG/1
25 TABLET ORAL DAILY
Qty: 90 TABLET | Refills: 3 | Status: SHIPPED | OUTPATIENT
Start: 2024-11-19

## 2024-11-19 RX ORDER — PEN NEEDLE, DIABETIC 30 GX3/16"
NEEDLE, DISPOSABLE MISCELLANEOUS
Qty: 400 EACH | Refills: 3 | Status: SHIPPED | OUTPATIENT
Start: 2024-11-19

## 2024-11-19 RX ORDER — OXYCODONE AND ACETAMINOPHEN 5; 325 MG/1; MG/1
1 TABLET ORAL
Qty: 30 TABLET | Refills: 0 | Status: CANCELLED | OUTPATIENT
Start: 2024-11-19

## 2024-11-19 RX ORDER — INSULIN GLARGINE 100 [IU]/ML
60 INJECTION, SOLUTION SUBCUTANEOUS 2 TIMES DAILY
Qty: 108 ML | Refills: 0 | Status: SHIPPED | OUTPATIENT
Start: 2024-11-19 | End: 2025-02-17

## 2024-11-19 NOTE — PROGRESS NOTES
Family Medicine      Patient Name: Fabiana Chanel  MRN: [id number]  : 1975    PCP: Alivia Ayala MD       HPI      Fabiana Chanel is a 49 y.o. female with multiple medical diagnoses as listed in the medical history and problem list that presents for   Chief Complaint   Patient presents with    Medication Refill    Annual Exam       HPI  Diet:  1-2 meals per day consisting of  meat, carbs and veggies   snacks  Water: 4 16 oz bottles per day  Exercise: 0 min per week  Alcohol: 1 drinks per week or less ... Wine  Smoke: Yes  Marijuana from safe supplier  Recreational Drugs: No    Work: Disabled  Sleep: 3 hours per night with naps throughout the day  Stress: No  Feels Safe at Home: Yes  Wears Seatbelts in Car: Yes    Dentist: Yes ... Last seen -   Ophthalmologist: Yes ... Last Seen - 2024         History      Past Medical History:  Past Medical History:   Diagnosis Date    Acute respiratory failure due to COVID-19 2021    Atrial fibrillation     Blood clot associated with vein wall inflammation     not dvt    Cardiomyopathy     Normal cors on cath 2017    CHF (congestive heart failure)     DM (diabetes mellitus) 2013    Essential hypertension 2014    Hyperlipidemia     Hypertension     Iron deficiency anemia 2013    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    Pneumonia due to COVID-19 virus 2021    Psoriasis     Sleep apnea     Ventricular tachycardia 2022       Past Surgical History:  Past Surgical History:   Procedure Laterality Date    CARDIAC CATHETERIZATION      COLONOSCOPY      COLONOSCOPY N/A 3/9/2022    Procedure: COLONOSCOPY;  Surgeon: Vielka Burrell MD;  Location: [facility];  Service: Endoscopy;  Laterality: N/A;    DILATION AND CURETTAGE OF UTERUS      EPIDURAL STEROID INJECTION N/A 2023    Procedure: INJECTION, STEROID, EPIDURAL, L5/S1 SOONER DATE    clear to hold Eliquis;  Surgeon: Jed Yeager MD;   Location: [facility];  Service: Pain Management;  Laterality: N/A;    ESOPHAGOGASTRODUODENOSCOPY N/A 5/9/2019    Procedure: EGD (ESOPHAGOGASTRODUODENOSCOPY);  Surgeon: Vielka Burrell MD;  Location: [facility] END[facility];  Service: Endoscopy;  Laterality: N/A;    TRANSFORAMINAL EPIDURAL INJECTION OF STEROID N/A 1/6/2022    Procedure: Transforaminal ANKITA L4/L5 L5/S1;  Surgeon: Jed Yeager MD;  Location: [facility];  Service: Pain Management;  Laterality: N/A;    TRANSFORAMINAL EPIDURAL INJECTION OF STEROID Right 12/1/2022    Procedure: INJECTION, STEROID, EPIDURAL, TRANSFORAMINAL APPROACH, RIGHT L4/L5 & L5/S1 CONTRAST;  Surgeon: Jed Yeager MD;  Location: [facility];  Service: Pain Management;  Laterality: Right;    TRANSFORAMINAL EPIDURAL INJECTION OF STEROID Right 4/14/2023    Procedure: INJECTION, STEROID, EPIDURAL, TRANSFORAMINAL APPROACH RIGHT S1;  Surgeon: Jed Yeager MD;  Location: [facility];  Service: Pain Management;  Laterality: Right;  3/14 AUTH    TRANSFORAMINAL EPIDURAL INJECTION OF STEROID Right 1/12/2024    Procedure: LUMBAR TRANSFORAMINAL RIGHT L5/S1 AND S1 *ELIQUIS CLEARANCE IN CHART*;  Surgeon: Jed Yeager MD;  Location: [facility];  Service: Pain Management;  Laterality: Right;  [phone]    TRANSFORAMINAL EPIDURAL INJECTION OF STEROID Right 8/2/2024    Procedure: LUMBAR TRANSFORAMINAL RIGHT L5/S1 AND S1 *ELIQUIS CLEARANCE IN CHART*;  Surgeon: Jed Yeager MD;  Location: [facility];  Service: Pain Management;  Laterality: Right;  [phone]  2 WK F/U ANTHONY       Social History:  Social History     Socioeconomic History    Marital status:    Tobacco Use    Smoking status: Never    Smokeless tobacco: Never    Tobacco comments:     smokes cigars on occasion   Substance and Sexual Activity    Alcohol use: Not Currently     Comment: occasional    Drug use: Not Currently     Types: Marijuana     Comment: occ    Sexual activity: Not Currently     Partners:  Male     Social Drivers of Health     Stress: Stress Concern Present (7/9/2019)    [facility] [employer] [facility] - Occupational Stress Questionnaire     Feeling of Stress : Rather much       Family History:  Family History   Problem Relation Name Age of Onset    Hypertension Mother      Hypertension Father      Diabetes Father      Diabetes Maternal Grandmother      Diabetes Paternal Grandmother      Breast cancer Neg Hx      Colon cancer Neg Hx      Ovarian cancer Neg Hx         Allergies and Medications: (updated and reviewed)  Review of patient's allergies indicates:   Allergen Reactions    Metformin      Diarrhea on metformin XR     Pneumococcal 23-abimbola ps vaccine     Trulicity [dulaglutide] Diarrhea     Current Outpatient Medications   Medication Sig Dispense Refill    BLOOD PRESSURE CUFF Misc 1 kit by Misc.(Non-Drug; Combo Route) route 2 (two) times daily. 1 each 0    blood sugar diagnostic Strp Check blood glucose 3x/day. 300 strip 3    clobetasol 0.05% (TEMOVATE) 0.05 % Oint APPLY OINTMENT TOPICALLY TWICE DAILY      ELIQUIS 5 mg Tab Take 1 tablet by mouth twice daily 180 tablet 3    insulin lispro 100 unit/mL pen INJECT 46 UNITS SUBCUTANEOUSLY THREE TIMES DAILY BEFORE MEAL(S) 135 mL 0    LIDOcaine (LIDODERM) 5 % Place 1 patch onto the skin once daily. Apply patch for 12 hours and then leave off for 12 hours 15 patch 0    methocarbamoL (ROBAXIN) 750 MG Tab Take 1 tablet (750 mg total) by mouth 2 (two) times daily as needed (muscle pain). 60 tablet 2    oxyCODONE-acetaminophen (PERCOCET) 5-325 mg per tablet Take 1 tablet by mouth every 24 hours as needed for Pain. 30 tablet 0    sacubitriL-valsartan (ENTRESTO) 24-26 mg per tablet Take 1 tablet by mouth 2 (two) times daily. 180 tablet 3    albuterol (PROVENTIL/VENTOLIN HFA) 90 mcg/actuation inhaler Inhale 1-2 puffs into the lungs every 6 (six) hours as needed. Rescue 18 g 0    blood-glucose meter (TRUE METRIX AIR GLUCOSE METER) Misc 1 each by  "Misc.(Non-Drug; Combo Route) route 3 (three) times daily. 1 each 0    fluticasone propionate (FLONASE) 50 mcg/actuation nasal spray 1 spray (50 mcg total) by Each Nostril route 2 (two) times daily as needed for Rhinitis or Allergies. 15 g 0    furosemide (LASIX) 40 MG tablet Take 1 tablet (40 mg total) by mouth once daily. 90 tablet 3    gabapentin (NEURONTIN) 400 MG capsule Take 1 capsule (400 mg total) by mouth 3 (three) times daily. TAKE 1 CAPSULE BY MOUTH THREE TIMES DAILY AS NEEDED FOR PAIN 90 capsule 3    insulin glargine U-100, Lantus, (LANTUS SOLOSTAR U-100 INSULIN) 100 unit/mL (3 mL) InPn pen Inject 60 Units into the skin 2 (two) times a day. 108 mL 0    metoprolol succinate (TOPROL-XL) 50 MG 24 hr tablet Take 1 tablet (50 mg total) by mouth once daily. 90 tablet 3    pen needle, diabetic (BD LORI 2ND GEN PEN NEEDLE) 32 gauge x 5/32" Ndle USE 1 PEN NEEDLE 4 TIMES DAILY 400 each 3    rosuvastatin (CRESTOR) 40 MG Tab Take 1 tablet (40 mg total) by mouth every evening. 90 tablet 3    spironolactone (ALDACTONE) 25 MG tablet Take 1 tablet (25 mg total) by mouth once daily. Hold until eating and drinking improves. Need to weight self daily 90 tablet 3     No current facility-administered medications for this visit.       Patient Care Team:  Alivia Ayala MD as PCP - General (Internal Medicine)  Prabhu Schmidt OD (Optometry)  Randall Roe OD as Consulting Physician (Optometry)  Sade Hopkins as ED Navigator  Miguel Lincoln MD as Obstetrician (Obstetrics and Gynecology)         Exam      Review of Systems:  (as noted above)  Review of Systems   Constitutional:  Positive for appetite change (fluctuates). Negative for unexpected weight change.   HENT:  Negative for nosebleeds and tinnitus.    Eyes:  Positive for visual disturbance (blurry at night with lights).   Respiratory:  Positive for apnea and shortness of breath.    Cardiovascular:  Negative for leg swelling.   Gastrointestinal:  Positive for " "constipation. Negative for diarrhea.   Genitourinary:  Negative for difficulty urinating, vaginal bleeding and vaginal discharge.   Musculoskeletal:  Positive for back pain. Negative for neck stiffness.   Neurological:  Positive for light-headedness. Negative for dizziness (intermittent. goes away after laying down).   Psychiatric/Behavioral:  Positive for dysphoric mood (has coping mechanisms). Negative for decreased concentration. The patient is not nervous/anxious.        Physical Exam:  Physical Exam  Vitals reviewed.   Constitutional:       General: She is not in acute distress.     Appearance: Normal appearance. She is not ill-appearing.   HENT:      Head: Normocephalic and atraumatic.   Eyes:      Extraocular Movements: Extraocular movements intact.      Pupils: Pupils are equal, round, and reactive to light.   Cardiovascular:      Rate and Rhythm: Normal rate.      Pulses: Normal pulses.   Pulmonary:      Effort: Pulmonary effort is normal.   Abdominal:      General: Abdomen is flat.   Musculoskeletal:      Cervical back: Normal range of motion.   Neurological:      Mental Status: She is alert and oriented to person, place, and time. Mental status is at baseline.   Psychiatric:         Mood and Affect: Mood normal.         Behavior: Behavior normal.       Vitals:    11/19/24 0844   BP: 136/88   BP Location: Right arm   Patient Position: Sitting   Pulse: 80   Resp: 18   Temp: 97.3 °F (36.3 °C)   TempSrc: Temporal   SpO2: (!) 90%   Weight: 116.8 kg (257 lb 8 oz)   Height: 5' 7" (1.702 m)      Body mass index is 40.33 kg/m².           Assessment & Plan      1. Annual physical exam  - CBC W/ AUTO DIFFERENTIAL; Future  - COMPREHENSIVE METABOLIC PANEL; Future  - LIPID PANEL; Future    2. Chronic combined systolic and diastolic heart failure  - Clinically stable.  - Patient to schedule appointment with cardiologist soon.  - furosemide (LASIX) 40 MG tablet; Take 1 tablet (40 mg total) by mouth once daily.  Dispense: " 90 tablet; Refill: 3  - metoprolol succinate (TOPROL-XL) 50 MG 24 hr tablet; Take 1 tablet (50 mg total) by mouth once daily.  Dispense: 90 tablet; Refill: 3  - rosuvastatin (CRESTOR) 40 MG Tab; Take 1 tablet (40 mg total) by mouth every evening.  Dispense: 90 tablet; Refill: 3    3. Type 2 diabetes mellitus with stage 4 chronic kidney disease, with long-term current use of insulin  - Clinically stable. Labs today.  - Patient to establish with nephrology soon. Ref previously placed.  - insulin glargine U-100, Lantus, (LANTUS SOLOSTAR U-100 INSULIN) 100 unit/mL (3 mL) InPn pen; Inject 60 Units into the skin 2 (two) times a day.  Dispense: 108 mL; Refill: 0  - spironolactone (ALDACTONE) 25 MG tablet; Take 1 tablet (25 mg total) by mouth once daily. Hold until eating and drinking improves. Need to weight self daily  Dispense: 90 tablet; Refill: 3    4. Lumbar radiculopathy  - gabapentin (NEURONTIN) 400 MG capsule; Take 1 capsule (400 mg total) by mouth 3 (three) times daily. TAKE 1 CAPSULE BY MOUTH THREE TIMES DAILY AS NEEDED FOR PAIN  Dispense: 90 capsule; Refill: 3    5. Chronic obstructive pulmonary disease, unspecified COPD type  - albuterol (PROVENTIL/VENTOLIN HFA) 90 mcg/actuation inhaler; Inhale 1-2 puffs into the lungs every 6 (six) hours as needed. Rescue  Dispense: 18 g; Refill: 0    6. Implantable cardioverter-defibrillator (ICD) in situ  - Clinically stable. Patient to schedule with cardiologist soon.    7. Abnormal blood chemistry  - CBC W/ AUTO DIFFERENTIAL; Future  - LIPID PANEL; Future           --------------------------------------------      Health Maintenance:  Health Maintenance         Date Due Completion Date    Pneumococcal Vaccines (Age 0-64) (1 of 2 - PCV) Never done ---    Cervical Cancer Screening 08/28/2024 2/28/2024    Influenza Vaccine (1) 09/01/2024 10/18/2019    COVID-19 Vaccine (1 - 2024-25 season) Never done ---    Hemoglobin A1c 09/06/2024 6/6/2024    Lipid Panel 03/13/2025 3/13/2024     Eye Exam 03/22/2025 3/22/2024    Diabetes Urine Screening 05/15/2025 5/15/2024    Foot Exam 05/15/2025 5/15/2024    Override on 11/10/2021: Done    Mammogram 05/31/2025 5/31/2024    TETANUS VACCINE 06/18/2029 6/18/2019    Colorectal Cancer Screening 03/09/2032 3/9/2022    RSV Vaccine (Age 60+ and Pregnant patients) (1 - 1-dose 75+ series) 07/03/2051 ---            Health maintenance reviewed, Patient plans to schedule Pap with her GYN, Lipid panel ordered, and A1c ordered    Follow Up:  Follow up in about 3 months (around 2/19/2025) for diabetes and cholesterol.       - The patient is given an After Visit Summary that lists all medications with directions, allergies, education, orders placed during this encounter and follow-up instructions.      - I have reviewed the patient's medical information including past medical, family, and social history sections including the medications and allergies.      - We discussed the patient's current medications.     This note was created by combination of typed  and MModal dictation.  Transcription errors may be present.  If there are any questions, please contact me.     Christal Alfaro PA-C  [facility]

## 2024-11-20 ENCOUNTER — TELEPHONE (OUTPATIENT)
Dept: FAMILY MEDICINE | Facility: CLINIC | Age: 49
End: 2024-11-20
Payer: MEDICARE

## 2024-11-20 ENCOUNTER — PATIENT MESSAGE (OUTPATIENT)
Dept: FAMILY MEDICINE | Facility: CLINIC | Age: 49
End: 2024-11-20
Payer: MEDICARE

## 2024-11-20 DIAGNOSIS — Z79.4 TYPE 2 DIABETES MELLITUS WITH STAGE 4 CHRONIC KIDNEY DISEASE, WITH LONG-TERM CURRENT USE OF INSULIN: Primary | ICD-10-CM

## 2024-11-20 DIAGNOSIS — N18.4 TYPE 2 DIABETES MELLITUS WITH STAGE 4 CHRONIC KIDNEY DISEASE, WITH LONG-TERM CURRENT USE OF INSULIN: Primary | ICD-10-CM

## 2024-11-20 DIAGNOSIS — E11.22 TYPE 2 DIABETES MELLITUS WITH STAGE 4 CHRONIC KIDNEY DISEASE, WITH LONG-TERM CURRENT USE OF INSULIN: Primary | ICD-10-CM

## 2024-11-21 ENCOUNTER — PATIENT MESSAGE (OUTPATIENT)
Dept: SPINE | Facility: CLINIC | Age: 49
End: 2024-11-21
Payer: MEDICARE

## 2024-11-21 ENCOUNTER — TELEPHONE (OUTPATIENT)
Dept: ELECTROPHYSIOLOGY | Facility: CLINIC | Age: 49
End: 2024-11-21
Payer: MEDICARE

## 2024-11-21 ENCOUNTER — CLINICAL SUPPORT (OUTPATIENT)
Dept: CARDIOLOGY | Facility: HOSPITAL | Age: 49
End: 2024-11-21
Attending: INTERNAL MEDICINE
Payer: MEDICARE

## 2024-11-21 ENCOUNTER — CLINICAL SUPPORT (OUTPATIENT)
Dept: CARDIOLOGY | Facility: HOSPITAL | Age: 49
End: 2024-11-21
Payer: MEDICARE

## 2024-11-21 DIAGNOSIS — I42.9 CARDIOMYOPATHY, UNSPECIFIED: ICD-10-CM

## 2024-11-21 DIAGNOSIS — Z95.810 PRESENCE OF AUTOMATIC (IMPLANTABLE) CARDIAC DEFIBRILLATOR: ICD-10-CM

## 2024-11-21 RX ORDER — OXYCODONE AND ACETAMINOPHEN 5; 325 MG/1; MG/1
1 TABLET ORAL
Qty: 30 TABLET | Refills: 0 | Status: SHIPPED | OUTPATIENT
Start: 2024-11-21

## 2024-11-21 NOTE — TELEPHONE ENCOUNTER
Following Provider: Former Dr. Christine pt    Device Type:  ICD    Date/Time:  11/20/2024 @ 1303    Event Type:  SVT, SVT lasted approximately 10 mins    Ventricular CL:  203 bpm     Duration:   3 mins 24 secs     Therapy Delivered:  ATP with aborted shock    Appropriate Therapy:  No    Successful:  No     Pt Symptomatic:  Felt increased heart rates.  Denies lightheadedness.  Pt having personal stress with her niece being bullied, attacked.     Most recent BP: 11/19/20254 clinic 136/88    Date of most recent treated arrhythmia: 8/2022    Diagnoses:  DM, PAF, NICM LV mural thrombus without MI,  MILE with BiPAP, Hyperparathyroidisim    No Hx of VT ablation  Meds: Eliquis 5 mg BID-pt states she is out-refill noted in chart. Pt will get refill.  Toprol XL 50 mg daily  Last EF  15-25% on 5/31/2024     Last clinic visit: 5/22/2024    Next clinic visit: recall 5/2025    Will forward to in clinic RN to review with Staff after speaking with pt.           ___        _______________Parameters

## 2024-11-21 NOTE — TELEPHONE ENCOUNTER
Patient requesting refill on oxyCODONE-acetaminophen (PERCOCET) 5-325 mg   Last office visit 10/17/24   shows last refill on 10/18/24  Patient does not have a pain contract on file with [facility]  Patient last UDS 07/06/23 was consistent with current therapy                            CODEINE   Not Detected               MORPHINE   Not Detected               6-ACETYLMORPHINE   Not Detected               OXYCODONE   Present               NOROYXCODONE   Present               OXYMORPHONE   Present               NOROXYMORPHONE   Not Detected               HYDROCODONE   Not Detected               NORHYDROCODONE   Not Detected               HYDROMORPHONE   Not Detected               BUPRENORPHINE   Not Detected               NORUBPRENORPHINE   Not Detected               FENTANYL   Not Detected               NORFENTANYL   Not Detected               MEPERIDINE METABOLITE   Not Detected               TAPENTADOL   Not Detected               TAPENTADOL-O-SULF   Not Detected               METHADONE   Not Detected               TRAMADOL   Not Detected               AMPHETAMINE   Not Detected               METHAMPHETAMINE   Not Detected               MDMA- ECSTASY   Not Detected               MDA   Not Detected               MDEA- Irma   Not Detected               METHYLPHENIDATE   Not Detected               PHENTERMINE   Not Detected               BENZOYLECGONINE   Not Detected               ALPRAZOLAM   Not Detected               ALPHA-OH-ALPRAZOLAM   Not Detected               CLONAZEPAM   Not Detected               7-AMINOCLONAZEPAM   Not Detected               DIAZEPAM   Not Detected               NORDIAZEPAM   Not Detected               OXAZEPAM   Not Detected               TEMAZEPAM   Not Detected               Lorazepam   Not Detected               MIDAZOLAM   Not Detected               ZOLPIDEM   Not Detected               BARBITURATES   Not Detected               Creatinine, Urine 20.0  - 400.0 mg/dL 141.4 183.2 R 121.5 R 25.0 R 31.0 R, CM 76.0 R, CM 26.8 R, CM   ETHYL GLUCURONIDE   Not Detected               MARIJUANA METABOLITE   Not Detected               Comment: INTERPRETIVE INFORMATION: Marijuana Metabolite  The cutoff for Marijuana Metabolite (immunoassay) was  changed from 20 ng/mL to 50 ng/mL,  effective May 15, 2023.   PCP   Not Detected               CARISOPRODOL   Not Detected               Comment: The carisoprodol immunoassay has cross-reactivity to  carisoprodol and meprobamate.   Naloxone   Not Detected               Gabapentin   Present               Pregabalin   Not Detected               Alpha-OH-Midazolam   Not Detected               Zolpidem Metabolite   Not Detected               PAIN MANAGEMENT DRUG PANEL   See Below

## 2024-11-22 ENCOUNTER — TELEPHONE (OUTPATIENT)
Dept: FAMILY MEDICINE | Facility: CLINIC | Age: 49
End: 2024-11-22
Payer: MEDICARE

## 2024-11-27 ENCOUNTER — PATIENT MESSAGE (OUTPATIENT)
Dept: SPINE | Facility: CLINIC | Age: 49
End: 2024-11-27
Payer: MEDICARE

## 2024-12-03 ENCOUNTER — CLINICAL SUPPORT (OUTPATIENT)
Dept: CARDIOLOGY | Facility: HOSPITAL | Age: 49
End: 2024-12-03
Attending: INTERNAL MEDICINE
Payer: MEDICARE

## 2024-12-03 ENCOUNTER — CLINICAL SUPPORT (OUTPATIENT)
Dept: CARDIOLOGY | Facility: HOSPITAL | Age: 49
End: 2024-12-03

## 2024-12-03 ENCOUNTER — OFFICE VISIT (OUTPATIENT)
Dept: ENDOCRINOLOGY | Facility: CLINIC | Age: 49
End: 2024-12-03
Payer: MEDICARE

## 2024-12-03 VITALS
HEART RATE: 82 BPM | BODY MASS INDEX: 39.63 KG/M2 | WEIGHT: 253 LBS | SYSTOLIC BLOOD PRESSURE: 100 MMHG | TEMPERATURE: 99 F | DIASTOLIC BLOOD PRESSURE: 50 MMHG

## 2024-12-03 DIAGNOSIS — I42.9 CARDIOMYOPATHY, UNSPECIFIED: ICD-10-CM

## 2024-12-03 DIAGNOSIS — E66.01 SEVERE OBESITY (BMI 35.0-39.9) WITH COMORBIDITY: ICD-10-CM

## 2024-12-03 DIAGNOSIS — Z79.4 TYPE 2 DIABETES MELLITUS WITH HYPERGLYCEMIA, WITH LONG-TERM CURRENT USE OF INSULIN: Primary | ICD-10-CM

## 2024-12-03 DIAGNOSIS — Z95.810 PRESENCE OF AUTOMATIC (IMPLANTABLE) CARDIAC DEFIBRILLATOR: ICD-10-CM

## 2024-12-03 DIAGNOSIS — E11.65 TYPE 2 DIABETES MELLITUS WITH HYPERGLYCEMIA, WITH LONG-TERM CURRENT USE OF INSULIN: Primary | ICD-10-CM

## 2024-12-03 PROCEDURE — 93295 DEV INTERROG REMOTE 1/2/MLT: CPT | Mod: ,,, | Performed by: INTERNAL MEDICINE

## 2024-12-03 PROCEDURE — 3060F POS MICROALBUMINURIA REV: CPT | Mod: CPTII,S$GLB,, | Performed by: NURSE PRACTITIONER

## 2024-12-03 PROCEDURE — 1159F MED LIST DOCD IN RCRD: CPT | Mod: CPTII,S$GLB,, | Performed by: NURSE PRACTITIONER

## 2024-12-03 PROCEDURE — 3008F BODY MASS INDEX DOCD: CPT | Mod: CPTII,S$GLB,, | Performed by: NURSE PRACTITIONER

## 2024-12-03 PROCEDURE — 93296 REM INTERROG EVL PM/IDS: CPT | Performed by: INTERNAL MEDICINE

## 2024-12-03 PROCEDURE — 99214 OFFICE O/P EST MOD 30 MIN: CPT | Mod: S$GLB,,, | Performed by: NURSE PRACTITIONER

## 2024-12-03 PROCEDURE — 99999 PR PBB SHADOW E&M-EST. PATIENT-LVL V: CPT | Mod: PBBFAC,,, | Performed by: NURSE PRACTITIONER

## 2024-12-03 PROCEDURE — 3074F SYST BP LT 130 MM HG: CPT | Mod: CPTII,S$GLB,, | Performed by: NURSE PRACTITIONER

## 2024-12-03 PROCEDURE — 3066F NEPHROPATHY DOC TX: CPT | Mod: CPTII,S$GLB,, | Performed by: NURSE PRACTITIONER

## 2024-12-03 PROCEDURE — 1160F RVW MEDS BY RX/DR IN RCRD: CPT | Mod: CPTII,S$GLB,, | Performed by: NURSE PRACTITIONER

## 2024-12-03 PROCEDURE — 3052F HG A1C>EQUAL 8.0%<EQUAL 9.0%: CPT | Mod: CPTII,S$GLB,, | Performed by: NURSE PRACTITIONER

## 2024-12-03 PROCEDURE — 4010F ACE/ARB THERAPY RXD/TAKEN: CPT | Mod: CPTII,S$GLB,, | Performed by: NURSE PRACTITIONER

## 2024-12-03 PROCEDURE — 3078F DIAST BP <80 MM HG: CPT | Mod: CPTII,S$GLB,, | Performed by: NURSE PRACTITIONER

## 2024-12-03 RX ORDER — BLOOD-GLUCOSE SENSOR
EACH MISCELLANEOUS
Qty: 12 EACH | Refills: 3 | Status: SHIPPED | OUTPATIENT
Start: 2024-12-03

## 2024-12-03 RX ORDER — INSULIN LISPRO 100 [IU]/ML
INJECTION, SOLUTION INTRAVENOUS; SUBCUTANEOUS
Qty: 135 ML | Refills: 1 | Status: SHIPPED | OUTPATIENT
Start: 2024-12-03

## 2024-12-03 RX ORDER — PEN NEEDLE, DIABETIC 30 GX3/16"
NEEDLE, DISPOSABLE MISCELLANEOUS
Qty: 400 EACH | Refills: 3 | Status: SHIPPED | OUTPATIENT
Start: 2024-12-03

## 2024-12-03 RX ORDER — INSULIN HUMAN 100 [IU]/ML
INJECTION, SUSPENSION SUBCUTANEOUS
Qty: 15 ML | Refills: 2 | Status: SHIPPED | OUTPATIENT
Start: 2024-12-03

## 2024-12-03 NOTE — PATIENT INSTRUCTIONS
Unable to evaluate glucose trends due to limited data/testing.   Start back with using Dexcom.     See Diabetes Education for Dexcom training.     If unable to use Dexcom, then do fingersticks 3-4x/day.   Continue current insulin doses.   Return to clinic in 3 months with A1c prior.

## 2024-12-03 NOTE — PROGRESS NOTES
CC: This 49 y.o. Black or  female  is here for evaluation of  T2DM along with comorbidities indicated in the Visit Diagnosis section of this encounter.    HPI: Fabiana Chanel was diagnosed with T2DM in 2013. Metformin and a sulfonylurea started at the time of diagnosis.           Prior visit 4/2023  Pt has not been seen in close to a year. No recent a1c available.   Pt had to stop Trulicity d/t diarrhea. Last dose was several months ago.   Interested to try another weekly injection. Has not been using Dexcom CGM d/t lack of refills.   Plan Increase Tresiba from 54 to 64 units twice daily.   Increase Humalog to 46 units before each meal.   Continue Humulin N 24 units nightly.   Start Mounjaro 2.5 mg once weekly for 4 weeks. Then increase to Mounjaro 5 mg once weekly. Prescription for Mounjaro sent to [facility].   Return to clinic in 6 weeks. Resume Dexcom asap - orders sent to [facility].       Interval hx  Pt last seen 1.5 years ago. Has h/o poor f/u.   Could not tolerate Mounjaro d/t diarrhea.       PMHX: CHF,  MILE on cpap, atrial fibrillation, MI, ICD placement, NICM (cath 11/2017) EF 20% by echo 12/2018    LAST DIABETES EDUCATION: 6/19/19, 11/2021 for Dexcom start       RECENT ILLNESSES/HOSPITALIZATIONS - -  Admitted 12/28/18 x 2 nights for acute on chronic HF      HOSPITALIZED FOR DIABETES  -  Yes - for hyperglycemia     DIABETES MEDICATIONS:    Humulin N 24 units qhs 10 pm   Lantus  60 units bid   Humalog Kwikpen 46 units ac with correction scale : 150-200=+2, 201-250=+4; 251-300=+6; 301-350=+8, over 350=+10 units        Misses medication doses -  denies     DM COMPLICATIONS: peripheral neuropathy and cardiovascular disease    SIGNIFICANT DIABETES MED HISTORY:   Metformin  mg bid - diarrhea   Trulicity 0.75 - diarrhea; Trulicity 1.5 mg - nausea and bloating.   Jardiance - recurrent  infection   Ozempic - nausea; blood clot??   Mounjaro - diarrhea   Novolog started  11/2019; Humulin N at bedtime started 5/2022      SELF MONITORING BLOOD GLUCOSE: one touch meter  Brings meter. Transcribed BGs into log below:             HYPOGLYCEMIC EPISODES:     Corrects with juice.       CURRENT DIET: drinks water. Eats 2-3 meals/day. No candy.       CURRENT EXERCISE: can tolerate walking up 2 blocks before she gets DIAZ       BP (!) 100/50   Pulse 82   Temp 98.9 °F (37.2 °C)   Wt 114.8 kg (253 lb)   BMI 39.63 kg/m²       ROS:   CONSTITUTIONAL: Appetite low, + Fatigue   + headache     PHYSICAL EXAM:  GENERAL: Well developed, well nourished. No acute distress.   PSYCH: AAOx3,  conversant, well-groomed. Judgement and insight good. Appropriate mood and affect.   NEURO: Cranial nerves grossly intact. Speech clear, no tremor.           Hemoglobin A1C   Date Value Ref Range Status   11/19/2024 8.7 (H) 4.0 - 5.6 % Final     Comment:     ADA Screening Guidelines:  5.7-6.4%  Consistent with prediabetes  >or=6.5%  Consistent with diabetes    High levels of fetal hemoglobin interfere with the HbA1C  assay. Heterozygous hemoglobin variants (HbS, HgC, etc)do  not significantly interfere with this assay.   However, presence of multiple variants may affect accuracy.     06/06/2024 9.1 (H) 4.0 - 5.6 % Final     Comment:     ADA Screening Guidelines:  5.7-6.4%  Consistent with prediabetes  >or=6.5%  Consistent with diabetes    High levels of fetal hemoglobin interfere with the HbA1C  assay. Heterozygous hemoglobin variants (HbS, HgC, etc)do  not significantly interfere with this assay.   However, presence of multiple variants may affect accuracy.     03/13/2024 9.5 (H) 4.0 - 5.6 % Final     Comment:     ADA Screening Guidelines:  5.7-6.4%  Consistent with prediabetes  >or=6.5%  Consistent with diabetes    High levels of fetal hemoglobin interfere with the HbA1C  assay. Heterozygous hemoglobin variants (HbS, HgC, etc)do  not significantly interfere with this assay.   However, presence of multiple variants  may affect accuracy.             Component Value Date/Time     11/19/2024 0947    K 4.0 11/19/2024 0947     11/19/2024 0947    CO2 26 11/19/2024 0947    BUN 12 11/19/2024 0947    CREATININE 1.1 11/19/2024 0947     (H) 11/19/2024 0947    CALCIUM 9.3 11/19/2024 0947    ALKPHOS 72 11/19/2024 0947    AST 15 11/19/2024 0947    ALT 14 11/19/2024 0947    BILITOT 0.3 11/19/2024 0947    EGFRNORACEVR >60.0 11/19/2024 0947    ESTGFRAFRICA 36 (A) 07/01/2022 1317     Lab Results   Component Value Date    CHOL 203 (H) 11/19/2024    CHOL 162 03/13/2024    CHOL 105 (L) 07/01/2022     Lab Results   Component Value Date    HDL 50 11/19/2024    HDL 44 03/13/2024    HDL 30 (L) 07/01/2022     Lab Results   Component Value Date    LDLCALC 134.4 11/19/2024    LDLCALC 99.8 03/13/2024    LDLCALC 58.4 (L) 07/01/2022     Lab Results   Component Value Date    TRIG 93 11/19/2024    TRIG 91 03/13/2024    TRIG 83 07/01/2022       Lab Results   Component Value Date    CHOLHDL 24.6 11/19/2024    CHOLHDL 27.2 03/13/2024    CHOLHDL 28.6 07/01/2022         Lab Results   Component Value Date    MICALBCREAT 80.7 (H) 05/15/2024           ASSESSMENT and PLAN:    A1C GOAL: < 7 %       1. Type 2 diabetes mellitus with hyperglycemia, with long-term current use of insulin  Unable to evaluate glucose trends due to limited data/testing.   Start back with using Dexcom.     See Diabetes Education for Dexcom training.     If unable to use Dexcom, then do fingersticks 3-4x/day.   Continue current insulin doses.   Return to clinic in 3 months with A1c prior.     Ambulatory referral/consult to Endocrinology    insulin lispro 100 unit/mL pen    Hemoglobin A1C    Ambulatory referral/consult to Diabetes Education      2. Severe obesity (BMI 35.0-39.9) with comorbidity  Increases insulin resistance.   Unable to tolerate GLP1, failed 3 of them.               Orders Placed This Encounter   Procedures    Hemoglobin A1C     Standing Status:   Future      Standing Expiration Date:   2/1/2026     Order Specific Question:   Send normal result to authorizing provider's In Basket if patient is active on MyChart:     Answer:   Yes    Ambulatory referral/consult to Diabetes Education     Standing Status:   Future     Standing Expiration Date:   12/3/2025     Referral Priority:   Routine     Referral Type:   Consultation     Referral Reason:   Specialty Services Required     Requested Specialty:   Endocrinology     Number of Visits Requested:   1     Expiration Date:   12/3/2025        Follow up in about 3 months (around 3/3/2025).       Patient is testing blood sugars 3x/day and taking 4 injections of insulin a day. The patient's insulin treatment regimen requires frequent adjustments by the patient on the basis of therapeutic CGM testing results.

## 2024-12-06 ENCOUNTER — LAB VISIT (OUTPATIENT)
Dept: LAB | Facility: HOSPITAL | Age: 49
End: 2024-12-06
Payer: MEDICARE

## 2024-12-06 DIAGNOSIS — E11.22 TYPE 2 DIABETES MELLITUS WITH STAGE 4 CHRONIC KIDNEY DISEASE, WITH LONG-TERM CURRENT USE OF INSULIN: ICD-10-CM

## 2024-12-06 DIAGNOSIS — N18.4 TYPE 2 DIABETES MELLITUS WITH STAGE 4 CHRONIC KIDNEY DISEASE, WITH LONG-TERM CURRENT USE OF INSULIN: ICD-10-CM

## 2024-12-06 DIAGNOSIS — Z79.4 TYPE 2 DIABETES MELLITUS WITH STAGE 4 CHRONIC KIDNEY DISEASE, WITH LONG-TERM CURRENT USE OF INSULIN: ICD-10-CM

## 2024-12-06 LAB
ALBUMIN SERPL BCP-MCNC: 3.5 G/DL (ref 3.5–5.2)
ALP SERPL-CCNC: 83 U/L (ref 40–150)
ALT SERPL W/O P-5'-P-CCNC: 8 U/L (ref 10–44)
ANION GAP SERPL CALC-SCNC: 7 MMOL/L (ref 8–16)
AST SERPL-CCNC: 15 U/L (ref 10–40)
BILIRUB SERPL-MCNC: 0.3 MG/DL (ref 0.1–1)
BUN SERPL-MCNC: 27 MG/DL (ref 6–20)
CALCIUM SERPL-MCNC: 9.6 MG/DL (ref 8.7–10.5)
CHLORIDE SERPL-SCNC: 102 MMOL/L (ref 95–110)
CO2 SERPL-SCNC: 31 MMOL/L (ref 23–29)
CREAT SERPL-MCNC: 2.1 MG/DL (ref 0.5–1.4)
EST. GFR  (NO RACE VARIABLE): 28.5 ML/MIN/1.73 M^2
GLUCOSE SERPL-MCNC: 105 MG/DL (ref 70–110)
POTASSIUM SERPL-SCNC: 3.8 MMOL/L (ref 3.5–5.1)
PROT SERPL-MCNC: 7.3 G/DL (ref 6–8.4)
SODIUM SERPL-SCNC: 140 MMOL/L (ref 136–145)

## 2024-12-06 PROCEDURE — 36415 COLL VENOUS BLD VENIPUNCTURE: CPT | Mod: PO

## 2024-12-06 PROCEDURE — 80053 COMPREHEN METABOLIC PANEL: CPT

## 2024-12-09 ENCOUNTER — TELEPHONE (OUTPATIENT)
Dept: FAMILY MEDICINE | Facility: CLINIC | Age: 49
End: 2024-12-09
Payer: MEDICARE

## 2024-12-09 NOTE — TELEPHONE ENCOUNTER
----- Message from Ines Smiley PA-C sent at 12/6/2024  5:23 PM CST -----  Please contact the patient and let them know that their results  show some kidney injury has her creatinine has increased and her EGFR has decreased back to 28.5.  Advised patient to follow up.  Thank you

## 2024-12-10 ENCOUNTER — OFFICE VISIT (OUTPATIENT)
Dept: PAIN MEDICINE | Facility: CLINIC | Age: 49
End: 2024-12-10
Payer: MEDICARE

## 2024-12-10 DIAGNOSIS — G89.4 CHRONIC PAIN SYNDROME: ICD-10-CM

## 2024-12-10 DIAGNOSIS — M51.360 DEGENERATION OF INTERVERTEBRAL DISC OF LUMBAR REGION WITH DISCOGENIC BACK PAIN: ICD-10-CM

## 2024-12-10 DIAGNOSIS — M54.16 LUMBAR RADICULOPATHY: Primary | ICD-10-CM

## 2024-12-10 PROCEDURE — 3066F NEPHROPATHY DOC TX: CPT | Mod: CPTII,95,, | Performed by: ANESTHESIOLOGY

## 2024-12-10 PROCEDURE — 3052F HG A1C>EQUAL 8.0%<EQUAL 9.0%: CPT | Mod: CPTII,95,, | Performed by: ANESTHESIOLOGY

## 2024-12-10 PROCEDURE — 3060F POS MICROALBUMINURIA REV: CPT | Mod: CPTII,95,, | Performed by: ANESTHESIOLOGY

## 2024-12-10 PROCEDURE — 4010F ACE/ARB THERAPY RXD/TAKEN: CPT | Mod: CPTII,95,, | Performed by: ANESTHESIOLOGY

## 2024-12-10 PROCEDURE — 1159F MED LIST DOCD IN RCRD: CPT | Mod: CPTII,95,, | Performed by: ANESTHESIOLOGY

## 2024-12-10 PROCEDURE — 99214 OFFICE O/P EST MOD 30 MIN: CPT | Mod: 95,GC,, | Performed by: ANESTHESIOLOGY

## 2024-12-10 PROCEDURE — 1160F RVW MEDS BY RX/DR IN RCRD: CPT | Mod: CPTII,95,, | Performed by: ANESTHESIOLOGY
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
## 2024-12-12 ENCOUNTER — CLINICAL SUPPORT (OUTPATIENT)
Dept: DIABETES | Facility: CLINIC | Age: 49
End: 2024-12-12
Payer: MEDICARE

## 2024-12-12 VITALS — WEIGHT: 255 LBS | BODY MASS INDEX: 40.02 KG/M2 | HEIGHT: 67 IN

## 2024-12-12 DIAGNOSIS — Z79.4 TYPE 2 DIABETES MELLITUS WITH HYPERGLYCEMIA, WITH LONG-TERM CURRENT USE OF INSULIN: ICD-10-CM

## 2024-12-12 DIAGNOSIS — E11.65 TYPE 2 DIABETES MELLITUS WITH HYPERGLYCEMIA, WITH LONG-TERM CURRENT USE OF INSULIN: ICD-10-CM

## 2024-12-12 PROCEDURE — 99999 PR PBB SHADOW E&M-EST. PATIENT-LVL II: CPT | Mod: PBBFAC,,,

## 2024-12-12 PROCEDURE — G0108 DIAB MANAGE TRN  PER INDIV: HCPCS | Mod: S$GLB,,, | Performed by: FAMILY MEDICINE

## 2024-12-12 NOTE — PROGRESS NOTES
"Diabetes Care Specialist Progress Note  Author: Mariann Ulrich RN  Date: 12/12/2024    Intake  Program Intake  Reason for Diabetes Program Visit:: Intervention  Type of Intervention:: Individual  Individual: Device Training  Device Training: Personal CGM  Current diabetes risk level:: moderate  In the last month, have you used the ER or been admitted to the hospital: No    Current Diabetes Treatment: Insulin  Method of insulin delivery?: Injections  Injection Type: Pens  Pen Type/Dose: Lantus 60U BID, Humulin N 24U @ HS, and Humalog 46U AC + SSI @ 150    Continuous Glucose Monitoring  Patient has CGM: No  Personal CGM type:: Dexcom G7 start today    Lab Results   Component Value Date    HGBA1C 8.7 (H) 11/19/2024       Weight: 115.7 kg (255 lb)   Height: 5' 7" (170.2 cm)   Body mass index is 39.94 kg/m².    Lifestyle Coping Support & Clinical  Lifestyle/Coping/Support  Psychosocial/Coping Skills Assessment Completed: : No  Deffered due to:: Time  Area of need?: Deferred    Diabetes Self-Management Skills Assessment  Medication Skills Assessment  Patient is able to identify current diabetes medications, dosages, and appropriate timing of medications.: yes  Patient reports problems or concerns with current medication regimen.: no  Medication Skills Assessment Completed:: Yes  Assessment indicates:: Instruction Needed  Area of need?: Yes    Diabetes Disease Process/Treatment Options  Diabetes Disease Process/Treatment Options: Skills Assessment Completed: No  Deferred due to:: Time  Area of need?: Deferred    Nutrition/Healthy Eating  Meal Plan 24 Hour Recall - Breakfast: sausage biscuit  Meal Plan 24 Hour Recall - Lunch: skips  Meal Plan 24 Hour Recall - Dinner: fried chicken  Meal Plan 24 Hour Recall - Snack: ritz crackers  Meal Plan 24 Hour Recall - Beverage: water  Who shops/cooks?: Patient  Patient can identify foods that impact blood sugar.: yes  Challenges to healthy eating:: portion control  Nutrition/Healthy " Eating Skills Assessment Completed:: Yes  Assessment indicates:: Instruction Needed, Knowledge deficit  Area of need?: Yes    Physical Activity/Exercise  Physical Activity/Exercise Skills Assessment Completed: : No  Deffered due to:: Time  Area of need?: Deferred    Home Blood Glucose Monitoring  Patient states that blood sugar is checked at home daily.: yes  Monitoring Method:: personal continuous glucose monitor  Personal CGM type:: Dexcom G7 start today  What is your A1c Target?: 7.0%  Home Blood Glucose Monitoring Skills Assessment Completed: : Yes  Assessment indicates:: Instruction Needed, Knowledge deficit  Area of need?: Yes    Acute Complications  Acute Complications Skills Assessment Completed: : No  Deffered due to:: Time  Area of need?: Deferred    Chronic Complications  Chronic Complications Skills Assessment Completed: : No  Deferred due to:: Time  Area of need?: Deferred      Assessment Summary and Plan    Based on today's diabetes care assessment, the following areas of need were identified:      Identified Areas of Need      Medication/Current Diabetes Treatment: Yes- Reviewed Patient's current diabetes medication regimen.    Lifestyle Coping/Support: Deferred   Diabetes Disease Process/Treatment Options: Deferred   Nutrition/Healthy Eating: Yes - see care planning   Physical Activity/Exercise: Deferred    Home Blood Glucose Monitoring: Yes - see care planning   Acute Complications: Deferred    Chronic Complications: Deferred       Today's interventions were provided through individual discussion, instruction, and written materials were provided.      Patient verbalized understanding of instruction and written materials.  Pt was able to return back demonstration of instructions today. Patient understood key points, needs reinforcement and further instruction.     Diabetes Self-Management Care Plan:    Today's Diabetes Self-Management Care Plan was developed with Fabiana Trivedi's input.  Fabiana Trivedi has agreed to work toward the following goal(s) to improve his/her overall diabetes control.      Care Plan: Diabetes Management   Updates made since 12/13/2023 12:00 AM        Problem: Blood Glucose Self-Monitoring         Goal: Patient agrees to change Dexcom G7 sensor every 10 days and review blood sugars at least 3 times per day.    Start Date: 12/12/2024   Expected End Date: 1/7/2025   Priority: High   Barriers: No Barriers Identified   Note:    12/12/24 - - - DEXCOM G7 CGM TRAINING (Pt connected to clinic via [employer]; password in Snap[employer]ot section)  Patient referred to clinic today for Dexcom G7 continuous glucose sensor system training. Pt used the dexcom Kristal and reviewed video on starting dexcom G7 using the Kristal. Also reviewed instructions per Dexcom G7 educational insert.   Overview:  5min glucose reading updates, trending arrows, BG graph screens, battery life indicator, Blue Tooth Symbol.  Menus: trend Graph, start sensor, enter BG, events, Alerts, Settings, Shutdown, Stop Sensor   Settings:                          * Urgent Low: 55 mg/dL                          * Urgent Low Soon: on                          * Low alert: 70 mg/dl repeat 15 min                          * High alert: 250 mg/dl                           * Rise rate: off                          * Fall Rate: off                          * Signal Loss: on repeat 20 min                          * No Reading: on repeat 20 min                          * Always sound: on  Reviewed additional setting options with patient, including Graph Height and Transmitter ID. Dexcom G7 was paired.    Reviewed where to find sensor insertion time and sensor expiration date.   Discussed no need to calibrate sensor during the entirety of the 10 day wear. Discussed that pt can calibrate sensor if desired, but at that time she will need to continue calibrating every 12 hours for sensor to remain accurate. Reviewed appropriate  calibration techniques.  Reviewed Dexcom G7 site selection. Site selected and prepped using aseptic technique Inserted to back of right upper arm. Transmitter/sensor placed per instructions.  Patient able to demonstrate without difficulty.  Encouraged to review manual prior to starting another sensor.    range 20 feet, but the first 3 hrs keep within 3 feet of transmitter.  Pt instructed on lag time of interstitial fluid from CBG and was advised to tx hypoglycemia and dose insulin based on SMBG values.  Dexcom technical support contact number given and examples of when to contact them discussed. Patient advised to always check glucose with glucometer should readings do not match symptoms felt (ex. Hypo or hyperglycemia).           Problem: Healthy Eating         Goal: Eat 2-3 meals daily with 30-45g/2-3 servings of Carbohydrate per meal. Limit snacking in between meal to 1 serving/15 grams or up to 20 grams.    Start Date: 12/12/2024   Expected End Date: 1/7/2025   Priority: Medium   Barriers: No Barriers Identified   Note:    12/12/24 - - Patient eats 2-3 meals a day. We reviewed eating right with diabetes. We discussed the importance of eating balanced meals with vegetables, fruits, lean meats, and whole grains. We concentrated on portion sizes. We discussed carb sources of foods, appropriate amount of carbs to have at meals/snacks and acceptable serving sizes of individual carb items. Obtained a 24-hr food recall from patient. We discussed various meal plan options to promote healthy eating.      - - Stressed importance of eating a protein at each meal and include non-starchy vegetables at lunch/dinner. Instructed pt to aim for 2-3 evenly spaced meals or a small carb snack in place of a missed meal. Written education information provided to patient for use at home. Pt verbalized understanding of all the above.       Follow Up Plan   Follow up in about 26 days (around 1/7/2025) for F/U #1 Review Dexcom  report, meal planning and finish assessment.    Today's care plan and follow up schedule was discussed with patient.  Fabiana Trivedi verbalized understanding of the care plan, goals, and agrees to follow up plan.        The patient was encouraged to communicate with his/her health care provider/physician and care team regarding his/her condition(s) and treatment.  I provided the patient with my contact information today and encouraged to contact me via phone or [facility] Patient Portal as needed.     Length of Visit   Total Time: 60 Minutes

## 2025-01-02 ENCOUNTER — PATIENT MESSAGE (OUTPATIENT)
Dept: PAIN MEDICINE | Facility: CLINIC | Age: 50
End: 2025-01-02
Payer: MEDICARE

## 2025-01-02 DIAGNOSIS — M79.18 MYOFASCIAL PAIN: ICD-10-CM

## 2025-01-02 RX ORDER — METHOCARBAMOL 750 MG/1
750 TABLET, FILM COATED ORAL 2 TIMES DAILY PRN
Qty: 60 TABLET | Refills: 2 | Status: SHIPPED | OUTPATIENT
Start: 2025-01-02

## 2025-01-02 RX ORDER — OXYCODONE AND ACETAMINOPHEN 5; 325 MG/1; MG/1
1 TABLET ORAL
Qty: 30 TABLET | Refills: 0 | Status: SHIPPED | OUTPATIENT
Start: 2025-01-02

## 2025-01-02 NOTE — TELEPHONE ENCOUNTER
Patient requesting refill on:Oxycodone & Methocarbamol  Last office visit: 12.10.24   shows last refill on: 11.22.24  Patient does have a pain contract on file with [facility]  Patient last UDS: 10.17.24 was consistent with current therapy.                    CODEINE  Not Detected  Not Detected       Comment: INTERPRETIVE INFORMATION: Codeine, U  Positive Cutoff: 40 ng/mL  Methodology: Mass Spectrometry   MORPHINE  Not Detected  Not Detected       Comment: INTERPRETIVE INFORMATION:Morphine, U  Positive Cutoff: 20 ng/mL  Methodology: Mass Spectrometry   6-ACETYLMORPHINE  Not Detected  Not Detected       Comment: INTERPRETIVE INFORMATION:6-acetylmorphine, U  Positive Cutoff: 20 ng/mL  Methodology: Mass Spectrometry   OXYCODONE  Not Detected  Present       Comment: INTERPRETIVE INFORMATION:Oxycodone, U  Positive Cutoff: 40 ng/mL  Methodology: Mass Spectrometry   NOROYXCODONE  Not Detected  Present       Comment: INTERPRETIVE INFORMATION:Noroxycodone, U  Positive Cutoff: 100 ng/mL  Methodology: Mass Spectrometry   OXYMORPHONE  Not Detected  Present       Comment: INTERPRETIVE INFORMATION:Oxymorphone, U  Positive Cutoff: 40 ng/mL  Methodology: Mass Spectrometry   NOROXYMORPHONE  Not Detected  Not Detected       Comment: INTERPRETIVE INFORMATION:Noroxymorphone, U  Positive Cutoff: 100 ng/mL  Methodology: Mass Spectrometry   HYDROCODONE  Not Detected  Not Detected       Comment: INTERPRETIVE INFORMATION:Hydrocodone, U  Positive Cutoff: 40 ng/mL  Methodology: Mass Spectrometry   NORHYDROCODONE  Not Detected  Not Detected       Comment: INTERPRETIVE INFORMATION:Norhydrocodone, U  Positive Cutoff: 100 ng/mL  Methodology: Mass Spectrometry   HYDROMORPHONE  Not Detected  Not Detected       Comment: INTERPRETIVE INFORMATION:Hydromorphone, U  Positive Cutoff: 20 ng/mL  Methodology: Mass Spectrometry   BUPRENORPHINE  Not Detected  Not Detected       Comment: INTERPRETIVE  INFORMATION:Buprenorphine, U  Positive Cutoff: 5 ng/mL  Methodology: Mass Spectrometry   NORUBPRENORPHINE  Not Detected  Not Detected       Comment: INTERPRETIVE INFORMATION:Norbuprenorphine, U  Positive Cutoff: 20 ng/mL  Methodology: Mass Spectrometry   FENTANYL  Not Detected  Not Detected       Comment: INTERPRETIVE INFORMATION:Fentanyl, U  Positive Cutoff: 2 ng/mL  Methodology: Mass Spectrometry   NORFENTANYL  Not Detected  Not Detected       Comment: INTERPRETIVE INFORMATION:Norfentanyl, U  Positive Cutoff: 2 ng/mL  Methodology: Mass Spectrometry   MEPERIDINE METABOLITE  Not Detected  Not Detected       Comment: INTERPRETIVE INFORMATION:Meperidine metabolite, U  Positive Cutoff: 50 ng/mL  Methodology: Mass Spectrometry   TAPENTADOL  Not Detected  Not Detected       Comment: INTERPRETIVE INFORMATION:Tapentadol, U  Positive Cutoff: 100 ng/mL  Methodology: Mass Spectrometry   TAPENTADOL-O-SULF  Not Detected  Not Detected       Comment: INTERPRETIVE INFORMATION:Tapentadol-o-Sulf, U  Positive Cutoff: 200 ng/mL  Methodology: Mass Spectrometry   METHADONE  Negative  Not Detected       Comment: Presumptive negative by immunoassay. Testing by mass  spectrometry is available on request.  INTERPRETIVE INFORMATION: Methadone Screen, U  Positive Cutoff: 150 ng/mL  Methodology: Immunoassay   TRAMADOL  Negative  Not Detected       Comment: Presumptive negative by immunoassay. Testing by mass  spectrometry is available on request.  INTERPRETIVE INFORMATION:Tramadol Screen, U  Positive Cutoff: 100 ng/mL  Methodology: Immunoassay   AMPHETAMINE  Not Detected  Not Detected       Comment: INTERPRETIVE INFORMATION:Amphetamine, U  Positive Cutoff: 50 ng/mL  Methodology: Mass Spectrometry   METHAMPHETAMINE  Not Detected  Not Detected       Comment: INTERPRETIVE INFORMATION:Methamphetamine, U  Positive Cutoff: 200 ng/mL  Methodology: Mass Spectrometry   MDMA- ECSTASY  Not Detected  Not Detected       Comment: INTERPRETIVE  INFORMATION:MDMA, U  Positive Cutoff: 200 ng/mL  Methodology: Mass Spectrometry   MDA  Not Detected  Not Detected       Comment: INTERPRETIVE INFORMATION:MDA, U  Positive Cutoff: 200 ng/mL  Methodology: Mass Spectrometry   MDEA- Irma  Not Detected  Not Detected       Comment: INTERPRETIVE INFORMATION:MDEA, U  Positive Cutoff: 200 ng/mL  Methodology: Mass Spectrometry   METHYLPHENIDATE  Not Detected  Not Detected       Comment: INTERPRETIVE INFORMATION:Methylphenidate, U  Positive Cutoff: 100 ng/mL  Methodology: Mass Spectrometry   PHENTERMINE  Not Detected  Not Detected       Comment: INTERPRETIVE INFORMATION:Phentermine, U  Positive Cutoff: 100 ng/mL  Methodology: Mass Spectrometry   BENZOYLECGONINE  Negative  Not Detected       Comment: Presumptive negative by immunoassay. Testing by mass  spectrometry is available on request.  INTERPRETIVE INFORMATION:Cocaine Screen, U  Positive Cutoff: 150 ng/mL  Methodology: Immunoassay   ALPRAZOLAM  Not Detected  Not Detected       Comment: INTERPRETIVE INFORMATION:Alprazolam, U  Positive Cutoff: 40 ng/mL  Methodology: Mass Spectrometry   ALPHA-OH-ALPRAZOLAM  Not Detected  Not Detected       Comment: INTERPRETIVE INFORMATION:Alpha-OH-Alprazolam, U  Positive Cutoff: 20 ng/mL  Methodology: Mass Spectrometry   CLONAZEPAM  Not Detected  Not Detected       Comment: INTERPRETIVE INFORMATION:Clonazepam, U  Positive Cutoff: 20 ng/mL  Methodology: Mass Spectrometry   7-AMINOCLONAZEPAM  Not Detected  Not Detected       Comment: INTERPRETIVE INFORMATION:7-Aminoclonazepam, U  Positive Cutoff: 40 ng/mL  Methodology: Mass Spectrometry   DIAZEPAM  Not Detected  Not Detected       Comment: INTERPRETIVE INFORMATION:Diazepam, U  Positive Cutoff: 50 ng/mL  Methodology: Mass Spectrometry   NORDIAZEPAM  Not Detected  Not Detected       Comment: INTERPRETIVE INFORMATION:Nordiazepam, U  Positive Cutoff: 50 ng/mL  Methodology: Mass Spectrometry   OXAZEPAM  Not Detected  Not Detected       Comment:  INTERPRETIVE INFORMATION:Oxazepam, U  Positive Cutoff: 50 ng/mL  Methodology: Mass Spectrometry   TEMAZEPAM  Not Detected  Not Detected       Comment: INTERPRETIVE INFORMATION:Temazepam, U  Positive Cutoff: 50 ng/mL  Methodology: Mass Spectrometry   Lorazepam  Not Detected  Not Detected       Comment: INTERPRETIVE INFORMATION:Lorazepam, U  Positive Cutoff: 60 ng/mL  Methodology: Mass Spectrometry   MIDAZOLAM  Not Detected  Not Detected       Comment: INTERPRETIVE INFORMATION:Midazolam, U  Positive Cutoff: 20 ng/mL  Methodology: Mass Spectrometry   ZOLPIDEM  Not Detected  Not Detected       Comment: INTERPRETIVE INFORMATION:Zolpidem, U  Positive Cutoff: 20 ng/mL  Methodology: Mass Spectrometry   BARBITURATES  Negative  Not Detected       Comment: Presumptive negative by immunoassay. Testing by mass  spectrometry is available on request.  INTERPRETIVE INFORMATION:Barbiturates Screen, U  Positive Cutoff: 200 ng/mL  Methodology: Immunoassay   Creatinine, Urine 20.0 - 400.0 mg/dL 35.5 57.0 R 141.4 183.2 R 121.5 R 25.0 R 31.0 R, CM   ETHYL GLUCURONIDE  Negative  Not Detected       Comment: Presumptive negative by immunoassay. Testing by mass  spectrometry is available on request.  INTERPRETIVE INFORMATION:Ethyl Glucuronide Screen, U  Positive Cutoff: 500 ng/mL  Methodology: Immunoassay   MARIJUANA METABOLITE  Negative  Not Detected CM       Comment: Presumptive negative by immunoassay. Testing by mass  spectrometry is available on request.  INTERPRETIVE INFORMATION: THC (Cannabinoids) Screen, U  Positive Cutoff: 50 ng/mL  Methodology: Immunoassay   PCP  Negative  Not Detected       Comment: Presumptive negative by immunoassay. Testing by mass  spectrometry is available on request.  INTERPRETIVE INFORMATION:Phencyclidine Screen, U  Positive Cutoff: 25 ng/mL  Methodology: Immunoassay   CARISOPRODOL  Negative  Not Detected CM       Comment: Presumptive negative by immunoassay. Testing by mass  spectrometry is available on  request.  INTERPRETIVE INFORMATION: Carisoprodol Screen, U  Positive Cutoff: 100 ng/mL  Methodology: Immunoassay  The carisoprodol immunoassay has cross-reactivity to  carisoprodol and meprobamate.   Naloxone  Not Detected  Not Detected       Comment: INTERPRETIVE INFORMATION:Naloxone, U  Positive Cutoff: 100 ng/mL  Methodology: Mass Spectrometry   Gabapentin  Present  Present       Comment: INTERPRETIVE INFORMATION:Gabapentin, U  Positive Cutoff: 3,000 ng/mL  Methodology: Mass Spectrometry   Pregabalin  Not Detected  Not Detected       Comment: INTERPRETIVE INFORMATION:Pregabalin, U  Positive Cutoff: 3,000 ng/mL  Methodology: Mass Spectrometry   Alpha-OH-Midazolam  Not Detected  Not Detected       Comment: INTERPRETIVE INFORMATION:Alpha-OH-Midazolam, U  Positive Cutoff: 20 ng/mL  Methodology: Mass Spectrometry   Zolpidem Metabolite  Not Detected  Not Detected

## 2025-01-09 ENCOUNTER — CLINICAL SUPPORT (OUTPATIENT)
Dept: DIABETES | Facility: CLINIC | Age: 50
End: 2025-01-09
Payer: MEDICARE

## 2025-01-09 ENCOUNTER — OFFICE VISIT (OUTPATIENT)
Dept: NEUROSURGERY | Facility: CLINIC | Age: 50
End: 2025-01-09
Payer: MEDICARE

## 2025-01-09 ENCOUNTER — HOSPITAL ENCOUNTER (OUTPATIENT)
Dept: RADIOLOGY | Facility: HOSPITAL | Age: 50
Discharge: HOME | End: 2025-01-09
Attending: PHYSICIAN ASSISTANT
Payer: MEDICARE

## 2025-01-09 VITALS
DIASTOLIC BLOOD PRESSURE: 53 MMHG | WEIGHT: 257.69 LBS | HEART RATE: 76 BPM | BODY MASS INDEX: 40.45 KG/M2 | OXYGEN SATURATION: 95 % | SYSTOLIC BLOOD PRESSURE: 91 MMHG | HEIGHT: 67 IN

## 2025-01-09 VITALS — HEIGHT: 67 IN | BODY MASS INDEX: 40.37 KG/M2 | WEIGHT: 257.19 LBS

## 2025-01-09 DIAGNOSIS — G89.4 CHRONIC PAIN SYNDROME: ICD-10-CM

## 2025-01-09 DIAGNOSIS — Z79.4 TYPE 2 DIABETES MELLITUS WITH HYPERGLYCEMIA, WITH LONG-TERM CURRENT USE OF INSULIN: Primary | ICD-10-CM

## 2025-01-09 DIAGNOSIS — M54.16 LUMBAR RADICULOPATHY: ICD-10-CM

## 2025-01-09 DIAGNOSIS — E11.65 TYPE 2 DIABETES MELLITUS WITH HYPERGLYCEMIA, WITH LONG-TERM CURRENT USE OF INSULIN: Primary | ICD-10-CM

## 2025-01-09 PROCEDURE — 99204 OFFICE O/P NEW MOD 45 MIN: CPT | Mod: S$GLB,,, | Performed by: PHYSICIAN ASSISTANT

## 2025-01-09 PROCEDURE — 3008F BODY MASS INDEX DOCD: CPT | Mod: CPTII,S$GLB,, | Performed by: PHYSICIAN ASSISTANT

## 2025-01-09 PROCEDURE — 3074F SYST BP LT 130 MM HG: CPT | Mod: CPTII,S$GLB,, | Performed by: PHYSICIAN ASSISTANT

## 2025-01-09 PROCEDURE — 1159F MED LIST DOCD IN RCRD: CPT | Mod: CPTII,S$GLB,, | Performed by: PHYSICIAN ASSISTANT

## 2025-01-09 PROCEDURE — 72114 X-RAY EXAM L-S SPINE BENDING: CPT | Mod: TC,FY

## 2025-01-09 PROCEDURE — 72114 X-RAY EXAM L-S SPINE BENDING: CPT | Mod: 26,,, | Performed by: RADIOLOGY

## 2025-01-09 PROCEDURE — 3078F DIAST BP <80 MM HG: CPT | Mod: CPTII,S$GLB,, | Performed by: PHYSICIAN ASSISTANT

## 2025-01-09 PROCEDURE — 99999 PR PBB SHADOW E&M-EST. PATIENT-LVL I: CPT | Mod: PBBFAC,,,

## 2025-01-09 PROCEDURE — 1160F RVW MEDS BY RX/DR IN RCRD: CPT | Mod: CPTII,S$GLB,, | Performed by: PHYSICIAN ASSISTANT

## 2025-01-09 NOTE — PROGRESS NOTES
Reviewed CGM data.   Glucoses fluctuating. Mild overnight spikes noted as well as mild daytime hypoglycemia, perhaps d/t high basal insulin dose.     Recommend   Continue Humulin N 24 units qhs  Reduce Lantus from 60 to 50 units bid.   Increase Humalog from 46 to 50 units ac and use ss.

## 2025-01-09 NOTE — PROGRESS NOTES
[facility]  Neurosurgery    SUBJECTIVE:     History of Present Illness:  Fabiana Chanel is a 49 y.o. female with complex past medical history including CKD stage 4, DM, obesity, MI x2, and a mural blood clot who presents with chronic low back pain as a referral from pain management.  She can not have an MRI due to AICD.  She has a visible right-sided disc herniation at L5-S1 on lumbar CT.  She has undergone multiple injections, each of which provide some relief but are not permanent.  She has also tried physical therapy.  Low back pain is her primary current complaint.  She has not had consistent leg pain in his least 6 months.  When present the leg pain was down the right posterolateral leg, sometimes stopping in the calf.    Denies numbness and weakness    Of note, she does mention numbness and weakness in her b/l hands that is only present at night.  This sensations improve by shaking out her hand or holding a cold water bottle.    Treatments tried:  - She is currently attending physical therapy.   - She is s/p Right L5/S1 and S1 TFESI  -Gabapentin: Gabapentin 400 mg nightly   -Muscle relaxer: methocarbamol   -Rx pain medications: Percocet 5/325 QHS PRN   -Spine surgery:  Never    - Cannot get MRI due to AICD, On Eliquis.         (Not in a hospital admission)      Review of patient's allergies indicates:   Allergen Reactions    Metformin      Diarrhea on metformin XR     Pneumococcal 23-abimbola ps vaccine     Trulicity [dulaglutide] Diarrhea       Past Medical History:   Diagnosis Date    Acute respiratory failure due to COVID-19 04/29/2021    Atrial fibrillation     Blood clot associated with vein wall inflammation     not dvt    Cardiomyopathy     Normal cors on cath 11/2017    CHF (congestive heart failure)     DM (diabetes mellitus) 09/19/2013    Essential hypertension 05/14/2014    Hyperlipidemia     Hypertension     Iron deficiency anemia 09/20/2013    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    Pneumonia due to COVID-19 virus 04/28/2021    Psoriasis     Sleep apnea     Ventricular tachycardia 08/06/2022     Past Surgical History:   Procedure Laterality Date    CARDIAC CATHETERIZATION      COLONOSCOPY      COLONOSCOPY N/A 3/9/2022    Procedure: COLONOSCOPY;  Surgeon: Vielka Burrell MD;  Location: [facility] END[facility];  Service: Endoscopy;  Laterality: N/A;    DILATION AND CURETTAGE OF UTERUS      EPIDURAL STEROID INJECTION N/A 8/11/2023    Procedure: INJECTION, STEROID, EPIDURAL, L5/S1 SOONER DATE    clear to hold Eliquis;  Surgeon: eJd Yeager MD;  Location: [facility], operated by [facility];  Service: Pain Management;  Laterality: N/A;    ESOPHAGOGASTRODUODENOSCOPY N/A 5/9/2019    Procedure: EGD (ESOPHAGOGASTRODUODENOSCOPY);  Surgeon: Vielka Burrell MD;  Location: [facility] ENDO;  Service: Endoscopy;  Laterality: N/A;    TRANSFORAMINAL EPIDURAL INJECTION OF STEROID N/A 1/6/2022    Procedure: Transforaminal ANKITA L4/L5 L5/S1;  Surgeon: Jed Yeager MD;  Location: [facility], operated by [facility];  Service: Pain Management;  Laterality: N/A;    TRANSFORAMINAL EPIDURAL INJECTION OF STEROID Right 12/1/2022    Procedure: INJECTION, STEROID, EPIDURAL, TRANSFORAMINAL APPROACH, RIGHT L4/L5 & L5/S1 CONTRAST;  Surgeon: Jed Yeager MD;  Location: [facility], operated by [facility];  Service: Pain Management;  Laterality: Right;    TRANSFORAMINAL EPIDURAL INJECTION OF STEROID Right 4/14/2023    Procedure: INJECTION, STEROID, EPIDURAL, TRANSFORAMINAL APPROACH RIGHT S1;  Surgeon: Jed Yeager MD;  Location: [facility], operated by [facility];  Service: Pain Management;  Laterality: Right;  3/14 AUTH    TRANSFORAMINAL EPIDURAL INJECTION OF STEROID Right 1/12/2024    Procedure: LUMBAR TRANSFORAMINAL RIGHT L5/S1 AND S1 *ELIQUIS CLEARANCE IN CHART*;  Surgeon: Jed Yeager MD;  Location: [facility] operated [facility];  Service: Pain Management;  Laterality: Right;  [phone]    TRANSFORAMINAL EPIDURAL INJECTION OF STEROID Right 8/2/2024    Procedure: LUMBAR TRANSFORAMINAL RIGHT  "L5/S1 AND S1 *ELIQUIS CLEARANCE IN CHART*;  Surgeon: Jed Yeager MD;  Location: [facility];  Service: Pain Management;  Laterality: Right;  [phone]  2 WK F/U ANTHONY       Social History     Tobacco Use    Smoking status: Never    Smokeless tobacco: Never    Tobacco comments:     smokes cigars on occasion   Substance Use Topics    Alcohol use: Not Currently     Comment: occasional    Drug use: Not Currently     Types: Marijuana     Comment: occ        Review of Systems:  As noted in HPI    OBJECTIVE:     Vital Signs (Most Recent):  Vitals:    01/09/25 1107   BP: (!) 91/53   Pulse: 76   SpO2: 95%   Weight: 116.9 kg (257 lb 11.5 oz)   Height: 5' 7" (1.702 m)   PainSc:   7   PainLoc: Back     Body mass index is 40.36 kg/m².      Physical Exam:  General: well developed, well nourished, no distress  Head: normocephalic, atraumatic  Neurologic: Alert and oriented. Thought content appropriate  GCS: Motor: 6/Verbal: 5/Eyes: 4 GCS Total: 15  Language: No aphasia  Speech: No dysarthria  Cranial nerves: face symmetric, tongue midline, CN II-XII grossly intact.   Eyes: pupils equal, round, reactive to light with accommodation, EOMI.   Pulmonary: normal respirations, not labored, no accessory muscles used  Sensory: intact to light touch throughout BLE and b/l hands  Motor Strength: Moves all extremities spontaneously with good tone.  Full strength upper and lower extremities. No abnormal movements seen.     Strength      Hand  FA   Upper: R     5/5 5/5    L     5/5 5/5     Iliopsoas Quadriceps Tibialis  anterior Gastro- cnemius EHL    Lower: R 5/5 5/5 5/5 5/5 5/5     L 5/5 5/5 5/5 5/5 5/5      DTR's - diminished in patellar and Achilles bilaterally  Dove: absent  Clonus: absent    Gait: normal    SI joint tenderness: Positive on the right  Midline Bony Tenderness:  Positive in lower lumbar spine  Wrist Tinel's positive on right    Diagnostic Results:  I have personally reviewed imaging and agree with the " findings.     CT LUMBAR SPINE WITHOUT CONTRAST 10/23/24  Right lateral recess disc extrusion at L5-S1, as above, slightly improved from the 12/22/2022 exam.     Additional degenerative spondylosis, as above.    X-ray lumbar spine with flexion-extension views 01/09/2025:   No spondylolisthesis at any level   No instability between flexion-extension views  No fractures    ASSESSMENT/PLAN:     Fabiana Chanel is a 49 y.o. female who presents with chronic low back pain.  She previously had L5 versus S1 radiculopathy on the right, but these symptoms have not been present for at least 6 months.  Lumbar CT revealed an L5-S1 right lateral recess disc extrusion.     Patient is intact on exam.  We reviewed her lumbar CT.  If we were to proceed with surgery, CT myelogram would be necessary for surgical planning.  However, given the lack of radicular components at this time, I do not recommend surgical intervention.  X-rays were ordered today to rule out instability which, if present, could have been the source of her back pain.  Fortunately, there is no spondylolisthesis or instability at any level of the lumbar spine.    Patient may follow up as needed for new or worsening symptoms    Please feel free to call with any further questions        Samantha Gerardo PA-C  [facility]  Department of Neurosurgery  [phone]    Disclaimer: This note was dictated by speech recognition. Minor errors in transcription may be present.  Please call with any questions.

## 2025-01-09 NOTE — PROGRESS NOTES
"Diabetes Care Specialist Progress Note  Author: Mariann Ulrich RN  Date: 1/9/2025      Intake  Program Intake  Reason for Diabetes Program Visit:: Initial Diabetes Assessment  Current diabetes risk level:: moderate  In the last month, have you used the ER or been admitted to the hospital: No    Current Diabetes Treatment: Insulin  Method of insulin delivery?: Injections  Injection Type: Pens  Pen Type/Dose: Lantus 50U BID, Humulin N 24U @ HS, and Humalog 50U AC + SSI @ 150    Continuous Glucose Monitoring  Patient has CGM: Yes  Personal CGM type:: Dexcom G7  GMI Date: 01/09/25  GMI Value: 7.1 %    Lab Results   Component Value Date    HGBA1C 8.7 (H) 11/19/2024       Weight: 116.7 kg (257 lb 3.2 oz)   Height: 5' 7" (170.2 cm)   Body mass index is 40.28 kg/m².    Lifestyle Coping Support & Clinical  Lifestyle/Coping/Support  Psychosocial/Coping Skills Assessment Completed: : No  Deffered due to:: Time  Area of need?: Deferred    Diabetes Self-Management Skills Assessment  Medication Skills Assessment  Patient is able to identify current diabetes medications, dosages, and appropriate timing of medications.: yes  Patient reports problems or concerns with current medication regimen.: no  Patient is  aware that some diabetes medications can cause low blood sugar?: Yes  Medication Skills Assessment Completed:: Yes  Assessment indicates:: Instruction Needed, Knowledge deficit  Area of need?: Yes    Diabetes Disease Process/Treatment Options  Diabetes Type?: Type II  When were you diagnosed?: Dx: 2013  If previous diabetes education, when/where:: 12/24 @ [facility]  What are your goals for this education session?: To learn diabetes self-management skills  Does patient understand the pathophysiology of diabetes?: No  Diabetes Disease Process/Treatment Options: Skills Assessment Completed: Yes  Assessment indicates:: Instruction Needed, Knowledge deficit  Area of need?: Yes    Nutrition/Healthy Eating  Nutrition/Healthy " Eating Skills Assessment Completed:: No  Assessment indicates:: Instruction Needed  Area of need?: Yes    Physical Activity/Exercise  Physical Activity/Exercise Skills Assessment Completed: : No  Deffered due to:: Time  Area of need?: Deferred    Home Blood Glucose Monitoring  Patient states that blood sugar is checked at home daily.: yes  Monitoring Method:: personal continuous glucose monitor  Personal CGM type:: Dexcom G7   What is your current Time in Range?: 67%  What is your A1c Target?: 7.0%  Home Blood Glucose Monitoring Skills Assessment Completed: : Yes  Assessment indicates:: Instruction Needed, Knowledge deficit  Area of need?: Yes    Acute Complications  Have you ever had hypoglycemia (low BG 70 or less)?: yes  How often and what are your symptoms?: 2-3 X's/week....  How do you treat hypoglycemia?: peppermint candies  Have you ever had DKA?: no  Do you ever test for ketones?: no  Do you have a sick day plan?: no  Acute Complications Skills Assessment Completed: : Yes  Assessment indicates:: Instruction Needed, Knowledge deficit  Area of need?: Yes    Chronic Complications  Chronic Complications Skills Assessment Completed: : No  Deferred due to:: Time  Area of need?: Deferred      Assessment Summary and Plan    Based on today's diabetes care assessment, the following areas of need were identified:      Identified Areas of Need      Medication/Current Diabetes Treatment: Yes- Reviewed current insulin regimen. Discussed insulin dose and BS lows and elevations. Endo NP reviewed insulin regimen and BS; insulin dose adjusted to Lantus 50U BID, Humalog 50U TID AC + SSI and NPH 24U @ HS. Pt verbalized understanding.   Lifestyle Coping/Support: Deferred   Diabetes Disease Process/Treatment Options: Yes- Reviewed what is diabetes and discussed the progression of the disease. Reviewed current A1c and blood glucose goals.   Nutrition/Healthy Eating: Yes - see care planning   Physical Activity/Exercise: Deferred     Home Blood Glucose Monitoring: Yes - see care planning   Acute Complications: Yes - Discussed blood sugar values, prevention, detection, signs and symptoms, and treatment of hypoglycemia following rule of 15.    Chronic Complications: Deferred       Today's interventions were provided through individual discussion, instruction, and written materials were provided.      Patient verbalized understanding of instruction and written materials.  Pt was able to return back demonstration of instructions today. Patient understood key points, needs reinforcement and further instruction.     Diabetes Self-Management Care Plan:    Today's Diabetes Self-Management Care Plan was developed with Fabiana Trivedi's input. Jamalvanessa Trivedi has agreed to work toward the following goal(s) to improve his/her overall diabetes control.      Care Plan: Diabetes Management   Updates made since 1/10/2024 12:00 AM        Problem: Blood Glucose Self-Monitoring         Goal: Patient agrees to change Dexcom G7 sensor every 10 days and review blood sugars at least 3 times per day.    Start Date: 12/12/2024   Expected End Date: 1/7/2025   This Visit's Progress: On track   Priority: High   Barriers: No Barriers Identified   Note:    12/12/24 - - - DEXCOM G7 CGM TRAINING (Pt connected to clinic via [employer]; password in SnapShot section)  Patient referred to clinic today for Dexcom G7 continuous glucose sensor system training. Pt used the dexcom Kristal and reviewed video on starting dexcom G7 using the Kristal. Also reviewed instructions per Dexcom G7 educational insert.   Overview:  5min glucose reading updates, trending arrows, BG graph screens, battery life indicator, Blue Tooth Symbol.  Menus: trend Graph, start sensor, enter BG, events, Alerts, Settings, Shutdown, Stop Sensor   Settings:                          * Urgent Low: 55 mg/dL                          * Urgent Low Soon: on                          * Low alert: 70 mg/dl  repeat 15 min                          * High alert: 250 mg/dl                           * Rise rate: off                          * Fall Rate: off                          * Signal Loss: on repeat 20 min                          * No Reading: on repeat 20 min                          * Always sound: on  Reviewed additional setting options with patient, including Graph Height and Transmitter ID. Dexcom G7 was paired.    Reviewed where to find sensor insertion time and sensor expiration date.   Discussed no need to calibrate sensor during the entirety of the 10 day wear. Discussed that pt can calibrate sensor if desired, but at that time she will need to continue calibrating every 12 hours for sensor to remain accurate. Reviewed appropriate calibration techniques.  Reviewed Dexcom G7 site selection. Site selected and prepped using aseptic technique Inserted to back of right upper arm. Transmitter/sensor placed per instructions.  Patient able to demonstrate without difficulty.  Encouraged to review manual prior to starting another sensor.    range 20 feet, but the first 3 hrs keep within 3 feet of transmitter.  Pt instructed on lag time of interstitial fluid from CBG and was advised to tx hypoglycemia and dose insulin based on SMBG values.  Dexcom technical support contact number given and examples of when to contact them discussed. Patient advised to always check glucose with glucometer should readings do not match symptoms felt (ex. Hypo or hyperglycemia).     Care Plan Update 1/9/25 - - Reviewed Pt's Dexcom clarity report. Reviewed pattern management, and discussed elevations and lows in relation to food and/or possible causes. Discussed ways to prevent hypoglycemia. Pt's time in range was 67% and very low was <1%.          Problem: Healthy Eating         Goal: Eat 2-3 meals daily with 30-45g/2-3 servings of Carbohydrate per meal. Limit snacking in between meal to 1 serving/15 grams or up to 20 grams.     Start Date: 12/12/2024   Expected End Date: 1/7/2025   This Visit's Progress: On track   Priority: Medium   Barriers: No Barriers Identified   Note:    12/12/24 - - Patient eats 2-3 meals a day. We reviewed eating right with diabetes. We discussed the importance of eating balanced meals with vegetables, fruits, lean meats, and whole grains. We concentrated on portion sizes. We discussed carb sources of foods, appropriate amount of carbs to have at meals/snacks and acceptable serving sizes of individual carb items. Obtained a 24-hr food recall from patient. We discussed various meal plan options to promote healthy eating.      - - Stressed importance of eating a protein at each meal and include non-starchy vegetables at lunch/dinner. Instructed pt to aim for 2-3 evenly spaced meals or a small carb snack in place of a missed meal. Written education information provided to patient for use at home. Pt verbalized understanding of all the above.    Care Plan Update 1/9/25 - - Pt says she is eating smaller portions of food. We reviewed carb vs non-carb sources of food, appropriate amount of carbs to have at meals/snacks and acceptable serving sizes of individual carb items. We discussed the importance of eating balanced meals with vegetables, fruits, lean meats, and whole grains. Pt to continue improving on her lifestyle modifications.        Follow Up Plan   Follow up in about 4 weeks (around 2/6/2025) for f/u insulin changes, review clarity report and meal planning.    Today's care plan and follow up schedule was discussed with patient.  Fabiana Trivedi verbalized understanding of the care plan, goals, and agrees to follow up plan.        The patient was encouraged to communicate with his/her health care provider/physician and care team regarding his/her condition(s) and treatment.  I provided the patient with my contact information today and encouraged to contact me via phone or [facility] as needed.      Length of Visit   Total Time: 60 Minutes

## 2025-01-14 LAB
OHS CV AF BURDEN PERCENT: < 1
OHS CV DC REMOTE DEVICE TYPE: NORMAL
OHS CV RV PACING PERCENT: 1 %

## 2025-02-10 ENCOUNTER — OFFICE VISIT (OUTPATIENT)
Dept: PAIN MEDICINE | Facility: CLINIC | Age: 50
End: 2025-02-10
Payer: MEDICARE

## 2025-02-10 VITALS
DIASTOLIC BLOOD PRESSURE: 70 MMHG | WEIGHT: 256.63 LBS | TEMPERATURE: 98 F | BODY MASS INDEX: 40.19 KG/M2 | SYSTOLIC BLOOD PRESSURE: 119 MMHG

## 2025-02-10 DIAGNOSIS — M79.18 MYOFASCIAL PAIN: ICD-10-CM

## 2025-02-10 DIAGNOSIS — M51.360 DEGENERATION OF INTERVERTEBRAL DISC OF LUMBAR REGION WITH DISCOGENIC BACK PAIN: ICD-10-CM

## 2025-02-10 DIAGNOSIS — M54.17 LUMBOSACRAL RADICULOPATHY: ICD-10-CM

## 2025-02-10 DIAGNOSIS — G89.4 CHRONIC PAIN SYNDROME: ICD-10-CM

## 2025-02-10 DIAGNOSIS — F11.20 UNCOMPLICATED OPIOID DEPENDENCE: Primary | ICD-10-CM

## 2025-02-10 DIAGNOSIS — M53.3 PAIN OF BOTH SACROILIAC JOINTS: ICD-10-CM

## 2025-02-10 DIAGNOSIS — M54.16 LUMBAR RADICULOPATHY: ICD-10-CM

## 2025-02-10 PROCEDURE — 99999 PR PBB SHADOW E&M-EST. PATIENT-LVL IV: CPT | Mod: PBBFAC,,,

## 2025-02-10 PROCEDURE — 1159F MED LIST DOCD IN RCRD: CPT | Mod: CPTII,S$GLB,,

## 2025-02-10 PROCEDURE — 3074F SYST BP LT 130 MM HG: CPT | Mod: CPTII,S$GLB,,

## 2025-02-10 PROCEDURE — 80359 METHYLENEDIOXYAMPHETAMINES: CPT

## 2025-02-10 PROCEDURE — 3008F BODY MASS INDEX DOCD: CPT | Mod: CPTII,S$GLB,,

## 2025-02-10 PROCEDURE — 3078F DIAST BP <80 MM HG: CPT | Mod: CPTII,S$GLB,,

## 2025-02-10 PROCEDURE — 99214 OFFICE O/P EST MOD 30 MIN: CPT | Mod: S$GLB,,,

## 2025-02-10 PROCEDURE — 1160F RVW MEDS BY RX/DR IN RCRD: CPT | Mod: CPTII,S$GLB,,

## 2025-02-10 RX ORDER — OXYCODONE AND ACETAMINOPHEN 5; 325 MG/1; MG/1
1 TABLET ORAL
Qty: 30 TABLET | Refills: 0 | Status: SHIPPED | OUTPATIENT
Start: 2025-02-10
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
## 2025-02-15 LAB

## 2025-02-17 ENCOUNTER — HOSPITAL ENCOUNTER (EMERGENCY)
Facility: HOSPITAL | Age: 50
Discharge: HOME | End: 2025-02-17
Attending: EMERGENCY MEDICINE
Payer: MEDICARE

## 2025-02-17 VITALS
SYSTOLIC BLOOD PRESSURE: 120 MMHG | OXYGEN SATURATION: 99 % | BODY MASS INDEX: 39.24 KG/M2 | HEIGHT: 67 IN | WEIGHT: 250 LBS | TEMPERATURE: 98 F | HEART RATE: 70 BPM | RESPIRATION RATE: 16 BRPM | DIASTOLIC BLOOD PRESSURE: 71 MMHG

## 2025-02-17 DIAGNOSIS — M79.672 LEFT FOOT PAIN: Primary | ICD-10-CM

## 2025-02-17 DIAGNOSIS — M54.16 LUMBAR RADICULOPATHY: ICD-10-CM

## 2025-02-17 LAB
B-HCG UR QL: NEGATIVE
CTP QC/QA: YES
GLUCOSE SERPL-MCNC: 178 MG/DL (ref 70–110)

## 2025-02-17 PROCEDURE — 81025 URINE PREGNANCY TEST: CPT

## 2025-02-17 PROCEDURE — 25000003 PHARM REV CODE 250

## 2025-02-17 PROCEDURE — 99283 EMERGENCY DEPT VISIT LOW MDM: CPT

## 2025-02-17 PROCEDURE — 82962 GLUCOSE BLOOD TEST: CPT

## 2025-02-17 RX ORDER — ACETAMINOPHEN 500 MG
1000 TABLET ORAL
Status: COMPLETED | OUTPATIENT
Start: 2025-02-17 | End: 2025-02-17

## 2025-02-17 RX ADMIN — ACETAMINOPHEN 1000 MG: 500 TABLET ORAL at 11:02

## 2025-02-17 NOTE — ED PROVIDER NOTES
Problem: Adult Behavioral Health Plan of Care  Goal: Rounds/Family Conference  Outcome: Not Progressing     Problem: Adult Behavioral Health Plan of Care  Goal: Rounds/Family Conference  Outcome: Not Progressing     Problem: Adult Behavioral Health Plan of Care  Goal: Plan of Care Review  1/1/2025 2028 by Vikram Membreno RN  Outcome: Unable to Meet  1/1/2025 2028 by Vikram Membreno RN  Outcome: Unable to Meet  Goal: Patient-Specific Goal (Individualization)  1/1/2025 2028 by Vikram Membreno RN  Outcome: Unable to Meet  1/1/2025 2028 by Vikram Membreno RN  Outcome: Unable to Meet  Goal: Adheres to Safety Considerations for Self and Others  1/1/2025 2028 by Vikram Membreno RN  Outcome: Unable to Meet  1/1/2025 2028 by Vikram Membreno RN  Outcome: Unable to Meet  Goal: Absence of New-Onset Illness or Injury  1/1/2025 2028 by Vikram Membreno RN  Outcome: Unable to Meet  1/1/2025 2028 by Vikram Membreno RN  Outcome: Unable to Meet  Goal: Optimized Coping Skills in Response to Life Stressors  1/1/2025 2028 by Vikram Membreno RN  Outcome: Unable to Meet  1/1/2025 2028 by Vikram Membreno RN  Outcome: Unable to Meet  Goal: Develops/Participates in Therapeutic [place] to Support Successful Transition  1/1/2025 2028 by Vikram Membreno RN  Outcome: Unable to Meet  1/1/2025 2028 by Vikram Membreno RN  Outcome: Unable to Meet  Goal: Rounds/Family Conference  1/1/2025 2028 by Vikram Membreno RN  Outcome: Unable to Meet  1/1/2025 2028 by Vikram Membreno RN  Outcome: Not Progressing     Problem: Violence Risk or Actual  Goal: Anger and Impulse Control  1/1/2025 2028 by Vikram Membreno RN  Outcome: Unable to Meet  1/1/2025 2028 by Vikram Membreno RN  Outcome: Unable to Meet     Problem: Excessive Substance Use  Goal: Optimized Energy Level (Excessive Substance Use)  1/1/2025 2028 by Vikram Membreno RN  Outcome: Unable to Meet  1/1/2025 2028 by Vikram Membreno RN  Outcome: Unable to Meet  Goal: Improved Behavioral Control (Excessive Substance Use)  1/1/2025 2028 by  "Encounter Date: 2/17/2025       History     Chief Complaint   Patient presents with    foot pain     Top of pain since yesterday to top of left foot. No recent trauma other than sitting with foot crossed under her on yesterday     A 49-year-old female with past medical history of hypertension, diabetes, neuropathy presents to the emergency department planning of atraumatic left-sided foot pain since yesterday.  Patient states she was sitting course cross for about 1 hour yesterday, and the pain began shortly afterwards.  Patient states her foot feels like "an ice brick." Patient was prescribed Percocet and gabapentin from pain management doctor.  Patient is able to ambulate in the emergency department without difficulty.  Patient denies fever, chest pain, shortness of breath, nausea, vomiting, abdominal pain, syncope.        Review of patient's allergies indicates:   Allergen Reactions    Metformin      Diarrhea on metformin XR     Pneumococcal 23-abimbola ps vaccine     Trulicity [dulaglutide] Diarrhea     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    Pneumonia due to COVID-19 virus 04/28/2021    Psoriasis     Sleep apnea     Ventricular tachycardia 08/06/2022     Past Surgical History:   Procedure Laterality Date    CARDIAC CATHETERIZATION      COLONOSCOPY      COLONOSCOPY N/A 3/9/2022    Procedure: COLONOSCOPY;  Surgeon: Vielka Burrell MD;  Location: [facility];  Service: Endoscopy;  Laterality: N/A;    DILATION AND CURETTAGE OF UTERUS      EPIDURAL STEROID INJECTION N/A 8/11/2023    Procedure: " INJECTION, STEROID, EPIDURAL, L5/S1 SOONER DATE    clear to hold Eliquis;  Surgeon: Jed Yeager MD;  Location: [facility], [place], operated by [facility];  Service: Pain Management;  Laterality: N/A;    ESOPHAGOGASTRODUODENOSCOPY N/A 5/9/2019    Procedure: EGD (ESOPHAGOGASTRODUODENOSCOPY);  Surgeon: Vielka Burrell MD;  Location: [facility];  Service: Endoscopy;  Laterality: N/A;    TRANSFORAMINAL EPIDURAL INJECTION OF STEROID N/A 1/6/2022    Procedure: Transforaminal ANKITA L4/L5 L5/S1;  Surgeon: Jed Yeager MD;  Location: [facility], [place], operated by [facility];  Service: Pain Management;  Laterality: N/A;    TRANSFORAMINAL EPIDURAL INJECTION OF STEROID Right 12/1/2022    Procedure: INJECTION, STEROID, EPIDURAL, TRANSFORAMINAL APPROACH, RIGHT L4/L5 & L5/S1 CONTRAST;  Surgeon: Jed Yeager MD;  Location: [facility], [place], operated by [facility];  Service: Pain Management;  Laterality: Right;    TRANSFORAMINAL EPIDURAL INJECTION OF STEROID Right 4/14/2023    Procedure: INJECTION, STEROID, EPIDURAL, TRANSFORAMINAL APPROACH RIGHT S1;  Surgeon: Jed Yeager MD;  Location: [facility], [place], operated [facility];  Service: Pain Management;  Laterality: Right;  3/14 AUTH    TRANSFORAMINAL EPIDURAL INJECTION OF STEROID Right 1/12/2024    Procedure: LUMBAR TRANSFORAMINAL RIGHT L5/S1 AND S1 *ELIQUIS CLEARANCE IN CHART*;  Surgeon: Jed Yeager MD;  Location: [facility], [place], operated by [facility];  Service: Pain Management;  Laterality: Right;  [phone]    TRANSFORAMINAL EPIDURAL INJECTION OF STEROID Right 8/2/2024    Procedure: LUMBAR TRANSFORAMINAL RIGHT L5/S1 AND S1 *ELIQUIS CLEARANCE IN CHART*;  Surgeon: Jed Yeager MD;  Location: [facility], [place], operated by [facility];  Service: Pain Management;  Laterality: Right;  [phone]  2 WK F/U ANTHONY     Family History   Problem Relation Name Age of Onset    Hypertension Mother      Hypertension Father      Diabetes Father      Diabetes Maternal Grandmother      Diabetes Paternal Grandmother      Breast cancer Neg Hx      Colon cancer Neg Hx      Ovarian cancer Neg Hx       Social  Membreno, Vikram, RN  Outcome: Unable to Meet  1/1/2025 2028 by Vikram Membreno RN  Outcome: Unable to Meet  Goal: Increased Participation and Engagement (Excessive Substance Use)  1/1/2025 2028 by Vikram Membreno RN  Outcome: Unable to Meet  1/1/2025 2028 by Vikram Membreno RN  Outcome: Unable to Meet  Goal: Improved Physiologic Symptoms (Excessive Substance Use)  1/1/2025 2028 by Vikram Membreno RN  Outcome: Unable to Meet  1/1/2025 2028 by Vikram Membreno RN  Outcome: Unable to Meet  Goal: Enhanced Social, Occupational or Functional Skills (Excessive Substance Use)  1/1/2025 2028 by Vikram Membreno RN  Outcome: Unable to Meet  1/1/2025 2028 by Vikram Membreno RN  Outcome: Unable to Meet     AAOx4 Pt denies SI/HI/AVH. Pt endorses having anxiety and depression, reports poor sleep and good appetite. Pt has poor eye contact and affect is flat. Pt is withdrawn and isolative to room. Q15 min safety checks continued.    History[1]  Review of Systems   Constitutional:  Negative for fever.   HENT:  Negative for sore throat.    Respiratory:  Negative for shortness of breath.    Cardiovascular:  Negative for chest pain.   Gastrointestinal:  Negative for abdominal pain, nausea and vomiting.   Genitourinary:  Negative for dysuria.   Musculoskeletal:  Positive for arthralgias and myalgias. Negative for back pain, gait problem and joint swelling.   Skin:  Negative for rash.   Neurological:  Negative for syncope and weakness.   Hematological:  Does not bruise/bleed easily.       Physical Exam     Initial Vitals [02/17/25 1003]   BP Pulse Resp Temp SpO2   125/71 73 17 98 °F (36.7 °C) 96 %      MAP       --         Physical Exam    Nursing note and vitals reviewed.  Constitutional: She appears well-developed and well-nourished.   HENT:   Head: Normocephalic and atraumatic.   Right Ear: External ear normal.   Left Ear: External ear normal.   Nose: Nose normal. Mouth/Throat: Oropharynx is clear and moist.   Eyes: Conjunctivae and lids are normal.   Neck: Trachea normal. Neck supple.   Cardiovascular:  Normal rate, regular rhythm, S1 normal, S2 normal and normal heart sounds.     Exam reveals no gallop and no friction rub.       No murmur heard.  Pulmonary/Chest: Effort normal and breath sounds normal. No respiratory distress. She has no decreased breath sounds. She has no wheezes. She has no rhonchi. She has no rales.   Abdominal: Abdomen is soft. Bowel sounds are normal. She exhibits no distension. There is no abdominal tenderness. There is no rebound, no guarding, no tenderness at McBurney's point and negative Dunbar's sign.   Musculoskeletal:         General: Normal range of motion.      Cervical back: Neck supple.      Comments: Top of the left  to touch.  1+ DP and PT pulses in the left foot.  Pulses are dopplerable.  Capillary refill less than 2 seconds.  Sensation intact bilaterally.  No tenderness at the left ankle.  5/5  strength at knees and ankles, echo.  Full range of motion in bilateral knees, bilateral ankles, echo toes.  No overlying erythema or warmth.  No bruising or evidence of trauma.  No swelling to lower extremities.     Lymphadenopathy:     She has no cervical adenopathy.   Neurological: She is alert. She has normal strength. No sensory deficit.   Skin: Skin is warm and dry.   Psychiatric: She has a normal mood and affect.         ED Course   Procedures  Labs Reviewed   POCT GLUCOSE MONITORING CONTINUOUS - Abnormal       Result Value    POC Glucose 178 (*)    POCT URINE PREGNANCY    POC Preg Test, Ur Negative       Acceptable Yes            Imaging Results    None          Medications   acetaminophen tablet 1,000 mg (1,000 mg Oral Given 2/17/25 1124)     Medical Decision Making  Encounter Date: 2/17/2025    49 y.o. female presents for evaluation of left-sided foot pain.  Hemodynamically stable. Afebrile. Phonating and protecting the airway spontaneously. No clinical evidence for cardiovascular instability or impending airway compromise.  Remainder of physical exam as above.  Prior medical records reviewed. PMD note reviewed. Current co-morbidities considered that will impact clinical decision making include as above.   Vitals range:   Temp:  [98 °F (36.7 °C)] 98 °F (36.7 °C)  Pulse:  [70-73] 70  Resp:  [16-17] 16  SpO2:  [96 %-99 %] 99 %  BP: (120-125)/(71) 120/71    Top of the left  to touch.  1+ DP and PT pulses in the left foot.  Pulses are dopplerable.  Capillary refill less than 2 seconds.  Sensation intact bilaterally.  No tenderness at the left ankle.  5/5 strength at knees and ankles, echo.  Full range of motion in bilateral knees, bilateral ankles, echo toes.  No overlying erythema or warmth.  No bruising or evidence of trauma.  No swelling to bilateral lower extremities.    Denies any trauma to the foot or other injury, hip pain, ankle pain, knee pain, fever, inability to bear weight on  ankle. Afebrile. Patient is non-toxic appearing and in no acute distress.  X-ray not performed considering lack of trauma.  I have low suspicion for fracture/dislocation.  Given above, I have low suspicion for septic joint, gout, achilles tendon rupture, DVT, avascular necrosis, acute limb ischemia.    Given patient's decreased pulses in the left side I am concerned for vascular insufficiency.  I will discharge patient with ambulatory referral to vascular surgery and Podiatry.  Patient drove to the emergency department therefore I was unable to give her stronger medication to control her pain.  Patient's kidney function is decreased therefore could not give her any NSAIDs.  Patient upset upon discharge, because her pain is not controlled and because of the fact that she did not get an x-ray.  I did offer the patient an x-ray, but she declined.  Expecting complaint from patient.  I asked patient how we could better help her in the emergency department today, but she states she just wanted to go home.  I explained to patient that she most likely has vascular insufficiency that needed to be worked up further by a vascular surgeon and explained to patient that if she had a ride home I would be happy to give her something stronger for pain.  Patient declined.      ED MEDS GIVEN:  Medications  acetaminophen tablet 1,000 mg (1,000 mg Oral Given 2/17/25 1124)    Clinical Impression:  Final diagnoses:  [M79.672] Left foot pain (Primary)    I discussed with the patient/family the diagnosis, treatment plan, indications for return to the emergency department, and for expected follow-up. The patient/family verbalized an understanding. The patient/family is asked if there are any questions or concerns. We discuss the case, until all issues are addressed to the patient/family's satisfaction. Patient/family understands and is agreeable to the plan.   Suleiman Cid    DISCLAIMER: This note was prepared with Mick voice  recognition transcription software. Garbled syntax, mangled pronouns, and other bizarre constructions may be attributed to that software system.      Amount and/or Complexity of Data Reviewed  Labs: ordered.    Risk  OTC drugs.                                      Clinical Impression:  Final diagnoses:  [M79.672] Left foot pain (Primary)          ED Disposition Condition    Discharge Stable          ED Prescriptions    None       Follow-up Information       Follow up With Specialties Details Why Contact Info    Alivia Ayala MD Internal Medicine Schedule an appointment as soon as possible for a visit   [address] [place] [address]  [phone]      [facility] Podiatry Schedule an appointment as soon as possible for a visit  For further evaluation, more definitive management of symptoms [address] [facility] [place] [address] [phone]    [facility] Vascular Surgery Schedule an appointment as soon as possible for a visit today For further evaluation, more definitive management of symptoms [address] [facility] [place] [address] [phone]               [1]   Social History  Tobacco Use    Smoking status: Never    Smokeless tobacco: Never    Tobacco comments:     smokes cigars on occasion   Substance Use Topics    Alcohol use: Not Currently     Comment: occasional    Drug use: Not Currently     Types: Marijuana     Comment: Suleiman Olmedo PA-C  02/17/25 1500

## 2025-02-17 NOTE — FIRST PROVIDER EVALUATION
Emergency Department TeleTriage Encounter Note      CHIEF COMPLAINT    Chief Complaint   Patient presents with    foot pain     Top of pain since yesterday to top of left foot. No recent trauma other than sitting with foot crossed under her on yesterday       VITAL SIGNS   Initial Vitals [02/17/25 1003]   BP Pulse Resp Temp SpO2   125/71 73 17 98 °F (36.7 °C) 96 %      MAP       --            ALLERGIES    Review of patient's allergies indicates:   Allergen Reactions    Metformin      Diarrhea on metformin XR     Pneumococcal 23-abimbola ps vaccine     Trulicity [dulaglutide] Diarrhea       PROVIDER TRIAGE NOTE  This is a teletriage evaluation of a 49 y.o. female presenting to the ED complaining of left foot pain since yesterday. No trauma. States pain started after sitting with foot under her leg yesterday. Denies redness and swelling.    Alert, no distress.     Initial orders will be placed and care will be transferred to an alternate provider when patient is roomed for a full evaluation. Any additional orders and the final disposition will be determined by that provider.         ORDERS  Labs Reviewed   POCT GLUCOSE MONITORING CONTINUOUS       ED Orders (720h ago, onward)      Start Ordered     Status Ordering Provider    02/17/25 1019 02/17/25 1018  POCT glucose  Once         Ordered LISBET CHAN              Virtual Visit Note: The provider triage portion of this emergency department evaluation and documentation was performed via [employer] App, a HIPAA-compliant telemedicine application, in concert with a tele-presenter in the room. A face to face patient evaluation with one of my colleagues will occur once the patient is placed in an emergency department room.      DISCLAIMER: This note was prepared with [employer] voice recognition transcription software. Garbled syntax, mangled pronouns, and other bizarre constructions may be attributed to that software system.    
BILLY Saravia

## 2025-02-17 NOTE — ED NOTES
"Pt states that she is upset due to the provider not ordering an xray of her foot. Reports that she "is going to report them 3 dummies that just guessing what they THINK is wrong with my foot. I can take tylenol at home."  "

## 2025-02-17 NOTE — DISCHARGE INSTRUCTIONS
Continue to take medications as prescribed by pain management doctor.  Follow up with Podiatry and vascular DrMitali for further evaluation management.  Thank you for coming to our Emergency Department today. It is important to remember that some problems or medical conditions are difficult to diagnose and may not be found or addressed during your Emergency Department visit.  These conditions often start with non-specific symptoms and can only be diagnosed on follow up visits with your primary care physician or specialist when the symptoms continue or change. Please remember that all medical conditions can change, and we cannot predict how you will be feeling tomorrow or the next day. Return to the ER with any questions/concerns, new/concerning symptoms, worsening or failure to improve.       Be sure to follow up with your primary care doctor and review all labs/imaging/tests that were performed during your ER visit with them. It is very common for us to identify non-emergent incidental findings which must be followed up with your primary care physician.  Some labs/imaging/tests may be outside of the normal range, and require non-emergent follow-up and/or further investigation/treatment/procedures/testing to help diagnose/exclude/prevent complications or other potentially serious medical conditions. Some abnormalities may not have been discussed or addressed during your ER visit. Some lab results may not return during your ER visit but can be accessible by downloading the free [facility] brandyn or by visiting [email or web address] . It is important for you to review all labs/imaging/tests which are outside of the normal range with your physician.    An ER visit does not replace a primary care visit, and many screening tests or follow-up tests cannot be ordered by an ER doctor or performed by the ER. Some tests may even require pre-approval.    If you do not have a primary care doctor, you may contact the one listed  on your discharge paperwork or you may also call the [facility] Appointment Desk at [phone] , or [facility] at  [phone] to schedule an appointment, or establish care with a primary care doctor or even a specialist and to obtain information about local resources. It is important to your health that you have a primary care doctor.    Please take all medications as directed. We have done our best to select a medication for you that will treat your condition however, all medications may potentially have side-effects and it is impossible to predict which medications may give you side-effects or what those side-effects (if any) those medications may give you.  If you feel that you are having a negative effect or side-effect of any medication you should stop taking those medications immediately and seek medical attention. If you feel that you are having a life-threatening reaction call 911.        Do not drive, swim, climb to height, take a bath, operate heavy machinery, drink alcohol or take potentially sedating medications, sign any legal documents or make any important decisions for 24 hours if you have received any pain medications, sedatives or mood altering drugs during your ER visit or within 24 hours of taking them if they have been prescribed to you.     You can find additional resources for Dentists, hearing aids, durable medical equipment, low cost pharmacies and other resources at [email or web address]

## 2025-02-18 ENCOUNTER — PATIENT OUTREACH (OUTPATIENT)
Facility: OTHER | Age: 50
End: 2025-02-18
Payer: MEDICARE

## 2025-02-18 NOTE — PROGRESS NOTES
Patient was seen in the ED on 2/17/25. Phoned patient to assist with Post ED Discharge Navigation. Patient has a scheduled PCP follow-up appointment. Reminded patient of upcoming appointment.  Mohinder Chahal

## 2025-02-19 ENCOUNTER — OFFICE VISIT (OUTPATIENT)
Dept: FAMILY MEDICINE | Facility: CLINIC | Age: 50
End: 2025-02-19
Payer: MEDICARE

## 2025-02-19 VITALS
TEMPERATURE: 97 F | WEIGHT: 258.38 LBS | BODY MASS INDEX: 40.55 KG/M2 | DIASTOLIC BLOOD PRESSURE: 78 MMHG | HEIGHT: 67 IN | HEART RATE: 91 BPM | OXYGEN SATURATION: 95 % | SYSTOLIC BLOOD PRESSURE: 124 MMHG | RESPIRATION RATE: 18 BRPM

## 2025-02-19 DIAGNOSIS — Z79.4 TYPE 2 DIABETES MELLITUS WITH STAGE 4 CHRONIC KIDNEY DISEASE, WITH LONG-TERM CURRENT USE OF INSULIN: ICD-10-CM

## 2025-02-19 DIAGNOSIS — M79.672 LEFT FOOT PAIN: Primary | ICD-10-CM

## 2025-02-19 DIAGNOSIS — N18.4 CKD STAGE 4 DUE TO TYPE 2 DIABETES MELLITUS: ICD-10-CM

## 2025-02-19 DIAGNOSIS — E11.22 TYPE 2 DIABETES MELLITUS WITH STAGE 4 CHRONIC KIDNEY DISEASE, WITH LONG-TERM CURRENT USE OF INSULIN: ICD-10-CM

## 2025-02-19 DIAGNOSIS — N18.4 TYPE 2 DIABETES MELLITUS WITH STAGE 4 CHRONIC KIDNEY DISEASE, WITH LONG-TERM CURRENT USE OF INSULIN: ICD-10-CM

## 2025-02-19 DIAGNOSIS — E11.22 CKD STAGE 4 DUE TO TYPE 2 DIABETES MELLITUS: ICD-10-CM

## 2025-02-19 DIAGNOSIS — I50.42 CHRONIC COMBINED SYSTOLIC AND DIASTOLIC HEART FAILURE: ICD-10-CM

## 2025-02-19 RX ORDER — SACUBITRIL AND VALSARTAN 24; 26 MG/1; MG/1
1 TABLET, FILM COATED ORAL 2 TIMES DAILY
Qty: 180 TABLET | Refills: 3 | Status: SHIPPED | OUTPATIENT
Start: 2025-02-19

## 2025-02-19 NOTE — PROGRESS NOTES
Family Medicine      Patient Name: Fabiana Chanel  MRN: [id number]  : 1975    PCP: Alivia Ayala MD       HPI      Fabiana Chanel is a 49 y.o. female with multiple medical diagnoses as listed in the medical history and problem list that presents for   Chief Complaint   Patient presents with    Foot Swelling     Pt states left foot been swollen since        HPI    History of Present Illness    Patient presents today for left foot pain and inability to bear weight after sitting on foot. She reports being unable to bear weight or walk properly after sitting on her foot in the back of a truck. She nearly fell when attempting to walk to her car. The affected area is tender and swollen in one localized region along the top of her foot, but she denies pain or swelling in the toes. She visited the ER two days ago and has a podiatrist appointment scheduled for tomorrow. She has a history of plantar tendinitis. Her A1C values have been trending downward from 11.6 to 9.5, then 9.1, and most recently 8.7 three months ago. She has been making dietary modifications and has an upcoming endocrinology appointment scheduled for . She is currently taking gabapentin, insulin, Crestor, and Entresto.      ROS:  General: -fever, -chills, -fatigue, -weight gain, -weight loss  Eyes: -vision changes, -redness, -discharge  ENT: -ear pain, -nasal congestion, -sore throat  Cardiovascular: -chest pain, -palpitations, -lower extremity edema  Respiratory: -cough, -shortness of breath  Gastrointestinal: -abdominal pain, -nausea, -vomiting, -diarrhea, -constipation, -blood in stool  Genitourinary: -dysuria, -hematuria, -frequency  Musculoskeletal: +joint pain, -muscle pain  Skin: -rash, -lesion  Neurological: -headache, -dizziness, -numbness, -tingling  Psychiatric: -anxiety, -depression, -sleep difficulty              History      Past Medical History:  Past Medical History:   Diagnosis Date     Acute respiratory failure due to COVID-19 04/29/2021    Atrial fibrillation     Blood clot associated with vein wall inflammation     not dvt    Cardiomyopathy     Normal cors on cath 11/2017    CHF (congestive heart failure)     DM (diabetes mellitus) 09/19/2013    Essential hypertension 05/14/2014    Hyperlipidemia     Hypertension     Iron deficiency anemia 09/20/2013    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    Pneumonia due to COVID-19 virus 04/28/2021    Psoriasis     Sleep apnea     Ventricular tachycardia 08/06/2022       Past Surgical History:  Past Surgical History:   Procedure Laterality Date    CARDIAC CATHETERIZATION      COLONOSCOPY      COLONOSCOPY N/A 3/9/2022    Procedure: COLONOSCOPY;  Surgeon: Vielka Burrell MD;  Location: [facility] ENDO;  Service: Endoscopy;  Laterality: N/A;    DILATION AND CURETTAGE OF UTERUS      EPIDURAL STEROID INJECTION N/A 8/11/2023    Procedure: INJECTION, STEROID, EPIDURAL, L5/S1 SOONER DATE    clear to hold Eliquis;  Surgeon: Jed Yeager MD;  Location: [facility] PAIN MGT;  Service: Pain Management;  Laterality: N/A;    ESOPHAGOGASTRODUODENOSCOPY N/A 5/9/2019    Procedure: EGD (ESOPHAGOGASTRODUODENOSCOPY);  Surgeon: Vielka Burrell MD;  Location: [facility] ENDO;  Service: Endoscopy;  Laterality: N/A;    TRANSFORAMINAL EPIDURAL INJECTION OF STEROID N/A 1/6/2022    Procedure: Transforaminal ANKITA L4/L5 L5/S1;  Surgeon: Jed Yeager MD;  Location: [facility] PAIN MGT;  Service: Pain Management;  Laterality: N/A;    TRANSFORAMINAL EPIDURAL INJECTION OF STEROID Right 12/1/2022    Procedure: INJECTION, STEROID, EPIDURAL, TRANSFORAMINAL APPROACH, RIGHT L4/L5 & L5/S1 CONTRAST;  Surgeon: Jed Yeager MD;  Location: [facility] PAIN MGT;  Service: Pain Management;  Laterality: Right;    TRANSFORAMINAL EPIDURAL INJECTION OF STEROID Right 4/14/2023    Procedure: INJECTION, STEROID, EPIDURAL, TRANSFORAMINAL APPROACH RIGHT S1;  Surgeon: Jed Yeager MD;  Location: [facility] PAIN MGT;   Service: Pain Management;  Laterality: Right;  3/14 AUTH    TRANSFORAMINAL EPIDURAL INJECTION OF STEROID Right 1/12/2024    Procedure: LUMBAR TRANSFORAMINAL RIGHT L5/S1 AND S1 *ELIQUIS CLEARANCE IN CHART*;  Surgeon: Jed Yeager MD;  Location: [facility] [place] PAIN MGT;  Service: Pain Management;  Laterality: Right;  [phone]    TRANSFORAMINAL EPIDURAL INJECTION OF STEROID Right 8/2/2024    Procedure: LUMBAR TRANSFORAMINAL RIGHT L5/S1 AND S1 *ELIQUIS CLEARANCE IN CHART*;  Surgeon: Jed Yeager MD;  Location: [facility] [place] PAIN MGT;  Service: Pain Management;  Laterality: Right;  [phone]  2 WK F/U ANTHONY       Social History:  Social History[1]    Family History:  Family History   Problem Relation Name Age of Onset    Hypertension Mother      Hypertension Father      Diabetes Father      Diabetes Maternal Grandmother      Diabetes Paternal Grandmother      Breast cancer Neg Hx      Colon cancer Neg Hx      Ovarian cancer Neg Hx         Allergies and Medications: (updated and reviewed)  Review of patient's allergies indicates:   Allergen Reactions    Metformin      Diarrhea on metformin XR     Pneumococcal 23-abimbola ps vaccine     Trulicity [dulaglutide] Diarrhea     Current Medications[2]    Patient Care Team:  Alivia yAala MD as PCP - General (Internal Medicine)  Prabhu Schmidt OD (Optometry)  Randall Roe OD as Consulting Physician (Optometry)  Sade Hopkins as ED Navigator  Miguel Lincoln MD as Obstetrician (Obstetrics and Gynecology)  Mohinder Chahal as ED Navigator         Exam      Review of Systems:  (as noted above)      Physical Exam:  Physical Exam  Vitals reviewed.   Constitutional:       General: She is not in acute distress.     Appearance: Normal appearance. She is normal weight. She is not ill-appearing.   HENT:      Head: Normocephalic and atraumatic.   Eyes:      Extraocular Movements: Extraocular movements intact.      Pupils: Pupils are equal, round, and reactive to light.   Cardiovascular:  "     Rate and Rhythm: Normal rate.      Pulses: Normal pulses.   Pulmonary:      Effort: Pulmonary effort is normal.   Abdominal:      General: Abdomen is flat.   Musculoskeletal:         General: Normal range of motion.      Cervical back: Normal range of motion.        Feet:    Feet:      Left foot:      Skin integrity: Skin integrity normal.      Comments: X = edema and bruising noted. TTP. ROM 2/5.  Neurological:      Mental Status: She is alert and oriented to person, place, and time. Mental status is at baseline.   Psychiatric:         Mood and Affect: Mood normal.         Behavior: Behavior normal.       Vitals:    02/19/25 0857   BP: 124/78   BP Location: Right arm   Patient Position: Sitting   Pulse: 91   Resp: 18   Temp: 97.1 °F (36.2 °C)   TempSrc: Temporal   SpO2: 95%   Weight: 117.2 kg (258 lb 6.1 oz)   Height: 5' 7" (1.702 m)      Body mass index is 40.47 kg/m².             Assessment & Plan      1. Left foot pain    2. Type 2 diabetes mellitus with stage 4 chronic kidney disease, with long-term current use of insulin    3. Chronic combined systolic and diastolic heart failure  - sacubitriL-valsartan (ENTRESTO) 24-26 mg per tablet; Take 1 tablet by mouth 2 (two) times daily.  Dispense: 180 tablet; Refill: 3    4. CKD stage 4 due to type 2 diabetes mellitus       Assessment & Plan    IMPRESSION:  - Suspected small fracture in left foot based on patient's symptoms and mechanism of injury  - Noted improvement in patient's diabetes management with A1C improving from 11.6 to 8.7 over past few months  - Reviewed recent labs and upcoming endocrinology appointment  - Assessed current diet and its impact on blood sugar control    LEFT FOOT INJURY:  - Assess the patient's left foot pain and swelling, which occurred after sitting on it for an extended period.  - Note that the patient is unable to stand or walk properly.  - Observe bruising and swelling in the left foot, with pain localized to a specific area.  - " Suspect a small fracture and recommend an x-ray for further evaluation.  - Patient preferred to defer Xray until tomorrow when she sees Podiatry.  - Instruct the patient to rest and elevate the affected foot, apply ice to the injured area, and avoid walking on the injured foot.  - Follow up on the previously scheduled podiatrist appointment for tomorrow.  - Suggest getting a boot or wrap from the podiatrist to aid in walking.  - Document the patient's past history of tendinitis under the feet.    TYPE 2 DIABETES MELLITUS WITH DIABETIC CHRONIC KIDNEY DISEASE:  - Monitor A1C level, noting improvement from 11.6 to 8.7, with Continuous Glucose Monitor (CGM) readings around 7.4.  - Complete lab work before endocrinology appointment on March 6th.  - Continue insulin therapy and schedule a refill if needed.  - Discuss dietary choices, advising against high-sugar fruits like grapes and recommending berries and melons instead.  - Emphasize the importance of controlling diabetes and blood pressure to protect kidney function.  - Verify if the patient has established care with nephrology.  - Assess blood pressure, which is noted to be okay.  - Educate patient on the importance of staying hydrated and monitoring urine color.    CHRONIC COMBINED SYSTOLIC AND DIASTOLIC HEART FAILURE:  - Follow up with scheduled cardiology appointment on March 7th.    CHRONIC OBSTRUCTIVE PULMONARY DISEASE, UNSPECIFIED COPD TYPE:  - Perform basic respiratory exam.    DIABETES MANAGEMENT:  - Discussed carbohydrate content in snacks like pretzels and their effect on blood sugar.  - Recommend increasing water intake.  - Recommend limiting intake of high carbohydrate snacks like pretzels.    FOLLOW UP:  - Contact the office if pharmacy indicates any medication refills are needed.           --------------------------------------------      Health Maintenance:  Health Maintenance         Date Due Completion Date    Sign Pain Contract Never done ---     Complete Opioid Risk Tool Never done ---    Pneumococcal Vaccines (Age 0-49) (1 of 2 - PCV) Never done ---    Cervical Cancer Screening 08/28/2024 2/28/2024    Influenza Vaccine (1) 09/01/2024 10/18/2019    COVID-19 Vaccine (1 - 2024-25 season) Never done ---    Hemoglobin A1c 02/19/2025 11/19/2024    Diabetic Eye Exam 03/22/2025 3/22/2024    Diabetes Urine Screening 05/15/2025 5/15/2024    Foot Exam 05/15/2025 5/15/2024    Override on 11/10/2021: Done    Mammogram 05/31/2025 5/31/2024    Lipid Panel 11/19/2025 11/19/2024    TETANUS VACCINE 06/18/2029 6/18/2019    Colorectal Cancer Screening 03/09/2032 3/9/2022    RSV Vaccine (Age 60+ and Pregnant patients) (1 - 1-dose 75+ series) 07/03/2051 ---            Health maintenance reviewed    Follow Up:  No follow-ups on file.       - The patient is given an After Visit Summary that lists all medications with directions, allergies, education, orders placed during this encounter and follow-up instructions.      - I have reviewed the patient's medical information including past medical, family, and social history sections including the medications and allergies.      - We discussed the patient's current medications.     This note was generated with the assistance of ambient listening technology. Verbal consent was obtained by the patient and accompanying visitor(s) for the recording of patient appointment to facilitate this note. I attest to having reviewed and edited the generated note for accuracy, though some syntax or spelling errors may persist. Please contact the author of this note for any clarification.      This note was created by combination of typed  and MModal dictation.  Transcription errors may be present.  If there are any questions, please contact me.     Christal Alfaro PA-C  [facility]               [1]   Social History  Socioeconomic History    Marital status:    Tobacco Use    Smoking status: Never     Smokeless tobacco: Never    Tobacco comments:     smokes cigars on occasion   Substance and Sexual Activity    Alcohol use: Not Currently     Comment: occasional    Drug use: Not Currently     Types: Marijuana     Comment: occ    Sexual activity: Not Currently     Partners: Male     Social Drivers of Health     Financial Resource Strain: Patient Unable To Answer (2/18/2025)    Overall Financial Resource Strain (CARDIA)     Difficulty of Paying Living Expenses: Patient unable to answer   Physical Activity: Patient Unable To Answer (2/18/2025)    Exercise Vital Sign     Days of Exercise per Week: Patient unable to answer     Minutes of Exercise per Session: Patient unable to answer   Stress: Stress Concern Present (7/9/2019)    [employer] - Occupational Stress Questionnaire     Feeling of Stress : Rather much   [2]   Current Outpatient Medications   Medication Sig Dispense Refill    albuterol (PROVENTIL/VENTOLIN HFA) 90 mcg/actuation inhaler Inhale 1-2 puffs into the lungs every 6 (six) hours as needed. Rescue 18 g 0    BLOOD PRESSURE CUFF Misc 1 kit by Misc.(Non-Drug; Combo Route) route 2 (two) times daily. 1 each 0    blood sugar diagnostic Strp Check blood glucose 3x/day. 300 strip 3    blood-glucose sensor (DEXCOM G7 SENSOR) Lupe Change every 10 days 12 each 3    clobetasol 0.05% (TEMOVATE) 0.05 % Oint APPLY OINTMENT TOPICALLY TWICE DAILY      ELIQUIS 5 mg Tab Take 1 tablet by mouth twice daily 180 tablet 3    fluticasone propionate (FLONASE) 50 mcg/actuation nasal spray 1 spray (50 mcg total) by Each Nostril route 2 (two) times daily as needed for Rhinitis or Allergies. 15 g 0    furosemide (LASIX) 40 MG tablet Take 1 tablet (40 mg total) by mouth once daily. 90 tablet 3    gabapentin (NEURONTIN) 400 MG capsule Take 1 capsule (400 mg total) by mouth 3 (three) times daily. TAKE 1 CAPSULE BY MOUTH THREE TIMES DAILY AS NEEDED FOR PAIN 90 capsule 3    insulin lispro 100 unit/mL pen  "Inject 135 units before meals 135 mL 1    insulin NPH isoph U-100 human (HUMULIN N NPH INSULIN KWIKPEN) 100 unit/mL (3 mL) InPn Inject 24 units nightly 15 mL 2    LIDOcaine (LIDODERM) 5 % Place 1 patch onto the skin once daily. Apply patch for 12 hours and then leave off for 12 hours 15 patch 0    methocarbamoL (ROBAXIN) 750 MG Tab Take 1 tablet (750 mg total) by mouth 2 (two) times daily as needed (muscle pain). 60 tablet 2    methocarbamoL (ROBAXIN) 750 MG Tab Take 1 tablet (750 mg total) by mouth 2 (two) times daily as needed (muscle pain). 60 tablet 2    metoprolol succinate (TOPROL-XL) 50 MG 24 hr tablet Take 1 tablet (50 mg total) by mouth once daily. 90 tablet 3    oxyCODONE-acetaminophen (PERCOCET) 5-325 mg per tablet Take 1 tablet by mouth every 24 hours as needed for Pain. 30 tablet 0    pen needle, diabetic (BD LORI 2ND GEN PEN NEEDLE) 32 gauge x 5/32" Ndle USE 1 PEN NEEDLE 4 TIMES DAILY 400 each 3    rosuvastatin (CRESTOR) 40 MG Tab Take 1 tablet (40 mg total) by mouth every evening. 90 tablet 3    spironolactone (ALDACTONE) 25 MG tablet Take 1 tablet (25 mg total) by mouth once daily. Hold until eating and drinking improves. Need to weight self daily 90 tablet 3    blood-glucose meter (TRUE METRIX AIR GLUCOSE METER) Misc 1 each by Misc.(Non-Drug; Combo Route) route 3 (three) times daily. 1 each 0    insulin glargine U-100, Lantus, (LANTUS SOLOSTAR U-100 INSULIN) 100 unit/mL (3 mL) InPn pen Inject 60 Units into the skin 2 (two) times a day. 108 mL 0    sacubitriL-valsartan (ENTRESTO) 24-26 mg per tablet Take 1 tablet by mouth 2 (two) times daily. 180 tablet 3     No current facility-administered medications for this visit.     "

## 2025-02-20 ENCOUNTER — HOSPITAL ENCOUNTER (OUTPATIENT)
Dept: RADIOLOGY | Facility: HOSPITAL | Age: 50
Discharge: HOME | End: 2025-02-20
Attending: PODIATRIST
Payer: MEDICARE

## 2025-02-20 ENCOUNTER — OFFICE VISIT (OUTPATIENT)
Dept: PODIATRY | Facility: CLINIC | Age: 50
End: 2025-02-20
Payer: MEDICARE

## 2025-02-20 VITALS — BODY MASS INDEX: 40.55 KG/M2 | HEIGHT: 67 IN | WEIGHT: 258.38 LBS

## 2025-02-20 DIAGNOSIS — M79.672 LEFT FOOT PAIN: ICD-10-CM

## 2025-02-20 DIAGNOSIS — E11.22 TYPE 2 DIABETES MELLITUS WITH STAGE 4 CHRONIC KIDNEY DISEASE, WITH LONG-TERM CURRENT USE OF INSULIN: Primary | ICD-10-CM

## 2025-02-20 DIAGNOSIS — N18.4 TYPE 2 DIABETES MELLITUS WITH STAGE 4 CHRONIC KIDNEY DISEASE, WITH LONG-TERM CURRENT USE OF INSULIN: Primary | ICD-10-CM

## 2025-02-20 DIAGNOSIS — Z79.4 TYPE 2 DIABETES MELLITUS WITH STAGE 4 CHRONIC KIDNEY DISEASE, WITH LONG-TERM CURRENT USE OF INSULIN: Primary | ICD-10-CM

## 2025-02-20 PROCEDURE — 1159F MED LIST DOCD IN RCRD: CPT | Mod: CPTII,S$GLB,, | Performed by: PODIATRIST

## 2025-02-20 PROCEDURE — 3008F BODY MASS INDEX DOCD: CPT | Mod: CPTII,S$GLB,, | Performed by: PODIATRIST

## 2025-02-20 PROCEDURE — 4010F ACE/ARB THERAPY RXD/TAKEN: CPT | Mod: CPTII,S$GLB,, | Performed by: PODIATRIST

## 2025-02-20 PROCEDURE — 73630 X-RAY EXAM OF FOOT: CPT | Mod: TC,FY,PO,LT

## 2025-02-20 RX ORDER — DICLOFENAC SODIUM 10 MG/G
2 GEL TOPICAL 4 TIMES DAILY
Qty: 450 G | Refills: 3 | Status: SHIPPED | OUTPATIENT
Start: 2025-02-20

## 2025-02-20 NOTE — PROGRESS NOTES
Subjective:     Patient ID: Fabiana Chanel is a 49 y.o. female.    Chief Complaint: Foot Pain, Diabetic Foot Exam (MEE Alfaro 2/19/25), and Numbness (Left foot )    Fabiana Trivedi is a 49 y.o. female who presents to the podiatry clinic  with complaint of  left foot pain. Onset of the symptoms was several days ago. Precipitating event: injured left foot.  Current symptoms include: ability to bear weight, but with some pain. Aggravating factors: any weight bearing. Symptoms have progressed to a point and plateaued. Patient has had no prior foot problems. Evaluation to date: seen in ED.       Review of Systems   Constitutional: Negative for chills.   Cardiovascular:  Negative for chest pain and claudication.   Respiratory:  Negative for cough.    Skin:  Positive for color change, dry skin and nail changes.   Musculoskeletal:  Positive for joint pain.   Gastrointestinal:  Negative for nausea.   Neurological:  Positive for paresthesias. Negative for numbness.   Psychiatric/Behavioral:  The patient is not nervous/anxious.         Objective:     Physical Exam  Constitutional:       Appearance: She is well-developed.      Comments: Oriented to time, place, and person.   Cardiovascular:      Comments: DP and PT pulses are palpable bilaterally. 3 sec capillary refill time and toes and feet are warm to touch proximally .  There is  hair growth on the feet and toes b/l. There is no edema b/l. No spider veins or varicosities present b/l.     Musculoskeletal:      Comments: Equinus noted b/l ankles with < 10 deg DF noted. MMT 5/5 in DF/PF/Inv/Ev resistance with no reproduction of pain in any direction. Passive range of motion of ankle and pedal joints is painless b/l.    Left dorsal foot pain    Feet:      Right foot:      Skin integrity: No callus or dry skin.      Left foot:      Skin integrity: No callus or dry skin.   Lymphadenopathy:      Comments: Negative lymphadenopathy bilateral popliteal fossa and  "tarsal tunnel.   Skin:     Comments: No open lesions, lacerations or wounds noted.Interdigital spaces clean, dry and intact b/l. No erythema noted to b/l foot.  Nails normal color and trophic qualities.     Neurological:      Mental Status: She is alert.      Comments: Light touch, proprioception, and sharp/dull sensation are all intact bilaterally. Protective threshold with the Fairbanks-Wienstein monofilament is intact bilaterally.    Psychiatric:         Behavior: Behavior is cooperative.           Assessment:      Encounter Diagnoses   Name Primary?    Left foot pain     Type 2 diabetes mellitus with stage 4 chronic kidney disease, with long-term current use of insulin Yes     Plan:     Fabiana Nielsen" was seen today for foot pain, diabetic foot exam and numbness.    Diagnoses and all orders for this visit:    Type 2 diabetes mellitus with stage 4 chronic kidney disease, with long-term current use of insulin  -     DIABETIC SHOES FOR HOME USE    Left foot pain  -     Ambulatory referral/consult to Podiatry  -     X-Ray Foot Complete Left; Future  -     Ankle Brachial Indices (NATALIIA); Future  -     DIABETIC SHOES FOR HOME USE    Other orders  -     diclofenac sodium (VOLTAREN ARTHRITIS PAIN) 1 % Gel; Apply 2 g topically 4 (four) times daily.      I counseled the patient on her conditions, their implications and medical management.      - Shoe inspection. Diabetic Foot Education. Patient reminded of the importance of good nutrition and blood sugar control to help prevent podiatric complications of diabetes. Patient instructed on proper foot hygeine. We discussed wearing proper shoe gear, daily foot inspections, never walking without protective shoe gear, caution putting sharp instruments to feet     - Discussed DM foot care:  Wear comfortable, proper fitting shoes. Wash feet daily. Dry well. After drying, apply moisturizer to feet (no lotion to webspaces). Inspect feet daily for skin breaks, blisters, " swelling, or redness. Wear cotton socks (preferably white)  Change socks every day. Do NOT walk barefoot. Do NOT use heating pads or warm/hot water soaks     CAM boot dispensed and applied to affected foot. Advised to use at all times while weightbearing and ambulating even for few minutes. Advised to use sneaker style shoe in opposite foot to maintain balance. Ok to remove  at night but reapply if patient is to get up in the middle of the night. Verbalized understanding. Advised to use for 3 -4 weeks.     Xray left foot ordered    Rx Voltaren gel to be applied to affected area up to 3-4 x daily as needed for pain    NATALIIA ordered    RTC PRN

## 2025-02-21 ENCOUNTER — PATIENT OUTREACH (OUTPATIENT)
Dept: ADMINISTRATIVE | Facility: HOSPITAL | Age: 50
End: 2025-02-21
Payer: MEDICARE

## 2025-02-21 NOTE — PROGRESS NOTES
Population Health Chart Review & Patient Outreach Details      Additional [facility] Health Notes:      Patient have upcoming labs but no orders link. Linked A1c to appointment.          Updates Requested / Reviewed:      Updated Care Coordination Note and Care Everywhere         Health Maintenance Topics Overdue:      [facility] Score: 1     Cervical Cancer Screening                       Health Maintenance Topic(s) Outreach Outcomes & Actions Taken:    Lab(s) - Outreach Outcomes & Actions Taken  : Overdue Lab(s) Scheduled

## 2025-02-27 ENCOUNTER — LAB VISIT (OUTPATIENT)
Dept: LAB | Facility: HOSPITAL | Age: 50
End: 2025-02-27
Payer: MEDICARE

## 2025-02-27 DIAGNOSIS — Z79.4 TYPE 2 DIABETES MELLITUS WITH HYPERGLYCEMIA, WITH LONG-TERM CURRENT USE OF INSULIN: ICD-10-CM

## 2025-02-27 DIAGNOSIS — E11.65 TYPE 2 DIABETES MELLITUS WITH HYPERGLYCEMIA, WITH LONG-TERM CURRENT USE OF INSULIN: ICD-10-CM

## 2025-02-27 LAB
ESTIMATED AVG GLUCOSE: 166 MG/DL (ref 68–131)
HBA1C MFR BLD: 7.4 % (ref 4–5.6)

## 2025-02-27 PROCEDURE — 83036 HEMOGLOBIN GLYCOSYLATED A1C: CPT | Performed by: NURSE PRACTITIONER

## 2025-02-27 PROCEDURE — 36415 COLL VENOUS BLD VENIPUNCTURE: CPT | Mod: PO | Performed by: NURSE PRACTITIONER

## 2025-03-04 ENCOUNTER — CLINICAL SUPPORT (OUTPATIENT)
Dept: CARDIOLOGY | Facility: HOSPITAL | Age: 50
End: 2025-03-04
Payer: MEDICARE

## 2025-03-04 DIAGNOSIS — Z95.810 PRESENCE OF AUTOMATIC (IMPLANTABLE) CARDIAC DEFIBRILLATOR: ICD-10-CM

## 2025-03-04 DIAGNOSIS — I42.9 CARDIOMYOPATHY, UNSPECIFIED: ICD-10-CM

## 2025-03-04 PROCEDURE — 93295 DEV INTERROG REMOTE 1/2/MLT: CPT | Mod: ,,, | Performed by: INTERNAL MEDICINE

## 2025-03-05 ENCOUNTER — CLINICAL SUPPORT (OUTPATIENT)
Dept: CARDIOLOGY | Facility: HOSPITAL | Age: 50
End: 2025-03-05
Attending: INTERNAL MEDICINE
Payer: MEDICARE

## 2025-03-05 DIAGNOSIS — I42.9 CARDIOMYOPATHY, UNSPECIFIED: ICD-10-CM

## 2025-03-05 DIAGNOSIS — Z95.810 PRESENCE OF AUTOMATIC (IMPLANTABLE) CARDIAC DEFIBRILLATOR: ICD-10-CM

## 2025-03-05 PROCEDURE — 93296 REM INTERROG EVL PM/IDS: CPT | Performed by: INTERNAL MEDICINE

## 2025-03-06 ENCOUNTER — OFFICE VISIT (OUTPATIENT)
Dept: ENDOCRINOLOGY | Facility: CLINIC | Age: 50
End: 2025-03-06
Payer: MEDICARE

## 2025-03-06 VITALS
SYSTOLIC BLOOD PRESSURE: 114 MMHG | DIASTOLIC BLOOD PRESSURE: 69 MMHG | HEART RATE: 78 BPM | BODY MASS INDEX: 40.94 KG/M2 | WEIGHT: 261.38 LBS

## 2025-03-06 DIAGNOSIS — Z79.4 TYPE 2 DIABETES MELLITUS WITH HYPERGLYCEMIA, WITH LONG-TERM CURRENT USE OF INSULIN: Primary | ICD-10-CM

## 2025-03-06 DIAGNOSIS — N18.4 CKD (CHRONIC KIDNEY DISEASE) STAGE 4, GFR 15-29 ML/MIN: ICD-10-CM

## 2025-03-06 DIAGNOSIS — E11.65 TYPE 2 DIABETES MELLITUS WITH HYPERGLYCEMIA, WITH LONG-TERM CURRENT USE OF INSULIN: Primary | ICD-10-CM

## 2025-03-06 PROCEDURE — 99999 PR PBB SHADOW E&M-EST. PATIENT-LVL V: CPT | Mod: PBBFAC,,, | Performed by: NURSE PRACTITIONER

## 2025-03-06 RX ORDER — INSULIN HUMAN 100 [IU]/ML
INJECTION, SUSPENSION SUBCUTANEOUS
Qty: 30 ML | Refills: 2 | Status: SHIPPED | OUTPATIENT
Start: 2025-03-06

## 2025-03-06 RX ORDER — LIRAGLUTIDE 6 MG/ML
INJECTION SUBCUTANEOUS
Qty: 3 ML | Refills: 3 | Status: SHIPPED | OUTPATIENT
Start: 2025-03-06

## 2025-03-06 RX ORDER — INSULIN GLARGINE 100 [IU]/ML
50 INJECTION, SOLUTION SUBCUTANEOUS 2 TIMES DAILY
Qty: 90 ML | Refills: 1 | Status: SHIPPED | OUTPATIENT
Start: 2025-03-06 | End: 2025-06-04

## 2025-03-06 RX ORDER — INSULIN LISPRO 100 [IU]/ML
INJECTION, SOLUTION INTRAVENOUS; SUBCUTANEOUS
Qty: 135 ML | Refills: 1 | Status: SHIPPED | OUTPATIENT
Start: 2025-03-06

## 2025-03-06 NOTE — Clinical Note
Hey pt scheduled later this month to see you for insulin pump eval. She wants Omnipod so she will need to take basal insulin injection plus Omnipod since her TDD is > 200 units/day. Also will need to do rough large/small meal boluses since she does not feel comfortable with carb counting. Thanks

## 2025-03-06 NOTE — PATIENT INSTRUCTIONS
Victoza 0.6 mg daily x 2 weeks.   If no nausea: Victoza 1.2 mg daily. If unable to tolerate 1.2 mg, then reduce back down to 0.6 mg daily.     Potential side effects: nausea, diarrhea, constipation, bloating. Nausea for the first week or two is common.  Avoid big food portions and greasy/heavy foods. Appetite suppression is a temporary side effect which means maintaining a healthy lifestyle is important.       Make sure to take Humalog pen with you when you eat out.   Best to eat within 20 minutes of Humalog injection.   Do not inject Humalog any later than 30 minutes after eating or else blood sugar can drop too low.        Discussed insulin pump therapy. She declines [employer]ion[employer] insulin pump d/t tubling.  Pt  interested Omnipod but it only holds 200 units of insulin per day. Pt's TDD is currently 224 units/day. She could use Omnipod once daily + one injection of basal insulin per day. See Diabetes Education for insulin pump evaluation. Pt does admit that carb counting is overwhelming but she can do small/large meal boluses for the insulin pump.     Return to clinic in 3 months with labs prior, or sooner if insulin pump is started (2-4 weeks after insulin pump start)

## 2025-03-06 NOTE — PROGRESS NOTES
CC: This 49 y.o. Black or  female  is here for evaluation of  T2DM along with comorbidities indicated in the Visit Diagnosis section of this encounter.    HPI: Fabiana Chanel was diagnosed with T2DM in 2013. Metformin and a sulfonylurea started at the time of diagnosis.           Prior visit 12/2024  Pt last seen 1.5 years ago. Has h/o poor f/u.   Could not tolerate Mounjaro d/t diarrhea.   Plan Unable to evaluate glucose trends due to limited data/testing.   Start back with using Dexcom.   See Diabetes Education for Dexcom training.   If unable to use Dexcom, then do fingersticks 3-4x/day.   Continue current insulin doses.   Return to clinic in 3 months with A1c prior.       Interval hx  A1c is down from 8.7 to 7.4%.   Finds that Dexcom has been very helpful as well as recent visits to diabetes educator. She has had CGM low alerts as low as 50s. Sometimes does not feel her lows.     Interested in Zepbound for weight loss.   She has reduced Lantus from 60 to 50 units bid.       PMHX: CHF,  MILE on cpap, atrial fibrillation, MI, ICD placement, NICM (cath 11/2017) EF 20% by echo 12/2018    LAST DIABETES EDUCATION: 6/19/19, 11/2021 for Dexcom start   12/2024, 1/2025 with JAYSHREE Ulrich RN     RECENT ILLNESSES/HOSPITALIZATIONS - -  Admitted 12/28/18 x 2 nights for acute on chronic HF      HOSPITALIZED FOR DIABETES  -  Yes - for hyperglycemia     DIABETES MEDICATIONS:    Humulin N 24 units qhs 10 pm   Lantus  50 units bid   Humalog Kwikpen 50 units ac with correction scale : 150-200=+2, 201-250=+4; 251-300=+6; 301-350=+8, over 350=+10 units        Misses medication doses -  skips Humalog when she doesn't have pen with her.      DM COMPLICATIONS: peripheral neuropathy and cardiovascular disease    SIGNIFICANT DIABETES MED HISTORY:   Metformin  mg bid - diarrhea   Jardiance - recurrent  infection   Trulicity 0.75 - diarrhea; Trulicity 1.5 mg - nausea and bloating  Ozempic - nausea; blood  clot??   Mounjaro - diarrhea   Novolog started 11/2019; Humulin N at bedtime started 5/2022      SELF MONITORING BLOOD GLUCOSE:  Dexcom G7 brandyn     CGM interpretation:  hyperglycemia secondary to missed Humalog doses, insulin/carb mismatch, and snacking. Hypoglycemia occasionally noted, d/t late Humalog injection (infrequent), insulin/carb mismatch, or Humalog correction dose (infrequent).              HYPOGLYCEMIC EPISODES:     Corrects with juice.       CURRENT DIET: drinks water. Eats 2-3 meals/day. No candy.     Diet recall: overnight ate a cookie. Dinner was burger. Lunch was samantha broiled oysters with 4 pieces of bread, tea (possibly given sweet tea? )    CURRENT EXERCISE: can tolerate walking up 2 blocks before she gets DIAZ       /69   Pulse 78   Wt 118.6 kg (261 lb 6.4 oz)   BMI 40.94 kg/m²       ROS:   CONSTITUTIONAL: Appetite low, + Fatigue   + headache     PHYSICAL EXAM:  GENERAL: Well developed, well nourished. No acute distress.   PSYCH: AAOx3,  conversant, well-groomed. Judgement and insight good. Appropriate mood and affect.   NEURO: Cranial nerves grossly intact. Speech clear, no tremor.           Hemoglobin A1C   Date Value Ref Range Status   02/27/2025 7.4 (H) 4.0 - 5.6 % Final     Comment:     ADA Screening Guidelines:  5.7-6.4%  Consistent with prediabetes  >or=6.5%  Consistent with diabetes    High levels of fetal hemoglobin interfere with the HbA1C  assay. Heterozygous hemoglobin variants (HbS, HgC, etc)do  not significantly interfere with this assay.   However, presence of multiple variants may affect accuracy.     11/19/2024 8.7 (H) 4.0 - 5.6 % Final     Comment:     ADA Screening Guidelines:  5.7-6.4%  Consistent with prediabetes  >or=6.5%  Consistent with diabetes    High levels of fetal hemoglobin interfere with the HbA1C  assay. Heterozygous hemoglobin variants (HbS, HgC, etc)do  not significantly interfere with this assay.   However, presence of multiple variants may affect  accuracy.     06/06/2024 9.1 (H) 4.0 - 5.6 % Final     Comment:     ADA Screening Guidelines:  5.7-6.4%  Consistent with prediabetes  >or=6.5%  Consistent with diabetes    High levels of fetal hemoglobin interfere with the HbA1C  assay. Heterozygous hemoglobin variants (HbS, HgC, etc)do  not significantly interfere with this assay.   However, presence of multiple variants may affect accuracy.             Component Value Date/Time     12/06/2024 0836    K 3.8 12/06/2024 0836     12/06/2024 0836    CO2 31 (H) 12/06/2024 0836    BUN 27 (H) 12/06/2024 0836    CREATININE 2.1 (H) 12/06/2024 0836     12/06/2024 0836    CALCIUM 9.6 12/06/2024 0836    ALKPHOS 83 12/06/2024 0836    AST 15 12/06/2024 0836    ALT 8 (L) 12/06/2024 0836    BILITOT 0.3 12/06/2024 0836    EGFRNORACEVR 28.5 (A) 12/06/2024 0836    ESTGFRAFRICA 36 (A) 07/01/2022 1317     Lab Results   Component Value Date    CHOL 203 (H) 11/19/2024    CHOL 162 03/13/2024    CHOL 105 (L) 07/01/2022     Lab Results   Component Value Date    HDL 50 11/19/2024    HDL 44 03/13/2024    HDL 30 (L) 07/01/2022     Lab Results   Component Value Date    LDLCALC 134.4 11/19/2024    LDLCALC 99.8 03/13/2024    LDLCALC 58.4 (L) 07/01/2022     Lab Results   Component Value Date    TRIG 93 11/19/2024    TRIG 91 03/13/2024    TRIG 83 07/01/2022       Lab Results   Component Value Date    CHOLHDL 24.6 11/19/2024    CHOLHDL 27.2 03/13/2024    CHOLHDL 28.6 07/01/2022         Lab Results   Component Value Date    MICALBCREAT 80.7 (H) 05/15/2024           ASSESSMENT and PLAN:    A1C GOAL: < 7 %       1. Type 2 diabetes mellitus with hyperglycemia, with long-term current use of insulin  Pt has tried and failed Mounjaro, Trulicity and Ozempic d/t GI s/e. She is willing to try Victoza.     Victoza 0.6 mg daily x 2 weeks.   If no nausea: Victoza 1.2 mg daily. If unable to tolerate 1.2 mg, then reduce back down to 0.6 mg daily.      Make sure to take Humalog pen with you when  you eat out.   Best to eat within 20 minutes of Humalog injection.   Do not inject Humalog any later than 30 minutes after eating or else blood sugar can drop too low.        Discussed insulin pump therapy. She declines [employer] insulin pump d/t tubling.  Pt  interested Omnipod but it only holds 200 units of insulin per day. Pt's TDD is currently 224 units/day. She could use Omnipod once daily with one injection of basal insulin per day.     See Diabetes Education for insulin pump evaluation. Pt does admit that carb counting is overwhelming so she will need to enter in small/large meal boluses into the insulin pump.     Return to clinic in 3 months with labs prior, or sooner if insulin pump is started (2-4 weeks after insulin pump start)          insulin lispro 100 unit/mL pen    insulin glargine U-100, Lantus, (LANTUS SOLOSTAR U-100 INSULIN) 100 unit/mL (3 mL) InPn pen    Hemoglobin A1C      2. CKD (chronic kidney disease) stage 4, GFR 15-29 ml/min  Sched f/u with Nephrology. Last visit was 7/2022.                 Orders Placed This Encounter   Procedures    Hemoglobin A1C     Standing Status:   Future     Expected Date:   3/6/2025     Expiration Date:   5/5/2026     Send normal result to authorizing provider's In Basket if patient is active on MyChart::   Yes        Follow up in about 3 months (around 6/6/2025).       Patient is testing blood sugars 3x/day and taking 4 injections of insulin a day. The patient's insulin treatment regimen requires frequent adjustments by the patient on the basis of therapeutic CGM testing results.

## 2025-03-07 DIAGNOSIS — Z95.810 PRESENCE OF AUTOMATIC (IMPLANTABLE) CARDIAC DEFIBRILLATOR: Primary | ICD-10-CM

## 2025-03-07 DIAGNOSIS — I42.0 DILATED CARDIOMYOPATHY: ICD-10-CM

## 2025-03-11 RX ORDER — LIRAGLUTIDE 6 MG/ML
INJECTION SUBCUTANEOUS
Qty: 3 ML | Refills: 3 | Status: SHIPPED | OUTPATIENT
Start: 2025-03-11

## 2025-03-14 LAB
OHS CV AF BURDEN PERCENT: < 1
OHS CV DC REMOTE DEVICE TYPE: NORMAL
OHS CV RV PACING PERCENT: 1 %

## 2025-03-17 ENCOUNTER — PATIENT MESSAGE (OUTPATIENT)
Dept: PAIN MEDICINE | Facility: CLINIC | Age: 50
End: 2025-03-17
Payer: MEDICARE

## 2025-03-17 DIAGNOSIS — M79.18 MYOFASCIAL PAIN: ICD-10-CM

## 2025-03-17 RX ORDER — OXYCODONE AND ACETAMINOPHEN 5; 325 MG/1; MG/1
1 TABLET ORAL
Qty: 30 TABLET | Refills: 0 | Status: SHIPPED | OUTPATIENT
Start: 2025-03-17

## 2025-03-17 NOTE — TELEPHONE ENCOUNTER
Patient requesting refill on Oxycodone  Last office visit 02.10.25   shows last refill on 02.10.25  Patient does not have a pain contract on file with [facility]  Patient last UDS 02.10.25

## 2025-03-26 ENCOUNTER — TELEPHONE (OUTPATIENT)
Dept: DIABETES | Facility: CLINIC | Age: 50
End: 2025-03-26
Payer: MEDICARE

## 2025-04-14 NOTE — ASSESSMENT & PLAN NOTE
Neurology Progress Note    S: Patient seen and examined.  now in ICU. no HA      Medications: MEDICATIONS  (STANDING):  buDESOnide    Inhalation Suspension 0.5 milliGRAM(s) Inhalation daily  chlorhexidine 2% Cloths 1 Application(s) Topical <User Schedule>  DAPTOmycin IVPB      DAPTOmycin IVPB 600 milliGRAM(s) IV Intermittent every 24 hours  fluticasone propionate 50 MICROgram(s)/spray Nasal Spray 1 Spray(s) Both Nostrils two times a day  influenza   Vaccine 0.5 milliLiter(s) IntraMuscular once  midodrine. 20 milliGRAM(s) Oral three times a day  montelukast 10 milliGRAM(s) Oral daily  pantoprazole    Tablet 40 milliGRAM(s) Oral before breakfast  polyethylene glycol 3350 17 Gram(s) Oral two times a day  remodulin (treprostinil) SubQ infusion 1 Dose(s) 1 Dose(s) SubCutaneous Continuous Pump  rivaroxaban 15 milliGRAM(s) Oral with dinner  senna 2 Tablet(s) Oral at bedtime    MEDICATIONS  (PRN):  acetaminophen     Tablet .. 1000 milliGRAM(s) Oral every 6 hours PRN Mild Pain (1 - 3), Moderate Pain (4 - 6)  albuterol/ipratropium for Nebulization 3 milliLiter(s) Nebulizer every 6 hours PRN Shortness of Breath  diphenhydrAMINE Injectable 25 milliGRAM(s) IV Push every 8 hours PRN Migraine  ketorolac   Injectable 30 milliGRAM(s) IV Push every 8 hours PRN Migraine  meclizine 12.5 milliGRAM(s) Oral once PRN Dizziness  melatonin 9 milliGRAM(s) Oral at bedtime PRN Insomnia  metoclopramide 10 milliGRAM(s) Oral every 8 hours PRN Migraine  oxyCODONE    IR 2.5 milliGRAM(s) Oral every 6 hours PRN Severe Pain (7 - 10)       Vitals:  Vital Signs Last 24 Hrs  T(C): 36.6 (14 Apr 2025 08:00), Max: 36.9 (13 Apr 2025 16:00)  T(F): 97.8 (14 Apr 2025 08:00), Max: 98.4 (13 Apr 2025 16:00)  HR: 75 (14 Apr 2025 09:00) (66 - 91)  BP: 90/54 (14 Apr 2025 09:00) (81/46 - 120/57)  BP(mean): 69 (14 Apr 2025 09:00) (65 - 82)  RR: 20 (14 Apr 2025 09:00) (11 - 45)  SpO2: 100% (14 Apr 2025 09:00) (92% - 100%)    Parameters below as of 14 Apr 2025 08:00  Patient On (Oxygen Delivery Method): nasal cannula  O2 Flow (L/min): 2            General Exam:   General Appearance: Appropriately dressed and in no acute distress       Head: Normocephalic, atraumatic and no dysmorphic features  Ear, Nose, and Throat: Moist mucous membranes  CVS: S1S2+  Resp: No SOB, no wheeze or rhonchi  Abd: soft NTND  Extremities: No edema, no cyanosis  Skin: No bruises, no rashes     Neurological Exam:    level of consciousness: Awake, alert  Orientation: Oriented to person, age, place, and time  Language: Speech is fluent with intact naming, repetition, and ability to follow commands (including simple commands and complex cross commands)  Cortical Signs:  no hemineglect.   Content: Good overall fund of knowledge (aware of current events,and relevant past history)    HINTS+; improved      Hearing intact and  symmetric to finger rub b/l as above    - Cranial Nerves:   PERRL, VFF, EOMI, facial sensation is intact in the V1-V3 distribution bilaterally; face is symmetric with augusto smile; hearing is intact to finger rub bilaterally and equally; uvula is midline and soft palate rises symmetrically; shoulder shrug intact; tongue protrudes in the midline with intact lateral movements    - Motor Testing:  Bulk: Normal   Tone: Normal  Strength:  There is no pronator drift b/l  There is no leg drift b/l  NO orbiting  intact rapid finger rapping                              Right           Left  Should-Abduct      5                   5   -some pain limitations  Elbow-Flex             5                   5  Elbow-Ext              5                   5                        5                   5    Hip-Flex                 5                   5  Knee-Flex              5                   5  Knee-Ext                5                   5  Dorsiflex                5                   5  Plantarflex             5                   5    - Reflexes:              Right           Left  Triceps                     2+                2+  Biceps                      2+                 2+  Brachioradialis         2+                2+  Patellar                    2+                 2+    requires distraction to elicit all reflexes    - Sensory: Intact throughout to light touch.   - Coordination: Finger-nose-finger intact without dysmetria.   ROmberg negative   - Gait: Normal steps, base, arm swing, and turning.  Able to tandem walk. Able to heel and toe walk.    I personally reviewed the below data/images/labs:       LABS:                          11.0   9.61  )-----------( 337      ( 13 Apr 2025 22:36 )             34.3     04-13    137  |  103  |  11  ----------------------------<  84  4.3   |  22  |  1.06    Ca    8.5      13 Apr 2025 22:36  Phos  3.0     04-13  Mg     2.1     04-13    TPro  6.8  /  Alb  3.8  /  TBili  0.8  /  DBili  x   /  AST  11  /  ALT  7[L]  /  AlkPhos  62  04-13    LIVER FUNCTIONS - ( 13 Apr 2025 22:36 )  Alb: 3.8 g/dL / Pro: 6.8 g/dL / ALK PHOS: 62 U/L / ALT: 7 U/L / AST: 11 U/L / GGT: x           PT/INR - ( 13 Apr 2025 22:36 )   PT: 13.7 sec;   INR: 1.19 ratio         PTT - ( 13 Apr 2025 22:36 )  PTT:39.6 sec  Urinalysis Basic - ( 13 Apr 2025 22:36 )    Color: x / Appearance: x / SG: x / pH: x  Gluc: 84 mg/dL / Ketone: x  / Bili: x / Urobili: x   Blood: x / Protein: x / Nitrite: x   Leuk Esterase: x / RBC: x / WBC x   Sq Epi: x / Non Sq Epi: x / Bacteria: x          < from: CT Head No Cont (04.07.25 @ 10:22) >    ACC: [id number] EXAM:  CT BRAIN   ORDERED BY:  SERINA BOWEN     PROCEDURE DATE:  04/07/2025          INTERPRETATION:  CLINICAL INFORMATION: headaches    COMPARISON: CT head 10/23/2024    CONTRAST:  IV Contrast: NONE  .    TECHNIQUE:  Serial axial images were obtained from the skull base to the   vertex using multi-slice helical technique. Sagittal and coronal   reformats were obtained.    FINDINGS:    VENTRICLES AND SULCI: Normal in size and configuration.  INTRA-AXIAL: No mass effect, acute hemorrhage, or midline shift.  EXTRA-AXIAL: No mass or fluid collection. Basal cisterns are normal in   appearance.    VISUALIZED SINUSES:  Clear.  TYMPANOMASTOID CAVITIES:  Clear.  VISUALIZED ORBITS: Normal.  CALVARIUM: Intact.    MISCELLANEOUS: None.      IMPRESSION:  No acute intracranial hemorrhage, mass effect, or midline shift.        --- End of Report ---            AAKASH SIMON MD; Attending Radiologist  This document has been electronically signed. Apr 7 2025 11:05AM    < end of copied text >       On admission blood glucose 56  Hold all insulin  Hypoglycemic protocol  Monitor blood glucose every 4 hours

## 2025-04-17 ENCOUNTER — PATIENT MESSAGE (OUTPATIENT)
Dept: PAIN MEDICINE | Facility: CLINIC | Age: 50
End: 2025-04-17
Payer: MEDICARE

## 2025-04-17 DIAGNOSIS — M79.18 MYOFASCIAL PAIN: ICD-10-CM

## 2025-04-21 RX ORDER — METHOCARBAMOL 750 MG/1
750 TABLET, FILM COATED ORAL 2 TIMES DAILY PRN
Qty: 60 TABLET | Refills: 2 | Status: SHIPPED | OUTPATIENT
Start: 2025-04-21

## 2025-04-21 RX ORDER — OXYCODONE AND ACETAMINOPHEN 5; 325 MG/1; MG/1
1 TABLET ORAL
Qty: 30 TABLET | Refills: 0 | Status: SHIPPED | OUTPATIENT
Start: 2025-04-21

## 2025-04-21 NOTE — TELEPHONE ENCOUNTER
Patient requesting refill on Percocet & Robaxin   Last office visit 2/10/25   shows last refill on 3/18/25  Patient does have a pain contract on file with [facility] department  Patient last UDS 2/10/25

## 2025-04-30 DIAGNOSIS — E11.9 TYPE 2 DIABETES MELLITUS WITHOUT COMPLICATION, UNSPECIFIED WHETHER LONG TERM INSULIN USE: ICD-10-CM

## 2025-05-14 ENCOUNTER — OFFICE VISIT (OUTPATIENT)
Dept: PAIN MEDICINE | Facility: CLINIC | Age: 50
End: 2025-05-14
Payer: MEDICARE

## 2025-05-14 VITALS
HEIGHT: 67 IN | HEART RATE: 75 BPM | SYSTOLIC BLOOD PRESSURE: 93 MMHG | BODY MASS INDEX: 40.62 KG/M2 | RESPIRATION RATE: 19 BRPM | TEMPERATURE: 98 F | DIASTOLIC BLOOD PRESSURE: 63 MMHG | OXYGEN SATURATION: 100 % | WEIGHT: 258.81 LBS

## 2025-05-14 DIAGNOSIS — M54.17 LUMBOSACRAL RADICULOPATHY: ICD-10-CM

## 2025-05-14 DIAGNOSIS — M79.18 MYOFASCIAL PAIN: ICD-10-CM

## 2025-05-14 DIAGNOSIS — G89.4 CHRONIC PAIN SYNDROME: ICD-10-CM

## 2025-05-14 DIAGNOSIS — M53.3 PAIN OF BOTH SACROILIAC JOINTS: Primary | ICD-10-CM

## 2025-05-14 DIAGNOSIS — M54.16 LUMBAR RADICULOPATHY: ICD-10-CM

## 2025-05-14 DIAGNOSIS — M79.18 PIRIFORMIS MUSCLE PAIN: ICD-10-CM

## 2025-05-14 DIAGNOSIS — M51.360 DEGENERATION OF INTERVERTEBRAL DISC OF LUMBAR REGION WITH DISCOGENIC BACK PAIN: ICD-10-CM

## 2025-05-14 PROCEDURE — 4010F ACE/ARB THERAPY RXD/TAKEN: CPT | Mod: CPTII,S$GLB,,

## 2025-05-14 PROCEDURE — 99214 OFFICE O/P EST MOD 30 MIN: CPT | Mod: S$GLB,,,

## 2025-05-14 PROCEDURE — 99999 PR PBB SHADOW E&M-EST. PATIENT-LVL V: CPT | Mod: PBBFAC,,,

## 2025-05-14 PROCEDURE — 3051F HG A1C>EQUAL 7.0%<8.0%: CPT | Mod: CPTII,S$GLB,,

## 2025-05-14 PROCEDURE — 3074F SYST BP LT 130 MM HG: CPT | Mod: CPTII,S$GLB,,

## 2025-05-14 PROCEDURE — 3008F BODY MASS INDEX DOCD: CPT | Mod: CPTII,S$GLB,,

## 2025-05-14 PROCEDURE — 1159F MED LIST DOCD IN RCRD: CPT | Mod: CPTII,S$GLB,,

## 2025-05-14 PROCEDURE — 3078F DIAST BP <80 MM HG: CPT | Mod: CPTII,S$GLB,,

## 2025-05-14 PROCEDURE — 1160F RVW MEDS BY RX/DR IN RCRD: CPT | Mod: CPTII,S$GLB,,

## 2025-05-14 RX ORDER — OXYCODONE AND ACETAMINOPHEN 5; 325 MG/1; MG/1
1 TABLET ORAL
Qty: 30 TABLET | Refills: 0 | Status: SHIPPED | OUTPATIENT
Start: 2025-05-14

## 2025-05-14 NOTE — PROGRESS NOTES
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    Odilia Buckley NP   05/14/2025

## 2025-05-14 NOTE — H&P (VIEW-ONLY)
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 Buckley NP   05/14/2025

## 2025-05-15 DIAGNOSIS — M79.18 MYOFASCIAL PAIN: Primary | ICD-10-CM

## 2025-05-15 DIAGNOSIS — M53.3 PAIN OF BOTH SACROILIAC JOINTS: ICD-10-CM

## 2025-05-19 ENCOUNTER — TELEPHONE (OUTPATIENT)
Dept: PAIN MEDICINE | Facility: CLINIC | Age: 50
End: 2025-05-19
Payer: MEDICARE

## 2025-05-19 NOTE — TELEPHONE ENCOUNTER
----- Message from Odilia Buckley NP sent at 5/19/2025  9:24 AM CDT -----  Can we make sure patient knows the number to schedule her PT/OT. Thank you, Oidlia  ----- Message -----  From: Loli Gunn  Sent: 5/15/2025   9:18 AM CDT  To: ASHLEY Joyweston Orozcoie,Looks like the only order she has is from December 2024 which is still active but it is for regular PT. She will need to contact PT/OT central scheduling team at [phone] to schedule. I'll add a note for them saying patient is now ready to schedule.Thanks,Loli  ----- Message -----  From: Odilia Buckley NP  Sent: 5/14/2025  10:07 AM CDT  To: Loli Gunn    Patient should have an active PT order, can we contact her again to try to schedule? Thanks,Odilia

## 2025-05-19 NOTE — TELEPHONE ENCOUNTER
"Has not started PHYSICAL THERAPY but prefers to call them when she has a set schedule. Wishes to cancel procedure on 05/22/25. .Patient expressed understanding and gratitude for phone call.  Call ended.      "  My Note [id number] AM     Addend     Delete     Copy     ----- Message from Odilia Buckley NP sent at 5/19/2025  9:24 AM CDT -----  Can we make sure patient knows the number to schedule her PT/OT. Thank you, Odilia  ----- Message -----  From: Loli Gunn  Sent: 5/15/2025   9:18 AM CDT  To: Odilia Buckley NP     Vida Orozcoie,Looks like the only order she has is from December 2024 which is still active but it is for regular PT. She will need to contact PT/OT central scheduling team at [phone] to schedule. I'll add a note for them saying patient is now ready to schedule.ThanksLoli  ----- Message -----  From: Odilia Buckley NP  Sent: 5/14/2025  10:07 AM CDT  To: Loli Gunn     Patient should have an active PT order, can we contact her again to try to schedule? ThanksOdilia         "  " Island Pedicle Flap Text: The defect edges were debeveled with a #15 scalpel blade.  Given the location of the defect, shape of the defect and the proximity to free margins an island pedicle advancement flap was deemed most appropriate.  Using a sterile surgical marker, an appropriate advancement flap was drawn incorporating the defect, outlining the appropriate donor tissue and placing the expected incisions within the relaxed skin tension lines where possible.    The area thus outlined was incised deep to adipose tissue with a #15 scalpel blade.  The skin margins were undermined to an appropriate distance in all directions around the primary defect and laterally outward around the island pedicle utilizing iris scissors.  There was minimal undermining beneath the pedicle flap.

## 2025-05-21 ENCOUNTER — PATIENT MESSAGE (OUTPATIENT)
Dept: PAIN MEDICINE | Facility: CLINIC | Age: 50
End: 2025-05-21
Payer: MEDICARE

## 2025-05-21 DIAGNOSIS — M79.18 MYOFASCIAL PAIN: ICD-10-CM

## 2025-05-21 DIAGNOSIS — E11.9 TYPE 2 DIABETES MELLITUS WITHOUT COMPLICATION: ICD-10-CM

## 2025-05-21 RX ORDER — OXYCODONE AND ACETAMINOPHEN 5; 325 MG/1; MG/1
1 TABLET ORAL
Qty: 30 TABLET | Refills: 0 | Status: SHIPPED | OUTPATIENT
Start: 2025-05-21

## 2025-05-21 NOTE — TELEPHONE ENCOUNTER
Patient requesting refill on oxyCODONE-acetaminophen (PERCOCET) 5-325 mg   Last office visit 05/14/25   shows last refill on 4/21/25  Patient does not have a pain contract on file with [facility]  Patient last UDS 02/10/25

## 2025-05-26 ENCOUNTER — PATIENT OUTREACH (OUTPATIENT)
Dept: ADMINISTRATIVE | Facility: HOSPITAL | Age: 50
End: 2025-05-26
Payer: MEDICARE

## 2025-05-26 NOTE — PROGRESS NOTES
HM and immunization's reviewed and updated.Patient have lab appointment scheduled. Due for urine screening scheduled same day.

## 2025-05-30 ENCOUNTER — HOSPITAL ENCOUNTER (OUTPATIENT)
Facility: OTHER | Age: 50
Discharge: HOME | End: 2025-05-30
Attending: ANESTHESIOLOGY | Admitting: ANESTHESIOLOGY
Payer: MEDICARE

## 2025-05-30 VITALS
HEIGHT: 67 IN | DIASTOLIC BLOOD PRESSURE: 54 MMHG | RESPIRATION RATE: 18 BRPM | WEIGHT: 250 LBS | HEART RATE: 73 BPM | TEMPERATURE: 98 F | OXYGEN SATURATION: 97 % | BODY MASS INDEX: 39.24 KG/M2 | SYSTOLIC BLOOD PRESSURE: 90 MMHG

## 2025-05-30 DIAGNOSIS — G89.29 CHRONIC PAIN: ICD-10-CM

## 2025-05-30 DIAGNOSIS — G57.02 PIRIFORMIS SYNDROME OF LEFT SIDE: ICD-10-CM

## 2025-05-30 DIAGNOSIS — M46.1 SACROILIITIS: Primary | ICD-10-CM

## 2025-05-30 PROCEDURE — 63600175 PHARM REV CODE 636 W HCPCS: Performed by: ANESTHESIOLOGY

## 2025-05-30 PROCEDURE — 20552 NJX 1/MLT TRIGGER POINT 1/2: CPT | Mod: 59

## 2025-05-30 PROCEDURE — 25500020 PHARM REV CODE 255: Performed by: ANESTHESIOLOGY

## 2025-05-30 PROCEDURE — 27096 INJECT SACROILIAC JOINT: CPT | Mod: 50

## 2025-05-30 PROCEDURE — 99152 MOD SED SAME PHYS/QHP 5/>YRS: CPT | Performed by: ANESTHESIOLOGY

## 2025-05-30 RX ORDER — FENTANYL CITRATE 50 UG/ML
INJECTION, SOLUTION INTRAMUSCULAR; INTRAVENOUS
Status: DISCONTINUED | OUTPATIENT
Start: 2025-05-30 | End: 2025-05-30 | Stop reason: HOSPADM

## 2025-05-30 RX ORDER — MIDAZOLAM HYDROCHLORIDE 1 MG/ML
INJECTION INTRAMUSCULAR; INTRAVENOUS
Status: DISCONTINUED | OUTPATIENT
Start: 2025-05-30 | End: 2025-05-30 | Stop reason: HOSPADM

## 2025-05-30 RX ORDER — LIDOCAINE HYDROCHLORIDE 20 MG/ML
INJECTION, SOLUTION INFILTRATION; PERINEURAL
Status: DISCONTINUED | OUTPATIENT
Start: 2025-05-30 | End: 2025-05-30 | Stop reason: HOSPADM

## 2025-05-30 RX ORDER — SODIUM CHLORIDE 9 MG/ML
INJECTION, SOLUTION INTRAVENOUS CONTINUOUS
Status: DISCONTINUED | OUTPATIENT
Start: 2025-05-30 | End: 2025-05-30 | Stop reason: HOSPADM

## 2025-05-30 RX ORDER — BUPIVACAINE HYDROCHLORIDE 2.5 MG/ML
INJECTION, SOLUTION EPIDURAL; INFILTRATION; INTRACAUDAL; PERINEURAL
Status: DISCONTINUED | OUTPATIENT
Start: 2025-05-30 | End: 2025-05-30 | Stop reason: HOSPADM

## 2025-05-30 RX ORDER — TRIAMCINOLONE ACETONIDE 40 MG/ML
INJECTION, SUSPENSION INTRA-ARTICULAR; INTRAMUSCULAR
Status: DISCONTINUED | OUTPATIENT
Start: 2025-05-30 | End: 2025-05-30 | Stop reason: HOSPADM

## 2025-05-30 NOTE — DISCHARGE INSTRUCTIONS

## 2025-05-30 NOTE — BRIEF OP NOTE
Discharge Note  Short Stay      SUMMARY     Admit Date: 5/30/2025    Attending Physician: ASTRID BROOKS      Discharge Physician: ASTRID BROOKS      Discharge Date: 5/30/2025 1:31 PM    Procedure(s) (LRB):  INJECTION,SACROILIAC JOINT BILATERAL AND LEFT PIRIFORMIS (Bilateral)    Final Diagnosis: Myofascial pain [M79.18]  Pain of both sacroiliac joints [M53.3]    Disposition: Home or self care    Patient Instructions:   Current Discharge Medication List        CONTINUE these medications which have NOT CHANGED    Details   albuterol (PROVENTIL/VENTOLIN HFA) 90 mcg/actuation inhaler Inhale 1-2 puffs into the lungs every 6 (six) hours as needed. Rescue  Qty: 18 g, Refills: 0    Associated Diagnoses: Chronic obstructive pulmonary disease, unspecified COPD type      BLOOD PRESSURE CUFF Misc 1 kit by Misc.(Non-Drug; Combo Route) route 2 (two) times daily.  Qty: 1 each, Refills: 0    Associated Diagnoses: Chronic combined systolic and diastolic heart failure; Essential hypertension; Nonischemic cardiomyopathy      blood sugar diagnostic Strp Check blood glucose 3x/day.  Qty: 300 strip, Refills: 3    Associated Diagnoses: Type 2 diabetes mellitus with stage 4 chronic kidney disease, with long-term current use of insulin      blood-glucose meter (TRUE METRIX AIR GLUCOSE METER) Misc 1 each by Misc.(Non-Drug; Combo Route) route 3 (three) times daily.  Qty: 1 each, Refills: 0    Associated Diagnoses: Type 2 diabetes mellitus without complication, without long-term current use of insulin      blood-glucose sensor (DEXCOM G7 SENSOR) Lupe Change every 10 days  Qty: 12 each, Refills: 3      clobetasol 0.05% (TEMOVATE) 0.05 % Oint APPLY OINTMENT TOPICALLY TWICE DAILY      diclofenac sodium (VOLTAREN ARTHRITIS PAIN) 1 % Gel Apply 2 g topically 4 (four) times daily.  Qty: 450 g, Refills: 3      ELIQUIS 5 mg Tab Take 1 tablet by mouth twice daily  Qty: 180 tablet, Refills: 3      fluticasone propionate (FLONASE) 50 mcg/actuation  nasal spray 1 spray (50 mcg total) by Each Nostril route 2 (two) times daily as needed for Rhinitis or Allergies.  Qty: 15 g, Refills: 0      furosemide (LASIX) 40 MG tablet Take 1 tablet (40 mg total) by mouth once daily.  Qty: 90 tablet, Refills: 3    Associated Diagnoses: Chronic combined systolic and diastolic heart failure      gabapentin (NEURONTIN) 400 MG capsule Take 1 capsule (400 mg total) by mouth 3 (three) times daily. TAKE 1 CAPSULE BY MOUTH THREE TIMES DAILY AS NEEDED FOR PAIN  Qty: 90 capsule, Refills: 3    Associated Diagnoses: Lumbar radiculopathy      insulin glargine U-100, Lantus, (LANTUS SOLOSTAR U-100 INSULIN) 100 unit/mL (3 mL) InPn pen Inject 50 Units into the skin 2 (two) times a day.  Qty: 90 mL, Refills: 1    Associated Diagnoses: Type 2 diabetes mellitus with hyperglycemia, with long-term current use of insulin      insulin lispro 100 unit/mL pen Inject  50 units before meals  Qty: 135 mL, Refills: 1    Associated Diagnoses: Type 2 diabetes mellitus with hyperglycemia, with long-term current use of insulin      insulin NPH isoph U-100 human (HUMULIN N NPH INSULIN KWIKPEN) 100 unit/mL (3 mL) InPn Inject 24 units nightly  Qty: 30 mL, Refills: 2      LIDOcaine (LIDODERM) 5 % Place 1 patch onto the skin once daily. Apply patch for 12 hours and then leave off for 12 hours  Qty: 15 patch, Refills: 0    Associated Diagnoses: Right arm pain      liraglutide 0.6 mg/0.1 mL, 18 mg/3 mL, subq PNIJ (VICTOZA 2-THAO) 0.6 mg/0.1 mL (18 mg/3 mL) PnIj pen Inject 0.6 mg into the skin once daily for 1 week. If no nausea: Inject 1.2 mg once daily thereafter.  Qty: 3 mL, Refills: 3      !! methocarbamoL (ROBAXIN) 750 MG Tab Take 1 tablet (750 mg total) by mouth 2 (two) times daily as needed (muscle pain).  Qty: 60 tablet, Refills: 2    Associated Diagnoses: Myofascial pain      !! methocarbamoL (ROBAXIN) 750 MG Tab Take 1 tablet (750 mg total) by mouth 2 (two) times daily as needed (muscle pain).  Qty: 60  "tablet, Refills: 2    Associated Diagnoses: Myofascial pain      metoprolol succinate (TOPROL-XL) 50 MG 24 hr tablet Take 1 tablet (50 mg total) by mouth once daily.  Qty: 90 tablet, Refills: 3    Comments: .  Associated Diagnoses: Chronic combined systolic and diastolic heart failure      oxyCODONE-acetaminophen (PERCOCET) 5-325 mg per tablet Take 1 tablet by mouth every 24 hours as needed for Pain.  Qty: 30 tablet, Refills: 0    Comments: Quantity prescribed more than 7 day supply? Yes, quantity medically necessary  Associated Diagnoses: Myofascial pain      pen needle, diabetic (BD LORI 2ND GEN PEN NEEDLE) 32 gauge x 5/32" Ndle USE 1 PEN NEEDLE 4 TIMES DAILY  Qty: 400 each, Refills: 3      rosuvastatin (CRESTOR) 40 MG Tab Take 1 tablet (40 mg total) by mouth every evening.  Qty: 90 tablet, Refills: 3    Associated Diagnoses: Chronic combined systolic and diastolic heart failure      sacubitriL-valsartan (ENTRESTO) 24-26 mg per tablet Take 1 tablet by mouth 2 (two) times daily.  Qty: 180 tablet, Refills: 3    Associated Diagnoses: Chronic combined systolic and diastolic heart failure      spironolactone (ALDACTONE) 25 MG tablet Take 1 tablet (25 mg total) by mouth once daily. Hold until eating and drinking improves. Need to weight self daily  Qty: 90 tablet, Refills: 3    Comments: .  Associated Diagnoses: Type 2 diabetes mellitus with stage 4 chronic kidney disease, with long-term current use of insulin       !! - Potential duplicate medications found. Please discuss with provider.              Discharge Diagnosis: Myofascial pain [M79.18]  Pain of both sacroiliac joints [M53.3]  Condition on Discharge: Stable with no complications to procedure   Diet on Discharge: Same as before.  Activity: as per instruction sheet.  Discharge to: Home with a responsible adult.  Follow up: 2-4 weeks       Please call my office or pager at [phone] if experienced any weakness or loss of sensation, fever > 101.5, pain " uncontrolled with oral medications, persistent nausea/vomiting/or diarrhea, redness or drainage from the incisions, or any other worrisome concerns. If physician on call was not reached or could not communicate with our office for any reason please go to the nearest emergency department

## 2025-05-30 NOTE — DISCHARGE SUMMARY
Discharge Note  Short Stay      SUMMARY     Admit Date: 5/30/2025    Attending Physician: ASTRID BROOKS      Discharge Physician: ASTRID BROOKS      Discharge Date: 5/30/2025 1:31 PM    Procedure(s) (LRB):  INJECTION,SACROILIAC JOINT BILATERAL AND LEFT PIRIFORMIS (Bilateral)    Final Diagnosis: Myofascial pain [M79.18]  Pain of both sacroiliac joints [M53.3]    Disposition: Home or self care    Patient Instructions:   Current Discharge Medication List        CONTINUE these medications which have NOT CHANGED    Details   albuterol (PROVENTIL/VENTOLIN HFA) 90 mcg/actuation inhaler Inhale 1-2 puffs into the lungs every 6 (six) hours as needed. Rescue  Qty: 18 g, Refills: 0    Associated Diagnoses: Chronic obstructive pulmonary disease, unspecified COPD type      BLOOD PRESSURE CUFF Misc 1 kit by Misc.(Non-Drug; Combo Route) route 2 (two) times daily.  Qty: 1 each, Refills: 0    Associated Diagnoses: Chronic combined systolic and diastolic heart failure; Essential hypertension; Nonischemic cardiomyopathy      blood sugar diagnostic Strp Check blood glucose 3x/day.  Qty: 300 strip, Refills: 3    Associated Diagnoses: Type 2 diabetes mellitus with stage 4 chronic kidney disease, with long-term current use of insulin      blood-glucose meter (TRUE METRIX AIR GLUCOSE METER) Misc 1 each by Misc.(Non-Drug; Combo Route) route 3 (three) times daily.  Qty: 1 each, Refills: 0    Associated Diagnoses: Type 2 diabetes mellitus without complication, without long-term current use of insulin      blood-glucose sensor (DEXCOM G7 SENSOR) Lupe Change every 10 days  Qty: 12 each, Refills: 3      clobetasol 0.05% (TEMOVATE) 0.05 % Oint APPLY OINTMENT TOPICALLY TWICE DAILY      diclofenac sodium (VOLTAREN ARTHRITIS PAIN) 1 % Gel Apply 2 g topically 4 (four) times daily.  Qty: 450 g, Refills: 3      ELIQUIS 5 mg Tab Take 1 tablet by mouth twice daily  Qty: 180 tablet, Refills: 3      fluticasone propionate (FLONASE) 50 mcg/actuation  nasal spray 1 spray (50 mcg total) by Each Nostril route 2 (two) times daily as needed for Rhinitis or Allergies.  Qty: 15 g, Refills: 0      furosemide (LASIX) 40 MG tablet Take 1 tablet (40 mg total) by mouth once daily.  Qty: 90 tablet, Refills: 3    Associated Diagnoses: Chronic combined systolic and diastolic heart failure      gabapentin (NEURONTIN) 400 MG capsule Take 1 capsule (400 mg total) by mouth 3 (three) times daily. TAKE 1 CAPSULE BY MOUTH THREE TIMES DAILY AS NEEDED FOR PAIN  Qty: 90 capsule, Refills: 3    Associated Diagnoses: Lumbar radiculopathy      insulin glargine U-100, Lantus, (LANTUS SOLOSTAR U-100 INSULIN) 100 unit/mL (3 mL) InPn pen Inject 50 Units into the skin 2 (two) times a day.  Qty: 90 mL, Refills: 1    Associated Diagnoses: Type 2 diabetes mellitus with hyperglycemia, with long-term current use of insulin      insulin lispro 100 unit/mL pen Inject  50 units before meals  Qty: 135 mL, Refills: 1    Associated Diagnoses: Type 2 diabetes mellitus with hyperglycemia, with long-term current use of insulin      insulin NPH isoph U-100 human (HUMULIN N NPH INSULIN KWIKPEN) 100 unit/mL (3 mL) InPn Inject 24 units nightly  Qty: 30 mL, Refills: 2      LIDOcaine (LIDODERM) 5 % Place 1 patch onto the skin once daily. Apply patch for 12 hours and then leave off for 12 hours  Qty: 15 patch, Refills: 0    Associated Diagnoses: Right arm pain      liraglutide 0.6 mg/0.1 mL, 18 mg/3 mL, subq PNIJ (VICTOZA 2-THAO) 0.6 mg/0.1 mL (18 mg/3 mL) PnIj pen Inject 0.6 mg into the skin once daily for 1 week. If no nausea: Inject 1.2 mg once daily thereafter.  Qty: 3 mL, Refills: 3      !! methocarbamoL (ROBAXIN) 750 MG Tab Take 1 tablet (750 mg total) by mouth 2 (two) times daily as needed (muscle pain).  Qty: 60 tablet, Refills: 2    Associated Diagnoses: Myofascial pain      !! methocarbamoL (ROBAXIN) 750 MG Tab Take 1 tablet (750 mg total) by mouth 2 (two) times daily as needed (muscle pain).  Qty: 60  "tablet, Refills: 2    Associated Diagnoses: Myofascial pain      metoprolol succinate (TOPROL-XL) 50 MG 24 hr tablet Take 1 tablet (50 mg total) by mouth once daily.  Qty: 90 tablet, Refills: 3    Comments: .  Associated Diagnoses: Chronic combined systolic and diastolic heart failure      oxyCODONE-acetaminophen (PERCOCET) 5-325 mg per tablet Take 1 tablet by mouth every 24 hours as needed for Pain.  Qty: 30 tablet, Refills: 0    Comments: Quantity prescribed more than 7 day supply? Yes, quantity medically necessary  Associated Diagnoses: Myofascial pain      pen needle, diabetic (BD LORI 2ND GEN PEN NEEDLE) 32 gauge x 5/32" Ndle USE 1 PEN NEEDLE 4 TIMES DAILY  Qty: 400 each, Refills: 3      rosuvastatin (CRESTOR) 40 MG Tab Take 1 tablet (40 mg total) by mouth every evening.  Qty: 90 tablet, Refills: 3    Associated Diagnoses: Chronic combined systolic and diastolic heart failure      sacubitriL-valsartan (ENTRESTO) 24-26 mg per tablet Take 1 tablet by mouth 2 (two) times daily.  Qty: 180 tablet, Refills: 3    Associated Diagnoses: Chronic combined systolic and diastolic heart failure      spironolactone (ALDACTONE) 25 MG tablet Take 1 tablet (25 mg total) by mouth once daily. Hold until eating and drinking improves. Need to weight self daily  Qty: 90 tablet, Refills: 3    Comments: .  Associated Diagnoses: Type 2 diabetes mellitus with stage 4 chronic kidney disease, with long-term current use of insulin       !! - Potential duplicate medications found. Please discuss with provider.              Discharge Diagnosis: Myofascial pain [M79.18]  Pain of both sacroiliac joints [M53.3]  Condition on Discharge: Stable with no complications to procedure   Diet on Discharge: Same as before.  Activity: as per instruction sheet.  Discharge to: Home with a responsible adult.  Follow up: 2-4 weeks       Please call my office or pager at [phone] if experienced any weakness or loss of sensation, fever > 101.5, pain " uncontrolled with oral medications, persistent nausea/vomiting/or diarrhea, redness or drainage from the incisions, or any other worrisome concerns. If physician on call was not reached or could not communicate with our office for any reason please go to the nearest emergency department

## 2025-05-30 NOTE — OP NOTE
Sacroiliac Joint and Piriformis Muscle Injection under Fluoroscopic Guidance    The procedure, risks, benefits, and options were discussed with the patient. There are no contraindications to the procedure. The patent expressed understanding and agreed to the procedure. Informed written consent was obtained prior to the start of the procedure and can be found in the patient's chart.    PATIENT NAME: Fabiana Chanel   MRN: [id number]     DATE OF PROCEDURE: 05/30/2025    PROCEDURE: Bilateral Sacroiliac Joint Injection under Fluoroscopic Guidance  PROCEDURE: Left Piriformis Muscle Injection under Fluoroscopic Guidance    PRE-OP DIAGNOSIS: Myofascial pain [M79.18]  Pain of both sacroiliac joints [M53.3]    POST-OP DIAGNOSIS: Same    PHYSICIAN: Jed Yeager MD    ASSISTANTS: Cleveland Heath MD  [facility]       MEDICATIONS INJECTED: Preservative-free Kenalog 40mg with 7cc of Bupivacine 0.25%     LOCAL ANESTHETIC INJECTED: Xylocaine 2%     SEDATION: Versed 1mg and Fentanyl 50mcg                                                                                                                                                                                     Conscious sedation ordered by M.D. Patient re-evaluation prior to administration of conscious sedation. No changes noted in patient's status from initial evaluation. The patient's vital signs were monitored by RN and patient remained hemodynamically stable throughout the procedure.    Event Time In   Sedation Start 1341   Sedation End 1355       ESTIMATED BLOOD LOSS: None    COMPLICATIONS: None    TECHNIQUE: Time-out was performed to identify the patient and procedure to be performed. With the patient laying in a prone position, the surgical area was prepped and draped in the usual sterile fashion using ChloraPrep and a fenestrated drape. The sacroiliac joint was determined under fluoroscopy guidance. Skin anesthesia was achieved by injecting  Lidocaine 2% over the injection site. The sacroiliac joint was  then approached with a 25 gauge, 3.5 inch spinal quinke needle that was introduced under fluoroscopic guidance in the AP and Lateral views. Once the needle tip was in the area of the joint, and there was no blood, contrast dye Omnipaque (300mg/mL) was injected to confirm placement and there was no vascular runoff. Fluoroscopic imaging in the AP and lateral views revealed a clear outline of the joint space. 4 mL of the medication mixture listed above was injected slowly intraarticular and francois-articular. Displacement of the radio opaque contrast after injection of the medication confirmed that the medication went into the area of the joint. The needles were removed and bleeding was nil. The same procedure was repeated on the contralateral side. A sterile dressing was applied. No specimens collected. The patient tolerated the procedure well.     TECHNIQUE: Time-out was performed to identify the patient and procedure to be performed. With the patient laying in a prone position, the surgical area was prepped and draped in the usual sterile fashion using ChloraPrep and a fenestrated drape.The area overlying the right piriformis muscle was identified under fluoroscopy in the AP view. Skin anesthesia was achieved by injecting Lidocaine 2% over the injection sites. A 25 gauge,  3.5 inch spinal quinke needle was then advanced 3 cm inferior and 3 cm lateral to the inferior aspect of the SI joint. Once the piriformis muscle was thought to be encountered, Contrast dye  Omnipaque (300mg/mL) was injected to confirm placement and there was no vascular runoff. Confirmation of spread was identified within the piriformis muscle using AP fluoroscopic views. Then  4 mL of the medication mixture listed above was injected slowly. The needles were removed and bleeding was nil. A sterile dressing was applied. No specimens collected. The patient tolerated the procedure well.        The patient was monitored after the procedure in the recovery area. They were given post-procedure and discharge instructions to follow at home. The patient was discharged in a stable condition.      Cleveland Heath MD    I reviewed and edited the fellow's note. I conducted my own interview and physical examination. I agree with the findings. I was present and supervising all critical portions of the procedure.    Jed Yeager MD

## 2025-06-02 LAB
POCT GLUCOSE: 166 MG/DL (ref 70–110)
POCT GLUCOSE: 57 MG/DL (ref 70–110)

## 2025-06-03 ENCOUNTER — CLINICAL SUPPORT (OUTPATIENT)
Dept: CARDIOLOGY | Facility: HOSPITAL | Age: 50
End: 2025-06-03
Payer: MEDICARE

## 2025-06-03 ENCOUNTER — CLINICAL SUPPORT (OUTPATIENT)
Dept: CARDIOLOGY | Facility: HOSPITAL | Age: 50
End: 2025-06-03
Attending: INTERNAL MEDICINE
Payer: MEDICARE

## 2025-06-03 DIAGNOSIS — Z95.810 PRESENCE OF AUTOMATIC (IMPLANTABLE) CARDIAC DEFIBRILLATOR: ICD-10-CM

## 2025-06-03 DIAGNOSIS — I42.9 CARDIOMYOPATHY, UNSPECIFIED: ICD-10-CM

## 2025-06-03 PROCEDURE — 93295 DEV INTERROG REMOTE 1/2/MLT: CPT | Mod: ,,, | Performed by: INTERNAL MEDICINE

## 2025-06-03 PROCEDURE — 93296 REM INTERROG EVL PM/IDS: CPT | Performed by: INTERNAL MEDICINE

## 2025-06-06 ENCOUNTER — HOSPITAL ENCOUNTER (EMERGENCY)
Facility: HOSPITAL | Age: 50
Discharge: HOME | End: 2025-06-06
Attending: EMERGENCY MEDICINE
Payer: MEDICARE

## 2025-06-06 VITALS
RESPIRATION RATE: 18 BRPM | WEIGHT: 250 LBS | OXYGEN SATURATION: 98 % | SYSTOLIC BLOOD PRESSURE: 103 MMHG | BODY MASS INDEX: 39.16 KG/M2 | DIASTOLIC BLOOD PRESSURE: 61 MMHG | TEMPERATURE: 98 F | HEART RATE: 85 BPM

## 2025-06-06 DIAGNOSIS — U07.1 COVID-19 VIRUS DETECTED: ICD-10-CM
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
## 2025-06-06 DIAGNOSIS — M54.9 ACUTE ON CHRONIC BACK PAIN: ICD-10-CM

## 2025-06-06 DIAGNOSIS — U07.1 COVID-19: Primary | ICD-10-CM

## 2025-06-06 DIAGNOSIS — G89.29 ACUTE ON CHRONIC BACK PAIN: ICD-10-CM

## 2025-06-06 PROBLEM — E66.01 SEVERE OBESITY (BMI 35.0-39.9) WITH COMORBIDITY: Status: ACTIVE | Noted: 2025-06-06

## 2025-06-06 LAB
CTP QC/QA: YES
MOLECULAR STREP A: NEGATIVE
POC MOLECULAR INFLUENZA A AGN: NEGATIVE
POC MOLECULAR INFLUENZA B AGN: NEGATIVE
SARS-COV-2 RDRP RESP QL NAA+PROBE: POSITIVE

## 2025-06-06 PROCEDURE — 63600175 PHARM REV CODE 636 W HCPCS

## 2025-06-06 PROCEDURE — 87651 STREP A DNA AMP PROBE: CPT

## 2025-06-06 PROCEDURE — 99284 EMERGENCY DEPT VISIT MOD MDM: CPT | Mod: 25

## 2025-06-06 PROCEDURE — 96372 THER/PROPH/DIAG INJ SC/IM: CPT

## 2025-06-06 PROCEDURE — 87635 SARS-COV-2 COVID-19 AMP PRB: CPT

## 2025-06-06 PROCEDURE — 25000003 PHARM REV CODE 250

## 2025-06-06 PROCEDURE — 87502 INFLUENZA DNA AMP PROBE: CPT

## 2025-06-06 RX ORDER — BENZONATATE 100 MG/1
100 CAPSULE ORAL 3 TIMES DAILY PRN
Qty: 20 CAPSULE | Refills: 0 | Status: SHIPPED | OUTPATIENT
Start: 2025-06-06 | End: 2025-06-16

## 2025-06-06 RX ORDER — ONDANSETRON 4 MG/1
4 TABLET, ORALLY DISINTEGRATING ORAL
Status: COMPLETED | OUTPATIENT
Start: 2025-06-06 | End: 2025-06-06

## 2025-06-06 RX ORDER — FLUTICASONE PROPIONATE 50 MCG
1 SPRAY, SUSPENSION (ML) NASAL 2 TIMES DAILY PRN
Qty: 15 G | Refills: 0 | Status: SHIPPED | OUTPATIENT
Start: 2025-06-06

## 2025-06-06 RX ORDER — LIDOCAINE 50 MG/G
2 PATCH TOPICAL
Status: DISCONTINUED | OUTPATIENT
Start: 2025-06-06 | End: 2025-06-06 | Stop reason: HOSPADM

## 2025-06-06 RX ORDER — GUAIFENESIN 100 MG/5ML
100-200 LIQUID ORAL EVERY 4 HOURS PRN
Qty: 60 ML | Refills: 0 | Status: SHIPPED | OUTPATIENT
Start: 2025-06-06 | End: 2025-06-16

## 2025-06-06 RX ORDER — LIDOCAINE 50 MG/G
1 PATCH TOPICAL DAILY
Qty: 15 PATCH | Refills: 0 | Status: SHIPPED | OUTPATIENT
Start: 2025-06-06 | End: 2025-06-16

## 2025-06-06 RX ORDER — MORPHINE SULFATE 4 MG/ML
6 INJECTION, SOLUTION INTRAMUSCULAR; INTRAVENOUS
Status: COMPLETED | OUTPATIENT
Start: 2025-06-06 | End: 2025-06-06

## 2025-06-06 RX ORDER — CETIRIZINE HYDROCHLORIDE 10 MG/1
10 TABLET ORAL DAILY
Qty: 30 TABLET | Refills: 0 | Status: SHIPPED | OUTPATIENT
Start: 2025-06-06 | End: 2025-07-06

## 2025-06-06 RX ADMIN — ONDANSETRON 4 MG: 4 TABLET, ORALLY DISINTEGRATING ORAL at 12:06

## 2025-06-06 RX ADMIN — MORPHINE SULFATE 6 MG: 4 INJECTION INTRAVENOUS at 12:06

## 2025-06-06 RX ADMIN — LIDOCAINE 2 PATCH: 50 PATCH TOPICAL at 12:06

## 2025-06-06 NOTE — DISCHARGE INSTRUCTIONS
You were seen in the emergency department today for COVID, back pain.  Please take all medications as prescribed and as we discussed.  Follow-up with specialist if instructed to do so.  It is important to remember that some problems are difficult to diagnose and may not be found during your Emergency Department visit. Be sure to follow up with your primary care doctor and review all labs/imaging/tests that were performed during this visit with them. Some labs/tests may be outside of the normal range and require non-emergent follow-up and further investigation to help diagnose/exclude/prevent complications or other medical conditions. Return to the emergency department for any new or worsening symptoms. Thank you for allowing me to care for you today, it was my pleasure. I hope you get to feeling better soon!

## 2025-06-06 NOTE — ED PROVIDER NOTES
Encounter Date: 6/6/2025    SCRIBE #1 NOTE: I, Brianna Lizarraga, am scribing for, and in the presence of,  Alessandro Kong PA-C. I have scribed the following portions of the note - Other sections scribed: HPI,ROS,PE,.       History     Chief Complaint   Patient presents with    Back Pain     Pt slipped and fell while in [facility] on Sunday. C/o lower back and left leg pain. Pt ambulatory and weight bearing. No medications this morning for pain      Patient is a 49 y.o. female with a past medical history of ventricular tachycardia, hypertension, diabetes mellitus, atrial fibrillation on Elliquis, congestive heart failure, hyperlipidemia, who presents to the Emergency Department for evaluation of non-radiating intermittent achy lumbar and thoracic back pain status post slip and fall x 5 days ago.  She reports she was walking in [facility] when she slipped on some water and fell on the ground. Denies head injury or loss of consciousness. She also reports associated symptoms of left knee pain. Denies any other injuries at this time. Patient also reports cough with additional congestion, post nasal drip, sore throat x 1 day. No sick contacts. Reports attempted treatment with prescribed oxycodone and muscle relaxer without relief.  Reports her sugars have been well controlled.  Denies history of kidney disease. She denies unexplained weight loss, other trauma/injury, IV drug use, bowel/bladder incontinence, overflow incontinence. She denies fever, chills, chest pain, shortness of breath. Denies abdominal pain. Patient reports history of epidural injection of steroid for history of chronic back pain.        The history is provided by the patient. No  was used.     Review of patient's allergies indicates:   Allergen Reactions    Metformin      Diarrhea on metformin XR     Pneumococcal 23-abimbola ps vaccine     Trulicity [dulaglutide] Diarrhea     Past Medical History:   Diagnosis Date    Acute respiratory failure  due to COVID-19 04/29/2021    Atrial fibrillation     Blood clot associated with vein wall inflammation     not dvt    Cardiomyopathy     Normal cors on cath 11/2017    CHF (congestive heart failure)     DM (diabetes mellitus) 09/19/2013    Essential hypertension 05/14/2014    Hyperlipidemia     Hypertension     Iron deficiency anemia 09/20/2013    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    Pneumonia due to COVID-19 virus 04/28/2021    Psoriasis     Sleep apnea     Ventricular tachycardia 08/06/2022     Past Surgical History:   Procedure Laterality Date    CARDIAC CATHETERIZATION      COLONOSCOPY      COLONOSCOPY N/A 3/9/2022    Procedure: COLONOSCOPY;  Surgeon: Vielka Burrell MD;  Location: [facility] ENDO;  Service: Endoscopy;  Laterality: N/A;    DILATION AND CURETTAGE OF UTERUS      EPIDURAL STEROID INJECTION N/A 8/11/2023    Procedure: INJECTION, STEROID, EPIDURAL, L5/S1 SOONER DATE    clear to hold Eliquis;  Surgeon: Jed Yeager MD;  Location: [facility] PAIN MGT;  Service: Pain Management;  Laterality: N/A;    ESOPHAGOGASTRODUODENOSCOPY N/A 5/9/2019    Procedure: EGD (ESOPHAGOGASTRODUODENOSCOPY);  Surgeon: Vielka Burrell MD;  Location: [facility] ENDO;  Service: Endoscopy;  Laterality: N/A;    INJECTION, SACROILIAC JOINT Bilateral 5/30/2025    Procedure: INJECTION,SACROILIAC JOINT BILATERAL AND LEFT PIRIFORMIS;  Surgeon: Jed Yeager MD;  Location: [facility] PAIN MGT;  Service: Pain Management;  Laterality: Bilateral;  2 WK F/U CARINA    TRANSFORAMINAL EPIDURAL INJECTION OF STEROID N/A 1/6/2022    Procedure: Transforaminal ANKITA L4/L5 L5/S1;  Surgeon: Jed Yeager MD;  Location: [facility] PAIN MGT;  Service: Pain Management;  Laterality: N/A;    TRANSFORAMINAL EPIDURAL INJECTION OF STEROID Right 12/1/2022    Procedure: INJECTION, STEROID, EPIDURAL, TRANSFORAMINAL APPROACH, RIGHT L4/L5 & L5/S1 CONTRAST;  Surgeon: Jed Yeager MD;  Location: [facility] PAIN MGT;  Service: Pain Management;  Laterality: Right;     TRANSFORAMINAL EPIDURAL INJECTION OF STEROID Right 4/14/2023    Procedure: INJECTION, STEROID, EPIDURAL, TRANSFORAMINAL APPROACH RIGHT S1;  Surgeon: Jed Yeager MD;  Location: [facility] PAIN MGT;  Service: Pain Management;  Laterality: Right;  3/14 AUTH    TRANSFORAMINAL EPIDURAL INJECTION OF STEROID Right 1/12/2024    Procedure: LUMBAR TRANSFORAMINAL RIGHT L5/S1 AND S1 *ELIQUIS CLEARANCE IN CHART*;  Surgeon: Jed Yeager MD;  Location: [facility] PAIN MGT;  Service: Pain Management;  Laterality: Right;  [phone]    TRANSFORAMINAL EPIDURAL INJECTION OF STEROID Right 8/2/2024    Procedure: LUMBAR TRANSFORAMINAL RIGHT L5/S1 AND S1 *ELIQUIS CLEARANCE IN CHART*;  Surgeon: Jed Yeager MD;  Location: [facility] PAIN MGT;  Service: Pain Management;  Laterality: Right;  [phone]  2 WK F/U ANTHONY     Family History   Problem Relation Name Age of Onset    Hypertension Mother      Hypertension Father      Diabetes Father      Diabetes Maternal Grandmother      Diabetes Paternal Grandmother      Breast cancer Neg Hx      Colon cancer Neg Hx      Ovarian cancer Neg Hx       Social History[1]  Review of Systems   Constitutional:  Negative for chills and fever.   HENT:  Positive for congestion, postnasal drip and sore throat. Negative for ear pain, sneezing and trouble swallowing.    Respiratory:  Positive for cough. Negative for shortness of breath.    Cardiovascular:  Negative for chest pain.   Gastrointestinal:  Negative for abdominal pain, constipation, nausea and vomiting (post-tussive).   Genitourinary:  Negative for dysuria, flank pain, frequency and hematuria.        (-) urinary/bowel incontinence    Musculoskeletal:  Positive for arthralgias (left knee) and back pain (generalized). Negative for neck pain and neck stiffness.        (-) head injury      Neurological:  Negative for dizziness, light-headedness and headaches.        (-) loss of consciousness        Physical Exam     Initial Vitals [06/06/25 1103]   BP  Pulse Resp Temp SpO2   103/61 85 16 98.2 °F (36.8 °C) 98 %      MAP       --         Physical Exam    Nursing note and vitals reviewed.  Constitutional: She appears well-developed and well-nourished.   HENT:   Head: Normocephalic and atraumatic.   Right Ear: External ear normal.   Left Ear: External ear normal.   There is mild posterior oropharyngeal erythema with postnasal drip and cobblestoning, no tonsillar swelling, no oropharyngeal exudates, uvula is midline. Normal dentition. No trismus.  No muffled voice. No submandibular swelling. Patient is tolerating secretions without difficulty.  Patient is speaking in full sentences on exam without difficulty.  Bilateral tympanic membranes are pearly gray without erythema, bulging, perforation.  There is no postauricular swelling, or overlying erythema or tenderness to palpation over mastoids bilaterally.    Eyes: Conjunctivae and EOM are normal. Pupils are equal, round, and reactive to light.   Neck: Carotid bruit is not present.   Normal range of motion.  Cardiovascular:  Normal rate, regular rhythm, normal heart sounds and intact distal pulses.     Exam reveals no gallop and no friction rub.       No murmur heard.  Pulmonary/Chest: Breath sounds normal. No respiratory distress. She has no wheezes. She has no rhonchi. She has no rales.   Abdominal: Abdomen is soft. Bowel sounds are normal. She exhibits no distension. There is no abdominal tenderness. There is no rebound and no guarding.   Musculoskeletal:         General: Normal range of motion.      Cervical back: Normal range of motion.      Comments: Mild tenderness to thoracic and lumbar paraspinal musculature. Normal range of motion of left knee. No swelling or obvious deformity noted. 2+ DP pulse.      Neurological: She is alert and oriented to person, place, and time. No cranial nerve deficit or sensory deficit. GCS score is 15. GCS eye subscore is 4. GCS verbal subscore is 5. GCS motor subscore is 6.    Psychiatric: She has a normal mood and affect.         ED Course   Procedures  Labs Reviewed   SARS-COV-2 RDRP GENE - Abnormal       Result Value    POC Rapid COVID Positive (*)      Acceptable Yes     POCT STREP A MOLECULAR    Molecular Strep A, POC Negative       Acceptable Yes     POCT INFLUENZA A/B MOLECULAR    POC Molecular Influenza A Ag Negative      POC Molecular Influenza B Ag Negative       Acceptable Yes            Imaging Results              X-Ray Thoracic Spine AP Lateral (Final result)  Result time 06/06/25 12:08:54      Final result by Cristian Gong MD (06/06/25 12:08:54)                   Impression:      See above      Electronically signed by: Cristian Gong MD  Date:    06/06/2025  Time:    12:08               Narrative:    EXAMINATION:  XR THORACIC SPINE AP LATERAL    CLINICAL HISTORY:  Unspecified fall, initial encounter    TECHNIQUE:  AP and lateral views of the thoracic spine were performed.    COMPARISON:  No 01/18/2023 ne    FINDINGS:  Mild DJD.  Slight thoracic scoliosis.  No fracture or dislocation.  No bone destruction identified.  Pacemaker noted as before.                                       X-Ray Lumbar Spine Ap And Lateral (Final result)  Result time 06/06/25 12:13:53      Final result by Cristian Gong MD (06/06/25 12:13:53)                   Impression:      See above      Electronically signed by: Cristian Gong MD  Date:    06/06/2025  Time:    12:13               Narrative:    EXAMINATION:  XR LUMBAR SPINE AP AND LATERAL    CLINICAL HISTORY:  fall;    TECHNIQUE:  AP, lateral and spot images were performed of the lumbar spine.    COMPARISON:  Non 01/09/2025 e    FINDINGS:  The lumbosacral disc space is narrowed.  The other disc spaces are well maintained.  No fracture, spondylolisthesis or bone destruction identified                                       X-Ray Chest PA And Lateral (Final result)  Result time 06/06/25 12:01:54       Final result by Cristian Gong MD (06/06/25 12:01:54)                   Impression:      See above      Electronically signed by: Cristian Gong MD  Date:    06/06/2025  Time:    12:01               Narrative:    EXAMINATION:  XR CHEST PA AND LATERAL    CLINICAL HISTORY:  Cough, unspecified    TECHNIQUE:  PA and lateral views of the chest were performed.    COMPARISON:  N 06/12/2024 one    FINDINGS:  Pacemaker identified as before.  Heart size normal.  The lungs are clear.  No pleural effusion                                       Medications   LIDOcaine 5 % patch 2 patch (2 patches Transdermal Patch Applied 6/6/25 1207)   morphine injection 6 mg (6 mg Intramuscular Given 6/6/25 1207)   ondansetron disintegrating tablet 4 mg (4 mg Oral Given 6/6/25 1207)     Medical Decision Making  This is an emergent evaluation of a 49 y.o. female with a past medical history of ventricular tachycardia, hypertension, diabetes mellitus, atrial fibrillation on Elliquis, congestive heart failure, hyperlipidemia, who presents to the Emergency Department for evaluation of non-radiating intermittent achy lumbar and thoracic back pain status post slip and fall x 5 days ago. Also URI symptoms.    Physical exam as above.    Differential diagnosis includes but is not limited to fracture, dislocation, strain, sciatica, COVID, flu, strep, pneumonia, bronchitis, other viral syndrome.  Considered cauda equina syndrome but highly doubtful given no trauma or injury, no bowel or bladder incontinence, normal neuro exam without deficits.  Considered conus medullaris syndrome but highly doubtful given no trauma or injury, no overflow incontinence, normal neuro exam without deficits.  Considered infectious etiology such as meningitis, encephalitis, epidural abscess but highly doubtful given no history of fever, no history of epidural injections, no IV drug use, normal neuro exam without deficits.  Considered abdominal aortic aneurysm but highly doubtful  given no pulsatile mass on abdominal exam, 2+ radial pulses that are equal and symmetric on exam.     Workup initiated with x-rays, viral swabs.  Ordered morphine IM for pain, Lidoderm patch, Zofran to prevent nausea with vomiting.  Vital signs, chart, labs, and/or imaging were all reviewed.  See ED course below and interpretations above. My overall impression is acute on chronic back pain, COVID.  Discussed Paxlovid therapy, patient does have contraindications due to being on Eliquis.  She would like antiviral therapy however.  Will start on Molnupiravir, other symptomatic medications with primary care follow-up.  Strict return precautions discussed.Vital signs are reassuring. Patient/Caregiver is stable for discharge at this time.  Patient/Caregiver was informed of results, plan of care, and are comfortable with this.  All questions and concerns were addressed. Discussed strict return precautions with the patient/caregiver. Instructed follow up with primary care provider within 1 week.      Alessandro Kong PA-C    DISCLAIMER: This note was prepared with [employer] voice recognition transcription software. Garbled syntax, mangled pronouns, and other bizarre constructions may be attributed to that software system.       Amount and/or Complexity of Data Reviewed  Labs: ordered. Decision-making details documented in ED Course.  Radiology: ordered. Decision-making details documented in ED Course.    Risk  OTC drugs.  Prescription drug management.            Scribe Attestation:   Scribe #1: I performed the above scribed service and the documentation accurately describes the services I performed. I attest to the accuracy of the note.        ED Course as of 06/06/25 1235   Fri Jun 06, 2025   1106 BP: 103/61 [TM]   1106 Temp: 98.2 °F (36.8 °C) [TM]   1106 Pulse: 85 [TM]   1106 Resp: 16 [TM]   1106 SpO2: 98 % [TM]   1205 X-Ray Chest PA And Lateral     TECHNIQUE:  PA and lateral views of the chest were performed.      COMPARISON:  N 06/12/2024 one     FINDINGS:  Pacemaker identified as before.  Heart size normal.  The lungs are clear.  No pleural effusion     Impression:     See above        Electronically signed by:Cristian Gong MD  Date:                                            06/06/2025  Time:                                           12:01   [TM]   1227 POCT Influenza A/B Molecular  Negative [TM]   1227 POCT Strep A, Molecular  Negative [TM]   1228 POCT COVID-19 Rapid Screening(!)  Positive [TM]   1228 X-Ray Thoracic Spine AP Lateral  FINDINGS:  Mild DJD.  Slight thoracic scoliosis.  No fracture or dislocation.  No bone destruction identified.  Pacemaker noted as before.     Impression:     See above        Electronically signed by:Cristian Gong MD  Date:                                            06/06/2025  Time:                                           12:08         [TM]   1228 X-Ray Lumbar Spine Ap And Lateral  EXAMINATION:  XR LUMBAR SPINE AP AND LATERAL     CLINICAL HISTORY:  fall;     TECHNIQUE:  AP, lateral and spot images were performed of the lumbar spine.     COMPARISON:  Non 01/09/2025 e     FINDINGS:  The lumbosacral disc space is narrowed.  The other disc spaces are well maintained.  No fracture, spondylolisthesis or bone destruction identified     Impression:     See above        Electronically signed by:Cristian Gong MD  Date:                                            06/06/2025  Time:                                           12:13   [TM]      ED Course User Index  [TM] Alessandro Kong PA-C                           Clinical Impression:  Final diagnoses:  [W19.XXXA] Fall  [U07.1] COVID-19 (Primary)  [M54.9, G89.29] Acute on chronic back pain          ED Disposition Condition    Discharge Stable          ED Prescriptions       Medication Sig Dispense Start Date End Date Auth. Provider    benzonatate (TESSALON) 100 MG capsule Take 1 capsule (100 mg total) by mouth 3 (three) times daily as needed. 20  capsule 6/6/2025 6/16/2025 Alessandro Kong PA-C    guaiFENesin 100 mg/5 ml (ROBITUSSIN) 100 mg/5 mL syrup Take 5-10 mLs (100-200 mg total) by mouth every 4 (four) hours as needed for Cough. 60 mL 6/6/2025 6/16/2025 Alessandro Kong PA-C    cetirizine (ZYRTEC) 10 MG tablet Take 1 tablet (10 mg total) by mouth once daily. 30 tablet 6/6/2025 7/6/2025 Alessandro Kong PA-C    fluticasone propionate (FLONASE) 50 mcg/actuation nasal spray 1 spray (50 mcg total) by Each Nostril route 2 (two) times daily as needed for Rhinitis. 15 g 6/6/2025 -- Alessandro Kong PA-C    LIDOcaine (LIDODERM) 5 % Place 1 patch onto the skin once daily. Remove & Discard patch within 12 hours or as directed by MD 15 patch 6/6/2025 -- Alessandro Kong PA-C    molnupiravir 200 mg capsule (EUA) Take 4 capsules (800 mg total) by mouth every 12 (twelve) hours. for 5 days 40 capsule 6/6/2025 6/11/2025 Alessandro Kong PA-C          Follow-up Information       Follow up With Specialties Details Why Contact [facility] Emergency Dept Emergency Medicine Go to  As needed, If symptoms worsen, or new symptoms develop [address] [facility] [place] [address] [phone]    Primary care doctor  Schedule an appointment as soon as possible for a visit in 3 days              I, Alessandro Kong PA-C, personally performed the services described in this documentation. All medical record entries made by the scribe were at my direction and in my presence. I have reviewed the chart and agree that the record reflects my personal performance and is accurate and complete.      DISCLAIMER: This note was prepared with [employer] voice recognition transcription software. Garbled syntax, mangled pronouns, and other bizarre constructions may be attributed to that software system.         [1]   Social History  Tobacco Use    Smoking status: Never    Smokeless tobacco: Never    Tobacco comments:     smokes cigars  on occasion   Substance Use Topics    Alcohol use: Not Currently     Comment: occasional    Drug use: Not Currently     Types: Marijuana     Comment: Alessandro Andrade PA-C  06/06/25 [id number]

## 2025-06-09 ENCOUNTER — PATIENT OUTREACH (OUTPATIENT)
Facility: OTHER | Age: 50
End: 2025-06-09
Payer: MEDICARE

## 2025-06-10 NOTE — PROGRESS NOTES
Patient was seen in the 6/6/25. Phoned patient on 2 separate occasions to assist with Post ED Discharge Navigation. Patient was unavailable. Message left on voice mail urging patient to call if additional assistance is needed.  Mohinder Chahal

## 2025-06-16 ENCOUNTER — OFFICE VISIT (OUTPATIENT)
Dept: PAIN MEDICINE | Facility: CLINIC | Age: 50
End: 2025-06-16
Payer: MEDICARE

## 2025-06-16 VITALS
DIASTOLIC BLOOD PRESSURE: 71 MMHG | TEMPERATURE: 98 F | BODY MASS INDEX: 40.83 KG/M2 | OXYGEN SATURATION: 99 % | WEIGHT: 260.13 LBS | SYSTOLIC BLOOD PRESSURE: 121 MMHG | RESPIRATION RATE: 18 BRPM | HEART RATE: 79 BPM | HEIGHT: 67 IN

## 2025-06-16 DIAGNOSIS — M54.16 LUMBAR RADICULOPATHY: ICD-10-CM

## 2025-06-16 DIAGNOSIS — G89.4 CHRONIC PAIN SYNDROME: ICD-10-CM

## 2025-06-16 DIAGNOSIS — M54.17 LUMBOSACRAL RADICULOPATHY: ICD-10-CM

## 2025-06-16 DIAGNOSIS — G57.02 PIRIFORMIS SYNDROME OF LEFT SIDE: ICD-10-CM

## 2025-06-16 DIAGNOSIS — M79.18 MYOFASCIAL PAIN: ICD-10-CM

## 2025-06-16 DIAGNOSIS — M54.9 DORSALGIA, UNSPECIFIED: Primary | ICD-10-CM

## 2025-06-16 DIAGNOSIS — Z91.81 HISTORY OF FALL WITHIN PAST 90 DAYS: ICD-10-CM

## 2025-06-16 DIAGNOSIS — M53.3 PAIN OF BOTH SACROILIAC JOINTS: ICD-10-CM

## 2025-06-16 DIAGNOSIS — M51.360 DEGENERATION OF INTERVERTEBRAL DISC OF LUMBAR REGION WITH DISCOGENIC BACK PAIN: ICD-10-CM

## 2025-06-16 PROCEDURE — 1160F RVW MEDS BY RX/DR IN RCRD: CPT | Mod: CPTII,S$GLB,,

## 2025-06-16 PROCEDURE — 99999 PR PBB SHADOW E&M-EST. PATIENT-LVL III: CPT | Mod: PBBFAC,,,

## 2025-06-16 PROCEDURE — 99214 OFFICE O/P EST MOD 30 MIN: CPT | Mod: S$GLB,,,

## 2025-06-16 PROCEDURE — 3074F SYST BP LT 130 MM HG: CPT | Mod: CPTII,S$GLB,,

## 2025-06-16 PROCEDURE — 3008F BODY MASS INDEX DOCD: CPT | Mod: CPTII,S$GLB,,

## 2025-06-16 PROCEDURE — 1159F MED LIST DOCD IN RCRD: CPT | Mod: CPTII,S$GLB,,

## 2025-06-16 PROCEDURE — 3078F DIAST BP <80 MM HG: CPT | Mod: CPTII,S$GLB,,

## 2025-06-16 PROCEDURE — 3051F HG A1C>EQUAL 7.0%<8.0%: CPT | Mod: CPTII,S$GLB,,

## 2025-06-16 PROCEDURE — 4010F ACE/ARB THERAPY RXD/TAKEN: CPT | Mod: CPTII,S$GLB,,

## 2025-06-16 RX ORDER — LIDOCAINE 50 MG/G
1 PATCH TOPICAL DAILY
Qty: 30 PATCH | Refills: 1 | Status: SHIPPED | OUTPATIENT
Start: 2025-06-16

## 2025-06-16 NOTE — PROGRESS NOTES
Subjective   Patient ID:  Fabiana Chanel is a 49 y.o. female who presents for evaluation of Cardiomyopathy      49 yoF here for ICD management. She is a former patient of Dr Christine.     NICM s/p DC ICD ([facility]) 4/17/28. Appropriate shock 8/5/22. Prior history of AF 4/18 leading to eliquis use. Was on lovenox transiently for LV thrombus.     Interval history:  AF in the setting of Covid 6/6/25 for 2 hours, otherwise no events.     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  04/28/2021: Pneumonia due to COVID-19 virus  No date: Psoriasis  No date: Sleep apnea  08/06/2022: Ventricular tachycardia    Past Surgical History:  No date: CARDIAC CATHETERIZATION  No date: COLONOSCOPY  3/9/2022: COLONOSCOPY; N/A      Comment:  Procedure: COLONOSCOPY;  Surgeon: Vielka Burrell MD;  Location: [facility];  Service: Endoscopy;                 Laterality: N/A;  No date: DILATION AND CURETTAGE OF UTERUS  8/11/2023: EPIDURAL STEROID INJECTION; N/A      Comment:  Procedure: INJECTION, STEROID, EPIDURAL, L5/S1 SOONER                DATE    clear to hold Eliquis;  Surgeon: Jed Yeager MD;  [facility];  Service: Pain                Management;  Laterality: N/A;  5/9/2019: ESOPHAGOGASTRODUODENOSCOPY; N/A      Comment:  Procedure: EGD (ESOPHAGOGASTRODUODENOSCOPY);  Surgeon:                Vielka Burrell MD;  Location: [facility];                 Service: Endoscopy;   Laterality: N/A;  5/30/2025: INJECTION, SACROILIAC JOINT; Bilateral      Comment:  Procedure: INJECTION,SACROILIAC JOINT BILATERAL AND LEFT               PIRIFORMIS;  Surgeon: Jed Yeager MD;  Location:                [facility];  Service: Pain Management;  Laterality:                Bilateral;  2 WK F/U CARINA  1/6/2022: TRANSFORAMINAL EPIDURAL INJECTION OF STEROID; N/A      Comment:  Procedure: Transforaminal ANKITA L4/L5 L5/S1;  Surgeon:                Jed Yeager MD;  Location: [facility];  Service:                Pain Management;  Laterality: N/A;  12/1/2022: TRANSFORAMINAL EPIDURAL INJECTION OF STEROID; Right      Comment:  Procedure: INJECTION, STEROID, EPIDURAL, TRANSFORAMINAL                APPROACH, RIGHT L4/L5 & L5/S1 CONTRAST;  Surgeon: Jed Yeager MD;  Location: [facility];  Service: Pain                Management;  Laterality: Right;  4/14/2023: TRANSFORAMINAL EPIDURAL INJECTION OF STEROID; Right      Comment:  Procedure: INJECTION, STEROID, EPIDURAL, TRANSFORAMINAL                APPROACH RIGHT S1;  Surgeon: Jed Yeager MD;                 Location: [facility];  Service: Pain Management;                 Laterality: Right;  3/14 AUTH  1/12/2024: TRANSFORAMINAL EPIDURAL INJECTION OF STEROID; Right      Comment:  Procedure: LUMBAR TRANSFORAMINAL RIGHT L5/S1 AND S1                *ELIQUIS CLEARANCE IN CHART*;  Surgeon: Jed Yeager MD;  Location: [facility];  Service: Pain Management;                Laterality: Right;  [phone]  8/2/2024: TRANSFORAMINAL EPIDURAL INJECTION OF STEROID; Right      Comment:  Procedure: LUMBAR TRANSFORAMINAL RIGHT L5/S1 AND S1                *ELIQUIS CLEARANCE IN CHART*;  Surgeon: Jed Yeager MD;  Location: [facility];  Service: Pain Management;                Laterality: Right;  [phone] WK F/U ANTHONY    Social History    Socioeconomic History      Marital status:     Tobacco  Use      Smoking status: Never      Smokeless tobacco: Never      Tobacco comments: smokes cigars on occasion    Substance and Sexual Activity      Alcohol use: Not Currently        Comment: occasional      Drug use: Not Currently        Types: Marijuana        Comment: occ      Sexual activity: Not Currently        Partners: Male    Social Drivers of Health  Financial Resource Strain: Patient Unable To Answer (2/18/2025)      Overall Financial Resource Strain (CARDIA)          Difficulty of Paying Living Expenses: Patient unable to answer  Food Insecurity: High Risk (11/18/2024)      Received from [facility] SDOH Screening          In the past 2 months, did you or others you live with eat smaller meals or skip meals because you didn't have money for food?: Yes  Physical Activity: Patient Unable To Answer (2/18/2025)      Exercise Vital Sign          Days of Exercise per Week: Patient unable to answer          Minutes of Exercise per Session: Patient unable to answer  Stress: Stress Concern Present (7/9/2019)      [employer] - Occupational Stress Questionnaire          Feeling of Stress : Rather much    Review of patient's family history indicates:  Problem: Hypertension      Relation: Mother          Name:               Age of Onset: (Not Specified)  Problem: Hypertension      Relation: Father          Name:               Age of Onset: (Not Specified)  Problem: Diabetes      Relation: Father          Name:               Age of Onset: (Not Specified)  Problem: Diabetes      Relation: Maternal Grandmother          Name:               Age of Onset: (Not Specified)  Problem: Diabetes      Relation: Paternal Grandmother          Name:               Age of Onset: (Not Specified)  Problem: Breast cancer      Relation: Neg Hx          Name:               Age of Onset: (Not Specified)  Problem: Colon cancer      Relation: Neg Hx          Name:               Age of Onset: (Not  Specified)  Problem: Ovarian cancer      Relation: Neg Hx          Name:               Age of Onset: (Not Specified)    Current Outpatient Medications:  albuterol (PROVENTIL/VENTOLIN HFA) 90 mcg/actuation inhaler, Inhale 1-2 puffs into the lungs every 6 (six) hours as needed. Rescue, Disp: 18 g, Rfl: 0  benzonatate (TESSALON) 100 MG capsule, Take 1 capsule (100 mg total) by mouth 3 (three) times daily as needed., Disp: 20 capsule, Rfl: 0  BLOOD PRESSURE CUFF Misc, 1 kit by Misc.(Non-Drug; Combo Route) route 2 (two) times daily., Disp: 1 each, Rfl: 0  blood sugar diagnostic Strp, Check blood glucose 3x/day., Disp: 300 strip, Rfl: 3  blood-glucose meter (TRUE METRIX AIR GLUCOSE METER) Misc, 1 each by Misc.(Non-Drug; Combo Route) route 3 (three) times daily., Disp: 1 each, Rfl: 0  blood-glucose sensor (DEXCOM G7 SENSOR) Lupe, Change every 10 days, Disp: 12 each, Rfl: 3  cetirizine (ZYRTEC) 10 MG tablet, Take 1 tablet (10 mg total) by mouth once daily., Disp: 30 tablet, Rfl: 0  clobetasol 0.05% (TEMOVATE) 0.05 % Oint, APPLY OINTMENT TOPICALLY TWICE DAILY, Disp: , Rfl:   diclofenac sodium (VOLTAREN ARTHRITIS PAIN) 1 % Gel, Apply 2 g topically 4 (four) times daily., Disp: 450 g, Rfl: 3  ELIQUIS 5 mg Tab, Take 1 tablet by mouth twice daily, Disp: 180 tablet, Rfl: 3  fluticasone propionate (FLONASE) 50 mcg/actuation nasal spray, 1 spray (50 mcg total) by Each Nostril route 2 (two) times daily as needed for Rhinitis or Allergies., Disp: 15 g, Rfl: 0  fluticasone propionate (FLONASE) 50 mcg/actuation nasal spray, 1 spray (50 mcg total) by Each Nostril route 2 (two) times daily as needed for Rhinitis., Disp: 15 g, Rfl: 0  furosemide (LASIX) 40 MG tablet, Take 1 tablet (40 mg total) by mouth once daily., Disp: 90 tablet, Rfl: 3  gabapentin (NEURONTIN) 400 MG capsule, Take 1 capsule (400 mg total) by mouth 3 (three) times daily. TAKE 1 CAPSULE BY MOUTH THREE TIMES DAILY AS NEEDED FOR PAIN, Disp: 90 capsule, Rfl: 3  guaiFENesin  "100 mg/5 ml (ROBITUSSIN) 100 mg/5 mL syrup, Take 5-10 mLs (100-200 mg total) by mouth every 4 (four) hours as needed for Cough., Disp: 60 mL, Rfl: 0  insulin glargine U-100, Lantus, (LANTUS SOLOSTAR U-100 INSULIN) 100 unit/mL (3 mL) InPn pen, Inject 50 Units into the skin 2 (two) times a day., Disp: 90 mL, Rfl: 1  insulin lispro 100 unit/mL pen, Inject  50 units before meals, Disp: 135 mL, Rfl: 1  insulin NPH isoph U-100 human (HUMULIN N NPH INSULIN KWIKPEN) 100 unit/mL (3 mL) InPn, Inject 24 units nightly, Disp: 30 mL, Rfl: 2  LIDOcaine (LIDODERM) 5 %, Place 1 patch onto the skin once daily. Remove & Discard patch within 12 hours or as directed by MD, Disp: 30 patch, Rfl: 1  liraglutide 0.6 mg/0.1 mL, 18 mg/3 mL, subq PNIJ (VICTOZA 2-THAO) 0.6 mg/0.1 mL (18 mg/3 mL) PnIj pen, Inject 0.6 mg into the skin once daily for 1 week. If no nausea: Inject 1.2 mg once daily thereafter., Disp: 3 mL, Rfl: 3  methocarbamoL (ROBAXIN) 750 MG Tab, Take 1 tablet (750 mg total) by mouth 2 (two) times daily as needed (muscle pain)., Disp: 60 tablet, Rfl: 2  methocarbamoL (ROBAXIN) 750 MG Tab, Take 1 tablet (750 mg total) by mouth 2 (two) times daily as needed (muscle pain)., Disp: 60 tablet, Rfl: 2  metoprolol succinate (TOPROL-XL) 50 MG 24 hr tablet, Take 1 tablet (50 mg total) by mouth once daily., Disp: 90 tablet, Rfl: 3  oxyCODONE-acetaminophen (PERCOCET) 5-325 mg per tablet, Take 1 tablet by mouth every 24 hours as needed for Pain., Disp: 30 tablet, Rfl: 0  pen needle, diabetic (BD LORI 2ND GEN PEN NEEDLE) 32 gauge x 5/32" Ndle, USE 1 PEN NEEDLE 4 TIMES DAILY, Disp: 400 each, Rfl: 3  rosuvastatin (CRESTOR) 40 MG Tab, Take 1 tablet (40 mg total) by mouth every evening., Disp: 90 tablet, Rfl: 3  sacubitriL-valsartan (ENTRESTO) 24-26 mg per tablet, Take 1 tablet by mouth 2 (two) times daily., Disp: 180 tablet, Rfl: 3  spironolactone (ALDACTONE) 25 MG tablet, Take 1 tablet (25 mg total) by mouth once daily. Hold until eating and " drinking improves. Need to weight self daily, Disp: 90 tablet, Rfl: 3    No current facility-administered medications for this visit.          Review of Systems   Constitutional: Negative.   HENT: Negative.     Eyes: Negative.    Cardiovascular:  Negative for chest pain, dyspnea on exertion, leg swelling, near-syncope, palpitations and syncope.   Respiratory: Negative.  Negative for shortness of breath.    Endocrine: Negative.    Hematologic/Lymphatic: Negative.    Skin: Negative.    Musculoskeletal: Negative.    Gastrointestinal: Negative.    Genitourinary: Negative.    Neurological: Negative.  Negative for dizziness and light-headedness.   Psychiatric/Behavioral: Negative.     Allergic/Immunologic: Negative.           Objective     Physical Exam  Vitals reviewed.   Constitutional:       General: She is not in acute distress.     Appearance: She is well-developed.   HENT:      Head: Normocephalic and atraumatic.   Eyes:      Pupils: Pupils are equal, round, and reactive to light.   Neck:      Thyroid: No thyromegaly.      Vascular: No JVD.   Cardiovascular:      Rate and Rhythm: Normal rate and regular rhythm.      Chest Wall: PMI is not displaced.      Heart sounds: Normal heart sounds, S1 normal and S2 normal. No murmur heard.     No friction rub. No gallop.   Pulmonary:      Effort: Pulmonary effort is normal. No respiratory distress.      Breath sounds: Normal breath sounds. No wheezing or rales.   Abdominal:      General: Bowel sounds are normal. There is no distension.      Palpations: Abdomen is soft.      Tenderness: There is no abdominal tenderness. There is no guarding or rebound.   Musculoskeletal:         General: No tenderness. Normal range of motion.      Cervical back: Normal range of motion.   Skin:     General: Skin is warm and dry.      Findings: No erythema or rash.   Neurological:      Mental Status: She is alert and oriented to person, place, and time.      Cranial Nerves: No cranial nerve  deficit.   Psychiatric:         Behavior: Behavior normal.         Thought Content: Thought content normal.         Judgment: Judgment normal.            Assessment and Plan     1. Chronic combined systolic and diastolic heart failure    2. Severe obesity (BMI 35.0-39.9) with comorbidity    3. Paroxysmal atrial fibrillation    4. Iron deficiency anemia, unspecified iron deficiency anemia type        Plan:  49 yoF here for ICD management. Normal DC ICD function with single AF event in the setting of Covid. I discussed routine device follow up including quarterly to bi-annual device checks for device function as well as yearly follow up in the EP clinic. The patient  was advised to call with any concerns regarding their device. Device clinic follow up as scheduled. RTC 1y

## 2025-06-16 NOTE — PROGRESS NOTES
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 and lower extremity strength is normal and symmetric.  No atrophy or tone abnormalities are noted.  NEURO: Bilateral upper and lower extremity coordination and muscle stretch reflexes are physiologic and symmetric.  Dove's absent. Plantar response are downgoing. No clonus noted. No loss of sensation is noted.  GAIT: Antalgic.       ASSESSMENT: 49 y.o. year old female with low back pain with radiculopathy.  She is unable to get MRI.  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 Her pain is consistent with:      1. Dorsalgia, unspecified  LIDOcaine (LIDODERM) 5 %      2. Pain of both sacroiliac joints  LIDOcaine (LIDODERM) 5 %      3. History of fall within past 90 days        4. Piriformis syndrome of left side        5. Myofascial pain        6. Lumbar radiculopathy        7. Degeneration of intervertebral disc of lumbar region with discogenic back pain        8. Chronic pain syndrome        9. Lumbosacral radiculopathy              PLAN:     - She is s/p bilateral SI joint injections and left piriformis  - I  have stressed the importance of physical activity and a home exercise plan to help with pain and improve health.  - Continue with ice and heat for myofascial pain.   - Provided patient home exercises to help with hip bursa and piriformis pain.   - Previous referral placed for physical therapy. Encouraged patient to schedule and attend.   - Continue Percocet 5/325 QHS PRN, #30. She will call for refills.   - The patient is here today for a refill of current pain medications and they believe these provide effective pain control and improvements in quality of life.  The patient notes no serious side effects, and feels the benefits outweigh the risks.  The patient was reminded of the pain contract that they signed previously as well as the risks and benefits of the medication including possible death.  The updated [place] Board  Pharmacy prescription monitoring program was reviewed, and the patient has been found to be compliant with current treatment plan.   - Pain contract signed 10/17/2024.  - UDS 2/10/2025 reviewed and appropriate.   - Continue methocarbamol  - Continue Gabapentin 400 mg daily.   - Refilled Lidoderm 5% patches.   - Cannot get MRI due to AICD, On Eliquis.   - RTC 3 months or sooner.  - She is due for annual medication visit with MD only 12/2025.     The above plan and management options were discussed at length with patient. Patient is in agreement with the above and verbalized understanding.    Odilia Buckley NP   06/16/2025

## 2025-06-17 ENCOUNTER — HOSPITAL ENCOUNTER (OUTPATIENT)
Dept: CARDIOLOGY | Facility: CLINIC | Age: 50
Discharge: HOME | End: 2025-06-17
Payer: MEDICARE

## 2025-06-17 ENCOUNTER — CLINICAL SUPPORT (OUTPATIENT)
Dept: CARDIOLOGY | Facility: HOSPITAL | Age: 50
End: 2025-06-17
Attending: INTERNAL MEDICINE
Payer: MEDICARE

## 2025-06-17 ENCOUNTER — OFFICE VISIT (OUTPATIENT)
Dept: ELECTROPHYSIOLOGY | Facility: CLINIC | Age: 50
End: 2025-06-17
Payer: MEDICARE

## 2025-06-17 VITALS
WEIGHT: 257.69 LBS | SYSTOLIC BLOOD PRESSURE: 118 MMHG | HEART RATE: 97 BPM | BODY MASS INDEX: 40.36 KG/M2 | DIASTOLIC BLOOD PRESSURE: 72 MMHG

## 2025-06-17 DIAGNOSIS — D50.9 IRON DEFICIENCY ANEMIA, UNSPECIFIED IRON DEFICIENCY ANEMIA TYPE: ICD-10-CM

## 2025-06-17 DIAGNOSIS — I42.0 DILATED CARDIOMYOPATHY: ICD-10-CM

## 2025-06-17 DIAGNOSIS — I50.42 CHRONIC COMBINED SYSTOLIC AND DIASTOLIC HEART FAILURE: Primary | ICD-10-CM

## 2025-06-17 DIAGNOSIS — E66.01 SEVERE OBESITY (BMI 35.0-39.9) WITH COMORBIDITY: ICD-10-CM

## 2025-06-17 DIAGNOSIS — Z95.810 PRESENCE OF AUTOMATIC (IMPLANTABLE) CARDIAC DEFIBRILLATOR: ICD-10-CM

## 2025-06-17 DIAGNOSIS — I48.0 PAROXYSMAL ATRIAL FIBRILLATION: ICD-10-CM

## 2025-06-17 LAB
OHS QRS DURATION: 120 MS
OHS QTC CALCULATION: 495 MS

## 2025-06-17 PROCEDURE — 93283 PRGRMG EVAL IMPLANTABLE DFB: CPT

## 2025-06-17 PROCEDURE — 99999 PR PBB SHADOW E&M-EST. PATIENT-LVL III: CPT | Mod: PBBFAC,,, | Performed by: INTERNAL MEDICINE

## 2025-06-17 PROCEDURE — 93010 ELECTROCARDIOGRAM REPORT: CPT | Mod: S$GLB,,, | Performed by: INTERNAL MEDICINE

## 2025-06-24 ENCOUNTER — PATIENT MESSAGE (OUTPATIENT)
Dept: PAIN MEDICINE | Facility: CLINIC | Age: 50
End: 2025-06-24
Payer: MEDICARE

## 2025-06-24 DIAGNOSIS — M79.18 MYOFASCIAL PAIN: ICD-10-CM

## 2025-06-24 LAB
OHS CV AF BURDEN PERCENT: < 1
OHS CV DC REMOTE DEVICE TYPE: NORMAL
OHS CV RV PACING PERCENT: 1 %

## 2025-06-25 RX ORDER — OXYCODONE AND ACETAMINOPHEN 5; 325 MG/1; MG/1
1 TABLET ORAL
Qty: 30 TABLET | Refills: 0 | Status: SHIPPED | OUTPATIENT
Start: 2025-06-25

## 2025-06-25 NOTE — TELEPHONE ENCOUNTER
Patient requesting refill on oxyCODONE-acetaminophen (PERCOCET) 5-325 mg   Last office visit 05/14/25   shows last refill on 5/21/25  Patient does not have a pain contract on file with [facility] department  Patient last UDS 02/10/25

## 2025-07-28 ENCOUNTER — PATIENT MESSAGE (OUTPATIENT)
Dept: PAIN MEDICINE | Facility: CLINIC | Age: 50
End: 2025-07-28
Payer: MEDICARE

## 2025-07-28 DIAGNOSIS — M79.18 MYOFASCIAL PAIN: ICD-10-CM

## 2025-07-28 RX ORDER — METHOCARBAMOL 750 MG/1
750 TABLET, FILM COATED ORAL 2 TIMES DAILY PRN
Qty: 60 TABLET | Refills: 2 | Status: SHIPPED | OUTPATIENT
Start: 2025-07-28

## 2025-07-28 RX ORDER — OXYCODONE AND ACETAMINOPHEN 5; 325 MG/1; MG/1
1 TABLET ORAL
Qty: 30 TABLET | Refills: 0 | Status: SHIPPED | OUTPATIENT
Start: 2025-07-28

## 2025-08-25 ENCOUNTER — PATIENT OUTREACH (OUTPATIENT)
Dept: ADMINISTRATIVE | Facility: HOSPITAL | Age: 50
End: 2025-08-25
Payer: MEDICARE

## 2025-08-27 ENCOUNTER — HOSPITAL ENCOUNTER (INPATIENT)
Facility: HOSPITAL | Age: 50
LOS: 1 days | Discharge: HOME | DRG: 683 | End: 2025-08-29
Attending: EMERGENCY MEDICINE | Admitting: STUDENT IN AN ORGANIZED HEALTH CARE EDUCATION/TRAINING PROGRAM
Payer: MEDICARE

## 2025-08-27 DIAGNOSIS — N17.9 ACUTE RENAL FAILURE: ICD-10-CM

## 2025-08-27 DIAGNOSIS — N18.9 ACUTE RENAL FAILURE SUPERIMPOSED ON CHRONIC KIDNEY DISEASE, UNSPECIFIED ACUTE RENAL FAILURE TYPE, UNSPECIFIED CKD STAGE: ICD-10-CM

## 2025-08-27 DIAGNOSIS — N17.9 ACUTE RENAL FAILURE SUPERIMPOSED ON CHRONIC KIDNEY DISEASE, UNSPECIFIED ACUTE RENAL FAILURE TYPE, UNSPECIFIED CKD STAGE: ICD-10-CM

## 2025-08-27 DIAGNOSIS — N18.9 ACUTE KIDNEY INJURY SUPERIMPOSED ON CHRONIC KIDNEY DISEASE: ICD-10-CM

## 2025-08-27 DIAGNOSIS — R07.9 CHEST PAIN: ICD-10-CM

## 2025-08-27 DIAGNOSIS — I50.9 HF (HEART FAILURE): ICD-10-CM

## 2025-08-27 DIAGNOSIS — R06.02 SOB (SHORTNESS OF BREATH): Primary | ICD-10-CM

## 2025-08-27 DIAGNOSIS — N17.9 ACUTE RENAL FAILURE, UNSPECIFIED ACUTE RENAL FAILURE TYPE: ICD-10-CM

## 2025-08-27 DIAGNOSIS — I50.9 HEART FAILURE: ICD-10-CM

## 2025-08-27 DIAGNOSIS — N17.9 ACUTE KIDNEY INJURY SUPERIMPOSED ON CHRONIC KIDNEY DISEASE: ICD-10-CM

## 2025-08-27 DIAGNOSIS — I50.42 CHRONIC COMBINED SYSTOLIC AND DIASTOLIC HEART FAILURE: ICD-10-CM

## 2025-08-27 LAB
ABSOLUTE EOSINOPHIL (OHS): 0.25 K/UL
ABSOLUTE MONOCYTE (OHS): 0.73 K/UL (ref 0.3–1)
ABSOLUTE NEUTROPHIL COUNT (OHS): 7.36 K/UL (ref 1.8–7.7)
ALBUMIN SERPL BCP-MCNC: 3.6 G/DL (ref 3.5–5.2)
ALP SERPL-CCNC: 83 UNIT/L (ref 40–150)
ALT SERPL W/O P-5'-P-CCNC: 9 UNIT/L (ref 10–44)
ANION GAP (OHS): 12 MMOL/L (ref 8–16)
AST SERPL-CCNC: 22 UNIT/L (ref 11–45)
B-HCG UR QL: NEGATIVE
BASOPHILS # BLD AUTO: 0.04 K/UL
BASOPHILS NFR BLD AUTO: 0.4 %
BILIRUB SERPL-MCNC: 0.3 MG/DL (ref 0.1–1)
BUN SERPL-MCNC: 39 MG/DL (ref 6–20)
CALCIUM SERPL-MCNC: 9.4 MG/DL (ref 8.7–10.5)
CHLORIDE SERPL-SCNC: 105 MMOL/L (ref 95–110)
CO2 SERPL-SCNC: 26 MMOL/L (ref 23–29)
CREAT SERPL-MCNC: 4.4 MG/DL (ref 0.5–1.4)
CTP QC/QA: YES
CTP QC/QA: YES
ERYTHROCYTE [DISTWIDTH] IN BLOOD BY AUTOMATED COUNT: 16.8 % (ref 11.5–14.5)
GFR SERPLBLD CREATININE-BSD FMLA CKD-EPI: 12 ML/MIN/1.73/M2
GLUCOSE SERPL-MCNC: 72 MG/DL (ref 70–110)
HCT VFR BLD AUTO: 38.5 % (ref 37–48.5)
HGB BLD-MCNC: 11.1 GM/DL (ref 12–16)
IMM GRANULOCYTES # BLD AUTO: 0.04 K/UL (ref 0–0.04)
IMM GRANULOCYTES NFR BLD AUTO: 0.4 % (ref 0–0.5)
LYMPHOCYTES # BLD AUTO: 2.21 K/UL (ref 1–4.8)
MCH RBC QN AUTO: 25 PG (ref 27–31)
MCHC RBC AUTO-ENTMCNC: 28.8 G/DL (ref 32–36)
MCV RBC AUTO: 87 FL (ref 82–98)
NT-PROBNP SERPL-MCNC: 248 PG/ML
NUCLEATED RBC (/100WBC) (OHS): 0 /100 WBC
PLATELET # BLD AUTO: 270 K/UL (ref 150–450)
PMV BLD AUTO: 9.7 FL (ref 9.2–12.9)
POC MOLECULAR INFLUENZA A AGN: NEGATIVE
POC MOLECULAR INFLUENZA B AGN: NEGATIVE
POTASSIUM SERPL-SCNC: 3.4 MMOL/L (ref 3.5–5.1)
PROT SERPL-MCNC: 7.9 GM/DL (ref 6–8.4)
RBC # BLD AUTO: 4.44 M/UL (ref 4–5.4)
RELATIVE EOSINOPHIL (OHS): 2.4 %
RELATIVE LYMPHOCYTE (OHS): 20.8 % (ref 18–48)
RELATIVE MONOCYTE (OHS): 6.9 % (ref 4–15)
RELATIVE NEUTROPHIL (OHS): 69.1 % (ref 38–73)
SODIUM SERPL-SCNC: 143 MMOL/L (ref 136–145)
TROPONIN I SERPL HS-MCNC: 23 NG/L
WBC # BLD AUTO: 10.63 K/UL (ref 3.9–12.7)

## 2025-08-27 PROCEDURE — 87502 INFLUENZA DNA AMP PROBE: CPT

## 2025-08-27 PROCEDURE — 93010 ELECTROCARDIOGRAM REPORT: CPT | Mod: ,,, | Performed by: INTERNAL MEDICINE

## 2025-08-27 PROCEDURE — 85025 COMPLETE CBC W/AUTO DIFF WBC: CPT | Performed by: STUDENT IN AN ORGANIZED HEALTH CARE EDUCATION/TRAINING PROGRAM

## 2025-08-27 PROCEDURE — 80053 COMPREHEN METABOLIC PANEL: CPT | Performed by: STUDENT IN AN ORGANIZED HEALTH CARE EDUCATION/TRAINING PROGRAM

## 2025-08-27 PROCEDURE — 84484 ASSAY OF TROPONIN QUANT: CPT | Performed by: STUDENT IN AN ORGANIZED HEALTH CARE EDUCATION/TRAINING PROGRAM

## 2025-08-27 PROCEDURE — 83880 ASSAY OF NATRIURETIC PEPTIDE: CPT | Performed by: STUDENT IN AN ORGANIZED HEALTH CARE EDUCATION/TRAINING PROGRAM

## 2025-08-27 PROCEDURE — 87635 SARS-COV-2 COVID-19 AMP PRB: CPT | Performed by: STUDENT IN AN ORGANIZED HEALTH CARE EDUCATION/TRAINING PROGRAM

## 2025-08-27 PROCEDURE — 99285 EMERGENCY DEPT VISIT HI MDM: CPT | Mod: 25

## 2025-08-27 PROCEDURE — 96361 HYDRATE IV INFUSION ADD-ON: CPT

## 2025-08-27 PROCEDURE — 51798 US URINE CAPACITY MEASURE: CPT

## 2025-08-27 PROCEDURE — 93005 ELECTROCARDIOGRAM TRACING: CPT

## 2025-08-27 PROCEDURE — 81025 URINE PREGNANCY TEST: CPT | Performed by: STUDENT IN AN ORGANIZED HEALTH CARE EDUCATION/TRAINING PROGRAM

## 2025-08-28 PROBLEM — N18.9 ACUTE KIDNEY INJURY SUPERIMPOSED ON CHRONIC KIDNEY DISEASE: Status: ACTIVE | Noted: 2025-08-28

## 2025-08-28 PROBLEM — N17.9 ACUTE RENAL FAILURE: Status: ACTIVE | Noted: 2025-08-28

## 2025-08-28 PROBLEM — R06.02 SOB (SHORTNESS OF BREATH): Status: ACTIVE | Noted: 2025-08-28

## 2025-08-28 LAB
ABSOLUTE EOSINOPHIL (OHS): 0.19 K/UL
ABSOLUTE MONOCYTE (OHS): 0.61 K/UL (ref 0.3–1)
ABSOLUTE NEUTROPHIL COUNT (OHS): 6.98 K/UL (ref 1.8–7.7)
ALBUMIN SERPL BCP-MCNC: 3.3 G/DL (ref 3.5–5.2)
ALP SERPL-CCNC: 73 UNIT/L (ref 40–150)
ALT SERPL W/O P-5'-P-CCNC: 9 UNIT/L (ref 10–44)
ANION GAP (OHS): 10 MMOL/L (ref 8–16)
AORTIC SIZE INDEX (SOV): 1.4 CM/M2
AORTIC SIZE INDEX: 1.2 CM/M2
ASCENDING AORTA: 2.6 CM
AST SERPL-CCNC: 20 UNIT/L (ref 11–45)
AV INDEX (PROSTH): 0.46
AV MEAN GRADIENT: 5 MMHG
AV PEAK GRADIENT: 8 MMHG
AV VALVE AREA BY VELOCITY RATIO: 1.6 CM²
AV VALVE AREA: 1.7 CM²
AV VELOCITY RATIO: 0.43
BACTERIA #/AREA URNS AUTO: NORMAL /HPF
BASOPHILS # BLD AUTO: 0.04 K/UL
BASOPHILS NFR BLD AUTO: 0.4 %
BILIRUB SERPL-MCNC: 0.4 MG/DL (ref 0.1–1)
BILIRUB UR QL STRIP.AUTO: NEGATIVE
BSA FOR ECHO PROCEDURE: 2.32 M2
BUN SERPL-MCNC: 36 MG/DL (ref 6–20)
CALCIUM SERPL-MCNC: 9 MG/DL (ref 8.7–10.5)
CHLORIDE SERPL-SCNC: 106 MMOL/L (ref 95–110)
CK SERPL-CCNC: 193 U/L (ref 20–180)
CK SERPL-CCNC: 219 U/L (ref 20–180)
CLARITY UR: CLEAR
CO2 SERPL-SCNC: 25 MMOL/L (ref 23–29)
COLOR UR AUTO: COLORLESS
CREAT SERPL-MCNC: 4 MG/DL (ref 0.5–1.4)
CREAT UR-MCNC: 57.6 MG/DL (ref 15–325)
CREAT UR-MCNC: 58.3 MG/DL (ref 15–325)
CTP QC/QA: YES
CV ECHO LV RWT: 0.39 CM
D DIMER PPP IA.FEU-MCNC: 0.87 MG/L FEU
DOP CALC AO PEAK VEL: 1.4 M/S
DOP CALC AO VTI: 23.9 CM
DOP CALC LVOT AREA: 3.8 CM2
DOP CALC LVOT DIAMETER: 2.2 CM
DOP CALC LVOT PEAK VEL: 0.6 M/S
DOP CALCLVOT PEAK VEL VTI: 11 CM
E WAVE DECELERATION TIME: 119 MSEC
E/A RATIO: 1.09
E/E' RATIO: 11 M/S
ECHO LV POSTERIOR WALL: 1.1 CM (ref 0.6–1.1)
ERYTHROCYTE [DISTWIDTH] IN BLOOD BY AUTOMATED COUNT: 17 % (ref 11.5–14.5)
FERRITIN SERPL-MCNC: 8.9 NG/ML (ref 20–300)
FRACTIONAL SHORTENING: 17.5 % (ref 28–44)
GFR SERPLBLD CREATININE-BSD FMLA CKD-EPI: 13 ML/MIN/1.73/M2
GLUCOSE SERPL-MCNC: 143 MG/DL (ref 70–110)
GLUCOSE SERPL-MCNC: 223 MG/DL (ref 70–110)
GLUCOSE UR QL STRIP: NEGATIVE
HCT VFR BLD AUTO: 36.4 % (ref 37–48.5)
HGB BLD-MCNC: 10.3 GM/DL (ref 12–16)
HGB UR QL STRIP: ABNORMAL
HOLD SPECIMEN: NORMAL
HYALINE CASTS UR QL AUTO: 0 /LPF (ref 0–1)
IMM GRANULOCYTES # BLD AUTO: 0.04 K/UL (ref 0–0.04)
IMM GRANULOCYTES NFR BLD AUTO: 0.4 % (ref 0–0.5)
INTERVENTRICULAR SEPTUM: 1 CM (ref 0.6–1.1)
IRON SATN MFR SERPL: 7 % (ref 20–50)
IRON SERPL-MCNC: 30 UG/DL (ref 30–160)
IVC DIAMETER: 1.63 CM
IVRT: 93 MSEC
KETONES UR QL STRIP: NEGATIVE
LA MAJOR: 4.9 CM
LA MINOR: 4.4 CM
LA WIDTH: 3.1 CM
LEFT ATRIUM SIZE: 4.2 CM
LEFT ATRIUM VOLUME INDEX: 23 ML/M2
LEFT ATRIUM VOLUME: 51 CM3
LEFT INTERNAL DIMENSION IN SYSTOLE: 4.7 CM (ref 2.1–4)
LEFT VENTRICLE DIASTOLIC VOLUME INDEX: 70.72 ML/M2
LEFT VENTRICLE DIASTOLIC VOLUME: 157 ML
LEFT VENTRICLE MASS INDEX: 108.7 G/M2
LEFT VENTRICLE SYSTOLIC VOLUME INDEX: 46.4 ML/M2
LEFT VENTRICLE SYSTOLIC VOLUME: 103 ML
LEFT VENTRICULAR INTERNAL DIMENSION IN DIASTOLE: 5.7 CM (ref 3.5–6)
LEFT VENTRICULAR MASS: 241.3 G
LEUKOCYTE ESTERASE UR QL STRIP: NEGATIVE
LV LATERAL E/E' RATIO: 8 M/S
LV SEPTAL E/E' RATIO: 19.2 M/S
LVED V (TEICH): 156.79 ML
LVES V (TEICH): 103.44 ML
LVOT MG: 0.91 MMHG
LVOT MV: 0.45 CM/S
LYMPHOCYTES # BLD AUTO: 2.48 K/UL (ref 1–4.8)
Lab: 1.5 CM/M
Lab: 1.8 CM/M
MAGNESIUM SERPL-MCNC: 2.1 MG/DL (ref 1.6–2.6)
MCH RBC QN AUTO: 24.9 PG (ref 27–31)
MCHC RBC AUTO-ENTMCNC: 28.3 G/DL (ref 32–36)
MCV RBC AUTO: 88 FL (ref 82–98)
MICROSCOPIC COMMENT: NORMAL
MV PEAK A VEL: 0.88 M/S
MV PEAK E VEL: 0.96 M/S
MV STENOSIS PRESSURE HALF TIME: 34.59 MS
MV VALVE AREA P 1/2 METHOD: 6.36 CM2
NITRITE UR QL STRIP: NEGATIVE
NUCLEATED RBC (/100WBC) (OHS): 0 /100 WBC
OHS CV CPX PATIENT HEIGHT IN: 67
OHS CV RV/LV RATIO: 0.49 CM
OHS QRS DURATION: 128 MS
OHS QTC CALCULATION: 462 MS
PH UR STRIP: 7 [PH]
PHOSPHATE SERPL-MCNC: 4.3 MG/DL (ref 2.7–4.5)
PISA TR MAX VEL: 3.4 M/S
PLATELET # BLD AUTO: 262 K/UL (ref 150–450)
PMV BLD AUTO: 9.6 FL (ref 9.2–12.9)
POCT GLUCOSE: 186 MG/DL (ref 70–110)
POCT GLUCOSE: 217 MG/DL (ref 70–110)
POCT GLUCOSE: 231 MG/DL (ref 70–110)
POTASSIUM SERPL-SCNC: 3.2 MMOL/L (ref 3.5–5.1)
PROT SERPL-MCNC: 7.1 GM/DL (ref 6–8.4)
PROT UR QL STRIP: ABNORMAL
PROT UR-MCNC: 68 MG/DL
PROT/CREAT UR: 1.17 MG/G{CREAT}
PTH-INTACT SERPL-MCNC: 243 PG/ML (ref 9–77)
PULM VEIN S/D RATIO: 1.47
PV PEAK D VEL: 0.36 M/S
PV PEAK GRADIENT: 7 MMHG
PV PEAK S VEL: 0.53 M/S
PV PEAK VELOCITY: 1.3 M/S
RA MAJOR: 4.1 CM
RA PRESSURE ESTIMATED: 3 MMHG
RA WIDTH: 3.4 CM
RBC # BLD AUTO: 4.13 M/UL (ref 4–5.4)
RBC #/AREA URNS AUTO: 0 /HPF (ref 0–4)
RELATIVE EOSINOPHIL (OHS): 1.8 %
RELATIVE LYMPHOCYTE (OHS): 24 % (ref 18–48)
RELATIVE MONOCYTE (OHS): 5.9 % (ref 4–15)
RELATIVE NEUTROPHIL (OHS): 67.5 % (ref 38–73)
RIGHT VENTRICLE DIASTOLIC BASEL DIMENSION: 2.8 CM
RIGHT VENTRICULAR END-DIASTOLIC DIMENSION: 2.83 CM
RV TB RVSP: 6 MMHG
SARS-COV-2 RDRP RESP QL NAA+PROBE: NEGATIVE
SINUS: 3 CM
SODIUM SERPL-SCNC: 141 MMOL/L (ref 136–145)
SODIUM UR-SCNC: 37 MMOL/L (ref 20–250)
SP GR UR STRIP: 1.01
SQUAMOUS #/AREA URNS AUTO: 2 /HPF
STJ: 2.3 CM
TDI LATERAL: 0.12 M/S
TDI SEPTAL: 0.05 M/S
TDI: 0.09 M/S
TIBC SERPL-MCNC: 451 UG/DL (ref 250–450)
TR MAX PG: 46 MMHG
TRANSFERRIN SERPL-MCNC: 305 MG/DL (ref 200–375)
TRICUSPID ANNULAR PLANE SYSTOLIC EXCURSION: 2.2 CM
TROPONIN I SERPL HS-MCNC: 23 NG/L
TV REST PULMONARY ARTERY PRESSURE: 49 MMHG
UROBILINOGEN UR STRIP-ACNC: NEGATIVE EU/DL
WBC # BLD AUTO: 10.34 K/UL (ref 3.9–12.7)
WBC #/AREA URNS AUTO: 1 /HPF (ref 0–5)
YEAST UR QL AUTO: NORMAL /HPF
Z-SCORE OF LEFT VENTRICULAR DIMENSION IN END DIASTOLE: -3.08
Z-SCORE OF LEFT VENTRICULAR DIMENSION IN END SYSTOLE: -0.01

## 2025-08-28 PROCEDURE — 25000003 PHARM REV CODE 250: Performed by: STUDENT IN AN ORGANIZED HEALTH CARE EDUCATION/TRAINING PROGRAM

## 2025-08-28 PROCEDURE — 94761 N-INVAS EAR/PLS OXIMETRY MLT: CPT

## 2025-08-28 PROCEDURE — 82570 ASSAY OF URINE CREATININE: CPT | Performed by: EMERGENCY MEDICINE

## 2025-08-28 PROCEDURE — 96372 THER/PROPH/DIAG INJ SC/IM: CPT | Performed by: STUDENT IN AN ORGANIZED HEALTH CARE EDUCATION/TRAINING PROGRAM

## 2025-08-28 PROCEDURE — 63600175 PHARM REV CODE 636 W HCPCS: Performed by: STUDENT IN AN ORGANIZED HEALTH CARE EDUCATION/TRAINING PROGRAM

## 2025-08-28 PROCEDURE — 25000003 PHARM REV CODE 250: Performed by: EMERGENCY MEDICINE

## 2025-08-28 PROCEDURE — 99900031 HC PATIENT EDUCATION (STAT)

## 2025-08-28 PROCEDURE — 27000190 HC CPAP FULL FACE MASK W/VALVE

## 2025-08-28 PROCEDURE — 11000001 HC ACUTE MED/SURG PRIVATE ROOM

## 2025-08-28 PROCEDURE — 85379 FIBRIN DEGRADATION QUANT: CPT | Performed by: EMERGENCY MEDICINE

## 2025-08-28 PROCEDURE — 84100 ASSAY OF PHOSPHORUS: CPT | Performed by: STUDENT IN AN ORGANIZED HEALTH CARE EDUCATION/TRAINING PROGRAM

## 2025-08-28 PROCEDURE — 25000242 PHARM REV CODE 250 ALT 637 W/ HCPCS: Performed by: STUDENT IN AN ORGANIZED HEALTH CARE EDUCATION/TRAINING PROGRAM

## 2025-08-28 PROCEDURE — 63600175 PHARM REV CODE 636 W HCPCS: Performed by: NURSE PRACTITIONER

## 2025-08-28 PROCEDURE — 84300 ASSAY OF URINE SODIUM: CPT | Performed by: EMERGENCY MEDICINE

## 2025-08-28 PROCEDURE — 63600175 PHARM REV CODE 636 W HCPCS: Performed by: EMERGENCY MEDICINE

## 2025-08-28 PROCEDURE — 83970 ASSAY OF PARATHORMONE: CPT | Performed by: PHYSICIAN ASSISTANT

## 2025-08-28 PROCEDURE — 94799 UNLISTED PULMONARY SVC/PX: CPT

## 2025-08-28 PROCEDURE — 84156 ASSAY OF PROTEIN URINE: CPT | Performed by: INTERNAL MEDICINE

## 2025-08-28 PROCEDURE — 83735 ASSAY OF MAGNESIUM: CPT | Performed by: STUDENT IN AN ORGANIZED HEALTH CARE EDUCATION/TRAINING PROGRAM

## 2025-08-28 PROCEDURE — 81001 URINALYSIS AUTO W/SCOPE: CPT | Performed by: INTERNAL MEDICINE

## 2025-08-28 PROCEDURE — 82550 ASSAY OF CK (CPK): CPT | Performed by: STUDENT IN AN ORGANIZED HEALTH CARE EDUCATION/TRAINING PROGRAM

## 2025-08-28 PROCEDURE — 84466 ASSAY OF TRANSFERRIN: CPT | Performed by: PHYSICIAN ASSISTANT

## 2025-08-28 PROCEDURE — 94660 CPAP INITIATION&MGMT: CPT

## 2025-08-28 PROCEDURE — 82728 ASSAY OF FERRITIN: CPT | Performed by: PHYSICIAN ASSISTANT

## 2025-08-28 PROCEDURE — 85025 COMPLETE CBC W/AUTO DIFF WBC: CPT | Performed by: STUDENT IN AN ORGANIZED HEALTH CARE EDUCATION/TRAINING PROGRAM

## 2025-08-28 PROCEDURE — 80053 COMPREHEN METABOLIC PANEL: CPT | Performed by: STUDENT IN AN ORGANIZED HEALTH CARE EDUCATION/TRAINING PROGRAM

## 2025-08-28 PROCEDURE — 96374 THER/PROPH/DIAG INJ IV PUSH: CPT

## 2025-08-28 PROCEDURE — 82550 ASSAY OF CK (CPK): CPT | Mod: 91 | Performed by: INTERNAL MEDICINE

## 2025-08-28 PROCEDURE — 99223 1ST HOSP IP/OBS HIGH 75: CPT | Mod: ,,, | Performed by: PHYSICIAN ASSISTANT

## 2025-08-28 PROCEDURE — 99900035 HC TECH TIME PER 15 MIN (STAT)

## 2025-08-28 PROCEDURE — 94640 AIRWAY INHALATION TREATMENT: CPT

## 2025-08-28 PROCEDURE — 96375 TX/PRO/DX INJ NEW DRUG ADDON: CPT

## 2025-08-28 PROCEDURE — 84484 ASSAY OF TROPONIN QUANT: CPT | Performed by: EMERGENCY MEDICINE

## 2025-08-28 RX ORDER — ACETAMINOPHEN 325 MG/1
650 TABLET ORAL EVERY 4 HOURS PRN
Status: DISCONTINUED | OUTPATIENT
Start: 2025-08-28 | End: 2025-08-29 | Stop reason: HOSPADM

## 2025-08-28 RX ORDER — ACETAMINOPHEN 325 MG/1
650 TABLET ORAL EVERY 8 HOURS PRN
Status: DISCONTINUED | OUTPATIENT
Start: 2025-08-28 | End: 2025-08-29 | Stop reason: HOSPADM

## 2025-08-28 RX ORDER — ALUMINUM HYDROXIDE, MAGNESIUM HYDROXIDE, AND SIMETHICONE 1200; 120; 1200 MG/30ML; MG/30ML; MG/30ML
30 SUSPENSION ORAL 4 TIMES DAILY PRN
Status: DISCONTINUED | OUTPATIENT
Start: 2025-08-28 | End: 2025-08-29 | Stop reason: HOSPADM

## 2025-08-28 RX ORDER — SODIUM,POTASSIUM PHOSPHATES 280-250MG
2 POWDER IN PACKET (EA) ORAL
Status: DISCONTINUED | OUTPATIENT
Start: 2025-08-28 | End: 2025-08-29 | Stop reason: HOSPADM

## 2025-08-28 RX ORDER — OXYCODONE AND ACETAMINOPHEN 5; 325 MG/1; MG/1
1 TABLET ORAL
Refills: 0 | Status: DISCONTINUED | OUTPATIENT
Start: 2025-08-28 | End: 2025-08-29 | Stop reason: HOSPADM

## 2025-08-28 RX ORDER — IPRATROPIUM BROMIDE AND ALBUTEROL SULFATE 2.5; .5 MG/3ML; MG/3ML
3 SOLUTION RESPIRATORY (INHALATION) EVERY 6 HOURS PRN
Status: DISCONTINUED | OUTPATIENT
Start: 2025-08-28 | End: 2025-08-29 | Stop reason: HOSPADM

## 2025-08-28 RX ORDER — ATORVASTATIN CALCIUM 40 MG/1
80 TABLET, FILM COATED ORAL DAILY
Status: DISCONTINUED | OUTPATIENT
Start: 2025-08-28 | End: 2025-08-29 | Stop reason: HOSPADM

## 2025-08-28 RX ORDER — METHOCARBAMOL 750 MG/1
750 TABLET, FILM COATED ORAL 2 TIMES DAILY PRN
Status: DISCONTINUED | OUTPATIENT
Start: 2025-08-28 | End: 2025-08-29 | Stop reason: HOSPADM

## 2025-08-28 RX ORDER — MORPHINE SULFATE 4 MG/ML
4 INJECTION, SOLUTION INTRAMUSCULAR; INTRAVENOUS
Refills: 0 | Status: COMPLETED | OUTPATIENT
Start: 2025-08-28 | End: 2025-08-28

## 2025-08-28 RX ORDER — IBUPROFEN 200 MG
16 TABLET ORAL
Status: DISCONTINUED | OUTPATIENT
Start: 2025-08-28 | End: 2025-08-29 | Stop reason: HOSPADM

## 2025-08-28 RX ORDER — METOPROLOL SUCCINATE 50 MG/1
50 TABLET, EXTENDED RELEASE ORAL DAILY
Status: DISCONTINUED | OUTPATIENT
Start: 2025-08-28 | End: 2025-08-28

## 2025-08-28 RX ORDER — LANOLIN ALCOHOL/MO/W.PET/CERES
800 CREAM (GRAM) TOPICAL
Status: DISCONTINUED | OUTPATIENT
Start: 2025-08-28 | End: 2025-08-29 | Stop reason: HOSPADM

## 2025-08-28 RX ORDER — POTASSIUM CHLORIDE 20 MEQ/1
40 TABLET, EXTENDED RELEASE ORAL ONCE
Status: COMPLETED | OUTPATIENT
Start: 2025-08-28 | End: 2025-08-28

## 2025-08-28 RX ORDER — SODIUM CHLORIDE, SODIUM LACTATE, POTASSIUM CHLORIDE, CALCIUM CHLORIDE 600; 310; 30; 20 MG/100ML; MG/100ML; MG/100ML; MG/100ML
INJECTION, SOLUTION INTRAVENOUS CONTINUOUS
Status: DISCONTINUED | OUTPATIENT
Start: 2025-08-28 | End: 2025-08-29 | Stop reason: HOSPADM

## 2025-08-28 RX ORDER — BISACODYL 10 MG/1
10 SUPPOSITORY RECTAL DAILY PRN
Status: DISCONTINUED | OUTPATIENT
Start: 2025-08-28 | End: 2025-08-29 | Stop reason: HOSPADM

## 2025-08-28 RX ORDER — GLUCAGON 1 MG
1 KIT INJECTION
Status: DISCONTINUED | OUTPATIENT
Start: 2025-08-28 | End: 2025-08-29 | Stop reason: HOSPADM

## 2025-08-28 RX ORDER — IBUPROFEN 200 MG
24 TABLET ORAL
Status: DISCONTINUED | OUTPATIENT
Start: 2025-08-28 | End: 2025-08-29 | Stop reason: HOSPADM

## 2025-08-28 RX ORDER — SIMETHICONE 80 MG
1 TABLET,CHEWABLE ORAL 4 TIMES DAILY PRN
Status: DISCONTINUED | OUTPATIENT
Start: 2025-08-28 | End: 2025-08-29 | Stop reason: HOSPADM

## 2025-08-28 RX ORDER — AMOXICILLIN 250 MG
1 CAPSULE ORAL DAILY PRN
Status: DISCONTINUED | OUTPATIENT
Start: 2025-08-28 | End: 2025-08-29 | Stop reason: HOSPADM

## 2025-08-28 RX ORDER — INSULIN ASPART 100 [IU]/ML
15 INJECTION, SOLUTION INTRAVENOUS; SUBCUTANEOUS
Status: DISCONTINUED | OUTPATIENT
Start: 2025-08-28 | End: 2025-08-28

## 2025-08-28 RX ORDER — INSULIN ASPART 100 [IU]/ML
0-5 INJECTION, SOLUTION INTRAVENOUS; SUBCUTANEOUS
Status: DISCONTINUED | OUTPATIENT
Start: 2025-08-28 | End: 2025-08-29 | Stop reason: HOSPADM

## 2025-08-28 RX ORDER — NALOXONE HCL 0.4 MG/ML
0.02 VIAL (ML) INJECTION
Status: DISCONTINUED | OUTPATIENT
Start: 2025-08-28 | End: 2025-08-29 | Stop reason: HOSPADM

## 2025-08-28 RX ORDER — ONDANSETRON HYDROCHLORIDE 2 MG/ML
4 INJECTION, SOLUTION INTRAVENOUS
Status: COMPLETED | OUTPATIENT
Start: 2025-08-28 | End: 2025-08-28

## 2025-08-28 RX ORDER — TALC
6 POWDER (GRAM) TOPICAL NIGHTLY PRN
Status: DISCONTINUED | OUTPATIENT
Start: 2025-08-28 | End: 2025-08-29 | Stop reason: HOSPADM

## 2025-08-28 RX ORDER — SODIUM CHLORIDE 0.9 % (FLUSH) 0.9 %
10 SYRINGE (ML) INJECTION EVERY 12 HOURS PRN
Status: DISCONTINUED | OUTPATIENT
Start: 2025-08-28 | End: 2025-08-29 | Stop reason: HOSPADM

## 2025-08-28 RX ORDER — INSULIN GLARGINE 100 [IU]/ML
25 INJECTION, SOLUTION SUBCUTANEOUS 2 TIMES DAILY
Status: DISCONTINUED | OUTPATIENT
Start: 2025-08-28 | End: 2025-08-29 | Stop reason: HOSPADM

## 2025-08-28 RX ORDER — ONDANSETRON HYDROCHLORIDE 2 MG/ML
4 INJECTION, SOLUTION INTRAVENOUS EVERY 8 HOURS PRN
Status: DISCONTINUED | OUTPATIENT
Start: 2025-08-28 | End: 2025-08-29 | Stop reason: HOSPADM

## 2025-08-28 RX ORDER — GABAPENTIN 400 MG/1
400 CAPSULE ORAL 3 TIMES DAILY PRN
Status: DISCONTINUED | OUTPATIENT
Start: 2025-08-28 | End: 2025-08-29 | Stop reason: HOSPADM

## 2025-08-28 RX ADMIN — INSULIN ASPART 15 UNITS: 100 INJECTION, SOLUTION INTRAVENOUS; SUBCUTANEOUS at 09:08

## 2025-08-28 RX ADMIN — IPRATROPIUM BROMIDE AND ALBUTEROL SULFATE 3 ML: 2.5; .5 SOLUTION RESPIRATORY (INHALATION) at 03:08

## 2025-08-28 RX ADMIN — Medication 6 MG: at 08:08

## 2025-08-28 RX ADMIN — ATORVASTATIN CALCIUM 80 MG: 40 TABLET, FILM COATED ORAL at 09:08

## 2025-08-28 RX ADMIN — IPRATROPIUM BROMIDE AND ALBUTEROL SULFATE 3 ML: 2.5; .5 SOLUTION RESPIRATORY (INHALATION) at 09:08

## 2025-08-28 RX ADMIN — GABAPENTIN 400 MG: 400 CAPSULE ORAL at 08:08

## 2025-08-28 RX ADMIN — SODIUM CHLORIDE 500 ML: 9 INJECTION, SOLUTION INTRAVENOUS at 03:08

## 2025-08-28 RX ADMIN — METOPROLOL SUCCINATE 50 MG: 50 TABLET, EXTENDED RELEASE ORAL at 09:08

## 2025-08-28 RX ADMIN — INSULIN ASPART 1 UNITS: 100 INJECTION, SOLUTION INTRAVENOUS; SUBCUTANEOUS at 08:08

## 2025-08-28 RX ADMIN — INSULIN ASPART 2 UNITS: 100 INJECTION, SOLUTION INTRAVENOUS; SUBCUTANEOUS at 05:08

## 2025-08-28 RX ADMIN — SODIUM CHLORIDE, POTASSIUM CHLORIDE, SODIUM LACTATE AND CALCIUM CHLORIDE: 600; 310; 30; 20 INJECTION, SOLUTION INTRAVENOUS at 08:08

## 2025-08-28 RX ADMIN — INSULIN GLARGINE 25 UNITS: 100 INJECTION, SOLUTION SUBCUTANEOUS at 08:08

## 2025-08-28 RX ADMIN — APIXABAN 5 MG: 5 TABLET, FILM COATED ORAL at 08:08

## 2025-08-28 RX ADMIN — MORPHINE SULFATE 4 MG: 4 INJECTION, SOLUTION INTRAMUSCULAR; INTRAVENOUS at 01:08

## 2025-08-28 RX ADMIN — ONDANSETRON 4 MG: 2 INJECTION INTRAMUSCULAR; INTRAVENOUS at 01:08

## 2025-08-28 RX ADMIN — SODIUM CHLORIDE, POTASSIUM CHLORIDE, SODIUM LACTATE AND CALCIUM CHLORIDE: 600; 310; 30; 20 INJECTION, SOLUTION INTRAVENOUS at 12:08

## 2025-08-28 RX ADMIN — INSULIN GLARGINE 25 UNITS: 100 INJECTION, SOLUTION SUBCUTANEOUS at 09:08

## 2025-08-28 RX ADMIN — APIXABAN 5 MG: 5 TABLET, FILM COATED ORAL at 09:08

## 2025-08-28 RX ADMIN — POTASSIUM CHLORIDE 40 MEQ: 1500 TABLET, EXTENDED RELEASE ORAL at 05:08

## 2025-08-28 RX ADMIN — OXYCODONE HYDROCHLORIDE AND ACETAMINOPHEN 1 TABLET: 5; 325 TABLET ORAL at 08:08

## 2025-08-29 VITALS
BODY MASS INDEX: 39.1 KG/M2 | WEIGHT: 249.13 LBS | HEIGHT: 67 IN | TEMPERATURE: 98 F | DIASTOLIC BLOOD PRESSURE: 67 MMHG | HEART RATE: 71 BPM | SYSTOLIC BLOOD PRESSURE: 97 MMHG | OXYGEN SATURATION: 100 % | RESPIRATION RATE: 18 BRPM

## 2025-08-29 DIAGNOSIS — N17.9 ACUTE KIDNEY INJURY SUPERIMPOSED ON CHRONIC KIDNEY DISEASE: Primary | ICD-10-CM

## 2025-08-29 DIAGNOSIS — N18.9 ACUTE KIDNEY INJURY SUPERIMPOSED ON CHRONIC KIDNEY DISEASE: Primary | ICD-10-CM

## 2025-08-29 LAB
ALBUMIN SERPL BCP-MCNC: 3.3 G/DL (ref 3.5–5.2)
ALBUMIN SERPL BCP-MCNC: 3.4 G/DL (ref 3.5–5.2)
ANION GAP (OHS): 10 MMOL/L (ref 8–16)
ANION GAP (OHS): 11 MMOL/L (ref 8–16)
BUN SERPL-MCNC: 29 MG/DL (ref 6–20)
BUN SERPL-MCNC: 31 MG/DL (ref 6–20)
CALCIUM SERPL-MCNC: 9.2 MG/DL (ref 8.7–10.5)
CALCIUM SERPL-MCNC: 9.3 MG/DL (ref 8.7–10.5)
CHLORIDE SERPL-SCNC: 104 MMOL/L (ref 95–110)
CHLORIDE SERPL-SCNC: 106 MMOL/L (ref 95–110)
CO2 SERPL-SCNC: 24 MMOL/L (ref 23–29)
CO2 SERPL-SCNC: 25 MMOL/L (ref 23–29)
CREAT SERPL-MCNC: 3.5 MG/DL (ref 0.5–1.4)
CREAT SERPL-MCNC: 3.8 MG/DL (ref 0.5–1.4)
GFR SERPLBLD CREATININE-BSD FMLA CKD-EPI: 14 ML/MIN/1.73/M2
GFR SERPLBLD CREATININE-BSD FMLA CKD-EPI: 15 ML/MIN/1.73/M2
GLUCOSE SERPL-MCNC: 216 MG/DL (ref 70–110)
GLUCOSE SERPL-MCNC: 246 MG/DL (ref 70–110)
HOLD SPECIMEN: NORMAL
PHOSPHATE SERPL-MCNC: 3.6 MG/DL (ref 2.7–4.5)
PHOSPHATE SERPL-MCNC: 4.7 MG/DL (ref 2.7–4.5)
POCT GLUCOSE: 196 MG/DL (ref 70–110)
POCT GLUCOSE: 224 MG/DL (ref 70–110)
POTASSIUM SERPL-SCNC: 3.8 MMOL/L (ref 3.5–5.1)
POTASSIUM SERPL-SCNC: 3.9 MMOL/L (ref 3.5–5.1)
SODIUM SERPL-SCNC: 140 MMOL/L (ref 136–145)
SODIUM SERPL-SCNC: 140 MMOL/L (ref 136–145)

## 2025-08-29 PROCEDURE — 82310 ASSAY OF CALCIUM: CPT | Performed by: NURSE PRACTITIONER

## 2025-08-29 PROCEDURE — 80069 RENAL FUNCTION PANEL: CPT | Performed by: INTERNAL MEDICINE

## 2025-08-29 PROCEDURE — 36415 COLL VENOUS BLD VENIPUNCTURE: CPT | Performed by: INTERNAL MEDICINE

## 2025-08-29 PROCEDURE — 94640 AIRWAY INHALATION TREATMENT: CPT

## 2025-08-29 PROCEDURE — 25000003 PHARM REV CODE 250: Performed by: STUDENT IN AN ORGANIZED HEALTH CARE EDUCATION/TRAINING PROGRAM

## 2025-08-29 PROCEDURE — 99232 SBSQ HOSP IP/OBS MODERATE 35: CPT | Mod: ,,, | Performed by: INTERNAL MEDICINE

## 2025-08-29 PROCEDURE — 25000242 PHARM REV CODE 250 ALT 637 W/ HCPCS: Performed by: STUDENT IN AN ORGANIZED HEALTH CARE EDUCATION/TRAINING PROGRAM

## 2025-08-29 PROCEDURE — 36415 COLL VENOUS BLD VENIPUNCTURE: CPT | Performed by: NURSE PRACTITIONER

## 2025-08-29 PROCEDURE — 99900035 HC TECH TIME PER 15 MIN (STAT)

## 2025-08-29 PROCEDURE — 94761 N-INVAS EAR/PLS OXIMETRY MLT: CPT

## 2025-08-29 PROCEDURE — 63600175 PHARM REV CODE 636 W HCPCS: Performed by: NURSE PRACTITIONER

## 2025-08-29 PROCEDURE — 94660 CPAP INITIATION&MGMT: CPT

## 2025-08-29 RX ORDER — FUROSEMIDE 40 MG/1
40 TABLET ORAL DAILY
Start: 2025-08-29

## 2025-08-29 RX ORDER — METOPROLOL SUCCINATE 50 MG/1
25 TABLET, EXTENDED RELEASE ORAL DAILY
Start: 2025-08-29

## 2025-08-29 RX ADMIN — SODIUM CHLORIDE, POTASSIUM CHLORIDE, SODIUM LACTATE AND CALCIUM CHLORIDE: 600; 310; 30; 20 INJECTION, SOLUTION INTRAVENOUS at 09:08

## 2025-08-29 RX ADMIN — INSULIN ASPART 3 UNITS: 100 INJECTION, SOLUTION INTRAVENOUS; SUBCUTANEOUS at 04:08

## 2025-08-29 RX ADMIN — IPRATROPIUM BROMIDE AND ALBUTEROL SULFATE 3 ML: 2.5; .5 SOLUTION RESPIRATORY (INHALATION) at 11:08

## 2025-08-29 RX ADMIN — APIXABAN 5 MG: 5 TABLET, FILM COATED ORAL at 09:08

## 2025-08-29 RX ADMIN — ATORVASTATIN CALCIUM 80 MG: 40 TABLET, FILM COATED ORAL at 09:08

## 2025-08-29 RX ADMIN — IPRATROPIUM BROMIDE AND ALBUTEROL SULFATE 3 ML: 2.5; .5 SOLUTION RESPIRATORY (INHALATION) at 04:08

## 2025-08-29 RX ADMIN — INSULIN ASPART 2 UNITS: 100 INJECTION, SOLUTION INTRAVENOUS; SUBCUTANEOUS at 12:08

## 2025-08-29 RX ADMIN — INSULIN GLARGINE 25 UNITS: 100 INJECTION, SOLUTION SUBCUTANEOUS at 09:08

## 2025-08-30 LAB — POCT GLUCOSE: 266 MG/DL (ref 70–110)

## 2025-09-02 ENCOUNTER — PATIENT OUTREACH (OUTPATIENT)
Dept: ADMINISTRATIVE | Facility: CLINIC | Age: 50
End: 2025-09-02
Payer: MEDICARE

## 2025-09-02 ENCOUNTER — PATIENT MESSAGE (OUTPATIENT)
Dept: PAIN MEDICINE | Facility: CLINIC | Age: 50
End: 2025-09-02
Payer: MEDICARE

## 2025-09-02 DIAGNOSIS — M79.18 MYOFASCIAL PAIN: ICD-10-CM

## 2025-09-02 RX ORDER — OXYCODONE AND ACETAMINOPHEN 5; 325 MG/1; MG/1
1 TABLET ORAL
Qty: 30 TABLET | Refills: 0 | Status: SHIPPED | OUTPATIENT
Start: 2025-09-02

## 2025-09-02 RX ORDER — METHOCARBAMOL 750 MG/1
750 TABLET, FILM COATED ORAL 2 TIMES DAILY PRN
Qty: 60 TABLET | Refills: 2 | Status: SHIPPED | OUTPATIENT
Start: 2025-09-02

## (undated) DEVICE — DRESSING LEUKOPLAST FLEX 1X3IN